# Patient Record
Sex: MALE | Race: WHITE | Employment: OTHER | URBAN - METROPOLITAN AREA
[De-identification: names, ages, dates, MRNs, and addresses within clinical notes are randomized per-mention and may not be internally consistent; named-entity substitution may affect disease eponyms.]

---

## 2017-08-02 RX ORDER — SILDENAFIL CITRATE 20 MG/1
TABLET ORAL
Qty: 30 TAB | Refills: 0 | Status: SHIPPED | OUTPATIENT
Start: 2017-08-02 | End: 2017-09-08

## 2017-08-16 PROBLEM — K21.9 GERD (GASTROESOPHAGEAL REFLUX DISEASE): Status: ACTIVE | Noted: 2017-08-16

## 2017-08-16 PROBLEM — K63.5 COLON POLYPS: Status: ACTIVE | Noted: 2017-08-16

## 2017-08-16 PROBLEM — Z79.899 ON STATIN THERAPY: Status: ACTIVE | Noted: 2017-08-16

## 2017-08-16 PROBLEM — M10.9 GOUT: Status: ACTIVE | Noted: 2017-08-16

## 2017-08-16 PROBLEM — N52.9 ED (ERECTILE DYSFUNCTION): Status: ACTIVE | Noted: 2017-08-16

## 2017-08-16 PROBLEM — Z23 ENCOUNTER FOR IMMUNIZATION: Status: ACTIVE | Noted: 2017-08-16

## 2017-08-16 PROBLEM — I10 HYPERTENSION: Status: ACTIVE | Noted: 2017-08-16

## 2017-08-16 PROBLEM — K13.21 LEUKOPLAKIA OF ORAL CAVITY: Status: ACTIVE | Noted: 2017-08-16

## 2017-08-16 PROBLEM — R06.00 DYSPNEA: Status: ACTIVE | Noted: 2017-08-16

## 2017-08-16 PROBLEM — R53.83 FATIGUE: Status: ACTIVE | Noted: 2017-08-16

## 2017-08-16 PROBLEM — E78.5 DYSLIPIDEMIA: Status: ACTIVE | Noted: 2017-08-16

## 2017-08-16 PROBLEM — I25.10 CAD (CORONARY ARTERY DISEASE): Status: ACTIVE | Noted: 2017-08-16

## 2017-08-16 PROBLEM — G45.9 TIA (TRANSIENT ISCHEMIC ATTACK): Status: ACTIVE | Noted: 2017-08-16

## 2017-08-16 RX ORDER — FUROSEMIDE 20 MG/1
TABLET ORAL DAILY
COMMUNITY
End: 2017-09-01 | Stop reason: SDUPTHER

## 2017-08-16 RX ORDER — ALBUTEROL SULFATE 90 UG/1
AEROSOL, METERED RESPIRATORY (INHALATION)
COMMUNITY
End: 2017-09-08

## 2017-09-01 RX ORDER — FUROSEMIDE 20 MG/1
TABLET ORAL
Qty: 30 TAB | Refills: 6 | Status: SHIPPED | OUTPATIENT
Start: 2017-09-01 | End: 2018-09-10

## 2017-09-06 RX ORDER — ATORVASTATIN CALCIUM 80 MG/1
TABLET, FILM COATED ORAL
Qty: 30 TAB | Refills: 6 | Status: SHIPPED | OUTPATIENT
Start: 2017-09-06 | End: 2017-12-08 | Stop reason: SDUPTHER

## 2017-09-08 ENCOUNTER — OFFICE VISIT (OUTPATIENT)
Dept: INTERNAL MEDICINE CLINIC | Age: 82
End: 2017-09-08

## 2017-09-08 VITALS
SYSTOLIC BLOOD PRESSURE: 174 MMHG | OXYGEN SATURATION: 95 % | HEIGHT: 68 IN | WEIGHT: 222 LBS | HEART RATE: 60 BPM | DIASTOLIC BLOOD PRESSURE: 88 MMHG | BODY MASS INDEX: 33.65 KG/M2

## 2017-09-08 DIAGNOSIS — M1A.00X0 IDIOPATHIC CHRONIC GOUT WITHOUT TOPHUS, UNSPECIFIED SITE: ICD-10-CM

## 2017-09-08 DIAGNOSIS — I10 ESSENTIAL HYPERTENSION: ICD-10-CM

## 2017-09-08 DIAGNOSIS — Z79.899 ON STATIN THERAPY: ICD-10-CM

## 2017-09-08 DIAGNOSIS — R53.83 FATIGUE, UNSPECIFIED TYPE: ICD-10-CM

## 2017-09-08 DIAGNOSIS — Z23 ENCOUNTER FOR IMMUNIZATION: ICD-10-CM

## 2017-09-08 DIAGNOSIS — E78.5 DYSLIPIDEMIA: ICD-10-CM

## 2017-09-08 DIAGNOSIS — I25.10 CORONARY ARTERY DISEASE INVOLVING NATIVE CORONARY ARTERY OF NATIVE HEART WITHOUT ANGINA PECTORIS: Primary | ICD-10-CM

## 2017-09-08 LAB
ALBUMIN SERPL-MCNC: 4.2 G/DL (ref 3.9–5.4)
ALKALINE PHOS POC: 124 U/L (ref 38–126)
ALT SERPL-CCNC: 39 U/L (ref 9–52)
AST SERPL-CCNC: 32 U/L (ref 14–36)
BACTERIA UA POCT, BACTPOCT: NORMAL
BILIRUB UR QL STRIP: NEGATIVE
BUN BLD-MCNC: 17 MG/DL (ref 9–20)
CALCIUM BLD-MCNC: 9.5 MG/DL (ref 8.4–10.2)
CASTS UA POCT: 0
CHLORIDE BLD-SCNC: 102 MMOL/L (ref 98–107)
CHOLEST SERPL-MCNC: 171 MG/DL (ref 0–200)
CK (CPK) POC: 50 U/L (ref 30–135)
CLUE CELLS, CLUEPOCT: NEGATIVE
CO2 POC: 28 MMOL/L (ref 22–32)
CREAT BLD-MCNC: 1.1 MG/DL (ref 0.8–1.5)
CRYSTALS UA POCT, CRYSPOCT: NORMAL
EGFR (POC): 62.2
EPITHELIAL CELLS POCT, EPITHPOCT: NORMAL
GLUCOSE POC: 105 MG/DL (ref 75–110)
GLUCOSE UR-MCNC: NEGATIVE MG/DL
GRAN# POC: 6.3 K/UL (ref 2–7.8)
GRAN% POC: 78.2 % (ref 37–92)
HCT VFR BLD CALC: 48.3 % (ref 37–51)
HDLC SERPL-MCNC: 36 MG/DL (ref 35–130)
HGB BLD-MCNC: 15.4 G/DL (ref 12–18)
KETONES P FAST UR STRIP-MCNC: NEGATIVE MG/DL
LDL CHOLESTEROL POC: 70.4 MG/DL (ref 0–130)
LY# POC: 1.3 K/UL (ref 0.6–4.1)
LY% POC: 17.1 % (ref 10–58.5)
MCH RBC QN: 32.5 PG (ref 26–32)
MCHC RBC-ENTMCNC: 31.9 G/DL (ref 30–36)
MCV RBC: 102 FL (ref 80–97)
MID #, POC: 0.3 K/UL (ref 0–1.8)
MID% POC: 4.7 % (ref 0.1–24)
MUCUS UA POCT, MUCPOCT: NORMAL
PH UR STRIP: 6 [PH] (ref 5–7)
PLATELET # BLD: 207 K/UL (ref 140–440)
POTASSIUM SERPL-SCNC: 5.1 MMOL/L (ref 3.6–5)
PROT SERPL-MCNC: 7.3 G/DL (ref 6.3–8.2)
PROTEIN,URINE POC: NEGATIVE MG/DL
RBC # BLD: 4.73 M/UL (ref 4.2–6.3)
RBC UA POCT, RBCPOCT: 0
SODIUM SERPL-SCNC: 145 MMOL/L (ref 137–145)
SP GR UR STRIP: 1.01 (ref 1.01–1.02)
TCHOL/HDL RATIO (POC): 4.8 (ref 0–4)
TOTAL BILIRUBIN POC: 0.8 MG/DL (ref 0.2–1.3)
TRICH UA POCT, TRICHPOC: NEGATIVE
TRIGL SERPL-MCNC: 323 MG/DL (ref 0–200)
UA UROBILINOGEN AMB POC: NORMAL (ref 0.2–1)
URINALYSIS CLARITY POC: CLEAR
URINALYSIS COLOR POC: NORMAL
URINE BLOOD POC: NEGATIVE
URINE LEUKOCYTES POC: NEGATIVE
URINE NITRITES POC: NEGATIVE
VLDLC SERPL CALC-MCNC: 64.6 MG/DL
WBC # BLD: 7.9 K/UL (ref 4.1–10.9)
WBC UA POCT, WBCPOCT: NORMAL
YEAST UA POCT, YEASTPOC: NEGATIVE

## 2017-09-08 RX ORDER — VALSARTAN 320 MG/1
320 TABLET ORAL DAILY
Qty: 30 TAB | Refills: 6 | Status: SHIPPED | OUTPATIENT
Start: 2017-09-08 | End: 2017-12-15 | Stop reason: SDUPTHER

## 2017-09-08 NOTE — MR AVS SNAPSHOT
Visit Information Date & Time Provider Department Dept. Phone Encounter #  
 9/8/2017  9:00 AM RAMA Mays MD [address] [place] [address] [facility] [phone] [id number] Follow-up Instructions Return in about 1 month (around 10/8/2017) for bp check, follow up. Upcoming Health Maintenance Date Due DTaP/Tdap/Td series (1 - Tdap) 10/8/1955 ZOSTER VACCINE AGE 60> 8/8/1994 GLAUCOMA SCREENING Q2Y 10/8/1999 MEDICARE YEARLY EXAM 10/8/1999 Pneumococcal 65+ Low/Medium Risk (2 of 2 - PPSV23) 1/1/2010 INFLUENZA AGE 9 TO ADULT 8/1/2017 Allergies as of 9/8/2017  Review Complete On: 9/8/2017 By: Arlet Ballard MD  
 No Known Allergies Current Immunizations  Reviewed on 11/9/2015 Name Date Influenza High Dose Vaccine PF 9/8/2017 Influenza Vaccine (Quad) PF 11/10/2015 11:04 AM  
 Pneumococcal Conjugate (PCV-13) 9/8/2017 Pneumococcal Vaccine (Unspecified Type) 1/1/2005 Not reviewed this visit You Were Diagnosed With   
  
 Codes Comments Coronary artery disease involving native coronary artery of native heart without angina pectoris    -  Primary ICD-10-CM: I25.10 ICD-9-CM: 414.01 Essential hypertension     ICD-10-CM: I10 
ICD-9-CM: 401.9 Dyslipidemia     ICD-10-CM: E78.5 ICD-9-CM: 272.4 On statin therapy     ICD-10-CM: Z79.899 ICD-9-CM: V58.69 Idiopathic chronic gout without tophus, unspecified site     ICD-10-CM: M1A. 00X0 ICD-9-CM: 274.02 Fatigue, unspecified type     ICD-10-CM: R53.83 ICD-9-CM: 780.79 Encounter for immunization     ICD-10-CM: Z20 ICD-9-CM: V03.89 Vitals BP Pulse Height(growth percentile) Weight(growth percentile) SpO2 BMI  
 174/88 (BP 1 Location: Left arm, BP Patient Position: Sitting) 60 5' 8\" (1.727 m) 222 lb (100.7 kg) 95% 33.75 kg/m2 Smoking Status Former Smoker Vitals History BMI and BSA Data Body Mass Index Body Surface Area 33.75 kg/m 2 2.2 m 2 [phone] Your Updated Medication List  
  
   
This list is accurate as of: 9/8/17 10:48 AM.  Always use your most recent med list.  
  
  
  
  
 allopurinol 300 mg tablet Commonly known as:  Vena Ditch Take  by mouth daily. amLODIPine 10 mg tablet Commonly known as:  Jones Matar Take 5 mg by mouth daily. aspirin 81 mg tablet Take 81 mg by mouth. atenolol 100 mg tablet Commonly known as:  TENORMIN Take 100 mg by mouth daily. atorvastatin 80 mg tablet Commonly known as:  LIPITOR  
TAKE ONE TABLET BY MOUTH EVERY DAY  
  
 diazePAM 2 mg tablet Commonly known as:  VALIUM Take 1 Tab by mouth every eight (8) hours as needed for Anxiety. Max Daily Amount: 6 mg.  
  
 docusate sodium 100 mg capsule Commonly known as:  Frida Parsley Take 1 Cap by mouth two (2) times a day. May use OTC instead. Prevents constipation from narcotics. FOLGARD RX 2.2-25-1 mg Tab Generic drug:  folic acid-vit H9-VVQ A00 TAKE ONE TABLET BY MOUTH DAILY  
  
 furosemide 20 mg tablet Commonly known as:  LASIX TAKE ONE TABLET BY MOUTH DAILY PREVACID PO Take  by mouth.  
  
 valsartan 320 mg tablet Commonly known as:  DIOVAN Take 1 Tab by mouth daily. Prescriptions Sent to Pharmacy Refills  
 valsartan (DIOVAN) 320 mg tablet 6 Sig: Take 1 Tab by mouth daily. Class: Normal  
 Pharmacy: [facility] [address] [place] [address] 108 Ph #: [phone] Route: Oral  
  
We Performed the Following ADMIN INFLUENZA VIRUS VAC [ [facility]] ADMIN PNEUMOCOCCAL VACCINE [ [facility]] AMB POC CK (CPK) [40095 CPT(R)] AMB POC COMPLETE CBC,AUTOMATED ENTER [id number] CPT(R)] AMB POC COMPREHENSIVE METABOLIC PANEL [53083 CPT(R)] AMB POC LIPID PROFILE [92493 CPT(R)] AMB POC URINALYSIS DIP STICK AUTO W/ MICRO  [77399 CPT(R)] INFLUENZA VIRUS VACCINE, HIGH DOSE SEASONAL, PRESERVATIVE FREE [79986 CPT(R)] PNEUMOCOCCAL CONJ VACCINE 13 VALENT IM [id number] CPT(R)] Follow-up Instructions Return in about 1 month (around 10/8/2017) for bp check, follow up. Patient Instructions Vaccine Information Statement Influenza (Flu) Vaccine (Inactivated or Recombinant): What you need to know Many Vaccine Information Statements are available in Thai and other languages. See www.immunize.org/vis Hojas de Información Sobre Vacunas están disponibles en Español y en muchos otros idiomas. Visite www.immunize.org/vis 1. Why get vaccinated? Influenza (flu) is a contagious disease that spreads around the [place] every year, usually between October and May. Flu is caused by influenza viruses, and is spread mainly by coughing, sneezing, and close contact. Anyone can get flu. Flu strikes suddenly and can last several days. Symptoms vary by age, but can include: 
 fever/chills  sore throat  muscle aches  fatigue  cough  headache  runny or stuffy nose Flu can also lead to pneumonia and blood infections, and cause diarrhea and seizures in children. If you have a medical condition, such as heart or lung disease, flu can make it worse. Flu is more dangerous for some people. Infants and young children, people 72years of age and older, pregnant women, and people with certain health conditions or a weakened immune system are at greatest risk. Each year thousands of people in the [facility] die from flu, and many more are hospitalized. Flu vaccine can: 
 keep you from getting flu, 
 make flu less severe if you do get it, and 
 keep you from spreading flu to your family and other people. 2. Inactivated and recombinant flu vaccines A dose of flu vaccine is recommended every flu season.  Children 6 months through 6years of age may need two doses during the same flu season. Everyone else needs only one dose each flu season. Some inactivated flu vaccines contain a very small amount of a mercury-based preservative called thimerosal. Studies have not shown thimerosal in vaccines to be harmful, but flu vaccines that do not contain thimerosal are available. There is no live flu virus in flu shots. They cannot cause the flu. There are many flu viruses, and they are always changing. Each year a new flu vaccine is made to protect against three or four viruses that are likely to cause disease in the upcoming flu season. But even when the vaccine doesnt exactly match these viruses, it may still provide some protection Flu vaccine cannot prevent: 
 flu that is caused by a virus not covered by the vaccine, or 
 illnesses that look like flu but are not. It takes about 2 weeks for protection to develop after vaccination, and protection lasts through the flu season. 3. Some people should not get this vaccine Tell the person who is giving you the vaccine:  If you have any severe, life-threatening allergies. If you ever had a life-threatening allergic reaction after a dose of flu vaccine, or have a severe allergy to any part of this vaccine, you may be advised not to get vaccinated. Most, but not all, types of flu vaccine contain a small amount of egg protein.  If you ever had Guillain-Barré Syndrome (also called GBS). Some people with a history of GBS should not get this vaccine. This should be discussed with your doctor.  If you are not feeling well. It is usually okay to get flu vaccine when you have a mild illness, but you might be asked to come back when you feel better. 4. Risks of a vaccine reaction With any medicine, including vaccines, there is a chance of reactions. These are usually mild and go away on their own, but serious reactions are also possible. Most people who get a flu shot do not have any problems with it. Minor problems following a flu shot include:  
 soreness, redness, or swelling where the shot was given  hoarseness  sore, red or itchy eyes  cough  fever  aches  headache  itching  fatigue If these problems occur, they usually begin soon after the shot and last 1 or 2 days. More serious problems following a flu shot can include the following:  There may be a small increased risk of Guillain-Barré Syndrome (GBS) after inactivated flu vaccine. This risk has been estimated at 1 or 2 additional cases per million people vaccinated. This is much lower than the risk of severe complications from flu, which can be prevented by flu vaccine.  Young children who get the flu shot along with pneumococcal vaccine (PCV13) and/or DTaP vaccine at the same time might be slightly more likely to have a seizure caused by fever. Ask your doctor for more information. Tell your doctor if a child who is getting flu vaccine has ever had a seizure. Problems that could happen after any injected vaccine:  People sometimes faint after a medical procedure, including vaccination. Sitting or lying down for about 15 minutes can help prevent fainting, and injuries caused by a fall. Tell your doctor if you feel dizzy, or have vision changes or ringing in the ears.  Some people get severe pain in the shoulder and have difficulty moving the arm where a shot was given. This happens very rarely.  Any medication can cause a severe allergic reaction. Such reactions from a vaccine are very rare, estimated at about 1 in a million doses, and would happen within a few minutes to a few hours after the vaccination. As with any medicine, there is a very remote chance of a vaccine causing a serious injury or death. The safety of vaccines is always being monitored.  For more information, visit: www.cdc.gov/vaccinesafety/ 
 
 
The [facility] Vaccine Injury Compensation Program (VICP) is a federal program that was created to compensate people who may have been injured by certain vaccines. Persons who believe they may have been injured by a vaccine can learn about the program and about filing a claim by calling 8-218.610.3464 or visiting the [address] [place] [address] website at [email or web address]/vaccinecompensation. There is a time limit to file a claim for compensation. 7. How can I learn more?  Ask your healthcare provider. He or she can give you the vaccine package insert or suggest other sources of information.  Call your local or state health department.  Contact the Centers for Disease Control and Prevention (CDC): 
- Call 8-188.363.2122 (1-800-CDC-INFO) or 
- Visit CDCs website at www.cdc.gov/flu Vaccine Information Statement Inactivated Influenza Vaccine 8/7/2015 
[address] 645VT-16 Department of Health and [place] Centers for Disease Control and Prevention Office Use Only Vaccine Information Statement Pneumococcal Conjugate Vaccine (PCV13): What You Need to Know Many Vaccine Information Statements are available in Grenadian and other languages. See www.immunize.org/vis. Hojas de información Sobre Vacunas están disponibles en español y en muchos otros idiomas. Visite www.immunize.org/vis. 1. Why get vaccinated? Vaccination can protect both children and adults from pneumococcal disease. Pneumococcal disease is caused by bacteria that can spread from person to person through close contact. It can cause ear infections, and it can also lead to more serious infections of the: 
 Lungs (pneumonia),  Blood (bacteremia), and 
 Covering of the brain and spinal cord (meningitis). Pneumococcal pneumonia is most common among adults. Pneumococcal meningitis can cause deafness and brain damage, and it kills about 1 child in 10 who get it. Anyone can get pneumococcal disease, but children under 3years of age and adults 72 years and older, people with certain medical conditions, and cigarette smokers are at the highest risk. Before there was a vaccine, the [facility] saw: 
 more than 700 cases of meningitis, 
 about 13,000 blood infections, 
 about 5 million ear infections, and 
 about 200 deaths 
in children under 5 each year from pneumococcal disease. Since vaccine became available, severe pneumococcal disease in these children has fallen by 88%. About 18,000 older adults die of pneumococcal disease each year in the [place]. Treatment of pneumococcal infections with penicillin and other drugs is not as effective as it used to be, because some strains of the disease have become resistant to these drugs. This makes prevention of the disease, through vaccination, even more important. 2. PCV13 vaccine Pneumococcal conjugate vaccine (called PCV13) protects against 13 types of pneumococcal bacteria. PCV13 is routinely given to children at 2, 4, 6, and 1515 months of age. It is also recommended for children and adults 3to 59years of age with certain health conditions, and for all adults 72years of age and older. Your doctor can give you details. 3. Some people should not get this vaccine Anyone who has ever had a life-threatening allergic reaction to a dose of this vaccine, to an earlier pneumococcal vaccine called PCV7, or to any vaccine containing diphtheria toxoid (for example, DTaP), should not get PCV13. Anyone with a severe allergy to any component of PCV13 should not get the vaccine. Tell your doctor if the person being vaccinated has any severe allergies. If the person scheduled for vaccination is not feeling well, your healthcare provider might decide to reschedule the shot on another day. 4. Risks of a vaccine reaction With any medicine, including vaccines, there is a chance of reactions. These are usually mild and go away on their own, but serious reactions are also possible. Problems reported following PCV13 varied by age and dose in the series. The most common problems reported among children were:  About half became drowsy after the shot, had a temporary loss of appetite, or had redness or tenderness where the shot was given.  About 1 out of 3 had swelling where the shot was given.  About 1 out of 3 had a mild fever, and about 1 in 20 had a fever over 102.2°F. 
 Up to about 8 out of 10 became fussy or irritable. Adults have reported pain, redness, and swelling where the shot was given; also mild fever, fatigue, headache, chills, or muscle pain. Angelia Oliveira children who get PCV13 along with inactivated flu vaccine at the same time may be at increased risk for seizures caused by fever. Ask your doctor for more information. Problems that could happen after any vaccine:  People sometimes faint after a medical procedure, including vaccination. Sitting or lying down for about 15 minutes can help prevent fainting, and injuries caused by a fall. Tell your doctor if you feel dizzy, or have vision changes or ringing in the ears.  Some older children and adults get severe pain in the shoulder and have difficulty moving the arm where a shot was given. This happens very rarely.  Any medication can cause a severe allergic reaction. Such reactions from a vaccine are very rare, estimated at about 1 in a million doses, and would happen within a few minutes to a few hours after the vaccination. As with any medicine, there is a very small chance of a vaccine causing a serious injury or death. The safety of vaccines is always being monitored. For more information, visit: www.cdc.gov/vaccinesafety/  
 
5. What if there is a serious reaction? What should I look for?  Look for anything that concerns you, such as signs of a severe allergic reaction, very high fever, or unusual behavior. Signs of a severe allergic reaction can include hives, swelling of the face and throat, difficulty breathing, a fast heartbeat, dizziness, and weakness  usually within a few minutes to a few hours after the vaccination. What should I do?  If you think it is a severe allergic reaction or other emergency that cant wait, call 9-1-1 or get the person to the nearest hospital. Otherwise, call your doctor. Reactions should be reported to the Vaccine Adverse Event Reporting System (VAERS). Your doctor should file this report, or you can do it yourself through the VAERS web site at www.vaers. hhs.gov, or by calling 9-878.787.9909. VAERS does not give medical advice. 6. The National Vaccine Injury Compensation Program 
 
The [facility] Vaccine Injury Compensation Program (VICP) is a federal program that was created to compensate people who may have been injured by certain vaccines.  
 
Persons who believe they may have been injured by a vaccine can learn about the program and about filing a claim by calling 7-292.570.7103 or visiting the [address] [facility] website at www.[facility].gov/vaccinecompensation. There is a time limit to file a claim for compensation. 7. How can I learn more?  Ask your healthcare provider. He or she can give you the vaccine package insert or suggest other sources of information.  Call your local or state health department.  Contact the Centers for Disease Control and Prevention (CDC): 
- Call 5-646.333.1372 (1-800-CDC-INFO) or 
- Visit CDCs website at www.cdc.gov/vaccines Vaccine Information Statement PCV13 Vaccine 11/5/2015  
42 [address] [id number]-02 [facility] and [place] Centers for Disease Control and Prevention Office Use Only Introducing [facility]! Kat[employer] introduces [employer] patient portal. Now you can access parts of your medical record, email your doctor's office, and request medication refills online. 1. In your internet browser, go to [email or web address] 2. Click on the First Time User? Click Here link in the Sign In box. You will see the New Member Sign Up page. 3. Enter your [employer] Access Code exactly as it appears below. You will not need to use this code after youve completed the sign-up process. If you do not sign up before the expiration date, you must request a new code. · [employer] Access Code: [email or web address] Expires: 12/7/2017  8:53 AM 
 
4. Enter the last four digits of your Social Security Number (xxxx) and Date of Birth (mm/dd/yyyy) as indicated and click Submit. You will be taken to the next sign-up page. 5. Create a [employer] ID. This will be your [employer] login ID and cannot be changed, so think of one that is secure and easy to remember. 6. Create a [employer] password. You can change your password at any time. 7. Enter your Password Reset Question and Answer. This can be used at a later time if you forget your password. 8. Enter your e-mail address. You will receive e-mail notification when new information is available in [address] 9. Click Sign Up. You can now view and download portions of your medical record. 10. Click the Download Summary menu link to download a portable copy of your medical information. If you have questions, please visit the Frequently Asked Questions section of the [facility] website. Remember, [facility] [employer] [facility] is NOT to be used for urgent needs. For medical emergencies, dial 911. Now available from your iPhone and Android! Please provide this summary of care documentation to your next provider. Your primary care clinician is listed as RAMA Calhoun. If you have any questions after today's visit, please call [phone].

## 2017-09-08 NOTE — PROGRESS NOTES
Chelsea Singh is a 80 y.o. male presenting for Blood Pressure Check (rm 3 - 6 mo follow up - had coffee with cream and sugar) and Cholesterol Problem  . 1. Have you been to the ER, urgent care clinic since your last visit? Hospitalized since your last visit? No    2. Have you seen or consulted any other health care providers outside of the [address] [place] [address] since your last visit? Include any pap smears or colon screening.  No

## 2017-09-08 NOTE — PATIENT INSTRUCTIONS
Vaccine Information Statement    Influenza (Flu) Vaccine (Inactivated or Recombinant): What you need to know    Many Vaccine Information Statements are available in Portuguese and other languages. See www.immunize.org/vis  Hojas de Información Sobre Vacunas están disponibles en Español y en muchos otros idiomas. Visite www.immunize.org/vis    1. Why get vaccinated? Influenza (flu) is a contagious disease that spreads around the [place] every year, usually between October and May. Flu is caused by influenza viruses, and is spread mainly by coughing, sneezing, and close contact. Anyone can get flu. Flu strikes suddenly and can last several days. Symptoms vary by age, but can include:   fever/chills   sore throat   muscle aches   fatigue   cough   headache    runny or stuffy nose    Flu can also lead to pneumonia and blood infections, and cause diarrhea and seizures in children. If you have a medical condition, such as heart or lung disease, flu can make it worse. Flu is more dangerous for some people. Infants and young children, people 72years of age and older, pregnant women, and people with certain health conditions or a weakened immune system are at greatest risk. Each year thousands of people in the [facility] die from flu, and many more are hospitalized. Flu vaccine can:   keep you from getting flu,   make flu less severe if you do get it, and   keep you from spreading flu to your family and other people. 2. Inactivated and recombinant flu vaccines    A dose of flu vaccine is recommended every flu season. Children 6 months through 6years of age may need two doses during the same flu season. Everyone else needs only one dose each flu season.        Some inactivated flu vaccines contain a very small amount of a mercury-based preservative called thimerosal. Studies have not shown thimerosal in vaccines to be harmful, but flu vaccines that do not contain thimerosal are available. There is no live flu virus in flu shots. They cannot cause the flu. There are many flu viruses, and they are always changing. Each year a new flu vaccine is made to protect against three or four viruses that are likely to cause disease in the upcoming flu season. But even when the vaccine doesnt exactly match these viruses, it may still provide some protection    Flu vaccine cannot prevent:   flu that is caused by a virus not covered by the vaccine, or   illnesses that look like flu but are not. It takes about 2 weeks for protection to develop after vaccination, and protection lasts through the flu season. 3. Some people should not get this vaccine    Tell the person who is giving you the vaccine:     If you have any severe, life-threatening allergies. If you ever had a life-threatening allergic reaction after a dose of flu vaccine, or have a severe allergy to any part of this vaccine, you may be advised not to get vaccinated. Most, but not all, types of flu vaccine contain a small amount of egg protein.  If you ever had Guillain-Barré Syndrome (also called GBS). Some people with a history of GBS should not get this vaccine. This should be discussed with your doctor.  If you are not feeling well. It is usually okay to get flu vaccine when you have a mild illness, but you might be asked to come back when you feel better. 4. Risks of a vaccine reaction    With any medicine, including vaccines, there is a chance of reactions. These are usually mild and go away on their own, but serious reactions are also possible. Most people who get a flu shot do not have any problems with it.      Minor problems following a flu shot include:    soreness, redness, or swelling where the shot was given     hoarseness   sore, red or itchy eyes   cough   fever   aches   headache   itching   fatigue  If these problems occur, they usually begin soon after the shot and last 1 or 2 days. More serious problems following a flu shot can include the following:     There may be a small increased risk of Guillain-Barré Syndrome (GBS) after inactivated flu vaccine. This risk has been estimated at 1 or 2 additional cases per million people vaccinated. This is much lower than the risk of severe complications from flu, which can be prevented by flu vaccine.  Young children who get the flu shot along with pneumococcal vaccine (PCV13) and/or DTaP vaccine at the same time might be slightly more likely to have a seizure caused by fever. Ask your doctor for more information. Tell your doctor if a child who is getting flu vaccine has ever had a seizure. Problems that could happen after any injected vaccine:      People sometimes faint after a medical procedure, including vaccination. Sitting or lying down for about 15 minutes can help prevent fainting, and injuries caused by a fall. Tell your doctor if you feel dizzy, or have vision changes or ringing in the ears.  Some people get severe pain in the shoulder and have difficulty moving the arm where a shot was given. This happens very rarely.  Any medication can cause a severe allergic reaction. Such reactions from a vaccine are very rare, estimated at about 1 in a million doses, and would happen within a few minutes to a few hours after the vaccination. As with any medicine, there is a very remote chance of a vaccine causing a serious injury or death. The safety of vaccines is always being monitored. For more information, visit: www.cdc.gov/vaccinesafety/    5. What if there is a serious reaction? What should I look for?  Look for anything that concerns you, such as signs of a severe allergic reaction, very high fever, or unusual behavior.     Signs of a severe allergic reaction can include hives, swelling of the face and throat, difficulty breathing, a fast heartbeat, dizziness, and weakness  usually within a few minutes to a few hours after the vaccination. What should I do?  If you think it is a severe allergic reaction or other emergency that cant wait, call 9-1-1 and get the person to the nearest hospital. Otherwise, call your doctor.  Reactions should be reported to the Vaccine Adverse Event Reporting System (VAERS). Your doctor should file this report, or you can do it yourself through  the VAERS web site at www.vaers. [facility].gov, or by calling 9-450.346.6257. VAERS does not give medical advice. 6. The National Vaccine Injury Compensation Program    The [facility] Vaccine Injury Compensation Program (VICP) is a federal program that was created to compensate people who may have been injured by certain vaccines. Persons who believe they may have been injured by a vaccine can learn about the program and about filing a claim by calling 2-239.999.7887 or visiting the [employer] website at www.[facility].gov/vaccinecompensation. There is a time limit to file a claim for compensation. 7. How can I learn more?  Ask your healthcare provider. He or she can give you the vaccine package insert or suggest other sources of information.  Call your local or state health department.  Contact the Centers for Disease Control and Prevention (CDC):  - Call 4-100.885.9996 (1-800-CDC-INFO) or  - Visit CDCs website at www.cdc.gov/flu    Vaccine Information Statement   Inactivated Influenza Vaccine   8/7/2015  [address] [place] [id number]    Department of Health and Human Services  Centers for Disease Control and Prevention    Office Use Only    Vaccine Information Statement     Pneumococcal Conjugate Vaccine (PCV13): What You Need to Know    Many Vaccine Information Statements are available in [place]ien and other languages. See www.immunize.org/vis. Hojas de información Sobre Vacunas están disponibles en español y en muchos otros idiomas. Visite www.immunize.org/vis. 1. Why get vaccinated?     Vaccination can protect both children and adults from pneumococcal disease. Pneumococcal disease is caused by bacteria that can spread from person to person through close contact. It can cause ear infections, and it can also lead to more serious infections of the:   Lungs (pneumonia),   Blood (bacteremia), and   Covering of the brain and spinal cord (meningitis). Pneumococcal pneumonia is most common among adults. Pneumococcal meningitis can cause deafness and brain damage, and it kills about 1 child in 10 who get it. Anyone can get pneumococcal disease, but children under 3years of age and adults 72 years and older, people with certain medical conditions, and cigarette smokers are at the highest risk. Before there was a vaccine, the [facility] saw:   more than 700 cases of meningitis,   about 13,000 blood infections,   about 5 million ear infections, and   about 200 deaths  in children under 5 each year from pneumococcal disease. Since vaccine became available, severe pneumococcal disease in these children has fallen by 88%. About 18,000 older adults die of pneumococcal disease each year in the [place]. Treatment of pneumococcal infections with penicillin and other drugs is not as effective as it used to be, because some strains of the disease have become resistant to these drugs. This makes prevention of the disease, through vaccination, even more important. 2. PCV13 vaccine    Pneumococcal conjugate vaccine (called PCV13) protects against 13 types of pneumococcal bacteria. PCV13 is routinely given to children at 2, 4, 6, and 1515 months of age. It is also recommended for children and adults 3to 59years of age with certain health conditions, and for all adults 72years of age and older. Your doctor can give you details.     3. Some people should not get this vaccine    Anyone who has ever had a life-threatening allergic reaction to a dose of this vaccine, to an earlier pneumococcal vaccine called PCV7, or to any vaccine containing diphtheria toxoid (for example, DTaP), should not get PCV13. Anyone with a severe allergy to any component of PCV13 should not get the vaccine. Tell your doctor if the person being vaccinated has any severe allergies. If the person scheduled for vaccination is not feeling well, your healthcare provider might decide to reschedule the shot on another day. 4. Risks of a vaccine reaction    With any medicine, including vaccines, there is a chance of reactions. These are usually mild and go away on their own, but serious reactions are also possible. Problems reported following PCV13 varied by age and dose in the series. The most common problems reported among children were:    About half became drowsy after the shot, had a temporary loss of appetite, or had redness or tenderness where the shot was given.  About 1 out of 3 had swelling where the shot was given.  About 1 out of 3 had a mild fever, and about 1 in 20 had a fever over 102.2°F.   Up to about 8 out of 10 became fussy or irritable. Adults have reported pain, redness, and swelling where the shot was given; also mild fever, fatigue, headache, chills, or muscle pain. [place] children who get PCV13 along with inactivated flu vaccine at the same time may be at increased risk for seizures caused by fever. Ask your doctor for more information. Problems that could happen after any vaccine:     People sometimes faint after a medical procedure, including vaccination. Sitting or lying down for about 15 minutes can help prevent fainting, and injuries caused by a fall. Tell your doctor if you feel dizzy, or have vision changes or ringing in the ears.  Some older children and adults get severe pain in the shoulder and have difficulty moving the arm where a shot was given. This happens very rarely.  Any medication can cause a severe allergic reaction.  Such reactions from a vaccine are very rare, estimated at about 1 in a million doses, and would happen within a few minutes to a few hours after the vaccination. As with any medicine, there is a very small chance of a vaccine causing a serious injury or death. The safety of vaccines is always being monitored. For more information, visit: www.cdc.gov/vaccinesafety/     5. What if there is a serious reaction? What should I look for?  Look for anything that concerns you, such as signs of a severe allergic reaction, very high fever, or unusual behavior. Signs of a severe allergic reaction can include hives, swelling of the face and throat, difficulty breathing, a fast heartbeat, dizziness, and weakness  usually within a few minutes to a few hours after the vaccination. What should I do?  If you think it is a severe allergic reaction or other emergency that cant wait, call 9-1-1 or get the person to the nearest hospital. Otherwise, call your doctor. Reactions should be reported to the Vaccine Adverse Event Reporting System (VAERS). Your doctor should file this report, or you can do it yourself through the VAERS web site at www.vaers. [facility].gov, or by calling 2-288.561.5108. VAERS does not give medical advice. 6. The National Vaccine Injury Compensation Program    The [facility] Vaccine Injury Compensation Program (VICP) is a federal program that was created to compensate people who may have been injured by certain vaccines. Persons who believe they may have been injured by a vaccine can learn about the program and about filing a claim by calling 3-884.112.6135 or visiting the [employer] website at www.[facility].gov/vaccinecompensation. There is a time limit to file a claim for compensation. 7. How can I learn more?  Ask your healthcare provider. He or she can give you the vaccine package insert or suggest other sources of information.  Call your local or state health department.    Contact the Centers for Disease Control and Prevention (CDC):  - Call 5-408.414.1264 (1-800-CDC-INFO) or  - Visit CDCs website at www.cdc.gov/vaccines    Vaccine Information Statement   PCV13 Vaccine   11/5/2015   [address] [id number]    Department of Health and Human Services  Centers for Disease Control and Prevention    Office Use Only

## 2017-09-08 NOTE — PROGRESS NOTES
This note will not be viewable in [address] Teresa Kumar is a 80 y.o. male and presents with Blood Pressure Check (Rm 3 - 6 mo follow up); Cholesterol Problem; and Cough (X 3- 4 mo - clear mucous)  . Subjective:    Mr. Gaby Gutierres presents today for follow-up of hypertension hyperlipidemia and a cough. The cough is been largely nonproductive with clear mucus over the past 3-4 months. He denies any pleuritic chest pain or shortness of breath. He has had no chest pain congestion coryza PND orthopnea or pedal edema. 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 30 Tab 6    atorvastatin (LIPITOR) 80 mg tablet TAKE ONE TABLET BY MOUTH EVERY DAY 30 Tab 6    furosemide (LASIX) 20 mg tablet TAKE ONE TABLET BY MOUTH DAILY 30 Tab 6    FOLGARD RX 2.2-25-1 mg tab TAKE ONE TABLET BY MOUTH DAILY 30 Tab 6    aspirin 81 mg tablet Take 81 mg by mouth.  amlodipine (NORVASC) 10 mg tablet Take 5 mg by mouth daily.  LANSOPRAZOLE (PREVACID PO) Take  by mouth.       allopurinol (ZYLOPRIM) 300 mg tablet Take  by mouth daily.      atenolol (TENORMIN) 100 mg tablet Take 100 mg by mouth daily.  docusate sodium (COLACE) 100 mg capsule Take 1 Cap by mouth two (2) times a day. May use OTC instead. Prevents constipation from narcotics. 60 Cap 0    diazepam (VALIUM) 2 mg tablet Take 1 Tab by mouth every eight (8) hours as needed for Anxiety. Max Daily Amount: 6 mg. 20 Tab 0     Social History     Social History    Marital status:      Spouse name: N/A    Number of children: N/A    Years of education: N/A     Social History Main Topics    Smoking status: Former Smoker    Smokeless tobacco: Former User    Alcohol use 8.4 oz/week     14 Cans of beer per week    Drug use: No    Sexual activity: Not Asked     Other Topics Concern    None     Social History Narrative     History reviewed. No pertinent family history. Review of Systems  Constitutional:  negative for fevers, chills, anorexia and weight loss  Eyes:    negative for visual disturbance and irritation  ENT:    negative for tinnitus,sore throat,nasal congestion,ear pains. hoarseness  Respiratory:     negative for hemoptysis, dyspnea,wheezing  CV:    negative for chest pain, palpitations, lower extremity edema  GI:    negative for nausea, vomiting, diarrhea, abdominal pain,melena  Endo:               negative for polyuria,polydipsia,polyphagia,heat intolerance  Genitourinary : negative for frequency, dysuria and hematuria  Integumentary: negative for rash and pruritus  Hematologic:   negative for easy bruising and gum/nose bleeding  Musculoskel:  negative for myalgias, arthralgias, back pain, muscle weakness, joint pain  Neurological:   negative for headaches, dizziness, vertigo, memory problems and gait   Behavl/Psych:  negative for feelings of anxiety, depression, mood changes  ROS otherwise negative      Objective:  Visit Vitals    /88 (BP 1 Location: Left arm, BP Patient Position: Sitting)    Pulse 60    Ht 5' 8\" (1.727 m)    Wt 222 lb (100.7 kg)  SpO2 95%    BMI 33.75 kg/m2     Physical Exam:   General appearance - alert, well appearing, and in no distress  Mental status - alert, oriented to person, place, and time  EYE-MAURICE, EOMI, fundi normal, corneas normal, no foreign bodies  ENT-ENT exam normal, no neck nodes or sinus tenderness  Nose -mildly erythematous and boggy  Mouth - mucous membranes moist, pharynx normal without lesions  Neck - supple, no significant adenopathy   Chest - clear to auscultation, no wheezes, rales or rhonchi, symmetric air entry   Heart - normal rate, regular rhythm, normal S1, S2, no murmurs, rubs, clicks or gallops   Abdomen - soft, nontender, nondistended, no masses or organomegaly  Lymph- no adenopathy palpable  Ext-peripheral pulses normal, no pedal edema, no clubbing or cyanosis  Skin-Warm and dry. no hyperpigmentation, vitiligo, or suspicious lesions  Neuro -alert, oriented, normal speech, no focal findings or movement disorder noted      Assessment/Plan:  Diagnoses and all orders for this visit:    1. Coronary artery disease involving native coronary artery of native heart without angina pectoris    2. Essential hypertension  -     AMB POC COMPREHENSIVE METABOLIC PANEL  -     AMB POC URINALYSIS DIP STICK AUTO W/ MICRO     3. Dyslipidemia  -     AMB POC LIPID PROFILE    4. On statin therapy  -     AMB POC CK (CPK)    5. Idiopathic chronic gout without tophus, unspecified site    6. Fatigue, unspecified type  -     AMB POC COMPLETE CBC,AUTOMATED ENTER    7. Encounter for immunization  -     Influenza virus vaccine ([address]) 72 years and older (11483)  -     Pneumococcal Conjugate vaccine - 13 valent (50 years and older)  -     Administration fee () for Medicare insured patients  -     [address] Medicare Injection Admin Charge    Other orders  -     valsartan (DIOVAN) 320 mg tablet; Take 1 Tab by mouth daily. ICD-10-CM ICD-9-CM    1.  Coronary artery disease involving native coronary artery of native heart without angina pectoris I25.10 414.01    2. Essential hypertension I10 401.9 AMB POC COMPREHENSIVE METABOLIC PANEL      AMB POC URINALYSIS DIP STICK AUTO W/ MICRO    3. Dyslipidemia E78.5 272.4 AMB POC LIPID PROFILE   4. On statin therapy Z79.899 V58.69 AMB POC CK (CPK)   5. Idiopathic chronic gout without tophus, unspecified site M1A. 00X0 274.02    6. Fatigue, unspecified type R53.83 780.79 AMB POC COMPLETE CBC,AUTOMATED ENTER   7. Encounter for immunization Z23 V03.89 INFLUENZA VIRUS VACCINE, HIGH DOSE SEASONAL, PRESERVATIVE FREE      PNEUMOCOCCAL CONJ VACCINE 13 VALENT IM      ADMIN INFLUENZA VIRUS VAC      ADMIN PNEUMOCOCCAL VACCINE   Plan:    Mr. Grady De Leon blood pressure is elevated today. The enalapril that he is on may be contributing to his cough. However he does have some postnasal drainage and this may be the cause as well. He had previous in office spirometry the did not demonstrate findings consistent with COPD. Will discontinue enalapril and switch to valsartan 320 mg daily. Follow-up blood pressure check in 1 month. Further recommendations based on lab results. Follow-up Disposition:  Return in about 1 month (around 10/8/2017) for bp check, follow up. I have reviewed with the patient details of the assessment and plan and all questions were answered. Relevent patient education was performed. Verbal and/or written instructions (see AVS) provided. The most recent lab findings were reviewed with the patient. An After Visit Summary was printed and given to the patient.     Yissel White MD

## 2017-10-10 ENCOUNTER — OFFICE VISIT (OUTPATIENT)
Dept: INTERNAL MEDICINE CLINIC | Age: 82
End: 2017-10-10

## 2017-10-10 VITALS
WEIGHT: 226 LBS | SYSTOLIC BLOOD PRESSURE: 140 MMHG | DIASTOLIC BLOOD PRESSURE: 90 MMHG | HEART RATE: 73 BPM | HEIGHT: 68 IN | BODY MASS INDEX: 34.25 KG/M2

## 2017-10-10 DIAGNOSIS — I10 ESSENTIAL HYPERTENSION: Primary | ICD-10-CM

## 2017-10-10 DIAGNOSIS — R05.9 COUGH: ICD-10-CM

## 2017-10-10 DIAGNOSIS — N52.9 ERECTILE DYSFUNCTION, UNSPECIFIED ERECTILE DYSFUNCTION TYPE: ICD-10-CM

## 2017-10-10 RX ORDER — SILDENAFIL CITRATE 20 MG/1
20 TABLET ORAL 3 TIMES DAILY
Qty: 30 TAB | Refills: 1 | Status: SHIPPED | OUTPATIENT
Start: 2017-10-10 | End: 2018-09-10

## 2017-10-10 NOTE — PROGRESS NOTES
This note will not be viewable in [address] Miladis Farooq is a 80 y.o. male and presents with Blood Pressure Check (Rm 2 - continues to cough)  . Subjective:  Mr. Annemarie Lipscomb presents today for follow-up blood pressure check. Blood pressure remains elevated despite his current regimen. His pedal edema persist and has not improved. He notes his cough has persisted to but states that his wife has had this as well as a couple of neighbors. The cough is now largely nonproductive. He has no shortness of breath or wheezing with this. 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 30 Tab 1    valsartan (DIOVAN) 320 mg tablet Take 1 Tab by mouth daily. 30 Tab 6    atorvastatin (LIPITOR) 80 mg tablet TAKE ONE TABLET BY MOUTH EVERY DAY 30 Tab 6    furosemide (LASIX) 20 mg tablet TAKE ONE TABLET BY MOUTH DAILY 30 Tab 6    FOLGARD RX 2.2-25-1 mg tab TAKE ONE TABLET BY MOUTH DAILY 30 Tab 6    docusate sodium (COLACE) 100 mg capsule Take 1 Cap by mouth two (2) times a day. May use OTC instead.  Prevents constipation from narcotics. 60 Cap 0    diazepam (VALIUM) 2 mg tablet Take 1 Tab by mouth every eight (8) hours as needed for Anxiety. Max Daily Amount: 6 mg. 20 Tab 0    aspirin 81 mg tablet Take 81 mg by mouth.  amlodipine (NORVASC) 10 mg tablet Take 5 mg by mouth daily.  LANSOPRAZOLE (PREVACID PO) Take  by mouth.  allopurinol (ZYLOPRIM) 300 mg tablet Take  by mouth daily.  atenolol (TENORMIN) 100 mg tablet Take 100 mg by mouth daily. Social History     Social History    Marital status:      Spouse name: N/A    Number of children: N/A    Years of education: N/A     Social History Main Topics    Smoking status: Former Smoker    Smokeless tobacco: Former User    Alcohol use 8.4 oz/week     14 Cans of beer per week    Drug use: No    Sexual activity: Not Asked     Other Topics Concern    None     Social History Narrative     History reviewed. No pertinent family history. Review of Systems  Constitutional:  negative for fevers, chills, anorexia and weight loss  Eyes:    negative for visual disturbance and irritation  ENT:    negative for tinnitus,sore throat,nasal congestion,ear pains. hoarseness  Respiratory:     negative for  hemoptysis, dyspnea,wheezing  CV:    negative for chest pain, palpitations, lower extremity edema  GI:    negative for nausea, vomiting, diarrhea, abdominal pain,melena  Endo:               negative for polyuria,polydipsia,polyphagia,heat intolerance  Genitourinary : negative for frequency, dysuria and hematuria  Integumentary: negative for rash and pruritus  Hematologic:   negative for easy bruising and gum/nose bleeding  Musculoskel:  negative for myalgias, arthralgias, back pain, muscle weakness, joint pain  Neurological:   negative for headaches, dizziness, vertigo, memory problems and gait   Behavl/Psych:  negative for feelings of anxiety, depression, mood changes  ROS otherwise negative      Objective:  Visit Vitals    /90 (BP 1 Location: Left arm, BP Patient Position: Sitting)    Pulse 73    Ht 5' 8\" (1.727 m)    Wt 226 lb (102.5 kg)    BMI 34.36 kg/m2     Physical Exam:   General appearance - alert, well appearing, and in no distress  Mental status - alert, oriented to person, place, and time  EYE-MAURICE, EOMI, fundi normal, corneas normal, no foreign bodies  ENT-ENT exam normal, no neck nodes or sinus tenderness  Nose - normal and patent, no erythema, discharge or polyps  Mouth - mucous membranes moist, pharynx normal without lesions  Neck - supple, no significant adenopathy   Chest - clear to auscultation, no wheezes, rales or rhonchi, symmetric air entry   Heart - normal rate, regular rhythm, normal S1, S2, no murmurs, rubs, clicks or gallops   Abdomen - soft, nontender, nondistended, no masses or organomegaly  Lymph- no adenopathy palpable  Ext-peripheral pulses normal, 2+ pedal edema, no clubbing or cyanosis  Skin-Warm and dry. no hyperpigmentation, vitiligo, or suspicious lesions  Neuro -alert, oriented, normal speech, no focal findings or movement disorder noted      Assessment/Plan:  Diagnoses and all orders for this visit:    1. Essential hypertension    2. Cough    3. Erectile dysfunction, unspecified erectile dysfunction type    Other orders  -     sildenafil (REVATIO) 20 mg tablet; Take 1 Tab by mouth three (3) times daily. ICD-10-CM ICD-9-CM    1. Essential hypertension I10 401.9    2. Cough R05 786.2    3. Erectile dysfunction, unspecified erectile dysfunction type N52.9 607.84        Plan:    Blood pressures improved overall on current regimen including valsartan, Lasix, amlodipine, and atenolol. Will continue to monitor blood pressure on current regimen. Follow low-sodium diet. Pedal edema may improve as well and will be monitored. Cough has persisted but workup including in office spirometry and  chest x-ray have been negative. Follow-up Disposition:  Return in about 6 weeks (around 11/21/2017).     I have reviewed with the patient details of the assessment and plan and all questions were answered. Relevent patient education was performed. Verbal and/or written instructions (see AVS) provided. The most recent lab findings were reviewed with the patient. Plan was discussed with patient who verbally expressed understanding. An After Visit Summary was printed and given to the patient.     Mari Miller MD

## 2017-10-10 NOTE — MR AVS SNAPSHOT
Visit Information Date & Time Provider Department Dept. Phone Encounter #  
 10/10/2017  9:40 AM RAMA Higuera MD [facility] 84 [phone] [id number] Follow-up Instructions Return in about 6 weeks (around 11/21/2017). Upcoming Health Maintenance Date Due DTaP/Tdap/Td series (1 - Tdap) 10/8/1955 ZOSTER VACCINE AGE 60> 8/8/1994 GLAUCOMA SCREENING Q2Y 10/8/1999 MEDICARE YEARLY EXAM 10/8/1999 Allergies as of 10/10/2017  Review Complete On: 10/10/2017 By: Vanessa Davis MD  
 No Known Allergies Current Immunizations  Reviewed on 11/9/2015 Name Date Influenza High Dose Vaccine PF 9/8/2017 Influenza Vaccine (Quad) PF 11/10/2015 11:04 AM  
 Pneumococcal Conjugate (PCV-13) 9/8/2017 Pneumococcal Vaccine (Unspecified Type) 1/1/2005 Not reviewed this visit You Were Diagnosed With   
  
 Codes Comments Essential hypertension    -  Primary ICD-10-CM: I10 
ICD-9-CM: 401.9 Vitals BP Pulse Height(growth percentile) Weight(growth percentile) BMI Smoking Status 140/90 (BP 1 Location: Left arm, BP Patient Position: Sitting) 73 5' 8\" (1.727 m) 226 lb (102.5 kg) 34.36 kg/m2 Former Smoker Vitals History BMI and BSA Data Body Mass Index Body Surface Area  
 34.36 kg/m 2 2.22 m 2 [phone] Your Updated Medication List  
  
   
This list is accurate as of: 10/10/17 10:58 AM.  Always use your most recent med list.  
  
  
  
  
 allopurinol 300 mg tablet Commonly known as:  Almetta Hefty Take  by mouth daily. amLODIPine 10 mg tablet Commonly known as:  Hettinger Royals Take 5 mg by mouth daily. aspirin 81 mg tablet Take 81 mg by mouth. atenolol 100 mg tablet Commonly known as:  TENORMIN Take 100 mg by mouth daily. atorvastatin 80 mg tablet Commonly known as:  LIPITOR  
TAKE ONE TABLET BY MOUTH EVERY DAY  
  
 diazePAM 2 mg tablet Commonly known as:  VALIUM Take 1 Tab by mouth every eight (8) hours as needed for Anxiety. Max Daily Amount: 6 mg.  
  
 docusate sodium 100 mg capsule Commonly known as:  Maretta Stanley Take 1 Cap by mouth two (2) times a day. May use OTC instead. Prevents constipation from narcotics. FOLGARD RX 2.2-25-1 mg Tab Generic drug:  folic acid-vit D5-EEZ A27 TAKE ONE TABLET BY MOUTH DAILY  
  
 furosemide 20 mg tablet Commonly known as:  LASIX TAKE ONE TABLET BY MOUTH DAILY PREVACID PO Take  by mouth.  
  
 sildenafil 20 mg tablet Commonly known as:  REVATIO Take 1 Tab by mouth three (3) times daily. valsartan 320 mg tablet Commonly known as:  DIOVAN Take 1 Tab by mouth daily. Prescriptions Printed Refills  
 sildenafil (REVATIO) 20 mg tablet 1 Sig: Take 1 Tab by mouth three (3) times daily. Class: Print Route: Oral  
  
Follow-up Instructions Return in about 6 weeks (around 11/21/2017). Introducing [facility]! [facility] introduces Amitive patient portal. Now you can access parts of your medical record, email your doctor's office, and request medication refills online. 1. In your internet browser, go to [email or web address] 2. Click on the First Time User? Click Here link in the Sign In box. You will see the New Member Sign Up page. 3. Enter your Amitive Access Code exactly as it appears below. You will not need to use this code after youve completed the sign-up process. If you do not sign up before the expiration date, you must request a new code. · Amitive Access Code: [email or web address] Expires: 12/7/2017  8:53 AM 
 
4. Enter the last four digits of your Social Security Number (xxxx) and Date of Birth (mm/dd/yyyy) as indicated and click Submit. You will be taken to the next sign-up page. 5. Create a Triond ID. This will be your Triond login ID and cannot be changed, so think of one that is secure and easy to remember. 6. Create a Triond password. You can change your password at any time. 7. Enter your Password Reset Question and Answer. This can be used at a later time if you forget your password. 8. Enter your e-mail address. You will receive e-mail notification when new information is available in [address] 9. Click Sign Up. You can now view and download portions of your medical record. 10. Click the Download Summary menu link to download a portable copy of your medical information. If you have questions, please visit the Frequently Asked Questions section of the Triond website. Remember, Triond is NOT to be used for urgent needs. For medical emergencies, dial 911. Now available from your iPhone and Android! Please provide this summary of care documentation to your next provider. Your primary care clinician is listed as RAMA Pedraza. If you have any questions after today's visit, please call [phone].

## 2017-10-10 NOTE — PROGRESS NOTES
Emily Olivera is a 80 y.o. male presenting for Blood Pressure Check (Rm 2 - continues to cough)  . 1. Have you been to the ER, urgent care clinic since your last visit? Hospitalized since your last visit? No    2. Have you seen or consulted any other health care providers outside of the [address] [place] [address] since your last visit? Include any pap smears or colon screening.  No

## 2017-11-06 ENCOUNTER — APPOINTMENT (OUTPATIENT)
Dept: GENERAL RADIOLOGY | Age: 82
End: 2017-11-06
Attending: EMERGENCY MEDICINE
Payer: MEDICARE

## 2017-11-06 ENCOUNTER — DOCUMENTATION ONLY (OUTPATIENT)
Dept: INTERNAL MEDICINE CLINIC | Age: 82
End: 2017-11-06

## 2017-11-06 ENCOUNTER — HOSPITAL ENCOUNTER (EMERGENCY)
Age: 82
Discharge: HOME | End: 2017-11-06
Attending: EMERGENCY MEDICINE
Payer: MEDICARE

## 2017-11-06 ENCOUNTER — APPOINTMENT (OUTPATIENT)
Dept: CT IMAGING | Age: 82
End: 2017-11-06
Attending: EMERGENCY MEDICINE
Payer: MEDICARE

## 2017-11-06 VITALS
TEMPERATURE: 97.9 F | RESPIRATION RATE: 23 BRPM | HEIGHT: 68 IN | HEART RATE: 74 BPM | OXYGEN SATURATION: 92 % | SYSTOLIC BLOOD PRESSURE: 182 MMHG | DIASTOLIC BLOOD PRESSURE: 85 MMHG | WEIGHT: 223 LBS | BODY MASS INDEX: 33.8 KG/M2

## 2017-11-06 DIAGNOSIS — R06.02 SHORTNESS OF BREATH: Primary | ICD-10-CM

## 2017-11-06 DIAGNOSIS — R07.89 CHEST TIGHTNESS: ICD-10-CM

## 2017-11-06 LAB
ALBUMIN SERPL-MCNC: 3.1 G/DL (ref 3.5–5)
ALBUMIN/GLOB SERPL: 0.8 {RATIO} (ref 1.1–2.2)
ALP SERPL-CCNC: 112 U/L (ref 45–117)
ALT SERPL-CCNC: 38 U/L (ref 12–78)
ANION GAP SERPL CALC-SCNC: 4 MMOL/L (ref 5–15)
AST SERPL-CCNC: 29 U/L (ref 15–37)
ATRIAL RATE: 65 BPM
BASOPHILS # BLD: 0 K/UL (ref 0–0.1)
BASOPHILS NFR BLD: 1 % (ref 0–1)
BILIRUB SERPL-MCNC: 0.6 MG/DL (ref 0.2–1)
BNP SERPL-MCNC: 379 PG/ML (ref 0–450)
BUN SERPL-MCNC: 19 MG/DL (ref 6–20)
BUN/CREAT SERPL: 15 (ref 12–20)
CALCIUM SERPL-MCNC: 8.2 MG/DL (ref 8.5–10.1)
CALCULATED P AXIS, ECG09: 91 DEGREES
CALCULATED R AXIS, ECG10: -25 DEGREES
CALCULATED T AXIS, ECG11: 32 DEGREES
CHLORIDE SERPL-SCNC: 107 MMOL/L (ref 97–108)
CK SERPL-CCNC: 60 U/L (ref 39–308)
CO2 SERPL-SCNC: 30 MMOL/L (ref 21–32)
CREAT SERPL-MCNC: 1.27 MG/DL (ref 0.7–1.3)
D DIMER PPP FEU-MCNC: 0.57 MG/L FEU (ref 0–0.65)
DIAGNOSIS, 93000: NORMAL
EOSINOPHIL # BLD: 0.1 K/UL (ref 0–0.4)
EOSINOPHIL NFR BLD: 3 % (ref 0–7)
ERYTHROCYTE [DISTWIDTH] IN BLOOD BY AUTOMATED COUNT: 14.8 % (ref 11.5–14.5)
GLOBULIN SER CALC-MCNC: 4 G/DL (ref 2–4)
GLUCOSE SERPL-MCNC: 96 MG/DL (ref 65–100)
HCT VFR BLD AUTO: 42.6 % (ref 36.6–50.3)
HGB BLD-MCNC: 14.3 G/DL (ref 12.1–17)
LYMPHOCYTES # BLD: 1 K/UL (ref 0.8–3.5)
LYMPHOCYTES NFR BLD: 17 % (ref 12–49)
MAGNESIUM SERPL-MCNC: 1.9 MG/DL (ref 1.6–2.4)
MCH RBC QN AUTO: 33 PG (ref 26–34)
MCHC RBC AUTO-ENTMCNC: 33.6 G/DL (ref 30–36.5)
MCV RBC AUTO: 98.4 FL (ref 80–99)
MONOCYTES # BLD: 0.5 K/UL (ref 0–1)
MONOCYTES NFR BLD: 9 % (ref 5–13)
NEUTS SEG # BLD: 4 K/UL (ref 1.8–8)
NEUTS SEG NFR BLD: 70 % (ref 32–75)
P-R INTERVAL, ECG05: 172 MS
PLATELET # BLD AUTO: 194 K/UL (ref 150–400)
POTASSIUM SERPL-SCNC: 4.4 MMOL/L (ref 3.5–5.1)
PROT SERPL-MCNC: 7.1 G/DL (ref 6.4–8.2)
Q-T INTERVAL, ECG07: 432 MS
QRS DURATION, ECG06: 84 MS
QTC CALCULATION (BEZET), ECG08: 449 MS
RBC # BLD AUTO: 4.33 M/UL (ref 4.1–5.7)
SODIUM SERPL-SCNC: 141 MMOL/L (ref 136–145)
TROPONIN I SERPL-MCNC: <0.04 NG/ML
VENTRICULAR RATE, ECG03: 65 BPM
WBC # BLD AUTO: 5.7 K/UL (ref 4.1–11.1)

## 2017-11-06 PROCEDURE — 80053 COMPREHEN METABOLIC PANEL: CPT | Performed by: EMERGENCY MEDICINE

## 2017-11-06 PROCEDURE — 85025 COMPLETE CBC W/AUTO DIFF WBC: CPT | Performed by: EMERGENCY MEDICINE

## 2017-11-06 PROCEDURE — 93005 ELECTROCARDIOGRAM TRACING: CPT

## 2017-11-06 PROCEDURE — 94761 N-INVAS EAR/PLS OXIMETRY MLT: CPT

## 2017-11-06 PROCEDURE — 74011250636 HC RX REV CODE- 250/636: Performed by: EMERGENCY MEDICINE

## 2017-11-06 PROCEDURE — 74011636320 HC RX REV CODE- 636/320: Performed by: EMERGENCY MEDICINE

## 2017-11-06 PROCEDURE — 99285 EMERGENCY DEPT VISIT HI MDM: CPT

## 2017-11-06 PROCEDURE — 85379 FIBRIN DEGRADATION QUANT: CPT | Performed by: EMERGENCY MEDICINE

## 2017-11-06 PROCEDURE — 36415 COLL VENOUS BLD VENIPUNCTURE: CPT | Performed by: EMERGENCY MEDICINE

## 2017-11-06 PROCEDURE — 71020 XR CHEST PA LAT: CPT

## 2017-11-06 PROCEDURE — 82550 ASSAY OF CK (CPK): CPT | Performed by: EMERGENCY MEDICINE

## 2017-11-06 PROCEDURE — 84484 ASSAY OF TROPONIN QUANT: CPT | Performed by: EMERGENCY MEDICINE

## 2017-11-06 PROCEDURE — 71275 CT ANGIOGRAPHY CHEST: CPT

## 2017-11-06 PROCEDURE — 83880 ASSAY OF NATRIURETIC PEPTIDE: CPT | Performed by: EMERGENCY MEDICINE

## 2017-11-06 PROCEDURE — 83735 ASSAY OF MAGNESIUM: CPT | Performed by: EMERGENCY MEDICINE

## 2017-11-06 RX ORDER — SODIUM CHLORIDE 0.9 % (FLUSH) 0.9 %
10 SYRINGE (ML) INJECTION
Status: COMPLETED | OUTPATIENT
Start: 2017-11-06 | End: 2017-11-06

## 2017-11-06 RX ORDER — SODIUM CHLORIDE 9 MG/ML
50 INJECTION, SOLUTION INTRAVENOUS
Status: COMPLETED | OUTPATIENT
Start: 2017-11-06 | End: 2017-11-06

## 2017-11-06 RX ADMIN — IOPAMIDOL 100 ML: 755 INJECTION, SOLUTION INTRAVENOUS at 14:26

## 2017-11-06 RX ADMIN — SODIUM CHLORIDE 50 ML/HR: 900 INJECTION, SOLUTION INTRAVENOUS at 14:26

## 2017-11-06 RX ADMIN — Medication 10 ML: at 14:26

## 2017-11-06 NOTE — DISCHARGE INSTRUCTIONS
Chest Pain: Care Instructions  Your Care Instructions    There are many things that can cause chest pain. Some are not serious and will get better on their own in a few days. But some kinds of chest pain need more testing and treatment. Your doctor may have recommended a follow-up visit in the next 8 to 12 hours. If you are not getting better, you may need more tests or treatment. Even though your doctor has released you, you still need to watch for any problems. The doctor carefully checked you, but sometimes problems can develop later. If you have new symptoms or if your symptoms do not get better, get medical care right away. If you have worse or different chest pain or pressure that lasts more than 5 minutes or you passed out (lost consciousness), call 911 or seek other emergency help right away. A medical visit is only one step in your treatment. Even if you feel better, you still need to do what your doctor recommends, such as going to all suggested follow-up appointments and taking medicines exactly as directed. This will help you recover and help prevent future problems. How can you care for yourself at home? · Rest until you feel better. · Take your medicine exactly as prescribed. Call your doctor if you think you are having a problem with your medicine. · Do not drive after taking a prescription pain medicine. When should you call for help? Call 911 if:  ? · You passed out (lost consciousness). ? · You have severe difficulty breathing. ? · You have symptoms of a heart attack. These may include:  ¨ Chest pain or pressure, or a strange feeling in your chest.  ¨ Sweating. ¨ Shortness of breath. ¨ Nausea or vomiting. ¨ Pain, pressure, or a strange feeling in your back, neck, jaw, or upper belly or in one or both shoulders or arms. ¨ Lightheadedness or sudden weakness. ¨ A fast or irregular heartbeat.   After you call 911, the  may tell you to chew 1 adult-strength or 2 to 4 low-dose aspirin. Wait for an ambulance. Do not try to drive yourself. ?Call your doctor today if:  ? · You have any trouble breathing. ? · Your chest pain gets worse. ? · You are dizzy or lightheaded, or you feel like you may faint. ? · You are not getting better as expected. ? · You are having new or different chest pain. Where can you learn more? Go to http://rhonda-ko.info/. Enter A120 in the search box to learn more about \"Chest Pain: Care Instructions. \"  Current as of: March 20, 2017  Content Version: 11.4  © 9940-8503 [employer]. Care instructions adapted under license by [employer] (which disclaims liability or warranty for this information). If you have questions about a medical condition or this instruction, always ask your healthcare professional. [place] [address] any warranty or liability for your use of this information. Shortness of Breath: Care Instructions  Your Care Instructions  Shortness of breath has many causes. Sometimes conditions such as anxiety can lead to shortness of breath. Some people get mild shortness of breath when they exercise. Trouble breathing also can be a symptom of a serious problem, such as asthma, lung disease, emphysema, heart problems, and pneumonia. If your shortness of breath continues, you may need tests and treatment. Watch for any changes in your breathing and other symptoms. Follow-up care is a key part of your treatment and safety. Be sure to make and go to all appointments, and call your doctor if you are having problems. It's also a good idea to know your test results and keep a list of the medicines you take. How can you care for yourself at home? · Do not smoke or allow others to smoke around you. If you need help quitting, talk to your doctor about stop-smoking programs and medicines. These can increase your chances of quitting for good.   · Get plenty of rest and sleep.  · Take your medicines exactly as prescribed. Call your doctor if you think you are having a problem with your medicine. · Find healthy ways to deal with stress. ¨ Exercise daily. ¨ Get plenty of sleep. ¨ Eat regularly and well. When should you call for help? Call 911 anytime you think you may need emergency care. For example, call if:  ? · You have severe shortness of breath. ? · You have symptoms of a heart attack. These may include:  ¨ Chest pain or pressure, or a strange feeling in the chest.  ¨ Sweating. ¨ Shortness of breath. ¨ Nausea or vomiting. ¨ Pain, pressure, or a strange feeling in the back, neck, jaw, or upper belly or in one or both shoulders or arms. ¨ Lightheadedness or sudden weakness. ¨ A fast or irregular heartbeat. After you call 911, the  may tell you to chew 1 adult-strength or 2 to 4 low-dose aspirin. Wait for an ambulance. Do not try to drive yourself. ?Call your doctor now or seek immediate medical care if:  ? · Your shortness of breath gets worse or you start to wheeze. Wheezing is a high-pitched sound when you breathe. ? · You wake up at night out of breath or have to prop your head up on several pillows to breathe. ? · You are short of breath after only light activity or while at rest.   ? Watch closely for changes in your health, and be sure to contact your doctor if:  ? · You do not get better over the next 1 to 2 days. Where can you learn more? Go to http://rhonda-ko.info/. Enter S780 in the search box to learn more about \"Shortness of Breath: Care Instructions. \"  Current as of: May 12, 2017  Content Version: 11.4  © 8437-7208 [employer]. Care instructions adapted under license by [employer] (which disclaims liability or warranty for this information).  If you have questions about a medical condition or this instruction, always ask your healthcare professional. Ulysses Torres any warranty or liability for your use of this information.

## 2017-11-06 NOTE — ED NOTES
1252 Patient to Xray via stretcher. 1400 Patient resting comfortably in stretcher. Call bell within reach. Family is at bedside. 1416 Patient off the floor to CT via stretcher. 1448 Patient resting comfortably in stretcher. Family remains at bedside. Patient denies pain and discomfort this time.

## 2017-11-06 NOTE — ED NOTES
Patient arrives via EMS. Patient c/o SOB since 0100 this morning that woke him up. Patient also c/o productive cough x 1 month. Patient states he has had swelling to bilateral lower extremities that has been worsening. Patient states his PCP told him to come to the ER to be evaluated.

## 2017-11-06 NOTE — ED NOTES
MD Anthony reviewed discharge instructions with the patient and spouse. The patient and spouse verbalized understanding. Patient discharged home with vitals baseline. Patient is taken to vehicle via wheelchair by RN. Patient has no further requests at this time and will follow up with his PCP as well as cardiology. No further complaints noted by patient.

## 2017-11-06 NOTE — PROGRESS NOTES
Received call in regards to patient - that he is SOB and pulse rate is 70 which they state this is low for him - he woke in the middle of the night SOB and is still feeling bad, advised to call rescue squad so that he could get started on 02 and they could evaluated if he needs to go to ER

## 2017-11-06 NOTE — ED PROVIDER NOTES
[facility].  EMERGENCY DEPARTMENT HISTORY AND PHYSICAL EXAM       Date of Service: 11/6/2017   Patient Name: Steph Etienne   YOB: 1934  Medical Record Number: [id number]    History of Presenting Illness     No chief complaint on file. History Provided By:  patient    Additional History:   Steph Etienne is a 80 y.o. male with PMhx significant for HTN, arthritis, gout, CAD, GERD, and TIA who presents via EMS to the ED with cc of intermittent episodes of SOB and cough productive of yellow sputum x 1 AM. Pt reports waking due to chest tightness and SOB at 0100. He drank a glass of water, took 2 tablets of Tylenol and then fell back asleep at ~ 3 AM. Pt reports chest tightness was radiating to both shoulders and then resolved before going back to sleep. He denies chest pain and denies any additional episodes of chest tightness since waking this morning. He felt normal when he woke this morning and then went to sit down after eating breakfast. He soon became SOB and his granddaughter (a nursing student) instructed him to go to the ED. Of note, the pt reports leg swelling that is worse than normal even though he has been taking Lasix as instructed. He notes that he had an MI ~ 20 years ago are much less severe than his prior MI. He does not have a hx of heart failure, DVT or PE. He has not traveled or had surgery recently. He denies any fevers, chills, N/V/D, CP, weight gain, abd pain, dysuria, hematuria, or hemoptysis. Social Hx: - Tobacco, + EtOH, - Illicit Drugs    There are no other complaints, changes or physical findings at this time.     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 Social History:   Social History   Substance Use Topics    Smoking status: Former Smoker    Smokeless tobacco: Former User    Alcohol use 8.4 oz/week     14 Cans of beer per week        Allergies:   No Known Allergies      Review of Systems   Review of Systems   Constitutional: Negative for chills, fatigue and fever. HENT: Negative for congestion, rhinorrhea and sore throat. Eyes: Negative for pain, discharge and visual disturbance. Respiratory: Positive for cough and shortness of breath. Negative for chest tightness and wheezing. Cardiovascular: Positive for leg swelling. Negative for chest pain and palpitations. Gastrointestinal: Negative for abdominal pain, constipation, diarrhea, nausea and vomiting. Genitourinary: Negative for dysuria, frequency and hematuria. Musculoskeletal: Negative for arthralgias, back pain and myalgias. Skin: Negative for rash. Neurological: Negative for dizziness, weakness, light-headedness and headaches. Psychiatric/Behavioral: Negative. Physical Exam  Physical Exam   Constitutional: He is oriented to person, place, and time. He appears well-developed and well-nourished. No distress. HENT:   Head: Normocephalic and atraumatic. Eyes: EOM are normal. Right eye exhibits no discharge.  Left eye exhibits no discharge. No scleral icterus. Neck: Normal range of motion. Neck supple. No tracheal deviation present. Cardiovascular: Normal rate, regular rhythm, normal heart sounds and intact distal pulses. Exam reveals no gallop and no friction rub. No murmur heard. Pulmonary/Chest: Effort normal and breath sounds normal. No respiratory distress. He has no wheezes. He has no rales. Abdominal: Soft. He exhibits no distension. There is no tenderness. Musculoskeletal: Normal range of motion. 2+ bilateral LE edema   Lymphadenopathy:     He has no cervical adenopathy. Neurological: He is alert and oriented to person, place, and time. Skin: Skin is warm and dry. No rash noted. Psychiatric: He has a normal mood and affect. Nursing note and vitals reviewed. Medical Decision Making   I am the first provider for this patient. I reviewed the vital signs, available nursing notes, past medical history, past surgical history, family history and social history. Old Medical Records: Old medical records. Previous electrocardiograms. Nursing notes. Provider Notes:   Differential includes pulmonary edema, heart failure, pleural effusion, ACS, PE, pneumonia, PTX  - CBC, CMP, Ty (one troponin as patient has not had any reoccurrence of symptoms since 1 AM), BNP  - CXR  - If CXR is unremarkable, will proceed with CTA to rule out PE    ED Course:  12:16 PM   Initial assessment performed. The patients presenting problems have been discussed, and they are in agreement with the care plan formulated and outlined with them. I have encouraged them to ask questions as they arise throughout their visit. PROGRESS NOTE:  3:00 PM  Labs unremarkable. CTA negative for PE or other acute process. Patient is maintaining oxygen saturation on room air. Advised him to increase lasix to 40 mg daily for five days and then to resume normal dose. Advised follow up with PCP and cardiology.   Discussed results, prescriptions and follow up plan with patient. Provided customary return to ED instructions. Patient expressed understanding. Josiane Wesley MD    Diagnostic Study Results     Labs -      Recent Results (from the past 12 hour(s))   EKG, 12 LEAD, INITIAL    Collection Time: 11/06/17 12:24 PM   Result Value Ref Range    Ventricular Rate 65 BPM    Atrial Rate 65 BPM    P-R Interval 172 ms    QRS Duration 84 ms    Q-T Interval 432 ms    QTC Calculation (Bezet) 449 ms    Calculated P Axis 91 degrees    Calculated R Axis -25 degrees    Calculated T Axis 32 degrees    Diagnosis       Normal sinus rhythm  Normal ECG  When compared with ECG of 09-NOV-2015 13:59,  No significant change was found  Confirmed by Jorge Gilliam ([id number]) on 11/6/2017 1:43:22 PM     CBC WITH AUTOMATED DIFF    Collection Time: 11/06/17 12:36 PM   Result Value Ref Range    WBC 5.7 4.1 - 11.1 K/uL    RBC 4.33 4.10 - 5.70 M/uL    HGB 14.3 12.1 - 17.0 g/dL    HCT 42.6 36.6 - 50.3 %    MCV 98.4 80.0 - 99.0 FL    MCH 33.0 26.0 - 34.0 PG    MCHC 33.6 30.0 - 36.5 g/dL    RDW 14.8 (H) 11.5 - 14.5 %    PLATELET 455 280 - 824 K/uL    NEUTROPHILS 70 32 - 75 %    LYMPHOCYTES 17 12 - 49 %    MONOCYTES 9 5 - 13 %    EOSINOPHILS 3 0 - 7 %    BASOPHILS 1 0 - 1 %    ABS. NEUTROPHILS 4.0 1.8 - 8.0 K/UL    ABS. LYMPHOCYTES 1.0 0.8 - 3.5 K/UL    ABS. MONOCYTES 0.5 0.0 - 1.0 K/UL    ABS. EOSINOPHILS 0.1 0.0 - 0.4 K/UL    ABS.  BASOPHILS 0.0 0.0 - 0.1 K/UL   METABOLIC PANEL, COMPREHENSIVE    Collection Time: 11/06/17 12:36 PM   Result Value Ref Range    Sodium 141 136 - 145 mmol/L    Potassium 4.4 3.5 - 5.1 mmol/L    Chloride 107 97 - 108 mmol/L    CO2 30 21 - 32 mmol/L    Anion gap 4 (L) 5 - 15 mmol/L    Glucose 96 65 - 100 mg/dL    BUN 19 6 - 20 MG/DL    Creatinine 1.27 0.70 - 1.30 MG/DL    BUN/Creatinine ratio 15 12 - 20      GFR est AA >60 >60 ml/min/1.73m2    GFR est non-AA 54 (L) >60 ml/min/1.73m2    Calcium 8.2 (L) 8.5 - 10.1 MG/DL    Bilirubin, total 0.6 0.2 - 1.0 MG/DL    ALT (SGPT) 38 12 - 78 U/L    AST (SGOT) 29 15 - 37 U/L    Alk. phosphatase 112 45 - 117 U/L    Protein, total 7.1 6.4 - 8.2 g/dL    Albumin 3.1 (L) 3.5 - 5.0 g/dL    Globulin 4.0 2.0 - 4.0 g/dL    A-G Ratio 0.8 (L) 1.1 - 2.2     CK W/ REFLX CKMB    Collection Time: 11/06/17 12:36 PM   Result Value Ref Range    CK 60 39 - 308 U/L   TROPONIN I    Collection Time: 11/06/17 12:36 PM   Result Value Ref Range    Troponin-I, Qt. <0.04 <0.05 ng/mL   MAGNESIUM    Collection Time: 11/06/17 12:36 PM   Result Value Ref Range    Magnesium 1.9 1.6 - 2.4 mg/dL   NT-PRO BNP    Collection Time: 11/06/17 12:36 PM   Result Value Ref Range    NT pro- 0 - 450 PG/ML       Radiologic Studies -  The following have been ordered and reviewed:  CTA CHEST W OR W WO CONT   Final Result      XR CHEST PA LAT   Final Result        CT Results  (Last 48 hours)               11/06/17 1427  CTA CHEST W OR W WO CONT Final result    Impression:  IMPRESSION: Mild atelectasis at the left lung base. Otherwise negative CTA of   the chest.       Narrative: Indication: Chest pain. COMPARISON: 4/23/2015. Contrast-enhanced CT angiogram of the chest performed using 100 cc Isovue-370. Three-dimensional postprocessing was performed. CT dose reduction was achieved through use of a standardized protocol tailored   for this examination and are automatic exposure control for dose modulation dose   reduction. FINDINGS:       The pulmonary arteries are unremarkable. There is no evidence for pulmonary   embolus as questioned clinically. There is no hilar or mediastinal adenopathy. There is mild atelectasis at the left base. The lungs are otherwise fully   expanded and clear. Upper abdomen is unremarkable. CXR Results  (Last 48 hours)               11/06/17 1307  XR CHEST PA LAT Final result    Impression:  IMPRESSION:    No acute cardiopulmonary disease radiographically. .  .            Narrative: INDICATION:  dyspnea, chest tightness since last night. EXAM: 2 VIEW CHEST RADIOGRAPH. COMPARISON: 4/23/2015. FINDINGS: Frontal and lateral views of the chest show minimal atelectasis or   scar in both lung bases, but less than on the prior study. . . The heart,   mediastinum and pulmonary vasculature are stable, upper normal.  The bony thorax   is unremarkable for age. ..                 Vital Signs-Reviewed the patient's vital signs. Patient Vitals for the past 12 hrs:   Temp Pulse Resp BP SpO2   11/06/17 1400 - 66 20 149/78 93 %   11/06/17 1330 - 65 25 (!) 170/98 94 %   11/06/17 1230 - 66 16 154/70 94 %   11/06/17 1215 97.9 °F (36.6 °C) 70 18 (!) 165/92 93 %       Medications Given in the ED:  Medications   iopamidol (ISOVUE-370) 76 % injection 100 mL (not administered)   0.9% sodium chloride infusion (not administered)   sodium chloride (NS) flush 10 mL (not administered)       Pulse Oximetry Analysis - Abnormal 93% on RA       EKG interpretation: (Preliminary) 1224  Rhythm: normal sinus rhythm; and regular . Rate (approx.): 65; Axis: normal; OH interval: normal; QRS interval: normal ; ST/T wave: normal; Other findings: normal.  Written by Mariela Jimenez ED Scribe, as dictated by Maura Halsted, MD    Diagnosis   Clinical Impression:   1. Shortness of breath    2.  Chest tightness         Plan:  1:   Follow-up Information     Follow up With Details Comments Contact Info    Aliyah Balckmon III, DO  Please follow up with cardiology as soon as possible [address]  [address]  [address] [facility]      Mary Driscoll MD In 2 days  [address] [place] [address]  [phone]      [facility] EMERGENCY DEPT  As needed, If symptoms worsen [address] [place] [address]  [phone]        2:   Current Discharge Medication List        Return to ED if Worse    Disposition Note:  Discharge Note:  3:04 PM  The pt is ready for discharge. The pt's signs, symptoms, diagnosis, and discharge instructions have been discussed and pt has conveyed their understanding. The pt is to follow up as recommended or return to ER should their symptoms worsen. Plan has been discussed and pt is in agreement. This note is prepared by Michael Ponce, acting as a Scribe for Peg Zhang MD.    Peg Zhang MD: The scribe's documentation has been prepared under my direction and personally reviewed by me in its entirety. I confirm that the notes above accurately reflects all work, treatment, procedures, and medical decision making performed by me.    _______________________________   Attestations:     Attestations: This note is prepared by Michael Ponce, acting as Scribe for MD Peg Carrasco MD: The scribe's documentation has been prepared under my direction and personally reviewed by me in its entirety.  I confirm that the note above accurately reflects all work, treatment, procedures, and medical decision making performed by me.        _______________________________

## 2017-11-08 RX ORDER — ATENOLOL 100 MG/1
TABLET ORAL
Qty: 30 TAB | Refills: 6 | Status: SHIPPED | OUTPATIENT
Start: 2017-11-08 | End: 2017-12-08 | Stop reason: SDUPTHER

## 2017-11-09 NOTE — TELEPHONE ENCOUNTER
Requested Prescriptions     Pending Prescriptions Disp Refills    amLODIPine (NORVASC) 10 mg tablet 60 Tab 3     Sig: Take 0.5 Tabs by mouth daily.        Last Refill: 08/31/17  Next Appointment: 11/21/17

## 2017-11-10 RX ORDER — AMLODIPINE BESYLATE 10 MG/1
5 TABLET ORAL DAILY
Qty: 60 TAB | Refills: 3 | Status: SHIPPED | OUTPATIENT
Start: 2017-11-10 | End: 2017-12-15 | Stop reason: SDUPTHER

## 2017-11-13 ENCOUNTER — OFFICE VISIT (OUTPATIENT)
Dept: INTERNAL MEDICINE CLINIC | Age: 82
End: 2017-11-13

## 2017-11-13 VITALS
SYSTOLIC BLOOD PRESSURE: 140 MMHG | TEMPERATURE: 97.8 F | DIASTOLIC BLOOD PRESSURE: 80 MMHG | OXYGEN SATURATION: 94 % | BODY MASS INDEX: 34.16 KG/M2 | WEIGHT: 225.4 LBS | HEIGHT: 68 IN | RESPIRATION RATE: 24 BRPM | HEART RATE: 78 BPM

## 2017-11-13 DIAGNOSIS — R06.02 SHORTNESS OF BREATH: ICD-10-CM

## 2017-11-13 DIAGNOSIS — R60.0 LOWER EXTREMITY EDEMA: Primary | ICD-10-CM

## 2017-11-13 DIAGNOSIS — I10 ESSENTIAL HYPERTENSION: ICD-10-CM

## 2017-11-13 NOTE — PROGRESS NOTES
Identified pt with two pt identifiers(name and ). Reviewed record in preparation for visit and have obtained necessary documentation. Chief Complaint   Patient presents with   [facility] Follow Up     ED [facility] on 17 for shortness of breath, cough and leg swelling; Room 8        Health Maintenance Due   Topic    DTaP/Tdap/Td series (1 - Tdap)    ZOSTER VACCINE AGE 60>     GLAUCOMA SCREENING Q2Y     MEDICARE YEARLY EXAM        Coordination of Care Questionnaire:  :   1) Have you been to an emergency room, urgent care clinic since your last visit? yes ED [facility] for shortness of breath, cough, bilateral leg swelling on 2017. Hospitalized since your last visit? no             2. Have seen or consulted any other health care provider since your last visit? NO  If yes, where when, and reason for visit? 3) Do you have an Advanced Directive/ Living Will in place? NO  If yes, do we have a copy on file NO  If no, would you like information NO    Patient is accompanied by self I have received verbal consent from Kaylin Comer to discuss any/all medical information while they are present in the room.

## 2017-11-13 NOTE — MR AVS SNAPSHOT
Visit Information Date & Time Provider Department Dept. Phone Encounter #  
 11/13/2017  2:50 PM RAMA Fierro MD [facility] [id number] Follow-up Instructions Return if symptoms worsen or fail to improve. Your Appointments 11/21/2017 10:10 AM  
FOLLOW UP 10 with MD BERT Toth [facility]JUANA [facility] ([address]) Appt Note: 6wks [address] [id number] [address] [facility] [phone] Upcoming Health Maintenance Date Due DTaP/Tdap/Td series (1 - Tdap) 10/8/1955 ZOSTER VACCINE AGE 60> 8/8/1994 GLAUCOMA SCREENING Q2Y 10/8/1999 MEDICARE YEARLY EXAM 10/8/1999 Allergies as of 11/13/2017  Review Complete On: 11/13/2017 By: Dominic Hyde MD  
 No Known Allergies Current Immunizations  Reviewed on 11/9/2015 Name Date Influenza High Dose Vaccine PF 9/8/2017 Influenza Vaccine (Quad) PF 11/10/2015 11:04 AM  
 Pneumococcal Conjugate (PCV-13) 9/8/2017 Pneumococcal Vaccine (Unspecified Type) 1/1/2005 Not reviewed this visit You Were Diagnosed With   
  
 Codes Comments Lower extremity edema    -  Primary ICD-10-CM: R60.0 ICD-9-CM: 927. 3 Shortness of breath     ICD-10-CM: R06.02 
ICD-9-CM: 786.05 Essential hypertension     ICD-10-CM: I10 
ICD-9-CM: 401.9 Vitals BP Pulse Temp Resp Height(growth percentile) Weight(growth percentile) 140/80 (BP 1 Location: Right arm, BP Patient Position: Sitting) 78 97.8 °F (36.6 °C) (Oral) 24 5' 8\" (1.727 m) 225 lb 6.4 oz (102.2 kg) SpO2 BMI Smoking Status 94% 34.27 kg/m2 Former Smoker Vitals History BMI and BSA Data Body Mass Index Body Surface Area  
 34.27 kg/m 2 2.21 m 2 Preferred Pharmacy Pharmacy Name Phone [facility] [address] [place] [address] [phone] Your Updated Medication List  
  
   
This list is accurate as of: 11/13/17  4:20 PM.  Always use your most recent med list.  
  
  
  
  
 allopurinol 300 mg tablet Commonly known as:  Jaunita Phenes Take  by mouth daily. amLODIPine 10 mg tablet Commonly known as:  Gillespie Fanti Take 0.5 Tabs by mouth daily. aspirin 81 mg tablet Take 81 mg by mouth. atenolol 100 mg tablet Commonly known as:  TENORMIN  
TAKE ONE TABLET BY MOUTH DAILY  
  
 atorvastatin 80 mg tablet Commonly known as:  LIPITOR  
TAKE ONE TABLET BY MOUTH EVERY DAY  
  
 diazePAM 2 mg tablet Commonly known as:  VALIUM Take 1 Tab by mouth every eight (8) hours as needed for Anxiety. Max Daily Amount: 6 mg.  
  
 docusate sodium 100 mg capsule Commonly known as:  Arman Duster Take 1 Cap by mouth two (2) times a day. May use OTC instead. Prevents constipation from narcotics. FOLGARD RX 2.2-25-1 mg Tab Generic drug:  folic acid-vit M1-SNA Y04 TAKE ONE TABLET BY MOUTH DAILY  
  
 furosemide 20 mg tablet Commonly known as:  LASIX TAKE ONE TABLET BY MOUTH DAILY PREVACID PO Take  by mouth.  
  
 sildenafil 20 mg tablet Commonly known as:  REVATIO Take 1 Tab by mouth three (3) times daily. valsartan 320 mg tablet Commonly known as:  DIOVAN Take 1 Tab by mouth daily. We Performed the Following REFERRAL TO CARDIOLOGY [PRW63 Custom] Comments:  
 Please evaluate patient for shortness of breath and lower extremity edema. History of hypertension and remote history of coronary disease. Follow-up Instructions Return if symptoms worsen or fail to improve. Referral Information Referral ID Referred By Referred To  
  
 [id number] [facility] [address] [place] [address] [place], [address] [facility] Visits Status Start Date End Date 1 New Request 11/13/17 11/13/18 If your referral has a status of pending review or denied, additional information will be sent to support the outcome of this decision. Introducing [facility]! Shonda Razo introduces [employer] patient portal. Now you can access parts of your medical record, email your doctor's office, and request medication refills online. 1. In your internet browser, go to [email or web address] 2. Click on the First Time User? Click Here link in the Sign In box. You will see the New Member Sign Up page. 3. Enter your [employer] Access Code exactly as it appears below. You will not need to use this code after youve completed the sign-up process. If you do not sign up before the expiration date, you must request a new code. · [employer] Access Code: [email or web address] Expires: 12/7/2017  7:53 AM 
 
4. Enter the last four digits of your Social Security Number (xxxx) and Date of Birth (mm/dd/yyyy) as indicated and click Submit. You will be taken to the next sign-up page. 5. Create a [employer] ID. This will be your [employer] login ID and cannot be changed, so think of one that is secure and easy to remember. 6. Create a [employer] password. You can change your password at any time. 7. Enter your Password Reset Question and Answer. This can be used at a later time if you forget your password. 8. Enter your e-mail address. You will receive e-mail notification when new information is available in [address] 9. Click Sign Up. You can now view and download portions of your medical record. 10. Click the Download Summary menu link to download a portable copy of your medical information. If you have questions, please visit the Frequently Asked Questions section of the [employer] website. Remember, [employer] is NOT to be used for urgent needs. For medical emergencies, dial 911. Now available from your iPhone and Android! Please provide this summary of care documentation to your next provider. Your primary care clinician is listed as RAMA [place]. If you have any questions after today's visit, please call [phone].

## 2017-11-13 NOTE — PROGRESS NOTES
This note will not be viewable in [address] Krys Wright is a 80 y.o. male and presents with Hospital Follow Up [facility] on 11/6/17 for shortness of breath, cough and leg swelling; Room 8)  . Subjective:  Mr. Liberty May presents today for hospital follow-up after going to the emergency room on 6 November for shortness of breath cough and leg swelling. At that time he had a CTA of the chest that ruled out a PE. He continues to have lower extremity swelling although he states it is mildly improved after increasing his furosemide to 40 mg daily. He had negative cardiac enzymes and an EKG that was unremarkable. He has not had an echocardiogram done in the recent past.  His last stress test was done in 2015 and at that time did not show any evidence for ischemia and a normal ejection fraction. He had an echo done at that time that also showed a normal ejection fraction. He has had in office spirometry done as well that suggested some mild obstructive lung disease. He was seen by pulmonary because of some hypoxemia and although I am not sure what the pulmonary function tests there showed was diagnosed with restrictive lung disease. He is not currently using an inhaler on a regular basis.     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 60 Tab 3    atenolol (TENORMIN) 100 mg tablet TAKE ONE TABLET BY MOUTH DAILY 30 Tab 6    sildenafil (REVATIO) 20 mg tablet Take 1 Tab by mouth three (3) times daily. 30 Tab 1    valsartan (DIOVAN) 320 mg tablet Take 1 Tab by mouth daily. 30 Tab 6    atorvastatin (LIPITOR) 80 mg tablet TAKE ONE TABLET BY MOUTH EVERY DAY 30 Tab 6    furosemide (LASIX) 20 mg tablet TAKE ONE TABLET BY MOUTH DAILY 30 Tab 6    FOLGARD RX 2.2-25-1 mg tab TAKE ONE TABLET BY MOUTH DAILY 30 Tab 6    docusate sodium (COLACE) 100 mg capsule Take 1 Cap by mouth two (2) times a day. May use OTC instead. Prevents constipation from narcotics. 60 Cap 0    diazepam (VALIUM) 2 mg tablet Take 1 Tab by mouth every eight (8) hours as needed for Anxiety. Max Daily Amount: 6 mg. 20 Tab 0    aspirin 81 mg tablet Take 81 mg by mouth.  LANSOPRAZOLE (PREVACID PO) Take  by mouth.  allopurinol (ZYLOPRIM) 300 mg tablet Take  by mouth daily. Social History     Social History    Marital status:      Spouse name: N/A    Number of children: N/A    Years of education: N/A     Social History Main Topics    Smoking status: Former Smoker    Smokeless tobacco: Former User    Alcohol use 8.4 oz/week     14 Cans of beer per week    Drug use: No    Sexual activity: Not Asked     Other Topics Concern    None     Social History Narrative     History reviewed. No pertinent family history. Review of Systems  Constitutional:  negative for fevers, chills, anorexia and weight loss  Eyes:    negative for visual disturbance and irritation  ENT:    negative for tinnitus,sore throat,nasal congestion,ear pains. hoarseness  Respiratory:     negative for , hemoptysis, dyspnea,wheezing, positive for cough  CV:    negative for chest pain, palpitations, positive for lower extremity edema  GI:    negative for nausea, vomiting, diarrhea, abdominal pain,melena  Endo:               negative for polyuria,polydipsia,polyphagia,heat intolerance  Genitourinary : negative for frequency, dysuria and hematuria  Integumentary: negative for rash and pruritus  Hematologic:   negative for easy bruising and gum/nose bleeding  Musculoskel:  negative for myalgias, arthralgias, back pain, muscle weakness, joint pain  Neurological:   negative for headaches, dizziness, vertigo, memory problems and gait   Behavl/Psych:  negative for feelings of anxiety, depression, mood changes  ROS otherwise negative      Objective:  Visit Vitals    /80 (BP 1 Location: Right arm, BP Patient Position: Sitting)    Pulse 78    Temp 97.8 °F (36.6 °C) (Oral)    Resp 24    Ht 5' 8\" (1.727 m)    Wt 225 lb 6.4 oz (102.2 kg)    SpO2 94%    BMI 34.27 kg/m2     Physical Exam:   General appearance - alert, well appearing, and in no distress  Mental status - alert, oriented to person, place, and time  EYE-MAURICE, EOMI, fundi normal, corneas normal, no foreign bodies  ENT-ENT exam normal, no neck nodes or sinus tenderness  Nose - normal and patent, no erythema, discharge or polyps  Mouth - mucous membranes moist, pharynx normal without lesions  Neck - supple, no significant adenopathy   Chest - clear to auscultation, no wheezes, rales or rhonchi, symmetric air entry   Heart - normal rate, regular rhythm, normal S1, S2, no murmurs, rubs, clicks or gallops   Abdomen - soft, nontender, nondistended, no masses or organomegaly  Lymph- no adenopathy palpable  Ext-peripheral pulses normal, no pedal edema, no clubbing or cyanosis  Skin-Warm and dry. no hyperpigmentation, vitiligo, or suspicious lesions  Neuro -alert, oriented, normal speech, no focal findings or movement disorder noted      Assessment/Plan:  Diagnoses and all orders for this visit:    1.  Lower extremity edema  - REFERRAL TO CARDIOLOGY    2. Shortness of breath    3. Essential hypertension          ICD-10-CM ICD-9-CM    1. Lower extremity edema R60.0 782.3 REFERRAL TO CARDIOLOGY   2. Shortness of breath R06.02 786.05    3. Essential hypertension I10 401.9      Plan:    I suspect a lot of what the patient experienced was probably secondary to some underlying COPD. However given his age and known history of coronary disease previously I think he should be evaluated by cardiology as well. He will be referred to cardiology for further evaluation. He has been given a sample of Bivespi 2 inhalations twice daily to take for the time being. He will continue furosemide 40 mg daily. Follow-up after referral to cardiology or return in the meantime if symptoms progress. Follow-up Disposition:  Return if symptoms worsen or fail to improve. I have reviewed with the patient details of the assessment and plan and all questions were answered. Relevent patient education was performed. Verbal and/or written instructions (see AVS) provided. The most recent lab findings were reviewed with the patient. Plan was discussed with patient who verbally expressed understanding. An After Visit Summary was printed and given to the patient.     Isiah Phelps MD

## 2017-11-15 NOTE — TELEPHONE ENCOUNTER
Requested Prescriptions     Pending Prescriptions Disp Refills    furosemide (LASIX) 40 mg tablet 30 Tab 6     Sig: Take 1 Tab by mouth daily. Last Refill:  In the hospital  Next Appointment:joe urrutia last seen 11/21/17

## 2017-11-16 RX ORDER — FUROSEMIDE 40 MG/1
40 TABLET ORAL DAILY
Qty: 30 TAB | Refills: 6 | Status: SHIPPED | OUTPATIENT
Start: 2017-11-16 | End: 2018-09-10

## 2017-11-27 LAB — EF %, EXTERNAL: NORMAL

## 2017-12-01 RX ORDER — ALLOPURINOL 300 MG/1
300 TABLET ORAL DAILY
Qty: 30 TAB | Refills: 6 | Status: SHIPPED | OUTPATIENT
Start: 2017-12-01 | End: 2018-07-10 | Stop reason: SDUPTHER

## 2017-12-01 NOTE — TELEPHONE ENCOUNTER
Requested Prescriptions     Pending Prescriptions Disp Refills    allopurinol (ZYLOPRIM) 300 mg tablet 30 Tab 6     Sig: Take 1 Tab by mouth daily.     folic acid-vit X8-IFT U49 (FOLGARD RX) 2.2-25-1 mg tab 30 Tab 6       Last Refill: 4/5/17 Allopurinol  /  8/30/17 Folgard  Next Appointment:no fu last seen 11/13/17

## 2017-12-08 RX ORDER — ATENOLOL 100 MG/1
100 TABLET ORAL DAILY
Qty: 30 TAB | Refills: 6 | Status: SHIPPED | OUTPATIENT
Start: 2017-12-08 | End: 2018-07-11 | Stop reason: SDUPTHER

## 2017-12-08 RX ORDER — ATORVASTATIN CALCIUM 80 MG/1
80 TABLET, FILM COATED ORAL DAILY
Qty: 30 TAB | Refills: 6 | Status: SHIPPED | OUTPATIENT
Start: 2017-12-08 | End: 2018-07-11 | Stop reason: SDUPTHER

## 2017-12-08 NOTE — TELEPHONE ENCOUNTER
Requested Prescriptions     Pending Prescriptions Disp Refills    atenolol (TENORMIN) 100 mg tablet 30 Tab 6     Sig: Take 1 Tab by mouth daily.  atorvastatin (LIPITOR) 80 mg tablet 30 Tab 6     Sig: Take 1 Tab by mouth daily.        Last Refill: 11/08/17, 09/06/17  Next Appointment:none scheduled

## 2017-12-15 RX ORDER — VALSARTAN 320 MG/1
320 TABLET ORAL DAILY
Qty: 30 TAB | Refills: 6 | Status: SHIPPED | OUTPATIENT
Start: 2017-12-15 | End: 2018-07-03 | Stop reason: SDUPTHER

## 2017-12-15 RX ORDER — AMLODIPINE BESYLATE 10 MG/1
10 TABLET ORAL DAILY
Qty: 90 TAB | Refills: 0 | Status: SHIPPED | OUTPATIENT
Start: 2017-12-15 | End: 2018-04-06 | Stop reason: SDUPTHER

## 2017-12-15 NOTE — TELEPHONE ENCOUNTER
Requested Prescriptions     Pending Prescriptions Disp Refills    valsartan (DIOVAN) 320 mg tablet 30 Tab 6     Sig: Take 1 Tab by mouth daily.        Last Refill: 09/08/17  Next Appointment:joe urrutia last seen 11/13/17

## 2017-12-15 NOTE — TELEPHONE ENCOUNTER
Requested Prescriptions     Pending Prescriptions Disp Refills    amLODIPine (NORVASC) 10 mg tablet 90 Tab 0     Sig: Take 1 Tab by mouth daily.        Last Refill: 11/10/17  Next Appointment:none scheduled

## 2018-03-09 ENCOUNTER — OFFICE VISIT (OUTPATIENT)
Dept: INTERNAL MEDICINE CLINIC | Age: 83
End: 2018-03-09

## 2018-03-09 VITALS
HEART RATE: 78 BPM | HEIGHT: 68 IN | SYSTOLIC BLOOD PRESSURE: 126 MMHG | WEIGHT: 230 LBS | TEMPERATURE: 97.9 F | OXYGEN SATURATION: 94 % | DIASTOLIC BLOOD PRESSURE: 80 MMHG | BODY MASS INDEX: 34.86 KG/M2

## 2018-03-09 DIAGNOSIS — R05.9 COUGH: Primary | ICD-10-CM

## 2018-03-09 RX ORDER — METHYLPREDNISOLONE 4 MG/1
TABLET ORAL
Qty: 1 DOSE PACK | Refills: 0 | Status: SHIPPED | OUTPATIENT
Start: 2018-03-09 | End: 2018-07-27 | Stop reason: ALTCHOICE

## 2018-03-09 RX ORDER — CODEINE PHOSPHATE AND GUAIFENESIN 10; 100 MG/5ML; MG/5ML
5 SOLUTION ORAL
Qty: 236 ML | Refills: 0 | Status: SHIPPED | OUTPATIENT
Start: 2018-03-09 | End: 2018-07-27

## 2018-03-09 RX ORDER — LEVOFLOXACIN 500 MG/1
500 TABLET, FILM COATED ORAL DAILY
Qty: 10 TAB | Refills: 0 | Status: SHIPPED | OUTPATIENT
Start: 2018-03-09 | End: 2018-03-19

## 2018-03-09 NOTE — MR AVS SNAPSHOT
[facility] 
 
 
 [address] [address] [phone] Patient: Mili Blanco MRN: [id number] :1934 Visit Information Date & Time Provider Department Dept. Phone Encounter #  
 3/9/2018  3:30 PM RAMA Kaplan MD [place] [address] [phone] [id number] Follow-up Instructions Return if symptoms worsen or fail to improve. Upcoming Health Maintenance Date Due DTaP/Tdap/Td series (1 - Tdap) 10/8/1955 ZOSTER VACCINE AGE 60> 1994 GLAUCOMA SCREENING Q2Y 10/8/1999 Bone Densitometry (Dexa) Screening 10/8/1999 MEDICARE YEARLY EXAM 10/8/1999 Allergies as of 3/9/2018  Review Complete On: 3/9/2018 By: Sharon Johnson No Known Allergies Current Immunizations  Reviewed on 2015 Name Date Influenza High Dose Vaccine PF 2017 Influenza Vaccine (Quad) PF 11/10/2015 11:04 AM  
 Pneumococcal Conjugate (PCV-13) 2017 Pneumococcal Vaccine (Unspecified Type) 2005 Not reviewed this visit You Were Diagnosed With   
  
 Codes Comments Cough    -  Primary ICD-10-CM: W74 ICD-9-CM: 932. 2 Vitals BP Pulse Temp Height(growth percentile) Weight(growth percentile) SpO2  
 126/80 (BP 1 Location: Left arm, BP Patient Position: Sitting) 78 97.9 °F (36.6 °C) (Oral) 5' 8\" (1.727 m) 230 lb (104.3 kg) 94% BMI Smoking Status 34.97 kg/m2 Former Smoker BMI and BSA Data Body Mass Index Body Surface Area 34.97 kg/m 2 2.24 m 2 Preferred Pharmacy Pharmacy Name Phone Coretha Page [address] [place] [facility] [address] [place] [address]  [address] [phone] Your Updated Medication List  
  
   
This list is accurate as of 3/9/18  4:30 PM.  Always use your most recent med list.  
  
  
  
  
 allopurinol 300 mg tablet Commonly known as:  Ozella Furrow Take 1 Tab by mouth daily. amLODIPine 10 mg tablet Commonly known as:  Cassidy Moreno Take 1 Tab by mouth daily. aspirin 81 mg tablet Take 81 mg by mouth. atenolol 100 mg tablet Commonly known as:  TENORMIN Take 1 Tab by mouth daily. atorvastatin 80 mg tablet Commonly known as:  LIPITOR Take 1 Tab by mouth daily. diazePAM 2 mg tablet Commonly known as:  VALIUM Take 1 Tab by mouth every eight (8) hours as needed for Anxiety. Max Daily Amount: 6 mg.  
  
 docusate sodium 100 mg capsule Commonly known as:  Andree Moulton Take 1 Cap by mouth two (2) times a day. May use OTC instead. Prevents constipation from narcotics. folic acid-vit C0-BQG R57 2.2-25-1 mg Tab Commonly known as:  FOLGARD RX  
TAKE ONE TABLET BY MOUTH DAILY * furosemide 20 mg tablet Commonly known as:  LASIX TAKE ONE TABLET BY MOUTH DAILY * furosemide 40 mg tablet Commonly known as:  LASIX Take 1 Tab by mouth daily. glycopyrrolate-formoterol 9-4.8 mcg Hfaa Commonly known as:  Leldon Yoselin Take 2 Inhalation by inhalation two (2) times a day. guaiFENesin-codeine 100-10 mg/5 mL solution Commonly known as:  Andrews Jesusita Take 5 mL by mouth three (3) times daily as needed for Cough. Max Daily Amount: 15 mL. levoFLOXacin 500 mg tablet Commonly known as:  Ericka Ripa Take 1 Tab by mouth daily for 10 days. methylPREDNISolone 4 mg tablet Commonly known as:  Ino Pencil Take as directed. PREVACID PO Take  by mouth.  
  
 sildenafil (antihypertensive) 20 mg tablet Commonly known as:  REVATIO Take 1 Tab by mouth three (3) times daily. valsartan 320 mg tablet Commonly known as:  DIOVAN Take 1 Tab by mouth daily. * Notice: This list has 2 medication(s) that are the same as other medications prescribed for you. Read the directions carefully, and ask your doctor or other care provider to review them with you. Prescriptions Printed Refills levoFLOXacin (LEVAQUIN) 500 mg tablet 0 Sig: Take 1 Tab by mouth daily for 10 days. Class: Print Route: Oral  
 methylPREDNISolone (MEDROL DOSEPACK) 4 mg tablet 0 Sig: Take as directed. Class: Print  
 guaiFENesin-codeine (CHERATUSSIN AC) 100-10 mg/5 mL solution 0 Sig: Take 5 mL by mouth three (3) times daily as needed for Cough. Max Daily Amount: 15 mL. Class: Print Route: Oral  
  
Follow-up Instructions Return if symptoms worsen or fail to improve. To-Do List   
 03/09/2018 Imaging:  XR CHEST PA LAT Introducing [facility]! [facility] introduces [employer] patient portal. Now you can access parts of your medical record, email your doctor's office, and request medication refills online. 1. In your internet browser, go to [email or web address] 2. Click on the First Time User? Click Here link in the Sign In box. You will see the New Member Sign Up page. 3. Enter your [employer] Access Code exactly as it appears below. You will not need to use this code after youve completed the sign-up process. If you do not sign up before the expiration date, you must request a new code. · [employer] Access Code: [email or web address] Expires: 6/7/2018  3:33 PM 
 
4. Enter the last four digits of your Social Security Number (xxxx) and Date of Birth (mm/dd/yyyy) as indicated and click Submit. You will be taken to the next sign-up page. 5. Create a [employer] ID. This will be your [facility] login ID and cannot be changed, so think of one that is secure and easy to remember. 6. Create a [facility] password. You can change your password at any time. 7. Enter your Password Reset Question and Answer. This can be used at a later time if you forget your password. 8. Enter your e-mail address. You will receive e-mail notification when new information is available in [address] 9. Click Sign Up. You can now view and download portions of your medical record. 10. Click the Download Summary menu link to download a portable copy of your medical information. If you have questions, please visit the Frequently Asked Questions section of the [employer] website. Remember, [facility] is NOT to be used for urgent needs. For medical emergencies, dial 911. Now available from your iPhone and Android! Please provide this summary of care documentation to your next provider. Your primary care clinician is listed as RAMA Guerin. If you have any questions after today's visit, please call [phone].

## 2018-03-09 NOTE — PROGRESS NOTES
Magdalena Mcqueen is a 80 y.o. male presenting for Cough and Shortness of Breath  . 1. Have you been to the ER, urgent care clinic since your last visit? Hospitalized since your last visit? No    2. Have you seen or consulted any other health care providers outside of the [address] [place] [address] since your last visit? Include any pap smears or colon screening. Yes - Dr Kwame Good- earlier today for cardio follow-up. Fall Risk Assessment, last 12 mths 11/13/2017   Able to walk? Yes   Fall in past 12 months? No         Abuse Screening Questionnaire 9/8/2017   Do you ever feel afraid of your partner? N   Are you in a relationship with someone who physically or mentally threatens you? N   Is it safe for you to go home? Y       PHQ over the last two weeks 11/13/2017   Little interest or pleasure in doing things Not at all   Feeling down, depressed or hopeless Not at all   Total Score PHQ 2 0       There are no discontinued medications.

## 2018-03-09 NOTE — PROGRESS NOTES
This note will not be viewable in [address] Rolo Gómez is a 80 y.o. male and presents with Cough and Shortness of Breath  . Subjective:  Mr. Rubén Galloway presents today with complaint of cough and congestion and shortness of breath. His symptoms have been present for the past couple of weeks. He denies fever chills or rigors. He denies any pleuritic chest pain. He saw his cardiologist earlier today who recommended that he be seen and have a chest x-ray today. He has not been taking any medication for this. His cough is productive of thick brownish sputum. 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 10 Tab 0    methylPREDNISolone (MEDROL DOSEPACK) 4 mg tablet Take as directed. 1 Dose Pack 0    guaiFENesin-codeine (CHERATUSSIN AC) 100-10 mg/5 mL solution Take 5 mL by mouth three (3) times daily as needed for Cough. Max Daily Amount: 15 mL. 236 mL 0    valsartan (DIOVAN) 320 mg tablet Take 1 Tab by mouth daily. 30 Tab 6    amLODIPine (NORVASC) 10 mg tablet Take 1 Tab by mouth daily. 90 Tab 0    atenolol (TENORMIN) 100 mg tablet Take 1 Tab by mouth daily. 30 Tab 6    atorvastatin (LIPITOR) 80 mg tablet Take 1 Tab by mouth daily. 30 Tab 6    allopurinol (ZYLOPRIM) 300 mg tablet Take 1 Tab by mouth daily. 30 Tab 6    folic acid-vit U1-ZDN I84 (FOLGARD RX) 2.2-25-1 mg tab TAKE ONE TABLET BY MOUTH DAILY 30 Tab 6    furosemide (LASIX) 40 mg tablet Take 1 Tab by mouth daily. (Patient taking differently: Take 80 mg by mouth daily.) 30 Tab 6    glycopyrrolate-formoterol (BEVESPI AEROSPHERE) 9-4.8 mcg HFAA Take 2 Inhalation by inhalation two (2) times a day. 1 Inhaler 0    sildenafil (REVATIO) 20 mg tablet Take 1 Tab by mouth three (3) times daily. 30 Tab 1    docusate sodium (COLACE) 100 mg capsule Take 1 Cap by mouth two (2) times a day. May use OTC instead. Prevents constipation from narcotics. 60 Cap 0    diazepam (VALIUM) 2 mg tablet Take 1 Tab by mouth every eight (8) hours as needed for Anxiety. Max Daily Amount: 6 mg. 20 Tab 0    aspirin 81 mg tablet Take 81 mg by mouth.  LANSOPRAZOLE (PREVACID PO) Take  by mouth.  furosemide (LASIX) 20 mg tablet TAKE ONE TABLET BY MOUTH DAILY 30 Tab 6     Social History     Social History    Marital status:      Spouse name: N/A    Number of children: N/A    Years of education: N/A     Social History Main Topics    Smoking status: Former Smoker    Smokeless tobacco: Former User    Alcohol use 8.4 oz/week     14 Cans of beer per week    Drug use: No    Sexual activity: Not Asked     Other Topics Concern    None     Social History Narrative     History reviewed. No pertinent family history. Review of Systems  Constitutional: negative for fevers, chills, anorexia and weight loss  Eyes:   negative for visual disturbance and irritation  ENT:   Positive for some sinus congestion and post nasal drainage.    Respiratory:  Positive for cough and chest congestion without wheezing  CV:   negative for chest pain, palpitations, lower extremity edema  GI:   negative for nausea, vomiting, diarrhea, abdominal pain,melena  Integumentary: negative for rash and pruritus  Neurological:  negative for headaches, dizziness, vertigo, memory problems and gait       Objective:  Visit Vitals    /80 (BP 1 Location: Left arm, BP Patient Position: Sitting)    Pulse 78    Temp 97.9 °F (36.6 °C) (Oral)    Ht 5' 8\" (1.727 m)    Wt 230 lb (104.3 kg)    SpO2 94%    BMI 34.97 kg/m2     Physical Exam:   General appearance - alert, ill appearing, and in no distress  Mental status - alert, oriented to person, place, and time  EYE-MAURICE, EOMI, conjuctiva clear. No lid swelling or purulent drainage  ENT- TM's clear without A/F level. Pharynx slightly erythematous with drainage noted  Nose - normal and patent, no erythema,  Neck - supple, no significant adenopathy   Chest - Coarse upper airway rhonchi present without wheezing   Heart - normal rate, regular rhythm, normal S1, S2, no murmurs, rubs, clicks or gallops   Skin-No rash appreciated  Neuro -alert, oriented, normal speech, no focal findings. Assessment/Plan:  Diagnoses and all orders for this visit:    1. Cough  -     XR CHEST PA LAT; Future  -     guaiFENesin-codeine (CHERATUSSIN AC) 100-10 mg/5 mL solution; Take 5 mL by mouth three (3) times daily as needed for Cough. Max Daily Amount: 15 mL. Other orders  -     levoFLOXacin (LEVAQUIN) 500 mg tablet; Take 1 Tab by mouth daily for 10 days. -     methylPREDNISolone (MEDROL DOSEPACK) 4 mg tablet; Take as directed. Chest x-ray does not appear to show any acute infiltrate. If the patient does not respond to antibiotics and a steroid Dosepak he should return to clinic for further evaluation. Other Instructions:  Mucinex as directed    Increase po fluids    Follow-up Disposition:  Return if symptoms worsen or fail to improve. I have reviewed with the patient details of the assessment and plan and all questions were answered.  Relevent patient education was performed. An After Visit Summary was printed and given to the patient.     Carmelo Bowers MD

## 2018-04-03 RX ORDER — AMLODIPINE BESYLATE 10 MG/1
10 TABLET ORAL DAILY
Qty: 90 TAB | Refills: 3 | Status: CANCELLED | OUTPATIENT
Start: 2018-04-03

## 2018-04-03 NOTE — TELEPHONE ENCOUNTER
Requested Prescriptions     Pending Prescriptions Disp Refills    amLODIPine (NORVASC) 10 mg tablet 90 Tab 3     Sig: Take 1 Tab by mouth daily.        Last Refill: 12/15/17  Next Appointment:joe urrutia

## 2018-04-06 RX ORDER — AMLODIPINE BESYLATE 10 MG/1
10 TABLET ORAL DAILY
Qty: 90 TAB | Refills: 3 | Status: SHIPPED | OUTPATIENT
Start: 2018-04-06 | End: 2019-04-04 | Stop reason: SDUPTHER

## 2018-07-03 RX ORDER — VALSARTAN 320 MG/1
320 TABLET ORAL DAILY
Qty: 30 TAB | Refills: 0 | Status: SHIPPED | OUTPATIENT
Start: 2018-07-03 | End: 2018-08-14 | Stop reason: ALTCHOICE

## 2018-07-03 NOTE — TELEPHONE ENCOUNTER
Requested Prescriptions     Pending Prescriptions Disp Refills    valsartan (DIOVAN) 320 mg tablet 30 Tab 0     Sig: Take 1 Tab by mouth daily. Patient Last Seen: 03- without labs    Last labs done:  11-    Next appointment:  Not scheduled  Patient was last seen for a routine visit on 11-, was referred to cardiologist (11-) and return to see Dr. Michoacano Polo after seeing the cardiologist.  No follow-up visit, only an acute visit in March 2018. Rx for 30 tablets and request patient to be seen prior to further refills.

## 2018-07-11 RX ORDER — ALLOPURINOL 300 MG/1
TABLET ORAL
Qty: 30 TAB | Refills: 5 | Status: SHIPPED | OUTPATIENT
Start: 2018-07-11 | End: 2018-07-27 | Stop reason: SDUPTHER

## 2018-07-11 RX ORDER — ALLOPURINOL 300 MG/1
300 TABLET ORAL DAILY
Qty: 30 TAB | Refills: 6 | Status: SHIPPED | OUTPATIENT
Start: 2018-07-11 | End: 2019-10-17

## 2018-07-13 RX ORDER — ATENOLOL 100 MG/1
TABLET ORAL
Qty: 30 TAB | Refills: 5 | Status: SHIPPED | OUTPATIENT
Start: 2018-07-13 | End: 2019-01-11 | Stop reason: SDUPTHER

## 2018-07-13 RX ORDER — ATORVASTATIN CALCIUM 80 MG/1
TABLET, FILM COATED ORAL
Qty: 30 TAB | Refills: 5 | Status: SHIPPED | OUTPATIENT
Start: 2018-07-13 | End: 2019-01-04 | Stop reason: SDUPTHER

## 2018-07-27 ENCOUNTER — OFFICE VISIT (OUTPATIENT)
Dept: INTERNAL MEDICINE CLINIC | Age: 83
End: 2018-07-27

## 2018-07-27 VITALS
SYSTOLIC BLOOD PRESSURE: 124 MMHG | WEIGHT: 222.4 LBS | RESPIRATION RATE: 16 BRPM | BODY MASS INDEX: 33.71 KG/M2 | OXYGEN SATURATION: 94 % | HEIGHT: 68 IN | DIASTOLIC BLOOD PRESSURE: 76 MMHG | TEMPERATURE: 98.8 F | HEART RATE: 67 BPM

## 2018-07-27 DIAGNOSIS — I10 ESSENTIAL HYPERTENSION: ICD-10-CM

## 2018-07-27 DIAGNOSIS — E78.5 DYSLIPIDEMIA: ICD-10-CM

## 2018-07-27 DIAGNOSIS — Z79.899 ON STATIN THERAPY: ICD-10-CM

## 2018-07-27 DIAGNOSIS — I25.10 CORONARY ARTERY DISEASE INVOLVING NATIVE CORONARY ARTERY OF NATIVE HEART WITHOUT ANGINA PECTORIS: Primary | ICD-10-CM

## 2018-07-27 DIAGNOSIS — R10.31 RIGHT LOWER QUADRANT ABDOMINAL PAIN: ICD-10-CM

## 2018-07-27 LAB
BACTERIA UA POCT, BACTPOCT: NORMAL
BILIRUB UR QL STRIP: NEGATIVE
CASTS UA POCT: 0
CLUE CELLS, CLUEPOCT: NEGATIVE
CRYSTALS UA POCT, CRYSPOCT: NEGATIVE
EPITHELIAL CELLS POCT: NEGATIVE
GLUCOSE UR-MCNC: NEGATIVE MG/DL
GRAN# POC: 6.6 K/UL (ref 2–7.8)
GRAN% POC: 76.1 % (ref 37–92)
HCT VFR BLD CALC: 45.9 % (ref 37–51)
HGB BLD-MCNC: 14.9 G/DL (ref 12–18)
KETONES P FAST UR STRIP-MCNC: NEGATIVE MG/DL
LY# POC: 1.7 K/UL (ref 0.6–4.1)
LY% POC: 20.1 % (ref 10–58.5)
MCH RBC QN: 32.2 PG (ref 26–32)
MCHC RBC-ENTMCNC: 32.4 G/DL (ref 30–36)
MCV RBC: 99 FL (ref 80–97)
MID #, POC: 0.3 K/UL (ref 0–1.8)
MID% POC: 3.8 % (ref 0.1–24)
MUCUS UA POCT, MUCPOCT: NORMAL
PH UR STRIP: 6 [PH] (ref 5–7)
PLATELET # BLD: 258 K/UL (ref 140–440)
PROT UR QL STRIP: NEGATIVE
RBC # BLD: 4.62 M/UL (ref 4.2–6.3)
RBC UA POCT, RBCPOCT: 0
SP GR UR STRIP: 1.01 (ref 1.01–1.02)
TRICH UA POCT, TRICHPOC: NEGATIVE
UA UROBILINOGEN AMB POC: NORMAL (ref 0.2–1)
URINALYSIS CLARITY POC: CLEAR
URINALYSIS COLOR POC: NORMAL
URINE BLOOD POC: NEGATIVE
URINE CULT COMMENT, POCT: NORMAL
URINE LEUKOCYTES POC: NEGATIVE
URINE NITRITES POC: NEGATIVE
WBC # BLD: 8.6 K/UL (ref 4.1–10.9)
WBC UA POCT, WBCPOCT: 0
YEAST UA POCT, YEASTPOC: NEGATIVE

## 2018-07-27 RX ORDER — MAGNESIUM CITRATE
296 SOLUTION, ORAL ORAL
Qty: 1 BOTTLE | Refills: 1 | Status: SHIPPED | OUTPATIENT
Start: 2018-07-27 | End: 2018-07-27

## 2018-07-27 NOTE — PROGRESS NOTES
Chief Complaint Patient presents with  Hypertension  
  room 1 1. Have you been to the ER, urgent care clinic since your last visit? Hospitalized since your last visit? NO 
 
2. Have you seen or consulted any other health care providers outside of the [address] [place] [address] since your last visit? Include any pap smears or colon screening. NO 
 
 
PT IS HERE FOR ROUTINE CHECK. PT IS NOT FASTING.

## 2018-07-27 NOTE — PROGRESS NOTES
This note will not be viewable in [address] Lawrence Nieto is a 80 y.o. male and presents with Hypertension (room 1) Jose Antonio Trujillo Subjective: 
Mr. Hunter Somemr presents today for follow-up of hypertension, coronary artery disease, hyperlipidemia, GERD, and history of gout. He has remote history of TIA. He is doing well on his current medical regimen. He has complained of continued cough productive of scant amount of sputum. He denies any chest pain, shortness of breath, PND, orthopnea, or pedal edema. His most significant complaint today is of a discomfort in his right lower abdomen. This is been present for the past 2-3 weeks. He does not note any change in his bowels. He denies any constipation or diarrhea. He has not had any nausea. He denies any fever chills or rigors. He has had occasional pain that radiates into his right testicle but this is been present for some time now. It is not persistent and has not gotten worse. Past Medical History:  
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  
Procedure Laterality Date [address] [facility] Cardiac Stents  HX HERNIA REPAIR    
 RIH  
 HX HERNIA REPAIR  43/42/29  
 open umbilical hernia repair mesh No Known Allergies Current Outpatient Prescriptions Medication Sig Dispense Refill  magnesium citrate solution Take 296 mL by mouth now for 1 dose.  1 Bottle 1  
 atorvastatin (LIPITOR) 80 mg tablet TAKE ONE TABLET BY MOUTH DAILY 30 Tab 5  
 atenolol (TENORMIN) 100 mg tablet TAKE ONE TABLET BY MOUTH DAILY 30 Tab 5  
 allopurinol (ZYLOPRIM) 300 mg tablet Take 1 Tab by mouth daily. 30 Tab 6  
 valsartan (DIOVAN) 320 mg tablet Take 1 Tab by mouth daily. 30 Tab 0  
 amLODIPine (NORVASC) 10 mg tablet Take 1 Tab by mouth daily. 90 Tab 3  furosemide (LASIX) 40 mg tablet Take 1 Tab by mouth daily. (Patient taking differently: Take 80 mg by mouth daily.) 30 Tab 6  
 sildenafil (REVATIO) 20 mg tablet Take 1 Tab by mouth three (3) times daily. 30 Tab 1  
 diazepam (VALIUM) 2 mg tablet Take 1 Tab by mouth every eight (8) hours as needed for Anxiety. Max Daily Amount: 6 mg. 20 Tab 0  
 aspirin 81 mg tablet Take 81 mg by mouth.  LANSOPRAZOLE (PREVACID PO) Take  by mouth.  folic acid-vit H6-DLF J40 (FOLGARD RX) 2.2-25-1 mg tab TAKE ONE TABLET BY MOUTH DAILY 30 Tab 6  
 glycopyrrolate-formoterol (BEVESPI AEROSPHERE) 9-4.8 mcg HFAA Take 2 Inhalation by inhalation two (2) times a day. 1 Inhaler 0  
 furosemide (LASIX) 20 mg tablet TAKE ONE TABLET BY MOUTH DAILY 30 Tab 6  
 docusate sodium (COLACE) 100 mg capsule Take 1 Cap by mouth two (2) times a day. May use OTC instead. Prevents constipation from narcotics. 60 Cap 0 Social History Social History  Marital status:  Spouse name: N/A  
 Number of children: N/A  
 Years of education: N/A Social History Main Topics  Smoking status: Former Smoker  Smokeless tobacco: Former User  Alcohol use 8.4 oz/week 14 Cans of beer per week  Drug use: No  
 Sexual activity: Not Asked Other Topics Concern  None Social History Narrative History reviewed. No pertinent family history. Review of Systems Constitutional:  negative for fevers, chills, anorexia and weight loss Eyes:    negative for visual disturbance and irritation ENT:    negative for tinnitus,sore throat,nasal congestion,ear pains.hoarseness Respiratory:     negative for cough, hemoptysis, dyspnea,wheezing CV:    negative for chest pain, palpitations, lower extremity edema GI:    negative for nausea, vomiting, diarrhea, abdominal pain,melena Endo:               negative for polyuria,polydipsia,polyphagia,heat intolerance Genitourinary : negative for frequency, dysuria and hematuria Integumentary: negative for rash and pruritus Hematologic:   negative for easy bruising and gum/nose bleeding Musculoskel:  negative for myalgias, arthralgias, back pain, muscle weakness, joint pain Neurological:   negative for headaches, dizziness, vertigo, memory problems and gait Behavl/Psych:  negative for feelings of anxiety, depression, mood changes ROS otherwise negative Objective: 
Visit Vitals  /76 (BP 1 Location: Left arm, BP Patient Position: Sitting)  Pulse 67  Temp 98.8 °F (37.1 °C) (Oral)  Resp 16  
 Ht 5' 8\" (1.727 m)  Wt 222 lb 6.4 oz (100.9 kg)  SpO2 94%  BMI 33.82 kg/m2 Physical Exam:  
General appearance - alert, well appearing, and in no distress Mental status - alert, oriented to person, place, and time EYE-MAURICE, EOMI, fundi normal, corneas normal, no foreign bodies ENT-ENT exam normal, no neck nodes or sinus tenderness Nose - normal and patent, no erythema, discharge or polyps Mouth - mucous membranes moist, pharynx normal without lesions Neck - supple, no significant adenopathy Chest - clear to auscultation, no wheezes, rales or rhonchi, symmetric air entry Heart - normal rate, regular rhythm, normal S1, S2, no murmurs, rubs, clicks or gallops Abdomen - soft, nontender, nondistended, no masses or organomegaly Lymph- no adenopathy palpable Ext-peripheral pulses normal, no pedal edema, no clubbing or cyanosis Skin-Warm and dry. no hyperpigmentation, vitiligo, or suspicious lesions Neuro -alert, oriented, normal speech, no focal findings or movement disorder noted Assessment/Plan: 
Diagnoses and all orders for this visit: 1. Coronary artery disease involving native coronary artery of native heart without angina pectoris 2. Essential hypertension -     METABOLIC PANEL, COMPREHENSIVE 
-     AMB POC URINALYSIS DIP STICK AUTO W/ MICRO 3. Dyslipidemia -     LIPID PANEL 
 
4. On statin therapy 5. Right lower quadrant abdominal pain -     XR ABD (AP AND ERECT OR DECUB); Future -     METABOLIC PANEL, COMPREHENSIVE 
-     AMB POC COMPLETE CBC,AUTOMATED ENTER 
-     AMB POC URINALYSIS DIP STICK AUTO W/ MICRO Other orders 
-     magnesium citrate solution; Take 296 mL by mouth now for 1 dose. ICD-10-CM ICD-9-CM 1. Coronary artery disease involving native coronary artery of native heart without angina pectoris I25.10 414.01   
2. Essential hypertension R34 297.9 METABOLIC PANEL, COMPREHENSIVE  
   AMB POC URINALYSIS DIP STICK AUTO W/ MICRO 3. Dyslipidemia E78.5 272.4 LIPID PANEL  
4. On statin therapy Z79.899 V58.69   
5. Right lower quadrant abdominal pain R10.31 789.03 XR ABD (AP AND ERECT OR DECUB) METABOLIC PANEL, COMPREHENSIVE  
   AMB POC COMPLETE CBC,AUTOMATED ENTER  
   AMB POC URINALYSIS DIP STICK AUTO W/ MICRO Plan: 
 
Patient's abdominal pain could perhaps be from constipation. Although he has not noted any change in his bowel habits his x-ray film indicates a significant amount of stool. He will be given mag citrate to take this weekend to see how he responds. If his symptoms are relieved and no further workup will be indicated. Otherwise may consider CT scan of the abdomen should his symptoms persist or progress. In the meantime he will have his labs done as well and will continue his other medications as prescribed above. His blood pressure appears to be controlled and his lipid profile is pending at this time. Follow-up Disposition: 
Return in about 1 month (around 8/27/2018) for follow up.  
 
I have reviewed with the patient details of the assessment and plan and all questions were answered. Relevent patient education was performed. Verbal and/or written instructions (see AVS) provided. The most recent lab findings were reviewed with the patient. Plan was discussed with patient who verbally expressed understanding. An After Visit Summary was printed and given to the patient.  
 
Anthony Owens MD

## 2018-07-28 LAB
ALBUMIN SERPL-MCNC: 4 G/DL (ref 3.5–4.7)
ALBUMIN/GLOB SERPL: 1.4 {RATIO} (ref 1.2–2.2)
ALP SERPL-CCNC: 126 IU/L (ref 39–117)
ALT SERPL-CCNC: 28 IU/L (ref 0–44)
AST SERPL-CCNC: 28 IU/L (ref 0–40)
BILIRUB SERPL-MCNC: 0.4 MG/DL (ref 0–1.2)
BUN SERPL-MCNC: 19 MG/DL (ref 8–27)
BUN/CREAT SERPL: 14 (ref 10–24)
CALCIUM SERPL-MCNC: 9.6 MG/DL (ref 8.6–10.2)
CHLORIDE SERPL-SCNC: 101 MMOL/L (ref 96–106)
CHOLEST SERPL-MCNC: 170 MG/DL (ref 100–199)
CO2 SERPL-SCNC: 27 MMOL/L (ref 20–29)
CREAT SERPL-MCNC: 1.4 MG/DL (ref 0.76–1.27)
GLOBULIN SER CALC-MCNC: 2.8 G/DL (ref 1.5–4.5)
GLUCOSE SERPL-MCNC: 93 MG/DL (ref 65–99)
HDLC SERPL-MCNC: 29 MG/DL
LDLC SERPL CALC-MCNC: ABNORMAL MG/DL (ref 0–99)
POTASSIUM SERPL-SCNC: 4.5 MMOL/L (ref 3.5–5.2)
PROT SERPL-MCNC: 6.8 G/DL (ref 6–8.5)
SODIUM SERPL-SCNC: 144 MMOL/L (ref 134–144)
TRIGL SERPL-MCNC: 483 MG/DL (ref 0–149)
VLDLC SERPL CALC-MCNC: ABNORMAL MG/DL (ref 5–40)

## 2018-07-31 NOTE — PROGRESS NOTES
Lab results overall look okay with the exception of an elevated triglyceride level. I believe this was a nonfasting. Creatinine mildly elevated at 1.4. Analysis was clear. Will order abdominal ultrasound for further evaluation.

## 2018-08-03 DIAGNOSIS — R10.84 GENERALIZED ABDOMINAL PAIN: Primary | ICD-10-CM

## 2018-08-03 RX ORDER — TRAMADOL HYDROCHLORIDE 50 MG/1
50 TABLET ORAL
Qty: 24 TAB | Refills: 0 | Status: SHIPPED | OUTPATIENT
Start: 2018-08-03 | End: 2018-09-10

## 2018-08-13 ENCOUNTER — HOSPITAL ENCOUNTER (OUTPATIENT)
Dept: ULTRASOUND IMAGING | Age: 83
Discharge: HOME | End: 2018-08-13
Attending: INTERNAL MEDICINE
Payer: MEDICARE

## 2018-08-13 DIAGNOSIS — R10.31 RIGHT LOWER QUADRANT ABDOMINAL PAIN: ICD-10-CM

## 2018-08-13 PROCEDURE — 76700 US EXAM ABDOM COMPLETE: CPT

## 2018-08-14 RX ORDER — OLMESARTAN MEDOXOMIL 40 MG/1
40 TABLET ORAL DAILY
Qty: 30 TAB | Refills: 6 | Status: SHIPPED | OUTPATIENT
Start: 2018-08-14 | End: 2018-09-24 | Stop reason: SDUPTHER

## 2018-08-16 DIAGNOSIS — R10.32 LLQ ABDOMINAL PAIN: Primary | ICD-10-CM

## 2018-08-16 NOTE — PROGRESS NOTES
Patient has already been notified of ultrasound and lab results. He continues to have symptoms and a follow-up office visit is to be scheduled. Please schedule a CT scan of the abdomen and pelvis prior to office visit for review.

## 2018-08-17 ENCOUNTER — HOSPITAL ENCOUNTER (OUTPATIENT)
Dept: CT IMAGING | Age: 83
Discharge: HOME | End: 2018-08-17
Attending: INTERNAL MEDICINE
Payer: MEDICARE

## 2018-08-17 DIAGNOSIS — R10.32 LLQ ABDOMINAL PAIN: ICD-10-CM

## 2018-08-17 LAB — CREAT BLD-MCNC: 1.1 MG/DL (ref 0.6–1.3)

## 2018-08-17 PROCEDURE — 82565 ASSAY OF CREATININE: CPT

## 2018-08-17 PROCEDURE — 74011000255 HC RX REV CODE- 255: Performed by: INTERNAL MEDICINE

## 2018-08-17 PROCEDURE — 74011250636 HC RX REV CODE- 250/636: Performed by: INTERNAL MEDICINE

## 2018-08-17 PROCEDURE — 74177 CT ABD & PELVIS W/CONTRAST: CPT

## 2018-08-17 PROCEDURE — 74011636320 HC RX REV CODE- 636/320: Performed by: INTERNAL MEDICINE

## 2018-08-17 RX ORDER — SODIUM CHLORIDE 9 MG/ML
50 INJECTION, SOLUTION INTRAVENOUS
Status: COMPLETED | OUTPATIENT
Start: 2018-08-17 | End: 2018-08-17

## 2018-08-17 RX ORDER — BARIUM SULFATE 20 MG/ML
900 SUSPENSION ORAL
Status: COMPLETED | OUTPATIENT
Start: 2018-08-17 | End: 2018-08-17

## 2018-08-17 RX ORDER — SODIUM CHLORIDE 0.9 % (FLUSH) 0.9 %
10 SYRINGE (ML) INJECTION
Status: COMPLETED | OUTPATIENT
Start: 2018-08-17 | End: 2018-08-17

## 2018-08-17 RX ADMIN — Medication 10 ML: at 13:57

## 2018-08-17 RX ADMIN — BARIUM SULFATE 900 ML: 21 SUSPENSION ORAL at 13:57

## 2018-08-17 RX ADMIN — IOPAMIDOL 100 ML: 755 INJECTION, SOLUTION INTRAVENOUS at 13:57

## 2018-08-17 RX ADMIN — SODIUM CHLORIDE 50 ML/HR: 900 INJECTION, SOLUTION INTRAVENOUS at 13:57

## 2018-08-20 ENCOUNTER — OFFICE VISIT (OUTPATIENT)
Dept: INTERNAL MEDICINE CLINIC | Age: 83
End: 2018-08-20

## 2018-08-20 VITALS
BODY MASS INDEX: 33.74 KG/M2 | RESPIRATION RATE: 16 BRPM | WEIGHT: 222.6 LBS | SYSTOLIC BLOOD PRESSURE: 126 MMHG | DIASTOLIC BLOOD PRESSURE: 73 MMHG | HEIGHT: 68 IN | HEART RATE: 66 BPM | TEMPERATURE: 98.6 F | OXYGEN SATURATION: 95 %

## 2018-08-20 DIAGNOSIS — R10.9 RIGHT FLANK PAIN: Primary | ICD-10-CM

## 2018-08-20 DIAGNOSIS — R10.31 RIGHT GROIN PAIN: ICD-10-CM

## 2018-08-20 NOTE — PROGRESS NOTES
Chief Complaint   Patient presents with    Medication Evaluation     room 3     1. Have you been to the ER, urgent care clinic since your last visit? NO Hospitalized since your last visit? NO    2. Have you seen or consulted any other health care providers outside of the [facility] since your last visit? Include any pap smears or colon screening. NO      PT IS HERE TO DISCUSS CT SCAN. PT STATES HE STILL HAS CONTINUED RIGHT SIDED ABDOMINAL PAIN.

## 2018-08-20 NOTE — MR AVS SNAPSHOT
[address] [address] [address] [address] [phone] Patient: David Ramos MRN: [id number] :1934 Visit Information Date & Time Provider Department Dept. Phone Encounter #  
 2018  2:50 PM RAMA Hinds MD [facility] [id number] Upcoming Health Maintenance Date Due DTaP/Tdap/Td series (1 - Tdap) 10/8/1955 ZOSTER VACCINE AGE 60> 1994 GLAUCOMA SCREENING Q2Y 10/8/1999 MEDICARE YEARLY EXAM 3/14/2018 Influenza Age 5 to Adult 2018 Allergies as of 2018  Review Complete On: 2018 By: Olena OrMONTSE No Known Allergies Current Immunizations  Reviewed on 2015 Name Date Influenza High Dose Vaccine PF 2017 Influenza Vaccine (Quad) PF 11/10/2015 11:04 AM  
 Pneumococcal Conjugate (PCV-13) 2017 Pneumococcal Vaccine (Unspecified Type) 2005 Not reviewed this visit You Were Diagnosed With   
  
 Codes Comments Right flank pain    -  Primary ICD-10-CM: R10.9 ICD-9-CM: 789.09 Right groin pain     ICD-10-CM: R10.31 ICD-9-CM: 789.09 Vitals BP Pulse Temp Resp Height(growth percentile) Weight(growth percentile) 126/73 (BP 1 Location: Left arm, BP Patient Position: Sitting) 66 98.6 °F (37 °C) (Oral) 16 5' 8\" (1.727 m) 222 lb 9.6 oz (101 kg) SpO2 BMI Smoking Status 95% 33.85 kg/m2 Former Smoker Vitals History BMI and BSA Data Body Mass Index Body Surface Area  
 33.85 kg/m 2 2.2 m 2 Preferred Pharmacy Pharmacy Name Phone Breonna Maldonado [address], [address] [place] [address] Hardik Farrar [phone] Your Updated Medication List  
  
   
This list is accurate as of 18  4:24 PM.  Always use your most recent med list.  
  
  
  
  
 allopurinol 300 mg tablet Commonly known as:  Magnus Agarwal Take 1 Tab by mouth daily. amLODIPine 10 mg tablet Commonly known as:  Gillespie Fanti Take 1 Tab by mouth daily. aspirin 81 mg tablet Take 81 mg by mouth. atenolol 100 mg tablet Commonly known as:  TENORMIN  
TAKE ONE TABLET BY MOUTH DAILY  
  
 atorvastatin 80 mg tablet Commonly known as:  LIPITOR  
TAKE ONE TABLET BY MOUTH DAILY  
  
 diazePAM 2 mg tablet Commonly known as:  VALIUM Take 1 Tab by mouth every eight (8) hours as needed for Anxiety. Max Daily Amount: 6 mg.  
  
 docusate sodium 100 mg capsule Commonly known as:  Armludwig Duster Take 1 Cap by mouth two (2) times a day. May use OTC instead. Prevents constipation from narcotics. * furosemide 20 mg tablet Commonly known as:  LASIX TAKE ONE TABLET BY MOUTH DAILY * furosemide 40 mg tablet Commonly known as:  LASIX Take 1 Tab by mouth daily. glycopyrrolate-formoterol 9-4.8 mcg Hfaa Commonly known as:  Fei Bobamoto Take 2 Inhalation by inhalation two (2) times a day. olmesartan 40 mg tablet Commonly known as:  [employer] Take 1 Tab by mouth daily. PREVACID PO Take  by mouth.  
  
 sildenafil (antihypertensive) 20 mg tablet Commonly known as:  REVATIO Take 1 Tab by mouth three (3) times daily. TL JONNA RX 2.2-25-1 mg Tab Generic drug:  folic acid-vit S4-ZFR Q50 TAKE ONE TABLET BY MOUTH DAILY  
  
 traMADol 50 mg tablet Commonly known as:  ULTRAM  
Take 1 Tab by mouth every six (6) hours as needed for Pain. Max Daily Amount: 200 mg.  
  
 * Notice: This list has 2 medication(s) that are the same as other medications prescribed for you. Read the directions carefully, and ask your doctor or other care provider to review them with you. We Performed the Following REFERRAL TO GENERAL SURGERY [REF27 Custom] Comments:  
 Please see patient for right flank/abdominal pain with radiation to the right groin. Referral Information Referral ID Referred By Referred To [address], [place]   
   [address] [place] [address] [address] [place], [address] [facility] Phone: [phone] Fax: [phone] Visits Status Start Date End Date 1 New Request 8/20/18 8/20/19 If your referral has a status of pending review or denied, additional information will be sent to support the outcome of this decision. Introducing [facility]! [employer] introduces [employer]anu patient portal. Now you can access parts of your medical record, email your doctor's office, and request medication refills online. 1. In your internet browser, go to [email or web address] 2. Click on the First Time User? Click Here link in the Sign In box. You will see the New Member Sign Up page. 3. Enter your Nadanu Access Code exactly as it appears below. You will not need to use this code after youve completed the sign-up process. If you do not sign up before the expiration date, you must request a new code. · Nadanu Access Code: [email or web address] Expires: 10/25/2018  2:58 PM 
 
4. Enter the last four digits of your Social Security Number (xxxx) and Date of Birth (mm/dd/yyyy) as indicated and click Submit. You will be taken to the next sign-up page. 5. Create a Nadanu ID. This will be your Nadanu login ID and cannot be changed, so think of one that is secure and easy to remember. 6. Create a Nadanu password. You can change your password at any time. 7. Enter your Password Reset Question and Answer. This can be used at a later time if you forget your password. 8. Enter your e-mail address. You will receive e-mail notification when new information is available in [address] 9. Click Sign Up. You can now view and download portions of your medical record. 10. Click the Download Summary menu link to download a portable copy of your medical information.  
 
If you have questions, please visit the Frequently Asked Questions section of the [employer]. Remember, [email or web address] is NOT to be used for urgent needs. For medical emergencies, dial 911. Now available from your iPhone and Android! Please provide this summary of care documentation to your next provider. Your primary care clinician is listed as RAMA Covington. If you have any questions after today's visit, please call [phone].

## 2018-08-21 ENCOUNTER — OFFICE VISIT (OUTPATIENT)
Dept: SURGERY | Age: 83
End: 2018-08-21

## 2018-08-21 VITALS
HEART RATE: 69 BPM | OXYGEN SATURATION: 98 % | RESPIRATION RATE: 18 BRPM | TEMPERATURE: 98.3 F | SYSTOLIC BLOOD PRESSURE: 143 MMHG | DIASTOLIC BLOOD PRESSURE: 76 MMHG | BODY MASS INDEX: 33.49 KG/M2 | HEIGHT: 68 IN | WEIGHT: 221 LBS

## 2018-08-21 DIAGNOSIS — R10.9 RIGHT FLANK PAIN: Primary | ICD-10-CM

## 2018-08-22 ENCOUNTER — TELEPHONE (OUTPATIENT)
Dept: SURGERY | Age: 83
End: 2018-08-22

## 2018-09-05 NOTE — PROGRESS NOTES
To: Tia Acevedo MD 
From: Cirilo Almazan MD 
 
Thank you for sending Connie Padilla back to see us. Nice to see him again. Encounter Date: 8/21/2018 Subjective:  
  
Baron Valdovinos is a 80 y.o. male presents for evaluation of right flank pain. Radiates around to the front down to the right testicle. Been there all the time for about 3-4 weeks. Severity waxes and wanes. RIH repaired with mesh years ago. I repaired an umbilical hernia in 4480. Objective:  
 
Visit Vitals  /76 (BP 1 Location: Left arm, BP Patient Position: Sitting)  Pulse 69  Temp 98.3 °F (36.8 °C)  Resp 18  Ht 5' 8\" (1.727 m)  Wt 100.2 kg (221 lb)  SpO2 98%  BMI 33.6 kg/m2 General:  alert, cooperative, no distress, appears stated age Abdomen: soft, bowel sounds active, non-tender No recurrent right inguinal hernia. The cord and testicle are not TTP. Assessment:  
 
No evidence of recurrent hernia. Symptoms do seem to be slowly improving. Plan:  
 
Ibuprofen 2 tabs three times daily with food. Ice area 20 minutes at a time as often as possible. Can do 20 minutes every hour. Refrain from exercise. Should walk daily, but nothing more. Do no lift>10lbs. Follow this regimen for 2 weeks. Call us in 2 weeks to let us know if: 1. Its all better -- ease back into exercise. Avoid abdominal work-outs another month. 2. Its almost better -- refrain from exercise another month. 3. Its no better -- call to make follow-up appointment.    
 
 
Cirilo Almazan MD

## 2018-09-10 ENCOUNTER — HOSPITAL ENCOUNTER (OUTPATIENT)
Dept: PREADMISSION TESTING | Age: 83
Discharge: HOME | End: 2018-09-10
Payer: MEDICARE

## 2018-09-10 VITALS
HEART RATE: 66 BPM | SYSTOLIC BLOOD PRESSURE: 117 MMHG | HEIGHT: 68 IN | RESPIRATION RATE: 16 BRPM | DIASTOLIC BLOOD PRESSURE: 56 MMHG | OXYGEN SATURATION: 95 % | TEMPERATURE: 97.7 F | WEIGHT: 228.84 LBS | BODY MASS INDEX: 34.68 KG/M2

## 2018-09-10 LAB
ANION GAP SERPL CALC-SCNC: 7 MMOL/L (ref 5–15)
ATRIAL RATE: 72 BPM
BUN SERPL-MCNC: 21 MG/DL (ref 6–20)
BUN/CREAT SERPL: 15 (ref 12–20)
CALCIUM SERPL-MCNC: 8.8 MG/DL (ref 8.5–10.1)
CALCULATED P AXIS, ECG09: 108 DEGREES
CALCULATED R AXIS, ECG10: -13 DEGREES
CALCULATED T AXIS, ECG11: 32 DEGREES
CHLORIDE SERPL-SCNC: 102 MMOL/L (ref 97–108)
CO2 SERPL-SCNC: 29 MMOL/L (ref 21–32)
CREAT SERPL-MCNC: 1.37 MG/DL (ref 0.7–1.3)
DIAGNOSIS, 93000: NORMAL
ERYTHROCYTE [DISTWIDTH] IN BLOOD BY AUTOMATED COUNT: 15.4 % (ref 11.5–14.5)
GLUCOSE SERPL-MCNC: 102 MG/DL (ref 65–100)
HCT VFR BLD AUTO: 42.8 % (ref 36.6–50.3)
HGB BLD-MCNC: 14.4 G/DL (ref 12.1–17)
MCH RBC QN AUTO: 32.9 PG (ref 26–34)
MCHC RBC AUTO-ENTMCNC: 33.6 G/DL (ref 30–36.5)
MCV RBC AUTO: 97.7 FL (ref 80–99)
NRBC # BLD: 0 K/UL (ref 0–0.01)
NRBC BLD-RTO: 0 PER 100 WBC
P-R INTERVAL, ECG05: 168 MS
PLATELET # BLD AUTO: 173 K/UL (ref 150–400)
PMV BLD AUTO: 10.5 FL (ref 8.9–12.9)
POTASSIUM SERPL-SCNC: 4.5 MMOL/L (ref 3.5–5.1)
Q-T INTERVAL, ECG07: 410 MS
QRS DURATION, ECG06: 82 MS
QTC CALCULATION (BEZET), ECG08: 448 MS
RBC # BLD AUTO: 4.38 M/UL (ref 4.1–5.7)
SODIUM SERPL-SCNC: 138 MMOL/L (ref 136–145)
VENTRICULAR RATE, ECG03: 72 BPM
WBC # BLD AUTO: 6.7 K/UL (ref 4.1–11.1)

## 2018-09-10 PROCEDURE — 80048 BASIC METABOLIC PNL TOTAL CA: CPT | Performed by: UROLOGY

## 2018-09-10 PROCEDURE — 93005 ELECTROCARDIOGRAM TRACING: CPT

## 2018-09-10 PROCEDURE — 85027 COMPLETE CBC AUTOMATED: CPT | Performed by: UROLOGY

## 2018-09-10 PROCEDURE — 36415 COLL VENOUS BLD VENIPUNCTURE: CPT | Performed by: UROLOGY

## 2018-09-10 RX ORDER — FUROSEMIDE 80 MG/1
80 TABLET ORAL DAILY
COMMUNITY
End: 2018-12-19 | Stop reason: SDUPTHER

## 2018-09-10 RX ORDER — IBUPROFEN 200 MG
200 TABLET ORAL
COMMUNITY
End: 2019-04-16

## 2018-09-10 NOTE — ADVANCED PRACTICE NURSE
TESSA 4 in PAT assessment. Pt denies ever having been advised that he snores loud enough to be heard through a closed door, ever having been advised that he had witnessed apnea while sleeping or ever having been referred for a sleep apnea evaluation. Reports being combative after waking up from anesthesia previously (\" had to have 4 people hold me down. \"). Denies decreased cervical ROM. Denies loose teeth, dentures or partial plates.

## 2018-09-10 NOTE — PERIOP NOTES
[facility] Preoperative Instructions Surgery Date 09/18/2018          Time of Arrival 1200 pm Contact # [phone] or [phone] 1. On the day of your surgery, please report to the Surgical Services Registration Desk and sign in at your designated time. The Surgery Center is located to the right of the Emergency Room. 2. You must have someone with you to drive you home. You should not drive a car for 24 hours following surgery. Please make arrangements for a friend or family member to stay with you for the first 24 hours after your surgery. 3. Do not have anything to eat or drink (including water, gum, mints, coffee, juice) after midnight ?? .? This may not apply to medications prescribed by your physician. ?(Please note below the special instructions with medications to take the morning of your procedure.) 4. We recommend you do not drink any alcoholic beverages for 24 hours before and after your surgery. 5. Contact your surgeons office for instructions on the following medications: non-steroidal anti-inflammatory drugs (i.e. Advil, Aleve), vitamins, and supplements. (Some surgeons will want you to stop these medications prior to surgery and others may allow you to take them) **If you are currently taking Plavix, Coumadin, Aspirin and/or other blood-thinning agents, contact your surgeon for instructions. ** Your surgeon will partner with the physician prescribing these medications to determine if it is safe to stop or if you need to continue taking. Please do not stop taking these medications without instructions from your surgeon 6. Wear comfortable clothes. Wear glasses instead of contacts. Do not bring any money or jewelry. Please bring picture ID, insurance card, and any prearranged co-payment or hospital payment. Do not wear make-up, particularly mascara the morning of your surgery.   Do not wear nail polish, particularly if you are having foot /hand surgery. Wear your hair loose or down, no ponytails, buns, keenan pins or clips. All body piercings must be removed. Please shower with antibacterial soap for three consecutive days before and on the morning of surgery, but do not apply any lotions, powders or deodorants after the shower on the day of surgery. Please use a fresh towels after each shower. Please sleep in clean clothes and change bed linens the night before surgery. Please do not shave for 48 hours prior to surgery. Shaving of the face is acceptable. 7. You should understand that if you do not follow these instructions your surgery may be cancelled. If your physical condition changes (I.e. fever, cold or flu) please contact your surgeon as soon as possible. 8. It is important that you be on time. If a situation occurs where you may be late, please call [phone] (OR Holding Area). 9. If you have any questions and or problems, please call [phone] (Pre-admission Testing). 10. Your surgery time may be subject to change. You will receive a phone call the evening prior if your time changes. 11.  If having outpatient surgery, you must have someone to drive you here, stay with you during the duration of your stay, and to drive you home at time of discharge. 12.   In an effort to improve the efficiency, privacy, and safety for all of our Pre-op patients visitors are not allowed in the Holding area. Once you arrive and are registered your family/visitors will be asked to remain in the waiting room. The Pre-op staff will get you from the Surgical Waiting Area and will explain to you and your family/visitors that the Pre-op phase is beginning. The staff will answer any questions and provide instructions for tracking of the patient, by use of the existing tracking number and color-coded status board in the waiting room.   At this time the staff will also ask for your designated spokesperson information in the event that the physician or staff need to provide an update or obtain any pertinent information. The designated spokesperson will be notified if the physician needs to speak to family during the pre-operative phase. If at any time your family/visitors has questions or concerns they may approach the volunteer desk in the waiting area for assistance. Special Instructions: MEDICATIONS TO TAKE THE MORNING OF SURGERY WITH A SIP OF WATER: allopurinol, amlodipine, atenolol, atorvastatin, valium if needed, furosemide (lasix), lansoprazole (prevacid) I understand a pre-operative phone call will be made to verify my surgery time. In the event that I am not available, I give permission for a message to be left on my answering service and/or with another person? yes 
 
 
 
 ___________________      __________   _________ 
  (Signature of Patient)             (Witness)                (Date and Time)

## 2018-09-18 ENCOUNTER — APPOINTMENT (OUTPATIENT)
Dept: CT IMAGING | Age: 83
End: 2018-09-18
Attending: GENERAL ACUTE CARE HOSPITAL
Payer: MEDICARE

## 2018-09-18 ENCOUNTER — ANESTHESIA EVENT (OUTPATIENT)
Dept: SURGERY | Age: 83
End: 2018-09-18
Payer: MEDICARE

## 2018-09-18 ENCOUNTER — APPOINTMENT (OUTPATIENT)
Dept: GENERAL RADIOLOGY | Age: 83
End: 2018-09-18
Attending: GENERAL ACUTE CARE HOSPITAL
Payer: MEDICARE

## 2018-09-18 ENCOUNTER — ANESTHESIA (OUTPATIENT)
Dept: SURGERY | Age: 83
End: 2018-09-18
Payer: MEDICARE

## 2018-09-18 ENCOUNTER — HOSPITAL ENCOUNTER (OUTPATIENT)
Age: 83
Setting detail: OBSERVATION
Discharge: HOME | End: 2018-09-19
Attending: UROLOGY | Admitting: GENERAL ACUTE CARE HOSPITAL
Payer: MEDICARE

## 2018-09-18 DIAGNOSIS — N43.3 HYDROCELE IN ADULT: Primary | ICD-10-CM

## 2018-09-18 DIAGNOSIS — R09.02 HYPOXIA: ICD-10-CM

## 2018-09-18 DIAGNOSIS — J39.8 TRACHEOMALACIA: ICD-10-CM

## 2018-09-18 LAB
ALBUMIN SERPL-MCNC: 4 G/DL (ref 3.5–5)
ALBUMIN/GLOB SERPL: 1 {RATIO} (ref 1.1–2.2)
ALP SERPL-CCNC: 138 U/L (ref 45–117)
ALT SERPL-CCNC: 37 U/L (ref 12–78)
ANION GAP SERPL CALC-SCNC: 6 MMOL/L (ref 5–15)
AST SERPL-CCNC: 31 U/L (ref 15–37)
BASOPHILS # BLD: 0 K/UL (ref 0–0.1)
BASOPHILS NFR BLD: 0 % (ref 0–1)
BILIRUB SERPL-MCNC: 0.5 MG/DL (ref 0.2–1)
BNP SERPL-MCNC: 269 PG/ML (ref 0–450)
BUN SERPL-MCNC: 18 MG/DL (ref 6–20)
BUN/CREAT SERPL: 13 (ref 12–20)
CALCIUM SERPL-MCNC: 8.9 MG/DL (ref 8.5–10.1)
CHLORIDE SERPL-SCNC: 101 MMOL/L (ref 97–108)
CO2 SERPL-SCNC: 30 MMOL/L (ref 21–32)
CREAT SERPL-MCNC: 1.37 MG/DL (ref 0.7–1.3)
DIFFERENTIAL METHOD BLD: ABNORMAL
EOSINOPHIL # BLD: 0.1 K/UL (ref 0–0.4)
EOSINOPHIL NFR BLD: 1 % (ref 0–7)
ERYTHROCYTE [DISTWIDTH] IN BLOOD BY AUTOMATED COUNT: 15.5 % (ref 11.5–14.5)
GLOBULIN SER CALC-MCNC: 3.9 G/DL (ref 2–4)
GLUCOSE SERPL-MCNC: 119 MG/DL (ref 65–100)
HCT VFR BLD AUTO: 45.1 % (ref 36.6–50.3)
HGB BLD-MCNC: 15.2 G/DL (ref 12.1–17)
IMM GRANULOCYTES # BLD: 0 K/UL (ref 0–0.04)
IMM GRANULOCYTES NFR BLD AUTO: 0 % (ref 0–0.5)
LYMPHOCYTES # BLD: 0.8 K/UL (ref 0.8–3.5)
LYMPHOCYTES NFR BLD: 10 % (ref 12–49)
MCH RBC QN AUTO: 32.4 PG (ref 26–34)
MCHC RBC AUTO-ENTMCNC: 33.7 G/DL (ref 30–36.5)
MCV RBC AUTO: 96.2 FL (ref 80–99)
MONOCYTES # BLD: 0.2 K/UL (ref 0–1)
MONOCYTES NFR BLD: 2 % (ref 5–13)
NEUTS SEG # BLD: 6.7 K/UL (ref 1.8–8)
NEUTS SEG NFR BLD: 87 % (ref 32–75)
NRBC # BLD: 0 K/UL (ref 0–0.01)
NRBC BLD-RTO: 0 PER 100 WBC
PLATELET # BLD AUTO: 178 K/UL (ref 150–400)
PMV BLD AUTO: 10.2 FL (ref 8.9–12.9)
POTASSIUM SERPL-SCNC: 4.7 MMOL/L (ref 3.5–5.1)
PROT SERPL-MCNC: 7.9 G/DL (ref 6.4–8.2)
RBC # BLD AUTO: 4.69 M/UL (ref 4.1–5.7)
RBC MORPH BLD: ABNORMAL
SODIUM SERPL-SCNC: 137 MMOL/L (ref 136–145)
WBC # BLD AUTO: 7.8 K/UL (ref 4.1–11.1)

## 2018-09-18 PROCEDURE — 71275 CT ANGIOGRAPHY CHEST: CPT

## 2018-09-18 PROCEDURE — 77030019908 HC STETH ESOPH SIMS -A: Performed by: NURSE ANESTHETIST, CERTIFIED REGISTERED

## 2018-09-18 PROCEDURE — 74011250636 HC RX REV CODE- 250/636: Performed by: ANESTHESIOLOGY

## 2018-09-18 PROCEDURE — 77030002888 HC SUT CHRMC J&J -A: Performed by: UROLOGY

## 2018-09-18 PROCEDURE — 74011250636 HC RX REV CODE- 250/636

## 2018-09-18 PROCEDURE — 76010000149 HC OR TIME 1 TO 1.5 HR: Performed by: UROLOGY

## 2018-09-18 PROCEDURE — 77030032490 HC SLV COMPR SCD KNE COVD -B: Performed by: UROLOGY

## 2018-09-18 PROCEDURE — 94760 N-INVAS EAR/PLS OXIMETRY 1: CPT

## 2018-09-18 PROCEDURE — 77030008684 HC TU ET CUF COVD -B: Performed by: NURSE ANESTHETIST, CERTIFIED REGISTERED

## 2018-09-18 PROCEDURE — 77030011640 HC PAD GRND REM COVD -A: Performed by: UROLOGY

## 2018-09-18 PROCEDURE — 74011250636 HC RX REV CODE- 250/636: Performed by: INTERNAL MEDICINE

## 2018-09-18 PROCEDURE — 85025 COMPLETE CBC W/AUTO DIFF WBC: CPT | Performed by: GENERAL ACUTE CARE HOSPITAL

## 2018-09-18 PROCEDURE — 74011000250 HC RX REV CODE- 250: Performed by: UROLOGY

## 2018-09-18 PROCEDURE — 99218 HC RM OBSERVATION: CPT

## 2018-09-18 PROCEDURE — 80053 COMPREHEN METABOLIC PANEL: CPT | Performed by: GENERAL ACUTE CARE HOSPITAL

## 2018-09-18 PROCEDURE — 77030012406 HC DRN WND PENRS BARD -A: Performed by: UROLOGY

## 2018-09-18 PROCEDURE — 94761 N-INVAS EAR/PLS OXIMETRY MLT: CPT

## 2018-09-18 PROCEDURE — 74011636320 HC RX REV CODE- 636/320: Performed by: INTERNAL MEDICINE

## 2018-09-18 PROCEDURE — 76060000033 HC ANESTHESIA 1 TO 1.5 HR: Performed by: UROLOGY

## 2018-09-18 PROCEDURE — 36415 COLL VENOUS BLD VENIPUNCTURE: CPT | Performed by: GENERAL ACUTE CARE HOSPITAL

## 2018-09-18 PROCEDURE — 88302 TISSUE EXAM BY PATHOLOGIST: CPT | Performed by: UROLOGY

## 2018-09-18 PROCEDURE — 77030026438 HC STYL ET INTUB CARD -A: Performed by: NURSE ANESTHETIST, CERTIFIED REGISTERED

## 2018-09-18 PROCEDURE — 74011250637 HC RX REV CODE- 250/637: Performed by: GENERAL ACUTE CARE HOSPITAL

## 2018-09-18 PROCEDURE — 74011250636 HC RX REV CODE- 250/636: Performed by: UROLOGY

## 2018-09-18 PROCEDURE — 71045 X-RAY EXAM CHEST 1 VIEW: CPT

## 2018-09-18 PROCEDURE — 74011000250 HC RX REV CODE- 250

## 2018-09-18 PROCEDURE — 83880 ASSAY OF NATRIURETIC PEPTIDE: CPT | Performed by: GENERAL ACUTE CARE HOSPITAL

## 2018-09-18 PROCEDURE — 76210000002 HC OR PH I REC 3 TO 3.5 HR: Performed by: UROLOGY

## 2018-09-18 RX ORDER — CEFAZOLIN SODIUM/WATER 2 G/20 ML
2 SYRINGE (ML) INTRAVENOUS ONCE
Status: COMPLETED | OUTPATIENT
Start: 2018-09-18 | End: 2018-09-18

## 2018-09-18 RX ORDER — AMLODIPINE BESYLATE 5 MG/1
10 TABLET ORAL DAILY
Status: DISCONTINUED | OUTPATIENT
Start: 2018-09-19 | End: 2018-09-19 | Stop reason: HOSPADM

## 2018-09-18 RX ORDER — LIDOCAINE HYDROCHLORIDE 10 MG/ML
0.1 INJECTION, SOLUTION EPIDURAL; INFILTRATION; INTRACAUDAL; PERINEURAL AS NEEDED
Status: DISCONTINUED | OUTPATIENT
Start: 2018-09-18 | End: 2018-09-18 | Stop reason: HOSPADM

## 2018-09-18 RX ORDER — ATENOLOL 50 MG/1
100 TABLET ORAL DAILY
Status: DISCONTINUED | OUTPATIENT
Start: 2018-09-19 | End: 2018-09-19 | Stop reason: HOSPADM

## 2018-09-18 RX ORDER — FUROSEMIDE 10 MG/ML
40 INJECTION INTRAMUSCULAR; INTRAVENOUS ONCE
Status: COMPLETED | OUTPATIENT
Start: 2018-09-18 | End: 2018-09-18

## 2018-09-18 RX ORDER — SODIUM CHLORIDE 0.9 % (FLUSH) 0.9 %
5-10 SYRINGE (ML) INJECTION EVERY 8 HOURS
Status: DISCONTINUED | OUTPATIENT
Start: 2018-09-18 | End: 2018-09-18 | Stop reason: HOSPADM

## 2018-09-18 RX ORDER — SODIUM CHLORIDE 0.9 % (FLUSH) 0.9 %
10 SYRINGE (ML) INJECTION
Status: COMPLETED | OUTPATIENT
Start: 2018-09-18 | End: 2018-09-18

## 2018-09-18 RX ORDER — LOSARTAN POTASSIUM 100 MG/1
100 TABLET ORAL DAILY
Status: DISCONTINUED | OUTPATIENT
Start: 2018-09-19 | End: 2018-09-19 | Stop reason: HOSPADM

## 2018-09-18 RX ORDER — ACETAMINOPHEN 325 MG/1
650 TABLET ORAL
Status: DISCONTINUED | OUTPATIENT
Start: 2018-09-18 | End: 2018-09-19 | Stop reason: HOSPADM

## 2018-09-18 RX ORDER — SODIUM CHLORIDE, SODIUM LACTATE, POTASSIUM CHLORIDE, CALCIUM CHLORIDE 600; 310; 30; 20 MG/100ML; MG/100ML; MG/100ML; MG/100ML
75 INJECTION, SOLUTION INTRAVENOUS CONTINUOUS
Status: DISCONTINUED | OUTPATIENT
Start: 2018-09-18 | End: 2018-09-18 | Stop reason: HOSPADM

## 2018-09-18 RX ORDER — PANTOPRAZOLE SODIUM 40 MG/1
40 TABLET, DELAYED RELEASE ORAL DAILY
Status: DISCONTINUED | OUTPATIENT
Start: 2018-09-19 | End: 2018-09-19 | Stop reason: HOSPADM

## 2018-09-18 RX ORDER — SODIUM CHLORIDE 9 MG/ML
50 INJECTION, SOLUTION INTRAVENOUS CONTINUOUS
Status: DISCONTINUED | OUTPATIENT
Start: 2018-09-18 | End: 2018-09-18 | Stop reason: HOSPADM

## 2018-09-18 RX ORDER — PHENYLEPHRINE HCL IN 0.9% NACL 0.4MG/10ML
SYRINGE (ML) INTRAVENOUS AS NEEDED
Status: DISCONTINUED | OUTPATIENT
Start: 2018-09-18 | End: 2018-09-18 | Stop reason: HOSPADM

## 2018-09-18 RX ORDER — ATORVASTATIN CALCIUM 40 MG/1
80 TABLET, FILM COATED ORAL
Status: DISCONTINUED | OUTPATIENT
Start: 2018-09-19 | End: 2018-09-19 | Stop reason: HOSPADM

## 2018-09-18 RX ORDER — SODIUM CHLORIDE 0.9 % (FLUSH) 0.9 %
5-10 SYRINGE (ML) INJECTION AS NEEDED
Status: DISCONTINUED | OUTPATIENT
Start: 2018-09-18 | End: 2018-09-19 | Stop reason: HOSPADM

## 2018-09-18 RX ORDER — DEXAMETHASONE SODIUM PHOSPHATE 4 MG/ML
INJECTION, SOLUTION INTRA-ARTICULAR; INTRALESIONAL; INTRAMUSCULAR; INTRAVENOUS; SOFT TISSUE AS NEEDED
Status: DISCONTINUED | OUTPATIENT
Start: 2018-09-18 | End: 2018-09-18 | Stop reason: HOSPADM

## 2018-09-18 RX ORDER — NEOSTIGMINE METHYLSULFATE 1 MG/ML
INJECTION INTRAVENOUS AS NEEDED
Status: DISCONTINUED | OUTPATIENT
Start: 2018-09-18 | End: 2018-09-18 | Stop reason: HOSPADM

## 2018-09-18 RX ORDER — FUROSEMIDE 10 MG/ML
INJECTION INTRAMUSCULAR; INTRAVENOUS
Status: COMPLETED
Start: 2018-09-18 | End: 2018-09-18

## 2018-09-18 RX ORDER — PROPOFOL 10 MG/ML
INJECTION, EMULSION INTRAVENOUS AS NEEDED
Status: DISCONTINUED | OUTPATIENT
Start: 2018-09-18 | End: 2018-09-18 | Stop reason: HOSPADM

## 2018-09-18 RX ORDER — MIDAZOLAM HYDROCHLORIDE 1 MG/ML
1 INJECTION, SOLUTION INTRAMUSCULAR; INTRAVENOUS AS NEEDED
Status: DISCONTINUED | OUTPATIENT
Start: 2018-09-18 | End: 2018-09-18 | Stop reason: HOSPADM

## 2018-09-18 RX ORDER — SUCCINYLCHOLINE CHLORIDE 20 MG/ML
INJECTION INTRAMUSCULAR; INTRAVENOUS AS NEEDED
Status: DISCONTINUED | OUTPATIENT
Start: 2018-09-18 | End: 2018-09-18 | Stop reason: HOSPADM

## 2018-09-18 RX ORDER — DIPHENHYDRAMINE HYDROCHLORIDE 50 MG/ML
12.5 INJECTION, SOLUTION INTRAMUSCULAR; INTRAVENOUS AS NEEDED
Status: DISCONTINUED | OUTPATIENT
Start: 2018-09-18 | End: 2018-09-18 | Stop reason: HOSPADM

## 2018-09-18 RX ORDER — EPHEDRINE SULFATE 50 MG/ML
INJECTION, SOLUTION INTRAVENOUS AS NEEDED
Status: DISCONTINUED | OUTPATIENT
Start: 2018-09-18 | End: 2018-09-18 | Stop reason: HOSPADM

## 2018-09-18 RX ORDER — ONDANSETRON 2 MG/ML
INJECTION INTRAMUSCULAR; INTRAVENOUS AS NEEDED
Status: DISCONTINUED | OUTPATIENT
Start: 2018-09-18 | End: 2018-09-18 | Stop reason: HOSPADM

## 2018-09-18 RX ORDER — GLYCOPYRROLATE 0.2 MG/ML
INJECTION INTRAMUSCULAR; INTRAVENOUS AS NEEDED
Status: DISCONTINUED | OUTPATIENT
Start: 2018-09-18 | End: 2018-09-18 | Stop reason: HOSPADM

## 2018-09-18 RX ORDER — HYDROMORPHONE HYDROCHLORIDE 1 MG/ML
0.2 INJECTION, SOLUTION INTRAMUSCULAR; INTRAVENOUS; SUBCUTANEOUS
Status: DISCONTINUED | OUTPATIENT
Start: 2018-09-18 | End: 2018-09-18 | Stop reason: HOSPADM

## 2018-09-18 RX ORDER — SODIUM CHLORIDE 0.9 % (FLUSH) 0.9 %
5-10 SYRINGE (ML) INJECTION EVERY 8 HOURS
Status: DISCONTINUED | OUTPATIENT
Start: 2018-09-18 | End: 2018-09-19 | Stop reason: HOSPADM

## 2018-09-18 RX ORDER — DIAZEPAM 2 MG/1
2 TABLET ORAL
Status: DISCONTINUED | OUTPATIENT
Start: 2018-09-18 | End: 2018-09-19 | Stop reason: HOSPADM

## 2018-09-18 RX ORDER — OXYCODONE AND ACETAMINOPHEN 5; 325 MG/1; MG/1
1 TABLET ORAL AS NEEDED
Status: DISCONTINUED | OUTPATIENT
Start: 2018-09-18 | End: 2018-09-18 | Stop reason: HOSPADM

## 2018-09-18 RX ORDER — FENTANYL CITRATE 50 UG/ML
50 INJECTION, SOLUTION INTRAMUSCULAR; INTRAVENOUS AS NEEDED
Status: DISCONTINUED | OUTPATIENT
Start: 2018-09-18 | End: 2018-09-18 | Stop reason: HOSPADM

## 2018-09-18 RX ORDER — SODIUM CHLORIDE 0.9 % (FLUSH) 0.9 %
5-10 SYRINGE (ML) INJECTION AS NEEDED
Status: DISCONTINUED | OUTPATIENT
Start: 2018-09-18 | End: 2018-09-18 | Stop reason: HOSPADM

## 2018-09-18 RX ORDER — LIDOCAINE HYDROCHLORIDE 20 MG/ML
INJECTION, SOLUTION EPIDURAL; INFILTRATION; INTRACAUDAL; PERINEURAL AS NEEDED
Status: DISCONTINUED | OUTPATIENT
Start: 2018-09-18 | End: 2018-09-18 | Stop reason: HOSPADM

## 2018-09-18 RX ORDER — ONDANSETRON 2 MG/ML
4 INJECTION INTRAMUSCULAR; INTRAVENOUS AS NEEDED
Status: DISCONTINUED | OUTPATIENT
Start: 2018-09-18 | End: 2018-09-18 | Stop reason: HOSPADM

## 2018-09-18 RX ORDER — MORPHINE SULFATE 10 MG/ML
2 INJECTION, SOLUTION INTRAMUSCULAR; INTRAVENOUS
Status: DISCONTINUED | OUTPATIENT
Start: 2018-09-18 | End: 2018-09-18 | Stop reason: HOSPADM

## 2018-09-18 RX ORDER — FENTANYL CITRATE 50 UG/ML
25 INJECTION, SOLUTION INTRAMUSCULAR; INTRAVENOUS
Status: DISCONTINUED | OUTPATIENT
Start: 2018-09-18 | End: 2018-09-18 | Stop reason: HOSPADM

## 2018-09-18 RX ORDER — MIDAZOLAM HYDROCHLORIDE 1 MG/ML
0.5 INJECTION, SOLUTION INTRAMUSCULAR; INTRAVENOUS
Status: DISCONTINUED | OUTPATIENT
Start: 2018-09-18 | End: 2018-09-18 | Stop reason: HOSPADM

## 2018-09-18 RX ORDER — HYDROCODONE BITARTRATE AND ACETAMINOPHEN 5; 325 MG/1; MG/1
1 TABLET ORAL
Qty: 20 TAB | Refills: 0 | Status: SHIPPED | OUTPATIENT
Start: 2018-09-18 | End: 2018-09-28

## 2018-09-18 RX ORDER — BUPIVACAINE HYDROCHLORIDE 2.5 MG/ML
INJECTION, SOLUTION EPIDURAL; INFILTRATION; INTRACAUDAL AS NEEDED
Status: DISCONTINUED | OUTPATIENT
Start: 2018-09-18 | End: 2018-09-18 | Stop reason: HOSPADM

## 2018-09-18 RX ORDER — SODIUM CHLORIDE, SODIUM LACTATE, POTASSIUM CHLORIDE, CALCIUM CHLORIDE 600; 310; 30; 20 MG/100ML; MG/100ML; MG/100ML; MG/100ML
25 INJECTION, SOLUTION INTRAVENOUS CONTINUOUS
Status: DISCONTINUED | OUTPATIENT
Start: 2018-09-18 | End: 2018-09-18 | Stop reason: HOSPADM

## 2018-09-18 RX ORDER — GUAIFENESIN 100 MG/5ML
81 LIQUID (ML) ORAL DAILY
Status: DISCONTINUED | OUTPATIENT
Start: 2018-09-19 | End: 2018-09-19 | Stop reason: HOSPADM

## 2018-09-18 RX ORDER — FENTANYL CITRATE 50 UG/ML
INJECTION, SOLUTION INTRAMUSCULAR; INTRAVENOUS AS NEEDED
Status: DISCONTINUED | OUTPATIENT
Start: 2018-09-18 | End: 2018-09-18 | Stop reason: HOSPADM

## 2018-09-18 RX ORDER — CEFUROXIME AXETIL 250 MG/1
250 TABLET ORAL 2 TIMES DAILY
Qty: 6 TAB | Refills: 0 | Status: SHIPPED | OUTPATIENT
Start: 2018-09-18 | End: 2018-09-28 | Stop reason: ALTCHOICE

## 2018-09-18 RX ORDER — ROCURONIUM BROMIDE 10 MG/ML
INJECTION, SOLUTION INTRAVENOUS AS NEEDED
Status: DISCONTINUED | OUTPATIENT
Start: 2018-09-18 | End: 2018-09-18 | Stop reason: HOSPADM

## 2018-09-18 RX ORDER — ALLOPURINOL 300 MG/1
300 TABLET ORAL DAILY
Status: DISCONTINUED | OUTPATIENT
Start: 2018-09-19 | End: 2018-09-19 | Stop reason: HOSPADM

## 2018-09-18 RX ORDER — FUROSEMIDE 80 MG/1
80 TABLET ORAL DAILY
Status: DISCONTINUED | OUTPATIENT
Start: 2018-09-19 | End: 2018-09-19 | Stop reason: HOSPADM

## 2018-09-18 RX ORDER — SODIUM CHLORIDE 9 MG/ML
50 INJECTION, SOLUTION INTRAVENOUS
Status: COMPLETED | OUTPATIENT
Start: 2018-09-18 | End: 2018-09-18

## 2018-09-18 RX ADMIN — ROCURONIUM BROMIDE 15 MG: 10 INJECTION, SOLUTION INTRAVENOUS at 14:31

## 2018-09-18 RX ADMIN — Medication 10 ML: at 21:15

## 2018-09-18 RX ADMIN — FENTANYL CITRATE 50 MCG: 50 INJECTION, SOLUTION INTRAMUSCULAR; INTRAVENOUS at 14:18

## 2018-09-18 RX ADMIN — DEXAMETHASONE SODIUM PHOSPHATE 8 MG: 4 INJECTION, SOLUTION INTRA-ARTICULAR; INTRALESIONAL; INTRAMUSCULAR; INTRAVENOUS; SOFT TISSUE at 14:30

## 2018-09-18 RX ADMIN — FENTANYL CITRATE 50 MCG: 50 INJECTION, SOLUTION INTRAMUSCULAR; INTRAVENOUS at 14:31

## 2018-09-18 RX ADMIN — SUCCINYLCHOLINE CHLORIDE 140 MG: 20 INJECTION INTRAMUSCULAR; INTRAVENOUS at 14:18

## 2018-09-18 RX ADMIN — FUROSEMIDE 40 MG: 10 INJECTION INTRAMUSCULAR; INTRAVENOUS at 17:28

## 2018-09-18 RX ADMIN — Medication 80 MCG: at 14:48

## 2018-09-18 RX ADMIN — NEOSTIGMINE METHYLSULFATE 2 MG: 1 INJECTION INTRAVENOUS at 15:02

## 2018-09-18 RX ADMIN — PROPOFOL 150 MG: 10 INJECTION, EMULSION INTRAVENOUS at 14:18

## 2018-09-18 RX ADMIN — GLYCOPYRROLATE 0.4 MG: 0.2 INJECTION INTRAMUSCULAR; INTRAVENOUS at 15:02

## 2018-09-18 RX ADMIN — EPHEDRINE SULFATE 10 MG: 50 INJECTION, SOLUTION INTRAVENOUS at 14:40

## 2018-09-18 RX ADMIN — LIDOCAINE HYDROCHLORIDE 80 MG: 20 INJECTION, SOLUTION EPIDURAL; INFILTRATION; INTRACAUDAL; PERINEURAL at 14:18

## 2018-09-18 RX ADMIN — SODIUM CHLORIDE 50 ML/HR: 900 INJECTION, SOLUTION INTRAVENOUS at 21:15

## 2018-09-18 RX ADMIN — ACETAMINOPHEN 650 MG: 325 TABLET ORAL at 20:35

## 2018-09-18 RX ADMIN — SODIUM CHLORIDE, SODIUM LACTATE, POTASSIUM CHLORIDE, AND CALCIUM CHLORIDE 25 ML/HR: 600; 310; 30; 20 INJECTION, SOLUTION INTRAVENOUS at 12:48

## 2018-09-18 RX ADMIN — FUROSEMIDE 40 MG: 10 INJECTION, SOLUTION INTRAMUSCULAR; INTRAVENOUS at 17:28

## 2018-09-18 RX ADMIN — Medication 10 ML: at 20:36

## 2018-09-18 RX ADMIN — Medication 80 MCG: at 14:44

## 2018-09-18 RX ADMIN — Medication 2 G: at 14:24

## 2018-09-18 RX ADMIN — ONDANSETRON 4 MG: 2 INJECTION INTRAMUSCULAR; INTRAVENOUS at 14:30

## 2018-09-18 RX ADMIN — ROCURONIUM BROMIDE 5 MG: 10 INJECTION, SOLUTION INTRAVENOUS at 14:18

## 2018-09-18 RX ADMIN — IOPAMIDOL 100 ML: 755 INJECTION, SOLUTION INTRAVENOUS at 21:15

## 2018-09-18 RX ADMIN — EPHEDRINE SULFATE 10 MG: 50 INJECTION, SOLUTION INTRAVENOUS at 14:34

## 2018-09-18 NOTE — PERIOP NOTES
TRANSFER - OUT REPORT: 
 
Verbal report given to AVIVA Keith(name) on Alex Wise  being transferred to [facility](unit) for routine progression of care Report consisted of patients Situation, Background, Assessment and  
Recommendations(SBAR). Information from the following report(s) SBAR was reviewed with the receiving nurse. Lines:  
Peripheral IV 09/18/18 Left Arm (Active) Site Assessment Clean, dry, & intact 9/18/2018  3:20 PM  
Phlebitis Assessment 0 9/18/2018  3:20 PM  
Infiltration Assessment 0 9/18/2018  3:20 PM  
Dressing Status Clean, dry, & intact 9/18/2018  3:20 PM  
Dressing Type Tape;Transparent 9/18/2018  3:20 PM  
Hub Color/Line Status Pink 9/18/2018  3:20 PM  
  
 
Opportunity for questions and clarification was provided. Patient transported with: 
 Registered Nurse Tech  
02 4L

## 2018-09-18 NOTE — OP NOTES
[facility]  OPERATIVE REPORT    Robin July.  MR#: [id number]  : 1934  ACCOUNT #: [de-identified]   DATE OF SERVICE: 2018    PREOPERATIVE DIAGNOSIS:  Right symptomatic hydrocele. POSTOPERATIVE DIAGNOSIS:  Right symptomatic hydrocele. PROCEDURE PERFORMED:  Right hydrocelectomy. SURGEON:  Dennise Farrell. Dana Dennis MD    ASSISTANT:  none. COMPLICATIONS:  None. ESTIMATED BLOOD LOSS:  Minimal.    SPECIMENS REMOVED:  Right hydrocele sac. IMPLANTS:  None. ANESTHESIA:  General.    COMPLICATIONS:  None. DESCRIPTION OF PROCEDURE:  After informed consent, the patient was placed on the operating table in supine position. He received preoperative antibiotics and SCDs and TEDs were placed on the lower extremities. After induction of general anesthetic, his scrotum was shaved and the penis, scrotum and inner thigh and lower abdomen were prepped and draped in sterile fashion. A full timeout was accomplished. I infiltrated the median raphe with Marcaine, lidocaine mix and then used a 15 blade to make a 4 cm incision and was able to dissect through the dartos and tunica vaginalis and I was able to deliver the hydrocele sac with the testicle within it from the right hemiscrotum. I was able to open the hydrocele sac and drain about 150 mL of clear fluid. The testicle was inspected and found to be somewhat atrophic, but no masses or lesions were noted. I trimmed the hydrocele sac and sent the trimmed material for pathology. I then reapproximated the hydrocele sac posterior to the testicle using running 3-0 chromic. I made sure that the neck was not tight. I then obtained hemostasis and placed the testicle in its anatomic position in the right hemiscrotum. I was careful not to twist it. Closed the first layer of tunica and dartos with running 3-0 chromic incorporating some anterior tunica albuginea to prevent a torsion.   Then a second layer was run with 3-0 chromic trying to obliterate dead space and then the skin was closed with a 4-0 Monocryl subcuticular. Dermabond was applied. The patient tolerated the procedure well. No complications. MD NANCI Arechiga / CHARLINE  D: 09/18/2018 15:10     T: 09/18/2018 15:57  JOB #: [id number]  CC: Genie Bui MD  CC: Von Lantigua MD

## 2018-09-18 NOTE — PROGRESS NOTES
Bedside shift change report given to Greg Owens  (oncoming nurse) by Ros Gilmore RN (offgoing nurse). Report included the following information SBAR.

## 2018-09-18 NOTE — H&P
Shane Veras is an 80year old male who presents today for \"Scrotal US results\". He returns for follow-up. This gentleman gives at least a 2 month history of pain in the right flank area that has slowly progressed down to the right testicular area. Denies any nausea, vomiting, fever or chills. No hematuria dysuria. No prior history of nephrolithiasis. Denies any history of urinary tract infections or prostatitis. He underwent a CT scan that I personally visualized August 17, 2018 that was unremarkable. He also had an abdominal ultrasound that was unremarkable. He was seen by general surgery and they ruled out a hernia. He states the main pain is now just above the right testicle. He has also had some swelling of the right testicle. Denies any trauma to the area. Denies any precipitating or alleviating factors. The pain is constant in nature. Testicular ultrasound reveals a large right hydrocele. No evidence of masses. The patient has had very little relief after the antibiotics that were prescribed. PAST MEDICAL HISTORY: 
 
Allergies: DENIES: Latex, Shellfish, X-Ray Dye, Iodine. Medications: CEFUROXIME AXETIL 500 MG ORAL TABLET (CEFUROXIME AXETIL) 1 po Q12hr OLMESARTAN MEDOXOMIL 40 MG ORAL TABLET (OLMESARTAN MEDOXOMIL) qd PREVACID 15 MG ORAL CAPSULE DELAYED RELEASE (LANSOPRAZOLE) qd 
ASPIRIN ADULT LOW DOSE 81 MG ORAL TABLET DELAYED RELEASE (ASPIRIN) qd 
ALLOPURINOL 300 MG ORAL TABLET (ALLOPURINOL) qd 
AMLODIPINE BESYLATE 10 MG ORAL TABLET (AMLODIPINE BESYLATE) qd 
ATENOLOL 100 MG ORAL TABLET (ATENOLOL) qd 
ATORVASTATIN CALCIUM 80 MG ORAL TABLET (ATORVASTATIN CALCIUM) qd FUROSEMIDE 80 MG ORAL TABLET (FUROSEMIDE) qd 
 
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 DENIES: Heart Disease, Pacemaker/Defibrillator, Lung Disease, Diabetes, Bowel Problems, Stroke/Seizure, Kidney Problems, Bleeding Problems, HIV, Hepatitis, or Cancer. Surgeries: DENIES prior surgeries Family History: DENIES: Prostate cancer, Kidney cancer, Kidney disease, Kidney stones. Social History: Retired. . Smoking status: former smoker. Drinks alcohol 4 or more times a week. System Review: Admits to: Leg Swelling and Shortness of Breath. DENIES: Unexplained Weight Loss, Dry Eyes, Dry Mouth, Constipation, Involuntary Urine Loss, Lower Extremity Weakness, Dry Skin, Difficulty Walking, Psychiatric Problems, Impaired Sex Drive, Easy Bleeding, Rash. EXAMINATION: Appearance: well-developed NAD Respiratory Effort: breathing easily Abdomen/Flank: Tender right anterior flank area, no palpable hernias, well-healed supraumbilical incision, no peritoneal signs Scrotum: Right hydrocele unchanged in nature, no palpable hernia, cord structures palpably normal today. URINALYSIS from Voided specimen Urine Dip: pH: 5.0, Bld: Neg, LE: Neg, Nit: Neg, Prot: Neg, Ket: Neg, Gluc: Neg 
Urine Micro: WBC: 1-2, RBC: 0, Bacteria: 0 IMPRESSION: 
 
1. RIGHT FLANK PAIN (TJU97-X13.9) - Unchanged 2. HYDROCELE IN ADULT (TWS06-H48.3) - Unchanged PLAN: Plan right hydrocelectomy. The risks, alternatives as well as benefits of the procedure including but not limited to bleeding, infection, recurrence, injury to the vasculature causing testicular atrophy as well as a possibility that the pain may  persist was discussed with the patient and family. He also understands may have nothing to do with his right sided abdominal pain. We will plan to perform it at the hospital in case an incidental hernia is discovered and general surgery as needed. cc: Robby Sow MD 
Transcribed by [employer] E&M Service, Urinalysis Microscopic Upcoming Orders Schedule Surgery - Schedule for RIGHT HYDROCELECTOMY - 1 hr at Hospital under General anesthesia. Requesting Next Available (Me). Pre-Op Abx: Ancef 1gm IV OCTOR. Office followup 2 weeks.

## 2018-09-18 NOTE — IP AVS SNAPSHOT
[address] 845 [facility] 
[phone] Patient: Taye Ernst MRN: [id number] :1934 About your hospitalization You were admitted on:  2018 You last received care in the:  [facility] 2 GENERAL SURGERY You were discharged on:  2018 Why you were hospitalized Your primary diagnosis was:  Not on File Your diagnoses also included:  Hypoxia Follow-up Information Follow up With Details Comments Contact Info Ej Miller MD Go on 2018 Hospital follow-up scheduled at 11:00am( If you have questions or need to reschedule please call [address]30 [facility] [address] [place] [address] 
[phone] Merle Quevedo MD Schedule an appointment as soon as possible for a visit in 1 week pulmonary follow up [address] [facility] [address] [place] [address] 
[phone] [employer]  this is your oxygen company, please call them with any issues or concerns directly [address] [address] 
[phone] Your Scheduled Appointments 2018 11:00 AM EDT Office Visit with Ej Miller MD  
[facility] 26 ([facility]) [facility] 70 [address] [address] [phone] Discharge Orders None A check gio indicates which time of day the medication should be taken. My Medications START taking these medications Instructions Each Dose to Equal  
 Morning Noon Evening Bedtime  
 cefUROXime 250 mg tablet Commonly known as:  CEFTIN Your last dose was: Your next dose is: Take 1 Tab by mouth two (2) times a day. 250 mg HYDROcodone-acetaminophen 5-325 mg per tablet Commonly known as:  Jermaine Handler Your last dose was: Your next dose is: Take 1 Tab by mouth every six (6) hours as needed for Pain. Max Daily Amount: 4 Tabs. 1 Tab CONTINUE taking these medications Instructions Each Dose to Equal  
 Morning Noon Evening Bedtime ADVIL 200 mg tablet Generic drug:  ibuprofen Your last dose was: Your next dose is: Take 200 mg by mouth every six (6) hours as needed for Pain. 200 mg  
    
   
   
   
  
 allopurinol 300 mg tablet Commonly known as:  Loraine Casa Your last dose was: Your next dose is: Take 1 Tab by mouth daily. 300 mg  
    
   
   
   
  
 amLODIPine 10 mg tablet Commonly known as:  Alexandria Perales Your last dose was: Your next dose is: Take 1 Tab by mouth daily. 10 mg  
    
   
   
   
  
 atenolol 100 mg tablet Commonly known as:  TENORMIN Your last dose was: Your next dose is: TAKE ONE TABLET BY MOUTH DAILY  
     
   
   
   
  
 atorvastatin 80 mg tablet Commonly known as:  LIPITOR Your last dose was: Your next dose is: TAKE ONE TABLET BY MOUTH DAILY  
     
   
   
   
  
 diazePAM 2 mg tablet Commonly known as:  VALIUM Your last dose was: Your next dose is: Take 1 Tab by mouth every eight (8) hours as needed for Anxiety. Max Daily Amount: 6 mg.  
 2 mg LASIX 80 mg tablet Generic drug:  furosemide Your last dose was: Your next dose is: Take 80 mg by mouth daily. 80 mg  
    
   
   
   
  
 olmesartan 40 mg tablet Commonly known as:  [employer] Your last dose was: Your next dose is: Take 1 Tab by mouth daily. 40 mg  
    
   
   
   
  
 PREVACID PO Your last dose was: Your next dose is: Take 15 mg by mouth daily. 15 mg  
    
   
   
   
  
 TL JONNA RX 2.2-25-1 mg Tab Generic drug:  folic acid-vit U6-PNE K87 Your last dose was: Your next dose is: TAKE ONE TABLET BY MOUTH DAILY  
     
   
   
   
  
  
STOP taking these medications   
 aspirin 81 mg tablet Where to Get Your Medications Information on where to get these meds will be given to you by the nurse or doctor. ! Ask your nurse or doctor about these medications  
  cefUROXime 250 mg tablet HYDROcodone-acetaminophen 5-325 mg per tablet Opioid Education Prescription Opioids: What You Need to Know: 
 
Prescription opioids can be used to help relieve moderate-to-severe pain and are often prescribed following a surgery or injury, or for certain health conditions. These medications can be an important part of treatment but also come with serious risks. Opioids are strong pain medicines. Examples include hydrocodone, oxycodone, fentanyl, and morphine. Heroin is an example of an illegal opioid. It is important to work with your health care provider to make sure you are getting the safest, most effective care. WHAT ARE THE RISKS AND SIDE EFFECTS OF OPIOID USE? Prescription opioids carry serious risks of addiction and overdose, especially with prolonged use. An opioid overdose, often marked by slow breathing, can cause sudden death. The use of prescription opioids can have a number of side effects as well, even when taken as directed. · Tolerance-meaning you might need to take more of a medication for the same pain relief · Physical dependence-meaning you have symptoms of withdrawal when the medication is stopped. Withdrawal symptoms can include nausea, sweating, chills, diarrhea, stomach cramps, and muscle aches. Withdrawal can last up to several weeks, depending on which drug you took and how long you took it. · Increased sensitivity to pain · Constipation · Nausea, vomiting, and dry mouth · Sleepiness and dizziness · Confusion · Depression · Low levels of testosterone that can result in lower sex drive, energy, and strength · Itching and sweating RISKS ARE GREATER WITH:      
· History of drug misuse, substance use disorder, or overdose · Mental health conditions (such as depression or anxiety) · Sleep apnea · Older age (72 years or older) · Pregnancy Avoid alcohol while taking prescription opioids. Also, unless specifically advised by your health care provider, medications to avoid include: · Benzodiazepines (such as Xanax or Valium) · Muscle relaxants (such as Soma or Flexeril) · Hypnotics (such as Ambien or Lunesta) · Other prescription opioids KNOW YOUR OPTIONS Talk to your health care provider about ways to manage your pain that don't involve prescription opioids. Some of these options may actually work better and have fewer risks and side effects. Consult your physician before adding or stopping any medications, treatments, or physical activity. Options may include: 
· Pain relievers such as acetaminophen, ibuprofen, and naproxen · Some medications that are also used for depression or seizures · Physical therapy and exercise · Counseling to help patients learn how to cope better with triggers of pain and stress. · Application of heat or cold compress · Massage therapy · Relaxation techniques Be Informed Make sure you know the name of your medication, how much and how often to take it, and its potential risks & side effects. IF YOU ARE PRESCRIBED OPIOIDS FOR PAIN: 
· Never take opioids in greater amounts or more often than prescribed. Remember the goal is not to be pain-free but to manage your pain at a tolerable level. · Follow up with your primary care provider to: · Work together to create a plan on how to manage your pain. · Talk about ways to help manage your pain that don't involve prescription opioids. · Talk about any and all concerns and side effects. · Help prevent misuse and abuse. · Never sell or share prescription opioids · Help prevent misuse and abuse. · Store prescription opioids in a secure place and out of reach of others (this may include visitors, children, friends, and family). · Safely dispose of unused/unwanted prescription opioids: Find your community drug take-back program or your pharmacy mail-back program, or flush them down the toilet, following guidance from the Food and Drug Administration (www.fda.gov/Drugs/ResourcesForYou). · Visit www.cdc.gov/drugoverdose to learn about the risks of opioid abuse and overdose. · If you believe you may be struggling with addiction, tell your health care provider and ask for guidance or call [address] [place] [address] at [phone]. Discharge Instructions [address], [address] [place] [id number] 
[phone] Patient Discharge Instructions Julianne Duong / [id number] : 1934 Admitted 2018 Discharged: 18 Take Home Medications · It is important that you take the medication exactly as they are prescribed. · Keep your medication in the bottles provided by the pharmacist and keep a list of the medication names, dosages, and times to be taken in your wallet. · Do not take other medications without consulting your doctor. What to do at [facility] Recommended diet: Cardiac Diet, low sodium (salt) Recommended activity: Activity as tolerated, Follow-up with Dr. Aleja Nesbitt in 1 week. Call [phone] for your appointment. Follow up with Pulmonary Specialist, Dr. Quynh Hart 1 week. Information obtained by : 
I understand that if any problems occur once I am at home I am to contact my physician. I understand and acknowledge receipt of the instructions indicated above. Physician's or R.N.'s Signature                                                                  Date/Time Patient or Representative Signature                                                          Date/Time Hydrocelectomy: What to Expect at [facility] Your Recovery Restart aspirin in 3 days Hydrocelectomy is surgery to remove a hydrocele. A hydrocele is a fluid-filled sac inside the scrotum. A hydrocele can happen on one or both sides of the scrotum. The doctor made a very small cut (incision) in your scrotum to drain the fluid from the hydrocele and to remove the fluid-filled sac. This surgery was done to remove the fluid and to stop the buildup of fluid in the scrotum. After your surgery, you may feel more tired than usual and have some mild groin pain for several days. Your groin and scrotum may be swollen or bruised. This usually gets better in 2 to 3 weeks. You will probably be able to go back to work or school 4 to 7 days after surgery. But you will need to avoid strenuous exercise or heavy lifting for 2 to 4 weeks. This care sheet gives you a general idea about how long it will take for you to recover. But each person recovers at a different pace. Follow the steps below to get better as quickly as possible. How can you care for yourself at home? Activity 
  · Rest when you feel tired. Getting enough sleep will help you recover.  
  · Try to walk each day. Start by walking a little more than you did the day before. Bit by bit, increase the amount you walk. Walking boosts blood flow and helps prevent pneumonia and constipation.  
  · You may shower 24 hours after surgery, if your doctor says it is okay. Pat the cut (incision) dry. Do not take a bath for the first week, or until your doctor tells you it is okay.  
  · You may return to work or school when you are ready. This is usually in about 4 to 7 days.  
  · Avoid strenuous activities, such as bicycle riding, jogging, weight lifting, or aerobic exercise, until your doctor says it is okay.  
  · For 2 to 4 weeks, avoid lifting anything that would make you strain. This may include heavy grocery bags and milk containers, a heavy briefcase or backpack, cat litter or dog food bags, a vacuum , or a child. Diet 
  · You can eat your normal diet. If your stomach is upset, try bland, low-fat foods like plain rice, broiled chicken, toast, and yogurt.  
  · Drink plenty of fluids to avoid becoming dehydrated.  
  · You may notice that your bowel movements are not regular right after your surgery. This is common. Try to avoid constipation and straining with bowel movements. You may want to take a fiber supplement every day. If you have not had a bowel movement after a couple of days, ask your doctor about taking a mild laxative. Medicines 
  · Your doctor will tell you if and when you can restart your medicines. He or she will also give you instructions about taking any new medicines.  
  · If you take blood thinners, such as warfarin (Coumadin), clopidogrel (Plavix), or aspirin, be sure to talk to your doctor. He or she will tell you if and when to start taking those medicines again. Make sure that you understand exactly what your doctor wants you to do.  
  · Take pain medicines exactly as directed. ¨ If the doctor gave you a prescription medicine for pain, take it as prescribed. ¨ If you are not taking a prescription pain medicine, ask your doctor if you can take an over-the-counter medicine.  
  · If you think pain medicine is making you sick to your stomach: 
¨ Take your medicine after meals (unless the doctor has told you not to). ¨ Ask your doctor for a different pain medicine.  
  · If your doctor prescribed antibiotics, take them as directed. Do not stop taking them just because you feel better. You need to take the full course of antibiotics. Incision care 
  · If you have strips of tape on the cut (incision) the doctor made, leave the tape on for a week or until it falls off.  
  · Wash the area daily with warm, soapy water, and pat it dry. Don't use hydrogen peroxide or alcohol, which can slow healing. You may cover the area with a gauze bandage if it weeps or rubs against clothing. Change the bandage every day.  
  · Keep the area clean and dry. Follow-up care is a key part of your treatment and safety. Be sure to make and go to all appointments, and call your doctor if you are having problems. It's also a good idea to know your test results and keep a list of the medicines you take. When should you call for help? Call 911 anytime you think you may need emergency care. For example, call if: 
  · You passed out (lost consciousness).  
  · You have severe trouble breathing.  
  · You have sudden chest pain and shortness of breath, or you cough up blood.  
 Call your doctor now or seek immediate medical care if: 
  · You are sick to your stomach or cannot keep fluids down.  
  · You have pain that does not get better after you take pain medicine.  
  · You have a fever over 100.4 F.  
  · You have loose stitches, or your incision comes open.  
  · Bright red blood has soaked through the bandage over your incision.  
  · Your scrotum gets more swollen.  
  · You have signs of infection, such as: 
¨ Increased pain, swelling, warmth, or redness. ¨ Red streaks leading from the incision. ¨ Pus draining from the incision. ¨ Swollen lymph nodes in your groin, neck, or armpits.  
 Watch closely for changes in your health, and be sure to contact your doctor if you have any problems. Where can you learn more? Go to http://rhonda-ko.info/. Enter I484 in the search box to learn more about \"Hydrocelectomy: What to Expect at Home. \" Current as of: May 12, 2017 Content Version: 11.7 © 6913-0455 [employer]. Care instructions adapted under license by [employer] (which disclaims liability or warranty for this information). If you have questions about a medical condition or this instruction, always ask your healthcare professional. [address] any warranty or liability for your use of this information. DISCHARGE SUMMARY from Nurse PATIENT INSTRUCTIONS: 
 
After general anesthesia or intravenous sedation, for 24 hours or while taking prescription Narcotics: · Limit your activities · Do not drive and operate hazardous machinery · Do not make important personal or business decisions · Do  not drink alcoholic beverages · If you have not urinated within 8 hours after discharge, please contact your surgeon on call. Report the following to your surgeon: 
· Excessive pain, swelling, redness or odor of or around the surgical area · Temperature over 100.5 · Nausea and vomiting lasting longer than 4 hours or if unable to take medications · Any signs of decreased circulation or nerve impairment to extremity: change in color, persistent  numbness, tingling, coldness or increase pain · Any questions What to do at Home: *  Please give a list of your current medications to your Primary Care Provider. *  Please update this list whenever your medications are discontinued, doses are 
    changed, or new medications (including over-the-counter products) are added. *  Please carry medication information at all times in case of emergency situations. These are general instructions for a healthy lifestyle: No smoking/ No tobacco products/ Avoid exposure to second hand smoke Surgeon General's Warning:  Quitting smoking now greatly reduces serious risk to your health. Obesity, smoking, and sedentary lifestyle greatly increases your risk for illness A healthy diet, regular physical exercise & weight monitoring are important for maintaining a healthy lifestyle You may be retaining fluid if you have a history of heart failure or if you experience any of the following symptoms:  Weight gain of 3 pounds or more overnight or 5 pounds in a week, increased swelling in our hands or feet or shortness of breath while lying flat in bed. Please call your doctor as soon as you notice any of these symptoms; do not wait until your next office visit. Recognize signs and symptoms of STROKE: 
 
F-face looks uneven A-arms unable to move or move unevenly S-speech slurred or non-existent T-time-call 911 as soon as signs and symptoms begin-DO NOT go Back to bed or wait to see if you get better-TIME IS BRAIN. Warning Signs of HEART ATTACK Call 911 if you have these symptoms: 
? Chest discomfort. Most heart attacks involve discomfort in the center of the chest that lasts more than a few minutes, or that goes away and comes back. It can feel like uncomfortable pressure, squeezing, fullness, or pain. ? Discomfort in other areas of the upper body. Symptoms can include pain or discomfort in one or both arms, the back, neck, jaw, or stomach. ? Shortness of breath with or without chest discomfort. ? Other signs may include breaking out in a cold sweat, nausea, or lightheadedness. Don't wait more than five minutes to call [address]! Fast action can save your life. Calling 911 is almost always the fastest way to get lifesaving treatment. Emergency Medical Services staff can begin treatment when they arrive  up to an hour sooner than if someone gets to the hospital by car.   
 
The discharge information has been reviewed with the patient and caregiver. The patient and caregiver verbalized understanding. Discharge medications reviewed with the patient and caregiver and appropriate educational materials and side effects teaching were provided. ___________________________________________________________________________________________________________________________________ A common side effect of anesthesia following surgery is nausea and/or vomiting. In order to decrease symptoms, it is wise to avoid foods that are high in fat, greasy foods, milk products, and spicy foods for the first 24 hours. Acceptable foods for the first 24 hours following surgery include but are not limited to: 
 
? soup 
? broth 
?  toast  
? crackers ? applesauce 
? bananas  
? mashed potatoes, 
? soft or scrambled eggs 
? oatmeal 
?  jello It is important to eat when taking your pain medication. This will help to prevent nausea. If possible, please try to time your meals with your medications. It is very important to stay hydrated following surgery. Sip fluids frequently while awake. Avoid acidic drinks such as citrus juices and soda for 24 hours. Carbonated beverages may cause bloating and gas. Acceptable fluids include: 
 
? water (flavor packets may add variety) ? coffee or tea (in moderation) ? Gatorade ? Therman [place] ? apple juice 
? cranberry juice You are encouraged to cough and deep breathe every hour when awake. This will help to prevent respiratory complications following anesthesia. You may want to hug a pillow when coughing and sneezing to add additional support to the surgical area and to decrease discomfort if you had abdominal or chest surgery. If you are discharged home with support stockings, you may remove them after 24 hours. Support stockings are used to help prevent blood clots in the legs following surgery.  
 
Please take time to review all of your Home Care Instructions and Medication Information sheets provided in your discharge packet. If you have any questions, please contact your surgeons office. Thank you. TO PREVENT AN INFECTION 1. [address] YOUR HANDS 
 
? To prevent infection, good handwashing is the most important thing you or your caregiver can do.   
 
? Wash your hands with soap and water or use the hand  we gave you before you touch any wounds. 2. SHOWER ? Use the antibacterial soap we gave you when you take a shower. ? Shower with this soap until your wounds are healed. ? To reach all areas of your body, you may need someone to help you. ? Dont forget to clean your belly button with every shower. 3.  USE CLEAN SHEETS 
 
? Use freshly cleaned sheets on your bed after surgery. ? To keep the surgery site clean, do not allow pets to sleep with you while your wound is still healing. 4. STOP SMOKING ? Stop smoking, or at least cut back on smoking ? Smoking slows your healing. 5.  CONTROL YOUR BLOOD SUGAR 
 
? High blood sugars slow wound healing. ? If you are diabetic, control your blood sugar levels before and after your surgery. Narcotic-Analgesic/Acetaminophen (Percocet, Norco, Lorcet HD, Lortab 10/325) - (By mouth) Why this medicine is used:  
Relieves pain. Contact a nurse or doctor right away if you have: 
· Extreme weakness, shallow breathing, slow heartbeat · Severe confusion, lightheadedness, dizziness, fainting · Yellow skin or eyes, dark urine or pale stools · Severe constipation, severe stomach pain, nausea, vomiting, loss of appetite · Sweating or cold, clammy skin Common side effects: · Mild constipation, nausea, vomiting · Sleepiness, tiredness · Itching, rash © 2017 [facility] Information is for End User's use only and may not be sold, redistributed or otherwise used for commercial purposes. Cefuroxime (Ceftin) - (By mouth) Why this medicine is used: Treats infections. Contact a nurse or doctor right away if you have: · Blistering, peeling, or red skin rash · Severe or bloody diarrhea Common side effects: 
· Nausea · Bad taste in your mouth © 2017 [address] Information is for End User's use only and may not be sold, redistributed or otherwise used for commercial purposes. ACO Transitions of Care Introducing [employer] [address] Sanchez offers a voluntary care coordination program to provide high quality service and care to Jovanni Rodriguez fee-for-service beneficiaries. Romain Herrmann was designed to help you enhance your health and well-being through the following services: ? Transitions of Care  support for individuals who are transitioning from one care setting to another (example: Hospital to home). ? Chronic and Complex Care Coordination  support for individuals and caregivers of those with serious or chronic illnesses or with more than one chronic (ongoing) condition and those who take a number of different medications. If you meet specific medical criteria, a [facility] may call you directly to coordinate your care with your primary care physician and your other care providers. For questions about the [facility] programs, please, contact your physicians office. For general questions or additional information about Accountable Care Organizations: 
Please visit www.medicare.gov/acos. html or call 1-800-MEDICARE (1-810.403.1716) TTY users should call 2-377.503.5327. [employer] Announcement We are excited to announce that we are making your provider's discharge notes available to you in [employer]. You will see these notes when they are completed and signed by the physician that discharged you from your recent hospital stay.   If you have any questions or concerns about any information you see in [employer], please call the Health Information Department where you were seen or reach out to your Primary Care Provider for more information about your plan of care. Introducing \[facility] [place] [facility]\"" & HEALTH SERVICES! Radha Ayala introduces [employer] patient portal. Now you can access parts of your medical record, email your doctor's office, and request medication refills online. 1. In your internet browser, go to [email or web address] 2. Click on the First Time User? Click Here link in the Sign In box. You will see the New Member Sign Up page. 3. Enter your [employer] Access Code exactly as it appears below. You will not need to use this code after youve completed the sign-up process. If you do not sign up before the expiration date, you must request a new code. · [employer] Access Code: [email or web address] Expires: 10/25/2018  2:58 PM 
 
4. Enter the last four digits of your Social Security Number (xxxx) and Date of Birth (mm/dd/yyyy) as indicated and click Submit. You will be taken to the next sign-up page. 5. Create a [employer] ID. This will be your [employer] login ID and cannot be changed, so think of one that is secure and easy to remember. 6. Create a [employer] password. You can change your password at any time. 7. Enter your Password Reset Question and Answer. This can be used at a later time if you forget your password. 8. Enter your e-mail address. You will receive e-mail notification when new information is available in [address] 9. Click Sign Up. You can now view and download portions of your medical record. 10. Click the Download Summary menu link to download a portable copy of your medical information. If you have questions, please visit the Frequently Asked Questions section of the [employer] website. Remember, [employer] is NOT to be used for urgent needs. For medical emergencies, dial 911. Now available from your iPhone and Android! Introducing [employer]el [employer] As a [facility] patient, I wanted to make you aware of our electronic visit tool called Mendel Kc. [employer] allows you to connect within minutes with a medical provider 24 hours a day, seven days a week via a mobile device or tablet or logging into a secure website from your computer. You can access Mendel Lozanofin from anywhere in the [place]. A virtual visit might be right for you when you have a simple condition and feel like you just dont want to get out of bed, or cant get away from work for an appointment, when your regular [facility] provider is not available (evenings, weekends or holidays), or when youre out of town and need minor care. Electronic visits cost only $49 and if the [employer] provider determines a prescription is needed to treat your condition, one can be electronically transmitted to a nearby pharmacy*. Please take a moment to enroll today if you have not already done so. The enrollment process is free and takes just a few minutes. To enroll, please download the [employer] cheryl to your tablet or phone, or visit [email or web address] to enroll on your computer. And, as an [address] [place] [address] patient with a [employer] account, the results of your visits will be scanned into your electronic medical record and your primary care provider will be able to view the scanned results. We urge you to continue to see your regular [facility] provider for your ongoing medical care. And while your primary care provider may not be the one available when you seek a Mendel Doughertybessyfin virtual visit, the peace of mind you get from getting a real diagnosis real time can be priceless. For more information on Mendel Farhatbessyfin, view our Frequently Asked Questions (FAQs) at [email or web address]. Sincerely, 
 
Benjamin Logan MD 
Chief Medical Officer [facility] *:  certain medications cannot be prescribed via [employer]el [employer] Providers Seen During Your Hospitalization Provider Specialty Primary office phone Suyapa eCe MD Urology [phone] Gaby Li MD Internal Medicine [phone] Immunizations Administered for This Admission Name Date Influenza Vaccine (Quad) PF 9/19/2018 Your Primary Care Physician (PCP) Primary Care Physician Office Phone Office Fax Sandi Bedoya  You are allergic to the following No active allergies Recent Documentation Height Weight BMI Smoking Status 1.727 m 102.1 kg 34.22 kg/m2 Former Smoker Emergency Contacts Name Discharge Info Relation Home Work Mobile Linn Rush DISCHARGE CAREGIVER [3] Spouse [3] [phone] Patient Belongings The following personal items are in your possession at time of discharge: 
  Dental Appliances: None  Visual Aid: Glasses, At bedside   Hearing Aids/Status: Not in hospital, At home  Home Medications: None   Jewelry: None  Clothing: Undergarments, Pants, Shirt, Footwear, Socks    Other Valuables: None  Personal Items Sent to Safe: declined Please provide this summary of care documentation to your next provider. Signatures-by signing, you are acknowledging that this After Visit Summary has been reviewed with you and you have received a copy. Patient Signature:  ____________________________________________________________ Date:  ____________________________________________________________  
  
Geovany Hackett Provider Signature:  ____________________________________________________________ Date:  ____________________________________________________________

## 2018-09-18 NOTE — ANESTHESIA PREPROCEDURE EVALUATION
Anesthetic History No history of anesthetic complications Review of Systems / Medical History Patient summary reviewed, nursing notes reviewed and pertinent labs reviewed Pulmonary Within defined limits Comments: Hx Hypoxia Former Smoker - 4 pack years Neuro/Psych  
 
 
 
TIA Comments: Tremors Alcohol abuse (F10.10) Cardiovascular Hypertension CAD, cardiac stents and hyperlipidemia Exercise tolerance: <4 METS 
  
GI/Hepatic/Renal 
  
GERD Comments: Umbilical hernia Dyspepsia and other specified disorders of function of stomach Endo/Other Arthritis Other Findings Comments: Back pain ETOH abuse 70 beers/week Gout Physical Exam 
 
Airway Mallampati: II 
TM Distance: 4 - 6 cm Neck ROM: normal range of motion Mouth opening: Normal 
 
 Cardiovascular Regular rate and rhythm,  S1 and S2 normal,  no murmur, click, rub, or gallop Rhythm: regular Rate: normal 
 
 
 
 Dental 
 
 
Comments: No loose Pulmonary Breath sounds clear to auscultation Abdominal 
GI exam deferred Other Findings Anesthetic Plan ASA: 3 Anesthesia type: general 
 
 
 
 
Induction: Intravenous Anesthetic plan and risks discussed with: Patient

## 2018-09-18 NOTE — DISCHARGE INSTRUCTIONS
Doctor Brooks [address]  [phone]      Patient Discharge Instructions    Kady Wolf / [id number] : 1934    Admitted 2018 Discharged: 18     Take Home Medications            · It is important that you take the medication exactly as they are prescribed. · Keep your medication in the bottles provided by the pharmacist and keep a list of the medication names, dosages, and times to be taken in your wallet. · Do not take other medications without consulting your doctor. What to do at Home    Recommended diet: Cardiac Diet, low sodium (salt)    Recommended activity: Activity as tolerated,       Follow-up with Dr. Jf Valle in 1 week. Call [phone] for your appointment. Follow up with Pulmonary Specialist, Dr. Cyndi Penaloza 1 week. Information obtained by :  I understand that if any problems occur once I am at home I am to contact my physician. I understand and acknowledge receipt of the instructions indicated above. Physician's or R.N.'s Signature                                                                  Date/Time                                                                                                                                              Patient or Representative Signature                                                          Date/Time         Hydrocelectomy: What to Expect at [address] aspirin in 3 days  Hydrocelectomy is surgery to remove a hydrocele. A hydrocele is a fluid-filled sac inside the scrotum. A hydrocele can happen on one or both sides of the scrotum. The doctor made a very small cut (incision) in your scrotum to drain the fluid from the hydrocele and to remove the fluid-filled sac.   This surgery was done to remove the fluid and to stop the buildup of fluid in the scrotum. After your surgery, you may feel more tired than usual and have some mild groin pain for several days. Your groin and scrotum may be swollen or bruised. This usually gets better in 2 to 3 weeks. You will probably be able to go back to work or school 4 to 7 days after surgery. But you will need to avoid strenuous exercise or heavy lifting for 2 to 4 weeks. This care sheet gives you a general idea about how long it will take for you to recover. But each person recovers at a different pace. Follow the steps below to get better as quickly as possible. How can you care for yourself at home? Activity    · Rest when you feel tired. Getting enough sleep will help you recover.     · Try to walk each day. Start by walking a little more than you did the day before. Bit by bit, increase the amount you walk. Walking boosts blood flow and helps prevent pneumonia and constipation.     · You may shower 24 hours after surgery, if your doctor says it is okay. Pat the cut (incision) dry. Do not take a bath for the first week, or until your doctor tells you it is okay.     · You may return to work or school when you are ready. This is usually in about 4 to 7 days.     · Avoid strenuous activities, such as bicycle riding, jogging, weight lifting, or aerobic exercise, until your doctor says it is okay.     · For 2 to 4 weeks, avoid lifting anything that would make you strain. This may include heavy grocery bags and milk containers, a heavy briefcase or backpack, cat litter or dog food bags, a vacuum , or a child. Diet    · You can eat your normal diet. If your stomach is upset, try bland, low-fat foods like plain rice, broiled chicken, toast, and yogurt.     · Drink plenty of fluids to avoid becoming dehydrated.     · You may notice that your bowel movements are not regular right after your surgery. This is common.  Try to avoid constipation and straining with bowel movements. You may want to take a fiber supplement every day. If you have not had a bowel movement after a couple of days, ask your doctor about taking a mild laxative. Medicines    · Your doctor will tell you if and when you can restart your medicines. He or she will also give you instructions about taking any new medicines.     · If you take blood thinners, such as warfarin (Coumadin), clopidogrel (Plavix), or aspirin, be sure to talk to your doctor. He or she will tell you if and when to start taking those medicines again. Make sure that you understand exactly what your doctor wants you to do.     · Take pain medicines exactly as directed. ¨ If the doctor gave you a prescription medicine for pain, take it as prescribed. ¨ If you are not taking a prescription pain medicine, ask your doctor if you can take an over-the-counter medicine.     · If you think pain medicine is making you sick to your stomach:  ¨ Take your medicine after meals (unless the doctor has told you not to). ¨ Ask your doctor for a different pain medicine.     · If your doctor prescribed antibiotics, take them as directed. Do not stop taking them just because you feel better. You need to take the full course of antibiotics. Incision care    · If you have strips of tape on the cut (incision) the doctor made, leave the tape on for a week or until it falls off.     · Wash the area daily with warm, soapy water, and pat it dry. Don't use hydrogen peroxide or alcohol, which can slow healing. You may cover the area with a gauze bandage if it weeps or rubs against clothing. Change the bandage every day.     · Keep the area clean and dry. Follow-up care is a key part of your treatment and safety. Be sure to make and go to all appointments, and call your doctor if you are having problems. It's also a good idea to know your test results and keep a list of the medicines you take. When should you call for help?   Call 911 anytime you think you may need emergency care. For example, call if:    · You passed out (lost consciousness).     · You have severe trouble breathing.     · You have sudden chest pain and shortness of breath, or you cough up blood.    Call your doctor now or seek immediate medical care if:    · You are sick to your stomach or cannot keep fluids down.     · You have pain that does not get better after you take pain medicine.     · You have a fever over 100.4 F.     · You have loose stitches, or your incision comes open.     · Bright red blood has soaked through the bandage over your incision.     · Your scrotum gets more swollen.     · You have signs of infection, such as:  ¨ Increased pain, swelling, warmth, or redness. ¨ Red streaks leading from the incision. ¨ Pus draining from the incision. ¨ Swollen lymph nodes in your groin, neck, or armpits.    Watch closely for changes in your health, and be sure to contact your doctor if you have any problems. Where can you learn more? Go to http://rhonda-ko.info/. Enter Z480 in the search box to learn more about \"Hydrocelectomy: What to Expect at Home. \"  Current as of: May 12, 2017  Content Version: 11.7  © 2213-1787 Connect. Care instructions adapted under license by [employer] (which disclaims liability or warranty for this information). If you have questions about a medical condition or this instruction, always ask your healthcare professional. [place] [address] any warranty or liability for your use of this information.     DISCHARGE SUMMARY from Nurse    PATIENT INSTRUCTIONS:    After general anesthesia or intravenous sedation, for 24 hours or while taking prescription Narcotics:  · Limit your activities  · Do not drive and operate hazardous machinery  · Do not make important personal or business decisions  · Do  not drink alcoholic beverages  · If you have not urinated within 8 hours after discharge, please contact your surgeon on call. Report the following to your surgeon:  · Excessive pain, swelling, redness or odor of or around the surgical area  · Temperature over 100.5  · Nausea and vomiting lasting longer than 4 hours or if unable to take medications  · Any signs of decreased circulation or nerve impairment to extremity: change in color, persistent  numbness, tingling, coldness or increase pain  · Any questions    What to do at Home:    *  Please give a list of your current medications to your Primary Care Provider. *  Please update this list whenever your medications are discontinued, doses are      changed, or new medications (including over-the-counter products) are added. *  Please carry medication information at all times in case of emergency situations. These are general instructions for a healthy lifestyle:    No smoking/ No tobacco products/ Avoid exposure to second hand smoke  Surgeon General's Warning:  Quitting smoking now greatly reduces serious risk to your health. Obesity, smoking, and sedentary lifestyle greatly increases your risk for illness    A healthy diet, regular physical exercise & weight monitoring are important for maintaining a healthy lifestyle    You may be retaining fluid if you have a history of heart failure or if you experience any of the following symptoms:  Weight gain of 3 pounds or more overnight or 5 pounds in a week, increased swelling in our hands or feet or shortness of breath while lying flat in bed. Please call your doctor as soon as you notice any of these symptoms; do not wait until your next office visit. Recognize signs and symptoms of STROKE:    F-face looks uneven    A-arms unable to move or move unevenly    S-speech slurred or non-existent    T-time-call 911 as soon as signs and symptoms begin-DO NOT go       Back to bed or wait to see if you get better-TIME IS BRAIN.     Warning Signs of HEART ATTACK     Call 911 if you have these symptoms:   Chest discomfort. Most heart attacks involve discomfort in the center of the chest that lasts more than a few minutes, or that goes away and comes back. It can feel like uncomfortable pressure, squeezing, fullness, or pain.  Discomfort in other areas of the upper body. Symptoms can include pain or discomfort in one or both arms, the back, neck, jaw, or stomach.  Shortness of breath with or without chest discomfort.  Other signs may include breaking out in a cold sweat, nausea, or lightheadedness. Don't wait more than five minutes to call 911 - MINUTES MATTER! Fast action can save your life. Calling 911 is almost always the fastest way to get lifesaving treatment. Emergency Medical Services staff can begin treatment when they arrive -- up to an hour sooner than if someone gets to the hospital by car. The discharge information has been reviewed with the patient and caregiver. The patient and caregiver verbalized understanding. Discharge medications reviewed with the patient and caregiver and appropriate educational materials and side effects teaching were provided. ___________________________________________________________________________________________________________________________________    A common side effect of anesthesia following surgery is nausea and/or vomiting. In order to decrease symptoms, it is wise to avoid foods that are high in fat, greasy foods, milk products, and spicy foods for the first 24 hours. Acceptable foods for the first 24 hours following surgery include but are not limited to:     soup   broth    toast    crackers    applesauce    bananas    mashed potatoes,   soft or scrambled eggs   oatmeal    jello    It is important to eat when taking your pain medication. This will help to prevent nausea. If possible, please try to time your meals with your medications. It is very important to stay hydrated following surgery. Sip fluids frequently while awake.  Avoid acidic drinks such as citrus juices and soda for 24 hours. Carbonated beverages may cause bloating and gas. Acceptable fluids include:    - water (flavor packets may add variety)  - coffee or tea (in moderation)  - Gatorade  - Pablo-aid  - apple juice  - cranberry juice    You are encouraged to cough and deep breathe every hour when awake. This will help to prevent respiratory complications following anesthesia. You may want to hug a pillow when coughing and sneezing to add additional support to the surgical area and to decrease discomfort if you had abdominal or chest surgery. If you are discharged home with support stockings, you may remove them after 24 hours. Support stockings are used to help prevent blood clots in the legs following surgery. Please take time to review all of your Home Care Instructions and Medication Information sheets provided in your discharge packet. If you have any questions, please contact your surgeons office. Thank you. TO PREVENT AN INFECTION      1. [address] YOUR HANDS     To prevent infection, good handwashing is the most important thing you or your caregiver can do.  Wash your hands with soap and water or use the hand  we gave you before you touch any wounds. 2. SHOWER     Use the antibacterial soap we gave you when you take a shower.  Shower with this soap until your wounds are healed.  To reach all areas of your body, you may need someone to help you.  Dont forget to clean your belly button with every shower. 3.  USE CLEAN SHEETS     Use freshly cleaned sheets on your bed after surgery.  To keep the surgery site clean, do not allow pets to sleep with you while your wound is still healing. 4. STOP SMOKING     Stop smoking, or at least cut back on smoking     Smoking slows your healing. 5.  CONTROL YOUR BLOOD SUGAR     High blood sugars slow wound healing.      If you are diabetic, control your blood sugar levels before and after your surgery. Narcotic-Analgesic/Acetaminophen (Percocet, Norco, Lorcet HD, Lortab 10/325) - (By mouth)   Why this medicine is used:   Relieves pain. Contact a nurse or doctor right away if you have:  · Extreme weakness, shallow breathing, slow heartbeat  · Severe confusion, lightheadedness, dizziness, fainting  · Yellow skin or eyes, dark urine or pale stools  · Severe constipation, severe stomach pain, nausea, vomiting, loss of appetite  · Sweating or cold, clammy skin     Common side effects:  · Mild constipation, nausea, vomiting  · Sleepiness, tiredness  · Itching, rash  © 2017 [facility] [facility] Information is for End User's use only and may not be sold, redistributed or otherwise used for commercial purposes. Cefuroxime (Ceftin) - (By mouth)   Why this medicine is used:   Treats infections. Contact a nurse or doctor right away if you have:  · Blistering, peeling, or red skin rash  · Severe or bloody diarrhea     Common side effects:  · Nausea  · Bad taste in your mouth  © 2017 [address] is for End User's use only and may not be sold, redistributed or otherwise used for commercial purposes.

## 2018-09-18 NOTE — PERIOP NOTES
Patient unable to stay above 90% on room air. Patient has been deep breathing and coughing. Patient has been instructed on how to use incentive spirometer. Patient at 750ml on regular basis on incentive. Patient sitting up, states he feels perfectly fine. Patient states earlier that five years ago when he had surgery before on his belly button that he was placed on oxygen. Patient states that after awhile he was doing well staying between 92-95 and patient was taken off oxygen 1640 Patient's granddaughter called for update upset that patient is still back in recovery. RN stated that he is awake and sitting up but unable to keep his oxygenation above 90%. She states that he is not going to get any better, he has been told he needs to be on oxygenation all the time. I explained to her that there is no documentation of that information. Nurse stated that patient was 95% on room air before surgery and needs to be close to baseline before he can be discharged. Patient's granddaughter asked what his oxygenation level was at that time and I stated 84% on room air. Patient's granddaughter hung up on me. Dr. María Dee notified and Dr. Shiloh Baum notified of patient not being able to sustain oxygenation above 90% without support. Dr. Shiloh Baum would like hospitalist consult on patient. Hospitalist paged, Dr. Marbella Harding will be around to see patient, new order for stat chest xray. Dr. María Dee notified of changes made by Dr. Shiloh Baum 1700 Dr. María Dee at bedside assessing patient 80 Dr. Marbella Harding at bedside collaborating with Dr. María Dee. [id number] Radiology at bedside completing chest xray per Dr. Marbella Harding [place] [address] orders from Dr. Marbella Harding, Lasix 40mg IV and labs 1732 All new orders completed per Dr. Marbella Harding request 
 
[phone] out to speak to patient's wife in waiting room. Updated her on patient's current condition, what we have completed and to let her know patient would be staying over night.  Patient's family understood and gave Patient was seen in the [place] ER and needs to schedule a follow up.     me daughters cell number to contact her when we have more information concerning a room for patient. Ene at [phone]

## 2018-09-18 NOTE — H&P
Hospitalist Admission Note NAME: Loraine Nelson :  1934 MRN:  [id number] Date/Time:  2018 5:22 PM 
 
Patient PCP: Shahzad Juárez MD 
______________________________________________________________________ Given the patient's current clinical presentation, I have a high level of concern for decompensation if discharged from the emergency department. Complex decision making was performed, which includes reviewing the patient's available past medical records, laboratory results, and x-ray films. My assessment of this patient's clinical condition and my plan of care is as follows. 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 
-Admit to Obs 
-Ordered stat labs - CBC, CMP, BNP 
-CXR ordered - official read pending - appeared consistent with fluid overload due to blunting of bilateral costophrenic angles and lower bases 
-Will give stat IV Lasix 40mg 
-Will also repeat Echo - previous one from  with normal EF and moderate mitral valve regurg 
-Pt already takes 80mg PO Lasix at home - will resume that from tomorrow 
-Did receive fluids during his procedure but states that he took his Lasix this morning  
-Currently on 2L NC - was weaned down from 4L - O2 sats drop down to low 80's without O2. Therefore wean as tolerated. Monitor I/Os. Addendum: 
Reviewed blood work and official CXR read. Will order stat CTA Chest to rule out PE. Pt remains on 4L with no respiratory complaints. 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 Discussed with Dr. Enid Carter [[place]?[facility] whose recommendation is make the pt NPO after midnight in case a bronch is planned for him tomorrow morning. CTA also show LLL atelectasis and/or consolidation. Pt remains afebrile, with normal WBC - will not start on abx. Went back to see the pt again - he remains comfortable, and saturating well on 4L NC without any respiratory complaints. Explained CT findings and pt verbalized understanding. Asked pt if he would like me to call his wife but pt refused noting that it was late. R hydroceolectomy 9/18 
-Underwent the procedure today using general anesthesia 
-As per Urology. GERD 
CAD 
HTN Gout 
-Resume home meds Code Status: Full Surrogate Decision Maker: Wife DVT Prophylaxis: SCD 
GI Prophylaxis: not indicated Baseline: Independent Subjective: CHIEF COMPLAINT: hypoxia HISTORY OF PRESENT ILLNESS:    
Brian Curran is a 80 y.o.  male who presents with CC above. Pt arrived at [address] [facility] for outpatient surgery this morning - Urological procedure. I was consulted for \"OTHER\" - after speaking with PACU Nursing Staff I was advised pt was hypoxic and requiring O2 when he had previously not been on it. Upon my evaluation at bedside, pt reported no complaints with his breathing. He denies any CP, fever or chills. He states that he on O2 7 years ago after a hospitalization but that was promptly taken away from him. He states that he takes Lasix 80mg every day and that the purpose was \"because my legs swell. \" He states that they have been consistently swollen for the past \"little while. \" He denies any PND or orthopnea. We were asked to admit for work up and evaluation of the above problems. Past Medical History:  
Diagnosis Date  Adverse effect of anesthesia   
 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  
Procedure Laterality Date [address] Cardiac Stents  HX HERNIA REPAIR    
 RIH  
 HX HERNIA REPAIR  82/08/74  
 open umbilical hernia repair mesh Social History Substance Use Topics  Smoking status: Former Smoker Packs/day: 1.00 Years: 4.00  Smokeless tobacco: Former User  Alcohol use Yes Comment: has not drank in 6 months Family History Problem Relation Age of Onset  Heart Disease Mother  Hypertension Mother  Hypertension Father  Hypertension Sister  Hypertension Brother  Cancer Brother No Known Allergies Prior to Admission medications Medication Sig Start Date End Date Taking? Authorizing Provider  
cefUROXime (CEFTIN) 250 mg tablet Take 1 Tab by mouth two (2) times a day. 9/18/18  Yes Jaja Garcia MD  
HYDROcodone-acetaminophen [facility]) 5-325 mg per tablet Take 1 Tab by mouth every six (6) hours as needed for Pain. Max Daily Amount: 4 Tabs. 9/18/18  Yes Jaja Garcia MD  
furosemide (LASIX) 80 mg tablet Take 80 mg by mouth daily. Yes Historical Provider  
ibuprofen (ADVIL) 200 mg tablet Take 200 mg by mouth every six (6) hours as needed for Pain. Yes Historical Provider  
olmesartan (BENICAR) 40 mg tablet Take 1 Tab by mouth daily. 8/14/18  Yes RAMA Heredia MD  
atorvastatin (LIPITOR) 80 mg tablet TAKE ONE TABLET BY MOUTH DAILY 7/13/18  Yes RAMA Heredia MD  
atenolol (TENORMIN) 100 mg tablet TAKE ONE TABLET BY MOUTH DAILY 7/13/18  Yes RAMA Heredia MD  
allopurinol (ZYLOPRIM) 300 mg tablet Take 1 Tab by mouth daily.  7/11/18  Yes Miguel Campbell MD  
 amLODIPine (NORVASC) 10 mg tablet Take 1 Tab by mouth daily. 4/6/18  Yes Kendell Sykes MD  
diazepam (VALIUM) 2 mg tablet Take 1 Tab by mouth every eight (8) hours as needed for Anxiety. Max Daily Amount: 6 mg. 4/22/15  Yes Jewels Ramirez MD  
aspirin 81 mg tablet Take 81 mg by mouth daily. Yes Historical Provider LANSOPRAZOLE (PREVACID PO) Take 15 mg by mouth daily. Yes Historical Provider TL JONNA RX 2.2-25-1 mg tab TAKE ONE TABLET BY MOUTH DAILY 8/13/18   RAMA Tim MD  
 
 
REVIEW OF SYSTEMS:    
I am not able to complete the review of systems because: The patient is intubated and sedated The patient has altered mental status due to his acute medical problems The patient has baseline aphasia from prior stroke(s) The patient has baseline dementia and is not reliable historian The patient is in acute medical distress and unable to provide information Total of 12 systems reviewed as follows:   
   POSITIVE= underlined text  Negative = text not underlined General:  fever, chills, sweats, generalized weakness, weight loss/gain,  
   loss of appetite Eyes:    blurred vision, eye pain, loss of vision, double vision ENT:    rhinorrhea, pharyngitis Respiratory:   cough, sputum production, SOB, MARES, wheezing, pleuritic pain  
Cardiology:   chest pain, palpitations, orthopnea, PND, edema, syncope Gastrointestinal:  abdominal pain , N/V, diarrhea, dysphagia, constipation, bleeding Genitourinary:  frequency, urgency, dysuria, hematuria, incontinence Muskuloskeletal :  arthralgia, myalgia, back pain Hematology:  easy bruising, nose or gum bleeding, lymphadenopathy Dermatological: rash, ulceration, pruritis, color change / jaundice Endocrine:   hot flashes or polydipsia Neurological:  headache, dizziness, confusion, focal weakness, paresthesia, Speech difficulties, memory loss, gait difficulty Psychological: Feelings of anxiety, depression, agitation Objective: VITALS:   
Visit Vitals  /69 (BP 1 Location: Left arm, BP Patient Position: At rest)  Pulse 70  Temp 98 °F (36.7 °C)  Resp 16  
 Ht 5' 8\" (1.727 m)  Wt 102.1 kg (225 lb 1.4 oz)  SpO2 (!) 89%  BMI 34.22 kg/m2 PHYSICAL EXAM: 
 
General:    Alert, cooperative, no distress, appears stated age. HEENT: Atraumatic, anicteric sclerae, pink conjunctivae No oral ulcers, mucosa moist, throat clear, dentition fair Neck:  Supple, symmetrical,  thyroid: non tender Lungs:   Mild crackles appreciated bibasilar Chest wall:  No tenderness  No Accessory muscle use. Heart:   Regular  rhythm,  No  murmur   No edema Abdomen:   Soft, non-tender. Not distended. Bowel sounds normal 
Extremities: No cyanosis. No clubbing,   
  Skin turgor normal, Capillary refill normal, Radial dial pulse 2+ Skin:     Not pale. Not Jaundiced  No rashes Psych:  Fair insight. Not depressed. Not anxious or agitated. Neurologic: EOMs intact. No facial asymmetry. No aphasia or slurred speech. Symmetrical strength, Sensation grossly intact. Alert and oriented X 4.  
 
_______________________________________________________________________ Care Plan discussed with: 
  Comments Patient x Family RN x Care Manager Consultant:     
_______________________________________________________________________ Expected  Disposition:  
Home with Family HH/PT/OT/RN x  
SNF/LTC   
LEILANI   
________________________________________________________________________ TOTAL TIME:  55 Minutes Critical Care Provided     Minutes non procedure based Comments  
 x Reviewed previous records  
>50% of visit spent in counseling and coordination of care  Discussion with patient and/or family and questions answered 
  
 
________________________________________________________________________ Signed: Harrison Rojo MD 
 
 Procedures: see electronic medical records for all procedures/Xrays and details which were not copied into this note but were reviewed prior to creation of Plan. LAB DATA REVIEWED:   
No results found for this or any previous visit (from the past 24 hour(s)).

## 2018-09-18 NOTE — ANESTHESIA POSTPROCEDURE EVALUATION
Post-Anesthesia Evaluation and Assessment Patient: Craig Barnett MRN: [id number]  SSN: [id number] YOB: 1934  Age: 80 y.o. Sex: male Cardiovascular Function/Vital Signs Visit Vitals  /73  Pulse 76  Temp 36.7 °C (98 °F)  Resp 30  
 Ht 5' 8\" (1.727 m)  Wt 102.1 kg (225 lb 1.4 oz)  SpO2 90%  BMI 34.22 kg/m2 Patient is status post general anesthesia for Procedure(s): RIGHT HYDROCELECTOMY. Nausea/Vomiting: None Postoperative hydration reviewed and adequate. Pain: 
Pain Scale 1: Numeric (0 - 10) (09/18/18 1236) Pain Intensity 1: 0 (09/18/18 1236) Managed Neurological Status:  
Neuro (WDL): Within Defined Limits (09/18/18 1216) At baseline Mental Status and Level of Consciousness: Alert and oriented Pulmonary Status:  
O2 Device: Nasal cannula (09/18/18 1518) Adequate oxygenation and airway patent Complications related to anesthesia: None Post-anesthesia assessment completed. No concerns Signed By: Rafy Odonnell DO September 18, 2018

## 2018-09-19 VITALS
DIASTOLIC BLOOD PRESSURE: 61 MMHG | HEART RATE: 75 BPM | TEMPERATURE: 97.3 F | OXYGEN SATURATION: 95 % | SYSTOLIC BLOOD PRESSURE: 98 MMHG | WEIGHT: 225.09 LBS | RESPIRATION RATE: 18 BRPM | HEIGHT: 68 IN | BODY MASS INDEX: 34.11 KG/M2

## 2018-09-19 PROCEDURE — 74011250637 HC RX REV CODE- 250/637: Performed by: GENERAL ACUTE CARE HOSPITAL

## 2018-09-19 PROCEDURE — 99218 HC RM OBSERVATION: CPT

## 2018-09-19 PROCEDURE — 90686 IIV4 VACC NO PRSV 0.5 ML IM: CPT | Performed by: INTERNAL MEDICINE

## 2018-09-19 PROCEDURE — G0008 ADMIN INFLUENZA VIRUS VAC: HCPCS

## 2018-09-19 PROCEDURE — 90471 IMMUNIZATION ADMIN: CPT

## 2018-09-19 PROCEDURE — 74011250636 HC RX REV CODE- 250/636: Performed by: INTERNAL MEDICINE

## 2018-09-19 PROCEDURE — 94760 N-INVAS EAR/PLS OXIMETRY 1: CPT

## 2018-09-19 PROCEDURE — 77010033678 HC OXYGEN DAILY

## 2018-09-19 PROCEDURE — 93306 TTE W/DOPPLER COMPLETE: CPT

## 2018-09-19 RX ADMIN — PANTOPRAZOLE SODIUM 40 MG: 40 TABLET, DELAYED RELEASE ORAL at 09:51

## 2018-09-19 RX ADMIN — ASPIRIN 81 MG 81 MG: 81 TABLET ORAL at 09:50

## 2018-09-19 RX ADMIN — INFLUENZA VIRUS VACCINE 0.5 ML: 15; 15; 15; 15 SUSPENSION INTRAMUSCULAR at 17:31

## 2018-09-19 RX ADMIN — Medication 10 ML: at 05:14

## 2018-09-19 RX ADMIN — ACETAMINOPHEN 650 MG: 325 TABLET ORAL at 00:06

## 2018-09-19 RX ADMIN — ALLOPURINOL 300 MG: 300 TABLET ORAL at 09:51

## 2018-09-19 RX ADMIN — AMLODIPINE BESYLATE 10 MG: 5 TABLET ORAL at 09:51

## 2018-09-19 RX ADMIN — ATENOLOL 100 MG: 50 TABLET ORAL at 09:51

## 2018-09-19 RX ADMIN — LOSARTAN POTASSIUM 100 MG: 100 TABLET, FILM COATED ORAL at 09:51

## 2018-09-19 RX ADMIN — FUROSEMIDE 80 MG: 80 TABLET ORAL at 09:51

## 2018-09-19 RX ADMIN — Medication 10 ML: at 14:31

## 2018-09-19 NOTE — PROGRESS NOTES
CM acknowledges discharge order and consult for home O2. CM met with patient and wife. Assessment completed. CM order for home O2 written, respiratory form filled out and faxed to Dr. Myrna Martines office for his signature. Awaiting fax back to forward to DME company. Update at 4:30pm -  faxed oxygen orders to [employer]. CM phoned Dr. Juve Walter office to discuss [facility] order for skilled nursing for respiratory assessment and education. Order placed and sent to [facility]. CM received a message thru [employer] requesting additional notes. Additional notes uploaded into [employer]. Update @ 4:45pm -  met with family to update on O2 order status and discuss [facility] for skilled nursing. At this time family is going to decline. State they have been thru before and pt has several family members who are nurses that will be monitoring him at home. Information for [facility] shared and family will keep in mind. [place] has received updated notes in [employer]. Update @ 5:10pm -  received notification thru [employer], pt initially approved but then call back from agency stated pt does not have qualifying diagnosis. CM department will provide pt with coverage for up to 3 months given pt oxygen level could not be maintained above 87% O2 without it . Portable tank taken from closet, presented to pt, signed by patient and form placed back in storage room bin. Reviewed follow up appt on AVS. Discussed calling [employer] prior to leaving hospital to arrange delivery to home. Pt and family are ageeable to the plan. Care Management Interventions PCP Verified by CM: Yes (sees dr Glenna Schmidt) Mode of Transport at Discharge: Other (see comment) (wife will transport to home at discharge by car) Transition of Care Consult (CM Consult): Discharge Planning Discharge Durable Medical Equipment: Yes (home o2) Physical Therapy Consult: No 
Occupational Therapy Consult: No 
Speech Therapy Consult: No 
 Current Support Network: Own Home, Lives with Spouse (lives in a one story home with 3 steps into entrance with wife) Confirm Follow Up Transport: Family (pt will drive self or wife will assist to follow up appt) Plan discussed with Pt/Family/Caregiver: Yes [facility] [employer] Offered: Yes Discharge Location Discharge Placement: Home with home health ARUN Appiah, RN Care Manager [id number] [facility] 
[phone]

## 2018-09-19 NOTE — PROGRESS NOTES
PCP JACQUES appt scheduled with Dr. Rogers Estimable on 9/28/2018 at 11:00am. Appt added to AVS. Gissel Orlando CM Specialist

## 2018-09-19 NOTE — PROGRESS NOTES
Pt's sats 82% on room air at rest. 
Pt's sat's 86% on room air during ambulation. Pt's sat's 90% on 2 L during ambulation. Pt's sat's 90% on 2 L at rest. 
 
Called MD office and informed of results. RN stated that she would tell MD that pt will need home oxygen ordered.

## 2018-09-19 NOTE — CONSULTS
[facility]  Pulmonary, Critical Care, and Sleep Medicine    Initial Patient Consult    Name: Elizabeth Dillon MRN: [id number]   : 1934 Hospital: [facility]   Date: 2018        IMPRESSION:   · Acute respiratory failure  · Tracheomalacia  · TESSA  · Atelectasis      RECOMMENDATIONS:   · Wean O2  · Mobilize  · Needs out pt sleep study  · Needs out pt PFT  · Will f/u with me next week in my office     Subjective: This patient has been seen and evaluated at the request of Dr. Olya Osman for post op hypoxemia. Patient is a 80 y.o. male obese s/p hydrcele resection  Developed hypoxemia post op  A chest ct was obtained with incidental finding of tracheomalacia  Today in no distress  No sob, no cp        Past Medical History:   Diagnosis Date    Adverse effect of anesthesia     combative after anesthesia    Alcohol abuse     6 beers/day    Arthritis     CAD (coronary artery disease)     Dyslipidemia 2017    Dyspepsia and other specified disorders of function of stomach     Dyspnea 2017    ED (erectile dysfunction) 2017    Encounter for immunization 2017    Fatigue 2017    GERD (gastroesophageal reflux disease) 2017    Gout     Gout 2017    Hypertension     Leukoplakia of oral cavity 2017    On statin therapy 2017    TIA (transient ischemic attack) 3/00/1304    Umbilical hernia       Past Surgical History:   Procedure Laterality Date    CARDIAC SURG PROCEDURE UNLIST      Cardiac Stents    HX HERNIA REPAIR      RIH    HX HERNIA REPAIR  /    open umbilical hernia repair mesh      Prior to Admission medications    Medication Sig Start Date End Date Taking? Authorizing Provider   cefUROXime (CEFTIN) 250 mg tablet Take 1 Tab by mouth two (2) times a day.  18  Yes Ale Queen MD   HYDROcodone-acetaminophen [facility]) 5-325 mg per tablet Take 1 Tab by mouth every six (6) hours as needed for Pain. Max Daily Amount: 4 Tabs. 9/18/18  Yes Veronique Saez MD   furosemide (LASIX) 80 mg tablet Take 80 mg by mouth daily. Yes Historical Provider   ibuprofen (ADVIL) 200 mg tablet Take 200 mg by mouth every six (6) hours as needed for Pain. Yes Historical Provider   olmesartan (BENICAR) 40 mg tablet Take 1 Tab by mouth daily. 8/14/18  Yes RAMA Hickman MD   atorvastatin (LIPITOR) 80 mg tablet TAKE ONE TABLET BY MOUTH DAILY 7/13/18  Yes RAMA Hickman MD   atenolol (TENORMIN) 100 mg tablet TAKE ONE TABLET BY MOUTH DAILY 7/13/18  Yes RAMA Hickman MD   allopurinol (ZYLOPRIM) 300 mg tablet Take 1 Tab by mouth daily. 7/11/18  Yes RAMA Hickman MD   amLODIPine (NORVASC) 10 mg tablet Take 1 Tab by mouth daily. 4/6/18  Yes Damaris Jimenez MD   diazepam (VALIUM) 2 mg tablet Take 1 Tab by mouth every eight (8) hours as needed for Anxiety. Max Daily Amount: 6 mg. 4/22/15  Yes Jewels Ramirez MD   aspirin 81 mg tablet Take 81 mg by mouth daily. Yes Historical Provider   LANSOPRAZOLE (PREVACID PO) Take 15 mg by mouth daily.    Yes Historical Provider   TL JONNA RX 2.2-25-1 mg tab TAKE ONE TABLET BY MOUTH DAILY 8/13/18   RAMA Hickman MD     No Known Allergies   Social History   Substance Use Topics    Smoking status: Former Smoker     Packs/day: 1.00     Years: 4.00    Smokeless tobacco: Former User    Alcohol use Yes      Comment: has not drank in 6 months      Family History   Problem Relation Age of Onset    Heart Disease Mother     Hypertension Mother     Hypertension Father     Hypertension Sister     Hypertension Brother     Cancer Brother         Current Facility-Administered Medications   Medication Dose Route Frequency    allopurinol (ZYLOPRIM) tablet 300 mg  300 mg Oral DAILY    amLODIPine (NORVASC) tablet 10 mg  10 mg Oral DAILY    aspirin chewable tablet 81 mg  81 mg Oral DAILY    losartan (COZAAR) tablet 100 mg  100 mg Oral DAILY    B complex-vitaminC-folic acid (NEPHROCAP) cap  1 Cap Oral DAILY    pantoprazole (PROTONIX) tablet 40 mg  40 mg Oral DAILY    atenolol (TENORMIN) tablet 100 mg  100 mg Oral DAILY    atorvastatin (LIPITOR) tablet 80 mg  80 mg Oral QHS    furosemide (LASIX) tablet 80 mg  80 mg Oral DAILY    sodium chloride (NS) flush 5-10 mL  5-10 mL IntraVENous Q8H    influenza vaccine 2018- (6 mos+)(PF) (FLUARIX QUAD/FLULAVAL QUAD) injection 0.5 mL  0.5 mL IntraMUSCular PRIOR TO DISCHARGE       Review of Systems:  A comprehensive review of systems was negative. Objective:   Vital Signs:    Visit Vitals    /89 (BP 1 Location: Left arm, BP Patient Position: At rest;Supine)    Pulse 77    Temp 98 °F (36.7 °C)    Resp 20    Ht 5' 8\" (1.727 m)    Wt 102.1 kg (225 lb 1.4 oz)    SpO2 95%    BMI 34.22 kg/m2       O2 Device: Nasal cannula   O2 Flow Rate (L/min): 4 l/min   Temp (24hrs), Av.9 °F (36.6 °C), Min:97.5 °F (36.4 °C), Max:99 °F (37.2 °C)       Intake/Output:   Last shift:         Last 3 shifts:  1901 -  0700  In: 700 [I.V.:700]  Out: 725 [Urine:725]    Intake/Output Summary (Last 24 hours) at 18 1049  Last data filed at 18 0013   Gross per 24 hour   Intake              700 ml   Output              725 ml   Net              -25 ml      Physical Exam:   General:  Alert, cooperative, no distress, appears stated age. Head:  Normocephalic, without obvious abnormality, atraumatic. Eyes:  Conjunctivae/corneas clear. PERRL, EOMs intact. Nose: Nares normal. Septum midline. Mucosa normal. No drainage or sinus tenderness. Throat: Lips, mucosa, and tongue normal. Teeth and gums normal.   Neck: Supple, symmetrical, trachea midline, no adenopathy, thyroid: no enlargment/tenderness/nodules, no carotid bruit and no JVD. No stridor   Back:   Symmetric, no curvature. ROM normal.   Lungs:   Clear to auscultation bilaterally. No wheezing   Chest wall:  No tenderness or deformity.    Heart:  Regular rate and rhythm, S1, S2 normal, no murmur, click, rub or gallop. Abdomen:   Soft, non-tender. Bowel sounds normal. No masses,  No organomegaly. Extremities: Extremities normal, atraumatic, no cyanosis or edema. Pulses: 2+ and symmetric all extremities. Skin: Skin color, texture, turgor normal. No rashes or lesions   Lymph nodes: Cervical, supraclavicular, and axillary nodes normal.   Neurologic: Grossly nonfocal     Data review:     Recent Results (from the past 24 hour(s))   CBC WITH AUTOMATED DIFF    Collection Time: 09/18/18  5:27 PM   Result Value Ref Range    WBC 7.8 4.1 - 11.1 K/uL    RBC 4.69 4.10 - 5.70 M/uL    HGB 15.2 12.1 - 17.0 g/dL    HCT 45.1 36.6 - 50.3 %    MCV 96.2 80.0 - 99.0 FL    MCH 32.4 26.0 - 34.0 PG    MCHC 33.7 30.0 - 36.5 g/dL    RDW 15.5 (H) 11.5 - 14.5 %    PLATELET 796 715 - 527 K/uL    MPV 10.2 8.9 - 12.9 FL    NRBC 0.0 0  WBC    ABSOLUTE NRBC 0.00 0.00 - 0.01 K/uL    NEUTROPHILS 87 (H) 32 - 75 %    LYMPHOCYTES 10 (L) 12 - 49 %    MONOCYTES 2 (L) 5 - 13 %    EOSINOPHILS 1 0 - 7 %    BASOPHILS 0 0 - 1 %    IMMATURE GRANULOCYTES 0 0.0 - 0.5 %    ABS. NEUTROPHILS 6.7 1.8 - 8.0 K/UL    ABS. LYMPHOCYTES 0.8 0.8 - 3.5 K/UL    ABS. MONOCYTES 0.2 0.0 - 1.0 K/UL    ABS. EOSINOPHILS 0.1 0.0 - 0.4 K/UL    ABS. BASOPHILS 0.0 0.0 - 0.1 K/UL    ABS. IMM.  GRANS. 0.0 0.00 - 0.04 K/UL    DF SMEAR SCANNED      RBC COMMENTS ANISOCYTOSIS  1+       METABOLIC PANEL, COMPREHENSIVE    Collection Time: 09/18/18  5:27 PM   Result Value Ref Range    Sodium 137 136 - 145 mmol/L    Potassium 4.7 3.5 - 5.1 mmol/L    Chloride 101 97 - 108 mmol/L    CO2 30 21 - 32 mmol/L    Anion gap 6 5 - 15 mmol/L    Glucose 119 (H) 65 - 100 mg/dL    BUN 18 6 - 20 MG/DL    Creatinine 1.37 (H) 0.70 - 1.30 MG/DL    BUN/Creatinine ratio 13 12 - 20      GFR est AA >60 >60 ml/min/1.73m2    GFR est non-AA 50 (L) >60 ml/min/1.73m2    Calcium 8.9 8.5 - 10.1 MG/DL    Bilirubin, total 0.5 0.2 - 1.0 MG/DL    ALT (SGPT) 37 12 - 78 U/L    AST (SGOT) 31 15 - 37 U/L    Alk.  phosphatase 138 (H) 45 - 117 U/L    Protein, total 7.9 6.4 - 8.2 g/dL    Albumin 4.0 3.5 - 5.0 g/dL    Globulin 3.9 2.0 - 4.0 g/dL    A-G Ratio 1.0 (L) 1.1 - 2.2     NT-PRO BNP    Collection Time: 09/18/18  5:27 PM   Result Value Ref Range    NT pro- 0 - 450 PG/ML       Imaging:  I have personally reviewed the patients radiographs and have reviewed the reports:  CXR: bibasilar atx  Chest ct: tracheomalacia        Blanca Curran MD

## 2018-09-19 NOTE — PROGRESS NOTES
Spiritual Care Partner Volunteer visited patient in Gen Surg on 9/19/18. Documented by: 
Candido Cole M.Div.  Paging Service [phone])

## 2018-09-19 NOTE — DISCHARGE SUMMARY
Physician Discharge Summary     Patient ID:  Trudy Alonso  [id number]  13 y.o.  1934    Admit date: 9/18/2018  Discharge date and time:  9/19/18  12:02 PM    Discharge Diagnoses: hypoxia    Hospital Course: Mr. Miladis Singh was admitted to the hospital postprocedure for a right hydrocelectomy with hypoxemia. The patient did not appear to have any breathing difficulty but his O2 sats were below acceptable range. He was admitted for observation. 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 In addition the patient is obese and has a history of sleep apnea which has been untreated. In the setting of having a procedure with anesthesia and untreated sleep apnea and obesity it was felt that his hypoxemia was multifactorial.  His tracheomalacia does not appear to be a contributing factor as this is probably a chronic condition. He was evaluated by pulmonary and further testing will be done as an outpatient. If his oxygen saturation remains below 88% on room air he will be sent home with oxygen to maintain sat above 88%. He will follow-up in the office in 1 week for reevaluation and will have follow-up with pulmonary in 1 week for further testing. PCP: Abhijit Chester MD  Consults: Pulmonary/Intensive care    Significant Diagnostic Studies: CTA chest    [unfilled]    [unfilled]      Discharge Exam:  Visit Vitals    /89 (BP 1 Location: Left arm, BP Patient Position: At rest;Supine)    Pulse 77    Temp 98 °F (36.7 °C)    Resp 20    Ht 5' 8\" (1.727 m)    Wt 225 lb 1.4 oz (102.1 kg)    SpO2 95%    BMI 34.22 kg/m2     General:  Alert, cooperative, no distress, appears stated age. Head:  Normocephalic, without obvious abnormality, atraumatic. Eyes:  Conjunctivae/corneas clear. PERRL, EOMs intact. Fundi benign   Ears:  Normal TMs and external ear canals both ears. Nose: Nares normal. Septum midline.  Mucosa normal. No drainage or sinus tenderness. Throat: Lips, mucosa, and tongue normal. Teeth and gums normal.   Neck: Supple, symmetrical, trachea midline, no adenopathy, thyroid: no enlargement/tenderness/nodules, no carotid bruit and no JVD. Back:   Symmetric, no curvature. ROM normal. No CVA tenderness. Lungs:   Clear to auscultation bilaterally. Chest wall:  No tenderness or deformity. Heart:  Regular rate and rhythm, S1, S2 normal, no murmur, click, rub or gallop. Abdomen:   Soft, non-tender. Bowel sounds normal. No masses,  No organomegaly. Genitalia:  Normal male without lesion, discharge or tenderness. Rectal:  Normal tone, normal prostate, no masses or tenderness  Guaiac negative stool. Extremities: Extremities normal, atraumatic, no cyanosis or edema. Pulses: 2+ and symmetric all extremities. Skin: Skin color, texture, turgor normal. No rashes or lesions   Lymph nodes: Cervical, supraclavicular, and axillary nodes normal.   Neurologic: CNII-XII intact. Normal strength, sensation and reflexes throughout. Disposition: home    Patient Instructions:   Current Discharge Medication List      START taking these medications    Details   cefUROXime (CEFTIN) 250 mg tablet Take 1 Tab by mouth two (2) times a day. Qty: 6 Tab, Refills: 0      HYDROcodone-acetaminophen (NORCO) 5-325 mg per tablet Take 1 Tab by mouth every six (6) hours as needed for Pain. Max Daily Amount: 4 Tabs. Qty: 20 Tab, Refills: 0    Associated Diagnoses: Hydrocele in adult         CONTINUE these medications which have NOT CHANGED    Details   furosemide (LASIX) 80 mg tablet Take 80 mg by mouth daily. ibuprofen (ADVIL) 200 mg tablet Take 200 mg by mouth every six (6) hours as needed for Pain. olmesartan (BENICAR) 40 mg tablet Take 1 Tab by mouth daily.   Qty: 30 Tab, Refills: 6      atorvastatin (LIPITOR) 80 mg tablet TAKE ONE TABLET BY MOUTH DAILY  Qty: 30 Tab, Refills: 5      atenolol (TENORMIN) 100 mg tablet TAKE ONE TABLET BY MOUTH DAILY  Qty: 30 Tab, Refills: 5      allopurinol (ZYLOPRIM) 300 mg tablet Take 1 Tab by mouth daily. Qty: 30 Tab, Refills: 6      amLODIPine (NORVASC) 10 mg tablet Take 1 Tab by mouth daily. Qty: 90 Tab, Refills: 3      diazepam (VALIUM) 2 mg tablet Take 1 Tab by mouth every eight (8) hours as needed for Anxiety. Max Daily Amount: 6 mg. Qty: 20 Tab, Refills: 0      LANSOPRAZOLE (PREVACID PO) Take 15 mg by mouth daily.       TL JONNA RX 2.2-25-1 mg tab TAKE ONE TABLET BY MOUTH DAILY  Qty: 30 Tab, Refills: 5         STOP taking these medications       aspirin 81 mg tablet Comments:   Reason for Stopping:                 Signed:  Prema Prescott MD  9/19/2018  12:01 PM

## 2018-09-19 NOTE — PROGRESS NOTES
PROGRESS NOTE 
 
NAME:  Juany Vance :   1934 MRN:   [id number] Date/Time:  2018 8:09 AM 
Subjective:  
History:  Admitted with hypoxia, now on 4L, CT chest revealed tracheomalacia , atelectasis, pt. Is s/p hydrocoelectomy right testicle Medications reviewed: 
Current Facility-Administered Medications Medication Dose Route Frequency  allopurinol (ZYLOPRIM) tablet 300 mg  300 mg Oral DAILY  amLODIPine (NORVASC) tablet 10 mg  10 mg Oral DAILY  aspirin chewable tablet 81 mg  81 mg Oral DAILY  losartan (COZAAR) tablet 100 mg  100 mg Oral DAILY  B complex-vitaminC-folic acid (NEPHROCAP) cap  1 Cap Oral DAILY  pantoprazole (PROTONIX) tablet 40 mg  40 mg Oral DAILY  atenolol (TENORMIN) tablet 100 mg  100 mg Oral DAILY  atorvastatin (LIPITOR) tablet 80 mg  80 mg Oral QHS  diazePAM (VALIUM) tablet 2 mg  2 mg Oral Q8H PRN  
 furosemide (LASIX) tablet 80 mg  80 mg Oral DAILY  sodium chloride (NS) flush 5-10 mL  5-10 mL IntraVENous Q8H  
 sodium chloride (NS) flush 5-10 mL  5-10 mL IntraVENous PRN  
 influenza vaccine - (6 mos+)(PF) (FLUARIX QUAD/FLULAVAL QUAD) injection 0.5 mL  0.5 mL IntraMUSCular PRIOR TO DISCHARGE  acetaminophen (TYLENOL) tablet 650 mg  650 mg Oral Q4H PRN Objective:  
Vitals: 
Visit Vitals  /71  Pulse 72  Temp 98 °F (36.7 °C)  Resp 19  
 Ht 5' 8\" (1.727 m)  Wt 225 lb 1.4 oz (102.1 kg)  SpO2 94%  BMI 34.22 kg/m2 O2 Flow Rate (L/min): 4 l/min O2 Device: Nasal cannula Temp (24hrs), Av.9 °F (36.6 °C), Min:97.5 °F (36.4 °C), Max:99 °F (37.2 °C) Last 24hr Input/Output: 
 
Intake/Output Summary (Last 24 hours) at 18 0809 Last data filed at 18 0013 Gross per 24 hour Intake              700 ml Output              725 ml Net              -25 ml PHYSICAL EXAM: 
General:     Alert, cooperative, no distress, appears stated age. Head:    Normocephalic, without obvious abnormality, atraumatic. Eyes:    Conjunctivae/corneas clear. PERRLA Nose:   Nares normal. No drainage or sinus tenderness. Throat:     Lips, mucosa, and tongue normal.  No Thrush Neck:   Supple, symmetrical,  no adenopathy, thyroid: non tender 
   no carotid bruit and no JVD. Back:     Symmetric,  No CVA tenderness. Lungs:    Clear to auscultation bilaterally. Mild upper airway wheeze Heart:    Regular rate and rhythm,  no murmur, rub or gallop. Abdomen:    Soft, non-tender. Not distended. Bowel sounds normal. No masses Extremities:  Extremities normal, atraumatic, No cyanosis. No edema. No clubbing Lymph nodes:  Cervical, supraclavicular normal. 
Neurologic:  Normal strength, Alert and oriented X 3. Skin:                No rash Lab Data Reviewed: 
 
Recent Results (from the past 24 hour(s)) CBC WITH AUTOMATED DIFF Collection Time: 09/18/18  5:27 PM  
Result Value Ref Range WBC 7.8 4.1 - 11.1 K/uL  
 RBC 4.69 4.10 - 5.70 M/uL  
 HGB 15.2 12.1 - 17.0 g/dL HCT 45.1 36.6 - 50.3 % MCV 96.2 80.0 - 99.0 FL  
 MCH 32.4 26.0 - 34.0 PG  
 MCHC 33.7 30.0 - 36.5 g/dL  
 RDW 15.5 (H) 11.5 - 14.5 % PLATELET 694 820 - 553 K/uL MPV 10.2 8.9 - 12.9 FL  
 NRBC 0.0 0  WBC ABSOLUTE NRBC 0.00 0.00 - 0.01 K/uL NEUTROPHILS 87 (H) 32 - 75 % LYMPHOCYTES 10 (L) 12 - 49 % MONOCYTES 2 (L) 5 - 13 % EOSINOPHILS 1 0 - 7 % BASOPHILS 0 0 - 1 % IMMATURE GRANULOCYTES 0 0.0 - 0.5 % ABS. NEUTROPHILS 6.7 1.8 - 8.0 K/UL  
 ABS. LYMPHOCYTES 0.8 0.8 - 3.5 K/UL  
 ABS. MONOCYTES 0.2 0.0 - 1.0 K/UL  
 ABS. EOSINOPHILS 0.1 0.0 - 0.4 K/UL  
 ABS. BASOPHILS 0.0 0.0 - 0.1 K/UL  
 ABS. IMM. GRANS. 0.0 0.00 - 0.04 K/UL  
 DF SMEAR SCANNED    
 RBC COMMENTS ANISOCYTOSIS 
1+ METABOLIC PANEL, COMPREHENSIVE Collection Time: 09/18/18  5:27 PM  
Result Value Ref Range Sodium 137 136 - 145 mmol/L  Potassium 4.7 3.5 - 5.1 mmol/L  
 Chloride 101 97 - 108 mmol/L  
 CO2 30 21 - 32 mmol/L Anion gap 6 5 - 15 mmol/L Glucose 119 (H) 65 - 100 mg/dL BUN 18 6 - 20 MG/DL Creatinine 1.37 (H) 0.70 - 1.30 MG/DL  
 BUN/Creatinine ratio 13 12 - 20 GFR est AA >60 >60 ml/min/1.73m2 GFR est non-AA 50 (L) >60 ml/min/1.73m2 Calcium 8.9 8.5 - 10.1 MG/DL Bilirubin, total 0.5 0.2 - 1.0 MG/DL  
 ALT (SGPT) 37 12 - 78 U/L  
 AST (SGOT) 31 15 - 37 U/L Alk. phosphatase 138 (H) 45 - 117 U/L Protein, total 7.9 6.4 - 8.2 g/dL Albumin 4.0 3.5 - 5.0 g/dL Globulin 3.9 2.0 - 4.0 g/dL A-G Ratio 1.0 (L) 1.1 - 2.2 NT-PRO BNP Collection Time: 09/18/18  5:27 PM  
Result Value Ref Range NT pro- 0 - 450 PG/ML EXAM:  CTA CHEST W OR W WO CONT INDICATION:   new onset hypoxia COMPARISON: CT of the chest, 4/23/2015 and 11/6/2017. Limitations: Respiratory motion Jacquie Barrio TECHNIQUE:  
Precontrast  images were obtained to localize the volume for acquisition. Multislice helical CT arteriography was performed from the diaphragm to the 
thoracic inlet during uneventful rapid bolus intravenous contrast 
administration. Lung and soft tissue windows were generated. Coronal and 
sagittal images were generated and 3D post processing consisting of coronal 
maximum intensity images was performed. CT dose reduction was achieved through use of a standardized protocol tailored 
for this examination and automatic exposure control for dose modulation. Jacquie Barrio FINDINGS: 
CHEST: 
Chest wall/thoracic inlet: Within normal limits. Thyroid: Within normal limits. Mediastinum/viviane: Calcified right hilar lymph nodes. Jacquie Barrio Heart/vessels: No visible pulmonary embolus. Calcifications of the coronary 
arteries. Lungs/Pleura: Calcified granulomas in the right lower lobe. Subpleural nodule in 
the left, posterior costophrenic sulcus is unchanged since 2015. Near complete 
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 Atelectasis and/or consolidation in the left lung base Mliss Deleon INCIDENTALLY IMAGED ABDOMEN: 
Incidentally imaged portions of the abdomen appear unremarkable. . 
MSK:  
Within normal limits. Mliss Deleon IMPRESSION IMPRESSION:  
1. No visualized pulmonary embolus. 2. Tracheomalacia. 3. Coronary artery disease. 4. Remote granulomatous disease. 5. Atelectasis and/or consolidation in the left lung base. Assessment/Plan: Active Problems: Hypoxia (4/23/2015) 
 
  
___________________________________________________ PLAN: 
 
1. Plan to have pulmonary see, ?bronch, pt. Largely asymptomatic but does have some mild wheezing and still on 4L NC 
 
 
 
 
 
 
___________________________________________________ Attending Physician: Raphael Sever, MD

## 2018-09-19 NOTE — PROGRESS NOTES
I have reviewed discharge instructions with the patient. Follow up appointments reviewed. Care of incision also reviewed with patient. The patient verbalized understanding. IV's removed. Patient discharged with all of belongings.

## 2018-09-23 NOTE — PROGRESS NOTES
This note will not be viewable in [address] Lalo Whaley is a 80 y.o. male and presents with Medication Evaluation (room 3)  . Subjective:  Mr. Gisell Watts presents today with complaint of continued right sided abdominal/flank discomfort. This is been present on and off for some time now. He denies any change in bowel habits. He denies any dysuria or frequency of urination. He has not had any fever chills or rigors. He does not note relief of pain with over-the-counter medications. He had a CT scan of the abdomen and pelvis performed which did not show any obvious abnormality. He presents today for follow-up evaluation review of this. He previously had an ultrasound of the abdomen in July.'s was also unremarkable. 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  30 Tab 6    TL JONNA RX 2.2-25-1 mg tab TAKE ONE TABLET BY MOUTH DAILY 30 Tab 5    atorvastatin (LIPITOR) 80 mg tablet TAKE ONE TABLET BY MOUTH DAILY 30 Tab 5    atenolol (TENORMIN) 100 mg tablet TAKE ONE TABLET BY MOUTH DAILY 30 Tab 5    allopurinol (ZYLOPRIM) 300 mg tablet Take 1 Tab by mouth daily. 30 Tab 6    amLODIPine (NORVASC) 10 mg tablet Take 1 Tab by mouth daily. 90 Tab 3    LANSOPRAZOLE (PREVACID PO) Take 15 mg by mouth daily.  cefUROXime (CEFTIN) 250 mg tablet Take 1 Tab by mouth two (2) times a day. 6 Tab 0    HYDROcodone-acetaminophen (NORCO) 5-325 mg per tablet Take 1 Tab by mouth every six (6) hours as needed for Pain. Max Daily Amount: 4 Tabs. 20 Tab 0    furosemide (LASIX) 80 mg tablet Take 80 mg by mouth daily.  ibuprofen (ADVIL) 200 mg tablet Take 200 mg by mouth every six (6) hours as needed for Pain.  diazepam (VALIUM) 2 mg tablet Take 1 Tab by mouth every eight (8) hours as needed for Anxiety. Max Daily Amount: 6 mg. 20 Tab 0     Social History     Social History    Marital status:      Spouse name: N/A    Number of children: N/A    Years of education: N/A     Social History Main Topics    Smoking status: Former Smoker     Packs/day: 1.00     Years: 4.00    Smokeless tobacco: Former User    Alcohol use Yes      Comment: has not drank in 6 months    Drug use: No    Sexual activity: Not Asked     Other Topics Concern    None     Social History Narrative     Family History   Problem Relation Age of Onset    Heart Disease Mother     Hypertension Mother     Hypertension Father     Hypertension Sister     Hypertension Brother     Cancer Brother        Review of Systems  Constitutional:  negative for fevers, chills, anorexia and weight loss  Eyes:    negative for visual disturbance and irritation  ENT:    negative for tinnitus,sore throat,nasal congestion,ear pains. hoarseness  Respiratory:     negative for cough, hemoptysis, dyspnea,wheezing  CV:    negative for chest pain, palpitations, lower extremity edema  GI:    negative for nausea, vomiting, diarrhea, abdominal pain,melena  Endo:               negative for polyuria,polydipsia,polyphagia,heat intolerance  Genitourinary : negative for frequency, dysuria and hematuria  Integumentary: negative for rash and pruritus  Hematologic:   negative for easy bruising and gum/nose bleeding  Musculoskel:  negative for myalgias, arthralgias, back pain, muscle weakness, joint pain  Neurological:   negative for headaches, dizziness, vertigo, memory problems and gait   Behavl/Psych:  negative for feelings of anxiety, depression, mood changes  ROS otherwise negative      Objective:  Visit Vitals    /73 (BP 1 Location: Left arm, BP Patient Position: Sitting)    Pulse 66    Temp 98.6 °F (37 °C) (Oral)    Resp 16    Ht 5' 8\" (1.727 m)    Wt 222 lb 9.6 oz (101 kg)    SpO2 95%    BMI 33.85 kg/m2     Physical Exam:   General appearance - alert, well appearing, and in no distress  Mental status - alert, oriented to person, place, and time  EYE-MAURICE, EOMI, fundi normal, corneas normal, no foreign bodies  ENT-ENT exam normal, no neck nodes or sinus tenderness  Nose - normal and patent, no erythema, discharge or polyps  Mouth - mucous membranes moist, pharynx normal without lesions  Neck - supple, no significant adenopathy   Chest - clear to auscultation, no wheezes, rales or rhonchi, symmetric air entry   Heart - normal rate, regular rhythm, normal S1, S2, no murmurs, rubs, clicks or gallops   Abdomen - soft, nontender, nondistended, no masses or organomegaly  Lymph- no adenopathy palpable  Ext-peripheral pulses normal, no pedal edema, no clubbing or cyanosis  Skin-Warm and dry. no hyperpigmentation, vitiligo, or suspicious lesions  Neuro -alert, oriented, normal speech, no focal findings or movement disorder noted      Assessment/Plan:  Diagnoses and all orders for this visit:    1. Right flank pain  -     REFERRAL TO GENERAL SURGERY    2. Right groin pain  -     REFERRAL TO GENERAL SURGERY          ICD-10-CM ICD-9-CM    1.  Right flank pain R10.9 789.09 REFERRAL TO GENERAL SURGERY 2. Right groin pain R10.31 789.09 REFERRAL TO GENERAL SURGERY   Plan:    The patient's exam is unremarkable. My concern is that perhaps he has either an abdominal wall strain or possible hernia that I am not able to appreciate on exam.  Will refer to surgery for further evaluation and guidance. Follow-up Disposition: Not on File    I have reviewed with the patient details of the assessment and plan and all questions were answered. Relevent patient education was performed. Verbal and/or written instructions (see AVS) provided. The most recent lab findings were reviewed with the patient. Plan was discussed with patient who verbally expressed understanding. An After Visit Summary was printed and given to the patient.     Tia Acevedo MD

## 2018-09-24 RX ORDER — OLMESARTAN MEDOXOMIL 40 MG/1
40 TABLET ORAL DAILY
Qty: 90 TAB | Refills: 3 | Status: SHIPPED | OUTPATIENT
Start: 2018-09-24 | End: 2019-04-12 | Stop reason: DRUGHIGH

## 2018-09-24 NOTE — TELEPHONE ENCOUNTER
Requested Prescriptions     Pending Prescriptions Disp Refills    olmesartan (BENICAR) 40 mg tablet 90 Tab 3     Sig: Take 1 Tab by mouth daily.        Last Refill: 08/14/18  Next Appointment:09/28/18

## 2018-09-28 ENCOUNTER — OFFICE VISIT (OUTPATIENT)
Dept: INTERNAL MEDICINE CLINIC | Age: 83
End: 2018-09-28

## 2018-09-28 VITALS
RESPIRATION RATE: 16 BRPM | BODY MASS INDEX: 34.33 KG/M2 | HEART RATE: 66 BPM | SYSTOLIC BLOOD PRESSURE: 111 MMHG | HEIGHT: 68 IN | TEMPERATURE: 97.6 F | OXYGEN SATURATION: 92 % | DIASTOLIC BLOOD PRESSURE: 54 MMHG | WEIGHT: 226.5 LBS

## 2018-09-28 DIAGNOSIS — R09.02 HYPOXIA: Primary | ICD-10-CM

## 2018-09-28 DIAGNOSIS — E78.5 DYSLIPIDEMIA: ICD-10-CM

## 2018-09-28 DIAGNOSIS — Z79.899 ON STATIN THERAPY: ICD-10-CM

## 2018-09-28 DIAGNOSIS — I25.10 CORONARY ARTERY DISEASE INVOLVING NATIVE CORONARY ARTERY OF NATIVE HEART WITHOUT ANGINA PECTORIS: ICD-10-CM

## 2018-09-28 DIAGNOSIS — I10 ESSENTIAL HYPERTENSION: ICD-10-CM

## 2018-09-28 NOTE — MR AVS SNAPSHOT
[facility] 
 
 
 [address] [address] [phone] Patient: David Ramos MRN: [id number] :1934 Visit Information Date & Time Provider Department Dept. Phone Encounter #  
 2018 11:00 AM RAMA Hinds MD [facility] [phone] [id number] Follow-up Instructions Return in about 3 months (around 2018) for follow up. Your Appointments 1/3/2019 10:40 AM  
FOLLOW UP 10 with MD BERT [facility] ([facility]) Appt Note: [address] [address] [address] [address] Upcoming Health Maintenance Date Due DTaP/Tdap/Td series (1 - Tdap) 10/8/1955 Shingrix Vaccine Age 50> (1 of 2) 10/8/1984 GLAUCOMA SCREENING Q2Y 10/8/1999 MEDICARE YEARLY EXAM 3/14/2018 Allergies as of 2018  Review Complete On: 2018 By: Gaby Li MD  
 No Known Allergies Current Immunizations  Reviewed on 2015 Name Date Influenza High Dose Vaccine PF 2017 Influenza Vaccine (Quad) PF 2018  5:31 PM, 11/10/2015 11:04 AM  
 Pneumococcal Conjugate (PCV-13) 2017 Pneumococcal Vaccine (Unspecified Type) 2005 Not reviewed this visit You Were Diagnosed With   
  
 Codes Comments Hypoxia    -  Primary ICD-10-CM: R09.02 
ICD-9-CM: 799.02 Coronary artery disease involving native coronary artery of native heart without angina pectoris     ICD-10-CM: I25.10 ICD-9-CM: 414.01 Dyslipidemia     ICD-10-CM: E78.5 ICD-9-CM: 272.4 On statin therapy     ICD-10-CM: Z79.899 ICD-9-CM: V58.69 Essential hypertension     ICD-10-CM: I10 
ICD-9-CM: 401.9 Vitals BP Pulse Temp Resp Height(growth percentile) Weight(growth percentile) 111/54 (BP 1 Location: Left arm, BP Patient Position: Sitting) 66 97.6 °F (36.4 °C) (Oral) 16 5' 8\" (1.727 m) 226 lb 8 oz (102.7 kg) SpO2 BMI Smoking Status 92% 34.44 kg/m2 Former Smoker Vitals History BMI and BSA Data Body Mass Index Body Surface Area 34.44 kg/m 2 2.22 m 2 Preferred Pharmacy Pharmacy Name Phone RACHEL RODRIGUEZ [address] [place], [address] [facility] Haily Ghosh [phone] Your Updated Medication List  
  
   
This list is accurate as of 9/28/18 11:42 AM.  Always use your most recent med list. ADVIL 200 mg tablet Generic drug:  ibuprofen Take 200 mg by mouth every six (6) hours as needed for Pain. allopurinol 300 mg tablet Commonly known as:  Ollen Epley Take 1 Tab by mouth daily. amLODIPine 10 mg tablet Commonly known as:  Arlyss Tori Take 1 Tab by mouth daily. atenolol 100 mg tablet Commonly known as:  TENORMIN  
TAKE ONE TABLET BY MOUTH DAILY  
  
 atorvastatin 80 mg tablet Commonly known as:  LIPITOR  
TAKE ONE TABLET BY MOUTH DAILY  
  
 LASIX 80 mg tablet Generic drug:  furosemide Take 80 mg by mouth daily. olmesartan 40 mg tablet Commonly known as:  [employer] Take 1 Tab by mouth daily. Oxygen Indications: 2 LITERS VIA NC  
  
 PREVACID PO Take 15 mg by mouth daily. TL JONNA RX 2.2-25-1 mg Tab Generic drug:  folic acid-vit U3-BZB M86 TAKE ONE TABLET BY MOUTH DAILY Follow-up Instructions Return in about 3 months (around 12/28/2018) for follow up. Introducing [facility]! [employer] introduces mobintent patient portal. Now you can access parts of your medical record, email your doctor's office, and request medication refills online. 1. In your internet browser, go to [email or web address] 2. Click on the First Time User? Click Here link in the Sign In box. You will see the New Member Sign Up page. 3. Enter your STWA Access Code exactly as it appears below. You will not need to use this code after youve completed the sign-up process. If you do not sign up before the expiration date, you must request a new code. · STWA Access Code: [email or web address] Expires: 10/25/2018  2:58 PM 
 
4. Enter the last four digits of your Social Security Number (xxxx) and Date of Birth (mm/dd/yyyy) as indicated and click Submit. You will be taken to the next sign-up page. 5. Create a STWA ID. This will be your STWA login ID and cannot be changed, so think of one that is secure and easy to remember. 6. Create a STWA password. You can change your password at any time. 7. Enter your Password Reset Question and Answer. This can be used at a later time if you forget your password. 8. Enter your e-mail address. You will receive e-mail notification when new information is available in [address]. 9. Click Sign Up. You can now view and download portions of your medical record. 10. Click the Download Summary menu link to download a portable copy of your medical information. If you have questions, please visit the Frequently Asked Questions section of the STWA website. Remember, STWA is NOT to be used for urgent needs. For medical emergencies, dial 911. Now available from your iPhone and Android! Please provide this summary of care documentation to your next provider. Your primary care clinician is listed as RAMA Leyva. If you have any questions after today's visit, please call [phone].

## 2018-09-28 NOTE — PROGRESS NOTES
This note will not be viewable in [address] Lalo Whaley is a 80 y.o. male and presents with Transitions Of Care (room 3) and Hospital Follow Up  . Subjective:  Mr. Gisell Watts presents today for transition of care follow-up after being hospitalized with hypoxemia after his surgical procedure. Date of admission 9/18/2018, date of discharge 9/19/2018. He went home with oxygen and had follow-up with pulmonary this week. He had formal pulmonary function test and was started Anuro. He has a sleep study scheduled for next week. He notes that he is less short of breath and denies any chest pain, palpitations, PND, orthopnea, or pedal edema. 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 90 Tab 3    furosemide (LASIX) 80 mg tablet Take 80 mg by mouth daily.  ibuprofen (ADVIL) 200 mg tablet Take 200 mg by mouth every six (6) hours as needed for Pain.       TL JONNA RX 2.2-25-1 mg tab TAKE ONE TABLET BY MOUTH DAILY 30 Tab 5    atorvastatin (LIPITOR) 80 mg tablet TAKE ONE TABLET BY MOUTH DAILY 30 Tab 5    atenolol (TENORMIN) 100 mg tablet TAKE ONE TABLET BY MOUTH DAILY 30 Tab 5    allopurinol (ZYLOPRIM) 300 mg tablet Take 1 Tab by mouth daily. 30 Tab 6    amLODIPine (NORVASC) 10 mg tablet Take 1 Tab by mouth daily. 90 Tab 3    LANSOPRAZOLE (PREVACID PO) Take 15 mg by mouth daily. Social History     Social History    Marital status:      Spouse name: N/A    Number of children: N/A    Years of education: N/A     Social History Main Topics    Smoking status: Former Smoker     Packs/day: 1.00     Years: 4.00    Smokeless tobacco: Former User    Alcohol use Yes      Comment: has not drank in 6 months    Drug use: No    Sexual activity: Not Asked     Other Topics Concern    None     Social History Narrative     Family History   Problem Relation Age of Onset    Heart Disease Mother     Hypertension Mother     Hypertension Father     Hypertension Sister     Hypertension Brother     Cancer Brother        Review of Systems  Constitutional:  negative for fevers, chills, anorexia and weight loss  Eyes:    negative for visual disturbance and irritation  ENT:    negative for tinnitus,sore throat,nasal congestion,ear pains. hoarseness  Respiratory:     negative for cough, hemoptysis, dyspnea,wheezing  CV:    negative for chest pain, palpitations, lower extremity edema  GI:    negative for nausea, vomiting, diarrhea, abdominal pain,melena  Endo:               negative for polyuria,polydipsia,polyphagia,heat intolerance  Genitourinary : negative for frequency, dysuria and hematuria  Integumentary: negative for rash and pruritus  Hematologic:   negative for easy bruising and gum/nose bleeding  Musculoskel:  negative for myalgias, arthralgias, back pain, muscle weakness, joint pain  Neurological:   negative for headaches, dizziness, vertigo, memory problems and gait Behavl/Psych:  negative for feelings of anxiety, depression, mood changes  ROS otherwise negative      Objective:  Visit Vitals    /54 (BP 1 Location: Left arm, BP Patient Position: Sitting)    Pulse 66    Temp 97.6 °F (36.4 °C) (Oral)    Resp 16    Ht 5' 8\" (1.727 m)    Wt 226 lb 8 oz (102.7 kg)    SpO2 92%    BMI 34.44 kg/m2     Physical Exam:   General appearance - alert, well appearing, and in no distress  Mental status - alert, oriented to person, place, and time  EYE-MAURICE, EOMI, fundi normal, corneas normal, no foreign bodies  ENT-ENT exam normal, no neck nodes or sinus tenderness  Nose - normal and patent, no erythema, discharge or polyps  Mouth - mucous membranes moist, pharynx normal without lesions  Neck - supple, no significant adenopathy   Chest - clear to auscultation, no wheezes, rales or rhonchi, symmetric air entry   Heart - normal rate, regular rhythm, normal S1, S2, no murmurs, rubs, clicks or gallops   Abdomen - soft, nontender, nondistended, no masses or organomegaly  Lymph- no adenopathy palpable  Ext-peripheral pulses normal, no pedal edema, no clubbing or cyanosis  Skin-Warm and dry. no hyperpigmentation, vitiligo, or suspicious lesions  Neuro -alert, oriented, normal speech, no focal findings or movement disorder noted      Assessment/Plan:  Diagnoses and all orders for this visit:    1. Hypoxia    2. Coronary artery disease involving native coronary artery of native heart without angina pectoris    3. Dyslipidemia    4. On statin therapy    5. Essential hypertension          ICD-10-CM ICD-9-CM    1. Hypoxia R09.02 799.02    2. Coronary artery disease involving native coronary artery of native heart without angina pectoris I25.10 414.01    3. Dyslipidemia E78.5 272.4    4. On statin therapy Z79.899 V58.69    5. Essential hypertension I10 401.9      Plan:    Patient will continue his current medical regimen as outlined above. Follow-up with pulmonary as scheduled.   Sleep study scheduled for next week. Blood pressure remained stable. Hyperlipidemia is treated on Lipitor. Follow-up Disposition:  Return in about 3 months (around 12/28/2018) for follow up. I have reviewed with the patient details of the assessment and plan and all questions were answered. Relevent patient education was performed. Verbal and/or written instructions (see AVS) provided. The most recent lab findings were reviewed with the patient. Plan was discussed with patient who verbally expressed understanding. An After Visit Summary was printed and given to the patient.     Chayo Foote MD

## 2018-09-28 NOTE — PROGRESS NOTES
Chief Complaint   Patient presents with   [address]     room [address] [facility] Follow Up     1. Have you been to the ER, urgent care clinic since your last visit? NO Hospital[facility] since your last visit? [facility]     2. Have you seen or consulted any other health care providers outside of the [address] [place] [address] since your last visit? Include any pap smears or colon screening.  YES, UROLOGY AND PULMONOLOGY    PT IS HERE FOR HOSP F/U. PT STATES HE HAD A RIGHT HYDROCOLECTOMY AND HAD COMPLICATIONS AFTER SURGERY, \"ESOPHAGUS COLLAPSED\"

## 2018-12-19 RX ORDER — FUROSEMIDE 80 MG/1
80 TABLET ORAL DAILY
Qty: 90 TAB | Refills: 3 | Status: ON HOLD | OUTPATIENT
Start: 2018-12-19 | End: 2019-10-28 | Stop reason: SDUPTHER

## 2018-12-19 NOTE — TELEPHONE ENCOUNTER
Requested Prescriptions     Pending Prescriptions Disp Refills    furosemide (LASIX) 80 mg tablet 90 Tab 0     Sig: Take 1 Tab by mouth daily.        Last Refill: ?  Next Appointment:1/3/19

## 2019-01-03 ENCOUNTER — OFFICE VISIT (OUTPATIENT)
Dept: INTERNAL MEDICINE CLINIC | Age: 84
End: 2019-01-03

## 2019-01-03 VITALS
OXYGEN SATURATION: 93 % | RESPIRATION RATE: 18 BRPM | DIASTOLIC BLOOD PRESSURE: 68 MMHG | TEMPERATURE: 97.7 F | HEIGHT: 68 IN | BODY MASS INDEX: 35.25 KG/M2 | SYSTOLIC BLOOD PRESSURE: 118 MMHG | HEART RATE: 64 BPM | WEIGHT: 232.6 LBS

## 2019-01-03 DIAGNOSIS — Z79.899 ON STATIN THERAPY: ICD-10-CM

## 2019-01-03 DIAGNOSIS — G95.19 NEUROGENIC CLAUDICATION (HCC): ICD-10-CM

## 2019-01-03 DIAGNOSIS — I25.10 CORONARY ARTERY DISEASE INVOLVING NATIVE CORONARY ARTERY OF NATIVE HEART WITHOUT ANGINA PECTORIS: Primary | ICD-10-CM

## 2019-01-03 DIAGNOSIS — E78.5 DYSLIPIDEMIA: ICD-10-CM

## 2019-01-03 DIAGNOSIS — I10 ESSENTIAL HYPERTENSION: ICD-10-CM

## 2019-01-03 PROBLEM — G47.33 OSA ON CPAP: Status: ACTIVE | Noted: 2019-01-03

## 2019-01-03 PROBLEM — E66.01 SEVERE OBESITY (HCC): Status: ACTIVE | Noted: 2019-01-03

## 2019-01-03 PROBLEM — Z99.89 OSA ON CPAP: Status: ACTIVE | Noted: 2019-01-03

## 2019-01-03 PROBLEM — J41.0 SIMPLE CHRONIC BRONCHITIS (HCC): Status: ACTIVE | Noted: 2019-01-03

## 2019-01-03 RX ORDER — UMECLIDINIUM BROMIDE AND VILANTEROL TRIFENATATE 62.5; 25 UG/1; UG/1
1 POWDER RESPIRATORY (INHALATION) DAILY
COMMUNITY
Start: 2018-12-26

## 2019-01-03 NOTE — PROGRESS NOTES
Westly Baumgarten is a 80 y.o. male Chief Complaint Patient presents with  Hypertension 3 month follow up c/o swelling in legs Visit Vitals /68 (BP 1 Location: Left arm, BP Patient Position: Sitting) Pulse 64 Temp 97.7 °F (36.5 °C) (Oral) Resp 18 Ht 5' 8\" (1.727 m) Wt 232 lb 9.6 oz (105.5 kg) SpO2 93% BMI 35.37 kg/m² Health Maintenance Due Topic Date Due  
 DTaP/Tdap/Td series (1 - Tdap) 10/08/1955  Shingrix Vaccine Age 50> (1 of 2) 10/08/1984  GLAUCOMA SCREENING Q2Y  10/08/1999  MEDICARE YEARLY EXAM  03/14/2018 1. Have you been to the ER, urgent care clinic since your last visit? Hospitalized since your last visit? No 
 
2. Have you seen or consulted any other health care providers outside of the [address] [place] [address] since your last visit? Include any pap smears or colon screening.  No

## 2019-01-04 NOTE — TELEPHONE ENCOUNTER
Patient is requesting for his Prevacid medication to be refilled as you have taken him off of it.     Requested Prescriptions     Pending Prescriptions Disp Refills    atorvastatin (LIPITOR) 80 mg tablet 30 Tab 5     LOV 1/3/19  NOV 7/12/19  Pharmacy on file verified

## 2019-01-07 RX ORDER — ATORVASTATIN CALCIUM 80 MG/1
TABLET, FILM COATED ORAL
Qty: 30 TAB | Refills: 5 | Status: SHIPPED | OUTPATIENT
Start: 2019-01-07 | End: 2019-07-08 | Stop reason: SDUPTHER

## 2019-01-11 NOTE — TELEPHONE ENCOUNTER
Requested Prescriptions     Pending Prescriptions Disp Refills    atenolol (TENORMIN) 100 mg tablet 90 Tab 3     Sig: Take 1 Tab by mouth daily.        Last Refill: 07/13/18  Next Appointment:07/12/19

## 2019-01-14 RX ORDER — ATENOLOL 100 MG/1
100 TABLET ORAL DAILY
Qty: 90 TAB | Refills: 3 | Status: SHIPPED | OUTPATIENT
Start: 2019-01-14 | End: 2019-06-10

## 2019-01-30 NOTE — PROGRESS NOTES
This note will not be viewable in [address] Sony Han is a 80 y.o. male and presents with Hypertension (3 month follow up c/o swelling in legs) Paige Martinez Subjective: 
 
Mr. Annamarie Tipton presents today for follow-up of hypertension. He has increased swelling in his legs. He denies any shortness of breath, PND, orthopnea. He complains of increased pain in his legs with ambulation. He has had previous testing for peripheral vascular disease which appears to be adequate. He has some moderate back pain from time to time. Review of Systems Constitutional:  
Eyes:   negative for visual disturbance and irritation ENT:   negative for tinnitus,sore throat,nasal congestion,ear pains. hoarseness Respiratory:  negative for cough, hemoptysis, dyspnea,wheezing CV:   negative for chest pain, palpitations, lower extremity edema GI:   negative for nausea, vomiting, diarrhea, abdominal pain,melena Endo:               negative for polyuria,polydipsia,polyphagia,heat intolerance Genitourinary: negative for frequency, dysuria and hematuria Integumentary: negative for rash and pruritus Hematologic:  negative for easy bruising and gum/nose bleeding Musculoskel: negative for myalgias, arthralgias, back pain, muscle weakness, joint pain Neurological:  negative for headaches, dizziness, vertigo, memory problems and gait Behavl/Psych: negative for feelings of anxiety, depression, mood changes Past Medical History:  
Diagnosis Date  Adverse effect of anesthesia   
 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  
Procedure Laterality Date [facility] [facility] Cardiac Stents  HX HERNIA REPAIR    
 RIH  
 HX HERNIA REPAIR  68/05/47  
 open umbilical hernia repair mesh  HX UROLOGICAL HYDROCOLECTOMY Social History Socioeconomic History  Marital status:  Spouse name: Not on file  Number of children: Not on file  Years of education: Not on file  Highest education level: Not on file Tobacco Use  Smoking status: Former Smoker Packs/day: 1.00 Years: 4.00 Pack years: 4.00  Smokeless tobacco: Former User Substance and Sexual Activity  Alcohol use: Yes Comment: has not drank in 6 months  Drug use: No  
 
Family History Problem Relation Age of Onset  Heart Disease Mother  Hypertension Mother  Hypertension Father  Hypertension Sister  Hypertension Brother  Cancer Brother Current Outpatient Medications Medication Sig Dispense Refill  ANORO ELLIPTA 62.5-25 mcg/actuation inhaler  furosemide (LASIX) 80 mg tablet Take 1 Tab by mouth daily. 90 Tab 3  
 olmesartan (BENICAR) 40 mg tablet Take 1 Tab by mouth daily. 90 Tab 3  ibuprofen (ADVIL) 200 mg tablet Take 200 mg by mouth every six (6) hours as needed for Pain.  TL JONNA RX 2.2-25-1 mg tab TAKE ONE TABLET BY MOUTH DAILY 30 Tab 5  
 allopurinol (ZYLOPRIM) 300 mg tablet Take 1 Tab by mouth daily. 30 Tab 6  
 amLODIPine (NORVASC) 10 mg tablet Take 1 Tab by mouth daily. 90 Tab 3  
 atenolol (TENORMIN) 100 mg tablet Take 1 Tab by mouth daily.  90 Tab 3  
 atorvastatin (LIPITOR) 80 mg tablet TAKE ONE TABLET BY MOUTH DAILY 30 Tab 5  
 Oxygen Indications: 2 LITERS VIA NC    
  LANSOPRAZOLE (PREVACID PO) Take 15 mg by mouth daily. No Known Allergies Objective: 
Visit Vitals /68 (BP 1 Location: Left arm, BP Patient Position: Sitting) Pulse 64 Temp 97.7 °F (36.5 °C) (Oral) Resp 18 Ht 5' 8\" (1.727 m) Wt 232 lb 9.6 oz (105.5 kg) SpO2 93% BMI 35.37 kg/m² Physical Exam:  
General appearance - alert, well appearing, and in no distress Mental status - alert, oriented to person, place, and time EYE-MAURICE, EOMI, fundi normal, corneas normal, no foreign bodies ENT-ENT exam normal, no neck nodes or sinus tenderness Nose - normal and patent, no erythema, discharge or polyps Mouth - mucous membranes moist, pharynx normal without lesions Neck - supple, no significant adenopathy Chest - clear to auscultation, no wheezes, rales or rhonchi, symmetric air entry Heart - normal rate, regular rhythm, normal S1, S2, no murmurs, rubs, clicks or gallops Abdomen - soft, nontender, nondistended, no masses or organomegaly Lymph- no adenopathy palpable Ext-peripheral pulses normal, 1+ bilateral pedal edema, no clubbing or cyanosis Skin-Warm and dry. no hyperpigmentation, vitiligo, or suspicious lesions Neuro -alert, oriented, normal speech, no focal findings or movement disorder noted Musculoskeletal- FROM, no bony abnormalities, no point tenderness No results found for this visit on 01/03/19. All results for lab orders may not have been returned by the time this encountered was closed. Assessment/Plan: ICD-10-CM ICD-9-CM 1. Coronary artery disease involving native coronary artery of native heart without angina pectoris I25.10 414.01   
2. Essential hypertension I10 401.9 3. Dyslipidemia E78.5 272.4   
4. On statin therapy Z79.899 V58.69   
5. Neurogenic claudication M48.062 724.03 REFERRAL TO ORTHOPEDICS Orders Placed This Encounter  REFERRAL TO ORTHOPEDICS Referral Priority:   Routine Referral Type:   Consultation Referral Reason:   Specialty Services Required Referred to Provider:   Sharan Sarmiento MD  
  Number of Visits Requested:   1  ANORO ELLIPTA 62.5-25 mcg/actuation inhaler Plan: I suspect he may have some neurogenic claudication. No clear if he may benefit from further evaluation. Will refer to orthopedics for further consultation. Continue other medications as prescribed. He has some trace pedal edema which is unchanged. Continue to monitor his blood pressure as well his his hyperlipidemia. He remains on a maintenance inhaler for some underlying COPD. Follow-up Disposition: 
Return in about 6 months (around 7/3/2019) for follow up. I have reviewed with the patient details of the assessment and plan and all questions were answered. Relevent patient education was performed. Verbal and/or written instructions (see AVS) provided. The most recent lab findings were reviewed with the patient. Plan was discussed with patient who verbal expressed understanding. An After Visit Summary was printed and given to the patient.  
 
 
Golria Montilla MD

## 2019-02-08 RX ORDER — CYANOCOBALAMIN/FOLIC AC/VIT B6 1-2.2-25MG
TABLET ORAL
Qty: 30 TAB | Refills: 4 | Status: SHIPPED | OUTPATIENT
Start: 2019-02-08 | End: 2019-04-12

## 2019-02-11 ENCOUNTER — DOCUMENTATION ONLY (OUTPATIENT)
Dept: INTERNAL MEDICINE CLINIC | Age: 84
End: 2019-02-11

## 2019-02-11 NOTE — PROGRESS NOTES
virt - fe not covered by insurance Dr Arti Booker recommends patient to take B complex with Folate - OTC - patient notified

## 2019-04-04 RX ORDER — AMLODIPINE BESYLATE 10 MG/1
TABLET ORAL
Qty: 30 TAB | Refills: 0 | Status: SHIPPED | OUTPATIENT
Start: 2019-04-04 | End: 2019-05-09 | Stop reason: SDUPTHER

## 2019-04-12 ENCOUNTER — APPOINTMENT (OUTPATIENT)
Dept: CT IMAGING | Age: 84
End: 2019-04-12
Attending: EMERGENCY MEDICINE
Payer: MEDICARE

## 2019-04-12 ENCOUNTER — APPOINTMENT (OUTPATIENT)
Dept: GENERAL RADIOLOGY | Age: 84
End: 2019-04-12
Attending: EMERGENCY MEDICINE
Payer: MEDICARE

## 2019-04-12 ENCOUNTER — APPOINTMENT (OUTPATIENT)
Dept: VASCULAR SURGERY | Age: 84
End: 2019-04-12
Attending: INTERNAL MEDICINE
Payer: MEDICARE

## 2019-04-12 ENCOUNTER — HOSPITAL ENCOUNTER (OUTPATIENT)
Age: 84
Setting detail: OBSERVATION
Discharge: HOME | End: 2019-04-15
Attending: EMERGENCY MEDICINE | Admitting: INTERNAL MEDICINE
Payer: MEDICARE

## 2019-04-12 DIAGNOSIS — G45.9 TIA (TRANSIENT ISCHEMIC ATTACK): Primary | ICD-10-CM

## 2019-04-12 DIAGNOSIS — I10 ESSENTIAL HYPERTENSION: ICD-10-CM

## 2019-04-12 DIAGNOSIS — I25.10 CORONARY ARTERY DISEASE INVOLVING NATIVE CORONARY ARTERY OF NATIVE HEART WITHOUT ANGINA PECTORIS: ICD-10-CM

## 2019-04-12 DIAGNOSIS — R09.02 HYPOXIA: ICD-10-CM

## 2019-04-12 DIAGNOSIS — E78.5 DYSLIPIDEMIA: ICD-10-CM

## 2019-04-12 DIAGNOSIS — I65.23 BILATERAL CAROTID ARTERY STENOSIS: ICD-10-CM

## 2019-04-12 DIAGNOSIS — Z99.89 OSA ON CPAP: ICD-10-CM

## 2019-04-12 DIAGNOSIS — G47.33 OSA ON CPAP: ICD-10-CM

## 2019-04-12 LAB
ANION GAP SERPL CALC-SCNC: 4 MMOL/L (ref 5–15)
ARTERIAL PATENCY WRIST A: YES
ATRIAL RATE: 65 BPM
BASE DEFICIT BLD-SCNC: 2 MMOL/L
BASOPHILS # BLD: 0.1 K/UL (ref 0–0.1)
BASOPHILS NFR BLD: 1 % (ref 0–1)
BDY SITE: ABNORMAL
BUN SERPL-MCNC: 26 MG/DL (ref 6–20)
BUN/CREAT SERPL: 20 (ref 12–20)
CALCIUM SERPL-MCNC: 9 MG/DL (ref 8.5–10.1)
CALCULATED P AXIS, ECG09: 40 DEGREES
CALCULATED R AXIS, ECG10: -20 DEGREES
CALCULATED T AXIS, ECG11: 32 DEGREES
CHLORIDE SERPL-SCNC: 108 MMOL/L (ref 97–108)
CO2 SERPL-SCNC: 27 MMOL/L (ref 21–32)
CREAT SERPL-MCNC: 1.29 MG/DL (ref 0.7–1.3)
DIAGNOSIS, 93000: NORMAL
DIFFERENTIAL METHOD BLD: ABNORMAL
EOSINOPHIL # BLD: 0.2 K/UL (ref 0–0.4)
EOSINOPHIL NFR BLD: 2 % (ref 0–7)
ERYTHROCYTE [DISTWIDTH] IN BLOOD BY AUTOMATED COUNT: 14.9 % (ref 11.5–14.5)
ETHANOL SERPL-MCNC: <10 MG/DL
GAS FLOW.O2 O2 DELIVERY SYS: ABNORMAL L/MIN
GLUCOSE BLD STRIP.AUTO-MCNC: 124 MG/DL (ref 65–100)
GLUCOSE SERPL-MCNC: 134 MG/DL (ref 65–100)
HCO3 BLD-SCNC: 22.4 MMOL/L (ref 22–26)
HCT VFR BLD AUTO: 44.1 % (ref 36.6–50.3)
HGB BLD-MCNC: 15.2 G/DL (ref 12.1–17)
IMM GRANULOCYTES # BLD AUTO: 0 K/UL (ref 0–0.04)
IMM GRANULOCYTES NFR BLD AUTO: 0 % (ref 0–0.5)
INR PPP: 1.2 (ref 0.9–1.1)
LEFT CCA DIST DIAS: 8.6 CM/S
LEFT CCA DIST SYS: 65 CM/S
LEFT CCA PROX DIAS: 12.8 CM/S
LEFT CCA PROX SYS: 70.9 CM/S
LEFT ECA DIAS: 0 CM/S
LEFT ECA SYS: 97.1 CM/S
LEFT ICA DIST DIAS: 7.5 CM/S
LEFT ICA DIST SYS: 32.7 CM/S
LEFT ICA MID DIAS: 11.3 CM/S
LEFT ICA MID SYS: 61.3 CM/S
LEFT ICA PROX DIAS: 10.4 CM/S
LEFT ICA PROX SYS: 39.1 CM/S
LEFT ICA/CCA SYS: 0.94
LEFT SUBCLAVIAN DIAS: 0 CM/S
LEFT SUBCLAVIAN SYS: 95.8 CM/S
LEFT VERTEBRAL DIAS: 8.83 CM/S
LEFT VERTEBRAL SYS: 36.3 CM/S
LYMPHOCYTES # BLD: 0.9 K/UL (ref 0.8–3.5)
LYMPHOCYTES NFR BLD: 13 % (ref 12–49)
MCH RBC QN AUTO: 33.9 PG (ref 26–34)
MCHC RBC AUTO-ENTMCNC: 34.5 G/DL (ref 30–36.5)
MCV RBC AUTO: 98.4 FL (ref 80–99)
MONOCYTES # BLD: 0.6 K/UL (ref 0–1)
MONOCYTES NFR BLD: 9 % (ref 5–13)
NEUTS SEG # BLD: 4.9 K/UL (ref 1.8–8)
NEUTS SEG NFR BLD: 75 % (ref 32–75)
NRBC # BLD: 0 K/UL (ref 0–0.01)
NRBC BLD-RTO: 0 PER 100 WBC
P-R INTERVAL, ECG05: 190 MS
PCO2 BLD: 35 MMHG (ref 35–45)
PH BLD: 7.41 [PH] (ref 7.35–7.45)
PLATELET # BLD AUTO: 184 K/UL (ref 150–400)
PMV BLD AUTO: 10.3 FL (ref 8.9–12.9)
PO2 BLD: 61 MMHG (ref 80–100)
POTASSIUM SERPL-SCNC: 4.7 MMOL/L (ref 3.5–5.1)
PROTHROMBIN TIME: 12.4 SEC (ref 9–11.1)
Q-T INTERVAL, ECG07: 432 MS
QRS DURATION, ECG06: 88 MS
QTC CALCULATION (BEZET), ECG08: 449 MS
RBC # BLD AUTO: 4.48 M/UL (ref 4.1–5.7)
RIGHT CCA DIST DIAS: 6.5 CM/S
RIGHT CCA DIST SYS: 55.9 CM/S
RIGHT CCA PROX DIAS: 0 CM/S
RIGHT CCA PROX SYS: 74 CM/S
RIGHT ECA DIAS: 4.06 CM/S
RIGHT ECA SYS: 83.9 CM/S
RIGHT ICA DIST DIAS: 8.8 CM/S
RIGHT ICA DIST SYS: 34.7 CM/S
RIGHT ICA MID DIAS: 12.8 CM/S
RIGHT ICA MID SYS: 54.2 CM/S
RIGHT ICA PROX DIAS: 12.2 CM/S
RIGHT ICA PROX SYS: 60.4 CM/S
RIGHT ICA/CCA SYS: 1.1
RIGHT SUBCLAVIAN DIAS: 0 CM/S
RIGHT SUBCLAVIAN SYS: 57.4 CM/S
RIGHT VERTEBRAL DIAS: 12.73 CM/S
RIGHT VERTEBRAL SYS: 47.3 CM/S
SAO2 % BLD: 92 % (ref 92–97)
SERVICE CMNT-IMP: ABNORMAL
SODIUM SERPL-SCNC: 139 MMOL/L (ref 136–145)
SPECIMEN TYPE: ABNORMAL
TROPONIN I SERPL-MCNC: <0.05 NG/ML
VENTRICULAR RATE, ECG03: 65 BPM
WBC # BLD AUTO: 6.6 K/UL (ref 4.1–11.1)

## 2019-04-12 PROCEDURE — 74011250636 HC RX REV CODE- 250/636: Performed by: INTERNAL MEDICINE

## 2019-04-12 PROCEDURE — 74011250637 HC RX REV CODE- 250/637: Performed by: INTERNAL MEDICINE

## 2019-04-12 PROCEDURE — 93880 EXTRACRANIAL BILAT STUDY: CPT

## 2019-04-12 PROCEDURE — 82962 GLUCOSE BLOOD TEST: CPT

## 2019-04-12 PROCEDURE — 71045 X-RAY EXAM CHEST 1 VIEW: CPT

## 2019-04-12 PROCEDURE — 36600 WITHDRAWAL OF ARTERIAL BLOOD: CPT

## 2019-04-12 PROCEDURE — 99218 HC RM OBSERVATION: CPT

## 2019-04-12 PROCEDURE — 70450 CT HEAD/BRAIN W/O DYE: CPT

## 2019-04-12 PROCEDURE — 99285 EMERGENCY DEPT VISIT HI MDM: CPT

## 2019-04-12 PROCEDURE — 85610 PROTHROMBIN TIME: CPT

## 2019-04-12 PROCEDURE — 96372 THER/PROPH/DIAG INJ SC/IM: CPT

## 2019-04-12 PROCEDURE — 84484 ASSAY OF TROPONIN QUANT: CPT

## 2019-04-12 PROCEDURE — 82803 BLOOD GASES ANY COMBINATION: CPT

## 2019-04-12 PROCEDURE — 93005 ELECTROCARDIOGRAM TRACING: CPT

## 2019-04-12 PROCEDURE — 97161 PT EVAL LOW COMPLEX 20 MIN: CPT

## 2019-04-12 PROCEDURE — 80307 DRUG TEST PRSMV CHEM ANLYZR: CPT

## 2019-04-12 PROCEDURE — 85025 COMPLETE CBC W/AUTO DIFF WBC: CPT

## 2019-04-12 PROCEDURE — 80048 BASIC METABOLIC PNL TOTAL CA: CPT

## 2019-04-12 PROCEDURE — 97116 GAIT TRAINING THERAPY: CPT

## 2019-04-12 PROCEDURE — 36415 COLL VENOUS BLD VENIPUNCTURE: CPT

## 2019-04-12 RX ORDER — SODIUM CHLORIDE 0.9 % (FLUSH) 0.9 %
5-40 SYRINGE (ML) INJECTION AS NEEDED
Status: DISCONTINUED | OUTPATIENT
Start: 2019-04-12 | End: 2019-04-15 | Stop reason: HOSPADM

## 2019-04-12 RX ORDER — SODIUM CHLORIDE 0.9 % (FLUSH) 0.9 %
5-40 SYRINGE (ML) INJECTION EVERY 8 HOURS
Status: DISCONTINUED | OUTPATIENT
Start: 2019-04-12 | End: 2019-04-15 | Stop reason: HOSPADM

## 2019-04-12 RX ORDER — ACETAMINOPHEN 325 MG/1
650 TABLET ORAL
Status: DISCONTINUED | OUTPATIENT
Start: 2019-04-12 | End: 2019-04-15 | Stop reason: HOSPADM

## 2019-04-12 RX ORDER — DULOXETIN HYDROCHLORIDE 30 MG/1
30 CAPSULE, DELAYED RELEASE ORAL DAILY
COMMUNITY
End: 2019-04-16

## 2019-04-12 RX ORDER — MULTIVIT WITH MINERALS/HERBS
1 TABLET ORAL DAILY
COMMUNITY
End: 2019-06-05

## 2019-04-12 RX ORDER — ACETAMINOPHEN 500 MG
1000 TABLET ORAL
COMMUNITY
End: 2019-05-21

## 2019-04-12 RX ORDER — GUAIFENESIN 100 MG/5ML
81 LIQUID (ML) ORAL DAILY
Status: DISCONTINUED | OUTPATIENT
Start: 2019-04-13 | End: 2019-04-14

## 2019-04-12 RX ORDER — LORAZEPAM 2 MG/ML
1 INJECTION INTRAMUSCULAR
Status: DISCONTINUED | OUTPATIENT
Start: 2019-04-12 | End: 2019-04-13

## 2019-04-12 RX ORDER — ATORVASTATIN CALCIUM 40 MG/1
80 TABLET, FILM COATED ORAL DAILY
Status: DISCONTINUED | OUTPATIENT
Start: 2019-04-13 | End: 2019-04-15 | Stop reason: HOSPADM

## 2019-04-12 RX ORDER — OLMESARTAN MEDOXOMIL 20 MG/1
20 TABLET ORAL DAILY
COMMUNITY
End: 2019-05-02 | Stop reason: SDUPTHER

## 2019-04-12 RX ORDER — DULOXETIN HYDROCHLORIDE 30 MG/1
30 CAPSULE, DELAYED RELEASE ORAL DAILY
Status: DISCONTINUED | OUTPATIENT
Start: 2019-04-13 | End: 2019-04-15 | Stop reason: HOSPADM

## 2019-04-12 RX ORDER — FUROSEMIDE 40 MG/1
80 TABLET ORAL DAILY
Status: DISCONTINUED | OUTPATIENT
Start: 2019-04-13 | End: 2019-04-15 | Stop reason: HOSPADM

## 2019-04-12 RX ORDER — ACETAMINOPHEN 650 MG/1
650 SUPPOSITORY RECTAL
Status: DISCONTINUED | OUTPATIENT
Start: 2019-04-12 | End: 2019-04-15 | Stop reason: HOSPADM

## 2019-04-12 RX ORDER — HEPARIN SODIUM 5000 [USP'U]/ML
5000 INJECTION, SOLUTION INTRAVENOUS; SUBCUTANEOUS EVERY 8 HOURS
Status: DISCONTINUED | OUTPATIENT
Start: 2019-04-12 | End: 2019-04-15 | Stop reason: HOSPADM

## 2019-04-12 RX ORDER — ALLOPURINOL 100 MG/1
300 TABLET ORAL DAILY
Status: DISCONTINUED | OUTPATIENT
Start: 2019-04-13 | End: 2019-04-15 | Stop reason: HOSPADM

## 2019-04-12 RX ADMIN — Medication 10 ML: at 21:26

## 2019-04-12 RX ADMIN — HEPARIN SODIUM 5000 UNITS: 5000 INJECTION INTRAVENOUS; SUBCUTANEOUS at 17:36

## 2019-04-12 RX ADMIN — Medication 10 ML: at 17:38

## 2019-04-12 RX ADMIN — ACETAMINOPHEN 650 MG: 325 TABLET ORAL at 21:25

## 2019-04-12 NOTE — PROGRESS NOTES
Pharmacy Clarification of Prior to Admission Medication Regimen The patient was interviewed regarding clarification of the prior to admission medication regimen. The patients family was present in room and obtained permission from patient to discuss drug regimen with visitor(s) present. The patient was questioned regarding use of any other inhalers, topical products, over the counter medications, herbal medications, vitamin products or ophthalmic/nasal/otic medication use. Patient was uncertain of his Olmesartan strengths. MHT called the outpatient pharmacy, [facility], [phone], and spoke with Yaakov Sprague, who was able to verify the patient's Olmesartan strengths and sigs. Information Obtained From: RX Query, Patient, Outpatient Pharmacy, Pertinent Pharmacy Findings: 
Patient's granddaughter helps patient with his medications PTA medication list was corrected to the following:  
 
Prior to Admission Medications Prescriptions Last Dose Informant Patient Reported? Taking? ANORO ELLIPTA 62.5-25 mcg/actuation inhaler 4/11/2019 at Unknown time Self Yes Yes Sig: Take 1 Puff by inhalation daily. DULoxetine (CYMBALTA) 30 mg capsule 4/11/2019 at Unknown time Self Yes Yes Sig: Take 30 mg by mouth daily. acetaminophen (TYLENOL) 500 mg tablet 4/11/2019 at Unknown time Self Yes Yes Sig: Take 1,000 mg by mouth every six (6) hours as needed for Pain. allopurinol (ZYLOPRIM) 300 mg tablet 4/11/2019 at Unknown time Self No Yes Sig: Take 1 Tab by mouth daily. amLODIPine (NORVASC) 10 mg tablet 4/11/2019 at Unknown time Self No Yes Sig: TAKE ONE TABLET BY MOUTH DAILY  
atenolol (TENORMIN) 100 mg tablet 4/11/2019 at Unknown time Self No Yes Sig: Take 1 Tab by mouth daily. atorvastatin (LIPITOR) 80 mg tablet 4/11/2019 at Unknown time Self No Yes Sig: TAKE ONE TABLET BY MOUTH DAILY  
b complex vitamins tablet 4/11/2019 at Unknown time Self Yes Yes Sig: Take 1 Tab by mouth daily. furosemide (LASIX) 80 mg tablet 4/11/2019 at Unknown time Self No Yes Sig: Take 1 Tab by mouth daily. ibuprofen (ADVIL) 200 mg tablet 4/5/2019 at Unknown time Self Yes Yes Sig: Take 200 mg by mouth every six (6) hours as needed for Pain. olmesartan (BENICAR) 20 mg tablet 4/11/2019 at Unknown time Other Yes Yes Sig: Take 20 mg by mouth daily. Facility-Administered Medications: None Thank you, Hero Whalen CPhT Medication History Pharmacy Technician

## 2019-04-12 NOTE — PROGRESS NOTES
Problem: Mobility Impaired (Adult and Pediatric) Goal: *Acute Goals and Plan of Care (Insert Text) Description Physical Therapy Goals Initiated 4/12/2019 1. Patient will move from supine to sit and sit to supine , scoot up and down and roll side to side in bed with independence within 7 day(s). 2.  Patient will transfer from bed to chair and chair to bed with independence using the least restrictive device within 7 day(s). 3.  Patient will perform sit to stand with independence within 7 day(s). 4.  Patient will ambulate with independence for 300 feet with the least restrictive device within 7 day(s). 5.  Patient will ascend/descend 1 stairs with independence within 7 day(s). Outcome: Progressing Towards Goal 
 PHYSICAL THERAPY EVALUATION- NEURO POPULATION Patient: Evelia Morin (70 y.o. male) Date: 4/12/2019 Primary Diagnosis: TIA (transient ischemic attack) [G45.9] Precautions: fall - S  
 
ASSESSMENT : 
Based on the objective data described below, the patient presents with slight limitation below baseline but mostly resolving sxs s/p episode of slurred and affeted speech and feeling unwell this am. Cleared for session by RN. Pt lives with wife in one story home, [facility]. He is normally fully independent with all activity with no device, drives, and does own ADLs. He states he does have a hx of low back issues (mainly on R) for which he is going to be evaluated by Dr. Phuc Matute. Pt currently requires min A to come to EOB after lying in the bed all day. He shows good equal strength all four extremities. He transfers with S. He amb with SBA to S with wide-based, lateral weight shifting gait but pt and family state this is his normal pattern. Pt voices feeling still a little wobbly but his gait presents steady w/o LOB or path deviation. He scores a 45/56 on the LLOYD balance scale placing him in the low fall risk category. Pt noted during session to appear SOB with activity but both family and pt report this is his baseline. Sats 92-93% on room air with activity and RN reports that is level that pt has remained at throughout the day. Pt returned to bed with call bell in reach. Nurse notified. At this time, pt appears to be returning to his baseline. He has some subjective feeling of unsteadiness but feels as if his gait is his normal pattern. Will see pt in acute setting to progress higher level balance as appropriate. Expect no PT needs upon d/c however if pt still feeling not back to his normal activity would recommend OPPT. Patient will benefit from skilled intervention to address the above impairments. Patient?s rehabilitation potential is considered to be Excellent Factors which may influence rehabilitation potential include:  
? None noted ? Mental ability/status ? Medical condition ? Home/family situation and support systems ? Safety awareness 
? Pain tolerance/management 
? Other: PLAN : 
Recommendations and Planned Interventions: ?             Bed Mobility Training             ? Neuromuscular Re-Education ? Transfer Training                   ? Orthotic/Prosthetic Training ? Gait Training                         ? Modalities ? Therapeutic Exercises           ? Edema Management/Control ? Therapeutic Activities            ? Patient and Family Training/Education ? Other (comment): Frequency/Duration: Patient will be followed by physical therapy 5 times a week to address goals. Discharge Recommendations: none vs OPPT if needed Further Equipment Recommendations for Discharge: none SUBJECTIVE:  
Patient stated ? I feel good. ? OBJECTIVE DATA SUMMARY:  
HISTORY:   
Past Medical History:  
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  
Procedure Laterality Date [facility] 2174 Cardiac Stents HX HERNIA REPAIR    
 RIH  
 HX HERNIA REPAIR  02/07/71  
 open umbilical hernia repair mesh HX UROLOGICAL HYDROCOLECTOMY Prior Level of Function/Home Situation: Pt lives with wife in one story home, sill step entry. He is normally fully independent with all activity with no device, drives, and does own ADLs. He states he does have a hx of low back issues (mainly on R) for which he is going to be evaluated by Dr. Clementine Hernandez. Home Situation Home Environment: Private residence # Steps to Enter: 1 One/Two Story Residence: One story Living Alone: No 
Support Systems: Family member(s), Child(theodora), Spouse/Significant Other/Partner Patient Expects to be Discharged to[de-identified] Private residence Current DME Used/Available at Home: None EXAMINATION/PRESENTATION/DECISION MAKING:  
Critical Behavior: 
Neurologic State: Alert Orientation Level: Oriented X4 Cognition: Appropriate decision making, Appropriate for age attention/concentration, Appropriate safety awareness, Follows commands Hearing: Auditory Auditory Impairment: Hard of hearing, bilateral 
 
Range Of Motion: 
AROM: Within functional limits PROM: Within functional limits Strength:   
 Strength: Within functional limits(no focal deficits noted) Tone & Sensation:  
Tone: Normal 
  
  
  
  
Sensation: Intact Coordination: 
Coordination: Within functional limits Functional Mobility: 
Bed Mobility: 
  
Supine to Sit: Minimum assistance Sit to Supine: Independent Transfers: 
Sit to Stand: Supervision Stand to Sit: Supervision Balance:  
Sitting: Intact Standing: Impaired Standing - Static: Good Standing - Dynamic : Fair Ambulation/Gait Training: 
Distance (ft): 150 Feet (ft) Assistive Device: Gait belt Ambulation - Level of Assistance: Stand-by assistance;Supervision Gait Abnormalities: Decreased step clearance(increased B lateral weight shift; pt/family state baseline) Speed/Beverly: Slow Step Length: Right shortened;Left shortened Functional Measure Copeland Balance Test: 
 
Sitting to Standin Standing Unsupported: 4 Sitting with Back Unsupported: 4 Standing to Sittin Transfers: 4 Standing Unsupported with Eyes Closed: 4 Standing Unsupported with Feet Together: 3 Reach Forward with Outstretched Arm: 3  Object: 3 Turn to Look Over Shoulders: 4 Turn 360 Degrees: 4 Alternate Foot on Step/Stool: 0 Standing Unsupported One Foot in Front: 3 Stand on One Le Total: 45 
 
 
56=Maximum possible score;  
0-20=High fall risk 21-40=Moderate fall risk 41-56=Low fall risk Physical Therapy Evaluation Charge Determination History Examination Presentation Decision-Making HIGH Complexity :3+ comorbidities / personal factors will impact the outcome/ POC  HIGH Complexity : 4+ Standardized tests and measures addressing body structure, function, activity limitation and / or participation in recreation  LOW Complexity : Stable, uncomplicated  LOW Complexity : FOTO score of  Based on the above components, the patient evaluation is determined to be of the following complexity level: LOW Activity Tolerance:  
Good for session; no acute distress Please refer to the flowsheet for vital signs taken during this treatment. After treatment:  
?     Patient left in no apparent distress sitting up in chair ? Patient left in no apparent distress in bed 
? Call bell left within reach ? Nursing notified ? Caregiver present ? Bed alarm activated COMMUNICATION/EDUCATION:  
The patient?s plan of care was discussed with: Registered Nurse. Patient was educated regarding his deficit(s) of high level balance as this relates to his diagnosis of TIA. He demonstrated Excellent understanding as evidenced by his ability to discuss. Patient and/or family was verbally educated on the BE FAST acronym for signs/symptoms of CVA and TIA. BE FAST was written on patient's communication board  for visual education and reinforcement. All questions answered with patient indicating good understanding. ?  Fall prevention education was provided and the patient/caregiver indicated understanding. ? Patient/family have participated as able in goal setting and plan of care. ?  Patient/family agree to work toward stated goals and plan of care. ?  Patient understands intent and goals of therapy, but is neutral about his/her participation. ? Patient is unable to participate in goal setting and plan of care. Thank you for this referral. 
Peggy Maldonado, PT Time Calculation: 31 mins

## 2019-04-12 NOTE — ED PROVIDER NOTES
EMERGENCY DEPARTMENT HISTORY AND PHYSICAL EXAM 
 
 
Date: 4/12/2019 Patient Name: Tano Lynn History of Presenting Illness Chief Complaint Patient presents with  Fatigue  
  weakness when at home he had gone outside family reports confusion  Altered mental status History Provided By: Patient's Wife and Patient's Daughter HPI: Tano Lynn, 80 y.o. male with PMHx significant for CAD, CVA/TIA, presents via EMS to the ED with cc of slurred speech. Family reports that this morning he went outside for a walk, he came in around 730, had slurred and garbled speech. Currently patient is unable to answer questions. No discernible speech. He does however follow simple commands and move all extremities. He was at his normal baseline prior to the episode this morning; family reports he is normally a and o x3 and moves all extremities; prior cva without residual deficits; HPI and ROS are limited due to current mental status, physical condition. PMHx: Significant for hypertension, TIA/CVA, high cholesterol, CAD PSHx: Significant for cardiac stents, hernia repair Social Hx: Drinks 6 beers a day. Former smoker. No illicit drug use There are no other complaints, changes, or physical findings at this time. PCP: Macy Gallardo MD 
 
No current facility-administered medications on file prior to encounter. Current Outpatient Medications on File Prior to Encounter Medication Sig Dispense Refill  olmesartan (BENICAR) 20 mg tablet Take 20 mg by mouth daily.  b complex vitamins tablet Take 1 Tab by mouth daily.  DULoxetine (CYMBALTA) 30 mg capsule Take 30 mg by mouth daily.  acetaminophen (TYLENOL) 500 mg tablet Take 1,000 mg by mouth every six (6) hours as needed for Pain.  amLODIPine (NORVASC) 10 mg tablet TAKE ONE TABLET BY MOUTH DAILY 30 Tab 0  
 atenolol (TENORMIN) 100 mg tablet Take 1 Tab by mouth daily.  90 Tab 3  
  atorvastatin (LIPITOR) 80 mg tablet TAKE ONE TABLET BY MOUTH DAILY 30 Tab 5  ANORO ELLIPTA 62.5-25 mcg/actuation inhaler Take 1 Puff by inhalation daily.  furosemide (LASIX) 80 mg tablet Take 1 Tab by mouth daily. 90 Tab 3  ibuprofen (ADVIL) 200 mg tablet Take 200 mg by mouth every six (6) hours as needed for Pain.  allopurinol (ZYLOPRIM) 300 mg tablet Take 1 Tab by mouth daily. 30 Tab 6 Past History Past Medical History: 
Past Medical History:  
Diagnosis Date  Adverse effect of anesthesia   
 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 
Past Surgical History:  
Procedure Laterality Date [facility] Cardiac Stents  HX HERNIA REPAIR    
 RIH  
 HX HERNIA REPAIR  58/90/20  
 open umbilical hernia repair mesh  HX UROLOGICAL HYDROCOLECTOMY Family History: 
Family History Problem Relation Age of Onset  Heart Disease Mother  Hypertension Mother  Hypertension Father  Hypertension Sister  Hypertension Brother  Cancer Brother Social History: 
Social History Tobacco Use  Smoking status: Former Smoker Packs/day: 1.00 Years: 4.00 Pack years: 4.00  Smokeless tobacco: Former User Substance Use Topics  Alcohol use: Yes Comment: has not drank in 6 months  Drug use: No  
 
 
Allergies: 
No Known Allergies Review of Systems Review of Systems Unable to perform ROS: Patient nonverbal  
 
 
Physical Exam  
Physical Exam  
Constitutional: He appears well-developed and well-nourished. No distress. HENT:  
Head: Normocephalic. Nose: Nose normal.  
Mouth/Throat: Oropharynx is clear and moist. No oropharyngeal exudate. Eyes: Pupils are equal, round, and reactive to light. Conjunctivae are normal. No scleral icterus. Neck: Normal range of motion. Neck supple. No JVD present. No tracheal deviation present. No thyromegaly present. Cardiovascular: Normal rate, regular rhythm and intact distal pulses. Exam reveals no gallop and no friction rub. No murmur heard. Pulmonary/Chest: Effort normal and breath sounds normal. No stridor. No respiratory distress. He has no wheezes. He has no rales. Abdominal: Soft. Bowel sounds are normal. He exhibits no distension. There is no tenderness. There is no rebound and no guarding. Musculoskeletal: Normal range of motion. He exhibits no edema. Lymphadenopathy:  
  He has no cervical adenopathy. Neurological: He is alert. No cranial nerve deficit. He exhibits normal muscle tone. Coordination normal.  
Patient alert. Eyes open. Garbled nondiscernible speech. Follows simple commands. EOMI. moves all extremities. Skin: Skin is warm and dry. No rash noted. He is not diaphoretic. No erythema. Nursing note and vitals reviewed. Diagnostic Study Results Labs - Recent Results (from the past 12 hour(s)) GLUCOSE, POC Collection Time: 04/12/19 10:17 AM  
Result Value Ref Range Glucose (POC) 124 (H) 65 - 100 mg/dL Performed by Atif BECKFORD)   
CBC WITH AUTOMATED DIFF Collection Time: 04/12/19 10:24 AM  
Result Value Ref Range WBC 6.6 4.1 - 11.1 K/uL  
 RBC 4.48 4.10 - 5.70 M/uL  
 HGB 15.2 12.1 - 17.0 g/dL HCT 44.1 36.6 - 50.3 %  MCV 98.4 80.0 - 99.0 FL  
 MCH 33.9 26.0 - 34.0 PG  
 MCHC 34.5 30.0 - 36.5 g/dL  
 RDW 14.9 (H) 11.5 - 14.5 % PLATELET 919 397 - 971 K/uL MPV 10.3 8.9 - 12.9 FL  
 NRBC 0.0 0  WBC ABSOLUTE NRBC 0.00 0.00 - 0.01 K/uL NEUTROPHILS 75 32 - 75 % LYMPHOCYTES 13 12 - 49 % MONOCYTES 9 5 - 13 % EOSINOPHILS 2 0 - 7 % BASOPHILS 1 0 - 1 % IMMATURE GRANULOCYTES 0 0.0 - 0.5 % ABS. NEUTROPHILS 4.9 1.8 - 8.0 K/UL  
 ABS. LYMPHOCYTES 0.9 0.8 - 3.5 K/UL  
 ABS. MONOCYTES 0.6 0.0 - 1.0 K/UL  
 ABS. EOSINOPHILS 0.2 0.0 - 0.4 K/UL  
 ABS. BASOPHILS 0.1 0.0 - 0.1 K/UL  
 ABS. IMM. GRANS. 0.0 0.00 - 0.04 K/UL  
 DF AUTOMATED METABOLIC PANEL, BASIC Collection Time: 04/12/19 10:24 AM  
Result Value Ref Range Sodium 139 136 - 145 mmol/L Potassium 4.7 3.5 - 5.1 mmol/L Chloride 108 97 - 108 mmol/L  
 CO2 27 21 - 32 mmol/L Anion gap 4 (L) 5 - 15 mmol/L Glucose 134 (H) 65 - 100 mg/dL BUN 26 (H) 6 - 20 MG/DL Creatinine 1.29 0.70 - 1.30 MG/DL  
 BUN/Creatinine ratio 20 12 - 20 GFR est AA >60 >60 ml/min/1.73m2 GFR est non-AA 53 (L) >60 ml/min/1.73m2 Calcium 9.0 8.5 - 10.1 MG/DL PROTHROMBIN TIME + INR Collection Time: 04/12/19 10:24 AM  
Result Value Ref Range INR 1.2 (H) 0.9 - 1.1 Prothrombin time 12.4 (H) 9.0 - 11.1 sec TROPONIN I Collection Time: 04/12/19 10:24 AM  
Result Value Ref Range Troponin-I, Qt. <0.05 <0.05 ng/mL ETHYL ALCOHOL Collection Time: 04/12/19 10:24 AM  
Result Value Ref Range ALCOHOL(ETHYL),SERUM <10 <10 MG/DL  
EKG, 12 LEAD, INITIAL Collection Time: 04/12/19 10:39 AM  
Result Value Ref Range Ventricular Rate 65 BPM  
 Atrial Rate 65 BPM  
 P-R Interval 190 ms QRS Duration 88 ms Q-T Interval 432 ms QTC Calculation (Bezet) 449 ms Calculated P Axis 40 degrees Calculated R Axis -20 degrees Calculated T Axis 32 degrees Diagnosis Normal sinus rhythm Normal ECG When compared with ECG of 10-SEP-2018 13:22, 
 premature ventricular complexes are no longer present POC G3 - PUL Collection Time: 04/12/19 11:08 AM  
Result Value Ref Range pH (POC) 7.414 7.35 - 7.45    
 pCO2 (POC) 35.0 35.0 - 45.0 MMHG  
 pO2 (POC) 61 (L) 80 - 100 MMHG  
 HCO3 (POC) 22.4 22 - 26 MMOL/L  
 sO2 (POC) 92 92 - 97 % Base deficit (POC) 2 mmol/L Site LEFT RADIAL Device: ROOM AIR Allens test (POC) YES Specimen type (POC) ARTERIAL    
DUPLEX CAROTID BILATERAL Collection Time: 04/12/19  2:50 PM  
Result Value Ref Range Right subclavian sys 57.4 cm/s RIGHT SUBCLAVIAN ARTERY D 0.00 cm/s Right cca dist sys 55.9 cm/s Right CCA dist cadena 6.5 cm/s Right CCA prox sys 74.0 cm/s Right CCA prox cadena 0.0 cm/s Right ICA dist sys 34.7 cm/s Right ICA dist cadena 8.8 cm/s Right ICA mid sys 54.2 cm/s Right ICA mid cadena 12.8 cm/s Right ICA prox sys 60.4 cm/s Right ICA prox cadena 12.2 cm/s Right eca sys 83.9 cm/s RIGHT EXTERNAL CAROTID ARTERY D 4.06 cm/s Right vertebral sys 47.3 cm/s RIGHT VERTEBRAL ARTERY D 12.73 cm/s Right ICA/CCA sys 1.1 Left subclavian sys 95.8 cm/s LEFT SUBCLAVIAN ARTERY D 0.00 cm/s Left CCA dist sys 65.0 cm/s Left CCA dist cadena 8.6 cm/s Left CCA prox sys 70.9 cm/s Left CCA prox cadena 12.8 cm/s Left ICA dist sys 32.7 cm/s Left ICA dist cadena 7.5 cm/s Left ICA mid sys 61.3 cm/s Left ICA mid cadena 11.3 cm/s Left ICA prox sys 39.1 cm/s Left ICA prox cadena 10.4 cm/s Left ECA sys 97.1 cm/s LEFT EXTERNAL CAROTID ARTERY D 0.00 cm/s Left vertebral sys 36.3 cm/s LEFT VERTEBRAL ARTERY D 8.83 cm/s Left ICA/CCA sys 0.94 Radiologic Studies -  
XR CHEST PORT Final Result IMPRESSION:  
1. There is areas of opacity and volume loss left lower lobe consistent with  
atelectasis. Follow-up to exclude pneumonia is suggested. CT CODE NEURO HEAD WO CONTRAST Final Result IMPRESSION: No acute process or change compared to the prior exam.  
  
  
  
MRI BRAIN WO CONT    (Results Pending) CT Results  (Last 48 hours) 04/12/19 0939  CT CODE NEURO HEAD WO CONTRAST Final result Impression:  IMPRESSION: No acute process or change compared to the prior exam.  
   
   
  
 Narrative:  EXAM: CT CODE NEURO HEAD WO CONTRAST INDICATION: Acute dysarthria, confusion COMPARISON: 8/17/2012. CONTRAST: None. TECHNIQUE: Unenhanced CT of the head was performed using 5 mm images. Brain and  
bone windows were generated. CT dose reduction was achieved through use of a  
standardized protocol tailored for this examination and automatic exposure  
control for dose modulation. FINDINGS:  
The ventricles and sulci are normal in size, shape and configuration and  
midline. Small vessel ischemic changes are stable compared to the prior exam.  
There is no intracranial hemorrhage, extra-axial collection, mass, mass effect  
or midline shift. The basilar cisterns are open. No acute infarct is  
identified. The bone windows demonstrate no abnormalities. The visualized  
portions of the paranasal sinuses and mastoid air cells are clear. Vascular  
calcification is noted. CXR Results  (Last 48 hours) 04/12/19 1012  XR CHEST PORT Final result Impression:  IMPRESSION:  
1. There is areas of opacity and volume loss left lower lobe consistent with  
atelectasis. Follow-up to exclude pneumonia is suggested. Narrative:  EXAM: XR CHEST PORT INDICATION: CVA, fatigue and altered mental status COMPARISON: 9/18/2018 FINDINGS: A portable AP radiograph of the chest was obtained at 1000 hours. The  
patient is on a cardiac monitor. The heart size is normal.  
   
When compared with the previous examination the overall aeration has improved. The right lung is clear. There are opacities identified at the left lung base with some opacity and apparent volume loss these findings are most likely due to  
atelectasis. Follow-up to exclude pneumonia is suggested. Medical Decision Making I am the first provider for this patient. I reviewed the vital signs, available nursing notes, past medical history, past surgical history, family history and social history. Vital Signs-Reviewed the patient's vital signs. Patient Vitals for the past 12 hrs: 
 Temp Pulse Resp BP SpO2  
04/12/19 1056     95 % 04/12/19 1045  67 17 126/64 90 % 04/12/19 0958  74 20 140/63 95 % 04/12/19 0953  70 18 140/73 100 % 04/12/19 0928 98.5 °F (36.9 °C) 65 18 157/77 (!) 89 % Pulse Oximetry Analysis - 89% on RA Cardiac Monitor:  
Rate: 65 bpm 
Rhythm: Normal Sinus Rhythm ED EKG interpretation:10:39 Rhythm: normal sinus rhythm; and regular . Rate (approx.): 65; Axis: normal; CT Interval: normal; QRS interval: normal ; ST/T wave: normal; This EKG was interpreted by Gilda García MD,ED Provider. Records Reviewed: Nursing Notes and Old Medical Records Provider Notes (Medical Decision Making): DDX: CVA/TIA, ACS, CHF 
 
ED Course:  
Initial assessment performed. The patients presenting problems have been discussed, and they are in agreement with the care plan formulated and outlined with them. I have encouraged them to ask questions as they arise throughout their visit. ED Course as of Apr 12 1450 Fri Apr 12, 2019  
7273 Patient now speaking with appropriate but slurred and understandable speech. [CH] 1011 Dr. Gregorio Cee, tele-neurology, evaluated the patient via bedside monitor. Patient now speaking with clear speech. Alert and oriented x4. Following commands without difficulty. She recommended admission first CVA/TIA workup. If creatinine okay obtain CTA head and neck. [CH] ED Course User Index 
[CH] Sujey Serrato MD  
 
 
 
PROGRESS NOTE Pt reevaluated. Pt symptoms resolved and feeling much better. No neuro deficits currently. No indication for TPA. Will admit to hospitalist for CVA/TIA workup. Written by Aida Thomas MD  
 
 
 
 
 
Critical Care Time:  
0 Disposition: 
Admit PLAN: 
1. Current Discharge Medication List  
  
 
2. Follow-up Information None Return to ED if worse Diagnosis Clinical Impression: 1. TIA (transient ischemic attack) Please note that this dictation was completed with Intent, the computer voice recognition software. Quite often unanticipated grammatical, syntax, homophones, and other interpretive errors are inadvertently transcribed by the computer software. Please disregard these errors. Please excuse any errors that have escaped final proofreading.

## 2019-04-12 NOTE — PROGRESS NOTES
TRANSFER - IN REPORT: 
 
Verbal report received from [facility]) on Emily Arrow  being received from ER(unit) for routine progression of care Report consisted of patients Situation, Background, Assessment and  
Recommendations(SBAR). Information from the following report(s) SBAR, Kardex, Recent Results, Med Rec Status and Cardiac Rhythm SR was reviewed with the receiving nurse. Opportunity for questions and clarification was provided. Assessment completed upon patients arrival to unit and care assumed.

## 2019-04-12 NOTE — PROGRESS NOTES
Speech pathology note Consult received for SLP dysphagia screening. RN to complete the STAND. If formal SLP evaluation is desired, please re-consult with SLP eval and treat order as SLP does not complete \"screenings\" only evaluations or treatments. Thank you. Ronit Kay., CCC-SLP

## 2019-04-12 NOTE — ROUTINE PROCESS
-Please complete MRI History and Safety Screening Form for this patient using [employer] only under Orders Requiring a Screening Form: 
 

## 2019-04-12 NOTE — PROGRESS NOTES
Spiritual Care Assessment/Progress Note [address] 
 
 
NAME: Lalo Whaley      MRN: [id number] AGE: 80 y.o. SEX: male Adventism Affiliation: Other Language: [place] 4/12/2019     Total Time (in minutes): 13 Spiritual Assessment begun in [facility] EMERGENCY DEPT through conversation with: 
  
    [x]Patient        [] Family    [] Friend(s) Reason for Consult: Other (comment)(code stroke) Spiritual beliefs: (Please include comment if needed) 
   [] Identifies with a navarro tradition:     
   [] Supported by a navarro community:        
   [] Claims no spiritual orientation:       
   [] Seeking spiritual identity:            
   [] Adheres to an individual form of spirituality:       
   [x] Not able to assess:                   
 
    
Identified resources for coping:  
   [] Prayer                           
   [] Music                  [] Guided Imagery [x] Family/friends                 [] Pet visits [] Devotional reading                         [] Unknown 
   [] Other:                                        
 
 
Interventions offered during this visit: (See comments for more details) Patient Interventions: Crisis Family/Friend(s): Catharsis/review of pertinent events in supportive environment Plan of Care: 
 
 [] Support spiritual and/or cultural needs  
 [] Support AMD and/or advance care planning process    
 [] Support grieving process 
 [] Coordinate Rites and/or Rituals  
 [] Coordination with community clergy 
 [x] No spiritual needs identified at this time 
 [] Detailed Plan of Care below (See Comments)  [] Make referral to Music Therapy 
[] Make referral to Pet Therapy    
[] Make referral to Addiction services 
[] Make referral to [facility] 
[] Make referral to Spiritual Care Partner 
[] No future visits requested       
[] Follow up visits as needed Comments:   Responded to Code Stroke in ER.   Patient's wife and daughter were at bedside as patient was being evaluated by tele-neurologist.  Spoke briefly with daughter. She and her mother are coping effectively at this time. Advised daughter of  availability Mick Thompson, [facility], [facility]  Paging Service  [phone])

## 2019-04-12 NOTE — H&P
Hospitalist Admission NoteNAME: Compa Rock :  1934 MRN:  [id number] Date/Time:  2019 2:00 PM 
 
Patient PCP: Gino Ronquillo MD 
________________________________________________________________________ My assessment of this patient's clinical condition and my plan of care is as follows. Assessment / Plan: 
Slurred speech rule out transient ischemic attack Initiate TIA protocol Check MRI of the brain, 2D echocardiogram and ultrasound carotids Check hemoglobin A1c, TSH and fasting lipid profile Start aspirin and continue home statin Consult PT OT and speech therapy Consult neurology Will give 1 dose of 1 mg IV Ativan for claustrophobia during MRI Fall precautions Left lower lobe atelectasis On arrival saturations were 89% on room air but quickly improved to 100% Chest x-ray showing left lower lobe atelectasis PO2 on ABG was 60 Start incentive spirometry Repeat chest x-ray to exclude pneumonia per radiologist recommendation Check CBC in a.m. Monitor for any respiratory symptoms Hypertension Currently blood pressure is 126/64 In view of acute TIA, will hold antihypertensives to allow for permissive hypertension Resume antihypertensives in a.m. History of COPD Continue home Anoro Dyslipidemia Gouty arthritis Continue atorvastatin, allopurinol Patient's care will be taken out by Dr. Rogers Estimable from tomorrow Code Status: full Surrogate Decision Maker: Vito Wang DVT Prophylaxis: heparin GI Prophylaxis: not indicated Baseline: From home independent Subjective: CHIEF COMPLAINT: Slurred speech HISTORY OF PRESENT ILLNESS:    
This is a 80-year-old male with past medical history of COPD, coronary artery disease, TIA presents to the hospital chief complaint of slurred speech. Janet Murrieta reports that he was in his usual state of health until about this morning when he was outside of his home to get newspaper and started having some slurred speech and difficulty in finding words.  He did not have any focal weakness, tingling, numbness or loss of vision.  Reports his symptoms have been improving gradually since the onset. Osiris Porter still reports some confusion.  Does not report any chest pain, shortness of breath, cough or phlegm.  Denies abdominal pain, nausea or vomiting.  Denies headache, fever or chills. On arrival to the hospital his vital signs were within normal limits except for saturations of 89% on room air but on repeat check 100% on room air.  On lab work is noted to have a normal CBC.  BMP showed a creatinine of 1.29.  CT head showed no acute process.  Chest x-ray showed left lower lobe atelectasis. We were asked to admit for work up and evaluation of the above problems. Past Medical History:  
Diagnosis Date  Adverse effect of anesthesia   
 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  
Procedure Laterality Date [facility] Cardiac Stents  HX HERNIA REPAIR    
 RIH  
 HX HERNIA REPAIR  04/98/60  
 open umbilical hernia repair mesh  HX UROLOGICAL HYDROCOLECTOMY Social History Tobacco Use  Smoking status: Former Smoker Packs/day: 1.00 Years: 4.00 Pack years: 4.00  Smokeless tobacco: Former User Substance Use Topics  Alcohol use: Yes Comment: has not drank in 6 months Family History Problem Relation Age of Onset  Heart Disease Mother  Hypertension Mother  Hypertension Father  Hypertension Sister  Hypertension Brother  Cancer Brother No Known Allergies Prior to Admission medications Medication Sig Start Date End Date Taking? Authorizing Provider  
olmesartan (BENICAR) 20 mg tablet Take 20 mg by mouth daily. Yes Provider, Historical  
b complex vitamins tablet Take 1 Tab by mouth daily. Yes Provider, Historical  
DULoxetine (CYMBALTA) 30 mg capsule Take 30 mg by mouth daily. Yes Provider, Historical  
acetaminophen (TYLENOL) 500 mg tablet Take 1,000 mg by mouth every six (6) hours as needed for Pain. Yes Provider, Historical  
amLODIPine (NORVASC) 10 mg tablet TAKE ONE TABLET BY MOUTH DAILY 4/4/19  Yes Eleuterio Bhandari MD  
atenolol (TENORMIN) 100 mg tablet Take 1 Tab by mouth daily. 1/14/19  Yes Mega Oquendo MD  
atorvastatin (LIPITOR) 80 mg tablet TAKE ONE TABLET BY MOUTH DAILY 1/7/19  Yes Mega Oquendo MD  
[facility] ELLIPTA 62.5-25 mcg/actuation inhaler Take 1 Puff by inhalation daily. 12/26/18  Yes Provider, Historical  
furosemide (LASIX) 80 mg tablet Take 1 Tab by mouth daily. 12/19/18  Yes Mega Oquendo MD  
ibuprofen (ADVIL) 200 mg tablet Take 200 mg by mouth every six (6) hours as needed for Pain. Yes Provider, Historical  
allopurinol (ZYLOPRIM) 300 mg tablet Take 1 Tab by mouth daily. 7/11/18  Yes Mega Oquendo MD  
 
 
REVIEW OF SYSTEMS:    
I am not able to complete the review of systems because: The patient is intubated and sedated The patient has altered mental status due to his acute medical problems The patient has baseline aphasia from prior stroke(s) The patient has baseline dementia and is not reliable historian The patient is in acute medical distress and unable to provide information Total of 12 systems reviewed as follows:   
   POSITIVE= underlined text  Negative = text not underlined General:  fever, chills, sweats, generalized weakness, weight loss/gain,  
   loss of appetite Eyes:    blurred vision, eye pain, loss of vision, double vision ENT:    rhinorrhea, pharyngitis Respiratory:   cough, sputum production, SOB, MARES, wheezing, pleuritic pain  
Cardiology:   chest pain, palpitations, orthopnea, PND, edema, syncope Gastrointestinal:  abdominal pain , N/V, diarrhea, dysphagia, constipation, bleeding Genitourinary:  frequency, urgency, dysuria, hematuria, incontinence Muskuloskeletal :  arthralgia, myalgia, back pain Hematology:  easy bruising, nose or gum bleeding, lymphadenopathy Dermatological: rash, ulceration, pruritis, color change / jaundice Endocrine:   hot flashes or polydipsia Neurological:  headache, dizziness, confusion, focal weakness, paresthesia, Speech difficulties, memory loss, gait difficulty Psychological: Feelings of anxiety, depression, agitation Objective: VITALS:   
Visit Vitals /64 Pulse 67 Temp 98.5 °F (36.9 °C) Resp 17 Ht 5' 8\" (1.727 m) Wt 108.4 kg (238 lb 15.7 oz) SpO2 95% BMI 36.34 kg/m² PHYSICAL EXAM: 
 
General:    Alert, cooperative, no distress, appears stated age. HEENT: Atraumatic, anicteric sclerae, pink conjunctivae No oral ulcers, mucosa moist, throat clear, dentition fair Neck:  Supple, symmetrical,  thyroid: non tender Lungs:   Clear to auscultation bilaterally. No Wheezing or Rhonchi. No rales. Air entry diminished at bases Chest wall:  No tenderness  No Accessory muscle use. Heart:   Regular  rhythm,  No  murmur   No edema Abdomen:   Soft, non-tender. Not distended. Bowel sounds normal 
Extremities: No cyanosis. No clubbing,   
  Skin turgor normal, Capillary refill normal, Radial dial pulse 2+ Skin:     Not pale. Not Jaundiced  No rashes Psych:  Not anxious or agitated. Neurologic: EOMs intact. No facial asymmetry. No aphasia or slurred speech. Symmetrical strength, Sensation grossly intact. Alert and oriented X 4.  
 
_______________________________________________________________________ Care Plan discussed with: 
  Comments Patient y Family RN y   
Care Manager Consultant:     
_______________________________________________________________________ Expected  Disposition:  
Home with Family y HH/PT/OT/RN   
SNF/LTC   
[facility]   
________________________________________________________________________ TOTAL TIME:  60  Minutes Critical Care Provided     Minutes non procedure based Comments  
 y Reviewed previous records  
>50% of visit spent in counseling and coordination of care y Discussion with patient and/or family and questions answered 
  
 
________________________________________________________________________ Signed: Boubacar Solitario MD 
 
Procedures: see electronic medical records for all procedures/Xrays and details which were not copied into this note but were reviewed prior to creation of Plan. LAB DATA REVIEWED:   
Recent Results (from the past 24 hour(s)) GLUCOSE, POC Collection Time: 04/12/19 10:17 AM  
Result Value Ref Range Glucose (POC) 124 (H) 65 - 100 mg/dL Performed by Trung HinesRN)   
CBC WITH AUTOMATED DIFF Collection Time: 04/12/19 10:24 AM  
Result Value Ref Range WBC 6.6 4.1 - 11.1 K/uL  
 RBC 4.48 4.10 - 5.70 M/uL  
 HGB 15.2 12.1 - 17.0 g/dL HCT 44.1 36.6 - 50.3 % MCV 98.4 80.0 - 99.0 FL  
 MCH 33.9 26.0 - 34.0 PG  
 MCHC 34.5 30.0 - 36.5 g/dL  
 RDW 14.9 (H) 11.5 - 14.5 % PLATELET 335 886 - 212 K/uL MPV 10.3 8.9 - 12.9 FL  
 NRBC 0.0 0  WBC ABSOLUTE NRBC 0.00 0.00 - 0.01 K/uL NEUTROPHILS 75 32 - 75 % LYMPHOCYTES 13 12 - 49 % MONOCYTES 9 5 - 13 % EOSINOPHILS 2 0 - 7 % BASOPHILS 1 0 - 1 % IMMATURE GRANULOCYTES 0 0.0 - 0.5 % ABS. NEUTROPHILS 4.9 1.8 - 8.0 K/UL  
 ABS. LYMPHOCYTES 0.9 0.8 - 3.5 K/UL  
 ABS. MONOCYTES 0.6 0.0 - 1.0 K/UL  
 ABS. EOSINOPHILS 0.2 0.0 - 0.4 K/UL  
 ABS. BASOPHILS 0.1 0.0 - 0.1 K/UL  
 ABS. IMM. GRANS. 0.0 0.00 - 0.04 K/UL  
 DF AUTOMATED METABOLIC PANEL, BASIC Collection Time: 04/12/19 10:24 AM  
Result Value Ref Range Sodium 139 136 - 145 mmol/L Potassium 4.7 3.5 - 5.1 mmol/L Chloride 108 97 - 108 mmol/L  
 CO2 27 21 - 32 mmol/L Anion gap 4 (L) 5 - 15 mmol/L Glucose 134 (H) 65 - 100 mg/dL BUN 26 (H) 6 - 20 MG/DL Creatinine 1.29 0.70 - 1.30 MG/DL  
 BUN/Creatinine ratio 20 12 - 20 GFR est AA >60 >60 ml/min/1.73m2 GFR est non-AA 53 (L) >60 ml/min/1.73m2 Calcium 9.0 8.5 - 10.1 MG/DL PROTHROMBIN TIME + INR Collection Time: 04/12/19 10:24 AM  
Result Value Ref Range INR 1.2 (H) 0.9 - 1.1 Prothrombin time 12.4 (H) 9.0 - 11.1 sec TROPONIN I Collection Time: 04/12/19 10:24 AM  
Result Value Ref Range Troponin-I, Qt. <0.05 <0.05 ng/mL ETHYL ALCOHOL Collection Time: 04/12/19 10:24 AM  
Result Value Ref Range ALCOHOL(ETHYL),SERUM <10 <10 MG/DL  
EKG, 12 LEAD, INITIAL Collection Time: 04/12/19 10:39 AM  
Result Value Ref Range Ventricular Rate 65 BPM  
 Atrial Rate 65 BPM  
 P-R Interval 190 ms QRS Duration 88 ms Q-T Interval 432 ms QTC Calculation (Bezet) 449 ms Calculated P Axis 40 degrees Calculated R Axis -20 degrees Calculated T Axis 32 degrees Diagnosis Normal sinus rhythm Normal ECG When compared with ECG of 10-SEP-2018 13:22, 
premature ventricular complexes are no longer present POC G3 - PUL Collection Time: 04/12/19 11:08 AM  
Result Value Ref Range pH (POC) 7.414 7.35 - 7.45    
 pCO2 (POC) 35.0 35.0 - 45.0 MMHG  
 pO2 (POC) 61 (L) 80 - 100 MMHG  
 HCO3 (POC) 22.4 22 - 26 MMOL/L  
 sO2 (POC) 92 92 - 97 % Base deficit (POC) 2 mmol/L Site LEFT RADIAL  Device: ROOM AIR    
 Allens test (POC) YES  Specimen type (POC) ARTERIAL

## 2019-04-12 NOTE — PROGRESS NOTES
Speech pathology note Reviewed chart and note patient admitted with aphasia that has resolved. Note NIHSS=0. Note patient passed the STAND, however no diet ordered yet. Discussed case with RN who reported no SLP-related concerns. Patient off the floor for testing, however wife and daughter present at bedside. Both report that patient has returned to his baseline speech, language, and cognition. Family also reported that patient has eaten crackers since being in the hospital with no difficulty. Formal SLP evaluation not clinically indicated at this time. Will sign off. Please re-consult if further needs arise. Thank you. Brianna Painting., CCC-SLP

## 2019-04-12 NOTE — ACP (ADVANCE CARE PLANNING)
Advance Care Planning Note      NAME: Emmanuel Little   :  1934   MRN:  [id number]     Date/Time:  2019 2:15 PM    Active Diagnoses:  Hospital Problems  Date Reviewed: 1/3/2019          Codes Class Noted POA    TIA (transient ischemic attack) ICD-10-CM: G45.9  ICD-9-CM: 435.9  2017 Unknown          CAD  HTN  Dyslipidemia  COPD  Gout      These active diagnoses are of sufficient risk that focused discussion on advance care planning is indicated in order to allow the patient to thoughtfully consider personal goals of care, and if situations arise that prevent the ability to personally give input, to ensure appropriate representation of their personal desires for different levels and aggressiveness of care. Discussion:   Code status addressed and wants to be a Full Code. Patient wants central line and vasopressors if needed. Patient  would like to assign wife Melquiades Hemphill  as the surrogate decision maker. Persons present and participating in discussion: Shamir Dang MD.      Time Spent:   Total time spent face-to-face in education and discussion: 16   minutes.          Codie Wynn MD   Hospitalist

## 2019-04-12 NOTE — ED NOTES
TRANSFER - OUT REPORT: 
 
Verbal report given to [facility] RN (name) on Amie Coughlin  being transferred to Neuro Tele 3105 (unit) for routine progression of care Report consisted of patients Situation, Background, Assessment and  
Recommendations(SBAR). Information from the following report(s) SBAR, ED Summary, [facility] and Recent Results was reviewed with the receiving nurse. Lines:  
Peripheral IV 04/12/19 Right Antecubital (Active) Site Assessment Clean, dry, & intact 4/12/2019  9:55 AM  
Phlebitis Assessment 0 4/12/2019  9:55 AM  
Infiltration Assessment 0 4/12/2019  9:55 AM  
Dressing Status Clean, dry, & intact 4/12/2019  9:55 AM  
Dressing Type Transparent 4/12/2019  9:55 AM  
Hub Color/Line Status Pink 4/12/2019  9:55 AM  
  
 
Opportunity for questions and clarification was provided.

## 2019-04-13 ENCOUNTER — APPOINTMENT (OUTPATIENT)
Dept: NON INVASIVE DIAGNOSTICS | Age: 84
End: 2019-04-13
Attending: INTERNAL MEDICINE
Payer: MEDICARE

## 2019-04-13 ENCOUNTER — APPOINTMENT (OUTPATIENT)
Dept: GENERAL RADIOLOGY | Age: 84
End: 2019-04-13
Attending: INTERNAL MEDICINE
Payer: MEDICARE

## 2019-04-13 ENCOUNTER — APPOINTMENT (OUTPATIENT)
Dept: MRI IMAGING | Age: 84
End: 2019-04-13
Attending: INTERNAL MEDICINE
Payer: MEDICARE

## 2019-04-13 LAB
ANION GAP SERPL CALC-SCNC: 4 MMOL/L (ref 5–15)
BUN SERPL-MCNC: 22 MG/DL (ref 6–20)
BUN/CREAT SERPL: 19 (ref 12–20)
CALCIUM SERPL-MCNC: 8.9 MG/DL (ref 8.5–10.1)
CHLORIDE SERPL-SCNC: 109 MMOL/L (ref 97–108)
CHOLEST SERPL-MCNC: 158 MG/DL
CHOLEST SERPL-MCNC: 173 MG/DL
CO2 SERPL-SCNC: 26 MMOL/L (ref 21–32)
CREAT SERPL-MCNC: 1.15 MG/DL (ref 0.7–1.3)
ECHO AO ROOT DIAM: 3.04 CM
ECHO AV AREA PEAK VELOCITY: 1.6 CM2
ECHO AV AREA VTI: 1.8 CM2
ECHO AV AREA/BSA PEAK VELOCITY: 0.7 CM2/M2
ECHO AV AREA/BSA VTI: 0.8 CM2/M2
ECHO AV MEAN GRADIENT: 15.4 MMHG
ECHO AV PEAK GRADIENT: 25.2 MMHG
ECHO AV PEAK VELOCITY: 250.87 CM/S
ECHO AV REGURGITANT PHT: 643 CM
ECHO AV VTI: 52.78 CM
ECHO EST RA PRESSURE: 10 MMHG
ECHO LA AREA 2C: 17.7 CM2
ECHO LA AREA 4C: 15.6 CM2
ECHO LA MAJOR AXIS: 3.37 CM
ECHO LA TO AORTIC ROOT RATIO: 1.11
ECHO LA VOL 2C: 45.71 ML (ref 18–58)
ECHO LA VOL 4C: 32.81 ML (ref 18–58)
ECHO LA VOL BP: 40.7 ML (ref 18–58)
ECHO LA VOL/BSA BIPLANE: 18.46 ML/M2 (ref 16–28)
ECHO LA VOLUME INDEX A2C: 20.74 ML/M2 (ref 16–28)
ECHO LA VOLUME INDEX A4C: 14.88 ML/M2 (ref 16–28)
ECHO LV E' LATERAL VELOCITY: 6.45 CM/S
ECHO LV E' SEPTAL VELOCITY: 4.63 CM/S
ECHO LV INTERNAL DIMENSION DIASTOLIC: 4.18 CM (ref 4.2–5.9)
ECHO LV INTERNAL DIMENSION SYSTOLIC: 2.93 CM
ECHO LV IVSD: 1.3 CM (ref 0.6–1)
ECHO LV MASS 2D: 242.9 G (ref 88–224)
ECHO LV MASS INDEX 2D: 110.2 G/M2 (ref 49–115)
ECHO LV POSTERIOR WALL DIASTOLIC: 1.36 CM (ref 0.6–1)
ECHO LVOT DIAM: 2.1 CM
ECHO LVOT PEAK GRADIENT: 5.7 MMHG
ECHO LVOT PEAK VELOCITY: 118.9 CM/S
ECHO LVOT SV: 93.6 ML
ECHO LVOT VTI: 26.93 CM
ECHO MV A VELOCITY: 81.56 CM/S
ECHO MV AREA PHT: 2.7 CM2
ECHO MV E DECELERATION TIME (DT): 252.2 MS
ECHO MV E VELOCITY: 78.11 CM/S
ECHO MV E/A RATIO: 0.96
ECHO MV E/E' LATERAL: 12.11
ECHO MV E/E' RATIO (AVERAGED): 14.49
ECHO MV E/E' SEPTAL: 16.87
ECHO MV PRESSURE HALF TIME (PHT): 81 MS
ECHO MV REGURGITANT PEAK GRADIENT: 73.9 MMHG
ECHO MV REGURGITANT PEAK VELOCITY: 429.79 CM/S
ECHO PULMONARY ARTERY SYSTOLIC PRESSURE (PASP): 41.4 MMHG
ECHO PV MAX VELOCITY: 88.05 CM/S
ECHO PV PEAK GRADIENT: 3.1 MMHG
ECHO RIGHT VENTRICULAR SYSTOLIC PRESSURE (RVSP): 41.4 MMHG
ECHO RV INTERNAL DIMENSION: 2.44 CM
ECHO TV REGURGITANT MAX VELOCITY: 280.02 CM/S
ECHO TV REGURGITANT PEAK GRADIENT: 31.4 MMHG
ERYTHROCYTE [DISTWIDTH] IN BLOOD BY AUTOMATED COUNT: 15.1 % (ref 11.5–14.5)
EST. AVERAGE GLUCOSE BLD GHB EST-MCNC: 131 MG/DL
GLUCOSE SERPL-MCNC: 111 MG/DL (ref 65–100)
HBA1C MFR BLD: 6.2 % (ref 4.2–6.3)
HCT VFR BLD AUTO: 41.9 % (ref 36.6–50.3)
HDLC SERPL-MCNC: 26 MG/DL
HDLC SERPL-MCNC: 28 MG/DL
HDLC SERPL: 6.1 {RATIO} (ref 0–5)
HDLC SERPL: 6.2 {RATIO} (ref 0–5)
HGB BLD-MCNC: 14.4 G/DL (ref 12.1–17)
LDLC SERPL CALC-MCNC: 82.6 MG/DL (ref 0–100)
LDLC SERPL CALC-MCNC: 84.6 MG/DL (ref 0–100)
LIPID PROFILE,FLP: ABNORMAL
LIPID PROFILE,FLP: ABNORMAL
MCH RBC QN AUTO: 34.1 PG (ref 26–34)
MCHC RBC AUTO-ENTMCNC: 34.4 G/DL (ref 30–36.5)
MCV RBC AUTO: 99.3 FL (ref 80–99)
NRBC # BLD: 0 K/UL (ref 0–0.01)
NRBC BLD-RTO: 0 PER 100 WBC
PISA AR MAX VEL: 462.4 CM/S
PLATELET # BLD AUTO: 182 K/UL (ref 150–400)
PMV BLD AUTO: 10.8 FL (ref 8.9–12.9)
POTASSIUM SERPL-SCNC: 4.4 MMOL/L (ref 3.5–5.1)
RBC # BLD AUTO: 4.22 M/UL (ref 4.1–5.7)
SODIUM SERPL-SCNC: 139 MMOL/L (ref 136–145)
TRIGL SERPL-MCNC: 247 MG/DL (ref ?–150)
TRIGL SERPL-MCNC: 302 MG/DL (ref ?–150)
TROPONIN I SERPL-MCNC: <0.05 NG/ML
TSH SERPL DL<=0.05 MIU/L-ACNC: 4.15 UIU/ML (ref 0.36–3.74)
VLDLC SERPL CALC-MCNC: 49.4 MG/DL
VLDLC SERPL CALC-MCNC: 60.4 MG/DL
WBC # BLD AUTO: 5.3 K/UL (ref 4.1–11.1)

## 2019-04-13 PROCEDURE — 84443 ASSAY THYROID STIM HORMONE: CPT

## 2019-04-13 PROCEDURE — 71046 X-RAY EXAM CHEST 2 VIEWS: CPT

## 2019-04-13 PROCEDURE — 97165 OT EVAL LOW COMPLEX 30 MIN: CPT | Performed by: OCCUPATIONAL THERAPIST

## 2019-04-13 PROCEDURE — 80048 BASIC METABOLIC PNL TOTAL CA: CPT

## 2019-04-13 PROCEDURE — 70551 MRI BRAIN STEM W/O DYE: CPT

## 2019-04-13 PROCEDURE — 83036 HEMOGLOBIN GLYCOSYLATED A1C: CPT

## 2019-04-13 PROCEDURE — 96374 THER/PROPH/DIAG INJ IV PUSH: CPT

## 2019-04-13 PROCEDURE — 80061 LIPID PANEL: CPT

## 2019-04-13 PROCEDURE — 85027 COMPLETE CBC AUTOMATED: CPT

## 2019-04-13 PROCEDURE — 99218 HC RM OBSERVATION: CPT

## 2019-04-13 PROCEDURE — 36415 COLL VENOUS BLD VENIPUNCTURE: CPT

## 2019-04-13 PROCEDURE — 96372 THER/PROPH/DIAG INJ SC/IM: CPT

## 2019-04-13 PROCEDURE — 74011250637 HC RX REV CODE- 250/637: Performed by: INTERNAL MEDICINE

## 2019-04-13 PROCEDURE — 93306 TTE W/DOPPLER COMPLETE: CPT

## 2019-04-13 PROCEDURE — 74011250636 HC RX REV CODE- 250/636: Performed by: PSYCHIATRY & NEUROLOGY

## 2019-04-13 PROCEDURE — 74011250636 HC RX REV CODE- 250/636: Performed by: INTERNAL MEDICINE

## 2019-04-13 PROCEDURE — 84484 ASSAY OF TROPONIN QUANT: CPT

## 2019-04-13 RX ORDER — DIPHENHYDRAMINE HCL 25 MG
25 CAPSULE ORAL
Status: DISCONTINUED | OUTPATIENT
Start: 2019-04-13 | End: 2019-04-15 | Stop reason: HOSPADM

## 2019-04-13 RX ORDER — LORAZEPAM 2 MG/ML
2 INJECTION INTRAMUSCULAR
Status: COMPLETED | OUTPATIENT
Start: 2019-04-13 | End: 2019-04-13

## 2019-04-13 RX ADMIN — ACETAMINOPHEN 650 MG: 325 TABLET ORAL at 19:06

## 2019-04-13 RX ADMIN — LORAZEPAM 2 MG: 2 INJECTION INTRAMUSCULAR; INTRAVENOUS at 12:40

## 2019-04-13 RX ADMIN — DULOXETINE HYDROCHLORIDE 30 MG: 30 CAPSULE, DELAYED RELEASE ORAL at 09:29

## 2019-04-13 RX ADMIN — Medication 10 ML: at 07:27

## 2019-04-13 RX ADMIN — ASPIRIN 81 MG 81 MG: 81 TABLET ORAL at 09:29

## 2019-04-13 RX ADMIN — HEPARIN SODIUM 5000 UNITS: 5000 INJECTION INTRAVENOUS; SUBCUTANEOUS at 09:29

## 2019-04-13 RX ADMIN — HEPARIN SODIUM 5000 UNITS: 5000 INJECTION INTRAVENOUS; SUBCUTANEOUS at 17:21

## 2019-04-13 RX ADMIN — Medication 10 ML: at 22:12

## 2019-04-13 RX ADMIN — DIPHENHYDRAMINE HYDROCHLORIDE 25 MG: 25 CAPSULE ORAL at 22:10

## 2019-04-13 RX ADMIN — FUROSEMIDE 80 MG: 40 TABLET ORAL at 09:29

## 2019-04-13 RX ADMIN — UMECLIDINIUM BROMIDE AND VILANTEROL TRIFENATATE 1 PUFF: 62.5; 25 POWDER RESPIRATORY (INHALATION) at 11:50

## 2019-04-13 RX ADMIN — ALLOPURINOL 300 MG: 100 TABLET ORAL at 09:29

## 2019-04-13 RX ADMIN — Medication 10 ML: at 14:56

## 2019-04-13 RX ADMIN — HEPARIN SODIUM 5000 UNITS: 5000 INJECTION INTRAVENOUS; SUBCUTANEOUS at 01:35

## 2019-04-13 RX ADMIN — ATORVASTATIN CALCIUM 80 MG: 40 TABLET, FILM COATED ORAL at 09:29

## 2019-04-13 NOTE — PROGRESS NOTES
Reason for Admission:  Slurred speech RRAT Score:  22 Resources/supports as identified by patient/family:   
The patient has 1 son that lives in [place], [place] and 1 daughter that lives locally. He lives in a 1 level home with his wife. Top Challenges facing patient (as identified by patient/family and CM): Finances/Medication cost?  The patient is retired and he has income from social security. He reports no financial concerns. He uses [address] [place] [address] for his medications. Transportation? The patient is able to drive. His daughter and wife also assist with transportation Support system or lack thereof? The patient lives with his wife in a 1 level home with 1 step to enter. He has 1 daughter that lives locally and 1 son that lives [place], [place]. He has 4 grandchildren. Living arrangements? The  lives in a 1 level home with 1 step to enter with his wife Self-care/ADLs/Cognition? The  is completely independent in his ADLs and IADLs. He uses no assistive device when ambulating. Current Advanced Directive/Advance Care Plan:  The patient is a full code and he does not have a MPOA/AD on-file. Plan for utilizing home health: N/A, PT/OT has cleared the patient for home with no services Likelihood of readmission: Low Transition of Care Plan:         
CM met with the patient and his daughter at bedside to discuss dispo plan. The patient lives in a 1 level home with 1 step to enter with his wife. He has 1 daughter that lives locally and 1 son that lives in [place], [place]. He has 4 grandchildren. The  is completely independent in his ADLs and IADLs. He uses no assistive device when ambulating. The patient  is able to drive and his daughter will assist with d/c transportation.  The patient plans to return home and PT/OT have cleared the patient to return home with no additional services. CM will continue to follow the patient for dispo planning needs. Care Management Interventions PCP Verified by CM: Yes(The patient has seen his PCP within the past 6 months ) Mode of Transport at Discharge: Other (see comment)(The patient's daughter will transport upon d/c ) Transition of Care Consult (CM Consult): Discharge Planning MyChart Signup: No 
Discharge Durable Medical Equipment: No 
Physical Therapy Consult: Yes Occupational Therapy Consult: Yes Speech Therapy Consult: Yes Current Support Network: Lives with Spouse Confirm Follow Up Transport: Self Plan discussed with Pt/Family/Caregiver: Yes Freedom of Choice Offered: Yes [employer] Information Provided?: No 
Discharge Location Discharge Placement: Home Vernell Coronel LCSW

## 2019-04-13 NOTE — ROUTINE PROCESS
Bedside shift change report given to Yue Mckeon RN (oncoming nurse) by Nabil Bradley RN  (offgoing nurse). Report included the following information SBAR, Kardex, Intake/Output, MAR, Accordion and Recent Results.

## 2019-04-13 NOTE — CONSULTS
STROKE/TRANSIENT ISCHEMIC ATTACK CONSULT NOTE    Patient ID:  Lalo Whaley  [id number]  9 y.o.  1934    Date of Consultation:  April 13, 2019    Referring Physician: Dr. Myesha Rizo    Reason for Consultation:  Transient slurred speech and aphasia    History of Present Illness:     Patient Active Problem List    Diagnosis Date Noted    Bilateral carotid artery stenosis 04/12/2019    Severe obesity ([address] 01/03/2019    TESSA on CPAP 01/03/2019    Simple chronic bronchitis ([address] 01/03/2019    Encounter for immunization 08/16/2017    Fatigue 08/16/2017    CAD (coronary artery disease) 08/16/2017    Dyslipidemia 08/16/2017    On statin therapy 08/16/2017    Gout 08/16/2017    GERD (gastroesophageal reflux disease) 08/16/2017    TIA (transient ischemic attack) 08/16/2017    Dyspnea 08/16/2017    Colon polyps 08/16/2017    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    Hypertension 08/16/2017    ED (erectile dysfunction) 08/16/2017    Hypoxia 04/23/2015    Neurogenic claudication 04/23/2015     Past Medical History:   Diagnosis Date    Adverse effect of anesthesia     combative after anesthesia    Alcohol abuse     6 beers/day    Arthritis     CAD (coronary artery disease)     Dyslipidemia 8/16/2017    Dyspepsia and other specified disorders of function of stomach     Dyspnea 8/16/2017    ED (erectile dysfunction) 8/16/2017    Encounter for immunization 8/16/2017    Fatigue 8/16/2017    GERD (gastroesophageal reflux disease) 8/16/2017    Gout     Gout 8/16/2017    Hypertension     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    On statin therapy 8/16/2017    TESSA on CPAP 1/3/2019    Simple chronic bronchitis ([address] 1/3/2019    TIA (transient ischemic attack) 5/42/4402    Umbilical hernia 10/6/7252      Past Surgical History:   Procedure Laterality Date    CARDIAC SURG PROCEDURE UNLIST  1996    Cardiac Stents    HX HERNIA REPAIR      RIH    HX HERNIA REPAIR  34/66/58    open umbilical hernia repair mesh    HX UROLOGICAL      HYDROCOLECTOMY      Prior to Admission medications    Medication Sig Start Date End Date Taking? Authorizing Provider   olmesartan (BENICAR) 20 mg tablet Take 20 mg by mouth daily. Yes Provider, Historical   b complex vitamins tablet Take 1 Tab by mouth daily. Yes Provider, Historical   DULoxetine (CYMBALTA) 30 mg capsule Take 30 mg by mouth daily. Yes Provider, Historical   acetaminophen (TYLENOL) 500 mg tablet Take 1,000 mg by mouth every six (6) hours as needed for Pain. Yes Provider, Historical   amLODIPine (NORVASC) 10 mg tablet TAKE ONE TABLET BY MOUTH DAILY 4/4/19  Yes Nelson Alba MD   atenolol (TENORMIN) 100 mg tablet Take 1 Tab by mouth daily. 1/14/19  Yes Matt Schulz MD   atorvastatin (LIPITOR) 80 mg tablet TAKE ONE TABLET BY MOUTH DAILY 1/7/19  Yes Matt Schulz MD   [facility] ELLIPTA 62.5-25 mcg/actuation inhaler Take 1 Puff by inhalation daily. 12/26/18  Yes Provider, Historical   furosemide (LASIX) 80 mg tablet Take 1 Tab by mouth daily. 12/19/18  Yes Matt Schulz MD   ibuprofen (ADVIL) 200 mg tablet Take 200 mg by mouth every six (6) hours as needed for Pain. Yes Provider, Historical   allopurinol (ZYLOPRIM) 300 mg tablet Take 1 Tab by mouth daily. 7/11/18  Yes Matt Schulz MD     No Known Allergies   Social History     Tobacco Use    Smoking status: Former Smoker     Packs/day: 1.00     Years: 4.00     Pack years: 4.00    Smokeless tobacco: Former User   Substance Use Topics    Alcohol use: Yes     Comment: has not drank in 6 months      Family History   Problem Relation Age of Onset    Heart Disease Mother     Hypertension Mother     Hypertension Father     Hypertension Sister     Hypertension Brother     Cancer Brother         Subjective:      Starr Olivares is a 80 y.o. past medical history of COPD, CAD, TIA, dyslipidemia, gout, TESSA on CPAP who was admitted from the ER for transient speech changes and aphasia. Per patient and history, yesterday morning he was in his usual state of health when he went outside of his home to get newspaper and started having some slurred speech and difficulty in finding words. He denies any focal weakness, tingling, numbness or loss of vision. Reports his symptoms have been improving gradually since the onset. In the ER blood pressure was 157/77. Laboratory workup was unremarkable except for decreased PO2 at 62 and increased TSH. Head CT did not reveal any acute process. Small vessel ischemic disease changes. Chest x-ray revealed left lower lobe atelectasis. Patient symptoms resolved while in the ER. Carotid Doppler done revealed less than 50% bilateral ICA stenosis. LDL was 82.6. When seen today patient reports he is at baseline. Outside reports reviewed: ER records, radiology reports, lab reports. Review of Systems:    Pertinent items are noted in HPI. Objective:     Patient Vitals for the past 8 hrs:   BP Temp Pulse Resp SpO2   04/13/19 0737 148/86 97.4 °F (36.3 °C) 68 20 93 %   04/13/19 0435 128/59 97.4 °F (36.3 °C) 64 20 96 %       NEUROLOGICAL EXAM:    Appearance: The patient is obese, provides a coherent history and is in no acute distress. Mental Status: Oriented to time, place and person. Fluent, no aphasia or dysarthria. Mood and affect appropriate. Cranial Nerves:   Intact visual fields. MAURICE, EOM's full, no nystagmus, no ptosis. Facial sensation is normal. Corneal reflexes are intact. Facial movement is symmetric. Hearing is normal bilaterally. Palate is midline with normal sternocleidomastoid and trapezius muscles are normal. Tongue is midline. Motor:  5/5 strength in upper and lower proximal and distal muscles. Normal bulk and tone. No pronator drift. Reflexes:   Deep tendon reflexes 1+/4 and symmetrical.   Sensory:   Normal to cold and vibration. Gait:  Not tested. Tremor:   No tremor noted. Cerebellar:  Intact FTN/NANDO/HTS.    Neurovascular: No carotid bruits. Imaging  CT Head: reviewed    Lab Review    Recent Results (from the past 24 hour(s))   GLUCOSE, POC    Collection Time: 04/12/19 10:17 AM   Result Value Ref Range    Glucose (POC) 124 (H) 65 - 100 mg/dL    Performed by Amparo Calderón (RN)    CBC WITH AUTOMATED DIFF    Collection Time: 04/12/19 10:24 AM   Result Value Ref Range    WBC 6.6 4.1 - 11.1 K/uL    RBC 4.48 4.10 - 5.70 M/uL    HGB 15.2 12.1 - 17.0 g/dL    HCT 44.1 36.6 - 50.3 %    MCV 98.4 80.0 - 99.0 FL    MCH 33.9 26.0 - 34.0 PG    MCHC 34.5 30.0 - 36.5 g/dL    RDW 14.9 (H) 11.5 - 14.5 %    PLATELET 442 531 - 015 K/uL    MPV 10.3 8.9 - 12.9 FL    NRBC 0.0 0  WBC    ABSOLUTE NRBC 0.00 0.00 - 0.01 K/uL    NEUTROPHILS 75 32 - 75 %    LYMPHOCYTES 13 12 - 49 %    MONOCYTES 9 5 - 13 %    EOSINOPHILS 2 0 - 7 %    BASOPHILS 1 0 - 1 %    IMMATURE GRANULOCYTES 0 0.0 - 0.5 %    ABS. NEUTROPHILS 4.9 1.8 - 8.0 K/UL    ABS. LYMPHOCYTES 0.9 0.8 - 3.5 K/UL    ABS. MONOCYTES 0.6 0.0 - 1.0 K/UL    ABS. EOSINOPHILS 0.2 0.0 - 0.4 K/UL    ABS. BASOPHILS 0.1 0.0 - 0.1 K/UL    ABS. IMM.  GRANS. 0.0 0.00 - 0.04 K/UL    DF AUTOMATED     METABOLIC PANEL, BASIC    Collection Time: 04/12/19 10:24 AM   Result Value Ref Range    Sodium 139 136 - 145 mmol/L    Potassium 4.7 3.5 - 5.1 mmol/L    Chloride 108 97 - 108 mmol/L    CO2 27 21 - 32 mmol/L    Anion gap 4 (L) 5 - 15 mmol/L    Glucose 134 (H) 65 - 100 mg/dL    BUN 26 (H) 6 - 20 MG/DL    Creatinine 1.29 0.70 - 1.30 MG/DL    BUN/Creatinine ratio 20 12 - 20      GFR est AA >60 >60 ml/min/1.73m2    GFR est non-AA 53 (L) >60 ml/min/1.73m2    Calcium 9.0 8.5 - 10.1 MG/DL   PROTHROMBIN TIME + INR    Collection Time: 04/12/19 10:24 AM   Result Value Ref Range    INR 1.2 (H) 0.9 - 1.1      Prothrombin time 12.4 (H) 9.0 - 11.1 sec   TROPONIN I    Collection Time: 04/12/19 10:24 AM   Result Value Ref Range    Troponin-I, Qt. <0.05 <0.05 ng/mL   ETHYL ALCOHOL    Collection Time: 04/12/19 10:24 AM   Result Value Ref Range    ALCOHOL(ETHYL),SERUM <10 <10 MG/DL   EKG, 12 LEAD, INITIAL    Collection Time: 04/12/19 10:39 AM   Result Value Ref Range    Ventricular Rate 65 BPM    Atrial Rate 65 BPM    P-R Interval 190 ms    QRS Duration 88 ms    Q-T Interval 432 ms    QTC Calculation (Bezet) 449 ms    Calculated P Axis 40 degrees    Calculated R Axis -20 degrees    Calculated T Axis 32 degrees    Diagnosis       Normal sinus rhythm  When compared with ECG of 10-SEP-2018 13:22,  premature ventricular complexes are no longer present  Confirmed by Monika Glover [id number]) on 4/12/2019 4:57:06 PM     POC G3 - PUL    Collection Time: 04/12/19 11:08 AM   Result Value Ref Range    pH (POC) 7.414 7.35 - 7.45      pCO2 (POC) 35.0 35.0 - 45.0 MMHG    pO2 (POC) 61 (L) 80 - 100 MMHG    HCO3 (POC) 22.4 22 - 26 MMOL/L    sO2 (POC) 92 92 - 97 %    Base deficit (POC) 2 mmol/L    Site LEFT RADIAL      Device: ROOM AIR      Allens test (POC) YES      Specimen type (POC) ARTERIAL     DUPLEX CAROTID BILATERAL    Collection Time: 04/12/19  2:50 PM   Result Value Ref Range    Right subclavian sys 57.4 cm/s    RIGHT SUBCLAVIAN ARTERY D 0.00 cm/s    Right cca dist sys 55.9 cm/s    Right CCA dist cadena 6.5 cm/s    Right CCA prox sys 74.0 cm/s    Right CCA prox cadena 0.0 cm/s    Right ICA dist sys 34.7 cm/s    Right ICA dist cadena 8.8 cm/s    Right ICA mid sys 54.2 cm/s    Right ICA mid cadena 12.8 cm/s    Right ICA prox sys 60.4 cm/s    Right ICA prox cadena 12.2 cm/s    Right eca sys 83.9 cm/s    RIGHT EXTERNAL CAROTID ARTERY D 4.06 cm/s    Right vertebral sys 47.3 cm/s    RIGHT VERTEBRAL ARTERY D 12.73 cm/s    Right ICA/CCA sys 1.1     Left subclavian sys 95.8 cm/s    LEFT SUBCLAVIAN ARTERY D 0.00 cm/s    Left CCA dist sys 65.0 cm/s    Left CCA dist cadena 8.6 cm/s    Left CCA prox sys 70.9 cm/s    Left CCA prox cadena 12.8 cm/s    Left ICA dist sys 32.7 cm/s    Left ICA dist cadena 7.5 cm/s    Left ICA mid sys 61.3 cm/s    Left ICA mid cadena 11.3 cm/s Left ICA prox sys 39.1 cm/s    Left ICA prox cadena 10.4 cm/s    Left ECA sys 97.1 cm/s    LEFT EXTERNAL CAROTID ARTERY D 0.00 cm/s    Left vertebral sys 36.3 cm/s    LEFT VERTEBRAL ARTERY D 8.83 cm/s    Left ICA/CCA sys 0.94    CBC W/O DIFF    Collection Time: 04/13/19  4:34 AM   Result Value Ref Range    WBC 5.3 4.1 - 11.1 K/uL    RBC 4.22 4.10 - 5.70 M/uL    HGB 14.4 12.1 - 17.0 g/dL    HCT 41.9 36.6 - 50.3 %    MCV 99.3 (H) 80.0 - 99.0 FL    MCH 34.1 (H) 26.0 - 34.0 PG    MCHC 34.4 30.0 - 36.5 g/dL    RDW 15.1 (H) 11.5 - 14.5 %    PLATELET 172 622 - 330 K/uL    MPV 10.8 8.9 - 12.9 FL    NRBC 0.0 0  WBC    ABSOLUTE NRBC 0.00 0.00 - 9.84 K/uL   METABOLIC PANEL, BASIC    Collection Time: 04/13/19  4:34 AM   Result Value Ref Range    Sodium 139 136 - 145 mmol/L    Potassium 4.4 3.5 - 5.1 mmol/L    Chloride 109 (H) 97 - 108 mmol/L    CO2 26 21 - 32 mmol/L    Anion gap 4 (L) 5 - 15 mmol/L    Glucose 111 (H) 65 - 100 mg/dL    BUN 22 (H) 6 - 20 MG/DL    Creatinine 1.15 0.70 - 1.30 MG/DL    BUN/Creatinine ratio 19 12 - 20      GFR est AA >60 >60 ml/min/1.73m2    GFR est non-AA >60 >60 ml/min/1.73m2    Calcium 8.9 8.5 - 10.1 MG/DL   TROPONIN I    Collection Time: 04/13/19  4:34 AM   Result Value Ref Range    Troponin-I, Qt. <0.05 <0.05 ng/mL   TSH 3RD GENERATION    Collection Time: 04/13/19  4:34 AM   Result Value Ref Range    TSH 4.15 (H) 0.36 - 3.74 uIU/mL         Assessment:     Active Problems:    TIA (transient ischemic attack) (8/16/2017)    Hyperlipidemia  Carotid stenosis    Plan:   1. Neurological examination is non-focal. No residual deficits seen. Consider TIA. 2. Head CT was negative. Agree with Brain MRI without contrast to assess for subtle stroke. 3. Carotid doppler revealed <50% bilateral ICA stenosis. No intervention necessary. 4. Echocardiogram was unremarkable. EF of 56-60%. 5. LDL was elevated at 82.6. Patient is on statin therapy. 6. Patient is considered a failure of Aspirin.  Recommend Plavix 75 mg daily for stroke prophylaxis. 7/ Baseline evaluation by speech therapy. 8. Discussed with the patient potential etiology, stroke risk stratification workup and treatment plan. All questions and concerns were answered. Okay for discharge from a neurological standpoint after MRI. Follow up with outpatient neurology in 2 weeks. Thank you for the consult.

## 2019-04-13 NOTE — PROGRESS NOTES
[address] 
[phone] Medical Progress Note NAME: Kaylin Comer :  1934 MRM:  [id number] Date/Time: 2019  10:09 AM 
 
Subjective:  
 
Events leading to admission noted. Aphasia has cleared and he is close to baseline. O2 sats improved and off O2. Still waiting on MRI, ECHO and inpatient Neuro consult. Having problems with fit of CPAP and will ask respiratory to address. He is mobile. No new CR, GI or  sxs. Past Medical History reviewed and unchanged from Admission History and Physical and/or prior progress note/electronic medical record Medications reviewed. ROS:   
 
General: negative for fever, chills, sweats, weakness Ear Nose and Throat: negative for rhinorrhea, pharyngitis, otalgia, tinnitus, speech or swallowing difficulties Respiratory:  negative for cough, sputum production, SOB, wheezing, MARES, pleuritic pain 
Cardiology:  negative for chest pain, palpitations, orthopnea, PND, edema, syncope Gastrointestinal: negative for abdominal pain, N/V, dysphagia, change in bowel habits, bleeding Genitourinary: negative for frequency, urgency, dysuria, hematuria, incontinence Muskuloskeletal : negative for arthralgia, myalgia Neurological: negative for headache, dizziness, confusion, focal weakness, paresthesia, memory loss, gait disturbance Objective:  
 
Last 24hrs VS reviewed since prior progress note. Most recent are: 
 
Visit Vitals /86 (BP 1 Location: Left arm, BP Patient Position: At rest) Pulse 68 Temp 97.4 °F (36.3 °C) Resp 20 Ht 5' 8\" (1.727 m) Wt 238 lb 15.7 oz (108.4 kg) SpO2 93% BMI 36.34 kg/m² SpO2 Readings from Last 6 Encounters:  
19 93% 19 93% 18 92% 18 95% 09/10/18 95% 18 98% Intake/Output Summary (Last 24 hours) at 2019 1010 Last data filed at 2019 1850 Gross per 24 hour Intake 100 ml Output  Net 100 ml Physical Exam: 
 
General appearance: alert, cooperative, no distress, appears stated age Eyes: negative Neck: supple, symmetrical, trachea midline, no adenopathy, thyroid: not enlarged, symmetric, no tenderness/mass/nodules, no carotid bruit and no JVD Lungs: clear to auscultation bilaterally Heart: regular rate and rhythm, S1, S2 normal, no murmur, click, rub or gallop Abdomen: soft, non-tender. Bowel sounds normal. No masses,  no organomegaly Extremities: extremities normal, atraumatic, no cyanosis or edema Skin: Skin color, texture, turgor normal. No rashes or lesions Neurologic: Grossly normal 
 
Data Review: 
 
Lab: 
 
BMP:  
Lab Results Component Value Date/Time  04/13/2019 04:34 AM  
 K 4.4 04/13/2019 04:34 AM  
  (H) 04/13/2019 04:34 AM  
 CO2 26 04/13/2019 04:34 AM  
 AGAP 4 (L) 04/13/2019 04:34 AM  
  (H) 04/13/2019 04:34 AM  
 BUN 22 (H) 04/13/2019 04:34 AM  
 CREA 1.15 04/13/2019 04:34 AM  
 GFRAA >60 04/13/2019 04:34 AM  
 GFRNA >60 04/13/2019 04:34 AM  
 
CBC:  
Lab Results Component Value Date/Time WBC 5.3 04/13/2019 04:34 AM  
 HGB 14.4 04/13/2019 04:34 AM  
 HCT 41.9 04/13/2019 04:34 AM  
  04/13/2019 04:34 AM  
 
All labs reviewed in past 24 hours Other pertinent lab: A1C and FLP pending Imaging: 
 
Reviewed. CT head negative, Carotid dopplers < 50% stenosis. CXR ? atelectasis Assessment / Plan:  
 
Principal Problem: 
  TIA (transient ischemic attack) (8/16/2017) Active Problems: Hypoxia (4/23/2015) CAD (coronary artery disease) (8/16/2017) Dyslipidemia (8/16/2017) Hypertension (8/16/2017) TESSA on CPAP (1/3/2019) Bilateral carotid artery stenosis (4/12/2019) 1. Await MRI, Neuro consult, ECHO 2. Respiratory to help with CPAP 3. O2 weaned 4. Repeat CXR 5. Continue ASA Care Plan discussed with: Patient and Family Discussed:  Code Status, Care Plan and D/C Planning Prophylaxis:  none Disposition:  Home w/Family 
___________________________________________________ Attending Physician: Carolynn Roth MD

## 2019-04-13 NOTE — PROGRESS NOTES
Initial Nutrition Assessment: 
 
INTERVENTIONS/RECOMMENDATIONS:  
· Continue current diet ASSESSMENT:  
Chart reviewed, medically noted for TIA and PMH shown below. Nutrition referral triggered due to MST score, however false trigger due to scoring shown below and weight history shows no significant weight loss over the past 8 years. Pt consumed 100% of dinner last night. Past Medical History:  
Diagnosis Date  Adverse effect of anesthesia   
 combative after anesthesia  Alcohol abuse 6 beers/day  Arthritis  CAD (coronary artery disease)  Dyslipidemia 8/16/2017  Dyspepsia and other specified disorders of function of stomach  Dyspnea 8/16/2017  ED (erectile dysfunction) 8/16/2017  Encounter for immunization 8/16/2017  Fatigue 8/16/2017  GERD (gastroesophageal reflux disease) 8/16/2017  Gout  Gout 8/16/2017  Hypertension  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  On statin therapy 8/16/2017  TESSA on CPAP 1/3/2019  Simple chronic bronchitis ([facility] [address] 1/3/2019  TIA (transient ischemic attack) 8/16/2017  Umbilical hernia 68/2/8458 Recently Lost Weight Without Trying No filed at 04/12/2019 1210 If Yes, How Much Weight Loss Unsure filed at 04/12/2019 1210 Eating Poorly Due to Decreased Appetite No filed at 04/12/2019 1210 MST Score 2 filed at 04/12/2019 1210 Diet Order: Cardiac 
% Eaten:   
Patient Vitals for the past 72 hrs: 
 % Diet Eaten 04/12/19 1850 100 % Pertinent Medications: [x]Reviewed: lasix, Pertinent Labs: [x]Reviewed:  
Food Allergies: [x]NKFA  []Other Last BM: 4/11 Edema:        []RUE   []LUE   [x]RLE 1+  [x]LLE  1+ Pressure Injury:      [] Stage I   [] Stage II   [] Stage III   [] Stage IV Wt Readings from Last 30 Encounters:  
04/12/19 108.4 kg (238 lb 15.7 oz) 04/12/19 108 kg (238 lb) 01/03/19 105.5 kg (232 lb 9.6 oz)  
09/28/18 102.7 kg (226 lb 8 oz) 09/18/18 102.1 kg (225 lb 1.4 oz) 09/10/18 103.8 kg (228 lb 13.4 oz) 08/21/18 100.2 kg (221 lb)  
08/20/18 101 kg (222 lb 9.6 oz)  
07/27/18 100.9 kg (222 lb 6.4 oz) 03/09/18 104.3 kg (230 lb)  
11/13/17 102.2 kg (225 lb 6.4 oz) 11/06/17 101.2 kg (223 lb) 10/10/17 102.5 kg (226 lb) 09/08/17 100.7 kg (222 lb) 12/04/15 96.2 kg (212 lb)  
11/16/15 96.2 kg (212 lb) 11/09/15 96.2 kg (212 lb)  
04/23/15 101.3 kg (223 lb 6.4 oz) 04/21/15 97.5 kg (215 lb) 08/17/12 94.2 kg (207 lb 10.8 oz) 11/09/11 95.3 kg (210 lb) Anthropometrics:  
Height: 5' 8\" (172.7 cm) Weight: 108.4 kg (238 lb 15.7 oz) IBW (%IBW):   ( ) UBW (%UBW):   (  %) Last Weight Metrics: 
Weight Loss Metrics 4/12/2019 1/3/2019 9/28/2018 9/18/2018 9/10/2018 8/21/2018 8/20/2018 Today's Wt 238 lb 15.7 oz 232 lb 9.6 oz 226 lb 8 oz 225 lb 1.4 oz 228 lb 13.4 oz 221 lb 222 lb 9.6 oz BMI 36.34 kg/m2 35.37 kg/m2 34.44 kg/m2 34.22 kg/m2 34.79 kg/m2 33.6 kg/m2 33.85 kg/m2 BMI: Body mass index is 36.34 kg/m². This BMI is indicative of: 
 []Underweight    []Normal    []Overweight    [x] Obesity   [] Extreme Obesity (BMI>40) Estimated Nutrition Needs (Based on):  
1900 Kcals/day(BMR: 1350 x 1.1) , 85 g(0.8 g/kg) Protein Carbohydrate: At Least 130 g/day  Fluids: 1900 mL/day (1ml/kcal) or per primary team 
 
NUTRITION DIAGNOSES:  
Problem:  No nutritional diagnosis at this time Etiology: related to Signs/Symptoms: as evidenced by NUTRITION INTERVENTIONS: 
Meals/Snacks: General/healthful diet GOAL:  
consume >50% of meals in 5-7 days LEARNING NEEDS (Diet, Food/Nutrient-Drug Interaction):  
 [x] None Identified 
 [] Identified and Education Provided/Documented 
 [] Identified and Pt declined/was not appropriate Cultureal, Caodaism, OR Ethnic Dietary Needs:  
 [x] None Identified 
 [] Identified and Addressed 
 
 [x] Interdisciplinary Care Plan Reviewed/Documented  
 [x] Discharge Planning:   Heart healthy diet MONITORING /EVALUATION:  
  
Food/Nutrient Intake Outcomes: Total energy intake Physical Signs/Symptoms Outcomes: Weight/weight change, Electrolyte and renal profile, Glucose profile, GI 
 
NUTRITION RISK:  
 [] High              [] Moderate           [x]  Low  []  Minimal/Uncompromised PT SEEN FOR:  
 []  MD Consult: []Calorie Count []Diabetic Diet Education []Diet Education []Electrolyte Management []General Nutrition Management and Supplements []Management of Tube Feeding []TPN Recommendations [x]  RN Referral:  [x]MST score >=2 
   []Enteral/Parenteral Nutrition PTA []Pregnant: Gestational DM or Multigestation 
   []Pressure Ulcer/Wound Care needs 
     
[]  Low BMI 
[]  LOS Referral  
 
 
Bo Huang RDN Pager [phone] Weekend Pager [phone]

## 2019-04-13 NOTE — PROGRESS NOTES
Problem: Falls - Risk of 
Goal: *Absence of Falls Description Document Anastacia Apodaca Fall Risk and appropriate interventions in the flowsheet. Outcome: Progressing Towards Goal 
  
Problem: Patient Education: Go to Patient Education Activity Goal: Patient/Family Education Outcome: Progressing Towards Goal 
  
Problem: Patient Education: Go to Patient Education Activity Goal: Patient/Family Education Outcome: Progressing Towards Goal

## 2019-04-13 NOTE — PROGRESS NOTES
* No surgery found * 
* No surgery found * Bedside shift change report given to Alexa CHICAS (oncoming nurse) by Liang Gray (offgoing nurse). Report included the following information SBAR, Kardex, ED Summary, Intake/Output, MAR and Recent Results. Zone Phone:   [phone] Significant changes during shift:  Patient was admitted. Patient was having trouble with CPAP machine from home, [employer] came and checked off the machine and respiratory refit the mask and patient is now asleep with mask on his face. Patient Information Starr Olivares 80 y.o. 
4/12/2019  9:47 AM by Jhonny Damon MD. Starr Olivares was admitted from Home 
 
Problem List 
 
Patient Active Problem List  
 Diagnosis Date Noted  Bilateral carotid artery stenosis 04/12/2019  Severe obesity ([facility] [address] 01/03/2019  TESSA on CPAP 01/03/2019  Simple chronic bronchitis ([facility] [address] 01/03/2019  Encounter for immunization 08/16/2017  Fatigue 08/16/2017  CAD (coronary artery disease) 08/16/2017  Dyslipidemia 08/16/2017  On statin therapy 08/16/2017  Gout 08/16/2017  GERD (gastroesophageal reflux disease) 08/16/2017  TIA (transient ischemic attack) 08/16/2017  Dyspnea 08/16/2017  Colon polyps 08/16/2017  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  
Diagnosis Date  Adverse effect of anesthesia   
 combative after anesthesia  Alcohol abuse 6 beers/day  Arthritis  CAD (coronary artery disease)  Dyslipidemia 8/16/2017  Dyspepsia and other specified disorders of function of stomach  Dyspnea 8/16/2017  ED (erectile dysfunction) 8/16/2017  Encounter for immunization 8/16/2017  Fatigue 8/16/2017  GERD (gastroesophageal reflux disease) 8/16/2017  Gout  Gout 8/16/2017  Hypertension  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  On statin therapy 8/16/2017  TESSA on CPAP 1/3/2019  Simple chronic bronchitis ([address] 1/3/2019  TIA (transient ischemic attack) 8/16/2017  Umbilical hernia 29/4/8615 Core Measures: CVA: Yes Yes CHF:No No 
PNA:No No 
 
Post Op Surgical (If Applicable):  
 
Number times ambulated in hallway past shift:  0 Number of times OOB to chair past shift:   0 
NG Tube: No 
Incentive Spirometer: Yes Drains: No  
Dressing Present:  No 
Flatus:  No 
 
Activity Status: OOB to Chair No 
Ambulated this shift Yes Bed Rest No 
 
Supplemental O2: (If Applicable) NC Yes NRB No 
Venti-mask No 
On 2 Liters/min Patient also has CPAP machine at night LINES AND DRAINS: 
 
Peripheral IV 
 
DVT prophylaxis: DVT prophylaxis Med- Yes DVT prophylaxis SCD or MOISES- No  
 
Wounds: (If Applicable) Wounds- No 
 
Patient Safety: 
 
Falls Score Total Score: 3 Safety Level_______ Bed Alarm On? Yes Sitter? No 
 
Plan for upcoming shift: MRI Discharge Plan: Yes Active Consults: 
IP CONSULT TO TELE-NEUROLOGY 
IP CONSULT TO NEUROLOGY 
IP CONSULT TO FAMILY PRACTICE 
IP CONSULT TO HOSPITALIST

## 2019-04-13 NOTE — PROGRESS NOTES
Occupational Therapy EVALUATION/discharge Patient: Baron Valdovinos (48 y.o. male) Date: 4/13/2019 Primary Diagnosis: TIA (transient ischemic attack) [G45.9] Precautions: none ASSESSMENT:  
Based on the objective data described below, the patient presents without any focal neurological deficits note during formal or functional testing. No LOB and good overall safety awareness during all functional mobility. Patient is performing ADLs at his baseline of being generally independent with the exception of needing assistance for socks and shoes. He is also demonstrating independence with all functional mobility. Patient and family are in agreement with this OT that the patient is at baseline and is without any OT needs. Will complete OT orders. Further skilled acute occupational therapy is not indicated at this time. Discharge Recommendations: None Further Equipment Recommendations for Discharge: none OBJECTIVE DATA SUMMARY:  
HISTORY:  
Past Medical History:  
Diagnosis Date  Adverse effect of anesthesia   
 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  
Procedure Laterality Date [address] [facility] Cardiac Stents  HX HERNIA REPAIR    
 RIH  
 HX HERNIA REPAIR  62/16/27  
 open umbilical hernia repair mesh  HX UROLOGICAL HYDROCOLECTOMY Prior Level of Function/Environment/Context: independent with ADLs with the exception of needing assistance for socks/shoes and is independent with ambulation. Some hand tremors at baseline, but not impeding FM coordination. Occupations in which the patient is/was successful, what are the barriers preventing that success:  
Performance Patterns (routines, roles, habits, and rituals):  
Personal Interests and/or values:  
Expanded or extensive additional review of patient history:  
 
Home Situation Home Environment: Private residence # Steps to Enter: 1 One/Two Story Residence: One story Living Alone: No 
Support Systems: Family member(s) Patient Expects to be Discharged to[de-identified] Private residence Current DME Used/Available at Home: None Hand dominance: Right EXAMINATION OF PERFORMANCE DEFICITS: 
Cognitive/Behavioral Status: 
Neurologic State: Alert Orientation Level: Oriented X4 Cognition: Appropriate decision making; Appropriate for age attention/concentration; Appropriate safety awareness; Follows commands Perception: Appears intact Perseveration: No perseveration noted Safety/Judgement: Awareness of environment Hearing: Auditory Auditory Impairment: Hearing aid(s) Hearing Aids/Status: With patient Vision/Perceptual:   
    
Acuity: Within Defined Limits Corrective Lenses: Reading glasses Range of Motion: 
AROM: Within functional limits Strength: 
Strength: Within functional limits Coordination: 
Coordination: Within functional limits Fine Motor Skills-Upper: Left Intact; Right Intact Gross Motor Skills-Upper: Left Intact; Right Intact Tone & Sensation: 
Tone: Normal 
Sensation: Intact Balance: 
Sitting: Intact Standing: Intact Functional Mobility and Transfers for ADLs:Bed Mobility: 
Rolling: Independent Supine to Sit: Independent Sit to Supine: Independent Scooting: Independent Transfers: Sit to Stand: Independent Stand to Sit: Independent Bed to Chair: Independent Bathroom Mobility: Independent Toilet Transfer : Independent ADL Assessment: 
Feeding: Independent Oral Facial Hygiene/Grooming: Independent Bathing: Independent Upper Body Dressing: Independent Lower Body Dressing: Independent Toileting: Independent Functional Measure: 
Barthel Index: 
 
Bathin Bladder: 10 Bowels: 10 
Groomin Dressing: 10 Feeding: 10 Mobility: 10 Stairs: 5 Toilet Use: 10 Transfer (Bed to Chair and Back): 15 Total: 90 The Barthel ADL Index: Guidelines 1. The index should be used as a record of what a patient does, not as a record of what a patient could do. 2. The main aim is to establish degree of independence from any help, physical or verbal, however minor and for whatever reason. 3. The need for supervision renders the patient not independent. 4. A patient's performance should be established using the best available evidence. Asking the patient, friends/relatives and nurses are the usual sources, but direct observation and common sense are also important. However direct testing is not needed. 5. Usually the patient's performance over the preceding 24-48 hours is important, but occasionally longer periods will be relevant. 6. Middle categories imply that the patient supplies over 50 per cent of the effort. 7. Use of aids to be independent is allowed. Cesario Ferrari., Barthel, D.W. (9333). Functional evaluation: the Barthel Index. [address] [facility] (14)2. BENNIE Cai, Shelby Tadeo., Jerry Pang., [facility], [address] [place] [address] (). Measuring the change indisability after inpatient rehabilitation; comparison of the responsiveness of the Barthel Index and Functional [place] Measure. Journal of Neurology, Neurosurgery, and Psychiatry, 66(4), 633-164.  
Jeni Martin, NAlexsanderJ.A, LIBRADO Pulido.M, Stu Bhandari, M.A. (2004.) Assessment of post-stroke quality of life in cost-effectiveness studies: The usefulness of the Barthel Index and the EuroQoL-5D. [facility], 13, 927-33 Activity Tolerance:  
SpO2 90 to 93% on RA at rest and with activity. After treatment:  
[]  Patient left in no apparent distress sitting up in chair 
[x]  Patient left in no apparent distress in bed 
[x]  Call bell left within reach [x]  Nursing notified 
[]  Caregiver present 
[]  Bed alarm activated COMMUNICATION/EDUCATION:  
Communication/Collaboration: 
[x]      Home safety education was provided and the patient/caregiver indicated understanding. [x]      Patient/family have participated as able and agree with findings and recommendations. []      Patient is unable to participate in plan of care at this time. Findings and recommendations were discussed with: Physical Therapist and Registered Nurse Pedro Hunt, OTR/L Time Calculation: 19 mins

## 2019-04-14 LAB
ANION GAP SERPL CALC-SCNC: 5 MMOL/L (ref 5–15)
BUN SERPL-MCNC: 23 MG/DL (ref 6–20)
BUN/CREAT SERPL: 18 (ref 12–20)
CALCIUM SERPL-MCNC: 8.8 MG/DL (ref 8.5–10.1)
CHLORIDE SERPL-SCNC: 107 MMOL/L (ref 97–108)
CO2 SERPL-SCNC: 27 MMOL/L (ref 21–32)
CREAT SERPL-MCNC: 1.25 MG/DL (ref 0.7–1.3)
GLUCOSE SERPL-MCNC: 106 MG/DL (ref 65–100)
POTASSIUM SERPL-SCNC: 4.2 MMOL/L (ref 3.5–5.1)
SODIUM SERPL-SCNC: 139 MMOL/L (ref 136–145)

## 2019-04-14 PROCEDURE — 94761 N-INVAS EAR/PLS OXIMETRY MLT: CPT

## 2019-04-14 PROCEDURE — 74011250637 HC RX REV CODE- 250/637: Performed by: INTERNAL MEDICINE

## 2019-04-14 PROCEDURE — 77010033678 HC OXYGEN DAILY

## 2019-04-14 PROCEDURE — 74011250636 HC RX REV CODE- 250/636: Performed by: INTERNAL MEDICINE

## 2019-04-14 PROCEDURE — 99218 HC RM OBSERVATION: CPT

## 2019-04-14 PROCEDURE — 36415 COLL VENOUS BLD VENIPUNCTURE: CPT

## 2019-04-14 PROCEDURE — 96372 THER/PROPH/DIAG INJ SC/IM: CPT

## 2019-04-14 PROCEDURE — 80048 BASIC METABOLIC PNL TOTAL CA: CPT

## 2019-04-14 PROCEDURE — 94762 N-INVAS EAR/PLS OXIMTRY CONT: CPT

## 2019-04-14 RX ORDER — CLOPIDOGREL BISULFATE 75 MG/1
75 TABLET ORAL DAILY
Status: DISCONTINUED | OUTPATIENT
Start: 2019-04-14 | End: 2019-04-15 | Stop reason: HOSPADM

## 2019-04-14 RX ADMIN — Medication 10 ML: at 15:01

## 2019-04-14 RX ADMIN — DULOXETINE HYDROCHLORIDE 30 MG: 30 CAPSULE, DELAYED RELEASE ORAL at 09:19

## 2019-04-14 RX ADMIN — UMECLIDINIUM BROMIDE AND VILANTEROL TRIFENATATE 1 PUFF: 62.5; 25 POWDER RESPIRATORY (INHALATION) at 09:19

## 2019-04-14 RX ADMIN — HEPARIN SODIUM 5000 UNITS: 5000 INJECTION INTRAVENOUS; SUBCUTANEOUS at 17:55

## 2019-04-14 RX ADMIN — Medication 10 ML: at 20:17

## 2019-04-14 RX ADMIN — DIPHENHYDRAMINE HYDROCHLORIDE 25 MG: 25 CAPSULE ORAL at 20:16

## 2019-04-14 RX ADMIN — HEPARIN SODIUM 5000 UNITS: 5000 INJECTION INTRAVENOUS; SUBCUTANEOUS at 01:22

## 2019-04-14 RX ADMIN — ATORVASTATIN CALCIUM 80 MG: 40 TABLET, FILM COATED ORAL at 09:19

## 2019-04-14 RX ADMIN — ALLOPURINOL 300 MG: 100 TABLET ORAL at 09:19

## 2019-04-14 RX ADMIN — Medication 10 ML: at 06:23

## 2019-04-14 RX ADMIN — CLOPIDOGREL BISULFATE 75 MG: 75 TABLET ORAL at 09:19

## 2019-04-14 RX ADMIN — HEPARIN SODIUM 5000 UNITS: 5000 INJECTION INTRAVENOUS; SUBCUTANEOUS at 09:19

## 2019-04-14 RX ADMIN — FUROSEMIDE 80 MG: 40 TABLET ORAL at 09:19

## 2019-04-14 NOTE — PROGRESS NOTES
Patient is 87%-92% on Home CPAP machine with good seal. There is no where to hook Oxygen to his machine. If you keep the nasal cannula in his nose, there is a large leak from the mask.

## 2019-04-14 NOTE — ROUTINE PROCESS
.. 
* No surgery found * 
* No surgery found * Bedside and Verbal shift change report given to RN (oncoming nurse) by Dannielle Magdaleno (offgoing nurse). Report included the following information SBAR, Kardex, Recent Results and Cardiac Rhythm NSR. Zone Phone:   [phone] Significant changes during shift:  Pt with poor fitting cpap mask, restless , not sleeping well took mask off sats 88% put on O2 2lnc. Patient Information Starr Olivares 80 y.o. 
4/12/2019  9:47 AM by Jhonny Damon MD. Starr Olivares was admitted from Home 
 
Problem List 
 
Patient Active Problem List  
 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  
Diagnosis Date  Adverse effect of anesthesia   
 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 
Ambulated this shift Yes Bed Rest No 
 
Supplemental O2: (If Applicable) NC Yes NRB No 
Venti-mask No 
On 2 Liters/min LINES AND DRAINS:piv 20g lac DVT prophylaxis: DVT prophylaxis Med- Yes DVT prophylaxis SCD or MOISES- No  
 
Wounds: (If Applicable) Wounds- No 
 
Location 0 Patient Safety: 
 
Falls Score Total Score: 3 Safety Level_______ Bed Alarm On? Yes Sitter? No 
 
Plan for upcoming shift: pulse oximetry testing tonight Discharge Plan: Yes TBD Active Consults: 
IP CONSULT TO TELE-NEUROLOGY 
IP CONSULT TO NEUROLOGY 
IP CONSULT TO FAMILY PRACTICE 
IP CONSULT TO HOSPITALIST

## 2019-04-14 NOTE — PROGRESS NOTES
[facility] Consult Service Progress NOTEPulmonary, Critical Care, and Sleep Medicine Name: Lawrence Nieto MRN: [id number] : 1934 Hospital: [facility] Date: 2019  Admission Date: 2019 Chart and notes reviewed. Data reviewed. Spoke with nursing staff to clarify consult. Pt having difficulty with his home CPAP mask, not his mask here in the hospital. He sees my partner Dr. Betsy Fernandez. Records show that when seen in clinic  and complained of mask leaks in the sleep clinic he was using an F20 mask. He was then tried on the DW FF S/S and pt requested a change and this was ordered from his DME [employer] in March. I asked nursing to have pt call our Sleep clinic Monday AM for appointment to address these complaints, bringing his device and last mask to troubleshoot. We do not have any masks here that will help him as an outpt. Unfortunately this cannot be resolved as an inpatient. Hospital Day: 3 Natalie Abbott MD

## 2019-04-14 NOTE — PROGRESS NOTES
[address] [place] [address] 
[phone] Medical Progress Note NAME: Lyndon Chavez :  1934 MRM:  [id number] Date/Time: 2019  8:53 AM 
 
Subjective:  
 
Aphasia has cleared and he is at baseline. O2 sats falling at night and CPAP mask ill fitting. Will see if pulmonary can address. In meantime reluctant to D/C until we qualify for nocturnal O2 if we can not fix his CPAP and maintain adequate O2 sats at night. He is mobile. No new CR, GI or  sxs. Have changed from ASA to Plavix re TIA. Past Medical History reviewed and unchanged from Admission History and Physical and/or prior progress note/electronic medical record Medications reviewed. ROS:   
 
General: negative for fever, chills, sweats, weakness Ear Nose and Throat: negative for rhinorrhea, pharyngitis, otalgia, tinnitus, speech or swallowing difficulties Respiratory:  negative for cough, sputum production, SOB, wheezing, MARES, pleuritic pain 
Cardiology:  negative for chest pain, palpitations, orthopnea, PND, edema, syncope Gastrointestinal: negative for abdominal pain, N/V, dysphagia, change in bowel habits, bleeding Genitourinary: negative for frequency, urgency, dysuria, hematuria, incontinence Muskuloskeletal : negative for arthralgia, myalgia Neurological: negative for headache, dizziness, confusion, focal weakness, paresthesia, memory loss, gait disturbance Objective:  
 
Last 24hrs VS reviewed since prior progress note. Most recent are: 
 
Visit Vitals /77 (BP 1 Location: Left arm, BP Patient Position: At rest) Pulse 77 Temp 98 °F (36.7 °C) Resp 22 Ht 5' 8\" (1.727 m) Wt 238 lb 15 oz (108.4 kg) SpO2 94% BMI 36.33 kg/m² SpO2 Readings from Last 6 Encounters:  
19 94% 19 93% 18 92% 18 95% 09/10/18 95% 18 98% O2 Flow Rate (L/min): 2 l/min Intake/Output Summary (Last 24 hours) at 4/14/2019 0578 Last data filed at 4/13/2019 8044 Gross per 24 hour Intake 320 ml Output  Net 320 ml Physical Exam: 
 
General appearance: alert, cooperative, no distress, appears stated age Eyes: negative Neck: supple, symmetrical, trachea midline, no adenopathy, thyroid: not enlarged, symmetric, no tenderness/mass/nodules, no carotid bruit and no JVD Lungs: clear to auscultation bilaterally Heart: regular rate and rhythm, S1, S2 normal, no murmur, click, rub or gallop Abdomen: soft, non-tender. Bowel sounds normal. No masses,  no organomegaly Extremities: extremities normal, atraumatic, no cyanosis or edema Skin: Skin color, texture, turgor normal. No rashes or lesions Neurologic: Grossly normal 
 
Data Review: 
 
Lab: 
 
BMP:  
Lab Results Component Value Date/Time  04/14/2019 04:13 AM  
 K 4.2 04/14/2019 04:13 AM  
  04/14/2019 04:13 AM  
 CO2 27 04/14/2019 04:13 AM  
 AGAP 5 04/14/2019 04:13 AM  
  (H) 04/14/2019 04:13 AM  
 BUN 23 (H) 04/14/2019 04:13 AM  
 CREA 1.25 04/14/2019 04:13 AM  
 GFRAA >60 04/14/2019 04:13 AM  
 GFRNA 55 (L) 04/14/2019 04:13 AM  
 
CBC:  
No results found for: WBC, HGB, HGBEXT, HCT, HCTEXT, PLT, PLTEXT, HGBEXT, HCTEXT, PLTEXT All labs reviewed in past 24 hours Other pertinent lab: A1C and FLP pending Imaging: 
 
Reviewed. CT head negative, Carotid dopplers < 50% stenosis. CXR now clear. MRI and ECHO normal 
 
 
Assessment / Plan:  
 
Principal Problem: 
  TIA (transient ischemic attack) (8/16/2017) Active Problems: Hypoxia (4/23/2015) CAD (coronary artery disease) (8/16/2017) Dyslipidemia (8/16/2017) Hypertension (8/16/2017) TESSA on CPAP (1/3/2019) Bilateral carotid artery stenosis (4/12/2019) 1. Overnight oximetry 2. Consult Dr. Sanjeev Garcia to help with CPAP 3. ASA->Plavix 4. Continue other Rx Care Plan discussed with: Patient and Family Discussed:  Code Status, Care Plan and D/C Planning Prophylaxis:  none Disposition:  Home w/Family 
___________________________________________________ Attending Physician: Bonita Peoples MD

## 2019-04-14 NOTE — PROGRESS NOTES
.. 
* No surgery found * 
* No surgery found * Bedside and Verbal shift change report given to [address] [place] (oncoming nurse) by Stevie Osorio RN (offgoing nurse). Report included the following information SBAR, Kardex, Recent Results and Cardiac Rhythm NSR. 
  
Zone Phone:   [phone] 
  
  
Significant changes during shift:  none 
  
  
  
Patient Information 
  
Jaydon Narayan 80 y.o. 
4/12/2019  9:47 AM by Sergey Zapata MD. Jaydon Narayan was admitted from Home 
  
Problem List 
  
    
Patient Active Problem List  
  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  
  
    
Past Medical History:  
Diagnosis Date  Adverse effect of anesthesia    
  combative after anesthesia  Alcohol abuse    
  6 beers/day  Arthritis    
 CAD (coronary artery disease)    
 Dyslipidemia 8/16/2017  Dyspepsia and other specified disorders of function of stomach    
 Dyspnea 8/16/2017  ED (erectile dysfunction) 8/16/2017  Encounter for immunization 8/16/2017  Fatigue 8/16/2017  GERD (gastroesophageal reflux disease) 8/16/2017  Gout    
 Gout 8/16/2017  Hypertension    
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  
  
  
  
Core Measures: 
  
CVA: Yes Yes Activity Status: 
  
OOB to Chair No 
Ambulated this shift Yes Bed Rest No 
  
Supplemental O2: (If Applicable) 
  
NC Yes NRB No 
Venti-mask No 
On 2 Liters/min 
  
  
LINES AND DRAINS:piv 20g lac 
  
    
DVT prophylaxis: 
  
DVT prophylaxis Med- Yes DVT prophylaxis SCD or MOISES- No  
  
Wounds: (If Applicable) 
  
Wounds- No 
  
Location 0 
  
Patient Safety: 
  
Falls Score Total Score: 3 Safety Level_______ Bed Alarm On? Yes Sitter? No 
  
Plan for upcoming shift: pulse oximetry testing tonight 
  
  
  
Discharge Plan: Yes TBD 
  
Active Consults: 
IP CONSULT TO TELE-NEUROLOGY 
IP CONSULT TO NEUROLOGY 
IP CONSULT TO FAMILY PRACTICE 
IP CONSULT TO HOSPITALIST

## 2019-04-14 NOTE — PROGRESS NOTES
Bedside shift change report given to Jhon Rojas RN  (oncoming nurse) by Saqib Borrego RN  (offgoing nurse). Report included the following information SBAR, Kardex, Intake/Output, MAR, Accordion and Recent Results.

## 2019-04-14 NOTE — PROGRESS NOTES
Neurology Progress Note Patient ID: Amie Coughlin [id number] 
16 y.o. 
1934 Subjective:  
  
Patient reports no recurrence of his TIA symptoms. Nocturnal hypoxia despite CPAP. Brain MRI without contrast revealed no acute stroke. Moderate white matter disease. Current Facility-Administered Medications Medication Dose Route Frequency  clopidogrel (PLAVIX) tablet 75 mg  75 mg Oral DAILY  diphenhydrAMINE (BENADRYL) capsule 25 mg  25 mg Oral Q4H PRN  
 allopurinol (ZYLOPRIM) tablet 300 mg  300 mg Oral DAILY  umeclidinium-vilanterol (ANORO ELLIPTA) 62.5 mcg- 25 mcg/inhalation  1 Puff Inhalation DAILY  atorvastatin (LIPITOR) tablet 80 mg  80 mg Oral DAILY  DULoxetine (CYMBALTA) capsule 30 mg  30 mg Oral DAILY  furosemide (LASIX) tablet 80 mg  80 mg Oral DAILY  sodium chloride (NS) flush 5-40 mL  5-40 mL IntraVENous Q8H  
 sodium chloride (NS) flush 5-40 mL  5-40 mL IntraVENous PRN  
 acetaminophen (TYLENOL) tablet 650 mg  650 mg Oral Q4H PRN Or  
 acetaminophen (TYLENOL) solution 650 mg  650 mg Per NG tube Q4H PRN Or  
 acetaminophen (TYLENOL) suppository 650 mg  650 mg Rectal Q4H PRN  
 heparin (porcine) injection 5,000 Units  5,000 Units SubCUTAneous Q8H Review of Systems: 
 
Pertinent items are noted in HPI. Objective:  
 
Patient Vitals for the past 8 hrs: 
 BP Temp Pulse Resp SpO2  
04/14/19 1216  97.4 °F (36.3 °C)     
04/14/19 0744 120/77 98 °F (36.7 °C) 77 22 94 % 04/14 0701 - 04/14 1900 In: 300 [P.O.:300] Out: 200 [Urine:200] 04/12 1901 - 04/14 0700 In: 320 [P.O.:320] Out: -  
 
Lab Review Recent Results (from the past 24 hour(s)) METABOLIC PANEL, BASIC Collection Time: 04/14/19  4:13 AM  
Result Value Ref Range Sodium 139 136 - 145 mmol/L Potassium 4.2 3.5 - 5.1 mmol/L Chloride 107 97 - 108 mmol/L  
 CO2 27 21 - 32 mmol/L Anion gap 5 5 - 15 mmol/L Glucose 106 (H) 65 - 100 mg/dL  BUN 23 (H) 6 - 20 MG/DL  
 Creatinine 1.25 0.70 - 1.30 MG/DL  
 BUN/Creatinine ratio 18 12 - 20 GFR est AA >60 >60 ml/min/1.73m2 GFR est non-AA 55 (L) >60 ml/min/1.73m2 Calcium 8.8 8.5 - 10.1 MG/DL  
 
NEUROLOGICAL EXAM: 
  
Appearance: The patient is obese, provides a coherent history and is in no acute distress. Mental Status: Oriented to time, place and person. Fluent, no aphasia or dysarthria. Mood and affect appropriate. Cranial Nerves:   II - XII were intact except for decrease hearing. Motor:  5/5 strength. Normal bulk and tone. No pronator drift. Reflexes:   Deep tendon reflexes 1+/4 and symmetrical.  
Sensory:   Normal to cold and vibration. Gait:  Not tested. Tremor:   No tremor noted. Cerebellar:  Intact FTN/NANDO/HTS. Assessment:  
TIA Hyperlipidemia Carotid stenosis TESSA Plan: 1. Neurological examination is non-focal. No residual deficits seen. Consider TIA. 2. Head CT was negative. Brain MRI without contrast revealed no acute stroke. Moderate white matter disease. 3. Carotid doppler revealed <50% bilateral ICA stenosis. No intervention necessary. 4. Echocardiogram was unremarkable. EF of 56-60%. 5. LDL was elevated at 82.6. Patient is on statin therapy. 6. Continue Plavix 75 mg daily for stroke prophylaxis. 7. TESSA treatment underway. 8. Patient was advised [place], [place] cannot drive for 30 days until evaluation by neurology. 
  
Okay for discharge from a neurological standpoint. Follow up with outpatient neurology in 2 weeks.  
 
Signed: 
Lizbeth Palomino MD 
4/14/2019 
12:20 PM

## 2019-04-15 ENCOUNTER — HOME HEALTH ADMISSION (OUTPATIENT)
Dept: HOME HEALTH SERVICES | Facility: HOME HEALTH | Age: 84
End: 2019-04-15
Payer: MEDICARE

## 2019-04-15 VITALS
HEART RATE: 94 BPM | WEIGHT: 238.94 LBS | HEIGHT: 68 IN | BODY MASS INDEX: 36.21 KG/M2 | SYSTOLIC BLOOD PRESSURE: 123 MMHG | TEMPERATURE: 99.1 F | OXYGEN SATURATION: 94 % | DIASTOLIC BLOOD PRESSURE: 76 MMHG | RESPIRATION RATE: 18 BRPM

## 2019-04-15 PROCEDURE — 96372 THER/PROPH/DIAG INJ SC/IM: CPT

## 2019-04-15 PROCEDURE — 74011250637 HC RX REV CODE- 250/637: Performed by: INTERNAL MEDICINE

## 2019-04-15 PROCEDURE — 99218 HC RM OBSERVATION: CPT

## 2019-04-15 PROCEDURE — 74011250636 HC RX REV CODE- 250/636: Performed by: INTERNAL MEDICINE

## 2019-04-15 PROCEDURE — 94762 N-INVAS EAR/PLS OXIMTRY CONT: CPT

## 2019-04-15 RX ORDER — CLOPIDOGREL BISULFATE 75 MG/1
75 TABLET ORAL DAILY
Qty: 90 TAB | Refills: 3 | Status: SHIPPED | OUTPATIENT
Start: 2019-04-15 | End: 2019-09-28

## 2019-04-15 RX ADMIN — CLOPIDOGREL BISULFATE 75 MG: 75 TABLET ORAL at 09:38

## 2019-04-15 RX ADMIN — UMECLIDINIUM BROMIDE AND VILANTEROL TRIFENATATE 1 PUFF: 62.5; 25 POWDER RESPIRATORY (INHALATION) at 09:37

## 2019-04-15 RX ADMIN — ALLOPURINOL 300 MG: 100 TABLET ORAL at 09:37

## 2019-04-15 RX ADMIN — HEPARIN SODIUM 5000 UNITS: 5000 INJECTION INTRAVENOUS; SUBCUTANEOUS at 01:48

## 2019-04-15 RX ADMIN — Medication 10 ML: at 06:18

## 2019-04-15 RX ADMIN — ATORVASTATIN CALCIUM 80 MG: 40 TABLET, FILM COATED ORAL at 09:38

## 2019-04-15 RX ADMIN — HEPARIN SODIUM 5000 UNITS: 5000 INJECTION INTRAVENOUS; SUBCUTANEOUS at 09:37

## 2019-04-15 RX ADMIN — DULOXETINE HYDROCHLORIDE 30 MG: 30 CAPSULE, DELAYED RELEASE ORAL at 09:38

## 2019-04-15 RX ADMIN — FUROSEMIDE 80 MG: 40 TABLET ORAL at 09:37

## 2019-04-15 NOTE — PROGRESS NOTES
Patient is being discharged to home today with home health services from [facility]. FOC signed and placed on chart.

## 2019-04-15 NOTE — PROGRESS NOTES
[address] 
[phone] Medical Progress Note NAME: Twin Stringer :  1934 MRM:  [id number] Date/Time: 4/15/2019  8:03 AM 
 
Subjective:  
 
Aphasia has cleared and he is at baseline. O2 sats falling at night and CPAP mask ill fitting. In meantime reluctant to D/C until we qualify for nocturnal O2 if we can not fix his CPAP and maintain adequate O2 sats at night. He is mobile. No new CR, GI or  sxs. Have changed from ASA to Plavix re TIA. Past Medical History reviewed and unchanged from Admission History and Physical and/or prior progress note/electronic medical record Medications reviewed. ROS:   
 
General: negative for fever, chills, sweats, weakness Ear Nose and Throat: negative for rhinorrhea, pharyngitis, otalgia, tinnitus, speech or swallowing difficulties Respiratory:  negative for cough, sputum production, SOB, wheezing, MARES, pleuritic pain 
Cardiology:  negative for chest pain, palpitations, orthopnea, PND, edema, syncope Gastrointestinal: negative for abdominal pain, N/V, dysphagia, change in bowel habits, bleeding Genitourinary: negative for frequency, urgency, dysuria, hematuria, incontinence Muskuloskeletal : negative for arthralgia, myalgia Neurological: negative for headache, dizziness, confusion, focal weakness, paresthesia, memory loss, gait disturbance Objective:  
 
Last 24hrs VS reviewed since prior progress note. Most recent are: 
 
Visit Vitals /76 (BP 1 Location: Right arm, BP Patient Position: Sitting) Pulse 94 Temp 99.1 °F (37.3 °C) Resp 18 Ht 5' 8\" (1.727 m) Wt 238 lb 15 oz (108.4 kg) SpO2 94% BMI 36.33 kg/m² SpO2 Readings from Last 6 Encounters:  
04/15/19 94% 19 93% 18 92% 18 95% 09/10/18 95% 18 98% O2 Flow Rate (L/min): 1.75 l/min Intake/Output Summary (Last 24 hours) at 4/15/2019 6349 Last data filed at 4/14/2019 2011 Gross per 24 hour Intake 660 ml Output 600 ml Net 60 ml Physical Exam: 
 
General appearance: alert, cooperative, no distress, appears stated age Eyes: negative Neck: supple, symmetrical, trachea midline, no adenopathy, thyroid: not enlarged, symmetric, no tenderness/mass/nodules, no carotid bruit and no JVD Lungs: clear to auscultation bilaterally Heart: regular rate and rhythm, S1, S2 normal, no murmur, click, rub or gallop Abdomen: soft, non-tender. Bowel sounds normal. No masses,  no organomegaly Extremities: extremities normal, atraumatic, no cyanosis or edema Skin: Skin color, texture, turgor normal. No rashes or lesions Neurologic: Grossly normal 
 
Data Review: 
 
Lab: 
 
BMP:  
No results found for: NA, K, CL, CO2, AGAP, GLU, BUN, CREA, GFRAA, GFRNA 
CBC:  
No results found for: WBC, HGB, HGBEXT, HCT, HCTEXT, PLT, PLTEXT, HGBEXT, HCTEXT, PLTEXT All labs reviewed in past 24 hours Other pertinent lab: A1C and FLP pending Imaging: 
 
Reviewed. CT head negative, Carotid dopplers < 50% stenosis. CXR now clear. MRI and ECHO normal 
 
 
Assessment / Plan:  
 
Principal Problem: 
  TIA (transient ischemic attack) (8/16/2017) Active Problems: Hypoxia (4/23/2015) CAD (coronary artery disease) (8/16/2017) Dyslipidemia (8/16/2017) Hypertension (8/16/2017) TESSA on CPAP (1/3/2019) Bilateral carotid artery stenosis (4/12/2019) 1. Overnight oximetry with sats 88-92% 2. Consult Dr. Nisreen Hamm to help with CPAP, follow up outpatient for TESSA 3. ASA->Plavix 4. Continue other Rx Care Plan discussed with: Patient and Family Discussed:  Code Status, Care Plan and D/C Planning Prophylaxis:  none Disposition:  Home w/Family 
___________________________________________________ Attending Physician: Stacey Jordan MD

## 2019-04-15 NOTE — DISCHARGE SUMMARY
Physician Discharge Summary     Patient ID:  Compa Rock  [id number]  77 y.o.  1934    Admit date: 4/12/2019  Discharge date and time:  4/15/19  8:11AM    Discharge Diagnoses: TIA, HTN, hypoxia, dyslipidemia, TESSA on CPAP, CAD, mild carotid artery disease    Hospital Course: Mr. Kassandra Chavez was admitted with aphasia which resolved within several hours. He had no other focal neurological deficits. His work up included head CT, MRI, carotid dopplers, ECHO all of which were normal with minimal carotid artery disease by US. He was started on Plavix in lieu of aspirin. He had an overnight oximetry on RA with sats 88% to 92%. He has TESSA and is on CPAP. He will need pulmonary follow up to have his CPAP mask evaluated/modified. No other changes were made in his medications. He will follow up in 1 week. PCP: Gino Ronquillo MD  Consults: Pulmonary/Intensive care and Neurology    Significant Diagnostic Studies: head CT, MRI, ECHO, carotid dopplers, overnight pulsoximetry    [unfilled]    [unfilled]      Discharge Exam:  Visit Vitals  /76 (BP 1 Location: Right arm, BP Patient Position: Sitting)   Pulse 94   Temp 99.1 °F (37.3 °C)   Resp 18   Ht 5' 8\" (1.727 m)   Wt 238 lb 15 oz (108.4 kg)   SpO2 94%   BMI 36.33 kg/m²     General:  Alert, cooperative, no distress, appears stated age. Head:  Normocephalic, without obvious abnormality, atraumatic. Eyes:  Conjunctivae/corneas clear. PERRL, EOMs intact. Fundi benign   Ears:  Normal TMs and external ear canals both ears. Nose: Nares normal. Septum midline. Mucosa normal. No drainage or sinus tenderness. Throat: Lips, mucosa, and tongue normal. Teeth and gums normal.   Neck: Supple, symmetrical, trachea midline, no adenopathy, thyroid: no enlargement/tenderness/nodules, no carotid bruit and no JVD. Back:   Symmetric, no curvature. ROM normal. No CVA tenderness. Lungs:   Clear to auscultation bilaterally. Chest wall:  No tenderness or deformity. Heart:  Regular rate and rhythm, S1, S2 normal, no murmur, click, rub or gallop. Abdomen:   Soft, non-tender. Bowel sounds normal. No masses,  No organomegaly. Genitalia:  Normal male without lesion, discharge or tenderness. Rectal:  Normal tone, normal prostate, no masses or tenderness  Guaiac negative stool. Extremities: Extremities normal, atraumatic, no cyanosis or edema. Pulses: 2+ and symmetric all extremities. Skin: Skin color, texture, turgor normal. No rashes or lesions   Lymph nodes: Cervical, supraclavicular, and axillary nodes normal.   Neurologic: CNII-XII intact. Normal strength, sensation and reflexes throughout. Disposition: home    Patient Instructions:   Current Discharge Medication List      START taking these medications    Details   clopidogrel (PLAVIX) 75 mg tab Take 1 Tab by mouth daily. Qty: 90 Tab, Refills: 3         CONTINUE these medications which have NOT CHANGED    Details   olmesartan (BENICAR) 20 mg tablet Take 20 mg by mouth daily. b complex vitamins tablet Take 1 Tab by mouth daily. DULoxetine (CYMBALTA) 30 mg capsule Take 30 mg by mouth daily. acetaminophen (TYLENOL) 500 mg tablet Take 1,000 mg by mouth every six (6) hours as needed for Pain. amLODIPine (NORVASC) 10 mg tablet TAKE ONE TABLET BY MOUTH DAILY  Qty: 30 Tab, Refills: 0      atenolol (TENORMIN) 100 mg tablet Take 1 Tab by mouth daily. Qty: 90 Tab, Refills: 3      atorvastatin (LIPITOR) 80 mg tablet TAKE ONE TABLET BY MOUTH DAILY  Qty: 30 Tab, Refills: 5      ANORO ELLIPTA 62.5-25 mcg/actuation inhaler Take 1 Puff by inhalation daily. furosemide (LASIX) 80 mg tablet Take 1 Tab by mouth daily. Qty: 90 Tab, Refills: 3      ibuprofen (ADVIL) 200 mg tablet Take 200 mg by mouth every six (6) hours as needed for Pain. allopurinol (ZYLOPRIM) 300 mg tablet Take 1 Tab by mouth daily.   Qty: 30 Tab, Refills: 6               Signed:  Gaby Li MD  4/15/2019  8:11 AM

## 2019-04-15 NOTE — PROGRESS NOTES
2212: Overnight study started. Patient on Room Air with a SPO2 of 88%. 04/15/2019 0425: Overnight study completed. SPO2 92% on room air. 0503: Overnight study copy in chart.

## 2019-04-15 NOTE — PROGRESS NOTES
Reviewed medicals and referral sent to [facility] to assess for home oxygen. Awaiting their response.

## 2019-04-15 NOTE — PROGRESS NOTES
Spoke with nurse Evans in Dr Hewitt Newer office and she states per Dr Rogers Estimable the patient does not qualify for home oxygen and patient to follow up with pulmonologist and appointment was already made. [place] [employer] company aware.

## 2019-04-15 NOTE — DISCHARGE INSTRUCTIONS
Doctor Cecilia [phone]  [phone]      Patient Discharge Instructions    Linn Patel / [id number] : 1934    Admitted 2019 Discharged: 4/15/19     Take Home Medications            · It is important that you take the medication exactly as they are prescribed. · Keep your medication in the bottles provided by the pharmacist and keep a list of the medication names, dosages, and times to be taken in your wallet. · Do not take other medications without consulting your doctor. What to do at Home    Recommended diet: Cardiac Diet,     Recommended activity: Activity as tolerated, per neurology no driving for 30 days      Follow-up with Dr. Deya Florez in 1 week. Call [phone] for your appointment. Information obtained by :  I understand that if any problems occur once I am at home I am to contact my physician. I understand and acknowledge receipt of the instructions indicated above.                                                                                                                                            Physician's or R.N.'s Signature                                                                  Date/Time                                                                                                                                              Patient or Representative Signature                                                          Date/Time

## 2019-04-16 ENCOUNTER — HOME CARE VISIT (OUTPATIENT)
Dept: SCHEDULING | Facility: HOME HEALTH | Age: 84
End: 2019-04-16
Payer: MEDICARE

## 2019-04-16 ENCOUNTER — PATIENT OUTREACH (OUTPATIENT)
Dept: INTERNAL MEDICINE CLINIC | Age: 84
End: 2019-04-16

## 2019-04-16 VITALS
HEART RATE: 72 BPM | OXYGEN SATURATION: 93 % | SYSTOLIC BLOOD PRESSURE: 138 MMHG | DIASTOLIC BLOOD PRESSURE: 64 MMHG | RESPIRATION RATE: 22 BRPM | TEMPERATURE: 98 F

## 2019-04-16 PROCEDURE — 3331090001 HH PPS REVENUE CREDIT

## 2019-04-16 PROCEDURE — 400013 HH SOC

## 2019-04-16 PROCEDURE — G0299 HHS/HOSPICE OF RN EA 15 MIN: HCPCS

## 2019-04-16 PROCEDURE — 3331090002 HH PPS REVENUE DEBIT

## 2019-04-16 RX ORDER — GABAPENTIN 100 MG/1
200 CAPSULE ORAL 3 TIMES DAILY
COMMUNITY
End: 2019-10-06 | Stop reason: DRUGHIGH

## 2019-04-16 NOTE — PROGRESS NOTES
Hospital Discharge Follow-Up Date/Time:  2019 1:46 PM 
 
Patient was admitted to [facility] on 19 and discharged on 4/15/19 for TIA. The physician discharge summary was available at the time of outreach. Patient was contacted within two business days of discharge. Top Challenges reviewed with the provider Patient has appointment 19 with sleep clinic to have CPAP mask evaluated and modified. Overnight oximetry study on room air in hospital showed sats 88-92%. Method of communication with provider :chart routing Inpatient RRAT score: 18 Was this a readmission? no  
Patient stated reason for the readmission: n/a Nurse Navigator (NN) contacted the family by telephone to perform post hospital discharge assessment. Verified name and  with family as identifiers. Provided introduction to self, and explanation of the Nurse Navigator role. Reviewed discharge instructions and red flags with family who verbalized understanding. Family given an opportunity to ask questions and does not have any further questions or concerns at this time. The family agrees to contact the PCP office for questions related to their healthcare. NN provided contact information for future reference. Disease Specific:   N/A Summary of patient's top problems: 1. Patient admitted with aphasia which resolved within several hours. No other focal neuro deficits. Patient's work up included head CT, MRI, carotid dopplers, echo. All tests normal except minimal carotid disease via ultrasound. Patient was started on Plavix. 2. Obstructive sleep apnea - wears CPAP. For study at sleep clinic 19 to have CPAP mask evaluated and modified. 3. No ACP on file. Discussed Honoring Choices with wife. Home Health orders at discharge:  Home Health company: [facility] Date of initial visit: 19 Durable Medical Equipment ordered/company: n/a 
 Durable Medical Equipment received: n/a Barriers to care? none Advance Care Planning:  
Does patient have an Advance Directive:  not on file; education provided Medication(s):  
New Medications at Discharge: plavix Changed Medications at Discharge: n/a Discontinued Medications at Discharge: n/a Medication reconciliation was performed with family, who verbalizes understanding of administration of home medications. There were no barriers to obtaining medications identified at this time. Referral to Pharm D needed: no  
 
Current Outpatient Medications Medication Sig  
 folic acid-vit U3-XPX U06 (VIRT-JONNA) 2.2-25-1 mg tab Take  by mouth daily.  gabapentin (NEURONTIN) 100 mg capsule Take  by mouth three (3) times daily.  clopidogrel (PLAVIX) 75 mg tab Take 1 Tab by mouth daily.  olmesartan (BENICAR) 20 mg tablet Take 20 mg by mouth daily.  b complex vitamins tablet Take 1 Tab by mouth daily.  acetaminophen (TYLENOL) 500 mg tablet Take 1,000 mg by mouth every six (6) hours as needed for Pain.  amLODIPine (NORVASC) 10 mg tablet TAKE ONE TABLET BY MOUTH DAILY  atenolol (TENORMIN) 100 mg tablet Take 1 Tab by mouth daily.  atorvastatin (LIPITOR) 80 mg tablet TAKE ONE TABLET BY MOUTH DAILY  ANORO ELLIPTA 62.5-25 mcg/actuation inhaler Take 1 Puff by inhalation daily.  furosemide (LASIX) 80 mg tablet Take 1 Tab by mouth daily.  allopurinol (ZYLOPRIM) 300 mg tablet Take 1 Tab by mouth daily. No current facility-administered medications for this visit. Medications Discontinued During This Encounter Medication Reason  DULoxetine (CYMBALTA) 30 mg capsule Not A Current Medication  ibuprofen (ADVIL) 200 mg tablet Not A Current Medication [facility] follow up appointment(s):  
Future Appointments Date Time Provider Deanna Casanova 4/24/2019 12:45 PM [id number] [facility] MRI 1 [facility]  
4/26/2019  3:30 PM Shreya Kern MD [address] [facility]  
 5/20/2019  8:40 AM Shon Gage.MD [facility]  
7/12/2019  9:30 AM MD Janay Monterroso [facility] follow up appointment(s): Sleep clinic 4/17/19, Dr. Cindy Denson, neurologist 5/20/19 [facility]:  n/a  
 
 
Goals  Knowledge and adherence of prescribed medication (ie. action, side effects, missed dose, etc.).   
  4/16/19-NN spoke with patient's wife re: recent hospital stay for TIA. Patient was started on Plavix. Stressed the importance of taking medication as prescribed and importance of not missing doses. Also discussed importance of reporting any further signs of neurological deficit that might indicate stroke, such as slurred speech, weakness, confusion and importance of getting patient immediate medical attention should that occur. Patient's wife voiced understanding.   NN will check with patient in 2 weeks to see how he is doing. / vs

## 2019-04-17 PROCEDURE — 3331090002 HH PPS REVENUE DEBIT

## 2019-04-17 PROCEDURE — 3331090001 HH PPS REVENUE CREDIT

## 2019-04-18 ENCOUNTER — HOME CARE VISIT (OUTPATIENT)
Dept: SCHEDULING | Facility: HOME HEALTH | Age: 84
End: 2019-04-18
Payer: MEDICARE

## 2019-04-18 PROCEDURE — 3331090002 HH PPS REVENUE DEBIT

## 2019-04-18 PROCEDURE — 3331090001 HH PPS REVENUE CREDIT

## 2019-04-19 PROCEDURE — 3331090002 HH PPS REVENUE DEBIT

## 2019-04-19 PROCEDURE — 3331090001 HH PPS REVENUE CREDIT

## 2019-04-20 PROCEDURE — 3331090001 HH PPS REVENUE CREDIT

## 2019-04-20 PROCEDURE — 3331090002 HH PPS REVENUE DEBIT

## 2019-04-21 PROCEDURE — 3331090001 HH PPS REVENUE CREDIT

## 2019-04-21 PROCEDURE — 3331090002 HH PPS REVENUE DEBIT

## 2019-04-22 PROCEDURE — 3331090002 HH PPS REVENUE DEBIT

## 2019-04-22 PROCEDURE — 3331090001 HH PPS REVENUE CREDIT

## 2019-04-23 ENCOUNTER — HOME CARE VISIT (OUTPATIENT)
Dept: SCHEDULING | Facility: HOME HEALTH | Age: 84
End: 2019-04-23
Payer: MEDICARE

## 2019-04-23 VITALS
RESPIRATION RATE: 18 BRPM | SYSTOLIC BLOOD PRESSURE: 118 MMHG | HEART RATE: 67 BPM | OXYGEN SATURATION: 93 % | DIASTOLIC BLOOD PRESSURE: 70 MMHG

## 2019-04-23 PROCEDURE — 3331090002 HH PPS REVENUE DEBIT

## 2019-04-23 PROCEDURE — G0152 HHCP-SERV OF OT,EA 15 MIN: HCPCS

## 2019-04-23 PROCEDURE — G0299 HHS/HOSPICE OF RN EA 15 MIN: HCPCS

## 2019-04-23 PROCEDURE — 3331090001 HH PPS REVENUE CREDIT

## 2019-04-24 ENCOUNTER — HOSPITAL ENCOUNTER (OUTPATIENT)
Dept: MRI IMAGING | Age: 84
Discharge: HOME | End: 2019-04-24
Attending: ORTHOPAEDIC SURGERY
Payer: MEDICARE

## 2019-04-24 VITALS
RESPIRATION RATE: 20 BRPM | SYSTOLIC BLOOD PRESSURE: 121 MMHG | HEIGHT: 68 IN | WEIGHT: 235 LBS | HEART RATE: 68 BPM | TEMPERATURE: 98.6 F | DIASTOLIC BLOOD PRESSURE: 56 MMHG | OXYGEN SATURATION: 95 % | BODY MASS INDEX: 35.61 KG/M2

## 2019-04-24 VITALS
OXYGEN SATURATION: 98 % | HEART RATE: 70 BPM | WEIGHT: 227 LBS | SYSTOLIC BLOOD PRESSURE: 130 MMHG | BODY MASS INDEX: 34.52 KG/M2 | DIASTOLIC BLOOD PRESSURE: 78 MMHG | TEMPERATURE: 98.2 F | RESPIRATION RATE: 18 BRPM

## 2019-04-24 DIAGNOSIS — M54.32 BILATERAL SCIATICA: ICD-10-CM

## 2019-04-24 DIAGNOSIS — M54.50 LOW BACK PAIN: ICD-10-CM

## 2019-04-24 DIAGNOSIS — M48.062 SPINAL STENOSIS, LUMBAR REGION WITH NEUROGENIC CLAUDICATION: ICD-10-CM

## 2019-04-24 DIAGNOSIS — M47.817 LUMBOSACRAL SPONDYLOSIS WITHOUT MYELOPATHY: ICD-10-CM

## 2019-04-24 DIAGNOSIS — M54.31 BILATERAL SCIATICA: ICD-10-CM

## 2019-04-24 PROCEDURE — 3331090002 HH PPS REVENUE DEBIT

## 2019-04-24 PROCEDURE — 3331090001 HH PPS REVENUE CREDIT

## 2019-04-24 PROCEDURE — 72148 MRI LUMBAR SPINE W/O DYE: CPT

## 2019-04-24 PROCEDURE — 74011250636 HC RX REV CODE- 250/636: Performed by: RADIOLOGY

## 2019-04-24 RX ORDER — FENTANYL CITRATE 50 UG/ML
100 INJECTION, SOLUTION INTRAMUSCULAR; INTRAVENOUS
Status: DISCONTINUED | OUTPATIENT
Start: 2019-04-24 | End: 2019-04-28 | Stop reason: HOSPADM

## 2019-04-24 RX ORDER — SODIUM CHLORIDE 9 MG/ML
25 INJECTION, SOLUTION INTRAVENOUS CONTINUOUS
Status: DISCONTINUED | OUTPATIENT
Start: 2019-04-24 | End: 2019-04-28 | Stop reason: HOSPADM

## 2019-04-24 RX ORDER — MIDAZOLAM HYDROCHLORIDE 1 MG/ML
5 INJECTION, SOLUTION INTRAMUSCULAR; INTRAVENOUS
Status: DISCONTINUED | OUTPATIENT
Start: 2019-04-24 | End: 2019-04-28 | Stop reason: HOSPADM

## 2019-04-24 RX ADMIN — FENTANYL CITRATE 25 MCG: 50 INJECTION, SOLUTION INTRAMUSCULAR; INTRAVENOUS at 12:09

## 2019-04-24 RX ADMIN — SODIUM CHLORIDE 25 ML/HR: 900 INJECTION, SOLUTION INTRAVENOUS at 12:00

## 2019-04-24 RX ADMIN — MIDAZOLAM HYDROCHLORIDE 1 MG: 1 INJECTION, SOLUTION INTRAMUSCULAR; INTRAVENOUS at 12:10

## 2019-04-24 RX ADMIN — FENTANYL CITRATE 50 MCG: 50 INJECTION, SOLUTION INTRAMUSCULAR; INTRAVENOUS at 12:05

## 2019-04-24 RX ADMIN — MIDAZOLAM HYDROCHLORIDE 2 MG: 1 INJECTION, SOLUTION INTRAMUSCULAR; INTRAVENOUS at 12:00

## 2019-04-24 NOTE — ROUTINE PROCESS
Procedure reviewed with patient by Dr. Bryson Keith. Opportunity to verbalize questions and concerns. Consent obtained.

## 2019-04-24 NOTE — H&P
Interventional Radiology History and Physical (Inpatient)    Patient: Jing Licona 80 y.o. male     Referring Physician:  Timothy Dhaliwal MD    Chief Complaint: No chief complaint on file.       History of Present Illness: conscious sedation (Versed and fentanyl) for diagnostic MRI    History:  Past Medical History:   Diagnosis Date    Adverse effect of anesthesia     combative after anesthesia    Alcohol abuse     6 beers/day    Arthritis     CAD (coronary artery disease)     Dyslipidemia 8/16/2017    Dyspepsia and other specified disorders of function of stomach     Dyspnea 8/16/2017    ED (erectile dysfunction) 8/16/2017    Encounter for immunization 8/16/2017    Fatigue 8/16/2017    GERD (gastroesophageal reflux disease) 8/16/2017    Gout     Gout 8/16/2017    Hypertension     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    On statin therapy 8/16/2017    TESSA on CPAP 1/3/2019    Simple chronic bronchitis ([address] 1/3/2019    TIA (transient ischemic attack) 7/51/5051    Umbilical hernia 49/3/4778     Family History   Problem Relation Age of Onset    Heart Disease Mother     Hypertension Mother     Hypertension Father     Hypertension Sister     Hypertension Brother     Cancer Brother      Social History     Socioeconomic History    Marital status:      Spouse name: Not on file    Number of children: Not on file    Years of education: Not on file    Highest education level: Not on file   Occupational History    Not on file   Social Needs    Financial resource strain: Not on file    Food insecurity:     Worry: Not on file     Inability: Not on file    Transportation needs:     Medical: Not on file     Non-medical: Not on file   Tobacco Use    Smoking status: Former Smoker     Packs/day: 1.00     Years: 4.00     Pack years: 4.00    Smokeless tobacco: Former User   Substance and Sexual Activity    Alcohol use: Yes     Comment: has not drank in 6 months    Drug use: No    Sexual activity: Not on file   Lifestyle    Physical activity:     Days per week: Not on file     Minutes per session: Not on file    Stress: Not on file   Relationships    Social connections:     Talks on phone: Not on file     Gets together: Not on file     Attends Religion service: Not on file     Active member of club or organization: Not on file     Attends meetings of clubs or organizations: Not on file     Relationship status: Not on file    Intimate partner violence:     Fear of current or ex partner: Not on file     Emotionally abused: Not on file     Physically abused: Not on file     Forced sexual activity: Not on file   Other Topics Concern    Not on file   Social History Narrative    Not on file       Allergies: No Known Allergies    Current Medications:  Current Outpatient Medications   Medication Sig    folic acid-vit L4-QAM E26 (VIRT-JONNA) 2.2-25-1 mg tab Take  by mouth daily.  gabapentin (NEURONTIN) 100 mg capsule Take  by mouth three (3) times daily.  cyanocobalamin/folic ac/vit B6 (VIRT-JONNA PO) Take 1 Tab by mouth daily.  gabapentin (NEURONTIN) 100 mg capsule Take 100 mg by mouth three (3) times daily.  clopidogrel (PLAVIX) 75 mg tab Take 1 Tab by mouth daily.  olmesartan (BENICAR) 20 mg tablet Take 20 mg by mouth daily.  b complex vitamins tablet Take 1 Tab by mouth daily.  acetaminophen (TYLENOL) 500 mg tablet Take 1,000 mg by mouth every six (6) hours as needed for Pain.  amLODIPine (NORVASC) 10 mg tablet TAKE ONE TABLET BY MOUTH DAILY    atenolol (TENORMIN) 100 mg tablet Take 1 Tab by mouth daily.  atorvastatin (LIPITOR) 80 mg tablet TAKE ONE TABLET BY MOUTH DAILY    ANORO ELLIPTA 62.5-25 mcg/actuation inhaler Take 1 Puff by inhalation daily.  furosemide (LASIX) 80 mg tablet Take 1 Tab by mouth daily.  allopurinol (ZYLOPRIM) 300 mg tablet Take 1 Tab by mouth daily.      Current Facility-Administered Medications   Medication Dose Route Frequency    0.9% sodium chloride infusion  25 mL/hr IntraVENous CONTINUOUS    fentaNYL citrate (PF) injection 100 mcg  100 mcg IntraVENous Multiple    midazolam (VERSED) injection 5 mg  5 mg IntraVENous Multiple        Physical Exam:  Blood pressure 136/69, pulse (!) 59, temperature 98.6 °F (37 °C), resp. rate 20, height 5' 8\" (1.727 m), weight 106.6 kg (235 lb), SpO2 94 %. GENERAL: alert, cooperative, no distress, appears stated age, LUNG: clear to auscultation bilaterally, HEART: regular rate and rhythm    Findings/Diagnosis: conscious sedation (Versed and fentanyl) for diagnostic MRI    Alerts:    Hospital Problems  Date Reviewed: 1/3/2019    None          Laboratory:    No results for input(s): HGB, HGBEXT, HCT, HCTEXT, WBC, PLT, PLTEXT, INR, BUN, CREA, K, CRCLT, HGBEXT, HCTEXT, PLTEXT in the last 72 hours. No lab exists for component: PTT, PT, INREXT      Plan of Care/Planned Procedure:  Risks, benefits, and alternatives reviewed with patient and he agrees to proceed with the procedure. Full dictated report to follow.

## 2019-04-24 NOTE — DISCHARGE INSTRUCTIONS
[facility]  Radiology Department  [phone]    Radiologist:  Dr. Cezar Ramires    Date:4/24/2019        MRI With Sedation Discharge Instructions      Go home and rest and restrict your activity the next 24 hours. You have been given sedating medications, so do not drive or drink alcohol today. Resume your previous diet and medications. You may return to work and resume normal activities tomorrow. Results of your MRI will be sent to your physician as soon as they become available.

## 2019-04-25 ENCOUNTER — HOME CARE VISIT (OUTPATIENT)
Dept: SCHEDULING | Facility: HOME HEALTH | Age: 84
End: 2019-04-25
Payer: MEDICARE

## 2019-04-25 VITALS
DIASTOLIC BLOOD PRESSURE: 64 MMHG | HEART RATE: 74 BPM | TEMPERATURE: 97.8 F | SYSTOLIC BLOOD PRESSURE: 112 MMHG | RESPIRATION RATE: 18 BRPM | OXYGEN SATURATION: 94 %

## 2019-04-25 PROCEDURE — G0151 HHCP-SERV OF PT,EA 15 MIN: HCPCS

## 2019-04-25 PROCEDURE — G0158 HHC OT ASSISTANT EA 15: HCPCS

## 2019-04-25 PROCEDURE — 3331090002 HH PPS REVENUE DEBIT

## 2019-04-25 PROCEDURE — 3331090001 HH PPS REVENUE CREDIT

## 2019-04-26 ENCOUNTER — OFFICE VISIT (OUTPATIENT)
Dept: INTERNAL MEDICINE CLINIC | Age: 84
End: 2019-04-26

## 2019-04-26 VITALS
RESPIRATION RATE: 18 BRPM | SYSTOLIC BLOOD PRESSURE: 118 MMHG | OXYGEN SATURATION: 92 % | TEMPERATURE: 97.7 F | DIASTOLIC BLOOD PRESSURE: 68 MMHG | HEART RATE: 64 BPM

## 2019-04-26 VITALS
HEART RATE: 63 BPM | OXYGEN SATURATION: 95 % | SYSTOLIC BLOOD PRESSURE: 118 MMHG | HEIGHT: 68 IN | WEIGHT: 228.8 LBS | DIASTOLIC BLOOD PRESSURE: 72 MMHG | BODY MASS INDEX: 34.68 KG/M2

## 2019-04-26 DIAGNOSIS — Z79.899 ON STATIN THERAPY: ICD-10-CM

## 2019-04-26 DIAGNOSIS — G47.33 OSA ON CPAP: ICD-10-CM

## 2019-04-26 DIAGNOSIS — E78.5 DYSLIPIDEMIA: ICD-10-CM

## 2019-04-26 DIAGNOSIS — F51.01 PRIMARY INSOMNIA: Primary | ICD-10-CM

## 2019-04-26 DIAGNOSIS — I10 ESSENTIAL HYPERTENSION: ICD-10-CM

## 2019-04-26 DIAGNOSIS — R09.02 HYPOXIA: ICD-10-CM

## 2019-04-26 DIAGNOSIS — Z99.89 OSA ON CPAP: ICD-10-CM

## 2019-04-26 DIAGNOSIS — E66.01 SEVERE OBESITY (HCC): ICD-10-CM

## 2019-04-26 DIAGNOSIS — G45.9 TIA (TRANSIENT ISCHEMIC ATTACK): ICD-10-CM

## 2019-04-26 PROCEDURE — 3331090002 HH PPS REVENUE DEBIT

## 2019-04-26 PROCEDURE — 3331090001 HH PPS REVENUE CREDIT

## 2019-04-26 RX ORDER — CLONAZEPAM 0.5 MG/1
0.5 TABLET ORAL
Qty: 30 TAB | Refills: 0 | Status: SHIPPED | OUTPATIENT
Start: 2019-04-26 | End: 2019-09-23

## 2019-04-26 NOTE — PROGRESS NOTES
Trudy Alonso is a 80 y.o. male and presents with Transitions Of Care  . Subjective:    Mr. Miladis Singh presents today for transition of care follow-up after being hospitalized at [facility] from the 12th through 15 April. Mr. Miladis Singh was admitted with aphasia which resolved within several hours. He had no other focal neurological deficits. His work up included head CT, MRI, carotid dopplers, ECHO all of which were normal with minimal carotid artery disease by US. He was started on Plavix in lieu of aspirin. He had an overnight oximetry on RA with sats 88% to 92%. He has TESSA and is on CPAP. He will need pulmonary follow up to have his CPAP mask evaluated/modified. No other changes were made in his medications. He has had follow-up with Dr. Vanessa Mayberry and his CPAP device was adjusted. He had an MRI for his back and has a follow-up with Dr. Brennan Gleason for this. He has had lower extremity weakness and some spinal stenosis is noted on his MRI. He has had no recurring symptoms of a aphasia or focal neurological deficits. Review of Systems  Constitutional:   Eyes:   negative for visual disturbance and irritation  ENT:   negative for tinnitus,sore throat,nasal congestion,ear pains. hoarseness  Respiratory:  negative for cough, hemoptysis, dyspnea,wheezing  CV:   negative for chest pain, palpitations, lower extremity edema  GI:   negative for nausea, vomiting, diarrhea, abdominal pain,melena  Endo:               negative for polyuria,polydipsia,polyphagia,heat intolerance  Genitourinary: negative for frequency, dysuria and hematuria  Integumentary: negative for rash and pruritus  Hematologic:  negative for easy bruising and gum/nose bleeding  Musculoskel: negative for myalgias, arthralgias, back pain, muscle weakness, joint pain  Neurological:  negative for headaches, dizziness, vertigo, memory problems and gait   Behavl/Psych: negative for feelings of anxiety, depression, mood changes    Past Medical History: Diagnosis Date    Adverse effect of anesthesia     combative after anesthesia    Alcohol abuse     6 beers/day    Arthritis     CAD (coronary artery disease)     Dyslipidemia 8/16/2017    Dyspepsia and other specified disorders of function of stomach     Dyspnea 8/16/2017    ED (erectile dysfunction) 8/16/2017    Encounter for immunization 8/16/2017    Fatigue 8/16/2017    GERD (gastroesophageal reflux disease) 8/16/2017    Gout     Gout 8/16/2017    Hypertension     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    On statin therapy 8/16/2017    TESSA on CPAP 1/3/2019    Simple chronic bronchitis ([address] 1/3/2019    TIA (transient ischemic attack) 1/22/8425    Umbilical hernia 02/3/8421     Past Surgical History:   Procedure Laterality Date    CARDIAC SURG PROCEDURE UNLIST  1996    Cardiac Stents    HX HERNIA REPAIR      RIH    HX HERNIA REPAIR  10/47/15    open umbilical hernia repair mesh    HX UROLOGICAL      HYDROCOLECTOMY     Social History     Socioeconomic History    Marital status:      Spouse name: Not on file    Number of children: Not on file    Years of education: Not on file    Highest education level: Not on file   Tobacco Use    Smoking status: Former Smoker     Packs/day: 1.00     Years: 4.00     Pack years: 4.00    Smokeless tobacco: Former User   Substance and Sexual Activity    Alcohol use: Yes     Comment: has not drank in 6 months    Drug use: No     Family History   Problem Relation Age of Onset    Heart Disease Mother     Hypertension Mother     Hypertension Father     Hypertension Sister     Hypertension Brother     Cancer Brother      Current Outpatient Medications   Medication Sig Dispense Refill    clonazePAM (KLONOPIN) 0.5 mg tablet Take 1 Tab by mouth nightly as needed (insomnia). Max Daily Amount: 0.5 mg. 30 Tab 0    folic acid-vit N9-SMT Z99 (VIRT-JONNA) 2.2-25-1 mg tab Take  by mouth daily.       gabapentin (NEURONTIN) 100 mg capsule Take 100 mg by mouth three (3) times daily.  clopidogrel (PLAVIX) 75 mg tab Take 1 Tab by mouth daily. 90 Tab 3    olmesartan (BENICAR) 20 mg tablet Take 20 mg by mouth daily.  b complex vitamins tablet Take 1 Tab by mouth daily.  acetaminophen (TYLENOL) 500 mg tablet Take 1,000 mg by mouth every six (6) hours as needed for Pain.  amLODIPine (NORVASC) 10 mg tablet TAKE ONE TABLET BY MOUTH DAILY 30 Tab 0    atenolol (TENORMIN) 100 mg tablet Take 1 Tab by mouth daily. 90 Tab 3    atorvastatin (LIPITOR) 80 mg tablet TAKE ONE TABLET BY MOUTH DAILY 30 Tab 5    ANORO ELLIPTA 62.5-25 mcg/actuation inhaler Take 1 Puff by inhalation daily.  furosemide (LASIX) 80 mg tablet Take 1 Tab by mouth daily. 90 Tab 3    allopurinol (ZYLOPRIM) 300 mg tablet Take 1 Tab by mouth daily. 30 Tab 6    gabapentin (NEURONTIN) 100 mg capsule Take  by mouth three (3) times daily.  cyanocobalamin/folic ac/vit B6 (VIRT-JONNA PO) Take 1 Tab by mouth daily.        Facility-Administered Medications Ordered in Other Visits   Medication Dose Route Frequency Provider Last Rate Last Dose    0.9% sodium chloride infusion  25 mL/hr IntraVENous CONTINUOUS Dudley Montesinos MD   Stopped at 04/24/19 1300    fentaNYL citrate (PF) injection 100 mcg  100 mcg IntraVENous Multiple Dudley Montesinos MD   25 mcg at 04/24/19 1209    midazolam (VERSED) injection 5 mg  5 mg IntraVENous Multiple Dudley Montesinos MD   1 mg at 04/24/19 1210     No Known Allergies    Objective:  Visit Vitals  /72 (BP 1 Location: Left arm, BP Patient Position: Sitting)   Pulse 63   Ht 5' 8\" (1.727 m)   Wt 228 lb 12.8 oz (103.8 kg)   SpO2 95%   BMI 34.79 kg/m²     Physical Exam:   General appearance - alert, well appearing, and in no distress  Mental status - alert, oriented to person, place, and time  EYE-MAURICE, EOMI, fundi normal, corneas normal, no foreign bodies  ENT-ENT exam normal, no neck nodes or sinus tenderness  Nose - normal and patent, no erythema, discharge or polyps  Mouth - mucous membranes moist, pharynx normal without lesions  Neck - supple, no significant adenopathy   Chest - clear to auscultation, no wheezes, rales or rhonchi, symmetric air entry   Heart - normal rate, regular rhythm, normal S1, S2, no murmurs, rubs, clicks or gallops   Abdomen - soft, nontender, nondistended, no masses or organomegaly  Lymph- no adenopathy palpable  Ext-peripheral pulses normal, no pedal edema, no clubbing or cyanosis  Skin-Warm and dry. no hyperpigmentation, vitiligo, or suspicious lesions  Neuro -alert, oriented, normal speech, no focal findings or movement disorder noted  Musculoskeletal- FROM, no bony abnormalities, no point tenderness    No results found for this visit on 04/26/19. All results for lab orders may not have been returned by the time this encountered was closed. Assessment/Plan:       ICD-10-CM ICD-9-CM    1. Primary insomnia F51.01 307.42 clonazePAM (KLONOPIN) 0.5 mg tablet   2. TIA (transient ischemic attack) G45.9 435.9    3. Essential hypertension I10 401.9    4. TESSA on CPAP G47.33 327.23     Z99.89 V46.8    5. Hypoxia R09.02 799.02    6. Dyslipidemia E78.5 272.4    7. On statin therapy Z79.899 V58.69    8. Severe obesity ([facility] E66.01 278.01    9. Transition of care performed with sharing of clinical summary Z91.89 V15.89        Orders Placed This Encounter    clonazePAM (KLONOPIN) 0.5 mg tablet     Sig: Take 1 Tab by mouth nightly as needed (insomnia). Max Daily Amount: 0.5 mg.     Dispense:  30 Tab     Refill:  0       Plan:    The patient will continue his current medical regimen as outlined above. Continue Plavix in lieu of aspirin as outlined per his discharge summary. Try clonazepam 0.5 mg nightly for insomnia/claustrophobia which he request to help him sleep. I have explained to him that this can work against his sleep apnea and that he should have his CPAP device adjusted further if he is having difficulty.   He has follow-up with pulmonary in July. I have reviewed with the patient details of the assessment and plan and all questions were answered. Relevent patient education was performed. Verbal and/or written instructions (see AVS) provided. The most recent lab findings were reviewed with the patient. Plan was discussed with patient who verbal expressed understanding. An After Visit Summary was printed and given to the patient.       Rowena Reina MD

## 2019-04-26 NOTE — PROGRESS NOTES
Lyndon Chavez is a 80 y.o. male presenting for Transitions Of Care  . 1. Have you been to the ER, urgent care clinic since your last visit? Hospitalized since your last visit? Patient was admitted to [facility] on 4/12/19 and discharged on 4/15/19 for TIA    2. Have you seen or consulted any other health care providers outside of the [address] [place] [address] since your last visit? Include any pap smears or colon screening. No    Fall Risk Assessment, last 12 mths 1/3/2019   Able to walk? Yes   Fall in past 12 months? No         Abuse Screening Questionnaire 4/26/2019   Do you ever feel afraid of your partner? N   Are you in a relationship with someone who physically or mentally threatens you? N   Is it safe for you to go home? Y       3 most recent PHQ Screens 4/12/2019   PHQ Not Done Patient Decline   Little interest or pleasure in doing things -   Feeling down, depressed, irritable, or hopeless -   Total Score PHQ 2 -       There are no discontinued medications.

## 2019-04-27 PROCEDURE — 3331090002 HH PPS REVENUE DEBIT

## 2019-04-27 PROCEDURE — 3331090001 HH PPS REVENUE CREDIT

## 2019-04-28 PROCEDURE — 3331090002 HH PPS REVENUE DEBIT

## 2019-04-28 PROCEDURE — 3331090001 HH PPS REVENUE CREDIT

## 2019-04-29 PROCEDURE — 3331090002 HH PPS REVENUE DEBIT

## 2019-04-29 PROCEDURE — 3331090001 HH PPS REVENUE CREDIT

## 2019-04-30 PROCEDURE — 3331090001 HH PPS REVENUE CREDIT

## 2019-04-30 PROCEDURE — 3331090002 HH PPS REVENUE DEBIT

## 2019-05-01 ENCOUNTER — HOME CARE VISIT (OUTPATIENT)
Dept: SCHEDULING | Facility: HOME HEALTH | Age: 84
End: 2019-05-01
Payer: MEDICARE

## 2019-05-01 VITALS
SYSTOLIC BLOOD PRESSURE: 116 MMHG | TEMPERATURE: 98.2 F | RESPIRATION RATE: 18 BRPM | DIASTOLIC BLOOD PRESSURE: 80 MMHG | HEART RATE: 70 BPM | OXYGEN SATURATION: 98 %

## 2019-05-01 PROCEDURE — G0299 HHS/HOSPICE OF RN EA 15 MIN: HCPCS

## 2019-05-01 PROCEDURE — 3331090001 HH PPS REVENUE CREDIT

## 2019-05-01 PROCEDURE — 3331090002 HH PPS REVENUE DEBIT

## 2019-05-02 ENCOUNTER — HOME CARE VISIT (OUTPATIENT)
Dept: SCHEDULING | Facility: HOME HEALTH | Age: 84
End: 2019-05-02
Payer: MEDICARE

## 2019-05-02 PROCEDURE — G0152 HHCP-SERV OF OT,EA 15 MIN: HCPCS

## 2019-05-02 PROCEDURE — 3331090001 HH PPS REVENUE CREDIT

## 2019-05-02 PROCEDURE — 3331090002 HH PPS REVENUE DEBIT

## 2019-05-02 RX ORDER — OLMESARTAN MEDOXOMIL 20 MG/1
20 TABLET ORAL DAILY
Qty: 90 TAB | Refills: 3 | Status: SHIPPED | OUTPATIENT
Start: 2019-05-02 | End: 2019-06-05

## 2019-05-02 NOTE — TELEPHONE ENCOUNTER
PCP: Shreya Kern MD    Last appt: 4/26/2019  Future Appointments   Date Time Provider Deanna Casanova   5/3/2019 To Be Determined Sheryl Houser [place] [address]   5/6/2019 10:30 AM Olegario Bowman [facility] [address]   5/20/2019  8:40 AM Lory Yun  [address]   7/12/2019  9:30 AM Shreya Kern MD [facility] CHRISTOPHER SCHED       Requested Prescriptions     Pending Prescriptions Disp Refills    olmesartan (BENICAR) 20 mg tablet 90 Tab 3     Sig: Take 1 Tab by mouth daily.        Prior labs and Blood pressures:  BP Readings from Last 3 Encounters:   05/01/19 116/80   04/26/19 118/72   04/25/19 112/64     Lab Results   Component Value Date/Time    Sodium 139 04/14/2019 04:13 AM    Potassium 4.2 04/14/2019 04:13 AM    Chloride 107 04/14/2019 04:13 AM    CO2 27 04/14/2019 04:13 AM    Anion gap 5 04/14/2019 04:13 AM    Glucose 106 (H) 04/14/2019 04:13 AM    BUN 23 (H) 04/14/2019 04:13 AM    Creatinine 1.25 04/14/2019 04:13 AM    BUN/Creatinine ratio 18 04/14/2019 04:13 AM    GFR est AA >60 04/14/2019 04:13 AM    GFR est non-AA 55 (L) 04/14/2019 04:13 AM    Calcium 8.8 04/14/2019 04:13 AM     Lab Results   Component Value Date/Time    Hemoglobin A1c 6.2 04/13/2019 04:34 AM     Lab Results   Component Value Date/Time    Cholesterol, total 173 04/13/2019 11:23 AM    Cholesterol (POC) 171.0 09/08/2017 10:42 AM    HDL Cholesterol 28 04/13/2019 11:23 AM    HDL Cholesterol (POC) 36.0 09/08/2017 10:42 AM    LDL Cholesterol (POC) 70.4 09/08/2017 10:42 AM    LDL, calculated 84.6 04/13/2019 11:23 AM    VLDL, calculated 60.4 04/13/2019 11:23 AM    Triglyceride 302 (H) 04/13/2019 11:23 AM    Triglycerides (POC) 323.0 (A) 09/08/2017 10:42 AM    CHOL/HDL Ratio 6.2 (H) 04/13/2019 11:23 AM     No results found for: TONI Kelly    Lab Results   Component Value Date/Time    TSH 4.15 (H) 04/13/2019 04:34 AM

## 2019-05-03 VITALS
SYSTOLIC BLOOD PRESSURE: 118 MMHG | OXYGEN SATURATION: 95 % | DIASTOLIC BLOOD PRESSURE: 78 MMHG | HEART RATE: 73 BPM | TEMPERATURE: 97.9 F | RESPIRATION RATE: 18 BRPM

## 2019-05-03 PROCEDURE — 3331090001 HH PPS REVENUE CREDIT

## 2019-05-03 PROCEDURE — 3331090002 HH PPS REVENUE DEBIT

## 2019-05-04 PROCEDURE — 3331090002 HH PPS REVENUE DEBIT

## 2019-05-04 PROCEDURE — 3331090001 HH PPS REVENUE CREDIT

## 2019-05-05 PROCEDURE — 3331090001 HH PPS REVENUE CREDIT

## 2019-05-05 PROCEDURE — 3331090002 HH PPS REVENUE DEBIT

## 2019-05-06 ENCOUNTER — HOME CARE VISIT (OUTPATIENT)
Dept: SCHEDULING | Facility: HOME HEALTH | Age: 84
End: 2019-05-06
Payer: MEDICARE

## 2019-05-06 VITALS
RESPIRATION RATE: 18 BRPM | TEMPERATURE: 98.5 F | HEART RATE: 62 BPM | OXYGEN SATURATION: 93 % | SYSTOLIC BLOOD PRESSURE: 130 MMHG | DIASTOLIC BLOOD PRESSURE: 60 MMHG

## 2019-05-06 PROCEDURE — 3331090002 HH PPS REVENUE DEBIT

## 2019-05-06 PROCEDURE — 3331090001 HH PPS REVENUE CREDIT

## 2019-05-06 PROCEDURE — G0152 HHCP-SERV OF OT,EA 15 MIN: HCPCS

## 2019-05-07 PROCEDURE — 3331090002 HH PPS REVENUE DEBIT

## 2019-05-07 PROCEDURE — 3331090001 HH PPS REVENUE CREDIT

## 2019-05-08 PROCEDURE — 3331090002 HH PPS REVENUE DEBIT

## 2019-05-08 PROCEDURE — 3331090001 HH PPS REVENUE CREDIT

## 2019-05-09 ENCOUNTER — HOSPITAL ENCOUNTER (OUTPATIENT)
Dept: PREADMISSION TESTING | Age: 84
Discharge: HOME | End: 2019-05-09
Attending: ORTHOPAEDIC SURGERY
Payer: MEDICARE

## 2019-05-09 VITALS
HEART RATE: 69 BPM | HEIGHT: 68 IN | RESPIRATION RATE: 20 BRPM | DIASTOLIC BLOOD PRESSURE: 54 MMHG | SYSTOLIC BLOOD PRESSURE: 143 MMHG | WEIGHT: 231.48 LBS | BODY MASS INDEX: 35.08 KG/M2 | TEMPERATURE: 97.9 F | OXYGEN SATURATION: 92 %

## 2019-05-09 DIAGNOSIS — F51.01 PRIMARY INSOMNIA: ICD-10-CM

## 2019-05-09 LAB
25(OH)D3 SERPL-MCNC: 13.7 NG/ML (ref 30–100)
ABO + RH BLD: NORMAL
ALBUMIN SERPL-MCNC: 3.4 G/DL (ref 3.5–5)
ALBUMIN/GLOB SERPL: 0.8 {RATIO} (ref 1.1–2.2)
ALP SERPL-CCNC: 145 U/L (ref 45–117)
ALT SERPL-CCNC: 98 U/L (ref 12–78)
ANION GAP SERPL CALC-SCNC: 8 MMOL/L (ref 5–15)
APPEARANCE UR: CLEAR
AST SERPL-CCNC: 64 U/L (ref 15–37)
BACTERIA URNS QL MICRO: NEGATIVE /HPF
BILIRUB SERPL-MCNC: 0.6 MG/DL (ref 0.2–1)
BILIRUB UR QL: NEGATIVE
BLOOD GROUP ANTIBODIES SERPL: NORMAL
BUN SERPL-MCNC: 22 MG/DL (ref 6–20)
BUN/CREAT SERPL: 15 (ref 12–20)
CALCIUM SERPL-MCNC: 8.5 MG/DL (ref 8.5–10.1)
CHLORIDE SERPL-SCNC: 109 MMOL/L (ref 97–108)
CO2 SERPL-SCNC: 24 MMOL/L (ref 21–32)
COLOR UR: NORMAL
CREAT SERPL-MCNC: 1.43 MG/DL (ref 0.7–1.3)
EPITH CASTS URNS QL MICRO: NORMAL /LPF
ERYTHROCYTE [DISTWIDTH] IN BLOOD BY AUTOMATED COUNT: 14.5 % (ref 11.5–14.5)
EST. AVERAGE GLUCOSE BLD GHB EST-MCNC: 126 MG/DL
GLOBULIN SER CALC-MCNC: 4.2 G/DL (ref 2–4)
GLUCOSE SERPL-MCNC: 149 MG/DL (ref 65–100)
GLUCOSE UR STRIP.AUTO-MCNC: NEGATIVE MG/DL
HBA1C MFR BLD: 6 % (ref 4.2–6.3)
HCT VFR BLD AUTO: 47 % (ref 36.6–50.3)
HGB BLD-MCNC: 15.6 G/DL (ref 12.1–17)
HGB UR QL STRIP: NEGATIVE
HYALINE CASTS URNS QL MICRO: NORMAL /LPF (ref 0–5)
INR PPP: 1.3 (ref 0.9–1.1)
KETONES UR QL STRIP.AUTO: NEGATIVE MG/DL
LEUKOCYTE ESTERASE UR QL STRIP.AUTO: NEGATIVE
MCH RBC QN AUTO: 32.6 PG (ref 26–34)
MCHC RBC AUTO-ENTMCNC: 33.2 G/DL (ref 30–36.5)
MCV RBC AUTO: 98.1 FL (ref 80–99)
NITRITE UR QL STRIP.AUTO: NEGATIVE
NRBC # BLD: 0 K/UL (ref 0–0.01)
NRBC BLD-RTO: 0 PER 100 WBC
PH UR STRIP: 5.5 [PH] (ref 5–8)
PLATELET # BLD AUTO: 196 K/UL (ref 150–400)
PMV BLD AUTO: 10.7 FL (ref 8.9–12.9)
POTASSIUM SERPL-SCNC: 3.7 MMOL/L (ref 3.5–5.1)
PREALB SERPL-MCNC: 17.3 MG/DL (ref 20–40)
PROT SERPL-MCNC: 7.6 G/DL (ref 6.4–8.2)
PROT UR STRIP-MCNC: NEGATIVE MG/DL
PROTHROMBIN TIME: 12.7 SEC (ref 9–11.1)
RBC # BLD AUTO: 4.79 M/UL (ref 4.1–5.7)
RBC #/AREA URNS HPF: NORMAL /HPF (ref 0–5)
SODIUM SERPL-SCNC: 141 MMOL/L (ref 136–145)
SP GR UR REFRACTOMETRY: 1.01 (ref 1–1.03)
SPECIMEN EXP DATE BLD: NORMAL
UA: UC IF INDICATED,UAUC: NORMAL
UROBILINOGEN UR QL STRIP.AUTO: 0.2 EU/DL (ref 0.2–1)
WBC # BLD AUTO: 6 K/UL (ref 4.1–11.1)
WBC URNS QL MICRO: NORMAL /HPF (ref 0–4)

## 2019-05-09 PROCEDURE — 83036 HEMOGLOBIN GLYCOSYLATED A1C: CPT

## 2019-05-09 PROCEDURE — 36415 COLL VENOUS BLD VENIPUNCTURE: CPT

## 2019-05-09 PROCEDURE — 97161 PT EVAL LOW COMPLEX 20 MIN: CPT

## 2019-05-09 PROCEDURE — 84134 ASSAY OF PREALBUMIN: CPT

## 2019-05-09 PROCEDURE — 97116 GAIT TRAINING THERAPY: CPT

## 2019-05-09 PROCEDURE — 81001 URINALYSIS AUTO W/SCOPE: CPT

## 2019-05-09 PROCEDURE — 85610 PROTHROMBIN TIME: CPT

## 2019-05-09 PROCEDURE — 85027 COMPLETE CBC AUTOMATED: CPT

## 2019-05-09 PROCEDURE — 86900 BLOOD TYPING SEROLOGIC ABO: CPT

## 2019-05-09 PROCEDURE — 80053 COMPREHEN METABOLIC PANEL: CPT

## 2019-05-09 PROCEDURE — 82306 VITAMIN D 25 HYDROXY: CPT

## 2019-05-09 RX ORDER — SODIUM CHLORIDE, SODIUM LACTATE, POTASSIUM CHLORIDE, CALCIUM CHLORIDE 600; 310; 30; 20 MG/100ML; MG/100ML; MG/100ML; MG/100ML
25 INJECTION, SOLUTION INTRAVENOUS CONTINUOUS
Status: CANCELLED | OUTPATIENT
Start: 2019-05-13

## 2019-05-09 RX ORDER — ACETAMINOPHEN 10 MG/ML
1000 INJECTION, SOLUTION INTRAVENOUS ONCE
Status: CANCELLED | OUTPATIENT
Start: 2019-05-13 | End: 2019-05-13

## 2019-05-09 RX ORDER — CALC/MAG/B COMPLEX/D3/HERB 61
15 TABLET ORAL
COMMUNITY
End: 2019-09-28

## 2019-05-09 RX ORDER — CEFAZOLIN SODIUM/WATER 2 G/20 ML
2 SYRINGE (ML) INTRAVENOUS ONCE
Status: CANCELLED | OUTPATIENT
Start: 2019-05-10 | End: 2019-05-10

## 2019-05-09 RX ORDER — PREGABALIN 150 MG/1
150 CAPSULE ORAL ONCE
Status: CANCELLED | OUTPATIENT
Start: 2019-05-13 | End: 2019-05-13

## 2019-05-09 RX ORDER — DULOXETIN HYDROCHLORIDE 30 MG/1
30 CAPSULE, DELAYED RELEASE ORAL DAILY
COMMUNITY
End: 2019-08-01 | Stop reason: SDUPTHER

## 2019-05-09 NOTE — TELEPHONE ENCOUNTER
Pharmacy on file verified    Requested Prescriptions     Pending Prescriptions Disp Refills    amLODIPine (NORVASC) 10 mg tablet 30 Tab 0       LOV 4/26/19  NOV 7/12/19  LF 4/4/19

## 2019-05-09 NOTE — PROGRESS NOTES
[facility] Physical Therapy Pre-surgery evaluation [address] [facility], [address] [facility] physical Therapy pre SPINE surgery EVALUATION Patient:Del Shafer (80 y.o. male) Date: 5/9/2019 L2 lateral fusion, L2-5 posterior discectomy and fusion Precautions: spinal precautions, log roll, back brace ASSESSMENT : 
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 Discussed anticipated disposition to home with possible discharge within a 1 to 2 day time frame post-surgery. Patient and  in agreement. Anticipate patient will need acute PT orders based on impaired gait and pain. GOALS: (Goals have been discussed and agreed upon with patient.) DISCHARGE GOALS: Time Frame: 1 DAY 1. Patient will demonstrate increased strength, range of motion, and pain control via a home exercise program in order to minimize functional deficits in preparation for their upcoming surgery. This will be achieved by using education, demonstration and through the use of an informational handout including a home exercise program. 
REHABILITATION POTENTIAL FOR STATED GOALS: Good RECOMMENDATIONS AND PLANNED INTERVENTIONS: (Benefits and precautions of physical therapy have been discussed with the patient.) 1. Home Exercise Program 
TREATMENT PLAN EFFECTIVE DATES: 5/9/2019 to 5/9/2019 FREQUENCY/DURATION: Patient to continue to perform home exercise program at least twice daily until surgery. SUBJECTIVE:  
Patient stated the pain makes me short of breath.  OBJECTIVE DATA SUMMARY:  
HISTORY:   
Past Medical History:  
Diagnosis Date  Adverse effect of anesthesia   
 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  
Procedure Laterality Date [address] [facility] Cardiac Stents  CARDIAC SURG PROCEDURE UNLIST  04/2019 EF 61-65%  HX HERNIA REPAIR    
 RIH  
 HX HERNIA REPAIR  55/09/63  
 open umbilical hernia repair mesh  HX UROLOGICAL HYDROCOLECTOMY Prior Level of Function/Home Situation: Pt reports [place], but has to rest after prolonged walking due to pain in back and BLE. Pt reports still doing yard work and using a riding more. Pt hasn't driven in 3 weeks per orders from neurologist due to having a TIA in early April. Pt reports that he hasn't had any falls and hasn't had any previous back surgeries. Personal factors and/or comorbidities impacting plan of care: none Home Situation Home Environment: Private residence # Steps to Enter: 1 Rails to Enter: No 
One/Two Story Residence: One story Living Alone: No 
Support Systems: Friends \ neighbors, Family member(s) Patient Expects to be Discharged to[de-identified] Private residence Current DME Used/Available at Home: Blood pressure cuff(Pulse oximeter) Tub or Shower Type: Shower EXAMINATION/PRESENTATION/DECISION MAKING:  
 
ADLs (Current Functional Status): Bathing/Showering:  
[x] Independent 
[] Requires Assistance from Someone [] Sponge Bath Only Ambulation: 
[x] Independent 
[] Walk Indoors Only 
[] Walk Outdoors [] Use Assistive Device [] Use Wheelchair Only Dressing: 
[x] Independent Requires Assistance from Someone for: 
[] Sock/Shoes 
[] Pants 
[] Everything Household Activities: 
[x] Routine house and yard work 
[] Light Housework Only 
[] None Critical Behavior: 
  
  
  
  
 
Strength:   
 Strength: Within functional limits Tone & Sensation:  
Tone: Normal 
  
  
  
  
Sensation: Impaired(numbness and tingling to B knee) Range Of Motion: 
 
 AROM: Within functional limits PROM: Within functional limits Coordination: 
 Coordination: Within functional limits Functional Mobility: 
Transfers: 
Sit to Stand: Independent Stand to Sit: Independent Balance:  
Sitting: Intact Standing: Intact Ambulation/Gait Training: 
Distance (ft): 150 Feet (ft) Gait Description (WDL): Exceptions to [facility] Base of Support: Widened Functional Measure: 
10 Meter walk test:  (Specify if any supplemental oxygen is used, the type, pre, during and post sats.) Trial 1 (Time to Walk 10 Meters): 13.56 Seconds Trial 2 (Time to Walk 10 Meters): 13.21 Seconds Trial 3 (Time to Walk 10 Meters): 14.66 Seconds Average : 13.8 Seconds Score (Meters/Second): 0.7 Meters/Second Walking Speed (m/s) Modifier Scale Age 52-63 Age 61-76 Age 66-77 Age 80-80 Male Female Male Female Male Female Male Female CH 
 0% Impaired ? 1.39 ? 1.40 ? 1.36 ? 1.30 ? 1.33 ? 1.27 ? 1.21 ? 1.15  
CI  
1-19% Impaired 1.11-1.38 1.12-1.39 1.09-1.35 1.04-1.29 1.06-1.32 1.01-1.26 0.96-1.20 0.92-1.14 CJ  
20-39% Impaired 0.83-1.10 0.84-1.11 0.82-1.08 0.78-1.03 0.80-1.05 0.76-1.00 0.72-0.95 0.69-0.91 CK  
40-59% Impaired 0.56-0.82 0.57-0.83 0.54-0.81 0.52-0.77 0.53-0.79 0.51-0.75 0.48-0.71 0.46-0.68  
CL  
 60-79% Impaired 0.28-0.55 0.28-0.56 0.27-0.53 0.26-0.51 0.27-0.52 0.25-0.50 0.24-0.49 0.23-0.45 CM 
 80-99% Impaired 0.01-0.28 < 0.01-0.28 < 0.01-0.27 < 0.01-0.26 0.01-0.27 0.01-0.24 0.01-0.23 0.01-0.22  
CN  
100% Impaired Cannot Perform Minimal Detectable Change (MDC-90) = 0.1 m/s Viviana EID. \"Comfortable and maximum walking speed of adults aged 20-79 years: reference values and determinants. \" Age and Agin Volume 26(1):15-9. Francesca Gallegos. \"Age- and gender-related test performance in community-dwelling elderly people: Six-Minute Walk Test, Copeland Balance Scale, Timed Up & Go Test, and gait speeds. \" Physical Therapy: 2002 Volume 82(2):128-37. Sara Ramirez DM, Anisha PW, Morenita Figueroa JD, [facility] SONIA. \"Assessing stability and change of four performance measures: a longitudinal study evaluating outcome following total hip and knee arthroplasty. \" [facility] Musculoskeletal Disorders: 2005 Volume 6(3). Sarbjit Little, PhD; Kimberly Watson, PhD. Tunde Parsons Paper: \"Walking Speed: the Sixth Vital Sign\" Journal of Geriatric Physical Therapy: 2009 - Volume 32 - Issue 2 - p 25 . Pain: 
Pain Scale 1: Numeric (0 - 10) Pain Intensity 1: 4-8 Pain Location 1: Back Pain Orientation 1: Lower Pain Description 1: Lonn Patsy Radicular pain: BLE  To knees Activity Tolerance:  
Good Patient [x]   does  []   does not demonstrate signs/symptoms of shortness of breath/dyspnea on exertion/respiratory distress. COMMUNICATION/EDUCATION:  
The patient was educated on: 
[x]         Early post operative mobility is imperative to achieve a patient's desired outcomes and to restore biological function. [x]         Post operative spinal precautions may/may not be applicable. These precautions are based on the patient's physician and the procedure(s) performed. [x]         Spinal precautions including: ·   No bending forward, sideways, or backwards ·   No twisting ·   No lifting more than 5-10 pounds ·   No sitting longer than 30-60 minutes at a time ·   Gael brace when out of bed and mobilizing The patients plan of care was discussed as follows:  
[x]         The patient verbalized understanding of his/her plan in preparation for their upcoming surgery 
[]         The patient's  was present for this session 
[]        The patient reports that he/she does not have a  identified at this time 
[]         The  verbalized understanding of the education regarding the patient's upcoming surgery 
[]         Patient/family agree to work toward stated goals and plan of care. []         Patient understands intent and goals of therapy, but is neutral about his/her participation. []         Patient is unable to participate in goal setting and plan of care.  
 
 
Thank you for this referral. 
Cheryl Grullon, PT

## 2019-05-09 NOTE — PERIOP NOTES
Incentive Patrice Gannon Using the incentive spirometer helps expand the small air sacs of your lungs, helps you breathe deeply, and helps improve your lung function. Use your incentive spirometer twice a day (10 breaths each time) prior to surgery. How to Use Your Incentive Spirometer: 1. Hold the incentive spirometer in an upright position. 2. Breathe out as usual.  
3. Place the mouthpiece in your mouth and seal your lips tightly around it. 4. Take a deep breath. Breathe in slowly and as deeply as possible. Keep the blue flow rate guide between the arrows. 5. Hold your breath as long as possible. Then exhale slowly and allow the piston to fall to the bottom of the column. 6. Rest for a few seconds and repeat steps one through five at least 10 times. PAT Tidal Volume___1250_______________  x________2________  Date_5/9/2019______________________ BRING THE INCENTIVE SPIROMETER WITH YOU TO THE HOSPITAL ON THE DAY OF YOUR SURGERY. Opportunity given to ask and answer questions as well as to observe return demonstration. Patient signature_____________________________    Tushar Koehler RN___________________________

## 2019-05-09 NOTE — PERIOP NOTES
[facility] Preoperative Instructions Surgery Date 5/13/2019          Time of Arrival 12:30 PM 
 
1. On the day of your surgery, please report to the Surgical Services Registration Desk and sign in at your designated time. The Surgery Center is located to the right of the Emergency Room. 2. You must have someone with you to drive you home. You should not drive a car for 24 hours following surgery. Please make arrangements for a friend or family member to stay with you for the first 24 hours after your surgery. 3. Do not have anything to eat or drink (including water, gum, mints, coffee, juice) after midnight 5/12/2019?? Sudie Mar ? This may not apply to medications prescribed by your physician. ?(Please note below the special instructions with medications to take the morning of your procedure.) 4. We recommend you do not drink any alcoholic beverages for 24 hours before and after your surgery. 5. Contact your surgeons office for instructions on the following medications: non-steroidal anti-inflammatory drugs (i.e. Advil, Aleve), vitamins, and supplements. (Some surgeons will want you to stop these medications prior to surgery and others may allow you to take them) **If you are currently taking Plavix, Coumadin, Aspirin and/or other blood-thinning agents, contact your surgeon for instructions. ** Your surgeon will partner with the physician prescribing these medications to determine if it is safe to stop or if you need to continue taking. Please do not stop taking these medications without instructions from your surgeon 6. Wear comfortable clothes. Wear glasses instead of contacts. Do not bring any money or jewelry. Please bring picture ID, insurance card, and any prearranged co-payment or hospital payment. Do not wear make-up, particularly mascara the morning of your surgery.   Do not wear nail polish, particularly if you are having foot /hand surgery. Wear your hair loose or down, no ponytails, buns, keenan pins or clips. All body piercings must be removed. Please shower with antibacterial soap for three consecutive days before and on the morning of surgery, but do not apply any lotions, powders or deodorants after the shower on the day of surgery. Please use a fresh towels after each shower. Please sleep in clean clothes and change bed linens the night before surgery. Please do not shave for 48 hours prior to surgery. Shaving of the face is acceptable. 7. You should understand that if you do not follow these instructions your surgery may be cancelled. If your physical condition changes (I.e. fever, cold or flu) please contact your surgeon as soon as possible. 8. It is important that you be on time. If a situation occurs where you may be late, please call [phone] (OR Holding Area). 9. If you have any questions and or problems, please call [phone] (Pre-admission Testing). 10. Your surgery time may be subject to change. You will receive a phone call the evening prior if your time changes. 11.  If having outpatient surgery, you must have someone to drive you here, stay with you during the duration of your stay, and to drive you home at time of discharge. 12.   In an effort to improve the efficiency, privacy, and safety for all of our Pre-op patients visitors are not allowed in the Holding area. Once you arrive and are registered your family/visitors will be asked to remain in the waiting room. The Pre-op staff will get you from the Surgical Waiting Area and will explain to you and your family/visitors that the Pre-op phase is beginning. The staff will answer any questions and provide instructions for tracking of the patient, by use of the existing tracking number and color-coded status board in the waiting room.   At this time the staff will also ask for your designated spokesperson information in the event that the physician or staff need to provide an update or obtain any pertinent information. The designated spokesperson will be notified if the physician needs to speak to family during the pre-operative phase. If at any time your family/visitors has questions or concerns they may approach the volunteer desk in the waiting area for assistance. Special Instructions: 
 
Bring your CPAP machine with you to the hospital to have available if needed Use your Incentive Spirometer at home prior to surgery Bring Incentive Spirometer and Notebook with you to the hospital the day of surgery. Use your Anoro-Ellipta  Inahler the morning of surgery and bring it with you to the hospital. 
 
 Drink your pre-Surgery drink the night before surgery and the morning of surgery as prescribed. Stop Plavix and Vitamin Supplements  per surgeon instructions. MEDICATIONS TO TAKE THE MORNING OF SURGERY WITH A SIP OF WATER: 
Anoro-Ellipta Inhaler, Allopurinol, Amlodipine,Atenolol,Duloxetine, Lasix, ADDENDUM; PT'S WIFE CALLED AND REPORTED PT IS ALSO TAKING LANSOPRAZOLE 15MG , instructed to also take lansoprazole the day of surgery I understand a pre-operative phone call will be made to verify my surgery time. In the event that I am not available, I give permission for a message to be left on my answering service and/or with another person? Yes [phone] 
 
 
 
 ___________________      __________   _________ 
  (Signature of Patient)             (Witness)                (Date and Time)

## 2019-05-09 NOTE — ADVANCED PRACTICE NURSE
Pt in for PAT assessment prior to planned surgery with Dr. Héctor Hinds 5/13/19. PMHx reviewed, recent admission for TIA noted. Neurology consult reviewed w/ recommended 2 week FU after DC from hospital.  Pt has FU appt scheduled for 5/20/19. PC to Bryce Anderson, assistant to Dr. Héctor Hinds regarding recent admission for TIA which she states pt had not reported to surgeon. Bryce Anderson to review recent admission, recommendation for FU with neurology for 2 weeks with Dr. Héctor Hinds and call back with further recommendations for pt regarding planned surgery.

## 2019-05-09 NOTE — PERIOP NOTES
Orthopedic and Spine Patients: Instructions on When You Can Eat or Drink Before Surgery You have been provided 2 pre-surgery drinks received at your pre-admission testing appointment. ? Night before surgery: 
o You should drink one pre-surgery drink at bedtime. No food after midnight!   
? Day of Surgery: 
o Complete 2nd pre-surgery drink 1 hour prior to arrival at hospital. 
For questions call Pre-Admission Testing at [phone]. They are available from 8:00am-5:00pm, Monday through Friday.

## 2019-05-09 NOTE — PERIOP NOTES
PC from pt's wife to verify all Mr [employer] current medications. PTA Medication List updated in [facility] . Instruction give for Mr Chris Martin to also take his Lansoprazole the morning of surgery. Mrs Chris Martin able to verbalize instructions without difficulty.  Jose M CHICAS

## 2019-05-09 NOTE — PERIOP NOTES
Preventing Infections Before and After  Your SurgeryIMPORTANT INSTRUCTIONSPlease read and follow these instructions carefully. If you are unable to comply with the below instructions your procedure will be cancelled. Every Night for Three (3) nights before your surgery: 1. Shower with an antibacterial soap, such as Dial, or the soap provided at your preassessment appointment. A shower is better than a bath for cleaning your skin. 2. If needed, ask someone to help you reach all areas of your body. Dont forget to clean your belly button with every shower. The night before your surgery: If you lose your Hibiclens please contact surgery center or you can purchase it at a local pharmacy 1. On the night before your surgery, shower with an antibacterial soap, such as Dial, or the soap provided at your preassessment appointment. 2. With one packet of Hibiclens in hand, turn water off. 
3. Apply Hibiclens antiseptic skin cleanser with a clean, freshly washed washcloth. ? Gently apply to your body from chin to toes (except the genital area) and especially the area(s) where your incision(s) will be. ? Leave Hibiclens on your skin for at least 20 seconds. CAUTION: If needed, Hibiclens may be used to clean the folds of skin of the legs (such as in the area of the groin) and on your buttocks and hips. However, do not use Hibiclens above the neck or in the genital area (your bottom) or put inside any area of your body. 4. Turn the water back on and rinse. 5. Dry gently with a clean, freshly washed towel. 6. After your shower, do not use any powder, deodorant, perfumes or lotion. 7. Use clean, freshly washed towels and washcloths every time you shower. 8. Wear clean, freshly washed pajamas to bed the night before surgery. 9. Sleep on clean, freshly washed sheets. 10. Do not allow pets to sleep in your bed with you. The Morning of your surgery: 1. Shower again thoroughly with an antibacterial soap, such as Dial or the soap provided at your preassessment appointment. If needed, ask someone for help to reach all areas of your body. Dont forget to clean your belly button! Rinse. 2. Dry gently with a clean, freshly washed towel. 3. After your shower, do not use any powder, deodorant, perfumes or lotion prior to surgery. 4. Put on clean, freshly washed clothing. Tips to help prevent infections after your surgery: 1. Protect your surgical wound from germs: 
? Hand washing is the most important thing you and your caregivers can do to prevent infections. ? Keep your bandage clean and dry! ? Do not touch your surgical wound. 2. Use clean, freshly washed towels and washcloths every time you shower; do not share bath linens with others. 3. Until your surgical wound is healed, wear clothing and sleep on bed linens each day that are clean and freshly washed. 4. Do not allow pets to sleep in your bed with you or touch your surgical wound. 5. Do not smoke  smoking delays wound healing. This may be a good time to stop smoking. 6. If you have diabetes, it is important for you to manage your blood sugar levels properly before your surgery as well as after your surgery. Poorly managed blood sugar levels slow down wound healing and prevent you from healing completely. If you lose your Hibiclens, please call the [facility], or it is available for purchase at your pharmacy. ___________________      ___________________      ________________ 
(Signature of Patient)          (Witness)                   (Date and Time)

## 2019-05-10 ENCOUNTER — DOCUMENTATION ONLY (OUTPATIENT)
Dept: INTERNAL MEDICINE CLINIC | Age: 84
End: 2019-05-10

## 2019-05-10 LAB
BACTERIA SPEC CULT: ABNORMAL
BACTERIA SPEC CULT: ABNORMAL
SERVICE CMNT-IMP: ABNORMAL

## 2019-05-10 RX ORDER — AMLODIPINE BESYLATE 10 MG/1
TABLET ORAL
Qty: 30 TAB | Refills: 6 | Status: SHIPPED | OUTPATIENT
Start: 2019-05-10 | End: 2019-06-10

## 2019-05-10 NOTE — PERIOP NOTES
PC from Bryce Anderson at Dr Héctor Hinds office stating Mupirocin 2% Nasal Ointment called into pharmacy for pt. Medication added to pt PTA Medication list in [facility].  Kody CHICAS

## 2019-05-10 NOTE — PERIOP NOTES
Preliminary MRSA Nasal CX results faxed to Dr Vasile Mehta marked URGENT. Request TX Plan be faxed back ASAP. Confirmation Fax received.  Duffy Mortimer RN

## 2019-05-13 ENCOUNTER — ANESTHESIA EVENT (OUTPATIENT)
Dept: SURGERY | Age: 84
DRG: 453 | End: 2019-05-13
Payer: MEDICARE

## 2019-05-13 ENCOUNTER — APPOINTMENT (OUTPATIENT)
Dept: GENERAL RADIOLOGY | Age: 84
DRG: 453 | End: 2019-05-13
Attending: ORTHOPAEDIC SURGERY
Payer: MEDICARE

## 2019-05-13 ENCOUNTER — ANESTHESIA (OUTPATIENT)
Dept: SURGERY | Age: 84
DRG: 453 | End: 2019-05-13
Payer: MEDICARE

## 2019-05-13 ENCOUNTER — APPOINTMENT (OUTPATIENT)
Dept: GENERAL RADIOLOGY | Age: 84
DRG: 453 | End: 2019-05-13
Attending: ANESTHESIOLOGY
Payer: MEDICARE

## 2019-05-13 ENCOUNTER — HOSPITAL ENCOUNTER (INPATIENT)
Age: 84
LOS: 8 days | Discharge: NURSING, REHAB OR LONG-TERM CARE | DRG: 453 | End: 2019-05-21
Attending: ORTHOPAEDIC SURGERY | Admitting: ORTHOPAEDIC SURGERY
Payer: MEDICARE

## 2019-05-13 DIAGNOSIS — Z98.1 S/P LUMBAR SPINAL FUSION: Primary | ICD-10-CM

## 2019-05-13 PROBLEM — M48.061 SPINAL STENOSIS OF LUMBAR REGION AT MULTIPLE LEVELS: Status: ACTIVE | Noted: 2019-05-13

## 2019-05-13 LAB
ANION GAP BLD CALC-SCNC: 18 MMOL/L (ref 10–20)
BUN BLD-MCNC: 26 MG/DL (ref 9–20)
CA-I BLD-MCNC: 1.1 MMOL/L (ref 1.12–1.32)
CHLORIDE BLD-SCNC: 100 MMOL/L (ref 98–107)
CO2 BLD-SCNC: 28 MMOL/L (ref 21–32)
CREAT BLD-MCNC: 1.5 MG/DL (ref 0.6–1.3)
GLUCOSE BLD STRIP.AUTO-MCNC: 163 MG/DL (ref 65–100)
GLUCOSE BLD-MCNC: 166 MG/DL (ref 65–100)
HCT VFR BLD AUTO: 42.1 % (ref 36.6–50.3)
HCT VFR BLD CALC: 45 % (ref 36.6–50.3)
HGB BLD-MCNC: 14.6 G/DL (ref 12.1–17)
POTASSIUM BLD-SCNC: 4.1 MMOL/L (ref 3.5–5.1)
SERVICE CMNT-IMP: ABNORMAL
SERVICE CMNT-IMP: ABNORMAL
SODIUM BLD-SCNC: 140 MMOL/L (ref 136–145)

## 2019-05-13 PROCEDURE — 77030038844 HC GRFT DMB NEVOS BIOE -G1: Performed by: ORTHOPAEDIC SURGERY

## 2019-05-13 PROCEDURE — 76010000171 HC OR TIME 2 TO 2.5 HR INTENSV-TIER 1: Performed by: ORTHOPAEDIC SURGERY

## 2019-05-13 PROCEDURE — 77030003029 HC SUT VCRL J&J -B: Performed by: ORTHOPAEDIC SURGERY

## 2019-05-13 PROCEDURE — 77030033138 HC SUT PGA STRATFX J&J -B: Performed by: ORTHOPAEDIC SURGERY

## 2019-05-13 PROCEDURE — 74011250636 HC RX REV CODE- 250/636

## 2019-05-13 PROCEDURE — 77030018836 HC SOL IRR NACL ICUM -A: Performed by: ORTHOPAEDIC SURGERY

## 2019-05-13 PROCEDURE — 0ST20ZZ RESECTION OF LUMBAR VERTEBRAL DISC, OPEN APPROACH: ICD-10-PCS | Performed by: ORTHOPAEDIC SURGERY

## 2019-05-13 PROCEDURE — 07DR3ZZ EXTRACTION OF ILIAC BONE MARROW, PERCUTANEOUS APPROACH: ICD-10-PCS | Performed by: ORTHOPAEDIC SURGERY

## 2019-05-13 PROCEDURE — 77030037695 HC ALLGRFT BN VIA FORM VIVX -H: Performed by: ORTHOPAEDIC SURGERY

## 2019-05-13 PROCEDURE — 94660 CPAP INITIATION&MGMT: CPT

## 2019-05-13 PROCEDURE — 74011000250 HC RX REV CODE- 250

## 2019-05-13 PROCEDURE — 36415 COLL VENOUS BLD VENIPUNCTURE: CPT

## 2019-05-13 PROCEDURE — 74011250636 HC RX REV CODE- 250/636: Performed by: ANESTHESIOLOGY

## 2019-05-13 PROCEDURE — 74011250636 HC RX REV CODE- 250/636: Performed by: ORTHOPAEDIC SURGERY

## 2019-05-13 PROCEDURE — 77030040356 HC CORD BPLR FRCP COVD -A: Performed by: ORTHOPAEDIC SURGERY

## 2019-05-13 PROCEDURE — 71045 X-RAY EXAM CHEST 1 VIEW: CPT

## 2019-05-13 PROCEDURE — 77030018723 HC ELCTRD BLD COVD -A: Performed by: ORTHOPAEDIC SURGERY

## 2019-05-13 PROCEDURE — 72100 X-RAY EXAM L-S SPINE 2/3 VWS: CPT

## 2019-05-13 PROCEDURE — 76000 FLUOROSCOPY <1 HR PHYS/QHP: CPT

## 2019-05-13 PROCEDURE — 77030032490 HC SLV COMPR SCD KNE COVD -B: Performed by: ORTHOPAEDIC SURGERY

## 2019-05-13 PROCEDURE — 77030035129: Performed by: ORTHOPAEDIC SURGERY

## 2019-05-13 PROCEDURE — 74011250637 HC RX REV CODE- 250/637: Performed by: ORTHOPAEDIC SURGERY

## 2019-05-13 PROCEDURE — 77030039267 HC ADH SKN EXOFIN S2SG -B: Performed by: ORTHOPAEDIC SURGERY

## 2019-05-13 PROCEDURE — 82962 GLUCOSE BLOOD TEST: CPT

## 2019-05-13 PROCEDURE — C1713 ANCHOR/SCREW BN/BN,TIS/BN: HCPCS | Performed by: ORTHOPAEDIC SURGERY

## 2019-05-13 PROCEDURE — 77030029099 HC BN WAX SSPC -A: Performed by: ORTHOPAEDIC SURGERY

## 2019-05-13 PROCEDURE — 0SG10A0 FUSION OF 2 OR MORE LUMBAR VERTEBRAL JOINTS WITH INTERBODY FUSION DEVICE, ANTERIOR APPROACH, ANTERIOR COLUMN, OPEN APPROACH: ICD-10-PCS | Performed by: ORTHOPAEDIC SURGERY

## 2019-05-13 PROCEDURE — 76210000000 HC OR PH I REC 2 TO 2.5 HR: Performed by: ORTHOPAEDIC SURGERY

## 2019-05-13 PROCEDURE — 77030029251 HC SPCR SPN GLMB -K1: Performed by: ORTHOPAEDIC SURGERY

## 2019-05-13 PROCEDURE — 77030034850: Performed by: ORTHOPAEDIC SURGERY

## 2019-05-13 PROCEDURE — 85018 HEMOGLOBIN: CPT

## 2019-05-13 PROCEDURE — 77030003028 HC SUT VCRL J&J -A: Performed by: ORTHOPAEDIC SURGERY

## 2019-05-13 PROCEDURE — 77030008467 HC STPLR SKN COVD -B: Performed by: ORTHOPAEDIC SURGERY

## 2019-05-13 PROCEDURE — 74011000250 HC RX REV CODE- 250: Performed by: ANESTHESIOLOGY

## 2019-05-13 PROCEDURE — 77030025906 HC GRFT BN CUBE CNFRM MUSC -F: Performed by: ORTHOPAEDIC SURGERY

## 2019-05-13 PROCEDURE — 77030008684 HC TU ET CUF COVD -B: Performed by: NURSE ANESTHETIST, CERTIFIED REGISTERED

## 2019-05-13 PROCEDURE — 77030020061 HC IV BLD WRMR ADMIN SET 3M -B: Performed by: ANESTHESIOLOGY

## 2019-05-13 PROCEDURE — 77030019908 HC STETH ESOPH SIMS -A: Performed by: NURSE ANESTHETIST, CERTIFIED REGISTERED

## 2019-05-13 PROCEDURE — 77030014007 HC SPNG HEMSTAT J&J -B: Performed by: ORTHOPAEDIC SURGERY

## 2019-05-13 PROCEDURE — 80047 BASIC METABLC PNL IONIZED CA: CPT

## 2019-05-13 PROCEDURE — 77030020268 HC MISC GENERAL SUPPLY: Performed by: ORTHOPAEDIC SURGERY

## 2019-05-13 PROCEDURE — 77030014647 HC SEAL FBRN TISSL BAXT -D: Performed by: ORTHOPAEDIC SURGERY

## 2019-05-13 PROCEDURE — 65660000000 HC RM CCU STEPDOWN

## 2019-05-13 PROCEDURE — 77030018846 HC SOL IRR STRL H20 ICUM -A: Performed by: ORTHOPAEDIC SURGERY

## 2019-05-13 PROCEDURE — 76060000035 HC ANESTHESIA 2 TO 2.5 HR: Performed by: ORTHOPAEDIC SURGERY

## 2019-05-13 PROCEDURE — 77030026438 HC STYL ET INTUB CARD -A: Performed by: NURSE ANESTHETIST, CERTIFIED REGISTERED

## 2019-05-13 DEVICE — 5.5MM VARIABLE ANGLE SCREW, 30MM
Type: IMPLANTABLE DEVICE | Site: SPINE LUMBAR | Status: FUNCTIONAL
Brand: TRUSS

## 2019-05-13 DEVICE — GRAFT BNE SUB W17XH17XL17MM 49ML CANC CUBE DEMIN CNFRM: Type: IMPLANTABLE DEVICE | Site: SPINE LUMBAR | Status: FUNCTIONAL

## 2019-05-13 DEVICE — PLYMOUTH THORACOLUMBAR PLATE, 19MM
Type: IMPLANTABLE DEVICE | Site: SPINE LUMBAR | Status: FUNCTIONAL
Brand: PLYMOUTH

## 2019-05-13 DEVICE — GRAFT BNE SUB GRFT 5CC VIA FRM MOLD: Type: IMPLANTABLE DEVICE | Site: SPINE LUMBAR | Status: FUNCTIONAL

## 2019-05-13 DEVICE — RISE-L SPACER 22 X 60MM, 7-14MM, 3-15&DEG;
Type: IMPLANTABLE DEVICE | Site: SPINE LUMBAR | Status: FUNCTIONAL
Brand: RISE-L

## 2019-05-13 DEVICE — ALLOGRAFT BNE SPNG 50X20X7 MM CANC DBM: Type: IMPLANTABLE DEVICE | Site: SPINE LUMBAR | Status: FUNCTIONAL

## 2019-05-13 RX ORDER — MIDAZOLAM HYDROCHLORIDE 1 MG/ML
0.25 INJECTION, SOLUTION INTRAMUSCULAR; INTRAVENOUS ONCE
Status: COMPLETED | OUTPATIENT
Start: 2019-05-13 | End: 2019-05-13

## 2019-05-13 RX ORDER — SODIUM CHLORIDE 0.9 % (FLUSH) 0.9 %
5-40 SYRINGE (ML) INJECTION EVERY 8 HOURS
Status: DISCONTINUED | OUTPATIENT
Start: 2019-05-13 | End: 2019-05-21 | Stop reason: HOSPADM

## 2019-05-13 RX ORDER — NEOSTIGMINE METHYLSULFATE 1 MG/ML
INJECTION INTRAVENOUS AS NEEDED
Status: DISCONTINUED | OUTPATIENT
Start: 2019-05-13 | End: 2019-05-13 | Stop reason: HOSPADM

## 2019-05-13 RX ORDER — ACETAMINOPHEN 500 MG
1000 TABLET ORAL EVERY 6 HOURS
Status: DISCONTINUED | OUTPATIENT
Start: 2019-05-14 | End: 2019-05-21 | Stop reason: HOSPADM

## 2019-05-13 RX ORDER — ONDANSETRON 2 MG/ML
4 INJECTION INTRAMUSCULAR; INTRAVENOUS
Status: ACTIVE | OUTPATIENT
Start: 2019-05-13 | End: 2019-05-14

## 2019-05-13 RX ORDER — SODIUM CHLORIDE 9 MG/ML
125 INJECTION, SOLUTION INTRAVENOUS CONTINUOUS
Status: DISPENSED | OUTPATIENT
Start: 2019-05-13 | End: 2019-05-14

## 2019-05-13 RX ORDER — SODIUM CHLORIDE 0.9 % (FLUSH) 0.9 %
5-40 SYRINGE (ML) INJECTION EVERY 8 HOURS
Status: DISCONTINUED | OUTPATIENT
Start: 2019-05-13 | End: 2019-05-13 | Stop reason: HOSPADM

## 2019-05-13 RX ORDER — HYDROMORPHONE HYDROCHLORIDE 2 MG/ML
INJECTION, SOLUTION INTRAMUSCULAR; INTRAVENOUS; SUBCUTANEOUS AS NEEDED
Status: DISCONTINUED | OUTPATIENT
Start: 2019-05-13 | End: 2019-05-13

## 2019-05-13 RX ORDER — DEXAMETHASONE SODIUM PHOSPHATE 4 MG/ML
INJECTION, SOLUTION INTRA-ARTICULAR; INTRALESIONAL; INTRAMUSCULAR; INTRAVENOUS; SOFT TISSUE AS NEEDED
Status: DISCONTINUED | OUTPATIENT
Start: 2019-05-13 | End: 2019-05-13 | Stop reason: HOSPADM

## 2019-05-13 RX ORDER — FAMOTIDINE 20 MG/1
20 TABLET, FILM COATED ORAL 2 TIMES DAILY
Status: DISCONTINUED | OUTPATIENT
Start: 2019-05-13 | End: 2019-05-15

## 2019-05-13 RX ORDER — FENTANYL CITRATE 50 UG/ML
25 INJECTION, SOLUTION INTRAMUSCULAR; INTRAVENOUS
Status: DISCONTINUED | OUTPATIENT
Start: 2019-05-13 | End: 2019-05-13 | Stop reason: HOSPADM

## 2019-05-13 RX ORDER — OXYCODONE HYDROCHLORIDE 5 MG/1
10-15 TABLET ORAL
Status: DISCONTINUED | OUTPATIENT
Start: 2019-05-13 | End: 2019-05-21 | Stop reason: HOSPADM

## 2019-05-13 RX ORDER — DEXMEDETOMIDINE HYDROCHLORIDE 4 UG/ML
INJECTION, SOLUTION INTRAVENOUS AS NEEDED
Status: DISCONTINUED | OUTPATIENT
Start: 2019-05-13 | End: 2019-05-13 | Stop reason: HOSPADM

## 2019-05-13 RX ORDER — MUPIROCIN 20 MG/G
OINTMENT TOPICAL 2 TIMES DAILY
Status: DISCONTINUED | OUTPATIENT
Start: 2019-05-13 | End: 2019-05-21 | Stop reason: HOSPADM

## 2019-05-13 RX ORDER — PHENYLEPHRINE HYDROCHLORIDE 10 MG/ML
INJECTION INTRAVENOUS
Status: DISCONTINUED
Start: 2019-05-13 | End: 2019-05-13 | Stop reason: WASHOUT

## 2019-05-13 RX ORDER — POLYETHYLENE GLYCOL 3350 17 G/17G
17 POWDER, FOR SOLUTION ORAL DAILY
Status: DISCONTINUED | OUTPATIENT
Start: 2019-05-14 | End: 2019-05-21 | Stop reason: HOSPADM

## 2019-05-13 RX ORDER — CEFAZOLIN SODIUM/WATER 2 G/20 ML
2 SYRINGE (ML) INTRAVENOUS ONCE
Status: COMPLETED | OUTPATIENT
Start: 2019-05-13 | End: 2019-05-13

## 2019-05-13 RX ORDER — LOSARTAN POTASSIUM 50 MG/1
50 TABLET ORAL DAILY
Status: DISCONTINUED | OUTPATIENT
Start: 2019-05-14 | End: 2019-05-16

## 2019-05-13 RX ORDER — SODIUM CHLORIDE 0.9 % (FLUSH) 0.9 %
5-40 SYRINGE (ML) INJECTION AS NEEDED
Status: DISCONTINUED | OUTPATIENT
Start: 2019-05-13 | End: 2019-05-21 | Stop reason: HOSPADM

## 2019-05-13 RX ORDER — AMLODIPINE BESYLATE 5 MG/1
10 TABLET ORAL DAILY
Status: DISCONTINUED | OUTPATIENT
Start: 2019-05-14 | End: 2019-05-21 | Stop reason: HOSPADM

## 2019-05-13 RX ORDER — CYCLOBENZAPRINE HCL 10 MG
10 TABLET ORAL
Status: DISCONTINUED | OUTPATIENT
Start: 2019-05-13 | End: 2019-05-21 | Stop reason: HOSPADM

## 2019-05-13 RX ORDER — PANTOPRAZOLE SODIUM 40 MG/1
40 TABLET, DELAYED RELEASE ORAL
Status: DISCONTINUED | OUTPATIENT
Start: 2019-05-14 | End: 2019-05-20

## 2019-05-13 RX ORDER — HYDROMORPHONE HYDROCHLORIDE 1 MG/ML
0.2 INJECTION, SOLUTION INTRAMUSCULAR; INTRAVENOUS; SUBCUTANEOUS
Status: DISCONTINUED | OUTPATIENT
Start: 2019-05-13 | End: 2019-05-13 | Stop reason: HOSPADM

## 2019-05-13 RX ORDER — AMOXICILLIN 250 MG
1 CAPSULE ORAL 2 TIMES DAILY
Status: DISCONTINUED | OUTPATIENT
Start: 2019-05-13 | End: 2019-05-21 | Stop reason: HOSPADM

## 2019-05-13 RX ORDER — SUCCINYLCHOLINE CHLORIDE 20 MG/ML
INJECTION INTRAMUSCULAR; INTRAVENOUS AS NEEDED
Status: DISCONTINUED | OUTPATIENT
Start: 2019-05-13 | End: 2019-05-13 | Stop reason: HOSPADM

## 2019-05-13 RX ORDER — PREGABALIN 75 MG/1
150 CAPSULE ORAL ONCE
Status: COMPLETED | OUTPATIENT
Start: 2019-05-13 | End: 2019-05-13

## 2019-05-13 RX ORDER — LIDOCAINE HYDROCHLORIDE 10 MG/ML
0.1 INJECTION, SOLUTION EPIDURAL; INFILTRATION; INTRACAUDAL; PERINEURAL AS NEEDED
Status: DISCONTINUED | OUTPATIENT
Start: 2019-05-13 | End: 2019-05-13 | Stop reason: HOSPADM

## 2019-05-13 RX ORDER — ATORVASTATIN CALCIUM 10 MG/1
5 TABLET, FILM COATED ORAL DAILY
Status: DISCONTINUED | OUTPATIENT
Start: 2019-05-14 | End: 2019-05-21 | Stop reason: HOSPADM

## 2019-05-13 RX ORDER — ACETAMINOPHEN 10 MG/ML
1000 INJECTION, SOLUTION INTRAVENOUS ONCE
Status: COMPLETED | OUTPATIENT
Start: 2019-05-13 | End: 2019-05-13

## 2019-05-13 RX ORDER — CEFAZOLIN SODIUM/WATER 2 G/20 ML
2 SYRINGE (ML) INTRAVENOUS EVERY 8 HOURS
Status: COMPLETED | OUTPATIENT
Start: 2019-05-13 | End: 2019-05-14

## 2019-05-13 RX ORDER — HYDROMORPHONE HYDROCHLORIDE 1 MG/ML
INJECTION, SOLUTION INTRAMUSCULAR; INTRAVENOUS; SUBCUTANEOUS
Status: COMPLETED
Start: 2019-05-13 | End: 2019-05-13

## 2019-05-13 RX ORDER — MIDAZOLAM HYDROCHLORIDE 1 MG/ML
INJECTION, SOLUTION INTRAMUSCULAR; INTRAVENOUS
Status: COMPLETED
Start: 2019-05-13 | End: 2019-05-13

## 2019-05-13 RX ORDER — DULOXETIN HYDROCHLORIDE 30 MG/1
30 CAPSULE, DELAYED RELEASE ORAL DAILY
Status: DISCONTINUED | OUTPATIENT
Start: 2019-05-14 | End: 2019-05-21 | Stop reason: HOSPADM

## 2019-05-13 RX ORDER — PHENYLEPHRINE HYDROCHLORIDE 10 MG/ML
INJECTION INTRAVENOUS
Status: DISPENSED
Start: 2019-05-13 | End: 2019-05-14

## 2019-05-13 RX ORDER — ROCURONIUM BROMIDE 10 MG/ML
INJECTION, SOLUTION INTRAVENOUS AS NEEDED
Status: DISCONTINUED | OUTPATIENT
Start: 2019-05-13 | End: 2019-05-13 | Stop reason: HOSPADM

## 2019-05-13 RX ORDER — GLYCOPYRROLATE 0.2 MG/ML
INJECTION INTRAMUSCULAR; INTRAVENOUS AS NEEDED
Status: DISCONTINUED | OUTPATIENT
Start: 2019-05-13 | End: 2019-05-13 | Stop reason: HOSPADM

## 2019-05-13 RX ORDER — NALOXONE HYDROCHLORIDE 0.4 MG/ML
0.4 INJECTION, SOLUTION INTRAMUSCULAR; INTRAVENOUS; SUBCUTANEOUS AS NEEDED
Status: DISCONTINUED | OUTPATIENT
Start: 2019-05-13 | End: 2019-05-21 | Stop reason: HOSPADM

## 2019-05-13 RX ORDER — ALLOPURINOL 100 MG/1
300 TABLET ORAL DAILY
Status: DISCONTINUED | OUTPATIENT
Start: 2019-05-14 | End: 2019-05-21 | Stop reason: HOSPADM

## 2019-05-13 RX ORDER — LIDOCAINE HYDROCHLORIDE 20 MG/ML
INJECTION, SOLUTION EPIDURAL; INFILTRATION; INTRACAUDAL; PERINEURAL AS NEEDED
Status: DISCONTINUED | OUTPATIENT
Start: 2019-05-13 | End: 2019-05-13 | Stop reason: HOSPADM

## 2019-05-13 RX ORDER — GABAPENTIN 100 MG/1
100 CAPSULE ORAL 3 TIMES DAILY
Status: DISCONTINUED | OUTPATIENT
Start: 2019-05-13 | End: 2019-05-21 | Stop reason: HOSPADM

## 2019-05-13 RX ORDER — PROPOFOL 10 MG/ML
INJECTION, EMULSION INTRAVENOUS AS NEEDED
Status: DISCONTINUED | OUTPATIENT
Start: 2019-05-13 | End: 2019-05-13 | Stop reason: HOSPADM

## 2019-05-13 RX ORDER — DIAZEPAM 5 MG/1
5 TABLET ORAL
Status: DISCONTINUED | OUTPATIENT
Start: 2019-05-13 | End: 2019-05-21 | Stop reason: HOSPADM

## 2019-05-13 RX ORDER — SODIUM CHLORIDE 0.9 % (FLUSH) 0.9 %
5-40 SYRINGE (ML) INJECTION AS NEEDED
Status: DISCONTINUED | OUTPATIENT
Start: 2019-05-13 | End: 2019-05-13 | Stop reason: HOSPADM

## 2019-05-13 RX ORDER — HYDROXYZINE HYDROCHLORIDE 10 MG/1
10 TABLET, FILM COATED ORAL
Status: DISCONTINUED | OUTPATIENT
Start: 2019-05-13 | End: 2019-05-16

## 2019-05-13 RX ORDER — PHENYLEPHRINE HCL IN 0.9% NACL 30MG/250ML
10-180 PLASTIC BAG, INJECTION (ML) INTRAVENOUS
Status: DISCONTINUED | OUTPATIENT
Start: 2019-05-13 | End: 2019-05-16

## 2019-05-13 RX ORDER — ATENOLOL 50 MG/1
100 TABLET ORAL DAILY
Status: DISCONTINUED | OUTPATIENT
Start: 2019-05-14 | End: 2019-05-21 | Stop reason: HOSPADM

## 2019-05-13 RX ORDER — OXYCODONE HYDROCHLORIDE 5 MG/1
5 TABLET ORAL
Status: DISCONTINUED | OUTPATIENT
Start: 2019-05-13 | End: 2019-05-21 | Stop reason: HOSPADM

## 2019-05-13 RX ORDER — SODIUM CHLORIDE, SODIUM LACTATE, POTASSIUM CHLORIDE, CALCIUM CHLORIDE 600; 310; 30; 20 MG/100ML; MG/100ML; MG/100ML; MG/100ML
25 INJECTION, SOLUTION INTRAVENOUS CONTINUOUS
Status: DISCONTINUED | OUTPATIENT
Start: 2019-05-13 | End: 2019-05-13 | Stop reason: HOSPADM

## 2019-05-13 RX ORDER — HYDROMORPHONE HYDROCHLORIDE 1 MG/ML
1 INJECTION, SOLUTION INTRAMUSCULAR; INTRAVENOUS; SUBCUTANEOUS
Status: DISPENSED | OUTPATIENT
Start: 2019-05-13 | End: 2019-05-14

## 2019-05-13 RX ORDER — FENTANYL CITRATE 50 UG/ML
INJECTION, SOLUTION INTRAMUSCULAR; INTRAVENOUS AS NEEDED
Status: DISCONTINUED | OUTPATIENT
Start: 2019-05-13 | End: 2019-05-13

## 2019-05-13 RX ORDER — CLONAZEPAM 0.5 MG/1
0.5 TABLET ORAL
Status: DISCONTINUED | OUTPATIENT
Start: 2019-05-13 | End: 2019-05-21 | Stop reason: HOSPADM

## 2019-05-13 RX ORDER — FACIAL-BODY WIPES
10 EACH TOPICAL DAILY PRN
Status: DISCONTINUED | OUTPATIENT
Start: 2019-05-15 | End: 2019-05-21 | Stop reason: HOSPADM

## 2019-05-13 RX ORDER — FUROSEMIDE 80 MG/1
80 TABLET ORAL DAILY
Status: DISCONTINUED | OUTPATIENT
Start: 2019-05-14 | End: 2019-05-16

## 2019-05-13 RX ORDER — IPRATROPIUM BROMIDE AND ALBUTEROL SULFATE 2.5; .5 MG/3ML; MG/3ML
SOLUTION RESPIRATORY (INHALATION)
Status: COMPLETED
Start: 2019-05-13 | End: 2019-05-13

## 2019-05-13 RX ORDER — SODIUM CHLORIDE, SODIUM LACTATE, POTASSIUM CHLORIDE, CALCIUM CHLORIDE 600; 310; 30; 20 MG/100ML; MG/100ML; MG/100ML; MG/100ML
25 INJECTION, SOLUTION INTRAVENOUS CONTINUOUS
Status: DISCONTINUED | OUTPATIENT
Start: 2019-05-13 | End: 2019-05-13

## 2019-05-13 RX ORDER — EPHEDRINE SULFATE/0.9% NACL/PF 50 MG/5 ML
SYRINGE (ML) INTRAVENOUS AS NEEDED
Status: DISCONTINUED | OUTPATIENT
Start: 2019-05-13 | End: 2019-05-13 | Stop reason: HOSPADM

## 2019-05-13 RX ORDER — DIPHENHYDRAMINE HYDROCHLORIDE 50 MG/ML
12.5 INJECTION, SOLUTION INTRAMUSCULAR; INTRAVENOUS AS NEEDED
Status: DISCONTINUED | OUTPATIENT
Start: 2019-05-13 | End: 2019-05-13 | Stop reason: HOSPADM

## 2019-05-13 RX ORDER — HYDROMORPHONE HYDROCHLORIDE 1 MG/ML
0.5 INJECTION, SOLUTION INTRAMUSCULAR; INTRAVENOUS; SUBCUTANEOUS ONCE
Status: COMPLETED | OUTPATIENT
Start: 2019-05-13 | End: 2019-05-13

## 2019-05-13 RX ORDER — IPRATROPIUM BROMIDE AND ALBUTEROL SULFATE 2.5; .5 MG/3ML; MG/3ML
3 SOLUTION RESPIRATORY (INHALATION) ONCE
Status: COMPLETED | OUTPATIENT
Start: 2019-05-13 | End: 2019-05-13

## 2019-05-13 RX ADMIN — FENTANYL CITRATE 25 MCG: 50 INJECTION, SOLUTION INTRAMUSCULAR; INTRAVENOUS at 17:55

## 2019-05-13 RX ADMIN — MUPIROCIN: 20 OINTMENT TOPICAL at 22:38

## 2019-05-13 RX ADMIN — Medication 2 G: at 22:33

## 2019-05-13 RX ADMIN — PREGABALIN 150 MG: 75 CAPSULE ORAL at 14:04

## 2019-05-13 RX ADMIN — IPRATROPIUM BROMIDE AND ALBUTEROL SULFATE 3 ML: .5; 3 SOLUTION RESPIRATORY (INHALATION) at 18:18

## 2019-05-13 RX ADMIN — LIDOCAINE HYDROCHLORIDE 100 MG: 20 INJECTION, SOLUTION EPIDURAL; INFILTRATION; INTRACAUDAL; PERINEURAL at 15:31

## 2019-05-13 RX ADMIN — HYDROMORPHONE HYDROCHLORIDE 0.5 MG: 1 INJECTION, SOLUTION INTRAMUSCULAR; INTRAVENOUS; SUBCUTANEOUS at 18:45

## 2019-05-13 RX ADMIN — Medication 10 MG: at 16:16

## 2019-05-13 RX ADMIN — ACETAMINOPHEN 1000 MG: 500 TABLET ORAL at 23:57

## 2019-05-13 RX ADMIN — SUCCINYLCHOLINE CHLORIDE 100 MG: 20 INJECTION INTRAMUSCULAR; INTRAVENOUS at 15:32

## 2019-05-13 RX ADMIN — DEXMEDETOMIDINE HYDROCHLORIDE 2 MCG: 4 INJECTION, SOLUTION INTRAVENOUS at 16:19

## 2019-05-13 RX ADMIN — HYDROMORPHONE HYDROCHLORIDE 0.4 MG: 2 INJECTION, SOLUTION INTRAMUSCULAR; INTRAVENOUS; SUBCUTANEOUS at 17:17

## 2019-05-13 RX ADMIN — DEXMEDETOMIDINE HYDROCHLORIDE 8 MCG: 4 INJECTION, SOLUTION INTRAVENOUS at 15:56

## 2019-05-13 RX ADMIN — NEOSTIGMINE METHYLSULFATE 4 MG: 1 INJECTION INTRAVENOUS at 17:08

## 2019-05-13 RX ADMIN — FENTANYL CITRATE 50 MCG: 50 INJECTION, SOLUTION INTRAMUSCULAR; INTRAVENOUS at 15:31

## 2019-05-13 RX ADMIN — DEXAMETHASONE SODIUM PHOSPHATE 4 MG: 4 INJECTION, SOLUTION INTRA-ARTICULAR; INTRALESIONAL; INTRAMUSCULAR; INTRAVENOUS; SOFT TISSUE at 15:39

## 2019-05-13 RX ADMIN — HYDROMORPHONE HYDROCHLORIDE 0.5 MG: 1 INJECTION, SOLUTION INTRAMUSCULAR; INTRAVENOUS; SUBCUTANEOUS at 19:00

## 2019-05-13 RX ADMIN — FENTANYL CITRATE 25 MCG: 50 INJECTION, SOLUTION INTRAMUSCULAR; INTRAVENOUS at 16:08

## 2019-05-13 RX ADMIN — MIDAZOLAM HYDROCHLORIDE 0.25 MG: 1 INJECTION, SOLUTION INTRAMUSCULAR; INTRAVENOUS at 18:15

## 2019-05-13 RX ADMIN — SENNOSIDES AND DOCUSATE SODIUM 1 TABLET: 8.6; 5 TABLET ORAL at 21:50

## 2019-05-13 RX ADMIN — IPRATROPIUM BROMIDE AND ALBUTEROL SULFATE 3 ML: 2.5; .5 SOLUTION RESPIRATORY (INHALATION) at 18:18

## 2019-05-13 RX ADMIN — Medication 10 ML: at 21:51

## 2019-05-13 RX ADMIN — FAMOTIDINE 20 MG: 20 TABLET ORAL at 21:50

## 2019-05-13 RX ADMIN — FENTANYL CITRATE 25 MCG: 50 INJECTION, SOLUTION INTRAMUSCULAR; INTRAVENOUS at 16:10

## 2019-05-13 RX ADMIN — Medication 10 MG: at 16:04

## 2019-05-13 RX ADMIN — MEPERIDINE HYDROCHLORIDE 12.5 MG: 25 INJECTION, SOLUTION INTRAMUSCULAR; INTRAVENOUS; SUBCUTANEOUS at 17:57

## 2019-05-13 RX ADMIN — GABAPENTIN 100 MG: 100 CAPSULE ORAL at 21:50

## 2019-05-13 RX ADMIN — DEXMEDETOMIDINE HYDROCHLORIDE 2 MCG: 4 INJECTION, SOLUTION INTRAVENOUS at 16:50

## 2019-05-13 RX ADMIN — ROCURONIUM BROMIDE 25 MG: 10 INJECTION, SOLUTION INTRAVENOUS at 15:40

## 2019-05-13 RX ADMIN — DEXMEDETOMIDINE HYDROCHLORIDE 2 MCG: 4 INJECTION, SOLUTION INTRAVENOUS at 16:48

## 2019-05-13 RX ADMIN — GLYCOPYRROLATE 0.7 MG: 0.2 INJECTION INTRAMUSCULAR; INTRAVENOUS at 17:08

## 2019-05-13 RX ADMIN — ROCURONIUM BROMIDE 5 MG: 10 INJECTION, SOLUTION INTRAVENOUS at 15:31

## 2019-05-13 RX ADMIN — ROCURONIUM BROMIDE 10 MG: 10 INJECTION, SOLUTION INTRAVENOUS at 16:08

## 2019-05-13 RX ADMIN — SODIUM CHLORIDE, SODIUM LACTATE, POTASSIUM CHLORIDE, AND CALCIUM CHLORIDE 25 ML/HR: 600; 310; 30; 20 INJECTION, SOLUTION INTRAVENOUS at 14:03

## 2019-05-13 RX ADMIN — SODIUM CHLORIDE 1000 ML: 900 INJECTION, SOLUTION INTRAVENOUS at 19:09

## 2019-05-13 RX ADMIN — Medication 10 MG: at 15:43

## 2019-05-13 RX ADMIN — Medication 30 MCG/MIN: at 19:38

## 2019-05-13 RX ADMIN — MEPERIDINE HYDROCHLORIDE 12.5 MG: 25 INJECTION, SOLUTION INTRAMUSCULAR; INTRAVENOUS; SUBCUTANEOUS at 18:02

## 2019-05-13 RX ADMIN — SODIUM CHLORIDE 125 ML/HR: 900 INJECTION, SOLUTION INTRAVENOUS at 18:21

## 2019-05-13 RX ADMIN — PROPOFOL 20 MG: 10 INJECTION, EMULSION INTRAVENOUS at 16:10

## 2019-05-13 RX ADMIN — Medication 2 G: at 15:37

## 2019-05-13 RX ADMIN — PROPOFOL 140 MG: 10 INJECTION, EMULSION INTRAVENOUS at 15:31

## 2019-05-13 RX ADMIN — DEXMEDETOMIDINE HYDROCHLORIDE 2 MCG: 4 INJECTION, SOLUTION INTRAVENOUS at 17:08

## 2019-05-13 RX ADMIN — FENTANYL CITRATE 25 MCG: 50 INJECTION, SOLUTION INTRAMUSCULAR; INTRAVENOUS at 18:05

## 2019-05-13 RX ADMIN — OXYCODONE HYDROCHLORIDE 5 MG: 5 TABLET ORAL at 21:50

## 2019-05-13 RX ADMIN — PROPOFOL 20 MG: 10 INJECTION, EMULSION INTRAVENOUS at 17:16

## 2019-05-13 RX ADMIN — ROCURONIUM BROMIDE 5 MG: 10 INJECTION, SOLUTION INTRAVENOUS at 17:00

## 2019-05-13 RX ADMIN — DEXMEDETOMIDINE HYDROCHLORIDE 4 MCG: 4 INJECTION, SOLUTION INTRAVENOUS at 15:49

## 2019-05-13 RX ADMIN — ACETAMINOPHEN 1000 MG: 10 INJECTION, SOLUTION INTRAVENOUS at 14:03

## 2019-05-13 NOTE — ANESTHESIA POSTPROCEDURE EVALUATION
Procedure(s): 
L2-4LATERAL FUSION WITH BONE MARROW ASPIRATION FROM ILILAC CREST. general 
 
Anesthesia Post Evaluation Patient location during evaluation: PACU Note status: Adequate. Level of consciousness: responsive to verbal stimuli and sleepy but conscious Pain management: satisfactory to patient Airway patency: patent Anesthetic complications: no 
Cardiovascular status: acceptable Respiratory status: acceptable Hydration status: acceptable Comments: +Post-Anesthesia Evaluation and Assessment Patient: Jaydon Narayan MRN: [id number]  SSN: [id number] YOB: 1934  Age: 80 y.o. Sex: male Cardiovascular Function/Vital Signs /60   Pulse 67   Temp 36.4 °C (97.6 °F)   Resp 20   Ht 5' 8\" (1.727 m)   Wt 101.9 kg (224 lb 10.4 oz)   SpO2 95%   BMI 34.16 kg/m² Patient is status post Procedure(s): 
L2-4LATERAL FUSION WITH BONE MARROW ASPIRATION FROM ILILAC CREST. Nausea/Vomiting: Controlled. Postoperative hydration reviewed and adequate. Pain: 
Pain Scale 1: Numeric (0 - 10) (05/13/19 1930) Pain Intensity 1: 4 (05/13/19 1930) Managed. Neurological Status:  
Neuro (WDL): Exceptions to WDL (05/13/19 4130) At baseline. Mental Status and Level of Consciousness: Arousable. Pulmonary Status:  
O2 Device: CPAP mask (05/13/19 4431) Adequate oxygenation and airway patent. Complications related to anesthesia: None Post-anesthesia assessment completed. No concerns. Signed By: Fern Cole MD  
 5/13/2019 Post anesthesia nausea and vomiting:  controlled Vitals Value Taken Time /60 5/13/2019  7:45 PM  
Temp 36.4 °C (97.6 °F) 5/13/2019  5:43 PM  
Pulse 66 5/13/2019  7:51 PM  
Resp 16 5/13/2019  7:51 PM  
SpO2 95 % 5/13/2019  7:51 PM  
Vitals shown include unvalidated device data.

## 2019-05-13 NOTE — BRIEF OP NOTE
BRIEF OPERATIVE NOTE Date of Procedure: 5/13/2019 Preoperative Diagnosis: BILATERAL SCIATICA, LOW BACK PAIN, STENOSIS, SPONDYLOSIS, SCOLIOSIS Postoperative Diagnosis: BILATERAL SCIATICA, LOW BACK PAIN, STENOSIS, SPONDYLOSIS, SCOLIOSIS Procedure(s): 
L2-4LATERAL FUSION WITH BONE MARROW ASPIRATION FROM [facility] Surgeon(s) and Role: Eric Fry MD - Primary Surgical Assistant: Cinda Ortiz Surgical Staff: 
Circ-1: Rossy Cross 
Circ-Relief: Lelo West Physician Assistant: ALANA Benjamin Radiology Technician: Fletcher Pfeiffer, RT Scrub Tech-1: [facility] Scrub Tech-Relief: Abdoulaye Akers Event Time In Time Out Incision Start [phone] Incision Close Anesthesia: General  
Estimated Blood Loss: 70cc Specimens: * No specimens in log * Findings: Stenosis Complications: None Implants:  
Implant Name Type Inv. Item Serial No.  Lot No. LRB No. Used Action GRAFT SPNG DMB CANC [id number] -- NEVOS - [id number]  GRAFT SPNG DMB CANC [id number] -- [employer] [id number] [employer] N/A N/A 1 Implanted GRAFT SPNG DMB CANC [id number] -- NEVOS - [id number]  GRAFT SPNG DMB CANC [id number] -- [employer] [id number] [employer] N/A N/A 1 Implanted CONFORM CUBE 17MM Q-PACK   [id number] [employer] 0011 N/A 1 Implanted CONFORM CUBE 17MM Q-PACK   [id number] [employer] N/A N/A 1 Implanted VIA 5CC FORM MOLDABLE   [id number] [employer] N/A N/A 1 Implanted SPACER EXP [id number] 7MM 3-15D -- RISE-L - SNA  SPACER EXP [id number] 7MM 3-15D -- RISE-L NA [employer] TRAY N/A 1 Implanted

## 2019-05-13 NOTE — PERIOP NOTES
TRANSFER - IN REPORT: 
 
Verbal report received from ELVIA Katz RN(name) on Elizabeth Peak  being received from OR(unit) for routine post - op Report consisted of patients Situation, Background, Assessment and  
Recommendations(SBAR). Information from the following report(s) OR Summary was reviewed with the receiving nurse. Opportunity for questions and clarification was provided. Assessment completed upon patients arrival to unit and care assumed.

## 2019-05-13 NOTE — H&P
Progress notes Subjective:  
  
Patient ID: Craig Barnett is a 80 y.o. male. 
  
Chief Complaint: Pain of the Lower Back 
  
  
HPI:  Craig Barnett is a 80 y.o. male with complaints of low back pain radiating down the posterior BLE to the ankles, L > R. It is worse with walking and standing and better with resting for about 45 minutes. The pain interferes with his ability to walk short distances. He describes a positive shopping cart sign. Previously he had bilateral PD edema +3. It is rated 8 out of 10 on the VAS.   
    
Patient Active Problem List  
  Diagnosis Date Noted  Bilateral carotid artery stenosis 04/12/2019  TESSA on CPAP 01/03/2019  Severe obesity 01/03/2019  Simple chronic bronchitis 01/03/2019  CAD (coronary artery disease) 08/16/2017  Colon polyps 08/16/2017  Dyslipidemia 08/16/2017  Dyspnea 08/16/2017  ED (erectile dysfunction) 08/16/2017  Fatigue 08/16/2017  GERD (gastroesophageal reflux disease) 08/16/2017  Gout 08/16/2017  Hypertension 08/16/2017  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  TIA (transient ischemic attack) 08/16/2017  Hypoxia 04/23/2015  Neurogenic claudication 04/23/2015  
  
  
  
Current Outpatient Medications:  
  allopurinol (ZYLOPRIM) 300 MG tablet, , Disp: , Rfl:  
  amLODIPine (NORVASC) 10 MG tablet, , Disp: , Rfl:  
  aspirin 81 MG tablet, Take 81 mg by mouth daily, Disp: , Rfl:  
  atenolol (TENORMIN) 100 MG tablet, , Disp: , Rfl:  
  atorvastatin (LIPITOR) 80 MG tablet, , Disp: , Rfl:  
  clonazePAM (KlonoPIN) 0.5 MG tablet, Take 0.5 mg by mouth, Disp: , Rfl:  
  clopidogrel (PLAVIX) 75 MG tablet, Take 75 mg by mouth daily, Disp: , Rfl:  
  DULoxetine (CYMBALTA) 30 MG capsule, Take 1 capsule (30 mg total) by mouth daily, Disp: 30 capsule, Rfl: 11 
  furosemide (LASIX) 80 MG tablet, , Disp: , Rfl:  
  gabapentin (NEURONTIN) 100 MG capsule, Take 1 capsule (100 mg total) by mouth See admin instructions 1 daily for 3 days, then 1 bid for 3 days, then 1 tid, Disp: 90 capsule, Rfl: 2 
  olmesartan (BENICAR) 40 MG tablet, , Disp: , Rfl:  
  pseudoephedrine-acetaminophen (TYLENOL SINUS)  MG tablet, Take 1 tablet by mouth every 4 (four) hours as needed for congestion, Disp: , Rfl:  
  umeclidinium-vilanterol (ANORO ELLIPTA) 62.5-25 MCG/INH aerosol powder , , Disp: , Rfl:  
  
No Known Allergies 
  
ROS:  
No new bowel or bladder incontinence. No fever. No saddle anesthesia. 
  
Objective:  
  
   
Vitals:  
  04/29/19 1133 BP: 129/56  
  
  
Body mass index is 34.52 kg/m². , a BMI over 30 is considered obese and a BMI over 40 has been associated with a higher risk of surgical complications. 
  
Constitutional: No acute distress. Well nourished. HEENT: Normocephalic. Respiratory:  No labored breathing. Cardiovascular:  No marked cyanosis. Skin:  No marked skin ulcers/lesions on bilateral upper or lower extremities. Psychiatric: Alert and oriented x3. Inspection: No gross deformity of bilateral upper or lower extremities. Musculoskeletal/Neurological:  
Gait/balance: - Normal. Able to walk on heels and toes. Thoracolumbar spine: 
- No tenderness to palpation - Full range of motion. Right lower extremity: 
- No tenderness to palpation - Full range of motion - No pain with internal/external rotation of the hip - Strength: 
- 5 out of 5 to hip flexors - 5 out of 5 to quads - 5 out of 5 to TA 
- 5 out of 5 to EHL 
- 5 out of 5 to [employer] - Negative straight leg raise Left lower extremity: 
- No tenderness to palpation - Full range of motion - No pain with internal/external rotation of the hip - Strength: 
- 5 out of 5 to hip flexors - 5 out of 5 to quads - 5 out of 5 to TA 
- 5 out of 5 to EHL 
- 5 out of 5 to [employer] - Negative straight leg raise Sensation: - Intact to light touch Reflexes: 
- +2 Patellar tendon  
- +2 Achilles tendon  
  
 Bilateral PD edema +2. 
  
Radiographs:   
  
  
 Mri Lumbar Spine Without Contrast ([id number]) 
  Result Date: 2019 [facility] - MRI LUMB SPINE WO CONT Patient: Edie Martins : 1934 Date of Service: 2019 1:27:58 PM Reason For Exam: LOW BACK PAIN,Lumbosacral spondylosis without myelopathy,Bilateral sciatica,Spinal stenosis, lumbar region with neurogenic claudication Ordering Provider: Jordan Sheridan Physician: Edgar Parra Signing Date: 2019 2:09:02 PM EXAM: MRI LUMB SPINE WO CONT INDICATION: LOW BACK PAIN,Lumbosacral spondylosis without myelopathy,Bilateral sciatica,Spinal stenosis, lumbar region with neurogenic claudication. M 54.5, M 47.817, M 54.31, M 54.32, M 48.062. COMPARISON: None TECHNIQUE: MR imaging of the lumbar spine was performed using the following sequences: sagittal T1, T2, STIR; coronal T2; axial T1, T2. CONTRAST:  None. FINDINGS: Conus position, morphology and signal appear normal. There is normal vertebral body height and bone signal. A congenitally narrow spinal canal is shown. There is L5-S1 retrolisthesis measuring 7 mm. A levoconvex lumbar curve is shown, mild-moderate, centered at L3-L4. No paraspinal soft tissue mass is shown. The visualized superior sacrum and SI joints appear within normal limits. Lower thoracic spine: Normal appearing T11-12 discs and facets. L1-L2: Normal disc height. No disc herniation nor substantial disc bulging. Mild bilateral facet osteoarthrosis. Mild canal stenosis. Mild left subarticular stenosis. L2-L3: Moderate disc space narrowing. Moderate leftward lateralizing diffuse disc bulging. Moderate left and moderate to severe right facet osteoarthrosis. Severe canal stenosis. Subarticular stenosis on the left. Moderate to severe left and mild right foraminal stenosis. L3-L4: Mild disc space narrowing. Mild to moderate diffuse disc bulging. Moderate to severe right and moderate left facet osteoarthrosis. Severe canal stenosis. Bilateral subarticular stenosis, greater on the left. Mild right foraminal narrowing. L4-L5: Severe disc space narrowing. Moderately prominent right lateral and left lateral-foraminal osteophytes. Mild left lateral foraminal disc bulging. Mild bilateral facet osteoarthrosis. Moderate to severe canal stenosis. Left greater than right bilateral subarticular stenosis. Moderate right foraminal stenosis. L5-S1: Severe disc space narrowing with ankylosis. Endplate osteophytes posteriorly, especially centrally. Mild bilateral facet osteoarthrosis. Mild canal stenosis with moderate indentation of the central and left anterolateral thecal sac, related to osteophyte versus T1 hyperintense disc protrusion, associated with posterior displacement of the traversing left S1 nerve root. Bilateral subarticular stenosis, greater on the left. Mild bilateral foraminal narrowing. IMPRESSION: Congenitally narrow spinal canal with multilevel degenerative findings detailed by level above. Note: Intravenous sedation was administered to this patient under direct nursing and physician supervision and with continuous monitoring. A total of 5 milligrams Versed and 100 micrograms fentanyl was administered. There were no complications. CPT code: 00711  Signing date/time: 4/24/2019  2:09 PM Signed by: Ian Pena    
  
I independently reviewed the lumbar spine MRI done at [facility] in April which shows an autofusion from L4-S1 and severe stenosis from L2-L5. I also reviewed the BLE EMGs which were limited by edema but there appears to be no lumbar radiculopathy. Assessment:  
  
    ICD-10-CM 1. Bilateral sciatica M54.31  
  M54.32 2. Low back pain, unspecified back pain laterality, unspecified chronicity, with sciatica presence unspecified M54.5 3. Spinal stenosis, lumbar region, with neurogenic claudication M48.062  
4.  Lumbosacral spondylosis without myelopathy M47.817  
 5. Degenerative scoliosis in adult patient M41.50  
6. Foraminal stenosis of lumbar region M99.83  
  
  
Plan:  
  
I reviewed the findings of the above tests and discussed treatment options with Mr. Joy Ann today. He has an autofusion from L4-S1 and severe stenosis from L2-L5 contributing to his current symptoms. The bilateral edema has improved slightly since the previous visit. HE is a candidate for a staged operation: day 1 is a L2-L5 lateral fusion from the left side if possible, day 2 is an L2-L5 posterior decompression and fusion. I have discussed the procedure in detail with the patient and mentioned complications, including but not limited to: death, permanent disability, heart attack, stroke, lung injury or infection, blindness, ileus, bladder or bowel problems, ureter injury, bleeding, nerve injury (including numbness, pain and weakness), paralysis (which may be permanent), failure to heal, failure to fuse bone together in fusion procedures, failure to relief symptoms, failure to relief pain, increased pain, need for further surgeries, failure or breakage or hardware, malpositioning of hardware, need to fuse or operate on additional levels determined either during or after surgery, destabilization of the spine (which may require fusion or later surgery), infections (which may or may not require additional surgery), dural tears (tears of the sac holding in nerves and spinal fluid), meningitis, voice changes, vocal cord injury, hoarseness, blood clots, pulmonary embolus, Monico syndrome, recurrent disc herniation, diaphragm paralysis, and anesthetic complications. Comorbidities such as obesity, smoking, rheumatoid arthritis, chronic steroid use and diabetes increase these risks. The patient understands and wants to proceed. . 
  
   
Orders Placed This Encounter  Surgical Posting Sheet  Surgical Posting Sheet  BMI >=25 PATIENT INSTRUCTIONS & EDUCATION  
  
 Return call after pt has made a decision.  
  
  
Charting performed by Fab Bray in the presence of Lazaro Kay MD. 
  
I, Lazaro Kay MD, personally performed the services described in this documentation, as recorded by the scribe in my presence and it accurately and completely records my words and actions. 
  
This note has been transcribed electronically using voice recognition and a trained scribe. It is believed to be accurate, but may contain errors secondary to technological limitations and other factors.

## 2019-05-13 NOTE — ANESTHESIA PREPROCEDURE EVALUATION
Anesthetic History No history of anesthetic complications Review of Systems / Medical History Patient summary reviewed, nursing notes reviewed and pertinent labs reviewed Pulmonary COPD Sleep apnea Comments: Hx Hypoxia Former Smoker - 4 pack years Neuro/Psych CVA 
TIA and psychiatric history Comments: Lumbar Stenosis, spondylosis, and scoliosis with bilateral Sciatica Tremors Alcohol abuse (F10.10) Cardiovascular Hypertension Valvular problems/murmurs: aortic stenosis and aortic insufficiency CAD, cardiac stents and hyperlipidemia Exercise tolerance: <4 METS Comments: TTE (4/13/19): Estimated left ventricular ejection fraction is 61 - 65%. Left ventricular mild concentric hypertrophy. No regional wall motion abnormality noted. Mild aortic valve stenosis is present. Mild aortic valve regurgitation is present. GI/Hepatic/Renal 
  
GERD Comments: Umbilical hernia Dyspepsia and other specified disorders of function of stomach Endo/Other Obesity and arthritis Pertinent negatives: No morbid obesity Other Findings Comments: Back pain ETOH abuse Gout Physical Exam 
 
Airway Mallampati: II 
TM Distance: 4 - 6 cm Neck ROM: normal range of motion Mouth opening: Normal 
 
 Cardiovascular Regular rate and rhythm,  S1 and S2 normal,  no murmur, click, rub, or gallop Rhythm: regular Rate: normal 
 
 
 
 Dental 
 
Dentition: Poor dentition Comments: Multiple missing teeth, significant decay, none loose. Pulmonary Breath sounds clear to auscultation Abdominal 
GI exam deferred Other Findings Anesthetic Plan ASA: 3 Anesthesia type: general 
 
 
 
 
Induction: Intravenous Anesthetic plan and risks discussed with: Patient

## 2019-05-13 NOTE — PERIOP NOTES
Dr. Burnetta Goltz at bedside assessing patient due to <Spo2 despite Duo Neb, and Unable to wean O2 in on Home CPAP Machine. Respiratory at Bedside Setting Up Hospital CPAP Machine with 100% FiO2. Adonay Cheung 1905 1L NS Bolus Started due to <BP 
 
1912 Chest X-Ray, I-Stat, H&H Completed. 1920 Dr. Vasile Mehta Updated on patients condition. 1930 Patients Family Updated on patients condition and Plan.

## 2019-05-13 NOTE — DISCHARGE INSTRUCTIONS
[facility]    Discharge Instruction Sheet: POSTERIOR SPINAL FUSION    DR. Glendy Handley    Pain control:   Typically, we will prescribe a narcotic usually 1-2 tabs every four hours is    sufficient for the pain. Most patients need this only for the first few weeks. You   should discontinue this as the pain decreases. You should not drive while taking any narcotic pain medications. Constipation  Pain medicines and anesthesia can be constipating-this can be prevented by gentle physical activity and drinking plenty of fluid. It should be treated with over-the-counter medications such as Miralax or suppositories, and/or Fleets enema. You should have a bowel movement at least every other day following surgery. Incision care     Keep this area clean and dry. Your dressing is designed to stay in place for 5-7 days. You will be sent home with one additional dressing to change at that time. Leave this new dressing in place until our follow up visit in the office in about 10-14 days. If staples are in place, they should be removed about 14-20 days after surgery. You may shower with this impermeable dressing in place. DO NOT take a tub bath or go swimming until cleared by your doctor. DO NOT apply lotions, oils, or creams to incision. To increase and promote healing:   Stop Smoking (or at least cut back on smoking).  Eat a well-balanced diet (high in protein and vitamin C)   If your appetite is poor, consider nutritional supplements like Ensure, Glucerna, or [place] Instant Breakfast.   If you are diabetic, controlling you blood sugars is very important to prevent infection and promote wound healing. Nutrition:   If you were on a supplement such as Ensure or Glucerna) while in the hospital, please continue using them with each meal for the next 30 days.    Eat a well-balanced diet - High in protein, high in vitamins and minerals, especially vitamin C and zinc.     Restrictions:   Remember your \"BLT's\"    1. Limited bending at waist    2. Lift no more than 10 pounds    3. No Twisting     If you were given a brace, wear it when out of bed. Warning signs : Please call your physician immediately at [phone] if you have   Bleeding from incision that is constant.  Change in mental status (unusual behavior or confusion)   If your incision develops redness or swelling   Change in wound drainage (increase in amount, color, or foul odor)   [place] over 101.5 degrees Fahrenheit    Headache that is not relieved with pain medication   Tenderness or redness in the calf of your leg    Emergency: CALL 911 if you have   Shortness of breath   Chest pain   Localized chest pain when coughing or taking a deep breath    Follow-up  Please call Dr. Willie Walker office for a follow up appointment in 2 weeks at [phone]. You can return to work when cleared by a physician. During normal business hours you may reach Dr. Amaya Polo' team directly at [phone] if you have concerns or questions.     Sandeep Holbrook

## 2019-05-14 ENCOUNTER — ANESTHESIA EVENT (OUTPATIENT)
Dept: SURGERY | Age: 84
DRG: 453 | End: 2019-05-14
Payer: MEDICARE

## 2019-05-14 ENCOUNTER — APPOINTMENT (OUTPATIENT)
Dept: GENERAL RADIOLOGY | Age: 84
DRG: 453 | End: 2019-05-14
Attending: ORTHOPAEDIC SURGERY
Payer: MEDICARE

## 2019-05-14 ENCOUNTER — ANESTHESIA (OUTPATIENT)
Dept: SURGERY | Age: 84
DRG: 453 | End: 2019-05-14
Payer: MEDICARE

## 2019-05-14 LAB
GLUCOSE BLD STRIP.AUTO-MCNC: 135 MG/DL (ref 65–100)
HGB BLD-MCNC: 14.3 G/DL (ref 12.1–17)
SERVICE CMNT-IMP: ABNORMAL

## 2019-05-14 PROCEDURE — 94660 CPAP INITIATION&MGMT: CPT

## 2019-05-14 PROCEDURE — 85018 HEMOGLOBIN: CPT

## 2019-05-14 PROCEDURE — C1713 ANCHOR/SCREW BN/BN,TIS/BN: HCPCS | Performed by: ORTHOPAEDIC SURGERY

## 2019-05-14 PROCEDURE — 77030012407 HC DRN WND BARD -B: Performed by: ORTHOPAEDIC SURGERY

## 2019-05-14 PROCEDURE — 77010033678 HC OXYGEN DAILY

## 2019-05-14 PROCEDURE — 74011000250 HC RX REV CODE- 250: Performed by: ANESTHESIOLOGY

## 2019-05-14 PROCEDURE — 77030035129: Performed by: ORTHOPAEDIC SURGERY

## 2019-05-14 PROCEDURE — 77030029301 HC PMP F NGP WND DRSG PICO S&N -F: Performed by: ORTHOPAEDIC SURGERY

## 2019-05-14 PROCEDURE — 74011250636 HC RX REV CODE- 250/636: Performed by: ANESTHESIOLOGY

## 2019-05-14 PROCEDURE — 77030012961 HC IRR KT CYSTO/TUR ICUM -A: Performed by: ORTHOPAEDIC SURGERY

## 2019-05-14 PROCEDURE — P9045 ALBUMIN (HUMAN), 5%, 250 ML: HCPCS

## 2019-05-14 PROCEDURE — 74011000250 HC RX REV CODE- 250

## 2019-05-14 PROCEDURE — 76010000162 HC OR TIME 1.5 TO 2 HR INTENSV-TIER 1: Performed by: ORTHOPAEDIC SURGERY

## 2019-05-14 PROCEDURE — 74011250636 HC RX REV CODE- 250/636

## 2019-05-14 PROCEDURE — 82962 GLUCOSE BLOOD TEST: CPT

## 2019-05-14 PROCEDURE — 0SG1071 FUSION OF 2 OR MORE LUMBAR VERTEBRAL JOINTS WITH AUTOLOGOUS TISSUE SUBSTITUTE, POSTERIOR APPROACH, POSTERIOR COLUMN, OPEN APPROACH: ICD-10-PCS | Performed by: ORTHOPAEDIC SURGERY

## 2019-05-14 PROCEDURE — 77030018846 HC SOL IRR STRL H20 ICUM -A: Performed by: ORTHOPAEDIC SURGERY

## 2019-05-14 PROCEDURE — 77030020263 HC SOL INJ SOD CL0.9% LFCR 1000ML: Performed by: ORTHOPAEDIC SURGERY

## 2019-05-14 PROCEDURE — 51798 US URINE CAPACITY MEASURE: CPT

## 2019-05-14 PROCEDURE — 76000 FLUOROSCOPY <1 HR PHYS/QHP: CPT

## 2019-05-14 PROCEDURE — 77030014647 HC SEAL FBRN TISSL BAXT -D: Performed by: ORTHOPAEDIC SURGERY

## 2019-05-14 PROCEDURE — 77030003029 HC SUT VCRL J&J -B: Performed by: ORTHOPAEDIC SURGERY

## 2019-05-14 PROCEDURE — 77030018836 HC SOL IRR NACL ICUM -A: Performed by: ORTHOPAEDIC SURGERY

## 2019-05-14 PROCEDURE — 77030026438 HC STYL ET INTUB CARD -A: Performed by: ANESTHESIOLOGY

## 2019-05-14 PROCEDURE — 97116 GAIT TRAINING THERAPY: CPT

## 2019-05-14 PROCEDURE — 77030029099 HC BN WAX SSPC -A: Performed by: ORTHOPAEDIC SURGERY

## 2019-05-14 PROCEDURE — 74011250636 HC RX REV CODE- 250/636: Performed by: ORTHOPAEDIC SURGERY

## 2019-05-14 PROCEDURE — 74011250637 HC RX REV CODE- 250/637: Performed by: ORTHOPAEDIC SURGERY

## 2019-05-14 PROCEDURE — 77030040356 HC CORD BPLR FRCP COVD -A: Performed by: ORTHOPAEDIC SURGERY

## 2019-05-14 PROCEDURE — 77030037728 HC GRFT BN FBR CORT 3DEMIN 30CC BACT -I: Performed by: ORTHOPAEDIC SURGERY

## 2019-05-14 PROCEDURE — 76060000034 HC ANESTHESIA 1.5 TO 2 HR: Performed by: ORTHOPAEDIC SURGERY

## 2019-05-14 PROCEDURE — 77030031139 HC SUT VCRL2 J&J -A: Performed by: ORTHOPAEDIC SURGERY

## 2019-05-14 PROCEDURE — 77030008684 HC TU ET CUF COVD -B: Performed by: ANESTHESIOLOGY

## 2019-05-14 PROCEDURE — 97161 PT EVAL LOW COMPLEX 20 MIN: CPT

## 2019-05-14 PROCEDURE — 74011000250 HC RX REV CODE- 250: Performed by: ORTHOPAEDIC SURGERY

## 2019-05-14 PROCEDURE — 77030018723 HC ELCTRD BLD COVD -A: Performed by: ORTHOPAEDIC SURGERY

## 2019-05-14 PROCEDURE — 65660000000 HC RM CCU STEPDOWN

## 2019-05-14 PROCEDURE — 77030008467 HC STPLR SKN COVD -B: Performed by: ORTHOPAEDIC SURGERY

## 2019-05-14 PROCEDURE — 77030013079 HC BLNKT BAIR HGGR 3M -A: Performed by: ANESTHESIOLOGY

## 2019-05-14 PROCEDURE — 77030039825 HC MSK NSL PAP SUPERNO2VA VYRM -B: Performed by: ANESTHESIOLOGY

## 2019-05-14 PROCEDURE — 77030002934 HC SUT MCRYL J&J -B: Performed by: ORTHOPAEDIC SURGERY

## 2019-05-14 PROCEDURE — 72100 X-RAY EXAM L-S SPINE 2/3 VWS: CPT

## 2019-05-14 PROCEDURE — 07DR3ZZ EXTRACTION OF ILIAC BONE MARROW, PERCUTANEOUS APPROACH: ICD-10-PCS | Performed by: ORTHOPAEDIC SURGERY

## 2019-05-14 PROCEDURE — 77030004391 HC BUR FLUT MEDT -C: Performed by: ORTHOPAEDIC SURGERY

## 2019-05-14 PROCEDURE — 77030022704 HC SUT VLOC COVD -B: Performed by: ORTHOPAEDIC SURGERY

## 2019-05-14 PROCEDURE — 77030013567 HC DRN WND RESERV BARD -A: Performed by: ORTHOPAEDIC SURGERY

## 2019-05-14 PROCEDURE — 76210000016 HC OR PH I REC 1 TO 1.5 HR: Performed by: ORTHOPAEDIC SURGERY

## 2019-05-14 PROCEDURE — 77030020275 HC MISC ORTHOPEDIC: Performed by: ORTHOPAEDIC SURGERY

## 2019-05-14 PROCEDURE — 36415 COLL VENOUS BLD VENIPUNCTURE: CPT

## 2019-05-14 PROCEDURE — 77030019908 HC STETH ESOPH SIMS -A: Performed by: ANESTHESIOLOGY

## 2019-05-14 PROCEDURE — 77030005518 HC CATH URETH FOL 2W BARD -B

## 2019-05-14 PROCEDURE — 77030034475 HC MISC IMPL SPN: Performed by: ORTHOPAEDIC SURGERY

## 2019-05-14 PROCEDURE — 77030003666 HC NDL SPINAL BD -A: Performed by: ORTHOPAEDIC SURGERY

## 2019-05-14 PROCEDURE — 77030003028 HC SUT VCRL J&J -A: Performed by: ORTHOPAEDIC SURGERY

## 2019-05-14 DEVICE — GRAFT BNE 3D 30 CC CORTICAL FIBER: Type: IMPLANTABLE DEVICE | Site: SPINE LUMBAR | Status: FUNCTIONAL

## 2019-05-14 DEVICE — SET SCR SPNL TI SGL INNR FOR VIPER 2 MINIMALLY INVASIVE: Type: IMPLANTABLE DEVICE | Site: SPINE LUMBAR | Status: FUNCTIONAL

## 2019-05-14 DEVICE — SCREW SPNL L45MM DIA7MM TI POLYAX EXT TAB FOR MINIMALLY: Type: IMPLANTABLE DEVICE | Site: SPINE LUMBAR | Status: FUNCTIONAL

## 2019-05-14 RX ORDER — SODIUM CHLORIDE 0.9 % (FLUSH) 0.9 %
5-40 SYRINGE (ML) INJECTION AS NEEDED
Status: DISCONTINUED | OUTPATIENT
Start: 2019-05-14 | End: 2019-05-21 | Stop reason: HOSPADM

## 2019-05-14 RX ORDER — SODIUM CHLORIDE 0.9 % (FLUSH) 0.9 %
5-40 SYRINGE (ML) INJECTION AS NEEDED
Status: DISCONTINUED | OUTPATIENT
Start: 2019-05-14 | End: 2019-05-14 | Stop reason: SDUPTHER

## 2019-05-14 RX ORDER — HYDROMORPHONE HYDROCHLORIDE 1 MG/ML
0.2 INJECTION, SOLUTION INTRAMUSCULAR; INTRAVENOUS; SUBCUTANEOUS
Status: DISCONTINUED | OUTPATIENT
Start: 2019-05-14 | End: 2019-05-14

## 2019-05-14 RX ORDER — TAMSULOSIN HYDROCHLORIDE 0.4 MG/1
0.4 CAPSULE ORAL DAILY
Status: DISCONTINUED | OUTPATIENT
Start: 2019-05-15 | End: 2019-05-21 | Stop reason: HOSPADM

## 2019-05-14 RX ORDER — IPRATROPIUM BROMIDE AND ALBUTEROL SULFATE 2.5; .5 MG/3ML; MG/3ML
3 SOLUTION RESPIRATORY (INHALATION)
Status: COMPLETED | OUTPATIENT
Start: 2019-05-14 | End: 2019-05-14

## 2019-05-14 RX ORDER — GABAPENTIN 100 MG/1
100 CAPSULE ORAL 3 TIMES DAILY
Status: DISCONTINUED | OUTPATIENT
Start: 2019-05-14 | End: 2019-05-14 | Stop reason: SDUPTHER

## 2019-05-14 RX ORDER — CEFAZOLIN SODIUM/WATER 2 G/20 ML
2 SYRINGE (ML) INTRAVENOUS EVERY 8 HOURS
Status: COMPLETED | OUTPATIENT
Start: 2019-05-15 | End: 2019-05-15

## 2019-05-14 RX ORDER — FACIAL-BODY WIPES
10 EACH TOPICAL DAILY PRN
Status: DISCONTINUED | OUTPATIENT
Start: 2019-05-16 | End: 2019-05-14 | Stop reason: SDUPTHER

## 2019-05-14 RX ORDER — FAMOTIDINE 20 MG/1
20 TABLET, FILM COATED ORAL 2 TIMES DAILY
Status: DISCONTINUED | OUTPATIENT
Start: 2019-05-14 | End: 2019-05-14 | Stop reason: SDUPTHER

## 2019-05-14 RX ORDER — OXYCODONE HYDROCHLORIDE 5 MG/1
10-15 TABLET ORAL
Status: DISCONTINUED | OUTPATIENT
Start: 2019-05-14 | End: 2019-05-14 | Stop reason: SDUPTHER

## 2019-05-14 RX ORDER — PHENYLEPHRINE HCL IN 0.9% NACL 0.4MG/10ML
SYRINGE (ML) INTRAVENOUS AS NEEDED
Status: DISCONTINUED | OUTPATIENT
Start: 2019-05-14 | End: 2019-05-14 | Stop reason: HOSPADM

## 2019-05-14 RX ORDER — SODIUM CHLORIDE, SODIUM LACTATE, POTASSIUM CHLORIDE, CALCIUM CHLORIDE 600; 310; 30; 20 MG/100ML; MG/100ML; MG/100ML; MG/100ML
75 INJECTION, SOLUTION INTRAVENOUS CONTINUOUS
Status: DISCONTINUED | OUTPATIENT
Start: 2019-05-14 | End: 2019-05-15

## 2019-05-14 RX ORDER — CYCLOBENZAPRINE HCL 10 MG
10 TABLET ORAL
Status: DISCONTINUED | OUTPATIENT
Start: 2019-05-14 | End: 2019-05-14 | Stop reason: SDUPTHER

## 2019-05-14 RX ORDER — ONDANSETRON 2 MG/ML
4 INJECTION INTRAMUSCULAR; INTRAVENOUS
Status: DISCONTINUED | OUTPATIENT
Start: 2019-05-14 | End: 2019-05-14 | Stop reason: SDUPTHER

## 2019-05-14 RX ORDER — HYDROXYZINE HYDROCHLORIDE 10 MG/1
10 TABLET, FILM COATED ORAL
Status: DISCONTINUED | OUTPATIENT
Start: 2019-05-14 | End: 2019-05-14 | Stop reason: SDUPTHER

## 2019-05-14 RX ORDER — ONDANSETRON 2 MG/ML
INJECTION INTRAMUSCULAR; INTRAVENOUS AS NEEDED
Status: DISCONTINUED | OUTPATIENT
Start: 2019-05-14 | End: 2019-05-14 | Stop reason: HOSPADM

## 2019-05-14 RX ORDER — ETOMIDATE 2 MG/ML
INJECTION INTRAVENOUS AS NEEDED
Status: DISCONTINUED | OUTPATIENT
Start: 2019-05-14 | End: 2019-05-14 | Stop reason: HOSPADM

## 2019-05-14 RX ORDER — FENTANYL CITRATE 50 UG/ML
INJECTION, SOLUTION INTRAMUSCULAR; INTRAVENOUS AS NEEDED
Status: DISCONTINUED | OUTPATIENT
Start: 2019-05-14 | End: 2019-05-14 | Stop reason: HOSPADM

## 2019-05-14 RX ORDER — MIDAZOLAM HYDROCHLORIDE 1 MG/ML
1 INJECTION, SOLUTION INTRAMUSCULAR; INTRAVENOUS AS NEEDED
Status: DISCONTINUED | OUTPATIENT
Start: 2019-05-14 | End: 2019-05-14 | Stop reason: SDUPTHER

## 2019-05-14 RX ORDER — SODIUM CHLORIDE 9 MG/ML
50 INJECTION, SOLUTION INTRAVENOUS CONTINUOUS
Status: DISCONTINUED | OUTPATIENT
Start: 2019-05-14 | End: 2019-05-15

## 2019-05-14 RX ORDER — MIDAZOLAM HYDROCHLORIDE 1 MG/ML
0.5 INJECTION, SOLUTION INTRAMUSCULAR; INTRAVENOUS
Status: DISCONTINUED | OUTPATIENT
Start: 2019-05-14 | End: 2019-05-14

## 2019-05-14 RX ORDER — AMOXICILLIN 250 MG
1 CAPSULE ORAL 2 TIMES DAILY
Status: DISCONTINUED | OUTPATIENT
Start: 2019-05-14 | End: 2019-05-14 | Stop reason: SDUPTHER

## 2019-05-14 RX ORDER — CEFAZOLIN SODIUM 1 G/3ML
INJECTION, POWDER, FOR SOLUTION INTRAMUSCULAR; INTRAVENOUS AS NEEDED
Status: DISCONTINUED | OUTPATIENT
Start: 2019-05-14 | End: 2019-05-14 | Stop reason: HOSPADM

## 2019-05-14 RX ORDER — LIDOCAINE HYDROCHLORIDE 20 MG/ML
INJECTION, SOLUTION EPIDURAL; INFILTRATION; INTRACAUDAL; PERINEURAL AS NEEDED
Status: DISCONTINUED | OUTPATIENT
Start: 2019-05-14 | End: 2019-05-14 | Stop reason: HOSPADM

## 2019-05-14 RX ORDER — CEFAZOLIN SODIUM/WATER 2 G/20 ML
2 SYRINGE (ML) INTRAVENOUS EVERY 8 HOURS
Status: DISCONTINUED | OUTPATIENT
Start: 2019-05-14 | End: 2019-05-14

## 2019-05-14 RX ORDER — SODIUM CHLORIDE 9 MG/ML
75 INJECTION, SOLUTION INTRAVENOUS CONTINUOUS
Status: DISPENSED | OUTPATIENT
Start: 2019-05-14 | End: 2019-05-15

## 2019-05-14 RX ORDER — ALBUMIN HUMAN 50 G/1000ML
SOLUTION INTRAVENOUS AS NEEDED
Status: DISCONTINUED | OUTPATIENT
Start: 2019-05-14 | End: 2019-05-14 | Stop reason: HOSPADM

## 2019-05-14 RX ORDER — NALOXONE HYDROCHLORIDE 0.4 MG/ML
0.4 INJECTION, SOLUTION INTRAMUSCULAR; INTRAVENOUS; SUBCUTANEOUS AS NEEDED
Status: DISCONTINUED | OUTPATIENT
Start: 2019-05-14 | End: 2019-05-14 | Stop reason: SDUPTHER

## 2019-05-14 RX ORDER — SODIUM CHLORIDE 0.9 % (FLUSH) 0.9 %
5-40 SYRINGE (ML) INJECTION EVERY 8 HOURS
Status: DISCONTINUED | OUTPATIENT
Start: 2019-05-14 | End: 2019-05-15 | Stop reason: SDUPTHER

## 2019-05-14 RX ORDER — SUCCINYLCHOLINE CHLORIDE 20 MG/ML
INJECTION INTRAMUSCULAR; INTRAVENOUS AS NEEDED
Status: DISCONTINUED | OUTPATIENT
Start: 2019-05-14 | End: 2019-05-14 | Stop reason: HOSPADM

## 2019-05-14 RX ORDER — LIDOCAINE HYDROCHLORIDE 10 MG/ML
0.1 INJECTION, SOLUTION EPIDURAL; INFILTRATION; INTRACAUDAL; PERINEURAL AS NEEDED
Status: DISCONTINUED | OUTPATIENT
Start: 2019-05-14 | End: 2019-05-14

## 2019-05-14 RX ORDER — ACETAMINOPHEN 500 MG
1000 TABLET ORAL EVERY 6 HOURS
Status: DISCONTINUED | OUTPATIENT
Start: 2019-05-14 | End: 2019-05-14 | Stop reason: SDUPTHER

## 2019-05-14 RX ORDER — FENTANYL CITRATE 50 UG/ML
25 INJECTION, SOLUTION INTRAMUSCULAR; INTRAVENOUS
Status: DISCONTINUED | OUTPATIENT
Start: 2019-05-14 | End: 2019-05-14

## 2019-05-14 RX ORDER — HYDROMORPHONE HYDROCHLORIDE 1 MG/ML
1 INJECTION, SOLUTION INTRAMUSCULAR; INTRAVENOUS; SUBCUTANEOUS
Status: DISCONTINUED | OUTPATIENT
Start: 2019-05-14 | End: 2019-05-14 | Stop reason: SDUPTHER

## 2019-05-14 RX ORDER — OXYCODONE AND ACETAMINOPHEN 5; 325 MG/1; MG/1
1 TABLET ORAL AS NEEDED
Status: DISCONTINUED | OUTPATIENT
Start: 2019-05-14 | End: 2019-05-14

## 2019-05-14 RX ORDER — DIAZEPAM 5 MG/1
5 TABLET ORAL
Status: DISCONTINUED | OUTPATIENT
Start: 2019-05-14 | End: 2019-05-14 | Stop reason: SDUPTHER

## 2019-05-14 RX ORDER — MIDAZOLAM HYDROCHLORIDE 1 MG/ML
1 INJECTION, SOLUTION INTRAMUSCULAR; INTRAVENOUS AS NEEDED
Status: DISCONTINUED | OUTPATIENT
Start: 2019-05-14 | End: 2019-05-14

## 2019-05-14 RX ORDER — DEXAMETHASONE SODIUM PHOSPHATE 4 MG/ML
INJECTION, SOLUTION INTRA-ARTICULAR; INTRALESIONAL; INTRAMUSCULAR; INTRAVENOUS; SOFT TISSUE AS NEEDED
Status: DISCONTINUED | OUTPATIENT
Start: 2019-05-14 | End: 2019-05-14 | Stop reason: HOSPADM

## 2019-05-14 RX ORDER — OXYCODONE HYDROCHLORIDE 5 MG/1
5 TABLET ORAL
Status: DISCONTINUED | OUTPATIENT
Start: 2019-05-14 | End: 2019-05-14 | Stop reason: SDUPTHER

## 2019-05-14 RX ORDER — FENTANYL CITRATE 50 UG/ML
50 INJECTION, SOLUTION INTRAMUSCULAR; INTRAVENOUS AS NEEDED
Status: DISCONTINUED | OUTPATIENT
Start: 2019-05-14 | End: 2019-05-21 | Stop reason: HOSPADM

## 2019-05-14 RX ORDER — POLYETHYLENE GLYCOL 3350 17 G/17G
17 POWDER, FOR SOLUTION ORAL DAILY
Status: DISCONTINUED | OUTPATIENT
Start: 2019-05-15 | End: 2019-05-14 | Stop reason: SDUPTHER

## 2019-05-14 RX ORDER — IPRATROPIUM BROMIDE AND ALBUTEROL SULFATE 2.5; .5 MG/3ML; MG/3ML
SOLUTION RESPIRATORY (INHALATION)
Status: COMPLETED
Start: 2019-05-14 | End: 2019-05-14

## 2019-05-14 RX ORDER — SODIUM CHLORIDE 0.9 % (FLUSH) 0.9 %
5-40 SYRINGE (ML) INJECTION EVERY 8 HOURS
Status: DISCONTINUED | OUTPATIENT
Start: 2019-05-14 | End: 2019-05-14

## 2019-05-14 RX ORDER — ONDANSETRON 2 MG/ML
4 INJECTION INTRAMUSCULAR; INTRAVENOUS AS NEEDED
Status: DISCONTINUED | OUTPATIENT
Start: 2019-05-14 | End: 2019-05-14

## 2019-05-14 RX ORDER — SODIUM CHLORIDE 0.9 % (FLUSH) 0.9 %
5-40 SYRINGE (ML) INJECTION AS NEEDED
Status: DISCONTINUED | OUTPATIENT
Start: 2019-05-14 | End: 2019-05-14

## 2019-05-14 RX ORDER — MORPHINE SULFATE 10 MG/ML
2 INJECTION, SOLUTION INTRAMUSCULAR; INTRAVENOUS
Status: DISCONTINUED | OUTPATIENT
Start: 2019-05-14 | End: 2019-05-14

## 2019-05-14 RX ORDER — SODIUM CHLORIDE 0.9 % (FLUSH) 0.9 %
5-40 SYRINGE (ML) INJECTION EVERY 8 HOURS
Status: DISCONTINUED | OUTPATIENT
Start: 2019-05-14 | End: 2019-05-14 | Stop reason: SDUPTHER

## 2019-05-14 RX ORDER — ROCURONIUM BROMIDE 10 MG/ML
INJECTION, SOLUTION INTRAVENOUS AS NEEDED
Status: DISCONTINUED | OUTPATIENT
Start: 2019-05-14 | End: 2019-05-14 | Stop reason: HOSPADM

## 2019-05-14 RX ORDER — SODIUM CHLORIDE 9 MG/ML
50 INJECTION, SOLUTION INTRAVENOUS CONTINUOUS
Status: DISCONTINUED | OUTPATIENT
Start: 2019-05-14 | End: 2019-05-14

## 2019-05-14 RX ORDER — SODIUM CHLORIDE, SODIUM LACTATE, POTASSIUM CHLORIDE, CALCIUM CHLORIDE 600; 310; 30; 20 MG/100ML; MG/100ML; MG/100ML; MG/100ML
75 INJECTION, SOLUTION INTRAVENOUS CONTINUOUS
Status: DISCONTINUED | OUTPATIENT
Start: 2019-05-14 | End: 2019-05-14

## 2019-05-14 RX ORDER — DIPHENHYDRAMINE HYDROCHLORIDE 50 MG/ML
12.5 INJECTION, SOLUTION INTRAMUSCULAR; INTRAVENOUS AS NEEDED
Status: DISCONTINUED | OUTPATIENT
Start: 2019-05-14 | End: 2019-05-14

## 2019-05-14 RX ADMIN — FENTANYL CITRATE 25 MCG: 50 INJECTION, SOLUTION INTRAMUSCULAR; INTRAVENOUS at 13:13

## 2019-05-14 RX ADMIN — Medication 10 ML: at 05:17

## 2019-05-14 RX ADMIN — ROCURONIUM BROMIDE 10 MG: 10 INJECTION, SOLUTION INTRAVENOUS at 13:13

## 2019-05-14 RX ADMIN — SUCCINYLCHOLINE CHLORIDE 180 MG: 20 INJECTION INTRAMUSCULAR; INTRAVENOUS at 13:13

## 2019-05-14 RX ADMIN — SODIUM CHLORIDE, POTASSIUM CHLORIDE, SODIUM LACTATE AND CALCIUM CHLORIDE 500 ML: 600; 310; 30; 20 INJECTION, SOLUTION INTRAVENOUS at 13:00

## 2019-05-14 RX ADMIN — Medication 1 LOZENGE: at 05:13

## 2019-05-14 RX ADMIN — ONDANSETRON 4 MG: 2 INJECTION INTRAMUSCULAR; INTRAVENOUS at 14:16

## 2019-05-14 RX ADMIN — ALBUMIN HUMAN 250 ML: 50 SOLUTION INTRAVENOUS at 12:40

## 2019-05-14 RX ADMIN — DEXAMETHASONE SODIUM PHOSPHATE 4 MG: 4 INJECTION, SOLUTION INTRA-ARTICULAR; INTRALESIONAL; INTRAMUSCULAR; INTRAVENOUS; SOFT TISSUE at 13:24

## 2019-05-14 RX ADMIN — Medication 10 ML: at 17:01

## 2019-05-14 RX ADMIN — CEFAZOLIN SODIUM 2 G: 1 INJECTION, POWDER, FOR SOLUTION INTRAMUSCULAR; INTRAVENOUS at 13:45

## 2019-05-14 RX ADMIN — Medication 2 G: at 06:17

## 2019-05-14 RX ADMIN — MUPIROCIN: 20 OINTMENT TOPICAL at 18:20

## 2019-05-14 RX ADMIN — Medication 10 ML: at 17:00

## 2019-05-14 RX ADMIN — UMECLIDINIUM BROMIDE AND VILANTEROL TRIFENATATE 1 PUFF: 62.5; 25 POWDER RESPIRATORY (INHALATION) at 08:10

## 2019-05-14 RX ADMIN — ETOMIDATE 20 MG: 2 INJECTION INTRAVENOUS at 13:13

## 2019-05-14 RX ADMIN — Medication 10 ML: at 17:09

## 2019-05-14 RX ADMIN — Medication 1 LOZENGE: at 06:36

## 2019-05-14 RX ADMIN — FENTANYL CITRATE 50 MCG: 50 INJECTION, SOLUTION INTRAMUSCULAR; INTRAVENOUS at 13:48

## 2019-05-14 RX ADMIN — ALLOPURINOL 300 MG: 100 TABLET ORAL at 08:02

## 2019-05-14 RX ADMIN — ONDANSETRON 4 MG: 2 INJECTION INTRAMUSCULAR; INTRAVENOUS at 15:48

## 2019-05-14 RX ADMIN — FUROSEMIDE 80 MG: 80 TABLET ORAL at 08:02

## 2019-05-14 RX ADMIN — OXYCODONE HYDROCHLORIDE 5 MG: 5 TABLET ORAL at 02:46

## 2019-05-14 RX ADMIN — PANTOPRAZOLE SODIUM 40 MG: 40 TABLET, DELAYED RELEASE ORAL at 08:01

## 2019-05-14 RX ADMIN — IPRATROPIUM BROMIDE AND ALBUTEROL SULFATE 3 ML: .5; 3 SOLUTION RESPIRATORY (INHALATION) at 15:15

## 2019-05-14 RX ADMIN — SENNOSIDES AND DOCUSATE SODIUM 1 TABLET: 8.6; 5 TABLET ORAL at 08:10

## 2019-05-14 RX ADMIN — Medication 10 ML: at 21:26

## 2019-05-14 RX ADMIN — Medication 80 MCG: at 13:27

## 2019-05-14 RX ADMIN — OXYCODONE HYDROCHLORIDE 10 MG: 5 TABLET ORAL at 08:01

## 2019-05-14 RX ADMIN — HYDROMORPHONE HYDROCHLORIDE 1 MG: 1 INJECTION, SOLUTION INTRAMUSCULAR; INTRAVENOUS; SUBCUTANEOUS at 17:25

## 2019-05-14 RX ADMIN — LIDOCAINE HYDROCHLORIDE 80 MG: 20 INJECTION, SOLUTION EPIDURAL; INFILTRATION; INTRACAUDAL; PERINEURAL at 13:13

## 2019-05-14 RX ADMIN — IPRATROPIUM BROMIDE AND ALBUTEROL SULFATE 3 ML: .5; 3 SOLUTION RESPIRATORY (INHALATION) at 12:46

## 2019-05-14 RX ADMIN — FAMOTIDINE 20 MG: 20 TABLET ORAL at 21:20

## 2019-05-14 RX ADMIN — FENTANYL CITRATE 25 MCG: 50 INJECTION, SOLUTION INTRAMUSCULAR; INTRAVENOUS at 15:30

## 2019-05-14 RX ADMIN — MUPIROCIN: 20 OINTMENT TOPICAL at 08:10

## 2019-05-14 RX ADMIN — SODIUM CHLORIDE, SODIUM LACTATE, POTASSIUM CHLORIDE, AND CALCIUM CHLORIDE 75 ML/HR: 600; 310; 30; 20 INJECTION, SOLUTION INTRAVENOUS at 10:59

## 2019-05-14 RX ADMIN — Medication 10 MCG/MIN: at 18:01

## 2019-05-14 RX ADMIN — SODIUM CHLORIDE 125 ML/HR: 900 INJECTION, SOLUTION INTRAVENOUS at 17:06

## 2019-05-14 RX ADMIN — IPRATROPIUM BROMIDE AND ALBUTEROL SULFATE 3 ML: 2.5; .5 SOLUTION RESPIRATORY (INHALATION) at 15:15

## 2019-05-14 RX ADMIN — Medication 80 MCG: at 13:20

## 2019-05-14 RX ADMIN — ACETAMINOPHEN 1000 MG: 500 TABLET ORAL at 12:01

## 2019-05-14 RX ADMIN — Medication 10 ML: at 21:19

## 2019-05-14 RX ADMIN — SODIUM CHLORIDE, SODIUM LACTATE, POTASSIUM CHLORIDE, AND CALCIUM CHLORIDE: 600; 310; 30; 20 INJECTION, SOLUTION INTRAVENOUS at 14:16

## 2019-05-14 RX ADMIN — Medication 15 MCG/MIN: at 05:17

## 2019-05-14 RX ADMIN — FENTANYL CITRATE 25 MCG: 50 INJECTION, SOLUTION INTRAMUSCULAR; INTRAVENOUS at 13:07

## 2019-05-14 RX ADMIN — ACETAMINOPHEN 1000 MG: 500 TABLET ORAL at 06:18

## 2019-05-14 RX ADMIN — GABAPENTIN 100 MG: 100 CAPSULE ORAL at 21:19

## 2019-05-14 RX ADMIN — SODIUM CHLORIDE 125 ML/HR: 900 INJECTION, SOLUTION INTRAVENOUS at 04:22

## 2019-05-14 RX ADMIN — ROCURONIUM BROMIDE 30 MG: 10 INJECTION, SOLUTION INTRAVENOUS at 13:20

## 2019-05-14 RX ADMIN — Medication 20 MCG/MIN: at 04:02

## 2019-05-14 RX ADMIN — SENNOSIDES AND DOCUSATE SODIUM 1 TABLET: 8.6; 5 TABLET ORAL at 21:20

## 2019-05-14 RX ADMIN — GABAPENTIN 100 MG: 100 CAPSULE ORAL at 08:02

## 2019-05-14 RX ADMIN — Medication 10 MCG/MIN: at 06:34

## 2019-05-14 RX ADMIN — HYDROMORPHONE HYDROCHLORIDE 1 MG: 1 INJECTION, SOLUTION INTRAMUSCULAR; INTRAVENOUS; SUBCUTANEOUS at 09:50

## 2019-05-14 NOTE — ANESTHESIA POSTPROCEDURE EVALUATION
Procedure(s): 
L2-5 POSTERIOR DECOMPRESSION AND FUSION WITH BONE MARROW ASPIRATION FROM ILIAC CREST. general 
 
Anesthesia Post Evaluation Patient location during evaluation: PACU Note status: Adequate. Level of consciousness: responsive to verbal stimuli and sleepy but conscious Pain management: satisfactory to patient Airway patency: patent Anesthetic complications: no 
Cardiovascular status: acceptable Respiratory status: acceptable Hydration status: acceptable Comments: +Post-Anesthesia Evaluation and Assessment Patient: Miladis Farooq MRN: [id number]  SSN: [id number] YOB: 1934  Age: 80 y.o. Sex: male Cardiovascular Function/Vital Signs /60   Pulse 95   Temp 36.7 °C (98.1 °F)   Resp 20   Ht 5' 8\" (1.727 m)   Wt 104 kg (229 lb 4.5 oz)   SpO2 92%   BMI 34.86 kg/m² Patient is status post Procedure(s): 
L2-5 POSTERIOR DECOMPRESSION AND FUSION WITH BONE MARROW ASPIRATION FROM ILIAC CREST. Nausea/Vomiting: Controlled. Postoperative hydration reviewed and adequate. Pain: 
Pain Scale 1: Numeric (0 - 10) (05/14/19 1535) Pain Intensity 1: 3 (05/14/19 1535) Managed. Neurological Status:  
Neuro (WDL): Exceptions to WDL (05/14/19 1502) At baseline. Mental Status and Level of Consciousness: Arousable. Pulmonary Status:  
O2 Device: CPAP mask (05/14/19 1530) Adequate oxygenation and airway patent. Complications related to anesthesia: None Post-anesthesia assessment completed. No concerns. Signed By: Jean Carlos Guzman MD  
 5/14/2019 Post anesthesia nausea and vomiting:  controlled Vitals Value Taken Time /58 5/14/2019  4:00 PM  
Temp 36.7 °C (98.1 °F) 5/14/2019  3:09 PM  
Pulse 93 5/14/2019  4:01 PM  
Resp 14 5/14/2019  4:01 PM  
SpO2 96 % 5/14/2019  4:01 PM  
Vitals shown include unvalidated device data.

## 2019-05-14 NOTE — PERIOP NOTES
Patient: Martinez Wolf MRN: [id number]  SSN: [id number] YOB: 1934  Age: 80 y.o. Sex: male Patient is status post Procedure(s): 
L2-5 POSTERIOR DECOMPRESSION AND FUSION WITH BONE MARROW ASPIRATION FROM ILIAC CREST. Surgeon(s) and Role: Xavier Guerra MD - Primary Local/Dose/Irrigation:  100ml surgical pain solution Peripheral IV 05/13/19 Left Antecubital (Active) Site Assessment Clean, dry, & intact 5/14/2019 10:40 AM  
Phlebitis Assessment 0 5/14/2019 10:40 AM  
Infiltration Assessment 0 5/14/2019 10:40 AM  
Dressing Status Clean, dry, & intact 5/14/2019 10:40 AM  
Dressing Type Tape;Transparent 5/14/2019 10:40 AM  
Hub Color/Line Status Pink;Capped 5/14/2019 10:40 AM  
   
Peripheral IV 05/13/19 Left Hand (Active) Site Assessment Clean, dry, & intact 5/14/2019 10:40 AM  
Phlebitis Assessment 0 5/14/2019 10:40 AM  
Infiltration Assessment 0 5/14/2019 10:40 AM  
Dressing Status Clean, dry, & intact 5/14/2019 10:40 AM  
Dressing Type Tape;Transparent 5/14/2019 10:40 AM  
Hub Color/Line Status Pink; Infusing 5/14/2019 10:40 AM  
        
Airway - Endotracheal Tube 05/14/19 Oral (Active) Line Estevan Lips 5/14/2019 12:00 AM  
         
 
 
 
Dressing/Packing:  Wound Back-Dressing Type: Staples(mikaela) (05/14/19 1402) Wound Flank Left-Dressing Type: Topical skin adhesive/glue (05/14/19 4545) Wound Back Left Aspiration site-Dressing Type: Adhesive wound dressing (Mastisol) (05/14/19 0824) Splint/Cast:  ]

## 2019-05-14 NOTE — PROGRESS NOTES
Chart reviewed. Attempted to see pt for PM therapy session however pt currently off the floor to OR for second part of staged surgery.  
 
rCistina Molina, PT, DPT

## 2019-05-14 NOTE — ANESTHESIA PREPROCEDURE EVALUATION
Anesthetic History No history of anesthetic complications Review of Systems / Medical History Patient summary reviewed, nursing notes reviewed and pertinent labs reviewed Pulmonary COPD Sleep apnea: CPAP Neuro/Psych CVA 
TIA and psychiatric history Comments: Lumbar Stenosis, spondylosis, and scoliosis with bilateral Sciatica Tremors Alcohol abuse (F10.10) Cardiovascular Hypertension Valvular problems/murmurs: aortic stenosis and aortic insufficiency CAD, cardiac stents and hyperlipidemia Exercise tolerance: <4 METS Comments: TTE (4/13/19): Estimated left ventricular ejection fraction is 61 - 65%. Left ventricular mild concentric hypertrophy. No regional wall motion abnormality noted. Mild aortic valve stenosis is present. Mild aortic valve regurgitation is present. GI/Hepatic/Renal 
  
GERD Endo/Other Obesity and arthritis Pertinent negatives: No morbid obesity Other Findings Comments: Back pain ETOH abuse Gout Physical Exam 
 
Airway Mallampati: II 
TM Distance: 4 - 6 cm Neck ROM: normal range of motion Mouth opening: Normal 
 
 Cardiovascular Regular rate and rhythm,  S1 and S2 normal,  no murmur, click, rub, or gallop Rhythm: regular Rate: normal 
 
 
 
 Dental 
 
Dentition: Poor dentition Comments: Multiple missing teeth, significant decay, none loose. Pulmonary Breath sounds clear to auscultation Prolonged expiration Abdominal 
GI exam deferred Other Findings Anesthetic Plan ASA: 3 Anesthesia type: general 
 
 
 
 
Induction: Intravenous Anesthetic plan and risks discussed with: Patient

## 2019-05-14 NOTE — PERIOP NOTES
Pt states he cannot tolerate the nasal cpap as it is blowing air into his  Right eye. States he wants the nasal oxygen placed back on done. o2 sat up and down   87- 93%. On the nasal 4 liters oxygen.

## 2019-05-14 NOTE — PROGRESS NOTES
Orders received, chart reviewed and patient evaluated by physical therapy. Pt scheduled for 2nd part of staged sx this afternoon therefore therapy recommendations TBD pending progress post-operatively. Patient with orthostatic hypotension during AM therapy session however BP stabilized with activity - please see doc flow sheet for vitals taken during this session. Pt ambulated 35ft w/ RW and minAx1 and tolerated well. Reported 7-8/10 pain at incision site however denied pain radiating down BLEs. Full evaluation to follow.   
 
Mili Molina, PT, DPT

## 2019-05-14 NOTE — PERIOP NOTES
TRANSFER - OUT REPORT: 
 
Verbal report given to [facility] RN(name) on Kaylin Comer  being transferred to [facility](unit) for routine progression of care Report consisted of patients Situation, Background, Assessment and  
Recommendations(SBAR). Information from the following report(s) OR Summary, Intake/Output and MAR was reviewed with the receiving nurse. Opportunity for questions and clarification was provided. Patient transported with: 
 Monitor Registered Nurse Tech Apolinar Drip Continuous CPAP with 100% FiO2 RRT

## 2019-05-14 NOTE — PROGRESS NOTES
Ortho/ NeuroSurgery NP Note POD# 0  s/p L2-5 POSTERIOR DECOMPRESSION AND FUSION WITH BONE MARROW ASPIRATION FROM ILIAC CREST Patient seen in PACU Pt resting in bed, CPAP in place. VSS Afebrile. Patient has not had something to eat/drink. No nausea. Most Recent Labs:  
Lab Results Component Value Date/Time HGB 14.3 05/14/2019 04:35 AM  
 Hemoglobin A1c 6.0 05/09/2019 07:16 AM  
 
 
Body mass index is 34.86 kg/m². Reference: BMI greater than 30 is classified as obesity and greater than 40 is classified as morbid obesity. STOP BANG Score: 7 Voiding status: needs to void, salvador in place Dressing c.d.i Calves soft and supple; No pain with passive stretch Sensation and motor intact SCDs for mechanical DVT proph Plan: 
1) PT BID starting tomorrow 2) Shalini-op Antibiotics Ancef 3) Pain management 4) Pepcid for GI Prophylaxis 5) Discharge plans to home pending progression Readiness for discharge: 
   [x] Vital Signs stable  
 [x] Hgb stable- no signs or symptoms of hemodynamic instability  
 [] + Voiding  
 [x] Incision intact, drainage minimal  
 [] Tolerating PO intake   
 [] Cleared by PT (OT if applicable) [] Stair training completed (if applicable) [] Independent/Contact Guard ambulation with assistive device (household distance) [] Bed mobility [] Car transfers  
  [] ADLs  
 [] Adequate pain control on oral medication alone Rose Raphael NP

## 2019-05-14 NOTE — OP NOTES
[address]  OPERATIVE REPORT    Name:  Fercho Encarnacion  MR#:  [id number]  :  1934  ACCOUNT #:  [de-identified]  DATE OF SERVICE:  2019      PREOPERATIVE DIAGNOSES:  1. Spinal stenosis with claudication. 2.  Lumbago. 3.  Sciatica. 4.  Lumbar spinal stenosis. POSTOPERATIVE DIAGNOSES:  1. Spinal stenosis with claudication. 2.  Lumbago. 3.  Sciatica. 4.  Lumbar spinal stenosis. PROCEDURE PERFORMED:  1. An L2 through L4 anterior fusion. 2.  An L2 through L4 application of interbody biomechanical device. 3. An L2 through L4 anterior instrumentation. 4.  Use of allograft bone for spinal fusion. 5.  Use of bone marrow aspirate for spinal fusion augmentation. SURGEON:  Isaac Hussein MD    FIRST ASSISTANT:  Rayne Rutledge. Dave Davis [place]    ANESTHESIA:  General.    DRAINS:  None. COMPLICATIONS:  None. SPECIMENS REMOVED:  None. IMPLANTS:  Globus RISE-L interbody biomechanical device and Globus anterior plate. ESTIMATED BLOOD LOSS:  70 mL. INDICATIONS OF THE PROCEDURE:  The patient is a pleasant 28-year-old gentleman with lumbar spinal stenosis, lumbago, and sciatica that has failed to improve on nonoperative treatment. At this point, he elected to proceed with surgical intervention. I have given him warnings about possible complications including, but not limited to pain, scar, bleeding, infection, nonunion, damage to surrounding structures, death, paralysis, blindness, stroke. He understands and wants to proceed. PROCEDURE:  After informed consent was obtained and the operative site was properly marked, the patient was moved back to the operating room and underwent general endotracheal anesthesia. His arms were placed in the 90:90 position. He was positioned in lateral decubitus with the left side up using the regular OR bed. He was safely secured to the bed and all the bony extremities were well padded.   A bump was placed between the knees and we then proceeded to use fluoroscopy to verify that we were in the correct level. Once we were in the correct level, I proceeded to perform a standard anterior approach for an OLIF. Through that approach, I was able to expose the L2 through L4 levels and retracted psoas muscles posteriorly and the abdominal contents anteriorly. Once the area was exposed and hemostasis was obtained, I proceeded to bring in the fluoroscopy and verified we were in the correct level. Self-retaining retractor was then placed in position, and I proceeded to remove the disk with 15-blade followed by box curette and Pederson. Curettes were then used with pituitaries. Once we had the disk removed, I used a Jamshidi needle through a separate fascial incision and aspirated 60 mL of bone marrow from the left posterosuperior iliac spine. I then proceeded to use a trial to determine the size for the implant and then the implant was packed with allograft bone, soaked in bone marrow aspirate and inserted in a proper position. Once in place, it was deployed to its final height and then a plate was selected and placed in position for the final.  The plate was in position for its final location. The screws were then tightened. I then proceeded to remove the retractor moving one level down and repeat the procedure in the exact same manner. Both levels were completed, I obtained final AP and lateral x-rays, saved them to PACS, closed abdominal musculature in layers with #1 Vicryl, followed by irrigating the subcu, closed the subcu with 2-0 Vicryl, and the skin with 3-0 running Monocryl and Dermabond. Sterile dressing was applied. The patient was then awakened and transferred to the PACU in stable condition. POSTOPERATIVE PLAN:  We are going to ambulate him tomorrow that will help us determine what kind of procedure he is going to need for the stage 2.       MD MORENA Caban/[id number]  D:  05/13/2019 17:30  T:  05/13/2019 22:29  JOB #:  [id number]  CC:  MD Ana Ferrer MD

## 2019-05-14 NOTE — PROGRESS NOTES
0700-Bedside shift change report given to Becca Benavidez (oncoming nurse) by  Yoan Ace nurse). Report included the following information SBAR, Kardex and MAR.  
0820- Apolinar stopped. AM BP meds held. BP soft. 3126- Patient assessed. See flowsheet. Bladder scan reveals 364 cc. Patient encouraged to void. 
0801- 10 of oxycodone given for \"6/10\" pain. See flowsheet. 0830- Patient working with PT. Patient was orthostatic. See flowsheet. 0900- Report given to pre-op holding nurse. They will send for the patient in one hour. 0950- 1 mg of dilaudid given for \"8/10\" pain. Yu placed per orders. Patient was unable to void. 1001- Patient transported to pre-op holding with nurse and monitor. 1630- Report received from PACU RN.  
7129- Patient received from PACU. Reassessed. See flow sheet. Patient is drowsy and intermittingly awakens and is agitated and restless. Family is at bedside. Frequent vital signs initiated. Dressing examined with PACU nurseVita. Old drainage present and marked. Per PACU nurseManish, the amount of drainage has not changed since surgery. 1721- Increased drainage on GERALDO dressing. Paged Dr. Héctor Hinds. 1725- 1 mg of dilaudid given for given for pain. 0- Dr. Héctor Hinds stated to change the GERALDO dressing if the seal breaks or change to an ABD pad if the dressing is saturated. The dressing is not saturated and the GERALDO seal is intact. Will continue to monitor. 1801- Apolinar started for hypotension. 1820- CPAP tidal volumes low after receiving hydromorphone. Called respiratory to see if they could adjust settings. Dr. Héctor Hinds paged for respiratory. 1825- Respiratory received orders to adjust CPAP. 1830- Apolinar increased to keep SBP greater than 90. 
1851- Increased drainage from GERALDO dressing, but dressing is not saturated and seal is intact. 1900- Report given to oncoming shift. 1910- BS checked.

## 2019-05-14 NOTE — PERIOP NOTES
1225:  CRNA at bedside aware of VSS. Patient repositioned in bed for comfort & elevation of head to assist in ease of breathing 1255:  Duo- Neb breathing treatment in progress. Patient A&O 
 
1172:  Patient to OR. Bilateral Hearing aides removed and taken to family in patient room by tech & they were updated that surgery was delayed close to 2 hours.

## 2019-05-14 NOTE — BRIEF OP NOTE
BRIEF OPERATIVE NOTE Date of Procedure: 5/14/2019 Preoperative Diagnosis: BILATERAL SCIATICA, LOW BACK PAIN, STENOSIS, SPONDYLOSIS, SCOLIOSIS Postoperative Diagnosis: BILATERAL SCIATICA, LOW BACK PAIN, STENOSIS, SPONDYLOSIS, SCOLIOSIS Procedure(s): 
L2-5 POSTERIOR DECOMPRESSION AND FUSION WITH BONE MARROW ASPIRATION FROM ILIAC CREST Surgeon(s) and Role: Anup Prado MD - Primary Surgical Assistant: ALANA Peters Surgical Staff: 
Circ-1: Chidi Christopher Physician Assistant: ALANA Orr Radiology Technician: Lera Mcardle; Renaye Sack Scrub Tech-1: Gale Must Event Time In Time Out Incision Start 1350 Incision Close Anesthesia: General  
Estimated Blood Loss: 50cc Specimens: * No specimens in log * Findings: stenosis Complications: None Implants:  
Implant Name Type Inv. Item Serial No.  Lot No. [id number] No. Used Action 3DEMIN CORTICAL FIBERS 30CC   [id number] [employer] N/A N/A 1 Implanted SCR RAIN X-TAB 7X45MM TI -- VIPER PRIME - SN/A  SCR RAIN X-TAB 7X45MM TI -- VIPER PRIME N/A [employer] SPINE N/A N/A 8 Implanted SCR ST SPNE INNER RAIN VIPER --  - SN/A  SCR ST SPNE INNER RAIN VIPER --  N/A [employer] DEPUY SPINE N/A N/A 8 Implanted 120mm VIPER RAMEZ   N/A DEPUY SPINE N/A N/A 2 Implanted

## 2019-05-14 NOTE — PROGRESS NOTES
Patient is presently of the floor to the OR for the 2nd part of his spine surgery. Will likely follow back tomorrow, POD#1, for OT evaluation.

## 2019-05-14 NOTE — PERIOP NOTES
1502-Handoff Report from Operating Room to PACU Report received from Fabiola Flower RN and Marifer Adamson CRNA regarding Wava Fossa. Surgeon(s): 
Diedre Ganser, MD  And Procedure(s) (LRB): 
L2-5 POSTERIOR DECOMPRESSION AND FUSION WITH BONE MARROW ASPIRATION FROM ILIAC CREST (N/A)  confirmed  
with allergies, drains and dressings discussed. Anesthesia type, drugs, patient history, complications, estimated blood loss, vital signs, intake and output, and last pain medication, lines, reversal medications and temperature were reviewed. 1510- Lungs with expiratory wheezing. Verbal order received for duo neb now. 1525- Respiratory at bedside with CPAP machine. 1545- Pt suddenly ripped CPAP mask off of face while RNs were trying to readjust it. Pt stating,  \"Help me! Where arm I? \" Pt reoriented and states he is feeling better once RN explained where he was. 1615- TRANSFER - OUT REPORT: 
 
Verbal report given to Nuno Donaldson RN(name) on Wava Fossa  being transferred to PCU(unit) for routine post - op Report consisted of patients Situation, Background, Assessment and  
Recommendations(SBAR). Information from the following report(s) SBAR, Kardex, OR Summary, Procedure Summary, Intake/Output, MAR and Recent Results was reviewed with the receiving nurse. Opportunity for questions and clarification was provided. Patient transported with: 
 Monitor O2 @ 10 liters Registered Nurse Tech RRT 
CPAP

## 2019-05-14 NOTE — PROGRESS NOTES
Spiritual Care Partner Volunteer visited patient in [address] on May 14, 2019. Documented by: 
NELSON Lucero Div  Paging Service [phone])

## 2019-05-14 NOTE — PROGRESS NOTES
Problem: Mobility Impaired (Adult and Pediatric) Goal: *Acute Goals and Plan of Care (Insert Text) Description Physical Therapy Goals Initiated 5/14/2019 1. Patient will move from supine to sit and sit to supine , scoot up and down and roll side to side in bed with modified independence within 4 days. 2. Patient will perform sit to stand with independence within 4 days. 3. Patient will ambulate with independence for 300 feet with the least restrictive device within 4 days. 5. Patient will verbalize and demonstrate understanding of spinal precautions (No bending, lifting greater than 5 lbs, or twisting; log-roll technique; frequent repositioning as instructed) within 4 days. Outcome: Progressing Towards Goal 
 PHYSICAL THERAPY EVALUATION Patient: Amie Coughlin (89 y.o. male) Date: 5/14/2019 Primary Diagnosis: Spinal stenosis of lumbar region at multiple levels [M48.061] Procedure(s) (LRB): 
L2-5 POSTERIOR DECOMPRESSION AND FUSION (N/A) Day of Surgery Precautions:   Spinal 
 
ASSESSMENT : 
Based on the objective data described below, the patient presents with orthostatic hypotension, increased pain levels, BLE weakness, decreased activity tolerance/endurance, impaired balance, and overall impaired functional mobility on POD 1 following L2-5 lateral fusion. Pt received supine in bed on 4 LPM O2, RN recently discontinued herbert and cleared pt for participation with therapy. Educated pt regarding spinal precautions, wearing schedule of brace, as well as log roll technique. Vital signs assessed throughout position changes/activity with pt initially hypotensive in static stance (82/70) however BP stabilized with activity. Pt required maxAx1 in order to perform log roll secondary to increased pain levels at incision site. Remaining functional mobility occurred with minAx1, including sit<>Stand transfer and ambulation trial of 35ft w/RW support.  Gait stability fair overall with pt exhibiting slow, shuffled gait and notable BLE weakness (mild buckling however no overt LOB). Pt report 7-8/10 pain at incision site however denied pain radiating down BLEs. Pt scheduled for 2nd part of staged surgery this afternoon therefore discharge recommendations TBD pending progress with therapy after 2nd part of surgery. Of note, pt did NOT receive back brace prior to surgery, stating that Dr. Sony Martinez told him he would receive it during hospital admission. Patient will benefit from skilled intervention to address the above impairments. Patient?s rehabilitation potential is considered to be Good Factors which may influence rehabilitation potential include:  
? None noted ? Mental ability/status ? Medical condition ? Home/family situation and support systems ? Safety awareness 
? Pain tolerance/management 
? Other: PLAN : 
Recommendations and Planned Interventions: 
?           Bed Mobility Training             ? Neuromuscular Re-Education ? Transfer Training                   ? Orthotic/Prosthetic Training 
? Gait Training                         ? Modalities ? Therapeutic Exercises           ? Edema Management/Control ? Therapeutic Activities            ? Patient and Family Training/Education ? Other (comment): Frequency/Duration: Patient will be followed by physical therapy  twice daily to address goals. Discharge Recommendations: To Be Determined Further Equipment Recommendations for Discharge: TBD SUBJECTIVE:  
Patient stated ? Oh, it's all coming back to me now.? OBJECTIVE DATA SUMMARY:  
HISTORY:   
Past Medical History:  
Diagnosis Date Adverse effect of anesthesia   
 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 [address] On statin therapy 8/16/2017 TESSA on CPAP 1/3/2019 Psychiatric disorder Depression Simple chronic bronchitis ([facility] [address] 1/3/2019 TIA (transient ischemic attack) 8/16/2017 Umbilical hernia 55/8/9620 Past Surgical History:  
Procedure Laterality Date Del al 2174 Cardiac Stents CARDIAC SURG PROCEDURE UNLIST  04/2019 EF 61-65% HX HERNIA REPAIR    
 RIH  
 HX HERNIA REPAIR  56/51/46  
 open umbilical hernia repair mesh HX UROLOGICAL HYDROCELECTOMY Prior Level of Function/Home Situation: Pt lives with wife in one story home. Independent w/ ambulation and ADLs. Denies history of falls. Recent TIA in April 2019 therefore has not been driving since. States pain in back and BLEs prevented him from walking long distances or standing for long periods of time. Personal factors and/or comorbidities impacting plan of care:  
 
Home Situation Home Environment: Private residence # Steps to Enter: 1 Rails to Enter: No 
One/Two Story Residence: One story Living Alone: No 
Support Systems: Spouse/Significant Other/Partner, Child(theodora), Family member(s) Patient Expects to be Discharged to[de-identified] Private residence Current DME Used/Available at Home: CPAP Tub or Shower Type: Shower EXAMINATION/PRESENTATION/DECISION MAKING:  
Critical Behavior: 
Neurologic State: Drowsy, Eyes open spontaneously, Alert Orientation Level: Oriented X4 Cognition: Follows commands, Appropriate safety awareness, Appropriate decision making Hearing: Auditory Auditory Impairment: Hard of hearing, bilateral, Hearing aid(s) Hearing Aids/Status: At bedside Skin:  dressing clean, dry, intact Edema: none noted Range Of Motion: 
AROM: Generally decreased, functional 
  
  
  
  
  
  
  
Strength:   
Strength: Generally decreased, functional 
  
  
  
  
  
  
Tone & Sensation:  
Tone: Normal 
  
  
  
  
Sensation: Intact Coordination: 
Coordination: Within functional limits Vision:  
  
Functional Mobility: 
Bed Mobility: 
Rolling: Maximum assistance;Assist x1(log roll) Supine to Sit: Maximum assistance;Assist x1 Sit to Supine: Maximum assistance;Assist x1 Scooting: Maximum assistance;Assist x1 Transfers: 
Sit to Stand: Minimum assistance;Assist x1 Balance:  
Sitting: Intact Standing: Impaired; With support Standing - Static: Good;Constant support Standing - Dynamic : Fair Ambulation/Gait Training: 
Distance (ft): 35 Feet (ft) Assistive Device: Walker, rolling;Gait belt Ambulation - Level of Assistance: Minimal assistance;Assist x1 Gait Abnormalities: Decreased step clearance;Shuffling gait;Trunk sway increased Base of Support: Widened Speed/Beverly: Pace decreased (<100 feet/min) Step Length: Left shortened;Right shortened Functional Measure: 
Barthel Index: 
Bathin Bladder: 0 Bowels: 5 Groomin Dressin Feeding: 10 Mobility: 0 Stairs: 0 Toilet Use: 5 Transfer (Bed to Chair and Back): 5 Total: 35/100 Percentage of impairment  
0% 1-19% 20-39% 40-59% 60-79% 80-99% 100% Barthel Score 0-100 100 99-80 79-60 59-40 20-39 1-19 
 0 The Barthel ADL Index: Guidelines 1. The index should be used as a record of what a patient does, not as a record of what a patient could do. 2. The main aim is to establish degree of independence from any help, physical or verbal, however minor and for whatever reason. 3. The need for supervision renders the patient not independent. 4. A patient's performance should be established using the best available evidence. Asking the patient, friends/relatives and nurses are the usual sources, but direct observation and common sense are also important. However direct testing is not needed. 5. Usually the patient's performance over the preceding 24-48 hours is important, but occasionally longer periods will be relevant. 6. Middle categories imply that the patient supplies over 50 per cent of the effort. 7. Use of aids to be independent is allowed. Cesario Ferrari., Barthel, D.W. ([id number]). Functional evaluation: the Barthel Index. [address] [facility] (14)2. Isra Ptael rose Daren, ELVAF, Shelby Tadeo., Jerry Pang., [place], [address] (1999). Measuring the change indisability after inpatient rehabilitation; comparison of the responsiveness of the Barthel Index and Functional Bee Measure. Journal of Neurology, Neurosurgery, and Psychiatry, 66(4), 568-982. Jeni Martin, N.J.A, MAZIN Pulido, & Rafael Gee MKOTA. (2004.) Assessment of post-stroke quality of life in cost-effectiveness studies: The usefulness of the Barthel Index and the EuroQoL-5D. [facility], 08, [phone] Physical Therapy Evaluation Charge Determination History Examination Presentation Decision-Making MEDIUM  Complexity : 1-2 comorbidities / personal factors will impact the outcome/ POC  MEDIUM Complexity : 3 Standardized tests and measures addressing body structure, function, activity limitation and / or participation in recreation  MEDIUM Complexity : Evolving with changing characteristics  MEDIUM Complexity : FOTO score of 26-74 Based on the above components, the patient evaluation is determined to be of the following complexity level: MEDIUM Pain: 
Pain Scale 1: Numeric (0 - 10) Pain Intensity 1: 8 Pain Location 1: Back Pain Orientation 1: Posterior Pain Description 1: Aching Pain Intervention(s) 1: Medication (see MAR) Activity Tolerance: Orthostatic hypotension which stabilized with exercise/activity; O2 sats 91-93% on 4 LPM O2 Please refer to the flowsheet for vital signs taken during this treatment. After treatment:  
?         Patient left in no apparent distress sitting up in chair ? Patient left in no apparent distress in bed 
? Call bell left within reach ? Nursing notified ? Caregiver present ? Bed alarm activated COMMUNICATION/EDUCATION:  
The patient?s plan of care was discussed with: Registered Nurse and NP . ? Fall prevention education was provided and the patient/caregiver indicated understanding. ? Patient/family have participated as able in goal setting and plan of care. ?         Patient/family agree to work toward stated goals and plan of care. ?         Patient understands intent and goals of therapy, but is neutral about his/her participation. ? Patient is unable to participate in goal setting and plan of care. Thank you for this referral. 
Emmett Quiñonez, PT, DPT Time Calculation: 23 mins

## 2019-05-14 NOTE — PROGRESS NOTES
TRANSFER - IN REPORT: 
 
Verbal report received from Karri Ross (name) on Alex Wise  being received from PACU(unit) for routine post - op Report consisted of patients Situation, Background, Assessment and  
Recommendations(SBAR). Information from the following report(s) SBAR, OR Summary, Procedure Summary, Intake/Output, Recent Results, Med Rec Status and Cardiac Rhythm NSR was reviewed with the receiving nurse. Opportunity for questions and clarification was provided. Assessment completed upon patients arrival to unit and care assumed.

## 2019-05-14 NOTE — PROGRESS NOTES
Spoke with Dr. Skyla Vicente who is aware that patient is on our CPAP machine due to amount of oxygen needed. Respiratory to adjust CPAP according to patient needs and wean FiO2 to keep sats above 90%. May place heater on bipap as patient feels dry. Patient may use home CPAP once FiO2 is weaned down and respiratory can bleed in O2 to home CPAP (Patient needing too much FiO2 to bleed into home CPAP at the moment) Tawana Rothman, RRT

## 2019-05-14 NOTE — ROUTINE PROCESS
sbar in note - pt to holding area identifies self and procedure for today. Vss. Nasal 4 liters in place. Pt has been npo except for meds. Pt has a salvador draining yellow urine.

## 2019-05-15 ENCOUNTER — APPOINTMENT (OUTPATIENT)
Dept: GENERAL RADIOLOGY | Age: 84
DRG: 453 | End: 2019-05-15
Attending: ORTHOPAEDIC SURGERY
Payer: MEDICARE

## 2019-05-15 LAB
GLUCOSE BLD STRIP.AUTO-MCNC: 133 MG/DL (ref 65–100)
HGB BLD-MCNC: 11.4 G/DL (ref 12.1–17)
SERVICE CMNT-IMP: ABNORMAL

## 2019-05-15 PROCEDURE — 97165 OT EVAL LOW COMPLEX 30 MIN: CPT | Performed by: OCCUPATIONAL THERAPIST

## 2019-05-15 PROCEDURE — 74011000250 HC RX REV CODE- 250: Performed by: ORTHOPAEDIC SURGERY

## 2019-05-15 PROCEDURE — 97164 PT RE-EVAL EST PLAN CARE: CPT

## 2019-05-15 PROCEDURE — 97530 THERAPEUTIC ACTIVITIES: CPT

## 2019-05-15 PROCEDURE — 97530 THERAPEUTIC ACTIVITIES: CPT | Performed by: OCCUPATIONAL THERAPIST

## 2019-05-15 PROCEDURE — 85018 HEMOGLOBIN: CPT

## 2019-05-15 PROCEDURE — 97116 GAIT TRAINING THERAPY: CPT

## 2019-05-15 PROCEDURE — 36415 COLL VENOUS BLD VENIPUNCTURE: CPT

## 2019-05-15 PROCEDURE — 74011250636 HC RX REV CODE- 250/636: Performed by: ORTHOPAEDIC SURGERY

## 2019-05-15 PROCEDURE — 82962 GLUCOSE BLOOD TEST: CPT

## 2019-05-15 PROCEDURE — 65660000000 HC RM CCU STEPDOWN

## 2019-05-15 PROCEDURE — 74011250637 HC RX REV CODE- 250/637: Performed by: ORTHOPAEDIC SURGERY

## 2019-05-15 PROCEDURE — 74011250636 HC RX REV CODE- 250/636: Performed by: PHYSICIAN ASSISTANT

## 2019-05-15 PROCEDURE — 94660 CPAP INITIATION&MGMT: CPT

## 2019-05-15 PROCEDURE — 72100 X-RAY EXAM L-S SPINE 2/3 VWS: CPT

## 2019-05-15 RX ORDER — FAMOTIDINE 20 MG/1
20 TABLET, FILM COATED ORAL
Status: DISCONTINUED | OUTPATIENT
Start: 2019-05-16 | End: 2019-05-15 | Stop reason: SDUPTHER

## 2019-05-15 RX ADMIN — SODIUM CHLORIDE 125 ML/HR: 900 INJECTION, SOLUTION INTRAVENOUS at 01:19

## 2019-05-15 RX ADMIN — SODIUM CHLORIDE 75 ML/HR: 900 INJECTION, SOLUTION INTRAVENOUS at 10:44

## 2019-05-15 RX ADMIN — FUROSEMIDE 80 MG: 80 TABLET ORAL at 10:44

## 2019-05-15 RX ADMIN — TAMSULOSIN HYDROCHLORIDE 0.4 MG: 0.4 CAPSULE ORAL at 09:22

## 2019-05-15 RX ADMIN — Medication 10 ML: at 21:06

## 2019-05-15 RX ADMIN — POLYETHYLENE GLYCOL 3350 17 G: 17 POWDER, FOR SOLUTION ORAL at 09:21

## 2019-05-15 RX ADMIN — Medication 10 ML: at 06:05

## 2019-05-15 RX ADMIN — DULOXETINE HYDROCHLORIDE 30 MG: 30 CAPSULE, DELAYED RELEASE ORAL at 09:22

## 2019-05-15 RX ADMIN — ACETAMINOPHEN 1000 MG: 500 TABLET ORAL at 16:33

## 2019-05-15 RX ADMIN — OXYCODONE HYDROCHLORIDE 5 MG: 5 TABLET ORAL at 18:16

## 2019-05-15 RX ADMIN — ALLOPURINOL 300 MG: 100 TABLET ORAL at 09:21

## 2019-05-15 RX ADMIN — UMECLIDINIUM BROMIDE AND VILANTEROL TRIFENATATE 1 PUFF: 62.5; 25 POWDER RESPIRATORY (INHALATION) at 10:45

## 2019-05-15 RX ADMIN — SENNOSIDES AND DOCUSATE SODIUM 1 TABLET: 8.6; 5 TABLET ORAL at 18:16

## 2019-05-15 RX ADMIN — GABAPENTIN 100 MG: 100 CAPSULE ORAL at 21:06

## 2019-05-15 RX ADMIN — Medication 2 G: at 20:46

## 2019-05-15 RX ADMIN — ATORVASTATIN CALCIUM 5 MG: 10 TABLET, FILM COATED ORAL at 09:21

## 2019-05-15 RX ADMIN — GABAPENTIN 100 MG: 100 CAPSULE ORAL at 16:32

## 2019-05-15 RX ADMIN — SENNOSIDES AND DOCUSATE SODIUM 1 TABLET: 8.6; 5 TABLET ORAL at 09:22

## 2019-05-15 RX ADMIN — NEPHROCAP 1 CAPSULE: 1 CAP ORAL at 09:21

## 2019-05-15 RX ADMIN — MUPIROCIN: 20 OINTMENT TOPICAL at 10:49

## 2019-05-15 RX ADMIN — PANTOPRAZOLE SODIUM 40 MG: 40 TABLET, DELAYED RELEASE ORAL at 09:22

## 2019-05-15 RX ADMIN — Medication 15 MCG/MIN: at 06:59

## 2019-05-15 RX ADMIN — Medication 2 G: at 11:56

## 2019-05-15 RX ADMIN — GABAPENTIN 100 MG: 100 CAPSULE ORAL at 09:22

## 2019-05-15 RX ADMIN — Medication 10 ML: at 16:37

## 2019-05-15 NOTE — PROGRESS NOTES
Patient seen for OT evaluation and full note to follow. Anticipate need for inpatient rehab after discharge.

## 2019-05-15 NOTE — PROGRESS NOTES
Problem: Self Care Deficits Care Plan (Adult) Goal: *Acute Goals and Plan of Care (Insert Text) Description Occupational Therapy Goals Initiated 5/15/2019 1. Patient will perform grooming standing at sink with supervision/set-up within 7 day(s). 2.  Patient will perform upper body dressing with supervision/set-up within 7 day(s). 3.  Patient will perform lower body dressing using AE with supervision/set-up within 7 day(s). 4.  Patient will perform toilet transfers with supervision/set-up within 7 day(s). 5.  Patient will perform all aspects of toileting with supervision/set-up within 7 day(s). 6.  Patient will perform sponge bathing using AE with supervision/set-up within 7 day(s). Outcome: Progressing Towards Goal 
 
 
 
OCCUPATIONAL THERAPY EVALUATION Patient: Sandi Flannery (6 y.o. male) Date: 5/15/2019 Primary Diagnosis: Spinal stenosis of lumbar region at multiple levels [M48.061] Procedure(s) (LRB): 
L2-5 POSTERIOR DECOMPRESSION AND FUSION WITH BONE MARROW ASPIRATION FROM ILIAC CREST (N/A) 1 Day Post-Op Precautions:  Fall, Spinal 
 
ASSESSMENT : 
Based on the objective data described below, the patient presents with mild confusion, post-op spine precautions, GW, decreased activity tolerance, decreased safety awareness and decreased balance which is impairing his functional independence. He is functioning below his independent to mod I baseline, now performing ADLs at an independent to max A level and is CGA to min A for functional mobility. Patient will benefit from skilled intervention to address the above impairments. Patients rehabilitation potential is considered to be Good Factors which may influence rehabilitation potential include: ? None noted ? Mental ability/status ? Medical condition ? Home/family situation and support systems ? Safety awareness ? Pain tolerance/management ?             Other: PLAN : 
Recommendations and Planned Interventions: 
?               Self Care Training                  ? Therapeutic Activities ? Functional Mobility Training    ? Cognitive Retraining 
? Therapeutic Exercises           ? Endurance Activities ? Balance Training                   ? Neuromuscular Re-Education ? Visual/Perceptual Training     ? Home Safety Training 
? Patient Education                 ? Family Training/Education ? Other (comment): Frequency/Duration: Patient will be followed by occupational therapy 5 times a week to address goals. Discharge Recommendations: Inpatient Rehab Further Equipment Recommendations for Discharge: TBD OBJECTIVE DATA SUMMARY:  
 
Past Medical History:  
Diagnosis Date  Adverse effect of anesthesia   
 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  
Procedure Laterality Date [facility] Cardiac Stents  CARDIAC SURG PROCEDURE UNLIST  04/2019 EF 61-65%  HX HERNIA REPAIR    
 RIH  
 HX HERNIA REPAIR  72/94/17  
 open umbilical hernia repair mesh  HX UROLOGICAL HYDROCELECTOMY Prior Level of Function/Home Situation: Patient reports being independent to mod I with ADLs, is independent with ambulation and is performing some yard work. Expanded or extensive additional review of patient history:  
 
Home Situation Home Environment: Private residence # Steps to Enter: 1 Rails to Enter: No 
One/Two Story Residence: One story Living Alone: No 
Support Systems: Spouse/Significant Other/Partner, Child(theodora), Family member(s) Patient Expects to be Discharged to[de-identified] Private residence Current DME Used/Available at Home: CPAP, LB AE for ADLs Tub or Shower Type: Shower ? Right hand dominant   ? Left hand dominant Cognitive/Behavioral Status: 
Neurologic State: Alert Orientation Level: Oriented to person;Oriented to place;Oriented to situation Cognition: Follows commands; Impaired decision making;Poor safety awareness Perception: Appears intact Perseveration: No perseveration noted Safety/Judgement: Decreased awareness of need for safety;Decreased insight into deficits Vision/Perceptual:   
    
Acuity: Within Defined Limits Range of Motion: 
AROM: Generally decreased, functional(for BLEs, WFL BUEs) Strength: 
Strength: Generally decreased, functional(for BLEs, WFL BUEs) Coordination: 
Coordination: Within functional limits Tone & Sensation: 
Tone: Normal 
Balance: 
Sitting: Intact Standing: Impaired(with support of RW) Standing - Static: Good Standing - Dynamic : Fair Functional Mobility and Transfers for ADLs: 
Bed Mobility: 
Rolling: Contact guard assistance;Assist x1 Supine to Sit: Contact guard assistance;Assist x1 Sit to Supine: Minimum assistance Scooting: Contact guard assistance Transfers: 
Sit to Stand: Minimum assistance Bed to Chair: Minimum assistance(ambulating with a RW) Toilet Transfer : Minimum assistance(using grab bar) Bathroom Mobility: Minimum assistance(ambulating with a RW) ADL Assessment: 
Feeding: Independent Oral Facial Hygiene/Grooming: Contact guard assistance Bathing: Moderate assistance Upper Body Dressing: Minimum assistance Lower Body Dressing: Maximum assistance Toileting: Moderate assistance Functional Measure: 
Barthel Index: 
 
Bathin Bladder: 0 Bowels: 10 
Groomin Dressin Feeding: 10 Mobility: 0 Stairs: 0 Toilet Use: 0 Transfer (Bed to Chair and Back): 10 Total: 30 Barthel and G-code impairment scale: 
Percentage of impairment CH 
0% CI 
1-19% CJ 
20-39% CK 
40-59% CL 
60-79% CM 
80-99% CN 
100% Barthel Score 0-100 100 99-80 79-60 59-40 20-39 1-19 
 0 Barthel Score 0-20 20 17-19 13-16 9-12 5-8 1-4 0 The Barthel ADL Index: Guidelines 1. The index should be used as a record of what a patient does, not as a record of what a patient could do. 2. The main aim is to establish degree of independence from any help, physical or verbal, however minor and for whatever reason. 3. The need for supervision renders the patient not independent. 4. A patient's performance should be established using the best available evidence. Asking the patient, friends/relatives and nurses are the usual sources, but direct observation and common sense are also important. However direct testing is not needed. 5. Usually the patient's performance over the preceding 24-48 hours is important, but occasionally longer periods will be relevant. 6. Middle categories imply that the patient supplies over 50 per cent of the effort. 7. Use of aids to be independent is allowed. Paola Mcmahon., Barthel, D.W. ([id number]). Functional evaluation: the Barthel Index. [address] [facility] (14)2. BENNIE Rangel, Clara Lucas., Chalo Contreras, [address] [place] [address] ().  Measuring the change indisability after inpatient rehabilitation; comparison of the responsiveness of the Barthel Index and Functional [place] Measure. Journal of Neurology, Neurosurgery, and Psychiatry, 66(4), 781-501. SARAH Becker, MAZIN Pulido, & Tracy Hernadez M.A. (2004.) Assessment of post-stroke quality of life in cost-effectiveness studies: The usefulness of the Barthel Index and the EuroQoL-5D. [facility], 77, 039-11 ADL Intervention and task modifications: 
Initiated bed mobility, transfer, standing grooming, toileting and safety training. Patient able to recall 3/3 spine precautions, but required cueing for adherence during bed mobility. Pain: 
Pain Scale 1: Numeric (0 - 10) Pain Intensity 1: 0 Activity Tolerance: VSS on 3L NC After treatment:  
? Patient left in no apparent distress sitting up in chair ? Patient left in no apparent distress in bed 
? Call bell left within reach ? Nursing notified ? Caregiver present ? Bed alarm activated COMMUNICATION/EDUCATION:  
The patients plan of care was discussed with: Registered Nurse. 
? Home safety education was provided and the patient/caregiver indicated understanding. ? Patient/family have participated as able in goal setting and plan of care. ? Patient/family agree to work toward stated goals and plan of care. ? Patient understands intent and goals of therapy, but is neutral about his/her participation. ? Patient is unable to participate in goal setting and plan of care. This patients plan of care is appropriate for delegation to [facility]. Thank you for this referral. 
Tricia Jasso OTR/L Time Calculation: 31 mins

## 2019-05-15 NOTE — PROGRESS NOTES
PCU SHIFT NURSING NOTE Bedside and Verbal shift change report given to RohitRN (oncoming nurse) by Asha Arndt RN (offgoing nurse). Report included the following information SBAR, Kardex, MAR, Recent Results and Cardiac Rhythm NSR. Shift Summary:  
0830:  /57. Decreased Apolinar to 10 mcg/min. Will continue to monitor pt. 
0920:  /59, Decreased Apolinar to 5 mcg/min. Pt working with pt. 
8367:  Pt up in chair. /67. Apolinar stopped. Pt is scheduled to received Norvasc, Atenolol, Losartan and Lasix this am.  Will placed call to PA and speak with him about meds. 1010:  [facility]ed [facility] to speak with ALANA Mendez about meds. 1030:  Spoke with Jean-Claude Hammond, [address]. Infomred him that pt was on Apolinar over night and that Norvasc, atenolol, lasix and Losartan. Spoke with him about giving Lasix and holding the others today. Apolinar is currently off and will continue to monitor BP. Received orders to decrease NS to 75 ml/hr. OT going in to work with pt. 
1050:  Pt back in bed. Spoke with pharmacy about anti-biotics and they are adjusting. Salvador discontinued and pt tolerated well. Urinal provided. 1210:  Pt assisted to sitting on side of bed to eat lunch. BP stable. No signs of distress. Small amount of new drainage on dressing to back. Will continue to monitor pt. 
1615:  Pt back to bed. Pt has not voided sinsce salvador removed. Bladder scanned for 199ml. Will monitor. 1700:  Pt off floor for x-ray. 1750: Went and picked up pt from x-ray department. When arrived pt was grimacing in pain. States his back pain was a 9/10. Took pt back up to room and pulled up and repositioned in bed. 1800:  Bladder scanned pt and 422 ml in bladder. Per policy straight cathed pt. Cath in without any difficulty. 450 ml of urine out. Will continue to monitor pt. 
1820:  Medicated with oxycodone and increased O2 to 3L NC. Will monitor. [facility] and asked for them to check pt CPAP. Reference number [id number] 1840:  Placed pt back on CPAP due to O2 sats staying around 86%. O2 sats on CPAP improved to 92% and climbing. 1910:  Bedside and Verbal shift change report given to Yoli Cleveland RN (oncoming nurse) by Carlos A Meyers RN (offgoing nurse). Report included the following information SBAR, Kardex, MAR, Recent Results and Cardiac Rhythm NSR.  
2000:  Changed GERALDO dressing to back. Pt resting and wanted to stay on his right side for now. Urinal in reach. Admission Date 5/13/2019 Admission Diagnosis Spinal stenosis of lumbar region at multiple levels [M48.061] Consults None Consults [x]PT [x]OT []Speech [x]Case Management  
  
[] Palliative Cardiac Monitoring Order  
[x]Yes []No  
 
IV drips []Yes Drip:                            Dose: 
Drip:                            Dose: 
Drip:                            Dose:  
[x]No  
 
GI Prophylaxis [x]Yes []No  
 
 
 
DVT Prophylaxis SCDs:  Sequential Compression Device: Bilateral  
     
 Gee stockings:     
  
[x] Medication []Contraindicated []None Activity Level Activity Level: Bed Rest   
 Activity Assistance: Complete care Purposeful Rounding every 1-2 hour? []Yes Dow Score  Total Score: 2 Bed Alarm (If score 3 or >) []Yes  
[] Refused (See signed refusal form in chart) Aquiles Score  Aquiles Score: 18 Aquiles Score (if score 14 or less) []PMT consult  
[]Wound Care consult []Specialty bed  
[] Nutrition consult Needs prior to discharge:  
Home O2 required:   
[x]Yes []No  
 If yes, how much O2 required? Other:  
 Last Bowel Movement: Last Bowel Movement Date: 05/12/19 Influenza Vaccine Received Flu Vaccine for Current Season (usually Sept-March): Yes Pneumonia Vaccine Diet Active Orders Diet DIET CARDIAC Regular LDAs Peripheral IV 05/13/19 Left Antecubital (Active) Site Assessment Clean, dry, & intact 5/14/2019  9:45 PM  
 Phlebitis Assessment 0 5/14/2019  9:45 PM  
Infiltration Assessment 0 5/14/2019  9:45 PM  
Dressing Status Clean, dry, & intact 5/14/2019  9:45 PM  
Dressing Type Transparent 5/14/2019  9:45 PM  
Hub Color/Line Status Pink; Infusing 5/14/2019  9:45 PM  
   
Peripheral IV 05/13/19 Left Hand (Active) Site Assessment Clean, dry, & intact 5/14/2019  9:45 PM  
Phlebitis Assessment 0 5/14/2019  9:45 PM  
Infiltration Assessment 0 5/14/2019  9:45 PM  
Dressing Status Clean, dry, & intact 5/14/2019  9:45 PM  
Dressing Type Transparent 5/14/2019  9:45 PM  
Hub Color/Line Status Pink; Infusing 5/14/2019  9:45 PM  
Alcohol Cap Used Yes 5/14/2019  9:45 PM  
                  
Urinary Catheter [REMOVED] Urinary Catheter 05/13/19 2- way; Yu-Indications for Use: Surgery Urinary Catheter 05/14/19 Coude-Indications for Use: Accurate measurement of urinary output Intake & Output Date 05/14/19 0700 - 05/15/19 0659 05/15/19 0700 - 05/16/19 6528 Shift 2877-7920 1510-1257 24 Hour Total 7522-4481 3496-7445 24 Hour Total  
INTAKE  
P.O. 50 50 100     
  P. O. 50 50 100     
I. V.(mL/kg/hr) 1509.7(8.2)  3613.2(1.4) I.V. 500  500 Phenylephrine Volume 157  157 Volume (0.9% sodium chloride infusion) 1756. 3  1756.3 Volume (lactated Ringers infusion) 1200  1200 Shift Total(mL/kg) [id number]. 2(35.2) 50(0.4) 3713. 2(33.4) OUTPUT Urine(mL/kg/hr) 600(0.5) 610(0.5) 1210(0.5) Urine Output 225  225 Urine Output (mL) (Urinary Catheter 05/14/19 Coude) 375 610 985 Blood 50  50 Estimated Blood Loss 50  50 Shift Total(mL/kg) 650(6.3) 610(5.5) [id number]) NET 3013.2 -560 2453.2 Weight (kg) 104 111.3 111.3 111.3 111.3 111.3 Readmission Risk Assessment Tool Score High Risk   
      
 24 Total Score 3 Has Seen PCP in Last 6 Months (Yes=3, No=0) 2 . Living with Significant Other. Assisted Living. LTAC. SNF. or  
Rehab 4 IP Visits Last 12 Months (1-3=4, 4=9, >4=11) 5 Pt. Coverage (Medicare=5 , Medicaid, or Self-Pay=4) 10 Charlson Comorbidity Score (Age + Comorbid Conditions) Criteria that do not apply:  
 Patient Length of Stay (>5 days = 3) Expected Length of Stay - - - Actual Length of Stay 2

## 2019-05-15 NOTE — PROGRESS NOTES
Problem: Mobility Impaired (Adult and Pediatric) Goal: *Acute Goals and Plan of Care (Insert Text) Description Physical Therapy Goals Initiated 5/14/2019 - remain appropriate 5/15/19 1. Patient will move from supine to sit and sit to supine , scoot up and down and roll side to side in bed with modified independence within 4 days. 2. Patient will perform sit to stand with independence within 4 days. 3. Patient will ambulate with independence for 300 feet with the least restrictive device within 4 days. 5. Patient will verbalize and demonstrate understanding of spinal precautions (No bending, lifting greater than 5 lbs, or twisting; log-roll technique; frequent repositioning as instructed) within 4 days. Outcome: Progressing Towards Goal 
 PHYSICAL THERAPY REEVALUATION Patient: Emmanuel Little (89 y.o. male) Date: 5/15/2019 Primary Diagnosis: Spinal stenosis of lumbar region at multiple levels [M48.061] Procedure(s) (LRB): 
L2-5 POSTERIOR DECOMPRESSION AND FUSION WITH BONE MARROW ASPIRATION FROM ILIAC CREST (N/A) 1 Day Post-Op Precautions:   Spinal, Fall Chart, physical therapy assessment, plan of care and goals were reviewed. ASSESSMENT : 
Based on the objective data described below, the patient presents with increased weakness, impaired standing balance, decreased activity tolerance, and overall impaired functional mobility on POD 1 following L2-5 posterior decompression and fusion (2nd part of staged surgery). Pt received supine in bed, on 6 LPM O2 and 10 mcg herbert, however cleared for participation with therapy by RN, pt agreeable. Vital signs assessed throughout mobility with BP remaining stable throughout. O2 sats decreased to 83% post activity on 6 LPM O2 however recovered with seated rest and instruction in pursed lip breathing technique. Gait unsteady overall with pt requiring minAx2 and use of RW during ambulation trial of 60ft.  Full body tremors/shakiness noted throughout therapy session which further increased gait instability/fall risk. Given functional mobility deficits demonstrated this date and lack of family support at home (wife with recent gallbladder sx, unable to safely assist per pt report), recommend inpt rehab at discharge. Patient will benefit from skilled intervention to address the above impairments. Patient?s rehabilitation potential is considered to be Good Factors which may influence rehabilitation potential include:  
? None noted ? Mental ability/status ? Medical condition ? Home/family situation and support systems ? Safety awareness 
? Pain tolerance/management 
? Other: PLAN : 
Recommendations and Planned Interventions: ?             Bed Mobility Training             ? Neuromuscular Re-Education ? Transfer Training                   ? Orthotic/Prosthetic Training ? Gait Training                         ? Modalities ? Therapeutic Exercises           ? Edema Management/Control ? Therapeutic Activities            ? Patient and Family Training/Education ? Other (comment): stair climbing Frequency/Duration: Patient will be followed by physical therapy twice daily to address goals. Discharge Recommendations: Inpatient Rehab Further Equipment Recommendations for Discharge: TBD SUBJECTIVE:  
Patient stated ? The only pain I'm having is this tube!! (salvador catheter). ? OBJECTIVE DATA SUMMARY:  
 
Past Medical History:  
Diagnosis Date Adverse effect of anesthesia   
 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  
Procedure Laterality Date [address] Cardiac Stents CARDIAC SURG PROCEDURE UNLIST  04/2019 EF 61-65% HX HERNIA REPAIR    
 RIH  
 HX HERNIA REPAIR  43/34/42  
 open umbilical hernia repair mesh HX UROLOGICAL HYDROCELECTOMY Hospital course since last seen and reason for reevaluation: Pt s/p 2nd part of planned staged surgery (L2-5 posterior decompression and fusion). Critical Behavior: 
Neurologic State: Alert, Eyes open spontaneously Orientation Level: Oriented X4 Cognition: Follows commands Skin:  dressing saturated with blood - RN aware Strength:   
Strength: Generally decreased, functional 
  
  
  
  
  
 
  
Tone & Sensation:  
Tone: Normal 
  
  
  
  
Sensation: Intact Range Of Motion: 
AROM: Generally decreased, functional 
  
  
  
  
  
  
  
Coordination: 
Coordination: Generally decreased, functional 
 
Functional Mobility: 
Bed Mobility: 
Rolling: Contact guard assistance;Assist x1 Supine to Sit: Contact guard assistance;Assist x1 Scooting: Supervision Transfers: 
Sit to Stand: Contact guard assistance;Assist x1 Stand to Sit: Contact guard assistance;Assist x1 Bed to Chair: Minimum assistance;Assist x1(2nd person for safety) Balance:  
Sitting: Intact Standing: Impaired; With support(RW) 
Standing - Static: Fair;Constant support Standing - Dynamic : Fair Ambulation/Gait Training: 
Distance (ft): 60 Feet (ft) Assistive Device: Brace/Splint;Gait belt;Walker, rolling Ambulation - Level of Assistance: Minimal assistance;Assist x1(2nd person for safety) Gait Abnormalities: Shuffling gait;Trunk sway increased(full body tremors/shakiness?) Base of Support: Widened Speed/Beverly: Pace decreased (<100 feet/min); Shuffled Step Length: Left shortened;Right shortened Functional Measure: 
Barthel Index: 
Bathin Bladder: 0 Bowels: 5 Groomin Dressin Feeding: 10 Mobility: 0 Stairs: 0 Toilet Use: 5 Transfer (Bed to Chair and Back): 5 Total: 35/100 Percentage of impairment  
0% 1-19% 20-39% 40-59% 60-79% 80-99% 100% Barthel Score 0-100 100 99-80 79-60 59-40 20-39 1-19 
 0 The Barthel ADL Index: Guidelines 1. The index should be used as a record of what a patient does, not as a record of what a patient could do. 2. The main aim is to establish degree of independence from any help, physical or verbal, however minor and for whatever reason. 3. The need for supervision renders the patient not independent. 4. A patient's performance should be established using the best available evidence. Asking the patient, friends/relatives and nurses are the usual sources, but direct observation and common sense are also important. However direct testing is not needed. 5. Usually the patient's performance over the preceding 24-48 hours is important, but occasionally longer periods will be relevant. 6. Middle categories imply that the patient supplies over 50 per cent of the effort. 7. Use of aids to be independent is allowed. Carol Crowell., Barthel, D.W. ([id number]). Functional evaluation: the Barthel Index. [address] [facility] (14)2. Timothy Mcgregor, BENNIE, Aj Turner., Teresa Patel.[place], [address] ().  Measuring the change indisability after inpatient rehabilitation; comparison of the responsiveness of the Barthel Index and Functional [place] Measure. Journal of Neurology, Neurosurgery, and Psychiatry, 66(4), 524-498. SARAH Walters, MAZIN Pulido, & Kaitlynn Remy M.A. (2004.) Assessment of post-stroke quality of life in cost-effectiveness studies: The usefulness of the Barthel Index and the EuroQoL-5D. [facility], 13, 757-92 Pain: 
Pain Scale 1: Numeric (0 - 10) Pain Intensity 1: 0 Activity Tolerance:  
O2 sats 83% post activity on 6 LPM O2, recovered with seated rest. All other VSS Please refer to the flowsheet for vital signs taken during this treatment. After treatment:  
?  Patient left in no apparent distress sitting up in chair ? Patient left in no apparent distress in bed 
? Call bell left within reach ? Nursing notified ? Caregiver present ? Bed alarm activated COMMUNICATION/EDUCATION:  
The patient?s plan of care was discussed with: Registered Nurse. ?  Fall prevention education was provided and the patient/caregiver indicated understanding. ? Patient/family have participated as able in goal setting and plan of care. ?  Patient/family agree to work toward stated goals and plan of care. ?  Patient understands intent and goals of therapy, but is neutral about his/her participation. ? Patient is unable to participate in goal setting and plan of care. Thank you for this referral. 
Jacquelyn Bustillo, PT, DPT Time Calculation: 25 mins

## 2019-05-15 NOTE — PROGRESS NOTES
Problem: Mobility Impaired (Adult and Pediatric) Goal: *Acute Goals and Plan of Care (Insert Text) Description Physical Therapy Goals Initiated 5/14/2019 - remain appropriate 5/15/19 1. Patient will move from supine to sit and sit to supine , scoot up and down and roll side to side in bed with modified independence within 4 days. 2. Patient will perform sit to stand with independence within 4 days. 3. Patient will ambulate with independence for 300 feet with the least restrictive device within 4 days. 5. Patient will verbalize and demonstrate understanding of spinal precautions (No bending, lifting greater than 5 lbs, or twisting; log-roll technique; frequent repositioning as instructed) within 4 days. 5/15/2019 1510 by Reyes Baseman, PT Outcome: Progressing Towards Goal 
5/15/2019 0916 by Reyes Baseman, PT Outcome: Progressing Towards Goal 
 PHYSICAL THERAPY TREATMENT Patient: Amie Coughlin (60 y.o. male) Date: 5/15/2019 Diagnosis: Spinal stenosis of lumbar region at multiple levels [M48.061] <principal problem not specified> Procedure(s) (LRB): 
L2-5 POSTERIOR DECOMPRESSION AND FUSION WITH BONE MARROW ASPIRATION FROM ILIAC CREST (N/A) 1 Day Post-Op Precautions: Fall, Spinal 
Chart, physical therapy assessment, plan of care and goals were reviewed. ASSESSMENT: 
Pt received supine in bed, RN and pt consented to therapy session. BP monitored and remained stable throughout session. All mobility occurred on 3 LPM O2, O2 sats decreasing to 86% post activity however quickly recovering to 93-5% with seated rest. Pt knew 2/3 spinal precautions, re-educated on precautions and use of lumbar back brace. Pt supine <> sit using log roll w/ CGAx1, sitting balance intact. Observed minor bleeding from incision through bandage, informed RN. CGA x1 sit <> stand, standing balance good w/ RW support.  Pt ambulated 90 ft w/ RW and CGA, exhibiting improved gait stability and fewer tremors however gait stability remains fair overall. Returned pt to chair. Overall, the pt tolerated the tx session well. Pt progressing appropriately toward goals and would continue to benefit from PT BID as well as inpatient rehab at discharge. Progression toward goals: 
?      Improving appropriately and progressing toward goals ? Improving slowly and progressing toward goals ? Not making progress toward goals and plan of care will be adjusted PLAN: 
Patient continues to benefit from skilled intervention to address the above impairments. Continue treatment per established plan of care. Discharge Recommendations:  Inpatient Rehab Further Equipment Recommendations for Discharge:  tbd by facility SUBJECTIVE:  
Patient stated ? I ate dinner sitting on the side of the bed. ? The patient stated 2/3 back precautions. Reviewed all 3 with patient. OBJECTIVE DATA SUMMARY:  
Critical Behavior: 
Neurologic State: Alert Orientation Level: Oriented to person, Oriented to place, Oriented to situation Cognition: Follows commands, Impaired decision making, Poor safety awareness Safety/Judgement: Decreased awareness of need for safety, Decreased insight into deficits Functional Mobility Training: 
Bed Mobility: 
Log Rolling: Contact guard assistance Supine to Sit: Contact guard assistance Sit to Supine: Minimum assistance Scooting: Supervision Brace donned with  maximal assistance while seated EOB. Transfers: 
Sit to Stand: Contact guard assistance Stand to Sit: Contact guard assistance Bed to Chair: Contact guard assistance Balance: 
Sitting: Intact Standing: Impaired; With support(rolling walker) Standing - Static: Good;Constant support Standing - Dynamic : Fair Ambulation/Gait Training: 
Distance (ft): 90 Feet (ft) Assistive Device: Brace/Splint;Gait belt;Walker, rolling Ambulation - Level of Assistance: Contact guard assistance;Assist x1 Gait Abnormalities: Decreased step clearance Base of Support: Widened Speed/Beverly: Pace decreased (<100 feet/min) Step Length: Left shortened;Right shortened Pain: 
Pain Scale 1: Numeric (0 - 10) Pain Intensity 1: 0 Activity Tolerance:  
O2 sats 86% post activity on 3 LPM O2, recovered with seated rest 
Please refer to the flowsheet for vital signs taken during this treatment. After treatment:  
?  Patient left in no apparent distress sitting up in chair ? Patient left in no apparent distress in bed 
? Call bell left within reach ? Nursing notified ? Caregiver present ? Bed alarm activated COMMUNICATION/COLLABORATION:  
The patient?s plan of care was discussed with: Registered Nurse Mili Molina, PT, DPT Time Calculation: 25 mins

## 2019-05-15 NOTE — PROGRESS NOTES
ORTHO POST OP SPINE PROGRESS NOTE May 15, 2019 Admit Date: 2019 Admit Diagnosis: Spinal stenosis of lumbar region at multiple levels [M48.061] Procedure: Procedure(s): 
L2-5 POSTERIOR DECOMPRESSION AND FUSION WITH BONE MARROW ASPIRATION FROM ILIAC CREST Post Op day: 1 Day Post-Op Subjective:  
 
Steph Etienne is a patient who has complaints Of mild low back pain and left hip pain with no right lower extremity pain postop day 1 and2   respectively following L2 through L5 posterior fusion and L2 through L4 lateral fusion. Daughter at bedside. Patient with CPAP on. Patient denies nausea or vomiting. Catheter still in place. Review of Systems: Pertinent items are noted in HPI. Objective:  
 
PT/OT:  
Distance Ambulated:          
Time Ambulated (min):       
Ambulation Response: Activity Response: Tolerated well Assistive Device:              Assistive Device: Fall prevention device Vital Signs:   
Blood pressure 112/62, pulse 77, temperature 98.3 °F (36.8 °C), resp. rate 20, height 5' 8\" (1.727 m), weight 111.3 kg (245 lb 6 oz), SpO2 94 %. Temp (24hrs), Av.2 °F (36.8 °C), Min:97.7 °F (36.5 °C), Max:98.6 °F (37 °C) No intake/output data recorded.  1901 - 05/15 0700 In: 5113.2 [P.O.:500; I.V.:4613.2] Out: 1260 [Urine:1210] LAB:   
Recent Labs 05/15/19 
0421 HGB 11.4* Wound/Drain Assessment: 
Drain:   
 
Dressing:  
 
Physical Exam: 
Neurological: no deficit Incision clean, dry, and intact and mikaela saturated, holding suction 5/5 strength bilateral lower extremities with the exception of the left hip flexors and knee extensors 4-5 limited by pain in the hip Assessment:  
  
Patient Active Problem List  
Diagnosis Code  Hypoxia R09.02  
 Neurogenic claudication M48.062  
 Encounter for immunization Z23  Fatigue R53.83  
 CAD (coronary artery disease) I25.10  Dyslipidemia E78.5  On statin therapy Z79.899  Gout M10.9  GERD (gastroesophageal reflux disease) K21.9  TIA (transient ischemic attack) G45.9  Dyspnea R06.00  
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  
 Hypertension I10  
 ED (erectile dysfunction) N52.9  Severe obesity (HCC) E66.01  
 TESSA on CPAP G47.33, Z99.89  Simple chronic bronchitis ([address] J41.0  Bilateral carotid artery stenosis I65.23  Spinal stenosis of lumbar region at multiple levels M48.061 Plan:  
 
Continue PT/OT/Rehab Discontinue: madeleine Consult: PT  and OT Discharge To: Home versus rehab pending progress We will discuss with Dr. Mary Kemp for bracing Will need mikaela dressing replaced. Has 2nd bandage and room

## 2019-05-15 NOTE — PROGRESS NOTES
Reason for Admission:   Spinal Stenosis of lumbar region at multiple levels. Bilateral Sciatica, Low Back Pain, Spondylosis. RRAT Score:     24 high risk Resources/supports as identified by patient/family:   Family support - wife and daughter Top Challenges facing patient (as identified by patient/family and CM): Finances/Medication cost?    No issues - has Medicare and [employer] secondary Transportation? Pt was driving and family drives Support system or lack thereof? Good family support system Living arrangements? Lives with wife in a 1 story home with 1 step to the entrance. Self-care/ADLs/Cognition? Independent with ADL's and IADL's/alert & oriented Current Advanced Directive/Advance Care Plan:  Not on file Plan for utilizing home health:   No, inpt rehab if approved Likelihood of readmission:  Low risk Transition of Care Plan:   inpt rehab if approved and f/u appts Pt is a 80 y.o  male admitted with Spinal Stenosis of lumbar region at multiple levels. Pt is 1 day post of from a L2-5 POSTERIOR DECOMPRESSION AND FUSION WITH BONE MARROW ASPIRATION FROM ILIAC CREST. Pt was alert, oriented resting in bed with his wife at the bedside. Demographic information verified and all is correct. Pt would like his daughter added to the emergency contact list and this CM will add it. Pt lives with his wife in a 1 story home with 1 step to the entrance. Prior to admission, pt was independent with his ADL's and IADL's. Pt has a CPAP machine, pulse ox and bp cuff at home. Pt had home health from [address] [place] [address] in the past and denies going to rehab. Preferred pharmacy is [facility] on [address]. CM discussed therapy's recommendation for inpt rehab and pt was in agreement.  Discussed inpt rehab hospitals and pt chose [facility]. [facility] form signed and referral was sent via [employer]. Care Management Interventions PCP Verified by CM: Yes(Dr. Cramer [facility]) Mode of Transport at Discharge: Other (see comment)(pt's family can drive pt by car) Transition of Care Consult (CM Consult): Discharge Planning Discharge Durable Medical Equipment: No(CPAP, pulse ox, Bp cuff) Physical Therapy Consult: Yes Occupational Therapy Consult: Yes Speech Therapy Consult: No 
Current Support Network: Lives with Spouse, Own Home(lives with wife in a 1 story home with 1 step to the entrance) Confirm Follow Up Transport: Family Plan discussed with Pt/Family/Caregiver: Yes Freedom of Choice Offered: Yes Discharge Location Discharge Placement: Rehab hospital/unit acute Gildardo Katz, [facility] [place]

## 2019-05-15 NOTE — OP NOTES
[address]  OPERATIVE REPORT    Name:  Atif Murphy  MR#:  [id number]  :  1934  ACCOUNT #:  [de-identified]  DATE OF SERVICE:  2019      PREOPERATIVE DIAGNOSES:  1. Lumbar spinal stenosis. 2.  Lumbago. 3.  Sciatica. 4.  Scoliosis. POSTOPERATIVE DIAGNOSES:  1. Lumbar spinal stenosis. 2.  Lumbago. 3.  Sciatica. 4.  Scoliosis. PROCEDURE PERFORMED:  1. An L2 through L5 posterior fusion. 2.  An L2 through L5 posterior instrumentation. 3.  Bone marrow aspirate from a separate fascial incision on the left posterosuperior iliac spine for spinal fusion augmentation. 4.  Allograft bone for spinal fusion. SURGEON:  Aleida Gandara MD    ASSISTANT:  Carlos Hassan    ANESTHESIA:  General.    DRAINS:  None. COMPLICATIONS:  None. SPECIMENS REMOVED:  None. IMPLANTS:   [employer] VIPER PRIME pedicle screw system. ESTIMATED BLOOD LOSS:  50 mL. INDICATIONS FOR PROCEDURE:  The patient is a pleasant 44-year-old gentleman who has undergone an L2 through L4 anterior fusion yesterday and today returns for the second part of the procedure after having had a chance to ambulate with physical therapy. At this point, he states that his legs feel better and for the second part of the procedure, no decompression is required. PROCEDURE:  After informed consent was obtained and the operative site was properly marked, the patient was moved back to the operating room and underwent general endotracheal anesthesia. He was positioned prone on the operating room table, he was on the [employer] frame. His arms were placed in a 90:90 position. The knees were gently bent with pillows. Fluoroscopy was used to gio the level of the incision. We then proceeded to prep and drape in the usual manner.   Time-out was obtained, verifying that this was the correct patient, the correct surgery, the correct site, as well as that he had received IV antibiotics within 30 minutes of the incision. I then proceeded to perform a standard bilateral Tana approaches and with fluoroscopy on the AP view, I placed pedicle screw aiming for the 3 o'clock and 9 o'clock position using the VIPER PRIME pedicle screw system from L2 through L5 bilaterally. Once each pedicle was marked  Individually, the pedicle screw was inserted, I was able to adjust the height accordingly  Under fluoroscopy. I saw the screws in the proper location on the AP view. Fluoroscopy was turned to the lateral view. The screw height was adjusted and with a proper screw height in place, I proceeded to insert a Jamshidi needle through a separate fascial incision on to the left posterosuperior iliac spine, aspirated 60 mL of bone marrow, and I proceeded with that bone marrow augment with the cortical bone fibers, decorticated the posterior elements bilaterally and placed allograft bone soaked in bone marrow aspirate. The rods were then placed in proper position followed by the locking caps. The locking caps were final tightened with a final tightening device. Once that was completed, I closed the fascia with #1 Vicryl and V-Loc followed by irrigating the subcutaneous, closed subcutaneous with 2-0 Vicryl, and skin with staples. Sterile dressing was applied. The patient was then awakened and transferred to PACU in stable condition. POSTOPERATIVE PLAN:  The patient is going to remain here for 1 or 2 days. We are going to give him SCDs and MOISES byerse for DVT prophylaxis and Ancef for infection prophylaxis. MD MORENA Bautista/[id number]  D:  05/14/2019 14:41  T:  05/14/2019 21:32  JOB #:  [id number]  CC:  ELVIA Melchor MD

## 2019-05-15 NOTE — PROGRESS NOTES
Bedside shift change report given to Corby Sommer (oncoming nurse) by BODØ (offgoing nurse). Report included the following information SBAR, OR Summary, Procedure Summary, Intake/Output, Recent Results, Med Rec Status and Cardiac Rhythm NSR. Bedside shift change report given to Roanne Barthel (oncoming nurse) by Elieser Mak (offgoing nurse). Report included the following information SBAR, OR Summary, Intake/Output, Med Rec Status and Cardiac Rhythm NSR. Apolinar infusing at 15mcg/min

## 2019-05-16 ENCOUNTER — APPOINTMENT (OUTPATIENT)
Dept: NUCLEAR MEDICINE | Age: 84
DRG: 453 | End: 2019-05-16
Attending: INTERNAL MEDICINE
Payer: MEDICARE

## 2019-05-16 ENCOUNTER — APPOINTMENT (OUTPATIENT)
Dept: GENERAL RADIOLOGY | Age: 84
DRG: 453 | End: 2019-05-16
Attending: PHYSICIAN ASSISTANT
Payer: MEDICARE

## 2019-05-16 ENCOUNTER — APPOINTMENT (OUTPATIENT)
Dept: CT IMAGING | Age: 84
DRG: 453 | End: 2019-05-16
Attending: INTERNAL MEDICINE
Payer: MEDICARE

## 2019-05-16 LAB
ANION GAP SERPL CALC-SCNC: 6 MMOL/L (ref 5–15)
ARTERIAL PATENCY WRIST A: YES
BASE DEFICIT BLD-SCNC: 4 MMOL/L
BDY SITE: ABNORMAL
BUN SERPL-MCNC: 48 MG/DL (ref 6–20)
BUN/CREAT SERPL: 25 (ref 12–20)
CALCIUM SERPL-MCNC: 7.9 MG/DL (ref 8.5–10.1)
CHLORIDE SERPL-SCNC: 107 MMOL/L (ref 97–108)
CO2 SERPL-SCNC: 24 MMOL/L (ref 21–32)
CREAT SERPL-MCNC: 1.92 MG/DL (ref 0.7–1.3)
D DIMER PPP FEU-MCNC: 3.87 MG/L FEU (ref 0–0.65)
GAS FLOW.O2 O2 DELIVERY SYS: ABNORMAL L/MIN
GAS FLOW.O2 SETTING OXYMISER: 6 L/M
GLUCOSE BLD STRIP.AUTO-MCNC: 121 MG/DL (ref 65–100)
GLUCOSE BLD STRIP.AUTO-MCNC: 142 MG/DL (ref 65–100)
GLUCOSE SERPL-MCNC: 152 MG/DL (ref 65–100)
HCO3 BLD-SCNC: 21.6 MMOL/L (ref 22–26)
HGB BLD-MCNC: 10.9 G/DL (ref 12.1–17)
PCO2 BLD: 40.7 MMHG (ref 35–45)
PH BLD: 7.33 [PH] (ref 7.35–7.45)
PO2 BLD: 55 MMHG (ref 80–100)
POTASSIUM SERPL-SCNC: 4.3 MMOL/L (ref 3.5–5.1)
SAO2 % BLD: 86 % (ref 92–97)
SERVICE CMNT-IMP: ABNORMAL
SERVICE CMNT-IMP: ABNORMAL
SODIUM SERPL-SCNC: 137 MMOL/L (ref 136–145)
SPECIMEN TYPE: ABNORMAL
TOTAL RESP. RATE, ITRR: 21

## 2019-05-16 PROCEDURE — 97530 THERAPEUTIC ACTIVITIES: CPT

## 2019-05-16 PROCEDURE — 85018 HEMOGLOBIN: CPT

## 2019-05-16 PROCEDURE — 74011000258 HC RX REV CODE- 258: Performed by: INTERNAL MEDICINE

## 2019-05-16 PROCEDURE — 80048 BASIC METABOLIC PNL TOTAL CA: CPT

## 2019-05-16 PROCEDURE — 74011250637 HC RX REV CODE- 250/637: Performed by: INTERNAL MEDICINE

## 2019-05-16 PROCEDURE — 77030018846 HC SOL IRR STRL H20 ICUM -A

## 2019-05-16 PROCEDURE — 77030040236 HC DEV DRSG WND PICO S&N -D

## 2019-05-16 PROCEDURE — 74011250636 HC RX REV CODE- 250/636: Performed by: ORTHOPAEDIC SURGERY

## 2019-05-16 PROCEDURE — 74011250636 HC RX REV CODE- 250/636: Performed by: INTERNAL MEDICINE

## 2019-05-16 PROCEDURE — 77030029301 HC PMP F NGP WND DRSG PICO S&N -F

## 2019-05-16 PROCEDURE — A9558 XE133 XENON 10MCI: HCPCS

## 2019-05-16 PROCEDURE — 87040 BLOOD CULTURE FOR BACTERIA: CPT

## 2019-05-16 PROCEDURE — 77030012794 HC KT NSL CANN/HGH TRAN -A

## 2019-05-16 PROCEDURE — 36415 COLL VENOUS BLD VENIPUNCTURE: CPT

## 2019-05-16 PROCEDURE — 77010033678 HC OXYGEN DAILY

## 2019-05-16 PROCEDURE — 74011000250 HC RX REV CODE- 250: Performed by: INTERNAL MEDICINE

## 2019-05-16 PROCEDURE — 85379 FIBRIN DEGRADATION QUANT: CPT

## 2019-05-16 PROCEDURE — 71250 CT THORAX DX C-: CPT

## 2019-05-16 PROCEDURE — 65660000000 HC RM CCU STEPDOWN

## 2019-05-16 PROCEDURE — 97116 GAIT TRAINING THERAPY: CPT

## 2019-05-16 PROCEDURE — 74011250637 HC RX REV CODE- 250/637: Performed by: ORTHOPAEDIC SURGERY

## 2019-05-16 PROCEDURE — 82962 GLUCOSE BLOOD TEST: CPT

## 2019-05-16 PROCEDURE — 82803 BLOOD GASES ANY COMBINATION: CPT

## 2019-05-16 PROCEDURE — 71045 X-RAY EXAM CHEST 1 VIEW: CPT

## 2019-05-16 PROCEDURE — 36600 WITHDRAWAL OF ARTERIAL BLOOD: CPT

## 2019-05-16 PROCEDURE — 94640 AIRWAY INHALATION TREATMENT: CPT

## 2019-05-16 PROCEDURE — 51798 US URINE CAPACITY MEASURE: CPT

## 2019-05-16 RX ORDER — IPRATROPIUM BROMIDE AND ALBUTEROL SULFATE 2.5; .5 MG/3ML; MG/3ML
3 SOLUTION RESPIRATORY (INHALATION)
Status: DISCONTINUED | OUTPATIENT
Start: 2019-05-16 | End: 2019-05-16

## 2019-05-16 RX ORDER — GUAIFENESIN 600 MG/1
600 TABLET, EXTENDED RELEASE ORAL EVERY 12 HOURS
Status: DISCONTINUED | OUTPATIENT
Start: 2019-05-16 | End: 2019-05-21 | Stop reason: HOSPADM

## 2019-05-16 RX ORDER — IPRATROPIUM BROMIDE AND ALBUTEROL SULFATE 2.5; .5 MG/3ML; MG/3ML
3 SOLUTION RESPIRATORY (INHALATION)
Status: DISCONTINUED | OUTPATIENT
Start: 2019-05-16 | End: 2019-05-18

## 2019-05-16 RX ADMIN — PANTOPRAZOLE SODIUM 40 MG: 40 TABLET, DELAYED RELEASE ORAL at 08:18

## 2019-05-16 RX ADMIN — Medication 10 ML: at 08:17

## 2019-05-16 RX ADMIN — GABAPENTIN 100 MG: 100 CAPSULE ORAL at 08:17

## 2019-05-16 RX ADMIN — ACETAMINOPHEN 1000 MG: 500 TABLET ORAL at 12:46

## 2019-05-16 RX ADMIN — GABAPENTIN 100 MG: 100 CAPSULE ORAL at 21:48

## 2019-05-16 RX ADMIN — ACETAMINOPHEN 1000 MG: 500 TABLET ORAL at 17:23

## 2019-05-16 RX ADMIN — CEFTRIAXONE 1 G: 1 INJECTION, POWDER, FOR SOLUTION INTRAMUSCULAR; INTRAVENOUS at 17:22

## 2019-05-16 RX ADMIN — DULOXETINE HYDROCHLORIDE 30 MG: 30 CAPSULE, DELAYED RELEASE ORAL at 08:18

## 2019-05-16 RX ADMIN — OXYCODONE HYDROCHLORIDE 5 MG: 5 TABLET ORAL at 06:45

## 2019-05-16 RX ADMIN — ATORVASTATIN CALCIUM 5 MG: 10 TABLET, FILM COATED ORAL at 08:18

## 2019-05-16 RX ADMIN — IPRATROPIUM BROMIDE AND ALBUTEROL SULFATE 3 ML: .5; 3 SOLUTION RESPIRATORY (INHALATION) at 19:36

## 2019-05-16 RX ADMIN — POLYETHYLENE GLYCOL 3350 17 G: 17 POWDER, FOR SOLUTION ORAL at 08:16

## 2019-05-16 RX ADMIN — GUAIFENESIN 600 MG: 600 TABLET, EXTENDED RELEASE ORAL at 21:48

## 2019-05-16 RX ADMIN — TAMSULOSIN HYDROCHLORIDE 0.4 MG: 0.4 CAPSULE ORAL at 08:18

## 2019-05-16 RX ADMIN — AZITHROMYCIN MONOHYDRATE 500 MG: 500 INJECTION, POWDER, LYOPHILIZED, FOR SOLUTION INTRAVENOUS at 14:15

## 2019-05-16 RX ADMIN — Medication 10 ML: at 21:48

## 2019-05-16 RX ADMIN — ACETAMINOPHEN 1000 MG: 500 TABLET ORAL at 06:21

## 2019-05-16 RX ADMIN — UMECLIDINIUM BROMIDE AND VILANTEROL TRIFENATATE 1 PUFF: 62.5; 25 POWDER RESPIRATORY (INHALATION) at 08:23

## 2019-05-16 RX ADMIN — FUROSEMIDE 80 MG: 80 TABLET ORAL at 08:18

## 2019-05-16 RX ADMIN — ALLOPURINOL 300 MG: 100 TABLET ORAL at 08:17

## 2019-05-16 RX ADMIN — GABAPENTIN 100 MG: 100 CAPSULE ORAL at 17:23

## 2019-05-16 RX ADMIN — SENNOSIDES AND DOCUSATE SODIUM 1 TABLET: 8.6; 5 TABLET ORAL at 17:23

## 2019-05-16 RX ADMIN — MUPIROCIN: 20 OINTMENT TOPICAL at 21:49

## 2019-05-16 RX ADMIN — Medication 10 ML: at 14:16

## 2019-05-16 RX ADMIN — NEPHROCAP 1 CAPSULE: 1 CAP ORAL at 08:18

## 2019-05-16 RX ADMIN — SENNOSIDES AND DOCUSATE SODIUM 1 TABLET: 8.6; 5 TABLET ORAL at 08:18

## 2019-05-16 RX ADMIN — OXYCODONE HYDROCHLORIDE 5 MG: 5 TABLET ORAL at 18:53

## 2019-05-16 NOTE — PROGRESS NOTES
Bedside and Verbal shift change report given to Bing (oncoming nurse) by Ar Eng RN (offgoing nurse). Report included the following information SBAR, Kardex, Intake/Output, MAR, Recent Results, Med Rec Status and Cardiac Rhythm NSR.  
 
2000. Ar Eng RN Changed GERALDO dressing to back.  
 
2100. Discussed with patient on available pain medication to take. Enforced to notify the RN for when he is in pain. 0005. Bladder scanned patient. 235mL scanned. Patient was not able to void. Will check again. 0600. Bladder scanned patient. Patient was able to void. 600mL output. 9479. Patient notified RN that he was in pain. Gave pain medication. (See Flowsheets, MAR for details.) 
 
0700. Patient had no concerns or complaints at this time. Bedside and Verbal shift change report given to [address] [place] [address] (oncoming nurse) by Bing (offgoing nurse). Report included the following information SBAR, Kardex, Intake/Output, MAR, Recent Results, Med Rec Status and Cardiac Rhythm NSR.

## 2019-05-16 NOTE — PROGRESS NOTES
Chart reviewed. Attempted to see pt for PM therapy session however RN reporting that pt just returned to bed, placed on 12 L high flow NC and SpO2 remains in 80s. RN requesting pt be allowed to rest. Will defer however continue to follow. Thank you Getachew Mcintyre, PT, DPT

## 2019-05-16 NOTE — PROGRESS NOTES
Attempted to see patient for OT treatment, but he is now back in bed and nursing has asked that OT be deferred this morning 2/2 patient's SpO2 holding in the 80s despite multiple measures taken to address this. He is on his CPAP now. Will follow back later today to re-attempt OT treatment.

## 2019-05-16 NOTE — PROGRESS NOTES
Ortho / Neurosurgery NP Note POD# 3  s/p L2-5 POSTERIOR DECOMPRESSION AND FUSION WITH BONE MARROW ASPIRATION FROM ILIAC CREST Pt resting in bed with home CPAP in place, sating in low 90's. Patient states he pain is well controlled and his breathing feels normal.  
 
VSS Afebrile. Voiding status: voided Labs Lab Results Component Value Date/Time HGB 10.9 (L) 05/16/2019 03:43 AM  
  
Lab Results Component Value Date/Time INR 1.3 (H) 05/09/2019 07:16 AM  
  
 
Body mass index is 37.31 kg/m². : A BMI > 30 is classified as obesity and > 40 is classified as morbid obesity. GERALDO Dressing in place with small amount of drainage present. Lateral incision intact with no drainage. Calves soft and supple; No pain with passive stretch Sensation and motor intact SCDs for mechanical DVT proph while in bed PLAN: 
1) PT BID 
2) TESSA/COPD with increased O2  requirements postoperatively- Hospitalist consulted- appreciate their recommendations. Chest XR 05/16: Opacification of the left lung base, likely combination of atelectasis and pleural fluid. . Clear right lung 3) GI Prophylaxis - Protonix 4) Readniess for discharge: 
   [] Vital Signs stable  
 [x] Hgb stable  
 [x] + Voiding- monitor strict I/O [x] Wound intact, drainage minimal  
 [x] Tolerating PO intake   
 [] Cleared by PT (OT if applicable) [] Stair training completed (if applicable) [] Independent / Contact Guard Assist (household distance) [] Bed mobility [] Car transfers  
  [] ADLs [x] Adequate pain control on oral medication alone Plan for discharge pending medical readiness.   
 
Artie Ybarra, WILBUR 
DNP, AGACNP-BC

## 2019-05-16 NOTE — PROGRESS NOTES
ORTHO POST OP SPINE PROGRESS NOTE May 16, 2019 Admit Date: 2019 Admit Diagnosis: Spinal stenosis of lumbar region at multiple levels [M48.061] Procedure: Procedure(s): 
L2-5 POSTERIOR DECOMPRESSION AND FUSION WITH BONE MARROW ASPIRATION FROM ILIAC CREST Post Op day: 2 Days Post-Op Subjective:  
 
Lyndon Chavez is a patient who has complaints Of mild back pain and improved were extremity pain status post L2 through L4 lateral fusion and L2 through L5 posterior fusion done in staged fashion. Worsening pain after up for x-rays yesterday. Daughter at bedside. Review of Systems: Pertinent items are noted in HPI. Objective:  
 
PT/OT:  
Distance Ambulated:          
Time Ambulated (min):       
Ambulation Response: Activity Response: Tolerated well Assistive Device:              Assistive Device: Fall prevention device Vital Signs:   
Blood pressure 113/55, pulse 78, temperature 98.1 °F (36.7 °C), resp. rate 21, height 5' 8\" (1.727 m), weight 111.3 kg (245 lb 6 oz), SpO2 97 %. Temp (24hrs), Av.4 °F (36.9 °C), Min:98.1 °F (36.7 °C), Max:98.9 °F (37.2 °C) No intake/output data recorded.  1901 -  0700 In: 2571.7 [P.O.:50; I.V.:2521.7] Out: 1210 [[id number]] LAB:   
Recent Labs 19 
5396 HGB 10.9* Wound/Drain Assessment: 
Drain:   
 
Dressing:  
 
Physical Exam: 
Neurological: no deficit Incision clean, dry, and intact and mikaela Holding suction. Changed by nursing yesterday 5 strength bilateral lower extremities Assessment:  
  
Patient Active Problem List  
Diagnosis Code  Hypoxia R09.02  
 Neurogenic claudication M48.062  
 Encounter for immunization Z23  Fatigue R53.83  
 CAD (coronary artery disease) I25.10  Dyslipidemia E78.5  On statin therapy Z79.899  Gout M10.9  GERD (gastroesophageal reflux disease) K21.9  TIA (transient ischemic attack) G45.9  Dyspnea R06.00  
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  
 Hypertension I10  
 ED (erectile dysfunction) N52.9  Severe obesity (HCC) E66.01  
 TESSA on CPAP G47.33, Z99.89  Simple chronic bronchitis ([address] J41.0  Bilateral carotid artery stenosis I65.23  Spinal stenosis of lumbar region at multiple levels M48.061 Plan:  
 
Continue PT/OT/Rehab Discontinue:  
Consult: PT  and OT Discharge To: Home. Likely tomorrow

## 2019-05-16 NOTE — PROGRESS NOTES
PCU SHIFT NURSING NOTE Bedside and Verbal shift change report given to Tammie Lanza RN (oncoming nurse) by Noe Patel RN (offgoing nurse). Report included the following information SBAR, Kardex, MAR, Recent Results and Cardiac Rhythm NSR. Shift Summary:  
0730:  Took pt off CPAP. Placed on 4L NC. Quickly had to increase to 6 L. Pt at 89-90% on 6L. Will monitor. 0800:  Spoke with PA while at bedside and informed him of pt O2 requirement. Also informed him that BP is still borderline. Will currently hold BP meds and assess pt. 
0910:  Placed order for chest x-ray portable and x-ray tech is already on the floor. Pt still on 6L NC satting 88%. Will continue to monitor pt. 
1010:  Pt back to bed.  o2 sats 90% on 6L. 1020:  Pt voided 300ml, post void bladder scan of 0ml. 1030:  Placed call to ortho office to speak with PA. 
1045:  Placed pt on his home CPAP with 4L O2. Due to sats at 83% on 6L NC.   
1047:  [facility]-ray and sats. He will speak with Dr. Marianne Cardoso and place necessary orders. 1115:  Rebekah Bowers NP with ortho came down to see pt. He will touch base with MD and place necessary orders. 1123:  Called and spoke with Dr. Kale Pulido for hospitalist consult. They will see pt. 
1215:  Dr. Kale Pulido at bedside assessing pt. He gave orders for labs and will order scans based off of the BMP. 
1225:  20 g IV placed and labs drawn and sent. 1328:  Received call that BMP was hemolyzed. Will repeat. Asked lab to look into it because the BMP and D-Dimer were sent at the same time. Lab was sent at [phone] and it was an hour before call received. 1505:  Received call from MD about labs and what test he ordered. Will call Nuclear Medicine for test. 
2719:  Nurse with ptm to Nuclear medicine. Pt on portable monitor and non-rebreather for transport. 1615:  Returned to floor with pt from CT. Pt has had both test completed. 1700:  Blood cultures drawn and sent to lab. During transport and moving with test GERALDO dressing tube disconnected. Spoke with Gaby Noble NP and received order to try to re-inforce and if not possible to change dressing. 1810:  GERALDO dressing changed once picked up from the PACU. Pt sitting up on side of the bed eating. Admission Date 5/13/2019 Admission Diagnosis Spinal stenosis of lumbar region at multiple levels [M48.061] Consults None Consults [x]PT [x]OT []Speech [x]Case Management  
  
[] Palliative Cardiac Monitoring Order  
[x]Yes []No  
 
IV drips []Yes Drip:                            Dose: 
Drip:                            Dose: 
Drip:                            Dose:  
[x]No  
 
GI Prophylaxis []Yes []No  
 
 
 
DVT Prophylaxis SCDs:  Sequential Compression Device: Bilateral  
     
 Gee stockings:     
  
[] Medication []Contraindicated []None Activity Level Activity Level: Up with Assistance Activity Assistance: Partial (one person) Purposeful Rounding every 1-2 hour? [x]Yes Dow Score  Total Score: 3 Bed Alarm (If score 3 or >) []Yes  
[] Refused (See signed refusal form in chart) Aquiles Score  Aquiles Score: 18 Aquiles Score (if score 14 or less) []PMT consult  
[]Wound Care consult []Specialty bed  
[] Nutrition consult Needs prior to discharge:  
Home O2 required:   
[x]Yes []No  
 If yes, how much O2 required? Other:  
 Last Bowel Movement: Last Bowel Movement Date: 05/12/19 Influenza Vaccine Received Flu Vaccine for Current Season (usually Sept-March): Yes Pneumonia Vaccine Diet Active Orders Diet DIET CARDIAC Regular LDAs Peripheral IV 05/13/19 Left Antecubital (Active) Site Assessment Clean, dry, & intact 5/16/2019  4:00 AM  
Phlebitis Assessment 0 5/16/2019  4:00 AM  
Infiltration Assessment 0 5/16/2019  4:00 AM  
 Dressing Status Clean, dry, & intact 5/16/2019  4:00 AM  
Dressing Type Tape;Transparent 5/16/2019  4:00 AM  
Hub Color/Line Status Pink;Flushed; Infusing 5/16/2019  4:00 AM  
Action Taken Open ports on tubing capped 5/16/2019  4:00 AM  
Alcohol Cap Used Yes 5/16/2019  4:00 AM  
                  
Urinary Catheter [REMOVED] Urinary Catheter 05/13/19 2- way; Yu-Indications for Use: Surgery [REMOVED] Urinary Catheter 05/14/19 Coude-Indications for Use: Surgery Intake & Output Date 05/15/19 0700 - 05/16/19 9190 05/16/19 0700 - 05/17/19 5410 Shift 6218-3949 5271-6570 24 Hour Total 6983-7142 3281-4762 24 Hour Total  
INTAKE  
I.V.(mL/kg/hr) 786.3(0.6)  786.3(0.3) Volume (0.9% sodium chloride infusion) 786.3  786. 3 Shift Total(mL/kg) 786. 3(7.1)  786. 3(7.1) OUTPUT Urine(mL/kg/hr)  600(0.4) 600(0.2) Urine Voided  600 600 Shift Total(mL/kg)  600(5.4) 600(5.4) .3 -600 186. 3 Weight (kg) 111.3 111.3 111.3 111.3 111.3 111.3 Readmission Risk Assessment Tool Score High Risk   
      
 24 Total Score 3 Has Seen PCP in Last 6 Months (Yes=3, No=0) 2 . Living with Significant Other. Assisted Living. LTAC. SNF. or  
Rehab  
 4 IP Visits Last 12 Months (1-3=4, 4=9, >4=11) 5 Pt. Coverage (Medicare=5 , Medicaid, or Self-Pay=4) 10 Charlson Comorbidity Score (Age + Comorbid Conditions) Criteria that do not apply:  
 Patient Length of Stay (>5 days = 3) Expected Length of Stay - - - Actual Length of Stay 3

## 2019-05-16 NOTE — PROGRESS NOTES
Problem: Mobility Impaired (Adult and Pediatric) Goal: *Acute Goals and Plan of Care (Insert Text) Description Physical Therapy Goals Initiated 5/14/2019 - remain appropriate 5/15/19 1. Patient will move from supine to sit and sit to supine , scoot up and down and roll side to side in bed with modified independence within 4 days. 2. Patient will perform sit to stand with independence within 4 days. 3. Patient will ambulate with independence for 300 feet with the least restrictive device within 4 days. 5. Patient will verbalize and demonstrate understanding of spinal precautions (No bending, lifting greater than 5 lbs, or twisting; log-roll technique; frequent repositioning as instructed) within 4 days. Outcome: Progressing Towards Goal 
 PHYSICAL THERAPY TREATMENT Patient: Linn Patel (2 y.o. male) Date: 5/16/2019 Diagnosis: Spinal stenosis of lumbar region at multiple levels [M48.061] <principal problem not specified> Procedure(s) (LRB): 
L2-5 POSTERIOR DECOMPRESSION AND FUSION WITH BONE MARROW ASPIRATION FROM ILIAC CREST (N/A) 2 Days Post-Op Precautions: Fall, Spinal 
Chart, physical therapy assessment, plan of care and goals were reviewed. ASSESSMENT: 
Pt received supine in bed, RN and pt consented to therapy. Pt recalled 3/3 spinal precautions, re-educated on use of lumbar back brace. Pt stated pain was 4/10. VS monitored throughout session, O2 sat decreased to 87% w/ activity on 6L O2 but quickly recovered to 92-95% with seated rest and pursed lips breathing instruction. Pt MinAx1 with log roll, and MinAx1 for supine <> sit. Static and dynamic sitting balance intact. Pt CGAx1 with sit <> stand transfer, static standing balance good w/ RW support, dynamic standing balance fair, pt ambulated 140 ft w/ RW and CGAx1. Following seated rest break, progressed pt to an additional ambulation trial of 30ft w/ CGAx1 and NO AD support.  Pt's dynamic standing balance fair, w/ pt reaching for support from walls or furniture. Gait stability significantly improved with rolling walker support therefore recommend continued use during all OOB mobility/ambulation. Pt reported pain increased to 7/10 at end of tx session. Overall, pt tolerated the tx session well and is progressing towards goals appropriately. Continue to see BID. Progression toward goals: 
?    Improving appropriately and progressing toward goals ? Improving slowly and progressing toward goals ? Not making progress toward goals and plan of care will be adjusted PLAN: 
Patient continues to benefit from skilled intervention to address the above impairments. Continue treatment per established plan of care. Discharge Recommendations:  Inpatient Rehab Further Equipment Recommendations for Discharge:  tbd by facility SUBJECTIVE:  
Patient stated ? I hurt after that xray. ? OBJECTIVE DATA SUMMARY:  
Critical Behavior: 
Neurologic State: Alert Orientation Level: Oriented X4 Cognition: Appropriate decision making, Appropriate for age attention/concentration, Appropriate safety awareness, Follows commands Safety/Judgement: Decreased awareness of need for safety, Decreased insight into deficits Functional Mobility Training: 
Bed Mobility: 
Rolling: Contact guard assistance Supine to Sit: Contact guard assistance Scooting: Supervision Transfers: 
Sit to Stand: Contact guard assistance Stand to Sit: Contact guard assistance Bed to Chair: Contact guard assistance Balance: 
Sitting: Intact Standing: Impaired; With support Standing - Static: Good;Constant support Standing - Dynamic : Fair Ambulation/Gait Training: 
Distance (ft): 170 Feet (ft)(140ft w/ RW, 30 w/o RW) Assistive Device: Brace/Splint;Gait belt;Walker, rolling Ambulation - Level of Assistance: Contact guard assistance;Assist x1 Gait Abnormalities: Decreased step clearance;Shuffling gait Base of Support: Widened Speed/Beverly: Pace decreased (<100 feet/min); Shuffled Step Length: Left shortened;Right shortened Pain: 
Pain Scale 1: Numeric (0 - 10) Pain Intensity 1: 0 Pain Location 1: Back Pain Orientation 1: Lower;Mid 
Pain Description 1: Aching Pain Intervention(s) 1: Repositioned;Relaxation technique;Medication (see MAR) Activity Tolerance:  
O2 sats 87% post activity on 6 LPM O2, recovered with seated rest 
Please refer to the flowsheet for vital signs taken during this treatment. After treatment:  
?    Patient left in no apparent distress sitting up in chair ? Patient left in no apparent distress in bed 
? Call bell left within reach ? Nursing notified ? Caregiver present ? Bed alarm activated COMMUNICATION/COLLABORATION:  
The patient?s plan of care was discussed with: Registered Nurse Malcolm Dillard PT, DPT Time Calculation: 28 mins

## 2019-05-16 NOTE — PROGRESS NOTES
JACQUES - inpt rehab at [address] [place] [address] if approved. CM reviewed chart and will continue to follow for discharge planning needs. [facility] is following pt for medical stability. Pt was unable to work with PT and OT today due to oxygen sats and was placed on 12 liters of high flow oxygen. They are unable to accept until his oxygen requirements are 5 liters or less. Larry Flower, [address] [facility]

## 2019-05-16 NOTE — PROGRESS NOTES
Attempted again to see patient for OT treatment, but nursing reports patient has just gotten back to bed and is now on 12 L high flow NC. Patient's SpO2 continue to hold in the 80s as well. Will defer OT today and follow back tomorrow.

## 2019-05-16 NOTE — CONSULTS
Hospitalist Consultation Note    NAME:  Loraine Nelson   :   1934   MRN:   [id number]     ATTENDING: Anup Mehta MD  PCP:  Anny Marie MD    Date/Time:  2019 12:25 PM      Recommendations/Plan:       Active Problems:    Spinal stenosis of lumbar region at multiple levels (2019)       Update  Cr is now elevated at 1.9 and D dimer is high at 3.8  Will get ct chest non contrast and also a vq scan to r/o pe  Will hold lasix due to elevated creatinine and check bmp in am   ABG shows po2 of 55 on 6L NC so will start him back on his home cpap. VQ low prob for PE. CT shows atelectasis and minimal infiltrate so will cont ceftriaxone and Zithromax for now. Avoiding quinolones due to his age. Acute hypoxic respiratory failure multifactorial  Atelectasis of left lung base  Left lung opacification rule out pneumonia  Hx of COPD and TESSA  -Chest x-ray shows opacification of the left lung base low  -We will check a stat BMP and d-dimer and based on that we will consider getting CT angiogram of chest  -Start DuoNeb.  Start empiric azithromycin and ceftriaxone for now. Check sputum and blood cx.   -Consider starting on BiPAP if still remains hypoxic.  Will check blood gas.   -Continue incentive spirometry  -Consider thoracentesis if there is significant effusion    Status post L2-L5 posterior decompression and fusion  -Postoperative care per neurosurgery    CKD stage 3  -base line cr is around 1.2 and cr on  was 1.4  -will check bmp now2    Hypertension  Dyslipidemia  GERD  BPH  -Resume home amlodipine, atenolol with holding parameters  -Resume home statin, Protonix and Flomax  -Hold losartan due to elevated creatinine    On Lasix at home, but no previous documented history of congestive heart failure  -Recent echo shows ejection fraction of 65% with mild aortic valve stenosis and no mention of diastolic dysfunction  -Continue home Lasix with holding parameters        Code Status: full  DVT Prophylaxis: Per ortho, now on Scd's          Subjective:   REQUESTING PHYSICIAN: Dr Abraham Carbajal:    Sob  This is a 63-year-old male with past medical history of hypertension, dyslipidemia, COPD who underwent a L2-L5 posterior decompression on 5/14/2019 and subsequently hospitalist consultation was placed today for shortness of breath and hypoxia.  Patient reports that he is now having some shortness of breath along with cough and white to yellowish phlegm.  Does not report any wheezing.  He does not report any chest pain.  He reports his back pain is currently controlled.  Does not report any fever or chills.  He does have a history of COPD and obstructive sleep apnea and is on CPAP at home. Geena Dos Santos is currently saturating anywhere between 88 and 92% on his home CPAP.  Does not report any other symptoms at this point of time.             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    Morbid obesity ([address]     On statin therapy 8/16/2017    TESSA on CPAP 1/3/2019    Psychiatric disorder     Depression    Simple chronic bronchitis ([address] 1/3/2019    TIA (transient ischemic attack) 1/28/5350    Umbilical hernia 94/5/8926      Past Surgical History:   Procedure Laterality Date    CARDIAC SURG PROCEDURE UNLIST  1996    Cardiac Stents    CARDIAC SURG PROCEDURE UNLIST  04/2019    EF 61-65%    HX HERNIA REPAIR      RIH    HX HERNIA REPAIR  30/32/21    open umbilical hernia repair mesh    HX UROLOGICAL      HYDROCELECTOMY     Social History     Tobacco Use    Smoking status: Former Smoker     Packs/day: 0.50     Years: 20.00     Pack years: 10.00    Smokeless tobacco: Former User    Tobacco comment: quit about 40  years ago   / used to dip tobaco and dip snuff   Substance Use Topics    Alcohol use: Yes     Comment: \"Drinks very little now for about a month \"      Family History   Problem Relation Age of Onset    Heart Disease Mother     Hypertension Mother     Hypertension Father     Hypertension Sister     Hypertension Brother     Cancer Brother      No Known Allergies   Prior to Admission medications    Medication Sig Start Date End Date Taking? Authorizing Provider   amLODIPine (NORVASC) 10 mg tablet TAKE ONE TABLET BY MOUTH DAILY 5/10/19  Yes Timo Shaikh MD   DULoxetine (CYMBALTA) 30 mg capsule Take 30 mg by mouth daily. Yes Provider, Historical   olmesartan (BENICAR) 20 mg tablet Take 1 Tab by mouth daily. 5/2/19  Yes Timo Shaikh MD   clonazePAM (KLONOPIN) 0.5 mg tablet Take 1 Tab by mouth nightly as needed (insomnia). Max Daily Amount: 0.5 mg. 4/26/19  Yes Timo Shaikh MD   folic acid-vit V2-ERICA W07 (VIRT-JONNA) 2.2-25-1 mg tab Take  by mouth daily. Yes Provider, Historical   cyanocobalamin/folic ac/vit B6 (VIRT-JONNA PO) Take 1 Tab by mouth daily. Yes Provider, Historical   b complex vitamins tablet Take 1 Tab by mouth daily. Yes Provider, Historical   atenolol (TENORMIN) 100 mg tablet Take 1 Tab by mouth daily. 1/14/19  Yes Timo Shaikh MD   atorvastatin (LIPITOR) 80 mg tablet TAKE ONE TABLET BY MOUTH DAILY 1/7/19  Yes Timo Shaikh MD   [facility] ELLIPTA 62.5-25 mcg/actuation inhaler Take 1 Puff by inhalation daily. 12/26/18  Yes Provider, Historical   furosemide (LASIX) 80 mg tablet Take 1 Tab by mouth daily. 12/19/18  Yes Timo Shaikh MD   allopurinol (ZYLOPRIM) 300 mg tablet Take 1 Tab by mouth daily.  7/11/18  Yes Emily Baca MD   mupirocin calcium (BACTROBAN) 2 % nasal ointment by Both Nostrils route two (2) times a day. Provider, Historical   lansoprazole (PREVACID) 15 mg capsule Take 15 mg by mouth Daily (before breakfast). Provider, Historical   gabapentin (NEURONTIN) 100 mg capsule Take  by mouth three (3) times daily. Provider, Historical   clopidogrel (PLAVIX) 75 mg tab Take 1 Tab by mouth daily. 4/15/19   Emily Baca MD   acetaminophen (TYLENOL) 500 mg tablet Take 1,000 mg by mouth every six (6) hours as needed for Pain. Provider, Historical       REVIEW OF SYSTEMS:     Total of 12 systems reviewed as follows:   I am not able to complete the review of systems because:    The patient is intubated and sedated    The patient has altered mental status due to his acute medical problems    The patient has baseline aphasia from prior stroke(s)    The patient has baseline dementia and is not reliable historian                 POSITIVE= underlined text  Negative = text not underlined  General:  fever, chills, sweats, generalized weakness, weight loss/gain,      loss of appetite   Eyes:    blurred vision, eye pain, loss of vision, double vision  ENT:    rhinorrhea, pharyngitis   Respiratory:   cough, sputum production, SOB, wheezing, MARES, pleuritic pain   Cardiology:   chest pain, palpitations, orthopnea, PND, edema, syncope   Gastrointestinal:  abdominal pain , N/V, dysphagia, diarrhea, constipation, bleeding   Genitourinary:  frequency, urgency, dysuria, hematuria, incontinence   Muskuloskeletal :  arthralgia, myalgia   Hematology:  easy bruising, nose or gum bleeding, lymphadenopathy   Dermatological: rash, ulceration, pruritis   Endocrine:   hot flashes or polydipsia   Neurological:  headache, dizziness, confusion, focal weakness, paresthesia,     Speech difficulties, memory loss, gait disturbance  Psychological: Feelings of anxiety, depression, agitation    Objective:   VITALS:    Visit Vitals  /59 (BP 1 Location: Right arm, BP Patient Position: At rest)   Pulse 78   Temp 98.2 °F (36.8 °C)   Resp 20   Ht 5' 8\" (1.727 m)   Wt 111.3 kg (245 lb 6 oz)   SpO2 90%   BMI 37.31 kg/m²     Temp (24hrs), Av.4 °F (36.9 °C), Min:98.1 °F (36.7 °C), Max:98.9 °F (37.2 °C)      PHYSICAL EXAM:   General:    Alert, cooperative, no distress, appears stated age. HEENT: Atraumatic, anicteric sclerae, pink conjunctivae     No oral ulcers, mucosa moist, throat clear  Neck:  Supple, symmetrical,  thyroid: non tender  Lungs:   Air entry diminished on left, crackles + at left base, no wheeze  Chest wall:  No tenderness  No Accessory muscle use. Heart:   Regular  rhythm,  No  murmur   No edema  Abdomen:   Soft, non-tender. Not distended. Bowel sounds normal  Extremities: No cyanosis. No clubbing  Skin:     Not pale. Not Jaundiced  No rashes   Psych:  Not anxious or agitated. Neurologic: EOMs intact. No facial asymmetry. No aphasia or slurred speech. Symmetrical strength, Alert and oriented X 4.     _______________________________________________________________________  Care Plan discussed with:    Comments   Patient y    Family      RN y    Care Manager                    Consultant:      ____________________________________________________________________  TOTAL TIME:     60  mins    Comments    y Reviewed previous records   >50% of visit spent in counseling and coordination of care y Discussion with patient and/or family and questions answered       Critical Care Provided     Minutes non procedure based  ________________________________________________________________________  Signed: Meli Juarez MD      Procedures: see electronic medical records for all procedures/Xrays and details which were not copied into this note but were reviewed prior to creation of Plan.     LAB DATA REVIEWED:    Recent Results (from the past 24 hour(s))   GLUCOSE, POC    Collection Time: 05/15/19  9:12 PM   Result Value Ref Range    Glucose (POC) 133 (H) 65 - 100 mg/dL    Performed by Pauline Vizcarra    HEMOGLOBIN    Collection Time: 05/16/19  3:43 AM   Result Value Ref Range    HGB 10.9 (L) 12.1 - 17.0 g/dL   GLUCOSE, POC    Collection Time: 05/16/19  7:26 AM   Result Value Ref Range    Glucose (POC) 121 (H) 65 - 100 mg/dL    Performed by Keith Gregg        _____________________________  Hospitalist: Liliam Olivas MD

## 2019-05-17 LAB
ANION GAP SERPL CALC-SCNC: 6 MMOL/L (ref 5–15)
APPEARANCE UR: CLEAR
BACTERIA URNS QL MICRO: NEGATIVE /HPF
BACTERIA URNS QL MICRO: NEGATIVE /HPF
BASOPHILS # BLD: 0 K/UL (ref 0–0.1)
BASOPHILS NFR BLD: 0 % (ref 0–1)
BILIRUB UR QL: NEGATIVE
BUN SERPL-MCNC: 46 MG/DL (ref 6–20)
BUN/CREAT SERPL: 28 (ref 12–20)
CALCIUM SERPL-MCNC: 8 MG/DL (ref 8.5–10.1)
CHLORIDE SERPL-SCNC: 108 MMOL/L (ref 97–108)
CO2 SERPL-SCNC: 25 MMOL/L (ref 21–32)
COLOR UR: ABNORMAL
CREAT SERPL-MCNC: 1.65 MG/DL (ref 0.7–1.3)
DIFFERENTIAL METHOD BLD: ABNORMAL
EOSINOPHIL # BLD: 0.1 K/UL (ref 0–0.4)
EOSINOPHIL NFR BLD: 1 % (ref 0–7)
EPITH CASTS URNS QL MICRO: ABNORMAL /LPF
EPITH CASTS URNS QL MICRO: NORMAL /LPF
ERYTHROCYTE [DISTWIDTH] IN BLOOD BY AUTOMATED COUNT: 14.7 % (ref 11.5–14.5)
GLUCOSE BLD STRIP.AUTO-MCNC: 116 MG/DL (ref 65–100)
GLUCOSE BLD STRIP.AUTO-MCNC: 137 MG/DL (ref 65–100)
GLUCOSE SERPL-MCNC: 113 MG/DL (ref 65–100)
GLUCOSE UR STRIP.AUTO-MCNC: NEGATIVE MG/DL
HCT VFR BLD AUTO: 32.5 % (ref 36.6–50.3)
HGB BLD-MCNC: 10.8 G/DL (ref 12.1–17)
HGB UR QL STRIP: NEGATIVE
HYALINE CASTS URNS QL MICRO: ABNORMAL /LPF (ref 0–5)
HYALINE CASTS URNS QL MICRO: NORMAL /LPF (ref 0–5)
IMM GRANULOCYTES # BLD AUTO: 0 K/UL (ref 0–0.04)
IMM GRANULOCYTES NFR BLD AUTO: 1 % (ref 0–0.5)
KETONES UR QL STRIP.AUTO: NEGATIVE MG/DL
LEUKOCYTE ESTERASE UR QL STRIP.AUTO: ABNORMAL
LYMPHOCYTES # BLD: 0.9 K/UL (ref 0.8–3.5)
LYMPHOCYTES NFR BLD: 10 % (ref 12–49)
MCH RBC QN AUTO: 33.4 PG (ref 26–34)
MCHC RBC AUTO-ENTMCNC: 33.2 G/DL (ref 30–36.5)
MCV RBC AUTO: 100.6 FL (ref 80–99)
MONOCYTES # BLD: 0.9 K/UL (ref 0–1)
MONOCYTES NFR BLD: 10 % (ref 5–13)
NEUTS SEG # BLD: 6.5 K/UL (ref 1.8–8)
NEUTS SEG NFR BLD: 78 % (ref 32–75)
NITRITE UR QL STRIP.AUTO: NEGATIVE
NRBC # BLD: 0 K/UL (ref 0–0.01)
NRBC BLD-RTO: 0 PER 100 WBC
PH UR STRIP: 5.5 [PH] (ref 5–8)
PLATELET # BLD AUTO: 153 K/UL (ref 150–400)
PMV BLD AUTO: 11.1 FL (ref 8.9–12.9)
POTASSIUM SERPL-SCNC: 4.2 MMOL/L (ref 3.5–5.1)
PROT UR STRIP-MCNC: 30 MG/DL
RBC # BLD AUTO: 3.23 M/UL (ref 4.1–5.7)
RBC #/AREA URNS HPF: ABNORMAL /HPF (ref 0–5)
RBC #/AREA URNS HPF: NORMAL /HPF (ref 0–5)
SERVICE CMNT-IMP: ABNORMAL
SERVICE CMNT-IMP: ABNORMAL
SODIUM SERPL-SCNC: 139 MMOL/L (ref 136–145)
SP GR UR REFRACTOMETRY: 1.02 (ref 1–1.03)
UA: UC IF INDICATED,UAUC: ABNORMAL
UROBILINOGEN UR QL STRIP.AUTO: 1 EU/DL (ref 0.2–1)
WBC # BLD AUTO: 8.4 K/UL (ref 4.1–11.1)
WBC URNS QL MICRO: ABNORMAL /HPF (ref 0–4)
WBC URNS QL MICRO: NORMAL /HPF (ref 0–4)

## 2019-05-17 PROCEDURE — 74011000258 HC RX REV CODE- 258: Performed by: INTERNAL MEDICINE

## 2019-05-17 PROCEDURE — 94640 AIRWAY INHALATION TREATMENT: CPT

## 2019-05-17 PROCEDURE — 65660000000 HC RM CCU STEPDOWN

## 2019-05-17 PROCEDURE — 80048 BASIC METABOLIC PNL TOTAL CA: CPT

## 2019-05-17 PROCEDURE — 81001 URINALYSIS AUTO W/SCOPE: CPT

## 2019-05-17 PROCEDURE — 87070 CULTURE OTHR SPECIMN AEROBIC: CPT

## 2019-05-17 PROCEDURE — 74011250636 HC RX REV CODE- 250/636: Performed by: INTERNAL MEDICINE

## 2019-05-17 PROCEDURE — 81015 MICROSCOPIC EXAM OF URINE: CPT

## 2019-05-17 PROCEDURE — 74011250637 HC RX REV CODE- 250/637: Performed by: INTERNAL MEDICINE

## 2019-05-17 PROCEDURE — 77010033678 HC OXYGEN DAILY

## 2019-05-17 PROCEDURE — 97110 THERAPEUTIC EXERCISES: CPT

## 2019-05-17 PROCEDURE — 87077 CULTURE AEROBIC IDENTIFY: CPT

## 2019-05-17 PROCEDURE — 97530 THERAPEUTIC ACTIVITIES: CPT | Performed by: OCCUPATIONAL THERAPIST

## 2019-05-17 PROCEDURE — 36415 COLL VENOUS BLD VENIPUNCTURE: CPT

## 2019-05-17 PROCEDURE — 82962 GLUCOSE BLOOD TEST: CPT

## 2019-05-17 PROCEDURE — 85025 COMPLETE CBC W/AUTO DIFF WBC: CPT

## 2019-05-17 PROCEDURE — 97535 SELF CARE MNGMENT TRAINING: CPT | Performed by: OCCUPATIONAL THERAPIST

## 2019-05-17 PROCEDURE — 74011000250 HC RX REV CODE- 250: Performed by: INTERNAL MEDICINE

## 2019-05-17 PROCEDURE — 87186 SC STD MICRODIL/AGAR DIL: CPT

## 2019-05-17 PROCEDURE — 74011250637 HC RX REV CODE- 250/637: Performed by: ORTHOPAEDIC SURGERY

## 2019-05-17 RX ORDER — AZITHROMYCIN 250 MG/1
500 TABLET, FILM COATED ORAL DAILY
Status: DISCONTINUED | OUTPATIENT
Start: 2019-05-17 | End: 2019-05-18

## 2019-05-17 RX ORDER — SORBITOL SOLUTION 70 %
30 SOLUTION, ORAL MISCELLANEOUS
Status: COMPLETED | OUTPATIENT
Start: 2019-05-17 | End: 2019-05-17

## 2019-05-17 RX ADMIN — AZITHROMYCIN MONOHYDRATE 500 MG: 250 TABLET ORAL at 12:18

## 2019-05-17 RX ADMIN — AMLODIPINE BESYLATE 10 MG: 5 TABLET ORAL at 08:58

## 2019-05-17 RX ADMIN — ALLOPURINOL 300 MG: 100 TABLET ORAL at 08:57

## 2019-05-17 RX ADMIN — IPRATROPIUM BROMIDE AND ALBUTEROL SULFATE 3 ML: .5; 3 SOLUTION RESPIRATORY (INHALATION) at 19:37

## 2019-05-17 RX ADMIN — SENNOSIDES AND DOCUSATE SODIUM 1 TABLET: 8.6; 5 TABLET ORAL at 17:06

## 2019-05-17 RX ADMIN — SORBITOL SOLUTION (BULK) 30 ML: 70 SOLUTION at 17:06

## 2019-05-17 RX ADMIN — DULOXETINE HYDROCHLORIDE 30 MG: 30 CAPSULE, DELAYED RELEASE ORAL at 08:58

## 2019-05-17 RX ADMIN — ATENOLOL 100 MG: 50 TABLET ORAL at 08:58

## 2019-05-17 RX ADMIN — OXYCODONE HYDROCHLORIDE 5 MG: 5 TABLET ORAL at 06:29

## 2019-05-17 RX ADMIN — CEFTRIAXONE 1 G: 1 INJECTION, POWDER, FOR SOLUTION INTRAMUSCULAR; INTRAVENOUS at 14:38

## 2019-05-17 RX ADMIN — ATORVASTATIN CALCIUM 5 MG: 10 TABLET, FILM COATED ORAL at 08:58

## 2019-05-17 RX ADMIN — MUPIROCIN: 20 OINTMENT TOPICAL at 18:00

## 2019-05-17 RX ADMIN — OXYCODONE HYDROCHLORIDE 5 MG: 5 TABLET ORAL at 02:50

## 2019-05-17 RX ADMIN — PANTOPRAZOLE SODIUM 40 MG: 40 TABLET, DELAYED RELEASE ORAL at 08:58

## 2019-05-17 RX ADMIN — POLYETHYLENE GLYCOL 3350 17 G: 17 POWDER, FOR SOLUTION ORAL at 08:57

## 2019-05-17 RX ADMIN — MUPIROCIN: 20 OINTMENT TOPICAL at 08:59

## 2019-05-17 RX ADMIN — ACETAMINOPHEN 1000 MG: 500 TABLET ORAL at 17:06

## 2019-05-17 RX ADMIN — GUAIFENESIN 600 MG: 600 TABLET, EXTENDED RELEASE ORAL at 21:06

## 2019-05-17 RX ADMIN — TAMSULOSIN HYDROCHLORIDE 0.4 MG: 0.4 CAPSULE ORAL at 08:58

## 2019-05-17 RX ADMIN — NEPHROCAP 1 CAPSULE: 1 CAP ORAL at 08:58

## 2019-05-17 RX ADMIN — SORBITOL SOLUTION (BULK) 30 ML: 70 SOLUTION at 15:00

## 2019-05-17 RX ADMIN — ACETAMINOPHEN 1000 MG: 500 TABLET ORAL at 06:18

## 2019-05-17 RX ADMIN — Medication 10 ML: at 12:18

## 2019-05-17 RX ADMIN — Medication 10 ML: at 06:34

## 2019-05-17 RX ADMIN — SENNOSIDES AND DOCUSATE SODIUM 1 TABLET: 8.6; 5 TABLET ORAL at 08:58

## 2019-05-17 RX ADMIN — IPRATROPIUM BROMIDE AND ALBUTEROL SULFATE 3 ML: .5; 3 SOLUTION RESPIRATORY (INHALATION) at 08:04

## 2019-05-17 RX ADMIN — GABAPENTIN 100 MG: 100 CAPSULE ORAL at 21:06

## 2019-05-17 RX ADMIN — ACETAMINOPHEN 1000 MG: 500 TABLET ORAL at 12:17

## 2019-05-17 RX ADMIN — GABAPENTIN 100 MG: 100 CAPSULE ORAL at 17:06

## 2019-05-17 RX ADMIN — GABAPENTIN 100 MG: 100 CAPSULE ORAL at 08:57

## 2019-05-17 RX ADMIN — IPRATROPIUM BROMIDE AND ALBUTEROL SULFATE 3 ML: .5; 3 SOLUTION RESPIRATORY (INHALATION) at 14:25

## 2019-05-17 RX ADMIN — Medication 10 ML: at 21:06

## 2019-05-17 RX ADMIN — GUAIFENESIN 600 MG: 600 TABLET, EXTENDED RELEASE ORAL at 08:58

## 2019-05-17 NOTE — PROGRESS NOTES
Hospitalist Progress Note NAME: Krys Wright :  1934 MRN:  [id number] Assessment / Plan: 
Acute hypoxic respiratory failure from Atelectasis Possible Pneumonia COPD 
TESSA 
-CT Chest: \"1. Bibasilar, left lower lobe and left upper lobe atelectasis or minimal 
Infiltrate. 2. Pulmonary artery hypertension is suggested but stable since 2018. 3. Other incidental findings. \" 
-V/Q scan: \"IMPRESSION: 1. Low probability for pulmonary embolism. 2. Left lung decreased ventilation compatible with airspace disease. \" 
-wean O2 as tolerated, making ggod progress today with pulmonary toilet/incentive spirometry 
-continue empiric antibiotics, check procalcitonin as PNA felt less likely and atelectasis seems to be the predominant issue Status post L2-L5 posterior decompression and fusion 
-Postoperative care per neurosurgery 
  
CKD stage 3 
-monitor renal function, Cr decreasing 
-continue to hold home losartan Hypertension 
-continue home amlodipine, atenolol with holding parameters 
-continue home lasix Dyslipidemia 
-continue home statin GERD 
-PPI BPH 
-Flomax; salvador discontinued today 
-UA checked without bacteria (patient complaints of dysuria) 
-bladder checks Constipation:  
-continue miralax and stool softeners 
-sorbitol ordered Obesity: Body mass index is 37.31 kg/m² Full DVT ppx per Ortho, SCDs Subjective: Chief Complaint / Reason for Physician Visit: follow-up hypoxia Patient feeling better O2 being weaned Review of Systems: 
Symptom Y/N Comments  Symptom Y/N Comments Fever/Chills n   Chest Pain n   
Poor Appetite    Edema y Better than baseline Cough    Abdominal Pain n   
Sputum    Joint Pain SOB/MARES  improiving  Pruritis/Rash Nausea/vomit    Tolerating PT/OT Diarrhea    Tolerating Diet y Denies dysphagia Constipation y No BM in 4d  Other y Olivia Salas Could NOT obtain due to:   
 
Objective: VITALS:  
 Last 24hrs VS reviewed since prior progress note. Most recent are: 
Patient Vitals for the past 24 hrs: 
 Temp Pulse Resp BP SpO2  
05/17/19 1425     95 % 05/17/19 1100 98.6 °F (37 °C) 86 16 118/65 93 % 05/17/19 1025     92 % 05/17/19 0904  97 17 113/70 (!) 87 % 05/17/19 0804 98.3 °F (36.8 °C) 97 18 125/63 (!) 88 % 05/17/19 0400    104/51   
05/17/19 0309 98.5 °F (36.9 °C) 85 20 113/64 95 % 05/16/19 2300 98.2 °F (36.8 °C) 85 20 112/61 95 % 05/16/19 2200    115/55   
05/16/19 2100  79 17 111/58 90 % 05/16/19 2000     91 % 05/1934 98.7 °F (37.1 °C) 80 22 110/58 90 % 05/16/19 1500 97.8 °F (36.6 °C) 82 22 107/53 91 % Intake/Output Summary (Last 24 hours) at 5/17/2019 1446 Last data filed at 5/17/2019 0400 Gross per 24 hour Intake  Output 1400 ml Net -1400 ml PHYSICAL EXAM: 
General: WD, WN. Alert, cooperative, no acute distress   
EENT:  EOMI. Anicteric sclerae. MMM Resp:  Diminished, no wheezing or rales. No accessory muscle use CV:  Regular  rhythm,  No edema GI:  Soft, Non distended, Non tender.  +Bowel sounds Neurologic:  Alert and oriented X 3, normal speech, hard of hearing Psych:   Good insight. Not anxious nor agitated Skin:  No rashes noted. No jaundice Reviewed most current lab test results and cultures  YES Reviewed most current radiology test results   YES Review and summation of old records today    NO Reviewed patient's current orders and MAR    YES 
PMH/SH reviewed - no change compared to H&P 
________________________________________________________________________ Care Plan discussed with: 
  Comments Patient x Family  x   
RN x Care Manager Consultant Multidiciplinary team rounds were held today with , nursing, pharmacist and clinical coordinator. Patient's plan of care was discussed; medications were reviewed and discharge planning was addressed. ________________________________________________________________________ Total NON critical care TIME:  35   Minutes Total CRITICAL CARE TIME Spent:   Minutes non procedure based Comments >50% of visit spent in counseling and coordination of care x   
________________________________________________________________________ Kimi Shukla MD  
 
Procedures: see electronic medical records for all procedures/Xrays and details which were not copied into this note but were reviewed prior to creation of Plan. LABS: 
I reviewed today's most current labs and imaging studies. Pertinent labs include: 
Recent Labs 05/17/19 
7293 05/16/19 
5371 05/15/19 
0421 WBC 8.4  --   --   
HGB 10.8* 10.9* 11.4* HCT 32.5*  --   --   
  --   --   
 
Recent Labs 05/17/19 
0306 05/16/19 
1347  137  
K 4.2 4.3  107 CO2 25 24 * 152* BUN 46* 48* CREA 1.65* 1.92* CA 8.0* 7.9* Signed: Kimi Shukla MD

## 2019-05-17 NOTE — PROGRESS NOTES
Problem: Mobility Impaired (Adult and Pediatric) Goal: *Acute Goals and Plan of Care (Insert Text) Description Physical Therapy Goals Initiated 5/14/2019 - remain appropriate 5/15/19 1. Patient will move from supine to sit and sit to supine , scoot up and down and roll side to side in bed with modified independence within 4 days. 2. Patient will perform sit to stand with independence within 4 days. 3. Patient will ambulate with independence for 300 feet with the least restrictive device within 4 days. 5. Patient will verbalize and demonstrate understanding of spinal precautions (No bending, lifting greater than 5 lbs, or twisting; log-roll technique; frequent repositioning as instructed) within 4 days. Outcome: Progressing Towards Goal 
PHYSICAL THERAPY TREATMENT Patient: Craig Barnett (89 y.o. male) Date: 5/17/2019 Diagnosis: Spinal stenosis of lumbar region at multiple levels [M48.061] <principal problem not specified> Procedure(s) (LRB): 
L2-5 POSTERIOR DECOMPRESSION AND FUSION WITH BONE MARROW ASPIRATION FROM ILIAC CREST (N/A) 3 Days Post-Op Precautions: Fall, Spinal 
Chart, physical therapy assessment, plan of care and goals were reviewed. ASSESSMENT: 
Pt received supine in bed on 15L of oxygen, high flow. At rest spO2: 93% and with activity desaturates to 87%. Pt performed UE and LE therex with verbal cues for technique and pacing. Pt encouraged to perform inspiration and expiration with UE movements to improve oxygen exchange. Pt utilized incentive spirometry with observation and verbal cues for education and encouragement. Pt will continue to benefit from PT to progress mobility as tolerated. Pt is still requiring increased oxygen support and anticipate a long recovery from this. Will see daily today given poor respiratory status and tolerance to activity. Continue to recommend inpatient rehab upon discharge pending progress. Progression toward goals: ?      Improving appropriately and progressing toward goals ? Improving slowly and progressing toward goals ? Not making progress toward goals and plan of care will be adjusted PLAN: 
Patient continues to benefit from skilled intervention to address the above impairments. Continue treatment per established plan of care. Discharge Recommendations:  Inpatient Rehab Further Equipment Recommendations for Discharge:  tbd SUBJECTIVE:  
Patient stated It takes everything out of me when I use that breathing thing (referring to incentive spirometry).  The patient stated 3/3 back precautions. Reviewed all 3 with patient. OBJECTIVE DATA SUMMARY:  
Critical Behavior: 
Neurologic State: Alert Orientation Level: Oriented X4 Cognition: Appropriate for age attention/concentration, Appropriate decision making, Follows commands Safety/Judgement: Decreased awareness of need for safety, Decreased insight into deficits Functional Mobility Training: 
Bed Mobility: 
 
  
Scooting: Supervision Therapeutic Exercises: Ankle pumps, LAQ, glute sets, elbow flexion/extension x 10 reps each; shoulder elevation, scapular retraction 2 x 5 Pain: 
Pain Scale 1: Numeric (0 - 10) Pain Intensity 1: 0 Pain Location 1: Back Pain Orientation 1: Lower Pain Description 1: Aching Pain Intervention(s) 1: Medication (see MAR) Activity Tolerance:  
Poor. Requiring significant amount of oxygen at rest and spO2: 93% at it's highest 
Please refer to the flowsheet for vital signs taken during this treatment. After treatment:  
?  Patient left in no apparent distress sitting up in chair ? Patient left in no apparent distress in bed 
? Call bell left within reach ? Nursing notified ? Caregiver present ? Bed alarm activated COMMUNICATION/COLLABORATION:  
The patients plan of care was discussed with: Occupational Therapist and Registered Nurse Ravindra Aguilera PT, DPT Time Calculation: 10 mins

## 2019-05-17 NOTE — PROGRESS NOTES
Problem: Self Care Deficits Care Plan (Adult) Goal: *Acute Goals and Plan of Care (Insert Text) Description Occupational Therapy Goals Initiated 5/15/2019 1. Patient will perform grooming standing at sink with supervision/set-up within 7 day(s). 2.  Patient will perform upper body dressing with supervision/set-up within 7 day(s). 3.  Patient will perform lower body dressing using AE with supervision/set-up within 7 day(s). 4.  Patient will perform toilet transfers with supervision/set-up within 7 day(s). 5.  Patient will perform all aspects of toileting with supervision/set-up within 7 day(s). 6.  Patient will perform sponge bathing using AE with supervision/set-up within 7 day(s). Outcome: Progressing Towards Goal 
 
OCCUPATIONAL THERAPY TREATMENT Patient: Suzie Bowles (89 y.o. male) Date: 5/17/2019 Diagnosis: Spinal stenosis of lumbar region at multiple levels [M48.061] <principal problem not specified> Procedure(s) (LRB): 
L2-5 POSTERIOR DECOMPRESSION AND FUSION WITH BONE MARROW ASPIRATION FROM ILIAC CREST (N/A) 3 Days Post-Op Precautions: Fall, Spinal 
 
ASSESSMENT: 
Patient with on 15L high flow NC today, maintaining an SpO2 of 93% at rest and only desaturating to 89% during activity. He was able to recall all spine precautions and required min A for adherence during bed mobility. Patient was overall CGA to min A for functional mobility, was min A to don his LSO and was SBA for standing grooming. He is very motivated to regain his functional independence and will greatly benefit from inpatient rehab at discharge. Progression toward goals: 
?       Improving appropriately and progressing toward goals ? Improving slowly and progressing toward goals ? Not making progress toward goals and plan of care will be adjusted PLAN: 
Patient continues to benefit from skilled intervention to address the above impairments. Continue treatment per established plan of care. Discharge Recommendations:  Inpatient Rehab Further Equipment Recommendations for Discharge:  TBD OBJECTIVE DATA SUMMARY:  
Cognitive/Behavioral Status: 
Neurologic State: Alert Orientation Level: Oriented X4 Cognition: Impaired decision making; Follows commands Safety/Judgement: Decreased awareness of need for safety Functional Mobility and Transfers for ADLs: 
Bed Mobility: 
Rolling: Contact guard assistance Supine to Sit: Minimum assistance Scooting: Supervision Transfers: 
Sit to Stand: Contact guard assistance Bed to Chair: Contact guard assistance(ambulating with a RW) Balance: 
Sitting: Intact Standing: Impaired(with support of RW) Standing - Static: Good Standing - Dynamic : Good ADL Intervention: 
Grooming Washing Face: Supervision;Set-up(seated) Washing Hands: Stand-by assistance(standing. ) Brushing/Combing Hair: Set-up; Supervision(seated. ) Upper Body Dressing Assistance Dressing Assistance: Minimum assistance(to forrest LSO) Cognitive Retraining Safety/Judgement: Decreased awareness of need for safety Pain: 
Pain Scale 1: Numeric (0 - 10) Pain Intensity 1: 0 Pain Location 1: Back Pain Orientation 1: Lower Pain Description 1: Aching Pain Intervention(s) 1: Medication (see MAR) Activity Tolerance:  
Patient on 15 L high flow NC t/o session. SpO2 was as low as 89% during activity, and was most consistently 93% when at rest.  
 
After treatment:  
? Patient left in no apparent distress sitting up in chair ? Patient left in no apparent distress in bed 
? Call bell left within reach ? Nursing notified ? Caregiver present ? Bed alarm activated COMMUNICATION/COLLABORATION:  
The patient?s plan of care was discussed with: Physical Therapist and Registered Nurse Pedro Hunt, OTR/L Time Calculation: 25 mins

## 2019-05-17 NOTE — PROGRESS NOTES
ORTHO POST OP SPINE PROGRESS NOTE May 17, 2019 Admit Date: 2019 Admit Diagnosis: Spinal stenosis of lumbar region at multiple levels [M48.061] Procedure: Procedure(s): 
L2-5 POSTERIOR DECOMPRESSION AND FUSION WITH BONE MARROW ASPIRATION FROM ILIAC CREST Post Op day: 3 Days Post-Op Subjective:  
 
Kaylin Comer is a patient who has complaints Mild back pain status post L2 through L5 posterior L2 through L4 lateral fusion done 3 and 4 days ago respectively. Tolerating p.o. and able to void. Daughter at bedside. O2 sats dropping and hospitalist consulted yesterday. V/Q scan and CT scan done yesterday showing no P. He continues with low O2 saturation. Has been using incentive spirometry. Was on 6 L yesterday is currently on 12 L. Review of Systems: Pertinent items are noted in HPI. Objective:  
 
PT/OT:  
Distance Ambulated:          
Time Ambulated (min):       
Ambulation Response: Activity Response: Tolerated well Assistive Device:              Assistive Device: Fall prevention device Vital Signs:   
Blood pressure 104/51, pulse 85, temperature 98.5 °F (36.9 °C), resp. rate 20, height 5' 8\" (1.727 m), weight 111.3 kg (245 lb 6 oz), SpO2 95 %. Temp (24hrs), Av.3 °F (36.8 °C), Min:97.8 °F (36.6 °C), Max:98.7 °F (37.1 °C) No intake/output data recorded. 05/15 1901 -  0700 In: -  
Out: 2750 [Urine:2750] LAB:   
Recent Labs 19 
5232 HGB 10.8* WBC 8.4  Wound/Drain Assessment: 
Drain:   
 
Dressing:  
 
Physical Exam: 
Neurological: no deficit Incision clean, dry, and intact 5/5 strength bilateral lower extremity Assessment:  
  
Patient Active Problem List  
Diagnosis Code  Hypoxia R09.02  
 Neurogenic claudication M48.062  
 Encounter for immunization Z23  Fatigue R53.83  
 CAD (coronary artery disease) I25.10  Dyslipidemia E78.5  On statin therapy Z79.899  Gout M10.9  GERD (gastroesophageal reflux disease) K21.9  TIA (transient ischemic attack) G45.9  Dyspnea R06.00  
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  
 Hypertension I10  
 ED (erectile dysfunction) N52.9  Severe obesity (HCC) E66.01  
 TESSA on CPAP G47.33, Z99.89  Simple chronic bronchitis ([address] J41.0  Bilateral carotid artery stenosis I65.23  Spinal stenosis of lumbar region at multiple levels M48.061 Plan:  
 
Continue PT/OT/Rehab Discontinue: 
Consult: PT  and OT Discharge To: Inpatient Rehab Encouraged incentive spirometry

## 2019-05-17 NOTE — PROGRESS NOTES
ADULT PROTOCOL: JET AEROSOL ASSESSMENT Patient  Evelia Morin     80 y.o.   male     5/16/2019  8:41 PM 
 
Breath Sounds Pre Procedure: Right Breath Sounds: Coarse Left Breath Sounds: Coarse Breath Sounds Post Procedure: Right Breath Sounds: Coarse Left Breath Sounds: Coarse Breathing pattern: Pre procedure Breathing Pattern: Regular Post procedure Breathing Pattern: Regular Heart Rate: Pre procedure Pulse: 81 
         Post procedure Pulse: 81 Resp Rate: Pre procedure Respirations: 18 Post procedure Respirations: 18 
 
Oxygen: O2 Device: Hi flow nasal cannula   Flow rate (L/min) 15 Changed: NO SpO2: Pre procedure SpO2: 92 %   with oxygen Post procedure SpO2: 90 %  with oxygen Nebulizer Therapy: Current medications Aerosolized Medications: DuoNeb Changed: NO Smoking History:  Smoking status: Former Smoker  
    Packs/day: 0.50  
    Years: 20.00  
    Pack years: 10.00 Problem List:  
Patient Active Problem List  
Diagnosis Code  Hypoxia R09.02  
 Neurogenic claudication M48.062  
 Encounter for immunization Z23  Fatigue R53.83  
 CAD (coronary artery disease) I25.10  Dyslipidemia E78.5  On statin therapy Z79.899  Gout M10.9  GERD (gastroesophageal reflux disease) K21.9  TIA (transient ischemic attack) G45.9  Dyspnea R06.00  
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  
 Hypertension I10  
 ED (erectile dysfunction) N52.9  Severe obesity (HCC) E66.01  
 TESSA on CPAP G47.33, Z99.89  Simple chronic bronchitis ([address] J41.0  Bilateral carotid artery stenosis I65.23  Spinal stenosis of lumbar region at multiple levels M48.061 Respiratory Therapist: RT Laura

## 2019-05-17 NOTE — PROGRESS NOTES
Bedside and Verbal shift change report given to Bing (oncoming nurse) by Yumi Dimas RN (offgoing nurse). Report included the following information SBAR, Kardex, Intake/Output, MAR, Recent Results, Med Rec Status and Cardiac Rhythm NSR.  
 
0140. Patient started pulling leads, pulse ox, and CPAP off. Reoriented patient and patient started answering all orientation questions correctly. Patient is not in distress. When asked why he pulled everything off he answered with \"I don't know. \" Patient is in no pain and does not have any complaints or concerns at this time. Repositioned patient so that he could go back to sleep. 0250. Patient stated that he was in pain. (See flow sheets for details). Gave pain medication (See MAR for details.)  
 
0098. Patient stated that he was in pain. (FlowSheets for details.) Gave pain medication. [facility], [facility] for details.) Discussed with patient on available pain medication to take. Enforced to notify the RN for when he is in pain.

## 2019-05-17 NOTE — ROUTINE PROCESS
Patient currently refusing to get put of bed, placed bed into chair position. Pulmonary toilet in progress. encouraging IS,  15 L High flow tiffanie

## 2019-05-18 LAB
ALBUMIN SERPL-MCNC: 2.5 G/DL (ref 3.5–5)
ALBUMIN/GLOB SERPL: 0.7 {RATIO} (ref 1.1–2.2)
ALP SERPL-CCNC: 80 U/L (ref 45–117)
ALT SERPL-CCNC: 14 U/L (ref 12–78)
ANION GAP SERPL CALC-SCNC: 6 MMOL/L (ref 5–15)
AST SERPL-CCNC: 52 U/L (ref 15–37)
BASOPHILS # BLD: 0 K/UL (ref 0–0.1)
BASOPHILS NFR BLD: 0 % (ref 0–1)
BILIRUB SERPL-MCNC: 0.6 MG/DL (ref 0.2–1)
BUN SERPL-MCNC: 37 MG/DL (ref 6–20)
BUN/CREAT SERPL: 30 (ref 12–20)
CALCIUM SERPL-MCNC: 8.1 MG/DL (ref 8.5–10.1)
CHLORIDE SERPL-SCNC: 110 MMOL/L (ref 97–108)
CO2 SERPL-SCNC: 26 MMOL/L (ref 21–32)
CREAT SERPL-MCNC: 1.25 MG/DL (ref 0.7–1.3)
DIFFERENTIAL METHOD BLD: ABNORMAL
EOSINOPHIL # BLD: 0.3 K/UL (ref 0–0.4)
EOSINOPHIL NFR BLD: 3 % (ref 0–7)
ERYTHROCYTE [DISTWIDTH] IN BLOOD BY AUTOMATED COUNT: 14.7 % (ref 11.5–14.5)
GLOBULIN SER CALC-MCNC: 3.4 G/DL (ref 2–4)
GLUCOSE BLD STRIP.AUTO-MCNC: 106 MG/DL (ref 65–100)
GLUCOSE BLD STRIP.AUTO-MCNC: 116 MG/DL (ref 65–100)
GLUCOSE BLD STRIP.AUTO-MCNC: 119 MG/DL (ref 65–100)
GLUCOSE SERPL-MCNC: 112 MG/DL (ref 65–100)
HCT VFR BLD AUTO: 32.1 % (ref 36.6–50.3)
HGB BLD-MCNC: 10.6 G/DL (ref 12.1–17)
IMM GRANULOCYTES # BLD AUTO: 0.1 K/UL (ref 0–0.04)
IMM GRANULOCYTES NFR BLD AUTO: 1 % (ref 0–0.5)
LYMPHOCYTES # BLD: 0.9 K/UL (ref 0.8–3.5)
LYMPHOCYTES NFR BLD: 11 % (ref 12–49)
MCH RBC QN AUTO: 33.3 PG (ref 26–34)
MCHC RBC AUTO-ENTMCNC: 33 G/DL (ref 30–36.5)
MCV RBC AUTO: 100.9 FL (ref 80–99)
MONOCYTES # BLD: 0.9 K/UL (ref 0–1)
MONOCYTES NFR BLD: 11 % (ref 5–13)
NEUTS SEG # BLD: 6.2 K/UL (ref 1.8–8)
NEUTS SEG NFR BLD: 74 % (ref 32–75)
NRBC # BLD: 0 K/UL (ref 0–0.01)
NRBC BLD-RTO: 0 PER 100 WBC
PLATELET # BLD AUTO: 171 K/UL (ref 150–400)
PMV BLD AUTO: 11 FL (ref 8.9–12.9)
POTASSIUM SERPL-SCNC: 4 MMOL/L (ref 3.5–5.1)
PROCALCITONIN SERPL-MCNC: 0.1 NG/ML
PROT SERPL-MCNC: 5.9 G/DL (ref 6.4–8.2)
RBC # BLD AUTO: 3.18 M/UL (ref 4.1–5.7)
SERVICE CMNT-IMP: ABNORMAL
SODIUM SERPL-SCNC: 142 MMOL/L (ref 136–145)
WBC # BLD AUTO: 8.4 K/UL (ref 4.1–11.1)

## 2019-05-18 PROCEDURE — 74011250637 HC RX REV CODE- 250/637: Performed by: INTERNAL MEDICINE

## 2019-05-18 PROCEDURE — 77010033711 HC HIGH FLOW OXYGEN

## 2019-05-18 PROCEDURE — 77010033678 HC OXYGEN DAILY

## 2019-05-18 PROCEDURE — 65660000000 HC RM CCU STEPDOWN

## 2019-05-18 PROCEDURE — 74011000250 HC RX REV CODE- 250: Performed by: INTERNAL MEDICINE

## 2019-05-18 PROCEDURE — 80053 COMPREHEN METABOLIC PANEL: CPT

## 2019-05-18 PROCEDURE — 85025 COMPLETE CBC W/AUTO DIFF WBC: CPT

## 2019-05-18 PROCEDURE — 94640 AIRWAY INHALATION TREATMENT: CPT

## 2019-05-18 PROCEDURE — 84145 PROCALCITONIN (PCT): CPT

## 2019-05-18 PROCEDURE — 82962 GLUCOSE BLOOD TEST: CPT

## 2019-05-18 PROCEDURE — 36415 COLL VENOUS BLD VENIPUNCTURE: CPT

## 2019-05-18 PROCEDURE — 74011250637 HC RX REV CODE- 250/637: Performed by: ORTHOPAEDIC SURGERY

## 2019-05-18 PROCEDURE — 97116 GAIT TRAINING THERAPY: CPT

## 2019-05-18 PROCEDURE — 74011000250 HC RX REV CODE- 250: Performed by: NURSE PRACTITIONER

## 2019-05-18 PROCEDURE — 51798 US URINE CAPACITY MEASURE: CPT

## 2019-05-18 RX ORDER — DEXTROMETHORPHAN POLISTIREX 30 MG/5 ML
SUSPENSION, EXTENDED RELEASE 12 HR ORAL AS NEEDED
Status: DISCONTINUED | OUTPATIENT
Start: 2019-05-18 | End: 2019-05-21 | Stop reason: HOSPADM

## 2019-05-18 RX ORDER — MAGNESIUM CITRATE
296 SOLUTION, ORAL ORAL
Status: COMPLETED | OUTPATIENT
Start: 2019-05-18 | End: 2019-05-18

## 2019-05-18 RX ORDER — IPRATROPIUM BROMIDE AND ALBUTEROL SULFATE 2.5; .5 MG/3ML; MG/3ML
3 SOLUTION RESPIRATORY (INHALATION)
Status: DISCONTINUED | OUTPATIENT
Start: 2019-05-18 | End: 2019-05-19

## 2019-05-18 RX ADMIN — Medication 10 ML: at 14:00

## 2019-05-18 RX ADMIN — SENNOSIDES AND DOCUSATE SODIUM 1 TABLET: 8.6; 5 TABLET ORAL at 09:39

## 2019-05-18 RX ADMIN — GUAIFENESIN 600 MG: 600 TABLET, EXTENDED RELEASE ORAL at 09:39

## 2019-05-18 RX ADMIN — ACETAMINOPHEN 1000 MG: 500 TABLET ORAL at 06:20

## 2019-05-18 RX ADMIN — GABAPENTIN 100 MG: 100 CAPSULE ORAL at 09:38

## 2019-05-18 RX ADMIN — ACETAMINOPHEN 1000 MG: 500 TABLET ORAL at 17:55

## 2019-05-18 RX ADMIN — GABAPENTIN 100 MG: 100 CAPSULE ORAL at 21:11

## 2019-05-18 RX ADMIN — CYCLOBENZAPRINE HYDROCHLORIDE 10 MG: 10 TABLET, FILM COATED ORAL at 03:28

## 2019-05-18 RX ADMIN — PANTOPRAZOLE SODIUM 40 MG: 40 TABLET, DELAYED RELEASE ORAL at 09:38

## 2019-05-18 RX ADMIN — DIAZEPAM 5 MG: 5 TABLET ORAL at 05:14

## 2019-05-18 RX ADMIN — BISACODYL 10 MG: 10 SUPPOSITORY RECTAL at 21:11

## 2019-05-18 RX ADMIN — Medication 10 ML: at 23:26

## 2019-05-18 RX ADMIN — POLYETHYLENE GLYCOL 3350 17 G: 17 POWDER, FOR SOLUTION ORAL at 09:37

## 2019-05-18 RX ADMIN — DIAZEPAM 5 MG: 5 TABLET ORAL at 17:55

## 2019-05-18 RX ADMIN — ATORVASTATIN CALCIUM 5 MG: 10 TABLET, FILM COATED ORAL at 09:38

## 2019-05-18 RX ADMIN — DULOXETINE HYDROCHLORIDE 30 MG: 30 CAPSULE, DELAYED RELEASE ORAL at 09:39

## 2019-05-18 RX ADMIN — ATENOLOL 100 MG: 50 TABLET ORAL at 09:38

## 2019-05-18 RX ADMIN — AMLODIPINE BESYLATE 10 MG: 5 TABLET ORAL at 09:38

## 2019-05-18 RX ADMIN — MUPIROCIN: 20 OINTMENT TOPICAL at 09:00

## 2019-05-18 RX ADMIN — Medication 296 ML: at 11:23

## 2019-05-18 RX ADMIN — GUAIFENESIN 600 MG: 600 TABLET, EXTENDED RELEASE ORAL at 21:11

## 2019-05-18 RX ADMIN — IPRATROPIUM BROMIDE AND ALBUTEROL SULFATE 3 ML: .5; 3 SOLUTION RESPIRATORY (INHALATION) at 20:45

## 2019-05-18 RX ADMIN — IPRATROPIUM BROMIDE AND ALBUTEROL SULFATE 3 ML: .5; 3 SOLUTION RESPIRATORY (INHALATION) at 07:25

## 2019-05-18 RX ADMIN — ACETAMINOPHEN 1000 MG: 500 TABLET ORAL at 12:00

## 2019-05-18 RX ADMIN — ALLOPURINOL 300 MG: 100 TABLET ORAL at 09:39

## 2019-05-18 RX ADMIN — GABAPENTIN 100 MG: 100 CAPSULE ORAL at 17:55

## 2019-05-18 RX ADMIN — TAMSULOSIN HYDROCHLORIDE 0.4 MG: 0.4 CAPSULE ORAL at 09:39

## 2019-05-18 RX ADMIN — NEPHROCAP 1 CAPSULE: 1 CAP ORAL at 09:38

## 2019-05-18 RX ADMIN — ACETAMINOPHEN 1000 MG: 500 TABLET ORAL at 23:25

## 2019-05-18 RX ADMIN — SENNOSIDES AND DOCUSATE SODIUM 1 TABLET: 8.6; 5 TABLET ORAL at 17:55

## 2019-05-18 RX ADMIN — OXYCODONE HYDROCHLORIDE 5 MG: 5 TABLET ORAL at 21:16

## 2019-05-18 RX ADMIN — MUPIROCIN: 20 OINTMENT TOPICAL at 18:00

## 2019-05-18 RX ADMIN — OXYCODONE HYDROCHLORIDE 5 MG: 5 TABLET ORAL at 02:23

## 2019-05-18 RX ADMIN — ACETAMINOPHEN 1000 MG: 500 TABLET ORAL at 00:00

## 2019-05-18 NOTE — PROGRESS NOTES
Problem: Mobility Impaired (Adult and Pediatric) Goal: *Acute Goals and Plan of Care (Insert Text) Description Physical Therapy Goals Initiated 5/14/2019 - remain appropriate 5/15/19 1. Patient will move from supine to sit and sit to supine , scoot up and down and roll side to side in bed with modified independence within 4 days. 2. Patient will perform sit to stand with independence within 4 days. 3. Patient will ambulate with independence for 300 feet with the least restrictive device within 4 days. 5. Patient will verbalize and demonstrate understanding of spinal precautions (No bending, lifting greater than 5 lbs, or twisting; log-roll technique; frequent repositioning as instructed) within 4 days. 5/18/2019 1544 by Travis Sorto PTA Outcome: Progressing Towards Goal 
5/18/2019 1247 by Travis Sorto PTA Outcome: Progressing Towards Goal 
 PHYSICAL THERAPY TREATMENT Patient: Twin Stringer (84 y.o. male) Date: 5/18/2019 Diagnosis: Spinal stenosis of lumbar region at multiple levels [M48.061] <principal problem not specified> Procedure(s) (LRB): 
L2-5 POSTERIOR DECOMPRESSION AND FUSION WITH BONE MARROW ASPIRATION FROM ILIAC CREST (N/A) 4 Days Post-Op Precautions: Fall, Spinal 
Chart, physical therapy assessment, plan of care and goals were reviewed. ASSESSMENT: 
Pt cleared by nurse to mobilize. Pt received up in chair on 7LPM. Pts 02 stats at 93% at rest.  Pt agreeable to therapy. Pt performed sit to stand transfer at [facility] with cueing for hand placement. Pt ambulated 25ft and 15ft with RW at [facility]. Pts LEs shaky during ambulation but pt with no LOB. Pt requiring x1 standing rest break during ambulation for PLB. Pts o2 stats decreasing to 88% after first bout but increasing to 91% quickly with PLB. Pt performed sit to supine at mod A/min A x2 with log roll. Pt left in bed supie with all needs met. Pts o2 stats at 99% on 7LPM. Nurse made aware. Pt will benefit from IP rehab to improve strength and endurance for safe mobility. Progression toward goals: 
?      Improving appropriately and progressing toward goals ? Improving slowly and progressing toward goals ? Not making progress toward goals and plan of care will be adjusted PLAN: 
Patient continues to benefit from skilled intervention to address the above impairments. Continue treatment per established plan of care. Discharge Recommendations:  Inpatient Rehab Further Equipment Recommendations for Discharge:  TBD by rehab SUBJECTIVE:  
Patient stated ? Yea I think I will get back in bed. ? The patient stated 3/3 back precautions. Reviewed all 3 with patient. OBJECTIVE DATA SUMMARY:  
Critical Behavior: 
Neurologic State: Alert Orientation Level: Oriented X4 Cognition: Follows commands Safety/Judgement: Decreased awareness of need for safety Functional Mobility Training: 
Bed Mobility: 
Log Rolling: Moderate assistance;Minimum assistance Sit to Supine: Minimum assistance; Moderate assistance;Assist x2 Brace donned with  minimal assistance/contact guard assist  
Transfers: 
Sit to Stand: Contact guard assistance Stand to Sit: Contact guard assistance Balance: 
Sitting: Intact Standing: Impaired Standing - Static: Fair;Constant support Standing - Dynamic : Fair;Constant support Ambulation/Gait Training: 
Distance (ft): 40 Feet (ft)(25ft and 15ft) Assistive Device: Gait belt;Walker, rolling Ambulation - Level of Assistance: Contact guard assistance Gait Abnormalities: Decreased step clearance Base of Support: Widened Speed/Beverly: Pace decreased (<100 feet/min) Step Length: Right shortened;Left shortened Pain: 
Pain Scale 1: Numeric (0 - 10) Pain Intensity 1: 0 Pain Location 1: Back Pain Orientation 1: Lower Pain Description 1: Aching Pain Intervention(s) 1: Medication (see MAR) Activity Tolerance: Pt with improved mobility tolerance. After treatment:  
?  Patient left in no apparent distress sitting up in chair ? Patient left in no apparent distress in bed 
? Call bell left within reach ? Nursing notified ? Caregiver present ? Bed alarm activated COMMUNICATION/COLLABORATION:  
The patient?s plan of care was discussed with: Registered Nurse Perez Fowler PTA Time Calculation: 15 mins

## 2019-05-18 NOTE — PROGRESS NOTES
Problem: Mobility Impaired (Adult and Pediatric) Goal: *Acute Goals and Plan of Care (Insert Text) Description Physical Therapy Goals Initiated 5/14/2019 - remain appropriate 5/15/19 1. Patient will move from supine to sit and sit to supine , scoot up and down and roll side to side in bed with modified independence within 4 days. 2. Patient will perform sit to stand with independence within 4 days. 3. Patient will ambulate with independence for 300 feet with the least restrictive device within 4 days. 5. Patient will verbalize and demonstrate understanding of spinal precautions (No bending, lifting greater than 5 lbs, or twisting; log-roll technique; frequent repositioning as instructed) within 4 days. Outcome: Progressing Towards Goal 
 PHYSICAL THERAPY TREATMENT Patient: Juany Vance (44 y.o. male) Date: 5/18/2019 Diagnosis: Spinal stenosis of lumbar region at multiple levels [M48.061] <principal problem not specified> Procedure(s) (LRB): 
L2-5 POSTERIOR DECOMPRESSION AND FUSION WITH BONE MARROW ASPIRATION FROM ILIAC CREST (N/A) 4 Days Post-Op Precautions: Fall, Spinal 
Chart, physical therapy assessment, plan of care and goals were reviewed. ASSESSMENT: 
Pt cleared by nurse to mobilize. Pt received up in chair on 7LPM high flow. Pts o2 stats at 91-93% at rest. Pt performed x2 sit to stand transfers with cueing for hand placement. Pt ambulated 25ft x2 with RW at [facility]. Pt requiring cueing for PLB breathing throughout. Pt able to perform. Pt with short pauses during ambulation to focus on breathing. Pts o2 stats at first bout at 92% and at 96-97% after second bout. Pt with improved mobility tolerance and able to maintaining o2 stats. Nurse aware on improved o2 stats. Pt left up in chair with all needs met with family in room. Pt will benefit from IP Rehab to improve strength and endurance for safe mobility. Progression toward goals: ?      Improving appropriately and progressing toward goals ? Improving slowly and progressing toward goals ? Not making progress toward goals and plan of care will be adjusted PLAN: 
Patient continues to benefit from skilled intervention to address the above impairments. Continue treatment per established plan of care. Discharge Recommendations:  Inpatient Rehab Further Equipment Recommendations for Discharge:  gait belt and rolling walker SUBJECTIVE:  
Patient stated ? I actually feel a little better. ? The patient stated 3/3 back precautions. Reviewed all 3 with patient. OBJECTIVE DATA SUMMARY:  
Critical Behavior: 
Neurologic State: Alert Orientation Level: Oriented X4 Cognition: Follows commands Safety/Judgement: Decreased awareness of need for safety Functional Mobility Training: 
Bed Mobility: 
  
  
Transfers: 
Sit to Stand: Contact guard assistance Stand to Sit: Contact guard assistance Balance: 
Sitting: Intact Standing: Impaired Standing - Static: Fair;Good;Constant support Standing - Dynamic : Fair;Constant support Ambulation/Gait Training: 
Distance (ft): 50 Feet (ft)(25ft x2) Assistive Device: Gait belt;Walker, rolling;Brace/Splint Ambulation - Level of Assistance: Contact guard assistance Gait Abnormalities: Decreased step clearance Base of Support: Widened Speed/Beverly: Pace decreased (<100 feet/min) Step Length: Right shortened;Left shortened Pain: 
Pain Scale 1: Numeric (0 - 10) Pain Intensity 1: 0 Pain Location 1: Back Pain Orientation 1: Lower Pain Description 1: Aching Pain Intervention(s) 1: Medication (see MAR) Activity Tolerance: Pt with improved mobility tolerance and improved o2 stats after activity. After treatment:  
?  Patient left in no apparent distress sitting up in chair ? Patient left in no apparent distress in bed 
? Call bell left within reach ? Nursing notified ?  Caregiver present ? Bed alarm activated COMMUNICATION/COLLABORATION:  
The patient?s plan of care was discussed with: Registered Nurse Shiloh Lyles PTA Time Calculation: 24 mins

## 2019-05-18 NOTE — PROGRESS NOTES
Hospitalist Progress Note NAME: Sandeep Holbrook :  1934 MRN:  [id number] Assessment / Plan: 
Acute hypoxic respiratory failure from Atelectasis Possible Pneumonia COPD 
TESSA 
-CT Chest: \"1. Bibasilar, left lower lobe and left upper lobe atelectasis or minimal 
Infiltrate. 2. Pulmonary artery hypertension is suggested but stable since 2018. 3. Other incidental findings. \" 
-V/Q scan: \"IMPRESSION: 1. Low probability for pulmonary embolism. 2. Left lung decreased ventilation compatible with airspace disease. \" 
-wean O2 as tolerated, making good progress today with pulmonary toilet/incentive spirometry 
-discontinue empiric antibiotics since procalcitonin normal (especially in this setting PNA felt less likely and atelectasis seems to be the predominant issue) Status post L2-L5 posterior decompression and fusion 
-Postoperative care per neurosurgery 
  
CKD stage 3 
-monitor renal function, Cr continues to decrease 
-continue to hold home losartan Hypertension 
-continue home amlodipine, atenolol with holding parameters 
-continue to hold home lasix Dyslipidemia 
-continue home statin GERD 
-PPI BPH 
-Flomax 
-bladder checks Constipation:  
-sorbitol ineffective, will try mag citrate today and prn enema ordered Obesity: Body mass index is 37.31 kg/m² Full DVT ppx per Ortho, SCDs Subjective: Chief Complaint / Reason for Physician Visit: follow-up hypoxia Patient wearing his CPAP today, still no BM, breathing feels better Review of Systems: 
Symptom Y/N Comments  Symptom Y/N Comments Fever/Chills n   Chest Pain n   
Poor Appetite    Edema Cough    Abdominal Pain n   
Sputum    Joint Pain SOB/MARES    Pruritis/Rash Nausea/vomit    Tolerating PT/OT Diarrhea    Tolerating Diet y Constipation    Other Could NOT obtain due to:   
 
Objective: VITALS:  
Last 24hrs VS reviewed since prior progress note. Most recent are: Patient Vitals for the past 24 hrs: 
 Temp Pulse Resp BP SpO2  
05/18/19 0725     91 % 05/18/19 0700 97.9 °F (36.6 °C) 67 19 112/62 (!) 88 % 05/18/19 0300 97.6 °F (36.4 °C) 74 20 128/70 95 % 05/17/19 2300 97.5 °F (36.4 °C) 71 16 119/64 91 % 05/17/19 1957 97.6 °F (36.4 °C) 75 18 119/68 94 % 05/17/19 1937     91 % 05/17/19 1900 98.1 °F (36.7 °C) 76 16 130/71 93 % 05/17/19 1504 98.3 °F (36.8 °C) 73 18 107/67 95 % 05/17/19 1425     95 % 05/17/19 1100 98.6 °F (37 °C) 86 16 118/65 93 % Intake/Output Summary (Last 24 hours) at 5/18/2019 1047 Last data filed at 5/18/2019 2744 Gross per 24 hour Intake 600 ml Output 1300 ml Net -700 ml PHYSICAL EXAM: 
General: WD, WN. Alert, cooperative, no acute distress   
EENT:  EOMI. Anicteric sclerae. MMM Resp:  Diminished, no wheezing or rales. No accessory muscle use CV:  Regular  rhythm,  No edema GI:  Soft, Non distended, Non tender.  +Bowel sounds Neurologic:  Alert and oriented X 3, normal speech, hard of hearing Psych:   Good insight. Not anxious nor agitated Skin:  No rashes noted. No jaundice Reviewed most current lab test results and cultures  YES Reviewed most current radiology test results   YES Review and summation of old records today    NO Reviewed patient's current orders and MAR    YES 
PMH/SH reviewed - no change compared to H&P 
________________________________________________________________________ Care Plan discussed with: 
  Comments Patient x Family RN x Care Manager Consultant Multidiciplinary team rounds were held today with , nursing, pharmacist and clinical coordinator. Patient's plan of care was discussed; medications were reviewed and discharge planning was addressed. ________________________________________________________________________ Total NON critical care TIME:  25   Minutes Total CRITICAL CARE TIME Spent:   Minutes non procedure based Comments >50% of visit spent in counseling and coordination of care    
________________________________________________________________________ Yissel Toledo MD  
 
Procedures: see electronic medical records for all procedures/Xrays and details which were not copied into this note but were reviewed prior to creation of Plan. LABS: 
I reviewed today's most current labs and imaging studies. Pertinent labs include: 
Recent Labs 05/18/19 
0760 05/17/19 
6150 05/16/19 
4060 WBC 8.4 8.4  --   
HGB 10.6* 10.8* 10.9* HCT 32.1* 32.5*  --   
 153  --   
 
Recent Labs 05/18/19 
8359 05/17/19 
0306 05/16/19 
1347  139 137  
K 4.0 4.2 4.3 * 108 107 CO2 26 25 24 * 113* 152* BUN 37* 46* 48* CREA 1.25 1.65* 1.92* CA 8.1* 8.0* 7.9* ALB 2.5*  --   --   
TBILI 0.6  --   --   
SGOT 52*  --   --   
ALT 14  --   --   
 
 
Signed: Yissel Toledo MD

## 2019-05-18 NOTE — PROGRESS NOTES
Bedside shift change report given to Chele Lazcano (oncoming nurse) by Brian Smith (offgoing nurse). Report included the following information SBAR, Kardex, Intake/Output, MAR, Recent Results and Cardiac Rhythm NSR.  
 
0223: C/O 6/10 back pain. PRN Roxicodone given. See MAR. Patient states \"I can't get comfortable. \" Attempts at repositioning unsuccessful. Requested pain medication. 0699: Patient continues to c/o of pain, now increased to 8/10. Patient states, \"it feels like spasms in my back. I am not comfortable. \" PRN Flexeril given for muscle spasms and repositioned. See MAR.   
 
3935: Patient states pain is \"gone. \" 
 
0700: Hand-off bedside report given to Kenny Rivera.

## 2019-05-18 NOTE — PROGRESS NOTES
ADULT PROTOCOL: JET AEROSOL  REASSESSMENT Patient  Trudy Alonso     80 y.o.   male     5/17/2019  8:47 PM 
 
Breath Sounds Pre Procedure: Right Breath Sounds: Coarse Left Breath Sounds: Coarse Breath Sounds Post Procedure: Right Breath Sounds: Coarse Left Breath Sounds: Coarse Breathing pattern: Pre procedure Breathing Pattern: Regular Post procedure Breathing Pattern: Regular Heart Rate: Pre procedure Pulse: 74 Post procedure Pulse: 75 Resp Rate: Pre procedure Respirations: 20 
         Post procedure Respirations: 19 Oxygen: O2 Device: Hi flow nasal cannula   Flow rate (L/min) 8 Changed: NO SpO2: Pre procedure SpO2: 91 %   with oxygen Post procedure SpO2: 90 %  with oxygen Nebulizer Therapy: Current medications Aerosolized Medications: DuoNeb Changed: NO 
 
 
Problem List:  
Patient Active Problem List  
Diagnosis Code  Hypoxia R09.02  
 Neurogenic claudication M48.062  
 Encounter for immunization Z23  Fatigue R53.83  
 CAD (coronary artery disease) I25.10  Dyslipidemia E78.5  On statin therapy Z79.899  Gout M10.9  GERD (gastroesophageal reflux disease) K21.9  TIA (transient ischemic attack) G45.9  Dyspnea R06.00  
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  
 Hypertension I10  
 ED (erectile dysfunction) N52.9  Severe obesity (HCC) E66.01  
 TESSA on CPAP G47.33, Z99.89  Simple chronic bronchitis ([address] J41.0  Bilateral carotid artery stenosis I65.23  Spinal stenosis of lumbar region at multiple levels M48.061 Respiratory Therapist: Mirela Leger, RT

## 2019-05-18 NOTE — PROGRESS NOTES
[facility] Included SBAR, I/O, MAR, Kardex, Recent Results, Cardiac Rhythm. Pt resting in bed at this time, receiving breathing treatment from RT. 
 
0930 - pt meds administered PO - tolerated well. Resting in bed on CPAP, no distress or pain at this time. 1100 - pt out of bed and in chair w/ brace around back. CPAP removed, Hi-flow replaced at 7L/min. Pt tolerated movement well. 1130 - Pt drank magnesium citrate 1215 - PT in room to ambulate patient

## 2019-05-18 NOTE — PROGRESS NOTES
Problem: Falls - Risk of 
Goal: *Absence of Falls Description Document Jose Alarconen Fall Risk and appropriate interventions in the flowsheet. Outcome: Progressing Towards Goal 
  
Problem: Pressure Injury - Risk of 
Goal: *Prevention of pressure injury Description Document Aquiles Scale and appropriate interventions in the flowsheet. Outcome: Progressing Towards Goal 
  
Problem: Chronic Obstructive Pulmonary Disease (COPD) Goal: *Oxygen saturation during activity within specified parameters Outcome: Progressing Towards Goal 
Goal: *Able to remain out of bed as prescribed Outcome: Progressing Towards Goal 
Goal: *Absence of hypoxia Outcome: Progressing Towards Goal 
Goal: *Optimize nutritional status Outcome: Progressing Towards Goal

## 2019-05-18 NOTE — PROGRESS NOTES
ADULT PROTOCOL: JET AEROSOL  REASSESSMENT Patient  Craig Barnett     80 y.o.   male     5/18/2019  2:01 PM 
 
Breath Sounds Pre Procedure: Right Breath Sounds: Diminished Left Breath Sounds: Diminished Breath Sounds Post Procedure: Right Breath Sounds: Diminished Left Breath Sounds: Diminished Breathing pattern: Pre procedure Breathing Pattern: Regular Post procedure Breathing Pattern: Regular Heart Rate: Pre procedure Pulse: 70 Post procedure Pulse: 74 Resp Rate: Pre procedure Respirations: 20 
         Post procedure Respirations: 20 
  
Oxygen: O2 Device: CPAP mask SpO2: Pre procedure SpO2: 91 % Post procedure SpO2: 92 % Nebulizer Therapy: Current medications Aerosolized Medications: DuoNeb Changed: yes, made BID per protocol. Problem List:  
Patient Active Problem List  
Diagnosis Code  Hypoxia R09.02  
 Neurogenic claudication M48.062  
 Encounter for immunization Z23  Fatigue R53.83  
 CAD (coronary artery disease) I25.10  Dyslipidemia E78.5  On statin therapy Z79.899  Gout M10.9  GERD (gastroesophageal reflux disease) K21.9  TIA (transient ischemic attack) G45.9  Dyspnea R06.00  
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  
 Hypertension I10  
 ED (erectile dysfunction) N52.9  Severe obesity (HCC) E66.01  
 TESSA on CPAP G47.33, Z99.89  Simple chronic bronchitis ([address] J41.0  Bilateral carotid artery stenosis I65.23  Spinal stenosis of lumbar region at multiple levels M48.061 Respiratory Therapist: eTssie Gallegos

## 2019-05-18 NOTE — PROGRESS NOTES
Po lumbar fusion. Pain managed. Having increase demand for o2. On bipap Patient Vitals for the past 24 hrs: 
 Temp Pulse Resp BP SpO2  
05/18/19 0725     91 % 05/18/19 0700 97.9 °F (36.6 °C) 67 19 112/62 (!) 88 % 05/18/19 0300 97.6 °F (36.4 °C) 74 20 128/70 95 % 05/17/19 2300 97.5 °F (36.4 °C) 71 16 119/64 91 % 05/17/19 1957 97.6 °F (36.4 °C) 75 18 119/68 94 % 05/17/19 1937     91 % 05/17/19 1900 98.1 °F (36.7 °C) 76 16 130/71 93 % 05/17/19 1504 98.3 °F (36.8 °C) 73 18 107/67 95 % 05/17/19 1425     95 % Dressing clean and dry Lat wound with bruising Pt when he can tolerate Medical management appreciated

## 2019-05-19 LAB
BACTERIA SPEC CULT: ABNORMAL
BACTERIA SPEC CULT: ABNORMAL
GLUCOSE BLD STRIP.AUTO-MCNC: 123 MG/DL (ref 65–100)
GLUCOSE BLD STRIP.AUTO-MCNC: 126 MG/DL (ref 65–100)
GLUCOSE BLD STRIP.AUTO-MCNC: 141 MG/DL (ref 65–100)
GRAM STN SPEC: ABNORMAL
SERVICE CMNT-IMP: ABNORMAL

## 2019-05-19 PROCEDURE — 65660000000 HC RM CCU STEPDOWN

## 2019-05-19 PROCEDURE — 97116 GAIT TRAINING THERAPY: CPT

## 2019-05-19 PROCEDURE — 74011250637 HC RX REV CODE- 250/637: Performed by: ORTHOPAEDIC SURGERY

## 2019-05-19 PROCEDURE — 82962 GLUCOSE BLOOD TEST: CPT

## 2019-05-19 PROCEDURE — 74011250637 HC RX REV CODE- 250/637: Performed by: INTERNAL MEDICINE

## 2019-05-19 RX ORDER — IPRATROPIUM BROMIDE AND ALBUTEROL SULFATE 2.5; .5 MG/3ML; MG/3ML
3 SOLUTION RESPIRATORY (INHALATION)
Status: DISCONTINUED | OUTPATIENT
Start: 2019-05-19 | End: 2019-05-21 | Stop reason: HOSPADM

## 2019-05-19 RX ADMIN — ATORVASTATIN CALCIUM 5 MG: 10 TABLET, FILM COATED ORAL at 08:28

## 2019-05-19 RX ADMIN — OXYCODONE HYDROCHLORIDE 5 MG: 5 TABLET ORAL at 21:52

## 2019-05-19 RX ADMIN — GABAPENTIN 100 MG: 100 CAPSULE ORAL at 08:28

## 2019-05-19 RX ADMIN — DULOXETINE HYDROCHLORIDE 30 MG: 30 CAPSULE, DELAYED RELEASE ORAL at 08:28

## 2019-05-19 RX ADMIN — SENNOSIDES AND DOCUSATE SODIUM 1 TABLET: 8.6; 5 TABLET ORAL at 18:07

## 2019-05-19 RX ADMIN — MUPIROCIN: 20 OINTMENT TOPICAL at 18:08

## 2019-05-19 RX ADMIN — TAMSULOSIN HYDROCHLORIDE 0.4 MG: 0.4 CAPSULE ORAL at 08:28

## 2019-05-19 RX ADMIN — GABAPENTIN 100 MG: 100 CAPSULE ORAL at 16:34

## 2019-05-19 RX ADMIN — ACETAMINOPHEN 1000 MG: 500 TABLET ORAL at 07:33

## 2019-05-19 RX ADMIN — AMLODIPINE BESYLATE 10 MG: 5 TABLET ORAL at 08:28

## 2019-05-19 RX ADMIN — ALLOPURINOL 300 MG: 100 TABLET ORAL at 08:28

## 2019-05-19 RX ADMIN — ACETAMINOPHEN 1000 MG: 500 TABLET ORAL at 18:07

## 2019-05-19 RX ADMIN — GUAIFENESIN 600 MG: 600 TABLET, EXTENDED RELEASE ORAL at 21:52

## 2019-05-19 RX ADMIN — Medication 10 ML: at 06:01

## 2019-05-19 RX ADMIN — PANTOPRAZOLE SODIUM 40 MG: 40 TABLET, DELAYED RELEASE ORAL at 08:28

## 2019-05-19 RX ADMIN — OXYCODONE HYDROCHLORIDE 5 MG: 5 TABLET ORAL at 06:01

## 2019-05-19 RX ADMIN — ATENOLOL 100 MG: 50 TABLET ORAL at 08:28

## 2019-05-19 RX ADMIN — GABAPENTIN 100 MG: 100 CAPSULE ORAL at 21:52

## 2019-05-19 RX ADMIN — MUPIROCIN: 20 OINTMENT TOPICAL at 09:00

## 2019-05-19 RX ADMIN — ACETAMINOPHEN 1000 MG: 500 TABLET ORAL at 12:55

## 2019-05-19 RX ADMIN — GUAIFENESIN 600 MG: 600 TABLET, EXTENDED RELEASE ORAL at 08:28

## 2019-05-19 RX ADMIN — NEPHROCAP 1 CAPSULE: 1 CAP ORAL at 08:28

## 2019-05-19 RX ADMIN — SENNOSIDES AND DOCUSATE SODIUM 1 TABLET: 8.6; 5 TABLET ORAL at 08:28

## 2019-05-19 RX ADMIN — Medication 10 ML: at 21:52

## 2019-05-19 RX ADMIN — POLYETHYLENE GLYCOL 3350 17 G: 17 POWDER, FOR SOLUTION ORAL at 08:27

## 2019-05-19 RX ADMIN — Medication 10 ML: at 16:34

## 2019-05-19 NOTE — PROGRESS NOTES
Hospitalist Progress Note NAME: Taye Ernst :  1934 MRN:  [id number] Assessment / Plan: 
Acute hypoxic respiratory failure from Atelectasis Pneumonia ruled out COPD 
TESSA 
-CT Chest: \"1. Bibasilar, left lower lobe and left upper lobe atelectasis or minimal 
Infiltrate. 2. Pulmonary artery hypertension is suggested but stable since 2018. 3. Other incidental findings. \" 
-V/Q scan: \"IMPRESSION: 1. Low probability for pulmonary embolism. 2. Left lung decreased ventilation compatible with airspace disease. \" 
-wean O2 as tolerated, making good progress today with pulmonary toilet/incentive spirometry; patient on 4L with O2 sat of 98%, not on home O2 thus will continue to wean to off 
-empiric antibiotics discontinued on  since procalcitonin normal (especially in this setting PNA felt less likely and atelectasis seems to be the predominant issue) Status post L2-L5 posterior decompression and fusion 
-Postoperative care per neurosurgery 
  
CKD stage 3 
-monitor renal function, Cr continues to decrease 
-continue to hold home losartan Hypertension 
-continue home amlodipine, atenolol with holding parameters 
-continue to hold home lasix Dyslipidemia 
-continue home statin GERD 
-PPI BPH 
-Flomax 
-bladder checks Constipation:  
-resolved Obesity: Body mass index is 37.31 kg/m² Full DVT ppx per Ortho, SCDs Subjective: Chief Complaint / Reason for Physician Visit: follow-up hypoxia Patient on NC Had BM and feels much better Slept well after BM Review of Systems: 
Symptom Y/N Comments  Symptom Y/N Comments Fever/Chills n   Chest Pain n   
Poor Appetite    Edema Cough    Abdominal Pain n   
Sputum    Joint Pain SOB/MARES    Pruritis/Rash Nausea/vomit    Tolerating PT/OT Diarrhea    Tolerating Diet y Constipation    Other Could NOT obtain due to:   
 
Objective: VITALS:  
 Last 24hrs VS reviewed since prior progress note. Most recent are: 
Patient Vitals for the past 24 hrs: 
 Temp Pulse Resp BP SpO2  
05/19/19 0736 97.9 °F (36.6 °C) 70 20 128/76 96 % 05/19/19 0400 97.6 °F (36.4 °C) (!) 58 20 128/60 92 % 05/19/19 0000 98 °F (36.7 °C) 68 18 123/64   
05/18/19 2044     92 % 05/18/19 2000 97.6 °F (36.4 °C) 70 18 121/66 91 % 05/18/19 1554 97.4 °F (36.3 °C) 66 18 124/66 92 % 05/18/19 1210 98 °F (36.7 °C) 68 18 107/58 93 % Intake/Output Summary (Last 24 hours) at 5/19/2019 6736 Last data filed at 5/19/2019 9931 Gross per 24 hour Intake  Output 800 ml Net -800 ml PHYSICAL EXAM: 
General: WD, WN. Alert, cooperative, no acute distress   
EENT:  EOMI. Anicteric sclerae. MMM Resp:  Diminished, no wheezing or rales. No accessory muscle use CV:  Regular  rhythm,  No edema GI:  Soft, Less distended, Non tender.  +Bowel sounds Neurologic:  Alert and oriented X 3, normal speech, hard of hearing Psych:   Good insight. Not anxious nor agitated Skin:  No rashes noted. No jaundice Reviewed most current lab test results and cultures  YES Reviewed most current radiology test results   YES Review and summation of old records today    NO Reviewed patient's current orders and MAR    YES 
PMH/ reviewed - no change compared to H&P 
________________________________________________________________________ Care Plan discussed with: 
  Comments Patient x Family RN x Care Manager Consultant Multidiciplinary team rounds were held today with , nursing, pharmacist and clinical coordinator. Patient's plan of care was discussed; medications were reviewed and discharge planning was addressed. ________________________________________________________________________ Total NON critical care TIME:  25   Minutes Total CRITICAL CARE TIME Spent:   Minutes non procedure based Comments >50% of visit spent in counseling and coordination of care    
________________________________________________________________________ Tk Reyes MD  
 
Procedures: see electronic medical records for all procedures/Xrays and details which were not copied into this note but were reviewed prior to creation of Plan. LABS: 
I reviewed today's most current labs and imaging studies. Pertinent labs include: 
Recent Labs 05/18/19 
2612 05/17/19 
0553 WBC 8.4 8.4 HGB 10.6* 10.8* HCT 32.1* 32.5*  
 153 Recent Labs 05/18/19 
0352 05/17/19 
0306 05/16/19 
1347  139 137  
K 4.0 4.2 4.3 * 108 107 CO2 26 25 24 * 113* 152* BUN 37* 46* 48* CREA 1.25 1.65* 1.92* CA 8.1* 8.0* 7.9* ALB 2.5*  --   --   
TBILI 0.6  --   --   
SGOT 52*  --   --   
ALT 14  --   --   
 
 
Signed: Tk Reyes MD

## 2019-05-19 NOTE — PROGRESS NOTES
Problem: Mobility Impaired (Adult and Pediatric) Goal: *Acute Goals and Plan of Care (Insert Text) Description Physical Therapy Goals Initiated 5/14/2019 - remain appropriate 5/15/19 1. Patient will move from supine to sit and sit to supine , scoot up and down and roll side to side in bed with modified independence within 4 days. 2. Patient will perform sit to stand with independence within 4 days. 3. Patient will ambulate with independence for 300 feet with the least restrictive device within 4 days. 5. Patient will verbalize and demonstrate understanding of spinal precautions (No bending, lifting greater than 5 lbs, or twisting; log-roll technique; frequent repositioning as instructed) within 4 days. Outcome: Progressing Towards Goal 
 PHYSICAL THERAPY TREATMENT Patient: Baron Valdovinos (44 y.o. male) Date: 5/19/2019 Diagnosis: Spinal stenosis of lumbar region at multiple levels [M48.061] <principal problem not specified> Procedure(s) (LRB): 
L2-5 POSTERIOR DECOMPRESSION AND FUSION WITH BONE MARROW ASPIRATION FROM ILIAC CREST (N/A) 5 Days Post-Op Precautions: Fall, Spinal 
Chart, physical therapy assessment, plan of care and goals were reviewed. ASSESSMENT: 
Patient received sitting in the chair, agreeable and cleared for therapy. Patient required CGA for all functional mobility and is progressing well. Patient on RA and O2 sats remained >90% with ambulation. Patient with decreased gait speed and shortened steps, mildly unsteady with RW. Patient left sitting in the chair with all needs met at the conclusion of PT treatment session. Recommending IP rehab at discharge. Progression toward goals: 
?      Improving appropriately and progressing toward goals ? Improving slowly and progressing toward goals ? Not making progress toward goals and plan of care will be adjusted PLAN: 
Patient continues to benefit from skilled intervention to address the above impairments. Continue treatment per established plan of care. Discharge Recommendations:  Inpatient Rehab Further Equipment Recommendations for Discharge:  TBD SUBJECTIVE:  
Patient stated ? I think I have turned a corner. ? The patient stated 3/3 back precautions. Reviewed all 3 with patient. OBJECTIVE DATA SUMMARY:  
Critical Behavior: 
Neurologic State: Alert, Appropriate for age Orientation Level: Oriented X4 Cognition: Follows commands Safety/Judgement: Decreased awareness of need for safety Functional Mobility Training: 
Bed Mobility: Log   
  
  
  
  
  
Brace on prior to PT session Transfers: 
Sit to Stand: Contact guard assistance Stand to Sit: Contact guard assistance Bed to Chair: Contact guard assistance Balance: 
Sitting: Intact; Without support Standing: Impaired; With support Standing - Static: Good Standing - Dynamic : Fair Ambulation/Gait Training: 
Distance (ft): 120 Feet (ft) Assistive Device: Gait belt;Walker, rolling Ambulation - Level of Assistance: Contact guard assistance Gait Abnormalities: Decreased step clearance;Trunk sway increased Base of Support: Widened Speed/Beverly: Pace decreased (<100 feet/min) Step Length: Right shortened;Left shortened Stairs: Therapeutic Exercises:  
 
Pain: 
Pain Scale 1: Numeric (0 - 10) Pain Intensity 1: 0 Pain Location 1: Back Pain Orientation 1: Lower Pain Description 1: Aching Pain Intervention(s) 1: Medication (see MAR) Activity Tolerance:  
Improving. O2 sats >90% on RA with activity. Please refer to the flowsheet for vital signs taken during this treatment. After treatment:  
?  Patient left in no apparent distress sitting up in chair ? Patient left in no apparent distress in bed 
? Call bell left within reach ? Nursing notified ? Caregiver present ? Bed alarm activated COMMUNICATION/COLLABORATION:  
 The patient?s plan of care was discussed with: Registered Nurse and  Carlos Enrique Ricks, PT, DPT Time Calculation: 15 mins

## 2019-05-19 NOTE — PROGRESS NOTES
Bedside shift change report given to [address] (oncoming nurse) by Farnaz Bains (offgoing nurse). Report included the following information SBAR.

## 2019-05-19 NOTE — PROGRESS NOTES
Bedside and Verbal shift change report given to Kelby Brar (oncoming nurse) by CHEL Isabel RN (offgoing nurse). Report given with SBAR, Kardex, Intake/Output, MAR and Recent Results.

## 2019-05-19 NOTE — PROGRESS NOTES
Bedside shift change report given to Olga Lidia Alberto (oncoming nurse) by Reta Nguyen (offgoing nurse). Report included the following information SBAR, Kardex, Intake/Output, MAR, Recent Results and Cardiac Rhythm NSR.  
 
1945: Patient complaining of continuing \"fullness\" in abdomen. Noted distended, soft abodmen. Patient not complaining of pain or nausea. Active BS in all four quadrants. Patient able to pass gas. Dr. Gilbert Aguirre notified. Writer requested CT of abdomen and PA ordered to give PRN enema first.  
 
2240: Patient able to have large BM in [facility]. Patient verifies that he \"feels better. \" Assisted back to bed and back to [facility] for second attempts at a BM. 2330: Patient able to have second large BM. Assisted x1 back to bed. CPAP placed on patient. Hand-off shift report given to oncoming nurse Mario Alberto Torres RN.

## 2019-05-19 NOTE — PROGRESS NOTES
Spiritual Care Assessment/Progress Note [facility] 
 
 
NAME: Emily Olivera      MRN: [id number] AGE: 80 y.o. SEX: male Hinduism Affiliation: Other Language: [place] 5/19/2019     Total Time (in minutes): 12 Spiritual Assessment begun in [facility] 2 PROGRESSIVE CARE through conversation with: 
  
    [x]Patient        [x] Family    [] Friend(s) Reason for Consult: Initial/Spiritual assessment, patient floor Spiritual beliefs: (Please include comment if needed) 
   [] Identifies with a navarro tradition:     
   [] Supported by a navarro community:        
   [] Claims no spiritual orientation:       
   [] Seeking spiritual identity:            
   [x] Adheres to an individual form of spirituality:       
   [] Not able to assess:                   
 
    
Identified resources for coping:  
   [] Prayer                           
   [] Music                  [] Guided Imagery [x] Family/friends                 [] Pet visits [] Devotional reading                         [] Unknown 
   [] Other:                                         
 
 
Interventions offered during this visit: (See comments for more details) Patient Interventions: Initial/Spiritual assessment, patient floor, Initial visit, Affirmation of emotions/emotional suffering, Normalization of emotional/spiritual concerns, Prayer (assurance of) Family/Friend(s): Affirmation of emotions/emotional suffering, Initial Assessment, Prayer (assurance of) Plan of Care: 
 
 [x] Support spiritual and/or cultural needs  
 [] Support AMD and/or advance care planning process    
 [] Support grieving process 
 [] Coordinate Rites and/or Rituals  
 [] Coordination with community clergy 
 [x] No spiritual needs identified at this time 
 [] Detailed Plan of Care below (See Comments)  [] Make referral to Music Therapy 
[] Make referral to Pet Therapy    
[] Make referral to Addiction services [] Make referral to [facility] 
[] Make referral to Spiritual Care Partner 
[] No future visits requested       
[x] Follow up visits as needed Comments:  made initial visit to patients room in PCU. Patient was sitting in the chair in the room. There were lats of family in the room including his wife, Helder Mathew and daughter Reji Aquino. There were others as well. Patient and wife told of some medical issues that he was going there. He is slowly getting better according to them. He said as you can see family is very important to us. They are his support system at this time. They said they don't go to a [employer] at this time.  provided pastoral support and care during this visit. Spiritual Care will follow up as needed. Estela Herrmann MDiv Pager: [phone]

## 2019-05-19 NOTE — PROGRESS NOTES
Problem: Falls - Risk of 
Goal: *Absence of Falls Description Document Leigh Charles Fall Risk and appropriate interventions in the flowsheet. Outcome: Progressing Towards Goal 
  
Problem: Patient Education: Go to Patient Education Activity Goal: Patient/Family Education Outcome: Progressing Towards Goal 
  
Problem: Patient Education: Go to Patient Education Activity Goal: Patient/Family Education Outcome: Progressing Towards Goal 
  
Problem: Pressure Injury - Risk of 
Goal: *Prevention of pressure injury Description Document Aquiles Scale and appropriate interventions in the flowsheet. Outcome: Progressing Towards Goal 
  
Problem: Patient Education: Go to Patient Education Activity Goal: Patient/Family Education Outcome: Progressing Towards Goal 
  
Problem: Patient Education: Go to Patient Education Activity Goal: Patient/Family Education Outcome: Progressing Towards Goal 
  
Problem: Chronic Obstructive Pulmonary Disease (COPD) Goal: *Oxygen saturation during activity within specified parameters Outcome: Progressing Towards Goal 
Goal: *Able to remain out of bed as prescribed Outcome: Progressing Towards Goal 
Goal: *Absence of hypoxia Outcome: Progressing Towards Goal 
Goal: *Optimize nutritional status Outcome: Progressing Towards Goal 
  
Problem: Patient Education: Go to Patient Education Activity Goal: Patient/Family Education Outcome: Progressing Towards Goal 
  
Problem: Pain - Acute Goal: *Control of acute pain Outcome: Progressing Towards Goal 
  
Problem: Patient Education: Go to Patient Education Activity Goal: Patient/Family Education Outcome: Progressing Towards Goal

## 2019-05-19 NOTE — PROGRESS NOTES
ADULT PROTOCOL: JET AEROSOL  REASSESSMENT Patient  Martinez Wolf     80 y.o.   male     5/19/2019  7:34 AM 
 
Nebulizer Therapy: Current medications Aerosolized Medications: DuoNeb Changed: yes, made PRN per protocol. No wheezing noted and pt states he is not SOB. This is not a home med. Problem List:  
Patient Active Problem List  
Diagnosis Code  Hypoxia R09.02  
 Neurogenic claudication M48.062  
 Encounter for immunization Z23  Fatigue R53.83  
 CAD (coronary artery disease) I25.10  Dyslipidemia E78.5  On statin therapy Z79.899  Gout M10.9  GERD (gastroesophageal reflux disease) K21.9  TIA (transient ischemic attack) G45.9  Dyspnea R06.00  
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  
 Hypertension I10  
 ED (erectile dysfunction) N52.9  Severe obesity (HCC) E66.01  
 TESSA on CPAP G47.33, Z99.89  Simple chronic bronchitis ([address] J41.0  Bilateral carotid artery stenosis I65.23  Spinal stenosis of lumbar region at multiple levels M48.061 Respiratory Therapist: Sergei Aguilar

## 2019-05-20 LAB
ALBUMIN SERPL-MCNC: 2.5 G/DL (ref 3.5–5)
ALBUMIN/GLOB SERPL: 0.7 {RATIO} (ref 1.1–2.2)
ALP SERPL-CCNC: 77 U/L (ref 45–117)
ALT SERPL-CCNC: 17 U/L (ref 12–78)
ANION GAP SERPL CALC-SCNC: 6 MMOL/L (ref 5–15)
AST SERPL-CCNC: 37 U/L (ref 15–37)
BASOPHILS # BLD: 0 K/UL (ref 0–0.1)
BASOPHILS NFR BLD: 0 % (ref 0–1)
BILIRUB SERPL-MCNC: 0.6 MG/DL (ref 0.2–1)
BUN SERPL-MCNC: 24 MG/DL (ref 6–20)
BUN/CREAT SERPL: 23 (ref 12–20)
CALCIUM SERPL-MCNC: 8.2 MG/DL (ref 8.5–10.1)
CHLORIDE SERPL-SCNC: 107 MMOL/L (ref 97–108)
CO2 SERPL-SCNC: 27 MMOL/L (ref 21–32)
CREAT SERPL-MCNC: 1.03 MG/DL (ref 0.7–1.3)
DIFFERENTIAL METHOD BLD: ABNORMAL
EOSINOPHIL # BLD: 0.3 K/UL (ref 0–0.4)
EOSINOPHIL NFR BLD: 5 % (ref 0–7)
ERYTHROCYTE [DISTWIDTH] IN BLOOD BY AUTOMATED COUNT: 14.6 % (ref 11.5–14.5)
GLOBULIN SER CALC-MCNC: 3.7 G/DL (ref 2–4)
GLUCOSE BLD STRIP.AUTO-MCNC: 119 MG/DL (ref 65–100)
GLUCOSE BLD STRIP.AUTO-MCNC: 119 MG/DL (ref 65–100)
GLUCOSE BLD STRIP.AUTO-MCNC: 123 MG/DL (ref 65–100)
GLUCOSE BLD STRIP.AUTO-MCNC: 124 MG/DL (ref 65–100)
GLUCOSE BLD STRIP.AUTO-MCNC: 126 MG/DL (ref 65–100)
GLUCOSE BLD STRIP.AUTO-MCNC: 133 MG/DL (ref 65–100)
GLUCOSE BLD STRIP.AUTO-MCNC: 92 MG/DL (ref 65–100)
GLUCOSE BLD STRIP.AUTO-MCNC: 99 MG/DL (ref 65–100)
GLUCOSE SERPL-MCNC: 113 MG/DL (ref 65–100)
HCT VFR BLD AUTO: 33.7 % (ref 36.6–50.3)
HGB BLD-MCNC: 11.1 G/DL (ref 12.1–17)
IMM GRANULOCYTES # BLD AUTO: 0.1 K/UL (ref 0–0.04)
IMM GRANULOCYTES NFR BLD AUTO: 1 % (ref 0–0.5)
LYMPHOCYTES # BLD: 1 K/UL (ref 0.8–3.5)
LYMPHOCYTES NFR BLD: 15 % (ref 12–49)
MCH RBC QN AUTO: 32.6 PG (ref 26–34)
MCHC RBC AUTO-ENTMCNC: 32.9 G/DL (ref 30–36.5)
MCV RBC AUTO: 98.8 FL (ref 80–99)
MONOCYTES # BLD: 0.6 K/UL (ref 0–1)
MONOCYTES NFR BLD: 8 % (ref 5–13)
NEUTS SEG # BLD: 4.9 K/UL (ref 1.8–8)
NEUTS SEG NFR BLD: 71 % (ref 32–75)
NRBC # BLD: 0 K/UL (ref 0–0.01)
NRBC BLD-RTO: 0 PER 100 WBC
PLATELET # BLD AUTO: 205 K/UL (ref 150–400)
PMV BLD AUTO: 10.5 FL (ref 8.9–12.9)
POTASSIUM SERPL-SCNC: 4.2 MMOL/L (ref 3.5–5.1)
PROT SERPL-MCNC: 6.2 G/DL (ref 6.4–8.2)
RBC # BLD AUTO: 3.41 M/UL (ref 4.1–5.7)
SERVICE CMNT-IMP: ABNORMAL
SERVICE CMNT-IMP: NORMAL
SERVICE CMNT-IMP: NORMAL
SODIUM SERPL-SCNC: 140 MMOL/L (ref 136–145)
WBC # BLD AUTO: 6.9 K/UL (ref 4.1–11.1)

## 2019-05-20 PROCEDURE — 65660000000 HC RM CCU STEPDOWN

## 2019-05-20 PROCEDURE — 80053 COMPREHEN METABOLIC PANEL: CPT

## 2019-05-20 PROCEDURE — 74011250637 HC RX REV CODE- 250/637: Performed by: ORTHOPAEDIC SURGERY

## 2019-05-20 PROCEDURE — 85025 COMPLETE CBC W/AUTO DIFF WBC: CPT

## 2019-05-20 PROCEDURE — 36415 COLL VENOUS BLD VENIPUNCTURE: CPT

## 2019-05-20 PROCEDURE — 74011250636 HC RX REV CODE- 250/636: Performed by: INTERNAL MEDICINE

## 2019-05-20 PROCEDURE — 74011250637 HC RX REV CODE- 250/637: Performed by: INTERNAL MEDICINE

## 2019-05-20 PROCEDURE — 77010033678 HC OXYGEN DAILY

## 2019-05-20 PROCEDURE — 97116 GAIT TRAINING THERAPY: CPT

## 2019-05-20 PROCEDURE — 97530 THERAPEUTIC ACTIVITIES: CPT | Performed by: OCCUPATIONAL THERAPIST

## 2019-05-20 PROCEDURE — 97535 SELF CARE MNGMENT TRAINING: CPT | Performed by: OCCUPATIONAL THERAPIST

## 2019-05-20 RX ORDER — GUAIFENESIN 600 MG/1
600 TABLET, EXTENDED RELEASE ORAL EVERY 12 HOURS
Qty: 60 TAB | Refills: 0 | Status: SHIPPED | OUTPATIENT
Start: 2019-05-20 | End: 2019-06-10

## 2019-05-20 RX ORDER — PANTOPRAZOLE SODIUM 40 MG/1
40 TABLET, DELAYED RELEASE ORAL
Status: DISCONTINUED | OUTPATIENT
Start: 2019-05-21 | End: 2019-05-21 | Stop reason: HOSPADM

## 2019-05-20 RX ORDER — NALOXONE HYDROCHLORIDE 4 MG/.1ML
SPRAY NASAL
Qty: 1 EACH | Refills: 0 | Status: SHIPPED | OUTPATIENT
Start: 2019-05-20 | End: 2019-09-23

## 2019-05-20 RX ORDER — DOXYCYCLINE HYCLATE 100 MG
100 TABLET ORAL EVERY 12 HOURS
Qty: 14 TAB | Refills: 0 | Status: SHIPPED | OUTPATIENT
Start: 2019-05-20 | End: 2019-05-27

## 2019-05-20 RX ORDER — POLYETHYLENE GLYCOL 3350 17 G/17G
17 POWDER, FOR SOLUTION ORAL DAILY
Qty: 15 PACKET | Refills: 0 | Status: SHIPPED | OUTPATIENT
Start: 2019-05-21 | End: 2019-06-05

## 2019-05-20 RX ORDER — FAMOTIDINE 20 MG/1
10 TABLET, FILM COATED ORAL
Status: DISCONTINUED | OUTPATIENT
Start: 2019-05-20 | End: 2019-05-21 | Stop reason: HOSPADM

## 2019-05-20 RX ORDER — ONDANSETRON 2 MG/ML
4 INJECTION INTRAMUSCULAR; INTRAVENOUS
Status: DISCONTINUED | OUTPATIENT
Start: 2019-05-20 | End: 2019-05-21 | Stop reason: HOSPADM

## 2019-05-20 RX ORDER — LEVOFLOXACIN 750 MG/1
750 TABLET ORAL
Qty: 5 TAB | Refills: 0 | Status: SHIPPED | OUTPATIENT
Start: 2019-05-21 | End: 2019-05-21

## 2019-05-20 RX ORDER — DOXYCYCLINE HYCLATE 100 MG
100 TABLET ORAL EVERY 12 HOURS
Status: DISCONTINUED | OUTPATIENT
Start: 2019-05-20 | End: 2019-05-21 | Stop reason: HOSPADM

## 2019-05-20 RX ORDER — FUROSEMIDE 80 MG/1
80 TABLET ORAL DAILY
Status: DISCONTINUED | OUTPATIENT
Start: 2019-05-20 | End: 2019-05-21 | Stop reason: HOSPADM

## 2019-05-20 RX ORDER — FAMOTIDINE 20 MG/1
20 TABLET, FILM COATED ORAL
Status: DISCONTINUED | OUTPATIENT
Start: 2019-05-20 | End: 2019-05-20

## 2019-05-20 RX ORDER — LEVOFLOXACIN 750 MG/1
750 TABLET ORAL
Status: DISCONTINUED | OUTPATIENT
Start: 2019-05-20 | End: 2019-05-21

## 2019-05-20 RX ORDER — AZITHROMYCIN 250 MG/1
500 TABLET, FILM COATED ORAL DAILY
Status: DISCONTINUED | OUTPATIENT
Start: 2019-05-20 | End: 2019-05-20

## 2019-05-20 RX ORDER — AMOXICILLIN 250 MG
1 CAPSULE ORAL 2 TIMES DAILY
Qty: 30 TAB | Refills: 0 | Status: SHIPPED | OUTPATIENT
Start: 2019-05-20 | End: 2019-06-04

## 2019-05-20 RX ORDER — OXYCODONE HYDROCHLORIDE 5 MG/1
5 TABLET ORAL
Qty: 60 TAB | Refills: 0 | Status: SHIPPED | OUTPATIENT
Start: 2019-05-20 | End: 2019-06-03

## 2019-05-20 RX ADMIN — TAMSULOSIN HYDROCHLORIDE 0.4 MG: 0.4 CAPSULE ORAL at 08:49

## 2019-05-20 RX ADMIN — DOXYCYCLINE HYCLATE 100 MG: 100 TABLET, COATED ORAL at 08:48

## 2019-05-20 RX ADMIN — ACETAMINOPHEN 1000 MG: 500 TABLET ORAL at 11:20

## 2019-05-20 RX ADMIN — FUROSEMIDE 80 MG: 80 TABLET ORAL at 08:49

## 2019-05-20 RX ADMIN — GUAIFENESIN 600 MG: 600 TABLET, EXTENDED RELEASE ORAL at 20:28

## 2019-05-20 RX ADMIN — GABAPENTIN 100 MG: 100 CAPSULE ORAL at 08:49

## 2019-05-20 RX ADMIN — ACETAMINOPHEN 1000 MG: 500 TABLET ORAL at 17:23

## 2019-05-20 RX ADMIN — OXYCODONE HYDROCHLORIDE 10 MG: 5 TABLET ORAL at 03:11

## 2019-05-20 RX ADMIN — OXYCODONE HYDROCHLORIDE 5 MG: 5 TABLET ORAL at 20:28

## 2019-05-20 RX ADMIN — ACETAMINOPHEN 1000 MG: 500 TABLET ORAL at 06:26

## 2019-05-20 RX ADMIN — DIAZEPAM 5 MG: 5 TABLET ORAL at 11:20

## 2019-05-20 RX ADMIN — PANTOPRAZOLE SODIUM 40 MG: 40 TABLET, DELAYED RELEASE ORAL at 06:26

## 2019-05-20 RX ADMIN — DOXYCYCLINE HYCLATE 100 MG: 100 TABLET, COATED ORAL at 20:28

## 2019-05-20 RX ADMIN — FAMOTIDINE 10 MG: 20 TABLET ORAL at 11:41

## 2019-05-20 RX ADMIN — MUPIROCIN: 20 OINTMENT TOPICAL at 09:00

## 2019-05-20 RX ADMIN — NEPHROCAP 1 CAPSULE: 1 CAP ORAL at 08:49

## 2019-05-20 RX ADMIN — MUPIROCIN: 20 OINTMENT TOPICAL at 17:24

## 2019-05-20 RX ADMIN — AMLODIPINE BESYLATE 10 MG: 5 TABLET ORAL at 08:49

## 2019-05-20 RX ADMIN — ONDANSETRON 4 MG: 2 INJECTION INTRAMUSCULAR; INTRAVENOUS at 11:20

## 2019-05-20 RX ADMIN — GABAPENTIN 100 MG: 100 CAPSULE ORAL at 21:26

## 2019-05-20 RX ADMIN — SENNOSIDES AND DOCUSATE SODIUM 1 TABLET: 8.6; 5 TABLET ORAL at 17:23

## 2019-05-20 RX ADMIN — GABAPENTIN 100 MG: 100 CAPSULE ORAL at 16:40

## 2019-05-20 RX ADMIN — LEVOFLOXACIN 750 MG: 750 TABLET, FILM COATED ORAL at 08:49

## 2019-05-20 RX ADMIN — DULOXETINE HYDROCHLORIDE 30 MG: 30 CAPSULE, DELAYED RELEASE ORAL at 08:49

## 2019-05-20 RX ADMIN — Medication 10 ML: at 06:26

## 2019-05-20 RX ADMIN — Medication 10 ML: at 11:40

## 2019-05-20 RX ADMIN — Medication 10 ML: at 21:27

## 2019-05-20 RX ADMIN — Medication 10 ML: at 16:40

## 2019-05-20 RX ADMIN — SENNOSIDES AND DOCUSATE SODIUM 1 TABLET: 8.6; 5 TABLET ORAL at 08:48

## 2019-05-20 RX ADMIN — ATENOLOL 100 MG: 50 TABLET ORAL at 08:49

## 2019-05-20 RX ADMIN — GUAIFENESIN 600 MG: 600 TABLET, EXTENDED RELEASE ORAL at 08:49

## 2019-05-20 RX ADMIN — POLYETHYLENE GLYCOL 3350 17 G: 17 POWDER, FOR SOLUTION ORAL at 08:49

## 2019-05-20 RX ADMIN — ATORVASTATIN CALCIUM 5 MG: 10 TABLET, FILM COATED ORAL at 08:49

## 2019-05-20 RX ADMIN — ALLOPURINOL 300 MG: 100 TABLET ORAL at 08:49

## 2019-05-20 NOTE — PROGRESS NOTES
Problem: Mobility Impaired (Adult and Pediatric) Goal: *Acute Goals and Plan of Care (Insert Text) Description Physical Therapy Goals Initiated 5/14/2019 - remain appropriate 5/15/19 1. Patient will move from supine to sit and sit to supine , scoot up and down and roll side to side in bed with modified independence within 4 days. 2. Patient will perform sit to stand with independence within 4 days. 3. Patient will ambulate with independence for 300 feet with the least restrictive device within 4 days. 5. Patient will verbalize and demonstrate understanding of spinal precautions (No bending, lifting greater than 5 lbs, or twisting; log-roll technique; frequent repositioning as instructed) within 4 days. 5/20/2019 1608 by Leia Del Rosario PTA Outcome: Progressing Towards Goal 
5/20/2019 1458 by Leia Del Rosario PTA Outcome: Progressing Towards Goal 
 PHYSICAL THERAPY TREATMENT Patient: Sergio Quijano (72 y.o. male) Date: 5/20/2019 Diagnosis: Spinal stenosis of lumbar region at multiple levels [M48.061] <principal problem not specified> Procedure(s) (LRB): 
L2-5 POSTERIOR DECOMPRESSION AND FUSION WITH BONE MARROW ASPIRATION FROM ILIAC CREST (N/A) 6 Days Post-Op Precautions: Fall, Spinal 
Chart, physical therapy assessment, plan of care and goals were reviewed. ASSESSMENT: 
Pt cleared by nurse to mobilize. Pt received up in chair after working with OT. Pt agreeable to therapy. Pt performed sit to stand transfer at [facility] with cueing for hand placement. Pt ambulated 110ft with RW at [facility]. Pt still requiring cueing for PLB. Pt with improved stability. Pt requiring x1 stand rest breaks for deep breathing. Pts o2 stats decreased to 84% after ambulation. Pt able to improve with PLB. Pt left up in chair with all needs met. Pt will benefit form IP rehab to continue to improve strength and endurance for safe mobility. Progression toward goals: ?      Improving appropriately and progressing toward goals ? Improving slowly and progressing toward goals ? Not making progress toward goals and plan of care will be adjusted PLAN: 
Patient continues to benefit from skilled intervention to address the above impairments. Continue treatment per established plan of care. Discharge Recommendations:  Inpatient Rehab Further Equipment Recommendations for Discharge:  TBD by rehab SUBJECTIVE:  
Patient stated ? I just got done. ? The patient stated 3/3 back precautions. Reviewed all 3 with patient. OBJECTIVE DATA SUMMARY:  
Critical Behavior: 
Neurologic State: Alert, Appropriate for age Orientation Level: Oriented X4 Cognition: Follows commands Safety/Judgement: Decreased awareness of need for safety Functional Mobility Training: 
Bed Mobility: Log   
  
  
  
  
  
Brace donned with  minimal assistance/contact guard assist  
Transfers: 
Sit to Stand: Contact guard assistance;Stand-by assistance Stand to Sit: Contact guard assistance;Stand-by assistance Balance: 
Sitting: Intact Standing: Impaired; With support Standing - Static: Good;Constant support Standing - Dynamic : Good;Constant support Ambulation/Gait Training: 
Distance (ft): 110 Feet (ft) Assistive Device: Gait belt;Walker, rolling Ambulation - Level of Assistance: Contact guard assistance Gait Abnormalities: Decreased step clearance Base of Support: Widened Speed/Beverly: Pace decreased (<100 feet/min) Step Length: Right shortened;Left shortened Pain: 
Pain Scale 1: Numeric (0 - 10) Activity Tolerance: Pt with improved mobility tolerance but with a decreased in o2 stats after activity. After treatment:  
?  Patient left in no apparent distress sitting up in chair ? Patient left in no apparent distress in bed 
? Call bell left within reach ? Nursing notified ? Caregiver present ?  Bed alarm activated COMMUNICATION/COLLABORATION:  
The patient?s plan of care was discussed with: Registered Nurse Joeseph Denver, PTA Time Calculation: 8 mins

## 2019-05-20 NOTE — PROGRESS NOTES
ORTHO POST OP SPINE PROGRESS NOTE May 20, 2019 Admit Date: 2019 Admit Diagnosis: Spinal stenosis of lumbar region at multiple levels [M48.061] Procedure: Procedure(s): 
L2-5 POSTERIOR DECOMPRESSION AND FUSION WITH BONE MARROW ASPIRATION FROM ILIAC CREST Post Op day: 6 Days Post-Op Subjective:  
 
Linn Patel is a patient who has complaints Mild back pain 6 days status post L2 to through L5 lateral and posterior fusion done in a staged fashion. Daughter at bedside. Remained in-house through the weekend given poor pulmonary function requiring oxygen although has been improving. Daughter concerned this morning that Lasix has not been restarted. Was having poor renal function as well. Patient states he is feeling well with regard to his back and only complaining of pain in the  bilateral hips left greater than right. Review of Systems: Pertinent items are noted in HPI. Objective:  
 
PT/OT:  
Distance Ambulated:          
Time Ambulated (min):       
Ambulation Response: Activity Response: Tolerated well Assistive Device:              Assistive Device: Fall prevention device Vital Signs:   
Blood pressure 132/70, pulse 62, temperature 98 °F (36.7 °C), resp. rate 18, height 5' 8\" (1.727 m), weight 113.6 kg (250 lb 7.1 oz), SpO2 94 %. Temp (24hrs), Av °F (36.7 °C), Min:97.9 °F (36.6 °C), Max:98.1 °F (36.7 °C) No intake/output data recorded.  1901 -  0700 In: 250 [P.O.:250] Out: 900 [Urine:900] LAB:   
Recent Labs  
  19 
0326 HGB 11.1* WBC 6.9  Wound/Drain Assessment: 
Drain:   
 
Dressing:  
 
Physical Exam: 
Neurological: no deficit Incision clean, dry, and intact 5/5 strength bilateral lower extremities with the exception of the bilateral hip flexors and knee extensors which are 4/5 Assessment:  
  
Patient Active Problem List  
Diagnosis Code  Hypoxia R09.02  
 Neurogenic claudication M48.062  
  Encounter for immunization Z23  Fatigue R53.83  
 CAD (coronary artery disease) I25.10  Dyslipidemia E78.5  On statin therapy Z79.899  Gout M10.9  GERD (gastroesophageal reflux disease) K21.9  TIA (transient ischemic attack) G45.9  Dyspnea R06.00  
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  
 Hypertension I10  
 ED (erectile dysfunction) N52.9  Severe obesity (HCC) E66.01  
 TESSA on CPAP G47.33, Z99.89  Simple chronic bronchitis ([address] J41.0  Bilateral carotid artery stenosis I65.23  Spinal stenosis of lumbar region at multiple levels M48.061 Plan:  
 
Continue PT/OT/Rehab Discontinue:  
Consult: PT  and OT Discharge To: Inpatient Rehab. Hopefully today   Will defer  Decision to restart Lasix to hospitalist 
 can likely switch to normal lumbar bandage prior to DC 
 Universal Safety Interventions

## 2019-05-20 NOTE — PROGRESS NOTES
Visit attempted pt feeling very nauseous after new meds this morning. Pt asked is we could wait till later today for therapy. Will defer at this time and continue to follow.

## 2019-05-20 NOTE — PROGRESS NOTES
Problem: Mobility Impaired (Adult and Pediatric) Goal: *Acute Goals and Plan of Care (Insert Text) Description Physical Therapy Goals Initiated 5/14/2019 - remain appropriate 5/15/19 1. Patient will move from supine to sit and sit to supine , scoot up and down and roll side to side in bed with modified independence within 4 days. 2. Patient will perform sit to stand with independence within 4 days. 3. Patient will ambulate with independence for 300 feet with the least restrictive device within 4 days. 5. Patient will verbalize and demonstrate understanding of spinal precautions (No bending, lifting greater than 5 lbs, or twisting; log-roll technique; frequent repositioning as instructed) within 4 days. Outcome: Progressing Towards Goal 
 PHYSICAL THERAPY TREATMENT Patient: Elizabeth Dillon (22 y.o. male) Date: 5/20/2019 Diagnosis: Spinal stenosis of lumbar region at multiple levels [M48.061] <principal problem not specified> Procedure(s) (LRB): 
L2-5 POSTERIOR DECOMPRESSION AND FUSION WITH BONE MARROW ASPIRATION FROM ILIAC CREST (N/A) 6 Days Post-Op Precautions: Fall, Spinal 
Chart, physical therapy assessment, plan of care and goals were reviewed. ASSESSMENT: 
Pt cleared by nurse to mobilize. Pt received up in chair on RA. Pts o2 stats at 91% at rest. Pt agreeable to therapy and reported feeling much better. Pt performed sit to stand transfer at [facility]. Pt performed ambulated 90ft with RW at [facility]. Pt requiring cueing for PLB with all mobility. Pts LEs shaky throughout due to weakness but with no LOB. Pt requiring x3 short standing rest breaks during ambulation. Pts i2 stats after ambulation at 93%. Pt performed seated exercises once back in recliner. Pt with improved mobility tolerance. Pt will benefit from IP rehab to improve strength and endurance for safe mobility to return to independent prior level of function. Progression toward goals: ?      Improving appropriately and progressing toward goals ? Improving slowly and progressing toward goals ? Not making progress toward goals and plan of care will be adjusted PLAN: 
Patient continues to benefit from skilled intervention to address the above impairments. Continue treatment per established plan of care. Discharge Recommendations:  Inpatient Rehab Further Equipment Recommendations for Discharge:  TBD by rehab SUBJECTIVE:  
Patient stated ? I feel much better now. ? The patient stated 3/3 back precautions. Reviewed all 3 with patient. OBJECTIVE DATA SUMMARY:  
Critical Behavior: 
Neurologic State: Alert, Appropriate for age Orientation Level: Oriented X4 Cognition: Follows commands Safety/Judgement: Decreased awareness of need for safety Functional Mobility Training: 
Bed Mobility: Log   
  
  
  
  
  
Transfers: 
Sit to Stand: Contact guard assistance Stand to Sit: Contact guard assistance Balance: 
Sitting: Intact Standing: Impaired; With support Standing - Static: Good;Constant support Standing - Dynamic : Fair;Constant support Ambulation/Gait Training: 
Distance (ft): 90 Feet (ft) Assistive Device: Gait belt;Walker, rolling Ambulation - Level of Assistance: Contact guard assistance Gait Abnormalities: Decreased step clearance Base of Support: Widened Speed/Beverly: Pace decreased (<100 feet/min) Step Length: Left shortened;Right shortened Therapeutic Exercises:  
Seated LAQ x10 Marching x10 Heel and toe raises x10 Pain: 
Pain Scale 1: Numeric (0 - 10) Pain Intensity 1: 4 Pain Location 1: Back Pain Orientation 1: Lower Pain Description 1: Aching Pain Intervention(s) 1: Medication (see MAR); Repositioned Activity Tolerance: Pt with improved mobility tolerance. After treatment:  
?  Patient left in no apparent distress sitting up in chair ? Patient left in no apparent distress in bed ?  Call bell left within reach ? Nursing notified ? Caregiver present ? Bed alarm activated COMMUNICATION/COLLABORATION:  
The patient?s plan of care was discussed with: Registered Nurse Trey Hummel PTA Time Calculation: 11 mins

## 2019-05-20 NOTE — PROGRESS NOTES
JACQUES: D/C to [facility] 
 
CM called [facility] to notify that patient is ready for D/C. He is no longer requiring 02 and is medically ready for rehab. CM will continue to assist with safe and appropriate D/C to acute rehab. Faith Michele RN, CHPN, [facility]

## 2019-05-20 NOTE — PROGRESS NOTES
Hospitalist Progress Note NAME: Craig Barnett :  1934 MRN:  [id number] Assessment / Plan: 
Acute hypoxic respiratory failure from Atelectasis Pneumonia ruled out COPD 
TESSA 
-CT Chest: \"1. Bibasilar, left lower lobe and left upper lobe atelectasis or minimal 
Infiltrate. 2. Pulmonary artery hypertension is suggested but stable since 2018. 3. Other incidental findings. \" 
-V/Q scan: \"IMPRESSION: 1. Low probability for pulmonary embolism. 2. Left lung decreased ventilation compatible with airspace disease. \" 
-empiric antibiotics discontinued on  since procalcitonin normal (especially in this setting PNA felt less likely and atelectasis seems to be the predominant issue)  
-was on 2L NC this am because of \"sleep apnea\" per RN report, otherwise O2 was weaned to RA on  MRSE and Serratia Bronchitis: per  Sputum Culture, despite this development patient has been weaned to RA; this is nothing that should stop patient from proceeding to [facility] rehab  
-start on Doxycycline for the MRSE (7 days) - patient educated to take with adequate water to prevent esophageal irritation/ucleration  
-Start on Levaquin for the Serratia (5 days) -With patient's renal issues I do not feel that Bactrim therapy is in his best interest 
-start flutter valve 
-continue mucinex Status post L2-L5 posterior decompression and fusion 
-Postoperative care per neurosurgery 
  
CKD stage 3 
-monitor renal function, Cr continues to decrease 
-continue to hold home losartan Hypertension 
-continue home amlodipine, atenolol with holding parameters 
-restart home lasix 80 mg daily 
-if goes to [facility] today will need to have BMP checked later this week Dyslipidemia 
-continue home statin GERD 
-PPI reordered 
-prn pepcid BPH 
-Flomax 
-bladder checks prn Constipation:  
-resolved Obesity: Body mass index is 37.31 kg/m² Full DVT ppx per Ortho, SCDs Subjective: Chief Complaint / Reason for Physician Visit: follow-up hypoxia Patient coughed up gray sputum this am, suptum was white until last night Denies SOB Otherwise feels okay Moving around better Review of Systems: 
Symptom Y/N Comments  Symptom Y/N Comments Fever/Chills n   Chest Pain n   
Poor Appetite    Edema Cough    Abdominal Pain n   
Sputum    Joint Pain SOB/MAERS    Pruritis/Rash Nausea/vomit    Tolerating PT/OT Diarrhea    Tolerating Diet y Constipation    Other Could NOT obtain due to:   
 
Objective: VITALS:  
Last 24hrs VS reviewed since prior progress note. Most recent are: 
Patient Vitals for the past 24 hrs: 
 Temp Pulse Resp BP SpO2  
05/20/19 0750 98 °F (36.7 °C) 62 18 132/70 94 % 05/20/19 0307 98.1 °F (36.7 °C) 67  (!) 131/94 93 % 05/19/19 2306 97.9 °F (36.6 °C) 65  127/66 94 % 05/19/19 1915 98 °F (36.7 °C) 67 18 129/63 92 % 05/19/19 1508 98.1 °F (36.7 °C) 67 20 125/66 92 % 05/19/19 1148 97.9 °F (36.6 °C) (!) 57 20 105/58 98 % Intake/Output Summary (Last 24 hours) at 5/20/2019 1431 Last data filed at 5/19/2019 2234 Gross per 24 hour Intake 250 ml Output 400 ml Net -150 ml PHYSICAL EXAM: 
General: WD, WN. Alert, cooperative, no acute distress   
EENT:  EOMI. Anicteric sclerae. MMM Resp:  Diminished, mild coarse breath sounds clear with cough. No accessory muscle use CV:  Regular  rhythm,  No edema GI:  Soft, Less distended, Non tender.  +Bowel sounds Neurologic:  Alert and oriented X 3, normal speech, hard of hearing Psych:   Good insight. Not anxious nor agitated Skin:  No rashes noted. No jaundice Reviewed most current lab test results and cultures  YES Reviewed most current radiology test results   YES Review and summation of old records today    NO Reviewed patient's current orders and MAR    YES 
PMH/SH reviewed - no change compared to H&P 
________________________________________________________________________ Care Plan discussed with: 
  Comments Patient x Family RN x Care Manager Consultant Multidiciplinary team rounds were held today with , nursing, pharmacist and clinical coordinator. Patient's plan of care was discussed; medications were reviewed and discharge planning was addressed. ________________________________________________________________________ Total NON critical care TIME:  35   Minutes Total CRITICAL CARE TIME Spent:   Minutes non procedure based Comments >50% of visit spent in counseling and coordination of care x   
________________________________________________________________________ Bereket Marquis MD  
 
Procedures: see electronic medical records for all procedures/Xrays and details which were not copied into this note but were reviewed prior to creation of Plan. LABS: 
I reviewed today's most current labs and imaging studies. Pertinent labs include: 
Recent Labs  
  05/20/19 
0326 05/18/19 
8208 WBC 6.9 8.4 HGB 11.1* 10.6* HCT 33.7* 32.1*  
 171 Recent Labs  
  05/20/19 
0326 05/18/19 
1599  142  
K 4.2 4.0  
 110* CO2 27 26 * 112* BUN 24* 37* CREA 1.03 1.25  
CA 8.2* 8.1* ALB 2.5* 2.5* TBILI 0.6 0.6 SGOT 37 52* ALT 17 14 Signed: Bereket Marquis MD

## 2019-05-20 NOTE — PROGRESS NOTES
Ortho / Neurosurgery NP Note POD# 7  s/p L2-5 POSTERIOR DECOMPRESSION AND FUSION WITH BONE MARROW ASPIRATION FROM ILIAC CREST Pt out of bed to chair on room air. States that he is nauseated from a new medication that was started this morning. VSS Afebrile. Voiding status: voiding Labs Lab Results Component Value Date/Time HGB 11.1 (L) 05/20/2019 03:26 AM  
  
Lab Results Component Value Date/Time INR 1.3 (H) 05/09/2019 07:16 AM  
  
 
Body mass index is 38.08 kg/m². : A BMI > 30 is classified as obesity and > 40 is classified as morbid obesity. GERALDO Dressing in place with small amount of drainage present. Lateral incision intact with no drainage. Calves soft and supple; No pain with passive stretch Sensation and motor intact SCDs for mechanical DVT proph while in bed PLAN: 
1) PT BID 
2) TESSA/COPD with increased O2  requirements postoperatively (now resolved) - Hospitalist consulted- appreciate their recommendations. Chest XR 05/16: Opacification of the left lung base, likely combination of atelectasis and pleural fluid. . Clear right 
 lung Sputum culture 05/17: gram positive cocci- Doxycycline started this morning- will defer to hospitalist for further management 3) GI Prophylaxis - Protonix 4) Readniess for discharge: 
   [x] Vital Signs stable  
 [x] Hgb stable  
 [x] + Voiding- monitor strict I/O [x] Wound intact, drainage minimal  
 [x] Tolerating PO intake   
 [] Cleared by PT (OT if applicable) [] Stair training completed (if applicable) [] Independent / Contact Guard Assist (household distance) [] Bed mobility [] Car transfers  
  [] ADLs [x] Adequate pain control on oral medication alone Plan for discharge to in patient rehab pending medical readiness, may need PO antibiotics per hospitalist recommendations.   
 
Raul Yang, NP 
DNP, AGACNP-BC

## 2019-05-20 NOTE — PROGRESS NOTES
Problem: Self Care Deficits Care Plan (Adult) Goal: *Acute Goals and Plan of Care (Insert Text) Description Occupational Therapy Goals Initiated 5/15/2019 1. Patient will perform grooming standing at sink with supervision/set-up within 7 day(s). 2.  Patient will perform upper body dressing with supervision/set-up within 7 day(s). 3.  Patient will perform lower body dressing using AE with supervision/set-up within 7 day(s). 4.  Patient will perform toilet transfers with supervision/set-up within 7 day(s). 5.  Patient will perform all aspects of toileting with supervision/set-up within 7 day(s). 6.  Patient will perform sponge bathing using AE with supervision/set-up within 7 day(s). Outcome: Progressing Towards Goal 
 
OCCUPATIONAL THERAPY TREATMENT Patient: Linn Patel (89 y.o. male) Date: 5/20/2019 Diagnosis: Spinal stenosis of lumbar region at multiple levels [M48.061] <principal problem not specified> Procedure(s) (LRB): 
L2-5 POSTERIOR DECOMPRESSION AND FUSION WITH BONE MARROW ASPIRATION FROM ILIAC CREST (N/A) 6 Days Post-Op Precautions: Fall, Spinal 
 
ASSESSMENT: 
Good overall functional progress and improved activity tolerance, now on RA with VSS. Cueing needed during LB ADLs for adherence to spine precautions, but patient otherwise demonstrating appropriate safety awareness. Overall he was CGA for transfers and ambulation with a RW, was min A for LB dressing, performed standing grooming with supervision and was max A for toileting. Patient will need AE for all aspects of his LB ADL performance and will likely need a toileting Aide. No overt LOB during standing ADLs or ambulation with a RW today. Progression toward goals: 
?       Improving appropriately and progressing toward goals ? Improving slowly and progressing toward goals ? Not making progress toward goals and plan of care will be adjusted PLAN: 
 Patient continues to benefit from skilled intervention to address the above impairments. Continue treatment per established plan of care. Discharge Recommendations:  Inpatient Rehab Further Equipment Recommendations for Discharge:  TBD OBJECTIVE DATA SUMMARY:  
Cognitive/Behavioral Status: 
Neurologic State: Alert Orientation Level: Oriented X4 Cognition: Appropriate decision making; Appropriate for age attention/concentration Safety/Judgement: Awareness of environment; Insight into deficits Functional Mobility and Transfers for ADLs: 
Bed Mobility: 
 Presented up in chair Scooting: Independent Transfers: 
Sit to Stand: Contact guard assistance;Stand-by assistance Toilet Transfer : Contact guard assistance(using grab bar) Bathroom Mobility: Contact guard assistance(ambulating with a RW) Balance: 
Sitting: Intact Standing: Impaired; With support Standing - Static: Good;Constant support Standing - Dynamic : Good;Constant support ADL Intervention: 
Grooming Grooming Assistance: Supervision;Set-up(standing at sink) Washing Face: Supervision;Set-up Washing Hands: Supervision;Set-up Brushing/Combing Hair: Supervision;Set-up Lower Body Dressing Assistance Underpants: Minimum assistance; Compensatory technique training Position Performed: Seated edge of bed;Standing Cues: Teodora Tanya Toileting Toileting Assistance: Maximum assistance Bowel Hygiene: Total assistance (dependent) Clothing Management: Supervision Cues: Verbal cues provided Cognitive Retraining Safety/Judgement: Awareness of environment; Insight into deficits Activity Tolerance: VSS on RA, Fair After treatment:  
? Patient left in no apparent distress sitting up in chair ? Patient left in no apparent distress in bed 
? Call bell left within reach ? Nursing notified ? Caregiver present ? Bed alarm activated COMMUNICATION/COLLABORATION:  
 The patients plan of care was discussed with: Physical Therapy Assistant and Registered Nurse Pedro Hunt, OTR/L Time Calculation: 24 mins

## 2019-05-21 ENCOUNTER — HOSPITAL ENCOUNTER (OUTPATIENT)
Dept: REHABILITATION | Age: 84
End: 2019-06-01
Attending: ORTHOPAEDIC SURGERY | Admitting: PHYSICAL MEDICINE & REHABILITATION

## 2019-05-21 VITALS
WEIGHT: 246.91 LBS | HEIGHT: 68 IN | TEMPERATURE: 97.8 F | OXYGEN SATURATION: 90 % | BODY MASS INDEX: 37.42 KG/M2 | RESPIRATION RATE: 18 BRPM | SYSTOLIC BLOOD PRESSURE: 109 MMHG | DIASTOLIC BLOOD PRESSURE: 61 MMHG | HEART RATE: 67 BPM

## 2019-05-21 DIAGNOSIS — M48.061 SPINAL STENOSIS OF LUMBAR REGION: ICD-10-CM

## 2019-05-21 DIAGNOSIS — J44.9 CHRONIC OBSTRUCTIVE PULMONARY DISEASE (HCC): ICD-10-CM

## 2019-05-21 LAB
ALBUMIN SERPL-MCNC: 2.7 G/DL (ref 3.5–5)
ALBUMIN/GLOB SERPL: 0.7 {RATIO} (ref 1.1–2.2)
ALP SERPL-CCNC: 89 U/L (ref 45–117)
ALT SERPL-CCNC: 20 U/L (ref 12–78)
ANION GAP SERPL CALC-SCNC: 3 MMOL/L (ref 5–15)
APPEARANCE UR: CLEAR
AST SERPL-CCNC: 37 U/L (ref 15–37)
BACTERIA SPEC CULT: NORMAL
BACTERIA URNS QL MICRO: NEGATIVE /HPF
BASOPHILS # BLD: 0 K/UL (ref 0–0.1)
BASOPHILS NFR BLD: 1 % (ref 0–1)
BILIRUB SERPL-MCNC: 0.7 MG/DL (ref 0.2–1)
BILIRUB UR QL: NEGATIVE
BUN SERPL-MCNC: 23 MG/DL (ref 6–20)
BUN/CREAT SERPL: 20 (ref 12–20)
CALCIUM SERPL-MCNC: 8.5 MG/DL (ref 8.5–10.1)
CHLORIDE SERPL-SCNC: 105 MMOL/L (ref 97–108)
CO2 SERPL-SCNC: 29 MMOL/L (ref 21–32)
COLOR UR: NORMAL
CREAT SERPL-MCNC: 1.17 MG/DL (ref 0.7–1.3)
DIFFERENTIAL METHOD BLD: ABNORMAL
EOSINOPHIL # BLD: 0.3 K/UL (ref 0–0.4)
EOSINOPHIL NFR BLD: 4 % (ref 0–7)
EPITH CASTS URNS QL MICRO: NORMAL /LPF
ERYTHROCYTE [DISTWIDTH] IN BLOOD BY AUTOMATED COUNT: 14.7 % (ref 11.5–14.5)
GLOBULIN SER CALC-MCNC: 3.8 G/DL (ref 2–4)
GLUCOSE BLD STRIP.AUTO-MCNC: 118 MG/DL (ref 65–100)
GLUCOSE BLD STRIP.AUTO-MCNC: 94 MG/DL (ref 65–100)
GLUCOSE SERPL-MCNC: 109 MG/DL (ref 65–100)
GLUCOSE UR STRIP.AUTO-MCNC: NEGATIVE MG/DL
HCT VFR BLD AUTO: 33.4 % (ref 36.6–50.3)
HGB BLD-MCNC: 11.2 G/DL (ref 12.1–17)
HGB UR QL STRIP: NEGATIVE
HYALINE CASTS URNS QL MICRO: NORMAL /LPF (ref 0–5)
IMM GRANULOCYTES # BLD AUTO: 0.1 K/UL (ref 0–0.04)
IMM GRANULOCYTES NFR BLD AUTO: 1 % (ref 0–0.5)
KETONES UR QL STRIP.AUTO: NEGATIVE MG/DL
LEUKOCYTE ESTERASE UR QL STRIP.AUTO: NEGATIVE
LYMPHOCYTES # BLD: 1 K/UL (ref 0.8–3.5)
LYMPHOCYTES NFR BLD: 14 % (ref 12–49)
MCH RBC QN AUTO: 33.2 PG (ref 26–34)
MCHC RBC AUTO-ENTMCNC: 33.5 G/DL (ref 30–36.5)
MCV RBC AUTO: 99.1 FL (ref 80–99)
MONOCYTES # BLD: 0.6 K/UL (ref 0–1)
MONOCYTES NFR BLD: 9 % (ref 5–13)
NEUTS SEG # BLD: 5.2 K/UL (ref 1.8–8)
NEUTS SEG NFR BLD: 71 % (ref 32–75)
NITRITE UR QL STRIP.AUTO: NEGATIVE
NRBC # BLD: 0 K/UL (ref 0–0.01)
NRBC BLD-RTO: 0 PER 100 WBC
PH UR STRIP: 5.5 [PH] (ref 5–8)
PLATELET # BLD AUTO: 221 K/UL (ref 150–400)
PMV BLD AUTO: 10.2 FL (ref 8.9–12.9)
POTASSIUM SERPL-SCNC: 4 MMOL/L (ref 3.5–5.1)
PROT SERPL-MCNC: 6.5 G/DL (ref 6.4–8.2)
PROT UR STRIP-MCNC: NEGATIVE MG/DL
RBC # BLD AUTO: 3.37 M/UL (ref 4.1–5.7)
RBC #/AREA URNS HPF: NORMAL /HPF (ref 0–5)
SERVICE CMNT-IMP: ABNORMAL
SERVICE CMNT-IMP: NORMAL
SERVICE CMNT-IMP: NORMAL
SODIUM SERPL-SCNC: 137 MMOL/L (ref 136–145)
SP GR UR REFRACTOMETRY: 1.01 (ref 1–1.03)
UROBILINOGEN UR QL STRIP.AUTO: 0.2 EU/DL (ref 0.2–1)
WBC # BLD AUTO: 7.1 K/UL (ref 4.1–11.1)
WBC URNS QL MICRO: NORMAL /HPF (ref 0–4)

## 2019-05-21 PROCEDURE — 85025 COMPLETE CBC W/AUTO DIFF WBC: CPT

## 2019-05-21 PROCEDURE — 74011250637 HC RX REV CODE- 250/637: Performed by: INTERNAL MEDICINE

## 2019-05-21 PROCEDURE — 81001 URINALYSIS AUTO W/SCOPE: CPT

## 2019-05-21 PROCEDURE — 80053 COMPREHEN METABOLIC PANEL: CPT

## 2019-05-21 PROCEDURE — 87086 URINE CULTURE/COLONY COUNT: CPT

## 2019-05-21 PROCEDURE — 82962 GLUCOSE BLOOD TEST: CPT

## 2019-05-21 PROCEDURE — 77010033678 HC OXYGEN DAILY

## 2019-05-21 PROCEDURE — 74011250637 HC RX REV CODE- 250/637: Performed by: ORTHOPAEDIC SURGERY

## 2019-05-21 PROCEDURE — 36415 COLL VENOUS BLD VENIPUNCTURE: CPT

## 2019-05-21 PROCEDURE — 97116 GAIT TRAINING THERAPY: CPT

## 2019-05-21 RX ORDER — CEFDINIR 300 MG/1
300 CAPSULE ORAL EVERY 12 HOURS
Qty: 8 CAP | Refills: 0 | Status: SHIPPED
Start: 2019-05-21 | End: 2019-05-25

## 2019-05-21 RX ORDER — CEFDINIR 300 MG/1
300 CAPSULE ORAL EVERY 12 HOURS
Status: DISCONTINUED | OUTPATIENT
Start: 2019-05-21 | End: 2019-05-21 | Stop reason: HOSPADM

## 2019-05-21 RX ORDER — ACETAMINOPHEN 325 MG/1
650 TABLET ORAL
Qty: 90 TAB | Refills: 0 | Status: SHIPPED | OUTPATIENT
Start: 2019-05-21 | End: 2019-06-04

## 2019-05-21 RX ADMIN — FUROSEMIDE 80 MG: 80 TABLET ORAL at 08:30

## 2019-05-21 RX ADMIN — LEVOFLOXACIN 750 MG: 750 TABLET, FILM COATED ORAL at 05:48

## 2019-05-21 RX ADMIN — POLYETHYLENE GLYCOL 3350 17 G: 17 POWDER, FOR SOLUTION ORAL at 08:30

## 2019-05-21 RX ADMIN — SENNOSIDES AND DOCUSATE SODIUM 1 TABLET: 8.6; 5 TABLET ORAL at 08:30

## 2019-05-21 RX ADMIN — ALLOPURINOL 300 MG: 100 TABLET ORAL at 08:30

## 2019-05-21 RX ADMIN — Medication 10 ML: at 05:49

## 2019-05-21 RX ADMIN — GABAPENTIN 100 MG: 100 CAPSULE ORAL at 08:30

## 2019-05-21 RX ADMIN — PANTOPRAZOLE SODIUM 40 MG: 40 TABLET, DELAYED RELEASE ORAL at 05:49

## 2019-05-21 RX ADMIN — ACETAMINOPHEN 1000 MG: 500 TABLET ORAL at 05:47

## 2019-05-21 RX ADMIN — NEPHROCAP 1 CAPSULE: 1 CAP ORAL at 08:30

## 2019-05-21 RX ADMIN — FAMOTIDINE 10 MG: 20 TABLET ORAL at 08:31

## 2019-05-21 RX ADMIN — DIAZEPAM 5 MG: 5 TABLET ORAL at 07:20

## 2019-05-21 RX ADMIN — ATORVASTATIN CALCIUM 5 MG: 10 TABLET, FILM COATED ORAL at 08:30

## 2019-05-21 RX ADMIN — DULOXETINE HYDROCHLORIDE 30 MG: 30 CAPSULE, DELAYED RELEASE ORAL at 08:30

## 2019-05-21 RX ADMIN — TAMSULOSIN HYDROCHLORIDE 0.4 MG: 0.4 CAPSULE ORAL at 08:31

## 2019-05-21 RX ADMIN — ATENOLOL 100 MG: 50 TABLET ORAL at 08:30

## 2019-05-21 RX ADMIN — GUAIFENESIN 600 MG: 600 TABLET, EXTENDED RELEASE ORAL at 08:30

## 2019-05-21 RX ADMIN — MUPIROCIN: 20 OINTMENT TOPICAL at 08:31

## 2019-05-21 RX ADMIN — AMLODIPINE BESYLATE 10 MG: 5 TABLET ORAL at 08:31

## 2019-05-21 RX ADMIN — DOXYCYCLINE HYCLATE 100 MG: 100 TABLET, COATED ORAL at 08:30

## 2019-05-21 RX ADMIN — CEFDINIR 300 MG: 300 CAPSULE ORAL at 09:35

## 2019-05-21 NOTE — DISCHARGE SUMMARY
Spine Discharge Summary    Patient ID:  Elizabeth Dillon  [id number]  male  80 y.o.  1934    Admit date: 5/13/2019    Discharge date: 5/21/2019    Admitting Physician: Jeffrey Jeffries MD     Consulting Physician(s):   Treatment Team: Attending Provider: Willard Hermosillo MD; Nurse Practitioner: Pilar Watson NP; Utilization Review: Kristen Nguyen RN; Care Manager: Sanjana Dunn, RN; Care Manager: Alma Cantu RN; Consulting Provider: Lissy Bishop MD; Primary Nurse: Mendy Lawton RN    Date of Surgery:   5/14/2019     Preoperative Diagnosis:  BILATERAL SCIATICA, LOW BACK PAIN, STENOSIS, SPONDYLOSIS, SCOLIOSIS    Postoperative Diagnosis:   BILATERAL SCIATICA, LOW BACK PAIN, STENOSIS, SPONDYLOSIS, SCOLIOSIS    Procedure(s):  L2-5 POSTERIOR DECOMPRESSION AND FUSION WITH BONE MARROW ASPIRATION FROM ILIAC CREST     Anesthesia Type:   General     Surgeon: Willard Hermosillo MD                            HPI:  Pt is a 80 y.o. male who has a history of BILATERAL SCIATICA, LOW BACK PAIN, STENOSIS, SPONDYLOSIS, SCOLIOSIS  with pain and limitations of activities of daily living who presents at this time for a two staged lateral and posterior L2-5 fusion  following the failure of conservative management.     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 : A BMI > 30 is classified as obesity and > 40 is classified as morbid obesity. Medications upon admission :   Prior to Admission Medications   Prescriptions Last Dose Informant Patient Reported? Taking? ANORO ELLIPTA 62.5-25 mcg/actuation inhaler 5/13/2019 at Unknown time Self Yes Yes   Sig: Take 1 Puff by inhalation daily. DULoxetine (CYMBALTA) 30 mg capsule 4/13/2019 at Unknown time  Yes Yes   Sig: Take 30 mg by mouth daily. acetaminophen (TYLENOL) 500 mg tablet Not Taking at Unknown time Self Yes No   Sig: Take 1,000 mg by mouth every six (6) hours as needed for Pain. allopurinol (ZYLOPRIM) 300 mg tablet 5/12/2019 at Unknown time Self No Yes   Sig: Take 1 Tab by mouth daily. amLODIPine (NORVASC) 10 mg tablet 5/13/2019 at Unknown time  No Yes   Sig: TAKE ONE TABLET BY MOUTH DAILY   atenolol (TENORMIN) 100 mg tablet 5/13/2019 at Unknown time Self No Yes   Sig: Take 1 Tab by mouth daily. atorvastatin (LIPITOR) 80 mg tablet 5/12/2019 at Unknown time Self No Yes   Sig: TAKE ONE TABLET BY MOUTH DAILY   b complex vitamins tablet 5/6/2019 at Unknown time Self Yes Yes   Sig: Take 1 Tab by mouth daily. clonazePAM (KLONOPIN) 0.5 mg tablet 5/6/2019 at Unknown time  No Yes   Sig: Take 1 Tab by mouth nightly as needed (insomnia). Max Daily Amount: 0.5 mg.   clopidogrel (PLAVIX) 75 mg tab Not Taking at Unknown time  No No   Sig: Take 1 Tab by mouth daily. cyanocobalamin/folic ac/vit B6 (VIRT-JONNA PO) 5/6/2019 at Unknown time  Yes Yes   Sig: Take 1 Tab by mouth daily. folic acid-vit N3-MUD I78 (VIRT-JONNA) 2.2-25-1 mg tab 5/6/2019 at Unknown time  Yes Yes   Sig: Take  by mouth daily. furosemide (LASIX) 80 mg tablet 5/6/2019 at Unknown time Self No Yes   Sig: Take 1 Tab by mouth daily.    gabapentin (NEURONTIN) 100 mg capsule Unknown at Unknown time  Yes No   Sig: Take  by mouth three (3) times daily. lansoprazole (PREVACID) 15 mg capsule Not Taking at Unknown time  Yes No   Sig: Take 15 mg by mouth Daily (before breakfast). mupirocin calcium (BACTROBAN) 2 % nasal ointment Not Taking at Unknown time  Yes No   Sig: by Both Nostrils route two (2) times a day. olmesartan (BENICAR) 20 mg tablet 5/12/2019 at Unknown time  No Yes   Sig: Take 1 Tab by mouth daily. Facility-Administered Medications: None        Allergies: Allergies   Allergen Reactions    Levaquin [Levofloxacin] Nausea and Vomiting     Reports anxiety, nausea, aching after taking levaquin        Hospital Course: The patient underwent surgery. Complications:  None; patient tolerated the procedure well. Was taken to the PACU in stable condition and then transferred to the PCU. On POD 0, the patient was admitted to PCU for increased O2 demand. On POD 1 the hospitalist service was consulted for acute hypoxic respiratory failure and KATHY. A CTA was ordered, which was negative for PE. The patient was started on empiric antibiotics for concerns of pneumonia on chest XR and sputum culture was obtained. Duonebs were ordered, and the patient was placed on his home CPAP. On 05/17 sputum culture grew gram positive cocci, and serratia marcescens. Doxycycline and Levaquin were started. The patient was unable to tolerate Levaquin due to GI upset, and Cefdnir was started in place of. The patient continued to progress and is now sating appropriately on room air, and baseline CPAP at night. Doxycycline and Cefdnir will continue at discharge for the prescribed period.       Perioperative Antibiotics:  Ancef     Postoperative Pain Management:  Oxycodone      Postoperative transfusions:    Number of units banked PRBCs =   none     Post Op complications: none    Hemoglobin at discharge:    Lab Results   Component Value Date/Time    HGB 11.2 (L) 05/21/2019 03:24 AM    INR 1.3 (H) 05/09/2019 07:16 AM       Dressing was changed on POD # 5. Incision - clean, dry and intact. No significant erythema or swelling. Neurovascular exam found to be within normal limits. Wound appears to be healing without any evidence of infection. Physical Therapy started following surgery and participated in bed mobility, transfers and ambulation. Gait:  Gait  Base of Support: Widened  Speed/Beverly: Pace decreased (<100 feet/min)  Step Length: Right shortened, Left shortened  Gait Abnormalities: Decreased step clearance  Ambulation - Level of Assistance: Contact guard assistance  Distance (ft): 110 Feet (ft)  Assistive Device: Gait belt, Walker, rolling                   Discharged to: Inpatient Rehab . Condition on Discharge:   stable    Discharge instructions:    - Take pain medications as prescribed  - Resume pre hospital diet      - Discharge activity: activity as tolerated  - Ambulate as tolerated  - Wound Care Keep wound clean and dry. See discharge instruction sheet. -DISCHARGE MEDICATION LIST     Current Discharge Medication List      START taking these medications    Details   cefdinir (OMNICEF) 300 mg capsule Take 1 Cap by mouth every twelve (12) hours for 4 days. Qty: 8 Cap, Refills: 0      oxyCODONE IR (ROXICODONE) 5 mg immediate release tablet Take 1 Tab by mouth every four (4) hours as needed for Pain for up to 14 days. Max Daily Amount: 30 mg.  Qty: 60 Tab, Refills: 0    Associated Diagnoses: S/P lumbar spinal fusion      polyethylene glycol (MIRALAX) 17 gram packet Take 1 Packet by mouth daily for 15 days. Qty: 15 Packet, Refills: 0      senna-docusate (PERICOLACE) 8.6-50 mg per tablet Take 1 Tab by mouth two (2) times a day for 15 days. Qty: 30 Tab, Refills: 0      naloxone (NARCAN) 4 mg/actuation nasal spray Use 1 spray intranasally, then discard. Repeat with new spray every 2 min as needed for opioid overdose symptoms, alternating nostrils.   Qty: 1 Each, Refills: 0 doxycycline (VIBRA-TABS) 100 mg tablet Take 1 Tab by mouth every twelve (12) hours for 7 days. Qty: 14 Tab, Refills: 0      guaiFENesin ER (MUCINEX) 600 mg ER tablet Take 1 Tab by mouth every twelve (12) hours for 30 days. Qty: 60 Tab, Refills: 0         CONTINUE these medications which have CHANGED    Details   acetaminophen (TYLENOL) 325 mg tablet Take 2 Tabs by mouth every six (6) hours as needed for Pain for up to 14 days. Qty: 90 Tab, Refills: 0         CONTINUE these medications which have NOT CHANGED    Details   amLODIPine (NORVASC) 10 mg tablet TAKE ONE TABLET BY MOUTH DAILY  Qty: 30 Tab, Refills: 6      DULoxetine (CYMBALTA) 30 mg capsule Take 30 mg by mouth daily. olmesartan (BENICAR) 20 mg tablet Take 1 Tab by mouth daily. Qty: 90 Tab, Refills: 3      clonazePAM (KLONOPIN) 0.5 mg tablet Take 1 Tab by mouth nightly as needed (insomnia). Max Daily Amount: 0.5 mg.  Qty: 30 Tab, Refills: 0    Associated Diagnoses: Primary insomnia      !! folic acid-vit N2-JTV Z94 (VIRT-JONNA) 2.2-25-1 mg tab Take  by mouth daily. !! cyanocobalamin/folic ac/vit B6 (VIRT-JONNA PO) Take 1 Tab by mouth daily. b complex vitamins tablet Take 1 Tab by mouth daily. atenolol (TENORMIN) 100 mg tablet Take 1 Tab by mouth daily. Qty: 90 Tab, Refills: 3      atorvastatin (LIPITOR) 80 mg tablet TAKE ONE TABLET BY MOUTH DAILY  Qty: 30 Tab, Refills: 5      ANORO ELLIPTA 62.5-25 mcg/actuation inhaler Take 1 Puff by inhalation daily. furosemide (LASIX) 80 mg tablet Take 1 Tab by mouth daily. Qty: 90 Tab, Refills: 3      allopurinol (ZYLOPRIM) 300 mg tablet Take 1 Tab by mouth daily. Qty: 30 Tab, Refills: 6      mupirocin calcium (BACTROBAN) 2 % nasal ointment by Both Nostrils route two (2) times a day. lansoprazole (PREVACID) 15 mg capsule Take 15 mg by mouth Daily (before breakfast). gabapentin (NEURONTIN) 100 mg capsule Take  by mouth three (3) times daily.       clopidogrel (PLAVIX) 75 mg tab Take 1 Tab by mouth daily. Qty: 90 Tab, Refills: 3       !! - Potential duplicate medications found. Please discuss with provider.        per medical continuation form      -Follow up in office in 2 weeks      Signed:  YUAN Rendon [id number]  Orthopaedic Nurse Practitioner    5/21/2019  10:57 AM

## 2019-05-21 NOTE — ROUTINE PROCESS
Report to Damaris Ca at [facility], all questions answered, transferred in wheelchair with personal belongings

## 2019-05-21 NOTE — PROGRESS NOTES
JACQUES: D/C to [facility] today at 2:00pm    Notified nurse and patient that [facility] has a bed and will be ready at 2:00 pm.      CM will assist with any further JACQUES planning as needed.     Keith Chu RN, CHPN, [facility]

## 2019-05-21 NOTE — PROGRESS NOTES
Problem: Mobility Impaired (Adult and Pediatric)  Goal: *Acute Goals and Plan of Care (Insert Text)  Description  Physical Therapy Goals  Initiated 5/14/2019 - remain appropriate 5/15/19    1. Patient will move from supine to sit and sit to supine , scoot up and down and roll side to side in bed with modified independence within 4 days. 2. Patient will perform sit to stand with independence within 4 days. 3. Patient will ambulate with independence for 300 feet with the least restrictive device within 4 days. 5. Patient will verbalize and demonstrate understanding of spinal precautions (No bending, lifting greater than 5 lbs, or twisting; log-roll technique; frequent repositioning as instructed) within 4 days. Outcome: Progressing Towards Goal   PHYSICAL THERAPY TREATMENT  Patient: Sandeep Holbrook (89 y.o. male)  Date: 5/21/2019  Diagnosis: Spinal stenosis of lumbar region at multiple levels [M48.061] <principal problem not specified>  Procedure(s) (LRB):  L2-5 POSTERIOR DECOMPRESSION AND FUSION WITH BONE MARROW ASPIRATION FROM ILIAC CREST (N/A) 7 Days Post-Op  Precautions: Fall, Spinal  Chart, physical therapy assessment, plan of care and goals were reviewed. ASSESSMENT:  Pt cleared by nurse to mobilize. Pt received up in chair. Pt agreeable to therapy. Pt performed sit to stand transfer at CGA/SBA. Pt ambulated 125ft with RW at [address]. Pt requiring cueing for PLB during activity. Pt able to complete walk without a standing rest break. Pts o2 stats after ambulation at 84%. Pt able to recover quickly with PLB. Pt perform seated exercises once back in chair. Pt will left up in chair with all needs met. Pt will benefit form IP rehab to improve endurance and safety to return to independent prior level of function. Progression toward goals:  ?      Improving appropriately and progressing toward goals  ? Improving slowly and progressing toward goals  ?       Not making progress toward goals and plan of care will be adjusted     PLAN:  Patient continues to benefit from skilled intervention to address the above impairments. Continue treatment per established plan of care. Discharge Recommendations:  Inpatient Rehab  Further Equipment Recommendations for Discharge:  TBD by rehab       SUBJECTIVE:   Patient stated ? I hope they are as nice as you guys. ?   The patient stated 3/3 back precautions. Reviewed all 3 with patient. OBJECTIVE DATA SUMMARY:   Critical Behavior:  Neurologic State: Alert  Orientation Level: Oriented X4  Cognition: Appropriate decision making, Appropriate for age attention/concentration  Safety/Judgement: Awareness of environment, Insight into deficits  Functional Mobility Training:     Brace donned with  minimal assistance/contact guard assist   Transfers:  Sit to Stand: Contact guard assistance;Stand-by assistance  Stand to Sit: Stand-by assistance                             Balance:  Sitting: Intact  Standing: Intact; With support  Standing - Static: Good;Constant support  Standing - Dynamic : Good;Constant support  Ambulation/Gait Training:  Distance (ft): 125 Feet (ft)  Assistive Device: Gait belt;Walker, rolling  Ambulation - Level of Assistance: Contact guard assistance        Gait Abnormalities: Decreased step clearance        Base of Support: Widened     Speed/Beverly: Pace decreased (<100 feet/min)  Step Length: Left shortened;Right shortened              Therapeutic Exercises:   Seated   LAQ x10  Marching x10  Heel and toe raises x10    Pain:  Pain Scale 1: Numeric (0 - 10)  Pain Intensity 1: 0              Activity Tolerance:   Pt improving mobility each day. After treatment:   ?  Patient left in no apparent distress sitting up in chair  ? Patient left in no apparent distress in bed  ? Call bell left within reach  ? Nursing notified  ? Caregiver present  ?   Bed alarm activated    COMMUNICATION/COLLABORATION:   The patient?s plan of care was discussed with: Registered Nurse    Della Cho, PTA   Time Calculation: 13 mins

## 2019-05-21 NOTE — PROGRESS NOTES
2230: Pt asleep sating 87-89% on home cpap. 2L of O2 connected to cpap. sats 91%.     2300: Bedside Monitor alarming, SpO2 87% while pt is sleeping wearing Cpap mask with 2L O2. Pt encouraged to take deep breaths. Sats continue to sustain 87-88%. O2 increased to 3.5L. sats back at 91%. Will continue to monitor. 2315: Sats continue to drop while patient is asleep wearing CPAP on 3.5L. Pt pulled up and repositioned. Cpap machine and mask assessed. O2 increased to 4.5L. sats 89-90%. Will continue to monitor.

## 2019-05-21 NOTE — PROGRESS NOTES
Hospitalist Progress Note    NAME: Starr Olivares   :  1934   MRN:  [id number]       Assessment / Plan:  Acute hypoxic respiratory failure from Atelectasis  Pneumonia ruled out  COPD  TESSA  -CT Chest: \"1. Bibasilar, left lower lobe and left upper lobe atelectasis or minimal  Infiltrate. 2. Pulmonary artery hypertension is suggested but stable since 2018. 3. Other incidental findings. \"  -V/Q scan: \"IMPRESSION: 1. Low probability for pulmonary embolism. 2. Left lung decreased ventilation compatible with airspace disease. \"  -Empiric antibiotics discontinued on  since procalcitonin normal (especially in this setting PNA felt less likely and atelectasis seems to be the predominant issue)   -Hypoxia resolved    MRSE and Serratia Bronchitis: per  Sputum Culture   -start on Doxycycline for the MRSE (7 days) - tolerating well thus far   -Start on Levaquin for the Serratia (5 days) but not tolerating; therefore, based on sensitivities, patient was transitioned to [place]  -Continue flutter valve and Mucinex, mobilizing sputum well    Status post L2-L5 posterior decompression and fusion  -Postoperative care per neurosurgery     CKD stage 3  -monitor renal function, Cr continues to decrease  -continue to hold home losartan    Hypertension  -continue home amlodipine, atenolol with holding parameters  -restart home lasix 80 mg daily    Dyslipidemia  -continue home statin    GERD  -PPI reordered  -prn pepcid    BPH  -Flomax  -bladder checks prn    Constipation:   -resolved    Obesity: Body mass index is 37.31 kg/m²    Full  DVT ppx per Ortho, SCDs  Discharge to rehab today       Subjective:     Chief Complaint / Reason for Physician Visit: follow-up hypoxia  Patient had apparent adverse reaction to Levaquin, became jittery and shaky, improved with time. States he is having productive cough that is becoming more clear and he is feeling better. No dyspnea. No nausea or vomiting.  Some constipation but passing flatus normally. Review of Systems:  Symptom Y/N Comments  Symptom Y/N Comments   Fever/Chills n   Chest Pain n    Poor Appetite n   Edema     Cough y   Abdominal Pain n    Sputum y   Joint Pain     SOB/MARES    Pruritis/Rash     Nausea/vomit n   Tolerating PT/OT     Diarrhea n   Tolerating Diet y    Constipation    Other       Could NOT obtain due to:      Objective:     VITALS:   Last 24hrs VS reviewed since prior progress note. Most recent are:  Patient Vitals for the past 24 hrs:   Temp Pulse Resp BP SpO2   05/21/19 1037 97.8 °F (36.6 °C) 67 18 109/61 90 %   05/21/19 0758 98.2 °F (36.8 °C) 61 16 132/81 93 %   05/21/19 0308 97.9 °F (36.6 °C) 65 18 118/61 (!) 88 %   05/20/19 2259 98.2 °F (36.8 °C) 66 20 119/64 92 %   05/20/19 2011 97.7 °F (36.5 °C) (!) 58 18 117/59 94 %       Intake/Output Summary (Last 24 hours) at 5/21/2019 1621  Last data filed at 5/21/2019 0100  Gross per 24 hour   Intake 150 ml   Output 600 ml   Net -450 ml        PHYSICAL EXAM:  General: WD, WN. Alert, cooperative, no acute distress    EENT:  EOMI. Anicteric sclerae. MMM  Resp:  Diminished, mild coarse breath sounds clear with cough. No accessory muscle use  CV:  Regular  rhythm,  No edema  GI:  Soft, nondistended, Non tender.  +Bowel sounds  Neurologic:  Alert and oriented X 3, normal speech, hard of hearing  Psych:   Good insight. Not anxious nor agitated  Skin:  No rashes noted.   No jaundice    Reviewed most current lab test results and cultures  YES  Reviewed most current radiology test results   YES  Review and summation of old records today    NO  Reviewed patient's current orders and MAR    YES  PMH/SH reviewed - no change compared to H&P  ________________________________________________________________________  Care Plan discussed with:    Comments   Patient x    Family      RN x    Care Manager     Consultant                        Multidiciplinary team rounds were held today with , nursing, pharmacist and clinical coordinator. Patient's plan of care was discussed; medications were reviewed and discharge planning was addressed. ________________________________________________________________________  Total NON critical care TIME:  35   Minutes    Total CRITICAL CARE TIME Spent:   Minutes non procedure based      Comments   >50% of visit spent in counseling and coordination of care x    ________________________________________________________________________  Lynell Alpers, MD     Procedures: see electronic medical records for all procedures/Xrays and details which were not copied into this note but were reviewed prior to creation of Plan. LABS:  I reviewed today's most current labs and imaging studies.   Pertinent labs include:  Recent Labs     05/21/19  0324 05/20/19  0326   WBC 7.1 6.9   HGB 11.2* 11.1*   HCT 33.4* 33.7*    205     Recent Labs     05/21/19  0324 05/20/19  0326    140   K 4.0 4.2    107   CO2 29 27   * 113*   BUN 23* 24*   CREA 1.17 1.03   CA 8.5 8.2*   ALB 2.7* 2.5*   TBILI 0.7 0.6   SGOT 37 37   ALT 20 17       Signed: Lynell Alpers, MD

## 2019-05-21 NOTE — ROUTINE PROCESS
Patient reports feeling poorly after levaquin this AM, same symptoms as yesterday, Jittery, pain aches and nausea/ reflux. Valium given for muscle tightness

## 2019-05-21 NOTE — PROGRESS NOTES
Problem: Chronic Obstructive Pulmonary Disease (COPD)  Goal: *Oxygen saturation during activity within specified parameters  Outcome: Progressing Towards Goal  Goal: *Able to remain out of bed as prescribed  Outcome: Progressing Towards Goal  Goal: *Absence of hypoxia  Outcome: Progressing Towards Goal     Problem: Pain - Acute  Goal: *Control of acute pain  Outcome: Progressing Towards Goal

## 2019-05-22 ENCOUNTER — PATIENT OUTREACH (OUTPATIENT)
Dept: INTERNAL MEDICINE CLINIC | Age: 84
End: 2019-05-22

## 2019-05-22 LAB
ALBUMIN SERPL-MCNC: 2.6 G/DL (ref 3.5–5)
ALBUMIN/GLOB SERPL: 0.7 {RATIO} (ref 1.1–2.2)
ALP SERPL-CCNC: 88 U/L (ref 45–117)
ALT SERPL-CCNC: 21 U/L (ref 12–78)
ANION GAP SERPL CALC-SCNC: 4 MMOL/L (ref 5–15)
AST SERPL-CCNC: 36 U/L (ref 15–37)
BASOPHILS # BLD: 0 K/UL (ref 0–0.1)
BASOPHILS NFR BLD: 1 % (ref 0–1)
BILIRUB SERPL-MCNC: 0.6 MG/DL (ref 0.2–1)
BUN SERPL-MCNC: 22 MG/DL (ref 6–20)
BUN/CREAT SERPL: 18 (ref 12–20)
CALCIUM SERPL-MCNC: 8.7 MG/DL (ref 8.5–10.1)
CHLORIDE SERPL-SCNC: 104 MMOL/L (ref 97–108)
CO2 SERPL-SCNC: 30 MMOL/L (ref 21–32)
CREAT SERPL-MCNC: 1.24 MG/DL (ref 0.7–1.3)
DIFFERENTIAL METHOD BLD: ABNORMAL
EOSINOPHIL # BLD: 0.2 K/UL (ref 0–0.4)
EOSINOPHIL NFR BLD: 3 % (ref 0–7)
ERYTHROCYTE [DISTWIDTH] IN BLOOD BY AUTOMATED COUNT: 14.9 % (ref 11.5–14.5)
GLOBULIN SER CALC-MCNC: 3.5 G/DL (ref 2–4)
GLUCOSE SERPL-MCNC: 95 MG/DL (ref 65–100)
HCT VFR BLD AUTO: 33.3 % (ref 36.6–50.3)
HGB BLD-MCNC: 11.4 G/DL (ref 12.1–17)
IMM GRANULOCYTES # BLD AUTO: 0 K/UL (ref 0–0.04)
IMM GRANULOCYTES NFR BLD AUTO: 1 % (ref 0–0.5)
LYMPHOCYTES # BLD: 0.9 K/UL (ref 0.8–3.5)
LYMPHOCYTES NFR BLD: 13 % (ref 12–49)
MAGNESIUM SERPL-MCNC: 2.1 MG/DL (ref 1.6–2.4)
MCH RBC QN AUTO: 33.4 PG (ref 26–34)
MCHC RBC AUTO-ENTMCNC: 34.2 G/DL (ref 30–36.5)
MCV RBC AUTO: 97.7 FL (ref 80–99)
MONOCYTES # BLD: 0.5 K/UL (ref 0–1)
MONOCYTES NFR BLD: 8 % (ref 5–13)
NEUTS SEG # BLD: 5.1 K/UL (ref 1.8–8)
NEUTS SEG NFR BLD: 74 % (ref 32–75)
NRBC # BLD: 0 K/UL (ref 0–0.01)
NRBC BLD-RTO: 0 PER 100 WBC
PLATELET # BLD AUTO: 216 K/UL (ref 150–400)
PMV BLD AUTO: 10.2 FL (ref 8.9–12.9)
POTASSIUM SERPL-SCNC: 4 MMOL/L (ref 3.5–5.1)
PROT SERPL-MCNC: 6.1 G/DL (ref 6.4–8.2)
RBC # BLD AUTO: 3.41 M/UL (ref 4.1–5.7)
SODIUM SERPL-SCNC: 138 MMOL/L (ref 136–145)
WBC # BLD AUTO: 6.8 K/UL (ref 4.1–11.1)

## 2019-05-22 PROCEDURE — 85025 COMPLETE CBC W/AUTO DIFF WBC: CPT

## 2019-05-22 PROCEDURE — 83735 ASSAY OF MAGNESIUM: CPT

## 2019-05-22 PROCEDURE — 80053 COMPREHEN METABOLIC PANEL: CPT

## 2019-05-22 PROCEDURE — 36415 COLL VENOUS BLD VENIPUNCTURE: CPT

## 2019-05-22 NOTE — PROGRESS NOTES
Transition of Care Coordination/Hospital to Post Acute Facility:     Date/Time:  2019 12:47 PM    Patient was admitted to [facility] on 19 for treatment of spinal stenosis. Patient was discharged 19 to [address] [place] [address] for continuation of care. Inpatient RRAT score: 27    Top Challenges reviewed             Method of communication with care team :phone     Nurse Navigator(NN) spoke with  to provide introduction to self and explanation of the Nurse Navigator Role. Verified name and  as patient identifiers. Discussed and reviewed  anticipated length of stay, medication reconciliation    ACP:   Does the patient have a current ACP (including DDNR):  no  Does the post acute facility have a copy of the patients ACP:  no    Medication(s):   New Medications at Discharge: omnicef, doxycycline, mucinex, narcan, oxycodone IR, miralax, pericolace  Changed Medications at Discharge: acetaminophen  Discontinued Medications at Discharge: n/a     PCP/Specialist follow up:   Future Appointments   Date Time Provider Deanna Casanova   2019  9:30 AM Zahra Steele MD [address]        Opportunity to ask questions was provided. Contact information was provided for future reference or further questions. Will continue to monitor.

## 2019-05-23 LAB
BACTERIA SPEC CULT: NORMAL
CC UR VC: NORMAL
SERVICE CMNT-IMP: NORMAL

## 2019-05-25 ENCOUNTER — APPOINTMENT (OUTPATIENT)
Dept: GENERAL RADIOLOGY | Age: 84
End: 2019-05-25
Attending: PHYSICAL MEDICINE & REHABILITATION

## 2019-05-25 PROCEDURE — 71045 X-RAY EXAM CHEST 1 VIEW: CPT

## 2019-05-29 ENCOUNTER — APPOINTMENT (OUTPATIENT)
Dept: ULTRASOUND IMAGING | Age: 84
End: 2019-05-29
Attending: PHYSICAL MEDICINE & REHABILITATION

## 2019-05-29 LAB
ANION GAP SERPL CALC-SCNC: 5 MMOL/L (ref 5–15)
BUN SERPL-MCNC: 16 MG/DL (ref 6–20)
BUN/CREAT SERPL: 15 (ref 12–20)
CALCIUM SERPL-MCNC: 8.5 MG/DL (ref 8.5–10.1)
CHLORIDE SERPL-SCNC: 105 MMOL/L (ref 97–108)
CO2 SERPL-SCNC: 28 MMOL/L (ref 21–32)
CREAT SERPL-MCNC: 1.08 MG/DL (ref 0.7–1.3)
ERYTHROCYTE [DISTWIDTH] IN BLOOD BY AUTOMATED COUNT: 14.6 % (ref 11.5–14.5)
GLUCOSE SERPL-MCNC: 105 MG/DL (ref 65–100)
HCT VFR BLD AUTO: 33.4 % (ref 36.6–50.3)
HGB BLD-MCNC: 11.5 G/DL (ref 12.1–17)
MCH RBC QN AUTO: 33.4 PG (ref 26–34)
MCHC RBC AUTO-ENTMCNC: 34.4 G/DL (ref 30–36.5)
MCV RBC AUTO: 97.1 FL (ref 80–99)
NRBC # BLD: 0 K/UL (ref 0–0.01)
NRBC BLD-RTO: 0 PER 100 WBC
PLATELET # BLD AUTO: 264 K/UL (ref 150–400)
PMV BLD AUTO: 10.1 FL (ref 8.9–12.9)
POTASSIUM SERPL-SCNC: 3.8 MMOL/L (ref 3.5–5.1)
RBC # BLD AUTO: 3.44 M/UL (ref 4.1–5.7)
SODIUM SERPL-SCNC: 138 MMOL/L (ref 136–145)
WBC # BLD AUTO: 5.7 K/UL (ref 4.1–11.1)

## 2019-05-29 PROCEDURE — 36415 COLL VENOUS BLD VENIPUNCTURE: CPT

## 2019-05-29 PROCEDURE — 85027 COMPLETE CBC AUTOMATED: CPT

## 2019-05-29 PROCEDURE — 80048 BASIC METABOLIC PNL TOTAL CA: CPT

## 2019-05-29 PROCEDURE — 93970 EXTREMITY STUDY: CPT

## 2019-06-05 ENCOUNTER — PATIENT OUTREACH (OUTPATIENT)
Dept: INTERNAL MEDICINE CLINIC | Age: 84
End: 2019-06-05

## 2019-06-05 NOTE — PROGRESS NOTES
Hospital Discharge Follow-Up      Date/Time:  2019 9:24 AM    Patient was admitted to [facility] on 19 and discharged on 19 for L2-5 decompression and fustion. The physician discharge summary was available at the time of outreach. The patient was then sent to [facility] inpatient rehab and discharged from that facility on 19. Patient was contacted within three business days of discharge from that facility. Top Challenges reviewed with the provider   No ACP on file         Method of communication with provider :chart routing    Inpatient RRAT score: 32  Was this a readmission? yes   Patient stated reason for the readmission: planned lumbar surgery    Nurse Navigator (NN) contacted the family by telephone to perform post hospital discharge assessment. Verified name and  with family as identifiers. Provided introduction to self, and explanation of the Nurse Navigator role. Reviewed discharge instructions and red flags with family who verbalized understanding. Family given an opportunity to ask questions and does not have any further questions or concerns at this time. The family agrees to contact the PCP office for questions related to their healthcare. NN provided contact information for future reference. Disease Specific:   N/A    Summary of patient's top problems:  1. Patient with history of spinal stenosis of lumbar region admitted to [facility] for L2-5 decompression and fusion. Patient's hospital course complicated by post op acute hypoxic respiratory failure and KATHY. CT negative for Pulmonary embolus. Patient started on antibiotics for concern of pneumonia along with duoneb treatments and use of CPAP which he uses at home. Sputum culture positive for gram + cocci and serratia marcescens. Antibiotics used were doxycycline and levaquin, which had to be changed to cefdnir due to GI upset with the levaquin. Patient progressed and worked with PT.   Transferred to [facility] for more intensive rehab post-op. Now receiving home PT. Home Health orders at discharge: [address]: [facility]  Date of initial visit: 6/5/19    Durable Medical Equipment ordered/company: n/a  Durable Medical Equipment received: Patient using a walker at home    Barriers to care? none    Advance Care Planning:   Does patient have an Advance Directive:  not on file; education provided - Discussed Honoring Choices with patient's wife and told her of availability of facilitator in PCP office. Medication(s):   New Medications at Discharge: cefdinir, doxycycline, guaifenesin ER, naloxone, oxycodone IR, miralax, pericolace  Changed Medications at Discharge: acetaminophen  Discontinued Medications at Discharge: n/a    Medication reconciliation was performed with family, who verbalizes understanding of administration of home medications. There were no barriers to obtaining medications identified at this time. Referral to Pharm D needed: no     Current Outpatient Medications   Medication Sig    polyethylene glycol (MIRALAX) 17 gram packet Take 1 Packet by mouth daily for 15 days.  amLODIPine (NORVASC) 10 mg tablet TAKE ONE TABLET BY MOUTH DAILY    gabapentin (NEURONTIN) 100 mg capsule Take  by mouth three (3) times daily.  clopidogrel (PLAVIX) 75 mg tab Take 1 Tab by mouth daily.  atenolol (TENORMIN) 100 mg tablet Take 1 Tab by mouth daily. (Patient taking differently: Take 50 mg by mouth daily.)    atorvastatin (LIPITOR) 80 mg tablet TAKE ONE TABLET BY MOUTH DAILY    ANORO ELLIPTA 62.5-25 mcg/actuation inhaler Take 1 Puff by inhalation daily.  furosemide (LASIX) 80 mg tablet Take 1 Tab by mouth daily.  allopurinol (ZYLOPRIM) 300 mg tablet Take 1 Tab by mouth daily.  naloxone (NARCAN) 4 mg/actuation nasal spray Use 1 spray intranasally, then discard. Repeat with new spray every 2 min as needed for opioid overdose symptoms, alternating nostrils.     guaiFENesin ER (MUCINEX) 600 mg ER tablet Take 1 Tab by mouth every twelve (12) hours for 30 days.  mupirocin calcium (BACTROBAN) 2 % nasal ointment by Both Nostrils route two (2) times a day.  DULoxetine (CYMBALTA) 30 mg capsule Take 30 mg by mouth daily.  lansoprazole (PREVACID) 15 mg capsule Take 15 mg by mouth Daily (before breakfast).  clonazePAM (KLONOPIN) 0.5 mg tablet Take 1 Tab by mouth nightly as needed (insomnia). Max Daily Amount: 0.5 mg.    folic acid-vit B8-WTU A51 (VIRT-JONNA) 2.2-25-1 mg tab Take  by mouth daily. No current facility-administered medications for this visit. Medications Discontinued During This Encounter   Medication Reason    olmesartan (BENICAR) 20 mg tablet Not A Current Medication    b complex vitamins tablet Not A Current Medication    cyanocobalamin/folic ac/vit B6 (VIRT-JONNA PO) Not A Current Medication       [facility] follow up appointment(s):   Future Appointments   Date Time Provider Deanna Casanova   6/10/2019  9:00 AM Mundo Kuhn MD [address] [facility]   7/12/2019  9:30 AM Mundo Kuhn MD [address]      Non-[facility] follow up appointment(s): Dr. Héctor Hinds, orthopedic surgeon 6/14/19  [facility]:  n/a       Goals      Returns to baseline activity level. 6/5/19-NN spoke to patient's wife after his discharge from [address] [place] [address] on 6/1/19 after having L2-5 decompression and fusion. Patient is mobile with a walker and is receiving home PT starting today. He has f/u with PCP on 6/10/19, and f/u with orthopedic surgeon 6/14/19. He will participate with therapy to return to baseline activity level. NN will be available for questions/concerns. Will also check on patient's progress in 2 weeks.  / vs

## 2019-06-10 ENCOUNTER — OFFICE VISIT (OUTPATIENT)
Dept: INTERNAL MEDICINE CLINIC | Age: 84
End: 2019-06-10

## 2019-06-10 VITALS
BODY MASS INDEX: 33.65 KG/M2 | HEART RATE: 72 BPM | TEMPERATURE: 97.8 F | DIASTOLIC BLOOD PRESSURE: 68 MMHG | HEIGHT: 68 IN | WEIGHT: 222 LBS | OXYGEN SATURATION: 95 % | RESPIRATION RATE: 18 BRPM | SYSTOLIC BLOOD PRESSURE: 110 MMHG

## 2019-06-10 DIAGNOSIS — I10 ESSENTIAL HYPERTENSION: ICD-10-CM

## 2019-06-10 DIAGNOSIS — I25.10 CORONARY ARTERY DISEASE INVOLVING NATIVE CORONARY ARTERY OF NATIVE HEART WITHOUT ANGINA PECTORIS: ICD-10-CM

## 2019-06-10 DIAGNOSIS — M48.061 SPINAL STENOSIS OF LUMBAR REGION AT MULTIPLE LEVELS: ICD-10-CM

## 2019-06-10 DIAGNOSIS — Z99.89 OSA ON CPAP: ICD-10-CM

## 2019-06-10 DIAGNOSIS — G47.33 OSA ON CPAP: ICD-10-CM

## 2019-06-10 DIAGNOSIS — R09.02 HYPOXIA: ICD-10-CM

## 2019-06-10 RX ORDER — ATENOLOL 50 MG/1
TABLET ORAL
COMMUNITY
Start: 2019-05-29 | End: 2019-07-01 | Stop reason: SDUPTHER

## 2019-06-10 RX ORDER — TAMSULOSIN HYDROCHLORIDE 0.4 MG/1
0.4 CAPSULE ORAL DAILY
COMMUNITY
End: 2019-06-10

## 2019-06-10 NOTE — PROGRESS NOTES
Chief Complaint   Patient presents with   [facility] Follow Up     had back surgery, having  pain in right testicle, on [facility]X for [facility]TI from [facility]         1. Have you been to the ER, urgent care clinic since your last visit? Hospitalized since your last visit? Yes [facility] for back surgery    2. Have you seen or consulted any other health care providers outside of the [address] [place] [address] since your last visit? Include any pap smears or colon screening.   no

## 2019-06-10 NOTE — PROGRESS NOTES
This note will not be viewable in [address] Madhu Johnson is a 80 y.o. male and presents with Hospital Follow Up (had back surgery, having  pain in right testicle, on ABX for UTI from [facility])  . Subjective:    Mr. Julissa Lorenzana presents today after having undergone elective L2-L5 laminectomy and decompression with Dr. Jennifer Hardy on May 14. He was discharged on the 21st and went to rehab at [facility]. He had some mild renal insufficiency and urine outflow obstruction that was likely secondary to his anesthesia. He was placed on tamsulosin and has remained on that since discharge from rehab. He has no urinary frequency or urgency and  freely. He also notes that during his hospitalization he became very short of breath and was volume overloaded and required diuresis. He has no shortness of breath at this time. He is complaining of some discomfort in his right groin area that comes and goes but remains fairly persistent. It is not exacerbated by activity or movement. [facility] and [facility] empirically on an antibiotic for a possible UTI. His urine culture is pending at this time. He is accompanied by his wife and his daughter today. Review of Systems  Constitutional:   Eyes:   negative for visual disturbance and irritation  ENT:   negative for tinnitus,sore throat,nasal congestion,ear pains. hoarseness  Respiratory:  negative for cough, hemoptysis, dyspnea,wheezing  CV:   negative for chest pain, palpitations, lower extremity edema  GI:   negative for nausea, vomiting, diarrhea, abdominal pain,melena  Endo:               negative for polyuria,polydipsia,polyphagia,heat intolerance  Genitourinary: negative for frequency, dysuria and hematuria  Integumentary: negative for rash and pruritus  Hematologic:  negative for easy bruising and gum/nose bleeding  Musculoskel: negative for myalgias, arthralgias, back pain, muscle weakness, joint pain  Neurological:  negative for headaches, dizziness, vertigo, memory problems and gait   Behavl/Psych: negative for feelings of anxiety, depression, mood changes    Past Medical History:   Diagnosis Date    Adverse effect of anesthesia     combative after anesthesia    Alcohol abuse     6 beers/day    Arthritis     CAD (coronary artery disease)     [facility] obstructive pulmonary disease ([facility] [address] [facility]     Dyslipidemia 8/16/2017    Dyspepsia and other specified disorders of function of stomach     Dyspnea 8/16/2017    ED (erectile dysfunction) 8/16/2017    Encounter for immunization 8/16/2017    Fatigue 8/16/2017    GERD (gastroesophageal reflux disease) 8/16/2017    Gout 8/16/2017    Hypertension     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    Morbid obesity ([facility] [address]     On statin therapy 8/16/2017    TESSA on CPAP 1/3/2019    Psychiatric disorder     Depression    Simple chronic bronchitis ([facility] [address] 1/3/2019    TIA (transient ischemic attack) 9/38/7605    Umbilical hernia 00/6/0488     Past Surgical History:   Procedure Laterality Date    CARDIAC SURG PROCEDURE UNLIST  1996    Cardiac Stents    CARDIAC SURG PROCEDURE UNLIST  04/2019    EF 61-65%    HX HERNIA REPAIR      RIH    HX HERNIA REPAIR  72/17/38    open umbilical hernia repair mesh    HX UROLOGICAL      HYDROCELECTOMY     Social History     Socioeconomic History    Marital status:      Spouse name: Not on file    Number of children: Not on file    Years of education: Not on file    Highest education level: Not on file   Tobacco Use    Smoking status: Former Smoker     Packs/day: 0.50     Years: 20.00     Pack years: 10.00    Smokeless tobacco: Former User    Tobacco comment: quit about 40  years ago   / used to dip tobaco and dip snuff   Substance and Sexual Activity    Alcohol use: Not Currently     Comment: \"Drinks very little now for about a month \"    Drug use: No     Family History   Problem Relation Age of Onset    Heart Disease Mother     Hypertension Mother     Hypertension Father     Hypertension Sister     Hypertension Brother     Cancer Brother      Current Outpatient Medications   Medication Sig Dispense Refill    atenolol (TENORMIN) 50 mg tablet       DULoxetine (CYMBALTA) 30 mg capsule Take 30 mg by mouth daily.  lansoprazole (PREVACID) 15 mg capsule Take 15 mg by mouth Daily (before breakfast).  gabapentin (NEURONTIN) 100 mg capsule Take  by mouth three (3) times daily.  clopidogrel (PLAVIX) 75 mg tab Take 1 Tab by mouth daily. 90 Tab 3    atorvastatin (LIPITOR) 80 mg tablet TAKE ONE TABLET BY MOUTH DAILY 30 Tab 5    ANORO ELLIPTA 62.5-25 mcg/actuation inhaler Take 1 Puff by inhalation daily.  furosemide (LASIX) 80 mg tablet Take 1 Tab by mouth daily. 90 Tab 3    allopurinol (ZYLOPRIM) 300 mg tablet Take 1 Tab by mouth daily. 30 Tab 6    naloxone (NARCAN) 4 mg/actuation nasal spray Use 1 spray intranasally, then discard. Repeat with new spray every 2 min as needed for opioid overdose symptoms, alternating nostrils. 1 Each 0    clonazePAM (KLONOPIN) 0.5 mg tablet Take 1 Tab by mouth nightly as needed (insomnia).  Max Daily Amount: 0.5 mg. 30 Tab 0     Allergies   Allergen Reactions    Levaquin [Levofloxacin] Nausea and Vomiting     Reports anxiety, nausea, aching after taking levaquin       Objective:  Visit Vitals  /68 (BP 1 Location: Right arm, BP Patient Position: Sitting)   Pulse 72   Temp 97.8 °F (36.6 °C)   Resp 18   Ht 5' 8\" (1.727 m)   Wt 222 lb (100.7 kg)   SpO2 95%   BMI 33.75 kg/m²     Physical Exam:   General appearance - alert, well appearing, and in no distress  Mental status - alert, oriented to person, place, and time  EYE-MAURICE, EOMI, fundi normal, corneas normal, no foreign bodies  ENT-ENT exam normal, no neck nodes or sinus tenderness  Nose - normal and patent, no erythema, discharge or polyps  Mouth - mucous membranes moist, pharynx normal without lesions  Neck - supple, no significant adenopathy Chest - clear to auscultation, no wheezes, rales or rhonchi, symmetric air entry   Heart - normal rate, regular rhythm, normal S1, S2, no murmurs, rubs, clicks or gallops   Abdomen - soft, nontender, nondistended, no masses or organomegaly  Lymph- no adenopathy palpable  Ext-peripheral pulses normal, no pedal edema, no clubbing or cyanosis  Skin-Warm and dry. no hyperpigmentation, vitiligo, or suspicious lesions  Neuro -alert, oriented, normal speech, no focal findings or movement disorder noted  Musculoskeletal- FROM, no bony abnormalities, no point tenderness    No results found for this visit on 06/10/19. All results for lab orders may not have been returned by the time this encountered was closed. Assessment/Plan:       ICD-10-CM ICD-9-CM    1. Transition of care performed with sharing of clinical summary Z91.89 V15.89    2. Coronary artery disease involving native coronary artery of native heart without angina pectoris I25.10 414.01    3. Essential hypertension I10 401.9    4. Hypoxia R09.02 799.02    5. TESSA on CPAP G47.33 327.23     Z99.89 V46.8    6. Spinal stenosis of lumbar region at multiple levels M48.061 724.02        Orders Placed This Encounter    atenolol (TENORMIN) 50 mg tablet    DISCONTD: b complex-vitamin c-folic acid 0.8 mg (NEPHRO-JADE) 0.8 mg tab tablet     Sig: Take 1 Tab by mouth daily.  DISCONTD: tamsulosin (FLOMAX) 0.4 mg capsule     Sig: Take 0.4 mg by mouth daily. Follow-up and Dispositions    · Return in about 1 month (around 7/10/2019) for as scheduled. Plan:    His Flomax and Nephrocap will be discontinued. He is on 50 mg of atenolol and 2.5 mg of amlodipine. His medication list has been updated. On exam he does not have an obvious hernia. He may have some degree of a muscle strain and if this persist it should resolve without further intervention.   I have reviewed his labs and before his discharge from rehab his renal function was back to normal.  He will follow-up as scheduled in 1 month. I have reviewed with the patient details of the assessment and plan and all questions were answered. Relevent patient education was performed. Verbal and/or written instructions (see AVS) provided. The most recent lab findings were reviewed with the patient. Plan was discussed with patient who verbal expressed understanding. An After Visit Summary was printed and given to the patient.       Mary Driscoll MD

## 2019-06-20 ENCOUNTER — PATIENT OUTREACH (OUTPATIENT)
Dept: INTERNAL MEDICINE CLINIC | Age: 84
End: 2019-06-20

## 2019-06-20 NOTE — PROGRESS NOTES
Patient has graduated from the Transitions of Care Coordination  program on 6/20/19. Patient's symptoms are stable at this time. Patient/family has the ability to self-manage. Care management goals have been completed at this time. No further nurse navigator follow up scheduled. Goals Addressed                 This Visit's Progress     COMPLETED: Returns to baseline activity level. 6/5/19-NN spoke to patient's wife after his discharge from [address] [place] [address] on 6/1/19 after having L2-5 decompression and fusion. Patient is mobile with a walker and is receiving home PT starting today. He has f/u with PCP on 6/10/19, and f/u with orthopedic surgeon 6/14/19. He will participate with therapy to return to baseline activity level. NN will be available for questions/concerns. Will also check on patient's progress in 2 weeks. / vs            Pt has nurse navigator's contact information for any further questions, concerns, or needs.   Patients upcoming visits:    Future Appointments   Date Time Provider Deanna Casanova   7/12/2019  9:30 AM Timo Shaikh MD [address]

## 2019-07-01 RX ORDER — ATENOLOL 50 MG/1
50 TABLET ORAL DAILY
Qty: 30 TAB | Refills: 5 | Status: ON HOLD | OUTPATIENT
Start: 2019-07-01 | End: 2019-11-25 | Stop reason: SDUPTHER

## 2019-07-01 RX ORDER — AMLODIPINE BESYLATE 2.5 MG/1
2.5 TABLET ORAL DAILY
Qty: 30 TAB | Refills: 5 | Status: SHIPPED | OUTPATIENT
Start: 2019-07-01 | End: 2019-07-08 | Stop reason: SDUPTHER

## 2019-07-01 NOTE — TELEPHONE ENCOUNTER
PCP: Nicole Baldwin MD    Last appt: 6/10/2019  Future Appointments   Date Time Provider Deanna Casanova   7/12/2019  9:30 AM Nicole Baldwin MD [address]       Requested Prescriptions     Pending Prescriptions Disp Refills    atenolol (TENORMIN) 50 mg tablet 30 Tab 5     Sig: Take 1 Tab by mouth daily.  amLODIPine (NORVASC) 2.5 mg tablet 30 Tab 5     Sig: Take 1 Tab by mouth daily.        Prior labs and Blood pressures:  BP Readings from Last 3 Encounters:   06/10/19 110/68   05/21/19 109/61   05/09/19 143/54     Lab Results   Component Value Date/Time    Sodium 138 05/29/2019 04:50 AM    Potassium 3.8 05/29/2019 04:50 AM    Chloride 105 05/29/2019 04:50 AM    CO2 28 05/29/2019 04:50 AM    Anion gap 5 05/29/2019 04:50 AM    Glucose 105 (H) 05/29/2019 04:50 AM    BUN 16 05/29/2019 04:50 AM    Creatinine 1.08 05/29/2019 04:50 AM    BUN/Creatinine ratio 15 05/29/2019 04:50 AM    GFR est AA >60 05/29/2019 04:50 AM    GFR est non-AA >60 05/29/2019 04:50 AM    Calcium 8.5 05/29/2019 04:50 AM     Lab Results   Component Value Date/Time    Hemoglobin A1c 6.0 05/09/2019 07:16 AM     Lab Results   Component Value Date/Time    Cholesterol, total 173 04/13/2019 11:23 AM    Cholesterol (POC) 171.0 09/08/2017 10:42 AM    HDL Cholesterol 28 04/13/2019 11:23 AM    HDL Cholesterol (POC) 36.0 09/08/2017 10:42 AM    LDL Cholesterol (POC) 70.4 09/08/2017 10:42 AM    LDL, calculated 84.6 04/13/2019 11:23 AM    VLDL, calculated 60.4 04/13/2019 11:23 AM    Triglyceride 302 (H) 04/13/2019 11:23 AM    Triglycerides (POC) 323.0 (A) 09/08/2017 10:42 AM    CHOL/HDL Ratio 6.2 (H) 04/13/2019 11:23 AM     Lab Results   Component Value Date/Time    Vitamin D 25-Hydroxy 13.7 (L) 05/09/2019 07:16 AM       Lab Results   Component Value Date/Time    TSH 4.15 (H) 04/13/2019 04:34 AM

## 2019-07-08 RX ORDER — AMLODIPINE BESYLATE 2.5 MG/1
2.5 TABLET ORAL DAILY
Qty: 90 TAB | Refills: 3 | Status: SHIPPED | OUTPATIENT
Start: 2019-07-08 | End: 2019-09-28

## 2019-07-08 RX ORDER — ATORVASTATIN CALCIUM 80 MG/1
TABLET, FILM COATED ORAL
Qty: 90 TAB | Refills: 3 | Status: SHIPPED | OUTPATIENT
Start: 2019-07-08 | End: 2020-06-23

## 2019-07-08 NOTE — TELEPHONE ENCOUNTER
Requested Prescriptions     Pending Prescriptions Disp Refills    amLODIPine (NORVASC) 2.5 mg tablet 90 Tab 3     Sig: Take 1 Tab by mouth daily.        Last Refill: 7-1-19 for 30d, they are requesting 90d  Last visit:6-10-19    Requested Prescriptions     Pending Prescriptions Disp Refills     90 Tab 3         atorvastatin (LIPITOR) 80 mg tablet 90 Tab 3     Sig: TAKE ONE TABLET BY MOUTH DAILY       Last Refill: 06-04-19  Last visit:6-10-19

## 2019-07-12 ENCOUNTER — OFFICE VISIT (OUTPATIENT)
Dept: INTERNAL MEDICINE CLINIC | Age: 84
End: 2019-07-12

## 2019-07-12 VITALS
WEIGHT: 216.6 LBS | OXYGEN SATURATION: 96 % | DIASTOLIC BLOOD PRESSURE: 66 MMHG | TEMPERATURE: 97.7 F | SYSTOLIC BLOOD PRESSURE: 116 MMHG | HEART RATE: 74 BPM | RESPIRATION RATE: 20 BRPM | BODY MASS INDEX: 32.83 KG/M2 | HEIGHT: 68 IN

## 2019-07-12 DIAGNOSIS — E78.5 DYSLIPIDEMIA: ICD-10-CM

## 2019-07-12 DIAGNOSIS — M1A.00X0 IDIOPATHIC CHRONIC GOUT WITHOUT TOPHUS, UNSPECIFIED SITE: ICD-10-CM

## 2019-07-12 DIAGNOSIS — J41.0 SIMPLE CHRONIC BRONCHITIS (HCC): ICD-10-CM

## 2019-07-12 DIAGNOSIS — R10.9 RIGHT FLANK PAIN: ICD-10-CM

## 2019-07-12 DIAGNOSIS — Z99.89 OSA ON CPAP: ICD-10-CM

## 2019-07-12 DIAGNOSIS — G47.33 OSA ON CPAP: ICD-10-CM

## 2019-07-12 DIAGNOSIS — G45.9 TIA (TRANSIENT ISCHEMIC ATTACK): ICD-10-CM

## 2019-07-12 DIAGNOSIS — I25.10 CORONARY ARTERY DISEASE INVOLVING NATIVE CORONARY ARTERY OF NATIVE HEART WITHOUT ANGINA PECTORIS: Primary | ICD-10-CM

## 2019-07-12 DIAGNOSIS — I10 ESSENTIAL HYPERTENSION: ICD-10-CM

## 2019-07-12 DIAGNOSIS — Z79.899 ON STATIN THERAPY: ICD-10-CM

## 2019-07-12 LAB
BILIRUB UR QL: NEGATIVE
CLARITY: CLEAR
COLOR UR: NORMAL
GLUCOSE 24H UR-MRATE: NEGATIVE G/(24.H)
HGB UR QL STRIP: NEGATIVE
KETONES UR QL STRIP.AUTO: NEGATIVE
LEUKOCYTE ESTERASE: NEGATIVE
NITRITE UR QL STRIP.AUTO: NEGATIVE
PH UR STRIP: 6 [PH] (ref 5–7)
PROT UR STRIP-MCNC: NEGATIVE MG/DL
SP GR UR REFRACTOMETRY: 1.01 (ref 1–1.03)
UROBILINOGEN UR QL STRIP.AUTO: NEGATIVE

## 2019-07-12 RX ORDER — OLMESARTAN MEDOXOMIL 20 MG/1
20 TABLET ORAL DAILY
COMMUNITY
End: 2019-09-28

## 2019-07-12 NOTE — PROGRESS NOTES
Sonali Queen is a 80 y.o. male presenting for Hypertension (6 mo fu) and Urinary Frequency  . 1. Have you been to the ER, urgent care clinic since your last visit? Hospitalized since your last visit? Yes When: 6/[facility] Reason for visit: uti    2. Have you seen or consulted any other health care providers outside of the [address] [place] [address] since your last visit? Include any pap smears or colon screening. No    Fall Risk Assessment, last 12 mths 1/3/2019   Able to walk? Yes   Fall in past 12 months? No         Abuse Screening Questionnaire 4/26/2019   Do you ever feel afraid of your partner? N   Are you in a relationship with someone who physically or mentally threatens you? N   Is it safe for you to go home? Y       3 most recent PHQ Screens 4/12/2019   PHQ Not Done Patient Decline   Little interest or pleasure in doing things -   Feeling down, depressed, irritable, or hopeless -   Total Score PHQ 2 -       There are no discontinued medications.

## 2019-07-16 NOTE — PROGRESS NOTES
This note will not be viewable in [address] Roderick Caballero is a 80 y.o. male and presents with Hypertension (6 mo fu) and Urinary Frequency  . Subjective:  Mr. Mack Prader presents today for follow-up of hypertension, hyperlipidemia, coronary disease, and monitoring of statin therapy. He endorses a history of recent right-sided flank pain with radiation to his right groin and testicle. [facility] with [facility]. He notes his symptoms have improved slightly but are persistent. He has no fever chills or rigors. He has no dysuria. He has no nausea or vomiting or diaphoresis. He denies shortness of breath, chest pain, palpitations, PND, orthopnea, or pedal edema.     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    Morbid obesity ([address]     On statin therapy 8/16/2017    TESSA on CPAP 1/3/2019    Psychiatric disorder     Depression    Simple chronic bronchitis ([address] 1/3/2019    TIA (transient ischemic attack) 4/04/1211    Umbilical hernia 49/8/5356     Past Surgical History:   Procedure Laterality Date    CARDIAC SURG PROCEDURE UNLIST  1996    Cardiac Stents    CARDIAC SURG PROCEDURE UNLIST  04/2019    EF 61-65%    HX HERNIA REPAIR      RIH    HX HERNIA REPAIR  09/17/13    open umbilical hernia repair mesh    HX UROLOGICAL      HYDROCELECTOMY     Allergies   Allergen Reactions    Levaquin [Levofloxacin] Nausea and Vomiting Reports anxiety, nausea, aching after taking levaquin     Current Outpatient Medications   Medication Sig Dispense Refill    olmesartan (BENICAR) 20 mg tablet Take 20 mg by mouth daily.  amLODIPine (NORVASC) 2.5 mg tablet Take 1 Tab by mouth daily. 90 Tab 3    atorvastatin (LIPITOR) 80 mg tablet TAKE ONE TABLET BY MOUTH DAILY 90 Tab 3    atenolol (TENORMIN) 50 mg tablet Take 1 Tab by mouth daily. 30 Tab 5    naloxone (NARCAN) 4 mg/actuation nasal spray Use 1 spray intranasally, then discard. Repeat with new spray every 2 min as needed for opioid overdose symptoms, alternating nostrils. 1 Each 0    DULoxetine (CYMBALTA) 30 mg capsule Take 30 mg by mouth daily.  lansoprazole (PREVACID) 15 mg capsule Take 15 mg by mouth Daily (before breakfast).  clonazePAM (KLONOPIN) 0.5 mg tablet Take 1 Tab by mouth nightly as needed (insomnia). Max Daily Amount: 0.5 mg. 30 Tab 0    gabapentin (NEURONTIN) 100 mg capsule Take  by mouth three (3) times daily.  clopidogrel (PLAVIX) 75 mg tab Take 1 Tab by mouth daily. 90 Tab 3    ANORO ELLIPTA 62.5-25 mcg/actuation inhaler Take 1 Puff by inhalation daily.  furosemide (LASIX) 80 mg tablet Take 1 Tab by mouth daily. 90 Tab 3    allopurinol (ZYLOPRIM) 300 mg tablet Take 1 Tab by mouth daily.  27 Tab 6     Social History     Socioeconomic History    Marital status:      Spouse name: Not on file    Number of children: Not on file    Years of education: Not on file    Highest education level: Not on file   Tobacco Use    Smoking status: Former Smoker     Packs/day: 0.50     Years: 20.00     Pack years: 10.00    Smokeless tobacco: Former User    Tobacco comment: quit about 40  years ago   / used to dip tobaco and dip snuff   Substance and Sexual Activity    Alcohol use: Not Currently     Comment: \"Drinks very little now for about a month \"    Drug use: No     Family History   Problem Relation Age of Onset    Heart Disease Mother    [facility] Hypertension Mother     Hypertension Father     Hypertension Sister     Hypertension Brother     Cancer Brother        Review of Systems  Constitutional:  negative for fevers, chills, anorexia and weight loss  Eyes:    negative for visual disturbance and irritation  ENT:    negative for tinnitus,sore throat,nasal congestion,ear pains. hoarseness  Respiratory:     negative for cough, hemoptysis, dyspnea,wheezing  CV:    negative for chest pain, palpitations, lower extremity edema  GI:    negative for nausea, vomiting, diarrhea, abdominal pain,melena  Endo:               negative for polyuria,polydipsia,polyphagia,heat intolerance  Genitourinary : negative for frequency, dysuria and hematuria  Integumentary: negative for rash and pruritus  Hematologic:   negative for easy bruising and gum/nose bleeding  Musculoskel:  negative for myalgias, arthralgias, back pain, muscle weakness, joint pain  Neurological:   negative for headaches, dizziness, vertigo, memory problems and gait   Behavl/Psych:  negative for feelings of anxiety, depression, mood changes  ROS otherwise negative      Objective:  Visit Vitals  /66 (BP 1 Location: Right arm, BP Patient Position: Sitting)   Pulse 74   Temp 97.7 °F (36.5 °C) (Oral)   Resp 20   Ht 5' 8\" (1.727 m)   Wt 216 lb 9.6 oz (98.2 kg)   SpO2 96%   BMI 32.93 kg/m²     Physical Exam:   General appearance - alert, well appearing, and in no distress  Mental status - alert, oriented to person, place, and time  EYE-MAURICE, EOMI, fundi normal, corneas normal, no foreign bodies  ENT-ENT exam normal, no neck nodes or sinus tenderness  Nose - normal and patent, no erythema, discharge or polyps  Mouth - mucous membranes moist, pharynx normal without lesions  Neck - supple, no significant adenopathy   Chest - clear to auscultation, no wheezes, rales or rhonchi, symmetric air entry   Heart - normal rate, regular rhythm, normal S1, S2, no murmurs, rubs, clicks or gallops   Abdomen - soft, nontender, nondistended, no masses or organomegaly, no bruising noted of the flank, no inguinal hernia noted with direct palpation of the inguinal canal  Lymph- no adenopathy palpable  Ext-peripheral pulses normal, no pedal edema, no clubbing or cyanosis  Skin-Warm and dry. no hyperpigmentation, vitiligo, or suspicious lesions  Neuro -alert, oriented, normal speech, no focal findings or movement disorder noted  -external genitalia appears normal testicles appear normal without nodularity thickening or swelling    Assessment/Plan:  Diagnoses and all orders for this visit:    1. Coronary artery disease involving native coronary artery of native heart without angina pectoris  -     LIPID PANEL; Future  -     METABOLIC PANEL, COMPREHENSIVE; Future    2. TIA (transient ischemic attack)    3. Essential hypertension  -     METABOLIC PANEL, COMPREHENSIVE; Future  -     URINALYSIS W/O MICRO    4. TESSA on CPAP    5. Simple chronic bronchitis ([address]    6. On statin therapy  -     CK; Future    7. Idiopathic chronic gout without tophus, unspecified site    8. Dyslipidemia  -     LIPID PANEL; Future    9. Right flank pain  -     CBC WITH AUTOMATED DIFF; Future  -     URINALYSIS W/O MICRO          ICD-10-CM ICD-9-CM    1. Coronary artery disease involving native coronary artery of native heart without angina pectoris I25.10 414.01 LIPID PANEL      METABOLIC PANEL, COMPREHENSIVE      CANCELED: LIPID PANEL      CANCELED: METABOLIC PANEL, COMPREHENSIVE      CANCELED: METABOLIC PANEL, COMPREHENSIVE      CANCELED: LIPID PANEL   2. TIA (transient ischemic attack) G45.9 435.9    3. Essential hypertension I15 246.5 METABOLIC PANEL, COMPREHENSIVE      URINALYSIS W/O MICRO      CANCELED: METABOLIC PANEL, COMPREHENSIVE      CANCELED: URINALYSIS W/O MICRO      CANCELED: METABOLIC PANEL, COMPREHENSIVE   4. TESSA on CPAP G47.33 327.23     Z99.89 V46.8    5. Simple chronic bronchitis (HCC) J41.0 491.0    6.  On statin therapy Z79.899 V58.69 CK CANCELED: CK      CANCELED: CK   7. Idiopathic chronic gout without tophus, unspecified site M1A. 00X0 274.02    8. Dyslipidemia E78.5 272.4 LIPID PANEL      CANCELED: LIPID PANEL      CANCELED: LIPID PANEL   9. Right flank pain R10.9 789.09 CBC WITH AUTOMATED DIFF      URINALYSIS W/O MICRO         Follow-up and Dispositions    · Return in about 2 weeks (around 7/26/2019) for follow up. Plan:    Continue current medical regimen as outlined above. Follow urine analysis and response to antibiotic therapy. I have reviewed with the patient details of the assessment and plan and all questions were answered. Relevent patient education was performed. Verbal and/or written instructions (see AVS) provided. The most recent lab findings were reviewed with the patient. Plan was discussed with patient who verbally expressed understanding. An After Visit Summary was printed and given to the patient.     Ed Wilson MD

## 2019-07-18 ENCOUNTER — OFFICE VISIT (OUTPATIENT)
Dept: INTERNAL MEDICINE CLINIC | Age: 84
End: 2019-07-18

## 2019-07-18 ENCOUNTER — DOCUMENTATION ONLY (OUTPATIENT)
Dept: INTERNAL MEDICINE CLINIC | Age: 84
End: 2019-07-18

## 2019-07-18 VITALS
WEIGHT: 218.2 LBS | HEART RATE: 70 BPM | SYSTOLIC BLOOD PRESSURE: 112 MMHG | OXYGEN SATURATION: 95 % | HEIGHT: 68 IN | TEMPERATURE: 97.6 F | RESPIRATION RATE: 16 BRPM | DIASTOLIC BLOOD PRESSURE: 60 MMHG | BODY MASS INDEX: 33.07 KG/M2

## 2019-07-18 DIAGNOSIS — I10 ESSENTIAL HYPERTENSION: ICD-10-CM

## 2019-07-18 DIAGNOSIS — J41.0 SIMPLE CHRONIC BRONCHITIS (HCC): ICD-10-CM

## 2019-07-18 DIAGNOSIS — Z01.818 PREOP EXAMINATION: Primary | ICD-10-CM

## 2019-07-18 DIAGNOSIS — Z79.899 ON STATIN THERAPY: ICD-10-CM

## 2019-07-18 DIAGNOSIS — Z99.89 OSA ON CPAP: ICD-10-CM

## 2019-07-18 DIAGNOSIS — G47.33 OSA ON CPAP: ICD-10-CM

## 2019-07-18 DIAGNOSIS — M25.551 RIGHT HIP PAIN: ICD-10-CM

## 2019-07-18 DIAGNOSIS — I25.10 CORONARY ARTERY DISEASE INVOLVING NATIVE CORONARY ARTERY OF NATIVE HEART WITHOUT ANGINA PECTORIS: ICD-10-CM

## 2019-07-18 DIAGNOSIS — H25.013 CORTICAL AGE-RELATED CATARACT OF BOTH EYES: ICD-10-CM

## 2019-07-18 DIAGNOSIS — E78.5 DYSLIPIDEMIA: ICD-10-CM

## 2019-07-18 DIAGNOSIS — N50.811 TESTICULAR PAIN, RIGHT: ICD-10-CM

## 2019-07-18 LAB
A-G RATIO,AGRAT: 1.4 RATIO
ALBUMIN SERPL-MCNC: 3.9 G/DL (ref 3.9–5.4)
ALP SERPL-CCNC: 159 U/L (ref 38–126)
ALT SERPL-CCNC: 63 U/L (ref 9–52)
ANION GAP SERPL CALC-SCNC: 16 MMOL/L
AST SERPL W P-5'-P-CCNC: 57 U/L (ref 14–36)
BILIRUB SERPL-MCNC: 0.5 MG/DL (ref 0.2–1.3)
BUN SERPL-MCNC: 23 MG/DL (ref 9–20)
BUN/CREATININE RATIO,BUCR: 21 RATIO
CALCIUM SERPL-MCNC: 9.1 MG/DL (ref 8.4–10.2)
CHLORIDE SERPL-SCNC: 98 MMOL/L (ref 98–107)
CHOL/HDL RATIO,CHHD: 6 RATIO (ref 0–4)
CHOLEST SERPL-MCNC: 138 MG/DL (ref 0–200)
CK SERPL-CCNC: 41 U/L (ref 30–135)
CO2 SERPL-SCNC: 28 MMOL/L (ref 22–32)
CREAT SERPL-MCNC: 1.1 MG/DL (ref 0.8–1.5)
ERYTHROCYTE [DISTWIDTH] IN BLOOD BY AUTOMATED COUNT: 13.4 %
GLOBULIN,GLOB: 2.7
GLUCOSE SERPL-MCNC: 132 MG/DL (ref 75–110)
HCT VFR BLD AUTO: 45.7 % (ref 37–51)
HDLC SERPL-MCNC: 25 MG/DL (ref 35–130)
HGB BLD-MCNC: 14 G/DL (ref 12–18)
LDL/HDL RATIO,LDHD: 2 RATIO
LDLC SERPL CALC-MCNC: 49 MG/DL (ref 0–130)
LYMPHOCYTES ABSOLUTE: 1.2 K/UL (ref 0.6–4.1)
LYMPHOCYTES NFR BLD: 16.4 % (ref 10–58.5)
MCH RBC QN AUTO: 31.7 PG (ref 26–32)
MCHC RBC AUTO-ENTMCNC: 30.6 G/DL (ref 30–36)
MCV RBC AUTO: 103.7 FL (ref 80–97)
MONOCYTES ABS-DIF,2141: 0.6 K/UL (ref 0–1.8)
MONOCYTES NFR BLD: 7.8 % (ref 0.1–24)
NEUTROPHILS # BLD: 75.8 % (ref 37–92)
NEUTROPHILS ABS,2156: 5.8 K/UL (ref 2–7.8)
PLATELET # BLD AUTO: 235 K/UL (ref 140–440)
PMV BLD AUTO: 8.9 FL
POTASSIUM SERPL-SCNC: 4.5 MMOL/L (ref 3.6–5)
PROT SERPL-MCNC: 6.6 G/DL (ref 6.3–8.2)
RBC # BLD AUTO: 4.41 M/UL (ref 4.2–6.3)
SODIUM SERPL-SCNC: 142 MMOL/L (ref 137–145)
TRIGL SERPL-MCNC: 321 MG/DL (ref 0–200)
VLDLC SERPL CALC-MCNC: 64 MG/DL
WBC # BLD AUTO: 7.6 K/UL (ref 4.1–10.9)

## 2019-07-18 NOTE — PROGRESS NOTES
This note will not be viewable in [address] Nevin Martinez is a 80 y.o. male and presents with Pre-op Exam (cataract surgery) and Follow-up  . Subjective:    Mr. Aayush Edgar presents today prior to undergoing cataract surgery for preoperative clearance. He denies any worsening shortness of breath, cough, congestion, chest pain, palpitations, PND, orthopnea, or pedal edema. He does note decreasing visual acuity. He has had some persistent discomfort in his right groin area. He notes this is sometimes alleviated by urinating. He had an apparent cyst that was drained previously by urology. [facility] and [facility] on an antibiotic which did seem to help. A follow-up urine culture within the past week was negative. Review of Systems  Constitutional:   Eyes:   negative for irritation  ENT:   negative for tinnitus,sore throat,nasal congestion,ear pains. hoarseness  Respiratory:  negative for cough, hemoptysis, dyspnea,wheezing  CV:   negative for chest pain, palpitations, lower extremity edema  GI:   negative for nausea, vomiting, diarrhea, abdominal pain,melena  Endo:               negative for polyuria,polydipsia,polyphagia,heat intolerance  Genitourinary: negative for frequency, dysuria and hematuria  Integumentary: negative for rash and pruritus  Hematologic:  negative for easy bruising and gum/nose bleeding  Musculoskel: negative for myalgias, arthralgias, back pain, muscle weakness, joint pain  Neurological:  negative for headaches, dizziness, vertigo, memory problems and gait   Behavl/Psych: negative for feelings of anxiety, depression, mood changes    Past Medical History:   Diagnosis Date    Adverse effect of anesthesia     combative after anesthesia    Alcohol abuse     6 beers/day    Arthritis     CAD (coronary artery disease)     Chronic obstructive pulmonary disease [address]     Dyslipidemia 8/16/2017    Dyspepsia and other specified disorders of function of stomach  Dyspnea 8/16/2017    ED (erectile dysfunction) 8/16/2017    Encounter for immunization 8/16/2017    Fatigue 8/16/2017    GERD (gastroesophageal reflux disease) 8/16/2017    Gout 8/16/2017    Hypertension     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    Morbid obesity ([facility]     On statin therapy 8/16/2017    TESSA on CPAP 1/3/2019    Psychiatric disorder     Depression    Simple chronic bronchitis ([facility] 1/3/2019    TIA (transient ischemic attack) 5/99/1707    Umbilical hernia 91/1/5192     Past Surgical History:   Procedure Laterality Date    CARDIAC SURG PROCEDURE UNLIST  1996    Cardiac Stents    CARDIAC SURG PROCEDURE UNLIST  04/2019    EF 61-65%    HX HERNIA REPAIR      RIH    HX HERNIA REPAIR  94/24/48    open umbilical hernia repair mesh    HX UROLOGICAL      HYDROCELECTOMY     Social History     Socioeconomic History    Marital status:      Spouse name: Not on file    Number of children: Not on file    Years of education: Not on file    Highest education level: Not on file   Tobacco Use    Smoking status: Former Smoker     Packs/day: 0.50     Years: 20.00     Pack years: 10.00    Smokeless tobacco: Former User    Tobacco comment: quit about 40  years ago   / used to dip tobaco and dip snuff   Substance and Sexual Activity    Alcohol use: Not Currently     Comment: \"Drinks very little now for about a month \"    Drug use: No     Family History   Problem Relation Age of Onset    Heart Disease Mother     Hypertension Mother     Hypertension Father     Hypertension Sister     Hypertension Brother     Cancer Brother      Current Outpatient Medications   Medication Sig Dispense Refill    olmesartan (BENICAR) 20 mg tablet Take 20 mg by mouth daily.  amLODIPine (NORVASC) 2.5 mg tablet Take 1 Tab by mouth daily. 90 Tab 3    atorvastatin (LIPITOR) 80 mg tablet TAKE ONE TABLET BY MOUTH DAILY 90 Tab 3    atenolol (TENORMIN) 50 mg tablet Take 1 Tab by mouth daily.  30 Tab 5    naloxone (NARCAN) 4 mg/actuation nasal spray Use 1 spray intranasally, then discard. Repeat with new spray every 2 min as needed for opioid overdose symptoms, alternating nostrils. 1 Each 0    DULoxetine (CYMBALTA) 30 mg capsule Take 30 mg by mouth daily.  lansoprazole (PREVACID) 15 mg capsule Take 15 mg by mouth Daily (before breakfast).  clonazePAM (KLONOPIN) 0.5 mg tablet Take 1 Tab by mouth nightly as needed (insomnia). Max Daily Amount: 0.5 mg. 30 Tab 0    gabapentin (NEURONTIN) 100 mg capsule Take  by mouth three (3) times daily.  clopidogrel (PLAVIX) 75 mg tab Take 1 Tab by mouth daily. 90 Tab 3    ANORO ELLIPTA 62.5-25 mcg/actuation inhaler Take 1 Puff by inhalation daily.  furosemide (LASIX) 80 mg tablet Take 1 Tab by mouth daily. 90 Tab 3    allopurinol (ZYLOPRIM) 300 mg tablet Take 1 Tab by mouth daily.  30 Tab 6     Allergies   Allergen Reactions    Levaquin [Levofloxacin] Nausea and Vomiting     Reports anxiety, nausea, aching after taking levaquin       Objective:  Visit Vitals  /60 (BP 1 Location: Left arm, BP Patient Position: Sitting)   Pulse 70   Temp 97.6 °F (36.4 °C) (Oral)   Resp 16   Ht 5' 8\" (1.727 m)   Wt 218 lb 3.2 oz (99 kg)   SpO2 95%   BMI 33.18 kg/m²     Physical Exam:   General appearance - alert, well appearing, and in no distress  Mental status - alert, oriented to person, place, and time  EYE-MAURICE, EOMI, fundi normal, corneas normal, no foreign bodies  ENT-ENT exam normal, no neck nodes or sinus tenderness  Nose - normal and patent, no erythema, discharge or polyps  Mouth - mucous membranes moist, pharynx normal without lesions  Neck - supple, no significant adenopathy   Chest - clear to auscultation, no wheezes, rales or rhonchi, symmetric air entry   Heart - normal rate, regular rhythm, normal S1, S2, no murmurs, rubs, clicks or gallops   Abdomen - soft, nontender, nondistended, no masses or organomegaly  Lymph- no adenopathy palpable  Ext-peripheral pulses normal, no pedal edema, no clubbing or cyanosis  Skin-Warm and dry. no hyperpigmentation, vitiligo, or suspicious lesions  Neuro -alert, oriented, normal speech, no focal findings or movement disorder noted  Musculoskeletal- FROM, no bony abnormalities, no point tenderness  -no palpable abnormality of the scrotum or testicle nontender without nodularity thickening or mass, no inguinal hernia noted on exam  No results found for this visit on 07/18/19. All results for lab orders may not have been returned by the time this encountered was closed. Assessment/Plan:       ICD-10-CM ICD-9-CM    1. Preop examination Z01.818 V72.84    2. Cortical age-related cataract of both eyes H25.013 366.15    3. Right hip pain M25.551 719.45 XR HIP RT W OR WO PELV 2-3 VWS   4. Coronary artery disease involving native coronary artery of native heart without angina pectoris I25.10 414.01    5. Essential hypertension I10 401.9    6. TESSA on CPAP G47.33 327.23     Z99.89 V46.8    7. Simple chronic bronchitis (HCC) J41.0 491.0 XR CHEST PA LAT   8. Dyslipidemia E78.5 272.4    9. On statin therapy Z79.899 V58.69    10. Testicular pain, right N50.811 608.9 REFERRAL TO UROLOGY       Orders Placed This Encounter    XR HIP RT W OR WO PELV 2-3 VWS     Standing Status:   Future     Number of Occurrences:   1     Standing Expiration Date:   7/18/2020     Order Specific Question:   Reason for Exam     Answer:   right hip pain    XR CHEST PA LAT     Standing Status:   Future     Number of Occurrences:   1     Standing Expiration Date:   7/18/2020     Order Specific Question:   Reason for Exam     Answer:   COPD    REFERRAL TO UROLOGY     Referral Priority:   Routine     Referral Type:   Consultation     Referral Reason:   Specialty Services Required     Referred to Provider:   Donnie Adkins MD     Requested Specialty:   Urology     Number of Visits Requested:   1       Follow-up and Dispositions    · Return for as scheduled. Plan:    Low perioperative risk for planned procedure. No further stratification needed. Patient is medically maximized for his procedure. With regard to his right groin discomfort and x-ray showed some mild degenerative arthritis but exam and history does not seem to indicate arthritis as a cause of his pain. He will be referred to urology for follow-up evaluation. I have reviewed with the patient details of the assessment and plan and all questions were answered. Relevent patient education was performed. Verbal and/or written instructions (see AVS) provided. The most recent lab findings were reviewed with the patient. Plan was discussed with patient who verbal expressed understanding. An After Visit Summary was printed and given to the patient.       Osvaldo Larson MD

## 2019-07-18 NOTE — PROGRESS NOTES
Reviewed record in preparation for visit and have obtained necessary documentation. Identified pt with two pt identifiers(name and ). Chief Complaint   Patient presents with    Pre-op Exam     cataract surgery        Coordination of Care Questionnaire:  :     1) Have you been to an emergency room, urgent care clinic since your last visit? No     Hospitalized since your last visit? No               2) Have you seen or consulted any other health care providers outside of [address] [place] [address] since your last visit?  No

## 2019-08-01 ENCOUNTER — OFFICE VISIT (OUTPATIENT)
Dept: INTERNAL MEDICINE CLINIC | Age: 84
End: 2019-08-01

## 2019-08-01 VITALS
HEART RATE: 71 BPM | WEIGHT: 220.6 LBS | DIASTOLIC BLOOD PRESSURE: 72 MMHG | TEMPERATURE: 98.1 F | HEIGHT: 68 IN | BODY MASS INDEX: 33.43 KG/M2 | SYSTOLIC BLOOD PRESSURE: 132 MMHG | OXYGEN SATURATION: 94 %

## 2019-08-01 DIAGNOSIS — G47.33 OSA ON CPAP: ICD-10-CM

## 2019-08-01 DIAGNOSIS — R09.02 HYPOXIA: ICD-10-CM

## 2019-08-01 DIAGNOSIS — I25.10 CORONARY ARTERY DISEASE INVOLVING NATIVE CORONARY ARTERY OF NATIVE HEART WITHOUT ANGINA PECTORIS: ICD-10-CM

## 2019-08-01 DIAGNOSIS — Z99.89 OSA ON CPAP: ICD-10-CM

## 2019-08-01 DIAGNOSIS — R10.9 FLANK PAIN: Primary | ICD-10-CM

## 2019-08-01 DIAGNOSIS — J41.0 SIMPLE CHRONIC BRONCHITIS (HCC): ICD-10-CM

## 2019-08-01 DIAGNOSIS — I10 ESSENTIAL HYPERTENSION: ICD-10-CM

## 2019-08-01 RX ORDER — DULOXETIN HYDROCHLORIDE 30 MG/1
30 CAPSULE, DELAYED RELEASE ORAL DAILY
Qty: 30 CAP | Refills: 6 | Status: SHIPPED | OUTPATIENT
Start: 2019-08-01 | End: 2020-03-16

## 2019-08-01 RX ORDER — PREDNISOLONE ACETATE 10 MG/ML
SUSPENSION/ DROPS OPHTHALMIC
COMMUNITY
Start: 2019-07-10 | End: 2019-09-23

## 2019-08-01 NOTE — PROGRESS NOTES
Reviewed record in preparation for visit and have obtained necessary documentation. Identified pt with two pt identifiers(name and ). Chief Complaint   Patient presents with    Shortness of Breath        Coordination of Care Questionnaire:  :     1) Have you been to an emergency room, urgent care clinic since your last visit? No     Hospitalized since your last visit? No               2) Have you seen or consulted any other health care providers outside of [address] [place] [address] since your last visit?  Yes Dr aSrbjit Carrasquillo

## 2019-08-01 NOTE — PROGRESS NOTES
This note will not be viewable in [address] Allie Taylor is a 80 y.o. male and presents with Shortness of Breath  . Subjective:    Mr. Aristeo Chavarria presents today for shortness of breath. He was noted to be short of breath yesterday when doing physical therapy. He was able to go for 5 minutes and then he had to stop and rest.  He was able to go for another 5 minutes. He was noted to be short of breath and discontinued the remainder of his therapy session. He denies any PND orthopnea chest pain or pedal edema. He denies cough or congestion at this time. He had recently undergone a work-up including CT scan chest x-rays after having back surgery and becoming hypoxemic postoperatively. He was noted to have some mild pulmonary hypertension on echocardiogram.  He is also noted to have obstructive sleep apnea and has been prescribed a CPAP device to wear at night which he is only recently started to do. He also has been given maintenance inhalers to use on a regular basis for underlying simple chronic bronchitis. He is complained of some right flank pain and has seen urology and was prescribed Bactrim at that time. The pain has improved but tends to come and go periodically. Review of Systems  Constitutional:   Eyes:   negative for visual disturbance and irritation  ENT:   negative for tinnitus,sore throat,nasal congestion,ear pains. hoarseness  Respiratory:  negative for cough, hemoptysis,wheezing  CV:   negative for chest pain, palpitations, lower extremity edema  GI:   negative for nausea, vomiting, diarrhea, abdominal pain,melena  Endo:               negative for polyuria,polydipsia,polyphagia,heat intolerance  Genitourinary: negative for frequency, dysuria and hematuria  Integumentary: negative for rash and pruritus  Hematologic:  negative for easy bruising and gum/nose bleeding  Musculoskel: negative for myalgias, arthralgias, back pain, muscle weakness, joint pain  Neurological:  negative for headaches, dizziness, vertigo, memory problems and gait   Behavl/Psych: negative for feelings of anxiety, depression, mood changes    Past Medical History:   Diagnosis Date    Adverse effect of anesthesia     combative after anesthesia    Alcohol abuse     6 beers/day    Arthritis     CAD (coronary artery disease)     [facility] obstructive pulmonary disease ([facility]     Dyslipidemia 8/16/2017    Dyspepsia and other specified disorders of function of stomach     Dyspnea 8/16/2017    ED (erectile dysfunction) 8/16/2017    Encounter for immunization 8/16/2017    Fatigue 8/16/2017    Flank pain 8/1/2019    GERD (gastroesophageal reflux disease) 8/16/2017    Gout 8/16/2017    Hypertension     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    Morbid obesity ([facility]     On statin therapy 8/16/2017    TESSA on CPAP 1/3/2019    Psychiatric disorder     Depression    Simple chronic bronchitis ([facility] 1/3/2019    TIA (transient ischemic attack) 7/24/2391    Umbilical hernia 44/0/1149     Past Surgical History:   Procedure Laterality Date    CARDIAC SURG PROCEDURE UNLIST  1996    Cardiac Stents    CARDIAC SURG PROCEDURE UNLIST  04/2019    EF 61-65%    HX HERNIA REPAIR      RIH    HX HERNIA REPAIR  91/98/08    open umbilical hernia repair mesh    HX UROLOGICAL      HYDROCELECTOMY     Social History     Socioeconomic History    Marital status:      Spouse name: Not on file    Number of children: Not on file    Years of education: Not on file    Highest education level: Not on file   Tobacco Use    Smoking status: Former Smoker     Packs/day: 0.50     Years: 20.00     Pack years: 10.00    Smokeless tobacco: Former User    Tobacco comment: quit about 40  years ago   / used to dip tobaco and dip snuff   Substance and Sexual Activity    Alcohol use: Not Currently     Comment: \"Drinks very little now for about a month \"    Drug use: No     Family History   Problem Relation Age of Onset    Heart Disease Mother  Hypertension Mother     Hypertension Father     Hypertension Sister     Hypertension Brother     Cancer Brother      Current Outpatient Medications   Medication Sig Dispense Refill    prednisoLONE acetate (PRED FORTE) 1 % ophthalmic suspension       DULoxetine (CYMBALTA) 30 mg capsule Take 1 Cap by mouth daily. 30 Cap 6    olmesartan (BENICAR) 20 mg tablet Take 20 mg by mouth daily.  amLODIPine (NORVASC) 2.5 mg tablet Take 1 Tab by mouth daily. 90 Tab 3    atorvastatin (LIPITOR) 80 mg tablet TAKE ONE TABLET BY MOUTH DAILY 90 Tab 3    atenolol (TENORMIN) 50 mg tablet Take 1 Tab by mouth daily. 30 Tab 5    naloxone (NARCAN) 4 mg/actuation nasal spray Use 1 spray intranasally, then discard. Repeat with new spray every 2 min as needed for opioid overdose symptoms, alternating nostrils. 1 Each 0    gabapentin (NEURONTIN) 100 mg capsule Take 100 mg by mouth three (3) times daily. Indications: taking 2 caps three times daily      clopidogrel (PLAVIX) 75 mg tab Take 1 Tab by mouth daily. 90 Tab 3    furosemide (LASIX) 80 mg tablet Take 1 Tab by mouth daily. 90 Tab 3    allopurinol (ZYLOPRIM) 300 mg tablet Take 1 Tab by mouth daily. 30 Tab 6    lansoprazole (PREVACID) 15 mg capsule Take 15 mg by mouth Daily (before breakfast).  clonazePAM (KLONOPIN) 0.5 mg tablet Take 1 Tab by mouth nightly as needed (insomnia). Max Daily Amount: 0.5 mg. 30 Tab 0    ANORO ELLIPTA 62.5-25 mcg/actuation inhaler Take 1 Puff by inhalation daily.        Allergies   Allergen Reactions    Levaquin [Levofloxacin] Nausea and Vomiting     Reports anxiety, nausea, aching after taking levaquin       Objective:  Visit Vitals  /72 (BP 1 Location: Left arm, BP Patient Position: Sitting)   Pulse 71   Temp 98.1 °F (36.7 °C) (Oral)   Ht 5' 8\" (1.727 m)   Wt 220 lb 9.6 oz (100.1 kg)   SpO2 94%   BMI 33.54 kg/m²     Physical Exam:   General appearance - alert, moderately obese, and in no distress  Mental status - alert, oriented to person, place, and time  EYE-MAURICE, EOMI, fundi normal, corneas normal, no foreign bodies  ENT-ENT exam normal, no neck nodes or sinus tenderness  Nose - normal and patent, no erythema, discharge or polyps  Mouth - mucous membranes moist, pharynx normal without lesions  Neck - supple, no significant adenopathy   Chest - clear to auscultation, no wheezes, rales or rhonchi, symmetric air entry   Heart - normal rate, regular rhythm, normal S1, S2, no murmurs, rubs, clicks or gallops   Abdomen - soft, nontender, nondistended, no masses or organomegaly  Lymph- no adenopathy palpable  Ext-peripheral pulses normal, no pedal edema, no clubbing or cyanosis  Skin-Warm and dry. no hyperpigmentation, vitiligo, or suspicious lesions  Neuro -alert, oriented, normal speech, no focal findings or movement disorder noted  Musculoskeletal- FROM, no bony abnormalities, no point tenderness    No results found for this visit on 08/01/19. All results for lab orders may not have been returned by the time this encountered was closed. Assessment/Plan:       ICD-10-CM ICD-9-CM    1. Flank pain R10.9 789.09 D DIMER   2. Essential hypertension I10 401.9    3. Coronary artery disease involving native coronary artery of native heart without angina pectoris I25.10 414.01    4. Hypoxia R09.02 799.02    5. TESSA on CPAP G47.33 327.23     Z99.89 V46.8    6. Simple chronic bronchitis (HCC) J41.0 491.0        Orders Placed This Encounter    D DIMER    prednisoLONE acetate (PRED FORTE) 1 % ophthalmic suspension    DULoxetine (CYMBALTA) 30 mg capsule     Sig: Take 1 Cap by mouth daily. Dispense:  30 Cap     Refill:  6     Plan:    Follow-up d-dimer. If positive will refer for a CT angiogram of the chest to rule out a PE. Otherwise I suspect his shortness of breath is related to his underlying COPD, sleep apnea, and pulmonary hypertension. He will continue his medications otherwise as prescribed.          I have reviewed with the patient details of the assessment and plan and all questions were answered. Relevent patient education was performed. Verbal and/or written instructions (see AVS) provided. The most recent lab findings were reviewed with the patient. Plan was discussed with patient who verbal expressed understanding. An After Visit Summary was printed and given to the patient.       Leonid Bang MD

## 2019-08-01 NOTE — LETTER
Name:Julito Glynn :1934 MR #:[id number] Provider Tressa Worthington MD  
*[id number]* BSMG-491 () Page 1 of 5 Initial [facility] CONTROLLED SUBSTANCE AGREEMENT I may be prescribed medications that are controlled substances as part  of my treatment plan for management of my medical condition(s). The goal of my treatment plan is to maintain and/or improve my health and wellbeing. Because controlled substances have an increased risk of abuse or harm, continual re-evaluation is needed determine if the goals of my treatment plan are being met for my safety and the safety of others. Chris Hill  am entering into this Controlled Substance Agreement with my provider, Wandy Mina MD at [address] [place] [address] . I understand that successful treatment requires mutual trust and honesty between me and my provider. I understand that there are state and federal laws and regulations which apply to the medications that my provider may prescribe that must be followed. I understand there are risks and benefits ts of taking the medicines that my provider may prescribe. I understand and agree that following this Agreement is necessary in continuing my provider-patient relationship and success of my treatment plan. As a part of my treatment plan, I agree to the following: COMMUNICATION: 
 
1. I will communicate fully with my provider about my medical condition(s), including the effect on my daily life and how well my medications are helping. I will tell my provider all of the medications that I take for any reason, including medications I receive from another health care provider, and will notify my provider about all issues, problems or concerns, including any side effects, which may be related to my medications. I understand that this information allows my provider to adjust my treatment plan to help manage my medical condition.  I understand that this information will become part of my permanent medical record. 2. I will notify my provider if I have a history of alcohol/drug misuse/addiction or if I have had treatment for alcohol/drug addiction in the past, or if I have a new problem with or concern about alcohol/drug use/addiction, because this increases the likelihood of high risk behaviors and may lead to serious medical conditions. 3. Females Only: I will notify my provider if I am or become pregnant, or if I intend to become pregnant, or if I intend to breastfeed. I understand that communication of these issues with my provider is important, due to possible effects my medication could have on an unborn fetus or breastfeeding child. Name:. Lior Carrillo :1934 MR #:[id number] Provider Wesley Guzman MD  
*[id number]* BSMG-491 () Page 2 of 5 Initial [employer] MISUSE OF MEDICATIONS / DRUGS: 
 
1. I agree to take all controlled substances as prescribed, and will not misuse or abuse any controlled substances prescribed by my provider. For my safety, I will not increase the amount of medicine I take without first talking with and getting permission from my provider. 2. If I have a medical emergency, another health care provider may prescribe me medication. If I seek emergency treatment, I will notify my provider within seventy-two (72) hours. 3. I understand that my provider may discuss my use and/or possible misuse/abuse of controlled substances and alcohol, as appropriate, with any health care provider involved in my care, pharmacist or legal authority. ILLEGAL DRUGS: 
 
1. I will not use illegal drugs of any kind, including but not limited to marijuana, heroin, cocaine, or any prescription drug which is not prescribed to me. DRUG DIVERSION / PRESCRIPTION FRAUD: 
 
1. I will not share, sell, trade, give away, or otherwise misuse my prescriptions or medications. 2. I will not alter any prescriptions provided to me by my provider. SINGLE PROVIDER: 
 
1. I agree that all controlled substances that I take will be prescribed only by my provider (or his/her covering provider) under this Agreement. This agreement does not prevent me from seeking emergency medical treatment or receiving pain management related to a surgery. PROTECTING MEDICATIONS: 
 
1. I am responsible for keeping my prescriptions and medications in a safe and secure place including safeguarding them from loss or theft. I understand that lost, stolen or damaged/destroyed prescriptions or medications will not be replaced. Name:Alexsander Hfofman :1934 MR #:[id number] Provider Yani Cole MD  
*[id number]* BSMG-491 () Page 3 of 5 Initial [employer] PRESCRIPTION RENEWALS/REFILLS: 
 
1. I will follow my controlled substance medication schedule as prescribed by my provider. 2. I understand and agree that I will make any requests for renewals or refills of my prescriptions only at the time of an office visit or during my providers regular office hours subject to the prescription refill requirements of the individual practice. 3. I understand that my provider may not call in prescriptions for controlled substances to my pharmacy. 4. I understand that my provider may adjust or discontinue these medications as deemed appropriate for my medical treatment plan. This Agreement does not guarantee the prescription of controlled medications. 5. I agree that if my medications are adjusted or discontinued, I will properly dispose of any remaining medications. I understand that I will be required to dispose of any remaining controlled medications prior to being provided with any prescriptions for other controlled medications.  
 
 
1. I authorize my provider and my pharmacy to cooperate fully with any local, state, or federal law enforcement agency in the investigation of any possible misuse, sale, or other diversion of my controlled substance prescriptions or medications. RISKS: 
 
 
1. I understand that if I do not adhere to this Agreement in any way, my provider may change my prescriptions, stop prescribing controlled substances or end our provider-patient relationship. 2. If my provider decides to stop prescribing medication, or decides to end our provider-patient relationship,my provider may require that I taper my medications slowly. If necessary, my provider may also provide a prescription for other medications to treat my withdrawal symptoms. UNDERSTANDING THIS AGREEMENT: 
 
I understand that my provider may adjust or stop my prescriptions for controlled substances based on my medical condition and my treatment plan. I understand that this Agreement does not guarantee that I will be prescribed medications or controlled substances. I understand that controlled substances may be just one part 
of my treatment plan. My initial on each page and my signature below shows that I have read each page of this Agreement, I have had an opportunity to ask questions, and all of my questions have been answered to my satisfaction by my provider. By signing below, I agree to comply with this Agreement, and I understand that if I do not follow the Agreements listed above, my provider may stop 
 
 
 
_________________________________________  Date/Time 8/1/2019 3:50 PM   
             (Patient Signature)

## 2019-08-03 LAB — D DIMER PPP FEU-MCNC: NORMAL MG/L FEU

## 2019-08-03 NOTE — PROGRESS NOTES
D-dimer results was not done because sample was not prepped properly.   Please have patient get lab work done at hospital.  (Lab rec printed)

## 2019-08-07 NOTE — PROGRESS NOTES
Patient informed. He or his wife will  the lab order from our  [facility]

## 2019-08-08 ENCOUNTER — APPOINTMENT (OUTPATIENT)
Dept: CT IMAGING | Age: 84
End: 2019-08-08
Attending: PHYSICIAN ASSISTANT
Payer: MEDICARE

## 2019-08-08 ENCOUNTER — HOSPITAL ENCOUNTER (EMERGENCY)
Age: 84
Discharge: HOME | End: 2019-08-09
Attending: EMERGENCY MEDICINE | Admitting: EMERGENCY MEDICINE
Payer: MEDICARE

## 2019-08-08 VITALS
TEMPERATURE: 97.4 F | DIASTOLIC BLOOD PRESSURE: 96 MMHG | WEIGHT: 221 LBS | HEIGHT: 67 IN | BODY MASS INDEX: 34.69 KG/M2 | HEART RATE: 70 BPM | RESPIRATION RATE: 18 BRPM | OXYGEN SATURATION: 93 % | SYSTOLIC BLOOD PRESSURE: 134 MMHG

## 2019-08-08 DIAGNOSIS — S39.011A STRAIN OF ABDOMINAL WALL, INITIAL ENCOUNTER: Primary | ICD-10-CM

## 2019-08-08 LAB
ALBUMIN SERPL-MCNC: 3.3 G/DL (ref 3.5–5)
ALBUMIN/GLOB SERPL: 0.9 {RATIO} (ref 1.1–2.2)
ALP SERPL-CCNC: 133 U/L (ref 45–117)
ALT SERPL-CCNC: 52 U/L (ref 12–78)
ANION GAP SERPL CALC-SCNC: 5 MMOL/L (ref 5–15)
APPEARANCE UR: CLEAR
AST SERPL-CCNC: 41 U/L (ref 15–37)
BACTERIA URNS QL MICRO: NEGATIVE /HPF
BASOPHILS # BLD: 0.1 K/UL (ref 0–0.1)
BASOPHILS NFR BLD: 1 % (ref 0–1)
BILIRUB SERPL-MCNC: 0.3 MG/DL (ref 0.2–1)
BILIRUB UR QL: NEGATIVE
BUN SERPL-MCNC: 27 MG/DL (ref 6–20)
BUN/CREAT SERPL: 17 (ref 12–20)
CALCIUM SERPL-MCNC: 8.9 MG/DL (ref 8.5–10.1)
CHLORIDE SERPL-SCNC: 104 MMOL/L (ref 97–108)
CO2 SERPL-SCNC: 30 MMOL/L (ref 21–32)
COLOR UR: NORMAL
CREAT BLD-MCNC: 1.5 MG/DL (ref 0.6–1.3)
CREAT SERPL-MCNC: 1.57 MG/DL (ref 0.7–1.3)
DIFFERENTIAL METHOD BLD: ABNORMAL
EOSINOPHIL # BLD: 0.1 K/UL (ref 0–0.4)
EOSINOPHIL NFR BLD: 2 % (ref 0–7)
EPITH CASTS URNS QL MICRO: NORMAL /LPF
ERYTHROCYTE [DISTWIDTH] IN BLOOD BY AUTOMATED COUNT: 15.7 % (ref 11.5–14.5)
GLOBULIN SER CALC-MCNC: 3.7 G/DL (ref 2–4)
GLUCOSE SERPL-MCNC: 101 MG/DL (ref 65–100)
GLUCOSE UR STRIP.AUTO-MCNC: NEGATIVE MG/DL
HCT VFR BLD AUTO: 40.8 % (ref 36.6–50.3)
HGB BLD-MCNC: 13.2 G/DL (ref 12.1–17)
HGB UR QL STRIP: NEGATIVE
HYALINE CASTS URNS QL MICRO: NORMAL /LPF (ref 0–5)
IMM GRANULOCYTES # BLD AUTO: 0 K/UL (ref 0–0.04)
IMM GRANULOCYTES NFR BLD AUTO: 1 % (ref 0–0.5)
KETONES UR QL STRIP.AUTO: NEGATIVE MG/DL
LEUKOCYTE ESTERASE UR QL STRIP.AUTO: NEGATIVE
LIPASE SERPL-CCNC: 143 U/L (ref 73–393)
LYMPHOCYTES # BLD: 1.2 K/UL (ref 0.8–3.5)
LYMPHOCYTES NFR BLD: 19 % (ref 12–49)
MCH RBC QN AUTO: 31.9 PG (ref 26–34)
MCHC RBC AUTO-ENTMCNC: 32.4 G/DL (ref 30–36.5)
MCV RBC AUTO: 98.6 FL (ref 80–99)
MONOCYTES # BLD: 0.6 K/UL (ref 0–1)
MONOCYTES NFR BLD: 10 % (ref 5–13)
NEUTS SEG # BLD: 4.4 K/UL (ref 1.8–8)
NEUTS SEG NFR BLD: 67 % (ref 32–75)
NITRITE UR QL STRIP.AUTO: NEGATIVE
NRBC # BLD: 0 K/UL (ref 0–0.01)
NRBC BLD-RTO: 0 PER 100 WBC
PH UR STRIP: 5.5 [PH] (ref 5–8)
PLATELET # BLD AUTO: 267 K/UL (ref 150–400)
PMV BLD AUTO: 10 FL (ref 8.9–12.9)
POTASSIUM SERPL-SCNC: 4.4 MMOL/L (ref 3.5–5.1)
PROT SERPL-MCNC: 7 G/DL (ref 6.4–8.2)
PROT UR STRIP-MCNC: NEGATIVE MG/DL
RBC # BLD AUTO: 4.14 M/UL (ref 4.1–5.7)
RBC #/AREA URNS HPF: NORMAL /HPF (ref 0–5)
SODIUM SERPL-SCNC: 139 MMOL/L (ref 136–145)
SP GR UR REFRACTOMETRY: 1.01 (ref 1–1.03)
UA: UC IF INDICATED,UAUC: NORMAL
UROBILINOGEN UR QL STRIP.AUTO: 0.2 EU/DL (ref 0.2–1)
WBC # BLD AUTO: 6.5 K/UL (ref 4.1–11.1)
WBC URNS QL MICRO: NORMAL /HPF (ref 0–4)

## 2019-08-08 PROCEDURE — 82565 ASSAY OF CREATININE: CPT

## 2019-08-08 PROCEDURE — 96374 THER/PROPH/DIAG INJ IV PUSH: CPT

## 2019-08-08 PROCEDURE — 81001 URINALYSIS AUTO W/SCOPE: CPT

## 2019-08-08 PROCEDURE — 83690 ASSAY OF LIPASE: CPT

## 2019-08-08 PROCEDURE — 80053 COMPREHEN METABOLIC PANEL: CPT

## 2019-08-08 PROCEDURE — 99283 EMERGENCY DEPT VISIT LOW MDM: CPT

## 2019-08-08 PROCEDURE — 85025 COMPLETE CBC W/AUTO DIFF WBC: CPT

## 2019-08-08 PROCEDURE — 74011250636 HC RX REV CODE- 250/636: Performed by: PHYSICIAN ASSISTANT

## 2019-08-08 PROCEDURE — 74176 CT ABD & PELVIS W/O CONTRAST: CPT

## 2019-08-08 PROCEDURE — 36415 COLL VENOUS BLD VENIPUNCTURE: CPT

## 2019-08-08 RX ORDER — MORPHINE SULFATE 2 MG/ML
2 INJECTION, SOLUTION INTRAMUSCULAR; INTRAVENOUS
Status: COMPLETED | OUTPATIENT
Start: 2019-08-08 | End: 2019-08-08

## 2019-08-08 RX ADMIN — MORPHINE SULFATE 2 MG: 2 INJECTION, SOLUTION INTRAMUSCULAR; INTRAVENOUS at 21:27

## 2019-08-09 LAB — D DIMER PPP FEU-MCNC: 1.34 MG/L FEU (ref 0–0.49)

## 2019-08-09 PROCEDURE — 74011000250 HC RX REV CODE- 250: Performed by: PHYSICIAN ASSISTANT

## 2019-08-09 RX ORDER — LIDOCAINE 4 G/100G
1 PATCH TOPICAL EVERY 24 HOURS
Status: DISCONTINUED | OUTPATIENT
Start: 2019-08-09 | End: 2019-08-09

## 2019-08-09 RX ORDER — LIDOCAINE 4 G/100G
1 PATCH TOPICAL
Status: DISCONTINUED | OUTPATIENT
Start: 2019-08-09 | End: 2019-08-09 | Stop reason: HOSPADM

## 2019-08-09 RX ORDER — METHOCARBAMOL 750 MG/1
750 TABLET, FILM COATED ORAL 3 TIMES DAILY
Qty: 15 TAB | Refills: 0 | Status: SHIPPED | OUTPATIENT
Start: 2019-08-09 | End: 2019-09-23

## 2019-08-09 RX ORDER — LIDOCAINE 4 G/100G
PATCH TOPICAL
Qty: 30 PATCH | Refills: 0 | Status: SHIPPED | OUTPATIENT
Start: 2019-08-09 | End: 2019-09-23

## 2019-08-09 NOTE — ED PROVIDER NOTES
EMERGENCY DEPARTMENT HISTORY AND PHYSICAL EXAM      Date: 8/8/2019  Patient Name: Rosie Lee    History of Presenting Illness     Chief Complaint   Patient presents with    Flank Pain       History Provided By: Patient    HPI: Rosie Lee, 80 y.o. male with PMHx significant for coronary artery disease, arthritis, COPD, dyslipidemia, alcohol abuse, hypertension, umbilical hernia, presents to the ED with cc of right flank pain. 2 days ago, the patient was leaning over in the car when he began having severe right flank pain. He had leftover oxycodone from back surgery in May and took this with relief of pain. Today, he leaned over to  something from the ground and had the sudden onset of severe right flank pain. The pain is worse with movement. His daughter reports that he has had intermittent flank pain over the last few months that his primary care provider has been assessing him for. He denies fever, nausea, vomiting, diarrhea, constipation, dysuria, hematuria, bowel or bladder dysfunction, saddle anesthesia, lower extremity numbness or weakness. There are no other complaints, changes, or physical findings at this time. PCP: Erki Santana MD    No current facility-administered medications on file prior to encounter. Current Outpatient Medications on File Prior to Encounter   Medication Sig Dispense Refill    prednisoLONE acetate (PRED FORTE) 1 % ophthalmic suspension       DULoxetine (CYMBALTA) 30 mg capsule Take 1 Cap by mouth daily. 30 Cap 6    olmesartan (BENICAR) 20 mg tablet Take 20 mg by mouth daily.  amLODIPine (NORVASC) 2.5 mg tablet Take 1 Tab by mouth daily. 90 Tab 3    atorvastatin (LIPITOR) 80 mg tablet TAKE ONE TABLET BY MOUTH DAILY 90 Tab 3    atenolol (TENORMIN) 50 mg tablet Take 1 Tab by mouth daily. 30 Tab 5    naloxone (NARCAN) 4 mg/actuation nasal spray Use 1 spray intranasally, then discard.  Repeat with new spray every 2 min as needed for opioid overdose symptoms, alternating nostrils. 1 Each 0    lansoprazole (PREVACID) 15 mg capsule Take 15 mg by mouth Daily (before breakfast).  clonazePAM (KLONOPIN) 0.5 mg tablet Take 1 Tab by mouth nightly as needed (insomnia). Max Daily Amount: 0.5 mg. 30 Tab 0    gabapentin (NEURONTIN) 100 mg capsule Take 100 mg by mouth three (3) times daily. Indications: taking 2 caps three times daily      clopidogrel (PLAVIX) 75 mg tab Take 1 Tab by mouth daily. 90 Tab 3    ANORO ELLIPTA 62.5-25 mcg/actuation inhaler Take 1 Puff by inhalation daily.  furosemide (LASIX) 80 mg tablet Take 1 Tab by mouth daily. 90 Tab 3    allopurinol (ZYLOPRIM) 300 mg tablet Take 1 Tab by mouth daily.  27 Tab 6       Past History     Past Medical History:  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    Morbid obesity ([address]     On statin therapy 8/16/2017    TESSA on CPAP 1/3/2019    Psychiatric disorder     Depression    Simple chronic bronchitis ([address] 1/3/2019    TIA (transient ischemic attack) 8/58/1578    Umbilical hernia 07/9/1145       Past Surgical History:  Past Surgical History:   Procedure Laterality Date    CARDIAC SURG PROCEDURE UNLIST  1996    Cardiac Stents    CARDIAC SURG PROCEDURE UNLIST  04/2019    EF 61-65%    HX HERNIA REPAIR      RIH    HX HERNIA REPAIR  73/23/99    open umbilical hernia repair mesh    HX UROLOGICAL      HYDROCELECTOMY       Family History:  Family History   Problem Relation Age of Onset    Heart Disease Mother     Hypertension Mother     Hypertension Father     Hypertension Sister     Hypertension Brother     Cancer Brother        Social History:  Social History     Tobacco Use    Smoking status: Former Smoker     Packs/day: 0.50     Years: 20.00     Pack years: 10.00    Smokeless tobacco: Former User    Tobacco comment: quit about 40  years ago   / used to dip tobaco and dip snuff   Substance Use Topics    Alcohol use: Not Currently     Comment: \"Drinks very little now for about a month \"    Drug use: No       Allergies: Allergies   Allergen Reactions    Levaquin [Levofloxacin] Nausea and Vomiting     Reports anxiety, nausea, aching after taking levaquin         Review of Systems   Review of Systems   Constitutional: Negative for chills and fever. HENT: Negative for ear pain and sore throat. Eyes: Negative for redness and visual disturbance. Respiratory: Negative for cough and shortness of breath. Cardiovascular: Negative for chest pain and palpitations. Gastrointestinal: Negative for abdominal pain, constipation, diarrhea, nausea and vomiting. Genitourinary: Positive for flank pain. Negative for dysuria and hematuria. Musculoskeletal: Negative for back pain and gait problem. Skin: Negative for rash and wound. Neurological: Negative for dizziness, weakness, numbness and headaches. Psychiatric/Behavioral: Negative for behavioral problems and confusion. All other systems reviewed and are negative. Physical Exam   Physical Exam   Constitutional: He is oriented to person, place, and time. He appears well-developed and well-nourished. Uncomfortable appearing with movement. HENT:   Head: Normocephalic and atraumatic. Eyes: Pupils are equal, round, and reactive to light. Conjunctivae and EOM are normal.   Neck: Normal range of motion. Neck supple.    Cardiovascular: Normal rate, regular rhythm and normal heart sounds. Pulmonary/Chest: Effort normal and breath sounds normal. No respiratory distress. He has no wheezes. He has no rales. Abdominal: Soft. Abdomen is protuberant but not firm or tense. He has multiple surgical incision scars. There is a small bulge over the right flank that may be a hernia. This area is very tender to palpation. There is also an anterior wall hernia with no tenderness. No rebound or guarding. Musculoskeletal:   Surgical scar over the lower thoracic spine. No midline tenderness to palpation of the thoracic or lumbar spine. Neurological: He is alert and oriented to person, place, and time. He has normal strength. No cranial nerve deficit or sensory deficit. GCS eye subscore is 4. GCS verbal subscore is 5. GCS motor subscore is 6. Skin: Skin is warm and dry. No rash noted. Psychiatric: He has a normal mood and affect. His behavior is normal.   Nursing note and vitals reviewed. Diagnostic Study Results     Labs -     Recent Results (from the past 12 hour(s))   CBC WITH AUTOMATED DIFF    Collection Time: 08/08/19  9:28 PM   Result Value Ref Range    WBC 6.5 4.1 - 11.1 K/uL    RBC 4.14 4.10 - 5.70 M/uL    HGB 13.2 12.1 - 17.0 g/dL    HCT 40.8 36.6 - 50.3 %    MCV 98.6 80.0 - 99.0 FL    MCH 31.9 26.0 - 34.0 PG    MCHC 32.4 30.0 - 36.5 g/dL    RDW 15.7 (H) 11.5 - 14.5 %    PLATELET 203 770 - 338 K/uL    MPV 10.0 8.9 - 12.9 FL    NRBC 0.0 0  WBC    ABSOLUTE NRBC 0.00 0.00 - 0.01 K/uL    NEUTROPHILS 67 32 - 75 %    LYMPHOCYTES 19 12 - 49 %    MONOCYTES 10 5 - 13 %    EOSINOPHILS 2 0 - 7 %    BASOPHILS 1 0 - 1 %    IMMATURE GRANULOCYTES 1 (H) 0.0 - 0.5 %    ABS. NEUTROPHILS 4.4 1.8 - 8.0 K/UL    ABS. LYMPHOCYTES 1.2 0.8 - 3.5 K/UL    ABS. MONOCYTES 0.6 0.0 - 1.0 K/UL    ABS. EOSINOPHILS 0.1 0.0 - 0.4 K/UL    ABS. BASOPHILS 0.1 0.0 - 0.1 K/UL    ABS. IMM.  GRANS. 0.0 0.00 - 0.04 K/UL    DF AUTOMATED     URINALYSIS W/ REFLEX CULTURE    Collection Time: 08/08/19 10:53 PM   Result Value Ref Range    Color YELLOW/STRAW      Appearance CLEAR CLEAR      Specific gravity 1.014 1.003 - 1.030      pH (UA) 5.5 5.0 - 8.0      Protein NEGATIVE  NEG mg/dL    Glucose NEGATIVE  NEG mg/dL    Ketone NEGATIVE  NEG mg/dL    Bilirubin NEGATIVE  NEG      Blood NEGATIVE  NEG      Urobilinogen 0.2 0.2 - 1.0 EU/dL    Nitrites NEGATIVE  NEG      Leukocyte Esterase NEGATIVE  NEG      WBC 0-4 0 - 4 /hpf    RBC 0-5 0 - 5 /hpf    Epithelial cells FEW FEW /lpf    Bacteria NEGATIVE  NEG /hpf    UA:UC IF INDICATED CULTURE NOT INDICATED BY UA RESULT CNI      Hyaline cast 2-5 0 - 5 /lpf   METABOLIC PANEL, COMPREHENSIVE    Collection Time: 08/08/19 11:03 PM   Result Value Ref Range    Sodium 139 136 - 145 mmol/L    Potassium 4.4 3.5 - 5.1 mmol/L    Chloride 104 97 - 108 mmol/L    CO2 30 21 - 32 mmol/L    Anion gap 5 5 - 15 mmol/L    Glucose 101 (H) 65 - 100 mg/dL    BUN 27 (H) 6 - 20 MG/DL    Creatinine 1.57 (H) 0.70 - 1.30 MG/DL    BUN/Creatinine ratio 17 12 - 20      GFR est AA 51 (L) >60 ml/min/1.73m2    GFR est non-AA 42 (L) >60 ml/min/1.73m2    Calcium 8.9 8.5 - 10.1 MG/DL    Bilirubin, total 0.3 0.2 - 1.0 MG/DL    ALT (SGPT) 52 12 - 78 U/L    AST (SGOT) 41 (H) 15 - 37 U/L    Alk. phosphatase 133 (H) 45 - 117 U/L    Protein, total 7.0 6.4 - 8.2 g/dL    Albumin 3.3 (L) 3.5 - 5.0 g/dL    Globulin 3.7 2.0 - 4.0 g/dL    A-G Ratio 0.9 (L) 1.1 - 2.2     LIPASE    Collection Time: 08/08/19 11:03 PM   Result Value Ref Range    Lipase 143 73 - 393 U/L   POC CREATININE    Collection Time: 08/08/19 11:05 PM   Result Value Ref Range    Creatinine (POC) 1.5 (H) 0.6 - 1.3 mg/dL    GFRAA, POC 54 (L) >60 ml/min/1.73m2    GFRNA, POC 45 (L) >60 ml/min/1.73m2       Radiologic Studies -   CT ABD PELV WO CONT   Final Result   IMPRESSION:   No acute findings seen. CT Results  (Last 48 hours)               08/08/19 2332  CT ABD PELV WO CONT Final result    Impression:  IMPRESSION:   No acute findings seen. Narrative:  EXAM: CT ABD PELV WO CONT       INDICATION: Pain, flank colic/kidney; Possible hernia, severe pain since bending   foreward right side       COMPARISON: August 17, 2018       CONTRAST:  None. TECHNIQUE:    Thin axial images were obtained through the  abdomen and pelvis. Coronal and   sagittal reconstructions were generated. Oral contrast was not administered. CT   dose reduction was achieved through use of a standardized protocol tailored for   this examination and automatic exposure control for dose modulation. The absence of intravenous contrast material reduces the sensitivity for   evaluation of the solid parenchymal organs of the abdomen. FINDINGS:    LUNG BASES: Clear. INCIDENTALLY IMAGED HEART AND MEDIASTINUM: Unremarkable. LIVER: No mass or biliary dilatation. GALLBLADDER: Unremarkable. SPLEEN: No mass. PANCREAS: No mass or ductal dilatation. ADRENALS: Unremarkable. KIDNEYS/URETERS: No mass, calculus, or hydronephrosis. STOMACH: Unremarkable. SMALL BOWEL: No dilatation or wall thickening. COLON: No dilatation or wall thickening. Sigmoid diverticulosis. APPENDIX: Unremarkable. PERITONEUM: No ascites or pneumoperitoneum. RETROPERITONEUM: No lymphadenopathy or aortic aneurysm. REPRODUCTIVE ORGANS:   URINARY BLADDER: No mass or calculus. Not filled cannot be fully evaluated. BONES: No destructive bone lesion. Postsurgical and degenerative changes   thoracolumbar spine. ADDITIONAL COMMENTS: N/A               CXR Results  (Last 48 hours)    None            Medical Decision Making   I am the first provider for this patient. I reviewed the vital signs, available nursing notes, past medical history, past surgical history, family history and social history. Vital Signs-Reviewed the patient's vital signs.   Patient Vitals for the past 12 hrs:   Temp Pulse Resp BP SpO2   08/08/19 1940 97.4 °F (36.3 °C) 70 18 (!) 134/96 93 %         Records Reviewed: Nursing Notes and Old Medical Records    Provider Notes (Medical Decision Making):   Patient presents with right flank pain. Differential diagnosis includes hernia, kidney stone, pyelonephritis, cholelithiasis, cholecystitis, appendicitis, musculoskeletal pain. Plan for labs, CT of the abdomen. ED Course:   Initial assessment performed. The patients presenting problems have been discussed, and they are in agreement with the care plan formulated and outlined with them. I have encouraged them to ask questions as they arise throughout their visit. 12:00 AM - Discussed results with patient and family. Dr Cheri Buchanan also assessed the patient. CT is negative and swelling to the right flank is consistent with soft tissue swelling due to muscle strain. Plan to treat with muscle relaxer and lidocaine patches. Disposition:  12:02 AM    The patient has been re-evaluated and is ready for discharge. Reviewed available results with patient. Counseled patient on diagnosis and care plan. Patient has expressed understanding, and all questions have been answered. Patient agrees with plan and agrees to follow up as recommended, or to return to the ED if their symptoms worsen. Discharge instructions have been provided and explained to the patient, along with reasons to return to the ED. PLAN:  1. Current Discharge Medication List      START taking these medications    Details   methocarbamol (ROBAXIN) 750 mg tablet Take 1 Tab by mouth three (3) times daily. Qty: 15 Tab, Refills: 0      lidocaine 4 % patch Apply patch to the area for 12 hours, then remove for 12 hours. Qty: 30 Patch, Refills: 0           2.    Follow-up Information     Follow up With Specialties Details Why Contact Info    Yunior Garcia MD Internal Medicine Schedule an appointment as soon as possible for a visit in 2 days for a recheck [address] [place] [address]  [phone]      [facility] EMERGENCY DEPT Emergency Medicine Go to If symptoms worsen [address] [place] [address]  [phone]        Return to ED if worse     Diagnosis     Clinical Impression:   1. Strain of abdominal wall, initial encounter            Reilly Reyes. KAYLEE Soto        This note will not be viewable in [email or web address].

## 2019-08-09 NOTE — ED NOTES
Report given to Lawanda [facility]. They were informed of patient chief complaint, current status, orders completed (to include IV access/medications/radiology testing), outstanding orders that still need to be completed, and the treatment plan. Ensured no questions or concerns regarding the patient prior to departure.

## 2019-08-09 NOTE — ED NOTES
Med applied to rt. Abd., tolerated well. Pt. Discharged to home by MD alert and oriented x 2, no distress. with family, iv d/c'd by MD

## 2019-08-09 NOTE — PROGRESS NOTES
D-dimer mildly elevated. If flank pain persist will need CT angiogram of the chest.  I see where he presented to the emergency room for evaluation and this will have been evaluated further at that time. Follow-up office visit to be scheduled.

## 2019-08-09 NOTE — DISCHARGE INSTRUCTIONS
Patient Education        Abdominal Strain: Rehab Exercises  Introduction  Here are some examples of exercises for you to try. The exercises may be suggested for a condition or for rehabilitation. Start each exercise slowly. Ease off the exercises if you start to have pain. You will be told when to start these exercises and which ones will work best for you. How to do the exercises  Tummy tuck    1. Lie on your back with your knees bent. Place two fingers just inside your hip bones so you can feel your lower belly muscles. 2. Take a deep breath in.  3. As you breathe out, pull your belly button in toward your spine, as if you are trying to zip up a tight pair of jeans. You should feel your lower belly muscles pull slightly away from your fingers as the muscles tighten. 4. Hold for about 6 seconds, but do not hold your breath. 5. Relax up to 10 seconds. 6. Repeat 8 to 12 times. 7. Repeat several times a day, and try to hold your lower belly muscles in for longer as you get stronger. 8. Practice doing this exercise while you are standing, such as when you are standing in line, or sitting. Pelvic tilt    1. Lie on your back with your knees bent. 2. \"Brace\" your stomach--tighten your muscles by pulling in and imagining your belly button moving toward your spine. 3. Press your lower back into the floor. You should feel your hips and pelvis rock back. 4. Hold for 6 seconds while breathing smoothly. 5. Relax and allow your pelvis and hips to rock forward. 6. Repeat 8 to 12 times. Curl-up    1. Lie down with your knees bent and your arms at your sides. Keep your feet flat on the floor. 2. Lift your head and shoulders up a few inches. At the same time, raise your arms to about thigh level. 3. Hold for 6 seconds. 4. Relax and return to your starting position. 5. Repeat 8 to 12 times. Diagonal curl-up    1. Lie down with your knees bent and your arms at your sides.  Keep your feet flat on the floor.  2. Lift your head and shoulders up. At the same time, reach to one side with both arms. 3. Hold for 6 seconds. 4. Relax and return to your starting position. 5. Repeat 8 to 12 times. 6. Repeat the same steps on your other side. Follow-up care is a key part of your treatment and safety. Be sure to make and go to all appointments, and call your doctor if you are having problems. It's also a good idea to know your test results and keep a list of the medicines you take. Where can you learn more? Go to http://rhonda-ko.info/. Enter E932 in the search box to learn more about \"Abdominal Strain: Rehab Exercises. \"  Current as of: September 20, 2018  Content Version: 12.1  © 8523-2599 [employer]. Care instructions adapted under license by [employer] (which disclaims liability or warranty for this information). If you have questions about a medical condition or this instruction, always ask your healthcare professional. [place] [address] any warranty or liability for your use of this information. Patient Education        Muscle Strain: Care Instructions  Your Care Instructions    A muscle strain happens when you overstretch, or pull, a muscle. It can happen when you exercise or lift something or when you have an accident. Rest and other home care can help the muscle heal.  Follow-up care is a key part of your treatment and safety. Be sure to make and go to all appointments, and call your doctor if you are having problems. It's also a good idea to know your test results and keep a list of the medicines you take. How can you care for yourself at home? · Rest the strained muscle. Do not put weight on it for a day or two. If your doctor advises you to, use crutches or a sling to rest a sore limb. · Put ice or a cold pack on the sore muscle for 10 to 20 minutes at a time to stop swelling.  Put a thin cloth between the ice pack and your skin.  · Prop up the sore arm or leg on a pillow when you ice it or anytime you sit or lie down during the next 3 days. Try to keep it above the level of your heart. This will help reduce swelling. · Take pain medicines exactly as directed. ? If the doctor gave you a prescription medicine for pain, take it as prescribed. ? If you are not taking a prescription pain medicine, ask your doctor if you can take an over-the-counter medicine. · Do not do anything that makes the pain worse. Return to exercise gradually as you feel better. When should you call for help? Call your doctor now or seek immediate medical care if:    · You have new severe pain.     · Your injured limb is cool or pale or changes color.     · You have tingling, weakness, or numbness in your injured limb.     · You cannot move the injured area.    Watch closely for changes in your health, and be sure to contact your doctor if:    · You cannot put weight on a joint, or it feels unsteady when you walk.     · Pain and swelling get worse or do not start to get better after 2 days of home treatment. Where can you learn more? Go to http://rhonda-ko.info/. Enter I870 in the search box to learn more about \"Muscle Strain: Care Instructions. \"  Current as of: September 20, 2018  Content Version: 12.1  © 7260-3355 [email or web address]. Care instructions adapted under license by [employer] (which disclaims liability or warranty for this information). If you have questions about a medical condition or this instruction, always ask your healthcare professional. [place] [address] any warranty or liability for your use of this information.

## 2019-08-13 RX ORDER — ALLOPURINOL 300 MG/1
TABLET ORAL
Qty: 30 TAB | Refills: 4 | Status: SHIPPED | OUTPATIENT
Start: 2019-08-13 | End: 2019-08-14 | Stop reason: SDUPTHER

## 2019-08-14 ENCOUNTER — OFFICE VISIT (OUTPATIENT)
Dept: INTERNAL MEDICINE CLINIC | Age: 84
End: 2019-08-14

## 2019-08-14 VITALS
BODY MASS INDEX: 34.84 KG/M2 | SYSTOLIC BLOOD PRESSURE: 112 MMHG | WEIGHT: 222 LBS | TEMPERATURE: 98.1 F | HEART RATE: 72 BPM | DIASTOLIC BLOOD PRESSURE: 62 MMHG | HEIGHT: 67 IN | RESPIRATION RATE: 18 BRPM | OXYGEN SATURATION: 95 %

## 2019-08-14 DIAGNOSIS — R10.9 RIGHT FLANK PAIN: Primary | ICD-10-CM

## 2019-08-14 LAB
ANION GAP SERPL CALC-SCNC: 14 MMOL/L
BUN SERPL-MCNC: 29 MG/DL (ref 9–20)
CALCIUM SERPL-MCNC: 9.3 MG/DL (ref 8.4–10.2)
CHLORIDE SERPL-SCNC: 101 MMOL/L (ref 98–107)
CO2 SERPL-SCNC: 30 MMOL/L (ref 22–32)
CREAT SERPL-MCNC: 1.5 MG/DL (ref 0.8–1.5)
GLUCOSE SERPL-MCNC: 103 MG/DL (ref 75–110)
POTASSIUM SERPL-SCNC: 5.7 MMOL/L (ref 3.6–5)
SODIUM SERPL-SCNC: 145 MMOL/L (ref 137–145)

## 2019-08-14 RX ORDER — DICLOFENAC SODIUM 100 MG/1
100 TABLET, FILM COATED, EXTENDED RELEASE ORAL DAILY
Qty: 30 TAB | Refills: 0 | Status: SHIPPED | OUTPATIENT
Start: 2019-08-14 | End: 2019-09-23

## 2019-08-14 RX ORDER — HYDROCODONE BITARTRATE AND ACETAMINOPHEN 5; 325 MG/1; MG/1
1 TABLET ORAL
Qty: 15 TAB | Refills: 0 | Status: SHIPPED | OUTPATIENT
Start: 2019-08-14 | End: 2019-08-17

## 2019-08-14 NOTE — PROGRESS NOTES
Gwynn Skiff presents today at the clinic for    Chief Complaint   Patient presents with    LOW BACK PAIN     ride side        Wt Readings from Last 3 Encounters:   08/14/19 222 lb (100.7 kg)   08/08/19 221 lb (100.2 kg)   08/01/19 220 lb 9.6 oz (100.1 kg)     Temp Readings from Last 3 Encounters:   08/14/19 98.1 °F (36.7 °C) (Oral)   08/08/19 97.4 °F (36.3 °C)   08/01/19 98.1 °F (36.7 °C) (Oral)     BP Readings from Last 3 Encounters:   08/14/19 112/62   08/08/19 (!) 134/96   08/01/19 132/72     Pulse Readings from Last 3 Encounters:   08/14/19 72   08/08/19 70   08/01/19 71       Health Maintenance Due   Topic    DTaP/Tdap/Td series (1 - Tdap)    Shingrix Vaccine Age 50> (1 of 2)    GLAUCOMA SCREENING Q2Y     MEDICARE YEARLY EXAM     Influenza Age 5 to Adult          Learning Assessment:  :     Learning Assessment 8/21/2018 9/8/2017 11/9/2015   PRIMARY LEARNER Patient Patient Patient   HIGHEST LEVEL OF EDUCATION - PRIMARY LEARNER  - GRADUATED HIGH SCHOOL OR GED -   BARRIERS PRIMARY LEARNER - NONE -   CO-LEARNER CAREGIVER - No -   PRIMARY LANGUAGE ENGLISH ENGLISH ENGLISH   LEARNER PREFERENCE PRIMARY LISTENING LISTENING LISTENING   ANSWERED BY patient patient patient   RELATIONSHIP SELF SELF SELF       Depression Screening:  :     3 most recent PHQ Screens 4/12/2019   [facility] Not Done Patient Decline   Little interest or pleasure in doing things -   Feeling down, depressed, irritable, or hopeless -   Total Score PHQ 2 -       Fall Risk Assessment:  :     Fall Risk Assessment, last 12 mths 1/3/2019   Able to walk? Yes   Fall in past 12 months? No       Abuse Screening:  :     Abuse Screening Questionnaire 4/26/2019 8/21/2018 7/27/2018 9/8/2017   Do you ever feel afraid of your partner? N N N N   Are you in a relationship with someone who physically or mentally threatens you? N N N N   Is it safe for you to go home?  Charity Villegas       Coordination of Care Questionnaire:  :     1. Have you been to the ER, urgent care clinic since your last visit? Hospitalized since your last visit? ED [facility] 8/8/19 for low back pain    2. Have you seen or consulted any other health care providers outside of the [address] [place] [address] since your last visit? Include any pap smears or colon screening.  no

## 2019-08-15 NOTE — PROGRESS NOTES
Potassium is 5.7. Question if this could be lab error as it was 4.4 last week. Creatinine is still up at 1.5. He can take his pain medicine but would like for him to have a follow up lab tomorrow.

## 2019-08-30 NOTE — PROGRESS NOTES
This note will not be viewable in [address] Del Holloway is a 80 y.o. male and presents with LOW BACK PAIN (ride side)  . Subjective:    Right flank to low back discomfort. This has been present on and off for the past few days. He denies any trauma or injury. He has no fever chills or rigors. He has no urinary frequency or urgency. The pain is exacerbated by movement he had a CT scan done in the emergency room on the eighth which did not reveal any significant abnormality. He had some previously noted degenerative changes in the lumbar spine. He describes the pain is radiating into his right groin area. .       Review of Systems  Constitutional:   Eyes:   negative for visual disturbance and irritation  ENT:   negative for tinnitus,sore throat,nasal congestion,ear pains. hoarseness  Respiratory:  negative for cough, hemoptysis, dyspnea,wheezing  CV:   negative for chest pain, palpitations, lower extremity edema  GI:   negative for nausea, vomiting, diarrhea, abdominal pain,melena  Endo:               negative for polyuria,polydipsia,polyphagia,heat intolerance  Genitourinary: negative for frequency, dysuria and hematuria  Integumentary: negative for rash and pruritus  Hematologic:  negative for easy bruising and gum/nose bleeding  Musculoskel: negative for myalgias, arthralgias, muscle weakness, joint pain  Neurological:  negative for headaches, dizziness, vertigo, memory problems and gait   Behavl/Psych: negative for feelings of anxiety, depression, mood changes    Past Medical History:   Diagnosis Date    Adverse effect of anesthesia     combative after anesthesia    Alcohol abuse     6 beers/day    Arthritis     CAD (coronary artery disease)     [facility] disease ([facility] [address]     Dyslipidemia 8/16/2017    Dyspepsia and other specified disorders of function of stomach     Dyspnea 8/16/2017    ED (erectile dysfunction) 8/16/2017    Encounter for immunization 8/16/2017    Fatigue 8/16/2017    Flank pain 8/1/2019    GERD (gastroesophageal reflux disease) 8/16/2017    Gout 8/16/2017    Hypertension     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    Morbid obesity ([facility] [address]     On statin therapy 8/16/2017    TESSA on CPAP 1/3/2019    Psychiatric disorder     Depression    Simple chronic bronchitis ([facility] [address] 1/3/2019    TIA (transient ischemic attack) 5/07/4540    Umbilical hernia 30/7/6768     Past Surgical History:   Procedure Laterality Date    CARDIAC SURG PROCEDURE UNLIST  1996    Cardiac Stents    CARDIAC SURG PROCEDURE UNLIST  04/2019    EF 61-65%    HX HERNIA REPAIR      RIH    HX HERNIA REPAIR  63/89/71    open umbilical hernia repair mesh    HX UROLOGICAL      HYDROCELECTOMY     Social History     Socioeconomic History    Marital status:      Spouse name: Not on file    Number of children: Not on file    Years of education: Not on file    Highest education level: Not on file   Tobacco Use    Smoking status: Former Smoker     Packs/day: 0.50     Years: 20.00     Pack years: 10.00    Smokeless tobacco: Former User    Tobacco comment: quit about 40  years ago   / used to dip tobaco and dip snuff   Substance and Sexual Activity    Alcohol use: Not Currently     Comment: \"Drinks very little now for about a month \"    Drug use: No     Family History   Problem Relation Age of Onset    Heart Disease Mother     Hypertension Mother     Hypertension Father     Hypertension Sister     Hypertension Brother     Cancer Brother      Current Outpatient Medications   Medication Sig Dispense Refill    diclofenac (VOLTAREN XR) 100 mg ER tablet Take 1 Tab by mouth daily. 30 Tab 0    methocarbamol (ROBAXIN) 750 mg tablet Take 1 Tab by mouth three (3) times daily. 15 Tab 0    lidocaine 4 % patch Apply patch to the area for 12 hours, then remove for 12 hours.  30 Patch 0    prednisoLONE acetate (PRED FORTE) 1 % ophthalmic suspension       DULoxetine (CYMBALTA) 30 mg capsule Take 1 Cap by mouth daily. 30 Cap 6    olmesartan (BENICAR) 20 mg tablet Take 20 mg by mouth daily.  amLODIPine (NORVASC) 2.5 mg tablet Take 1 Tab by mouth daily. 90 Tab 3    atorvastatin (LIPITOR) 80 mg tablet TAKE ONE TABLET BY MOUTH DAILY 90 Tab 3    atenolol (TENORMIN) 50 mg tablet Take 1 Tab by mouth daily. 30 Tab 5    naloxone (NARCAN) 4 mg/actuation nasal spray Use 1 spray intranasally, then discard. Repeat with new spray every 2 min as needed for opioid overdose symptoms, alternating nostrils. 1 Each 0    lansoprazole (PREVACID) 15 mg capsule Take 15 mg by mouth Daily (before breakfast).  clonazePAM (KLONOPIN) 0.5 mg tablet Take 1 Tab by mouth nightly as needed (insomnia). Max Daily Amount: 0.5 mg. 30 Tab 0    gabapentin (NEURONTIN) 100 mg capsule Take 100 mg by mouth three (3) times daily. Indications: taking 2 caps three times daily      clopidogrel (PLAVIX) 75 mg tab Take 1 Tab by mouth daily. 90 Tab 3    ANORO ELLIPTA 62.5-25 mcg/actuation inhaler Take 1 Puff by inhalation daily.  furosemide (LASIX) 80 mg tablet Take 1 Tab by mouth daily. 90 Tab 3    allopurinol (ZYLOPRIM) 300 mg tablet Take 1 Tab by mouth daily.  30 Tab 6     Allergies   Allergen Reactions    Levaquin [Levofloxacin] Nausea and Vomiting     Reports anxiety, nausea, aching after taking levaquin       Objective:  Visit Vitals  /62 (BP 1 Location: Right arm, BP Patient Position: Sitting)   Pulse 72   Temp 98.1 °F (36.7 °C) (Oral)   Resp 18   Ht 5' 7\" (1.702 m)   Wt 222 lb (100.7 kg)   SpO2 95%   BMI 34.77 kg/m²     Physical Exam:   General appearance - alert, well appearing, and in no distress  Mental status - alert, oriented to person, place, and time  EYE-MAURICE, EOMI, fundi normal, corneas normal, no foreign bodies  ENT-ENT exam normal, no neck nodes or sinus tenderness  Nose - normal and patent, no erythema, discharge or polyps  Mouth - mucous membranes moist, pharynx normal without lesions  Neck - supple, no significant adenopathy   Chest - clear to auscultation, no wheezes, rales or rhonchi, symmetric air entry, no CVA tenderness  Heart - normal rate, regular rhythm, normal S1, S2, no murmurs, rubs, clicks or gallops   Abdomen - soft, nontender, nondistended, no masses or organomegaly  Lymph- no adenopathy palpable  Ext-peripheral pulses normal, no pedal edema, no clubbing or cyanosis  Skin-Warm and dry. no hyperpigmentation, vitiligo, or suspicious lesions  Neuro -alert, oriented, normal speech, no focal findings or movement disorder noted  Musculoskeletal- FROM, no bony abnormalities, no point tenderness, straight leg raising test is negative, deep tendon reflexes are diminished but symmetric, left hip demonstrates full range of motion without pain or discomfort on internal rotation    Results for orders placed or performed in visit on 49/79/79   METABOLIC PANEL, BASIC   Result Value Ref Range    Glucose 103 75 - 110 mg/dL    BUN 29.0 (H) 9.0 - 20.0 mg/dL    Creatinine 1.5 0.8 - 1.5 mg/dL    Sodium 145 137 - 145 mmol/L    Potassium 5.7 (H) 3.6 - 5.0 mmol/L    Chloride 101 98 - 107 mmol/L    CO2 30.0 22.0 - 32.0 mmol/L    Calcium 9.3 8.4 - 10.2 mg/dl    Anion gap 14 mmol/L    GFR est AA 54 <60 mL/min/1.73m2    GFR est non-AA 45 <60 mL/min/1.73m2     All results for lab orders may not have been returned by the time this encountered was closed. Assessment/Plan:       ICD-10-CM ICD-9-CM    1. Right flank pain N44.0 778.10 METABOLIC PANEL, BASIC      diclofenac (VOLTAREN XR) 100 mg ER tablet      HYDROcodone-acetaminophen (NORCO) 5-325 mg per tablet       Orders Placed This Encounter    METABOLIC PANEL, BASIC ([facility])    diclofenac (VOLTAREN XR) 100 mg ER tablet     Sig: Take 1 Tab by mouth daily. Dispense:  30 Tab     Refill:  0    HYDROcodone-acetaminophen (NORCO) 5-325 mg per tablet     Sig: Take 1 Tab by mouth every four (4) hours as needed for Pain for up to 3 days. Max Daily Amount: 6 Tabs. Dispense:  15 Tab     Refill:  0       Follow-up and Dispositions    · Return for as scheduled. Plan:    Right flank pain which is most likely musculoskeletal.  Patient has some mild renal insufficiency and will not be given an NSAID. He is given a limited amount of hydrocodone to take for severe pain. If his symptoms are not improved may consider physical therapy evaluation or further imaging if indicated. Further recommendations based on labs as ordered. I have reviewed with the patient details of the assessment and plan and all questions were answered. Relevent patient education was performed. Verbal and/or written instructions (see AVS) provided. The most recent lab findings were reviewed with the patient. Plan was discussed with patient who verbal expressed understanding. An After Visit Summary was printed and given to the patient.       Bria Hinojosa MD

## 2019-09-20 ENCOUNTER — OFFICE VISIT (OUTPATIENT)
Dept: INTERNAL MEDICINE CLINIC | Age: 84
End: 2019-09-20

## 2019-09-20 VITALS
OXYGEN SATURATION: 95 % | TEMPERATURE: 94 F | HEIGHT: 67 IN | HEART RATE: 57 BPM | SYSTOLIC BLOOD PRESSURE: 132 MMHG | RESPIRATION RATE: 18 BRPM | DIASTOLIC BLOOD PRESSURE: 72 MMHG | WEIGHT: 229 LBS | BODY MASS INDEX: 35.94 KG/M2

## 2019-09-20 DIAGNOSIS — R53.83 FATIGUE, UNSPECIFIED TYPE: ICD-10-CM

## 2019-09-20 DIAGNOSIS — R51.9 ACUTE NONINTRACTABLE HEADACHE, UNSPECIFIED HEADACHE TYPE: ICD-10-CM

## 2019-09-20 DIAGNOSIS — I10 ESSENTIAL HYPERTENSION: Primary | ICD-10-CM

## 2019-09-20 LAB
A-G RATIO,AGRAT: 1.2 RATIO
ALBUMIN SERPL-MCNC: 4 G/DL (ref 3.9–5.4)
ALP SERPL-CCNC: 186 U/L (ref 38–126)
ALT SERPL-CCNC: 35 U/L (ref 0–50)
ANION GAP SERPL CALC-SCNC: 11 MMOL/L
AST SERPL W P-5'-P-CCNC: 36 U/L (ref 14–36)
BILIRUB SERPL-MCNC: 0.5 MG/DL (ref 0.2–1.3)
BUN SERPL-MCNC: 22 MG/DL (ref 9–20)
BUN/CREATININE RATIO,BUCR: 16 RATIO
CALCIUM SERPL-MCNC: 9.4 MG/DL (ref 8.4–10.2)
CHLORIDE SERPL-SCNC: 101 MMOL/L (ref 98–107)
CO2 SERPL-SCNC: 29 MMOL/L (ref 22–32)
CREAT SERPL-MCNC: 1.4 MG/DL (ref 0.8–1.5)
GLOBULIN,GLOB: 3.4
GLUCOSE SERPL-MCNC: 139 MG/DL (ref 75–110)
POTASSIUM SERPL-SCNC: 5.1 MMOL/L (ref 3.6–5)
PROT SERPL-MCNC: 7.4 G/DL (ref 6.3–8.2)
SODIUM SERPL-SCNC: 141 MMOL/L (ref 137–145)

## 2019-09-20 NOTE — PROGRESS NOTES
This note will not be viewable in [address] Rosie Lee is a 80 y.o. male and presents with Hypertension (elevated BP this am 199/99 194/85, BAD headache, feels faint)  . Subjective:    Mr. Eugene Reeder presents today for follow-up evaluation. He states that upon awakening this morning he felt very poorly. He feels faint and he has a bad headache. His blood pressure was elevated when he checked it at home this morning. He did take his blood pressure medication. He states that he broke out in sweat. This lasted for several minutes. He denies shortness of breath or chest pain. He denies PND orthopnea or pedal edema. His headache is somewhat improved at this point but still persists. He denies any visual changes. He has no sinus pressure congestion or drainage. Review of Systems  Constitutional:   Eyes:   negative for visual disturbance and irritation  ENT:   negative for tinnitus,sore throat,nasal congestion,ear pains. hoarseness  Respiratory:  negative for cough, hemoptysis, dyspnea,wheezing  CV:   negative for chest pain, palpitations, lower extremity edema  GI:   negative for nausea, vomiting, diarrhea, abdominal pain,melena  Endo:               negative for polyuria,polydipsia,polyphagia,heat intolerance  Genitourinary: negative for frequency, dysuria and hematuria  Integumentary: negative for rash and pruritus  Hematologic:  negative for easy bruising and gum/nose bleeding  Musculoskel: negative for myalgias, arthralgias, back pain, muscle weakness, joint pain  Neurological:  negative for headaches, dizziness, vertigo, memory problems and gait   Behavl/Psych: negative for feelings of anxiety, depression, mood changes    Past Medical History:   Diagnosis Date    Adverse effect of anesthesia     combative after anesthesia    Alcohol abuse     6 beers/day    Arthritis     CAD (coronary artery disease)     Chronic obstructive pulmonary disease ([facility] [address]     Dyslipidemia 8/16/2017    Dyspepsia and other specified disorders of function of stomach     Dyspnea 8/16/2017    ED (erectile dysfunction) 8/16/2017    Encounter for immunization 8/16/2017    Fatigue 8/16/2017    Flank pain 8/1/2019    GERD (gastroesophageal reflux disease) 8/16/2017    Gout 8/16/2017    Hypertension     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    Morbid obesity ([facility] [address]     On statin therapy 8/16/2017    TESSA on CPAP 1/3/2019    Psychiatric disorder     Depression    Simple chronic bronchitis ([facility] [address] 1/3/2019    TIA (transient ischemic attack) 0/36/7663    Umbilical hernia 54/8/6066     Past Surgical History:   Procedure Laterality Date    CARDIAC SURG PROCEDURE UNLIST  1996    Cardiac Stents    CARDIAC SURG PROCEDURE UNLIST  04/2019    EF 61-65%    HX HERNIA REPAIR      RIH    HX HERNIA REPAIR  88/02/22    open umbilical hernia repair mesh    HX UROLOGICAL      HYDROCELECTOMY     Social History     Socioeconomic History    Marital status:      Spouse name: Not on file    Number of children: Not on file    Years of education: Not on file    Highest education level: Not on file   Tobacco Use    Smoking status: Former Smoker     Packs/day: 0.50     Years: 20.00     Pack years: 10.00    Smokeless tobacco: Former User    Tobacco comment: quit about 40  years ago   / used to dip tobaco and dip snuff   Substance and Sexual Activity    Alcohol use: Not Currently     Comment: \"Drinks very little now for about a month \"    Drug use: No     Family History   Problem Relation Age of Onset    Heart Disease Mother     Hypertension Mother     Hypertension Father     Hypertension Sister     Hypertension Brother     Cancer Brother      Current Outpatient Medications   Medication Sig Dispense Refill    DULoxetine (CYMBALTA) 30 mg capsule Take 1 Cap by mouth daily. 30 Cap 6    olmesartan (BENICAR) 20 mg tablet Take 20 mg by mouth daily.  amLODIPine (NORVASC) 2.5 mg tablet Take 1 Tab by mouth daily.  90 Tab 3    atorvastatin (LIPITOR) 80 mg tablet TAKE ONE TABLET BY MOUTH DAILY 90 Tab 3    atenolol (TENORMIN) 50 mg tablet Take 1 Tab by mouth daily. 30 Tab 5    naloxone (NARCAN) 4 mg/actuation nasal spray Use 1 spray intranasally, then discard. Repeat with new spray every 2 min as needed for opioid overdose symptoms, alternating nostrils. 1 Each 0    lansoprazole (PREVACID) 15 mg capsule Take 15 mg by mouth Daily (before breakfast).  gabapentin (NEURONTIN) 100 mg capsule Take 100 mg by mouth three (3) times daily. Indications: taking 2 caps three times daily      clopidogrel (PLAVIX) 75 mg tab Take 1 Tab by mouth daily. 90 Tab 3    ANORO ELLIPTA 62.5-25 mcg/actuation inhaler Take 1 Puff by inhalation daily.  furosemide (LASIX) 80 mg tablet Take 1 Tab by mouth daily. 90 Tab 3    allopurinol (ZYLOPRIM) 300 mg tablet Take 1 Tab by mouth daily. 30 Tab 6    diclofenac (VOLTAREN XR) 100 mg ER tablet Take 1 Tab by mouth daily. 30 Tab 0    methocarbamol (ROBAXIN) 750 mg tablet Take 1 Tab by mouth three (3) times daily. 15 Tab 0    lidocaine 4 % patch Apply patch to the area for 12 hours, then remove for 12 hours. 30 Patch 0    prednisoLONE acetate (PRED FORTE) 1 % ophthalmic suspension       clonazePAM (KLONOPIN) 0.5 mg tablet Take 1 Tab by mouth nightly as needed (insomnia).  Max Daily Amount: 0.5 mg. 30 Tab 0     Allergies   Allergen Reactions    Levaquin [Levofloxacin] Nausea and Vomiting     Reports anxiety, nausea, aching after taking levaquin       Objective:  Visit Vitals  /72 (BP 1 Location: Right arm, BP Patient Position: Sitting)   Pulse (!) 57   Temp (!) 94 °F (34.4 °C) (Oral)   Resp 18   Ht 5' 7\" (1.702 m)   Wt 229 lb (103.9 kg)   SpO2 95%   BMI 35.87 kg/m²     Physical Exam:   General appearance - alert, well appearing, and in no distress  Mental status - alert, oriented to person, place, and time  EYE-MAURICE, EOMI, fundi normal, corneas normal, no foreign bodies  ENT-ENT exam normal, no neck nodes or sinus tenderness  Nose - normal and patent, no erythema, discharge or polyps  Mouth - mucous membranes moist, pharynx normal without lesions  Neck - supple, no significant adenopathy   Chest - clear to auscultation, no wheezes, rales or rhonchi, symmetric air entry   Heart - normal rate, regular rhythm, normal S1, S2, no murmurs, rubs, clicks or gallops   Abdomen - soft, nontender, nondistended, no masses or organomegaly  Lymph- no adenopathy palpable  Ext-peripheral pulses normal, no pedal edema, no clubbing or cyanosis  Skin-Warm and dry. no hyperpigmentation, vitiligo, or suspicious lesions  Neuro -alert, oriented, normal speech, no focal findings or movement disorder noted  Musculoskeletal- FROM, no bony abnormalities, no point tenderness    No results found for this visit on 09/20/19. All results for lab orders may not have been returned by the time this encountered was closed. Assessment/Plan:       ICD-10-CM ICD-9-CM    1. Essential hypertension I10 401.9 AMB POC EKG ROUTINE W/ 12 LEADS, INTER & REP      METABOLIC PANEL, COMPREHENSIVE   2. Fatigue, unspecified type R53.83 780.79 CBC WITH AUTOMATED DIFF   3. Acute nonintractable headache, unspecified headache type R51 784.0        Orders Placed This Encounter    CBC WITH AUTOMATED DIFF    METABOLIC PANEL, COMPREHENSIVE ([facility])    AMB POC EKG ROUTINE W/ 12 LEADS, INTER & REP     Order Specific Question:   Reason for Exam:     Answer:   HTN       Plan:    Headache was sudden onset this morning. It seems improved at this point time. His neurological exam is nonfocal.  His elevated blood pressure may have been the cause or a result of his headache. Blood pressure appears to be stable at this point time on his current regimen. Further recommendations based on labs as ordered. If headache progresses or symptoms worsen the patient is instructed to go to the emergency room for further evaluation.   Follow-up blood pressure after sitting for approximately 1 hour was 128/72. EKG with nonspecific changes. Labs pending. I have reviewed with the patient details of the assessment and plan and all questions were answered. Relevent patient education was performed. Verbal and/or written instructions (see AVS) provided. The most recent lab findings were reviewed with the patient. Plan was discussed with patient who verbal expressed understanding. An After Visit Summary was printed and given to the patient.       Chris Elizalde MD

## 2019-09-20 NOTE — PROGRESS NOTES
Chief Complaint   Patient presents with    Hypertension     elevated BP this am 199/99 194/85, BAD headache, feels faint         1. Have you been to the ER, urgent care clinic since your last visit? Hospitalized since your last visit? no    2. Have you seen or consulted any other health care providers outside of the [address] [place] [address] since your last visit? Include any pap smears or colon screening.   no

## 2019-09-21 LAB
BASOPHILS # BLD AUTO: 0.1 X10E3/UL (ref 0–0.2)
BASOPHILS NFR BLD AUTO: 1 %
EOSINOPHIL # BLD AUTO: 0.1 X10E3/UL (ref 0–0.4)
EOSINOPHIL NFR BLD AUTO: 1 %
ERYTHROCYTE [DISTWIDTH] IN BLOOD BY AUTOMATED COUNT: 15 % (ref 12.3–15.4)
HCT VFR BLD AUTO: 40.3 % (ref 37.5–51)
HGB BLD-MCNC: 13.8 G/DL (ref 13–17.7)
IMM GRANULOCYTES # BLD AUTO: 0.1 X10E3/UL (ref 0–0.1)
IMM GRANULOCYTES NFR BLD AUTO: 1 %
LYMPHOCYTES # BLD AUTO: 1 X10E3/UL (ref 0.7–3.1)
LYMPHOCYTES NFR BLD AUTO: 8 %
MCH RBC QN AUTO: 32.5 PG (ref 26.6–33)
MCHC RBC AUTO-ENTMCNC: 34.2 G/DL (ref 31.5–35.7)
MCV RBC AUTO: 95 FL (ref 79–97)
MONOCYTES # BLD AUTO: 0.5 X10E3/UL (ref 0.1–0.9)
MONOCYTES NFR BLD AUTO: 4 %
NEUTROPHILS # BLD AUTO: 9.9 X10E3/UL (ref 1.4–7)
NEUTROPHILS NFR BLD AUTO: 85 %
PLATELET # BLD AUTO: 262 X10E3/UL (ref 150–450)
RBC # BLD AUTO: 4.24 X10E6/UL (ref 4.14–5.8)
WBC # BLD AUTO: 11.5 X10E3/UL (ref 3.4–10.8)

## 2019-09-23 ENCOUNTER — APPOINTMENT (OUTPATIENT)
Dept: CT IMAGING | Age: 84
DRG: 871 | End: 2019-09-23
Attending: EMERGENCY MEDICINE
Payer: MEDICARE

## 2019-09-23 ENCOUNTER — HOSPITAL ENCOUNTER (INPATIENT)
Age: 84
LOS: 5 days | Discharge: HOME | DRG: 871 | End: 2019-09-28
Attending: EMERGENCY MEDICINE | Admitting: HOSPITALIST
Payer: MEDICARE

## 2019-09-23 ENCOUNTER — APPOINTMENT (OUTPATIENT)
Dept: GENERAL RADIOLOGY | Age: 84
DRG: 871 | End: 2019-09-23
Attending: INTERNAL MEDICINE
Payer: MEDICARE

## 2019-09-23 DIAGNOSIS — J18.9 PNEUMONIA OF LOWER LOBE DUE TO INFECTIOUS ORGANISM, UNSPECIFIED LATERALITY: ICD-10-CM

## 2019-09-23 DIAGNOSIS — K62.5 RECTAL BLEEDING: Primary | ICD-10-CM

## 2019-09-23 DIAGNOSIS — I95.9 HYPOTENSION, UNSPECIFIED HYPOTENSION TYPE: ICD-10-CM

## 2019-09-23 PROBLEM — A41.9 SEVERE SEPSIS (HCC): Status: ACTIVE | Noted: 2019-09-23

## 2019-09-23 PROBLEM — R65.20 SEVERE SEPSIS (HCC): Status: ACTIVE | Noted: 2019-09-23

## 2019-09-23 LAB
ALBUMIN SERPL-MCNC: 3 G/DL (ref 3.5–5)
ALBUMIN/GLOB SERPL: 0.8 {RATIO} (ref 1.1–2.2)
ALP SERPL-CCNC: 165 U/L (ref 45–117)
ALT SERPL-CCNC: 30 U/L (ref 12–78)
ANION GAP SERPL CALC-SCNC: 4 MMOL/L (ref 5–15)
ANION GAP SERPL CALC-SCNC: 7 MMOL/L (ref 5–15)
APPEARANCE UR: CLEAR
APTT PPP: 37.8 SEC (ref 22.1–32)
AST SERPL-CCNC: 23 U/L (ref 15–37)
ATRIAL RATE: 88 BPM
BACTERIA URNS QL MICRO: NEGATIVE /HPF
BASOPHILS # BLD: 0.1 K/UL (ref 0–0.1)
BASOPHILS NFR BLD: 1 % (ref 0–1)
BILIRUB SERPL-MCNC: 0.3 MG/DL (ref 0.2–1)
BILIRUB UR QL: NEGATIVE
BUN SERPL-MCNC: 32 MG/DL (ref 6–20)
BUN SERPL-MCNC: 35 MG/DL (ref 6–20)
BUN/CREAT SERPL: 17 (ref 12–20)
BUN/CREAT SERPL: 19 (ref 12–20)
CALCIUM SERPL-MCNC: 7.3 MG/DL (ref 8.5–10.1)
CALCIUM SERPL-MCNC: 8.5 MG/DL (ref 8.5–10.1)
CALCULATED P AXIS, ECG09: 95 DEGREES
CALCULATED R AXIS, ECG10: -7 DEGREES
CALCULATED T AXIS, ECG11: 63 DEGREES
CHLORIDE SERPL-SCNC: 107 MMOL/L (ref 97–108)
CHLORIDE SERPL-SCNC: 112 MMOL/L (ref 97–108)
CO2 SERPL-SCNC: 25 MMOL/L (ref 21–32)
CO2 SERPL-SCNC: 26 MMOL/L (ref 21–32)
COLOR UR: ABNORMAL
CREAT SERPL-MCNC: 1.8 MG/DL (ref 0.7–1.3)
CREAT SERPL-MCNC: 1.9 MG/DL (ref 0.7–1.3)
DIAGNOSIS, 93000: NORMAL
DIFFERENTIAL METHOD BLD: ABNORMAL
EOSINOPHIL # BLD: 0.2 K/UL (ref 0–0.4)
EOSINOPHIL NFR BLD: 1 % (ref 0–7)
EPITH CASTS URNS QL MICRO: ABNORMAL /LPF
ERYTHROCYTE [DISTWIDTH] IN BLOOD BY AUTOMATED COUNT: 15.7 % (ref 11.5–14.5)
GLOBULIN SER CALC-MCNC: 3.9 G/DL (ref 2–4)
GLUCOSE SERPL-MCNC: 169 MG/DL (ref 65–100)
GLUCOSE SERPL-MCNC: 192 MG/DL (ref 65–100)
GLUCOSE UR STRIP.AUTO-MCNC: NEGATIVE MG/DL
HCT VFR BLD AUTO: 27.3 % (ref 36.6–50.3)
HCT VFR BLD AUTO: 31.3 % (ref 36.6–50.3)
HCT VFR BLD AUTO: 34.6 % (ref 36.6–50.3)
HGB BLD-MCNC: 10.4 G/DL (ref 12.1–17)
HGB BLD-MCNC: 11.3 G/DL (ref 12.1–17)
HGB BLD-MCNC: 8.8 G/DL (ref 12.1–17)
HGB UR QL STRIP: NEGATIVE
IMM GRANULOCYTES # BLD AUTO: 0.1 K/UL (ref 0–0.04)
IMM GRANULOCYTES NFR BLD AUTO: 1 % (ref 0–0.5)
INR PPP: 1.2 (ref 0.9–1.1)
KETONES UR QL STRIP.AUTO: NEGATIVE MG/DL
LACTATE BLD-SCNC: 2.74 MMOL/L (ref 0.4–2)
LACTATE BLD-SCNC: 3.34 MMOL/L (ref 0.4–2)
LACTATE SERPL-SCNC: 2.5 MMOL/L (ref 0.4–2)
LACTATE SERPL-SCNC: 3.4 MMOL/L (ref 0.4–2)
LEUKOCYTE ESTERASE UR QL STRIP.AUTO: ABNORMAL
LIPASE SERPL-CCNC: 133 U/L (ref 73–393)
LYMPHOCYTES # BLD: 1.5 K/UL (ref 0.8–3.5)
LYMPHOCYTES NFR BLD: 9 % (ref 12–49)
MCH RBC QN AUTO: 31.5 PG (ref 26–34)
MCHC RBC AUTO-ENTMCNC: 32.7 G/DL (ref 30–36.5)
MCV RBC AUTO: 96.4 FL (ref 80–99)
MONOCYTES # BLD: 0.9 K/UL (ref 0–1)
MONOCYTES NFR BLD: 5 % (ref 5–13)
NEUTS SEG # BLD: 14.1 K/UL (ref 1.8–8)
NEUTS SEG NFR BLD: 83 % (ref 32–75)
NITRITE UR QL STRIP.AUTO: NEGATIVE
NRBC # BLD: 0 K/UL (ref 0–0.01)
NRBC BLD-RTO: 0 PER 100 WBC
P-R INTERVAL, ECG05: 174 MS
PH UR STRIP: 5.5 [PH] (ref 5–8)
PLATELET # BLD AUTO: 268 K/UL (ref 150–400)
PMV BLD AUTO: 10.4 FL (ref 8.9–12.9)
POTASSIUM SERPL-SCNC: 4.1 MMOL/L (ref 3.5–5.1)
POTASSIUM SERPL-SCNC: 5.1 MMOL/L (ref 3.5–5.1)
PROT SERPL-MCNC: 6.9 G/DL (ref 6.4–8.2)
PROT UR STRIP-MCNC: NEGATIVE MG/DL
PROTHROMBIN TIME: 12.3 SEC (ref 9–11.1)
Q-T INTERVAL, ECG07: 404 MS
QRS DURATION, ECG06: 82 MS
QTC CALCULATION (BEZET), ECG08: 488 MS
RBC # BLD AUTO: 3.59 M/UL (ref 4.1–5.7)
RBC #/AREA URNS HPF: ABNORMAL /HPF (ref 0–5)
SODIUM SERPL-SCNC: 140 MMOL/L (ref 136–145)
SODIUM SERPL-SCNC: 141 MMOL/L (ref 136–145)
SP GR UR REFRACTOMETRY: 1.02 (ref 1–1.03)
THERAPEUTIC RANGE,PTTT: ABNORMAL SECS (ref 58–77)
TROPONIN I SERPL-MCNC: <0.05 NG/ML
UA: UC IF INDICATED,UAUC: ABNORMAL
UROBILINOGEN UR QL STRIP.AUTO: 0.2 EU/DL (ref 0.2–1)
VENTRICULAR RATE, ECG03: 88 BPM
WBC # BLD AUTO: 16.9 K/UL (ref 4.1–11.1)
WBC URNS QL MICRO: ABNORMAL /HPF (ref 0–4)

## 2019-09-23 PROCEDURE — 86900 BLOOD TYPING SEROLOGIC ABO: CPT

## 2019-09-23 PROCEDURE — 94761 N-INVAS EAR/PLS OXIMETRY MLT: CPT

## 2019-09-23 PROCEDURE — 83690 ASSAY OF LIPASE: CPT

## 2019-09-23 PROCEDURE — 30233N1 TRANSFUSION OF NONAUTOLOGOUS RED BLOOD CELLS INTO PERIPHERAL VEIN, PERCUTANEOUS APPROACH: ICD-10-PCS | Performed by: INTERNAL MEDICINE

## 2019-09-23 PROCEDURE — 81001 URINALYSIS AUTO W/SCOPE: CPT

## 2019-09-23 PROCEDURE — 96374 THER/PROPH/DIAG INJ IV PUSH: CPT

## 2019-09-23 PROCEDURE — 74011250636 HC RX REV CODE- 250/636: Performed by: HOSPITALIST

## 2019-09-23 PROCEDURE — 93005 ELECTROCARDIOGRAM TRACING: CPT

## 2019-09-23 PROCEDURE — 74011250636 HC RX REV CODE- 250/636: Performed by: EMERGENCY MEDICINE

## 2019-09-23 PROCEDURE — 85025 COMPLETE CBC W/AUTO DIFF WBC: CPT

## 2019-09-23 PROCEDURE — 65660000000 HC RM CCU STEPDOWN

## 2019-09-23 PROCEDURE — 51798 US URINE CAPACITY MEASURE: CPT

## 2019-09-23 PROCEDURE — 87086 URINE CULTURE/COLONY COUNT: CPT

## 2019-09-23 PROCEDURE — 86923 COMPATIBILITY TEST ELECTRIC: CPT

## 2019-09-23 PROCEDURE — 36430 TRANSFUSION BLD/BLD COMPNT: CPT

## 2019-09-23 PROCEDURE — 85730 THROMBOPLASTIN TIME PARTIAL: CPT

## 2019-09-23 PROCEDURE — 83605 ASSAY OF LACTIC ACID: CPT

## 2019-09-23 PROCEDURE — 85610 PROTHROMBIN TIME: CPT

## 2019-09-23 PROCEDURE — 87040 BLOOD CULTURE FOR BACTERIA: CPT

## 2019-09-23 PROCEDURE — 84484 ASSAY OF TROPONIN QUANT: CPT

## 2019-09-23 PROCEDURE — 71045 X-RAY EXAM CHEST 1 VIEW: CPT

## 2019-09-23 PROCEDURE — 85018 HEMOGLOBIN: CPT

## 2019-09-23 PROCEDURE — 99285 EMERGENCY DEPT VISIT HI MDM: CPT

## 2019-09-23 PROCEDURE — 74011000258 HC RX REV CODE- 258: Performed by: HOSPITALIST

## 2019-09-23 PROCEDURE — 80053 COMPREHEN METABOLIC PANEL: CPT

## 2019-09-23 PROCEDURE — P9016 RBC LEUKOCYTES REDUCED: HCPCS

## 2019-09-23 PROCEDURE — 74176 CT ABD & PELVIS W/O CONTRAST: CPT

## 2019-09-23 PROCEDURE — 74011250637 HC RX REV CODE- 250/637: Performed by: HOSPITALIST

## 2019-09-23 PROCEDURE — 96361 HYDRATE IV INFUSION ADD-ON: CPT

## 2019-09-23 PROCEDURE — 36415 COLL VENOUS BLD VENIPUNCTURE: CPT

## 2019-09-23 RX ORDER — SODIUM CHLORIDE 0.9 % (FLUSH) 0.9 %
5-40 SYRINGE (ML) INJECTION EVERY 8 HOURS
Status: DISCONTINUED | OUTPATIENT
Start: 2019-09-23 | End: 2019-09-27

## 2019-09-23 RX ORDER — GUAIFENESIN 600 MG/1
1200 TABLET, EXTENDED RELEASE ORAL 2 TIMES DAILY
Status: DISCONTINUED | OUTPATIENT
Start: 2019-09-23 | End: 2019-09-28 | Stop reason: HOSPADM

## 2019-09-23 RX ORDER — PANTOPRAZOLE SODIUM 40 MG/1
40 TABLET, DELAYED RELEASE ORAL
Status: DISCONTINUED | OUTPATIENT
Start: 2019-09-24 | End: 2019-09-25

## 2019-09-23 RX ORDER — SODIUM CHLORIDE 0.9 % (FLUSH) 0.9 %
5-40 SYRINGE (ML) INJECTION AS NEEDED
Status: DISCONTINUED | OUTPATIENT
Start: 2019-09-23 | End: 2019-09-28 | Stop reason: HOSPADM

## 2019-09-23 RX ORDER — GABAPENTIN 100 MG/1
100 CAPSULE ORAL 3 TIMES DAILY
Status: DISCONTINUED | OUTPATIENT
Start: 2019-09-23 | End: 2019-09-28 | Stop reason: HOSPADM

## 2019-09-23 RX ORDER — SODIUM CHLORIDE 9 MG/ML
50 INJECTION, SOLUTION INTRAVENOUS CONTINUOUS
Status: DISCONTINUED | OUTPATIENT
Start: 2019-09-23 | End: 2019-09-26

## 2019-09-23 RX ORDER — SODIUM CHLORIDE 9 MG/ML
250 INJECTION, SOLUTION INTRAVENOUS AS NEEDED
Status: DISCONTINUED | OUTPATIENT
Start: 2019-09-23 | End: 2019-09-26 | Stop reason: SDUPTHER

## 2019-09-23 RX ORDER — IPRATROPIUM BROMIDE AND ALBUTEROL SULFATE 2.5; .5 MG/3ML; MG/3ML
3 SOLUTION RESPIRATORY (INHALATION)
Status: DISCONTINUED | OUTPATIENT
Start: 2019-09-23 | End: 2019-09-28 | Stop reason: HOSPADM

## 2019-09-23 RX ORDER — DOCUSATE SODIUM 100 MG/1
100 CAPSULE, LIQUID FILLED ORAL 2 TIMES DAILY
Status: DISCONTINUED | OUTPATIENT
Start: 2019-09-23 | End: 2019-09-26

## 2019-09-23 RX ORDER — ONDANSETRON 2 MG/ML
4 INJECTION INTRAMUSCULAR; INTRAVENOUS
Status: DISCONTINUED | OUTPATIENT
Start: 2019-09-23 | End: 2019-09-28 | Stop reason: HOSPADM

## 2019-09-23 RX ORDER — ONDANSETRON 2 MG/ML
4 INJECTION INTRAMUSCULAR; INTRAVENOUS
Status: COMPLETED | OUTPATIENT
Start: 2019-09-23 | End: 2019-09-23

## 2019-09-23 RX ORDER — ACETAMINOPHEN 325 MG/1
650 TABLET ORAL
Status: DISCONTINUED | OUTPATIENT
Start: 2019-09-23 | End: 2019-09-25

## 2019-09-23 RX ORDER — DULOXETIN HYDROCHLORIDE 30 MG/1
30 CAPSULE, DELAYED RELEASE ORAL DAILY
Status: DISCONTINUED | OUTPATIENT
Start: 2019-09-23 | End: 2019-09-28 | Stop reason: HOSPADM

## 2019-09-23 RX ORDER — ALLOPURINOL 100 MG/1
300 TABLET ORAL DAILY
Status: DISCONTINUED | OUTPATIENT
Start: 2019-09-23 | End: 2019-09-28 | Stop reason: HOSPADM

## 2019-09-23 RX ORDER — ONDANSETRON 2 MG/ML
INJECTION INTRAMUSCULAR; INTRAVENOUS
Status: DISPENSED
Start: 2019-09-23 | End: 2019-09-23

## 2019-09-23 RX ADMIN — SODIUM CHLORIDE 500 ML: 900 INJECTION, SOLUTION INTRAVENOUS at 13:43

## 2019-09-23 RX ADMIN — GUAIFENESIN 1200 MG: 600 TABLET, EXTENDED RELEASE ORAL at 10:28

## 2019-09-23 RX ADMIN — SODIUM CHLORIDE 10 ML: 9 INJECTION, SOLUTION INTRAMUSCULAR; INTRAVENOUS; SUBCUTANEOUS at 21:45

## 2019-09-23 RX ADMIN — CEFTRIAXONE 1 G: 1 INJECTION, POWDER, FOR SOLUTION INTRAMUSCULAR; INTRAVENOUS at 11:45

## 2019-09-23 RX ADMIN — SODIUM CHLORIDE 10 ML: 9 INJECTION, SOLUTION INTRAMUSCULAR; INTRAVENOUS; SUBCUTANEOUS at 14:00

## 2019-09-23 RX ADMIN — DULOXETINE 30 MG: 30 CAPSULE, DELAYED RELEASE ORAL at 11:28

## 2019-09-23 RX ADMIN — ONDANSETRON 4 MG: 2 INJECTION INTRAMUSCULAR; INTRAVENOUS at 04:41

## 2019-09-23 RX ADMIN — ACETAMINOPHEN 650 MG: 325 TABLET ORAL at 09:30

## 2019-09-23 RX ADMIN — UMECLIDINIUM BROMIDE AND VILANTEROL TRIFENATATE 1 PUFF: 62.5; 25 POWDER RESPIRATORY (INHALATION) at 18:31

## 2019-09-23 RX ADMIN — SODIUM CHLORIDE 150 ML/HR: 900 INJECTION, SOLUTION INTRAVENOUS at 10:54

## 2019-09-23 RX ADMIN — SODIUM CHLORIDE 150 ML/HR: 900 INJECTION, SOLUTION INTRAVENOUS at 19:25

## 2019-09-23 RX ADMIN — AZITHROMYCIN MONOHYDRATE 500 MG: 500 INJECTION, POWDER, LYOPHILIZED, FOR SOLUTION INTRAVENOUS at 10:27

## 2019-09-23 RX ADMIN — ONDANSETRON 4 MG: 2 INJECTION INTRAMUSCULAR; INTRAVENOUS at 11:27

## 2019-09-23 RX ADMIN — GABAPENTIN 100 MG: 100 CAPSULE ORAL at 10:28

## 2019-09-23 RX ADMIN — SODIUM CHLORIDE 1000 ML: 900 INJECTION, SOLUTION INTRAVENOUS at 08:42

## 2019-09-23 RX ADMIN — Medication 10 ML: at 14:00

## 2019-09-23 RX ADMIN — SODIUM CHLORIDE 1000 ML: 900 INJECTION, SOLUTION INTRAVENOUS at 04:45

## 2019-09-23 RX ADMIN — GABAPENTIN 100 MG: 100 CAPSULE ORAL at 21:45

## 2019-09-23 RX ADMIN — GUAIFENESIN 1200 MG: 600 TABLET, EXTENDED RELEASE ORAL at 18:28

## 2019-09-23 RX ADMIN — DOCUSATE SODIUM 100 MG: 100 CAPSULE, LIQUID FILLED ORAL at 10:28

## 2019-09-23 RX ADMIN — ALLOPURINOL 300 MG: 300 TABLET ORAL at 11:28

## 2019-09-23 RX ADMIN — GABAPENTIN 100 MG: 100 CAPSULE ORAL at 18:28

## 2019-09-23 NOTE — PROGRESS NOTES
Pharmacy Medication Reconciliation     The patient was asleep, wife and daughter were interviewed regarding current PTA medication list, use and drug allergies and questioned regarding use of any other inhalers, topical products, over the counter medications, herbal medications, vitamin products or ophthalmic/nasal/otic medication use. Sources: patient's wife and daughter, home medications brought to hospital, Rx Query    Allergy Update: Levaquin [levofloxacin]; Ciprofloxacin; and Quinolones-confirmed    Recommendations/Findings: The following amendments were made to the patient's active medication list on file at [facility]:   1) Additions:   Biotene Lozenges    2) Deletions:   Diclofenac er tab  Clonazepam  Lidocaine patch  Methocarbamol  Naloxone  Pred forte ophthalmic     3) Changes:   Gabapentin 100mg tablet from 100mg TID to 200mg TID    4) Pertinent Pharmacy Findings:  - Per patient's wife and daughter - the patient was supposed to stop taking gabapentin once this prescription was finished     -Clarified PTA med list with patient's wife and daughter. PTA medication list was corrected to the following:     Prior to Admission Medications   Prescriptions Last Dose Informant Patient Reported? Taking? ANORO ELLIPTA 62.5-25 mcg/actuation inhaler 2019 at AM Significant Other Yes Yes   Sig: Take 1 Puff by inhalation daily. DULoxetine (CYMBALTA) 30 mg capsule 2019 at AM Significant Other No Yes   Sig: Take 1 Cap by mouth daily. Lactoperoxi/Gluc Oxid/Pot Thio (BIOTENE DRY MOUTH MM) Unknown at Unknown time  Yes No   Si Lozenge by Mucous Membrane route. allopurinol (ZYLOPRIM) 300 mg tablet 2019 at AM Significant Other No Yes   Sig: Take 1 Tab by mouth daily. amLODIPine (NORVASC) 2.5 mg tablet 2019 at AM Significant Other No Yes   Sig: Take 1 Tab by mouth daily. atenolol (TENORMIN) 50 mg tablet 2019 at AM Significant Other No Yes   Sig: Take 1 Tab by mouth daily.    atorvastatin (LIPITOR) 80 mg tablet 9/22/2019 at AM Significant Other No Yes   Sig: TAKE ONE TABLET BY MOUTH DAILY   clopidogrel (PLAVIX) 75 mg tab 9/22/2019 at AM Significant Other No Yes   Sig: Take 1 Tab by mouth daily. furosemide (LASIX) 80 mg tablet 9/22/2019 at AM Significant Other No Yes   Sig: Take 1 Tab by mouth daily. gabapentin (NEURONTIN) 100 mg capsule 9/22/2019 at PM Significant Other Yes Yes   Sig: Take 200 mg by mouth three (3) times daily. lansoprazole (PREVACID) 15 mg capsule 9/22/2019 at AM Significant Other Yes Yes   Sig: Take 15 mg by mouth Daily (before breakfast). olmesartan (BENICAR) 20 mg tablet 9/22/2019 at AM Significant Other Yes Yes   Sig: Take 20 mg by mouth daily.       Facility-Administered Medications: None          Thank you,  Merna Godoy, [address] [facility] pharmacy student

## 2019-09-23 NOTE — PROGRESS NOTES
Pharmacy Automatic Renal Dosing Protocol - Antimicrobials    Indication for Antimicrobials: CAP     Current Regimen of Each Antimicrobial:  Zosyn 3.375g IV q6h x7 days  Azithromycin 500mg IV q24h x 7 days (Start Date ; Day # 1)    Significant Cultures:        Radiology / Imaging results: (X-ray, CT scan or MRI):    CXR: IMPRESSION: Increased left basilar airspace disease, compatible with atelectasis  or pneumonia. Follow-up is recommended to assure resolution. Paralysis, amputations, malnutrition:      Labs:  Recent Labs     19  0349 19  1028 19  1024   CREA 1.90* 1.4  --    BUN 32* 22.0*  --    WBC 16.9*  --  11.5*     Temp (24hrs), Av.1 °F (36.7 °C), Min:98.1 °F (36.7 °C), Max:98.1 °F (36.7 °C)    Creatinine Clearance (mL/min) or Dialysis: 33    Impression/Plan:   Adjust Zosyn dose to 3.375g IV x 1 over 30 min, followed by 3.375g IV q8h x 7 days  Continue Azithromycin at current dose  Antimicrobial stop date      Pharmacy will follow daily and adjust medications as appropriate for renal function and/or serum levels. Thank you,  Yesenia Every    Recommended duration of therapy  [email or web address]. docx    Renal Dosing  [email or web address]/Renal%[id number]. pdf

## 2019-09-23 NOTE — ED NOTES
Renetta Dixon is a 80 y.o. male who presents with total of 3 episodes of large amount of red to maroon blood from rectum starting at 1:10am today (had 2 more episodes within next 30 minutes), felt lightheaded after third episode. No abdominal pain, n/v.  +sweats, headache. Has been having increased cough with yellow sputum x 2 weeks. Denies increased SOB, no chest pain.   Colonoscopy 3 years ago was normal.

## 2019-09-23 NOTE — ED PROVIDER NOTES
EMERGENCY DEPARTMENT HISTORY AND PHYSICAL EXAM      Date: 9/23/2019  Patient Name: Jessie Sicard    History of Presenting Illness     Chief Complaint   Patient presents with    Abdominal Pain     Pt arrived via EMS, family states he had 3 large bloody stools. PT is pale. [place]. History Provided By: Patient, Patient's Daughter and EMS    HPI: Jessie Sicard, 80 y.o. male presents to the ED with cc of rectal bleeding. Pt had been seen by PCP a few days ago with the complaint of fatigue and feeling lightheaded. No sig abnormality found at that time. Pt brought in by EMS this morning secondary to rectal bleeding. Pt states onset last night with multiple large blood bowel movements. Blood maroon and large amount. Pt began feeling lightheaded. Pt states he has had mild abdominal cramping in LLQ. There has also been nausea but no vomiting. Pt denies any fever or chills. He denies any chest pain. Pt states he has been feeling short of breath worsening with exertion. He has had a dry cough. Pt denies any urinary symptoms. There have been no syncopal episodes. There are no other complaints, changes, or physical findings at this time. PCP: Herminia Nixon MD    No current facility-administered medications on file prior to encounter. Current Outpatient Medications on File Prior to Encounter   Medication Sig Dispense Refill    DULoxetine (CYMBALTA) 30 mg capsule Take 1 Cap by mouth daily. 30 Cap 6    olmesartan (BENICAR) 20 mg tablet Take 20 mg by mouth daily.  amLODIPine (NORVASC) 2.5 mg tablet Take 1 Tab by mouth daily. 90 Tab 3    atorvastatin (LIPITOR) 80 mg tablet TAKE ONE TABLET BY MOUTH DAILY 90 Tab 3    atenolol (TENORMIN) 50 mg tablet Take 1 Tab by mouth daily. 30 Tab 5    lansoprazole (PREVACID) 15 mg capsule Take 15 mg by mouth Daily (before breakfast).  gabapentin (NEURONTIN) 100 mg capsule Take 200 mg by mouth three (3) times daily.       clopidogrel (PLAVIX) 75 mg tab Take 1 Tab by mouth daily. 90 Tab 3    ANORO ELLIPTA 62.5-25 mcg/actuation inhaler Take 1 Puff by inhalation daily.  furosemide (LASIX) 80 mg tablet Take 1 Tab by mouth daily. 90 Tab 3    allopurinol (ZYLOPRIM) 300 mg tablet Take 1 Tab by mouth daily. 30 Tab 6    Lactoperoxi/Gluc Oxid/Pot Thio (BIOTENE DRY MOUTH MM) 1 Lozenge by Mucous Membrane route.          Past History     Past Medical History:  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    Morbid obesity ([address]     On statin therapy 8/16/2017    TESSA on CPAP 1/3/2019    Psychiatric disorder     Depression    Simple chronic bronchitis ([address] 1/3/2019    TIA (transient ischemic attack) 3/40/9429    Umbilical hernia 60/2/6851       Past Surgical History:  Past Surgical History:   Procedure Laterality Date    CARDIAC SURG PROCEDURE UNLIST  1996    Cardiac Stents    CARDIAC SURG PROCEDURE UNLIST  04/2019    EF 61-65%    HX HERNIA REPAIR      RIH    HX HERNIA REPAIR  41/16/19    open umbilical hernia repair mesh    HX UROLOGICAL      HYDROCELECTOMY       Family History:  Family History   Problem Relation Age of Onset    Heart Disease Mother     Hypertension Mother     Hypertension Father     Hypertension Sister     Hypertension Brother     Cancer Brother        Social History:  Social History     Tobacco Use    Smoking status: Former Smoker     Packs/day: 0.50     Years: 20.00     Pack years: 10.00    Smokeless tobacco: Former User    Tobacco comment: quit about 40  years ago   / used to dip tobaco and dip snuff   Substance Use Topics    Alcohol use: Not Currently     Comment: \"Drinks very little now for about a month \"    Drug use: No       Allergies: Allergies   Allergen Reactions    Levaquin [Levofloxacin] Nausea and Vomiting     Reports anxiety, nausea, aching after taking levaquin    Ciprofloxacin Shortness of Breath    Quinolones Shortness of Breath         Review of Systems   Review of Systems   Constitutional: Positive for activity change, appetite change, diaphoresis and fatigue. Negative for chills and fever. HENT: Negative. Negative for congestion, rhinorrhea, sinus pressure and sore throat. Eyes: Negative. Respiratory: Positive for cough and shortness of breath. Negative for choking, chest tightness and wheezing. Cardiovascular: Negative. Negative for chest pain, palpitations and leg swelling. Gastrointestinal: Positive for abdominal pain, blood in stool and nausea. Negative for constipation, diarrhea and vomiting. Endocrine: Negative. Genitourinary: Negative. Negative for difficulty urinating, dysuria, flank pain, hematuria and urgency. Musculoskeletal: Negative. Skin: Negative. Neurological: Positive for light-headedness. Negative for dizziness, speech difficulty, weakness, numbness and headaches. Hematological: Bruises/bleeds easily. Psychiatric/Behavioral: Negative. All other systems reviewed and are negative. Physical Exam   Physical Exam   Constitutional: He is oriented to person, place, and time. He appears well-developed and well-nourished. He appears distressed. HENT:   Head: Normocephalic and atraumatic. Mouth/Throat: Oropharynx is clear and moist. No oropharyngeal exudate. Eyes: Pupils are equal, round, and reactive to light. Conjunctivae and EOM are normal.   Neck: Normal range of motion. Neck supple. No JVD present.  No tracheal deviation present. Cardiovascular: Normal rate, regular rhythm, normal heart sounds and intact distal pulses. No murmur heard. Pulmonary/Chest: Effort normal. No stridor. No respiratory distress. He has no wheezes. He has no rales. He exhibits no tenderness. Diminished in bases, no rhonchi or wheezing   Abdominal: Soft. He exhibits no distension. There is tenderness (Mild left mid abdomen). There is no rebound and no guarding. Obese   Musculoskeletal: Normal range of motion. He exhibits no edema or tenderness. Neurological: He is alert and oriented to person, place, and time. No cranial nerve deficit. No gross motor or sensory deficits    Skin: Skin is warm and dry. He is not diaphoretic. There is pallor. Psychiatric: He has a normal mood and affect. His behavior is normal.   Nursing note and vitals reviewed.       Diagnostic Study Results     Labs -     Recent Results (from the past 12 hour(s))   POC LACTIC ACID    Collection Time: 09/23/19  8:43 AM   Result Value Ref Range    Lactic Acid (POC) 3.34 (HH) 0.40 - 2.00 mmol/L   HGB & HCT    Collection Time: 09/23/19 12:18 PM   Result Value Ref Range    HGB 8.8 (L) 12.1 - 17.0 g/dL    HCT 27.3 (L) 36.6 - 50.3 %   TROPONIN I    Collection Time: 09/23/19 12:18 PM   Result Value Ref Range    Troponin-I, Qt. <0.05 <3.72 ng/mL   METABOLIC PANEL, BASIC    Collection Time: 09/23/19 12:18 PM   Result Value Ref Range    Sodium 141 136 - 145 mmol/L    Potassium 5.1 3.5 - 5.1 mmol/L    Chloride 112 (H) 97 - 108 mmol/L    CO2 25 21 - 32 mmol/L    Anion gap 4 (L) 5 - 15 mmol/L    Glucose 169 (H) 65 - 100 mg/dL    BUN 35 (H) 6 - 20 MG/DL    Creatinine 1.80 (H) 0.70 - 1.30 MG/DL    BUN/Creatinine ratio 19 12 - 20      GFR est AA 44 (L) >60 ml/min/1.73m2    GFR est non-AA 36 (L) >60 ml/min/1.73m2    Calcium 7.3 (L) 8.5 - 10.1 MG/DL   POC LACTIC ACID    Collection Time: 09/23/19 12:40 PM   Result Value Ref Range    Lactic Acid (POC) 2.74 (HH) 0.40 - 2.00 mmol/L Radiologic Studies -   XR CHEST PORT   Final Result   IMPRESSION: Increased left basilar airspace disease, compatible with atelectasis   or pneumonia. Follow-up is recommended to assure resolution. CT ABD PELV WO CONT   Final Result   IMPRESSION:   Mild colonic diverticulosis. No evidence of acute diverticulitis. CT Results  (Last 48 hours)               09/23/19 0456  CT ABD PELV WO CONT Final result    Impression:  IMPRESSION:   Mild colonic diverticulosis. No evidence of acute diverticulitis. Narrative:  EXAM: CT ABD PELV WO CONT       INDICATION: Abdominal pain; LGI bleed,        COMPARISON: August 8, 2019 and April 21, 2015       CONTRAST:  None. TECHNIQUE:    Thin axial images were obtained through the abdomen and pelvis. Coronal and   sagittal reconstructions were generated. Oral contrast was not administered. CT   dose reduction was achieved through use of a standardized protocol tailored for   this examination and automatic exposure control for dose modulation. The absence of intravenous contrast material reduces the sensitivity for   evaluation of the solid parenchymal organs of the abdomen. FINDINGS:    LUNG BASES: 7 mm left lower lobe pulmonary nodule, unchanged since 2015. Unchanged calcified granuloma in the right lower lobe. Minimal bilateral   dependent atelectasis. INCIDENTALLY IMAGED HEART AND MEDIASTINUM: Extensive coronary artery   calcifications. LIVER: No mass or biliary dilatation. GALLBLADDER: Unremarkable. SPLEEN: No mass. PANCREAS: No mass or ductal dilatation. ADRENALS: Unremarkable. KIDNEYS/URETERS: No mass, calculus, or hydronephrosis. STOMACH: Unremarkable. SMALL BOWEL: No dilatation or wall thickening. COLON: No dilatation or wall thickening. Mild diverticulosis. No evidence of   acute diverticulitis. APPENDIX: Normal.   PERITONEUM: No ascites or pneumoperitoneum.    RETROPERITONEUM: No lymphadenopathy or aortic aneurysm. REPRODUCTIVE ORGANS: Normal sized prostate gland. URINARY BLADDER: No mass or calculus. BONES: No destructive bone lesion. Fusion hardware in the lumbar spine. ADDITIONAL COMMENTS: N/A               CXR Results  (Last 48 hours)               09/23/19 0633  XR CHEST PORT Final result    Impression:  IMPRESSION: Increased left basilar airspace disease, compatible with atelectasis   or pneumonia. Follow-up is recommended to assure resolution. Narrative:  INDICATION: Elevated white blood cell count       COMPARISON: July 18, 2019       FINDINGS: AP portable imaging of the chest performed at 6:14 AM demonstrates a   stable cardiomediastinal silhouette. The thoracic aorta remains tortuous and   atherosclerotic. There is mild left basilar airspace disease, increased from the   prior study. . No significant osseous abnormalities are seen. Medical Decision Making   I am the first provider for this patient. I reviewed the vital signs, available nursing notes, past medical history, past surgical history, family history and social history. Vital Signs-Reviewed the patient's vital signs.   Patient Vitals for the past 12 hrs:   Temp Pulse Resp BP SpO2   09/23/19 1701 98.4 °F (36.9 °C) 91 18 123/53 96 %   09/23/19 1645   18 116/58    09/23/19 1630  82 18 119/57 95 %   09/23/19 1615 98.5 °F (36.9 °C) 83 18 124/88 97 %   09/23/19 1600 98.7 °F (37.1 °C) 86 18 118/77 96 %   09/23/19 1545 97.8 °F (36.6 °C) 80 18 116/52 96 %   09/23/19 1519 98.4 °F (36.9 °C) 81 18 109/62 95 %   09/23/19 1413 98.5 °F (36.9 °C) 80 18 126/64 94 %   09/23/19 1345    114/58    09/23/19 1330  84 22 107/54 (!) 89 %   09/23/19 1315  83 20 (!) 89/56 92 %   09/23/19 1300  83 16 105/48 91 %   09/23/19 1230  86 20 99/53 92 %   09/23/19 1200  83 22 95/50 92 %   09/23/19 1130  85 21 101/53 93 %   09/23/19 1124 97.5 °F (36.4 °C)       09/23/19 1100  92 19 99/69 97 %   09/23/19 1030  87 21 99/57 94 % 09/23/19 1000  85 20 101/53 95 %   09/23/19 0836 96 °F (35.6 °C) 85 26 (!) 78/43 93 %   09/23/19 0830  88 28 101/56 95 %   09/23/19 0800  86 20 99/58 94 %   09/23/19 0730  94 17 106/66 90 %   09/23/19 0700  85 22 99/51 93 %   09/23/19 0633  89 15 95/58 92 %   09/23/19 0600  85 22 123/59 92 %   09/23/19 0530  81 21 108/60 93 %       EKG interpretation: (Preliminary)  NSR, rate 88, normal axis/pr/qrs, prolonged QTc. No acute ST changes, Skyla Santiago DO      Records Reviewed: Nursing Notes, Old Medical Records, Ambulance Run Sheet, Previous Radiology Studies and Previous Laboratory Studies    Provider Notes (Medical Decision Making):   DDx- Acute blood loss anemia, Ischemic Colitis, Diverticulitis v diverticulosis with bleeding, dehydration electrolyte abnormality     ED Course:   Initial assessment performed. The patients presenting problems have been discussed, and they are in agreement with the care plan formulated and outlined with them. I have encouraged them to ask questions as they arise throughout their visit. CT ordered- unfortunately due to worsening Cr, CT obtained without IV contrast. Ct no inflammatory changes of colon, diverticulosis present. Pt feeling better, pt no longer pale. There has been a drop in his Hgb from 2 days ago but at 11, pt BP has improved with NS. Consult Note:   Page to colorectal surgery as pt has seen them before for colonoscopy. No return call, I have relayed this to the Hospitalist.     Consult Note:   Case discussed with Dr. Shaniqua Baker, pt will be seen and evaluated for admission. Consult Note:   Case discussed with Dr. Ashanti Ibrahim. CXR shows concern for atelectasis v. Infiltrate. I had discussed with pt, he states he has had a productive cough. Pt with elevated lactate, originally felt to come from GI issues. Given CXR and Rectal bleeding, pt will proceed placing order for Zosyn to be given for coverage of Pneumonia as well as GI source.      Pt and daughter updated on treatment plan. Critical Care Time:   CRITICAL CARE NOTE :    IMPENDING DETERIORATION -Cardiovascular and Renal  ASSOCIATED RISK FACTORS - Hypotension, Bleeding and Dehydration  MANAGEMENT- Bedside Assessment and Supervision of Care  INTERPRETATION -  Xrays, CT Scan, ECG, Blood Pressure, Cardiac Output Measures  and Labs  INTERVENTIONS  IV fluids, Typed and cross 1 unit of blood if needed given Hypotension and active bleeding  CASE REVIEW - Hospitalist, Nursing and Family  TREATMENT RESPONSE -Stable  PERFORMED BY - Self    NOTES   :  I have spent 40 minutes of critical care time involved in lab review, consultations with specialist, family decision- making, bedside attention and documentation. During this entire length of time I was immediately available to the patient . Gabby To DO      Disposition:  Admit    PLAN:  1. Admit for further  Evaluation and management, serial H/H, IV Ab    Diagnosis     Clinical Impression:   1. Rectal bleeding    2. Hypotension, unspecified hypotension type    3. Pneumonia of lower lobe due to infectious organism, unspecified laterality [facility])        Attestations:    Gabby To DO    Please note that this dictation was completed with [employer], the computer voice recognition software. Quite often unanticipated grammatical, syntax, homophones, and other interpretive errors are inadvertently transcribed by the computer software. Please disregard these errors. Please excuse any errors that have escaped final proofreading. Thank you.

## 2019-09-23 NOTE — CONSULTS
Consult    Patient: Tod Lim MRN: [id number]  SSN: [id number]    YOB: 1934  Age: 80 y.o. Sex: male      Subjective:      Tod Lim is a 80 y.o. male who is being seen for rectal bleeding. He had a colonoscopy in the past with Dr. Virgil Conte about 5 years ago. He comes in with significant rectal bleeding that started between 1-2am overnight. He had 3 episodes about 10 minutes apart and felt light headed. He hasn't had any BMs since those 3 episodes.   He denies any pain        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    Morbid obesity ([address]     On statin therapy 8/16/2017    TESSA on CPAP 1/3/2019    Psychiatric disorder     Depression    Simple chronic bronchitis ([address] 1/3/2019    TIA (transient ischemic attack) 9/78/2770    Umbilical hernia 78/6/7153     Past Surgical History:   Procedure Laterality Date    CARDIAC SURG PROCEDURE UNLIST  1996    Cardiac Stents    CARDIAC SURG PROCEDURE UNLIST  04/2019    EF 61-65%    HX HERNIA REPAIR      RIH    HX HERNIA REPAIR  09/47/92    open umbilical hernia repair mesh    HX UROLOGICAL      HYDROCELECTOMY      Family History   Problem Relation Age of Onset    Heart Disease Mother     Hypertension Mother     Hypertension Father     Hypertension Sister     Hypertension Brother     Cancer Brother      Social History     Tobacco Use    Smoking status: Former Smoker Packs/day: 0.50     Years: 20.00     Pack years: 10.00    Smokeless tobacco: Former User    Tobacco comment: quit about 40  years ago   / used to dip tobaco and dip snuff   Substance Use Topics    Alcohol use: Not Currently     Comment: \"Drinks very little now for about a month \"      Current Facility-Administered Medications   Medication Dose Route Frequency Provider Last Rate Last Dose    sodium chloride (NS) flush 5-40 mL  5-40 mL IntraVENous Q8H Diantha Loop, DO        sodium chloride (NS) flush 5-40 mL  5-40 mL IntraVENous PRN Diantha Loop, DO        0.9% sodium chloride infusion 250 mL  250 mL IntraVENous PRN Diantha Loop, DO        azithromycin (ZITHROMAX) 500 mg in 0.9% sodium chloride (MBP/ADV) 250 mL  500 mg IntraVENous Q24H Melissa Sinha MD   Stopped at 09/23/19 1142    0.9% sodium chloride infusion  150 mL/hr IntraVENous CONTINUOUS Melissa Sinha  mL/hr at 09/23/19 1054 150 mL/hr at 09/23/19 1054    acetaminophen (TYLENOL) tablet 650 mg  650 mg Oral Q6H PRN Melissa Sinha MD   650 mg at 09/23/19 0930    sodium chloride (NS) flush 5-40 mL  5-40 mL IntraVENous Q8H Melissa Sinha MD        sodium chloride (NS) flush 5-40 mL  5-40 mL IntraVENous PRN Melissa Sinha MD        ondansetron [facility]) injection 4 mg  4 mg IntraVENous Q6H PRN Melissa Sinha MD   4 mg at 09/23/19 1127    docusate sodium (COLACE) capsule 100 mg  100 mg Oral BID Melissa Sinha MD   100 mg at 09/23/19 1028    cefTRIAXone (ROCEPHIN) 1 g in 0.9% sodium chloride (MBP/ADV) 50 mL  1 g IntraVENous Q24H Melissa Sinha MD   Stopped at 09/23/19 1214    albuterol-ipratropium (DUO-NEB) 2.5 MG-0.5 MG/3 ML  3 mL Nebulization Q4H PRN Melissa Sinha MD        allopurinol (ZYLOPRIM) tablet 300 mg  300 mg Oral DAILY Melissa Sinha MD   300 mg at 09/23/19 1128    umeclidinium-vilanterol (ANORO ELLIPTA) 62.5 mcg- 25 mcg/inhalation  1 Puff Inhalation DAILY Melissa Sinha MD        DULoxetine (CYMBALTA) capsule 30 mg 30 mg Oral DAILY Familia Person MD   30 mg at 09/23/19 1128    gabapentin (NEURONTIN) capsule 100 mg  100 mg Oral TID Familia Person MD   100 mg at 09/23/19 1028    [START ON 9/24/2019] pantoprazole (PROTONIX) tablet 40 mg  40 mg Oral ACB Familia Person MD        guaiFENesin ER [facility]) tablet 1,200 mg  1,200 mg Oral BID Familia Person MD   1,200 mg at 09/23/19 1028    0.9% sodium chloride infusion 250 mL  250 mL IntraVENous PRN Familia Person MD            Allergies   Allergen Reactions    Levaquin [Levofloxacin] Nausea and Vomiting     Reports anxiety, nausea, aching after taking levaquin    Ciprofloxacin Shortness of Breath    Quinolones Shortness of Breath       Review of Systems:  A comprehensive review of systems was negative except for that written in the History of Present Illness. Objective:     Vitals:    09/23/19 1413 09/23/19 1519 09/23/19 1545 09/23/19 1600   BP: 126/64 109/62 116/52 118/77   Pulse: 80 81 80 86   Resp: 18 18 18 18   Temp: 98.5 °F (36.9 °C) 98.4 °F (36.9 °C) 97.8 °F (36.6 °C) 98.7 °F (37.1 °C)   SpO2: 94% 95% 96% 96%   Weight:       Height:            Physical Exam:  General:  Alert, cooperative, no distress, appears stated age. Eyes:  Conjunctivae/corneas clear. PERRL, EOMs intact. Fundi benign   Ears:  Normal TMs and external ear canals both ears. Nose: Nares normal. Septum midline. Mucosa normal. No drainage or sinus tenderness. Mouth/Throat: Lips, mucosa, and tongue normal. Teeth and gums normal.   Neck: Supple, symmetrical, trachea midline, no adenopathy, thyroid: no enlargment/tenderness/nodules, no carotid bruit and no JVD. Back:   Symmetric, no curvature. ROM normal. No CVA tenderness. Lungs:   Clear to auscultation bilaterally. Heart:  Regular rate and rhythm, S1, S2 normal, no murmur, click, rub or gallop. Abdomen:   Soft, non-tender. Bowel sounds normal. No masses,  No organomegaly.    Extremities: Extremities normal, atraumatic, no cyanosis or edema.   Pulses: 2+ and symmetric all extremities. Skin: Skin color, texture, turgor normal. No rashes or lesions   Lymph nodes: Cervical, supraclavicular, and axillary nodes normal.   Neurologic: CNII-XII intact. Normal strength, sensation and reflexes throughout. Assessment:     Hospital Problems  Date Reviewed: 9/20/2019          Codes Class Noted POA    Rectal bleeding ICD-10-CM: K62.5  ICD-9-CM: 569.3  9/23/2019 Unknown        Pneumonia ICD-10-CM: J18.9  ICD-9-CM: 209  9/23/2019 Unknown        Severe sepsis ([facility] ICD-10-CM: A41.9, R65.20  ICD-9-CM: 038.9, 995.92  9/23/2019 Unknown              Plan:     84M with large amount of rectal bleeding. Hgb went from 13.8 to 8.8, so he possibly lost as much as 1.5L of blood. Blood pressure improved. he's getting a transfusion now. Next lab draw is after the transfusion. I would like to keep him on ice chips and sips in case he needs an emergent procedure. Agree with holding plavix.   Last dose was yesterday    Plan for colonoscopy after 5 days off plavix (likely Friday)    Signed By: Alyson Glass MD     September 23, 2019

## 2019-09-23 NOTE — ED NOTES
TRANSFER - OUT REPORT:    Verbal report given to [facility] OF LUZ MARIA RN(name) on Ryland Vásquez  being transferred to PCU room [phone] (unit) for routine progression of care       Report consisted of patients Situation, Background, Assessment and   Recommendations(SBAR). Information from the following report(s) SBAR, ED Summary, [facility] and Recent Results was reviewed with the receiving nurse. Lines:   Peripheral IV 09/23/19 Right Antecubital (Active)   Site Assessment Clean, dry, & intact 9/23/2019  4:02 AM   Phlebitis Assessment 0 9/23/2019  4:02 AM   Infiltration Assessment 0 9/23/2019  4:02 AM   Dressing Status Clean, dry, & intact 9/23/2019  4:02 AM       Peripheral IV 09/23/19 Left; Inner Basilic (Active)   Site Assessment Clean, dry, & intact; Clean;Dry; Intact 9/23/2019  9:24 AM   Phlebitis Assessment 0 9/23/2019  9:24 AM   Infiltration Assessment 0 9/23/2019  9:24 AM   Dressing Status Clean, dry, & intact; Clean;Dry; Intact 9/23/2019  9:24 AM   Dressing Type Transparent;Tape 9/23/2019  9:24 AM   Hub Color/Line Status Green;Flushed 9/23/2019  9:24 AM   Alcohol Cap Used No 9/23/2019  9:24 AM        Opportunity for questions and clarification was provided.       Patient transported with:   Monitor  Registered Nurse

## 2019-09-23 NOTE — PROGRESS NOTES
1325:  TRANSFER - IN REPORT:    Verbal report received from [address] RN(name) on Yenny Trevino  being received from ED(unit) for routine progression of care      Report consisted of patients Situation, Background, Assessment and   Recommendations(SBAR). Information from the following report(s) SBAR, Kardex, ED Summary, Intake/Output, MAR, Recent Results, Cardiac Rhythm NSR and Quality Measures was reviewed with the receiving nurse. Opportunity for questions and clarification was provided. Assessment completed upon patients arrival to unit and care assumed. 1410: Pt arrived to the unit. VSS. No complaints of pain. Family at bedside. Dual Skin Assessment performed with Lasha Conley RN. No signs of pressure ulcers. Pt reported not urinating since last night. Pt attempted to use the urinal with no luck. Bladder Scan showed 309mL. 1435: Pt ambulated to [facility] with 2 assist. Pt voided 225mL. 1530: Blood Transfusion began. Pt tolerating well. VSS. Family at bedside. 1545: Pt tolerating blood transfusion well. No complaints of SOB, HA, or chest pain. 1600: Dr. Lo Santos at bedside evaluating pt. Per Beryl Peterson, draw labs after blood transfusion tonight around 2000 and in the AM. Pt tolerating blood transfusion well. Rate advanced to 125mL/hr. Pt NPO with ice chips, sips of clears, and meds. Family at bedside. 1500: Pt tolerating blood transfusion well. Family at bedside. 1815: Blood transfusion complete. VSS. No complaints of pain, SOB, or Chest Pain. 1900: Bedside and Verbal shift change report given to Fran Trammell RN (oncoming nurse) by Carlos Santa RN and Estelita Segovia (offgoing nurse). Report included the following information SBAR, Kardex, ED Summary, Intake/Output, Recent Results, Cardiac Rhythm NSr and Quality Measures.

## 2019-09-23 NOTE — H&P
Hospitalist Admission Note    NAME: Armida Goodman   :  1934   MRN:  [id number]     Date/Time:  2019 7:30 AM    Patient PCP: Des Reynolds MD   Cardiology:  Dr. Shannon Shultz    ______________________________________________________________________   Assessment & Plan:  Severe sepsis, POA due to  LLL community acquired pneumonia, POA   Rectal bleeding, POA suspect diverticular. Possible ischemia. CT abdomen without contrast with diverticulosis without colitis or diverticulitis  Acute blood loss anemia, POA hgb 13-->11  Hypotension due to blood loss and severe sepsis, POA  KATHY on CKD stage 3, POA Cr 1.9, baseline 1.1 to 1.4  CAD hx stent x 1 26 years ago, POA on Plavix  Gout  COPD, not on home O2  TESSA on cpap  TIA , on plavix. Morbid obesity  Hard of hearing, has hearing aid  S/p L2-L5 decompression for spinal stenosis and back pain     --get blood culture. Order sputum culture. Put on rocephin and zithromax. Has Levaquin/quinolone allergy. --1L bolus given earlier. Repeat 1L bolus and then maintenance fluid with NS 150ml/hr.  --serial lactic. Second lactic 3.4-->3.3 done prior to second liter bolus. --clear liquid diet. Colorectal surgery consult. Report colonoscopy 3 years ago normal and would not need repeat. --serial hgb, transfuse if persistent hypotension or if hgb <7  --hold lasix, olmesartan, amlodipine, and atenolol due to hypotension. Monitor in stepdown  --check UA  --continue allopurinol  --continue anoro. Add duoneb q4h prn. Add mucinex  --continue cpap. Has not been using at home per daughter  --hold plavix due to GI bleeding  --continue gabapentin wean    Body mass index is 35.88 kg/m². Code:  Discussed, full  DVT prophylaxis: SCD  Surrogate decision maker:  Wife Jennifer Loja but she is having colonoscopy today. Daughter Ene [phone]        Subjective:   CHIEF COMPLAINT:  Rectal bleeding    HISTORY OF PRESENT ILLNESS:     Armida Goodman is a 80 y. o. male who presents with total of 3 episodes of large amount of red to maroon blood from rectum starting at 1:10am today (had 2 more episodes within next 30 minutes), felt lightheaded after third episode. No abdominal pain, n/v.  +sweats, headache. Has been having increased cough with yellow sputum x 2 weeks. Denies increased SOB, no chest pain. Colonoscopy 3 years ago was normal.       We were asked to admit for work up and evaluation of the above problems.      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    Morbid obesity ([address]     On statin therapy 8/16/2017    TESSA on CPAP 1/3/2019    Psychiatric disorder     Depression    Simple chronic bronchitis ([address] 1/3/2019    TIA (transient ischemic attack) 5/51/5825    Umbilical hernia 36/5/5518      Past Surgical History:   Procedure Laterality Date    CARDIAC SURG PROCEDURE UNLIST  1996    Cardiac Stents    CARDIAC SURG PROCEDURE UNLIST  04/2019    EF 61-65%    HX HERNIA REPAIR      RIH    HX HERNIA REPAIR  19/96/26    open umbilical hernia repair mesh    HX UROLOGICAL      HYDROCELECTOMY     Social History     Tobacco Use    Smoking status: Former Smoker     Packs/day: 0.50     Years: 20.00     Pack years: 10.00    Smokeless tobacco: Former User    Tobacco comment: quit about 40  years ago   / used to dip tobaco and dip snuff   Substance Use Topics    Alcohol use: Not Currently     Comment: \"Drinks very little now for about a month \"    , independent ADLs    Family History   Problem Relation Age of Onset    Heart Disease Mother     Hypertension Mother     Hypertension Father     Hypertension Sister     Hypertension Brother     Cancer Brother       Allergies   Allergen Reactions    Levaquin [Levofloxacin] Nausea and Vomiting     Reports anxiety, nausea, aching after taking levaquin    Ciprofloxacin Shortness of Breath    Quinolones Shortness of Breath        Prior to Admission medications    Medication Sig Start Date End Date Taking? Authorizing Provider   DULoxetine (CYMBALTA) 30 mg capsule Take 1 Cap by mouth daily. 8/1/19  Yes Azeb Figueroa MD   olmesartan (BENICAR) 20 mg tablet Take 20 mg by mouth daily. Yes Provider, Historical   amLODIPine (NORVASC) 2.5 mg tablet Take 1 Tab by mouth daily. 7/8/19  Yes Azeb Figueroa MD   atorvastatin (LIPITOR) 80 mg tablet TAKE ONE TABLET BY MOUTH DAILY 7/8/19  Yes Azeb Figueroa MD   atenolol (TENORMIN) 50 mg tablet Take 1 Tab by mouth daily. 7/1/19  Yes Gordo Driscoll MD   lansoprazole (PREVACID) 15 mg capsule Take 15 mg by mouth Daily (before breakfast). Yes Provider, Historical   gabapentin (NEURONTIN) 100 mg capsule Take 100 mg by mouth three (3) times daily. Yes Provider, Historical   clopidogrel (PLAVIX) 75 mg tab Take 1 Tab by mouth daily. 4/15/19  Yes Azeb Figueroa MD   [facility] ELLIPTA 62.5-25 mcg/actuation inhaler Take 1 Puff by inhalation daily. 12/26/18  Yes Provider, Historical   furosemide (LASIX) 80 mg tablet Take 1 Tab by mouth daily. 12/19/18  Yes Azeb Figueroa MD   allopurinol (ZYLOPRIM) 300 mg tablet Take 1 Tab by mouth daily. 7/11/18  Yes Azeb Figueroa MD     REVIEW OF SYSTEMS:  POSITIVE= Bold.   Negative = normal text  General:  fever, chills, sweats, generalized weakness, weight loss/gain, loss of appetite  Eyes:  blurred vision, eye pain, loss of vision, diplopia  Ear Nose and Throat: rhinorrhea, pharyngitis  Respiratory:   cough, sputum production, SOB, wheezing, MARES, pleuritic pain  Cardiology:  chest pain, palpitations, orthopnea, PND, edema, syncope   Gastrointestinal:  abdominal pain, N/V, dysphagia, diarrhea, constipation, bleeding  Genitourinary:  frequency, urgency, dysuria, hematuria, incontinence  Muskuloskeletal :  arthralgia, myalgia  Hematology:  easy bruising, bleeding, lymphadenopathy  Dermatological:  rash, ulceration, pruritis  Endocrine:  hot flashes or polydipsia  Neurological:  headache, dizziness, confusion, focal weakness, paresthesia, memory loss, gait disturbance  Psychological: anxiety, depression, agitation      Objective:   VITALS:    Visit Vitals  BP 95/58   Pulse 89   Temp 98.1 °F (36.7 °C)   Resp 15   Ht 5' 7\" (1.702 m)   Wt 103.9 kg (229 lb 0.9 oz)   SpO2 92%   BMI 35.88 kg/m²     Temp (24hrs), Av.1 °F (36.7 °C), Min:98.1 °F (36.7 °C), Max:98.1 °F (36.7 °C)    Body mass index is 35.88 kg/m². PHYSICAL EXAM:    General:    Alert, cooperative, obese no distress, appears stated age. HEENT: Atraumatic, anicteric sclerae, pink conjunctivae     No oral ulcers, mucosa moist, throat clear. Hard of hearing   Neck:  Supple, symmetrical,  thyroid: non tender  Lungs:   Some crackles in left lower lobe. No Wheezing or Rhonchi. Chest wall:  No tenderness  No Accessory muscle use. Heart:   Regular  rhythm,  No  murmur   No gallop. No edema. Abdomen:   Obese, protuberant, non-tender. Bowel sounds normal. No masses  Extremities: No cyanosis. No clubbing  Skin:     Not pale Not Jaundiced  Petechiae in left lower leg with petechial scab due to itching and scratching. Psych:  Good insight. Not depressed. Not anxious or agitated. Neurologic: EOMs intact. No facial asymmetry. No aphasia or slurred speech. Symmetrical strength, Alert and oriented X 3.    Peripheral pulse: Left, Radial, 2+  Capillary refill:  normal    IMAGING RESULTS:   []       I have personally reviewed the actual   []     CXR  []     CT scan  CXR:  CT :  EKG: SR 88   ________________________________________________________________________  Care Plan discussed with:    Comments   Patient y    Family  y Daughter Ene bedside   RN     Care Manager                    Consultant:      ________________________________________________________________________  Prophylaxis:  GI PPI   DVT SCD   ________________________________________________________________________  Recommended Disposition:   Home with Family y   HH/PT/OT/RN y   SNF/LTC    LEILANI    ________________________________________________________________________  Code Status:  Full Code y   DNR/DNI    ________________________________________________________________________  TOTAL TIME:  60 minutes      Comments    y Reviewed previous records   >50% of visit spent in counseling and coordination of care  Discussion with patient and/or family and questions answered         ______________________________________________________________________  Myesha Conte MD      Procedures: see electronic medical records for all procedures/Xrays and details which were not copied into this note but were reviewed prior to creation of Plan. LAB DATA REVIEWED:    Recent Results (from the past 24 hour(s))   CBC WITH AUTOMATED DIFF    Collection Time: 09/23/19  3:49 AM   Result Value Ref Range    WBC 16.9 (H) 4.1 - 11.1 K/uL    RBC 3.59 (L) 4.10 - 5.70 M/uL    HGB 11.3 (L) 12.1 - 17.0 g/dL    HCT 34.6 (L) 36.6 - 50.3 %    MCV 96.4 80.0 - 99.0 FL    MCH 31.5 26.0 - 34.0 PG    MCHC 32.7 30.0 - 36.5 g/dL    RDW 15.7 (H) 11.5 - 14.5 %    PLATELET 188 224 - 218 K/uL    MPV 10.4 8.9 - 12.9 FL    NRBC 0.0 0  WBC    ABSOLUTE NRBC 0.00 0.00 - 0.01 K/uL    NEUTROPHILS 83 (H) 32 - 75 %    LYMPHOCYTES 9 (L) 12 - 49 %    MONOCYTES 5 5 - 13 %    EOSINOPHILS 1 0 - 7 %    BASOPHILS 1 0 - 1 %    IMMATURE GRANULOCYTES 1 (H) 0.0 - 0.5 %    ABS. NEUTROPHILS 14.1 (H) 1.8 - 8.0 K/UL    ABS. LYMPHOCYTES 1.5 0.8 - 3.5 K/UL    ABS. MONOCYTES 0.9 0.0 - 1.0 K/UL    ABS. EOSINOPHILS 0.2 0.0 - 0.4 K/UL    ABS. BASOPHILS 0.1 0.0 - 0.1 K/UL    ABS. IMM. GRANS. 0.1 (H) 0.00 - 0.04 K/UL    DF AUTOMATED     METABOLIC PANEL, COMPREHENSIVE    Collection Time: 09/23/19  3:49 AM   Result Value Ref Range    Sodium 140 136 - 145 mmol/L    Potassium 4.1 3.5 - 5.1 mmol/L    Chloride 107 97 - 108 mmol/L    CO2 26 21 - 32 mmol/L    Anion gap 7 5 - 15 mmol/L    Glucose 192 (H) 65 - 100 mg/dL    BUN 32 (H) 6 - 20 MG/DL    Creatinine 1.90 (H) 0.70 - 1.30 MG/DL    BUN/Creatinine ratio 17 12 - 20      GFR est AA 41 (L) >60 ml/min/1.73m2    GFR est non-AA 34 (L) >60 ml/min/1.73m2    Calcium 8.5 8.5 - 10.1 MG/DL    Bilirubin, total 0.3 0.2 - 1.0 MG/DL    ALT (SGPT) 30 12 - 78 U/L    AST (SGOT) 23 15 - 37 U/L    Alk.  phosphatase 165 (H) 45 - 117 U/L    Protein, total 6.9 6.4 - 8.2 g/dL    Albumin 3.0 (L) 3.5 - 5.0 g/dL    Globulin 3.9 2.0 - 4.0 g/dL    A-G Ratio 0.8 (L) 1.1 - 2.2     PROTHROMBIN TIME + INR    Collection Time: 09/23/19  3:49 AM   Result Value Ref Range    INR 1.2 (H) 0.9 - 1.1      Prothrombin time 12.3 (H) 9.0 - 11.1 sec   PTT    Collection Time: 09/23/19  3:49 AM   Result Value Ref Range    aPTT 37.8 (H) 22.1 - 32.0 sec    aPTT, therapeutic range     58.0 - 77.0 SECS   EKG, 12 LEAD, INITIAL    Collection Time: 09/23/19  3:55 AM   Result Value Ref Range    Ventricular Rate 88 BPM    Atrial Rate 88 BPM    P-R Interval 174 ms    QRS Duration 82 ms    Q-T Interval 404 ms    QTC Calculation (Bezet) 488 ms    Calculated P Axis 95 degrees    Calculated R Axis -7 degrees    Calculated T Axis 63 degrees    Diagnosis       Normal sinus rhythm  Prolonged QT  When compared with ECG of 12-APR-2019 10:39,  No significant change was found     LACTIC ACID    Collection Time: 09/23/19  4:47 AM   Result Value Ref Range    Lactic acid 3.4 (HH) 0.4 - 2.0 MMOL/L   LIPASE    Collection Time: 09/23/19  4:47 AM   Result Value Ref Range Lipase 133 73 - 393 U/L   TYPE + CROSSMATCH    Collection Time: 09/23/19  4:47 AM   Result Value Ref Range    Crossmatch Expiration 09/26/2019     ABO/Rh(D) B POSITIVE     Antibody screen NEG     Unit number [id number]     Blood component type RC LR     Unit division 00     Status of unit ALLOCATED     Crossmatch result Compatible

## 2019-09-23 NOTE — ED NOTES
Bedside shift change report given to Rah Singh RN (oncoming nurse) by Fe Alaniz RN (offgoing nurse). Report included the following information SBAR and ED Summary.

## 2019-09-24 LAB
ERYTHROCYTE [DISTWIDTH] IN BLOOD BY AUTOMATED COUNT: 15.9 % (ref 11.5–14.5)
HCT VFR BLD AUTO: 26.9 % (ref 36.6–50.3)
HCT VFR BLD AUTO: 27 % (ref 36.6–50.3)
HGB BLD-MCNC: 8.9 G/DL (ref 12.1–17)
HGB BLD-MCNC: 9 G/DL (ref 12.1–17)
LACTATE SERPL-SCNC: 1.8 MMOL/L (ref 0.4–2)
MCH RBC QN AUTO: 33 PG (ref 26–34)
MCHC RBC AUTO-ENTMCNC: 33.5 G/DL (ref 30–36.5)
MCV RBC AUTO: 98.5 FL (ref 80–99)
NRBC # BLD: 0 K/UL (ref 0–0.01)
NRBC BLD-RTO: 0 PER 100 WBC
PLATELET # BLD AUTO: 188 K/UL (ref 150–400)
PMV BLD AUTO: 10 FL (ref 8.9–12.9)
RBC # BLD AUTO: 2.73 M/UL (ref 4.1–5.7)
WBC # BLD AUTO: 11.2 K/UL (ref 4.1–11.1)

## 2019-09-24 PROCEDURE — 74011250636 HC RX REV CODE- 250/636: Performed by: HOSPITALIST

## 2019-09-24 PROCEDURE — 36415 COLL VENOUS BLD VENIPUNCTURE: CPT

## 2019-09-24 PROCEDURE — 74011250637 HC RX REV CODE- 250/637: Performed by: HOSPITALIST

## 2019-09-24 PROCEDURE — 74011000258 HC RX REV CODE- 258: Performed by: HOSPITALIST

## 2019-09-24 PROCEDURE — 77010033678 HC OXYGEN DAILY

## 2019-09-24 PROCEDURE — 85027 COMPLETE CBC AUTOMATED: CPT

## 2019-09-24 PROCEDURE — 83605 ASSAY OF LACTIC ACID: CPT

## 2019-09-24 PROCEDURE — 65660000000 HC RM CCU STEPDOWN

## 2019-09-24 PROCEDURE — 85018 HEMOGLOBIN: CPT

## 2019-09-24 RX ADMIN — SODIUM CHLORIDE 10 ML: 9 INJECTION, SOLUTION INTRAMUSCULAR; INTRAVENOUS; SUBCUTANEOUS at 05:35

## 2019-09-24 RX ADMIN — SODIUM CHLORIDE 150 ML/HR: 900 INJECTION, SOLUTION INTRAVENOUS at 02:08

## 2019-09-24 RX ADMIN — GABAPENTIN 100 MG: 100 CAPSULE ORAL at 18:13

## 2019-09-24 RX ADMIN — DULOXETINE 30 MG: 30 CAPSULE, DELAYED RELEASE ORAL at 08:35

## 2019-09-24 RX ADMIN — Medication 10 ML: at 18:13

## 2019-09-24 RX ADMIN — GUAIFENESIN 1200 MG: 600 TABLET, EXTENDED RELEASE ORAL at 18:13

## 2019-09-24 RX ADMIN — Medication 10 ML: at 21:56

## 2019-09-24 RX ADMIN — GABAPENTIN 100 MG: 100 CAPSULE ORAL at 21:55

## 2019-09-24 RX ADMIN — DOCUSATE SODIUM 100 MG: 100 CAPSULE, LIQUID FILLED ORAL at 18:13

## 2019-09-24 RX ADMIN — CEFTRIAXONE 1 G: 1 INJECTION, POWDER, FOR SOLUTION INTRAMUSCULAR; INTRAVENOUS at 11:20

## 2019-09-24 RX ADMIN — AZITHROMYCIN MONOHYDRATE 500 MG: 500 INJECTION, POWDER, LYOPHILIZED, FOR SOLUTION INTRAVENOUS at 08:35

## 2019-09-24 RX ADMIN — DOCUSATE SODIUM 100 MG: 100 CAPSULE, LIQUID FILLED ORAL at 08:35

## 2019-09-24 RX ADMIN — SODIUM CHLORIDE 10 ML: 9 INJECTION, SOLUTION INTRAMUSCULAR; INTRAVENOUS; SUBCUTANEOUS at 21:55

## 2019-09-24 RX ADMIN — ALLOPURINOL 300 MG: 300 TABLET ORAL at 08:35

## 2019-09-24 RX ADMIN — UMECLIDINIUM BROMIDE AND VILANTEROL TRIFENATATE 1 PUFF: 62.5; 25 POWDER RESPIRATORY (INHALATION) at 08:36

## 2019-09-24 RX ADMIN — PANTOPRAZOLE SODIUM 40 MG: 40 TABLET, DELAYED RELEASE ORAL at 08:35

## 2019-09-24 RX ADMIN — GABAPENTIN 100 MG: 100 CAPSULE ORAL at 08:35

## 2019-09-24 RX ADMIN — GUAIFENESIN 1200 MG: 600 TABLET, EXTENDED RELEASE ORAL at 08:35

## 2019-09-24 NOTE — PROGRESS NOTES
1925: Bedside shift change report given to Addis Ewing RN (oncoming nurse) by Myke Dee RN and Woodrow Cervantes RN (offgoing nurse). Report included the following information SBAR, Kardex, Intake/Output, MAR, Recent Results and Cardiac Rhythm NSR. Pt A&Ox4, denies pain. Lungs clear, diminished. Hyperactive BS. Abdomen distended, semi-soft. Trace bilateral LE edema. Family at bedside, call bell within reach. 2050: Paged telehospitalist c critical lactic of 2.5 (down from 2.74)     2117: Acknowledged order to redraw lactic with AM labs. 0315: SpO2 decreasing to 89% while pt sleeping. Applied 2L NC. SpO2 increases to 95%. 0720: Bedside shift change report given to [address]  (oncoming nurse) by Addis Ewing RN (offgoing nurse). Report included the following information SBAR, Kardex, Intake/Output, MAR, Recent Results and Cardiac Rhythm NSR.

## 2019-09-24 NOTE — PROGRESS NOTES
0124 Report received from Marlo Figueroa RN. Patient in bed with no complaints at this time. Call bell within reach. [phone] Patient up to [facility] then to recliner. Placed patient on room air as O2 sats 98 % on 2L NC O2.   0940 O2 sats 94-96% on room air. 1015 unable to get lab specimen for repeat H/H.   1020 Dr Richard Meadows in to see patient. Diet advanced to Full Liquids. [address] [place] obtained lab specimen for H/H.  1325 Spoke with Dr Trina Andrea, aware of last H/H 8.9/27.0. Changed order to be drawn q12h. Plan of care discussed. 1540 Patient remains up in recliner. Daughter in room with patient. No complaints at this time.

## 2019-09-24 NOTE — PROGRESS NOTES
**Consult Information**  Member Facility: [facility] Tammie Sanchez MRN: [id number]  Consult ID: [id number]  Facility Time Zone: ET  Date and Time of Consult: 09/23/2019 08:53:22 PM  Requesting Clinician: Donny Bill RN  Patient Name: Pauline Hylton  YOB: 1934  Gender: Male    **Clinical Note**  Clinical Note: Patient's lactic acid is down to 2.5 from 2.74. Patient is being appropriately treated. Will repeat lactic acid in am.  D/w nursing. **Attestation**  Interaction Mode: Phone Only  Phone Duration (mins): 1  Time of Call : 09/23/2019 09:16 PM  Interaction Attestation: Interprofessional internet consultation was delivered through telephonic and/or electronic communication upon the request of the patients treating physician, while the requesting and the rendering provider were not in the same physical location. Written report was provided to the requesting provider.   Evaluation Duration (mins): 1

## 2019-09-24 NOTE — PROGRESS NOTES
CRS Progress Note    Bleeding seems to have stopped. Hgb down to 9 likely related to dilution/equilibration.   No blood per rectum    /63 (BP 1 Location: Right arm, BP Patient Position: At rest)   Pulse 77   Temp 98.3 °F (36.8 °C)   Resp 18   Ht 5' 7\" (1.702 m)   Wt 103.9 kg (229 lb 0.9 oz)   SpO2 94%   BMI 35.88 kg/m²     NAD, AAOx3  Abd soft, nontender    Labs reviewed    Plan  Full liquid diet  Plan for bowel prep on Thursday and colonoscopy on Friday  Hopefully we can stop plavix indefinitely

## 2019-09-24 NOTE — PROGRESS NOTES
Hospitalist Progress Note    NAME: Max Mckeon   :  1934   MRN:  [id number]       Assessment / Plan:  Severe sepsis, POA  due to LLL community acquired pneumonia, POA   Hypotension POA- resolved now  Lactate normalized  Likely due to blood loss  + severe sepsis, POA    Follow blood culture- neg in 18 hrs till now  Cont IV Abx- IV rocephin and zithromax for now (Levaquin/quinolone allergy)  S/p 2L bolus NS in ER,   cont maintenance fluid - decrease to 75ml/hr today    Rectal bleeding, POA - suspect diverticular  Acute blood loss anemia, POA   hgb 13-->11->8.8->10.4-->9.0->8.9  CT abdomen without contrast with diverticulosis without colitis or diverticulitis    Full liquids for now as per CRS Dr Jasmin estevez on Thursday- Plan for Colonoscopy Friday noted  Need to be off Plavix - likely DC for good on discharge  Report colonoscopy 3 years ago normal  Serial H/H - Q 12 now, transfuse if <8.0    KATHY on CKD stage 3, POA Cr 1.9, baseline 1.1 to 1.4  Cr=     CAD hx stent x 1 26 years ago, POA on Plavix  --hold lasix, olmesartan, amlodipine, and atenolol due to hypotension. Holding Plavix- likely DC on discharge- change to ASA 81        Gout  COPD, not on home O2  TESSA on cpap  TIA , on plavix. Morbid obesity  Hard of hearing, has hearing aid  S/p L2-L5 decompression for spinal stenosis and back pain        --continue anoro. Add duoneb q4h prn. Add mucinex  --continue cpap. Has not been using at home per daughter  --continue gabapentin wean        Code:  Discussed, full  DVT prophylaxis: SCD  Surrogate decision maker:  Wife Jc Reynoso but she is having colonoscopy today. Daughter Ene [phone]      30.0 - 39.9 Obese / Body mass index is 35.88 kg/m².    weightloss recommended  Recommended Disposition: Home w/Family on Friday Evening post Colonoscopy if cleared by Dr Richard Meadows (Family want DC on Friday itself)     Subjective:     Chief Complaint / Reason for Physician Visit F/U GI Bleed, PNA, Sepsis  \"I am fine\". Discussed with RN events overnight. Review of Systems:  Symptom Y/N Comments  Symptom Y/N Comments   Fever/Chills n   Chest Pain n    Poor Appetite n   Edema n    Cough n   Abdominal Pain n    Sputum n   Joint Pain n    SOB/MARES n   Pruritis/Rash     Nausea/vomit n   Tolerating PT/OT y    Diarrhea    Tolerating Diet y    Constipation    Other       Could NOT obtain due to:      Objective:     VITALS:   Last 24hrs VS reviewed since prior progress note. Most recent are:  Patient Vitals for the past 24 hrs:   Temp Pulse Resp BP SpO2   09/24/19 1114 98.5 °F (36.9 °C) 74 18 129/55 92 %   09/24/19 0719 98.4 °F (36.9 °C) 77 20 133/63 93 %   09/24/19 0216 98.3 °F (36.8 °C) 77 18 138/63 94 %   09/23/19 2311 98.2 °F (36.8 °C) 95 16 131/58 96 %   09/23/19 2220     95 %   09/23/19 2100 97.9 °F (36.6 °C) 80 16 121/89 95 %   09/23/19 2000    125/59    09/23/19 1900 97.5 °F (36.4 °C) 86 16 126/53 94 %   09/23/19 1816 98 °F (36.7 °C) 81 18 120/60 96 %   09/23/19 1745  82 16 122/70 99 %   09/23/19 1730  87 18 125/58 93 %   09/23/19 1715  83 16  95 %   09/23/19 1701 98.4 °F (36.9 °C) 91 18 123/53 96 %   09/23/19 1700  93  123/53    09/23/19 1645   18 116/58    09/23/19 1630  82 18 119/57 95 %   09/23/19 1615 98.5 °F (36.9 °C) 83 18 124/88 97 %   09/23/19 1600 98.7 °F (37.1 °C) 86 18 118/77 96 %   09/23/19 1545 97.8 °F (36.6 °C) 80 18 116/52 96 %   09/23/19 1519 98.4 °F (36.9 °C) 81 18 109/62 95 %   09/23/19 1413 98.5 °F (36.9 °C) 80 18 126/64 94 %   09/23/19 1345    114/58    09/23/19 1330  84 22 107/54 (!) 89 %       Intake/Output Summary (Last 24 hours) at 9/24/2019 1320  Last data filed at 9/24/2019 1120  Gross per 24 hour   Intake 5240.8 ml   Output 585 ml   Net 4655.8 ml        PHYSICAL EXAM:  General: WD, WN. Alert, cooperative, no acute distress    EENT:  EOMI. Anicteric sclerae. MMM  Resp:  CTA bilaterally, no wheezing or rales.   No accessory muscle use  CV:  Regular  rhythm,  No edema  GI:  Soft, Non distended, Non tender.  +Bowel sounds  Neurologic:  Alert and oriented X 3, normal speech,   Psych:   Good insight. Not anxious nor agitated  Skin:  No rashes. No jaundice    Reviewed most current lab test results and cultures  YES  Reviewed most current radiology test results   YES  Review and summation of old records today    NO  Reviewed patient's current orders and MAR    YES  PMH/SH reviewed - no change compared to H&P  ________________________________________________________________________  Care Plan discussed with:    Comments   Patient x    Family  x At bedside   RN x    Care Manager x Nila   Consultant                       x Multidiciplinary team rounds were held today with , nursing, pharmacist and clinical coordinator. Patient's plan of care was discussed; medications were reviewed and discharge planning was addressed. ________________________________________________________________________  Total NON critical care TIME:  36   Minutes    Total CRITICAL CARE TIME Spent:   Minutes non procedure based      Comments   >50% of visit spent in counseling and coordination of care     ________________________________________________________________________  Isabel Montes MD     Procedures: see electronic medical records for all procedures/Xrays and details which were not copied into this note but were reviewed prior to creation of Plan. LABS:  I reviewed today's most current labs and imaging studies. Pertinent labs include:  Recent Labs     09/24/19  1157 09/24/19  0216 09/23/19 2002 09/23/19  0349   WBC  --  11.2*  --   --  16.9*   HGB 8.9* 9.0* 10.4*   < > 11.3*   HCT 27.0* 26.9* 31.3*   < > 34.6*   PLT  --  188  --   --  268    < > = values in this interval not displayed.      Recent Labs     09/23/19  1218 09/23/19  0349    140   K 5.1 4.1   * 107   CO2 25 26   * 192*   BUN 35* 32*   CREA 1.80* 1.90*   CA 7.3* 8.5   ALB  --  3.0*   TBILI --  0.3   SGOT  --  23   ALT  --  30   INR  --  1.2*       Signed: Lyle Horvath MD

## 2019-09-25 ENCOUNTER — APPOINTMENT (OUTPATIENT)
Dept: CT IMAGING | Age: 84
DRG: 871 | End: 2019-09-25
Attending: INTERNAL MEDICINE
Payer: MEDICARE

## 2019-09-25 ENCOUNTER — ANESTHESIA EVENT (OUTPATIENT)
Dept: ENDOSCOPY | Age: 84
DRG: 871 | End: 2019-09-25
Payer: MEDICARE

## 2019-09-25 PROBLEM — Z23 ENCOUNTER FOR IMMUNIZATION: Status: RESOLVED | Noted: 2017-08-16 | Resolved: 2019-09-25

## 2019-09-25 LAB
ANION GAP SERPL CALC-SCNC: 4 MMOL/L (ref 5–15)
BACTERIA SPEC CULT: NORMAL
BUN SERPL-MCNC: 21 MG/DL (ref 6–20)
BUN/CREAT SERPL: 16 (ref 12–20)
CALCIUM SERPL-MCNC: 7.7 MG/DL (ref 8.5–10.1)
CC UR VC: NORMAL
CHLORIDE SERPL-SCNC: 117 MMOL/L (ref 97–108)
CO2 SERPL-SCNC: 23 MMOL/L (ref 21–32)
CREAT SERPL-MCNC: 1.3 MG/DL (ref 0.7–1.3)
ERYTHROCYTE [DISTWIDTH] IN BLOOD BY AUTOMATED COUNT: 16 % (ref 11.5–14.5)
GLUCOSE SERPL-MCNC: 112 MG/DL (ref 65–100)
HCT VFR BLD AUTO: 22.3 % (ref 36.6–50.3)
HCT VFR BLD AUTO: 23 % (ref 36.6–50.3)
HCT VFR BLD AUTO: 24.2 % (ref 36.6–50.3)
HCT VFR BLD AUTO: 26.1 % (ref 36.6–50.3)
HGB BLD-MCNC: 7.2 G/DL (ref 12.1–17)
HGB BLD-MCNC: 7.3 G/DL (ref 12.1–17)
HGB BLD-MCNC: 7.3 G/DL (ref 12.1–17)
HGB BLD-MCNC: 8.5 G/DL (ref 12.1–17)
MCH RBC QN AUTO: 32.6 PG (ref 26–34)
MCHC RBC AUTO-ENTMCNC: 32.3 G/DL (ref 30–36.5)
MCV RBC AUTO: 100.9 FL (ref 80–99)
NRBC # BLD: 0 K/UL (ref 0–0.01)
NRBC BLD-RTO: 0 PER 100 WBC
PLATELET # BLD AUTO: 192 K/UL (ref 150–400)
PMV BLD AUTO: 10.5 FL (ref 8.9–12.9)
POTASSIUM SERPL-SCNC: 4.7 MMOL/L (ref 3.5–5.1)
RBC # BLD AUTO: 2.21 M/UL (ref 4.1–5.7)
SERVICE CMNT-IMP: NORMAL
SODIUM SERPL-SCNC: 144 MMOL/L (ref 136–145)
WBC # BLD AUTO: 9 K/UL (ref 4.1–11.1)

## 2019-09-25 PROCEDURE — 74011000258 HC RX REV CODE- 258: Performed by: HOSPITALIST

## 2019-09-25 PROCEDURE — 87077 CULTURE AEROBIC IDENTIFY: CPT

## 2019-09-25 PROCEDURE — 74011250636 HC RX REV CODE- 250/636: Performed by: INTERNAL MEDICINE

## 2019-09-25 PROCEDURE — 74011250636 HC RX REV CODE- 250/636: Performed by: HOSPITALIST

## 2019-09-25 PROCEDURE — C9113 INJ PANTOPRAZOLE SODIUM, VIA: HCPCS | Performed by: INTERNAL MEDICINE

## 2019-09-25 PROCEDURE — 77010033678 HC OXYGEN DAILY

## 2019-09-25 PROCEDURE — 87186 SC STD MICRODIL/AGAR DIL: CPT

## 2019-09-25 PROCEDURE — 74178 CT ABD&PLV WO CNTR FLWD CNTR: CPT

## 2019-09-25 PROCEDURE — 87070 CULTURE OTHR SPECIMN AEROBIC: CPT

## 2019-09-25 PROCEDURE — P9016 RBC LEUKOCYTES REDUCED: HCPCS

## 2019-09-25 PROCEDURE — 74011250637 HC RX REV CODE- 250/637: Performed by: HOSPITALIST

## 2019-09-25 PROCEDURE — 65660000000 HC RM CCU STEPDOWN

## 2019-09-25 PROCEDURE — 74011636320 HC RX REV CODE- 636/320: Performed by: INTERNAL MEDICINE

## 2019-09-25 PROCEDURE — 74011000250 HC RX REV CODE- 250: Performed by: INTERNAL MEDICINE

## 2019-09-25 PROCEDURE — 36430 TRANSFUSION BLD/BLD COMPNT: CPT

## 2019-09-25 PROCEDURE — 85027 COMPLETE CBC AUTOMATED: CPT

## 2019-09-25 PROCEDURE — 80048 BASIC METABOLIC PNL TOTAL CA: CPT

## 2019-09-25 PROCEDURE — 85018 HEMOGLOBIN: CPT

## 2019-09-25 PROCEDURE — 36415 COLL VENOUS BLD VENIPUNCTURE: CPT

## 2019-09-25 RX ORDER — PANTOPRAZOLE SODIUM 40 MG/10ML
40 INJECTION, POWDER, LYOPHILIZED, FOR SOLUTION INTRAVENOUS EVERY 12 HOURS
Status: DISCONTINUED | OUTPATIENT
Start: 2019-09-25 | End: 2019-09-28 | Stop reason: HOSPADM

## 2019-09-25 RX ORDER — ACETAMINOPHEN 10 MG/ML
1000 INJECTION, SOLUTION INTRAVENOUS EVERY 8 HOURS
Status: COMPLETED | OUTPATIENT
Start: 2019-09-25 | End: 2019-09-26

## 2019-09-25 RX ORDER — ATENOLOL 50 MG/1
50 TABLET ORAL DAILY
Status: DISCONTINUED | OUTPATIENT
Start: 2019-09-26 | End: 2019-09-25

## 2019-09-25 RX ORDER — MORPHINE SULFATE 2 MG/ML
1 INJECTION, SOLUTION INTRAMUSCULAR; INTRAVENOUS
Status: DISCONTINUED | OUTPATIENT
Start: 2019-09-25 | End: 2019-09-28 | Stop reason: HOSPADM

## 2019-09-25 RX ORDER — SODIUM CHLORIDE 0.9 % (FLUSH) 0.9 %
10 SYRINGE (ML) INJECTION
Status: COMPLETED | OUTPATIENT
Start: 2019-09-25 | End: 2019-09-25

## 2019-09-25 RX ORDER — SODIUM CHLORIDE 9 MG/ML
250 INJECTION, SOLUTION INTRAVENOUS AS NEEDED
Status: DISCONTINUED | OUTPATIENT
Start: 2019-09-25 | End: 2019-09-26 | Stop reason: SDUPTHER

## 2019-09-25 RX ORDER — ATENOLOL 25 MG/1
12.5 TABLET ORAL DAILY
Status: DISCONTINUED | OUTPATIENT
Start: 2019-09-26 | End: 2019-09-28 | Stop reason: HOSPADM

## 2019-09-25 RX ADMIN — ACETAMINOPHEN 1000 MG: 10 INJECTION, SOLUTION INTRAVENOUS at 16:55

## 2019-09-25 RX ADMIN — Medication 10 ML: at 21:46

## 2019-09-25 RX ADMIN — GABAPENTIN 100 MG: 100 CAPSULE ORAL at 16:55

## 2019-09-25 RX ADMIN — PROCHLORPERAZINE EDISYLATE 5 MG: 5 INJECTION INTRAMUSCULAR; INTRAVENOUS at 08:11

## 2019-09-25 RX ADMIN — SODIUM CHLORIDE 50 ML/HR: 900 INJECTION, SOLUTION INTRAVENOUS at 18:11

## 2019-09-25 RX ADMIN — GUAIFENESIN 1200 MG: 600 TABLET, EXTENDED RELEASE ORAL at 17:58

## 2019-09-25 RX ADMIN — CEFTRIAXONE 1 G: 1 INJECTION, POWDER, FOR SOLUTION INTRAMUSCULAR; INTRAVENOUS at 10:35

## 2019-09-25 RX ADMIN — MORPHINE SULFATE 1 MG: 2 INJECTION, SOLUTION INTRAMUSCULAR; INTRAVENOUS at 08:11

## 2019-09-25 RX ADMIN — PANTOPRAZOLE SODIUM 40 MG: 40 INJECTION, POWDER, FOR SOLUTION INTRAVENOUS at 10:35

## 2019-09-25 RX ADMIN — SODIUM CHLORIDE 10 ML: 9 INJECTION, SOLUTION INTRAMUSCULAR; INTRAVENOUS; SUBCUTANEOUS at 13:29

## 2019-09-25 RX ADMIN — ONDANSETRON 4 MG: 2 INJECTION INTRAMUSCULAR; INTRAVENOUS at 03:21

## 2019-09-25 RX ADMIN — SODIUM CHLORIDE 75 ML/HR: 900 INJECTION, SOLUTION INTRAVENOUS at 04:14

## 2019-09-25 RX ADMIN — AZITHROMYCIN MONOHYDRATE 500 MG: 500 INJECTION, POWDER, LYOPHILIZED, FOR SOLUTION INTRAVENOUS at 08:11

## 2019-09-25 RX ADMIN — GABAPENTIN 100 MG: 100 CAPSULE ORAL at 21:44

## 2019-09-25 RX ADMIN — Medication 10 ML: at 13:30

## 2019-09-25 RX ADMIN — Medication 10 ML: at 11:51

## 2019-09-25 RX ADMIN — SODIUM CHLORIDE 10 ML: 9 INJECTION, SOLUTION INTRAMUSCULAR; INTRAVENOUS; SUBCUTANEOUS at 22:00

## 2019-09-25 RX ADMIN — IOPAMIDOL 100 ML: 755 INJECTION, SOLUTION INTRAVENOUS at 11:51

## 2019-09-25 RX ADMIN — ACETAMINOPHEN 1000 MG: 10 INJECTION, SOLUTION INTRAVENOUS at 09:00

## 2019-09-25 RX ADMIN — PANTOPRAZOLE SODIUM 40 MG: 40 INJECTION, POWDER, FOR SOLUTION INTRAVENOUS at 21:44

## 2019-09-25 NOTE — PROGRESS NOTES
Hospitalist Progress Note    NAME: Steve Rosenberg   :  1934   MRN:  [id number]       Assessment / Plan:  Rectal bleeding, POA - suspect diverticular, recurrent active GI bleed on   Acute blood loss anemia, POA   Intractable n/v on   hgb 13-->11->8.8->10.4-->9.0->8.9> 7.2>7.3  CT abdomen without contrast with diverticulosis without colitis or diverticulitis  Pt had 2active BM of bright red blood with clots this am  , pt c/o n/v   BP stable . A unit PRBC ordered , H&H q8h , transfuse if HB < 8  NPO for now, PRN anti-emetics , increase IVF   Stat CTA abdomen /pelvis : no active bleeding   Talked to Dr Jesús Mora >> no indication for emergent colonoscopy as plavix still need to wash out   Bowel prep on Thursday- Plan for Colonoscopy Friday noted  Need to be off Plavix - likely DC for good on discharge  Report colonoscopy 3 years ago normal  Update : dr Susan Brandon allowed ice chips , repeat hb after a unit PRBC 7.3, will order another unit . Severe sepsis, POA  due to LLL community acquired pneumonia, POA   Hypotension POA- resolved now  Lactate normalized  Likely due to blood loss  + severe sepsis, POA    Follow blood culture- neg in 18 hrs till now  Cont IV Abx- IV rocephin and zithromax for now (Levaquin/quinolone allergy)  S/p 2L bolus NS in ER,   cont maintenance fluid -    KATHY on CKD stage 3, POA Cr 1.9, baseline 1.1 to 1.4 resolved   Cr= 1.30    CAD hx stent x 1 26 years ago, POA on Plavix  --hold lasix, olmesartan, amlodipine due to hypotension and GI bleed   - BP coming up , will restart low dose tenormin with holding parameters   Holding Plavix- likely DC on discharge- change to ASA 81    Gout  COPD, not on home O2  TESSA on cpap  TIA , on plavix. Morbid obesity  Hard of hearing, has hearing aid  S/p L2-L5 decompression for spinal stenosis and back pain    --continue anoro. Add duoneb q4h prn. Add mucinex  --continue cpap.   Has not been using at home per daughter  --continue gabapentin wean        Code:  Discussed, full  DVT prophylaxis: SCD  Surrogate decision maker:  Wife Rubin Mijares. Daughter Ene [phone]      30.0 - 39.9 Obese / Body mass index is 35.88 kg/m². weightloss recommended  Recommended Disposition: Home w/Family on Friday Evening post Colonoscopy if cleared by Dr Niki Johnson (Family want DC on Friday itself)     Subjective:     Chief Complaint / Reason for Physician Visit F/U GI Bleed, PNA, Sepsis  Pt had bloody BM x4 this am and had n/v and not feeling well . C/o HA  Discussed with RN events overnight. Review of Systems:  Symptom Y/N Comments  Symptom Y/N Comments   Fever/Chills n   Chest Pain n    Poor Appetite y   Edema n    Cough n   Abdominal Pain n    Sputum n   Joint Pain n    SOB/MARES n   Pruritis/Rash     Nausea/vomit y/y   Tolerating PT/OT y    Diarrhea    Tolerating Diet n    Constipation    Other y HA      Could NOT obtain due to:      Objective:     VITALS:   Last 24hrs VS reviewed since prior progress note. Most recent are:  Patient Vitals for the past 24 hrs:   Temp Pulse Resp BP SpO2   09/25/19 0323 97.4 °F (36.3 °C) (!) 101 20 141/87 98 %   09/24/19 2258 98.3 °F (36.8 °C) 92 20 137/60 97 %   09/24/19 2014 97.6 °F (36.4 °C) 76 20 137/70 99 %   09/24/19 1540 98.4 °F (36.9 °C) 79 20 145/61 94 %   09/24/19 1114 98.5 °F (36.9 °C) 74 18 129/55 92 %       Intake/Output Summary (Last 24 hours) at 9/25/2019 0746  Last data filed at 9/24/2019 2258  Gross per 24 hour   Intake 3013.75 ml   Output 500 ml   Net 2513.75 ml        PHYSICAL EXAM:  General: WD, WN. Alert, cooperative,pale , dryu heaving   EENT:  EOMI. Anicteric sclerae. MMM  Resp:  CTA bilaterally, no wheezing or rales. No accessory muscle use  CV:  Regular  rhythm,  No edema  GI:  Soft, Non distended, Non tender.  +Bowel sounds  Neurologic:  Alert and oriented X 3, normal speech,   Psych:   Good insight. Not anxious nor agitated  Skin:  No rashes.   No jaundice    Reviewed most current lab test results and cultures YES  Reviewed most current radiology test results   YES  Review and summation of old records today    NO  Reviewed patient's current orders and MAR    YES  PMH/SH reviewed - no change compared to H&P  ________________________________________________________________________  Care Plan discussed with:    Comments   Patient x    Family  x At bedside   RN x    Care Manager x    Consultant  x Dr Susan Brandon                     x Multidiciplinary team rounds were held today with , nursing, pharmacist and clinical coordinator. Patient's plan of care was discussed; medications were reviewed and discharge planning was addressed. ________________________________________________________________________  Total NON critical care TIME:   Minutes    Total CRITICAL CARE TIME Spent: 30  Minutes non procedure based      Comments   >50% of visit spent in counseling and coordination of care x    ________________________________________________________________________  Jennifer Calhoun MD     Procedures: see electronic medical records for all procedures/Xrays and details which were not copied into this note but were reviewed prior to creation of Plan. LABS:  I reviewed today's most current labs and imaging studies. Pertinent labs include:  Recent Labs     09/25/19  0014 09/24/19  1157 09/24/19  0216  09/23/19  0349   WBC 9.0  --  11.2*  --  16.9*   HGB 7.2* 8.9* 9.0*   < > 11.3*   HCT 22.3* 27.0* 26.9*   < > 34.6*     --  188  --  268    < > = values in this interval not displayed.      Recent Labs     09/25/19  0014 09/23/19  1218 09/23/19  0349    141 140   K 4.7 5.1 4.1   * 112* 107   CO2 23 25 26   * 169* 192*   BUN 21* 35* 32*   CREA 1.30 1.80* 1.90*   CA 7.7* 7.3* 8.5   ALB  --   --  3.0*   TBILI  --   --  0.3   SGOT  --   --  23   ALT  --   --  30   INR  --   --  1.2*       Signed: Jennifer Calhoun MD

## 2019-09-25 NOTE — PROGRESS NOTES
Bedside and Verbal shift change report given to [facility] AVIVA CONRAD (oncoming nurse) by Meredith Guerra RN (offgoing nurse). Report included the following information SBAR, Kardex, Intake/Output, MAR, Accordion, Recent Results, Med Rec Status, Cardiac Rhythm Sinus Tach and Alarm Parameters . Patient received in bed noted to be in some distress. Blood noted to be on the pad in the bed and offgoing RN reported 2 other bloody stools overnight. Patient reporting nausea and overall feeling of malaise. MD paged and orders received.

## 2019-09-25 NOTE — PROGRESS NOTES
Bedside and Verbal shift change report given to Merle Macario RN (oncoming nurse) by Franco Bhagat RN (offgoing nurse). Report included the following information SBAR, Kardex, MAR and Recent Results. 2014: Assessed patient, MEWS 1, VSS, A&O x 4, no complaints of pain at this time, patient is resting in bed comfortalby with no request at this time. Will continue to monitor. 2258: Reassessed patient, MEWS 1, VSS, no changes from previous assessment, no complaints of pain at this time, no request at this time. Will continue to monitor. 0321: Administered PRN Zofran for nausea. 1588[place]ston Angela patient, MEWS 2 due to HR of 101, patient up to the bathroom and had bright red watery bowel movement, assisted patient back to bed and placed on 2L NC due to him being SOB. 0630: Patient had another bloody stool will page MD on day shift to make aware he is showing signs of active bleeding again. 0710: Bedside and Verbal shift change report given to Meagan Perez RN (oncoming nurse) by Merle Macario RN (offgoing nurse). Report included the following information SBAR, Kardex, MAR and Recent Results.

## 2019-09-25 NOTE — PROGRESS NOTES
Problem: Pressure Injury - Risk of  Goal: *Prevention of pressure injury  Description  Document Aquiles Scale and appropriate interventions in the flowsheet. Outcome: Progressing Towards Goal  Note:   Pressure Injury Interventions:  Sensory Interventions: Assess changes in LOC, Keep linens dry and wrinkle-free, Maintain/enhance activity level, Minimize linen layers, Pressure redistribution bed/mattress (bed type)    Moisture Interventions: Absorbent underpads, Apply protective barrier, creams and emollients    Activity Interventions: Assess need for specialty bed, Increase time out of bed    Mobility Interventions: PT/OT evaluation, Pressure redistribution bed/mattress (bed type)    Nutrition Interventions: Offer support with meals,snacks and hydration    Friction and Shear Interventions: Lift sheet, Apply protective barrier, creams and emollients                Problem: Patient Education: Go to Patient Education Activity  Goal: Patient/Family Education  Outcome: Progressing Towards Goal     Problem: Falls - Risk of  Goal: *Absence of Falls  Description  Document Too Fall Risk and appropriate interventions in the flowsheet.   Outcome: Progressing Towards Goal  Note:   Fall Risk Interventions:  Mobility Interventions: Bed/chair exit alarm         Medication Interventions: Bed/chair exit alarm    Elimination Interventions: Bed/chair exit alarm              Problem: Patient Education: Go to Patient Education Activity  Goal: Patient/Family Education  Outcome: Progressing Towards Goal     Problem: Breathing Pattern - Ineffective  Goal: *Absence of hypoxia  Outcome: Progressing Towards Goal

## 2019-09-25 NOTE — PROGRESS NOTES
Reason for Admission: Rectal Bleeding                  RRAT Score:  27                Resources/supports as identified by patient/family:   Family                 Top Challenges facing patient (as identified by patient/family and CM): Finances/Medication cost?  Medicaid                   Transportation? Family to transport               Support system or lack thereof? Family                     Living arrangements? Lives with spouse              Self-care/ADLs/Cognition? Independent                Current Advanced Directive/Advance Care Plan:  FULL-Not On FIle                           Plan for utilizing home health:  TBD                    Transition of Care Plan:                  CM completed room visit with pt, with family by bedside. Pt lives in one story home with family. Pt is known to be independent with ADLs, and drives. Pt will have transport home provided by family. Pt is known to be active with PCP: seen Sept 2019 and uses [facility]. Pt reported access to DME at home. Pt reported [address] in the past and inpatient rehab in the past.    Pt currently on O2, and will need to be weaned off at d/c. If Home O2 is required then CM will order DME. Pt listed spouse as medical decision maker when discussing ACP. CM will continue to follow. JACQUES    Weaned off O2  2nd IM Medicare Letter  Possible [id number]  [facility] with family     Care Management Interventions  PCP Verified by CM: Yes  Mode of Transport at Discharge:  Other (see comment)  Transition of Care Consult (CM Consult): Discharge Planning  Discharge Durable Medical Equipment: No  Physical Therapy Consult: No  Occupational Therapy Consult: No  Speech Therapy Consult: Yes  Current Support Network: Lives with Spouse, Own Home  Confirm Follow Up Transport: Family  Plan discussed with Pt/Family/Caregiver: Yes  Discharge Location  Discharge Placement: [facility] [address], MSW, [facility]

## 2019-09-26 ENCOUNTER — APPOINTMENT (OUTPATIENT)
Dept: GENERAL RADIOLOGY | Age: 84
DRG: 871 | End: 2019-09-26
Attending: INTERNAL MEDICINE
Payer: MEDICARE

## 2019-09-26 ENCOUNTER — APPOINTMENT (OUTPATIENT)
Dept: NUCLEAR MEDICINE | Age: 84
DRG: 871 | End: 2019-09-26
Attending: NURSE PRACTITIONER
Payer: MEDICARE

## 2019-09-26 LAB
ABO + RH BLD: NORMAL
BASOPHILS # BLD: 0 K/UL (ref 0–0.1)
BASOPHILS NFR BLD: 0 % (ref 0–1)
BLOOD GROUP ANTIBODIES SERPL: NORMAL
DIFFERENTIAL METHOD BLD: ABNORMAL
EOSINOPHIL # BLD: 0.2 K/UL (ref 0–0.4)
EOSINOPHIL NFR BLD: 2 % (ref 0–7)
ERYTHROCYTE [DISTWIDTH] IN BLOOD BY AUTOMATED COUNT: 16.2 % (ref 11.5–14.5)
HCT VFR BLD AUTO: 23.9 % (ref 36.6–50.3)
HCT VFR BLD AUTO: 26.1 % (ref 36.6–50.3)
HCT VFR BLD AUTO: 27.6 % (ref 36.6–50.3)
HGB BLD-MCNC: 7.7 G/DL (ref 12.1–17)
HGB BLD-MCNC: 8.7 G/DL (ref 12.1–17)
HGB BLD-MCNC: 9.2 G/DL (ref 12.1–17)
IMM GRANULOCYTES # BLD AUTO: 0 K/UL (ref 0–0.04)
IMM GRANULOCYTES NFR BLD AUTO: 0 % (ref 0–0.5)
LYMPHOCYTES # BLD: 1 K/UL (ref 0.8–3.5)
LYMPHOCYTES NFR BLD: 11 % (ref 12–49)
MCH RBC QN AUTO: 31.6 PG (ref 26–34)
MCHC RBC AUTO-ENTMCNC: 32.2 G/DL (ref 30–36.5)
MCV RBC AUTO: 98 FL (ref 80–99)
MONOCYTES # BLD: 0.8 K/UL (ref 0–1)
MONOCYTES NFR BLD: 9 % (ref 5–13)
NEUTS SEG # BLD: 7.1 K/UL (ref 1.8–8)
NEUTS SEG NFR BLD: 78 % (ref 32–75)
NRBC # BLD: 0 K/UL (ref 0–0.01)
NRBC BLD-RTO: 0 PER 100 WBC
PLATELET # BLD AUTO: 174 K/UL (ref 150–400)
PMV BLD AUTO: 10.1 FL (ref 8.9–12.9)
RBC # BLD AUTO: 2.44 M/UL (ref 4.1–5.7)
SPECIMEN EXP DATE BLD: NORMAL
WBC # BLD AUTO: 9.2 K/UL (ref 4.1–11.1)

## 2019-09-26 PROCEDURE — 74011250636 HC RX REV CODE- 250/636: Performed by: INTERNAL MEDICINE

## 2019-09-26 PROCEDURE — 36430 TRANSFUSION BLD/BLD COMPNT: CPT

## 2019-09-26 PROCEDURE — 76937 US GUIDE VASCULAR ACCESS: CPT

## 2019-09-26 PROCEDURE — 74011250636 HC RX REV CODE- 250/636: Performed by: HOSPITALIST

## 2019-09-26 PROCEDURE — 74011000258 HC RX REV CODE- 258: Performed by: HOSPITALIST

## 2019-09-26 PROCEDURE — 74011250637 HC RX REV CODE- 250/637: Performed by: HOSPITALIST

## 2019-09-26 PROCEDURE — 85025 COMPLETE CBC W/AUTO DIFF WBC: CPT

## 2019-09-26 PROCEDURE — 74011250637 HC RX REV CODE- 250/637: Performed by: INTERNAL MEDICINE

## 2019-09-26 PROCEDURE — 74011000250 HC RX REV CODE- 250: Performed by: HOSPITALIST

## 2019-09-26 PROCEDURE — 65660000000 HC RM CCU STEPDOWN

## 2019-09-26 PROCEDURE — P9016 RBC LEUKOCYTES REDUCED: HCPCS

## 2019-09-26 PROCEDURE — 77030027138 HC INCENT SPIROMETER -A

## 2019-09-26 PROCEDURE — 36415 COLL VENOUS BLD VENIPUNCTURE: CPT

## 2019-09-26 PROCEDURE — 77010033678 HC OXYGEN DAILY

## 2019-09-26 PROCEDURE — 86900 BLOOD TYPING SEROLOGIC ABO: CPT

## 2019-09-26 PROCEDURE — A9512 TC99M PERTECHNETATE: HCPCS

## 2019-09-26 PROCEDURE — 74011000250 HC RX REV CODE- 250: Performed by: NURSE PRACTITIONER

## 2019-09-26 PROCEDURE — 77030018786 HC NDL GD F/USND BARD -B

## 2019-09-26 PROCEDURE — 85018 HEMOGLOBIN: CPT

## 2019-09-26 PROCEDURE — 71045 X-RAY EXAM CHEST 1 VIEW: CPT

## 2019-09-26 PROCEDURE — C1751 CATH, INF, PER/CENT/MIDLINE: HCPCS

## 2019-09-26 PROCEDURE — C9113 INJ PANTOPRAZOLE SODIUM, VIA: HCPCS | Performed by: INTERNAL MEDICINE

## 2019-09-26 RX ORDER — ACETAMINOPHEN 325 MG/1
650 TABLET ORAL
Status: DISCONTINUED | OUTPATIENT
Start: 2019-09-26 | End: 2019-09-28 | Stop reason: HOSPADM

## 2019-09-26 RX ORDER — AZITHROMYCIN 250 MG/1
500 TABLET, FILM COATED ORAL DAILY
Status: DISCONTINUED | OUTPATIENT
Start: 2019-09-27 | End: 2019-09-26

## 2019-09-26 RX ORDER — FUROSEMIDE 10 MG/ML
60 INJECTION INTRAMUSCULAR; INTRAVENOUS ONCE
Status: COMPLETED | OUTPATIENT
Start: 2019-09-26 | End: 2019-09-26

## 2019-09-26 RX ORDER — SODIUM CHLORIDE 9 MG/ML
250 INJECTION, SOLUTION INTRAVENOUS AS NEEDED
Status: DISCONTINUED | OUTPATIENT
Start: 2019-09-26 | End: 2019-09-28 | Stop reason: HOSPADM

## 2019-09-26 RX ADMIN — AZITHROMYCIN MONOHYDRATE 500 MG: 500 INJECTION, POWDER, LYOPHILIZED, FOR SOLUTION INTRAVENOUS at 08:50

## 2019-09-26 RX ADMIN — UMECLIDINIUM BROMIDE AND VILANTEROL TRIFENATATE 1 PUFF: 62.5; 25 POWDER RESPIRATORY (INHALATION) at 08:50

## 2019-09-26 RX ADMIN — GABAPENTIN 100 MG: 100 CAPSULE ORAL at 23:01

## 2019-09-26 RX ADMIN — Medication 10 ML: at 13:38

## 2019-09-26 RX ADMIN — PANTOPRAZOLE SODIUM 40 MG: 40 INJECTION, POWDER, FOR SOLUTION INTRAVENOUS at 08:50

## 2019-09-26 RX ADMIN — PANTOPRAZOLE SODIUM 40 MG: 40 INJECTION, POWDER, FOR SOLUTION INTRAVENOUS at 21:15

## 2019-09-26 RX ADMIN — ALLOPURINOL 300 MG: 300 TABLET ORAL at 08:49

## 2019-09-26 RX ADMIN — ATENOLOL 12.5 MG: 25 TABLET ORAL at 12:01

## 2019-09-26 RX ADMIN — Medication 10 ML: at 21:19

## 2019-09-26 RX ADMIN — GABAPENTIN 100 MG: 100 CAPSULE ORAL at 08:50

## 2019-09-26 RX ADMIN — SODIUM CHLORIDE 10 ML: 9 INJECTION, SOLUTION INTRAMUSCULAR; INTRAVENOUS; SUBCUTANEOUS at 21:15

## 2019-09-26 RX ADMIN — FUROSEMIDE 60 MG: 10 INJECTION, SOLUTION INTRAMUSCULAR; INTRAVENOUS at 07:08

## 2019-09-26 RX ADMIN — POLYETHYLENE GLYCOL-3350 AND ELECTROLYTES 4000 ML: 236; 6.74; 5.86; 2.97; 22.74 POWDER, FOR SOLUTION ORAL at 15:47

## 2019-09-26 RX ADMIN — Medication 10 ML: at 06:00

## 2019-09-26 RX ADMIN — ACETAMINOPHEN 1000 MG: 10 INJECTION, SOLUTION INTRAVENOUS at 01:58

## 2019-09-26 RX ADMIN — GUAIFENESIN 1200 MG: 600 TABLET, EXTENDED RELEASE ORAL at 08:49

## 2019-09-26 RX ADMIN — CEFTRIAXONE 1 G: 1 INJECTION, POWDER, FOR SOLUTION INTRAMUSCULAR; INTRAVENOUS at 12:01

## 2019-09-26 RX ADMIN — GABAPENTIN 100 MG: 100 CAPSULE ORAL at 17:32

## 2019-09-26 RX ADMIN — SODIUM CHLORIDE 10 ML: 9 INJECTION, SOLUTION INTRAMUSCULAR; INTRAVENOUS; SUBCUTANEOUS at 13:37

## 2019-09-26 RX ADMIN — DULOXETINE 30 MG: 30 CAPSULE, DELAYED RELEASE ORAL at 08:49

## 2019-09-26 RX ADMIN — SODIUM CHLORIDE 10 ML: 9 INJECTION, SOLUTION INTRAMUSCULAR; INTRAVENOUS; SUBCUTANEOUS at 06:00

## 2019-09-26 RX ADMIN — GUAIFENESIN 1200 MG: 600 TABLET, EXTENDED RELEASE ORAL at 17:32

## 2019-09-26 NOTE — PROGRESS NOTES
Dr. Kieran Hurley and PICC nurses at bedside. Pt is a hard stick. BothMD and PICC nurse agree that pt does not need a midline. Per PICC team call them if help is needed with labs/line. Pt is down to one PIV.

## 2019-09-26 NOTE — PROGRESS NOTES
1930: Pt resting in bed, A&Ox4, no distress, family at bedside. Pt's skin is pink, warm, dry; pt denies pain or nausea. Lungs clear. Hyperactive BS. Denies needs at this time. Therapeutic listening as family expressed frustration and concerns over care. Reassurances provided. Family expressing understanding of plan of care and verbalize feeling better. 2000: Pt incontinent of moderate amount of maroon colored stool while using urinal. Incontinence care provided. Pt voiding without difficulty. Sent H&H to lab. 2025: Notified family at bedside that Hgb is now 8.5.   0430: Hgb 7.7 this AM. Paged telehospitalist for transfusion orders (attending note states plan to transfuse for Hgb <8)  0432: Spoke to Dr. Gi Ibrahim regarding attending note and provided update: pt recently on plavix, has had 1 medium sized bloody stool this shift. He will order 1 unit RBCs. 8876: Acknowledged orders to transfuse 1 unit RBCs. 0879: Updated granddaughter at bedside of current Hgb and plans to transfuse. She states she saw desaturations below 70% on the in room monitor and increased pt O2 to 3L. Recommended family bring in pt CPAP since pt is experiencing apnea. (SpO2 has remained above 80% c observed apneic episodes per monitor records)  0445: Pt continues to desat to low 80s while sleeping. Placed on 2L ventimask since pt is mouth-breathing. 0449: Pt SpO2 improves to 95%  0514: Blood consent signed and on chart. Education provided on need for transfusion and s/s of transfusion reaction. Initiated transfusion. 0530: No s/s of reaction. Increased rate of infusion (see doc flowsheet)  5606: Family requesting RN to bedside. Pt SOB, RR 22, new fine crackles in bases. Paged MD to discuss possible CXR and lasix. Pt has received 3 units and is currently infusing 1 unit. Decreased rate of transfusion to 100mL/h.   0640: Update provided to Dr. Gi Ibrahim regarding possible new fluid volume overload.  Orders entered for CXR and lasix, and IVF DCd.  0797: CXR completed. 0745: I have reviewed and agree with documentation provided this shift by Zorita Cockayne RN, [place].

## 2019-09-26 NOTE — PROGRESS NOTES
Rj lab for H&H. Was going to attempt to place another IV but no site for IV. Spoke with PICC team about possible midline. They will come and assess the pt.

## 2019-09-26 NOTE — PROGRESS NOTES
Hospitalist Progress Note    NAME: Mohamud Borges   :  1934   MRN:  [id number]       Assessment / Plan:  Acute persistent Lower GI bleeding  Acute blood loss anemia, POA   -hb base line is 13 and dropped to 7.7  -got 1 unit of prbc and hb is now 9.2  CT abdomen without contrast with diverticulosis without colitis or diverticulitis  NM bleeding scan shows no bleeding  CRS is planning on colonoscopy in am  NPO from mid nite  Off plavix      Severe sepsis, POA  due to LLL community acquired pneumonia, POA   Hypotension POA- resolved now  Acute Hypoxic respiratory failure due to above  -Cont ceftriaxone and Zithromax  -BP is now improved  -sputum cx shows staph species  -urine cx negative. Blood cx NGTD  -had episode of SOB today and cxr showed atelectasis vs pna. Will cont abx as above and start IS. IV fluids stopped and received one dose of lasix this am.  -Cont o2 by nasal cannula      KATHY on CKD stage 3  Cr is now close to base line of around 1.3    CAD hx stent x 1 26 years ago, POA on Plavix  HTN  --hold lasix, olmesartan, amlodipine due to hypotension and GI bleed   Holding Plavix- likely DC on discharge- change to ASA 81  -started on low dose of tenormin and monitor BP    Gout  COPD, not on home O2  TESSA on cpap  TIA , on plavix. Morbid obesity  Hard of hearing, has hearing aid  S/p L2-L5 decompression for spinal stenosis and back pain    --continue anoro. Cont duoneb q4h prn. Add mucinex  --continue cpap. Has not been using at home per daughter  --continue gabapentin wean        Code:  full    DVT prophylaxis: SCD due to gi bleeding  Surrogate decision maker:  Wife Wilder Munoz. Daughter Ene [phone]      30.0 - 39.9 Obese / Body mass index is 35.88 kg/m².    weightloss recommended    Recommended Disposition: Home     Subjective:     Chief Complaint / Reason for Physician Visit F/U GI Bleed, PNA, Sepsis  Had episode of sob today and improved after lasix was given  No further bleeding    Objective: VITALS:   Last 24hrs VS reviewed since prior progress note.  Most recent are:  Patient Vitals for the past 24 hrs:   Temp Pulse Resp BP SpO2   09/26/19 1451 98 °F (36.7 °C) 79 15 124/52 96 %   09/26/19 1207 98 °F (36.7 °C) 87 20 140/62 97 %   09/26/19 1201  95  150/63    09/26/19 0850  89  129/58    09/26/19 0802 98.4 °F (36.9 °C) 87 18 116/57 97 %   09/26/19 0730 97.7 °F (36.5 °C) 83 18 128/69 97 %   09/26/19 0630  (!) 102 26 137/60 100 %   09/26/19 0615 97.5 °F (36.4 °C) 88 16 147/65 99 %   09/26/19 0606     98 %   09/26/19 0604     97 %   09/26/19 0602     97 %   09/26/19 0559     97 %   09/26/19 0558     (!) 87 %   09/26/19 0556     90 %   09/26/19 0555     92 %   09/26/19 0553     92 %   09/26/19 0539 97.4 °F (36.3 °C) 88 18 125/54 95 %   09/26/19 0524 97.7 °F (36.5 °C) 88 20 138/57 94 %   09/26/19 0514 97.8 °F (36.6 °C) 93 20 136/63 95 %   09/26/19 0449     (!) 79 %   09/26/19 0447     98 %   09/26/19 0445     (!) 84 %   09/26/19 0443     96 %   09/26/19 0354 98.7 °F (37.1 °C) 94 17 127/61 96 %   09/26/19 0254     95 %   09/26/19 0253     96 %   09/26/19 0251     (!) 88 %   09/26/19 0249     92 %   09/26/19 0203     97 %   09/26/19 0200     97 %   09/26/19 0159     97 %   09/26/19 0157     97 %   09/26/19 0156     97 %   09/26/19 0154     97 %   09/26/19 0151     (!) 85 %   09/26/19 0150     92 %   09/26/19 0148     (!) 80 %   09/26/19 0146     93 %   09/26/19 0144     95 %   09/26/19 0142     (!) 83 %   09/26/19 0141     93 %   09/26/19 0139     (!) 84 %   09/26/19 0136     97 %   09/26/19 0135     100 %   09/26/19 0133     (!) 86 %   09/25/19 2200 97.8 °F (36.6 °C) 89 18 130/57 94 %   09/25/19 2001 97.6 °F (36.4 °C) 92 19 139/60 95 %   09/25/19 1801 98 °F (36.7 °C) 94 21 143/62 94 %   09/25/19 1700 98.1 °F (36.7 °C) 98 23 156/61 100 %   09/25/19 1630 98 °F (36.7 °C) 86 20 136/45 97 %   09/25/19 1600 97.7 °F (36.5 °C) 100 20 149/57 93 %       Intake/Output Summary (Last 24 hours) at 9/26/2019 1557  Last data filed at 9/26/2019 1202  Gross per 24 hour   Intake 3277.05 ml   Output 4650 ml   Net -1372.95 ml        PHYSICAL EXAM:  General: Alert, cooperative, pale   EENT:  EOMI. Anicteric sclerae. MMM  Resp:  CTA bilaterally, no wheezing or rales. No accessory muscle use  CV:  Regular  rhythm,  No edema  GI:  Soft, Non distended, Non tender.  +Bowel sounds  Neurologic:  Alert and oriented X 3, normal speech,   Psych:   Not anxious nor agitated  Skin:  No rashes. No jaundice    Reviewed most current lab test results and cultures  YES  Reviewed most current radiology test results   YES  Review and summation of old records today    NO  Reviewed patient's current orders and MAR    YES  PMH/SH reviewed - no change compared to H&P  ________________________________________________________________________  Care Plan discussed with:    Comments   Patient x    Family      RN x    Care Manager     Consultant                        Multidiciplinary team rounds were held today with , nursing, pharmacist and clinical coordinator. Patient's plan of care was discussed; medications were reviewed and discharge planning was addressed. ________________________________________________________________________  Total NON critical care TIME: 35   Minutes    Total CRITICAL CARE TIME Spent: 30  Minutes non procedure based      Comments   >50% of visit spent in counseling and coordination of care x    ________________________________________________________________________  Mariela Garcia MD     Procedures: see electronic medical records for all procedures/Xrays and details which were not copied into this note but were reviewed prior to creation of Plan. LABS:  I reviewed today's most current labs and imaging studies.   Pertinent labs include:  Recent Labs     09/26/19  0944 09/26/19  0402 09/25/19 1942 09/25/19 0014  09/24/19  0216   WBC  --  9.2  --   --  9.0  --  11.2*   HGB 9.2* 7.7* 8.5*   < > 7.2*   < > 9.0*   HCT 27.6* 23.9* 26.1*   < > 22.3*   < > 26.9*   PLT  --  174  --   --  192  --  188    < > = values in this interval not displayed.      Recent Labs     09/25/19 0014      K 4.7   *   CO2 23   *   BUN 21*   CREA 1.30   CA 7.7*       Signed: Joyce Fontaine MD

## 2019-09-26 NOTE — PROGRESS NOTES
Problem: Pressure Injury - Risk of  Goal: *Prevention of pressure injury  Description  Document Aquiles Scale and appropriate interventions in the flowsheet. Outcome: Progressing Towards Goal  Note:   Pressure Injury Interventions:  Sensory Interventions: Assess changes in LOC, Keep linens dry and wrinkle-free, Minimize linen layers    Moisture Interventions: Absorbent underpads, Apply protective barrier, creams and emollients, Check for incontinence Q2 hours and as needed    Activity Interventions: Increase time out of bed    Mobility Interventions: HOB 30 degrees or less, Turn and reposition approx. every two hours(pillow and wedges)    Nutrition Interventions: Document food/fluid/supplement intake    Friction and Shear Interventions: Apply protective barrier, creams and emollients, Minimize layers, Transferring/repositioning devices                Problem: Patient Education: Go to Patient Education Activity  Goal: Patient/Family Education  Outcome: Progressing Towards Goal     Problem: Falls - Risk of  Goal: *Absence of Falls  Description  Document Phoebe Dave Fall Risk and appropriate interventions in the flowsheet.   Outcome: Progressing Towards Goal  Note:   Fall Risk Interventions:  Mobility Interventions: Assess mobility with egress test, Bed/chair exit alarm, Patient to call before getting OOB         Medication Interventions: Bed/chair exit alarm, Patient to call before getting OOB, Teach patient to arise slowly    Elimination Interventions: Bed/chair exit alarm, Call light in reach, Patient to call for help with toileting needs              Problem: Patient Education: Go to Patient Education Activity  Goal: Patient/Family Education  Outcome: Progressing Towards Goal     Problem: Patient Education: Go to Patient Education Activity  Goal: Patient/Family Education  Outcome: Progressing Towards Goal     Problem: Upper and Lower GI Bleed: Day 3  Goal: Activity/Safety  Outcome: Progressing Towards Goal  Goal: Diagnostic Test/Procedures  Outcome: Progressing Towards Goal  Goal: Nutrition/Diet  Outcome: Progressing Towards Goal  Goal: Medications  Outcome: Progressing Towards Goal  Goal: Treatments/Interventions/Procedures  Outcome: Progressing Towards Goal  Goal: Psychosocial  Outcome: Progressing Towards Goal

## 2019-09-26 NOTE — PROGRESS NOTES
Bedside and Verbal shift change report given to Javad Valles (oncoming nurse) by Robyn Turner RN (offgoing nurse). Report included the following information SBAR, Kardex, Intake/Output, MAR, Recent Results and Cardiac Rhythm NSR.     0345: Pt sleeping/resting comfortably. Family at bedside. 0445: pt desating into the low 80s. Placed on 2L venti. sats increased into mid 90s.    0514: blood transfusion initiated. NS continuous fluids stopped. 0600: pt placed on 4L venti. Sleep apnea causing pt to desat into 70s/80s. When pt wakes up.    2394: pt family member called out stating pt SOB, increased work of breathing increased respirations. Decreased transfusion to 100. Fine crackles in the bases. Tele Paged Dr. Bree Garcia for cxray and lasix.

## 2019-09-26 NOTE — PROGRESS NOTES
Subjective





- Date & Time of Evaluation


Date of Evaluation: 05/22/19


Time of Evaluation: 10:00





- Subjective


Subjective: 





Pt resting in bed 


still w/ mild confusion





needs antibiotics for pneumonia





will need pT to strenghten her leg


for ambulation and because she lives in a walkup 


she lives on the 5th floor





Objective





- Vital Signs/Intake and Output


Vital Signs (last 24 hours): 


                                        











Temp Pulse Resp BP Pulse Ox


 


 98.2 F   76   18   133/76   94 L


 


 05/22/19 12:00  05/22/19 12:00  05/22/19 12:00  05/22/19 12:00  05/22/19 12:00











- Medications


Medications: 


                               Current Medications





Acetaminophen (Tylenol 325mg Tab)  325 mg PO Q6 PRN


   PRN Reason: Pain, moderate (4-7)


   Last Admin: 05/19/19 20:25 Dose:  325 mg


Albuterol/Ipratropium (Duoneb 3 Mg/0.5 Mg (3 Ml) Ud)  3 ml INH RQ4 PRN


   PRN Reason: Shortness of Breath


   Last Admin: 05/18/19 21:05 Dose:  3 ml


Amlodipine Besylate (Norvasc)  10 mg PO DAILY ANITA


   Last Admin: 05/22/19 09:45 Dose:  10 mg


Enoxaparin Sodium (Lovenox)  80 mg SC Q12 ANITA; Protocol


   Last Admin: 05/22/19 09:44 Dose:  80 mg


Hydrochlorothiazide (Hydrodiuril)  25 mg PO DAILY ANITA


   Last Admin: 05/22/19 09:43 Dose:  25 mg


Clindamycin Phosphate (Cleocin 600mg/50ml D5w)  600 mg in 50 mls @ 50 mls/hr 

IVPB Q12 ANITA; Protocol


   Last Admin: 05/22/19 11:05 Dose:  50 mls/hr


Aztreonam 1 gm/ Sodium (Chloride)  100 mls @ 100 mls/hr IVPB Q12 ANITA; Protocol


   Last Admin: 05/22/19 09:43 Dose:  100 mls/hr


Amikacin Sulfate 250 mg/ (Sodium Chloride)  101 mls @ 100.609 mls/hr IVPB Q24H 

ANITA; Protocol


   Last Admin: 05/22/19 13:17 Dose:  100.609 mls/hr


Metoprolol Tartrate (Lopressor)  100 mg PO BID ANITA


   Last Admin: 05/22/19 09:44 Dose:  100 mg


Risperidone (Risperdal Tab)  0.25 mg PO BID PRN


   PRN Reason: Agitation


   Last Admin: 05/21/19 20:33 Dose:  0.25 mg











- Labs


Labs: 


                                        





                                 05/22/19 04:20 





                                 05/22/19 04:20 





                                        











PT  11.9 Seconds (9.8-13.1)   05/20/19  10:30    


 


INR  1.0   05/20/19  10:30    














Assessment and Plan


(1) Atrial fibrillation with RVR


Status: Acute   





(2) Pneumonia


Status: Acute   





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


Status: Acute   





(4) Old myocardial infarction


Assessment & Plan: 


1986


Status: Acute Telemedicine Crosscover:    RN paged for Hgb 7.7 in patient with active GIB on Plavix. Attending's note states to transfuse for Hgb less than 8.0 mg/dL. 1 unit pRBCs ordered. Addendum at 06:41 am:  RN paged as patient with increasing SOB, O2 requirement and pulmonary exam concerning for volume overload. Stat CXR ordered as well as IV lasix. Patient's maintenance IVF's discontinued. Continue slow rate pRBC transfusion at this time.

## 2019-09-26 NOTE — PROGRESS NOTES
Midline insertion procedure note:  Pt has very limited vascular access. Procedure explained to Patient and family along with risks and benefits. Procedure teaching completed. Pre procedure assessment done. Patient Rodriguez Cook denies questions or concerns at this time. Midline information booklet left at bedside. Midline sign over the [facility]. Maximum sterile barrier precautions observed throughout procedure. Lidocaine 1% 5ml sc injected to site prior to access the vein. Cannulated Chephalic vein using ultrasound guidance. Inserted 4 Fr. single lumen midline to Right arm. Blood return verified and flushed with 20ml normal saline. Sterile dressing applied with Biopatch, StatLock and occlusive dressing as per protocol. Curos caps applied to port. Patient tolerated procedure well with minimal blood loss. Reason for access : Reliable IV access  Complications related to insertion : none  This midline to be removed when therapy no longer needed. See nursing message on Zones for midline reminders. Midline is CT and MRI compatible. Inserted by : Elie Carrel RN Vascular Access Nurse  Assisted by : Andi Santos RN, [facility] Vascular Access Nurse  Catheter Length : 11 cm   External Length : 0 cm   Right arm circumference : 32 Cm  Catheter occupies 14% of vein. Type of Midline: BARD  Ref#: [id number]  Lot#: [id number]  Expiration Date: 2021-01-31  Informed primary nurse Chryl Moots, RN midline is ready for use and to hang new infusion tubing prior to use.     Elie Carrel RN, BSN, [facility]      Vascular Access Nurse

## 2019-09-26 NOTE — PROGRESS NOTES
0700- Bedside and Verbal shift change report given to SCOTTIE Morrow, RN (oncoming nurse) by Topher Parson RN (offgoing nurse). Report included the following information SBAR, Kardex, Intake/Output, MAR and Recent Results. Pt resting in bed, family at bedside. Pt looks comfortable, no SOB noted. [address] - [address], NP in to see pt. NP states that a study would like to be done on pt to help narrow in where the bleed may be.   0932- Nuclear medicine called to confirm that RN does not need to go with pt. Nuclear med will be picking pt up soon. 1813- Pt off the floor to nuclear medicine. Pt family members in pt room. 1207- Pt back up in room. No complaints at this time. 5- Dr. Wallene Kehr in room to speak with family. Goal is still to do colonoscopy tomorrow, will start prep per orders. 1552- Pt started Golytely, instructed family and pt that this RN would like to see stools before flushing out of commode.   3700 [employer] called about pt home CPAP

## 2019-09-27 ENCOUNTER — ANESTHESIA (OUTPATIENT)
Dept: ENDOSCOPY | Age: 84
DRG: 871 | End: 2019-09-27
Payer: MEDICARE

## 2019-09-27 LAB
ABO + RH BLD: NORMAL
BACTERIA SPEC CULT: ABNORMAL
BACTERIA SPEC CULT: ABNORMAL
BLD PROD TYP BPU: NORMAL
BLOOD GROUP ANTIBODIES SERPL: NORMAL
BPU ID: NORMAL
CROSSMATCH RESULT,%XM: NORMAL
ERYTHROCYTE [DISTWIDTH] IN BLOOD BY AUTOMATED COUNT: 16.6 % (ref 11.5–14.5)
GRAM STN SPEC: ABNORMAL
HCT VFR BLD AUTO: 26.5 % (ref 36.6–50.3)
HCT VFR BLD AUTO: 28.9 % (ref 36.6–50.3)
HGB BLD-MCNC: 9 G/DL (ref 12.1–17)
HGB BLD-MCNC: 9.3 G/DL (ref 12.1–17)
MCH RBC QN AUTO: 32 PG (ref 26–34)
MCHC RBC AUTO-ENTMCNC: 34 G/DL (ref 30–36.5)
MCV RBC AUTO: 94.3 FL (ref 80–99)
NRBC # BLD: 0 K/UL (ref 0–0.01)
NRBC BLD-RTO: 0 PER 100 WBC
PLATELET # BLD AUTO: 220 K/UL (ref 150–400)
PMV BLD AUTO: 9.9 FL (ref 8.9–12.9)
RBC # BLD AUTO: 2.81 M/UL (ref 4.1–5.7)
SERVICE CMNT-IMP: ABNORMAL
SPECIMEN EXP DATE BLD: NORMAL
STATUS OF UNIT,%ST: NORMAL
UNIT DIVISION, %UDIV: 0
WBC # BLD AUTO: 6.1 K/UL (ref 4.1–11.1)

## 2019-09-27 PROCEDURE — 77030039825 HC MSK NSL PAP SUPERNO2VA VYRM -B: Performed by: ANESTHESIOLOGY

## 2019-09-27 PROCEDURE — 74011250636 HC RX REV CODE- 250/636: Performed by: SURGERY

## 2019-09-27 PROCEDURE — 36415 COLL VENOUS BLD VENIPUNCTURE: CPT

## 2019-09-27 PROCEDURE — 77030018846 HC SOL IRR STRL H20 ICUM -A

## 2019-09-27 PROCEDURE — 65660000000 HC RM CCU STEPDOWN

## 2019-09-27 PROCEDURE — 74011250637 HC RX REV CODE- 250/637: Performed by: HOSPITALIST

## 2019-09-27 PROCEDURE — 74011000258 HC RX REV CODE- 258: Performed by: HOSPITALIST

## 2019-09-27 PROCEDURE — 74011250636 HC RX REV CODE- 250/636: Performed by: ANESTHESIOLOGY

## 2019-09-27 PROCEDURE — 85027 COMPLETE CBC AUTOMATED: CPT

## 2019-09-27 PROCEDURE — 74011250636 HC RX REV CODE- 250/636: Performed by: INTERNAL MEDICINE

## 2019-09-27 PROCEDURE — C9113 INJ PANTOPRAZOLE SODIUM, VIA: HCPCS | Performed by: INTERNAL MEDICINE

## 2019-09-27 PROCEDURE — 74011250637 HC RX REV CODE- 250/637: Performed by: INTERNAL MEDICINE

## 2019-09-27 PROCEDURE — 0DJD8ZZ INSPECTION OF LOWER INTESTINAL TRACT, VIA NATURAL OR ARTIFICIAL OPENING ENDOSCOPIC: ICD-10-PCS | Performed by: SURGERY

## 2019-09-27 PROCEDURE — 76040000007: Performed by: SURGERY

## 2019-09-27 PROCEDURE — 85018 HEMOGLOBIN: CPT

## 2019-09-27 PROCEDURE — 77010033678 HC OXYGEN DAILY

## 2019-09-27 PROCEDURE — 74011250636 HC RX REV CODE- 250/636: Performed by: HOSPITALIST

## 2019-09-27 PROCEDURE — 76060000032 HC ANESTHESIA 0.5 TO 1 HR: Performed by: SURGERY

## 2019-09-27 PROCEDURE — 74011000250 HC RX REV CODE- 250: Performed by: ANESTHESIOLOGY

## 2019-09-27 RX ORDER — FLUMAZENIL 0.1 MG/ML
0.2 INJECTION INTRAVENOUS
Status: DISCONTINUED | OUTPATIENT
Start: 2019-09-27 | End: 2019-09-27 | Stop reason: HOSPADM

## 2019-09-27 RX ORDER — DOXYCYCLINE HYCLATE 100 MG
100 TABLET ORAL EVERY 12 HOURS
Status: DISCONTINUED | OUTPATIENT
Start: 2019-09-27 | End: 2019-09-28 | Stop reason: HOSPADM

## 2019-09-27 RX ORDER — NALOXONE HYDROCHLORIDE 0.4 MG/ML
0.4 INJECTION, SOLUTION INTRAMUSCULAR; INTRAVENOUS; SUBCUTANEOUS
Status: DISCONTINUED | OUTPATIENT
Start: 2019-09-27 | End: 2019-09-27 | Stop reason: HOSPADM

## 2019-09-27 RX ORDER — MIDAZOLAM HYDROCHLORIDE 1 MG/ML
.25-5 INJECTION, SOLUTION INTRAMUSCULAR; INTRAVENOUS
Status: DISCONTINUED | OUTPATIENT
Start: 2019-09-27 | End: 2019-09-27 | Stop reason: HOSPADM

## 2019-09-27 RX ORDER — SODIUM CHLORIDE 0.9 % (FLUSH) 0.9 %
5-40 SYRINGE (ML) INJECTION EVERY 8 HOURS
Status: DISCONTINUED | OUTPATIENT
Start: 2019-09-27 | End: 2019-09-28 | Stop reason: HOSPADM

## 2019-09-27 RX ORDER — SODIUM CHLORIDE 0.9 % (FLUSH) 0.9 %
5-40 SYRINGE (ML) INJECTION AS NEEDED
Status: DISCONTINUED | OUTPATIENT
Start: 2019-09-27 | End: 2019-09-28 | Stop reason: HOSPADM

## 2019-09-27 RX ORDER — DEXTROMETHORPHAN/PSEUDOEPHED 2.5-7.5/.8
1.2 DROPS ORAL
Status: DISCONTINUED | OUTPATIENT
Start: 2019-09-27 | End: 2019-09-27 | Stop reason: HOSPADM

## 2019-09-27 RX ORDER — EPINEPHRINE 0.1 MG/ML
1 INJECTION INTRACARDIAC; INTRAVENOUS
Status: DISCONTINUED | OUTPATIENT
Start: 2019-09-27 | End: 2019-09-27 | Stop reason: HOSPADM

## 2019-09-27 RX ORDER — LIDOCAINE HYDROCHLORIDE 20 MG/ML
INJECTION, SOLUTION EPIDURAL; INFILTRATION; INTRACAUDAL; PERINEURAL AS NEEDED
Status: DISCONTINUED | OUTPATIENT
Start: 2019-09-27 | End: 2019-09-27 | Stop reason: HOSPADM

## 2019-09-27 RX ORDER — PROPOFOL 10 MG/ML
INJECTION, EMULSION INTRAVENOUS AS NEEDED
Status: DISCONTINUED | OUTPATIENT
Start: 2019-09-27 | End: 2019-09-27 | Stop reason: HOSPADM

## 2019-09-27 RX ORDER — SODIUM CHLORIDE 0.9 % (FLUSH) 0.9 %
5-40 SYRINGE (ML) INJECTION EVERY 8 HOURS
Status: DISCONTINUED | OUTPATIENT
Start: 2019-09-27 | End: 2019-09-27

## 2019-09-27 RX ORDER — SODIUM CHLORIDE 9 MG/ML
50 INJECTION, SOLUTION INTRAVENOUS CONTINUOUS
Status: DISPENSED | OUTPATIENT
Start: 2019-09-27 | End: 2019-09-27

## 2019-09-27 RX ORDER — FENTANYL CITRATE 50 UG/ML
50 INJECTION, SOLUTION INTRAMUSCULAR; INTRAVENOUS
Status: DISCONTINUED | OUTPATIENT
Start: 2019-09-27 | End: 2019-09-27 | Stop reason: HOSPADM

## 2019-09-27 RX ORDER — ATROPINE SULFATE 0.1 MG/ML
0.5 INJECTION INTRAVENOUS
Status: DISCONTINUED | OUTPATIENT
Start: 2019-09-27 | End: 2019-09-27 | Stop reason: HOSPADM

## 2019-09-27 RX ADMIN — Medication 10 ML: at 22:06

## 2019-09-27 RX ADMIN — GUAIFENESIN 1200 MG: 600 TABLET, EXTENDED RELEASE ORAL at 08:08

## 2019-09-27 RX ADMIN — CEFTRIAXONE 1 G: 1 INJECTION, POWDER, FOR SOLUTION INTRAMUSCULAR; INTRAVENOUS at 09:31

## 2019-09-27 RX ADMIN — PANTOPRAZOLE SODIUM 40 MG: 40 INJECTION, POWDER, FOR SOLUTION INTRAVENOUS at 22:02

## 2019-09-27 RX ADMIN — GABAPENTIN 100 MG: 100 CAPSULE ORAL at 15:22

## 2019-09-27 RX ADMIN — PROPOFOL 150 MG: 10 INJECTION, EMULSION INTRAVENOUS at 14:20

## 2019-09-27 RX ADMIN — DOXYCYCLINE HYCLATE 100 MG: 100 TABLET, COATED ORAL at 22:03

## 2019-09-27 RX ADMIN — Medication 10 ML: at 22:03

## 2019-09-27 RX ADMIN — LIDOCAINE HYDROCHLORIDE 40 MG: 20 INJECTION, SOLUTION EPIDURAL; INFILTRATION; INTRACAUDAL; PERINEURAL at 13:46

## 2019-09-27 RX ADMIN — DOXYCYCLINE HYCLATE 100 MG: 100 TABLET, COATED ORAL at 15:22

## 2019-09-27 RX ADMIN — SODIUM CHLORIDE 10 ML: 9 INJECTION, SOLUTION INTRAMUSCULAR; INTRAVENOUS; SUBCUTANEOUS at 07:06

## 2019-09-27 RX ADMIN — UMECLIDINIUM BROMIDE AND VILANTEROL TRIFENATATE 1 PUFF: 62.5; 25 POWDER RESPIRATORY (INHALATION) at 08:13

## 2019-09-27 RX ADMIN — AZITHROMYCIN MONOHYDRATE 500 MG: 500 INJECTION, POWDER, LYOPHILIZED, FOR SOLUTION INTRAVENOUS at 06:57

## 2019-09-27 RX ADMIN — ATENOLOL 12.5 MG: 25 TABLET ORAL at 08:08

## 2019-09-27 RX ADMIN — GABAPENTIN 100 MG: 100 CAPSULE ORAL at 08:08

## 2019-09-27 RX ADMIN — SODIUM CHLORIDE 50 ML/HR: 900 INJECTION, SOLUTION INTRAVENOUS at 13:45

## 2019-09-27 RX ADMIN — Medication 10 ML: at 15:22

## 2019-09-27 RX ADMIN — GABAPENTIN 100 MG: 100 CAPSULE ORAL at 22:03

## 2019-09-27 RX ADMIN — DULOXETINE 30 MG: 30 CAPSULE, DELAYED RELEASE ORAL at 08:08

## 2019-09-27 RX ADMIN — ALLOPURINOL 300 MG: 300 TABLET ORAL at 08:08

## 2019-09-27 RX ADMIN — PANTOPRAZOLE SODIUM 40 MG: 40 INJECTION, POWDER, FOR SOLUTION INTRAVENOUS at 08:08

## 2019-09-27 NOTE — PROCEDURES
Colonoscopy Procedure Note    Indications: Rectal bleeding    Anesthesia/Sedation: MAC anesthesia Propofol    Pre-Procedure Exam:  Airway: clear   Heart: normal S1and S2    Lungs: clear bilateral  Abdomen: soft, nontender, bowel sounds present and normal in all quadrants   Mental Status: awake, alert, and oriented to person, place, and time      Procedure in Detail:  Informed consent was obtained for the procedure, including sedation. Risks of perforation, hemorrhage, adverse drug reaction, and aspiration were discussed. The patient was placed in the left lateral decubitus position. Based on the pre-procedure assessment, including review of the patient's medical history, medications, allergies, and review of systems, he had been deemed to be an appropriate candidate for moderate sedation; he was therefore sedated with the medications listed above. The patient was monitored continuously with ECG tracing, pulse oximetry, blood pressure monitoring, and direct observations. A rectal examination was performed. The [id number] was inserted into the rectum and advanced under direct vision to the cecum, which was identified by the ileocecal valve and appendiceal orifice. The quality of the colonic preparation was good. A careful inspection was made as the colonoscope was withdrawn, including a retroflexed view of the rectum; findings and interventions are described below. Appropriate photodocumentation was obtained. Findings:   Rectum:   Normal  Sigmoid:     - Diverticulosis. No blood or stigmata of recent bleeding  Descending Colon:     - Diverticulosis. No blood or stigmata of recent bleeding  Transverse Colon:     - Diverticulosis. No blood or stigmata of recent bleeding  Ascending Colon:     - Diverticulosis. No blood or stigmata of recent bleeding  Cecum:     - Diverticulosis. No blood or stigmata of recent bleeding          Specimens: No specimens were collected.        EBL: None    Complications: None; patient tolerated the procedure well. Attending Attestation: I performed the procedure.     Recommendations:    - No further colonoscopies needed unless any symptoms

## 2019-09-27 NOTE — ANESTHESIA PREPROCEDURE EVALUATION
Anesthetic History   No history of anesthetic complications            Review of Systems / Medical History  Patient summary reviewed, nursing notes reviewed and pertinent labs reviewed    Pulmonary    COPD    Sleep apnea: CPAP           Neuro/Psych       CVA  TIA and psychiatric history    Comments: Lumbar Stenosis, spondylosis, and scoliosis with bilateral Sciatica    Tremors  Alcohol abuse (F10.10) Cardiovascular    Hypertension  Valvular problems/murmurs: aortic stenosis and aortic insufficiency        CAD, cardiac stents and hyperlipidemia    Exercise tolerance: <4 METS  Comments: TTE (4/13/19):    Estimated left ventricular ejection fraction is 61 - 65%. Left ventricular mild concentric hypertrophy. No regional wall motion abnormality noted. Mild aortic valve stenosis is present. Mild aortic valve regurgitation is present. GI/Hepatic/Renal     GERD           Endo/Other        Obesity and arthritis  Pertinent negatives: No morbid obesity   Other Findings   Comments: Back pain  ETOH abuse  Gout  Diarrhea  Lower GI Bleed         Physical Exam    Airway  Mallampati: II  TM Distance: 4 - 6 cm  Neck ROM: normal range of motion   Mouth opening: Normal     Cardiovascular  Regular rate and rhythm,  S1 and S2 normal,  no murmur, click, rub, or gallop  Rhythm: regular  Rate: normal         Dental    Dentition: Poor dentition  Comments: Multiple missing teeth, significant decay, none loose.    Pulmonary  Breath sounds clear to auscultation          Prolonged expiration     Abdominal  GI exam deferred       Other Findings            Anesthetic Plan    ASA: 3  Anesthesia type: general          Induction: Intravenous  Anesthetic plan and risks discussed with: Patient      Propofol MAC/Supernova

## 2019-09-27 NOTE — PROGRESS NOTES
Following message sent to hospitalist on call via Telemed Request:    Can pt have order for prn po and/or IV Tylenol? Admitted with lower GIB. Getting bowel prep for colonoscopy 9/27. C/o intermittent headaches throughout the day. No prns on board other than IV morphine. Pt to be NPO after midnight. Had order for prn IV Tylenol earlier this admission.

## 2019-09-27 NOTE — PROGRESS NOTES
TRANSFER - OUT REPORT:    Verbal report given to Brie Fonseca RN(name) on Thuan Darlene  being transferred to 2269(unit) for routine progression of care       Report consisted of patients Situation, Background, Assessment and   Recommendations(SBAR). Information from the following report(s) Procedure Summary, MAR, Accordion and Cardiac Rhythm NSR was reviewed with the receiving nurse. Lines:   Peripheral IV 09/23/19 Right Antecubital (Active)   Site Assessment Clean, dry, & intact 9/26/2019  2:51 PM   Phlebitis Assessment 0 9/26/2019  2:51 PM   Infiltration Assessment 0 9/26/2019  2:51 PM   Dressing Status Clean, dry, & intact 9/26/2019  2:51 PM   Dressing Type Transparent;Tape 9/26/2019  2:51 PM   Hub Color/Line Status Pink; Infusing 9/26/2019  2:51 PM   Action Taken Open ports on tubing capped 9/26/2019 12:07 PM   Alcohol Cap Used Yes 9/26/2019  2:51 PM        Opportunity for questions and clarification was provided.       Patient transported with:

## 2019-09-27 NOTE — PERIOP NOTES
Anesthesia reports 150mg Propofol, 40mg Lidocaine and 700mL NS given during procedure. Received report from anesthesia staff on vital signs and status of patient. Endoscope was pre-cleaned at the bedside immediately following procedure by ELVIA BUENO

## 2019-09-27 NOTE — PROGRESS NOTES
Hanna Geoffrey  1934  [id number]    Situation:  Verbal report received from: Olga Campos RN  Procedure: Procedure(s):  COLONOSCOPY    Background:    Preoperative diagnosis: RECTAL BLEEDING  Postoperative diagnosis: Diverticulosis    :  Dr. Darrick Cardona  Assistant(s): Endoscopy Technician-1: Dsaha EID  Endoscopy RN-1: Lakisha Payne    Specimens: * No specimens in log *  H. Pylori  no    Assessment:  Anesthesia gave intra-procedure sedation and medications, see anesthesia flow sheet yes    Intravenous fluids: NS@ KVO     Vital signs stable      Abdominal assessment: round and soft      Recommendation:  Discharge patient per MD order .   Return to floor

## 2019-09-27 NOTE — ANESTHESIA POSTPROCEDURE EVALUATION
Procedure(s):  COLONOSCOPY.    total IV anesthesia    Anesthesia Post Evaluation        Patient location during evaluation: PACU  Note status: Adequate. Level of consciousness: responsive to verbal stimuli and sleepy but conscious  Pain management: satisfactory to patient  Airway patency: patent  Anesthetic complications: no  Cardiovascular status: acceptable  Respiratory status: acceptable  Hydration status: acceptable  Comments: +Post-Anesthesia Evaluation and Assessment    Patient: Hector Shahid MRN: [id number]  SSN: [id number]   YOB: 1934  Age: 80 y.o. Sex: male      Cardiovascular Function/Vital Signs    /71   Pulse 82   Temp 36.9 °C (98.5 °F)   Resp 17   Ht 5' 7\" (1.702 m)   Wt 103.9 kg (229 lb 0.9 oz)   SpO2 100%   BMI 35.88 kg/m²     Patient is status post Procedure(s):  COLONOSCOPY. Nausea/Vomiting: Controlled. Postoperative hydration reviewed and adequate. Pain:  Pain Scale 1: Numeric (0 - 10) (09/27/19 1429)  Pain Intensity 1: 0 (09/27/19 1429)   Managed. Neurological Status: At baseline. Mental Status and Level of Consciousness: Arousable. Pulmonary Status:   O2 Device: Room air (09/27/19 1429)   Adequate oxygenation and airway patent. Complications related to anesthesia: None    Post-anesthesia assessment completed. No concerns.     Signed By: Linward Halsted, DO    9/27/2019  Post anesthesia nausea and vomiting:  controlled      Vitals Value Taken Time   /71 9/27/2019  2:29 PM   Temp     Pulse 82 9/27/2019  2:29 PM   Resp 17 9/27/2019  2:29 PM   SpO2 100 % 9/27/2019  2:29 PM

## 2019-09-27 NOTE — ROUTINE PROCESS
MRSA positive sputum noted during chart review, critical value not relayed to RN. Dr Joesph Aguilar contacted and made aware. Patient placed on contact.

## 2019-09-28 VITALS
DIASTOLIC BLOOD PRESSURE: 59 MMHG | TEMPERATURE: 98.3 F | OXYGEN SATURATION: 100 % | HEART RATE: 70 BPM | HEIGHT: 67 IN | RESPIRATION RATE: 22 BRPM | BODY MASS INDEX: 35.95 KG/M2 | WEIGHT: 229.06 LBS | SYSTOLIC BLOOD PRESSURE: 151 MMHG

## 2019-09-28 LAB
BACTERIA SPEC CULT: NORMAL
HCT VFR BLD AUTO: 24.6 % (ref 36.6–50.3)
HCT VFR BLD AUTO: 30 % (ref 36.6–50.3)
HGB BLD-MCNC: 8.3 G/DL (ref 12.1–17)
HGB BLD-MCNC: 9.5 G/DL (ref 12.1–17)
SERVICE CMNT-IMP: NORMAL

## 2019-09-28 PROCEDURE — 74011250637 HC RX REV CODE- 250/637: Performed by: HOSPITALIST

## 2019-09-28 PROCEDURE — 74011250637 HC RX REV CODE- 250/637: Performed by: INTERNAL MEDICINE

## 2019-09-28 PROCEDURE — C9113 INJ PANTOPRAZOLE SODIUM, VIA: HCPCS | Performed by: INTERNAL MEDICINE

## 2019-09-28 PROCEDURE — 74011250636 HC RX REV CODE- 250/636: Performed by: INTERNAL MEDICINE

## 2019-09-28 PROCEDURE — 85018 HEMOGLOBIN: CPT

## 2019-09-28 PROCEDURE — 36415 COLL VENOUS BLD VENIPUNCTURE: CPT

## 2019-09-28 RX ORDER — PANTOPRAZOLE SODIUM 40 MG/1
40 TABLET, DELAYED RELEASE ORAL DAILY
Qty: 15 TAB | Refills: 0 | Status: SHIPPED | OUTPATIENT
Start: 2019-09-28 | End: 2019-10-13

## 2019-09-28 RX ORDER — GUAIFENESIN 100 MG/5ML
81 LIQUID (ML) ORAL DAILY
Qty: 30 TAB | Refills: 0 | Status: SHIPPED | OUTPATIENT
Start: 2019-09-28 | End: 2019-10-28

## 2019-09-28 RX ORDER — DOXYCYCLINE HYCLATE 100 MG
100 TABLET ORAL EVERY 12 HOURS
Qty: 10 TAB | Refills: 0 | Status: SHIPPED | OUTPATIENT
Start: 2019-09-28 | End: 2019-10-05

## 2019-09-28 RX ADMIN — DOXYCYCLINE HYCLATE 100 MG: 100 TABLET, COATED ORAL at 08:12

## 2019-09-28 RX ADMIN — ATENOLOL 12.5 MG: 25 TABLET ORAL at 08:11

## 2019-09-28 RX ADMIN — PANTOPRAZOLE SODIUM 40 MG: 40 INJECTION, POWDER, FOR SOLUTION INTRAVENOUS at 08:12

## 2019-09-28 RX ADMIN — Medication 10 ML: at 06:42

## 2019-09-28 RX ADMIN — UMECLIDINIUM BROMIDE AND VILANTEROL TRIFENATATE 1 PUFF: 62.5; 25 POWDER RESPIRATORY (INHALATION) at 08:14

## 2019-09-28 RX ADMIN — ALLOPURINOL 300 MG: 300 TABLET ORAL at 08:12

## 2019-09-28 RX ADMIN — GABAPENTIN 100 MG: 100 CAPSULE ORAL at 08:11

## 2019-09-28 RX ADMIN — DULOXETINE 30 MG: 30 CAPSULE, DELAYED RELEASE ORAL at 09:00

## 2019-09-28 RX ADMIN — GUAIFENESIN 1200 MG: 600 TABLET, EXTENDED RELEASE ORAL at 08:12

## 2019-09-28 NOTE — PROGRESS NOTES
Serial hemoglobin, monitoring for sighs of bleeding. No distress family at bedside, hypervigilant. Patient refusing cpap. Family placed patient on 2l NC. Nurse verified tubing and placement patient o2 drops due to apneic episodes episodes and refuses to wear cpap while sleeping. Patient not necessitating o2 while aware, only while sleeping. Continue to monitor.

## 2019-09-29 ENCOUNTER — HOSPITAL ENCOUNTER (INPATIENT)
Age: 84
LOS: 6 days | Discharge: HOME HEALTH | DRG: 377 | End: 2019-10-05
Attending: EMERGENCY MEDICINE | Admitting: INTERNAL MEDICINE
Payer: MEDICARE

## 2019-09-29 ENCOUNTER — APPOINTMENT (OUTPATIENT)
Dept: CT IMAGING | Age: 84
DRG: 377 | End: 2019-09-29
Attending: EMERGENCY MEDICINE
Payer: MEDICARE

## 2019-09-29 DIAGNOSIS — K62.5 BRBPR (BRIGHT RED BLOOD PER RECTUM): Primary | ICD-10-CM

## 2019-09-29 DIAGNOSIS — K92.2 GASTROINTESTINAL HEMORRHAGE, UNSPECIFIED GASTROINTESTINAL HEMORRHAGE TYPE: ICD-10-CM

## 2019-09-29 PROBLEM — D62 ACUTE BLOOD LOSS ANEMIA: Status: ACTIVE | Noted: 2019-09-29

## 2019-09-29 LAB
ALBUMIN SERPL-MCNC: 2.6 G/DL (ref 3.5–5)
ALBUMIN/GLOB SERPL: 0.8 {RATIO} (ref 1.1–2.2)
ALP SERPL-CCNC: 135 U/L (ref 45–117)
ALT SERPL-CCNC: 26 U/L (ref 12–78)
ANION GAP SERPL CALC-SCNC: 6 MMOL/L (ref 5–15)
AST SERPL-CCNC: 30 U/L (ref 15–37)
BASOPHILS # BLD: 0 K/UL (ref 0–0.1)
BASOPHILS NFR BLD: 0 % (ref 0–1)
BILIRUB SERPL-MCNC: 0.4 MG/DL (ref 0.2–1)
BUN SERPL-MCNC: 10 MG/DL (ref 6–20)
BUN/CREAT SERPL: 8 (ref 12–20)
CALCIUM SERPL-MCNC: 7.6 MG/DL (ref 8.5–10.1)
CHLORIDE SERPL-SCNC: 111 MMOL/L (ref 97–108)
CO2 SERPL-SCNC: 25 MMOL/L (ref 21–32)
COMMENT, HOLDF: NORMAL
CREAT SERPL-MCNC: 1.26 MG/DL (ref 0.7–1.3)
DIFFERENTIAL METHOD BLD: ABNORMAL
EOSINOPHIL # BLD: 0.2 K/UL (ref 0–0.4)
EOSINOPHIL NFR BLD: 3 % (ref 0–7)
ERYTHROCYTE [DISTWIDTH] IN BLOOD BY AUTOMATED COUNT: 16.9 % (ref 11.5–14.5)
GLOBULIN SER CALC-MCNC: 3.1 G/DL (ref 2–4)
GLUCOSE SERPL-MCNC: 134 MG/DL (ref 65–100)
HCT VFR BLD AUTO: 22.8 % (ref 36.6–50.3)
HCT VFR BLD AUTO: 24.6 % (ref 36.6–50.3)
HCT VFR BLD AUTO: 26.7 % (ref 36.6–50.3)
HGB BLD-MCNC: 7.6 G/DL (ref 12.1–17)
HGB BLD-MCNC: 8.2 G/DL (ref 12.1–17)
HGB BLD-MCNC: 8.7 G/DL (ref 12.1–17)
IMM GRANULOCYTES # BLD AUTO: 0 K/UL (ref 0–0.04)
IMM GRANULOCYTES NFR BLD AUTO: 0 % (ref 0–0.5)
LYMPHOCYTES # BLD: 0.5 K/UL (ref 0.8–3.5)
LYMPHOCYTES NFR BLD: 7 % (ref 12–49)
MCH RBC QN AUTO: 31.1 PG (ref 26–34)
MCHC RBC AUTO-ENTMCNC: 32.6 G/DL (ref 30–36.5)
MCV RBC AUTO: 95.4 FL (ref 80–99)
MONOCYTES # BLD: 0.5 K/UL (ref 0–1)
MONOCYTES NFR BLD: 8 % (ref 5–13)
NEUTS SEG # BLD: 5.5 K/UL (ref 1.8–8)
NEUTS SEG NFR BLD: 82 % (ref 32–75)
NRBC # BLD: 0 K/UL (ref 0–0.01)
NRBC BLD-RTO: 0 PER 100 WBC
PLATELET # BLD AUTO: 253 K/UL (ref 150–400)
PMV BLD AUTO: 9.6 FL (ref 8.9–12.9)
POTASSIUM SERPL-SCNC: 3.8 MMOL/L (ref 3.5–5.1)
PROT SERPL-MCNC: 5.7 G/DL (ref 6.4–8.2)
RBC # BLD AUTO: 2.8 M/UL (ref 4.1–5.7)
RBC MORPH BLD: ABNORMAL
SAMPLES BEING HELD,HOLD: NORMAL
SODIUM SERPL-SCNC: 142 MMOL/L (ref 136–145)
WBC # BLD AUTO: 6.7 K/UL (ref 4.1–11.1)

## 2019-09-29 PROCEDURE — 74011250636 HC RX REV CODE- 250/636: Performed by: EMERGENCY MEDICINE

## 2019-09-29 PROCEDURE — 85025 COMPLETE CBC W/AUTO DIFF WBC: CPT

## 2019-09-29 PROCEDURE — 99284 EMERGENCY DEPT VISIT MOD MDM: CPT

## 2019-09-29 PROCEDURE — 74011000250 HC RX REV CODE- 250: Performed by: EMERGENCY MEDICINE

## 2019-09-29 PROCEDURE — 96374 THER/PROPH/DIAG INJ IV PUSH: CPT

## 2019-09-29 PROCEDURE — P9016 RBC LEUKOCYTES REDUCED: HCPCS

## 2019-09-29 PROCEDURE — C9113 INJ PANTOPRAZOLE SODIUM, VIA: HCPCS | Performed by: EMERGENCY MEDICINE

## 2019-09-29 PROCEDURE — 36415 COLL VENOUS BLD VENIPUNCTURE: CPT

## 2019-09-29 PROCEDURE — C9113 INJ PANTOPRAZOLE SODIUM, VIA: HCPCS | Performed by: INTERNAL MEDICINE

## 2019-09-29 PROCEDURE — 74178 CT ABD&PLV WO CNTR FLWD CNTR: CPT

## 2019-09-29 PROCEDURE — 86923 COMPATIBILITY TEST ELECTRIC: CPT

## 2019-09-29 PROCEDURE — 74011250636 HC RX REV CODE- 250/636: Performed by: INTERNAL MEDICINE

## 2019-09-29 PROCEDURE — 36430 TRANSFUSION BLD/BLD COMPNT: CPT

## 2019-09-29 PROCEDURE — 80053 COMPREHEN METABOLIC PANEL: CPT

## 2019-09-29 PROCEDURE — 74011250637 HC RX REV CODE- 250/637: Performed by: INTERNAL MEDICINE

## 2019-09-29 PROCEDURE — 74011636320 HC RX REV CODE- 636/320: Performed by: EMERGENCY MEDICINE

## 2019-09-29 PROCEDURE — 86900 BLOOD TYPING SEROLOGIC ABO: CPT

## 2019-09-29 PROCEDURE — 85018 HEMOGLOBIN: CPT

## 2019-09-29 PROCEDURE — 74011250637 HC RX REV CODE- 250/637: Performed by: FAMILY MEDICINE

## 2019-09-29 PROCEDURE — 65660000000 HC RM CCU STEPDOWN

## 2019-09-29 PROCEDURE — 74011250637 HC RX REV CODE- 250/637: Performed by: EMERGENCY MEDICINE

## 2019-09-29 RX ORDER — SODIUM CHLORIDE 0.9 % (FLUSH) 0.9 %
10 SYRINGE (ML) INJECTION
Status: COMPLETED | OUTPATIENT
Start: 2019-09-29 | End: 2019-09-29

## 2019-09-29 RX ORDER — PEPPERMINT OIL
SPIRIT ORAL ONCE
Status: DISPENSED | OUTPATIENT
Start: 2019-09-29 | End: 2019-09-30

## 2019-09-29 RX ORDER — SODIUM CHLORIDE 9 MG/ML
250 INJECTION, SOLUTION INTRAVENOUS AS NEEDED
Status: DISCONTINUED | OUTPATIENT
Start: 2019-09-29 | End: 2019-10-05 | Stop reason: HOSPADM

## 2019-09-29 RX ORDER — DIAZEPAM 2 MG/1
2 TABLET ORAL
Status: COMPLETED | OUTPATIENT
Start: 2019-09-29 | End: 2019-09-29

## 2019-09-29 RX ORDER — LANOLIN ALCOHOL/MO/W.PET/CERES
3 CREAM (GRAM) TOPICAL
Status: DISCONTINUED | OUTPATIENT
Start: 2019-09-29 | End: 2019-10-05 | Stop reason: HOSPADM

## 2019-09-29 RX ORDER — DOXYCYCLINE HYCLATE 100 MG
100 TABLET ORAL EVERY 12 HOURS
Status: COMPLETED | OUTPATIENT
Start: 2019-09-29 | End: 2019-10-04

## 2019-09-29 RX ORDER — ATENOLOL 25 MG/1
25 TABLET ORAL DAILY
Status: DISCONTINUED | OUTPATIENT
Start: 2019-09-30 | End: 2019-10-05 | Stop reason: HOSPADM

## 2019-09-29 RX ORDER — GABAPENTIN 100 MG/1
200 CAPSULE ORAL 3 TIMES DAILY
Status: DISCONTINUED | OUTPATIENT
Start: 2019-09-29 | End: 2019-09-30

## 2019-09-29 RX ORDER — DULOXETIN HYDROCHLORIDE 30 MG/1
30 CAPSULE, DELAYED RELEASE ORAL DAILY
Status: DISCONTINUED | OUTPATIENT
Start: 2019-09-30 | End: 2019-10-05 | Stop reason: HOSPADM

## 2019-09-29 RX ADMIN — DIAZEPAM 2 MG: 2 TABLET ORAL at 12:19

## 2019-09-29 RX ADMIN — GABAPENTIN 200 MG: 100 CAPSULE ORAL at 17:52

## 2019-09-29 RX ADMIN — GABAPENTIN 200 MG: 100 CAPSULE ORAL at 21:34

## 2019-09-29 RX ADMIN — DOXYCYCLINE HYCLATE 100 MG: 100 TABLET, COATED ORAL at 21:35

## 2019-09-29 RX ADMIN — SODIUM CHLORIDE 40 MG: 9 INJECTION, SOLUTION INTRAMUSCULAR; INTRAVENOUS; SUBCUTANEOUS at 21:34

## 2019-09-29 RX ADMIN — IOPAMIDOL 100 ML: 755 INJECTION, SOLUTION INTRAVENOUS at 13:34

## 2019-09-29 RX ADMIN — MELATONIN 3 MG: at 21:35

## 2019-09-29 RX ADMIN — Medication 10 ML: at 13:34

## 2019-09-29 RX ADMIN — SODIUM CHLORIDE 40 MG: 9 INJECTION, SOLUTION INTRAMUSCULAR; INTRAVENOUS; SUBCUTANEOUS at 12:44

## 2019-09-29 NOTE — ED PROVIDER NOTES
EMERGENCY DEPARTMENT HISTORY AND PHYSICAL EXAM      Date: 9/29/2019  Patient Name: Liz Tan  Patient Age and Sex: 80 y.o. male    History of Presenting Illness     Chief Complaint   Patient presents with    Rectal Bleeding     The patient presents to the ED via car with family, rectal bleeding, discharged home from in patient hospital stay for same. History Provided By: Patient and Patient's Daughter    Ability to gather history was limited by: None    HPI: Liz Tan, 80 y.o. male with known diverticulosis, istory of GI bleed requiring multiple transfusions over the past week, unclear source of bleeding, return to the emergency department with ongoing active bright red blood per rectum. Patient reportedly passed a large amount of bright red blood to the toilet today, without nausea or vomiting or significant melena. He does report diffuse bilateral aching abdominal pain, seemingly mild. He is not anticoagulated. Over the past week he underwent colonoscopy and CT angiography which failed to identify a source of bleeding, it was recommended for outpatient upper endoscopy. Pt denies any other alleviating or exacerbating factors. There are no other complaints, changes or physical findings at this time.      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    Morbid obesity ([address]     On statin therapy 8/16/2017    TESSA on CPAP 1/3/2019    Psychiatric disorder     Depression    Simple chronic bronchitis ([address] 1/3/2019    TIA (transient ischemic attack) 2/65/5385    Umbilical hernia 59/7/7016     Past Surgical History:   Procedure Laterality Date    CARDIAC SURG PROCEDURE UNLIST  1996    Cardiac Stents    CARDIAC SURG PROCEDURE UNLIST  04/2019    EF 61-65%    COLONOSCOPY N/A 9/27/2019    COLONOSCOPY performed by Lisa Arias MD at [facility] ENDOSCOPY    HX [facility]    HX HERNIA REPAIR  85/07/37    open umbilical hernia repair mesh    HX UROLOGICAL      HYDROCELECTOMY       PCP: Temitope Spence MD    Past History   Past Medical History:  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    Morbid obesity ([address]     On statin therapy 8/16/2017    TESSA on CPAP 1/3/2019    Psychiatric disorder     Depression    Simple chronic bronchitis ([address] 1/3/2019    TIA (transient ischemic attack) 7/48/1901    Umbilical hernia 55/5/4384       Past Surgical History:  Past Surgical History:   Procedure Laterality Date    CARDIAC SURG PROCEDURE UNLIST  1996    Cardiac Stents    CARDIAC SURG PROCEDURE UNLIST  04/2019    EF 61-65%    COLONOSCOPY N/A 9/27/2019    COLONOSCOPY performed by [facility] at [facility]COPY    HX HERNIA REPAIR      [place]    HX HERNIA REPAIR  18/99/61    open umbilical hernia repair mesh    HX UROLOGICAL      HYDROCELECTOMY       Family History:  Family History   Problem Relation Age of Onset    Heart Disease Mother     Hypertension Mother     Hypertension Father     Hypertension Sister     Hypertension Brother     Cancer Brother        Social History:  Social History     Tobacco Use    Smoking status: Former Smoker     Packs/day: 0.50     Years: 20.00     Pack years: 10.00    Smokeless tobacco: Former User    Tobacco comment: quit about 40  years ago   / used to dip tobaco and dip snuff   Substance Use Topics    Alcohol use: Not Currently     Comment: \"Drinks very little now for about a month \"    Drug use: No       Allergies:   Allergies   Allergen Reactions    Levaquin [Levofloxacin] Nausea and Vomiting     Reports anxiety, nausea, aching after taking levaquin    Ciprofloxacin Shortness of Breath    Quinolones Shortness of Breath       Current Medications:  Current Facility-Administered Medications on File Prior to Encounter   Medication Dose Route Frequency Provider Last Rate Last Dose    [DISCONTINUED] doxycycline (VIBRA-TABS) tablet 100 mg  100 mg Oral Q12H Lori Garcia MD   100 mg at 09/28/19 9224    [DISCONTINUED] sodium chloride (NS) flush 5-40 mL  5-40 mL IntraVENous PRN Keshawn Johns MD        [DISCONTINUED] sodium chloride (NS) flush 5-40 mL  5-40 mL IntraVENous Q8H Janae Quinonez MD   10 mL at 09/28/19 2484    [DISCONTINUED] sodium chloride (NS) flush 5-40 mL  5-40 mL IntraVENous PRN Janae Quinonez MD   10 mL at 09/27/19 2203    [DISCONTINUED] maalox/viscous lidocaine (COV GI COCKTAIL)  30 mL Oral BID PRN Lori Garcia MD        [DISCONTINUED] 0.9% sodium chloride infusion 250 mL  250 mL IntraVENous PRN MD Yfn Amin [DISCONTINUED] acetaminophen (TYLENOL) tablet 650 mg  650 mg Oral Q4H PRN Geo Perez MD        [DISCONTINUED] prochlorperazine (COMPAZINE) with saline injection 5 mg  5 mg IntraVENous Q6H PRN Kely Farmer MD   5 mg at 09/25/19 0811    [DISCONTINUED] morphine injection 1 mg  1 mg IntraVENous Q4H PRN Flaca Gant MD   1 mg at 09/25/19 0024    [DISCONTINUED] pantoprazole (PROTONIX) injection 40 mg  40 mg IntraVENous Q12H Flaca Gant MD   40 mg at 09/28/19 5383    [DISCONTINUED] atenolol (TENORMIN) tablet 12.5 mg  12.5 mg Oral DAILY Flaca Gant MD   12.5 mg at 09/28/19 2219    [DISCONTINUED] sodium chloride (NS) flush 5-40 mL  5-40 mL IntraVENous PRN Marina Cottrell DO        [DISCONTINUED] sodium chloride (NS) flush 5-40 mL  5-40 mL IntraVENous PRN Albania Rueda MD        [DISCONTINUED] ondansetron [facility]) injection 4 mg  4 mg IntraVENous Q6H PRN Albania Rueda MD   4 mg at 09/25/19 0321    [DISCONTINUED] albuterol-ipratropium (DUO-NEB) 2.5 MG-0.5 MG/3 ML  3 mL Nebulization Q4H PRN Albania Rueda MD        [DISCONTINUED] allopurinol (ZYLOPRIM) tablet 300 mg  300 mg Oral DAILY Albania Rueda MD   300 mg at 09/28/19 5736    [DISCONTINUED] umeclidinium-vilanterol (ANORO ELLIPTA) 62.5 mcg- 25 mcg/inhalation  1 Puff Inhalation DAILY Albania Rueda MD   1 Puff at 09/28/19 9361    [DISCONTINUED] DULoxetine (CYMBALTA) capsule 30 mg  30 mg Oral DAILY Albania Rueda MD   30 mg at 09/28/19 0900    [DISCONTINUED] gabapentin (NEURONTIN) capsule 100 mg  100 mg Oral TID Albania Rueda MD   100 mg at 09/28/19 9119    [DISCONTINUED] guaiFENesin ER (MUCINEX) tablet 1,200 mg  1,200 mg Oral BID Albania Rueda MD   1,200 mg at 09/28/19 9577    [DISCONTINUED] influenza vaccine 2019-20 (6 mos+)(PF) (FLUARIX/FLULAVAL/FLUZONE QUAD) injection 0.5 mL  0.5 mL IntraMUSCular PRIOR TO DISCHARGE Albania Rueda MD         Current Outpatient Medications on File Prior to Encounter   Medication Sig Dispense Refill    doxycycline (VIBRA-TABS) 100 mg tablet Take 1 Tab by mouth every twelve (12) hours for 5 days. 10 Tab 0    pantoprazole (PROTONIX) 40 mg tablet Take 1 Tab by mouth daily for 15 days. 15 Tab 0    aspirin 81 mg chewable tablet Take 1 Tab by mouth daily for 30 days.  30 Tab 0    Lactoperoxi/Gluc Oxid/Pot Thio (BIOTENE DRY MOUTH MM) 1 Lozenge by Mucous Membrane route.  DULoxetine (CYMBALTA) 30 mg capsule Take 1 Cap by mouth daily. 30 Cap 6    atorvastatin (LIPITOR) 80 mg tablet TAKE ONE TABLET BY MOUTH DAILY 90 Tab 3    atenolol (TENORMIN) 50 mg tablet Take 1 Tab by mouth daily. 30 Tab 5    gabapentin (NEURONTIN) 100 mg capsule Take 200 mg by mouth three (3) times daily.  ANORO ELLIPTA 62.5-25 mcg/actuation inhaler Take 1 Puff by inhalation daily.  furosemide (LASIX) 80 mg tablet Take 1 Tab by mouth daily. 90 Tab 3    allopurinol (ZYLOPRIM) 300 mg tablet Take 1 Tab by mouth daily. 30 Tab 6       Review of Systems   Review of Systems   Gastrointestinal: Positive for abdominal pain and blood in stool. All other systems reviewed and are negative. Physical Exam   Vital Signs  Patient Vitals for the past 24 hrs:   Temp Pulse Resp BP SpO2   09/29/19 1454  84 20 143/74 98 %   09/29/19 1300  82 24 143/68 97 %   09/29/19 1230  79 23 150/67 97 %   09/29/19 1200  82 26 150/66 99 %   09/29/19 1145  79 (!) 32 145/70 100 %   09/29/19 1130  90 20 139/60 96 %   09/29/19 1100  94 24 137/73 100 %   09/29/19 1049 97.8 °F (36.6 °C) (!) 103 21 135/76 100 %       Physical Exam   Constitutional: He is oriented to person, place, and time. He appears well-developed and well-nourished. No distress. HENT:   Head: Normocephalic and atraumatic. Eyes: Conjunctivae are normal. Right eye exhibits no discharge. Left eye exhibits no discharge. Neck: Normal range of motion. Neck supple. Cardiovascular: Normal rate, regular rhythm and normal heart sounds. No murmur heard. Pulmonary/Chest: Effort normal and breath sounds normal. No respiratory distress. He has no wheezes. Abdominal: Soft. He exhibits no distension. There is generalized tenderness. There is no rigidity and no guarding.    Generalized tenderness, right greater than left   Genitourinary:   Genitourinary Comments: Patient passed moderate to large amount of charlie red blood in the emergency department, no melena   Musculoskeletal: Normal range of motion. He exhibits no deformity. Neurological: He is alert and oriented to person, place, and time. Skin: Skin is warm and dry. No rash noted. Psychiatric: He has a normal mood and affect. His behavior is normal. Thought content normal.   Tremulous, \"jumpy\", seems extremely anxious   Nursing note and vitals reviewed. Diagnostic Study Results   Labs  Recent Results (from the past 24 hour(s))   CBC WITH AUTOMATED DIFF    Collection Time: 09/29/19 10:58 AM   Result Value Ref Range    WBC 6.7 4.1 - 11.1 K/uL    RBC 2.80 (L) 4.10 - 5.70 M/uL    HGB 8.7 (L) 12.1 - 17.0 g/dL    HCT 26.7 (L) 36.6 - 50.3 %    MCV 95.4 80.0 - 99.0 FL    MCH 31.1 26.0 - 34.0 PG    MCHC 32.6 30.0 - 36.5 g/dL    RDW 16.9 (H) 11.5 - 14.5 %    PLATELET 031 337 - 749 K/uL    MPV 9.6 8.9 - 12.9 FL    NRBC 0.0 0  WBC    ABSOLUTE NRBC 0.00 0.00 - 0.01 K/uL    NEUTROPHILS 82 (H) 32 - 75 %    LYMPHOCYTES 7 (L) 12 - 49 %    MONOCYTES 8 5 - 13 %    EOSINOPHILS 3 0 - 7 %    BASOPHILS 0 0 - 1 %    IMMATURE GRANULOCYTES 0 0.0 - 0.5 %    ABS. NEUTROPHILS 5.5 1.8 - 8.0 K/UL    ABS. LYMPHOCYTES 0.5 (L) 0.8 - 3.5 K/UL    ABS. MONOCYTES 0.5 0.0 - 1.0 K/UL    ABS. EOSINOPHILS 0.2 0.0 - 0.4 K/UL    ABS. BASOPHILS 0.0 0.0 - 0.1 K/UL    ABS. IMM.  GRANS. 0.0 0.00 - 0.04 K/UL    DF AUTOMATED      RBC COMMENTS ANISOCYTOSIS  1+       METABOLIC PANEL, COMPREHENSIVE    Collection Time: 09/29/19 10:58 AM   Result Value Ref Range    Sodium 142 136 - 145 mmol/L    Potassium 3.8 3.5 - 5.1 mmol/L    Chloride 111 (H) 97 - 108 mmol/L    CO2 25 21 - 32 mmol/L    Anion gap 6 5 - 15 mmol/L    Glucose 134 (H) 65 - 100 mg/dL    BUN 10 6 - 20 MG/DL    Creatinine 1.26 0.70 - 1.30 MG/DL    BUN/Creatinine ratio 8 (L) 12 - 20      GFR est AA >60 >60 ml/min/1.73m2    GFR est non-AA 55 (L) >60 ml/min/1.73m2    Calcium 7.6 (L) 8.5 - 10.1 MG/DL Bilirubin, total 0.4 0.2 - 1.0 MG/DL    ALT (SGPT) 26 12 - 78 U/L    AST (SGOT) 30 15 - 37 U/L    Alk. phosphatase 135 (H) 45 - 117 U/L    Protein, total 5.7 (L) 6.4 - 8.2 g/dL    Albumin 2.6 (L) 3.5 - 5.0 g/dL    Globulin 3.1 2.0 - 4.0 g/dL    A-G Ratio 0.8 (L) 1.1 - 2.2     SAMPLES BEING HELD    Collection Time: 09/29/19 10:58 AM   Result Value Ref Range    SAMPLES BEING HELD BLUE     COMMENT        Add-on orders for these samples will be processed based on acceptable specimen integrity and analyte stability, which may vary by analyte. Radiologic Studies  CT ABD PELV W WO CONT   Final Result   IMPRESSION:   No evidence of active GI bleeding. No evidence of acute process. CT Results  (Last 48 hours)               09/29/19 1334  CT ABD PELV W WO CONT Final result    Impression:  IMPRESSION:   No evidence of active GI bleeding. No evidence of acute process. Narrative:  EXAM: CT ABD PELV W WO CONT       INDICATION: GI bleeding eval for extravasation       COMPARISON: Nuclear medicine GI blood loss study of 9/26/2019. CT 9/25/2019. CONTRAST: 100 mL of Isovue-370. TECHNIQUE:    Before and after the uneventful intravenous administration of contrast, thin   axial images were obtained through the abdomen and pelvis. Coronal and sagittal   reconstructions were generated. Oral contrast was not administered. CT dose   reduction was achieved through use of a standardized protocol tailored for this   examination and automatic exposure control for dose modulation. FINDINGS:    LUNG BASES: Clear. INCIDENTALLY IMAGED HEART AND MEDIASTINUM: Coronary atherosclerotic   calcifications. LIVER: No mass or biliary dilatation. GALLBLADDER: Unremarkable. SPLEEN: No mass. PANCREAS: No mass or ductal dilatation. ADRENALS: Unremarkable. KIDNEYS: No mass, calculus, or hydronephrosis. STOMACH: Unremarkable. SMALL BOWEL: No dilatation or wall thickening.    COLON: No dilatation or wall thickening. Several diverticula are present. No   evidence of diverticulitis. APPENDIX: Unremarkable. PERITONEUM: No ascites or pneumoperitoneum. RETROPERITONEUM: No lymphadenopathy or aortic aneurysm. REPRODUCTIVE ORGANS: Unremarkable. URINARY BLADDER: No mass or calculus. BONES: No destructive bone lesion. The patient is status post posterior fusion   from L2 to L5. ADDITIONAL COMMENTS: N/A               CXR Results  (Last 48 hours)    None          Procedures     Procedures    Medical Decision Making     Provider Notes (Medical Decision Making):   59-year-old male with recurrent bright red blood per rectum, passing moderate to large amounts of blood from the rectum, with diffuse abdominal pain. He required 4 transfusions over the past week. On exam he has no significant abdominal tenderness. He passed further bright red blood per rectum in the emergency department. I consulted colorectal surgery, who is the service to take care of him over the past week. They recommended CT angiography to be repeated. CT angiogram did not demonstrate a source of the acute bleeding today. The recommendation therefore was to admit to medical hospitalist and consult gastroenterology. Patient was given Protonix and will check serial hematocrits, clear liquid diet for now. Hemodynamically stable. Lexy Do MD  2:57 PM        Consult required?   Yes colorectal surgery and gastroenterology      Medications Administered During ED Course:  Medications   peppermint spirit solution (has no administration in time range)   diazePAM (VALIUM) tablet 2 mg (2 mg Oral Given 9/29/19 1219)   pantoprazole (PROTONIX) 40 mg in sodium chloride 0.9% 10 mL injection (40 mg IntraVENous Given 9/29/19 1244)   iopamidol (ISOVUE-370) 76 % injection 100 mL (100 mL IntraVENous Given 9/29/19 1334)   sodium chloride (NS) flush 10 mL (10 mL IntraVENous Given 9/29/19 1334)          Diagnosis     Disposition:  Admitted    Clinical Impression:   1. BRBPR (bright red blood per rectum)    2. Gastrointestinal hemorrhage, unspecified gastrointestinal hemorrhage type        Attestation:  I personally performed the services described in this documentation on this date 9/29/2019 for patient Renetta Dixon. Linnell Hammans, MD        I was the first provider for this patient on this visit. To the best of my ability I reviewed relevant prior medical records, electrocardiograms, laboratories, and radiologic studies. The patient's presenting problems were discussed, and the patient was in agreement with the care plan formulated and outlined with them. Linnell Hammans, MD    Please note that this dictation was completed with Dragon voice recognition software. Quite often unanticipated grammatical, syntax, homophones, and other interpretive errors are inadvertently transcribed by the computer software. Please disregard these errors and excuse any errors that have escaped final proofreading.

## 2019-09-29 NOTE — ED NOTES
Patient with oob to bedside commode with episode of bright red unformed stool. Patient became tachycardiac to 120s and was assisted back to bed.  Patients heart rate returned to 80s upon rest

## 2019-09-29 NOTE — DISCHARGE INSTRUCTIONS
Patient Discharge Instructions    Neil Munoz / [id number] : 1934    Admitted 2019 Discharged: 2019         DISCHARGE DIAGNOSIS:   Acute  Lower GI bleeding  Acute blood loss anemia, POA   Severe sepsis, POA resolved  LLL MRSA community acquired pneumonia, POA   Hypotension POA- resolved now  Acute Hypoxic respiratory failure due to above resolved  KATHY on CKD stage 3  CAD hx stent x 1 26 years ago, POA on Plavix  HTN  Gout  COPD, not on home O2  TESSA on cpap  TIA , on plavix. Morbid obesity       Take Home Medications     {Medication reconciliation information is now added to the patient's AVS automatically when it is printed. There is no need to use this SmartLink in discharge instructions. Highlight this text and delete it to clear this message}      General drug facts     If you have a very bad allergy, wear an allergy ID at all times. It is important that you take the medication exactly as they are prescribed. Keep your medication in the bottles provided by the pharmacist.  Keep a list of all your drugs (prescription, natural products, vitamins, OTC) with you. Give this list to your doctor. Do not take other medications without consulting your doctor. Do not share your drugs with others and do not take anyone else's drugs. Keep all drugs out of the reach of children and pets. Most drugs may be thrown away in household trash after mixing with coffee grounds or venecia litter and sealing in a plastic bag. Keep a list Call your doctor for help with any side effects. If in the [place] you may also call the FDA at 1-084-MXO-2921    Talk with the doctor before starting any new drug, including OTC, natural products, or vitamins. What to do at Home    1. Recommended diet: Cardiac    2. Recommended activity: Activity as tolerated    3.  If you experience any of the following symptoms then please call your primary care physician or return to the emergency room if you cannot get hold of your doctor:    4. Wound Care: None    5. Lab work: Cbc and Bmp in 1 week    6. Continue to wear cpap    8. Bring these papers with you to your follow up appointments. The papers will help your doctors be sure to continue the care plan from the hospital.      Follow-up with:   PCP: Eden Rivera MD  Follow-up Information     Follow up With Specialties Details Why Contact Info    Eden Rivera MD Internal Medicine   [address] [facility].  [phone]      Eden Rivera MD Internal Medicine Schedule an appointment as soon as possible for a visit in 1 week  [address]      Paty Godoy MD Colon and Rectal Surgery Schedule an appointment as soon as possible for a visit in 1 week  [address] [facility] [id number] [phone]      Harmony Levy MD Gastroenterology Schedule an appointment as soon as possible for a visit in 1 week  [address] [phone]             Please call for your own appointment        Information obtained by :  I understand that if any problems occur once I am at home I am to contact my physician. I understand and acknowledge receipt of the instructions indicated above.                                                                                                                                            Physician's or R.N.'s Signature                                                                  Date/Time                                                                                                                                              Patient or Representative Signature                                                          Date/Time

## 2019-09-29 NOTE — CONSULTS
Colon and Rectal Surgery History and Physical    Subjective:      Han Corcoran is a 80 y.o. male who has a history of rectal bleeding. He was discharged yesterday after not having any rectal bleeding and having stable Hgb. He had a colonoscopy on last admission showing pandiverticulosis but no sign of active bleeding. He had a normal rectum. He had a large bloody bm today and came back to ER. CTA negative once again.      Patient Active Problem List    Diagnosis Date Noted    Rectal bleeding 09/23/2019    Pneumonia 09/23/2019    Severe sepsis ([address] 09/23/2019    Flank pain 08/01/2019    Spinal stenosis of lumbar region at multiple levels 05/13/2019    Bilateral carotid artery stenosis 04/12/2019    Severe obesity ([address] 01/03/2019    TESSA on CPAP 01/03/2019    Simple chronic bronchitis ([address] 01/03/2019    Fatigue 08/16/2017    CAD (coronary artery disease) 08/16/2017    Dyslipidemia 08/16/2017    On statin therapy 08/16/2017    Gout 08/16/2017    GERD (gastroesophageal reflux disease) 08/16/2017    TIA (transient ischemic attack) 08/16/2017    Dyspnea 08/16/2017    Colon polyps 08/16/2017    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    Hypertension 08/16/2017    ED (erectile dysfunction) 08/16/2017    Hypoxia 04/23/2015    Neurogenic claudication 04/23/2015     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    Morbid obesity ([address]     On statin therapy 8/16/2017    TESSA on CPAP 1/3/2019    Psychiatric disorder     Depression    Simple chronic bronchitis (HCC) 1/3/2019    TIA (transient ischemic attack) 4/93/0431    Umbilical hernia 41/4/0924      Past Surgical History:   Procedure Laterality Date    CARDIAC SURG PROCEDURE UNLIST  1996    Cardiac Stents    CARDIAC SURG PROCEDURE UNLIST  04/2019    EF 61-65%    COLONOSCOPY N/A 9/27/2019    COLONOSCOPY performed by S[facility] Krause [facility] at [facility] ENDOSCOPY     [facility]  65/99/13    open umbilical hernia repair mesh    HX UROLOGICAL      HYDROCELECTOMY      Social History     Tobacco Use    Smoking status: Former Smoker     Packs/day: 0.50     Years: 20.00     Pack years: 10.00    Smokeless tobacco: Former User    Tobacco comment: quit about 40  years ago   / used to dip tobaco and dip snuff   Substance Use Topics    Alcohol use: Not Currently     Comment: \"Drinks very little now for about a month \"      Family History   Problem Relation Age of Onset    Heart Disease Mother     Hypertension Mother     Hypertension Father     Hypertension Sister     Hypertension Brother     Cancer Brother       Prior to Admission medications    Medication Sig Start Date End Date Taking? Authorizing Provider   doxycycline (VIBRA-TABS) 100 mg tablet Take 1 Tab by mouth every twelve (12) hours for 5 days. 9/28/19 10/3/19  Lexus Cosby MD   pantoprazole (PROTONIX) 40 mg tablet Take 1 Tab by mouth daily for 15 days. 9/28/19 10/13/19  Lexus Cosby MD   aspirin 81 mg chewable tablet Take 1 Tab by mouth daily for 30 days. 9/28/19 10/28/19  Lexus Cosby MD   Lactoperoxi/Gluc Oxid/Pot Thio (BIOTENE DRY MOUTH MM) 1 Lozenge by Mucous Membrane route. Provider, Historical   DULoxetine (CYMBALTA) 30 mg capsule Take 1 Cap by mouth daily.  8/1/19   Faith Patrick MD   atorvastatin (LIPITOR) 80 mg tablet TAKE ONE TABLET BY MOUTH DAILY 7/8/19   Faith Patrick MD atenolol (TENORMIN) 50 mg tablet Take 1 Tab by mouth daily. 7/1/19   Dann Garcia MD   gabapentin (NEURONTIN) 100 mg capsule Take 200 mg by mouth three (3) times daily. Provider, Historical   ANORO ELLIPTA 62.5-25 mcg/actuation inhaler Take 1 Puff by inhalation daily. 12/26/18   Provider, Historical   furosemide (LASIX) 80 mg tablet Take 1 Tab by mouth daily. 12/19/18   Harriet Caldwell MD   allopurinol (ZYLOPRIM) 300 mg tablet Take 1 Tab by mouth daily. 7/11/18   Harriet Caldwell MD     Allergies   Allergen Reactions    Levaquin [Levofloxacin] Nausea and Vomiting     Reports anxiety, nausea, aching after taking levaquin    Ciprofloxacin Shortness of Breath    Quinolones Shortness of Breath        Review of Systems:    A comprehensive review of systems was negative except for that written in the History of Present Illness. Objective:     Visit Vitals  /68   Pulse 84   Temp 97.8 °F (36.6 °C)   Resp 21   Ht 5' 8\" (1.727 m)   Wt 105.9 kg (233 lb 8 oz)   SpO2 98%   BMI 35.50 kg/m²        Physical Exam:   General:  Alert, cooperative, no distress, appears stated age. Head:  Normocephalic, without obvious abnormality, atraumatic. Eyes:  Conjunctivae/corneas clear. PERRL, EOMs intact. Ears:  Normal external ear canals both ears. Nose: Nares normal. Septum midline. No drainage. Throat: Lips, mucosa, and tongue normal. Teeth and gums normal.   Neck: Supple, symmetrical, trachea midline, no adenopathy   Back:   Symmetric, no curvature. ROM normal.   Lungs:   Clear   Chest wall:  No tenderness or deformity. Heart:  Regular rate and rhythm       Abdomen:   Soft, non-tender. Bowel sounds normal. No masses,  No organomegaly. Genitalia:  deferred       Extremities: Extremities normal, atraumatic, no cyanosis or edema. Skin: Skin color, texture, turgor normal. No rashes or lesions. Neurologic: CNII-XII intact. Normal strength, sensation and reflexes throughout.          Imaging: images and reports reviewed    Lab Review:    Recent Results (from the past 24 hour(s))   CBC WITH AUTOMATED DIFF    Collection Time: 09/29/19 10:58 AM   Result Value Ref Range    WBC 6.7 4.1 - 11.1 K/uL    RBC 2.80 (L) 4.10 - 5.70 M/uL    HGB 8.7 (L) 12.1 - 17.0 g/dL    HCT 26.7 (L) 36.6 - 50.3 %    MCV 95.4 80.0 - 99.0 FL    MCH 31.1 26.0 - 34.0 PG    MCHC 32.6 30.0 - 36.5 g/dL    RDW 16.9 (H) 11.5 - 14.5 %    PLATELET 335 047 - 104 K/uL    MPV 9.6 8.9 - 12.9 FL    NRBC 0.0 0  WBC    ABSOLUTE NRBC 0.00 0.00 - 0.01 K/uL    NEUTROPHILS 82 (H) 32 - 75 %    LYMPHOCYTES 7 (L) 12 - 49 %    MONOCYTES 8 5 - 13 %    EOSINOPHILS 3 0 - 7 %    BASOPHILS 0 0 - 1 %    IMMATURE GRANULOCYTES 0 0.0 - 0.5 %    ABS. NEUTROPHILS 5.5 1.8 - 8.0 K/UL    ABS. LYMPHOCYTES 0.5 (L) 0.8 - 3.5 K/UL    ABS. MONOCYTES 0.5 0.0 - 1.0 K/UL    ABS. EOSINOPHILS 0.2 0.0 - 0.4 K/UL    ABS. BASOPHILS 0.0 0.0 - 0.1 K/UL    ABS. IMM. GRANS. 0.0 0.00 - 0.04 K/UL    DF AUTOMATED      RBC COMMENTS ANISOCYTOSIS  1+       METABOLIC PANEL, COMPREHENSIVE    Collection Time: 09/29/19 10:58 AM   Result Value Ref Range    Sodium 142 136 - 145 mmol/L    Potassium 3.8 3.5 - 5.1 mmol/L    Chloride 111 (H) 97 - 108 mmol/L    CO2 25 21 - 32 mmol/L    Anion gap 6 5 - 15 mmol/L    Glucose 134 (H) 65 - 100 mg/dL    BUN 10 6 - 20 MG/DL    Creatinine 1.26 0.70 - 1.30 MG/DL    BUN/Creatinine ratio 8 (L) 12 - 20      GFR est AA >60 >60 ml/min/1.73m2    GFR est non-AA 55 (L) >60 ml/min/1.73m2    Calcium 7.6 (L) 8.5 - 10.1 MG/DL    Bilirubin, total 0.4 0.2 - 1.0 MG/DL    ALT (SGPT) 26 12 - 78 U/L    AST (SGOT) 30 15 - 37 U/L    Alk.  phosphatase 135 (H) 45 - 117 U/L    Protein, total 5.7 (L) 6.4 - 8.2 g/dL    Albumin 2.6 (L) 3.5 - 5.0 g/dL    Globulin 3.1 2.0 - 4.0 g/dL    A-G Ratio 0.8 (L) 1.1 - 2.2     SAMPLES BEING HELD    Collection Time: 09/29/19 10:58 AM   Result Value Ref Range    SAMPLES BEING HELD BLUE     COMMENT        Add-on orders for these samples will be processed based on acceptable specimen integrity and analyte stability, which may vary by analyte. HGB & HCT    Collection Time: 09/29/19  3:27 PM   Result Value Ref Range    HGB 7.6 (L) 12.1 - 17.0 g/dL    HCT 22.8 (L) 36.6 - 50.3 %       Labs and radiology: images and reports reviewed      Assessment:     GI bleed. Consult GI regarding possible upper GI source. Most likely diverticular bleeding but need to rule out upper GI source. Will need serial H/h and possible more blood transfusions. Admit to medical team and Dr. Aishwarya Simpson will follow along as last admission.      Plan:     As above    Signed By: Dakota De Leon MD     September 29, 2019

## 2019-09-29 NOTE — H&P
Hospitalist Admission Note    NAME: Negrito Santos   :  1934   MRN:  [id number]     Date/Time:  2019 3:22 PM    Patient PCP: Augustina Correa MD  _____________________________________________________________________  Given the patient's current clinical presentation, I have a high level of concern for decompensation if discharged from the emergency department. Complex decision making was performed, which includes reviewing the patient's available past medical records, laboratory results, and x-ray films. My assessment of this patient's clinical condition and my plan of care is as follows. Assessment / Plan:    Symptomatic Acute blood loss anemia  -Hg 9.5--8.7--7.6 with dizziness and weakness   -plan for transfusion 1 unit. Continue to follow Hg/Hct q6hrs  -admit to stepdown, high risk     Recurrent GI bleeding  -CT abd/pelvis w/wo No evidence of active GI bleeding. No evidence of acute process  -work up last week included NM bleeding scan (no bleeding) and colonoscopy (diverticulosis but no bleeding). Source unclear and plavix was discontinued  -GI consult and CRS. Differentials include SB source like AVMs or still diverticular bleeding.   -start IV protonix but less likely UGIB  -hold ASA    Recent  LLL MRSA community acquired pneumonia  -continue doxycycline to complete course. Not hypoxic   -incentive spirometry  -contact isolation     CKD stage 3  -follow Cr     CAD hx stent x 1 (26 years ago)  HTN  -hold asa, lasix.   At risk for hypotension with continued bleeding  -continue BB at half dose     Gout - hold allopurinol  COPD, not on home O2  -continue inhaler with nebs prn    TESSA on cpap  Morbid obesity  Hard of hearing, has hearing aid  Hx TIA   S/p L2-L5 decompression for spinal stenosis and back pain   -has not been using CPAP at home per daughter  -continue gabapentin        Code Status:  Full  Surrogate Decision Maker: wife    DVT Prophylaxis:  SCD  GI Prophylaxis: not indicated    Baseline:  . Independent           Subjective:   CHIEF COMPLAINT:  Rectal bleeding    HISTORY OF PRESENT ILLNESS:     Anca Barbosa is a 80 y.o.  male who was just discharged yesterday after an admission for hematochezia. His work up included CT abdomen, nuclear med bleeding scan and colonoscopy but no definite bleeding source was identified. His plavix was stopped and he went home yesterday evening. This morning he had a bout of BRBPR and presented back to this ER. Stat CT abdomen/pelvis again did not show any source of bleeding. His initial Hg is 8.7 which is down from 9.5 yesterday. After 2 more bouts of hematochezia in the ER, his Hg dropped to 7.6.         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    Morbid obesity ([address]     On statin therapy 8/16/2017    TESSA on CPAP 1/3/2019    Psychiatric disorder     Depression    Simple chronic bronchitis ([address] 1/3/2019    TIA (transient ischemic attack) 2/96/3284    Umbilical hernia 84/9/2513        Past Surgical History:   Procedure Laterality Date    CARDIAC SURG PROCEDURE UNLIST  1996    Cardiac Stents    CARDIAC SURG PROCEDURE UNLIST  04/2019    EF 61-65%    COLONOSCOPY N/A 9/27/2019    COL[facility] by [facility]umberto Miller [facility] at [facility] ENDOSCOPY    HX HERNIA REPAIR      RIH    HX HERNIA REPAIR  15/30/25    open umbilical hernia repair mesh    HX UROLOGICAL      HYDROCELECTOMY       Social History     Tobacco Use    Smoking status: Former Smoker     Packs/day: 0.50     Years: 20.00     Pack years: 10.00    Smokeless tobacco: Former User    Tobacco comment: quit about 40  years ago   / used to dip tobaco and dip snuff   Substance Use Topics    Alcohol use: Not Currently     Comment: \"Drinks very little now for about a month \"        Family History   Problem Relation Age of Onset    Heart Disease Mother     Hypertension Mother     Hypertension Father     Hypertension Sister     Hypertension Brother     Cancer Brother      Allergies   Allergen Reactions    Levaquin [Levofloxacin] Nausea and Vomiting     Reports anxiety, nausea, aching after taking levaquin    Ciprofloxacin Shortness of Breath    Quinolones Shortness of Breath        Prior to Admission medications    Medication Sig Start Date End Date Taking? Authorizing Provider   doxycycline (VIBRA-TABS) 100 mg tablet Take 1 Tab by mouth every twelve (12) hours for 5 days. 9/28/19 10/3/19  Raymundo Dillon MD   pantoprazole (PROTONIX) 40 mg tablet Take 1 Tab by mouth daily for 15 days. 9/28/19 10/13/19  Raymundo Dillon MD   aspirin 81 mg chewable tablet Take 1 Tab by mouth daily for 30 days. 9/28/19 10/28/19  Raymundo Dillon MD   Lactoperoxi/Gluc Oxid/Pot Thio (BIOTENE DRY MOUTH MM) 1 Lozenge by Mucous Membrane route. Provider, Historical   DULoxetine (CYMBALTA) 30 mg capsule Take 1 Cap by mouth daily. 8/1/19   Amy Emery MD   atorvastatin (LIPITOR) 80 mg tablet TAKE ONE TABLET BY MOUTH DAILY 7/8/19   Amy Emery MD   atenolol (TENORMIN) 50 mg tablet Take 1 Tab by mouth daily. 7/1/19   Candance Juneau, MD   gabapentin (NEURONTIN) 100 mg capsule Take 200 mg by mouth three (3) times daily. Provider, Historical   ANORO ELLIPTA 62.5-25 mcg/actuation inhaler Take 1 Puff by inhalation daily.  12/26/18   Provider, Historical   furosemide (LASIX) 80 mg tablet Take 1 Tab by mouth daily. 12/19/18   Sukhjinder Donohue MD   allopurinol (ZYLOPRIM) 300 mg tablet Take 1 Tab by mouth daily. 7/11/18   Sukhjinder Donohue MD       REVIEW OF SYSTEMS:       Total of 12 systems reviewed as follows:       POSITIVE= underlined text  Negative = text not underlined  General:  fever, chills, sweats, generalized weakness, weight loss/gain,      loss of appetite   Eyes:    blurred vision, eye pain, loss of vision, double vision  ENT:    rhinorrhea, pharyngitis   Respiratory:   cough, sputum production, SOB, MARES, wheezing, pleuritic pain   Cardiology:   chest pain, palpitations, orthopnea, PND, edema, syncope   Gastrointestinal:  abdominal pain , N/V, diarrhea, dysphagia, constipation, rectal bleeding   Genitourinary:  frequency, urgency, dysuria, hematuria, incontinence   Muskuloskeletal :  arthralgia, myalgia, back pain   Hematology:  easy bruising, nose or gum bleeding, lymphadenopathy   Dermatological: rash, ulceration, pruritis, color change / jaundice  Endocrine:   hot flashes or polydipsia   Neurological:  headache, dizziness, confusion, focal weakness, paresthesia,     Speech difficulties, memory loss, gait difficulty  Psychological: Feelings of anxiety, depression, agitation    Objective:   VITALS:    Visit Vitals  /74   Pulse 84   Temp 97.8 °F (36.6 °C)   Resp 20   Ht 5' 8\" (1.727 m)   Wt 105.9 kg (233 lb 8 oz)   SpO2 98%   BMI 35.50 kg/m²       PHYSICAL EXAM:    General:    Alert, cooperative, no distress, appears stated age. HEENT: Atraumatic, anicteric sclerae, pink conjunctivae     No oral ulcers, mucosa moist, throat clear, dentition fair  Neck:  Supple, symmetrical,  thyroid: non tender  Lungs:   Clear to auscultation bilaterally. No Wheezing or Rhonchi. No rales. Chest wall:  No tenderness  No Accessory muscle use. Heart:   Regular  rhythm,  No  murmur   No edema  Abdomen:   Soft, non-tender. Not distended. Bowel sounds normal  Extremities: No cyanosis.   No clubbing,  Skin turgor normal, Capillary refill normal, Radial dial pulse 2+  Skin:     Not pale. Not Jaundiced  No rashes   Psych:  Good insight. Not depressed. Not anxious or agitated. Neurologic: EOMs intact. No facial asymmetry. No aphasia or slurred speech. Symmetrical strength, Sensation grossly intact. Alert and oriented X 4.     _______________________________________________________________________  Care Plan discussed with:    Comments   Patient x    Family  x    RN     Care Manager                    Consultant:  carl ERNANDEZ   _______________________________________________________________________  Expected  Disposition:   Home with Family x   HH/PT/OT/RN    SNF/LTC    LEILANI    ________________________________________________________________________  TOTAL TIME:  72  Minutes    Critical Care Provided     Minutes non procedure based      Comments    x Reviewed previous records   >50% of visit spent in counseling and coordination of care  Discussion with patient and/or family and questions answered       Given the patient's current clinical presentation, I have a high level of concern for decompensation if discharged from the ED. Complex decision making was performed which includes reviewing the patient's available past medical records, laboratory results, and Xray films. I have also directly communicated my plan and discussed this case with the involved ED physician.     ____________________________________________________________________  Carolynn Bower MD    Procedures: see electronic medical records for all procedures/Xrays and details which were not copied into this note but were reviewed prior to creation of Plan.     LAB DATA REVIEWED:    Recent Results (from the past 24 hour(s))   CBC WITH AUTOMATED DIFF    Collection Time: 09/29/19 10:58 AM   Result Value Ref Range    WBC 6.7 4.1 - 11.1 K/uL    RBC 2.80 (L) 4.10 - 5.70 M/uL    HGB 8.7 (L) 12.1 - 17.0 g/dL    HCT 26.7 (L) 36.6 - 50.3 %    MCV 95.4 80.0 - 99.0 FL    MCH 31.1 26.0 - 34.0 PG    MCHC 32.6 30.0 - 36.5 g/dL    RDW 16.9 (H) 11.5 - 14.5 %    PLATELET 128 833 - 613 K/uL    MPV 9.6 8.9 - 12.9 FL    NRBC 0.0 0  WBC    ABSOLUTE NRBC 0.00 0.00 - 0.01 K/uL    NEUTROPHILS 82 (H) 32 - 75 %    LYMPHOCYTES 7 (L) 12 - 49 %    MONOCYTES 8 5 - 13 %    EOSINOPHILS 3 0 - 7 %    BASOPHILS 0 0 - 1 %    IMMATURE GRANULOCYTES 0 0.0 - 0.5 %    ABS. NEUTROPHILS 5.5 1.8 - 8.0 K/UL    ABS. LYMPHOCYTES 0.5 (L) 0.8 - 3.5 K/UL    ABS. MONOCYTES 0.5 0.0 - 1.0 K/UL    ABS. EOSINOPHILS 0.2 0.0 - 0.4 K/UL    ABS. BASOPHILS 0.0 0.0 - 0.1 K/UL    ABS. IMM. GRANS. 0.0 0.00 - 0.04 K/UL    DF AUTOMATED      RBC COMMENTS ANISOCYTOSIS  1+       METABOLIC PANEL, COMPREHENSIVE    Collection Time: 09/29/19 10:58 AM   Result Value Ref Range    Sodium 142 136 - 145 mmol/L    Potassium 3.8 3.5 - 5.1 mmol/L    Chloride 111 (H) 97 - 108 mmol/L    CO2 25 21 - 32 mmol/L    Anion gap 6 5 - 15 mmol/L    Glucose 134 (H) 65 - 100 mg/dL    BUN 10 6 - 20 MG/DL    Creatinine 1.26 0.70 - 1.30 MG/DL    BUN/Creatinine ratio 8 (L) 12 - 20      GFR est AA >60 >60 ml/min/1.73m2    GFR est non-AA 55 (L) >60 ml/min/1.73m2    Calcium 7.6 (L) 8.5 - 10.1 MG/DL    Bilirubin, total 0.4 0.2 - 1.0 MG/DL    ALT (SGPT) 26 12 - 78 U/L    AST (SGOT) 30 15 - 37 U/L    Alk. phosphatase 135 (H) 45 - 117 U/L    Protein, total 5.7 (L) 6.4 - 8.2 g/dL    Albumin 2.6 (L) 3.5 - 5.0 g/dL    Globulin 3.1 2.0 - 4.0 g/dL    A-G Ratio 0.8 (L) 1.1 - 2.2     SAMPLES BEING HELD    Collection Time: 09/29/19 10:58 AM   Result Value Ref Range    SAMPLES BEING HELD BLUE     COMMENT        Add-on orders for these samples will be processed based on acceptable specimen integrity and analyte stability, which may vary by analyte.

## 2019-09-29 NOTE — ED NOTES
Dr. Nithya Gil at bedside for assessment and plan of care. Blood consent obtained by Dr. Nithya Gil and witnessed by this RN. Patient and family agreeable. Awaiting inpatient bed assignment.

## 2019-09-29 NOTE — PROGRESS NOTES
Reason for Readmission:     GI bleed         RRAT Score and Risk Level:     27 high risk     Level of Readmission:    Level 1 (last admission 9/23-9/28 for GI bleed)      Care Conference scheduled:   Unit IDR rounds       Resources/supports as identified by patient/family:   Family support       Top Challenges facing patient (as identified by patient/family and CM): Finances/Medication cost?     No challenges reported  Transportation      Pt's family can transport at discharge, patient can drive  Support system or lack thereof?     family  Living arrangements? Lives with wife, 1 story house  Self-care/ADLs/Cognition? Independent with ADLs, currently alert and oriented       Current Advanced Directive/Advance Care Plan:  none           Plan for utilizing home health:   Has not used home health in the past             Transition of Care Plan:    Based on readmission, the patient's previous Plan of Care discharge home with family assistance and follow up appointments   has been evaluated and/or modified. The current Transition of Care Plan is:           1. Patient in ED bed waiting for inpatient admission  2. Patient will need 2nd IM letter at discharge  3. Patient and family prefer for patient to discharge home with family assistance and follow up appointments. Patient's daughter does not feel additional assistance at home is needed at this time. Patient is a 80year old male admitted 9/29 for GI bleed. Patient alert and oriented, resting in bed with wife and daughter and several other family members present in room. Demographic information verified and correct. Insurance information verified and correct. Patient lives with his wife, no home oxygen, no DME, has not used home health in the past.  [facility] [address] [facility] in [place]. Patient states he is independent with ADLs and can drive. Patient's family can transport at discharge.       Care Management Interventions  PCP Verified by CM: Yes(Nicolas Mckinnon MD)  Mode of Transport at Discharge:  Other (see comment)(pt's family can transport at [employer])  Transition of Care Consult (CM Consult): Discharge Planning  Discharge Durable Medical Equipment: No  Physical Therapy Consult: No  Occupational Therapy Consult: No  Speech Therapy Consult: No  Current Support Network: Lives with Spouse, Own Home(1 story house)  Confirm Follow Up Transport: Family  Plan discussed with Pt/Family/Caregiver: Yes  Discharge Location  Discharge Placement: [address], RN, [place] [id number] Care Management  [phone]

## 2019-09-29 NOTE — DISCHARGE SUMMARY
Hospitalist Discharge Summary     Patient ID:  Nancy Her  [id number]  6 y.o.  1934  9/23/2019    PCP on record: Sailaja Kohler MD    Admit date: 9/23/2019  Discharge date and time: 9/29/2019    DISCHARGE DIAGNOSIS:    Acute persistent Lower GI bleeding s/p colonoscopy showing divertiuclosis  Acute blood loss anemia, POA   Severe sepsis, POA resolved  LLL MRSA community acquired pneumonia, POA   Hypotension POA- resolved now  Acute Hypoxic respiratory failure due to above resolved  KATHY on CKD stage 3  CAD hx stent x 1 26 years ago, POA on Plavix  HTN  Gout  COPD, not on home O2  TESSA on cpap  TIA 4/19, on plavix, now on asa  Morbid obesity                CONSULTATIONS:  IP CONSULT TO COLORECTAL SURGERY    Excerpted HPI from H&P of Anthony Reyes MD:  Nancy Her is a 80 y.o. male who presents with total of 3 episodes of large amount of red to maroon blood from rectum starting at 1:10am today (had 2 more episodes within next 30 minutes), felt lightheaded after third episode. No abdominal pain, n/v.  +sweats, headache. Has been having increased cough with yellow sputum x 2 weeks. Denies increased SOB, no chest pain. Colonoscopy 3 years ago was normal.           ______________________________________________________________________  DISCHARGE SUMMARY/HOSPITAL COURSE:  for full details see H&P, daily progress notes, labs, consult notes. Acute persistent Lower GI bleeding  Acute blood loss anemia, POA   -hb base line is 13. S/p 1 unit of prbc on 9/25.  Hb is 9 today  CT abdomen without contrast with diverticulosis without colitis or diverticulitis  NM bleeding scan shows no bleeding  C scope showed diverticulosis but no stigmata of bleeding  Plavix held and Dr Judy Alves said to start asa instead and out pt endoscopy   HB stable and cleared by CRS for discharge     Severe sepsis, POA resolved  LLL MRSA community acquired pneumonia, POA   Hypotension POA- resolved now  Acute Hypoxic respiratory failure due to above resolved  -Sputum cx grew MRSA. Change abx to Doxycyline. Add Maalox prn with abx due to heart burn with abx. -BP is now improved  -urine cx negative. Blood cx NGTD  -Cont IS due to atelectasis on CXR. On room air now.     KATHY on CKD stage 3  Cr is now close to base line of around 1.3     CAD hx stent x 1 26 years ago, POA on Plavix  HTN  --hold lasix, olmesartan, amlodipine due to hypotension and GI bleed   -Holding Plavix- likely DC on discharge- change to ASA 81  -cont added  low dose of tenormin and monitor BP     Gout  COPD, not on home O2  TESSA on cpap  TIA 4/19, on plavix. Morbid obesity  Hard of hearing, has hearing aid  S/p L2-L5 decompression for spinal stenosis and back pain 5/19  -continue anoro. Cont duoneb q4h prn. Cont mucinex  -continue cpap.  Has not been using at home per daughter  -continue gabapentin      He is seen and examined today, vitals are stable, lab work is at base line and cleared by CRS to be discharged for out endoscopy.          _______________________________________________________________________  Patient seen and examined by me on discharge day. Pertinent Findings:  Gen:    Not in distress  Chest: Clear lungs  CVS:   Regular rhythm. No edema  Abd:  Soft, not distended, not tender  Neuro:  Alert, awake  _______________________________________________________________________  DISCHARGE MEDICATIONS:   Discharge Medication List as of 9/28/2019  1:11 PM      START taking these medications    Details   doxycycline (VIBRA-TABS) 100 mg tablet Take 1 Tab by mouth every twelve (12) hours for 5 days. , Print, Disp-10 Tab, R-0      pantoprazole (PROTONIX) 40 mg tablet Take 1 Tab by mouth daily for 15 days. , Print, Disp-15 Tab, R-0      aspirin 81 mg chewable tablet Take 1 Tab by mouth daily for 30 days. , Print, Disp-30 Tab, R-0         CONTINUE these medications which have NOT CHANGED    Details   DULoxetine (CYMBALTA) 30 mg capsule Take 1 Cap by mouth daily. , Normal, Disp-30 Cap, R-6      atorvastatin (LIPITOR) 80 mg tablet TAKE ONE TABLET BY MOUTH DAILY, Normal, Disp-90 Tab, R-3      atenolol (TENORMIN) 50 mg tablet Take 1 Tab by mouth daily. , Normal, Disp-30 Tab, R-5      gabapentin (NEURONTIN) 100 mg capsule Take 200 mg by mouth three (3) times daily. , Historical Med      ANORO ELLIPTA 62.5-25 mcg/actuation inhaler Take 1 Puff by inhalation daily. , Historical Med, KIMBERLY      furosemide (LASIX) 80 mg tablet Take 1 Tab by mouth daily. , Normal, Disp-90 Tab, R-3      allopurinol (ZYLOPRIM) 300 mg tablet Take 1 Tab by mouth daily. , Normal, Disp-30 Tab, R-6      Lactoperoxi/Gluc Oxid/Pot Thio (BIOTENE DRY MOUTH MM) 1 Lozenge by Mucous Membrane route., Historical Med         STOP taking these medications       olmesartan (BENICAR) 20 mg tablet Comments:   Reason for Stopping:         amLODIPine (NORVASC) 2.5 mg tablet Comments:   Reason for Stopping:         lansoprazole (PREVACID) 15 mg capsule Comments:   Reason for Stopping:         clopidogrel (PLAVIX) 75 mg tab Comments:   Reason for Stopping:                 Patient Follow Up Instructions:   1. Recommended diet: Cardiac    2. Recommended activity: Activity as tolerated    3. If you experience any of the following symptoms then please call your primary care physician or return to the emergency room if you cannot get hold of your doctor:    4. Wound Care: None    5. Lab work: Cbc and Bmp in 1 week    6.  Continue to wear cpap    Follow-up Information     Follow up With Specialties Details Why Contact Info    Morro Boyle MD Internal Medicine   [facility] [address]  [facility]  [phone]      Morro Boyle MD Internal Medicine Schedule an appointment as soon as possible for a visit in 1 week  [address]      Lionel Nowak MD Colon and Rectal Surgery Schedule an appointment as soon as possible for a visit in 1 week  [place] [facility] [phone]  [phone]      Angelo Valdivia MD Gastroenterology Schedule an appointment as soon as possible for a visit in 1 week  [place] [phone]          ________________________________________________________________    Risk of deterioration: High    Condition at Discharge:  Stable  __________________________________________________________________    Disposition  Home with family, no needs    ____________________________________________________________________    Code Status: Full Code  ___________________________________________________________________      Total time in minutes spent coordinating this discharge (includes going over instructions, follow-up, prescriptions, and preparing report for sign off to her PCP) :  35  minutes    Signed:  Cece Etienne MD

## 2019-09-30 ENCOUNTER — ANESTHESIA EVENT (OUTPATIENT)
Dept: ENDOSCOPY | Age: 84
DRG: 377 | End: 2019-09-30
Payer: MEDICARE

## 2019-09-30 ENCOUNTER — PATIENT OUTREACH (OUTPATIENT)
Dept: INTERNAL MEDICINE CLINIC | Age: 84
End: 2019-09-30

## 2019-09-30 ENCOUNTER — ANESTHESIA (OUTPATIENT)
Dept: ENDOSCOPY | Age: 84
DRG: 377 | End: 2019-09-30
Payer: MEDICARE

## 2019-09-30 LAB
ABO + RH BLD: NORMAL
ALBUMIN SERPL-MCNC: 2.3 G/DL (ref 3.5–5)
ALBUMIN/GLOB SERPL: 0.8 {RATIO} (ref 1.1–2.2)
ALP SERPL-CCNC: 117 U/L (ref 45–117)
ALT SERPL-CCNC: 25 U/L (ref 12–78)
ANION GAP SERPL CALC-SCNC: 3 MMOL/L (ref 5–15)
AST SERPL-CCNC: 28 U/L (ref 15–37)
BILIRUB SERPL-MCNC: 0.4 MG/DL (ref 0.2–1)
BLD PROD TYP BPU: NORMAL
BLOOD GROUP ANTIBODIES SERPL: NORMAL
BPU ID: NORMAL
BUN SERPL-MCNC: 12 MG/DL (ref 6–20)
BUN/CREAT SERPL: 10 (ref 12–20)
CALCIUM SERPL-MCNC: 7.8 MG/DL (ref 8.5–10.1)
CHLORIDE SERPL-SCNC: 111 MMOL/L (ref 97–108)
CO2 SERPL-SCNC: 28 MMOL/L (ref 21–32)
CREAT SERPL-MCNC: 1.24 MG/DL (ref 0.7–1.3)
CROSSMATCH RESULT,%XM: NORMAL
GLOBULIN SER CALC-MCNC: 2.9 G/DL (ref 2–4)
GLUCOSE SERPL-MCNC: 108 MG/DL (ref 65–100)
HCT VFR BLD AUTO: 24.7 % (ref 36.6–50.3)
HCT VFR BLD AUTO: 24.9 % (ref 36.6–50.3)
HCT VFR BLD AUTO: 28.6 % (ref 36.6–50.3)
HGB BLD-MCNC: 8 G/DL (ref 12.1–17)
HGB BLD-MCNC: 8.2 G/DL (ref 12.1–17)
HGB BLD-MCNC: 9.4 G/DL (ref 12.1–17)
POTASSIUM SERPL-SCNC: 4.2 MMOL/L (ref 3.5–5.1)
PROT SERPL-MCNC: 5.2 G/DL (ref 6.4–8.2)
SODIUM SERPL-SCNC: 142 MMOL/L (ref 136–145)
SPECIMEN EXP DATE BLD: NORMAL
STATUS OF UNIT,%ST: NORMAL
UNIT DIVISION, %UDIV: 0

## 2019-09-30 PROCEDURE — 74011250637 HC RX REV CODE- 250/637: Performed by: INTERNAL MEDICINE

## 2019-09-30 PROCEDURE — 74011000250 HC RX REV CODE- 250: Performed by: ANESTHESIOLOGY

## 2019-09-30 PROCEDURE — 88305 TISSUE EXAM BY PATHOLOGIST: CPT

## 2019-09-30 PROCEDURE — 77030039825 HC MSK NSL PAP SUPERNO2VA VYRM -B: Performed by: ANESTHESIOLOGY

## 2019-09-30 PROCEDURE — 76040000019: Performed by: SPECIALIST

## 2019-09-30 PROCEDURE — 36415 COLL VENOUS BLD VENIPUNCTURE: CPT

## 2019-09-30 PROCEDURE — 0DB68ZX EXCISION OF STOMACH, VIA NATURAL OR ARTIFICIAL OPENING ENDOSCOPIC, DIAGNOSTIC: ICD-10-PCS | Performed by: SPECIALIST

## 2019-09-30 PROCEDURE — 80053 COMPREHEN METABOLIC PANEL: CPT

## 2019-09-30 PROCEDURE — C9113 INJ PANTOPRAZOLE SODIUM, VIA: HCPCS | Performed by: INTERNAL MEDICINE

## 2019-09-30 PROCEDURE — 74011250636 HC RX REV CODE- 250/636: Performed by: ANESTHESIOLOGY

## 2019-09-30 PROCEDURE — 74011250636 HC RX REV CODE- 250/636: Performed by: SPECIALIST

## 2019-09-30 PROCEDURE — 74011250637 HC RX REV CODE- 250/637: Performed by: FAMILY MEDICINE

## 2019-09-30 PROCEDURE — 85018 HEMOGLOBIN: CPT

## 2019-09-30 PROCEDURE — 76060000031 HC ANESTHESIA FIRST 0.5 HR: Performed by: SPECIALIST

## 2019-09-30 PROCEDURE — 74011250636 HC RX REV CODE- 250/636: Performed by: INTERNAL MEDICINE

## 2019-09-30 PROCEDURE — 65660000000 HC RM CCU STEPDOWN

## 2019-09-30 PROCEDURE — 77030019988 HC FCPS ENDOSC DISP BSC -B: Performed by: SPECIALIST

## 2019-09-30 PROCEDURE — 74011000250 HC RX REV CODE- 250: Performed by: INTERNAL MEDICINE

## 2019-09-30 RX ORDER — NALOXONE HYDROCHLORIDE 0.4 MG/ML
0.4 INJECTION, SOLUTION INTRAMUSCULAR; INTRAVENOUS; SUBCUTANEOUS
Status: DISCONTINUED | OUTPATIENT
Start: 2019-09-30 | End: 2019-09-30 | Stop reason: HOSPADM

## 2019-09-30 RX ORDER — SODIUM CHLORIDE 0.9 % (FLUSH) 0.9 %
5-40 SYRINGE (ML) INJECTION AS NEEDED
Status: DISCONTINUED | OUTPATIENT
Start: 2019-09-30 | End: 2019-10-05 | Stop reason: HOSPADM

## 2019-09-30 RX ORDER — MIDAZOLAM HYDROCHLORIDE 1 MG/ML
.25-5 INJECTION, SOLUTION INTRAMUSCULAR; INTRAVENOUS
Status: DISCONTINUED | OUTPATIENT
Start: 2019-09-30 | End: 2019-09-30 | Stop reason: HOSPADM

## 2019-09-30 RX ORDER — IPRATROPIUM BROMIDE AND ALBUTEROL SULFATE 2.5; .5 MG/3ML; MG/3ML
3 SOLUTION RESPIRATORY (INHALATION)
Status: DISCONTINUED | OUTPATIENT
Start: 2019-09-30 | End: 2019-10-05 | Stop reason: HOSPADM

## 2019-09-30 RX ORDER — ACETAMINOPHEN 325 MG/1
650 TABLET ORAL
Status: DISCONTINUED | OUTPATIENT
Start: 2019-09-30 | End: 2019-10-01

## 2019-09-30 RX ORDER — FUROSEMIDE 10 MG/ML
40 INJECTION INTRAMUSCULAR; INTRAVENOUS ONCE
Status: COMPLETED | OUTPATIENT
Start: 2019-09-30 | End: 2019-09-30

## 2019-09-30 RX ORDER — SODIUM CHLORIDE 9 MG/ML
100 INJECTION, SOLUTION INTRAVENOUS CONTINUOUS
Status: DISPENSED | OUTPATIENT
Start: 2019-09-30 | End: 2019-09-30

## 2019-09-30 RX ORDER — GABAPENTIN 100 MG/1
100 CAPSULE ORAL 3 TIMES DAILY
Status: DISCONTINUED | OUTPATIENT
Start: 2019-09-30 | End: 2019-10-05 | Stop reason: HOSPADM

## 2019-09-30 RX ORDER — DEXTROMETHORPHAN/PSEUDOEPHED 2.5-7.5/.8
1.2 DROPS ORAL
Status: DISCONTINUED | OUTPATIENT
Start: 2019-09-30 | End: 2019-09-30 | Stop reason: HOSPADM

## 2019-09-30 RX ORDER — FUROSEMIDE 80 MG/1
80 TABLET ORAL DAILY
Status: DISCONTINUED | OUTPATIENT
Start: 2019-10-01 | End: 2019-10-05 | Stop reason: HOSPADM

## 2019-09-30 RX ORDER — FLUMAZENIL 0.1 MG/ML
0.2 INJECTION INTRAVENOUS
Status: DISCONTINUED | OUTPATIENT
Start: 2019-09-30 | End: 2019-09-30 | Stop reason: HOSPADM

## 2019-09-30 RX ORDER — SODIUM CHLORIDE 0.9 % (FLUSH) 0.9 %
5-40 SYRINGE (ML) INJECTION EVERY 8 HOURS
Status: DISCONTINUED | OUTPATIENT
Start: 2019-09-30 | End: 2019-10-05 | Stop reason: HOSPADM

## 2019-09-30 RX ORDER — PROPOFOL 10 MG/ML
INJECTION, EMULSION INTRAVENOUS AS NEEDED
Status: DISCONTINUED | OUTPATIENT
Start: 2019-09-30 | End: 2019-09-30 | Stop reason: HOSPADM

## 2019-09-30 RX ORDER — ATROPINE SULFATE 0.1 MG/ML
0.5 INJECTION INTRAVENOUS
Status: DISCONTINUED | OUTPATIENT
Start: 2019-09-30 | End: 2019-09-30 | Stop reason: HOSPADM

## 2019-09-30 RX ORDER — LIDOCAINE HYDROCHLORIDE 20 MG/ML
INJECTION, SOLUTION EPIDURAL; INFILTRATION; INTRACAUDAL; PERINEURAL AS NEEDED
Status: DISCONTINUED | OUTPATIENT
Start: 2019-09-30 | End: 2019-09-30 | Stop reason: HOSPADM

## 2019-09-30 RX ADMIN — PROPOFOL 120 MG: 10 INJECTION, EMULSION INTRAVENOUS at 11:03

## 2019-09-30 RX ADMIN — DOXYCYCLINE HYCLATE 100 MG: 100 TABLET, COATED ORAL at 20:52

## 2019-09-30 RX ADMIN — ACETAMINOPHEN 650 MG: 325 TABLET ORAL at 07:03

## 2019-09-30 RX ADMIN — MELATONIN 3 MG: at 20:53

## 2019-09-30 RX ADMIN — GABAPENTIN 100 MG: 100 CAPSULE ORAL at 21:03

## 2019-09-30 RX ADMIN — SODIUM CHLORIDE 40 MG: 9 INJECTION, SOLUTION INTRAMUSCULAR; INTRAVENOUS; SUBCUTANEOUS at 08:12

## 2019-09-30 RX ADMIN — ATENOLOL 25 MG: 25 TABLET ORAL at 08:11

## 2019-09-30 RX ADMIN — GABAPENTIN 100 MG: 100 CAPSULE ORAL at 15:35

## 2019-09-30 RX ADMIN — Medication 10 ML: at 21:03

## 2019-09-30 RX ADMIN — DOXYCYCLINE HYCLATE 100 MG: 100 TABLET, COATED ORAL at 08:11

## 2019-09-30 RX ADMIN — GABAPENTIN 200 MG: 100 CAPSULE ORAL at 08:11

## 2019-09-30 RX ADMIN — DULOXETINE HYDROCHLORIDE 30 MG: 30 CAPSULE, DELAYED RELEASE ORAL at 08:11

## 2019-09-30 RX ADMIN — Medication 10 ML: at 15:36

## 2019-09-30 RX ADMIN — SODIUM CHLORIDE 100 ML/HR: 900 INJECTION, SOLUTION INTRAVENOUS at 10:39

## 2019-09-30 RX ADMIN — SODIUM CHLORIDE 40 MG: 9 INJECTION, SOLUTION INTRAMUSCULAR; INTRAVENOUS; SUBCUTANEOUS at 20:53

## 2019-09-30 RX ADMIN — FUROSEMIDE 40 MG: 10 INJECTION, SOLUTION INTRAMUSCULAR; INTRAVENOUS at 16:27

## 2019-09-30 RX ADMIN — ACETAMINOPHEN 650 MG: 325 TABLET ORAL at 20:53

## 2019-09-30 RX ADMIN — LIDOCAINE HYDROCHLORIDE 100 MG: 20 INJECTION, SOLUTION EPIDURAL; INFILTRATION; INTRACAUDAL; PERINEURAL at 10:53

## 2019-09-30 RX ADMIN — Medication 10 ML: at 20:54

## 2019-09-30 RX ADMIN — UMECLIDINIUM BROMIDE AND VILANTEROL TRIFENATATE 1 PUFF: 62.5; 25 POWDER RESPIRATORY (INHALATION) at 12:34

## 2019-09-30 NOTE — ANESTHESIA POSTPROCEDURE EVALUATION
Procedure(s):  ESOPHAGOGASTRODUODENOSCOPY (EGD)  ESOPHAGOGASTRODUODENAL (EGD) BIOPSY. total IV anesthesia    Anesthesia Post Evaluation        Patient location during evaluation: PACU  Note status: Adequate. Level of consciousness: responsive to verbal stimuli and sleepy but conscious  Pain management: satisfactory to patient  Airway patency: patent  Anesthetic complications: no  Cardiovascular status: acceptable  Respiratory status: acceptable  Hydration status: acceptable  Comments: +Post-Anesthesia Evaluation and Assessment    Patient: Mary Hoffman MRN: [id number]  SSN: [id number]   YOB: 1934  Age: 80 y.o. Sex: male      Cardiovascular Function/Vital Signs    /63   Pulse 70   Temp 36.5 °C (97.7 °F)   Resp (!) 31   Ht 5' 8\" (1.727 m)   Wt 111.9 kg (246 lb 11.1 oz)   SpO2 94%   BMI 37.51 kg/m²     Patient is status post Procedure(s) with comments:  ESOPHAGOGASTRODUODENOSCOPY (EGD) - 1030 best   1100 or 1530 ok  ESOPHAGOGASTRODUODENAL (EGD) BIOPSY. Nausea/Vomiting: Controlled. Postoperative hydration reviewed and adequate. Pain:  Pain Scale 1: Numeric (0 - 10) (09/30/19 1116)  Pain Intensity 1: 0 (09/30/19 1116)   Managed. Neurological Status: At baseline. Mental Status and Level of Consciousness: Arousable. Pulmonary Status:   O2 Device: Room air (09/30/19 1116)   Adequate oxygenation and airway patent. Complications related to anesthesia: None    Post-anesthesia assessment completed. No concerns. Signed By: Denys Crocker MD    9/30/2019  Post anesthesia nausea and vomiting:  controlled      Vitals Value Taken Time   /70 9/30/2019 11:26 AM   Temp 36.5 °C (97.7 °F) 9/30/2019 11:16 AM   Pulse 0 9/30/2019 11:29 AM   Resp 0 9/30/2019 11:29 AM   SpO2 94 % 9/30/2019 11:26 AM   Vitals shown include unvalidated device data.

## 2019-09-30 NOTE — PROGRESS NOTES
9/30/19-Patient was admitted to [facility] 9/23/19-9/28/19 for rectal bleeding, and re-admitted 9/29/19for acute blood loss anemia.   NN will follow upon discharge. / vs

## 2019-09-30 NOTE — PROGRESS NOTES
TRANSFER - OUT REPORT:    Verbal report given to [facility] RN(name) on Elizabeth Coulter  being transferred to PCU(unit) for routine post - op       Report consisted of patients Situation, Background, Assessment and   Recommendations(SBAR). Information from the following report(s) SBAR was reviewed with the receiving nurse. Opportunity for questions and clarification was provided.

## 2019-09-30 NOTE — PROCEDURES
Esophagogastroduodenoscopy    Indications:  Hematochezia  Acute blood loss anemia    Medications:  See anesthesia form    Assistants:  Elizabeth Boyle      Post procedure diagnosis: proximal gastric exudate atop folds    Description of Procedure:    Prior to the procedure its objectives, risks, consequences and alternatives were discussed with the patient who then elected to proceed. The Olympus video endoscope was inserted under direct vision into the mouth and then into the esophagus. The esophagus looked normal.    The z-line was located at 43 cm. In the proximal stomach there were thick fold with overlying exudate. The started in the fundus and were present in the proximal 1/3 of th body. The mucosa between the folds was normal.  There were no other diagnostic abnormalities of the body, fundus, antrum, cardia and incisura of the stomach. This included direct and retroflexion examination. The first and second portion of the duodenum appeared normal.  I took biopsies of the exudate and the folds. Complications: There were no apparent complications and the patient tolerated the procedure well. Implants:  none    Estimated Blood Loss:  none  Specimens Removed:  Proximal stomach biopsies  Impressions: Thick folds with overlying exudate proximal stomach. Appearance suggests ischemia.  This is unusual and is impressive  No other source of bleeding seen and no blood in the lumen      Signed By: Jackson Dobson MD                        September 30, 2019     11:03 AM

## 2019-09-30 NOTE — ROUTINE PROCESS
Phyllis Cutting 1934 
[id number] Situation: 
Verbal report received from: Heather Ortiz RN Procedure: Procedure(s) with comments: 
ESOPHAGOGASTRODUODENOSCOPY (EGD) - 1030 best   1100 or 1530 ok ESOPHAGOGASTRODUODENAL (EGD) BIOPSY Background: 
 
Preoperative diagnosis: Hematochezia [K92.1] Postoperative diagnosis: linear exudate proximal stomach :  Dr. Joseph Oconnell Assistant(s): Endoscopy Technician-1: Jose Carlos Becerra Endoscopy RN-1: Yvonne Barton RN Endoscopy RN-2: Andres Hassan RN Specimens:  
ID Type Source Tests Collected by Time Destination 1 : proximal stomach bx Preservative   Sherrie Bermeo MD 9/30/2019 1059 Pathology H. Pylori  no Assessment: 
Intra-procedure medications Anesthesia gave intra-procedure sedation and medications, see anesthesia flow sheet yes Intravenous fluids: NS@ Thora Haagensen Vital signs stable Abdominal assessment: round and soft Recommendation: 
Discharge patient per MD order [facility] Return to floor  Yes Family or Friend  Daughter and wife upstairs in patient room Permission to share finding with family or friend yes

## 2019-09-30 NOTE — H&P
Pre-endoscopy H and P    The patient was seen and examined in the endoscoy suite. The airway was assessed and docuemented. The problem list, past medical history, and medications were reviewed.      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    Hypertension I10    ED (erectile dysfunction) N52.9    Severe obesity (HCC) E66.01    TESSA on CPAP G47.33, Z99.89    Simple chronic bronchitis (HCC) J41.0    Bilateral carotid artery stenosis I65.23    Spinal stenosis of lumbar region at multiple levels M48.061    Flank pain R10.9    Rectal bleeding K62.5    Pneumonia J18.9    Severe sepsis (HCC) A41.9, R65.20    Acute blood loss anemia D62     Social History     Socioeconomic History    Marital status:      Spouse name: Not on file    Number of children: Not on file    Years of education: Not on file    Highest education level: Not on file   Occupational History    Not on file   Social Needs    Financial resource strain: Not on file    Food insecurity:     Worry: Not on file     Inability: Not on file    Transportation needs:     Medical: Not on file     Non-medical: Not on file   Tobacco Use    Smoking status: Former Smoker     Packs/day: 0.50     Years: 20.00     Pack years: 10.00    Smokeless tobacco: Former User    Tobacco comment: quit about 40  years ago   / used to dip tobaco and dip snuff   Substance and Sexual Activity    Alcohol use: Not Currently     Comment: \"Drinks very little now for about a month \"    Drug use: No    Sexual activity: Not on file   Lifestyle    Physical activity:     Days per week: Not on file     Minutes per session: Not on file    Stress: Not on file   Relationships    Social connections:     Talks on phone: Not on file     Gets together: Not on file     Attends Orthodoxy service: Not on file     Active member of club or organization: Not on file     Attends meetings of clubs or organizations: Not on file     Relationship status: Not on file    Intimate partner violence:     Fear of current or ex partner: Not on file     Emotionally abused: Not on file     Physically abused: Not on file     Forced sexual activity: Not on file   Other Topics Concern    Not on file   Social History Narrative    Not on file     Past Medical History:   Diagnosis Date    Adverse effect of anesthesia     combative after anesthesia    Alcohol abuse     6 beers/day    Arthritis     CAD (coronary artery disease)     Chronic obstructive pulmonary disease ([facility]     Dyslipidemia 2017    Dyspepsia and other specified disorders of function of stomach     Dyspnea 2017    ED (erectile dysfunction) 2017    Encounter for immunization 2017    Fatigue 2017    Flank pain 2019    GERD (gastroesophageal reflux disease) 2017    Gout 2017    Hypertension     Leukoplakia of oral cavity 2017    Morbid obesity ([facility] [address] [facility]     On statin therapy 2017    TESSA on CPAP 1/3/2019    Psychiatric disorder     Depression    Simple chronic bronchitis ([facility] [address] 1/3/2019    TIA (transient ischemic attack) 435    Umbilical hernia      The patient has a family history of na    Prior to Admission Medications   Prescriptions Last Dose Informant Patient Reported? Taking? ANORO ELLIPTA 62.5-25 mcg/actuation inhaler  Significant Other Yes No   Sig: Take 1 Puff by inhalation daily. DULoxetine (CYMBALTA) 30 mg capsule  Significant Other No No   Sig: Take 1 Cap by mouth daily. Lactoperoxi/Gluc Oxid/Pot Thio (BIOTENE DRY MOUTH MM)   Yes No   Si Lozenge by Mucous Membrane route.    allopurinol (ZYLOPRIM) 300 mg tablet  Significant Other No No   Sig: Take 1 Tab by mouth daily. aspirin 81 mg chewable tablet   No No   Sig: Take 1 Tab by mouth daily for 30 days. atenolol (TENORMIN) 50 mg tablet  Significant Other No No   Sig: Take 1 Tab by mouth daily. atorvastatin (LIPITOR) 80 mg tablet  Significant Other No No   Sig: TAKE ONE TABLET BY MOUTH DAILY   doxycycline (VIBRA-TABS) 100 mg tablet   No No   Sig: Take 1 Tab by mouth every twelve (12) hours for 5 days. furosemide (LASIX) 80 mg tablet  Significant Other No No   Sig: Take 1 Tab by mouth daily. gabapentin (NEURONTIN) 100 mg capsule  Significant Other Yes No   Sig: Take 200 mg by mouth three (3) times daily. pantoprazole (PROTONIX) 40 mg tablet   No No   Sig: Take 1 Tab by mouth daily for 15 days. Facility-Administered Medications: None           The review of systems is:  negative for shortness of breath or chest pain      The heart, lungs, and mental status were satisfactory for the administration of anesthesia sedation and for the procedure. We are doing this for hematochezia, recurrent off blood thinners with a negative colonoscopy last admission DR Bernardo Bueno. I discussed with the patient the objectives, risks, consequences and alternatives to the procedure.       Rosangela Akers MD  9/30/2019  10:49 AM

## 2019-09-30 NOTE — PROGRESS NOTES
Hospitalist Progress Note    NAME: Armida Goodman   :  1934   MRN:  [id number]       Assessment / Plan:    Symptomatic Acute blood loss anemia  S/p EGD on   -Hg 9.5--8.7--7.6 with dizziness and weakness   -s/p transfusion  1 unit yestersday . Continue to follow Hg/Hct q8hrs  -admit to stepdown, high risk   - EGD  showed : Impressions: Thick folds with overlying exudate proximal stomach. Appearance suggests ischemia. This is unusual and is impressive  No other source of bleeding seen and no blood in the lumen  - Gi on board , c/w CLD , await bx results   - no further bleeding today    transfuse if Hb <8  With h/o CAD s/p stent     Recurrent GI bleeding  -CT abd/pelvis w/wo No evidence of active GI bleeding. No evidence of acute process  -work up last week included NM bleeding scan (no bleeding) and colonoscopy (diverticulosis but no bleeding). Source unclear and plavix was discontinued  -GI consult and CRS. Differentials include SB source like AVMs or still diverticular bleeding. -c/w  IV protonix but less likely UGIB  -hold ASA  - hold lasix for now , caution with blood transfusions, can be easily fluid overload . - if no further bleeding and BP stable ,lasix can be restarted tomorrow am      Recent  LLL MRSA community acquired pneumonia  -continue doxycycline to complete course. Not hypoxic   -incentive spirometry  -contact isolation     CKD stage 3  -follow Cr     CAD hx stent x 1 (26 years ago)  HTN  LE edema   -hold asa, lasix.   At risk for hypotension with continued bleeding  -continue BB at half dose  - consider restarting home dose lasix if BP remains stable      Gout - hold allopurinol  COPD, not on home O2  -continue inhaler with nebs prn     TESSA on cpap  Morbid obesity  Hard of hearing, has hearing aid  Hx TIA   S/p L2-L5 decompression for spinal stenosis and back pain   -has not been using CPAP at home per daughter  -continue gabapentin ,weaned down to 100mg bid       Code Status:  Full  Surrogate Decision Maker: wife     DVT Prophylaxis:  SCD  GI Prophylaxis: on PPI    Baseline:  . Independent        30.0 - 39.9 Obese / Body mass index is 37.51 kg/m². Subjective:     Chief Complaint / Reason for Physician Visit  FU GI bleed . He reports no acute complaints . Discussed with RN events overnight. Review of Systems:  Symptom Y/N Comments  Symptom Y/N Comments   Fever/Chills    Chest Pain n    Poor Appetite    Edema y LE edema    Cough    Abdominal Pain n    Sputum    Joint Pain     SOB/MARES n   Pruritis/Rash     Nausea/vomit n   Tolerating PT/OT     Diarrhea n   Tolerating Diet  npo   Constipation    Other       Could NOT obtain due to:      Objective:     VITALS:   Last 24hrs VS reviewed since prior progress note.  Most recent are:  Patient Vitals for the past 24 hrs:   Temp Pulse Resp BP SpO2   09/30/19 0329 97.5 °F (36.4 °C) 84 11 138/73 96 %   09/29/19 2215  80 21 145/70 93 %   09/29/19 2145  81 25 149/74 97 %   09/29/19 2045  85 25 133/71 95 %   09/29/19 1931  86 23  97 %   09/29/19 1930 98 °F (36.7 °C) 86 20 145/71 98 %   09/29/19 1929  83 25  98 %   09/29/19 1915 98.1 °F (36.7 °C) 83 22 153/70 98 %   09/29/19 1914  85 22  99 %   09/29/19 1901  79 22  100 %   09/29/19 1900    163/71    09/29/19 1848  82 18  100 %   09/29/19 1845  80 23 154/76    09/29/19 1830 97.8 °F (36.6 °C) 85 19 163/77 97 %   09/29/19 1815  83 17 147/72 98 %   09/29/19 1811 98 °F (36.7 °C) 78 17 152/78 98 %   09/29/19 1800  80 21 152/78 100 %   09/29/19 1756    158/74    09/29/19 1755 98.1 °F (36.7 °C) 81 20 158/74 97 %   09/29/19 1754  82 20  100 %   09/29/19 1750 98.2 °F (36.8 °C) 70 26 150/73 97 %   09/29/19 1746  82 24  99 %   09/29/19 1745 98.1 °F (36.7 °C) 80 16 148/74 98 %   09/29/19 1744 98.1 °F (36.7 °C) 81 21 146/67 100 %   09/29/19 1729 98.4 °F (36.9 °C) 83 20 151/68 97 %   09/29/19 1630    124/68 98 %   09/29/19 1629 98.3 °F (36.8 °C) (!) 112 (!) 31 124/68 100 %   09/29/19 1600  82 25 164/68 97 %   09/29/19 1530  84 21 155/68 98 %   09/29/19 1500  88 19 146/72 97 %   09/29/19 1454  84 20 143/74 98 %   09/29/19 1300  82 24 143/68 97 %   09/29/19 1230  79 23 150/67 97 %   09/29/19 1200  82 26 150/66 99 %   09/29/19 1145  79 (!) 32 145/70 100 %   09/29/19 1130  90 20 139/60 96 %   09/29/19 1100  94 24 137/73 100 %   09/29/19 1049 97.8 °F (36.6 °C) (!) 103 21 135/76 100 %       Intake/Output Summary (Last 24 hours) at 9/30/2019 0838  Last data filed at 9/30/2019 0424  Gross per 24 hour   Intake    Output 600 ml   Net -600 ml        PHYSICAL EXAM:  General: WD, WN. Alert, cooperative, no acute distress    EENT:  EOMI. Anicteric sclerae. MMM  Resp:  CTA bilaterally, no wheezing or rales. No accessory muscle use  CV:  Regular  rhythm,  LE  Edema 2+  GI:  Soft, Non distended, Non tender.  +Bowel sounds  Neurologic:  Alert and oriented X 3, normal speech,   Psych:   Good insight. Not anxious nor agitated  Skin:  No rashes. No jaundice    Reviewed most current lab test results and cultures  YES  Reviewed most current radiology test results   YES  Review and summation of old records today    NO  Reviewed patient's current orders and MAR    YES  PMH/ reviewed - no change compared to H&P  ________________________________________________________________________  Care Plan discussed with:    Comments   Patient x    Family      RN x    Care Manager     Consultant                       x Multidiciplinary team rounds were held today with , nursing, pharmacist and clinical coordinator. Patient's plan of care was discussed; medications were reviewed and discharge planning was addressed.      ________________________________________________________________________  Total NON critical care TIME:  25   Minutes    Total CRITICAL CARE TIME Spent:   Minutes non procedure based      Comments   >50% of visit spent in counseling and coordination of care x    ________________________________________________________________________  Nino Taylor MD     Procedures: see electronic medical records for all procedures/Xrays and details which were not copied into this note but were reviewed prior to creation of Plan. LABS:  I reviewed today's most current labs and imaging studies. Pertinent labs include:  Recent Labs     09/30/19  0407 09/29/19  2136 09/29/19  1527 09/29/19  1058  09/27/19  0942   WBC  --   --   --  6.7  --  6.1   HGB 8.2* 8.2* 7.6* 8.7*   < > 9.0*   HCT 24.9* 24.6* 22.8* 26.7*   < > 26.5*   PLT  --   --   --  253  --  220    < > = values in this interval not displayed.      Recent Labs     09/30/19  0407 09/29/19  1058    142   K 4.2 3.8   * 111*   CO2 28 25   * 134*   BUN 12 10   CREA 1.24 1.26   CA 7.8* 7.6*   ALB 2.3* 2.6*   TBILI 0.4 0.4   SGOT 28 30   ALT 25 26       Signed: Nino Taylor MD

## 2019-09-30 NOTE — PROGRESS NOTES
JACQUES - home with [facility] and f/u orders     Pt is a Level 1 Readmit [facility] a [facility] Pt went home with f/u appts. CM sent interoffice email to Denys GARCIA to inform her of pt's readmission. CM reviewed chart and noted pt lives with his wife in a 1 story home. Pt was independent with his ADL's and driving. Denies using DME. CM will continue to follow for discharge planning needs.      Caitlin Vyas, [address]

## 2019-09-30 NOTE — PROGRESS NOTES
See op note    The lesions look ischemic (emboic or small vessel)  Await biopsies    My plan:    Review his arterial anatomy in radiology  Speak to family  Cami Hebert to have clear liquids this pm, but I did not write  Will follow    Darwin Hylton MD  11:07 AM  9/30/2019

## 2019-09-30 NOTE — CONSULTS
[address]               Gastroenterology Consult Note  ALANA Bahena  Covering for Dr. Ángel Garcia     Admitting: Dr. Jerre Lefort Date: 9/30/2019     Subjective:     Chief Complaint: GI bleed    History of Present Illness: Mann Beth is a 80 y.o. male who is seen in consultation for BRBPR and anemia. Pt reprots he started with bleeding on Sunday 9/22. He awoke early in the morning and felt he needed to have a BM. Passes significant amount of BRB per rectum. He went back to bed. Reports this happened x3 that day. No abdominal pain with episodes. He felt lightheaded as if he was going to pass out so came to ED. He was admitted, Plavix was stopped and CTA was negative for bleeding source. He had a colonoscopy by Dr. Aishwarya Simpson showing diverticulosis and presumed to be diverticular bleed. Pt was d/dia home and yesterday had significant amount of BRB per rectum and felt symptomatic with lightheadedness and slight AMS. He continued to have bleeding with last episode being yesterday at [facility].  Now has minor discomfort in his left and right flank/sides. Blood is more maroon now with some clots present. No melena. He admits to nausea with dry heaving but no vomiting. He does have a hx of reflux and indigestion int he past.  He was taking OTC prilosec, on D/C this was changes to Protonix. Never had an EGD in the past.     Hgb 8.7 on 9/29, devcreased to 7.6 transfused one unit of PRBC and increased to 8.2. MCV 95.4. CTA 9/29/19: IMPRESSION:  No evidence of active GI bleeding. No evidence of acute process. Colonoscopy 9/27/19: Findings:   Rectum:   Normal  Sigmoid:     - Diverticulosis. No blood or stigmata of recent bleeding  Descending Colon:     - Diverticulosis. No blood or stigmata of recent bleeding  Transverse Colon:     - Diverticulosis. No blood or stigmata of recent bleeding  Ascending Colon:     - Diverticulosis.   No blood or stigmata of recent bleeding  Cecum:     - Diverticulosis.   No blood or stigmata of recent bleeding    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    Morbid obesity ([address]     On statin therapy 8/16/2017    TESSA on CPAP 1/3/2019    Psychiatric disorder     Depression    Simple chronic bronchitis ([address] 1/3/2019    TIA (transient ischemic attack) 5/75/8579    Umbilical hernia 36/2/7274     Past Surgical History:   Procedure Laterality Date    CARDIAC SURG PROCEDURE UNLIST  1996    Cardiac Stents    CARDIAC SURG PROCEDURE UNLIST  04/2019    EF 61-65%    COLONOSCOPY N/A 9/27/2019    COLONOSCOPY performed by Karla [facility]auer [facility] at [facility] ENDOSCOPY    HX HERNIA REPAIR      RIH    HX HERNIA REPAIR  88/58/18    open umbilical hernia repair mesh    HX UROLOGICAL      HYDROCELECTOMY      Family History   Problem Relation Age of Onset    Heart Disease Mother     Hypertension Mother     Hypertension Father     Hypertension Sister     Hypertension Brother     Cancer Brother      Social History     Tobacco Use    Smoking status: Former Smoker     Packs/day: 0.50     Years: 20.00     Pack years: 10.00    Smokeless tobacco: Former User    Tobacco comment: quit about 40  years ago   / used to dip tobaco and dip snuff   Substance Use Topics    Alcohol use: Not Currently     Comment: \"Drinks very little now for about a month \"      Allergies   Allergen Reactions    Levaquin [Levofloxacin] Nausea and Vomiting     Reports anxiety, nausea, aching after taking levaquin    Ciprofloxacin Shortness of Breath    Quinolones Shortness of Breath     Current Facility-Administered Medications   Medication Dose Route Frequency    acetaminophen (TYLENOL) tablet 650 mg  650 mg Oral Q6H PRN    umeclidinium-vilanterol (ANORO ELLIPTA) 62.5 mcg- 25 mcg/inhalation  1 Puff Inhalation DAILY    atenolol (TENORMIN) tablet 25 mg  25 mg Oral DAILY    doxycycline (VIBRA-TABS) tablet 100 mg  100 mg Oral Q12H    DULoxetine (CYMBALTA) capsule 30 mg  30 mg Oral DAILY    gabapentin (NEURONTIN) capsule 200 mg  200 mg Oral TID    pantoprazole (PROTONIX) 40 mg in sodium chloride 0.9% 10 mL injection  40 mg IntraVENous Q12H    0.9% sodium chloride infusion 250 mL  250 mL IntraVENous PRN    melatonin tablet 3 mg  3 mg Oral QHS PRN        Review of Systems:    A detailed review of systems was performed as follows:  Constitutional:  Negative  Eyes:  No ocular sensitivity to the sun, blurred vision or double vision. ENMT:  No nose or sinus problems. Respiratory: No coughing, wheezing or sob  Cardiac:  No chest pain, exertional chest pain or palpitations  Gastrointestinal:  See history of the present illness  :   No pain with urination or hematuria  Musculoskeletal:  + arthritis no hot swollen joints. Endocrine:  No thyroid disease or diabetes  Psychiatric: No depression or feeling blue  Integumentary:  No skin rash or sensitivity to the sun. Neurologic:  +hx of TIA, no seizure; no numbness or tingling of the extremities. Heme-Lymphatic:  No history of anemia, no unexplained lumps or bumps      Objective:     Physical Exam:  Visit Vitals  /73 (BP 1 Location: Right arm, BP Patient Position: At rest)   Pulse 84   Temp 97.5 °F (36.4 °C)   Resp 11   Ht 5' 8\" (1.727 m)   Wt 111.9 kg (246 lb 11.1 oz)   SpO2 96%   BMI 37.51 kg/m²        GEN: Pleasant WM, NAD  Skin:  Extremities and face reveal no rashes. HEENT: Sclerae anicteric. Extra-occular muscles are intact. Cardiovascular: Regular rate and rhythm. No murmurs, gallops, or rubs. Elige Nicely Respiratory:  Comfortable breathing with no accessory muscle use. Clear breath sounds with no wheezes, rales, or rhonchi. GI:  Abdomen obese,  nondistended, soft, and nontender. Normal active bowel sounds. No enlargement of the liver or spleen. No masses palpable. Rectal:  Deferred  Musculoskeletal:  trace pitting edema of the lower legs. Neurological:  Gross memory appears intact. Patient is alert and oriented. Psychiatric:  Mood appears appropriate with judgement intact. Lymphatic:  No cervical or supraclavicular adenopathy. Laboratory:    Recent Results (from the past 24 hour(s))   CBC WITH AUTOMATED DIFF    Collection Time: 09/29/19 10:58 AM   Result Value Ref Range    WBC 6.7 4.1 - 11.1 K/uL    RBC 2.80 (L) 4.10 - 5.70 M/uL    HGB 8.7 (L) 12.1 - 17.0 g/dL    HCT 26.7 (L) 36.6 - 50.3 %    MCV 95.4 80.0 - 99.0 FL    MCH 31.1 26.0 - 34.0 PG    MCHC 32.6 30.0 - 36.5 g/dL    RDW 16.9 (H) 11.5 - 14.5 %    PLATELET 769 526 - 102 K/uL    MPV 9.6 8.9 - 12.9 FL    NRBC 0.0 0  WBC    ABSOLUTE NRBC 0.00 0.00 - 0.01 K/uL    NEUTROPHILS 82 (H) 32 - 75 %    LYMPHOCYTES 7 (L) 12 - 49 %    MONOCYTES 8 5 - 13 %    EOSINOPHILS 3 0 - 7 %    BASOPHILS 0 0 - 1 %    IMMATURE GRANULOCYTES 0 0.0 - 0.5 %    ABS. NEUTROPHILS 5.5 1.8 - 8.0 K/UL    ABS. LYMPHOCYTES 0.5 (L) 0.8 - 3.5 K/UL    ABS. MONOCYTES 0.5 0.0 - 1.0 K/UL    ABS. EOSINOPHILS 0.2 0.0 - 0.4 K/UL    ABS. BASOPHILS 0.0 0.0 - 0.1 K/UL    ABS. IMM.  GRANS. 0.0 0.00 - 0.04 K/UL    DF AUTOMATED      RBC COMMENTS ANISOCYTOSIS  1+       METABOLIC PANEL, COMPREHENSIVE    Collection Time: 09/29/19 10:58 AM   Result Value Ref Range    Sodium 142 136 - 145 mmol/L    Potassium 3.8 3.5 - 5.1 mmol/L    Chloride 111 (H) 97 - 108 mmol/L    CO2 25 21 - 32 mmol/L    Anion gap 6 5 - 15 mmol/L    Glucose 134 (H) 65 - 100 mg/dL    BUN 10 6 - 20 MG/DL    Creatinine 1.26 0.70 - 1.30 MG/DL    BUN/Creatinine ratio 8 (L) 12 - 20      GFR est AA >60 >60 ml/min/1.73m2    GFR est non-AA 55 (L) >60 ml/min/1.73m2    Calcium 7.6 (L) 8.5 - 10.1 MG/DL    Bilirubin, total 0.4 0.2 - 1.0 MG/DL    ALT (SGPT) 26 12 - 78 U/L    AST (SGOT) 30 15 - 37 U/L    Alk. phosphatase 135 (H) 45 - 117 U/L    Protein, total 5.7 (L) 6.4 - 8.2 g/dL    Albumin 2.6 (L) 3.5 - 5.0 g/dL    Globulin 3.1 2.0 - 4.0 g/dL    A-G Ratio 0.8 (L) 1.1 - 2.2     SAMPLES BEING HELD    Collection Time: 09/29/19 10:58 AM   Result Value Ref Range    SAMPLES BEING HELD BLUE     COMMENT        Add-on orders for these samples will be processed based on acceptable specimen integrity and analyte stability, which may vary by analyte.    TYPE + CROSSMATCH    Collection Time: 09/29/19 10:58 AM   Result Value Ref Range    Crossmatch Expiration 10/02/2019     ABO/Rh(D) B POSITIVE     Antibody screen NEG     Unit number [id number]     Blood component type RC LR     Unit division 00     Status of unit TRANSFUSED     Crossmatch result Compatible    HGB & HCT    Collection Time: 09/29/19  3:27 PM   Result Value Ref Range    HGB 7.6 (L) 12.1 - 17.0 g/dL    HCT 22.8 (L) 36.6 - 50.3 %   HGB & HCT    Collection Time: 09/29/19  9:36 PM   Result Value Ref Range    HGB 8.2 (L) 12.1 - 17.0 g/dL    HCT 24.6 (L) 36.6 - 50.3 %   HGB & HCT    Collection Time: 09/30/19  4:07 AM   Result Value Ref Range    HGB 8.2 (L) 12.1 - 17.0 g/dL    HCT 24.9 (L) 36.6 - 39.2 %   METABOLIC PANEL, COMPREHENSIVE    Collection Time: 09/30/19  4:07 AM   Result Value Ref Range    Sodium 142 136 - 145 mmol/L    Potassium 4.2 3.5 - 5.1 mmol/L    Chloride 111 (H) 97 - 108 mmol/L    CO2 28 21 - 32 mmol/L    Anion gap 3 (L) 5 - 15 mmol/L    Glucose 108 (H) 65 - 100 mg/dL    BUN 12 6 - 20 MG/DL    Creatinine 1.24 0.70 - 1.30 MG/DL    BUN/Creatinine ratio 10 (L) 12 - 20      GFR est AA >60 >60 ml/min/1.73m2    GFR est non-AA 56 (L) >60 ml/min/1.73m2    Calcium 7.8 (L) 8.5 - 10.1 MG/DL Bilirubin, total 0.4 0.2 - 1.0 MG/DL    ALT (SGPT) 25 12 - 78 U/L    AST (SGOT) 28 15 - 37 U/L    Alk. phosphatase 117 45 - 117 U/L    Protein, total 5.2 (L) 6.4 - 8.2 g/dL    Albumin 2.3 (L) 3.5 - 5.0 g/dL    Globulin 2.9 2.0 - 4.0 g/dL    A-G Ratio 0.8 (L) 1.1 - 2.2       CT Results (most recent):  Results from Hospital Encounter encounter on 09/29/19   CT ABD PELV W WO CONT    Narrative EXAM: CT ABD PELV W WO CONT    INDICATION: GI bleeding eval for extravasation    COMPARISON: Nuclear medicine GI blood loss study of 9/26/2019. CT 9/25/2019. CONTRAST: 100 mL of Isovue-370. TECHNIQUE:   Before and after the uneventful intravenous administration of contrast, thin  axial images were obtained through the abdomen and pelvis. Coronal and sagittal  reconstructions were generated. Oral contrast was not administered. CT dose  reduction was achieved through use of a standardized protocol tailored for this  examination and automatic exposure control for dose modulation. FINDINGS:   LUNG BASES: Clear. INCIDENTALLY IMAGED HEART AND MEDIASTINUM: Coronary atherosclerotic  calcifications. LIVER: No mass or biliary dilatation. GALLBLADDER: Unremarkable. SPLEEN: No mass. PANCREAS: No mass or ductal dilatation. ADRENALS: Unremarkable. KIDNEYS: No mass, calculus, or hydronephrosis. STOMACH: Unremarkable. SMALL BOWEL: No dilatation or wall thickening. COLON: No dilatation or wall thickening. Several diverticula are present. No  evidence of diverticulitis. APPENDIX: Unremarkable. PERITONEUM: No ascites or pneumoperitoneum. RETROPERITONEUM: No lymphadenopathy or aortic aneurysm. REPRODUCTIVE ORGANS: Unremarkable. URINARY BLADDER: No mass or calculus. BONES: No destructive bone lesion. The patient is status post posterior fusion  from L2 to L5. ADDITIONAL COMMENTS: N/A      Impression IMPRESSION:  No evidence of active GI bleeding. No evidence of acute process.            Assessment/Plan:     Active Problems:    Acute blood loss anemia (9/29/2019)    Patient continued with red/maroon blood per rectum and continued drop in hgb from  8.7 to 7.6, received one unit of blood this admission with improvement in hgb to 8.2 (baseline appears to be around 13 last month). We will proceed with EGD to look for causes of UGI bleeding such as PUD, erosive gastritis, AVM, and daya wiss tear, and esophagitis. Procedure including risks, benefits and alternatives discussed with pt and family at bedside, they are in agreement with proceeding. Cont to monitor hgb, if cont to drop and no source of bleeding found on EGD may consider SB capsule.       ALANA Mallory    09/30/19  9:24 AM    [address] [facility], [address] [place] [address]  [address] [id number]  [address] [place] [address] [place]: [phone]

## 2019-09-30 NOTE — PROGRESS NOTES
Anesthesia reports 120mg Propofol, 100mg Lidocaine and 250mL NS given during procedure. Received report from anesthesia staff on vital signs and status of patient.

## 2019-09-30 NOTE — ANESTHESIA PREPROCEDURE EVALUATION
Anesthetic History   No history of anesthetic complications            Review of Systems / Medical History  Patient summary reviewed, nursing notes reviewed and pertinent labs reviewed    Pulmonary    COPD    Sleep apnea: CPAP           Neuro/Psych       CVA  TIA and psychiatric history    Comments: Lumbar Stenosis, spondylosis, and scoliosis with bilateral Sciatica    Tremors  Alcohol abuse (F10.10) Cardiovascular    Hypertension  Valvular problems/murmurs: aortic stenosis and aortic insufficiency        CAD, cardiac stents and hyperlipidemia    Exercise tolerance: <4 METS  Comments: TTE (4/13/19):    Estimated left ventricular ejection fraction is 61 - 65%. Left ventricular mild concentric hypertrophy. No regional wall motion abnormality noted. Mild aortic valve stenosis is present. Mild aortic valve regurgitation is present. GI/Hepatic/Renal     GERD           Endo/Other        Obesity and arthritis  Pertinent negatives: No morbid obesity   Other Findings   Comments: Back pain  ETOH abuse  Gout  Diarrhea  Lower GI Bleed           Physical Exam    Airway  Mallampati: II  TM Distance: 4 - 6 cm  Neck ROM: normal range of motion   Mouth opening: Normal     Cardiovascular  Regular rate and rhythm,  S1 and S2 normal,  no murmur, click, rub, or gallop  Rhythm: regular  Rate: normal         Dental    Dentition: Poor dentition  Comments: Multiple missing teeth, significant decay, none loose.    Pulmonary  Breath sounds clear to auscultation          Prolonged expiration     Abdominal  GI exam deferred       Other Findings            Anesthetic Plan    ASA: 3  Anesthesia type: general and total IV anesthesia          Induction: Intravenous  Anesthetic plan and risks discussed with: Patient      Propofol MAC/Supernova

## 2019-10-01 ENCOUNTER — APPOINTMENT (OUTPATIENT)
Dept: CT IMAGING | Age: 84
DRG: 377 | End: 2019-10-01
Attending: INTERNAL MEDICINE
Payer: MEDICARE

## 2019-10-01 LAB
ERYTHROCYTE [DISTWIDTH] IN BLOOD BY AUTOMATED COUNT: 17.4 % (ref 11.5–14.5)
HCT VFR BLD AUTO: 27.2 % (ref 36.6–50.3)
HCT VFR BLD AUTO: 29.1 % (ref 36.6–50.3)
HCT VFR BLD AUTO: 29.4 % (ref 36.6–50.3)
HGB BLD-MCNC: 8.8 G/DL (ref 12.1–17)
HGB BLD-MCNC: 9.4 G/DL (ref 12.1–17)
HGB BLD-MCNC: 9.6 G/DL (ref 12.1–17)
MCH RBC QN AUTO: 30.1 PG (ref 26–34)
MCHC RBC AUTO-ENTMCNC: 32.4 G/DL (ref 30–36.5)
MCV RBC AUTO: 93.2 FL (ref 80–99)
NRBC # BLD: 0 K/UL (ref 0–0.01)
NRBC BLD-RTO: 0 PER 100 WBC
PLATELET # BLD AUTO: 233 K/UL (ref 150–400)
PMV BLD AUTO: 9.7 FL (ref 8.9–12.9)
RBC # BLD AUTO: 2.92 M/UL (ref 4.1–5.7)
WBC # BLD AUTO: 4.8 K/UL (ref 4.1–11.1)

## 2019-10-01 PROCEDURE — 74011250636 HC RX REV CODE- 250/636: Performed by: INTERNAL MEDICINE

## 2019-10-01 PROCEDURE — 74011000250 HC RX REV CODE- 250: Performed by: INTERNAL MEDICINE

## 2019-10-01 PROCEDURE — 74011250637 HC RX REV CODE- 250/637: Performed by: INTERNAL MEDICINE

## 2019-10-01 PROCEDURE — 97165 OT EVAL LOW COMPLEX 30 MIN: CPT | Performed by: OCCUPATIONAL THERAPIST

## 2019-10-01 PROCEDURE — 36415 COLL VENOUS BLD VENIPUNCTURE: CPT

## 2019-10-01 PROCEDURE — 85018 HEMOGLOBIN: CPT

## 2019-10-01 PROCEDURE — 85027 COMPLETE CBC AUTOMATED: CPT

## 2019-10-01 PROCEDURE — 74011250637 HC RX REV CODE- 250/637: Performed by: HOSPITALIST

## 2019-10-01 PROCEDURE — 97535 SELF CARE MNGMENT TRAINING: CPT | Performed by: OCCUPATIONAL THERAPIST

## 2019-10-01 PROCEDURE — C9113 INJ PANTOPRAZOLE SODIUM, VIA: HCPCS | Performed by: INTERNAL MEDICINE

## 2019-10-01 PROCEDURE — 65660000000 HC RM CCU STEPDOWN

## 2019-10-01 PROCEDURE — 74011250637 HC RX REV CODE- 250/637: Performed by: FAMILY MEDICINE

## 2019-10-01 PROCEDURE — 70450 CT HEAD/BRAIN W/O DYE: CPT

## 2019-10-01 RX ORDER — DIPHENHYDRAMINE HCL 12.5MG/5ML
25 LIQUID (ML) ORAL
Status: COMPLETED | OUTPATIENT
Start: 2019-10-01 | End: 2019-10-01

## 2019-10-01 RX ORDER — BUTALBITAL, ACETAMINOPHEN AND CAFFEINE 50; 325; 40 MG/1; MG/1; MG/1
1 TABLET ORAL
Status: DISCONTINUED | OUTPATIENT
Start: 2019-10-01 | End: 2019-10-05 | Stop reason: HOSPADM

## 2019-10-01 RX ADMIN — ACETAMINOPHEN 650 MG: 325 TABLET ORAL at 03:17

## 2019-10-01 RX ADMIN — ATENOLOL 25 MG: 25 TABLET ORAL at 08:41

## 2019-10-01 RX ADMIN — FUROSEMIDE 80 MG: 80 TABLET ORAL at 08:40

## 2019-10-01 RX ADMIN — SODIUM CHLORIDE 40 MG: 9 INJECTION, SOLUTION INTRAMUSCULAR; INTRAVENOUS; SUBCUTANEOUS at 20:53

## 2019-10-01 RX ADMIN — Medication 10 ML: at 21:00

## 2019-10-01 RX ADMIN — Medication 10 ML: at 15:06

## 2019-10-01 RX ADMIN — GABAPENTIN 100 MG: 100 CAPSULE ORAL at 21:00

## 2019-10-01 RX ADMIN — MELATONIN 3 MG: at 23:58

## 2019-10-01 RX ADMIN — UMECLIDINIUM BROMIDE AND VILANTEROL TRIFENATATE 1 PUFF: 62.5; 25 POWDER RESPIRATORY (INHALATION) at 08:44

## 2019-10-01 RX ADMIN — DOXYCYCLINE HYCLATE 100 MG: 100 TABLET, COATED ORAL at 20:52

## 2019-10-01 RX ADMIN — Medication 10 ML: at 05:05

## 2019-10-01 RX ADMIN — BUTALBITAL, ACETAMINOPHEN AND CAFFEINE 1 TABLET: 50; 325; 40 TABLET ORAL at 16:43

## 2019-10-01 RX ADMIN — BUTALBITAL, ACETAMINOPHEN AND CAFFEINE 1 TABLET: 50; 325; 40 TABLET ORAL at 23:58

## 2019-10-01 RX ADMIN — DULOXETINE HYDROCHLORIDE 30 MG: 30 CAPSULE, DELAYED RELEASE ORAL at 08:41

## 2019-10-01 RX ADMIN — DOXYCYCLINE HYCLATE 100 MG: 100 TABLET, COATED ORAL at 08:40

## 2019-10-01 RX ADMIN — GABAPENTIN 100 MG: 100 CAPSULE ORAL at 08:41

## 2019-10-01 RX ADMIN — DIPHENHYDRAMINE HYDROCHLORIDE 25 MG: 25 LIQUID ORAL at 03:17

## 2019-10-01 RX ADMIN — BUTALBITAL, ACETAMINOPHEN AND CAFFEINE 1 TABLET: 50; 325; 40 TABLET ORAL at 08:40

## 2019-10-01 RX ADMIN — SODIUM CHLORIDE 40 MG: 9 INJECTION, SOLUTION INTRAMUSCULAR; INTRAVENOUS; SUBCUTANEOUS at 08:41

## 2019-10-01 NOTE — PROGRESS NOTES
Gastroenterology Progress Note  ALANA Vizcarra   covering for Dr. Lee [place]    10/1/2019    Admit Date: 9/29/2019    Subjective: Follow up for:  1) GI bleeding- hematochezia    2) Thick folds with exudate     Patient is on Clear Liquid. Tolerating it well. No pain with diet. Pain: Patient complains of abdominal pain no. Bowel Movements: Starting to pass darker appearing formed stool. Small in quantity. No further acute episodes of bleeding. Hgb 8.8 today     EGD done on 9/30: Impressions: Thick folds with overlying exudate proximal stomach. Appearance suggests ischemia. This is unusual and is impressive  No other source of bleeding seen and no blood in the lumen    Path: Proximal stomach, biopsy:   Superficial mucosal necrosis with erosion, see comment     Comment   Histologically these biopsies are unusual with superficial mucosal necrosis and little accompanying acute inflammation. The underlying fundic glands are essentially unremarkable. I favor a local effect such as NSAID or other medication over ischemic gastritis.      Only been on 81 mg of ASA, no other NSAID use    Current Facility-Administered Medications   Medication Dose Route Frequency    butalbital-acetaminophen-caffeine (FIORICET, ESGIC) -40 mg per tablet 1 Tab  1 Tab Oral Q6H PRN    sodium chloride (NS) flush 5-40 mL  5-40 mL IntraVENous Q8H    sodium chloride (NS) flush 5-40 mL  5-40 mL IntraVENous PRN    gabapentin (NEURONTIN) capsule 100 mg  100 mg Oral TID    furosemide (LASIX) tablet 80 mg  80 mg Oral DAILY    albuterol-ipratropium (DUO-NEB) 2.5 MG-0.5 MG/3 ML  3 mL Nebulization Q6H PRN    umeclidinium-vilanterol (ANORO ELLIPTA) 62.5 mcg- 25 mcg/inhalation  1 Puff Inhalation DAILY    atenolol (TENORMIN) tablet 25 mg  25 mg Oral DAILY    doxycycline (VIBRA-TABS) tablet 100 mg  100 mg Oral Q12H    DULoxetine (CYMBALTA) capsule 30 mg  30 mg Oral DAILY    pantoprazole (PROTONIX) 40 mg in sodium chloride 0.9% 10 mL injection  40 mg IntraVENous Q12H    0.9% sodium chloride infusion 250 mL  250 mL IntraVENous PRN    melatonin tablet 3 mg  3 mg Oral QHS PRN        Objective:     Blood pressure 143/76, pulse 76, temperature 98.3 °F (36.8 °C), resp. rate 18, height 5' 8\" (1.727 m), weight 107.5 kg (236 lb 15.9 oz), SpO2 98 %. No intake/output data recorded. 09/29 1901 - 10/01 0700  In: 250 [I.V.:250]  Out: 2250 [Urine:2250]    EXAM:   GEN: WM, NAD  HEENT: NCAT  Heart: RRR, + systolic murmur  Lungs: CTA  Abdomen: Mildly distended, soft, non-tender, normal BS    Data Review    Recent Results (from the past 24 hour(s))   HGB & HCT    Collection Time: 09/30/19  8:44 PM   Result Value Ref Range    HGB 9.4 (L) 12.1 - 17.0 g/dL    HCT 28.6 (L) 36.6 - 50.3 %   CBC W/O DIFF    Collection Time: 10/01/19  5:18 AM   Result Value Ref Range    WBC 4.8 4.1 - 11.1 K/uL    RBC 2.92 (L) 4.10 - 5.70 M/uL    HGB 8.8 (L) 12.1 - 17.0 g/dL    HCT 27.2 (L) 36.6 - 50.3 %    MCV 93.2 80.0 - 99.0 FL    MCH 30.1 26.0 - 34.0 PG    MCHC 32.4 30.0 - 36.5 g/dL    RDW 17.4 (H) 11.5 - 14.5 %    PLATELET 119 196 - 138 K/uL    MPV 9.7 8.9 - 12.9 FL    NRBC 0.0 0  WBC    ABSOLUTE NRBC 0.00 0.00 - 0.01 K/uL     Recent Labs     10/01/19  0518 09/30/19 2044 09/29/19  1058   WBC 4.8  --   --  6.7   HGB 8.8* 9.4*   < > 8.7*   HCT 27.2* 28.6*   < > 26.7*     --   --  253    < > = values in this interval not displayed. Recent Labs     09/30/19  0407 09/29/19  1058    142   K 4.2 3.8   * 111*   CO2 28 25   BUN 12 10   CREA 1.24 1.26   * 134*   CA 7.8* 7.6*     Recent Labs     09/30/19  0407 09/29/19  1058   SGOT 28 30   ALT 25 26    135*   TBILI 0.4 0.4   TP 5.2* 5.7*   ALB 2.3* 2.6*   GLOB 2.9 3.1     No results for input(s): INR, PTP, APTT, INREXT in the last 72 hours. No results for input(s): FE, TIBC, PSAT, FERR in the last 72 hours.    No results found for: FOL, RBCF   No results for input(s): PH, PCO2, PO2 in the last 72 hours. No results for input(s): CPK, CKNDX, TROIQ in the last 72 hours. No lab exists for component: CPKMB  Lab Results   Component Value Date/Time    Cholesterol, total 138 07/18/2019 08:37 AM    HDL Cholesterol 25 (L) 07/18/2019 08:37 AM    LDL, calculated 49 07/18/2019 08:37 AM    Triglyceride 321 (H) 07/18/2019 08:37 AM    CHOL/HDL Ratio 6 (H) 07/18/2019 08:37 AM     Lab Results   Component Value Date/Time    Glucose (POC) 118 (H) 05/21/2019 11:16 AM    Glucose (POC) 94 05/21/2019 07:34 AM    Glucose (POC) 126 (H) 05/20/2019 09:07 PM    Glucose (POC) 99 05/20/2019 04:30 PM    Glucose (POC) 119 (H) 05/20/2019 11:30 AM     Lab Results   Component Value Date/Time    Color YELLOW/STRAW 09/23/2019 09:58 PM    Appearance CLEAR 09/23/2019 09:58 PM    Specific gravity 1.020 09/23/2019 09:58 PM    Specific gravity 1.015 07/12/2019 01:50 PM    pH (UA) 5.5 09/23/2019 09:58 PM    Protein NEGATIVE  09/23/2019 09:58 PM    Glucose NEGATIVE  09/23/2019 09:58 PM    Ketone NEGATIVE  09/23/2019 09:58 PM    Bilirubin NEGATIVE  09/23/2019 09:58 PM    Urobilinogen 0.2 09/23/2019 09:58 PM    Nitrites NEGATIVE  09/23/2019 09:58 PM    Leukocyte Esterase TRACE (A) 09/23/2019 09:58 PM    Epithelial cells FEW 09/23/2019 09:58 PM    Bacteria NEGATIVE  09/23/2019 09:58 PM    WBC 5-10 09/23/2019 09:58 PM    RBC 0-5 09/23/2019 09:58 PM           Assessment:     Active Problems:    Acute blood loss anemia (9/29/2019)        Plan:     EGD done yesterday concerning for ischemia, path suggesting NSAID/medication induced however only on 81 mg of ASA. We will continue to monitor hgb and for signs of bleeding. Further investigate causes to ischemia with cardiac evaluation to perform embolic w/u. Consider restarting plavix if CRS in agreement. Further assessment and plan per Dr. Debby Pinto.      ALANA Gomez    10/01/19  12:35 PM  [address] [facility], [address] [facility]  [address] [facility]: [phone]

## 2019-10-01 NOTE — PROGRESS NOTES
Order received and acknowledged. Attempted to see pt for PT eval. Discussed with nursing, pt with HA but medically clear for PT. Entered the room and daughter at bedside. Both pt and daughter refused to mobilize at this time until he received HA medicine. Daughter reported that MD had just been in the room and they were waiting on \"a sedative and tylenol for the HA\". Nursing made aware of interaction. Will continue to follow.

## 2019-10-01 NOTE — PROGRESS NOTES
Hospitalist Progress Note    NAME: Max Mckeon   :  1934   MRN:  [id number]       Assessment / Plan:  Symptomatic Acute blood loss anemia  S/p EGD on   -Hg 9.5, currently stable   -s/p transfusion  1 unit RBCs  -trend Hb  -EGD  showed : Impressions:    Thick folds with overlying exudate proximal stomach.  Appearance suggests ischemia. This is unusual and is impressive  No other source of bleeding seen and no blood in the lumen  -appreciate GI, to consider pill cam, possible Wed  - no further bleeding today    transfuse if Hb <8  With h/o CAD s/p stent   -still having melanotic stools     Recurrent GI bleeding  -CT abd/pelvis w/wo No evidence of active GI bleeding. No evidence of acute process  -work up last week included NM bleeding scan (no bleeding) and colonoscopy (diverticulosis but no bleeding).  Source unclear and plavix was discontinued  -GI consult and CRS.   Differentials include SB source like AVMs or still diverticular bleeding. -c/w  IV protonix but less likely UGIB  -hold ASA  - hold lasix for now , caution with blood transfusions, can be easily fluid overload .   - if no further bleeding and BP stable ,lasix can be restarted tomorrow am     Headache  -reports worst headache he's had  -CTH negative for bleeding   -improved with fioricet     Recent  LLL MRSA community acquired pneumonia  -continue doxycycline to complete course.  Not hypoxic   -incentive spirometry  -contact isolation     CKD stage 3  -follow Cr     CAD hx stent x 1 (26 years ago)  HTN  LE edema   -hold asa, lasix.  At risk for hypotension with continued bleeding  -continue BB at half dose  - consider restarting home dose lasix if BP remains stable      Gout - hold allopurinol  COPD, not on home O2  -continue inhaler with nebs prn     TESSA on cpap  Morbid obesity  Hard of hearing, has hearing aid  Hx TIA   S/p L2-L5 decompression for spinal stenosis and back pain   -has not been using CPAP at home per daughter  -continue gabapentin ,weaned down to 100mg bid         Code Status:  Full  Surrogate Decision Maker: wife     DVT Prophylaxis:  SCD  GI Prophylaxis: on PPI    Baseline:  .  Independent         30.0 - 39.9 Obese / Body mass index is 37.51 kg/m². Subjective:     Chief Complaint / Reason for Physician Visit  Primary complaint is headache this AM. Noted to have melanotic stools    Discussed with RN events overnight. Review of Systems:  Symptom Y/N Comments  Symptom Y/N Comments   Fever/Chills n   Chest Pain n    Poor Appetite    Edema     Cough n   Abdominal Pain n    Sputum n   Joint Pain     SOB/MARES n   Pruritis/Rash     Nausea/vomit n   Tolerating PT/OT     Diarrhea    Tolerating Diet     Constipation    Other y +headache     Could NOT obtain due to:      Objective:     VITALS:   Last 24hrs VS reviewed since prior progress note. Most recent are:  Patient Vitals for the past 24 hrs:   Temp Pulse Resp BP SpO2   10/01/19 1728 98.2 °F (36.8 °C) 72 16 146/67 97 %   10/01/19 1057 98.3 °F (36.8 °C) 76 18 143/76 98 %   10/01/19 0754 98.3 °F (36.8 °C) 79 18 156/78 94 %   10/01/19 0406 98.1 °F (36.7 °C) 80 19 140/77 92 %   10/01/19 0405  80 26  90 %   09/30/19 2317 98 °F (36.7 °C) 82 26 148/71 93 %       Intake/Output Summary (Last 24 hours) at 10/1/2019 1936  Last data filed at 10/1/2019 0416  Gross per 24 hour   Intake    Output 600 ml   Net -600 ml        PHYSICAL EXAM:  General: WD, WN. Alert, cooperative, uncomfortable but not acute distress    EENT:  EOMI. Anicteric sclerae. MMM  Resp:  CTA bilaterally, no wheezing or rales. No accessory muscle use  CV:  Regular  rhythm,  No edema  GI:  Soft, Non distended, Non tender.  +Bowel sounds  Neurologic:  Alert and oriented X 3, normal speech,   Psych:   Not anxious nor agitated  Skin:  No rashes.   No jaundice    Reviewed most current lab test results and cultures  YES  Reviewed most current radiology test results   YES  Review and summation of old records today    NO  Reviewed patient's current orders and MAR    YES  PMH/SH reviewed - no change compared to H&P  ________________________________________________________________________  Care Plan discussed with:    Comments   Patient x    Family      RN x    Care Manager     Consultant  x Dr Allison Cardona team rounds were held today with , nursing, pharmacist and clinical coordinator. Patient's plan of care was discussed; medications were reviewed and discharge planning was addressed. ________________________________________________________________________  Total NON critical care TIME: 30   Minutes    Total CRITICAL CARE TIME Spent:   Minutes non procedure based      Comments   >50% of visit spent in counseling and coordination of care x    ________________________________________________________________________  Reggie Lombard, DO     Procedures: see electronic medical records for all procedures/Xrays and details which were not copied into this note but were reviewed prior to creation of Plan. LABS:  I reviewed today's most current labs and imaging studies. Pertinent labs include:  Recent Labs     10/01/19  1300 10/01/19  0518 09/30/19  2044  09/29/19  1058   WBC  --  4.8  --   --  6.7   HGB 9.4* 8.8* 9.4*   < > 8.7*   HCT 29.1* 27.2* 28.6*   < > 26.7*   PLT  --  233  --   --  253    < > = values in this interval not displayed.      Recent Labs     09/30/19  0407 09/29/19  1058    142   K 4.2 3.8   * 111*   CO2 28 25   * 134*   BUN 12 10   CREA 1.24 1.26   CA 7.8* 7.6*   ALB 2.3* 2.6*   TBILI 0.4 0.4   SGOT 28 30   ALT 25 26       Signed: Reggie Lombard, DO

## 2019-10-01 NOTE — PROGRESS NOTES
OCCUPATIONAL THERAPY EVALUATION/DISCHARGE  Patient: Ozzie Arvizu (40 y.o. male)  Date: 10/1/2019  Primary Diagnosis: Acute blood loss anemia [D62]  Procedure(s) (LRB):  ESOPHAGOGASTRODUODENOSCOPY (EGD) (N/A)  ESOPHAGOGASTRODUODENAL (EGD) BIOPSY (N/A) 1 Day Post-Op   Precautions:   Contact(MRSA)    ASSESSMENT  Based on the objective data described below, the patient presents with report of feeling poorly and weak. He was not interested in OT services and is familiar with adaptive strategies with ADLs. Supervision to SBA needed for ADLS and ADL mobility. Vitals were stable and pt was on room air this session. Reinforced with pt the importance of being OOB for most of the day, mobilizing with supervision to bathroom and around room. Family was present and all are in agreement that OT services are not indicated at this time. Pt does have a essential tremor in BUE educated pt on stabilizing UE against body and larger utensils to compensate. Current Level of Function (ADLs/self-care): SBA to supervision level with mobility and ADLS    Functional Outcome Measure: The patient scored 60/100 on the barthel outcome measure which is indicative of minimal to moderate decline in ADLS. Other factors to consider for discharge: will need supervision and SBA at discharge     PLAN :  Recommend with staff: OOB for all meals, mobilizing with staff frequently, OOB to bathroom for toileting and ADLS    Recommendation for discharge: (in order for the patient to meet his/her long term goals)  No skilled occupational therapy/ follow up rehabilitation needs identified at this time. Recommend initial family assist for IADLS and ADLS as needed    This discharge recommendation:  Has been made in collaboration with the attending provider and/or case management    Equipment recommendations for successful discharge: none       SUBJECTIVE:   Patient stated I don't need you. I don't feel good but I can do all of that.     OBJECTIVE DATA SUMMARY:   HISTORY:   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    Morbid obesity ([address]     On statin therapy 8/16/2017    TESSA on CPAP 1/3/2019    Psychiatric disorder     Depression    Simple chronic bronchitis ([address] 1/3/2019    TIA (transient ischemic attack) 0/62/9234    Umbilical hernia 69/9/1468     Past Surgical History:   Procedure Laterality Date    CARDIAC SURG PROCEDURE UNLIST  1996    Cardiac Stents    CARDIAC SURG PROCEDURE UNLIST  04/2019    EF 61-65%    COLONOSCOPY N/A 9/27/2019    COLONOSCOPY performed by Kehinde Torres MD at [facility] ENDOSCOPY    HX HERNIA REPAIR      [facility]    HX HERNIA REPAIR  36/80/59    open umbilical hernia repair mesh    HX UROLOGICAL      HYDROCELECTOMY    UPPER GI ENDOSCOPY,BIOPSY  9/30/2019            Prior Level of Function/Environment/Context: performed ADLS on his own, uses hip kit for lower body dressing, ambulated without assist devices, has a essential tremor in BUE, just finished OP PT  Expanded or extensive additional review of patient history:   Home Situation  Home Environment: Private residence  # Steps to Enter: 1  Rails to Enter: No  One/Two Story Residence: One story  Living Alone: No  Support Systems: Spouse/Significant Other/Partner, Family member(s)  Current DME Used/Available at Home: None(Hip kit)  Tub or Shower Type: Shower    Hand dominance: Right    EXAMINATION OF PERFORMANCE DEFICITS:  Cognitive/Behavioral Status:  Neurologic State: Alert  Orientation Level: Oriented X4  Cognition: Follows commands; Appropriate decision making; Appropriate for age attention/concentration  Perception: Appears intact  Perseveration: No perseveration noted  Safety/Judgement: Awareness of environment; Fall prevention;Home safety; Insight into deficits        Hearing: Auditory  Auditory Impairment: Hard of hearing, bilateral, Hearing aid(s)  Hearing Aids/Status: With patient    Vision/Perceptual:                           Acuity: (grossly intact)         Range of Motion:    AROM: Generally decreased, functional                         Strength:    Strength: Generally decreased, functional                Coordination:  Coordination: Generally decreased, functional(BUE essential tremor)  Fine Motor Skills-Upper: Left Intact; Right Intact    Gross Motor Skills-Upper: Left Impaired;Right Impaired(but functional, essential tremor)    Tone & Sensation:    Tone: Normal                         Balance:  Sitting: Intact    Functional Mobility and Transfers for ADLs:  Bed Mobility:  Rolling: (seated at bedside chair upon arrival )    Transfers:  Sit to Stand: Supervision  Stand to Sit: Supervision  Bed to Chair: Stand-by assistance(without assist devices)  Bathroom Mobility: Stand-by assistance  Toilet Transfer : Stand-by assistance    ADL Assessment:  Feeding: Independent    Oral Facial Hygiene/Grooming: Supervision    Bathing: Supervision    Upper Body Dressing: Supervision    Lower Body Dressing: Stand-by assistance(uses hip kit)    Toileting: Supervision                ADL Intervention and task modifications:     See assessment  Cognitive Retraining  Safety/Judgement: Awareness of environment; Fall prevention;Home safety; Insight into deficits      Functional Measure:  Barthel Index:    Bathin  Bladder: 10  Bowels: 5  Groomin  Dressin  Feeding: 10  Mobility: 10  Stairs: 0  Toilet Use: 5  Transfer (Bed to Chair and Back): 10  Total: 60/100        The Barthel ADL Index: Guidelines  1. The index should be used as a record of what a patient does, not as a record of what a patient could do. 2. The main aim is to establish degree of independence from any help, physical or verbal, however minor and for whatever reason. 3. The need for supervision renders the patient not independent. 4. A patient's performance should be established using the best available evidence. Asking the patient, friends/relatives and nurses are the usual sources, but direct observation and common sense are also important. However direct testing is not needed. 5. Usually the patient's performance over the preceding 24-48 hours is important, but occasionally longer periods will be relevant. 6. Middle categories imply that the patient supplies over 50 per cent of the effort. 7. Use of aids to be independent is allowed. Kole Victor., Barthel, D.W. (7098). Functional evaluation: the Barthel Index. [address] [facility] (14)2. Beckie Richard rose BENNIE Mercedes, Elige Heimlich., Justina Jalloh., Amna Gaona, [address] [place] [address] (1999). Measuring the change indisability after inpatient rehabilitation; comparison of the responsiveness of the Barthel Index and Functional [place] Measure. Journal of Neurology, Neurosurgery, and Psychiatry, 66(4), 975-104. Sarah Ricci, N.J.A, MAZIN Pulido, & Lexi Fierro M.A. (2004.) Assessment of post-stroke quality of life in cost-effectiveness studies: The usefulness of the Barthel Index and the EuroQoL-5D.  Quality of Life Research, 15, 154-64         Occupational Therapy Evaluation Charge Determination   History Examination Decision-Making   MEDIUM Complexity : Expanded review of history including physical, cognitive and psychosocial  history  MEDIUM Complexity : 3-5 performance deficits relating to physical, cognitive , or psychosocial skils that result in activity limitations and / or participation restrictions MEDIUM Complexity : Patient may present with comorbidities that affect occupational performnce. Miniml to moderate modification of tasks or assistance (eg, physical or verbal ) with assesment(s) is necessary to enable patient to complete evaluation       Based on the above components, the patient evaluation is determined to be of the following complexity level: MEDIUM  Pain Ratin/10    Activity Tolerance:   Fair and requires rest breaks  Please refer to the flowsheet for vital signs taken during this treatment. After treatment patient left in no apparent distress:    Sitting in chair, Call bell within reach and Caregiver / family present    COMMUNICATION/EDUCATION:   The patients plan of care was discussed with: Registered Nurse and patient and his family.     Thank you for this referral.  Le Ledesma, OTR/L  Time Calculation: 20 mins

## 2019-10-01 NOTE — PROGRESS NOTES
1900: The bedside shift change report given to Tony  (oncoming nurse) by Wilman Son  (offgoing nurse). Report included the following information SBAR, Kardex, Intake/Output, MAR, Recent Results, Med Rec Status, Cardiac Rhythm NSR and Alarm Parameters . 2:50 AM  Reported lack of sleep , the melatonin given earlier not working, Radha BEAVERS, ordered benadryl 25mg, administered as per orders. 5:51 AM  Blood work done.  Hemoglobin  8.8 at 5AM, for H & H Q8

## 2019-10-01 NOTE — PROGRESS NOTES
**Consult Information**  Member [facility]: [facility] MRN: [id number]  Consult ID: [id number] Time Zone: ET  Date and Time of Consult: 10/01/2019 02:56:04 AM  Requesting Clinician: Joanna Michelle  Patient Name: James Stevens  YOB: 1934  Gender: Male    **Clinical Note**  Clinical Note: Will order Benadryl for sleep for patient. D/w nursing. **Attestation**  Interaction Mode: Phone Only  Phone Duration (mins): 1  Time of Call : 10/01/2019 02:58 AM  Interaction Attestation: Interprofessional internet consultation was delivered through telephonic and/or electronic communication upon the request of the patients treating physician, while the requesting and the rendering provider were not in the same physical location. Written report was provided to the requesting provider.   Evaluation Duration (mins): 1

## 2019-10-01 NOTE — PROGRESS NOTES
Problem: Falls - Risk of  Goal: *Absence of Falls  Description  Document Julia Velasco Fall Risk and appropriate interventions in the flowsheet. Outcome: Progressing Towards Goal  Note:   Fall Risk Interventions:  Mobility Interventions: Bed/chair exit alarm, Patient to call before getting OOB         Medication Interventions: Patient to call before getting OOB    Elimination Interventions: Bed/chair exit alarm, Call light in reach, Urinal in reach              Problem: Patient Education: Go to Patient Education Activity  Goal: Patient/Family Education  Outcome: Progressing Towards Goal     Problem: Risk for Spread of Infection  Goal: Prevent transmission of infectious organism to others  Description  Prevent the transmission of infectious organisms to other patients, staff members, and visitors.   Outcome: Progressing Towards Goal     Problem: Patient Education:  Go to Education Activity  Goal: Patient/Family Education  Outcome: Progressing Towards Goal

## 2019-10-01 NOTE — ROUTINE PROCESS
1000, holly for HA, improved. 1300 HGB drawn and to lab. 
 
1400 GI at bedside. . 
 
1800 Dr Urvashi Fox at bedside, patient daughter aware of plan of care

## 2019-10-02 LAB
ANION GAP SERPL CALC-SCNC: 4 MMOL/L (ref 5–15)
BUN SERPL-MCNC: 12 MG/DL (ref 6–20)
BUN/CREAT SERPL: 10 (ref 12–20)
CALCIUM SERPL-MCNC: 8.1 MG/DL (ref 8.5–10.1)
CHLORIDE SERPL-SCNC: 105 MMOL/L (ref 97–108)
CO2 SERPL-SCNC: 28 MMOL/L (ref 21–32)
CREAT SERPL-MCNC: 1.25 MG/DL (ref 0.7–1.3)
ERYTHROCYTE [DISTWIDTH] IN BLOOD BY AUTOMATED COUNT: 17.3 % (ref 11.5–14.5)
GLUCOSE SERPL-MCNC: 105 MG/DL (ref 65–100)
HBV SURFACE AG SER QL: 0.28 INDEX
HBV SURFACE AG SER QL: NEGATIVE
HCT VFR BLD AUTO: 25.1 % (ref 36.6–50.3)
HCT VFR BLD AUTO: 25.7 % (ref 36.6–50.3)
HCT VFR BLD AUTO: 26.2 % (ref 36.6–50.3)
HCV AB SERPL QL IA: NONREACTIVE
HCV COMMENT,HCGAC: NORMAL
HGB BLD-MCNC: 8.5 G/DL (ref 12.1–17)
HGB BLD-MCNC: 8.6 G/DL (ref 12.1–17)
HGB BLD-MCNC: 8.6 G/DL (ref 12.1–17)
HIV1 P24 AG SERPL QL IA: NONREACTIVE
HIV1+2 AB SERPL QL IA: NONREACTIVE
MCH RBC QN AUTO: 30.5 PG (ref 26–34)
MCHC RBC AUTO-ENTMCNC: 32.8 G/DL (ref 30–36.5)
MCV RBC AUTO: 92.9 FL (ref 80–99)
NRBC # BLD: 0 K/UL (ref 0–0.01)
NRBC BLD-RTO: 0 PER 100 WBC
PLATELET # BLD AUTO: 246 K/UL (ref 150–400)
PMV BLD AUTO: 9.8 FL (ref 8.9–12.9)
POTASSIUM SERPL-SCNC: 3.8 MMOL/L (ref 3.5–5.1)
RBC # BLD AUTO: 2.82 M/UL (ref 4.1–5.7)
SODIUM SERPL-SCNC: 137 MMOL/L (ref 136–145)
WBC # BLD AUTO: 3.9 K/UL (ref 4.1–11.1)

## 2019-10-02 PROCEDURE — 74011250637 HC RX REV CODE- 250/637: Performed by: INTERNAL MEDICINE

## 2019-10-02 PROCEDURE — 77030018846 HC SOL IRR STRL H20 ICUM -A

## 2019-10-02 PROCEDURE — 74011250636 HC RX REV CODE- 250/636: Performed by: INTERNAL MEDICINE

## 2019-10-02 PROCEDURE — 77010033678 HC OXYGEN DAILY

## 2019-10-02 PROCEDURE — 97116 GAIT TRAINING THERAPY: CPT

## 2019-10-02 PROCEDURE — 85018 HEMOGLOBIN: CPT

## 2019-10-02 PROCEDURE — 80048 BASIC METABOLIC PNL TOTAL CA: CPT

## 2019-10-02 PROCEDURE — 97161 PT EVAL LOW COMPLEX 20 MIN: CPT

## 2019-10-02 PROCEDURE — 36415 COLL VENOUS BLD VENIPUNCTURE: CPT

## 2019-10-02 PROCEDURE — C9113 INJ PANTOPRAZOLE SODIUM, VIA: HCPCS | Performed by: INTERNAL MEDICINE

## 2019-10-02 PROCEDURE — 74011250637 HC RX REV CODE- 250/637: Performed by: FAMILY MEDICINE

## 2019-10-02 PROCEDURE — 85027 COMPLETE CBC AUTOMATED: CPT

## 2019-10-02 PROCEDURE — 65660000000 HC RM CCU STEPDOWN

## 2019-10-02 PROCEDURE — 74011000250 HC RX REV CODE- 250: Performed by: INTERNAL MEDICINE

## 2019-10-02 RX ORDER — PANTOPRAZOLE SODIUM 40 MG/1
40 TABLET, DELAYED RELEASE ORAL
Status: DISCONTINUED | OUTPATIENT
Start: 2019-10-02 | End: 2019-10-05 | Stop reason: HOSPADM

## 2019-10-02 RX ADMIN — FUROSEMIDE 80 MG: 80 TABLET ORAL at 08:28

## 2019-10-02 RX ADMIN — Medication 10 ML: at 21:11

## 2019-10-02 RX ADMIN — MELATONIN 3 MG: at 22:40

## 2019-10-02 RX ADMIN — UMECLIDINIUM BROMIDE AND VILANTEROL TRIFENATATE 1 PUFF: 62.5; 25 POWDER RESPIRATORY (INHALATION) at 08:27

## 2019-10-02 RX ADMIN — GABAPENTIN 100 MG: 100 CAPSULE ORAL at 08:28

## 2019-10-02 RX ADMIN — Medication 10 ML: at 05:41

## 2019-10-02 RX ADMIN — GABAPENTIN 100 MG: 100 CAPSULE ORAL at 16:16

## 2019-10-02 RX ADMIN — Medication 10 ML: at 16:16

## 2019-10-02 RX ADMIN — PANTOPRAZOLE SODIUM 40 MG: 40 TABLET, DELAYED RELEASE ORAL at 16:16

## 2019-10-02 RX ADMIN — BUTALBITAL, ACETAMINOPHEN AND CAFFEINE 1 TABLET: 50; 325; 40 TABLET ORAL at 05:28

## 2019-10-02 RX ADMIN — BUTALBITAL, ACETAMINOPHEN AND CAFFEINE 1 TABLET: 50; 325; 40 TABLET ORAL at 22:40

## 2019-10-02 RX ADMIN — DOXYCYCLINE HYCLATE 100 MG: 100 TABLET, COATED ORAL at 08:28

## 2019-10-02 RX ADMIN — GABAPENTIN 100 MG: 100 CAPSULE ORAL at 21:11

## 2019-10-02 RX ADMIN — DOXYCYCLINE HYCLATE 100 MG: 100 TABLET, COATED ORAL at 21:10

## 2019-10-02 RX ADMIN — SODIUM CHLORIDE 40 MG: 9 INJECTION, SOLUTION INTRAMUSCULAR; INTRAVENOUS; SUBCUTANEOUS at 08:28

## 2019-10-02 RX ADMIN — ATENOLOL 25 MG: 25 TABLET ORAL at 08:28

## 2019-10-02 RX ADMIN — DULOXETINE HYDROCHLORIDE 30 MG: 30 CAPSULE, DELAYED RELEASE ORAL at 08:28

## 2019-10-02 NOTE — ACP (ADVANCE CARE PLANNING)
Responded to nurse request for advance medical directive consult. Explained document to patient, wife and daughter. Assisted in completing the first portion of the AMD.  Made a copy for patient's chart. Returned original and one copy to patient. Patient named his daughter, Chito Barnes as his primary decision maker.   Her telephone number is [phone]    KERRIE Whelan, [facility], Chaplain [facility] Paging Service  [phone])

## 2019-10-02 NOTE — PROGRESS NOTES
0700- Bedside and Verbal shift change report given to SCOTTIE Warren RN (oncoming nurse) by Ant Taylor (offgoing nurse). Report included the following information SBAR, Kardex, Intake/Output, MAR and Recent Results. Pt resting in bed. Daughter in room. No needs at this time. 80- Spoke with [facility] about pt wanting Advance Directive, [facility] states that they may not be able to see pt today and possibly see pt tomorrow. Will update family and pt.  [phone]  in to see pt and address advance directive with pt and family members.

## 2019-10-02 NOTE — PROGRESS NOTES
Gastroenterology Progress Note  ALANA Flores   covering for Dr. Gerda Almaraz    10/2/2019    Admit Date: 9/29/2019    Subjective: Follow up for:  1) GI bleeding- hematochezia    2) Thick folds with exudate in stomach    Patient is on Clear Liquid. Tolerating it well. No pain with diet. No N/V. Pain: Patient complains of abdominal pain no. Bowel Movements: No further BM today, denies any further melena or hematochezi    Hgb 8.6 today       Current Facility-Administered Medications   Medication Dose Route Frequency    butalbital-acetaminophen-caffeine (FIORICET, ESGIC) -40 mg per tablet 1 Tab  1 Tab Oral Q6H PRN    sodium chloride (NS) flush 5-40 mL  5-40 mL IntraVENous Q8H    sodium chloride (NS) flush 5-40 mL  5-40 mL IntraVENous PRN    gabapentin (NEURONTIN) capsule 100 mg  100 mg Oral TID    furosemide (LASIX) tablet 80 mg  80 mg Oral DAILY    albuterol-ipratropium (DUO-NEB) 2.5 MG-0.5 MG/3 ML  3 mL Nebulization Q6H PRN    umeclidinium-vilanterol (ANORO ELLIPTA) 62.5 mcg- 25 mcg/inhalation  1 Puff Inhalation DAILY    atenolol (TENORMIN) tablet 25 mg  25 mg Oral DAILY    doxycycline (VIBRA-TABS) tablet 100 mg  100 mg Oral Q12H    DULoxetine (CYMBALTA) capsule 30 mg  30 mg Oral DAILY    pantoprazole (PROTONIX) 40 mg in sodium chloride 0.9% 10 mL injection  40 mg IntraVENous Q12H    0.9% sodium chloride infusion 250 mL  250 mL IntraVENous PRN    melatonin tablet 3 mg  3 mg Oral QHS PRN        Objective:     Blood pressure 144/73, pulse 72, temperature 98.4 °F (36.9 °C), resp. rate 18, height 5' 8\" (1.727 m), weight 107.5 kg (236 lb 15.9 oz), SpO2 98 %.     10/02 0701 - 10/02 1900  In: -   Out: 500 [Urine:500]    09/30 1901 - 10/02 0700  In: 80 [P.O.:120]  Out: 3992 [Urine:2225]    EXAM:   GEN: Elderly pleasant WM, NAD  HEENT: NCAT  Heart: RRR, + systolic murmur  Lungs: CTA  Abdomen: Mildly distended, soft, non-tender, normal BS    Data Review    Recent Results (from the past 24 hour(s))   HGB & HCT    Collection Time: 10/01/19  1:00 PM   Result Value Ref Range    HGB 9.4 (L) 12.1 - 17.0 g/dL    HCT 29.1 (L) 36.6 - 50.3 %   HGB & HCT    Collection Time: 10/01/19  8:49 PM   Result Value Ref Range    HGB 9.6 (L) 12.1 - 17.0 g/dL    HCT 29.4 (L) 36.6 - 50.3 %   POST EXPOSURE PROFILE    Collection Time: 10/01/19  9:50 PM   Result Value Ref Range    p24 Antigen NONREACTIVE NR      HIV-1,2 Ab NONREACTIVE NR      Hep C  virus Ab Interp. NONREACTIVE NR      Hep C  virus Ab comment Method used is Siemens [employer]      Hepatitis B surface Ag 0.28 Index    Hep B surface Ag Interp. NEGATIVE  NEG     CBC W/O DIFF    Collection Time: 10/02/19  5:06 AM   Result Value Ref Range    WBC 3.9 (L) 4.1 - 11.1 K/uL    RBC 2.82 (L) 4.10 - 5.70 M/uL    HGB 8.6 (L) 12.1 - 17.0 g/dL    HCT 26.2 (L) 36.6 - 50.3 %    MCV 92.9 80.0 - 99.0 FL    MCH 30.5 26.0 - 34.0 PG    MCHC 32.8 30.0 - 36.5 g/dL    RDW 17.3 (H) 11.5 - 14.5 %    PLATELET 495 567 - 910 K/uL    MPV 9.8 8.9 - 12.9 FL    NRBC 0.0 0  WBC    ABSOLUTE NRBC 0.00 0.00 - 8.78 K/uL   METABOLIC PANEL, BASIC    Collection Time: 10/02/19  5:06 AM   Result Value Ref Range    Sodium 137 136 - 145 mmol/L    Potassium 3.8 3.5 - 5.1 mmol/L    Chloride 105 97 - 108 mmol/L    CO2 28 21 - 32 mmol/L    Anion gap 4 (L) 5 - 15 mmol/L    Glucose 105 (H) 65 - 100 mg/dL    BUN 12 6 - 20 MG/DL    Creatinine 1.25 0.70 - 1.30 MG/DL    BUN/Creatinine ratio 10 (L) 12 - 20      GFR est AA >60 >60 ml/min/1.73m2    GFR est non-AA 55 (L) >60 ml/min/1.73m2    Calcium 8.1 (L) 8.5 - 10.1 MG/DL     Recent Labs     10/02/19  0506 10/01/19  2049  10/01/19  0518   WBC 3.9*  --   --  4.8   HGB 8.6* 9.6*   < > 8.8*   HCT 26.2* 29.4*   < > 27.2*     --   --  233    < > = values in this interval not displayed.      Recent Labs     10/02/19  0506 09/30/19  0407 09/29/19  1058    142 142   K 3.8 4.2 3.8    111* 111*   CO2 28 28 25   BUN 12 12 10   CREA 1.25 1.24 1. 26   * 108* 134*   CA 8.1* 7.8* 7.6*     Recent Labs     09/30/19  0407 09/29/19  1058   SGOT 28 30   ALT 25 26    135*   TBILI 0.4 0.4   TP 5.2* 5.7*   ALB 2.3* 2.6*   GLOB 2.9 3.1     No results for input(s): INR, PTP, APTT, INREXT, INREXT in the last 72 hours. No results for input(s): FE, TIBC, PSAT, FERR in the last 72 hours. No results found for: FOL, RBCF   No results for input(s): PH, PCO2, PO2 in the last 72 hours. No results for input(s): CPK, CKNDX, TROIQ in the last 72 hours. No lab exists for component: CPKMB  Lab Results   Component Value Date/Time    Cholesterol, total 138 07/18/2019 08:37 AM    HDL Cholesterol 25 (L) 07/18/2019 08:37 AM    LDL, calculated 49 07/18/2019 08:37 AM    Triglyceride 321 (H) 07/18/2019 08:37 AM    CHOL/HDL Ratio 6 (H) 07/18/2019 08:37 AM     Lab Results   Component Value Date/Time    Glucose (POC) 118 (H) 05/21/2019 11:16 AM    Glucose (POC) 94 05/21/2019 07:34 AM    Glucose (POC) 126 (H) 05/20/2019 09:07 PM    Glucose (POC) 99 05/20/2019 04:30 PM    Glucose (POC) 119 (H) 05/20/2019 11:30 AM     Lab Results   Component Value Date/Time    Color YELLOW/STRAW 09/23/2019 09:58 PM    Appearance CLEAR 09/23/2019 09:58 PM    Specific gravity 1.020 09/23/2019 09:58 PM    Specific gravity 1.015 07/12/2019 01:50 PM    pH (UA) 5.5 09/23/2019 09:58 PM    Protein NEGATIVE  09/23/2019 09:58 PM    Glucose NEGATIVE  09/23/2019 09:58 PM    Ketone NEGATIVE  09/23/2019 09:58 PM    Bilirubin NEGATIVE  09/23/2019 09:58 PM    Urobilinogen 0.2 09/23/2019 09:58 PM    Nitrites NEGATIVE  09/23/2019 09:58 PM    Leukocyte Esterase TRACE (A) 09/23/2019 09:58 PM    Epithelial cells FEW 09/23/2019 09:58 PM    Bacteria NEGATIVE  09/23/2019 09:58 PM    WBC 5-10 09/23/2019 09:58 PM    RBC 0-5 09/23/2019 09:58 PM           Assessment:     Active Problems:    Acute blood loss anemia (9/29/2019)        Plan:     GI bleeding: Patient has no further melena or hematochezia today.   Planning for capsule study tomorrow morning to evaluate SB. NPO after MN. CRS re consulted, consider restarting palvix if they are in agreement. Gastric lesions: concerning for ischemia, causes by hypotension/hypovolemia, or embolic cause. Cardiology planning for EUNICE tomorrow as well as further O/P eval with EVR. Plan for f/u EGD in 6 weeks to reevaluate lesions.         ALANA Pool    10/02/19  12:35 PM  [id number] [facility], [address] [place] [address]  [phone] [id number]  [address] [place] [address] [place]: [phone]

## 2019-10-02 NOTE — PROGRESS NOTES
I have reviewed and agree with documentation provided this shift by Christena Gosselin RN, orientee.

## 2019-10-02 NOTE — PROGRESS NOTES
1900 - Bedside and Verbal shift change report given to Lyn Parkinson, AVIVA and Margot Jung RN (oncoming nurse) by Ange Arriaag RN (offgoing nurse). Report included the following information SBAR, Kardex, Intake/Output, MAR, Recent Results and Cardiac Rhythm NSR. Pt resting up in the chair at this time. Pt family at bedside and call bell within reach. 2050 - Pt ambulated back to bed. SCD's applied and heels elevated. Pt hgb and hmt lab drawn and sent to lab. Pt resting in bed with no complaints at this time. 6661 - Order in for CPAP and respiratory therapy. 0000 - Pt reports 8/10 headache and inability to sleep. Administered fioricet and melatonin. CPAP off per pt request.   6101 - Pt reports that the headache has subsided. Pt is back on CPAP. Call bell within reach. 46- Labs drawn and sent. 3934 - Pt bathed with CHG, linen changed, electrodes changed, and gown changed. SCD's applied. Pt repositioned in bed. Pt complains of 6/10 headache. Will treat with fioricet. Pt resting in bed quietly at this time. 0700 - Shift report given to Natalia Mays RN using SBAR.

## 2019-10-02 NOTE — PROGRESS NOTES
Problem: Falls - Risk of  Goal: *Absence of Falls  Description  Document Billie Dear Fall Risk and appropriate interventions in the flowsheet. Outcome: Progressing Towards Goal  Note:   Fall Risk Interventions:  Mobility Interventions: Assess mobility with egress test, Bed/chair exit alarm, Patient to call before getting OOB         Medication Interventions: Bed/chair exit alarm, Patient to call before getting OOB, Teach patient to arise slowly    Elimination Interventions: Bed/chair exit alarm, Call light in reach, Patient to call for help with toileting needs    History of Falls Interventions: Bed/chair exit alarm         Problem: Patient Education: Go to Patient Education Activity  Goal: Patient/Family Education  Outcome: Progressing Towards Goal     Problem: Risk for Spread of Infection  Goal: Prevent transmission of infectious organism to others  Description  Prevent the transmission of infectious organisms to other patients, staff members, and visitors. Outcome: Progressing Towards Goal     Problem: Patient Education:  Go to Education Activity  Goal: Patient/Family Education  Outcome: Progressing Towards Goal     Problem: Pressure Injury - Risk of  Goal: *Prevention of pressure injury  Description  Document Aquiles Scale and appropriate interventions in the flowsheet. Outcome: Progressing Towards Goal  Note:   Pressure Injury Interventions:  Sensory Interventions: Check visual cues for pain, Assess changes in LOC    Moisture Interventions: Absorbent underpads, Minimize layers, Moisture barrier    Activity Interventions: Increase time out of bed, PT/OT evaluation    Mobility Interventions: Pressure redistribution bed/mattress (bed type), Turn and reposition approx.  every two hours(pillow and wedges), PT/OT evaluation    Nutrition Interventions: Document food/fluid/supplement intake    Friction and Shear Interventions: Minimize layers, Transferring/repositioning devices                Problem: Patient Education: Go to Patient Education Activity  Goal: Patient/Family Education  Outcome: Progressing Towards Goal

## 2019-10-02 NOTE — PROGRESS NOTES
CRS progress note    No major bleeding events. Reports some dark stool. Hgb stable in mid-8 range    /52   Pulse 69   Temp 98.7 °F (37.1 °C)   Resp 16   Ht 5' 8\" (1.727 m)   Wt 107.5 kg (236 lb 15.9 oz)   SpO2 90%   BMI 36.03 kg/m²     NAD, AAOx3  Abd vincent    Plan  Appreciate GI help in trying to localize the lesion. Source is likely diverticular but I agree with capsule study to rule out small bowel. Bleeding is not significant enough to warrant surgery.   Plus, since the bleeding has not been localized, I cannot do a segmental resection    I will continue to check his chart and follow along peripherally

## 2019-10-02 NOTE — CONSULTS
Consult    NAME: Renetta Dixon   :  1934   MRN:  [id number]     Date/Time:  10/2/2019 8:55 AM    Patient PCP: Eden Rivera MD  ________________________________________________________________________     Assessment:     1. Acute blood loss anemia  2. Gastrointestinal hemorrhage  3. CAD s/p prior PTCA of the LAD in . Cath /10 w/ mild diffuse disease; nml EF. MPI 3/18 w/ EF 71% and no ischemia or infarct  4. Echo  w/ EF 60-65%, mild cLVH, mild PHTN  5. Mild aortic stenosis  6. Chronic venous insufficiency s/p left GSV venaseal  and right GSV venaseal 3/19  7. Chronic kidney disease; Stg 3  8. Gout  9. COPD  10. TESSA on CPAP  11. Remote TIA   12. Hypertension  13. Hyperlipidemia  14. Former smoker  13. [place]        Plan:   EGD w/ findings concerning for ischemia  Pill cam to ensue  Colorectal surgery to see    CT w/ no active bleeding  HDCT w/o acute process    HgB 8.6    1. Discussed EUNICE w/ patient. NPO p MN. Will plan on this tomorrow afternoon. 2. Will need a 30d EVR on discharge  3. Keep on tele while here  4. If EVR and EUNICE unremarkable can consider ILR as an outpt  5. Continue beta blocker  6. Plavix per GI/CRS    Thank you for this consult and allowing me to take part in this patients care. Please call with questions. [x]        High complexity decision making was performed        Subjective:   CHIEF COMPLAINT: BRBPR    HISTORY OF PRESENT ILLNESS:     Michelle Rock is a 80 y.o.  male who \"was just discharged yesterday after an admission for hematochezia. His work up included CT abdomen, nuclear med bleeding scan and colonoscopy but no definite bleeding source was identified. His plavix was stopped and he went home yesterday evening. This morning he had a bout of BRBPR and presented back to this ER. Stat CT abdomen/pelvis again did not show any source of bleeding. His initial Hg is 8.7 which is down from 9.5 yesterday.   After 2 more bouts of hematochezia in the ER, his Hg dropped to 7. 6. \"    We were asked to consult for work up and evaluation of the above problems.      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    Morbid obesity ([address]     On statin therapy 8/16/2017    TESSA on CPAP 1/3/2019    Psychiatric disorder     Depression    Simple chronic bronchitis ([address] 1/3/2019    TIA (transient ischemic attack) 5/34/5437    Umbilical hernia 66/8/3559      Past Surgical History:   Procedure Laterality Date    CARDIAC SURG PROCEDURE UNLIST  1996    Cardiac Stents    CARDIAC SURG PROCEDURE UNLIST  04/2019    EF 61-65%    COLONOSCOPY N/A 9/27/2019    COLONOSCOPY performed by Humberto Miller MD at [facility] ENDOSCOPY    HX HERNIA REPAIR      RI    HX HERNIA REPAIR  45/90/64    open umbilical hernia repair mesh    HX UROLOGICAL      HYDROCELECTOMY    UPPER GI ENDOSCOPY,BIOPSY  9/30/2019          Allergies   Allergen Reactions    Levaquin [Levofloxacin] Nausea and Vomiting     Reports anxiety, nausea, aching after taking levaquin    Ciprofloxacin Shortness of Breath    Quinolones Shortness of Breath      Meds:  See below  Social History     Tobacco Use    Smoking status: Former Smoker     Packs/day: 0.50     Years: 20.00     Pack years: 10.00    Smokeless tobacco: Former User    Tobacco comment: quit about 40  years ago   / used to dip tobaco and dip snuff   Substance Use Topics    Alcohol use: Not Currently Comment: \"Drinks very little now for about a month \"      Family History   Problem Relation Age of Onset    Heart Disease Mother     Hypertension Mother     Hypertension Father     Hypertension Sister     Hypertension Brother     Cancer Brother        REVIEW OF SYSTEMS:     []         Unable to obtain  ROS due to ---   [x]         Total of 12 systems reviewed as follows:    Constitutional: negative fever, negative chills, negative weight loss  Eyes:   negative visual changes  ENT:   negative sore throat, tongue or lip swelling  Respiratory:  negative cough, negative dyspnea  Cards:  negative for chest pain, palpitations, lower extremity edema  GI:   negative for nausea, vomiting, diarrhea, and abdominal pain  Genitourinary: negative for frequency, dysuria  Integument:  negative for rash   Hematologic:  negative for easy bruising and gum/nose bleeding  Musculoskel: negative for myalgias,  back pain  Neurological:  negative for headaches, dizziness, vertigo, weakness  Behavl/Psych: negative for feelings of anxiety, depression     Pertinent Positives include :    Objective:      Physical Exam:    Last 24hrs VS reviewed since prior progress note. Most recent are:    Visit Vitals  /68   Pulse 73   Temp 98.4 °F (36.9 °C)   Resp 18   Ht 5' 8\" (1.727 m)   Wt 107.5 kg (236 lb 15.9 oz)   SpO2 92%   BMI 36.03 kg/m²       Intake/Output Summary (Last 24 hours) at 10/2/2019 0855  Last data filed at 10/2/2019 0439  Gross per 24 hour   Intake 120 ml   Output 1325 ml   Net -1205 ml        General Appearance: Well developed, well nourished, alert & oriented x 3,    no acute distress. [place]. Obese. Ears/Nose/Mouth/Throat: Pupils equal and round, Hearing grossly normal.  Neck: Supple. JVP within normal limits. Carotids good upstrokes, with no bruit. Chest: Lungs clear to auscultation bilaterally. Cardiovascular: Regular rate and rhythm, S1S2 normal, no murmur, rubs, gallops.   Abdomen: Soft, non-tender, bowel sounds are active. No organomegaly. Extremities: No edema bilaterally. Femoral pulses +2, Distal Pulses +1. Skin: Warm and dry. Neuro: CN II-XII grossly intact, Strength and sensation grossly intact. []         Post-cath site without hematoma, bruit, tenderness, or thrill. Distal pulses intact. Data:      Telemetry:  SR    EKG:  [x]  No new EKG for review. Prior to Admission medications    Medication Sig Start Date End Date Taking? Authorizing Provider   doxycycline (VIBRA-TABS) 100 mg tablet Take 1 Tab by mouth every twelve (12) hours for 5 days. 9/28/19 10/3/19  Davi Ruby MD   pantoprazole (PROTONIX) 40 mg tablet Take 1 Tab by mouth daily for 15 days. 9/28/19 10/13/19  Davi Ruby MD   aspirin 81 mg chewable tablet Take 1 Tab by mouth daily for 30 days. 9/28/19 10/28/19  Davi Ruby MD   Lactoperoxi/Gluc Oxid/Pot Thio (BIOTENE DRY MOUTH MM) 1 Lozenge by Mucous Membrane route. Provider, Historical   DULoxetine (CYMBALTA) 30 mg capsule Take 1 Cap by mouth daily. 8/1/19   Meryle Guise, MD   atorvastatin (LIPITOR) 80 mg tablet TAKE ONE TABLET BY MOUTH DAILY 7/8/19   Meryle Guise, MD   atenolol (TENORMIN) 50 mg tablet Take 1 Tab by mouth daily. 7/1/19   Laurent Hooper MD   gabapentin (NEURONTIN) 100 mg capsule Take 200 mg by mouth three (3) times daily. Provider, Historical   ANORO ELLIPTA 62.5-25 mcg/actuation inhaler Take 1 Puff by inhalation daily. 12/26/18   Provider, Historical   furosemide (LASIX) 80 mg tablet Take 1 Tab by mouth daily. 12/19/18   Meryle Guise, MD   allopurinol (ZYLOPRIM) 300 mg tablet Take 1 Tab by mouth daily.  7/11/18   Meryle Guise, MD       Recent Results (from the past 24 hour(s))   HGB & HCT    Collection Time: 10/01/19  1:00 PM   Result Value Ref Range    HGB 9.4 (L) 12.1 - 17.0 g/dL    HCT 29.1 (L) 36.6 - 50.3 %   HGB & HCT    Collection Time: 10/01/19  8:49 PM   Result Value Ref Range    HGB 9.6 (L) 12.1 - 17.0 g/dL    HCT 29.4 (L) 36.6 - 50.3 %   POST EXPOSURE PROFILE    Collection Time: 10/01/19  9:50 PM   Result Value Ref Range    p24 Antigen NONREACTIVE NR      HIV-1,2 Ab NONREACTIVE NR      Hep C  virus Ab Interp. NONREACTIVE NR      Hep C  virus Ab comment Method used is Siemens [employer]      Hepatitis B surface Ag 0.28 Index    Hep B surface Ag Interp.  NEGATIVE  NEG     CBC W/O DIFF    Collection Time: 10/02/19  5:06 AM   Result Value Ref Range    WBC 3.9 (L) 4.1 - 11.1 K/uL    RBC 2.82 (L) 4.10 - 5.70 M/uL    HGB 8.6 (L) 12.1 - 17.0 g/dL    HCT 26.2 (L) 36.6 - 50.3 %    MCV 92.9 80.0 - 99.0 FL    MCH 30.5 26.0 - 34.0 PG    MCHC 32.8 30.0 - 36.5 g/dL    RDW 17.3 (H) 11.5 - 14.5 %    PLATELET 724 889 - 295 K/uL    MPV 9.8 8.9 - 12.9 FL    NRBC 0.0 0  WBC    ABSOLUTE NRBC 0.00 0.00 - 9.10 K/uL   METABOLIC PANEL, BASIC    Collection Time: 10/02/19  5:06 AM   Result Value Ref Range    Sodium 137 136 - 145 mmol/L    Potassium 3.8 3.5 - 5.1 mmol/L    Chloride 105 97 - 108 mmol/L    CO2 28 21 - 32 mmol/L    Anion gap 4 (L) 5 - 15 mmol/L    Glucose 105 (H) 65 - 100 mg/dL    BUN 12 6 - 20 MG/DL    Creatinine 1.25 0.70 - 1.30 MG/DL    BUN/Creatinine ratio 10 (L) 12 - 20      GFR est AA >60 >60 ml/min/1.73m2    GFR est non-AA 55 (L) >60 ml/min/1.73m2    Calcium 8.1 (L) 8.5 - 10.1 MG/DL        To Paiz III, DO

## 2019-10-02 NOTE — PROGRESS NOTES
Hospitalist Progress Note    NAME: Chadwick Case   :  1934   MRN:  [id number]       Assessment / Plan:  Symptomatic Acute blood loss anemia  Recurrent GI bleeding, unclear etiology  S/p EGD on   -Hg 9.5, currently stable   -s/p transfusion  1 unit RBCs  -trend Hb  -EGD  showed : Impressions:    Thick folds with overlying exudate proximal stomach.  Appearance suggests ischemia. This is unusual and is impressive  No other source of bleeding seen and no blood in the lumen  -appreciate GI, to consider pill cam, possible Wed  - no further bleeding today    transfuse if Hb <8  With h/o CAD s/p stent   -still having melanotic stools    Given unusual EGD findings, concern for possible embolic phenom  -cardio consulted for EUNICE, planned for 10.3  -patient to undergo capsule study for SB eval though this felt less likely to be source of bleeding. Patient will also undergo OP eval with EVR and will follow with cardiology for this. Will consider restarting plavix if Hb remains stable in the AM     -CT abd/pelvis w/wo No evidence of active GI bleeding. No evidence of acute process  -work up last week included NM bleeding scan (no bleeding) and colonoscopy (diverticulosis but no bleeding).  Source unclear and plavix was discontinued  -GI consult and CRS.   Differentials include SB source like AVMs or still diverticular bleeding. -c/w  IV protonix but less likely UGIB  -hold ASA  - hold lasix for now , caution with blood transfusions, can be easily fluid overload .   - if no further bleeding and BP stable ,lasix can be restarted tomorrow am     Headache  -CTH negative  -resolved with fioricet     Recent  LLL MRSA community acquired pneumonia  -continue doxycycline to complete course.  Not hypoxic   -incentive spirometry  -contact isolation     CKD stage 3  -follow Cr     CAD hx stent x 1 (26 years ago)  HTN  LE edema   -hold asa, lasix.  At risk for hypotension with continued bleeding  -continue BB at half dose  - consider restarting home dose lasix if BP remains stable      Gout - hold allopurinol  COPD, not on home O2  -continue inhaler with nebs prn     TESSA on cpap  Morbid obesity  Hard of hearing, has hearing aid  Hx TIA 4/19  S/p L2-L5 decompression for spinal stenosis and back pain 5/19  -has not been using CPAP at home per daughter  -continue gabapentin ,weaned down to 100mg bid         Code Status:  Full  Surrogate Decision Maker: wife     DVT Prophylaxis:  SCD  GI Prophylaxis: on PPI    Baseline:  .  Independent         30.0 - 39.9 Obese / Body mass index is 37.51 kg/m². Subjective:     Chief Complaint / Reason for Physician Visit  Patient sitting in bedside chair, no acute complaints. Discussed with RN events overnight. Review of Systems:  Symptom Y/N Comments  Symptom Y/N Comments   Fever/Chills n   Chest Pain n    Poor Appetite    Edema     Cough n   Abdominal Pain n    Sputum n   Joint Pain     SOB/MARES n   Pruritis/Rash     Nausea/vomit n   Tolerating PT/OT     Diarrhea    Tolerating Diet     Constipation    Other       Could NOT obtain due to:      Objective:     VITALS:   Last 24hrs VS reviewed since prior progress note. Most recent are:  Patient Vitals for the past 24 hrs:   Temp Pulse Resp BP SpO2   10/02/19 1001  72  144/73 98 %   10/02/19 0827  73  150/68    10/02/19 0720 98.4 °F (36.9 °C) 73 18 130/71 92 %   10/02/19 0439 98 °F (36.7 °C) 71 16 142/84 97 %   10/02/19 0005 99 °F (37.2 °C) 75 26 148/71 95 %   10/01/19 2319     95 %   10/01/19 1938 98.5 °F (36.9 °C) 74 28 132/64 97 %   10/01/19 1728 98.2 °F (36.8 °C) 72 16 146/67 97 %   10/01/19 1057 98.3 °F (36.8 °C) 76 18 143/76 98 %       Intake/Output Summary (Last 24 hours) at 10/2/2019 1046  Last data filed at 10/2/2019 0827  Gross per 24 hour   Intake 120 ml   Output 1825 ml   Net -1705 ml        PHYSICAL EXAM:  General: WD, WN. Alert, cooperative, uncomfortable but not acute distress    EENT:  EOMI.  Anicteric sclerae. MMM  Resp:  CTA bilaterally, no wheezing or rales. No accessory muscle use  CV:  Regular  rhythm,  No edema  GI:  Soft, Non distended, Non tender.  +Bowel sounds  Neurologic:  Alert and oriented X 3, normal speech,   Psych:   Not anxious nor agitated  Skin:  No rashes. No jaundice    Reviewed most current lab test results and cultures  YES  Reviewed most current radiology test results   YES  Review and summation of old records today    NO  Reviewed patient's current orders and MAR    YES  PMH/SH reviewed - no change compared to H&P  ________________________________________________________________________  Care Plan discussed with:    Comments   Patient x    Family      RN x    Care Manager     Consultant                        Multidiciplinary team rounds were held today with , nursing, pharmacist and clinical coordinator. Patient's plan of care was discussed; medications were reviewed and discharge planning was addressed. ________________________________________________________________________  Total NON critical care TIME: 25 Minutes    Total CRITICAL CARE TIME Spent:   Minutes non procedure based      Comments   >50% of visit spent in counseling and coordination of care x    ________________________________________________________________________  Knute Amis, DO     Procedures: see electronic medical records for all procedures/Xrays and details which were not copied into this note but were reviewed prior to creation of Plan. LABS:  I reviewed today's most current labs and imaging studies. Pertinent labs include:  Recent Labs     10/02/19  0506 10/01/19  2049 10/01/19  1300 10/01/19  0518  09/29/19  1058   WBC 3.9*  --   --  4.8  --  6.7   HGB 8.6* 9.6* 9.4* 8.8*   < > 8.7*   HCT 26.2* 29.4* 29.1* 27.2*   < > 26.7*     --   --  233  --  253    < > = values in this interval not displayed.      Recent Labs     10/02/19  0506 09/30/19  0407 09/29/19  1058    142 142   K 3.8 4.2 3.8    111* 111*   CO2 28 28 25   * 108* 134*   BUN 12 12 10   CREA 1.25 1.24 1.26   CA 8.1* 7.8* 7.6*   ALB  --  2.3* 2.6*   TBILI  --  0.4 0.4   SGOT  --  28 30   ALT  --  25 26       Signed: Alisa Santos DO

## 2019-10-02 NOTE — PROGRESS NOTES
1900 - Bedside and Verbal shift change report given to Fanny Nava, RN and Nilam Bales RN (oncoming nurse) by Natalia Mays RN (offgoing nurse). Report included the following information SBAR, Kardex, Intake/Output, MAR, Recent Results and Cardiac Rhythm NSR. Pt sitting up in chair at this time. Call bell within reach. VSS on RA. Will continue to monitor. 2108 - Multiple unsuccessful attempts at drawing labs to monitor H/H q8h. Paged telemed MD to obtain order for arterial stick to obtain H/H now and AM labs. 2130 - H/H drawn and sent to lab. Hgb 8.6.   2250 - Pt reports 8/10 headache and requesting melatonin. Pt placed back in bed and CPAP on pt. Call bell within reach. Will continue to monitor. 0000 - Pt NPO.   0351 - H/H drawn and sent to lab. Hgb 9.7.   0545 - Pt given CHG bath, linens changed, and gown changed. Pt denies any discomfort at this time. CPAP on and call bell within reach. 0700 - Shift report given to Natalia Mays RN using SBAR.

## 2019-10-02 NOTE — PROGRESS NOTES
Problem: Mobility Impaired (Adult and Pediatric)  Goal: *Acute Goals and Plan of Care (Insert Text)  Description  FUNCTIONAL STATUS PRIOR TO ADMISSION: Independent w/ ambulation and ADLs. Denies history of falls. Recently finished OP PT at [address] [place] [address] following back surgery in May 2019 Wilbercalixto Cardenas). Does not wear home O2. HOME SUPPORT PRIOR TO ADMISSION: The patient lived with wife however did not require assist. Supportive daughter in the area who is able to assist if needed. Physical Therapy Goals  Initiated 10/2/2019  1. Patient will move from supine to sit and sit to supine , scoot up and down and roll side to side in bed with independence within 7 day(s). 2.  Patient will transfer from bed to chair and chair to bed with independence using the least restrictive device within 7 day(s). 3.  Patient will perform sit to stand with independence within 7 day(s). 4.  Patient will ambulate with independence for 150 feet with the least restrictive device within 7 day(s). 5.  Patient will ascend/descend 1 stairs with 0 handrail(s) with independence within 7 day(s). Outcome: Progressing Towards Goal   PHYSICAL THERAPY EVALUATION  Patient: Mann Beth (81 y.o. male)  Date: 10/2/2019  Primary Diagnosis: Acute blood loss anemia [D62]  Procedure(s) (LRB):  ESOPHAGOGASTRODUODENOSCOPY (EGD) (N/A)  ESOPHAGOGASTRODUODENAL (EGD) BIOPSY (N/A) 2 Days Post-Op   Precautions:   Contact(MRSA)      ASSESSMENT  Based on the objective data described below, the patient presents with generalized weakness, impaired balance, decreased endurance/activity tolerance, and overall impaired functional mobility. Pt required CGAx1 during ambulation trial secondary to mild increase in trunk sway. Pt with x1 major LOB, requiring CG/Guillermina to prevent fall. Pt only able to tolerate ambulation trial of 45ft before fatigue and declining further participation with therapy.  Anticipate pt will continue to progress well with skilled intervention in acute care setting and be appropriate to return home w/ assist of family. Pt declining additional therapy at discharge. Current Level of Function Impacting Discharge (mobility/balance): CGAx1 during ambulation without device. Functional Outcome Measure: The patient scored 50/100 on the Barthel Index outcome measure which is indicative of moderate impairment in ADLs and functional mobility. Other factors to consider for discharge: lives w/ wife who has her own medical issues     Patient will benefit from skilled therapy intervention to address the above noted impairments. PLAN :  Recommendations and Planned Interventions: bed mobility training, transfer training, gait training, therapeutic exercises, patient and family training/education and therapeutic activities      Frequency/Duration: Patient will be followed by physical therapy:  4 times a week to address goals. Recommendation for discharge: (in order for the patient to meet his/her long term goals)  Pt declining further skilled therapy needs at discharge. This discharge recommendation:  Has not yet been discussed the attending provider and/or case management    Equipment recommendations for successful discharge (if) home: patient owns DME required for discharge         SUBJECTIVE:   Patient stated I need to use the bathroom, then we can walk. 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    Morbid obesity ([facility] [address] [address]     On statin therapy 8/16/2017    TESSA on CPAP 1/3/2019    Psychiatric disorder     Depression    Simple chronic bronchitis ([facility] [address] 1/3/2019    TIA (transient ischemic attack) 8/97/8586    Umbilical hernia 05/4/0341     Past Surgical History:   Procedure Laterality Date    CARDIAC SURG PROCEDURE UNLIST  1996    Cardiac Stents    CARDIAC SURG PROCEDURE UNLIST  04/2019    EF 61-65%    COLONOSCOPY N/A 9/27/2019    COLONOSCOPY performed by Luis Parker MD at [address]  46/50/37    open umbilical hernia repair mesh    HX UROLOGICAL      HYDROCELECTOMY    UPPER GI ENDOSCOPY,BIOPSY  9/30/2019            Personal factors and/or comorbidities impacting plan of care:     Home Situation  Home Environment: Private residence  # Steps to Enter: 1  Rails to Enter: No  One/Two Story Residence: One story  Living Alone: No  Support Systems: Child(theodora), Spouse/Significant Other/Partner  Patient Expects to be Discharged to[de-identified] Private residence  Current DME Used/Available at Home: Shaikh Og, straight, Walker, rolling, Adaptive dressing aides  Tub or Shower Type: Shower    EXAMINATION/PRESENTATION/DECISION MAKING:   Critical Behavior:  Neurologic State: Alert  Orientation Level: Oriented X4  Cognition: Follows commands  Safety/Judgement: Awareness of environment, Fall prevention, Home safety, Insight into deficits  Hearing:   Auditory  Auditory Impairment: Hard of hearing, bilateral, Hearing aid(s)  Hearing Aids/Status: With patient  Skin:  intact  Edema: none noted   Range Of Motion:  AROM: Generally decreased, functional                       Strength:    Strength: Generally decreased, functional                    Tone & Sensation:   Tone: Normal              Sensation: Intact               Coordination:  Coordination: Within functional limits  Vision:      Functional Mobility:  Bed Mobility:  Rolling: Supervision  Supine to Sit: Supervision     Scooting: Supervision  Transfers:  Sit to Stand: Supervision  Stand to Sit: Supervision        Bed to Chair: Contact guard assistance              Balance:   Sitting: Intact  Standing: Impaired  Standing - Static: Good  Standing - Dynamic : Fair  Ambulation/Gait Training:  Distance (ft): 45 Feet (ft)  Assistive Device: Gait belt  Ambulation - Level of Assistance: Contact guard assistance        Gait Abnormalities: Decreased step clearance;Trunk sway increased; Shuffling gait        Base of Support: Widened     Speed/Beverly: Pace decreased (<100 feet/min)  Step Length: Left shortened;Right shortened                  Functional Measure:  Barthel Index:    Bathin  Bladder: 5  Bowels: 10  Groomin  Dressin  Feeding: 10  Mobility: 0  Stairs: 0  Toilet Use: 5  Transfer (Bed to Chair and Back): 10  Total: 50/100       The Barthel ADL Index: Guidelines  1. The index should be used as a record of what a patient does, not as a record of what a patient could do. 2. The main aim is to establish degree of independence from any help, physical or verbal, however minor and for whatever reason. 3. The need for supervision renders the patient not independent. 4. A patient's performance should be established using the best available evidence. Asking the patient, friends/relatives and nurses are the usual sources, but direct observation and common sense are also important. However direct testing is not needed. 5. Usually the patient's performance over the preceding 24-48 hours is important, but occasionally longer periods will be relevant. 6. Middle categories imply that the patient supplies over 50 per cent of the effort. 7. Use of aids to be independent is allowed. Abbi Santos., Barthel, D.W. ([id number]). Functional evaluation: the Barthel Index. [facility] (14)2. BENNIE Farris, Carmelina Rivas., Gerda Lucas, [place], [facility] ().  Measuring the change indisability after inpatient rehabilitation; comparison of the responsiveness of the Barthel Index and Functional [place] Measure. Journal of Neurology, Neurosurgery, and Psychiatry, 664), 054-400. SARAH Bejarano, MAZIN Pulido, & Jacqui Rodrigues M.A. (2004.) Assessment of post-stroke quality of life in cost-effectiveness studies: The usefulness of the Barthel Index and the EuroQoL-5D. Quality of Life Research, 15, 195-86           Physical Therapy Evaluation Charge Determination   History Examination Presentation Decision-Making   MEDIUM  Complexity : 1-2 comorbidities / personal factors will impact the outcome/ POC  MEDIUM Complexity : 3 Standardized tests and measures addressing body structure, function, activity limitation and / or participation in recreation  MEDIUM Complexity : Evolving with changing characteristics  MEDIUM Complexity : FOTO score of 26-74      Based on the above components, the patient evaluation is determined to be of the following complexity level: LOW     Pain Rating:  Denied complaints of pain    Activity Tolerance:   WNL and Good  Please refer to the flowsheet for vital signs taken during this treatment. After treatment patient left in no apparent distress:   Sitting in chair, Call bell within reach, Bed / chair alarm activated and Caregiver / family present    COMMUNICATION/EDUCATION:   The patients plan of care was discussed with: Registered Nurse. Fall prevention education was provided and the patient/caregiver indicated understanding., Patient/family have participated as able in goal setting and plan of care. and Patient/family agree to work toward stated goals and plan of care.     Thank you for this referral.  Jacqui Gutierres, PT   Time Calculation: 17 mins

## 2019-10-02 NOTE — PROGRESS NOTES
Problem: Risk for Spread of Infection  Goal: Prevent transmission of infectious organism to others  Description  Prevent the transmission of infectious organisms to other patients, staff members, and visitors. Outcome: Progressing Towards Goal     Problem: Falls - Risk of  Goal: *Absence of Falls  Description  Document Prince Celestin Fall Risk and appropriate interventions in the flowsheet. Outcome: Progressing Towards Goal  Note:   Fall Risk Interventions:  Mobility Interventions: Bed/chair exit alarm, Communicate number of staff needed for ambulation/transfer, Patient to call before getting OOB         Medication Interventions: Patient to call before getting OOB, Teach patient to arise slowly    Elimination Interventions: Bed/chair exit alarm, Call light in reach, Patient to call for help with toileting needs, Toileting schedule/hourly rounds    History of Falls Interventions: Bed/chair exit alarm, Room close to nurse's station         Problem: Pressure Injury - Risk of  Goal: *Prevention of pressure injury  Description  Document Aquiles Scale and appropriate interventions in the flowsheet.   Outcome: Progressing Towards Goal  Note:   Pressure Injury Interventions:  Sensory Interventions: Avoid rigorous massage over bony prominences, Assess need for specialty bed, Assess changes in LOC    Moisture Interventions: Absorbent underpads, Check for incontinence Q2 hours and as needed, Minimize layers    Activity Interventions: Increase time out of bed, Pressure redistribution bed/mattress(bed type)    Mobility Interventions: HOB 30 degrees or less, Pressure redistribution bed/mattress (bed type), Float heels    Nutrition Interventions: Document food/fluid/supplement intake, Offer support with meals,snacks and hydration    Friction and Shear Interventions: HOB 30 degrees or less, Minimize layers

## 2019-10-03 ENCOUNTER — APPOINTMENT (OUTPATIENT)
Dept: NON INVASIVE DIAGNOSTICS | Age: 84
DRG: 377 | End: 2019-10-03
Attending: INTERNAL MEDICINE
Payer: MEDICARE

## 2019-10-03 LAB
HCT VFR BLD AUTO: 29.4 % (ref 36.6–50.3)
HGB BLD-MCNC: 9.7 G/DL (ref 12.1–17)

## 2019-10-03 PROCEDURE — 74011250636 HC RX REV CODE- 250/636: Performed by: INTERNAL MEDICINE

## 2019-10-03 PROCEDURE — 76040000019: Performed by: SPECIALIST

## 2019-10-03 PROCEDURE — 85018 HEMOGLOBIN: CPT

## 2019-10-03 PROCEDURE — 65660000000 HC RM CCU STEPDOWN

## 2019-10-03 PROCEDURE — B24BZZ4 ULTRASONOGRAPHY OF HEART WITH AORTA, TRANSESOPHAGEAL: ICD-10-PCS | Performed by: INTERNAL MEDICINE

## 2019-10-03 PROCEDURE — 36415 COLL VENOUS BLD VENIPUNCTURE: CPT

## 2019-10-03 PROCEDURE — 93312 ECHO TRANSESOPHAGEAL: CPT

## 2019-10-03 PROCEDURE — 74011000250 HC RX REV CODE- 250: Performed by: INTERNAL MEDICINE

## 2019-10-03 PROCEDURE — 74011250637 HC RX REV CODE- 250/637: Performed by: INTERNAL MEDICINE

## 2019-10-03 PROCEDURE — 99152 MOD SED SAME PHYS/QHP 5/>YRS: CPT

## 2019-10-03 PROCEDURE — 74011250637 HC RX REV CODE- 250/637: Performed by: FAMILY MEDICINE

## 2019-10-03 RX ORDER — LIDOCAINE HYDROCHLORIDE 20 MG/ML
15 SOLUTION OROPHARYNGEAL AS NEEDED
Status: DISCONTINUED | OUTPATIENT
Start: 2019-10-03 | End: 2019-10-03

## 2019-10-03 RX ORDER — KETOROLAC TROMETHAMINE 30 MG/ML
30 INJECTION, SOLUTION INTRAMUSCULAR; INTRAVENOUS ONCE
Status: COMPLETED | OUTPATIENT
Start: 2019-10-03 | End: 2019-10-03

## 2019-10-03 RX ORDER — CLOPIDOGREL BISULFATE 75 MG/1
75 TABLET ORAL DAILY
Status: DISCONTINUED | OUTPATIENT
Start: 2019-10-04 | End: 2019-10-03

## 2019-10-03 RX ORDER — GUAIFENESIN 100 MG/5ML
81 LIQUID (ML) ORAL DAILY
Status: DISCONTINUED | OUTPATIENT
Start: 2019-10-04 | End: 2019-10-03

## 2019-10-03 RX ORDER — FENTANYL CITRATE 50 UG/ML
12.5-5 INJECTION, SOLUTION INTRAMUSCULAR; INTRAVENOUS
Status: DISCONTINUED | OUTPATIENT
Start: 2019-10-03 | End: 2019-10-03

## 2019-10-03 RX ORDER — MIDAZOLAM HYDROCHLORIDE 1 MG/ML
.5-2 INJECTION, SOLUTION INTRAMUSCULAR; INTRAVENOUS
Status: DISCONTINUED | OUTPATIENT
Start: 2019-10-03 | End: 2019-10-03

## 2019-10-03 RX ORDER — GUAIFENESIN 100 MG/5ML
81 LIQUID (ML) ORAL DAILY
Status: DISCONTINUED | OUTPATIENT
Start: 2019-10-04 | End: 2019-10-05 | Stop reason: HOSPADM

## 2019-10-03 RX ADMIN — UMECLIDINIUM BROMIDE AND VILANTEROL TRIFENATATE 1 PUFF: 62.5; 25 POWDER RESPIRATORY (INHALATION) at 09:49

## 2019-10-03 RX ADMIN — MELATONIN 3 MG: at 22:04

## 2019-10-03 RX ADMIN — BENZOCAINE, BUTAMBEN, AND TETRACAINE HYDROCHLORIDE 1 SPRAY: .028; .004; .004 AEROSOL, SPRAY TOPICAL at 13:07

## 2019-10-03 RX ADMIN — Medication 10 ML: at 06:12

## 2019-10-03 RX ADMIN — FUROSEMIDE 80 MG: 80 TABLET ORAL at 09:49

## 2019-10-03 RX ADMIN — ATENOLOL 25 MG: 25 TABLET ORAL at 09:49

## 2019-10-03 RX ADMIN — DULOXETINE HYDROCHLORIDE 30 MG: 30 CAPSULE, DELAYED RELEASE ORAL at 09:49

## 2019-10-03 RX ADMIN — GABAPENTIN 100 MG: 100 CAPSULE ORAL at 16:35

## 2019-10-03 RX ADMIN — PANTOPRAZOLE SODIUM 40 MG: 40 TABLET, DELAYED RELEASE ORAL at 16:35

## 2019-10-03 RX ADMIN — FENTANYL CITRATE 25 MCG: 50 INJECTION INTRAMUSCULAR; INTRAVENOUS at 13:09

## 2019-10-03 RX ADMIN — KETOROLAC TROMETHAMINE 30 MG: 30 INJECTION, SOLUTION INTRAMUSCULAR at 09:03

## 2019-10-03 RX ADMIN — GABAPENTIN 100 MG: 100 CAPSULE ORAL at 09:49

## 2019-10-03 RX ADMIN — Medication 10 ML: at 22:04

## 2019-10-03 RX ADMIN — DOXYCYCLINE HYCLATE 100 MG: 100 TABLET, COATED ORAL at 20:42

## 2019-10-03 RX ADMIN — Medication 10 ML: at 16:35

## 2019-10-03 RX ADMIN — MIDAZOLAM 1 MG: 1 INJECTION INTRAMUSCULAR; INTRAVENOUS at 13:08

## 2019-10-03 RX ADMIN — PANTOPRAZOLE SODIUM 40 MG: 40 TABLET, DELAYED RELEASE ORAL at 09:49

## 2019-10-03 RX ADMIN — GABAPENTIN 100 MG: 100 CAPSULE ORAL at 20:42

## 2019-10-03 RX ADMIN — LIDOCAINE HYDROCHLORIDE 15 ML: 20 SOLUTION ORAL; TOPICAL at 13:06

## 2019-10-03 RX ADMIN — DOXYCYCLINE HYCLATE 100 MG: 100 TABLET, COATED ORAL at 09:49

## 2019-10-03 NOTE — PROGRESS NOTES
2330: Slight desaturations to 86% while on CPAP c good pleth. Recovers immediately to 93%. 0130: Desaturations observed on pulse oximetry c good pleth to 80% briefly, then returns to 93% while on CPAP.   0245: Several desaturations observed on pulse oximetry, some as low as 45% SpO2 c good pleth while on CPAP c 2L O2. Pt rouses to stimulation and is encouraged to take deep breaths. SpO2 recovers to 91%. Notified RT. Pt appears to be having several apneas despite CPAP use. 0423: Desaturation to 83% on CPAP, good pleth. Recovers to 94%. 0630: Pt continues to have 2-3 episodes of apnea per hour while on CPAP.

## 2019-10-03 NOTE — PERIOP NOTES
Received rpt from night nurse and she stated pt NPO. Patient able to swallow pill cam  Without difficulty. Daughter in room. Gave rpt to day nurse. Instructed nurse, patient and daughter to watch for blue blinking light and call endo if light stops blinking. Dr. Casa Anthony in to see pt. Patient knows to not eat until scheduled EUNICE is completed.

## 2019-10-03 NOTE — PROGRESS NOTES
This is a late note documenting an October 2, 2019 Advance Medical Directive consult on PCU. Responded to nurse request for advance medical directive consult. Explained document to patient, wife and daughter. Assisted in completing the first portion of the AMD.  Made a copy for patient's chart. Returned original and one copy to patient.     Patient named his daughter, Anderson Dixon as his primary decision maker. Her telephone number is [phone]    Patient indicated he has very good support from his family. He has good spiritual support as well.   He was very receptive to assurance of prayer.      KERRIE Wright, [facility], [address] [place] [address]J[place] ([id number])

## 2019-10-03 NOTE — PROGRESS NOTES
Patient arrived to Non-Invasive Cardiology Lab for In Patient EUNICE Procedure. Staff introduced to patient. Patient identifiers verified with Name and Date of Birth. Procedure verified with patient. Consent forms reviewed and signed by patient or authorized representative and verified. Allergies verified. Patient informed of procedure and plan of care. Questions answered with review. Patient on cardiac monitor, non-invasive blood pressure, SPO2 monitor. On 2l NC. Patient is A&Ox3. Patient reports no complaints. Patient on bed, in low position, with side rails up. Patient instructed to call for assistance as needed. Family waiting in pt. room.

## 2019-10-03 NOTE — PROGRESS NOTES
Progress Note      10/3/2019 7:54 AM  NAME: Han Corcoran   MRN:  [id number]   Admit Diagnosis: Acute blood loss anemia [D62]      Problem List:     1. Acute blood loss anemia  2. Gastrointestinal hemorrhage  3. CAD s/p prior PTCA of the LAD in '96. Cath /10 w/ mild diffuse disease; nml EF. MPI 3/18 w/ EF 71% and no ischemia or infarct  4. Echo 4/19 w/ EF 60-65%, mild cLVH, mild PHTN  5. Mild aortic stenosis  6. Chronic venous insufficiency s/p left GSV venaseal 2/19 and right GSV venaseal 3/19  7. Chronic kidney disease; Stg 3  8. Gout  9. COPD  10. TESSA on CPAP  11. Remote TIA 4/19  12. Hypertension  13. Hyperlipidemia  14. Former smoker  13. [place]     Assessment/Plan:   EGD w/ findings concerning for ischemia  Pill cam per GI     CT w/ no active bleeding  HDCT w/o acute process     HgB 9.7     16. Discussed EUNICE w/ patient. NPO. Will plan on this afternoon. 17. Will need a 30d EVR on discharge  18. Keep on tele while here  19. If EVR and EUNICE unremarkable can consider ILR as an outpt  20. Continue beta blocker  21. Plavix per GI         [x]       High complexity decision making was performed in this patient at high risk for decompensation with multiple organ involvement. Subjective:     Han Corcoran denies chest pain, dyspnea. Discussed with RN events overnight. Review of Systems:    Symptom Y/N Comments  Symptom Y/N Comments   Fever/Chills N   Chest Pain N    Poor Appetite N   Edema N    Cough N   Abdominal Pain N    Sputum N   Joint Pain N    SOB/MARES N   Pruritis/Rash N    Nausea/vomit N   Tolerating PT/OT Y    Diarrhea N   Tolerating Diet Y    Constipation N   Other       Could NOT obtain due to:      Objective:      Physical Exam:    Last 24hrs VS reviewed since prior progress note.  Most recent are:    Visit Vitals  /75   Pulse 72   Temp 97.9 °F (36.6 °C)   Resp 18   Ht 5' 8\" (1.727 m)   Wt 107.5 kg (236 lb 15.9 oz)   SpO2 94%   BMI 36.03 kg/m²       Intake/Output Summary (Last 24 hours) at 10/3/2019 0754  Last data filed at 10/3/2019 0545  Gross per 24 hour   Intake 240 ml   Output 1300 ml   Net -1060 ml        General Appearance: Well developed, well nourished, alert & oriented x 3,    no acute distress. Ears/Nose/Mouth/Throat: Hearing grossly normal.  Neck: Supple. Chest: Lungs clear to auscultation bilaterally. Cardiovascular: Regular rate and rhythm, S1S2 normal, no murmur. Abdomen: Soft, non-tender, bowel sounds are active. Extremities: No edema bilaterally. Skin: Warm and dry. []         Post-cath site without hematoma, bruit, tenderness, or thrill. Distal pulses intact. PMH/ reviewed - no change compared to H&P    Data Review    Telemetry: normal sinus rhythm     EKG:   []  No new EKG for review    Lab Data Personally Reviewed:    Recent Labs     10/03/19  0512 10/02/19  2128  10/02/19  0506  10/01/19  0518   WBC  --   --   --  3.9*  --  4.8   HGB 9.7* 8.6*   < > 8.6*   < > 8.8*   HCT 29.4* 25.1*   < > 26.2*   < > 27.2*   PLT  --   --   --  246  --  233    < > = values in this interval not displayed. No results for input(s): INR, PTP, APTT, INREXT in the last 72 hours. Recent Labs     10/02/19  0506      K 3.8      CO2 28   BUN 12   CREA 1.25   *   CA 8.1*     No results for input(s): CPK, CKNDX, TROIQ in the last 72 hours. No lab exists for component: CPKMB  Lab Results   Component Value Date/Time    Cholesterol, total 138 07/18/2019 08:37 AM    HDL Cholesterol 25 (L) 07/18/2019 08:37 AM    LDL, calculated 49 07/18/2019 08:37 AM    Triglyceride 321 (H) 07/18/2019 08:37 AM    CHOL/HDL Ratio 6 (H) 07/18/2019 08:37 AM       No results for input(s): SGOT, GPT, AP, TBIL, TP, ALB, GLOB, GGT, AML, LPSE in the last 72 hours. No lab exists for component: AMYP, HLPSE  No results for input(s): PH, PCO2, PO2 in the last 72 hours.     Medications Personally Reviewed:    Current Facility-Administered Medications   Medication Dose Route Frequency    pantoprazole (PROTONIX) tablet 40 mg  40 mg Oral ACB&D    butalbital-acetaminophen-caffeine (FIORICET, ESGIC) -40 mg per tablet 1 Tab  1 Tab Oral Q6H PRN    sodium chloride (NS) flush 5-40 mL  5-40 mL IntraVENous Q8H    sodium chloride (NS) flush 5-40 mL  5-40 mL IntraVENous PRN    gabapentin (NEURONTIN) capsule 100 mg  100 mg Oral TID    furosemide (LASIX) tablet 80 mg  80 mg Oral DAILY    albuterol-ipratropium (DUO-NEB) 2.5 MG-0.5 MG/3 ML  3 mL Nebulization Q6H PRN    umeclidinium-vilanterol (ANORO ELLIPTA) 62.5 mcg- 25 mcg/inhalation  1 Puff Inhalation DAILY    atenolol (TENORMIN) tablet 25 mg  25 mg Oral DAILY    doxycycline (VIBRA-TABS) tablet 100 mg  100 mg Oral Q12H    DULoxetine (CYMBALTA) capsule 30 mg  30 mg Oral DAILY    0.9% sodium chloride infusion 250 mL  250 mL IntraVENous PRN    melatonin tablet 3 mg  3 mg Oral QHS PRN         Irven Cost III, DO

## 2019-10-03 NOTE — PROGRESS NOTES
Chart reviewed. Attempted to see pt for PT intervention however pt off the floor for EUNICE. Will defer however continue to follow.  Thank you    Vinnie Nunez, PT, DPT

## 2019-10-03 NOTE — PROGRESS NOTES
TRANSFER - OUT REPORT:    Verbal report given to [facility] on Chadwick Case being transferred to Room 2250 for routine progression of care       Report consisted of patient's Situation, Background, Assessment and   Recommendations(SBAR). Information from the following report(s) SBAR, Procedure Summary, Intake/Output, MAR, Recent Results, Med Rec Status, Cardiac Rhythm NSR and Procedure Verification was reviewed with the receiving nurse. Opportunity for questions and clarification was provided.       Patient transported with:   Yadio

## 2019-10-03 NOTE — PROGRESS NOTES
Problem: Falls - Risk of  Goal: *Absence of Falls  Description  Document Shonda Sutton Fall Risk and appropriate interventions in the flowsheet. Outcome: Progressing Towards Goal  Note:   Fall Risk Interventions:  Mobility Interventions: Bed/chair exit alarm, Assess mobility with egress test, Patient to call before getting OOB         Medication Interventions: Bed/chair exit alarm, Patient to call before getting OOB, Teach patient to arise slowly    Elimination Interventions: Bed/chair exit alarm, Call light in reach, Patient to call for help with toileting needs    History of Falls Interventions: Bed/chair exit alarm         Problem: Risk for Spread of Infection  Goal: Prevent transmission of infectious organism to others  Description  Prevent the transmission of infectious organisms to other patients, staff members, and visitors.   Outcome: Progressing Towards Goal     Problem: Patient Education:  Go to Education Activity  Goal: Patient/Family Education  Outcome: Progressing Towards Goal     Problem: Upper and Lower GI Bleed:  Discharge Outcomes  Goal: *Hemodynamically stable  Outcome: Progressing Towards Goal  Goal: *Lungs clear or at baseline  Outcome: Progressing Towards Goal  Goal: *Tolerating diet  Outcome: Progressing Towards Goal

## 2019-10-03 NOTE — PROGRESS NOTES
Hospitalist Progress Note    NAME: Mann Beth   :  1934   MRN:  [id number]       Assessment / Plan:  Symptomatic Acute blood loss anemia  Recurrent GI bleeding, unclear etiology  S/p EGD on , gastric lesion concerning for ischemia  -embolic work-up underway, plan for EUNICE today 10.3  -s/p tranfusion, Hb stable last several days and up to 9.7 today  -will restart asa, discussed with patient and daughter. Pt was switched to plavix after recent TIA after being on asa for \"20 years prior\". Patient and daughter do not wish for him to go back on plavix and will resume asa after discussion  -swallowed pill cam today  -appreciate all consults  -cont PPI bid for now, less likely UGIB    Headache  -CTH negative  -resolved with fioricet     Recent  LLL MRSA community acquired pneumonia  -continue doxycycline to complete course.  Not hypoxic   -incentive spirometry  -contact isolation     CKD stage 3  -follow Cr     CAD hx stent x 1 (26 years ago)  HTN  LE edema   -hold asa, lasix.  At risk for hypotension with continued bleeding  -continue BB at half dose  - consider restarting home dose lasix if BP remains stable      Gout - hold allopurinol  COPD, not on home O2  -continue inhaler with nebs prn     TESSA on cpap  Morbid obesity  Hard of hearing, has hearing aid  Hx TIA   S/p L2-L5 decompression for spinal stenosis and back pain   -has not been using CPAP at home per daughter  -continue gabapentin ,weaned down to 100mg bid     Likely DC home tomorrow pending course        Code Status:  Full  Surrogate Decision Maker: wife     DVT Prophylaxis:  SCD  GI Prophylaxis: on PPI    Baseline:  .  Independent         30.0 - 39.9 Obese / Body mass index is 37.51 kg/m². Subjective:     Chief Complaint / Reason for Physician Visit   \"I haven't felt this good since I've been here\". Denies n/v, cp, or sob    Discussed with RN events overnight.      Review of Systems:  Symptom Y/N Comments  Symptom Y/N Comments   Fever/Chills n   Chest Pain n    Poor Appetite    Edema     Cough n   Abdominal Pain n    Sputum n   Joint Pain     SOB/MARES n   Pruritis/Rash     Nausea/vomit n   Tolerating PT/OT     Diarrhea    Tolerating Diet     Constipation    Other       Could NOT obtain due to:      Objective:     VITALS:   Last 24hrs VS reviewed since prior progress note.  Most recent are:  Patient Vitals for the past 24 hrs:   Temp Pulse Resp BP SpO2   10/03/19 1407  68 20 131/68 93 %   10/03/19 1355  70 21 132/67 94 %   10/03/19 1350  68 17 134/67 94 %   10/03/19 1345  73 21 140/58 94 %   10/03/19 1340  77 19 98/57 92 %   10/03/19 1335  71 20 138/65 93 %   10/03/19 1330  68 15 142/71 95 %   10/03/19 1325  70 14 134/85 96 %   10/03/19 1320  71 19 127/65 93 %   10/03/19 1315  72 20 159/82 98 %   10/03/19 1310  65 17 145/72 100 %   10/03/19 1305  (!) 54 17 138/59 98 %   10/03/19 1252  70  135/74 100 %   10/03/19 1123 98.7 °F (37.1 °C) 68 14 138/69 97 %   10/03/19 0949  71  131/80    10/03/19 0721 97.6 °F (36.4 °C) 75 18 148/79 93 %   10/03/19 0430     94 %   10/03/19 0429     (!) 80 %   10/03/19 0304 97.9 °F (36.6 °C) 72 18 141/75 94 %   10/03/19 0256     96 %   10/03/19 0248     (!) 81 %   10/03/19 0247     90 %   10/03/19 0239     (!) 85 %   10/03/19 0237     91 %   10/02/19 2343     (!) 86 %   10/02/19 2342     (!) 88 %   10/02/19 2339     (!) 88 %   10/02/19 2338     (!) 88 %   10/02/19 2336     (!) 88 %   10/02/19 2334     (!) 88 %   10/02/19 2333     (!) 88 %   10/02/19 2331     (!) 86 %   10/02/19 2328     (!) 88 %   10/02/19 2327     (!) 87 %   10/02/19 2325     (!) 87 %   10/02/19 2253 99 °F (37.2 °C) 74 21 158/65 98 %   10/02/19 1930 99.2 °F (37.3 °C) 75 18 143/66 98 %   10/02/19 1444 98.6 °F (37 °C) 62 23 132/60 97 %       Intake/Output Summary (Last 24 hours) at 10/3/2019 1430  Last data filed at 10/3/2019 1353  Gross per 24 hour Intake 240 ml   Output 1300 ml   Net -1060 ml        PHYSICAL EXAM:  General: WD, WN. Alert, cooperative, uncomfortable but not acute distress    EENT:  EOMI. Anicteric sclerae. MMM  Resp:  CTA bilaterally, no wheezing or rales. No accessory muscle use  CV:  Regular  rhythm,  No edema  GI:  Soft, Non distended, Non tender.  +Bowel sounds  Neurologic:  Alert and oriented X 3, normal speech,   Psych:   Not anxious nor agitated  Skin:  No rashes. No jaundice    Reviewed most current lab test results and cultures  YES  Reviewed most current radiology test results   YES  Review and summation of old records today    NO  Reviewed patient's current orders and MAR    YES  PMH/SH reviewed - no change compared to H&P  ________________________________________________________________________  Care Plan discussed with:    Comments   Patient x    Family      RN x    Care Manager     Consultant                        Multidiciplinary team rounds were held today with , nursing, pharmacist and clinical coordinator. Patient's plan of care was discussed; medications were reviewed and discharge planning was addressed. ________________________________________________________________________  Total NON critical care TIME: 25 Minutes    Total CRITICAL CARE TIME Spent:   Minutes non procedure based      Comments   >50% of visit spent in counseling and coordination of care x    ________________________________________________________________________  Jose Antonio Pelt, DO     Procedures: see electronic medical records for all procedures/Xrays and details which were not copied into this note but were reviewed prior to creation of Plan. LABS:  I reviewed today's most current labs and imaging studies.   Pertinent labs include:  Recent Labs     10/03/19  0512 10/02/19  2128 10/02/19  1252 10/02/19  0506  10/01/19  0518   WBC  --   --   --  3.9*  --  4.8   HGB 9.7* 8.6* 8.5* 8.6*   < > 8.8*   HCT 29.4* 25.1* 25.7* 26.2*   < > 27.2*   PLT  --   --   --  246  --  233    < > = values in this interval not displayed.      Recent Labs     10/02/19  0506      K 3.8      CO2 28   *   BUN 12   CREA 1.25   CA 8.1*       Signed: Alyson Roche DO

## 2019-10-03 NOTE — PROGRESS NOTES
Gastroenterology Daily Progress Note   (Negrito Hart PA-C covering for Dr. Julia Morse)   [address] [place] [address] Dr Tang Date: 9/29/2019       Subjective:     Patient is feeling well, swallowed capsule today with no issues. He has been NPO. No abdominal pain, N/V.  Hgb increased to 9.7 without any further s/sx of bleeding. Headache is better, thinks it gets worse with no food. Planning for EUNICE today around 2. Current Facility-Administered Medications   Medication Dose Route Frequency    pantoprazole (PROTONIX) tablet 40 mg  40 mg Oral ACB&D    butalbital-acetaminophen-caffeine (FIORICET, ESGIC) -40 mg per tablet 1 Tab  1 Tab Oral Q6H PRN    sodium chloride (NS) flush 5-40 mL  5-40 mL IntraVENous Q8H    sodium chloride (NS) flush 5-40 mL  5-40 mL IntraVENous PRN    gabapentin (NEURONTIN) capsule 100 mg  100 mg Oral TID    furosemide (LASIX) tablet 80 mg  80 mg Oral DAILY    albuterol-ipratropium (DUO-NEB) 2.5 MG-0.5 MG/3 ML  3 mL Nebulization Q6H PRN    umeclidinium-vilanterol (ANORO ELLIPTA) 62.5 mcg- 25 mcg/inhalation  1 Puff Inhalation DAILY    atenolol (TENORMIN) tablet 25 mg  25 mg Oral DAILY    doxycycline (VIBRA-TABS) tablet 100 mg  100 mg Oral Q12H    DULoxetine (CYMBALTA) capsule 30 mg  30 mg Oral DAILY    0.9% sodium chloride infusion 250 mL  250 mL IntraVENous PRN    melatonin tablet 3 mg  3 mg Oral QHS PRN        Objective:     Visit Vitals  /80   Pulse 71   Temp 97.6 °F (36.4 °C)   Resp 18   Ht 5' 8\" (1.727 m)   Wt 107.5 kg (236 lb 15.9 oz)   SpO2 93%   BMI 36.03 kg/m²   Blood pressure 131/80, pulse 71, temperature 97.6 °F (36.4 °C), resp. rate 18, height 5' 8\" (1.727 m), weight 107.5 kg (236 lb 15.9 oz), SpO2 93 %. No intake/output data recorded.     10/01 1901 - 10/03 0700  In: 360 [P.O.:360]  Out: 2625 [Urine:2625]      Intake/Output Summary (Last 24 hours) at 10/3/2019 1027  Last data filed at 10/3/2019 0545  Gross per 24 hour   Intake 240 ml Output 800 ml   Net -560 ml         Physical Exam:       General: Pleasant elderly WF, NAD  Chest:  CTA, No rhonchi, rales or rubs. Heart: S1, S2, RRR  GI: distended but Soft, NT, + bowel sounds  Extremities: No ededma  CNS: CN II-XII normal.      Labs:       Recent Results (from the past 24 hour(s))   HGB & HCT    Collection Time: 10/02/19 12:52 PM   Result Value Ref Range    HGB 8.5 (L) 12.1 - 17.0 g/dL    HCT 25.7 (L) 36.6 - 50.3 %   HGB & HCT    Collection Time: 10/02/19  9:28 PM   Result Value Ref Range    HGB 8.6 (L) 12.1 - 17.0 g/dL    HCT 25.1 (L) 36.6 - 50.3 %   HGB & HCT    Collection Time: 10/03/19  5:12 AM   Result Value Ref Range    HGB 9.7 (L) 12.1 - 17.0 g/dL    HCT 29.4 (L) 36.6 - 50.3 %   LABRCNT(wbc:2,hgb:2,hct:2,plt:2,)  Recent Labs     10/02/19  0506      K 3.8      CO2 28   BUN 12   CREA 1.25   *   CA 8.1*   LABRCNT(sgot:3,gpt:3,ap:3,tbiL:3,TP:3,ALB:3,GLOB:3,ggt:3,aml:3,amyp:3,lpse:3,hlpse:3)No results for input(s): INR, PTP, APTT, INREXT in the last 72 hours. No results for input(s): SGOT, GPT, AP, TBIL, TP, ALB, GLOB, GGT, AML, LPSE in the last 72 hours. No lab exists for component: AMYP, HLPSEBRIEFLAB(B12,FOL,FOLAT,RBCF)No results found for: FOL, RBCFLABRCNT(CPK:3,CpKMB:3,ckndx:3,troiq:3)No components found for: GLPOCBRIEFLAB(CHOL,CHOLX,CHOLP,CHLST,CHOLV,HDL,HDLC,HDLP,LDL,DLDL,LDLC,DLDLP,TGL,TGLX,TRIGL,TRIGP,CHHD,CHHDX)No results for input(s): PH, PCO2, PO2 in the last 72 hours. LABRCNT(CPK:3,CpKMB:3,ckndx:3,troiq:3)ALANA Sanches  No results for input(s): CPK, CKNDX, TROIQ in the last 72 hours. No lab exists for component: ALANA Harkins      Problem List:     Active Problems:    Acute blood loss anemia (9/29/2019)        Impression:  GI bleed with hematochezia  Gastric lesions- concerning for ischemia  Anemia         Plan:  Patient has no further melena or hematochezia today. Capsule study in progress to evaluate the SB.   Gastric lesions concerning for ischemia, causes by hypotension/hypovolemia, or embolic cause. Cardiology planning for EUNICE today, pt NPO. CRS following peripherally.     - Pending results of capsule to determine further management  - Consider restarting Plavix if no findings  - Plan for f/u EGD in 6 weeks to reevaluate gastric lesions  - Embolic w/u in progress by cardiology  - Continue to monitor hgb and transfuse as needed          ALANA Pool    10/3/2019  10:27 AM   [id number] [facility], [address] [facility]  [address] [place] [address] [place]: [phone]

## 2019-10-03 NOTE — PROGRESS NOTES
Brief Procedure Note    Patient: Renetta Dixon MRN: [id number]  SSN: [id number]    YOB: 1934  Age: 80 y.o. Sex: male      Date of Procedure: 10/3/2019     Pre-procedure Diagnosis: Rule out PFO/ASD    Post-procedure Diagnosis: nml LVEF, mild MR, mild AI, no DULCE clot, MIGUEL, no PFO/ASD, mild plaquing of the Ao    Procedure: EUNICE    Performed By: Annetta Singh III, DO     Anesthesia: Moderate Sedation    Estimated Blood Loss: Less than 10 mL      Specimens:  None    Findings: as above    Complications: None    Implants: None    Recommendations: Continue medical therapy. 30 day EVR to be set up by my office. Will be available as needed.     Signed By: Warren Rawls DO     October 3, 2019

## 2019-10-04 ENCOUNTER — HOME HEALTH ADMISSION (OUTPATIENT)
Dept: HOME HEALTH SERVICES | Facility: HOME HEALTH | Age: 84
End: 2019-10-04
Payer: MEDICARE

## 2019-10-04 LAB
HCT VFR BLD AUTO: 28.4 % (ref 36.6–50.3)
HGB BLD-MCNC: 8.9 G/DL (ref 12.1–17)

## 2019-10-04 PROCEDURE — 65660000000 HC RM CCU STEPDOWN

## 2019-10-04 PROCEDURE — 74011250637 HC RX REV CODE- 250/637: Performed by: INTERNAL MEDICINE

## 2019-10-04 PROCEDURE — 85018 HEMOGLOBIN: CPT

## 2019-10-04 PROCEDURE — 74011250637 HC RX REV CODE- 250/637: Performed by: FAMILY MEDICINE

## 2019-10-04 PROCEDURE — 77010033678 HC OXYGEN DAILY

## 2019-10-04 PROCEDURE — 97116 GAIT TRAINING THERAPY: CPT

## 2019-10-04 PROCEDURE — 36415 COLL VENOUS BLD VENIPUNCTURE: CPT

## 2019-10-04 PROCEDURE — 0DJ07ZZ INSPECTION OF UPPER INTESTINAL TRACT, VIA NATURAL OR ARTIFICIAL OPENING: ICD-10-PCS | Performed by: SPECIALIST

## 2019-10-04 RX ORDER — BUTALBITAL, ACETAMINOPHEN AND CAFFEINE 50; 325; 40 MG/1; MG/1; MG/1
1 TABLET ORAL
Qty: 10 TAB | Refills: 0 | Status: SHIPPED | OUTPATIENT
Start: 2019-10-04 | End: 2019-11-19 | Stop reason: SDUPTHER

## 2019-10-04 RX ORDER — POLYETHYLENE GLYCOL 3350 17 G/17G
17 POWDER, FOR SOLUTION ORAL DAILY
Status: DISCONTINUED | OUTPATIENT
Start: 2019-10-05 | End: 2019-10-05 | Stop reason: HOSPADM

## 2019-10-04 RX ORDER — DOCUSATE SODIUM 100 MG/1
100 CAPSULE, LIQUID FILLED ORAL
Status: DISCONTINUED | OUTPATIENT
Start: 2019-10-04 | End: 2019-10-05 | Stop reason: HOSPADM

## 2019-10-04 RX ADMIN — GABAPENTIN 100 MG: 100 CAPSULE ORAL at 08:53

## 2019-10-04 RX ADMIN — DULOXETINE HYDROCHLORIDE 30 MG: 30 CAPSULE, DELAYED RELEASE ORAL at 08:53

## 2019-10-04 RX ADMIN — GABAPENTIN 100 MG: 100 CAPSULE ORAL at 16:34

## 2019-10-04 RX ADMIN — PANTOPRAZOLE SODIUM 40 MG: 40 TABLET, DELAYED RELEASE ORAL at 08:53

## 2019-10-04 RX ADMIN — ATENOLOL 25 MG: 25 TABLET ORAL at 08:53

## 2019-10-04 RX ADMIN — BUTALBITAL, ACETAMINOPHEN AND CAFFEINE 1 TABLET: 50; 325; 40 TABLET ORAL at 02:23

## 2019-10-04 RX ADMIN — PANTOPRAZOLE SODIUM 40 MG: 40 TABLET, DELAYED RELEASE ORAL at 16:34

## 2019-10-04 RX ADMIN — MELATONIN 3 MG: at 20:50

## 2019-10-04 RX ADMIN — ASPIRIN 81 MG CHEWABLE TABLET 81 MG: 81 TABLET CHEWABLE at 08:53

## 2019-10-04 RX ADMIN — FUROSEMIDE 80 MG: 80 TABLET ORAL at 08:53

## 2019-10-04 RX ADMIN — GABAPENTIN 100 MG: 100 CAPSULE ORAL at 22:20

## 2019-10-04 RX ADMIN — DOXYCYCLINE HYCLATE 100 MG: 100 TABLET, COATED ORAL at 08:53

## 2019-10-04 RX ADMIN — UMECLIDINIUM BROMIDE AND VILANTEROL TRIFENATATE 1 PUFF: 62.5; 25 POWDER RESPIRATORY (INHALATION) at 08:54

## 2019-10-04 RX ADMIN — BUTALBITAL, ACETAMINOPHEN AND CAFFEINE 1 TABLET: 50; 325; 40 TABLET ORAL at 12:13

## 2019-10-04 RX ADMIN — Medication 10 ML: at 16:34

## 2019-10-04 RX ADMIN — Medication 10 ML: at 04:53

## 2019-10-04 RX ADMIN — BUTALBITAL, ACETAMINOPHEN AND CAFFEINE 1 TABLET: 50; 325; 40 TABLET ORAL at 20:50

## 2019-10-04 NOTE — PROGRESS NOTES
0700- Bedside and Verbal shift change report given to SCOTTIE Toussaint RN (oncoming nurse) by Marleny Capps RN (offgoing nurse). Report included the following information SBAR, Kardex, Intake/Output, MAR and Recent Results. Pt resting in bed. No needs at this time. 46- Dr. Marlon River in to see pt. Pt up in chair. Pt daughter voicing concern for pt and pt wanting to have a BM before leaving. 1400- Pt Son in room to visit. 1648- No needs at this time. Pt resting in chair.

## 2019-10-04 NOTE — PROGRESS NOTES
[id number]  Pill Cam Dictated.     Findings:  Focal fold thickening with superficial erosion (seen on EGD c/w mucosal necrosis)    DIstal TI with focal erythema but no bleeding, can't exclude avm, nonbleeding    Plan:  Proceed with anticoagulation  PPI, sucralfate  Should f/u with Dr. Alma Owens after discharge

## 2019-10-04 NOTE — PROGRESS NOTES
Problem: Upper and Lower GI Bleed:  Discharge Outcomes  Goal: *Hemodynamically stable  Outcome: Progressing Towards Goal  Goal: *Demonstrates independent activity or return to baseline  Outcome: Progressing Towards Goal  Goal: *Verbalizes understanding and describes prescribed diet  Outcome: Progressing Towards Goal  Goal: *Tolerating diet  Outcome: Progressing Towards Goal

## 2019-10-04 NOTE — PROGRESS NOTES
PCP JACQUES appt scheduled with Dr. Prosper Beach on 10/17/2019 at 3:30pm Appt added to AVS. CATALINO Srivastava CM Specialist

## 2019-10-04 NOTE — PROGRESS NOTES
Problem: Mobility Impaired (Adult and Pediatric)  Goal: *Acute Goals and Plan of Care (Insert Text)  Description  FUNCTIONAL STATUS PRIOR TO ADMISSION: Independent w/ ambulation and ADLs. Denies history of falls. Recently finished OP PT at [address] [place] [address] following back surgery in May 2019 Calista Vieyra). Does not wear home O2. HOME SUPPORT PRIOR TO ADMISSION: The patient lived with wife however did not require assist. Supportive daughter in the area who is able to assist if needed. Physical Therapy Goals  Initiated 10/2/2019  1. Patient will move from supine to sit and sit to supine , scoot up and down and roll side to side in bed with independence within 7 day(s). 2.  Patient will transfer from bed to chair and chair to bed with independence using the least restrictive device within 7 day(s). 3.  Patient will perform sit to stand with independence within 7 day(s). 4.  Patient will ambulate with independence for 150 feet with the least restrictive device within 7 day(s). 5.  Patient will ascend/descend 1 stairs with 0 handrail(s) with independence within 7 day(s). Outcome: Progressing Towards Goal   PHYSICAL THERAPY TREATMENT  Patient: Liz Tan (77 y.o. male)  Date: 10/4/2019  Diagnosis: Acute blood loss anemia [D62] <principal problem not specified>  Procedure(s) (LRB):  CAPSULE (N/A) 1 Day Post-Op  Precautions: Contact(MRSA)  Chart, physical therapy assessment, plan of care and goals were reviewed. ASSESSMENT  Patient continues with skilled PT services and is progressing towards goals. Demonstrated stable O2 sats and HR with ambulation for 200 feet. Current Level of Function Impacting Discharge (mobility/balance): independent with bed mobility, supine to sit, sit to stand  and bed to chair transfers. Sba amb with RW for 200 feet. Other factors to consider for discharge: no         PLAN :  Patient continues to benefit from skilled intervention to address the above impairments. Continue treatment per established plan of care. to address goals. Recommendation for discharge: (in order for the patient to meet his/her long term goals)   Physical therapy at least 2 days/week in the home     This discharge recommendation:  Has been made in collaboration with the attending provider and/or case management    Equipment recommendations for successful discharge (if) home: patient owns DME required for discharge         OBJECTIVE DATA SUMMARY:   Critical Behavior:  Neurologic State: Alert  Orientation Level: Oriented X4  Cognition: Follows commands, Appropriate decision making, Appropriate for age attention/concentration, Appropriate safety awareness  Safety/Judgement: Awareness of environment, Good awareness of safety precautions  Functional Mobility Training:  Bed Mobility:  Rolling: (up in recliner when PT arrived)                 Transfers:  Sit to Stand: Modified independent  Stand to Sit: Independent        Bed to Chair: Modified independent                    Balance:  Sitting: Intact  Standing: Impaired  Standing - Static: Good; Unsupported  Standing - Dynamic : Fair;Occasional  Ambulation/Gait Training:  Distance (ft): 200 Feet (ft)  Assistive Device: Gait belt;Walker, rolling  Ambulation - Level of Assistance: Stand-by assistance        Gait Abnormalities: Path deviations(minor deviations/no LOB)        Base of Support: Widened     Speed/Beverly: Pace decreased (<100 feet/min)                    Pain Rating:  No compliants    Activity Tolerance:   Good and SpO2 stable on RA  Please refer to the flowsheet for vital signs taken during this treatment.     After treatment patient left in no apparent distress:   Sitting in chair, Call bell within reach and Caregiver / family present    COMMUNICATION/COLLABORATION:   The patients plan of care was discussed with: Registered Nurse and     Lyric Salcedo, PT   Time Calculation: 16 mins

## 2019-10-04 NOTE — PROGRESS NOTES
Gastroenterology Daily Progress Note   (Luther Sanches PA-C covering for Dr. Shanon Sinclair)   [address] [facility] [address] [place] Date: 9/29/2019       Subjective:     Patient is doing well today, he has no abdominal pain. Continued with head aches. No F/C. No BM this far but passinggas. Did get dinner an breakfast, tolerating diet without any sx. No S/Sx of bleeding. Capsule done yesterday, report pending. EUNICE was done with no embolic source, normal LVEF    Hgb 8.9 today    Current Facility-Administered Medications   Medication Dose Route Frequency    aspirin chewable tablet 81 mg  81 mg Oral DAILY    pantoprazole (PROTONIX) tablet 40 mg  40 mg Oral ACB&D    butalbital-acetaminophen-caffeine (FIORICET, ESGIC) -40 mg per tablet 1 Tab  1 Tab Oral Q6H PRN    sodium chloride (NS) flush 5-40 mL  5-40 mL IntraVENous Q8H    sodium chloride (NS) flush 5-40 mL  5-40 mL IntraVENous PRN    gabapentin (NEURONTIN) capsule 100 mg  100 mg Oral TID    furosemide (LASIX) tablet 80 mg  80 mg Oral DAILY    albuterol-ipratropium (DUO-NEB) 2.5 MG-0.5 MG/3 ML  3 mL Nebulization Q6H PRN    umeclidinium-vilanterol (ANORO ELLIPTA) 62.5 mcg- 25 mcg/inhalation  1 Puff Inhalation DAILY    atenolol (TENORMIN) tablet 25 mg  25 mg Oral DAILY    DULoxetine (CYMBALTA) capsule 30 mg  30 mg Oral DAILY    0.9% sodium chloride infusion 250 mL  250 mL IntraVENous PRN    melatonin tablet 3 mg  3 mg Oral QHS PRN        Objective:     Visit Vitals  /62 (BP 1 Location: Left arm, BP Patient Position: Sitting)   Pulse 70   Temp 98.2 °F (36.8 °C)   Resp 18   Ht 5' 8\" (1.727 m)   Wt 102.5 kg (225 lb 15.5 oz)   SpO2 97%   BMI 34.36 kg/m²   Blood pressure 107/62, pulse 70, temperature 98.2 °F (36.8 °C), resp. rate 18, height 5' 8\" (1.727 m), weight 102.5 kg (225 lb 15.5 oz), SpO2 97 %. No intake/output data recorded.     10/02 1901 - 10/04 0700  In: 240 [P.O.:240]  Out: 1700 [Urine:1700]      Intake/Output Summary (Last 24 hours) at 10/4/2019 1157  Last data filed at 10/4/2019 0600  Gross per 24 hour   Intake    Output 900 ml   Net -900 ml         Physical Exam:       General: Pleasant elderly WF, NAD  Chest:  CTA, No rhonchi, rales or rubs. Heart: S1, S2, RRR  GI: obese, distended but Soft, NT, + bowel sounds  Extremities: No ededma  CNS: CN II-XII normal.      Labs:       Recent Results (from the past 24 hour(s))   HGB & HCT    Collection Time: 10/04/19  4:50 AM   Result Value Ref Range    HGB 8.9 (L) 12.1 - 17.0 g/dL    HCT 28.4 (L) 36.6 - 50.3 %   LABRCNT(wbc:2,hgb:2,hct:2,plt:2,)  Recent Labs     10/02/19  0506      K 3.8      CO2 28   BUN 12   CREA 1.25   *   CA 8.1*   LABRCNT(sgot:3,gpt:3,ap:3,tbiL:3,TP:3,ALB:3,GLOB:3,ggt:3,aml:3,amyp:3,lpse:3,hlpse:3)No results for input(s): INR, PTP, APTT, INREXT, INREXT in the last 72 hours. No results for input(s): SGOT, GPT, AP, TBIL, TP, ALB, GLOB, GGT, AML, LPSE in the last 72 hours. No lab exists for component: AMYP, HLPSEBRIEFLAB(B12,FOL,FOLAT,RBCF)No results found for: FOL, RBCFLABRCNT(CPK:3,CpKMB:3,ckndx:3,troiq:3)No components found for: GLPOCBRIEFLAB(CHOL,CHOLX,CHOLP,CHLST,CHOLV,HDL,HDLC,HDLP,LDL,DLDL,LDLC,DLDLP,TGL,TGLX,TRIGL,TRIGP,CHHD,CHHDX)No results for input(s): PH, PCO2, PO2 in the last 72 hours. LABRCNT(CPK:3,CpKMB:3,ckndx:3,troiq:3)ALANA Sanches  No results for input(s): CPK, CKNDX, TROIQ in the last 72 hours. No lab exists for component: ALANA Harkins      Problem List:     Active Problems:    Acute blood loss anemia (9/29/2019)        Impression:  GI bleed with hematochezia  Gastric lesions- concerning for ischemia  Anemia         Plan:  Capsule done with report pending, should not hold up D/C. No further s/sx of bleeding, hgb stable at 8.9. EUNICE with no embolic source. GI will sign off the case, please contact with questions or if further consultation is required.     - Pending results of capsule to determine further management  - Arrange f/u in office in 1-2 weeks to review results  - Plan for f/u EGD in 6 weeks to reevaluate gastric lesions  - Cardiology f/u as planned for further embolic w/u  - Continue to monitor hgb while admitted         Carlos Gonzalez    10/4/2019  12:24 PM    [address] [facility], [address] [place] [address]  [facility] [address] [place] [address]: [phone]

## 2019-10-04 NOTE — PROGRESS NOTES
Progress Note      10/4/2019 7:54 AM  NAME: Nolan Valenzuela   MRN:  [id number]   Admit Diagnosis: Acute blood loss anemia [D62]      Problem List:     1. Acute blood loss anemia  2. Gastrointestinal hemorrhage  3. CAD s/p prior PTCA of the LAD in '96. Cath /10 w/ mild diffuse disease; nml EF. MPI 3/18 w/ EF 71% and no ischemia or infarct  4. Echo 4/19 w/ EF 60-65%, mild cLVH, mild PHTN  5. Mild aortic stenosis  6. Chronic venous insufficiency s/p left GSV venaseal 2/19 and right GSV venaseal 3/19  7. Chronic kidney disease; Stg 3  8. Gout  9. COPD  10. TESSA on CPAP  11. Remote TIA 4/19  12. Hypertension  13. Hyperlipidemia  14. Former smoker  13. [place]     Assessment/Plan:   EGD w/ findings concerning for ischemia  Pill cam per GI     CT w/ no active bleeding  HDCT w/o acute process     HgB 9     16. EUNICE w/o embolic source; nml LVEF  17. Will need a 30d EVR on discharge; to be arranged by my office  18. Keep on tele while here  19. If EVR unremarkable can consider ILR as an outpt  20. Continue beta blocker  21. On ASA 81mg  22. OK w/ discharge from my standpoint; can see me in 6 weeks in the office after EVR is complete         [x]       High complexity decision making was performed in this patient at high risk for decompensation with multiple organ involvement. Subjective:     Nolan Valenzuela denies chest pain, dyspnea. Discussed with RN events overnight. Review of Systems:    Symptom Y/N Comments  Symptom Y/N Comments   Fever/Chills N   Chest Pain N    Poor Appetite N   Edema N    Cough N   Abdominal Pain N    Sputum N   Joint Pain N    SOB/MRAES N   Pruritis/Rash N    Nausea/vomit N   Tolerating PT/OT Y    Diarrhea N   Tolerating Diet Y    Constipation N   Other       Could NOT obtain due to:      Objective:      Physical Exam:    Last 24hrs VS reviewed since prior progress note.  Most recent are:    Visit Vitals  /76   Pulse 88   Temp 99.7 °F (37.6 °C)   Resp 18   Ht 5' 8\" (1.727 m)   Wt 102.5 kg (225 lb 15.5 oz)   SpO2 94%   BMI 34.36 kg/m²       Intake/Output Summary (Last 24 hours) at 10/4/2019 0954  Last data filed at 10/4/2019 0600  Gross per 24 hour   Intake    Output 900 ml   Net -900 ml        General Appearance: Well developed, well nourished, alert & oriented x 3,    no acute distress. Ears/Nose/Mouth/Throat: Hearing grossly normal.  Neck: Supple. Chest: Lungs clear to auscultation bilaterally. Cardiovascular: Regular rate and rhythm, S1S2 normal, no murmur. Abdomen: Soft, non-tender, bowel sounds are active. Extremities: No edema bilaterally. Skin: Warm and dry. []         Post-cath site without hematoma, bruit, tenderness, or thrill. Distal pulses intact. PMH/SH reviewed - no change compared to H&P    Data Review    Telemetry: sinus rhythm     EKG:   []  No new EKG for review    Lab Data Personally Reviewed:    Recent Labs     10/04/19  0450 10/03/19  0512  10/02/19  0506   WBC  --   --   --  3.9*   HGB 8.9* 9.7*   < > 8.6*   HCT 28.4* 29.4*   < > 26.2*   PLT  --   --   --  246    < > = values in this interval not displayed. No results for input(s): INR, PTP, APTT, INREXT, INREXT in the last 72 hours. Recent Labs     10/02/19  0506      K 3.8      CO2 28   BUN 12   CREA 1.25   *   CA 8.1*     No results for input(s): CPK, CKNDX, TROIQ in the last 72 hours. No lab exists for component: CPKMB  Lab Results   Component Value Date/Time    Cholesterol, total 138 07/18/2019 08:37 AM    HDL Cholesterol 25 (L) 07/18/2019 08:37 AM    LDL, calculated 49 07/18/2019 08:37 AM    Triglyceride 321 (H) 07/18/2019 08:37 AM    CHOL/HDL Ratio 6 (H) 07/18/2019 08:37 AM       No results for input(s): SGOT, GPT, AP, TBIL, TP, ALB, GLOB, GGT, AML, LPSE in the last 72 hours. No lab exists for component: AMYP, HLPSE  No results for input(s): PH, PCO2, PO2 in the last 72 hours.     Medications Personally Reviewed:    Current Facility-Administered Medications   Medication Dose Route Frequency    aspirin chewable tablet 81 mg  81 mg Oral DAILY    pantoprazole (PROTONIX) tablet 40 mg  40 mg Oral ACB&D    butalbital-acetaminophen-caffeine (FIORICET, ESGIC) -40 mg per tablet 1 Tab  1 Tab Oral Q6H PRN    sodium chloride (NS) flush 5-40 mL  5-40 mL IntraVENous Q8H    sodium chloride (NS) flush 5-40 mL  5-40 mL IntraVENous PRN    gabapentin (NEURONTIN) capsule 100 mg  100 mg Oral TID    furosemide (LASIX) tablet 80 mg  80 mg Oral DAILY    albuterol-ipratropium (DUO-NEB) 2.5 MG-0.5 MG/3 ML  3 mL Nebulization Q6H PRN    umeclidinium-vilanterol (ANORO ELLIPTA) 62.5 mcg- 25 mcg/inhalation  1 Puff Inhalation DAILY    atenolol (TENORMIN) tablet 25 mg  25 mg Oral DAILY    DULoxetine (CYMBALTA) capsule 30 mg  30 mg Oral DAILY    0.9% sodium chloride infusion 250 mL  250 mL IntraVENous PRN    melatonin tablet 3 mg  3 mg Oral QHS PRN         [place] Samantha III, DO

## 2019-10-04 NOTE — PROGRESS NOTES
JACQUES - Home with HH and f/u appts    CM met with pt and pt's daughter at the bedside. Discussed therapy's recommendation for SNF or HH. [facility] Arash Weber informed and [place] orders obtained. Pt and pt's daughter chose [facility]. FOC form signed and referral was sent via [facility] and they accepted. Pt lives home with his wife and his children and grandchildren live close by. Discussed [facility] and they were in agreement. Referral sent to [facility] via [employer]. Medicare pt has received, reviewed, and signed 2nd IM letter informing them of their right to appeal the discharge. Signed copy has been placed on pt bedside chart. Pt's family can transport pt home by car at discharge.      Josh Seals, [address]

## 2019-10-04 NOTE — PROGRESS NOTES
Pulse oximetry assessment   97% at rest on room air (if 88% or less, skip next steps)  94% while ambulating on room air about 200 feet  No sob observed or reported    James Guadalupe, PT

## 2019-10-05 VITALS
TEMPERATURE: 98 F | OXYGEN SATURATION: 97 % | HEIGHT: 68 IN | SYSTOLIC BLOOD PRESSURE: 154 MMHG | HEART RATE: 75 BPM | DIASTOLIC BLOOD PRESSURE: 76 MMHG | BODY MASS INDEX: 34.25 KG/M2 | RESPIRATION RATE: 16 BRPM | WEIGHT: 225.97 LBS

## 2019-10-05 LAB
HCT VFR BLD AUTO: 27.8 % (ref 36.6–50.3)
HGB BLD-MCNC: 8.8 G/DL (ref 12.1–17)

## 2019-10-05 PROCEDURE — 85018 HEMOGLOBIN: CPT

## 2019-10-05 PROCEDURE — 74011250637 HC RX REV CODE- 250/637: Performed by: INTERNAL MEDICINE

## 2019-10-05 PROCEDURE — 36415 COLL VENOUS BLD VENIPUNCTURE: CPT

## 2019-10-05 RX ADMIN — ATENOLOL 25 MG: 25 TABLET ORAL at 08:22

## 2019-10-05 RX ADMIN — GABAPENTIN 100 MG: 100 CAPSULE ORAL at 08:22

## 2019-10-05 RX ADMIN — POLYETHYLENE GLYCOL 3350 17 G: 17 POWDER, FOR SOLUTION ORAL at 08:22

## 2019-10-05 RX ADMIN — PANTOPRAZOLE SODIUM 40 MG: 40 TABLET, DELAYED RELEASE ORAL at 08:24

## 2019-10-05 RX ADMIN — UMECLIDINIUM BROMIDE AND VILANTEROL TRIFENATATE 1 PUFF: 62.5; 25 POWDER RESPIRATORY (INHALATION) at 08:27

## 2019-10-05 RX ADMIN — FUROSEMIDE 80 MG: 80 TABLET ORAL at 08:22

## 2019-10-05 RX ADMIN — Medication 10 ML: at 06:00

## 2019-10-05 RX ADMIN — DULOXETINE HYDROCHLORIDE 30 MG: 30 CAPSULE, DELAYED RELEASE ORAL at 08:22

## 2019-10-05 RX ADMIN — BUTALBITAL, ACETAMINOPHEN AND CAFFEINE 1 TABLET: 50; 325; 40 TABLET ORAL at 04:42

## 2019-10-05 RX ADMIN — ASPIRIN 81 MG CHEWABLE TABLET 81 MG: 81 TABLET CHEWABLE at 08:22

## 2019-10-05 NOTE — PROGRESS NOTES
Hospitalist Progress Note    NAME: Armida Goodman   :  1934   MRN:  [id number]       Assessment / Plan:  Symptomatic Acute blood loss anemia  Recurrent GI bleeding, unclear etiology  S/p EGD on , gastric lesion concerning for ischemia  -embolic work-up underway, plan for EUNICE today 10.3  -s/p tranfusion, Hb stable last several days and up to 9.7 today  -will restart asa, discussed with patient and daughter. Pt was switched to plavix after recent TIA after being on asa for \"20 years prior\". Patient and daughter do not wish for him to go back on plavix and will resume asa after discussion  -swallowed pill cam today  -appreciate all consults  -cont PPI bid for now, less likely UGIB    Headache  -CTH negative  -resolved with fioricet     Recent  LLL MRSA community acquired pneumonia  -continue doxycycline to complete course.  Not hypoxic   -incentive spirometry  -contact isolation     CKD stage 3  -follow Cr     CAD hx stent x 1 (26 years ago)  HTN  LE edema   -hold asa, lasix.  At risk for hypotension with continued bleeding  -continue BB at half dose  - consider restarting home dose lasix if BP remains stable      Gout - hold allopurinol  COPD, not on home O2  -continue inhaler with nebs prn     TESSA on cpap  Morbid obesity  Hard of hearing, has hearing aid  Hx TIA   S/p L2-L5 decompression for spinal stenosis and back pain   -has not been using CPAP at home per daughter  -continue gabapentin ,weaned down to 100mg bid     Constipation  -start bowel regimen with miralax, colace  -ambulate with assistance    Likely DC home tomorrow pending course        Code Status:  Full  Surrogate Decision Maker: wife     DVT Prophylaxis:  SCD  GI Prophylaxis: on PPI    Baseline:  .  Independent         30.0 - 39.9 Obese / Body mass index is 37.51 kg/m². Subjective:     Chief Complaint / Reason for Physician Visit  Pt resting in chair, no acute complaints.  Family is very concerned that he has not had a bowel movement    Discussed with RN events overnight. Review of Systems:  Symptom Y/N Comments  Symptom Y/N Comments   Fever/Chills n   Chest Pain n    Poor Appetite    Edema     Cough n   Abdominal Pain n    Sputum n   Joint Pain     SOB/MARES n   Pruritis/Rash     Nausea/vomit n   Tolerating PT/OT     Diarrhea    Tolerating Diet     Constipation y   Other       Could NOT obtain due to:      Objective:     VITALS:   Last 24hrs VS reviewed since prior progress note. Most recent are:  Patient Vitals for the past 24 hrs:   Temp Pulse Resp BP SpO2   10/04/19 1928 98.4 °F (36.9 °C) 72 17 143/60 100 %   10/04/19 1511 98.3 °F (36.8 °C) 73 18 124/58 97 %   10/04/19 1138 98.2 °F (36.8 °C) 70 18 107/62 97 %   10/04/19 0853  88  139/76    10/04/19 0727 99.7 °F (37.6 °C) 79 18 142/69 94 %   10/04/19 0600  74  145/67 97 %   10/04/19 0558 97.9 °F (36.6 °C)  18     10/04/19 0400  75 18  91 %   10/04/19 0100  77   93 %   10/03/19 2338 98 °F (36.7 °C) 78 16 151/69 95 %       Intake/Output Summary (Last 24 hours) at 10/4/2019 2206  Last data filed at 10/4/2019 0727  Gross per 24 hour   Intake    Output 450 ml   Net -450 ml        PHYSICAL EXAM:  General: WD, WN. Alert, cooperative, uncomfortable but not acute distress    EENT:  EOMI. Anicteric sclerae. MMM  Resp:  CTA bilaterally, no wheezing or rales. No accessory muscle use  CV:  Regular  rhythm,  No edema  GI:  Soft, Non distended, Non tender.  +Bowel sounds  Neurologic:  Alert and oriented X 3, normal speech,   Psych:   Not anxious nor agitated  Skin:  No rashes.   No jaundice    Reviewed most current lab test results and cultures  YES  Reviewed most current radiology test results   YES  Review and summation of old records today    NO  Reviewed patient's current orders and MAR    YES  PMH/SH reviewed - no change compared to H&P  ________________________________________________________________________  Care Plan discussed with:    Comments   Patient x    Family RN x    Care Manager     Consultant                        Multidiciplinary team rounds were held today with , nursing, pharmacist and clinical coordinator. Patient's plan of care was discussed; medications were reviewed and discharge planning was addressed. ________________________________________________________________________  Total NON critical care TIME: 25 Minutes    Total CRITICAL CARE TIME Spent:   Minutes non procedure based      Comments   >50% of visit spent in counseling and coordination of care x    ________________________________________________________________________  Lazarus Harm, DO     Procedures: see electronic medical records for all procedures/Xrays and details which were not copied into this note but were reviewed prior to creation of Plan. LABS:  I reviewed today's most current labs and imaging studies. Pertinent labs include:  Recent Labs     10/04/19  0450 10/03/19  0512 10/02/19  2128  10/02/19  0506   WBC  --   --   --   --  3.9*   HGB 8.9* 9.7* 8.6*   < > 8.6*   HCT 28.4* 29.4* 25.1*   < > 26.2*   PLT  --   --   --   --  246    < > = values in this interval not displayed.      Recent Labs     10/02/19  0506      K 3.8      CO2 28   *   BUN 12   CREA 1.25   CA 8.1*       Signed: Lazarus Harm, DO

## 2019-10-05 NOTE — DISCHARGE SUMMARY
Hospitalist Discharge Summary     Patient ID:  Gilma Garcia  [id number]  58 y.o.  1934  9/29/2019    PCP on record: Harriet Caldwell MD    Admit date: 9/29/2019  Discharge date and time: 10/5/2019    DISCHARGE DIAGNOSIS:    See below    CONSULTATIONS:  IP CONSULT TO Diana Johnson 157 CONSULT TO HOSPITALIST  IP CONSULT TO GASTROENTEROLOGY  IP CONSULT TO CARDIOLOGY    Excerpted HPI from H&P of Gisell English MD:  Karen Salcedo is a 80 y.o.  male who was just discharged yesterday after an admission for hematochezia. His work up included CT abdomen, nuclear med bleeding scan and colonoscopy but no definite bleeding source was identified. His plavix was stopped and he went home yesterday evening. This morning he had a bout of BRBPR and presented back to this ER. Stat CT abdomen/pelvis again did not show any source of bleeding. His initial Hg is 8.7 which is down from 9.5 yesterday. After 2 more bouts of hematochezia in the ER, his Hg dropped to 7.6.    ______________________________________________________________________  DISCHARGE SUMMARY/HOSPITAL COURSE:  for full details see H&P, daily progress notes, labs, consult notes. Symptomatic Acute blood loss anemia  Recurrent GI bleeding, unclear etiology  S/p EGD on 09/30, gastric lesion concerning for ischemia  -embolic work-up underway  -EUNICE: nml LVEF, mild MR, mild AI, no DULCE clot, MIGUEL, no PFO/ASD, mild plaquing of the Ao  -to set up for outpatient event recorder with cardiology  -s/p tranfusion, Hb stable last several days and up to 9.7 today  -will restart asa, discussed with patient and daughter. Pt was switched to plavix after recent TIA after being on asa for \"20 years prior\".  Patient and daughter do not wish for him to go back on plavix and will resume asa after discussion  -s/p pill cam, to follow up OP for results  -appreciate all consults  -cont PPI bid for now, less likely UGIB     Headache  -CTH negative  -resolved with fioricet     Recent  LLL MRSA community acquired pneumonia  -continue doxycycline to complete course.  Not hypoxic   -incentive spirometry  -contact isolation     CKD stage 3  -follow Cr     CAD hx stent x 1 (26 years ago)  HTN  LE edema   -hold asa, lasix.  At risk for hypotension with continued bleeding  -continue BB at half dose  - consider restarting home dose lasix if BP remains stable      Gout - hold allopurinol  COPD, not on home O2  -continue inhaler with nebs prn     TESSA on cpap  Morbid obesity  Hard of hearing, has hearing aid  Hx TIA 4/19  S/p L2-L5 decompression for spinal stenosis and back pain 5/19  -has not been using CPAP at home per daughter  -continue gabapentin ,weaned down to 100mg bid   _______________________________________________________________________  Patient seen and examined by me on discharge day. Pertinent Findings:  Gen:    Not in distress  Chest: Clear lungs  CVS:   Regular rhythm. No edema  Abd:  Soft, not distended, not tender  Neuro:  Alert, oriented x 3  _______________________________________________________________________  DISCHARGE MEDICATIONS:   Current Discharge Medication List      START taking these medications    Details   butalbital-acetaminophen-caffeine (FIORICET, ESGIC) -40 mg per tablet Take 1 Tab by mouth every six (6) hours as needed for Headache. Qty: 10 Tab, Refills: 0         CONTINUE these medications which have NOT CHANGED    Details   pantoprazole (PROTONIX) 40 mg tablet Take 1 Tab by mouth daily for 15 days. Qty: 15 Tab, Refills: 0      aspirin 81 mg chewable tablet Take 1 Tab by mouth daily for 30 days. Qty: 30 Tab, Refills: 0      Lactoperoxi/Gluc Oxid/Pot Thio (BIOTENE DRY MOUTH MM) 1 Lozenge by Mucous Membrane route. DULoxetine (CYMBALTA) 30 mg capsule Take 1 Cap by mouth daily.   Qty: 30 Cap, Refills: 6      atorvastatin (LIPITOR) 80 mg tablet TAKE ONE TABLET BY MOUTH DAILY  Qty: 90 Tab, Refills: 3      atenolol (TENORMIN) 50 mg tablet Take 1 Tab by mouth daily. Qty: 30 Tab, Refills: 5      gabapentin (NEURONTIN) 100 mg capsule Take 200 mg by mouth three (3) times daily. ANORO ELLIPTA 62.5-25 mcg/actuation inhaler Take 1 Puff by inhalation daily. furosemide (LASIX) 80 mg tablet Take 1 Tab by mouth daily. Qty: 90 Tab, Refills: 3      allopurinol (ZYLOPRIM) 300 mg tablet Take 1 Tab by mouth daily. Qty: 30 Tab, Refills: 6         STOP taking these medications       doxycycline (VIBRA-TABS) 100 mg tablet Comments:   Reason for Stopping:                 Patient Follow Up Instructions: Activity: PT/OT per Home Health  Diet: Resume previous diet  Wound Care: None needed    Follow-up with PCP one week.  Other follow-up as below  Follow-up tests/labs pill cam results to follow OP with gastroenterology    Follow-up Information     Follow up With Specialties Details Why Contact Info    Segun Guzman MD Internal Medicine Go on 10/17/2019 Hospital follow-up scheduled at 3:30pm ( If you have questions or need to reschedule please call [address]  [phone]      Viri Hernandez DO Cardiology Call  [phone] [facility]  [address]  [address] [facility]      Natacha Briseno MD Gastroenterology Call  [address]  [phone]      [address]  PT and Nursing services  [address] [place] [address]  [phone]    [facility]    [place] [id number]  [phone]        ________________________________________________________________    Risk of deterioration: Moderate    Condition at Discharge:  Stable  __________________________________________________________________    Disposition  Home with family and home health services    ____________________________________________________________________    Code Status: Full Code  ___________________________________________________________________      Total time in minutes spent coordinating this discharge (includes going over instructions, follow-up, prescriptions, and preparing report for sign off to her PCP) :  35 minutes    Signed:  Clearance Tootie DO

## 2019-10-05 NOTE — ROUTINE PROCESS
1900: Bedside shift change report given to Bud chiu RN (oncoming nurse) by Samantha Parra (offgoing nurse). Report included the following information Kardex. 0700: Bedside shift change report given to Carlyn Doherty RN (oncoming nurse) by Bud chiu RN (offgoing nurse). Report included the following information SBAR.

## 2019-10-05 NOTE — OP NOTES
[address]  OPERATIVE REPORT    Name:  Emily Hancock  MR#:  [id number]  :  1934  ACCOUNT #:  [de-identified]  DATE OF SERVICE:  10/04/2019      PREOPERATIVE DIAGNOSIS:  Recent episode of acute gastrointestinal blood loss anemia while on anticoagulation. POSTOPERATIVE DIAGNOSES:  Erythema and thickened gastric folds with some mucosal erosion and nonspecific erythema in the most distal small bowel unlikely source of bleeding. PROCEDURE PERFORMED:  Small bowel capsule endoscopy. SURGEON:  Charles Larson MD    ASSISTANT:  None. ANESTHESIA:  n/a. COMPLICATIONS:  None. SPECIMENS REMOVED:  None. IMPLANTS:  None. ESTIMATED BLOOD LOSS:  None. DESCRIPTION OF PROCEDURE:  The patient ingested the capsule in a typical fashion. After the risks, benefits and alternatives were explained in detail, including the risk of capsule retention in up to 1% to 2% of individuals. The capsule entered the esophagus and the stomach at 1 minute and 4 seconds. It exited the stomach at 20 minutes and 3 seconds for a total of 19 minutes. 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 Further distal in the stomach, in the prepyloric area, once again we saw some exuberant erythema overlying folds not typical of a watermelon stomach, but raises the possibility. Next first duodenal image at 20 minutes and 3 seconds, there was no AVM seen in the duodenum. There were several areas of erythema on tips of villi seen at 22 minutes and 5 seconds and 23 minutes and 35 seconds. Thereafter, we entered the colon at 5 hours 5 minutes and 37 seconds. Just proximal to the colon was some focal erythema seen near the ileocecal valve, which could be an AVM, but which is poorly formed. FINDINGS:  1.   Thickened hypertrophied fold of mucosal erythema and some superficial erosion in the stomach as previously mentioned on EGD. No bleeding in small bowel. 2.  Some possible AVM or nonspecific erythema just proximal to the ileocecal valve. No blood seen in GI tract. RECOMMENDATION:  The patient may require a repeat colonoscopy if he develops any GI bleeding at which time EGD could be entertained to document healing of this mucosal necrosis, otherwise proceed with anticoagulation.         Edward Nichols MD [id number]  D:  10/04/2019 19:25  T:  10/04/2019 23:41  JOB #:  [id number]

## 2019-10-06 ENCOUNTER — HOME CARE VISIT (OUTPATIENT)
Dept: SCHEDULING | Facility: HOME HEALTH | Age: 84
End: 2019-10-06
Payer: MEDICARE

## 2019-10-06 VITALS
TEMPERATURE: 98 F | DIASTOLIC BLOOD PRESSURE: 68 MMHG | BODY MASS INDEX: 34.25 KG/M2 | OXYGEN SATURATION: 97 % | WEIGHT: 225.97 LBS | SYSTOLIC BLOOD PRESSURE: 118 MMHG | HEART RATE: 67 BPM | RESPIRATION RATE: 18 BRPM | HEIGHT: 68 IN

## 2019-10-06 PROCEDURE — 3331090001 HH PPS REVENUE CREDIT

## 2019-10-06 PROCEDURE — G0299 HHS/HOSPICE OF RN EA 15 MIN: HCPCS

## 2019-10-06 PROCEDURE — 400013 HH SOC

## 2019-10-06 PROCEDURE — 3331090002 HH PPS REVENUE DEBIT

## 2019-10-07 ENCOUNTER — HOME CARE VISIT (OUTPATIENT)
Dept: SCHEDULING | Facility: HOME HEALTH | Age: 84
End: 2019-10-07
Payer: MEDICARE

## 2019-10-07 VITALS
SYSTOLIC BLOOD PRESSURE: 118 MMHG | RESPIRATION RATE: 18 BRPM | HEART RATE: 80 BPM | OXYGEN SATURATION: 97 % | DIASTOLIC BLOOD PRESSURE: 90 MMHG | TEMPERATURE: 98.5 F

## 2019-10-07 PROCEDURE — 3331090002 HH PPS REVENUE DEBIT

## 2019-10-07 PROCEDURE — 3331090001 HH PPS REVENUE CREDIT

## 2019-10-07 PROCEDURE — G0151 HHCP-SERV OF PT,EA 15 MIN: HCPCS

## 2019-10-07 NOTE — PROGRESS NOTES
Transitions of Care - Pharmacist Discharge Medication Reconciliation     S/O: Mr. Golden Bosch 80 y.o., referred by HUG team, to pharmacy for transitions of care. Patient was recently hospitalized at [facility] from 9/29/19 to 10/5/19. Patient's PCP: Dariela Sage MD       Past Medical History:   Diagnosis Date    Adverse effect of anesthesia     combative after anesthesia    Alcohol abuse     6 beers/day    Arthritis     CAD (coronary artery disease)     Chronic obstructive pulmonary disease ([facility] [address]     Dyslipidemia 8/16/2017    Dyspepsia and other specified disorders of function of stomach     Dyspnea 8/16/2017    ED (erectile dysfunction) 8/16/2017    Encounter for immunization 8/16/2017    Fatigue 8/16/2017    Flank pain 8/1/2019    GERD (gastroesophageal reflux disease) 8/16/2017    Gout 8/16/2017    Hypertension     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    Morbid obesity ([facility] [address]     On statin therapy 8/16/2017    TESSA on CPAP 1/3/2019    Psychiatric disorder     Depression    Simple chronic bronchitis ([facility] [address] 1/3/2019    TIA (transient ischemic attack) 9/26/4612    Umbilical hernia 15/5/2119     Reason for hospitalization: GI Bleed    HUG/Bundle patient?: YES  Core Measure: Other - GI Bleed    Did patient receive antibiotic therapy during hospitalization?: YES  If yes: For CAP, completed 5 day course of doxycycline on 10/4/19  Discharged on abx?: NO - therapy completed    Is patient on anticoagulation?: NO  Antiplatelet therapy?: ASA 81 mg    Other pertinent findings:   S/p EGD on 9/30/19; gastric lesion concerning for ischemia. EUNICE negative with normal LVEF. Continue medical therapy. 30-day EVR to be set up by cardiology. S/p pillcam study; will follow-up with results outpatient. Okay to continue ASA. Continue PPI for now but EGD results less likely UGIB per provider.   Restarted BB and furosemide after resolution of bleed; HgB stable on discharge  Fioricet added for headache    Admission medication history completed by pharmacy?: NO     Patient was discharged by Dr. Lance Greene with the following medication list:    Discharge Medication List as of 10/5/2019 11:58 AM        START taking these medications    Details   butalbital-acetaminophen-caffeine (FIORICET, ESGIC) -40 mg per tablet Take 1 Tab by mouth every six (6) hours as needed for Headache., Normal, Disp-10 Tab, R-0           CONTINUE these medications which have NOT CHANGED    Details   pantoprazole (PROTONIX) 40 mg tablet Take 1 Tab by mouth daily for 15 days. , Print, Disp-15 Tab, R-0      aspirin 81 mg chewable tablet Take 1 Tab by mouth daily for 30 days. , Print, Disp-30 Tab, R-0      Lactoperoxi/Gluc Oxid/Pot Thio (BIOTENE DRY MOUTH MM) 1 Lozenge by Mucous Membrane route., Historical Med      DULoxetine (CYMBALTA) 30 mg capsule Take 1 Cap by mouth daily. , Normal, Disp-30 Cap, R-6      atorvastatin (LIPITOR) 80 mg tablet TAKE ONE TABLET BY MOUTH DAILY, Normal, Disp-90 Tab, R-3      atenolol (TENORMIN) 50 mg tablet Take 1 Tab by mouth daily. , Normal, Disp-30 Tab, R-5      gabapentin (NEURONTIN) 100 mg capsule Take 200 mg by mouth three (3) times daily. , Historical Med      ANORO ELLIPTA 62.5-25 mcg/actuation inhaler Take 1 Puff by inhalation daily. , Historical Med, KIMBERLY      furosemide (LASIX) 80 mg tablet Take 1 Tab by mouth daily. , Normal, Disp-90 Tab, R-3      allopurinol (ZYLOPRIM) 300 mg tablet Take 1 Tab by mouth daily. , Normal, Disp-30 Tab, R-6           STOP taking these medications       doxycycline (VIBRA-TABS) 100 mg tablet Comments:   Reason for Stopping:               Recommendation(s)/Plan(s): Gabapentin weaned down to 100 mg TID during admission but patient was discharged back on home regimen.  Consider adjusting therapy if appropriate    Other interventions completed by pharmacy (patient education, medication access, changes to AVS, etc.): None    Thank you,  Lucila Freeman, AJCOBD

## 2019-10-08 PROCEDURE — 3331090001 HH PPS REVENUE CREDIT

## 2019-10-08 PROCEDURE — 3331090002 HH PPS REVENUE DEBIT

## 2019-10-09 ENCOUNTER — HOME CARE VISIT (OUTPATIENT)
Dept: HOME HEALTH SERVICES | Facility: HOME HEALTH | Age: 84
End: 2019-10-09
Payer: MEDICARE

## 2019-10-09 VITALS
HEART RATE: 97 BPM | RESPIRATION RATE: 18 BRPM | OXYGEN SATURATION: 97 % | DIASTOLIC BLOOD PRESSURE: 60 MMHG | SYSTOLIC BLOOD PRESSURE: 122 MMHG | TEMPERATURE: 98.5 F

## 2019-10-09 PROCEDURE — 3331090001 HH PPS REVENUE CREDIT

## 2019-10-09 PROCEDURE — 3331090002 HH PPS REVENUE DEBIT

## 2019-10-09 PROCEDURE — G0299 HHS/HOSPICE OF RN EA 15 MIN: HCPCS

## 2019-10-10 PROCEDURE — 3331090001 HH PPS REVENUE CREDIT

## 2019-10-10 PROCEDURE — 3331090002 HH PPS REVENUE DEBIT

## 2019-10-11 ENCOUNTER — HOME CARE VISIT (OUTPATIENT)
Dept: SCHEDULING | Facility: HOME HEALTH | Age: 84
End: 2019-10-11
Payer: MEDICARE

## 2019-10-11 VITALS
SYSTOLIC BLOOD PRESSURE: 140 MMHG | HEART RATE: 70 BPM | DIASTOLIC BLOOD PRESSURE: 70 MMHG | OXYGEN SATURATION: 95 % | RESPIRATION RATE: 18 BRPM

## 2019-10-11 PROCEDURE — 3331090001 HH PPS REVENUE CREDIT

## 2019-10-11 PROCEDURE — G0151 HHCP-SERV OF PT,EA 15 MIN: HCPCS

## 2019-10-11 PROCEDURE — 3331090002 HH PPS REVENUE DEBIT

## 2019-10-12 PROCEDURE — 3331090001 HH PPS REVENUE CREDIT

## 2019-10-12 PROCEDURE — 3331090002 HH PPS REVENUE DEBIT

## 2019-10-13 PROCEDURE — 3331090001 HH PPS REVENUE CREDIT

## 2019-10-13 PROCEDURE — 3331090002 HH PPS REVENUE DEBIT

## 2019-10-14 ENCOUNTER — HOME CARE VISIT (OUTPATIENT)
Dept: SCHEDULING | Facility: HOME HEALTH | Age: 84
End: 2019-10-14
Payer: MEDICARE

## 2019-10-14 VITALS
RESPIRATION RATE: 18 BRPM | OXYGEN SATURATION: 97 % | SYSTOLIC BLOOD PRESSURE: 130 MMHG | TEMPERATURE: 98.5 F | DIASTOLIC BLOOD PRESSURE: 70 MMHG | HEART RATE: 70 BPM

## 2019-10-14 PROCEDURE — G0299 HHS/HOSPICE OF RN EA 15 MIN: HCPCS

## 2019-10-14 PROCEDURE — 3331090001 HH PPS REVENUE CREDIT

## 2019-10-14 PROCEDURE — 3331090002 HH PPS REVENUE DEBIT

## 2019-10-15 PROCEDURE — 3331090001 HH PPS REVENUE CREDIT

## 2019-10-15 PROCEDURE — 3331090002 HH PPS REVENUE DEBIT

## 2019-10-16 ENCOUNTER — HOME CARE VISIT (OUTPATIENT)
Dept: SCHEDULING | Facility: HOME HEALTH | Age: 84
End: 2019-10-16
Payer: MEDICARE

## 2019-10-16 PROCEDURE — 3331090001 HH PPS REVENUE CREDIT

## 2019-10-16 PROCEDURE — G0151 HHCP-SERV OF PT,EA 15 MIN: HCPCS

## 2019-10-16 PROCEDURE — 3331090002 HH PPS REVENUE DEBIT

## 2019-10-17 ENCOUNTER — OFFICE VISIT (OUTPATIENT)
Dept: INTERNAL MEDICINE CLINIC | Age: 84
End: 2019-10-17

## 2019-10-17 VITALS
SYSTOLIC BLOOD PRESSURE: 138 MMHG | HEIGHT: 68 IN | DIASTOLIC BLOOD PRESSURE: 73 MMHG | WEIGHT: 216 LBS | BODY MASS INDEX: 32.74 KG/M2 | HEART RATE: 69 BPM | TEMPERATURE: 97.7 F | OXYGEN SATURATION: 96 %

## 2019-10-17 VITALS
HEART RATE: 67 BPM | SYSTOLIC BLOOD PRESSURE: 140 MMHG | RESPIRATION RATE: 18 BRPM | DIASTOLIC BLOOD PRESSURE: 70 MMHG | OXYGEN SATURATION: 96 %

## 2019-10-17 DIAGNOSIS — I25.10 CORONARY ARTERY DISEASE INVOLVING NATIVE CORONARY ARTERY OF NATIVE HEART WITHOUT ANGINA PECTORIS: ICD-10-CM

## 2019-10-17 DIAGNOSIS — K62.5 RECTAL BLEEDING: ICD-10-CM

## 2019-10-17 DIAGNOSIS — D64.9 ANEMIA, UNSPECIFIED TYPE: ICD-10-CM

## 2019-10-17 DIAGNOSIS — I10 ESSENTIAL HYPERTENSION: ICD-10-CM

## 2019-10-17 PROCEDURE — 3331090002 HH PPS REVENUE DEBIT

## 2019-10-17 PROCEDURE — 3331090001 HH PPS REVENUE CREDIT

## 2019-10-17 RX ORDER — PANTOPRAZOLE SODIUM 40 MG/1
40 TABLET, DELAYED RELEASE ORAL DAILY
Qty: 90 TAB | Refills: 3 | Status: SHIPPED | OUTPATIENT
Start: 2019-10-17 | End: 2020-09-22

## 2019-10-17 NOTE — PROGRESS NOTES
This note will not be viewable in [address] Ai Lozada is a 80 y.o. male and presents with Transitions Of Care  . Subjective:  Mr. Jennifer Coyne presents today for transition of care follow-up after hospitalization for multiple problems including GI bleed. He has multiple medical problems including coronary disease, hypertension, chronic kidney disease, history of recent lumbar laminectomy, and sleep apnea. He had a follow-up hemoglobin after discharge from the hospital which was 10.1 at his gastroenterologist office. He has hemoglobin at discharge was 8.5. His Plavix was discontinued. 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 He has not had any recurring bleeding. He has resumed aspirin 81 mg daily and was placed on pantoprazole in lieu of Prevacid. He complains of itching head to toe which is been present for months. A lot of his blood pressure medication was discontinued because of hypotension while hospitalized. He remains on atenolol. Review of Systems  Constitutional:   Eyes:   negative for visual disturbance and irritation  ENT:   negative for tinnitus,sore throat,nasal congestion,ear pains. hoarseness  Respiratory:  negative for cough, hemoptysis, dyspnea,wheezing  CV:   negative for chest pain, palpitations, lower extremity edema  GI:   negative for nausea, vomiting, diarrhea, abdominal pain,melena  Endo:               negative for polyuria,polydipsia,polyphagia,heat intolerance  Genitourinary: negative for frequency, dysuria and hematuria  Integumentary: negative for rash and pruritus  Hematologic:  negative for easy bruising and gum/nose bleeding  Musculoskel: negative for myalgias, arthralgias, back pain, muscle weakness, joint pain  Neurological:  negative for headaches, dizziness, vertigo, memory problems and gait   Behavl/Psych: negative for feelings of anxiety, depression, mood changes    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    Morbid obesity ([facility] [address]     On statin therapy 8/16/2017    TESSA on CPAP 1/3/2019    Psychiatric disorder     Depression    Simple chronic bronchitis ([facility] [address] 1/3/2019    TIA (transient ischemic attack) 0/93/6775    Umbilical hernia 61/2/1541     Past Surgical History:   Procedure Laterality Date    CARDIAC SURG PROCEDURE UNLIST  1996    Cardiac Stents    CARDIAC SURG PROCEDURE UNLIST  04/2019    EF 61-65%    COLONOSCOPY N/A 9/27/2019    COLONOSCOPY performed by Jimmy Ferguson MD at [facility] ENDOSCOPY    HX HERNIA REPAIR      [facility]    HX HERNIA REPAIR  77/33/34    open umbilical hernia repair mesh    HX UROLOGICAL      HYDROCELECTOMY    UPPER GI ENDOSCOPY,BIOPSY  9/30/2019          Social History     Socioeconomic History    Marital status:      Spouse name: Not on file    Number of children: Not on file    Years of education: Not on file    Highest education level: Not on file   Tobacco Use    Smoking status: Former Smoker     Packs/day: 0.50     Years: 20.00     Pack years: 10.00    Smokeless tobacco: Former User    Tobacco comment: quit about 40  years ago   / used to dip tobaco and dip snuff   Substance and Sexual Activity    Alcohol use: Not Currently     Comment: \"Drinks very little now for about a month \"    Drug use: No     Family History   Problem Relation Age of Onset    Heart Disease Mother     Hypertension Mother     Hypertension Father     Hypertension Sister     Hypertension Brother     Cancer Brother      Current Outpatient Medications   Medication Sig Dispense Refill    pantoprazole (PROTONIX) 40 mg tablet Take 1 Tab by mouth daily. 90 Tab 3    aspirin 81 mg chewable tablet Take 1 Tab by mouth daily for 30 days. 30 Tab 0    Lactoperoxi/Gluc Oxid/Pot Thio (BIOTENE DRY MOUTH MM) 1 Lozenge by Mucous Membrane route.  DULoxetine (CYMBALTA) 30 mg capsule Take 1 Cap by mouth daily. 30 Cap 6    atorvastatin (LIPITOR) 80 mg tablet TAKE ONE TABLET BY MOUTH DAILY 90 Tab 3    atenolol (TENORMIN) 50 mg tablet Take 1 Tab by mouth daily. 30 Tab 5    ANORO ELLIPTA 62.5-25 mcg/actuation inhaler Take 1 Puff by inhalation daily.  furosemide (LASIX) 80 mg tablet Take 1 Tab by mouth daily. 90 Tab 3    gabapentin (NEURONTIN) 100 mg capsule Take 100 mg by mouth three (3) times daily.  butalbital-acetaminophen-caffeine (FIORICET, ESGIC) -40 mg per tablet Take 1 Tab by mouth every six (6) hours as needed for Headache.  10 Tab 0     Allergies   Allergen Reactions    Levaquin [Levofloxacin] Nausea and Vomiting     Reports anxiety, nausea, aching after taking levaquin    Ciprofloxacin Shortness of Breath    Quinolones Shortness of Breath       Objective:  Visit Vitals  /73 (BP 1 Location: Left arm, BP Patient Position: Sitting)   Pulse 69   Temp 97.7 °F (36.5 °C) (Oral)   Ht 5' 8\" (1.727 m)   Wt 216 lb (98 kg)   SpO2 96%   BMI 32.84 kg/m²     Physical Exam:   General appearance - alert, well appearing, and in no distress  Mental status - alert, oriented to person, place, and time  EYE-MAURICE, EOMI, fundi normal, corneas normal, no foreign bodies  ENT-ENT exam normal, no neck nodes or sinus tenderness  Nose - normal and patent, no erythema, discharge or polyps  Mouth - mucous membranes moist, pharynx normal without lesions  Neck - supple, no significant adenopathy   Chest - clear to auscultation, no wheezes, rales or rhonchi, symmetric air entry   Heart - normal rate, regular rhythm, normal S1, S2, no murmurs, rubs, clicks or gallops   Abdomen - soft, nontender, nondistended, no masses or organomegaly  Lymph- no adenopathy palpable  Ext-peripheral pulses normal, no pedal edema, no clubbing or cyanosis  Skin-Warm and dry. no hyperpigmentation, vitiligo, or suspicious lesions  Neuro -alert, oriented, normal speech, no focal findings or movement disorder noted  Musculoskeletal- FROM, no bony abnormalities, no point tenderness    No results found for this visit on 10/17/19. All results for lab orders may not have been returned by the time this encountered was closed. Assessment/Plan:       ICD-10-CM ICD-9-CM    1. Transition of care performed with sharing of clinical summary Z91.89 V15.89    2. Coronary artery disease involving native coronary artery of native heart without angina pectoris I25.10 414.01    3. Essential hypertension I10 401.9    4. Rectal bleeding K62.5 569.3    5. Anemia, unspecified type D64.9 285.9 CBC WITH AUTOMATED DIFF       Orders Placed This Encounter    CBC WITH AUTOMATED DIFF     Standing Status:   Future     Standing Expiration Date:   11/14/2019    pantoprazole (PROTONIX) 40 mg tablet     Sig: Take 1 Tab by mouth daily. Dispense:  90 Tab     Refill:  3       Follow-up and Dispositions    · Return for as scheduled. Plan:    Follow-up with GI and colorectal surgery. If the patient has no further bleeding no further testing or intervention is likely to be required. If he does have recurring bleeding while on baby aspirin may need to consider further intervention. Continue pantoprazole as prescribed. Discontinue allopurinol which may be the source of his pruritus. Follow-up as scheduled.   He is given a order to have a follow-up hemoglobin done in 2 weeks.        I have reviewed with the patient details of the assessment and plan and all questions were answered. Relevent patient education was performed. Verbal and/or written instructions (see AVS) provided. The most recent lab findings were reviewed with the patient. Plan was discussed with patient who verbal expressed understanding. An After Visit Summary was printed and given to the patient.       Reeda Soulier, MD

## 2019-10-17 NOTE — PROGRESS NOTES
Reviewed record in preparation for visit and have obtained necessary documentation. Identified pt with two pt identifiers(name and ). Chief Complaint   Patient presents with    Transitions Of Care        Coordination of Care Questionnaire:  :     1) Have you been to an emergency room, urgent care clinic since your last visit? Yes     Hospitalized since your last visit? Yes 2019-10/5/2019 [facility]              2) Have you seen or consulted any other health care providers outside of [address] [place] [address] since your last visit?

## 2019-10-18 ENCOUNTER — HOME CARE VISIT (OUTPATIENT)
Dept: SCHEDULING | Facility: HOME HEALTH | Age: 84
End: 2019-10-18
Payer: MEDICARE

## 2019-10-18 PROCEDURE — G0151 HHCP-SERV OF PT,EA 15 MIN: HCPCS

## 2019-10-18 PROCEDURE — 3331090002 HH PPS REVENUE DEBIT

## 2019-10-18 PROCEDURE — G0299 HHS/HOSPICE OF RN EA 15 MIN: HCPCS

## 2019-10-18 PROCEDURE — 3331090001 HH PPS REVENUE CREDIT

## 2019-10-19 VITALS
DIASTOLIC BLOOD PRESSURE: 70 MMHG | OXYGEN SATURATION: 96 % | RESPIRATION RATE: 18 BRPM | HEART RATE: 69 BPM | TEMPERATURE: 98 F | SYSTOLIC BLOOD PRESSURE: 120 MMHG

## 2019-10-19 VITALS
HEART RATE: 70 BPM | OXYGEN SATURATION: 98 % | SYSTOLIC BLOOD PRESSURE: 128 MMHG | DIASTOLIC BLOOD PRESSURE: 80 MMHG | RESPIRATION RATE: 18 BRPM | TEMPERATURE: 98.2 F

## 2019-10-19 PROCEDURE — 3331090002 HH PPS REVENUE DEBIT

## 2019-10-19 PROCEDURE — 3331090001 HH PPS REVENUE CREDIT

## 2019-10-20 PROCEDURE — 3331090002 HH PPS REVENUE DEBIT

## 2019-10-20 PROCEDURE — 3331090001 HH PPS REVENUE CREDIT

## 2019-10-21 ENCOUNTER — HOME CARE VISIT (OUTPATIENT)
Dept: SCHEDULING | Facility: HOME HEALTH | Age: 84
End: 2019-10-21
Payer: MEDICARE

## 2019-10-21 VITALS
DIASTOLIC BLOOD PRESSURE: 70 MMHG | RESPIRATION RATE: 18 BRPM | HEART RATE: 70 BPM | TEMPERATURE: 97.8 F | OXYGEN SATURATION: 96 % | SYSTOLIC BLOOD PRESSURE: 130 MMHG

## 2019-10-21 PROCEDURE — 3331090001 HH PPS REVENUE CREDIT

## 2019-10-21 PROCEDURE — G0300 HHS/HOSPICE OF LPN EA 15 MIN: HCPCS

## 2019-10-21 PROCEDURE — G0151 HHCP-SERV OF PT,EA 15 MIN: HCPCS

## 2019-10-21 PROCEDURE — 3331090002 HH PPS REVENUE DEBIT

## 2019-10-22 PROCEDURE — 3331090001 HH PPS REVENUE CREDIT

## 2019-10-22 PROCEDURE — 3331090002 HH PPS REVENUE DEBIT

## 2019-10-23 VITALS
TEMPERATURE: 97.8 F | RESPIRATION RATE: 18 BRPM | OXYGEN SATURATION: 96 % | DIASTOLIC BLOOD PRESSURE: 70 MMHG | HEART RATE: 70 BPM | SYSTOLIC BLOOD PRESSURE: 130 MMHG

## 2019-10-23 PROCEDURE — 3331090001 HH PPS REVENUE CREDIT

## 2019-10-23 PROCEDURE — 3331090002 HH PPS REVENUE DEBIT

## 2019-10-24 ENCOUNTER — HOME CARE VISIT (OUTPATIENT)
Dept: SCHEDULING | Facility: HOME HEALTH | Age: 84
End: 2019-10-24
Payer: MEDICARE

## 2019-10-24 PROCEDURE — 3331090002 HH PPS REVENUE DEBIT

## 2019-10-24 PROCEDURE — G0151 HHCP-SERV OF PT,EA 15 MIN: HCPCS

## 2019-10-24 PROCEDURE — 3331090001 HH PPS REVENUE CREDIT

## 2019-10-25 ENCOUNTER — HOME CARE VISIT (OUTPATIENT)
Dept: SCHEDULING | Facility: HOME HEALTH | Age: 84
End: 2019-10-25
Payer: MEDICARE

## 2019-10-25 VITALS
RESPIRATION RATE: 18 BRPM | DIASTOLIC BLOOD PRESSURE: 70 MMHG | TEMPERATURE: 98.5 F | HEART RATE: 71 BPM | OXYGEN SATURATION: 97 % | SYSTOLIC BLOOD PRESSURE: 130 MMHG

## 2019-10-25 VITALS
HEART RATE: 70 BPM | SYSTOLIC BLOOD PRESSURE: 130 MMHG | DIASTOLIC BLOOD PRESSURE: 70 MMHG | OXYGEN SATURATION: 97 % | RESPIRATION RATE: 18 BRPM | TEMPERATURE: 98 F

## 2019-10-25 PROCEDURE — 3331090002 HH PPS REVENUE DEBIT

## 2019-10-25 PROCEDURE — G0299 HHS/HOSPICE OF RN EA 15 MIN: HCPCS

## 2019-10-25 PROCEDURE — 3331090001 HH PPS REVENUE CREDIT

## 2019-10-26 PROCEDURE — 3331090002 HH PPS REVENUE DEBIT

## 2019-10-26 PROCEDURE — 3331090001 HH PPS REVENUE CREDIT

## 2019-10-27 ENCOUNTER — APPOINTMENT (OUTPATIENT)
Dept: CT IMAGING | Age: 84
DRG: 069 | End: 2019-10-27
Attending: EMERGENCY MEDICINE
Payer: MEDICARE

## 2019-10-27 ENCOUNTER — HOSPITAL ENCOUNTER (INPATIENT)
Age: 84
LOS: 1 days | Discharge: HOME | DRG: 069 | End: 2019-10-28
Attending: EMERGENCY MEDICINE | Admitting: HOSPITALIST
Payer: MEDICARE

## 2019-10-27 DIAGNOSIS — R41.82 ALTERED MENTAL STATUS, UNSPECIFIED ALTERED MENTAL STATUS TYPE: ICD-10-CM

## 2019-10-27 DIAGNOSIS — I10 BENIGN ESSENTIAL HTN: ICD-10-CM

## 2019-10-27 DIAGNOSIS — R53.1 LEFT-SIDED WEAKNESS: Primary | ICD-10-CM

## 2019-10-27 DIAGNOSIS — N17.9 AKI (ACUTE KIDNEY INJURY) (HCC): ICD-10-CM

## 2019-10-27 DIAGNOSIS — G44.201 ACUTE INTRACTABLE TENSION-TYPE HEADACHE: ICD-10-CM

## 2019-10-27 DIAGNOSIS — K62.5 RECTAL BLEEDING: ICD-10-CM

## 2019-10-27 DIAGNOSIS — G45.9 TIA (TRANSIENT ISCHEMIC ATTACK): ICD-10-CM

## 2019-10-27 DIAGNOSIS — E78.5 DYSLIPIDEMIA, GOAL LDL BELOW 70: ICD-10-CM

## 2019-10-27 PROBLEM — R29.810 FACIAL DROOP: Status: ACTIVE | Noted: 2019-10-27

## 2019-10-27 LAB
ALBUMIN SERPL-MCNC: 3.5 G/DL (ref 3.5–5)
ALBUMIN/GLOB SERPL: 0.9 {RATIO} (ref 1.1–2.2)
ALP SERPL-CCNC: 175 U/L (ref 45–117)
ALT SERPL-CCNC: 27 U/L (ref 12–78)
ANION GAP SERPL CALC-SCNC: 11 MMOL/L (ref 5–15)
APPEARANCE UR: CLEAR
AST SERPL-CCNC: 30 U/L (ref 15–37)
BASOPHILS # BLD: 0.1 K/UL (ref 0–0.1)
BASOPHILS NFR BLD: 1 % (ref 0–1)
BILIRUB SERPL-MCNC: 0.3 MG/DL (ref 0.2–1)
BILIRUB UR QL: NEGATIVE
BUN SERPL-MCNC: 26 MG/DL (ref 6–20)
BUN/CREAT SERPL: 14 (ref 12–20)
CALCIUM SERPL-MCNC: 8.8 MG/DL (ref 8.5–10.1)
CHLORIDE SERPL-SCNC: 104 MMOL/L (ref 97–108)
CK SERPL-CCNC: 63 U/L (ref 39–308)
CO2 SERPL-SCNC: 25 MMOL/L (ref 21–32)
COLOR UR: NORMAL
CREAT SERPL-MCNC: 1.83 MG/DL (ref 0.7–1.3)
DIFFERENTIAL METHOD BLD: ABNORMAL
EOSINOPHIL # BLD: 0.2 K/UL (ref 0–0.4)
EOSINOPHIL NFR BLD: 3 % (ref 0–7)
ERYTHROCYTE [DISTWIDTH] IN BLOOD BY AUTOMATED COUNT: 16.8 % (ref 11.5–14.5)
GLOBULIN SER CALC-MCNC: 3.9 G/DL (ref 2–4)
GLUCOSE BLD STRIP.AUTO-MCNC: 118 MG/DL (ref 65–100)
GLUCOSE SERPL-MCNC: 113 MG/DL (ref 65–100)
GLUCOSE UR STRIP.AUTO-MCNC: NEGATIVE MG/DL
HCT VFR BLD AUTO: 38.7 % (ref 36.6–50.3)
HGB BLD-MCNC: 12 G/DL (ref 12.1–17)
HGB UR QL STRIP: NEGATIVE
IMM GRANULOCYTES # BLD AUTO: 0 K/UL (ref 0–0.04)
IMM GRANULOCYTES NFR BLD AUTO: 0 % (ref 0–0.5)
INR PPP: 1.2 (ref 0.9–1.1)
KETONES UR QL STRIP.AUTO: NEGATIVE MG/DL
LEUKOCYTE ESTERASE UR QL STRIP.AUTO: NEGATIVE
LYMPHOCYTES # BLD: 1.1 K/UL (ref 0.8–3.5)
LYMPHOCYTES NFR BLD: 17 % (ref 12–49)
MCH RBC QN AUTO: 30.1 PG (ref 26–34)
MCHC RBC AUTO-ENTMCNC: 31 G/DL (ref 30–36.5)
MCV RBC AUTO: 97 FL (ref 80–99)
MONOCYTES # BLD: 0.6 K/UL (ref 0–1)
MONOCYTES NFR BLD: 9 % (ref 5–13)
NEUTS SEG # BLD: 4.7 K/UL (ref 1.8–8)
NEUTS SEG NFR BLD: 71 % (ref 32–75)
NITRITE UR QL STRIP.AUTO: NEGATIVE
NRBC # BLD: 0 K/UL (ref 0–0.01)
NRBC BLD-RTO: 0 PER 100 WBC
PH UR STRIP: 6 [PH] (ref 5–8)
PLATELET # BLD AUTO: 247 K/UL (ref 150–400)
PMV BLD AUTO: 10 FL (ref 8.9–12.9)
POTASSIUM SERPL-SCNC: 4.2 MMOL/L (ref 3.5–5.1)
PROT SERPL-MCNC: 7.4 G/DL (ref 6.4–8.2)
PROT UR STRIP-MCNC: NEGATIVE MG/DL
PROTHROMBIN TIME: 12.2 SEC (ref 9–11.1)
RBC # BLD AUTO: 3.99 M/UL (ref 4.1–5.7)
SERVICE CMNT-IMP: ABNORMAL
SODIUM SERPL-SCNC: 140 MMOL/L (ref 136–145)
SP GR UR REFRACTOMETRY: 1.01 (ref 1–1.03)
TROPONIN I SERPL-MCNC: <0.05 NG/ML
UROBILINOGEN UR QL STRIP.AUTO: 0.2 EU/DL (ref 0.2–1)
WBC # BLD AUTO: 6.7 K/UL (ref 4.1–11.1)

## 2019-10-27 PROCEDURE — 80053 COMPREHEN METABOLIC PANEL: CPT

## 2019-10-27 PROCEDURE — 70450 CT HEAD/BRAIN W/O DYE: CPT

## 2019-10-27 PROCEDURE — 84484 ASSAY OF TROPONIN QUANT: CPT

## 2019-10-27 PROCEDURE — 3331090001 HH PPS REVENUE CREDIT

## 2019-10-27 PROCEDURE — 82550 ASSAY OF CK (CPK): CPT

## 2019-10-27 PROCEDURE — 70496 CT ANGIOGRAPHY HEAD: CPT

## 2019-10-27 PROCEDURE — 74011250636 HC RX REV CODE- 250/636

## 2019-10-27 PROCEDURE — 85610 PROTHROMBIN TIME: CPT

## 2019-10-27 PROCEDURE — 3331090002 HH PPS REVENUE DEBIT

## 2019-10-27 PROCEDURE — 85025 COMPLETE CBC W/AUTO DIFF WBC: CPT

## 2019-10-27 PROCEDURE — 36415 COLL VENOUS BLD VENIPUNCTURE: CPT

## 2019-10-27 PROCEDURE — 96374 THER/PROPH/DIAG INJ IV PUSH: CPT

## 2019-10-27 PROCEDURE — 0042T CT CODE NEURO PERF W CBF: CPT

## 2019-10-27 PROCEDURE — 65660000000 HC RM CCU STEPDOWN

## 2019-10-27 PROCEDURE — 74011250636 HC RX REV CODE- 250/636: Performed by: EMERGENCY MEDICINE

## 2019-10-27 PROCEDURE — 96375 TX/PRO/DX INJ NEW DRUG ADDON: CPT

## 2019-10-27 PROCEDURE — 82962 GLUCOSE BLOOD TEST: CPT

## 2019-10-27 PROCEDURE — 74011636320 HC RX REV CODE- 636/320: Performed by: EMERGENCY MEDICINE

## 2019-10-27 PROCEDURE — 74011250637 HC RX REV CODE- 250/637: Performed by: EMERGENCY MEDICINE

## 2019-10-27 PROCEDURE — 99285 EMERGENCY DEPT VISIT HI MDM: CPT

## 2019-10-27 PROCEDURE — 81003 URINALYSIS AUTO W/O SCOPE: CPT

## 2019-10-27 RX ORDER — DULOXETIN HYDROCHLORIDE 30 MG/1
30 CAPSULE, DELAYED RELEASE ORAL DAILY
Status: DISCONTINUED | OUTPATIENT
Start: 2019-10-28 | End: 2019-10-28 | Stop reason: HOSPADM

## 2019-10-27 RX ORDER — FENTANYL CITRATE 50 UG/ML
50 INJECTION, SOLUTION INTRAMUSCULAR; INTRAVENOUS
Status: COMPLETED | OUTPATIENT
Start: 2019-10-27 | End: 2019-10-27

## 2019-10-27 RX ORDER — PANTOPRAZOLE SODIUM 40 MG/1
40 TABLET, DELAYED RELEASE ORAL DAILY
Status: DISCONTINUED | OUTPATIENT
Start: 2019-10-28 | End: 2019-10-28 | Stop reason: HOSPADM

## 2019-10-27 RX ORDER — ACETAMINOPHEN 650 MG/1
650 SUPPOSITORY RECTAL
Status: DISCONTINUED | OUTPATIENT
Start: 2019-10-27 | End: 2019-10-28 | Stop reason: HOSPADM

## 2019-10-27 RX ORDER — ATORVASTATIN CALCIUM 40 MG/1
80 TABLET, FILM COATED ORAL
Status: DISCONTINUED | OUTPATIENT
Start: 2019-10-27 | End: 2019-10-28 | Stop reason: HOSPADM

## 2019-10-27 RX ORDER — ONDANSETRON 2 MG/ML
4 INJECTION INTRAMUSCULAR; INTRAVENOUS
Status: COMPLETED | OUTPATIENT
Start: 2019-10-27 | End: 2019-10-27

## 2019-10-27 RX ORDER — DIAZEPAM 10 MG/2ML
2 INJECTION INTRAMUSCULAR
Status: DISCONTINUED | OUTPATIENT
Start: 2019-10-27 | End: 2019-10-27

## 2019-10-27 RX ORDER — GABAPENTIN 100 MG/1
100 CAPSULE ORAL 3 TIMES DAILY
Status: DISCONTINUED | OUTPATIENT
Start: 2019-10-27 | End: 2019-10-28 | Stop reason: HOSPADM

## 2019-10-27 RX ORDER — HYDRALAZINE HYDROCHLORIDE 20 MG/ML
10 INJECTION INTRAMUSCULAR; INTRAVENOUS
Status: DISCONTINUED | OUTPATIENT
Start: 2019-10-27 | End: 2019-10-28 | Stop reason: HOSPADM

## 2019-10-27 RX ORDER — BUTALBITAL, ACETAMINOPHEN AND CAFFEINE 50; 325; 40 MG/1; MG/1; MG/1
1 TABLET ORAL
Status: DISCONTINUED | OUTPATIENT
Start: 2019-10-27 | End: 2019-10-28 | Stop reason: HOSPADM

## 2019-10-27 RX ORDER — ONDANSETRON 4 MG/1
4 TABLET, ORALLY DISINTEGRATING ORAL
Status: COMPLETED | OUTPATIENT
Start: 2019-10-27 | End: 2019-10-27

## 2019-10-27 RX ORDER — BUTALBITAL, ACETAMINOPHEN AND CAFFEINE 50; 325; 40 MG/1; MG/1; MG/1
1 TABLET ORAL
Status: COMPLETED | OUTPATIENT
Start: 2019-10-27 | End: 2019-10-27

## 2019-10-27 RX ORDER — FENTANYL CITRATE 50 UG/ML
INJECTION, SOLUTION INTRAMUSCULAR; INTRAVENOUS
Status: COMPLETED
Start: 2019-10-27 | End: 2019-10-27

## 2019-10-27 RX ORDER — GUAIFENESIN 100 MG/5ML
81 LIQUID (ML) ORAL DAILY
Status: DISCONTINUED | OUTPATIENT
Start: 2019-10-28 | End: 2019-10-28 | Stop reason: HOSPADM

## 2019-10-27 RX ORDER — SODIUM CHLORIDE 0.9 % (FLUSH) 0.9 %
10 SYRINGE (ML) INJECTION
Status: COMPLETED | OUTPATIENT
Start: 2019-10-27 | End: 2019-10-27

## 2019-10-27 RX ORDER — FENTANYL CITRATE 50 UG/ML
25 INJECTION, SOLUTION INTRAMUSCULAR; INTRAVENOUS
Status: COMPLETED | OUTPATIENT
Start: 2019-10-27 | End: 2019-10-27

## 2019-10-27 RX ORDER — SODIUM CHLORIDE 0.9 % (FLUSH) 0.9 %
5-40 SYRINGE (ML) INJECTION AS NEEDED
Status: DISCONTINUED | OUTPATIENT
Start: 2019-10-27 | End: 2019-10-28 | Stop reason: HOSPADM

## 2019-10-27 RX ORDER — SODIUM CHLORIDE 0.9 % (FLUSH) 0.9 %
5-40 SYRINGE (ML) INJECTION EVERY 8 HOURS
Status: DISCONTINUED | OUTPATIENT
Start: 2019-10-27 | End: 2019-10-28 | Stop reason: HOSPADM

## 2019-10-27 RX ORDER — FUROSEMIDE 40 MG/1
80 TABLET ORAL DAILY
Status: DISCONTINUED | OUTPATIENT
Start: 2019-10-28 | End: 2019-10-28

## 2019-10-27 RX ORDER — ACETAMINOPHEN 325 MG/1
650 TABLET ORAL
Status: DISCONTINUED | OUTPATIENT
Start: 2019-10-27 | End: 2019-10-28 | Stop reason: HOSPADM

## 2019-10-27 RX ORDER — LORAZEPAM 2 MG/ML
1 INJECTION INTRAMUSCULAR
Status: COMPLETED | OUTPATIENT
Start: 2019-10-27 | End: 2019-10-27

## 2019-10-27 RX ORDER — ATENOLOL 25 MG/1
50 TABLET ORAL DAILY
Status: DISCONTINUED | OUTPATIENT
Start: 2019-10-28 | End: 2019-10-28 | Stop reason: HOSPADM

## 2019-10-27 RX ADMIN — FENTANYL CITRATE 50 MCG: 50 INJECTION INTRAMUSCULAR; INTRAVENOUS at 21:59

## 2019-10-27 RX ADMIN — IOPAMIDOL 140 ML: 755 INJECTION, SOLUTION INTRAVENOUS at 20:04

## 2019-10-27 RX ADMIN — ONDANSETRON 4 MG: 2 INJECTION INTRAMUSCULAR; INTRAVENOUS at 20:30

## 2019-10-27 RX ADMIN — SODIUM CHLORIDE 1000 ML: 900 INJECTION, SOLUTION INTRAVENOUS at 19:33

## 2019-10-27 RX ADMIN — ONDANSETRON 4 MG: 4 TABLET, ORALLY DISINTEGRATING ORAL at 19:04

## 2019-10-27 RX ADMIN — BUTALBITAL, ACETAMINOPHEN, AND CAFFEINE 1 TABLET: 50; 325; 40 TABLET ORAL at 22:00

## 2019-10-27 RX ADMIN — FENTANYL CITRATE 25 MCG: 50 INJECTION INTRAMUSCULAR; INTRAVENOUS at 19:04

## 2019-10-27 RX ADMIN — Medication 10 ML: at 20:04

## 2019-10-27 RX ADMIN — FENTANYL CITRATE 50 MCG: 50 INJECTION, SOLUTION INTRAMUSCULAR; INTRAVENOUS at 21:59

## 2019-10-27 RX ADMIN — LORAZEPAM 1 MG: 2 INJECTION INTRAMUSCULAR; INTRAVENOUS at 18:18

## 2019-10-27 NOTE — ED NOTES
Pt remains restless and refusing to lay flat on CT table. Pt fighting against staff and trying to stand up from CT table. Pt daughter to CT to reassure pt. Dr. Hermann Limon and ED RN remain in [address] with patient.

## 2019-10-27 NOTE — ED PROVIDER NOTES
EMERGENCY DEPARTMENT HISTORY AND PHYSICAL EXAM      Date: 10/27/2019  Patient Name: Gilma Garcia    History of Presenting Illness     Chief Complaint   Patient presents with    Altered mental status     Granddaughter reports onset around 56 of confusion with headache    Facial Droop     granddaughter reports L lower facial droop onset around 1530        History Provided By: Patient and Caregiver    HPI: Gilma Garcia, 80 y.o. male presents to the ED with cc of possible stroke. The patient was brought in by his family members for new onset of altered mental status, headache and left facial droop. Symptoms started at 1 PM today. At that time, he had more confusion than normal.  He complained of a headache. He denied any trauma, fever or chills. He also denies neck pain. Pain is severe currently. His relatives states that he had a similar episode previously which was treated with medications. At 3:30 PM, the patient developed left facial droop. That prompted the family to bring the patient in. The patient also has nausea and vomiting. He denies chest pain, shortness of breath or abdominal pain. There are no other complaints, changes, or physical findings at this time. PCP: Harriet Caldwell MD    No current facility-administered medications on file prior to encounter. Current Outpatient Medications on File Prior to Encounter   Medication Sig Dispense Refill    Lactoperoxi/Gluc Oxid/Pot Thio (BIOTENE DRY MOUTH MM) 1 Lozenge by Mucous Membrane route as needed (dry mouth).  pantoprazole (PROTONIX) 40 mg tablet Take 1 Tab by mouth daily. 90 Tab 3    gabapentin (NEURONTIN) 100 mg capsule Take 100 mg by mouth three (3) times daily.  butalbital-acetaminophen-caffeine (FIORICET, ESGIC) -40 mg per tablet Take 1 Tab by mouth every six (6) hours as needed for Headache. 10 Tab 0    aspirin 81 mg chewable tablet Take 1 Tab by mouth daily for 30 days.  30 Tab 0    DULoxetine (CYMBALTA) 30 mg capsule Take 1 Cap by mouth daily. 30 Cap 6    atorvastatin (LIPITOR) 80 mg tablet TAKE ONE TABLET BY MOUTH DAILY 90 Tab 3    atenolol (TENORMIN) 50 mg tablet Take 1 Tab by mouth daily. 30 Tab 5    ANORO ELLIPTA 62.5-25 mcg/actuation inhaler Take 1 Puff by inhalation daily.  furosemide (LASIX) 80 mg tablet Take 1 Tab by mouth daily.  80 Tab 3       Past History     Past Medical History:  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    Morbid obesity ([address]     On statin therapy 8/16/2017    TESSA on CPAP 1/3/2019    Psychiatric disorder     Depression    Simple chronic bronchitis ([address] 1/3/2019    TIA (transient ischemic attack) 8/66/5981    Umbilical hernia 16/3/6876       Past Surgical History:  Past Surgical History:   Procedure Laterality Date    CARDIAC SURG PROCEDURE UNLIST  1996    Cardiac Stents    CARDIAC SURG PROCEDURE UNLIST  04/2019    EF 61-65%    COLONOSCOPY N/A 9/27/2019    COLONOSCOPY performed by Travon Goel MD at hospitals ENDOSCOPY    H[facility]    HX HERNIA REPAIR  76/80/93    open umbilical hernia repair mesh    HX UROLOGICAL      HYDROCELECTOMY    UPPER GI ENDOSCOPY,BIOPSY  9/30/2019            Family History:  Family History   Problem Relation Age of Onset    Heart Disease Mother     Hypertension Mother     Hypertension Father     Hypertension Sister     Hypertension Brother    Indira Cancer Brother        Social History:  Social History     Tobacco Use    Smoking status: Former Smoker     Packs/day: 0.50     Years: 20.00     Pack years: 10.00    Smokeless tobacco: Former User    Tobacco comment: quit about 40  years ago   / used to dip tobaco and dip snuff   Substance Use Topics    Alcohol use: Not Currently     Comment: \"Drinks very little now for about a month \"    Drug use: No       Allergies: Allergies   Allergen Reactions    Levaquin [Levofloxacin] Nausea and Vomiting     Reports anxiety, nausea, aching after taking levaquin    Ciprofloxacin Shortness of Breath    Quinolones Shortness of Breath         Review of Systems   Review of Systems   Constitutional: Negative for fever. HENT: Negative for congestion. Eyes: Negative. Respiratory: Negative for shortness of breath. Cardiovascular: Negative for chest pain. Gastrointestinal: Negative for abdominal pain. Endocrine: Negative for polyuria. Genitourinary: Negative for dysuria. Musculoskeletal: Negative for back pain. Skin: Negative. Allergic/Immunologic: Negative for immunocompromised state. Neurological: Positive for weakness and headaches. Psychiatric/Behavioral: Negative. All other systems reviewed and are negative. Physical Exam   Physical Exam   Constitutional: He appears well-developed and well-nourished. He appears distressed. HENT:   Head: Normocephalic and atraumatic. Eyes: Pupils are equal, round, and reactive to light. EOM are normal.   Neck: Normal range of motion. Neck supple. Cardiovascular: Normal rate, regular rhythm, normal heart sounds and intact distal pulses. Pulmonary/Chest: Effort normal and breath sounds normal. He has no wheezes. Abdominal: Soft. Bowel sounds are normal. There is no tenderness. Musculoskeletal: Normal range of motion. He exhibits no edema or tenderness. Neurological: He is alert. No cranial nerve deficit.    Left facial droop, decreased left , sensory intact, A & O x 2 + (knows the year)   Skin: Skin is warm and dry. Psychiatric: He has a normal mood and affect. Nursing note and vitals reviewed. Diagnostic Study Results     Labs -     Recent Results (from the past 12 hour(s))   GLUCOSE, POC    Collection Time: 10/27/19  6:00 PM   Result Value Ref Range    Glucose (POC) 118 (H) 65 - 100 mg/dL    Performed by Jincarl Chavarria    CBC WITH AUTOMATED DIFF    Collection Time: 10/27/19  6:38 PM   Result Value Ref Range    WBC 6.7 4.1 - 11.1 K/uL    RBC 3.99 (L) 4.10 - 5.70 M/uL    HGB 12.0 (L) 12.1 - 17.0 g/dL    HCT 38.7 36.6 - 50.3 %    MCV 97.0 80.0 - 99.0 FL    MCH 30.1 26.0 - 34.0 PG    MCHC 31.0 30.0 - 36.5 g/dL    RDW 16.8 (H) 11.5 - 14.5 %    PLATELET 298 074 - 991 K/uL    MPV 10.0 8.9 - 12.9 FL    NRBC 0.0 0  WBC    ABSOLUTE NRBC 0.00 0.00 - 0.01 K/uL    NEUTROPHILS 71 32 - 75 %    LYMPHOCYTES 17 12 - 49 %    MONOCYTES 9 5 - 13 %    EOSINOPHILS 3 0 - 7 %    BASOPHILS 1 0 - 1 %    IMMATURE GRANULOCYTES 0 0.0 - 0.5 %    ABS. NEUTROPHILS 4.7 1.8 - 8.0 K/UL    ABS. LYMPHOCYTES 1.1 0.8 - 3.5 K/UL    ABS. MONOCYTES 0.6 0.0 - 1.0 K/UL    ABS. EOSINOPHILS 0.2 0.0 - 0.4 K/UL    ABS. BASOPHILS 0.1 0.0 - 0.1 K/UL    ABS. IMM.  GRANS. 0.0 0.00 - 0.04 K/UL    DF AUTOMATED     PROTHROMBIN TIME + INR    Collection Time: 10/27/19  6:38 PM   Result Value Ref Range    INR 1.2 (H) 0.9 - 1.1      Prothrombin time 12.2 (H) 9.0 - 49.2 sec   METABOLIC PANEL, COMPREHENSIVE    Collection Time: 10/27/19  6:38 PM   Result Value Ref Range    Sodium 140 136 - 145 mmol/L    Potassium 4.2 3.5 - 5.1 mmol/L    Chloride 104 97 - 108 mmol/L    CO2 25 21 - 32 mmol/L    Anion gap 11 5 - 15 mmol/L    Glucose 113 (H) 65 - 100 mg/dL    BUN 26 (H) 6 - 20 MG/DL    Creatinine 1.83 (H) 0.70 - 1.30 MG/DL    BUN/Creatinine ratio 14 12 - 20      GFR est AA 43 (L) >60 ml/min/1.73m2    GFR est non-AA 35 (L) >60 ml/min/1.73m2    Calcium 8.8 8.5 - 10.1 MG/DL    Bilirubin, total 0.3 0.2 - 1.0 MG/DL    ALT (SGPT) 27 12 - 78 U/L    AST (SGOT) 30 15 - 37 U/L    Alk. phosphatase 175 (H) 45 - 117 U/L    Protein, total 7.4 6.4 - 8.2 g/dL    Albumin 3.5 3.5 - 5.0 g/dL    Globulin 3.9 2.0 - 4.0 g/dL    A-G Ratio 0.9 (L) 1.1 - 2.2     CK    Collection Time: 10/27/19  6:38 PM   Result Value Ref Range    CK 63 39 - 308 U/L   TROPONIN I    Collection Time: 10/27/19  6:38 PM   Result Value Ref Range    Troponin-I, Qt. <0.05 <0.05 ng/mL   URINALYSIS W/ RFLX MICROSCOPIC    Collection Time: 10/27/19  7:01 PM   Result Value Ref Range    Color YELLOW/STRAW      Appearance CLEAR CLEAR      Specific gravity 1.012 1.003 - 1.030      pH (UA) 6.0 5.0 - 8.0      Protein NEGATIVE  NEG mg/dL    Glucose NEGATIVE  NEG mg/dL    Ketone NEGATIVE  NEG mg/dL    Bilirubin NEGATIVE  NEG      Blood NEGATIVE  NEG      Urobilinogen 0.2 0.2 - 1.0 EU/dL    Nitrites NEGATIVE  NEG      Leukocyte Esterase NEGATIVE  NEG     METABOLIC PANEL, BASIC    Collection Time: 10/28/19  2:17 AM   Result Value Ref Range    Sodium 136 136 - 145 mmol/L    Potassium 4.2 3.5 - 5.1 mmol/L    Chloride 102 97 - 108 mmol/L    CO2 26 21 - 32 mmol/L    Anion gap 8 5 - 15 mmol/L    Glucose 109 (H) 65 - 100 mg/dL    BUN 23 (H) 6 - 20 MG/DL    Creatinine 1.60 (H) 0.70 - 1.30 MG/DL    BUN/Creatinine ratio 14 12 - 20      GFR est AA 50 (L) >60 ml/min/1.73m2    GFR est non-AA 41 (L) >60 ml/min/1.73m2    Calcium 8.9 8.5 - 10.1 MG/DL   TSH 3RD GENERATION    Collection Time: 10/28/19  2:17 AM   Result Value Ref Range    TSH 1.75 0.36 - 3.74 uIU/mL       Radiologic Studies -   CTA CODE NEURO HEAD AND NECK W CONT   Final Result   IMPRESSION:   Examinations significantly limited due to motion artifact. There is no major intracranial vessel occlusion. Moderate to severe chronic microvascular ischemic change with small chronic   remote infarctions. .             CT CODE NEURO PERF W CBF   Final Result   IMPRESSION:   Examinations are significantly limited due to motion artifact. There is no major intracranial vessel occlusion. Moderate to severe chronic microvascular ischemic change with small chronic   remote infarctions. .      CT CODE NEURO HEAD WO CONTRAST   Final Result   IMPRESSION:       Moderate to severe chronic microvascular ischemic change. Mild to moderate cerebral atrophy. MRI BRAIN WO CONT    (Results Pending)     CT Results  (Last 48 hours)    None        CXR Results  (Last 48 hours)    None          Medical Decision Making   I am the first provider for this patient. I reviewed the vital signs, available nursing notes, past medical history, past surgical history, family history and social history. Vital Signs-Reviewed the patient's vital signs. Patient Vitals for the past 12 hrs:   Temp Pulse Resp BP SpO2   10/27/19 1756 97.7 °F (36.5 °C) 73 16 (!) 196/107 97 %         Records Reviewed: Nursing Notes, Old Medical Records, Previous Radiology Studies and Previous Laboratory Studies    Provider Notes (Medical Decision Making):   CVA, ICH, TIA, metabolic    ED Course:   Initial assessment performed. The patients presenting problems have been discussed, and they are in agreement with the care plan formulated and outlined with them. I have encouraged them to ask questions as they arise throughout their visit.     Consult Note:      I discussed the case with Dr. Kenney Patel, Tele-Neurology, who recommends admission        Consult note    The patient is being admitted by the Hospitalist    CRITICAL CARE NOTE :    2:26 AM      IMPENDING DETERIORATION -CNS    ASSOCIATED RISK FACTORS - CNS Decompensation    MANAGEMENT- Bedside Assessment and Supervision of Care    INTERPRETATION -  CT Scan and Blood Pressure    INTERVENTIONS - hemodynamic mngmt and Metobolic interventions    CASE REVIEW - Hospitalist, Medical Sub-Specialist, Nursing and Family    TREATMENT RESPONSE -Improved    PERFORMED BY - Self        NOTES   :      I have spent 30 minutes of critical care time involved in lab review, consultations with specialist, family decision- making, bedside attention and documentation. During this entire length of time I was immediately available to the patient . Sally Garcia MD                                                       Critical Care Time:   30    Disposition:  admit    PLAN:  1. Current Discharge Medication List        2. Follow-up Information    None       Return to ED if worse     Diagnosis     Clinical Impression:   1. Left-sided weakness    2. KATHY (acute kidney injury) ([facility] [address]    3. Altered mental status, unspecified altered mental status type    4. Acute intractable tension-type headache        Attestations:    Sally Garcia MD    Please note that this dictation was completed with [employer], the computer voice recognition software. Quite often unanticipated grammatical, syntax, homophones, and other interpretive errors are inadvertently transcribed by the computer software. Please disregard these errors. Please excuse any errors that have escaped final proofreading. Thank you.

## 2019-10-27 NOTE — ED NOTES
Pt. Mental status improving at this time. Pt. Still restless but able to communicate better and a better historian. Pt. Apologized for \"acting up\" in CT.

## 2019-10-27 NOTE — ED NOTES
Pt unable to tolerate lying flat in CT. Patient yelling at staff stating \"help me. \" Pt also with vomiting in CT. Pt restless. Dr. Rainey Spare to CT. Orders placed. ED RN remains with patient.

## 2019-10-28 ENCOUNTER — APPOINTMENT (OUTPATIENT)
Dept: MRI IMAGING | Age: 84
DRG: 069 | End: 2019-10-28
Attending: HOSPITALIST
Payer: MEDICARE

## 2019-10-28 VITALS
DIASTOLIC BLOOD PRESSURE: 85 MMHG | HEIGHT: 68 IN | WEIGHT: 215.39 LBS | HEART RATE: 67 BPM | SYSTOLIC BLOOD PRESSURE: 154 MMHG | OXYGEN SATURATION: 94 % | BODY MASS INDEX: 32.64 KG/M2 | RESPIRATION RATE: 20 BRPM | TEMPERATURE: 97.8 F

## 2019-10-28 LAB
ANION GAP SERPL CALC-SCNC: 8 MMOL/L (ref 5–15)
BUN SERPL-MCNC: 23 MG/DL (ref 6–20)
BUN/CREAT SERPL: 14 (ref 12–20)
CALCIUM SERPL-MCNC: 8.9 MG/DL (ref 8.5–10.1)
CHLORIDE SERPL-SCNC: 102 MMOL/L (ref 97–108)
CHOLEST SERPL-MCNC: 151 MG/DL
CO2 SERPL-SCNC: 26 MMOL/L (ref 21–32)
CREAT SERPL-MCNC: 1.6 MG/DL (ref 0.7–1.3)
EST. AVERAGE GLUCOSE BLD GHB EST-MCNC: 105 MG/DL
GLUCOSE SERPL-MCNC: 109 MG/DL (ref 65–100)
HBA1C MFR BLD: 5.3 % (ref 4.2–6.3)
HDLC SERPL-MCNC: 30 MG/DL
HDLC SERPL: 5 {RATIO} (ref 0–5)
LDLC SERPL CALC-MCNC: 82 MG/DL (ref 0–100)
LIPID PROFILE,FLP: ABNORMAL
POTASSIUM SERPL-SCNC: 4.2 MMOL/L (ref 3.5–5.1)
SODIUM SERPL-SCNC: 136 MMOL/L (ref 136–145)
TRIGL SERPL-MCNC: 195 MG/DL (ref ?–150)
TSH SERPL DL<=0.05 MIU/L-ACNC: 1.75 UIU/ML (ref 0.36–3.74)
VLDLC SERPL CALC-MCNC: 39 MG/DL

## 2019-10-28 PROCEDURE — 74011250636 HC RX REV CODE- 250/636: Performed by: INTERNAL MEDICINE

## 2019-10-28 PROCEDURE — 70551 MRI BRAIN STEM W/O DYE: CPT

## 2019-10-28 PROCEDURE — 3331090001 HH PPS REVENUE CREDIT

## 2019-10-28 PROCEDURE — 84443 ASSAY THYROID STIM HORMONE: CPT

## 2019-10-28 PROCEDURE — 95816 EEG AWAKE AND DROWSY: CPT | Performed by: PSYCHIATRY & NEUROLOGY

## 2019-10-28 PROCEDURE — 80048 BASIC METABOLIC PNL TOTAL CA: CPT

## 2019-10-28 PROCEDURE — 83036 HEMOGLOBIN GLYCOSYLATED A1C: CPT

## 2019-10-28 PROCEDURE — 36415 COLL VENOUS BLD VENIPUNCTURE: CPT

## 2019-10-28 PROCEDURE — 3331090002 HH PPS REVENUE DEBIT

## 2019-10-28 PROCEDURE — 97535 SELF CARE MNGMENT TRAINING: CPT

## 2019-10-28 PROCEDURE — 97161 PT EVAL LOW COMPLEX 20 MIN: CPT

## 2019-10-28 PROCEDURE — 80061 LIPID PANEL: CPT

## 2019-10-28 PROCEDURE — 74011250637 HC RX REV CODE- 250/637: Performed by: HOSPITALIST

## 2019-10-28 PROCEDURE — 97116 GAIT TRAINING THERAPY: CPT

## 2019-10-28 PROCEDURE — 97165 OT EVAL LOW COMPLEX 30 MIN: CPT

## 2019-10-28 RX ORDER — LORAZEPAM 2 MG/ML
1 INJECTION INTRAMUSCULAR
Status: COMPLETED | OUTPATIENT
Start: 2019-10-28 | End: 2019-10-28

## 2019-10-28 RX ORDER — FUROSEMIDE 40 MG/1
TABLET ORAL
Qty: 45 TAB | Refills: 2 | Status: SHIPPED | OUTPATIENT
Start: 2019-10-28 | End: 2019-11-19 | Stop reason: DRUGHIGH

## 2019-10-28 RX ADMIN — LORAZEPAM 1 MG: 2 INJECTION INTRAMUSCULAR; INTRAVENOUS at 08:38

## 2019-10-28 RX ADMIN — FUROSEMIDE 80 MG: 40 TABLET ORAL at 10:11

## 2019-10-28 RX ADMIN — BUTALBITAL, ACETAMINOPHEN, AND CAFFEINE 1 TABLET: 50; 325; 40 TABLET ORAL at 03:30

## 2019-10-28 RX ADMIN — ASPIRIN 81 MG 81 MG: 81 TABLET ORAL at 10:11

## 2019-10-28 RX ADMIN — DULOXETINE HYDROCHLORIDE 30 MG: 30 CAPSULE, DELAYED RELEASE ORAL at 10:10

## 2019-10-28 RX ADMIN — Medication 10 ML: at 01:16

## 2019-10-28 RX ADMIN — ATENOLOL 50 MG: 25 TABLET ORAL at 10:10

## 2019-10-28 RX ADMIN — Medication 10 ML: at 13:25

## 2019-10-28 RX ADMIN — PANTOPRAZOLE SODIUM 40 MG: 40 TABLET, DELAYED RELEASE ORAL at 10:10

## 2019-10-28 RX ADMIN — Medication 10 ML: at 08:42

## 2019-10-28 RX ADMIN — ATORVASTATIN CALCIUM 80 MG: 40 TABLET, FILM COATED ORAL at 01:16

## 2019-10-28 RX ADMIN — UMECLIDINIUM BROMIDE AND VILANTEROL TRIFENATATE 1 PUFF: 62.5; 25 POWDER RESPIRATORY (INHALATION) at 10:20

## 2019-10-28 NOTE — H&P
Hospitalist Admission Note    NAME: Nolan Valenzuela   :  1934   MRN:  [id number]     Date/Time:  10/27/2019 9:57 PM    Patient PCP: Jimmy Elias MD  ______________________________________________________________________  Given the patient's current clinical presentation, I have a high level of concern for decompensation if discharged from the emergency department. Complex decision making was performed, which includes reviewing the patient's available past medical records, laboratory results, and x-ray films. My assessment of this patient's clinical condition and my plan of care is as follows. Assessment / Plan:    Acute encephalopathy, resolved   mental status seems is back to baseline    Concern for left facial droop, seems resolved per daughter  Suspect TIA will rule out stroke  Chronic headaches off and on  Observation telemetry admission   We will order MRI brain  Continue high-dose statin, and baby aspirin  We will check hemoglobin A1c, TSH and lipid panel  Neurology consult in a.m. Had an echocardiogram done on October 3, 2019  PT and OT eval     CKD stage 3  Creatinine 1.8 which is slightly up than last creatinine 1.2, monitor     CAD hx stent x 1 (26 years ago)  HTN  -continue ASA, statin, lasix and BB    Gout   Off allopurinol for concern that it may be causing itching and burning sensation    COPD, not on home O2  -continue inhaler with nebs prn     TESSA, supposed to be on CPAP at home  -non compliant with CPAP machine per daughter    Hard of hearing, uses hearing aid    Hx TIA   S/p L2-L5 decompression for spinal stenosis and back pain   per notes last admission he was being weaned off gabapentin but discharged on 100 TID dose.      Morbid obesity    Code Status: Full   Surrogate Decision Maker: wife     DVT Prophylaxis: SCD  GI Prophylaxis: not indicated    Baseline: lives with wife, walks with cane.        Subjective:   CHIEF COMPLAINT: Altered mental status and facial droop    HISTORY OF PRESENT ILLNESS:     Harriett Hopkins is a 80 y.o. white male with past medical history significant for coronary artery disease, COPD, dyslipidemia, GERD, hypertension, history of GI bleed, TIA recently discharged from [facility] [address] after being treated for a GI bleed presented to ER with above complaints. Patient is very hard of hearing but at time of my exam his mental status is baseline. Information is obtained from patient and his daughter who is at the bedside. As per daughter since discharge patient has been improving and was baseline state of health until this morning around 1:00 when his wife noticed that he was confused with headache asking some silly questions. Around 3:30 PM granddaughter noticed that patient may have left facial droop and he was brought to emergency room for evaluation. Patient is not a candidate for TPA in setting of recent history of GI bleed and his symptoms were already improving when he arrived to the ER. He had a CT head and CTA head and neck and they were negative for any acute finding. Patient is seen by telemetry neurology in ER and recommended overnight observation admission stroke work-up. At the time I saw patient does not have facial droop and is back to baseline per daughter. We were asked to admit for work up and evaluation of the above problems.      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    Morbid obesity ([facility] [address]     On statin therapy 8/16/2017    TESSA on CPAP 1/3/2019    Psychiatric disorder     Depression    Simple chronic bronchitis ([facility] 1/3/2019    TIA (transient ischemic attack) 1/33/0454    Umbilical hernia 91/8/6446        Past Surgical History:   Procedure Laterality Date    CARDIAC SURG PROCEDURE UNLIST  1996    Cardiac Stents    CARDIAC SURG PROCEDURE UNLIST  04/2019    EF 61-65%    COLONOSCOPY N/A 9/27/2019    COLONOSCOPY performed by Lionel Nowak MD at [facility] ENDOSCOPY     [facility]  85/60/84    open umbilical hernia repair mesh    HX UROLOGICAL      HYDROCELECTOMY    UPPER GI ENDOSCOPY,BIOPSY  9/30/2019            Social History     Tobacco Use    Smoking status: Former Smoker     Packs/day: 0.50     Years: 20.00     Pack years: 10.00    Smokeless tobacco: Former User    Tobacco comment: quit about 40  years ago   / used to dip tobaco and dip snuff   Substance Use Topics    Alcohol use: Not Currently     Comment: \"Drinks very little now for about a month \"        Family History   Problem Relation Age of Onset    Heart Disease Mother     Hypertension Mother     Hypertension Father     Hypertension Sister     Hypertension Brother     Cancer Brother      Allergies   Allergen Reactions    Levaquin [Levofloxacin] Nausea and Vomiting     Reports anxiety, nausea, aching after taking levaquin    Ciprofloxacin Shortness of Breath    Quinolones Shortness of Breath        Prior to Admission medications    Medication Sig Start Date End Date Taking? Authorizing Provider   Lactoperoxi/Gluc Oxid/Pot Thio (BIOTENE DRY MOUTH MM) 1 Lozenge by Mucous Membrane route as needed (dry mouth). 10/22/19   Provider, Historical   pantoprazole (PROTONIX) 40 mg tablet Take 1 Tab by mouth daily. 10/17/19   Morro Boyle MD   gabapentin (NEURONTIN) 100 mg capsule Take 100 mg by mouth three (3) times daily.  10/6/19   Provider, Historical butalbital-acetaminophen-caffeine (FIORICET, ESGIC) -40 mg per tablet Take 1 Tab by mouth every six (6) hours as needed for Headache. 10/4/19   Judy Domingo DO   aspirin 81 mg chewable tablet Take 1 Tab by mouth daily for 30 days. 9/28/19 10/28/19  Cosme Washington MD   DULoxetine (CYMBALTA) 30 mg capsule Take 1 Cap by mouth daily. 8/1/19   Dharmesh Monge MD   atorvastatin (LIPITOR) 80 mg tablet TAKE ONE TABLET BY MOUTH DAILY 7/8/19   Dharmesh Monge MD   atenolol (TENORMIN) 50 mg tablet Take 1 Tab by mouth daily. 7/1/19   Bjorn Song MD   ANORO ELLIPTA 62.5-25 mcg/actuation inhaler Take 1 Puff by inhalation daily. 12/26/18   Provider, Historical   furosemide (LASIX) 80 mg tablet Take 1 Tab by mouth daily. 12/19/18   Dharmesh Monge MD       REVIEW OF SYSTEMS:     I am not able to complete the review of systems because:    The patient is intubated and sedated    The patient has altered mental status due to his acute medical problems    The patient has baseline aphasia from prior stroke(s)    The patient has baseline dementia and is not reliable historian    The patient is in acute medical distress and unable to provide information           Total of 12 systems reviewed as follows:       POSITIVE= underlined text  Negative = text not underlined  General:  fever, chills, sweats, generalized weakness, weight loss/gain,      loss of appetite   Eyes:    blurred vision, eye pain, loss of vision, double vision  ENT:    rhinorrhea, pharyngitis   Respiratory:   cough, sputum production, SOB, MARES, wheezing, pleuritic pain   Cardiology:   chest pain, palpitations, orthopnea, PND, edema, syncope   Gastrointestinal:  abdominal pain , N/V, diarrhea, dysphagia, constipation, bleeding   Genitourinary:  frequency, urgency, dysuria, hematuria, incontinence   Muskuloskeletal :  arthralgia, myalgia, back pain  Hematology:  easy bruising, nose or gum bleeding, lymphadenopathy   Dermatological: rash, ulceration, pruritis, color change / jaundice  Endocrine:   hot flashes or polydipsia   Neurological:  headache, dizziness, confusion, focal weakness, paresthesia,     Speech difficulties, memory loss, gait difficulty  Psychological: Feelings of anxiety, depression, agitation    Objective:   VITALS:    Visit Vitals  /71   Pulse 81   Temp 97.7 °F (36.5 °C)   Resp 22   Ht 5' 8\" (1.727 m)   Wt 97.7 kg (215 lb 6.2 oz)   SpO2 95%   BMI 32.75 kg/m²       PHYSICAL EXAM:    General:    Alert, cooperative, no distress, appears stated age. HEENT: Atraumatic, anicteric sclerae, pink conjunctivae     No oral ulcers, mucosa moist, throat clear, dentition fair  Neck:  Supple, symmetrical,  thyroid: non tender  Lungs:   Clear to auscultation bilaterally. No Wheezing or Rhonchi. No rales. Chest wall:  No tenderness  No Accessory muscle use. Heart:   Regular  rhythm,  No  murmur   No edema  Abdomen:   Soft, non-tender. Not distended. Bowel sounds normal  Extremities: No cyanosis. No clubbing,      Skin turgor normal, Capillary refill normal, Radial dial pulse 2+  Skin:     Not pale. Not Jaundiced  No rashes   Psych:  Good insight. Not depressed. Not anxious or agitated. Neurologic: EOMs intact. No facial asymmetry. No aphasia or slurred speech. Symmetrical strength, Sensation grossly intact.  Alert and oriented X 4.     _______________________________________________________________________  Care Plan discussed with:    Comments   Patient x    Family      RN     Care Manager                    Consultant:      _______________________________________________________________________  Expected  Disposition:   Home with Family x   HH/PT/OT/RN    SNF/LTC    LEILANI    ________________________________________________________________________  TOTAL TIME:   Minutes    Critical Care Provided     Minutes non procedure based      Comments     Reviewed previous records   >50% of visit spent in counseling and coordination of care Discussion with patient and/or family and questions answered       ________________________________________________________________________  Signed: Wilma Gómez MD    Procedures: see electronic medical records for all procedures/Xrays and details which were not copied into this note but were reviewed prior to creation of Plan. LAB DATA REVIEWED:    Recent Results (from the past 24 hour(s))   GLUCOSE, POC    Collection Time: 10/27/19  6:00 PM   Result Value Ref Range    Glucose (POC) 118 (H) 65 - 100 mg/dL    Performed by Winsome Navarro    CBC WITH AUTOMATED DIFF    Collection Time: 10/27/19  6:38 PM   Result Value Ref Range    WBC 6.7 4.1 - 11.1 K/uL    RBC 3.99 (L) 4.10 - 5.70 M/uL    HGB 12.0 (L) 12.1 - 17.0 g/dL    HCT 38.7 36.6 - 50.3 %    MCV 97.0 80.0 - 99.0 FL    MCH 30.1 26.0 - 34.0 PG    MCHC 31.0 30.0 - 36.5 g/dL    RDW 16.8 (H) 11.5 - 14.5 %    PLATELET 069 723 - 250 K/uL    MPV 10.0 8.9 - 12.9 FL    NRBC 0.0 0  WBC    ABSOLUTE NRBC 0.00 0.00 - 0.01 K/uL    NEUTROPHILS 71 32 - 75 %    LYMPHOCYTES 17 12 - 49 %    MONOCYTES 9 5 - 13 %    EOSINOPHILS 3 0 - 7 %    BASOPHILS 1 0 - 1 %    IMMATURE GRANULOCYTES 0 0.0 - 0.5 %    ABS. NEUTROPHILS 4.7 1.8 - 8.0 K/UL    ABS. LYMPHOCYTES 1.1 0.8 - 3.5 K/UL    ABS. MONOCYTES 0.6 0.0 - 1.0 K/UL    ABS. EOSINOPHILS 0.2 0.0 - 0.4 K/UL    ABS. BASOPHILS 0.1 0.0 - 0.1 K/UL    ABS. IMM.  GRANS. 0.0 0.00 - 0.04 K/UL    DF AUTOMATED     PROTHROMBIN TIME + INR    Collection Time: 10/27/19  6:38 PM   Result Value Ref Range    INR 1.2 (H) 0.9 - 1.1      Prothrombin time 12.2 (H) 9.0 - 82.3 sec   METABOLIC PANEL, COMPREHENSIVE    Collection Time: 10/27/19  6:38 PM   Result Value Ref Range    Sodium 140 136 - 145 mmol/L    Potassium 4.2 3.5 - 5.1 mmol/L    Chloride 104 97 - 108 mmol/L    CO2 25 21 - 32 mmol/L    Anion gap 11 5 - 15 mmol/L    Glucose 113 (H) 65 - 100 mg/dL    BUN 26 (H) 6 - 20 MG/DL    Creatinine 1.83 (H) 0.70 - 1.30 MG/DL    BUN/Creatinine ratio 14 12 - 20      GFR est AA 43 (L) >60 ml/min/1.73m2    GFR est non-AA 35 (L) >60 ml/min/1.73m2    Calcium 8.8 8.5 - 10.1 MG/DL    Bilirubin, total 0.3 0.2 - 1.0 MG/DL    ALT (SGPT) 27 12 - 78 U/L    AST (SGOT) 30 15 - 37 U/L    Alk.  phosphatase 175 (H) 45 - 117 U/L    Protein, total 7.4 6.4 - 8.2 g/dL    Albumin 3.5 3.5 - 5.0 g/dL    Globulin 3.9 2.0 - 4.0 g/dL    A-G Ratio 0.9 (L) 1.1 - 2.2     CK    Collection Time: 10/27/19  6:38 PM   Result Value Ref Range    CK 63 39 - 308 U/L   TROPONIN I    Collection Time: 10/27/19  6:38 PM   Result Value Ref Range    Troponin-I, Qt. <0.05 <0.05 ng/mL   URINALYSIS W/ RFLX MICROSCOPIC    Collection Time: 10/27/19  7:01 PM   Result Value Ref Range    Color YELLOW/STRAW      Appearance CLEAR CLEAR      Specific gravity 1.012 1.003 - 1.030      pH (UA) 6.0 5.0 - 8.0      Protein NEGATIVE  NEG mg/dL    Glucose NEGATIVE  NEG mg/dL    Ketone NEGATIVE  NEG mg/dL    Bilirubin NEGATIVE  NEG      Blood NEGATIVE  NEG      Urobilinogen 0.2 0.2 - 1.0 EU/dL    Nitrites NEGATIVE  NEG      Leukocyte Esterase NEGATIVE  NEG

## 2019-10-28 NOTE — DISCHARGE INSTRUCTIONS
Patient Discharge Instructions    Dirk Lopez / [id number] : 1934    Admitted 10/27/2019 Discharged: 10/28/2019         DISCHARGE DIAGNOSIS:     ? TIA    Mild dehydration, reduce lasix               What to do at Home    1. Recommended diet: Cardiac Diet    2. Recommended activity: Activity as tolerated    3. If you experience any of the following symptoms then please call your primary care physician or return to the emergency room if you cannot get hold of your doctor:   Fevers > 100.5, chills   Nausea or vomiting, persistent diarrhea > 24 hours   Blood in stool or black stools   Chest pain or SOB      Follow-up Information     Follow up With Specialties Details Why Contact Info    Zhane Umanzor MD Internal Medicine Schedule an appointment as soon as possible for a visit in 1 week  [address]      Muklu Jade MD Neurology Schedule an appointment as soon as possible for a visit in 4 weeks  [address] [place] [address]  [id number]  [phone]          Reduce lasix to 40 mg alternating with 80 mg every other day, 40 mg, 80 mg, 40 mg etc    CHF specific discharge instructions    Weight:  · Daily weights, Notify your Doctor if Wt gain of 3 lb in a day or 5 lb in a week     Diet :  · Low salt cardiac diet            Information obtained by :  I understand that if any problems occur once I am at home I am to contact my physician. I understand and acknowledge receipt of the instructions indicated above.                                                                                                                                            Physician's or R.N.'s Signature                                                                  Date/Time                                                                                                                                              Patient or Representative Signature Date/Time

## 2019-10-28 NOTE — PROGRESS NOTES
Bedside shift change report given to Josr Narayan RN by BALWINDER CONNORS RN. Report included the following information SBAR, ED Summary, Intake/Output, MAR, Recent Results and Cardiac Rhythm NSR.

## 2019-10-28 NOTE — PROGRESS NOTES
Patient discharged home with family. Discharge instructions and prescription reviewed with patient and family.

## 2019-10-28 NOTE — PHYSICIAN ADVISORY
Short Stay Review Pt Name:  Tod Lim MR#  [id number] [facility]#   [id number] Room and Hospital  3107/01  @ [facility] Hospitalization date  10/27/2019  6:04 PM 
No discharge date for patient encounter. Current Attending Physician  Ezekiel Hankins MD  
 
A discharge order has been placed for this episode of hospital care for Mr. Tod Lim; since this hospital stay is less than two midnights, I reviewed Mr. Sakshi Rush's chart. Mr. Sakshi Rush's healthcare insurance/benefit include: 
Payor: [place] MED[place]ARE / Plan: [place] MEDICARE PART A & B / Product Type: Medicare /  
 
Utilization Review related case summary:  
Age  80 y.o.  
BMI  Body mass index is 32.75 kg/m². PMHx includes  CKD, HTN, CAD Hospital course  The pt was hospitalized for acute encephalopathy and at this time we learn acute CVA and possible seizure are ruled out. Risk of deterioration at the time this patient  was hospitalized     Moderate On the basis of chart review, this patient's hospitalization status Should be changed to OBSERVATION The information in this document is a recommendation to be used for utilization review and utilization management purposes only. This recommendation is not an order. The recommendation is made based on the information reviewed at the time of the referral, is pursuant to the [place] [facility] Conditions of Participation (42 CFR Part 482), and is neither a judgment nor an assessment with regard to the appropriateness or quality of clinical care. For all Managed Care patients: The Criteria are intended solely for use as screening guidelines with respect to the medical appropriateness of healthcare services and not for final clinical or payment determinations concerning the type or level of medical care provided, or proposed to be provided, to a patient.  They help the reviewers determine whether a patient is appropriate for observation or inpatient admission at the time a decision to admit the patient is being made. All efforts are made to apply the pertinent payor criteria ([employer] or [employer]) as well as the clinical judgements based on the information reviewed at the time of the referral.\" Nothing in this document may be used to limit, alter, or affect clinical services provided to the patient named below. Lalo Dhaliwal MD MPH FACP Cell : [phone] Physician Advisor Codie  [address]  
Utilization Review, Care Management CSN:  [id number] MITESH:   [id number] Admitted on :  10/27/2019 Discharge order

## 2019-10-28 NOTE — PROGRESS NOTES
Problem: Self Care Deficits Care Plan (Adult)  Goal: *Acute Goals and Plan of Care (Insert Text)  Description  FUNCTIONAL STATUS PRIOR TO ADMISSION: Patient was modified independent using a single point cane for functional mobility. Patient was modified independent for basic and instrumental ADLs. HOME SUPPORT: The patient lived with wife and granddaughter but did not require assist.    Occupational Therapy Goals  Initiated 10/28/2019  1. Patient will perform grooming, standing at sink for at least 5 min with out LOB, with supervision/set-up within 7 day(s). 2.  Patient will perform lower body dressing with supervision/set-up within 7 day(s). 3.  Patient will perform anterior bathing, seated or standing at sink, with supervision/set-up within 7 day(s). 4.  Patient will perform toilet transfers with modified independence within 7 day(s). 5.  Patient will perform all aspects of toileting with modified independence within 7 day(s). 6.  Patient will utilize energy conservation techniques during functional activities without cues within 7 day(s). Outcome: Progressing Towards Goal     OCCUPATIONAL THERAPY EVALUATION  Patient: Cabrera Dejesus (39 y.o. male)  Date: 10/28/2019  Primary Diagnosis: Facial droop [R29.810]        Precautions: Standard     ASSESSMENT  Based on the objective data described below, the patient presents with occasional LOB during mobility, good and equal UE ROM, equal UE strength bilaterally, no visual deficits, and intact coordination following admission for facial droop and severe HA. Patient now with good facial symmetry but continues to report 7/10 HA. CT head negative and MRI brain pending. He offers excellent efforts this session and is able to demonstrate toileting tasks in bathroom with CGA. Noted 1 LOB in standing with min A to recover, otherwise overall CGA with SPC. Patient requires assist to don socks this session due to use of AE at baseline.  He is agreeable to sit up in chair at end of session. Family present for duration of session. Patient will benefit from acute skilled OT services to maximize safety with ADLs and mobility but anticipate no follow up OT needs at discharge at this time. Current Level of Function Impacting Discharge (ADLs/self-care): overall CGA for ADLs with occasional min A. Functional Outcome Measure: The patient scored Total: 55/100 on the Barthel Index outcome measure which is indicative of 45% impaired ability to care for basic self needs/dependency on others. Other factors to consider for discharge: lives with supportive family members, motivated     Patient will benefit from skilled therapy intervention to address the above noted impairments. PLAN :  Recommendations and Planned Interventions: self care training, functional mobility training, therapeutic exercise, balance training, therapeutic activities, endurance activities, neuromuscular re-education, patient education, home safety training and family training/education    Frequency/Duration: Patient will be followed by occupational therapy 4 times a week to address goals. Recommendation for discharge: (in order for the patient to meet his/her long term goals)  No skilled occupational therapy/ follow up rehabilitation needs identified at this time. This discharge recommendation:  Has been made in collaboration with the attending provider and/or case management    IF patient discharges home will need the following DME: patient owns DME required for discharge       SUBJECTIVE:   Patient stated I can show you how I do it.  -during bed mobility. RN cleared patient for therapy. Patient pleasant and agreeable to participate. Family present at bedside.      OBJECTIVE DATA SUMMARY:   HISTORY:   Past Medical History:   Diagnosis Date    Adverse effect of anesthesia     combative after anesthesia    Alcohol abuse     6 beers/day    Arthritis     CAD (coronary artery disease)     Chronic obstructive pulmonary disease (HCC)     Dyslipidemia 8/16/2017    Dyspepsia and other specified disorders of function of stomach     Dyspnea 8/16/2017    ED (erectile dysfunction) 8/16/2017    Encounter for immunization 8/16/2017    Fatigue 8/16/2017    Flank pain 8/1/2019    GERD (gastroesophageal reflux disease) 8/16/2017    Gout 8/16/2017    Hypertension     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    Morbid obesity ([facility]     On statin therapy 8/16/2017    TESSA on CPAP 1/3/2019    Psychiatric disorder     Depression    Simple chronic bronchitis ([facility] 1/3/2019    TIA (transient ischemic attack) 5/74/7123    Umbilical hernia 61/2/1205     Past Surgical History:   Procedure Laterality Date    CARDIAC SURG PROCEDURE UNLIST  1996    Cardiac Stents    CARDIAC SURG PROCEDURE UNLIST  04/2019    EF 61-65%    COLONOSCOPY N/A 9/27/2019    COLONOSCOPY performed by Todd Juarez MD at [facility] ENDOSCOPY     [facility]  [birth year]    open umbilical hernia repair mesh    HX UROLOGICAL      HYDROCELECTOMY    UPPER GI ENDOSCOPY,BIOPSY  9/30/2019            Expanded or extensive additional review of patient history:     Home Situation  Home Environment: Private residence  # Steps to Enter: 1  Rails to Enter: No  One/Two Story Residence: One story  Living Alone: No  Support Systems: Spouse/Significant Other/Partner, Child(theodora), Family member(s)  Current DME Used/Available at Home: Cane, straight, Transfer bench, Walker, rolling, Shower chair  Tub or Shower Type: Shower    Hand dominance: Right    EXAMINATION OF PERFORMANCE DEFICITS:  Cognitive/Behavioral Status:  Neurologic State: Alert; Appropriate for age  Orientation Level: Oriented X4  Cognition: Follows commands  Perception: Appears intact  Perseveration: No perseveration noted  Safety/Judgement: Awareness of environment; Fall prevention    Hearing:   Auditory  Auditory Impairment: Hard of hearing, bilateral  Hearing Aids/Status: With patient    Vision/Perceptual:    Diplopia: No      Range of Motion:  AROM: Within functional limits In bilateral UEs  PROM: Within functional limits In bilateral UEs    Strength:  Strength: Generally decreased, functional In bilateral UEs    Coordination:  Coordination: Within functional limits In bilateral UEs  Fine Motor Skills-Upper: Left Intact; Right Intact    Gross Motor Skills-Upper: Left Intact; Right Intact    Tone & Sensation:  Tone: Normal  Sensation: Intact    Balance:  Sitting: Intact  Standing: Impaired; Without support  Standing - Static: Good;Constant support;Occasional  Standing - Dynamic : Good;Constant support;Occasional    Functional Mobility and Transfers for ADLs:  Bed Mobility:  Rolling: Modified independent  Supine to Sit: Modified independent  Sit to Supine: Modified independent  Scooting: Modified independent    Transfers:  Sit to Stand: Stand-by assistance  Stand to Sit: Stand-by assistance  Bathroom Mobility: Stand-by assistance;Contact guard assistance  Toilet Transfer : Contact guard assistance    ADL Assessment:  Feeding: Independent    Oral Facial Hygiene/Grooming: Stand-by assistance(in standing)    Bathing: Contact guard assistance(infer based on observations)    Upper Body Dressing: Setup    Lower Body Dressing: Contact guard assistance(patient reports use of AE at home; to pull up over hips)    Toileting: Contact guard assistance;Minimum assistance(during standing clothing management)    ADL Intervention and task modifications:  Lower Body Dressing Assistance  Socks: Total assistance (dependent)(w/o AE; patient reports use of sock aid at home)    Toileting  Bladder Hygiene: Supervision(seated)  Clothing Management: Minimum assistance  Cues: Verbal cues provided;Physical assistance for pants down;Physical assistance for pants up    Cognitive Retraining  Safety/Judgement: Awareness of environment; Fall prevention    Functional Measure:  Barthel Index:    Bathin  Bladder: 10  Bowels: 10  Groomin  Dressin  Feeding: 10  Mobility: 0  Stairs: 0  Toilet Use: 5  Transfer (Bed to Chair and Back): 10  Total: 55/100        The Barthel ADL Index: Guidelines  1. The index should be used as a record of what a patient does, not as a record of what a patient could do. 2. The main aim is to establish degree of independence from any help, physical or verbal, however minor and for whatever reason. 3. The need for supervision renders the patient not independent. 4. A patient's performance should be established using the best available evidence. Asking the patient, friends/relatives and nurses are the usual sources, but direct observation and common sense are also important. However direct testing is not needed. 5. Usually the patient's performance over the preceding 24-48 hours is important, but occasionally longer periods will be relevant. 6. Middle categories imply that the patient supplies over 50 per cent of the effort. 7. Use of aids to be independent is allowed. Silvia Fong., Barthel, D.W. (9728). Functional evaluation: the Barthel Index. [address] [facility] (14)2. [facility] BENNIE Turcios, lGoria Barton., [facility], [address] [place] [address] (). Measuring the change indisability after inpatient rehabilitation; comparison of the responsiveness of the Barthel Index and Functional [place] Measure. Journal of Neurology, Neurosurgery, and Psychiatry, 66(4), 542-644. Shalini Chong N.J.A, MAZIN Pulido, & Clarita Escalera M.A. (2004.) Assessment of post-stroke quality of life in cost-effectiveness studies: The usefulness of the Barthel Index and the EuroQoL-5D.  Quality of Life Research, 15, 163-38     Occupational Therapy Evaluation Charge Determination   History Examination Decision-Making   LOW Complexity : Brief history review  LOW Complexity : 1-3 performance deficits relating to physical, cognitive , or psychosocial skils that result in activity limitations and / or participation restrictions  MEDIUM Complexity : Patient may present with comorbidities that affect occupational performnce. Miniml to moderate modification of tasks or assistance (eg, physical or verbal ) with assesment(s) is necessary to enable patient to complete evaluation       Based on the above components, the patient evaluation is determined to be of the following complexity level: LOW   Pain Rating:  Patient reporting 7/10 HA but not limiting to participation. Activity Tolerance:   Good and SpO2 stable on RA. /81 during session (MD goal for BP <140/90). Patient denied feeling lightheaded or dizzy during session. Please refer to the flowsheet for vital signs taken during this treatment. After treatment patient left in no apparent distress:    Sitting in chair, Call bell within reach, Bed / chair alarm activated, Caregiver / family present and RN notified     COMMUNICATION/EDUCATION:   The patients plan of care was discussed with: Physical Therapist and Registered Nurse. Home safety education was provided and the patient/caregiver indicated understanding., Patient/family have participated as able in goal setting and plan of care. and Patient/family agree to work toward stated goals and plan of care. This patients plan of care is appropriate for delegation to HUGO.     Thank you for this referral.  Kisha Mijares OT  Time Calculation: 27 mins

## 2019-10-28 NOTE — PROGRESS NOTES
Reason for Readmission:   Facial droop            RRAT Score and Risk Level:     34     Level of Readmission:   [address] scheduled: Met with pt's family by bedside          Resources/supports as identified by patient/family: Family support and C          Top Challenges facing patient (as identified by patient/family and CM): Finances/Medication cost?    Medicare    Transportation  Family to transport       Support system or lack thereof? Family support      Living arrangements? Lives with family         Self-care/ADLs/Cognition? Needs limited assistance        Current Advanced Directive/Advance Care Plan:  [facility] for utilizing home health:  Resumption Order               Transition of Care Plan:    Based on readmission, the patient's previous Plan of Care   has been evaluated and/or modified. The current Transition of Care Plan is:           CM completed room visit to complete assessment with pt, with family by bedside. Pt resides with family in their one story home. Pt is known to need limited assistance with ADls, and does limited driving. Pt is active with PCP: seen Oct 2019 and uses [facility]. Pt has DME at home: cane. Pt reported [address] (active) and Inpatient Rehab in the past.    Pt will need a resumption of care order at d.c.  Pt;s family will assist with transport him    JACQUES:    Home with family  2nd IM Medicare Letter    Care Management Interventions  PCP Verified by CM: Yes  Mode of Transport at Discharge:  Other (see comment)  Transition of Care Consult (CM Consult): Discharge Planning  Discharge Durable Medical Equipment: No  Physical Therapy Consult: Yes  Occupational Therapy Consult: Yes  Speech Therapy Consult: No  Current Support Network: Lives with Spouse, Own Home  Confirm Follow Up Transport: Family  Plan discussed with Pt/Family/Caregiver: Yes  Discharge Location  Discharge Placement: [facility] [address], MSW, [facility]

## 2019-10-28 NOTE — PROGRESS NOTES
Physical Therapy:  10/28/19    Orders received and appreciated, chart reviewed, spoke to RN. Patient currently SIA at MRI. Will follow up for PT Evaluation.      Thank you,  VIVIAN PeñaT

## 2019-10-28 NOTE — ED NOTES
PT A&Ox4, poor safety awarness. PT has been observed in CT and throught [facility] shift for pulling lines. Pt daughter states to [facility] that he didn't know he was pulling his IV. [place], RN explained to pt daughter that in fact that he did not understand that he was doing it. PT daughter explained to Tiff Americo that he was not to have mittens on.  AVIVA Mathew complies and states that we are all a team and daughter states she will stay with him. Door remains open, family bedside.

## 2019-10-28 NOTE — PROGRESS NOTES
Transitions of Care - Pharmacist Retrospective Admission Medication History    The patient was not interviewed regarding clarification of the prior to admission medication regimen. Family members were present in room and able to provide history regarding patient's PTA medications. Doses were confirmed via RX Query and medication list from recent PCP office visit. Information Obtained From: Family, RX Query, Medication list from 10/17/19 office visit with Dr. Shahnaz Resendez    Recommendations/Findings: The following amendments were made to the patient's active medication list on file at [facility]:     1) Additions: NONE    2) Removals:   Gabapentin tab    3) Changes: NONE    4) Pertinent Pharmacy Findings:  Family states that patient has been weaned off of gabapentin and is no longer taking this agent at home  Family states that the patient was taking allopurinol PTA up until a few weeks ago when the patient experienced a rash. PCP discontinued allopurinol to see if this agent was causing the rash and it has not been restarted as of today (10/28/19). It appears that the family is still unsure if the allopurinol is what caused the rash. Amlodipine, olmesartan and clopidogrel were discontinued after 19 admission and have not been restarted as of today (10/28/19)    PTA medication list was corrected to the following:     Prior to Admission Medications   Prescriptions Last Dose Informant Patient Reported? Taking? ANORO ELLIPTA 62.5-25 mcg/actuation inhaler 10/26/2019 Family Member Yes Yes   Sig: Take 1 Puff by inhalation daily. DULoxetine (CYMBALTA) 30 mg capsule 10/26/2019 Family Member No Yes   Sig: Take 1 Cap by mouth daily. Lactoperoxi/Gluc Oxid/Pot Thio (BIOTENE DRY MOUTH MM) 2019 Family Member Yes Yes   Si Lozenge by Mucous Membrane route as needed (dry mouth). aspirin 81 mg chewable tablet 10/26/2019 Family Member No Yes   Sig: Take 1 Tab by mouth daily for 30 days.    atenolol (TENORMIN) 50 mg tablet 10/26/2019 Family Member No Yes   Sig: Take 1 Tab by mouth daily. atorvastatin (LIPITOR) 80 mg tablet 10/26/2019 Family Member No Yes   Sig: TAKE ONE TABLET BY MOUTH DAILY   butalbital-acetaminophen-caffeine (FIORICET, ESGIC) -40 mg per tablet 10/21/2019 Family Member No Yes   Sig: Take 1 Tab by mouth every six (6) hours as needed for Headache. furosemide (LASIX) 80 mg tablet 10/26/2019 Family Member No Yes   Sig: Take 1 Tab by mouth daily. pantoprazole (PROTONIX) 40 mg tablet 10/26/2019 Family Member No Yes   Sig: Take 1 Tab by mouth daily.       Facility-Administered Medications: None       Thank you,  Jean Macdonald, PHARMD

## 2019-10-28 NOTE — PROGRESS NOTES
Problem: Falls - Risk of  Goal: *Absence of Falls  Description  Document Melanie Lang Fall Risk and appropriate interventions in the flowsheet.   Outcome: Progressing Towards Goal  Note:   Fall Risk Interventions:  Mobility Interventions: Bed/chair exit alarm    Mentation Interventions: Bed/chair exit alarm, Door open when patient unattended         Elimination Interventions: Bed/chair exit alarm, Call light in reach, Toileting schedule/hourly rounds              Problem: Patient Education: Go to Patient Education Activity  Goal: Patient/Family Education  Outcome: Progressing Towards Goal     Problem: Patient Education: Go to Patient Education Activity  Goal: Patient/Family Education  Outcome: Progressing Towards Goal

## 2019-10-28 NOTE — PROGRESS NOTES
Occupational Therapy:  10/28/19    Orders received and appreciated, chart reviewed, spoke to RN. Patient currently SIA at MRI. Will follow up for OT Evaluation.      Thank you,  Kelsie Johnson, OTR/L

## 2019-10-28 NOTE — ROUTINE PROCESS
* No surgery found * 
* No surgery found * Bedside and Verbal shift change report given to [address] [place] [address] [address] (oncoming nurse) by Stephan Cisneros RN (offgoing nurse). Report included the following information SBAR, Kardex, MAR and Recent Results. [facility] Phone:   [phone] Significant changes during shift:   
1) admission 2) MRI form electronically signed by daughter 3) patient very restless during night, repeatedly picked at lines, pulled off gown, pulled off blankets then said he was cold. Patient Information Negrito Santos 80 y.o. 
10/27/2019  6:04 PM by Derik Barnett MD. Negrito Santos was admitted from Home 
 
Problem List 
 
Patient Active Problem List  
 Diagnosis Date Noted  Facial droop 10/27/2019  Acute blood loss anemia 09/29/2019  Rectal bleeding 09/23/2019  Pneumonia 09/23/2019  Severe sepsis ([address] 09/23/2019  Flank pain 08/01/2019  Spinal stenosis of lumbar region at multiple levels 05/13/2019  Bilateral carotid artery stenosis 04/12/2019  Severe obesity ([address] 01/03/2019  TSESA on CPAP 01/03/2019  Simple chronic bronchitis ([address] 01/03/2019  Fatigue 08/16/2017  CAD (coronary artery disease) 08/16/2017  Dyslipidemia 08/16/2017  On statin therapy 08/16/2017  Gout 08/16/2017  GERD (gastroesophageal reflux disease) 08/16/2017  TIA (transient ischemic attack) 08/16/2017  Dyspnea 08/16/2017  Colon polyps 08/16/2017  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  
Diagnosis Date  Adverse effect of anesthesia   
 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 1/3/2019  TIA (transient ischemic attack) 8/16/2017  Umbilical hernia 09/3/3075 Core Measures: CVA: Yes Yes CHF:No Not applicable PNA:No Not applicable Activity Status: OOB to Chair No 
Ambulated this shift No  
Bed Rest No 
 
Supplemental O2: (If Applicable) NC Yes NRB No 
Venti-mask No 
On 2L Liters/min LINES AND DRAINS: 
 
PIV 
 
DVT prophylaxis: DVT prophylaxis Med- No 
DVT prophylaxis SCD or MOISES- Refused Wounds: (If Applicable) Wounds- No 
 
Location none Patient Safety: 
 
Falls Score Total Score: 4 Safety Level_______ Bed Alarm On? Yes Sitter? No 
 
Plan for upcoming shift: safety, MRI, neuro consult Discharge Plan: No just admitted Active Consults: 
IP CONSULT TO NEUROLOGY 
IP CONSULT TO NEUROLOGY 
IP CONSULT TO HOSPITALIST

## 2019-10-28 NOTE — CONSULTS
NEUROLOGY NOTE     Chief Complaint   Patient presents with    Altered mental status     Granddaughter reports onset around 56 of confusion with headache    Facial Droop     granddaughter reports L lower facial droop onset around 1530        Reason for Consult  I have been asked to see the patient in neurological consultation by Rodgers Mohs, MD to render advice and opinion regarding possible stroke    HPI  Thuan Colon is a 80 y.o. male who presents to the hospital because of altered mental status. He started having mild confusion on 10/26/19 but he was significantly different from his baseline at around 1 pm on 10/27/19. His speech was not slurred but when he was walking, he was leaning to the left. While going to sit on the chair he missed it and fell down. He was also agitated and was not able to hold on to notes and coins in his hands. He was brought to the hospital and pt did have lab work and it was normal. MRI brain is normal as well. He did recently had GI bleed on plavix. He takes aspirin 81 mg at home.      ROS  A ten system review of constitutional, cardiovascular, respiratory, musculoskeletal, endocrine, skin, SHEENT, genitourinary, psychiatric and neurologic systems was obtained and is unremarkable except as stated in HPI     PMH  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    Morbid obesity ([address]     On statin therapy 8/16/2017    TESSA on CPAP 1/3/2019    Psychiatric disorder     Depression    Simple chronic bronchitis (HCC) 1/3/2019    TIA (transient ischemic attack) 8/90/4080    Umbilical hernia 75/3/7703       FH  Family History   Problem Relation Age of Onset    Heart Disease Mother     Hypertension Mother     Hypertension Father     Hypertension Sister     Hypertension Brother     Cancer Brother        SH  Social History     Socioeconomic History    Marital status:      Spouse name: Not on file    Number of children: Not on file    Years of education: Not on file    Highest education level: Not on file   Tobacco Use    Smoking status: Former Smoker     Packs/day: 0.50     Years: 20.00     Pack years: 10.00    Smokeless tobacco: Former User    Tobacco comment: quit about 40  years ago   / used to dip tobaco and dip snuff   Substance and Sexual Activity    Alcohol use: Not Currently     Comment: \"Drinks very little now for about a month \"    Drug use: No       ALLERGIES  Allergies   Allergen Reactions    Levaquin [Levofloxacin] Nausea and Vomiting     Reports anxiety, nausea, aching after taking levaquin    Ciprofloxacin Shortness of Breath    Quinolones Shortness of Breath       PHYSICAL EXAMINATION:   Patient Vitals for the past 24 hrs:   Temp Pulse Resp BP SpO2   10/28/19 0803 97.1 °F (36.2 °C) 68 18 123/79 95 %   10/28/19 0332 99.3 °F (37.4 °C) 66 18 151/79 94 %   10/28/19 0026 98.9 °F (37.2 °C) 77 22 168/58 94 %   10/27/19 2201  75 23  95 %   10/27/19 2200  76 30 153/72 (!) 89 %   10/27/19 2159  80 25  97 %   10/27/19 2145  77 15 153/75 (!) 88 %   10/27/19 2020     95 %   10/27/19 2015    177/71 94 %   10/27/19 1945  78 23 (!) 189/95 (!) 89 %   10/27/19 1900  81 22 144/61 92 %   10/27/19 1857  82 29 144/61 99 %   10/27/19 1756 97.7 °F (36.5 °C) 73 16 (!) 196/107 97 %        General:   General appearance: Pt is in no acute distress   Distal pulses are preserved  Fundoscopic exam: attempted    Neurological Examination:   Mental Status:  AAO x3. Speech is fluent. Follows commands, has normal fund of knowledge, attention, short term recall, comprehension and insight. Cranial Nerves: Visual fields are full. PERRL, Extraocular movements are full. Facial sensation intact. Facial movement intact. Hearing intact to conversation. Palate elevates symmetrically. Shoulder shrug symmetric. Tongue midline. Motor: Strength is 5/5 in all 4 ext. Normal tone. No atrophy. Sensation: Light touch - Normal    Reflexes: DTRs 2+ in UE and 1+ at knees and absent at ankles. Plantar responses downgoing. Coordination/Cerebellar: Intact to finger-nose-finger     Gait: not tested    Skin: No significant bruising or lacerations. LAB DATA REVIEWED:    Recent Results (from the past 24 hour(s))   GLUCOSE, POC    Collection Time: 10/27/19  6:00 PM   Result Value Ref Range    Glucose (POC) 118 (H) 65 - 100 mg/dL    Performed by [facility]    CBC WITH AUTOMATED DIFF    Collection Time: 10/27/19  6:38 PM   Result Value Ref Range    WBC 6.7 4.1 - 11.1 K/uL    RBC 3.99 (L) 4.10 - 5.70 M/uL    HGB 12.0 (L) 12.1 - 17.0 g/dL    HCT 38.7 36.6 - 50.3 %    MCV 97.0 80.0 - 99.0 FL    MCH 30.1 26.0 - 34.0 PG    MCHC 31.0 30.0 - 36.5 g/dL    RDW 16.8 (H) 11.5 - 14.5 %    PLATELET 582 641 - 600 K/uL    MPV 10.0 8.9 - 12.9 FL    NRBC 0.0 0  WBC    ABSOLUTE NRBC 0.00 0.00 - 0.01 K/uL    NEUTROPHILS 71 32 - 75 %    LYMPHOCYTES 17 12 - 49 %    MONOCYTES 9 5 - 13 %    EOSINOPHILS 3 0 - 7 %    BASOPHILS 1 0 - 1 %    IMMATURE GRANULOCYTES 0 0.0 - 0.5 %    ABS. NEUTROPHILS 4.7 1.8 - 8.0 K/UL    ABS. LYMPHOCYTES 1.1 0.8 - 3.5 K/UL    ABS. MONOCYTES 0.6 0.0 - 1.0 K/UL    ABS. EOSINOPHILS 0.2 0.0 - 0.4 K/UL    ABS. BASOPHILS 0.1 0.0 - 0.1 K/UL    ABS. IMM.  GRANS. 0.0 0.00 - 0.04 K/UL    DF AUTOMATED     PROTHROMBIN TIME + INR    Collection Time: 10/27/19  6:38 PM   Result Value Ref Range    INR 1.2 (H) 0.9 - 1.1 Prothrombin time 12.2 (H) 9.0 - 57.6 sec   METABOLIC PANEL, COMPREHENSIVE    Collection Time: 10/27/19  6:38 PM   Result Value Ref Range    Sodium 140 136 - 145 mmol/L    Potassium 4.2 3.5 - 5.1 mmol/L    Chloride 104 97 - 108 mmol/L    CO2 25 21 - 32 mmol/L    Anion gap 11 5 - 15 mmol/L    Glucose 113 (H) 65 - 100 mg/dL    BUN 26 (H) 6 - 20 MG/DL    Creatinine 1.83 (H) 0.70 - 1.30 MG/DL    BUN/Creatinine ratio 14 12 - 20      GFR est AA 43 (L) >60 ml/min/1.73m2    GFR est non-AA 35 (L) >60 ml/min/1.73m2    Calcium 8.8 8.5 - 10.1 MG/DL    Bilirubin, total 0.3 0.2 - 1.0 MG/DL    ALT (SGPT) 27 12 - 78 U/L    AST (SGOT) 30 15 - 37 U/L    Alk.  phosphatase 175 (H) 45 - 117 U/L    Protein, total 7.4 6.4 - 8.2 g/dL    Albumin 3.5 3.5 - 5.0 g/dL    Globulin 3.9 2.0 - 4.0 g/dL    A-G Ratio 0.9 (L) 1.1 - 2.2     CK    Collection Time: 10/27/19  6:38 PM   Result Value Ref Range    CK 63 39 - 308 U/L   TROPONIN I    Collection Time: 10/27/19  6:38 PM   Result Value Ref Range    Troponin-I, Qt. <0.05 <0.05 ng/mL   URINALYSIS W/ RFLX MICROSCOPIC    Collection Time: 10/27/19  7:01 PM   Result Value Ref Range    Color YELLOW/STRAW      Appearance CLEAR CLEAR      Specific gravity 1.012 1.003 - 1.030      pH (UA) 6.0 5.0 - 8.0      Protein NEGATIVE  NEG mg/dL    Glucose NEGATIVE  NEG mg/dL    Ketone NEGATIVE  NEG mg/dL    Bilirubin NEGATIVE  NEG      Blood NEGATIVE  NEG      Urobilinogen 0.2 0.2 - 1.0 EU/dL    Nitrites NEGATIVE  NEG      Leukocyte Esterase NEGATIVE  NEG     METABOLIC PANEL, BASIC    Collection Time: 10/28/19  2:17 AM   Result Value Ref Range    Sodium 136 136 - 145 mmol/L    Potassium 4.2 3.5 - 5.1 mmol/L    Chloride 102 97 - 108 mmol/L    CO2 26 21 - 32 mmol/L    Anion gap 8 5 - 15 mmol/L    Glucose 109 (H) 65 - 100 mg/dL    BUN 23 (H) 6 - 20 MG/DL    Creatinine 1.60 (H) 0.70 - 1.30 MG/DL    BUN/Creatinine ratio 14 12 - 20      GFR est AA 50 (L) >60 ml/min/1.73m2    GFR est non-AA 41 (L) >60 ml/min/1.73m2 Calcium 8.9 8.5 - 10.1 MG/DL   HEMOGLOBIN A1C WITH EAG    Collection Time: 10/28/19  2:17 AM   Result Value Ref Range    Hemoglobin A1c 5.3 4.2 - 6.3 %    Est. average glucose 105 mg/dL   LIPID PANEL    Collection Time: 10/28/19  2:17 AM   Result Value Ref Range    LIPID PROFILE          Cholesterol, total 151 <200 MG/DL    Triglyceride 195 (H) <150 MG/DL    HDL Cholesterol 30 MG/DL    LDL, calculated 82 0 - 100 MG/DL    VLDL, calculated 39 MG/DL    CHOL/HDL Ratio 5.0 0.0 - 5.0     TSH 3RD GENERATION    Collection Time: 10/28/19  2:17 AM   Result Value Ref Range    TSH 1.75 0.36 - 3.74 uIU/mL        HOME MEDS  Prior to Admission Medications   Prescriptions Last Dose Informant Patient Reported? Taking? ANORO ELLIPTA 62.5-25 mcg/actuation inhaler  Significant Other Yes No   Sig: Take 1 Puff by inhalation daily. DULoxetine (CYMBALTA) 30 mg capsule  Significant Other No No   Sig: Take 1 Cap by mouth daily. Lactoperoxi/Gluc Oxid/Pot Thio (BIOTENE DRY MOUTH MM)   Yes No   Si Lozenge by Mucous Membrane route as needed (dry mouth). aspirin 81 mg chewable tablet   No No   Sig: Take 1 Tab by mouth daily for 30 days. atenolol (TENORMIN) 50 mg tablet  Significant Other No No   Sig: Take 1 Tab by mouth daily. atorvastatin (LIPITOR) 80 mg tablet  Significant Other No No   Sig: TAKE ONE TABLET BY MOUTH DAILY   butalbital-acetaminophen-caffeine (FIORICET, ESGIC) -40 mg per tablet   No No   Sig: Take 1 Tab by mouth every six (6) hours as needed for Headache. furosemide (LASIX) 80 mg tablet  Significant Other No No   Sig: Take 1 Tab by mouth daily. gabapentin (NEURONTIN) 100 mg capsule   Yes No   Sig: Take 100 mg by mouth three (3) times daily. pantoprazole (PROTONIX) 40 mg tablet   No No   Sig: Take 1 Tab by mouth daily.       Facility-Administered Medications: None       CURRENT MEDS  Current Facility-Administered Medications   Medication Dose Route Frequency    umeclidinium-vilanterol (ANORO ELLIPTA) 62.5 mcg- 25 mcg/inhalation  1 Puff Inhalation DAILY    aspirin chewable tablet 81 mg  81 mg Oral DAILY    atenolol (TENORMIN) tablet 50 mg  50 mg Oral DAILY    atorvastatin (LIPITOR) tablet 80 mg  80 mg Oral QHS    DULoxetine (CYMBALTA) capsule 30 mg  30 mg Oral DAILY    furosemide (LASIX) tablet 80 mg  80 mg Oral DAILY    gabapentin (NEURONTIN) capsule 100 mg  100 mg Oral TID    pantoprazole (PROTONIX) tablet 40 mg  40 mg Oral DAILY    sodium chloride (NS) flush 5-40 mL  5-40 mL IntraVENous Q8H    influenza vaccine 2019-20 (6 mos+)(PF) (FLUARIX/FLULAVAL/FLUZONE QUAD) injection 0.5 mL  0.5 mL IntraMUSCular PRIOR TO DISCHARGE       Stroke workup    MRI Brain  1. Age-related central volume loss and white matter disease greater than  expected for age and similar to the prior MRI 4/13/19. 2. No acute intracranial abnormality demonstrated. CTA Head and neck   Examinations significantly limited due to motion artifact. There is no major intracranial vessel occlusion. Moderate to severe chronic microvascular ischemic change with small chronic  remote infarctions. .  Limited evaluation of ICA stenosis     EUNICE:   Estimated left ventricular ejection fraction is 56 - 60%       Stroke labs:  HgBA1c    Lab Results   Component Value Date/Time    Hemoglobin A1c 5.3 10/28/2019 02:17 AM     LDL   Lab Results   Component Value Date/Time    LDL, calculated 82 10/28/2019 02:17 AM       IMPRESSION:  Cabrera Dejesus is a 80 y.o. male who presents with altered mental status and mild left sided weakness. Now resolved. Suspect TIA. Metabolic stern is negative. Pt did have mild urinary incontinence during the episode. Will also get EEG to check for any seizure activity, although suspect that it will be normal.     RECOMMENDATIONS:  - Telemetry  - BP goal is less than 140/90  - Continue ASA 81 mg daily. Cannot escalate antiplatelet therapy as he did have gi bleed. - Cont atorvastatin 80 mg daily   - ST/OT/PT     - EEG is pending. Otherwise no other neuro stern. Call with questions. Thank you very much for this consultation.       Jatinder Flynn MD  Neurologist

## 2019-10-28 NOTE — PROCEDURES
Patient Name: Hanna Hale  : 1934  Age: 80 y.o. Ordering physician: No ref. provider found  Date of EEG: 10/28/19   EEG procedure number: [id number]  Diagnosis: altered mental status  Interpreting physician: Daisy Paz MD      ELECTROENCEPHALOGRAM REPORT     PROCEDURE: EEG. CLINICAL INDICATION: The patient is a 80 y.o. male who is being evaluated for baseline electro cerebral activities and to rule out seizure focus. EEG CLASSIFICATION: Normal    DESCRIPTION OF THE RECORD:   The background of this recording contains a posteriorly-located occipital alpha rhythm of 9 Hz that attenuates with eye opening. Throughout the recording, there were no clear areas of focal slowing nor spike or spike-and-wave discharges seen. Hyperventilation was not performed. Photic stimulation produced no response in the posterior head regions. During the recording the patient did not achieve stage II sleep    INTERPRETATION: This is a normal electroencephalogram with the patient awake, showing no clear focal abnormalities or epileptiform activity. A normal EEG doesn't not rule out seizures. Clinical correlation recommended.       Daisy Paz MD  Neurologist

## 2019-10-28 NOTE — PROGRESS NOTES
Problem: Mobility Impaired (Adult and Pediatric)  Goal: *Acute Goals and Plan of Care (Insert Text)  Description  FUNCTIONAL STATUS PRIOR TO ADMISSION: Patient was modified independent using a single point cane for functional mobility. HOME SUPPORT PRIOR TO ADMISSION: The patient lived with his wife but did not require assist for mobility or adls    Physical Therapy Goals  Initiated 10/28/2019  1. Patient will move from supine to sit and sit to supine , scoot up and down and roll side to side in bed with modified independence within 7 day(s). 2.  Patient will transfer from bed to chair and chair to bed with modified independence using the least restrictive device within 7 day(s). 3.  Patient will perform sit to stand with modified independence within 7 day(s). 4.  Patient will ambulate with modified independence for 150 feet with the least restrictive device within 7 day(s). Outcome: Progressing Towards Goal   PHYSICAL THERAPY EVALUATION  Patient: Mohamud Borges (65 y.o. male)  Date: 10/28/2019  Primary Diagnosis: Facial droop [R29.810]        Precautions:          ASSESSMENT  Based on the objective data described below, the patient presents with generalized weakness and fatigue, but with mobility only slightly below his baseline. Pt admitted for facial droop and a Ha. Pt presents without a facial droop and report of a mild Ha. CT and CTA were negative and he has a MRI pending. Pt demonstrates OOB with cga to min a x 1. Pt with a couple of losses of balance with static and dynamic standing, requiring min a x 1 to correct. The losses of balance were most likely due to fatigue from lack of sleep. Pt's granddaughter present and reporting pt is moving like he normally does at his baseline. Gait is slow but steady overall. Current Level of Function Impacting Discharge (mobility/balance): cga/min a x 1    Functional Outcome Measure:   The patient scored 55/100 on the Barthel Index outcome measure which is indicative of 45% impaired function and with adls     Other factors to consider for discharge: good family support, pt presents close to his baseline     Patient will benefit from skilled therapy intervention to address the above noted impairments. PLAN :  Recommendations and Planned Interventions: bed mobility training, transfer training, gait training, therapeutic exercises, patient and family training/education and therapeutic activities      Frequency/Duration: Patient will be followed by physical therapy:  4 times a week to address goals.     Recommendation for discharge: (in order for the patient to meet his/her long term goals)  To return home without formal services    This discharge recommendation:  Has been made in collaboration with the attending provider and/or case management    IF patient discharges home will need the following DME: none         SUBJECTIVE:   Patient stated I am just so sleepy from not sleeping    OBJECTIVE DATA SUMMARY:   HISTORY:    Past Medical History:   Diagnosis Date    Adverse effect of anesthesia     combative after anesthesia    Alcohol abuse     6 beers/day    Arthritis     CAD (coronary artery disease)     Chronic obstructive pulmonary disease ([address]     Dyslipidemia 8/16/2017    Dyspepsia and other specified disorders of function of stomach     Dyspnea 8/16/2017    ED (erectile dysfunction) 8/16/2017    Encounter for immunization 8/16/2017    Fatigue 8/16/2017    Flank pain 8/1/2019    GERD (gastroesophageal reflux disease) 8/16/2017    Gout 8/16/2017    Hypertension     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    Morbid obesity ([address]     On statin therapy 8/16/2017    TESSA on CPAP 1/3/2019    Psychiatric disorder     Depression    Simple chronic bronchitis ([address] 1/3/2019    TIA (transient ischemic attack) 1/77/2287    Umbilical hernia 40/4/7107     Past Surgical History:   Procedure Laterality Date    CARDIAC SURG PROCEDURE UNLIST  1996    Cardiac Stents CARDIAC SURG PROCEDURE UNLIST  04/2019    EF 61-65%    COLONOSCOPY N/A 9/27/2019    COLONOSCOPY performed by Chichi Mejia MD at [place] [address]    HX HERNIA REPAIR  74/99/19    open umbilical hernia repair mesh    HX UROLOGICAL      HYDROCELECTOMY    UPPER GI ENDOSCOPY,BIOPSY  9/30/2019            Personal factors and/or comorbidities impacting plan of care:  none    Home Situation  Home Environment: Private residence  # Steps to Enter: 1  Rails to Enter: No  One/Two Story Residence: One story  Living Alone: No  Support Systems: Spouse/Significant Other/Partner, Child(theodora), Family member(s)  Current DME Used/Available at Home: Duson Mandril, straight, Transfer bench, Walker, rolling, Shower chair  Tub or Shower Type: Shower    EXAMINATION/PRESENTATION/DECISION MAKING:   Critical Behavior:  Neurologic State: Alert, Appropriate for age  Orientation Level: Oriented X4  Cognition: Follows commands  Safety/Judgement: Awareness of environment, Fall prevention  Hearing: Auditory  Auditory Impairment: Hard of hearing, bilateral  Hearing Aids/Status: With patient  Range Of Motion:  AROM: Within functional limits           PROM: Within functional limits           Strength:    Strength: Generally decreased, functional                    Tone & Sensation:   Tone: Normal              Sensation: Intact               Coordination:  Coordination: Within functional limits  Vision:   Diplopia: No  Functional Mobility:  Bed Mobility:  Rolling: Modified independent  Supine to Sit: Modified independent  Sit to Supine: Modified independent  Scooting: Modified independent  Transfers:  Sit to Stand: Stand-by assistance  Stand to Sit: Stand-by assistance                       Balance:   Sitting: Intact  Standing: Impaired; Without support  Standing - Static: Good;Constant support;Occasional  Standing - Dynamic : Good;Constant support;Occasional  Ambulation/Gait Training:  Distance (ft): 26 Feet (ft)  Assistive Device: Gait belt;Cane, straight  Ambulation - Level of Assistance: Contact guard assistance;Stand-by assistance     Gait Description (WDL): Exceptions to WDL  Gait Abnormalities: Decreased step clearance; Path deviations        Base of Support: Widened     Speed/Beverly: Slow  Step Length: Right shortened;Left shortened     Functional Measure:  Barthel Index:    Bathin  Bladder: 10  Bowels: 10  Groomin  Dressin  Feeding: 10  Mobility: 0  Stairs: 0  Toilet Use: 5  Transfer (Bed to Chair and Back): 10  Total: 55/100       The Barthel ADL Index: Guidelines  1. The index should be used as a record of what a patient does, not as a record of what a patient could do. 2. The main aim is to establish degree of independence from any help, physical or verbal, however minor and for whatever reason. 3. The need for supervision renders the patient not independent. 4. A patient's performance should be established using the best available evidence. Asking the patient, friends/relatives and nurses are the usual sources, but direct observation and common sense are also important. However direct testing is not needed. 5. Usually the patient's performance over the preceding 24-48 hours is important, but occasionally longer periods will be relevant. 6. Middle categories imply that the patient supplies over 50 per cent of the effort. 7. Use of aids to be independent is allowed. Jackeline Mejia, Barthel, D.W. ([id number]). Functional evaluation: the Barthel Index. [facility] (14)2. Dann Hickey rose BENNIE Mercedes, Rickie Duran., Maya Kennedy., [place], [address] [place] [address] (). Measuring the change indisability after inpatient rehabilitation; comparison of the responsiveness of the Barthel Index and Functional [place] Measure. Journal of Neurology, Neurosurgery, and Psychiatry, 66(4), 301-207. Daniel Florez, N.J.A, LIBRADO Pulido.JENNIFER, & Radha Hernandez MAlexsanderA. (2004.) Assessment of post-stroke quality of life in cost-effectiveness studies:  The usefulness of the Barthel Index and the EuroQoL-5D. Quality of Life Research, 15, 157-34           Physical Therapy Evaluation Charge Determination   History Examination Presentation Decision-Making   MEDIUM  Complexity : 1-2 comorbidities / personal factors will impact the outcome/ POC  MEDIUM Complexity : 3 Standardized tests and measures addressing body structure, function, activity limitation and / or participation in recreation  MEDIUM Complexity : Evolving with changing characteristics  MEDIUM Complexity : FOTO score of 26-74      Based on the above components, the patient evaluation is determined to be of the following complexity level: MEDIUM    Pain Rating:  N/a    Activity Tolerance:   Good to Fair  Please refer to the flowsheet for vital signs taken during this treatment. After treatment patient left in no apparent distress:   Sitting in chair, Call bell within reach and Caregiver / family present    COMMUNICATION/EDUCATION:   The patients plan of care was discussed with: Occupational Therapist and Registered Nurse. Fall prevention education was provided and the patient/caregiver indicated understanding., Patient/family have participated as able in goal setting and plan of care. and Patient/family agree to work toward stated goals and plan of care.     Thank you for this referral.  Kevin Leone, PT   Time Calculation: 26 mins

## 2019-10-29 ENCOUNTER — HOME CARE VISIT (OUTPATIENT)
Dept: HOME HEALTH SERVICES | Facility: HOME HEALTH | Age: 84
End: 2019-10-29
Payer: MEDICARE

## 2019-10-29 ENCOUNTER — HOME CARE VISIT (OUTPATIENT)
Dept: SCHEDULING | Facility: HOME HEALTH | Age: 84
End: 2019-10-29
Payer: MEDICARE

## 2019-10-29 ENCOUNTER — PATIENT OUTREACH (OUTPATIENT)
Dept: INTERNAL MEDICINE CLINIC | Age: 84
End: 2019-10-29

## 2019-10-29 LAB
BACTERIA SPEC CULT: ABNORMAL
BACTERIA SPEC CULT: ABNORMAL
SERVICE CMNT-IMP: ABNORMAL

## 2019-10-29 PROCEDURE — 3331090001 HH PPS REVENUE CREDIT

## 2019-10-29 PROCEDURE — 3331090002 HH PPS REVENUE DEBIT

## 2019-10-29 NOTE — PROGRESS NOTES
MASOOD spoke to Omer Bauer - NN - with Dr. Daniela Preston office. Attempted to make follow-up appt within 5 days of discharge - NN unable to locate appt time for date within 5 days - only time available was 11/5/2019 at 11:15pm.    CM notified patient's wife of date and time of appt and she confirms that she will be able to transport patient.     Liana Jerez RN, BSN, [address] [place] [address]     [phone]

## 2019-10-29 NOTE — PROGRESS NOTES
Hospital Discharge Follow-Up      Date/Time:  10/29/2019 3:27 PM    Patient was admitted to [facility] on 10/27 and discharged on 10/28 for DX. FACIAL DROOP. The physician discharge summary was not available at the time of outreach. Patient was contacted within 2 business days of discharge. Top Challenges reviewed with the provider     Advance Care Planning:   Does patient have an Advance Directive:  reviewed and current        Method of communication with provider :chart routing    Inpatient RRAT score: 29  Was this a readmission? yes   Patient stated reason for the readmission: L sided weakness, facial droop    Care Transition Nurse (CTN) contacted the patient by telephone to perform post hospital discharge assessment. Verified name and  with patient as identifiers. Provided introduction to self, and explanation of the CTN role. Patient received hospital discharge instructions. CTN reviewed discharge instructions and red flags with patient who verbalized understanding. Patient given an opportunity to ask questions and does not have any further questions or concerns at this time. The patient agrees to contact the PCP office for questions related to their healthcare. CTN provided contact information for future reference. Disease Specific:   N/A    Patients top risk factors for readmission:  none    Home Health orders at discharge: none    Medication(s):   Changed Medications at Discharge:   furosemide 40 mg tablet (LASIX); Take 80 mg alternating with 40 mg every other day    Medication reconciliation was performed with patient, who verbalizes understanding of administration of home medications. There were no barriers to obtaining medications identified at this time.     Referral to Pharm D needed: no     Current Outpatient Medications   Medication Sig    furosemide (LASIX) 40 mg tablet Take 80 mg alternating with 40 mg every other day    Lactoperoxi/Gluc Oxid/Pot Thio (BIOTENE DRY MOUTH MM) 1 Lozenge by Mucous Membrane route as needed (dry mouth).  pantoprazole (PROTONIX) 40 mg tablet Take 1 Tab by mouth daily.  butalbital-acetaminophen-caffeine (FIORICET, ESGIC) -40 mg per tablet Take 1 Tab by mouth every six (6) hours as needed for Headache.  DULoxetine (CYMBALTA) 30 mg capsule Take 1 Cap by mouth daily.  atorvastatin (LIPITOR) 80 mg tablet TAKE ONE TABLET BY MOUTH DAILY    atenolol (TENORMIN) 50 mg tablet Take 1 Tab by mouth daily.  ANORO ELLIPTA 62.5-25 mcg/actuation inhaler Take 1 Puff by inhalation daily. No current facility-administered medications for this visit. [facility] follow up appointment(s):   Future Appointments   Date Time Provider Deanna Wrighti   10/30/2019 To Be Determined Alfa Lipoma [address] [address] [facility]   11/5/2019 11:15 AM Ashanti Alba MD [address] [address]:  information provided as a resource     Goals      Establish PCP relationships and regularly scheduled appointments. 10/29 Pt.has f/u with PCP, Dr. Paul Rodríguez on 11/5 @ 11:15 am, pt.declined earlier appt., pt.has scheduled f/u with (Neurology) Nasreen Hernandez will have wife or g-dtr. call for appt. d/t wife and g-dtr provides transportation. CTN will f/u with pt.in 1-2 weeks. -[facility]    10/30 CTN call [facility] to f/u with therapy, spoke with Daily reports they received the referral today from Dr. Navas Flaquito office, will start SN/PT w/n 48hrs. CTN will f/u with pt.in 1 week. -       Understands red flags post discharge. 10/29 Pt.reports he is doing well, reports weakness to L side has improved, reports he has generalized weakness all over, no reports of headaches. CTN reminded pt.and PHI to report sx such as difficulty walking, dizziness, loss of balance and coordination, difficulty speaking, numbness or paralysis in the face, leg, or arm, most likely on just one side of the body, a sudden headache, especially when accompanied by nausea, vomiting.  Pt.PHI encourage to call 911 or take pt to ED for eval. Pt.and PHI verbalizes understanding. CTN will f/u with pt.in 1-2 weeks. -[address]

## 2019-10-30 DIAGNOSIS — G93.40 ENCEPHALOPATHY: Primary | ICD-10-CM

## 2019-10-30 DIAGNOSIS — R09.02 HYPOXIA: ICD-10-CM

## 2019-10-30 PROCEDURE — 3331090001 HH PPS REVENUE CREDIT

## 2019-10-30 PROCEDURE — 3331090002 HH PPS REVENUE DEBIT

## 2019-10-31 PROCEDURE — 3331090001 HH PPS REVENUE CREDIT

## 2019-10-31 PROCEDURE — 3331090002 HH PPS REVENUE DEBIT

## 2019-11-01 ENCOUNTER — HOME CARE VISIT (OUTPATIENT)
Dept: SCHEDULING | Facility: HOME HEALTH | Age: 84
End: 2019-11-01
Payer: MEDICARE

## 2019-11-01 VITALS
RESPIRATION RATE: 18 BRPM | SYSTOLIC BLOOD PRESSURE: 114 MMHG | DIASTOLIC BLOOD PRESSURE: 62 MMHG | OXYGEN SATURATION: 96 % | TEMPERATURE: 97.3 F | HEART RATE: 71 BPM

## 2019-11-01 VITALS
HEART RATE: 66 BPM | SYSTOLIC BLOOD PRESSURE: 142 MMHG | TEMPERATURE: 98.3 F | OXYGEN SATURATION: 98 % | RESPIRATION RATE: 18 BRPM | DIASTOLIC BLOOD PRESSURE: 84 MMHG

## 2019-11-01 PROCEDURE — G0493 RN CARE EA 15 MIN HH/HOSPICE: HCPCS

## 2019-11-01 PROCEDURE — G0151 HHCP-SERV OF PT,EA 15 MIN: HCPCS

## 2019-11-01 PROCEDURE — 3331090001 HH PPS REVENUE CREDIT

## 2019-11-01 PROCEDURE — 3331090002 HH PPS REVENUE DEBIT

## 2019-11-02 PROCEDURE — 3331090001 HH PPS REVENUE CREDIT

## 2019-11-02 PROCEDURE — 3331090002 HH PPS REVENUE DEBIT

## 2019-11-03 PROCEDURE — 3331090002 HH PPS REVENUE DEBIT

## 2019-11-03 PROCEDURE — 3331090001 HH PPS REVENUE CREDIT

## 2019-11-04 PROCEDURE — 3331090002 HH PPS REVENUE DEBIT

## 2019-11-04 PROCEDURE — 3331090001 HH PPS REVENUE CREDIT

## 2019-11-05 ENCOUNTER — OFFICE VISIT (OUTPATIENT)
Dept: INTERNAL MEDICINE CLINIC | Age: 84
End: 2019-11-05

## 2019-11-05 ENCOUNTER — HOME CARE VISIT (OUTPATIENT)
Dept: SCHEDULING | Facility: HOME HEALTH | Age: 84
End: 2019-11-05
Payer: MEDICARE

## 2019-11-05 VITALS
DIASTOLIC BLOOD PRESSURE: 76 MMHG | HEART RATE: 79 BPM | RESPIRATION RATE: 16 BRPM | HEIGHT: 68 IN | SYSTOLIC BLOOD PRESSURE: 130 MMHG | TEMPERATURE: 98.4 F | BODY MASS INDEX: 32.52 KG/M2 | OXYGEN SATURATION: 95 % | WEIGHT: 214.6 LBS

## 2019-11-05 DIAGNOSIS — Z79.899 ON STATIN THERAPY: ICD-10-CM

## 2019-11-05 DIAGNOSIS — L29.9 PRURITUS: ICD-10-CM

## 2019-11-05 DIAGNOSIS — Z99.89 OSA ON CPAP: ICD-10-CM

## 2019-11-05 DIAGNOSIS — R29.810 FACIAL DROOP: ICD-10-CM

## 2019-11-05 DIAGNOSIS — G47.33 OSA ON CPAP: ICD-10-CM

## 2019-11-05 DIAGNOSIS — G45.9 TIA (TRANSIENT ISCHEMIC ATTACK): ICD-10-CM

## 2019-11-05 DIAGNOSIS — I25.10 CORONARY ARTERY DISEASE INVOLVING NATIVE CORONARY ARTERY OF NATIVE HEART WITHOUT ANGINA PECTORIS: ICD-10-CM

## 2019-11-05 DIAGNOSIS — K62.5 RECTAL BLEEDING: ICD-10-CM

## 2019-11-05 DIAGNOSIS — M1A.00X0 IDIOPATHIC CHRONIC GOUT WITHOUT TOPHUS, UNSPECIFIED SITE: ICD-10-CM

## 2019-11-05 DIAGNOSIS — E78.5 DYSLIPIDEMIA: ICD-10-CM

## 2019-11-05 PROCEDURE — 3331090002 HH PPS REVENUE DEBIT

## 2019-11-05 PROCEDURE — 3331090001 HH PPS REVENUE CREDIT

## 2019-11-05 RX ORDER — DOXEPIN HYDROCHLORIDE 25 MG/1
25 CAPSULE ORAL
Qty: 30 CAP | Refills: 0 | Status: SHIPPED | OUTPATIENT
Start: 2019-11-05 | End: 2019-11-19

## 2019-11-05 RX ORDER — ASPIRIN 81 MG/1
81 TABLET ORAL DAILY
COMMUNITY

## 2019-11-05 NOTE — PROGRESS NOTES
This note will not be viewable in [address] Cabrera Dejesus is a 80 y.o. male and presents with Transitions Of Care  . Subjective:    Mr. Trena March presents today for transition of care follow-up after presenting to the [facility] on 27 October and being discharged on 28 October after having a complete neurological work-up. His work-up included an MRI as well as CT scan which did not demonstrate an acute infarct. He had an EEG that was read as normal.  He was seen by neurology. He is on aspirin 81 mg daily. He does have a history of a recent GI bleed and no further anticoagulation was recommended. Because his creatinine had bumped up slightly his Lasix was decreased to 80 mg alternating with 40 mg every day. The facial droop that he presented with has completely resolved. The agitation that he had at the time of his presentation has completely resolved. There is no evidence for underlying infection. Patient continues to complain of itching at nighttime. This interferes with his sleep. We had discontinued his allopurinol thinking this may be contributing factor. This has not made any significant difference in his symptoms. He is due for a follow-up with GI for an upper endoscopy in the near future. Review of Systems  Constitutional:   Eyes:   negative for visual disturbance and irritation  ENT:   negative for tinnitus,sore throat,nasal congestion,ear pains. hoarseness  Respiratory:  negative for cough, hemoptysis, dyspnea,wheezing  CV:   negative for chest pain, palpitations, lower extremity edema  GI:   negative for nausea, vomiting, diarrhea, abdominal pain,melena  Endo:               negative for polyuria,polydipsia,polyphagia,heat intolerance  Genitourinary: negative for frequency, dysuria and hematuria  Integumentary: negative for rash and pruritus  Hematologic:  negative for easy bruising and gum/nose bleeding  Musculoskel: negative for myalgias, arthralgias, back pain, muscle weakness, joint pain  Neurological:  negative for headaches, dizziness, vertigo, memory problems and gait   Behavl/Psych: negative for feelings of anxiety, depression, mood changes    Past Medical History:   Diagnosis Date    Adverse effect of anesthesia     combative after anesthesia    Alcohol abuse     6 beers/day    Arthritis     CAD (coronary artery disease)     [facility] obstructive pulmonary disease ([facility]     Dyslipidemia 8/16/2017    Dyspepsia and other specified disorders of function of stomach     Dyspnea 8/16/2017    ED (erectile dysfunction) 8/16/2017    Encounter for immunization 8/16/2017    Fatigue 8/16/2017    Flank pain 8/1/2019    GERD (gastroesophageal reflux disease) 8/16/2017    Gout 8/16/2017    Hypertension     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    Morbid obesity ([facility]     On statin therapy 8/16/2017    TESSA on CPAP 1/3/2019    Psychiatric disorder     Depression    Simple chronic bronchitis ([facility] 1/3/2019    TIA (transient ischemic attack) 3/83/7976    Umbilical hernia 75/7/3809     Past Surgical History:   Procedure Laterality Date    CARDIAC SURG PROCEDURE UNLIST  1996    Cardiac Stents    CARDIAC SURG PROCEDURE UNLIST  04/2019    EF 61-65%    COLONOSCOPY N/A 9/27/2019    COLONOSCOPY performed by Humberto Miller MD at [facility] ENDOSCOPY    HX HERNIA REPAIR      RIH    HX HERNIA REPAIR  09/19/28    open umbilical hernia repair mesh    HX UROLOGICAL      HYDROCELECTOMY    UPPER GI ENDOSCOPY,BIOPSY  9/30/2019          Social History     Socioeconomic History    Marital status:      Spouse name: Not on file    Number of children: Not on file    Years of education: Not on file    Highest education level: Not on file   Tobacco Use    Smoking status: Former Smoker     Packs/day: 0.50     Years: 20.00     Pack years: 10.00    Smokeless tobacco: Former User    Tobacco comment: quit about 40  years ago   / used to dip tobaco and dip snuff   Substance and Sexual Activity    Alcohol use: Not Currently     Comment: \"Drinks very little now for about a month \"    Drug use: No     Family History   Problem Relation Age of Onset    Heart Disease Mother     Hypertension Mother     Hypertension Father     Hypertension Sister     Hypertension Brother     Cancer Brother      Current Outpatient Medications   Medication Sig Dispense Refill    aspirin delayed-release 81 mg tablet Take  by mouth daily.  doxepin (SINEQUAN) 25 mg capsule Take 1 Cap by mouth nightly. 30 Cap 0    polyethylene glycol (MIRALAX) 17 gram/dose powder Take 17 g by mouth daily as needed (constipation).  furosemide (LASIX) 40 mg tablet Take 80 mg alternating with 40 mg every other day 45 Tab 2    Lactoperoxi/Gluc Oxid/Pot Thio (BIOTENE DRY MOUTH MM) 1 Lozenge by Mucous Membrane route as needed (dry mouth).  pantoprazole (PROTONIX) 40 mg tablet Take 1 Tab by mouth daily. 90 Tab 3    DULoxetine (CYMBALTA) 30 mg capsule Take 1 Cap by mouth daily. 30 Cap 6    atorvastatin (LIPITOR) 80 mg tablet TAKE ONE TABLET BY MOUTH DAILY 90 Tab 3    atenolol (TENORMIN) 50 mg tablet Take 1 Tab by mouth daily. 30 Tab 5    ANORO ELLIPTA 62.5-25 mcg/actuation inhaler Take 1 Puff by inhalation daily.  butalbital-acetaminophen-caffeine (FIORICET, ESGIC) -40 mg per tablet Take 1 Tab by mouth every six (6) hours as needed for Headache.  10 Tab 0     Allergies   Allergen Reactions    Levaquin [Levofloxacin] Nausea and Vomiting     Reports anxiety, nausea, aching after taking levaquin    Ciprofloxacin Shortness of Breath    Quinolones Shortness of Breath       Objective:  Visit Vitals  /76 (BP 1 Location: Left arm, BP Patient Position: Sitting)   Pulse 79   Temp 98.4 °F (36.9 °C) (Oral)   Resp 16   Ht 5' 8\" (1.727 m)   Wt 214 lb 9.6 oz (97.3 kg)   SpO2 95%   BMI 32.63 kg/m²     Physical Exam:   General appearance - alert, well appearing, and in no distress  Mental status - alert, oriented to person, place, and time  EYE-MAURICE, EOMI, fundi normal, corneas normal, no foreign bodies  ENT-ENT exam normal, no neck nodes or sinus tenderness  Nose - normal and patent, no erythema, discharge or polyps  Mouth - mucous membranes moist, pharynx normal without lesions  Neck - supple, no significant adenopathy   Chest - clear to auscultation, no wheezes, rales or rhonchi, symmetric air entry   Heart - normal rate, regular rhythm, normal S1, S2, no murmurs, rubs, clicks or gallops   Abdomen - soft, nontender, nondistended, no masses or organomegaly  Lymph- no adenopathy palpable  Ext-peripheral pulses normal, no pedal edema, no clubbing or cyanosis  Skin-Warm and dry. no hyperpigmentation, vitiligo, or suspicious lesions  Neuro -alert, oriented, normal speech, no focal findings or movement disorder noted  Musculoskeletal- FROM, no bony abnormalities, no point tenderness    No results found for this visit on 11/05/19. All results for lab orders may not have been returned by the time this encountered was closed. Assessment/Plan:       ICD-10-CM ICD-9-CM    1. Transition of care performed with sharing of clinical summary Z91.89 V15.89    2. TIA (transient ischemic attack) G45.9 435.9    3. Coronary artery disease involving native coronary artery of native heart without angina pectoris I25.10 414.01    4. Rectal bleeding K62.5 569.3    5. TESSA on CPAP G47.33 327.23     Z99.89 V46.8    6. Dyslipidemia E78.5 272.4    7. On statin therapy Z79.899 V58.69    8. Idiopathic chronic gout without tophus, unspecified site M1A. 00X0 274.02    9. Facial droop R29.810 781.94    10. Pruritus L29.9 698.9        Orders Placed This Encounter    aspirin delayed-release 81 mg tablet     Sig: Take  by mouth daily.  doxepin (SINEQUAN) 25 mg capsule     Sig: Take 1 Cap by mouth nightly. Dispense:  30 Cap     Refill:  0     Plan:    The patient's hospitalization and work-up was reviewed with him today. He continues to have itching. We will try doxepin 25 mg nightly for itching and insomnia. He will continue his lower dose of Lasix and see how he does with this. He has not had any significant swelling. He will continue 81 mg aspirin daily. He has not had any recurring rectal bleeding. His hemoglobin was stable during his hospitalization. Follow-up otherwise as planned. I have reviewed with the patient details of the assessment and plan and all questions were answered. Relevent patient education was performed. Verbal and/or written instructions (see AVS) provided. The most recent lab findings were reviewed with the patient. Plan was discussed with patient who verbal expressed understanding. An After Visit Summary was printed and given to the patient.       Abelino Nieves MD

## 2019-11-05 NOTE — PROGRESS NOTES
Reviewed record in preparation for visit and have obtained necessary documentation. Identified pt with two pt identifiers(name and ). No chief complaint on file. Coordination of Care Questionnaire:  :     1) Have you been to an emergency room, urgent care clinic since your last visit? Yes [facility] 10/27/2019      Hospitalized since your last visit? Yes Patient was admitted to [facility] on 10/27 and discharged on 10/28 for DX. FACIAL DROOP. 2) Have you seen or consulted any other health care providers outside of [address] [place] [address] since your last visit?   No

## 2019-11-06 ENCOUNTER — HOME CARE VISIT (OUTPATIENT)
Dept: HOME HEALTH SERVICES | Facility: HOME HEALTH | Age: 84
End: 2019-11-06
Payer: MEDICARE

## 2019-11-06 ENCOUNTER — HOME CARE VISIT (OUTPATIENT)
Dept: SCHEDULING | Facility: HOME HEALTH | Age: 84
End: 2019-11-06
Payer: MEDICARE

## 2019-11-06 VITALS
SYSTOLIC BLOOD PRESSURE: 124 MMHG | TEMPERATURE: 98.3 F | RESPIRATION RATE: 18 BRPM | OXYGEN SATURATION: 97 % | HEART RATE: 63 BPM | DIASTOLIC BLOOD PRESSURE: 74 MMHG

## 2019-11-06 PROCEDURE — G0151 HHCP-SERV OF PT,EA 15 MIN: HCPCS

## 2019-11-06 PROCEDURE — 3331090001 HH PPS REVENUE CREDIT

## 2019-11-06 PROCEDURE — 3331090002 HH PPS REVENUE DEBIT

## 2019-11-07 PROCEDURE — 3331090002 HH PPS REVENUE DEBIT

## 2019-11-07 PROCEDURE — 3331090001 HH PPS REVENUE CREDIT

## 2019-11-08 ENCOUNTER — HOME CARE VISIT (OUTPATIENT)
Dept: SCHEDULING | Facility: HOME HEALTH | Age: 84
End: 2019-11-08
Payer: MEDICARE

## 2019-11-08 VITALS
RESPIRATION RATE: 18 BRPM | HEART RATE: 67 BPM | OXYGEN SATURATION: 96 % | SYSTOLIC BLOOD PRESSURE: 130 MMHG | DIASTOLIC BLOOD PRESSURE: 68 MMHG | TEMPERATURE: 97.8 F

## 2019-11-08 PROCEDURE — 3331090001 HH PPS REVENUE CREDIT

## 2019-11-08 PROCEDURE — 3331090002 HH PPS REVENUE DEBIT

## 2019-11-08 PROCEDURE — G0300 HHS/HOSPICE OF LPN EA 15 MIN: HCPCS

## 2019-11-08 PROCEDURE — G0151 HHCP-SERV OF PT,EA 15 MIN: HCPCS

## 2019-11-09 VITALS — SYSTOLIC BLOOD PRESSURE: 128 MMHG | DIASTOLIC BLOOD PRESSURE: 74 MMHG

## 2019-11-09 PROCEDURE — 3331090001 HH PPS REVENUE CREDIT

## 2019-11-09 PROCEDURE — 3331090002 HH PPS REVENUE DEBIT

## 2019-11-10 PROCEDURE — 3331090002 HH PPS REVENUE DEBIT

## 2019-11-10 PROCEDURE — 3331090001 HH PPS REVENUE CREDIT

## 2019-11-11 ENCOUNTER — HOME CARE VISIT (OUTPATIENT)
Dept: SCHEDULING | Facility: HOME HEALTH | Age: 84
End: 2019-11-11
Payer: MEDICARE

## 2019-11-11 VITALS
TEMPERATURE: 98 F | DIASTOLIC BLOOD PRESSURE: 72 MMHG | SYSTOLIC BLOOD PRESSURE: 126 MMHG | HEART RATE: 72 BPM | RESPIRATION RATE: 18 BRPM

## 2019-11-11 PROCEDURE — G0300 HHS/HOSPICE OF LPN EA 15 MIN: HCPCS

## 2019-11-11 PROCEDURE — 3331090001 HH PPS REVENUE CREDIT

## 2019-11-11 PROCEDURE — 3331090002 HH PPS REVENUE DEBIT

## 2019-11-12 PROCEDURE — 3331090001 HH PPS REVENUE CREDIT

## 2019-11-12 PROCEDURE — 3331090002 HH PPS REVENUE DEBIT

## 2019-11-13 PROCEDURE — 3331090001 HH PPS REVENUE CREDIT

## 2019-11-13 PROCEDURE — 3331090002 HH PPS REVENUE DEBIT

## 2019-11-14 ENCOUNTER — PATIENT OUTREACH (OUTPATIENT)
Dept: INTERNAL MEDICINE CLINIC | Age: 84
End: 2019-11-14

## 2019-11-14 ENCOUNTER — HOME CARE VISIT (OUTPATIENT)
Dept: SCHEDULING | Facility: HOME HEALTH | Age: 84
End: 2019-11-14
Payer: MEDICARE

## 2019-11-14 PROCEDURE — 3331090001 HH PPS REVENUE CREDIT

## 2019-11-14 PROCEDURE — 3331090002 HH PPS REVENUE DEBIT

## 2019-11-14 NOTE — PROGRESS NOTES
Goals      Establish PCP relationships and regularly scheduled appointments. 10/29 Pt.has f/u with PCP, Dr. Manny David on 11/5 @ 11:15 am, pt.declined earlier appt., pt.has scheduled f/u with (Neurology) Callum Travis will have wife or g-dtr. call for appt. d/t wife and g-dtr provides transportation. CTN will f/u with pt.in 1-2 weeks. -[address]    10/30 CTN call [facility] to f/u with therapy, spoke with Daily reports they received the referral today from Dr. Maribell Wilkes office, will start SN/PT w/n 48hrs. CTN will f/u with pt.in 1 week. -[address]    11/14 Pt. reports he is doing well, f/u 11/5 with , reports facial droop resolved, pt.reports he is sleeping better at night, decreased itching, reports he has completed with Home Health therapy. Pt states \"I love fresh kale\", reports he was outside cooking his fresh kale. CTN f/u with pt.in 2-3 weeks. -       Understands red flags post discharge. 10/29 Pt.reports he is doing well, reports weakness to L side has improved, reports he has generalized weakness all over, no reports of headaches. CTN reminded pt.and PHI to report sx such as difficulty walking, dizziness, loss of balance and coordination, difficulty speaking, numbness or paralysis in the face, leg, or arm, most likely on just one side of the body, a sudden headache, especially when accompanied by nausea, vomiting. Pt.PHI encourage to call 911 or take pt to ED for eval. Pt.and PHI verbalizes understanding. CTN will f/u with pt.in 1-2 weeks. -[address]    11/14 Pt.without any reports of s/s listed above, reports he is doing well. CTN will f/u with pt. in 2 weeks. -[address]

## 2019-11-15 PROCEDURE — 3331090001 HH PPS REVENUE CREDIT

## 2019-11-15 PROCEDURE — 3331090002 HH PPS REVENUE DEBIT

## 2019-11-15 NOTE — DISCHARGE SUMMARY
Hospitalist Discharge Note    NAME:  Nolan Valenzuela   :   1934   MRN:   [id number]       Admit date: 10/27/2019    Discharge Date: 10/28/2019    PCP: Jimmy Elias MD    Discharge Diagnoses:    TIA POA    Metabolic encephalopathy, resolved      Chronic headaches POA     CKD stage 3     CAD s/p stent x 1 (26 years ago)    Essential HTN     Gout      COPD, not on home O2     TESSA, supposed to be on CPAP at home     Hard of hearing, uses hearing aid     Hx TIA     S/p L2-L5 decompression for spinal stenosis and back pain       Code Status: Full       Facility-Administered Medications Ordered in Other Encounters   Medication Dose Route Frequency    aspirin chewable tablet 81 mg  81 mg Oral DAILY    haloperidol lactate (HALDOL) injection 2 mg  2 mg IntraVENous Q6H PRN    [START ON 2019] Vancomycin - please send level before starting dose  1 Each Other ONCE    nitroglycerin (NITROBID) 2 % ointment 1 Inch  1 Inch Topical Q6H    melatonin tablet 3 mg  3 mg Oral QHS    albuterol-ipratropium (DUO-NEB) 2.5 MG-0.5 MG/3 ML  3 mL Nebulization Q6H PRN    sodium chloride (NS) flush 5-40 mL  5-40 mL IntraVENous Q8H    sodium chloride (NS) flush 5-40 mL  5-40 mL IntraVENous PRN    acetaminophen (TYLENOL) tablet 650 mg  650 mg Oral Q4H PRN    ondansetron (ZOFRAN) injection 4 mg  4 mg IntraVENous Q4H PRN    atenolol (TENORMIN) tablet 50 mg  50 mg Oral DAILY    atorvastatin (LIPITOR) tablet 80 mg  80 mg Oral DAILY    DULoxetine (CYMBALTA) capsule 30 mg  30 mg Oral DAILY    pantoprazole (PROTONIX) tablet 40 mg  40 mg Oral DAILY    thiamine mononitrate (B-1) tablet 100 mg  100 mg Oral DAILY    sodium chloride (NS) flush 5-40 mL  5-40 mL IntraVENous Q8H    sodium chloride (NS) flush 5-40 mL  5-40 mL IntraVENous PRN    LORazepam (ATIVAN) injection 2 mg  2 mg IntraVENous Q5MIN PRN    LORazepam (ATIVAN) injection 1 mg  1 mg IntraVENous Q4H PRN    acyclovir (ZOVIRAX) 800 mg in 0.9% sodium chloride 250 mL IVPB  800 mg IntraVENous Q12H    ampicillin (OMNIPEN) 2 g in 0.9% sodium chloride (MBP/ADV) 100 mL  2 g IntraVENous Q6H    vancomycin (VANCOCIN) 1500 mg in  ml infusion  1,500 mg IntraVENous Q24H    cefTRIAXone (ROCEPHIN) 2 g in 0.9% sodium chloride (MBP/ADV) 50 mL  2 g IntraVENous Q12H    influenza vaccine 2019-20 (6 mos+)(PF) (FLUARIX/FLULAVAL/FLUZONE QUAD) injection 0.5 mL  0.5 mL IntraMUSCular PRIOR TO DISCHARGE           Follow-up Information     Follow up With Specialties Details Why Contact Info    Amy Emery MD Internal Medicine Schedule an appointment as soon as possible for a visit on 11/5/2019 11:15AM - Please go to your hospital follow-up appt -  [address]      Rober Kraus MD Neurology Schedule an appointment as soon as possible for a visit in 3 weeks  [address] [place], [place] [address]quyn[facility]. [phone]            Time spent on discharge:   I spent greater than 30 minutes on discharge, seeing and examining the patient and reviewing labs, reconciling home meds and new meds, coordinating care with case management, doing the discharge papers and the D/C summary    Discharge disposition: home    Discharge Condition: Stable      Summary of admission H+P(copied from Dr Sean Singh Note):    CHIEF COMPLAINT: Altered mental status and facial droop     HISTORY OF PRESENT ILLNESS:     Librado Parekh is a 80 y.o. white male with past medical history significant for coronary artery disease, COPD, dyslipidemia, GERD, hypertension, history of GI bleed, TIA recently discharged from [facility] after being treated for a GI bleed presented to ER with above complaints. Patient is very hard of hearing but at time of my exam his mental status is baseline. Information is obtained from patient and his daughter who is at the bedside.   As per daughter since discharge patient has been improving and was baseline state of health until this morning around 1:00 when his wife noticed that he was confused with headache asking some silly questions. Around 3:30 PM granddaughter noticed that patient may have left facial droop and he was brought to emergency room for evaluation. Patient is not a candidate for TPA in setting of recent history of GI bleed and his symptoms were already improving when he arrived to the ER. He had a CT head and CTA head and neck and they were negative for any acute finding. Patient is seen by telemetry neurology in ER and recommended overnight observation admission stroke work-up. At the time I saw patient does not have facial droop and is back to baseline per daughter.     We were asked to admit for work up and evaluation of the above problems.           Past Medical History:   Diagnosis Date    Adverse effect of anesthesia       combative after anesthesia    Alcohol abuse       6 beers/day    Arthritis      CAD (coronary artery disease)      Chronic obstructive pulmonary disease ([address]      Dyslipidemia 8/16/2017    Dyspepsia and other specified disorders of function of stomach      Dyspnea 8/16/2017    ED (erectile dysfunction) 8/16/2017    Encounter for immunization 8/16/2017    Fatigue 8/16/2017    Flank pain 8/1/2019    GERD (gastroesophageal reflux disease) 8/16/2017    Gout 8/16/2017    Hypertension      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 . Sulcal and ventricular  prominence. . There is no intracranial hemorrhage, extra-axial collection, mass,  mass effect or midline shift. The basilar cisterns are open. No acute infarct  is identified.  The bone windows demonstrate no abnormalities. The visualized  portions of the paranasal sinuses and mastoid air cells are clear. IMPRESSION:   Moderate to severe chronic microvascular ischemic change. Mild to moderate cerebral atrophy. Admit CTA head and neck read by radiology FINDINGS:  Moderate to severe chronic microvascular ischemic change. Sulcal and ventricular  prominence and lobar. Small remote infarction in the left nely. Small remote  periventricular infarctions in the cerebral white matter. There is a significant  degree of motion artifact which somewhat limited exam. No large pulmonary mass. Sphenoid sinus disease. CTA NECK:   Retropharyngeal course of the carotid arteries. . Motion artifact significantly  limits evaluation. Limited evaluation of ICA stenosis   CTA HEAD:  Dural venous sinuses are patent. A 2 segments are patent. There are A1 segments  bilaterally. M1 segments are patent. Proximal M2 segments are patent. Basilar  artery is patent. Proximal P1 segments are patent. No evaluation of cavernous  and petrous carotid. . The basilar artery and its branches are normal. The  visualized portions of the internal carotid, anterior cerebral, and middle  cerebral arteries are patent. CT PERFUSION:No definite perfusion asymmetry is identified. .  IMPRESSION:  Examinations significantly limited due to motion artifact. There is no major intracranial vessel occlusion. Moderate to severe chronic microvascular ischemic change with small chronic  remote infarctions. MRI brain read by radiology FINDINGS:  The jugular size configuration somewhat greater than expected for age consistent  with volume loss and unchanged from prior exams. Multifocal and confluent T2 hyperintensity also again noted in the white matter  predominantly periventricular and location was some involvement basal ganglia  and brainstem in pattern suggesting chronic small vessel ischemic change.   No abnormal restricted diffusion or other findings of acute or subacute brain  infarction. Single punctate focus of hemosiderin right posterior temporal cortex  in this age group likely of no acute significance. No evidence of acute hemorrhage, mass or abnormal extra-axial fluid collections. Flow voids are present in the vertebral basilar and carotid artery systems. Findings of cervical spondylosis. Craniocervical junction is otherwise  unremarkable  Mild mucosal thickening sphenoid sinus. Other paranasal sinuses and structures  of the skull base are unremarkable  IMPRESSION:  1. Age-related central volume loss and white matter disease greater than  expected for age and similar to the prior MRI 4/13/19. 2. No acute intracranial abnormality demonstrated. EEG read by radiology results:  DESCRIPTION OF THE RECORD:   The background of this recording contains a posteriorly-located occipital alpha rhythm of 9 Hz that attenuates with eye opening. Throughout the recording, there were no clear areas of focal slowing nor spike or spike-and-wave discharges seen. Hyperventilation was not performed. Photic stimulation produced no response in the posterior head regions. During the recording the patient did not achieve stage II sleep  INTERPRETATION: This is a normal electroencephalogram with the patient awake, showing no clear focal abnormalities or epileptiform activity. A normal EEG doesn't not rule out seizures. Clinical correlation recommended.     Hospital course:     Admitted with AMS and left facial drop  No clear infection, no fever or leukocytosis  UA negative  MS improved  Next day was awake and talking, closer to baseline  Neuro exam non focal  CT head and CTA head and neck with no acute events  MRI brain with no CVA  LDL 82, HgBa1c 5.3  Seen by Dr Zora Holley, recommended continue ASA, felt it was consistent with TIA  Recent GI bleed, did not recommend plavix, had been stopped after last admit  D/C to home      Daily note:     Chief Complaint:  F/u facial droop  Symptoms improved, no new complaints  MS back to baseline    Review of Systems:  Fever/chills N   Cough N   SOB/MARES  N   Chest Pain N   Abdominal Pain N   Diarrhea N   Nausea/Vomiting N   Headache N        Yes Past Med History and Social history reviewed. No changes. Yes Current Medication list and allergies reviewed*    Objective:   Vitals:    10/28/19 0803 97.1 °F (36.2 °C) 68 18 123/79 95 %   10/28/19 0332 99.3 °F (37.4 °C) 66 18 151/79 94 %   10/28/19 0026 98.9 °F (37.2 °C) 77 22 168/58 94 %   10/27/19 2201  75 23  95 %   10/27/19 2200  76 30 153/72 (!) 89 %   10/27/19 2159  80 25  97 %   10/27/19 2145  77 15 153/75 (!) 88 %   10/27/19 2020     95 %   10/27/19 2015    177/71 94 %   10/27/19 1945  78 23 (!) 189/95 (!) 89 %   10/27/19 1900  81 22 144/61 92 %   10/27/19 1857  82 29 144/61 99 %   10/27/19 1756 97.7 °F (36.5 °C) 73 16 (!) 196/107 97 %           PHYSICAL EXAM:  General:          Alert, no distress.    Neck:  No meningismus No masses  Lungs:             No accessory muscle use or retractions     Clear BS bilaterally  Heart:              Regular rate and rhythm,  No murmurs No Gallops. No LE edema  Abdomen:      Soft, non-tender. Not distended.  Bowel sounds normal.      Skin:               No rashes  Neurologic:      Alert, follows commands    Motor exam nonfocal    ___________________________________________________    Hospitalist: Michael Mayorga MD     ___________________________________________________    **Lab Data Reviewed:  No results found for this or any previous visit (from the past 24 hour(s)).

## 2019-11-16 PROCEDURE — 3331090001 HH PPS REVENUE CREDIT

## 2019-11-16 PROCEDURE — 3331090002 HH PPS REVENUE DEBIT

## 2019-11-17 ENCOUNTER — HOSPITAL ENCOUNTER (EMERGENCY)
Age: 84
Discharge: HOME | End: 2019-11-17
Attending: EMERGENCY MEDICINE

## 2019-11-17 VITALS
HEART RATE: 72 BPM | DIASTOLIC BLOOD PRESSURE: 53 MMHG | TEMPERATURE: 97.6 F | SYSTOLIC BLOOD PRESSURE: 172 MMHG | OXYGEN SATURATION: 100 % | RESPIRATION RATE: 15 BRPM

## 2019-11-17 DIAGNOSIS — I10 ACCELERATED HYPERTENSION: ICD-10-CM

## 2019-11-17 DIAGNOSIS — G44.209 ACUTE NON INTRACTABLE TENSION-TYPE HEADACHE: Primary | ICD-10-CM

## 2019-11-17 DIAGNOSIS — G43.009 MIGRAINE WITHOUT AURA AND WITHOUT STATUS MIGRAINOSUS, NOT INTRACTABLE: ICD-10-CM

## 2019-11-17 PROCEDURE — 3331090002 HH PPS REVENUE DEBIT

## 2019-11-17 PROCEDURE — 3331090001 HH PPS REVENUE CREDIT

## 2019-11-17 RX ORDER — BUTALBITAL, ACETAMINOPHEN AND CAFFEINE 300; 40; 50 MG/1; MG/1; MG/1
1 CAPSULE ORAL
Qty: 20 CAP | Refills: 0 | Status: SHIPPED | OUTPATIENT
Start: 2019-11-17 | End: 2019-11-19

## 2019-11-17 RX ORDER — BUTALBITAL, ACETAMINOPHEN AND CAFFEINE 300; 40; 50 MG/1; MG/1; MG/1
1 CAPSULE ORAL
Qty: 20 CAP | Refills: 0 | Status: SHIPPED | OUTPATIENT
Start: 2019-11-17 | End: 2019-11-19 | Stop reason: SDUPTHER

## 2019-11-17 NOTE — ED TRIAGE NOTES
Patient arrives to the emergency department via EMS with a chief complaint of a HA since 11A on the sides of his head. Reports taking Tylenol at 11 and 2 that has not provided relief. Reports a similar HA 2 weeks ago that he was seen here for and told it was because he was dehydrated. Reports drinking a lot of water since being discharged. Pt has equal strong , equal smile and was able to walk at baseline. Pt does have tremors which he reports he has \"had bad for a long time. \"

## 2019-11-18 ENCOUNTER — HOSPITAL ENCOUNTER (INPATIENT)
Age: 84
LOS: 6 days | Discharge: HOME HEALTH | DRG: 099 | End: 2019-11-25
Attending: EMERGENCY MEDICINE | Admitting: HOSPITALIST
Payer: MEDICARE

## 2019-11-18 ENCOUNTER — APPOINTMENT (OUTPATIENT)
Dept: CT IMAGING | Age: 84
DRG: 099 | End: 2019-11-18
Attending: EMERGENCY MEDICINE
Payer: MEDICARE

## 2019-11-18 ENCOUNTER — APPOINTMENT (OUTPATIENT)
Dept: GENERAL RADIOLOGY | Age: 84
DRG: 099 | End: 2019-11-18
Attending: EMERGENCY MEDICINE
Payer: MEDICARE

## 2019-11-18 DIAGNOSIS — R50.9 FEVER, UNSPECIFIED FEVER CAUSE: ICD-10-CM

## 2019-11-18 DIAGNOSIS — G93.40 ACUTE ENCEPHALOPATHY: Primary | ICD-10-CM

## 2019-11-18 DIAGNOSIS — R56.9 SEIZURE-LIKE ACTIVITY (HCC): ICD-10-CM

## 2019-11-18 DIAGNOSIS — I16.1 HYPERTENSIVE EMERGENCY: ICD-10-CM

## 2019-11-18 DIAGNOSIS — F10.11 HISTORY OF ETOH ABUSE: ICD-10-CM

## 2019-11-18 LAB
ABO + RH BLD: NORMAL
ALBUMIN SERPL-MCNC: 3.9 G/DL (ref 3.5–5)
ALBUMIN/GLOB SERPL: 0.9 {RATIO} (ref 1.1–2.2)
ALP SERPL-CCNC: 138 U/L (ref 45–117)
ALT SERPL-CCNC: 22 U/L (ref 12–78)
AMMONIA PLAS-SCNC: 56 UMOL/L
ANION GAP SERPL CALC-SCNC: 10 MMOL/L (ref 5–15)
APPEARANCE UR: CLEAR
ARTERIAL PATENCY WRIST A: ABNORMAL
AST SERPL-CCNC: 21 U/L (ref 15–37)
BASE EXCESS BLDV CALC-SCNC: 0 MMOL/L
BASOPHILS # BLD: 0 K/UL (ref 0–0.1)
BASOPHILS NFR BLD: 0 % (ref 0–1)
BDY SITE: ABNORMAL
BILIRUB SERPL-MCNC: 0.4 MG/DL (ref 0.2–1)
BILIRUB UR QL: NEGATIVE
BLOOD GROUP ANTIBODIES SERPL: NORMAL
BUN SERPL-MCNC: 22 MG/DL (ref 6–20)
BUN/CREAT SERPL: 13 (ref 12–20)
CALCIUM SERPL-MCNC: 9.2 MG/DL (ref 8.5–10.1)
CHLORIDE SERPL-SCNC: 101 MMOL/L (ref 97–108)
CO2 SERPL-SCNC: 28 MMOL/L (ref 21–32)
COLOR UR: NORMAL
CREAT SERPL-MCNC: 1.64 MG/DL (ref 0.7–1.3)
DIFFERENTIAL METHOD BLD: ABNORMAL
EOSINOPHIL # BLD: 0.1 K/UL (ref 0–0.4)
EOSINOPHIL NFR BLD: 1 % (ref 0–7)
ERYTHROCYTE [DISTWIDTH] IN BLOOD BY AUTOMATED COUNT: 15.7 % (ref 11.5–14.5)
ETHANOL SERPL-MCNC: <10 MG/DL
GAS FLOW.O2 O2 DELIVERY SYS: ABNORMAL L/MIN
GAS FLOW.O2 SETTING OXYMISER: 3 L/M
GLOBULIN SER CALC-MCNC: 4.2 G/DL (ref 2–4)
GLUCOSE SERPL-MCNC: 150 MG/DL (ref 65–100)
GLUCOSE UR STRIP.AUTO-MCNC: NEGATIVE MG/DL
HCO3 BLDV-SCNC: 26.4 MMOL/L (ref 23–28)
HCT VFR BLD AUTO: 40.4 % (ref 36.6–50.3)
HGB BLD-MCNC: 12.6 G/DL (ref 12.1–17)
HGB UR QL STRIP: NEGATIVE
IMM GRANULOCYTES # BLD AUTO: 0 K/UL (ref 0–0.04)
IMM GRANULOCYTES NFR BLD AUTO: 0 % (ref 0–0.5)
INR PPP: 1.2 (ref 0.9–1.1)
KETONES UR QL STRIP.AUTO: NEGATIVE MG/DL
LEUKOCYTE ESTERASE UR QL STRIP.AUTO: NEGATIVE
LYMPHOCYTES # BLD: 0.8 K/UL (ref 0.8–3.5)
LYMPHOCYTES NFR BLD: 8 % (ref 12–49)
MCH RBC QN AUTO: 29.6 PG (ref 26–34)
MCHC RBC AUTO-ENTMCNC: 31.2 G/DL (ref 30–36.5)
MCV RBC AUTO: 94.8 FL (ref 80–99)
MONOCYTES # BLD: 0.5 K/UL (ref 0–1)
MONOCYTES NFR BLD: 5 % (ref 5–13)
NEUTS SEG # BLD: 8.4 K/UL (ref 1.8–8)
NEUTS SEG NFR BLD: 86 % (ref 32–75)
NITRITE UR QL STRIP.AUTO: NEGATIVE
NRBC # BLD: 0 K/UL (ref 0–0.01)
NRBC BLD-RTO: 0 PER 100 WBC
PCO2 BLDV: 50.4 MMHG (ref 41–51)
PH BLDV: 7.33 [PH] (ref 7.32–7.42)
PH UR STRIP: 7 [PH] (ref 5–8)
PLATELET # BLD AUTO: 249 K/UL (ref 150–400)
PMV BLD AUTO: 10.4 FL (ref 8.9–12.9)
PO2 BLDV: 27 MMHG (ref 25–40)
POTASSIUM SERPL-SCNC: 3.7 MMOL/L (ref 3.5–5.1)
PROT SERPL-MCNC: 8.1 G/DL (ref 6.4–8.2)
PROT UR STRIP-MCNC: NEGATIVE MG/DL
PROTHROMBIN TIME: 12.6 SEC (ref 9–11.1)
RBC # BLD AUTO: 4.26 M/UL (ref 4.1–5.7)
RBC MORPH BLD: ABNORMAL
SAO2 % BLDV: 46 % (ref 65–88)
SODIUM SERPL-SCNC: 139 MMOL/L (ref 136–145)
SP GR UR REFRACTOMETRY: 1.01 (ref 1–1.03)
SPECIMEN EXP DATE BLD: NORMAL
SPECIMEN TYPE: ABNORMAL
TOTAL RESP. RATE, ITRR: 24
TROPONIN I SERPL-MCNC: <0.05 NG/ML
UROBILINOGEN UR QL STRIP.AUTO: 0.2 EU/DL (ref 0.2–1)
WBC # BLD AUTO: 9.8 K/UL (ref 4.1–11.1)

## 2019-11-18 PROCEDURE — 96374 THER/PROPH/DIAG INJ IV PUSH: CPT

## 2019-11-18 PROCEDURE — 85610 PROTHROMBIN TIME: CPT

## 2019-11-18 PROCEDURE — 84484 ASSAY OF TROPONIN QUANT: CPT

## 2019-11-18 PROCEDURE — 36415 COLL VENOUS BLD VENIPUNCTURE: CPT

## 2019-11-18 PROCEDURE — 74011250636 HC RX REV CODE- 250/636: Performed by: EMERGENCY MEDICINE

## 2019-11-18 PROCEDURE — 87040 BLOOD CULTURE FOR BACTERIA: CPT

## 2019-11-18 PROCEDURE — 74011000250 HC RX REV CODE- 250: Performed by: EMERGENCY MEDICINE

## 2019-11-18 PROCEDURE — 74011250636 HC RX REV CODE- 250/636

## 2019-11-18 PROCEDURE — 96375 TX/PRO/DX INJ NEW DRUG ADDON: CPT

## 2019-11-18 PROCEDURE — 81003 URINALYSIS AUTO W/O SCOPE: CPT

## 2019-11-18 PROCEDURE — 70450 CT HEAD/BRAIN W/O DYE: CPT

## 2019-11-18 PROCEDURE — 3331090002 HH PPS REVENUE DEBIT

## 2019-11-18 PROCEDURE — 82140 ASSAY OF AMMONIA: CPT

## 2019-11-18 PROCEDURE — 86900 BLOOD TYPING SEROLOGIC ABO: CPT

## 2019-11-18 PROCEDURE — 85025 COMPLETE CBC W/AUTO DIFF WBC: CPT

## 2019-11-18 PROCEDURE — 74011000258 HC RX REV CODE- 258: Performed by: EMERGENCY MEDICINE

## 2019-11-18 PROCEDURE — 71045 X-RAY EXAM CHEST 1 VIEW: CPT

## 2019-11-18 PROCEDURE — 80307 DRUG TEST PRSMV CHEM ANLYZR: CPT

## 2019-11-18 PROCEDURE — 74011250637 HC RX REV CODE- 250/637: Performed by: EMERGENCY MEDICINE

## 2019-11-18 PROCEDURE — 82803 BLOOD GASES ANY COMBINATION: CPT

## 2019-11-18 PROCEDURE — 99285 EMERGENCY DEPT VISIT HI MDM: CPT

## 2019-11-18 PROCEDURE — 80053 COMPREHEN METABOLIC PANEL: CPT

## 2019-11-18 PROCEDURE — 3331090001 HH PPS REVENUE CREDIT

## 2019-11-18 PROCEDURE — 83605 ASSAY OF LACTIC ACID: CPT

## 2019-11-18 RX ORDER — LORAZEPAM 2 MG/ML
2 INJECTION INTRAMUSCULAR
Status: COMPLETED | OUTPATIENT
Start: 2019-11-18 | End: 2019-11-18

## 2019-11-18 RX ORDER — LIDOCAINE HYDROCHLORIDE 10 MG/ML
10 INJECTION, SOLUTION EPIDURAL; INFILTRATION; INTRACAUDAL; PERINEURAL ONCE
Status: COMPLETED | OUTPATIENT
Start: 2019-11-18 | End: 2019-11-18

## 2019-11-18 RX ORDER — FENTANYL CITRATE 50 UG/ML
50 INJECTION, SOLUTION INTRAMUSCULAR; INTRAVENOUS ONCE
Status: COMPLETED | OUTPATIENT
Start: 2019-11-18 | End: 2019-11-18

## 2019-11-18 RX ORDER — HALOPERIDOL 5 MG/ML
5 INJECTION INTRAMUSCULAR
Status: ACTIVE | OUTPATIENT
Start: 2019-11-18 | End: 2019-11-19

## 2019-11-18 RX ORDER — ONDANSETRON 2 MG/ML
INJECTION INTRAMUSCULAR; INTRAVENOUS
Status: COMPLETED
Start: 2019-11-18 | End: 2019-11-18

## 2019-11-18 RX ORDER — VANCOMYCIN 2 GRAM/500 ML IN 0.9 % SODIUM CHLORIDE INTRAVENOUS
2000
Status: COMPLETED | OUTPATIENT
Start: 2019-11-18 | End: 2019-11-19

## 2019-11-18 RX ORDER — LORAZEPAM 2 MG/ML
1 INJECTION INTRAMUSCULAR
Status: DISCONTINUED | OUTPATIENT
Start: 2019-11-18 | End: 2019-11-18

## 2019-11-18 RX ORDER — ONDANSETRON 2 MG/ML
4 INJECTION INTRAMUSCULAR; INTRAVENOUS
Status: COMPLETED | OUTPATIENT
Start: 2019-11-18 | End: 2019-11-18

## 2019-11-18 RX ORDER — ACETAMINOPHEN 650 MG/1
650 SUPPOSITORY RECTAL
Status: COMPLETED | OUTPATIENT
Start: 2019-11-18 | End: 2019-11-18

## 2019-11-18 RX ORDER — SODIUM CHLORIDE 0.9 % (FLUSH) 0.9 %
10 SYRINGE (ML) INJECTION
Status: COMPLETED | OUTPATIENT
Start: 2019-11-18 | End: 2019-11-18

## 2019-11-18 RX ORDER — LACTULOSE 10 G/15ML
30 SOLUTION ORAL; RECTAL EVERY 6 HOURS
Status: DISCONTINUED | OUTPATIENT
Start: 2019-11-19 | End: 2019-11-19

## 2019-11-18 RX ORDER — DEXAMETHASONE SODIUM PHOSPHATE 4 MG/ML
10 INJECTION, SOLUTION INTRA-ARTICULAR; INTRALESIONAL; INTRAMUSCULAR; INTRAVENOUS; SOFT TISSUE
Status: COMPLETED | OUTPATIENT
Start: 2019-11-18 | End: 2019-11-18

## 2019-11-18 RX ORDER — LABETALOL HYDROCHLORIDE 5 MG/ML
20 INJECTION, SOLUTION INTRAVENOUS
Status: DISPENSED | OUTPATIENT
Start: 2019-11-18 | End: 2019-11-19

## 2019-11-18 RX ORDER — FENTANYL CITRATE 50 UG/ML
INJECTION, SOLUTION INTRAMUSCULAR; INTRAVENOUS
Status: COMPLETED
Start: 2019-11-18 | End: 2019-11-18

## 2019-11-18 RX ADMIN — FENTANYL CITRATE 50 MCG: 50 INJECTION, SOLUTION INTRAMUSCULAR; INTRAVENOUS at 23:36

## 2019-11-18 RX ADMIN — ONDANSETRON 4 MG: 2 INJECTION INTRAMUSCULAR; INTRAVENOUS at 19:21

## 2019-11-18 RX ADMIN — PHENYTOIN SODIUM 1000 MG: 50 INJECTION INTRAMUSCULAR; INTRAVENOUS at 20:41

## 2019-11-18 RX ADMIN — DEXAMETHASONE SODIUM PHOSPHATE 10 MG: 4 INJECTION, SOLUTION INTRAMUSCULAR; INTRAVENOUS at 23:47

## 2019-11-18 RX ADMIN — LIDOCAINE HYDROCHLORIDE 10 ML: 10 INJECTION, SOLUTION EPIDURAL; INFILTRATION; INTRACAUDAL; PERINEURAL at 22:48

## 2019-11-18 RX ADMIN — Medication 10 ML: at 20:51

## 2019-11-18 RX ADMIN — LORAZEPAM 2 MG: 2 INJECTION INTRAMUSCULAR; INTRAVENOUS at 19:38

## 2019-11-18 RX ADMIN — CEFTRIAXONE SODIUM 2 G: 2 INJECTION, POWDER, FOR SOLUTION INTRAMUSCULAR; INTRAVENOUS at 23:48

## 2019-11-18 RX ADMIN — ACETAMINOPHEN 650 MG: 650 SUPPOSITORY RECTAL at 22:53

## 2019-11-18 NOTE — ED PROVIDER NOTES
EMERGENCY DEPARTMENT HISTORY AND PHYSICAL EXAM      Date: 11/18/2019  Patient Name: Melani Yost    Please note that this dictation was completed with [employer], the computer voice recognition software. Quite often unanticipated grammatical, syntax, homophones, and other interpretive errors are inadvertently transcribed by the computer software. Please disregard these errors. Please excuse any errors that have escaped final proofreading. History of Presenting Illness     No chief complaint on file. History Provided By: Patient    HPI: Melani Yost, 80 y.o. male, with a past medical history significant for alcohol abuse, coronary artery disease, COPD presenting the emergency department brought in after family noticed him to be more confused and combative today. This is similar to a prior presentation when he was admitted on 10/27. They report that he at that time became confused, less responsive, very similar to this presentation. He had a work-up which included an MRI, CTA, EEG all of which were relatively unremarkable for significant acute process. He was discharged home. Last night he was seen here in the emergency department for headache, and did not have any imaging at that time. He returns today after family noticed him to be more confused and combative. I am unable to provide obtain any history from the patient. EMS report that he is combative, they report an expressive aphasia and hypertension. Per the physician that saw him last night for headache his headache was not mild and had resolved after short period of time. Not thunderclap, not severe at onset. On arrival here when he was combative sedation was given in order to obtain a CT of the head without contrast.  Neurology consulted. After some sedation the patient became less responsive and then had seizure-like activity. PCP: Ashanti Alba MD    No current facility-administered medications on file prior to encounter. Current Outpatient Medications on File Prior to Encounter   Medication Sig Dispense Refill    butalbital-acetaminophen-caff (FIORICET) -40 mg per capsule Take 1 Cap by mouth every four (4) hours as needed for Pain. Indications: Tension Headache 20 Cap 0    butalbital-acetaminophen-caff (FIORICET) -40 mg per capsule Take 1 Cap by mouth every four (4) hours as needed for Pain. Indications: Tension Headache 20 Cap 0    aspirin delayed-release 81 mg tablet Take  by mouth daily.  doxepin (SINEQUAN) 25 mg capsule Take 1 Cap by mouth nightly. 30 Cap 0    polyethylene glycol (MIRALAX) 17 gram/dose powder Take 17 g by mouth daily as needed (constipation).  furosemide (LASIX) 40 mg tablet Take 80 mg alternating with 40 mg every other day 45 Tab 2    Lactoperoxi/Gluc Oxid/Pot Thio (BIOTENE DRY MOUTH MM) 1 Lozenge by Mucous Membrane route as needed (dry mouth).  pantoprazole (PROTONIX) 40 mg tablet Take 1 Tab by mouth daily. 90 Tab 3    butalbital-acetaminophen-caffeine (FIORICET, ESGIC) -40 mg per tablet Take 1 Tab by mouth every six (6) hours as needed for Headache. 10 Tab 0    DULoxetine (CYMBALTA) 30 mg capsule Take 1 Cap by mouth daily. 30 Cap 6    atorvastatin (LIPITOR) 80 mg tablet TAKE ONE TABLET BY MOUTH DAILY 90 Tab 3    atenolol (TENORMIN) 50 mg tablet Take 1 Tab by mouth daily. 30 Tab 5    ANORO ELLIPTA 62.5-25 mcg/actuation inhaler Take 1 Puff by inhalation daily.          Past History     Past Medical History:  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    Morbid obesity ([facility] [address]     On statin therapy 8/16/2017    TESSA on CPAP 1/3/2019    Psychiatric disorder     Depression    Simple chronic bronchitis ([facility] [address] 1/3/2019    TIA (transient ischemic attack) 3/12/6324    Umbilical hernia 56/0/7498       Past Surgical History:  Past Surgical History:   Procedure Laterality Date    CARDIAC SURG PROCEDURE UNLIST  1996    Cardiac Stents    CARDIAC SURG PROCEDURE UNLIST  04/2019    EF 61-65%    COLONOSCOPY N/A 9/27/2019    COLONOSCOPY performed by Luis Parker MD at [facility] ENDOSCOPY    HX [facility]    HX HERNIA REPAIR  66/08/96    open umbilical hernia repair mesh    HX UROLOGICAL      HYDROCELECTOMY    UPPER GI ENDOSCOPY,BIOPSY  9/30/2019            Family History:  Family History   Problem Relation Age of Onset    Heart Disease Mother     Hypertension Mother     Hypertension Father     Hypertension Sister     Hypertension Brother     Cancer Brother        Social History:  Social History     Tobacco Use    Smoking status: Former Smoker     Packs/day: 0.50     Years: 20.00     Pack years: 10.00    Smokeless tobacco: Former User    Tobacco comment: quit about 40  years ago   / used to dip tobaco and dip snuff   Substance Use Topics    Alcohol use: Not Currently     Comment: \"Drinks very little now for about a month \"    Drug use: No       Allergies: Allergies   Allergen Reactions    Levaquin [Levofloxacin] Nausea and Vomiting     Reports anxiety, nausea, aching after taking levaquin    Ciprofloxacin Shortness of Breath    Quinolones Shortness of Breath         Review of Systems   Review of Systems   Unable to perform ROS: Mental status change       Physical Exam   Physical Exam   Constitutional: Awake alert, combative male, oriented only to self will  HENT:   Head: Normocephalic and atraumatic. Moist mucous membranes   Eyes: Surgical change the right pupil. Left pupil 3 mm reactive. Left eye exhibits no discharge. Neck: Normal range of motion. Neck supple. No tracheal deviation present. Cardiovascular: Normal rate, regular rhythm and normal heart sounds. No murmur heard. Pulmonary/Chest: Effort normal and breath sounds normal. No respiratory distress. no wheezes. no rales. Abdominal: Soft. Bowel sounds are normal. There is no tenderness. There is no rebound and no guarding. Musculoskeletal: Normal range of motion. exhibits no edema, tenderness or deformity. Neurological: Patient can say his name, otherwise he seems to have expressive aphasia. He is combative and swinging at staff. Skin: Skin is warm and dry. No rash noted. No erythema. Psychiatric: behavior is normal.   Nursing note and vitals reviewed. Diagnostic Study Results     Labs -   No results found for this or any previous visit (from the past 12 hour(s)). Radiologic Studies -   CT HEAD WO CONT    (Results Pending)     CT Results  (Last 48 hours)    None        CXR Results  (Last 48 hours)    None            Medical Decision Making   I am the first provider for this patient. I reviewed the vital signs, available nursing notes, past medical history, past surgical history, family history and social history. Vital Signs-Reviewed the patient's vital signs. No data found. Records Reviewed: Nursing Notes, Old Medical Records, Previous Radiology Studies and Previous Laboratory Studies    Provider Notes (Medical Decision Making):   Patient here with encephalopathy, has a history of alcohol abuse, could be hepatic encephalopathy, alcohol withdrawal, doubt subarachnoid hemorrhage. Headache last night not characteristic of subarachnoid hemorrhage. Head imaging is negative  Today. Clinical concern for [facility].   Patient improving after Dilantin, after initial sedation given upon my initial evaluation the patient had a tonic-clonic seizure-like activity. He had already been given Ativan. Decision was made to forego intubation at this time as he was protecting his airway and his seizure activity is resolved. We will plan for further work-up, will attempt to get a CTA if his agitation will allow for another study. I do not think the risk of further sedation outweighs the benefit of further imaging given his similar previous presentation with a negative work-up. ED Course:   Initial assessment performed. The patients presenting problems have been discussed, and they are in agreement with the care plan formulated and outlined with them. I have encouraged them to ask questions as they arise throughout their visit. ED Course as of Nov 18 2255   Mon Nov 18, 2019   2159 Patient unable to tolerate CTA. Clinical suspicion for large vessel occlusion is low. Differential mainly includes seizures, alcohol withdrawal, hepatic encephalopathy. Clinical concern for a large vessel stroke is low. Discussed with family. Advised that if CTA is wanted by family then patient would need intubation, advised that my clinical suspicion for large vessel occlusion is low and risks of intubation are higher than the chance that the patient has a large vessel occlusion and my medical opinion at this time. I think more likely he is having alcohol withdrawal seizure versus new onset of seizure diagnosis. Could also be hyperammonemia or a Warnicke's encephalopathy. Will admit the patient to the hospitalist for further work-up. [AR]   3278 Patient discussed with Dr. Ghulam Pierce, hospitalist.  He was recommending lumbar puncture to assess for subarachnoid hemorrhage. Clinical suspicion for subarachnoid hemorrhage extremely low based off of the history and physical exam.  CT angiography on 10/27 was negative and did not show any aneurysm. He is not anemic and had a negative head CT.   LP would be difficult as patient has been combative and would likely require sedation to the point of needing intubation. For this reason I feel the risk of obtaining a lumbar puncture outweighs any potential benefit as the chance that he has a n subarachnoid hemorrhage that is not seen on CT is not as high as potential damage that could be done by the sedation needed to get the LP done.    [AR]   2221 Antibody screen: NEG [AR]   2254 Patient found to have a fever, Will obtain LP    [AR]      ED Course User Index  [AR] Patricia Goldman DO         Critical Care Time:   I have spent 53 minutes of critical care time in evaluating and treating this patient. This includes time spent at bedside, time with family and decision makers, documentation, review of labs and imaging, and/or consultation with specialists. It does not include time spent on separately billed procedures. This patient presents with a critical illness or injury that acutely impairs one or more vital organ systems such that there is a high probability of imminent or life threatening deterioration in the patient's condition. This case involved decision making of high complexity to assess, manipulate, and support vital organ system failure and/or to prevent further life threatening deterioration of the patient's condition. Failure to initiate these interventions on an urgent basis would likely result in sudden, clinically significant or life threatening deterioration in the patient's condition. Abnormal findings supporting critical care: Encephalopathy, hypertension  Interventions to support critical care: Sedation, specialist consultation, head imaging, IV fluids  Failure to intervene may result in: death, organ failure, Harm to self or others. 11:46 PM  LP attempted unsuccessfully, to attempts were made at L4-L5. Patient was agitated and restless throughout the attempt. No CSF was obtained. 2255  Patient's fever was noted around this time. The patient does not have a leukocytosis.   No other sirs at this time. No need for 30 cc/kg fluid bolus. Disposition:    Patient is being admitted to the hospital by Dr. Marciano Pope. The results of their tests and reasons for their admission have been discussed with them and/or available family. They convey agreement and understanding for the need to be admitted and for their admission diagnosis. Consultation has been made with the inpatient physician specialist for hospitalization. Diagnosis     Clinical Impression:   1. Acute encephalopathy    2. Fever, unspecified fever cause    3. History of ETOH abuse    4. Seizure-like activity ([facility] [address]    5. Hypertensive emergency        Attestations:   This note was completed by Chris Rojo DO

## 2019-11-18 NOTE — ED NOTES
Dr. Yue Okeefe reviewed discharge instructions with the patient. The patient verbalized understanding. All questions and concerns were addressed. The patient declined a wheelchair and is discharged ambulatory in the care of family members with instructions and prescriptions in hand. Pt is alert and oriented x 4. Respirations are clear and unlabored.

## 2019-11-18 NOTE — DISCHARGE INSTRUCTIONS
Thank you for allowing us to take care of you today! We hope we addressed all of your concerns and needs. We strive to provide excellent quality care in the Emergency Department. You will receive a survey after your visit to evaluate the care you were provided. Should you receive a survey from us, we invite you to share your experience and tell us what made it excellent. It was a pleasure serving you, we invite you to share your experience with us, in our pursuit for excellence, should you be selected to receive a survey. The exam and treatment you received in the Emergency Department were for an urgent problem and are not intended as complete care. It is important that you follow up with a doctor, nurse practitioner, or physician assistant for ongoing care. If your symptoms become worse or you do not improve as expected and you are unable to reach your usual health care provider, you should return to the Emergency Department. We are available 24 hours a day. Please take your discharge instructions with you when you go to your follow-up appointment. If you have any problem arranging a follow-up appointment, contact the Emergency Department immediately. If a prescription has been provided, please have it filled as soon as possible to prevent a delay in treatment. Read the entire medication instruction sheet provided to you by the pharmacy. If you have any questions or reservations about taking the medication due to side effects or interactions with other medications, please call your primary care physician or contact the ER to speak with the charge nurse. Make an appointment with your family doctor or the physician you were referred to for follow-up of this visit as instructed on your discharge paperwork, as this is mandatory follow-up. Return to the ER if you are unable to be seen or if you are unable to be seen in a timely manner.     If you have any problem arranging the follow-up visit, contact the Emergency Department immediately. I hope you feel better and thank you again for allow us to provide you with excellent care today at [facility]! Warmest regards,    Justina Toney MD  Emergency Medicine Physician  [facility]        _____________________________________________________________________________________________________________    Vitals:    19 1825   BP: 172/53   BP 1 Location: Right arm   BP Patient Position: At rest   Pulse: 72   Resp: 15   Temp: 97.6 °F (36.4 °C)   SpO2: 100%       No results found for this or any previous visit (from the past 12 hour(s)).     No orders to display     CT Results  (Last 48 hours)    None          Local Primary Care Physicians   [facility] [phone]  MD Arnol Bui, MD Mckenzie Guevara MD [facility] Doctors [phone]  KASHMIR Lagunas MD Delora Newborn, MD [facility] [phone]  [place] [address] [facility] [phone]  MD Liza Arnold, MD Nevaeh Cobos, MD Cheryle Creighton, MD   [facility] [phone]  [facility] BP, MD Margareth Harkins, MD Evelia Christopher, NP [address] [phone]  MD Liang Calzada, MD Susan Sifuentes, MD Sadia Carroll, MD Rosalba Peraza, MD Arianna Guajardo, MD Kuldeep Fletcher, MD   [address] [facility]  Kranthi Jackson MD [facility] [phone]  Cathy Castellano, MD Deanne San, WILBUR Tobar, MD Catrina Mckeon, MD Lawanda Butler, MD Mg Segovia, MD Sharyle Manly, MD   [address] [facility] [phone]  Karie Ann, MD Esha Avalos, [facility]  Carmelo Canas, WILBUR Serrato, MD Faustino Broussard, MD Stoney Lujan, MD Zac England, MD RUSHING [facility] [phone]  MD Ghassan King MD Rupert Countess, MD Tacey Songster, MD Bailey Prima, MD   [facility] [phone]  MD Alphonso Ruiz MD [place] [phone]  MD Matt Sanches, MD Meryle Laurence, MD   [address] [facility] [phone]  MD Latia Li MD Bolivar Cooler, MD Delma Ewing, MD Samara Clapper, MD Pola Sly, NP  Clarita Camacho MD [address] [facility]   [phone]  MD Keerthi Segal MD Maury Lager, MD     [address] [facility] [phone]  Nancy Greenwood, MD Krystyna Jensen, ALEJAP  Myra Feliciano, KAYLEE Feliciano, KAYLEE Presley MD Hersey Kil, WILBUR Prasad DO   Miscellaneous:  Piyush Pineda MD [facility] Departments   For adult and child immunizations, family planning, TB screening, STD testing and women's health services. [facility]: [place] [address] [phone]     [address]: [address] [phone]     [address] [place] [address]        [address] [place] [address]  For primary care services, woman and child wellness, and some clinics providing specialty care. [facility] -- [address] [facility]. [address] [place] [address] [phone]   [address] [facility] [address] [facility] [address] [facility] [address] [facility] [phone]   [address] [facility] [address] [facility] [address] [phone] [address]  [address] [place] [address] [address] [place] [address]  [phone] [address] [facility]  [address] [address] [place] [phone]   [address] [phone]   Beatrice Islas [facility] [address], [address] [facility]   [facility]: [address] [place] [address] [place] [address] [phone]   [employer]  [address] [facility] ([email or web address].it/about/mission. asp)         Sexual Health/Woman Wellness Clinics   For STD/HIV testing and treatment, pregnancy testing and services, men's health, birth control services, LGBT services, and hepatitis/HPV vaccine services. [employer] [facility] [place] [employer] [address] [facility] [address] [facility] [address] Trinidad Tanmay [phone]   [facility] [address], 5th floor [phone]   Pregnancy [address] [place] [address] [phone]        [place] [address] [facility] [address]   [phone]   [place]   [phone]   Women, Infant and Children's Services: [address] [phone]       [facility] [address]    [phone] [address] [facility]   [phone]   [address]   [address]       Patient Education        Tension Headache: Care Instructions  Your Care Instructions  Most headaches are tension headaches. These headaches tend to happen again, especially if you are under stress. A tension headache may cause pain or a feeling of pressure all over your head. You probably can't pinpoint the center of the pain. If you keep getting tension headaches, the best thing you can do to limit them is to find out what is causing them and then make changes in those areas. Follow-up care is a key part of your treatment and safety. Be sure to make and go to all appointments, and call your doctor if you are having problems.  It's also a good idea to know your test results and keep a list of the medicines you take. How can you care for yourself at home? · Rest in a quiet, dark room with a cool cloth on your forehead until your headache is gone. Close your eyes, and try to relax or go to sleep. Don't watch TV or read. Avoid using the computer. · Use a warm, moist towel or a heating pad set on low to relax tight shoulder and neck muscles. · Have someone gently massage your neck and shoulders. · Take pain medicines exactly as directed. ? If the doctor gave you a prescription medicine for pain, take it as prescribed. ? If you are not taking a prescription pain medicine, ask your doctor if you can take an over-the-counter medicine. · Be careful not to take pain medicine more often than the instructions allow, because you may get worse or more frequent headaches when the medicine wears off. · If you get another tension headache, stop what you are doing and sit quietly for a moment. Close your eyes and breathe slowly. Try to relax your head and neck muscles. · Do not ignore new symptoms that occur with a headache, such as fever, weakness or numbness, vision changes, or confusion. These may be signs of a more serious problem. To help prevent headaches  · Keep a headache diary so you can figure out what triggers your headaches. Avoiding triggers may help you prevent headaches. Record when each headache began, how long it lasted, and what the pain was like (throbbing, aching, stabbing, or dull). List anything that may have triggered the headache, such as being physically or emotionally stressed or being anxious or depressed. Other possible triggers are hunger, anger, fatigue, poor posture, and muscle strain. · Find healthy ways to deal with stress. Headaches are most common during or right after stressful times. Take time to relax before and after you do something that has caused a headache in the past.  · Exercise daily to relieve stress. Relaxation exercises may help reduce tension.   · Get plenty of sleep.  · Eat regularly and well. Long periods without food can trigger a headache. · Treat yourself to a massage. Some people find that massages are very helpful in relieving tension. · Try to keep your muscles relaxed by keeping good posture. Check your jaw, face, neck, and shoulder muscles for tension, and try to relax them. When sitting at a desk, change positions often, and stretch for 30 seconds each hour. · Reduce eyestrain from computers by blinking frequently and looking away from the computer screen every so often. Make sure you have proper eyewear and that your monitor is set up properly, about an arm's length away. When should you call for help? Call 911 anytime you think you may need emergency care. For example, call if:    · You have signs of a stroke. These may include:  ? Sudden numbness, paralysis, or weakness in your face, arm, or leg, especially on only one side of your body. ? Sudden vision changes. ? Sudden trouble speaking. ? Sudden confusion or trouble understanding simple statements. ? Sudden problems with walking or balance. ? A sudden, severe headache that is different from past headaches.    Call your doctor now or seek immediate medical care if:    · You have new or worse nausea and vomiting.     · You have a new or higher fever.     · Your headache gets much worse.    Watch closely for changes in your health, and be sure to contact your doctor if:    · You are not getting better after 2 days (48 hours). Where can you learn more? Go to http://rhonda-ko.info/. Enter 17 17 05 in the search box to learn more about \"Tension Headache: Care Instructions. \"  Current as of: March 28, 2019  Content Version: 12.2  © 2928-2702 [employer]. Care instructions adapted under license by [employer] (which disclaims liability or warranty for this information).  If you have questions about a medical condition or this instruction, always ask your healthcare professional. [address] any warranty or liability for your use of this information.

## 2019-11-18 NOTE — ED PROVIDER NOTES
EMERGENCY DEPARTMENT HISTORY AND PHYSICAL EXAM      Please note that this dictation was completed with [employer], the computer voice recognition software. Quite often unanticipated grammatical, syntax, homophones, and other interpretive errors are inadvertently transcribed by the computer software. Please disregard these errors and any errors that have escaped final proofreading. Thank you. Date: 11/17/2019  Patient Name: Shannon Hong  Patient Age and Sex: 80 y.o. male    History of Presenting Illness     Chief Complaint   Patient presents with    Headache     since 11am unrelieved by tylenol at 11a and 2p. hx of tia and hypertension. pt had similar HA 2 weeks ago that took a full day to go away but that one was worse       History Provided By: Patient    HPI: Shannon Hong, 80 y.o. male with past medical history as documented below presents to the ED with c/o of acute onset of mild intensity bitemporal headache onset since 11 AM.  Patient states that the headache came all of a sudden but was moderate intensity. Patient gave him some Tylenol at 11 AM followed by 2 PM.  Patient also had a headache about 2 weeks ago which felt similar. He currently states the pain is gone. He states that he does not want any work-up done and feels ready to go home. He denies any blurry vision, slurred speech, focal weakness. Patient states that he was seen here in the emergency room recently for dehydration and was told to increase his fluid intake which he has. He denies any fever, altered mental status, neck pain. Pt denies any other alleviating or exacerbating factors. Additionally, pt specifically denies any recent fever, chills, nausea, vomiting, abdominal pain, CP, SOB, lightheadedness, dizziness, numbness, weakness, BLE swelling, heart palpitations, urinary sxs, diarrhea, constipation, melena, hematochezia, cough, or congestion. There are no other complaints, changes or physical findings at this time.      PCP: Salma Brown MD    Past History   Past Medical History:  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    Morbid obesity ([address]     On statin therapy 8/16/2017    TESSA on CPAP 1/3/2019    Psychiatric disorder     Depression    Simple chronic bronchitis ([facility] 1/3/2019    TIA (transient ischemic attack) 2/51/2900    Umbilical hernia 05/2/3869       Past Surgical History:  Past Surgical History:   Procedure Laterality Date    CARDIAC SURG PROCEDURE UNLIST  1996    Cardiac Stents    CARDIAC SURG PROCEDURE UNLIST  04/2019    EF 61-65%    COLONOSCOPY N/A 9/27/2019    COLONOSCOPY performed by Bonifacio Kapoor MD at \[facility]\"" ENDOSCOPY    HX [facility]    HX HERNIA REPAIR  42/45/88    open umbilical hernia repair mesh    HX UROLOGICAL      HYDROCELECTOMY    UPPER GI ENDOSCOPY,BIOPSY  9/30/2019            Family History:  Family History   Problem Relation Age of Onset    Heart Disease Mother     Hypertension Mother     Hypertension Father     Hypertension Sister     Hypertension Brother     Cancer Brother        Social History:  Social History     Tobacco Use    Smoking status: Former Smoker     Packs/day: 0.50     Years: 20.00     Pack years: 10.00    Smokeless tobacco: Former User    Tobacco comment: quit about 40  years ago   / used to dip tobaco and dip snuff   Substance Use Topics    Alcohol use: Not Currently     Comment: \"Drinks very little now for about a month \"    Drug use: No       Allergies: Allergies   Allergen Reactions    Levaquin [Levofloxacin] Nausea and Vomiting     Reports anxiety, nausea, aching after taking levaquin    Ciprofloxacin Shortness of Breath    Quinolones Shortness of Breath       Current Medications:  No current facility-administered medications on file prior to encounter. Current Outpatient Medications on File Prior to Encounter   Medication Sig Dispense Refill    aspirin delayed-release 81 mg tablet Take  by mouth daily.  doxepin (SINEQUAN) 25 mg capsule Take 1 Cap by mouth nightly. 30 Cap 0    polyethylene glycol (MIRALAX) 17 gram/dose powder Take 17 g by mouth daily as needed (constipation).  furosemide (LASIX) 40 mg tablet Take 80 mg alternating with 40 mg every other day 45 Tab 2    Lactoperoxi/Gluc Oxid/Pot Thio (BIOTENE DRY MOUTH MM) 1 Lozenge by Mucous Membrane route as needed (dry mouth).  pantoprazole (PROTONIX) 40 mg tablet Take 1 Tab by mouth daily. 90 Tab 3    butalbital-acetaminophen-caffeine (FIORICET, ESGIC) -40 mg per tablet Take 1 Tab by mouth every six (6) hours as needed for Headache. 10 Tab 0    DULoxetine (CYMBALTA) 30 mg capsule Take 1 Cap by mouth daily. 30 Cap 6    atorvastatin (LIPITOR) 80 mg tablet TAKE ONE TABLET BY MOUTH DAILY 90 Tab 3    atenolol (TENORMIN) 50 mg tablet Take 1 Tab by mouth daily. 30 Tab 5    ANORO ELLIPTA 62.5-25 mcg/actuation inhaler Take 1 Puff by inhalation daily. Review of Systems   Review of Systems   Constitutional: Negative. Negative for chills and fever. HENT: Negative. Negative for congestion, facial swelling, rhinorrhea, sore throat, trouble swallowing and voice change. Eyes: Negative. Respiratory: Negative. Negative for apnea, cough, chest tightness, shortness of breath and wheezing. Cardiovascular: Negative. Negative for chest pain, palpitations and leg swelling. Gastrointestinal: Negative. Negative for abdominal distention, abdominal pain, blood in stool, constipation, diarrhea, nausea and vomiting. Endocrine: Negative. Negative for cold intolerance, heat intolerance and polyuria. Genitourinary: Negative. Negative for difficulty urinating, dysuria, flank pain, frequency, hematuria and urgency. Musculoskeletal: Negative. Negative for arthralgias, back pain, myalgias, neck pain and neck stiffness. Skin: Negative. Negative for color change and rash. Neurological: Positive for headaches. Negative for dizziness, syncope, facial asymmetry, speech difficulty, weakness, light-headedness and numbness. Hematological: Negative. Does not bruise/bleed easily. Psychiatric/Behavioral: Negative. Negative for confusion and self-injury. The patient is not nervous/anxious. Physical Exam   Physical Exam   Constitutional: He is oriented to person, place, and time. Vital signs are normal. He appears well-developed and well-nourished. He is cooperative. Non-toxic appearance. HENT:   Head: Normocephalic and atraumatic. Mouth/Throat: Mucous membranes are normal. No posterior oropharyngeal erythema. Eyes: Pupils are equal, round, and reactive to light. Conjunctivae and EOM are normal.   Neck: Normal range of motion. Cardiovascular: Normal rate, regular rhythm, normal heart sounds and intact distal pulses. Exam reveals no gallop and no friction rub. No murmur heard. Pulmonary/Chest: Effort normal and breath sounds normal. No respiratory distress. He has no wheezes. He has no rales. He exhibits no tenderness. Abdominal: Soft. Bowel sounds are normal. He exhibits no distension and no mass. There is no tenderness. There is no rebound and no guarding. Musculoskeletal: Normal range of motion. General: No tenderness, deformity or edema. Neurological: He is alert and oriented to person, place, and time. He displays normal reflexes. No cranial nerve deficit.  He exhibits normal muscle tone. Coordination normal.   Skin: Skin is warm. No rash noted. Psychiatric: He has a normal mood and affect. Nursing note and vitals reviewed. Diagnostic Study Results     Labs -  No results found for this or any previous visit (from the past 24 hour(s)). Radiologic Studies -   No orders to display     CT Results  (Last 48 hours)    None        CXR Results  (Last 48 hours)    None          Medical Decision Making   I am the first provider for this patient. I reviewed the vital signs, available nursing notes, past medical history, past surgical history, family history and social history. Vital Signs-Reviewed the patient's vital signs. Patient Vitals for the past 24 hrs:   Temp Pulse Resp BP SpO2   11/17/19 1825 97.6 °F (36.4 °C) 72 15 172/53 100 %       Pulse Oximetry Analysis - 100% on RA    Cardiac Monitor:   Rate: 72 bpm  Rhythm: Normal Sinus Rhythm      Records Reviewed: Nursing Notes, Old Medical Records, Previous electrocardiograms, Previous Radiology Studies and Previous Laboratory Studies    Provider Notes (Medical Decision Making):   Pt presents with acute headache; afebrile with stable vitals; exam is without focal deficits. DDx: migraine, tension headache, cluster HA, stress, hypertensive urgency, dehydration. HA was gradual onset, pt neuro intact, no nausea/vomiting/focal weakness/sensory change to suggest CVA or ICH. Also pt is not immunocompromised, no fever, no focal weakness to suggest brain abscess. Therefore, no CT head. No neck stiffness, fever, AMS, photophobia to suggest meningitis. Neg kernig and Brudzinski. Therefore no LP. No jaw claudication, visual changes or temporal pain to suggest temporal arteritis, therefore no ESR/CRP. No eye pain, PERRL, no red eye to suggest glaucoma. No risk factors for CO. Will treat pain and reassess. ED Course:   Initial assessment performed.  The patients presenting problems have been discussed, and they are in agreement with the care plan formulated and outlined with them. I have encouraged them to ask questions as they arise throughout their visit. HYPERTENSION COUNSELING   Education was provided to the patient today regarding their hypertension. Patient is made aware of their elevated blood pressure and is instructed to follow up this week with their Primary Care for a recheck. Patient is counseled regarding consequences of chronic, uncontrolled hypertension including kidney disease, heart disease, stroke or even death. Patient states their understanding and agrees to follow up this week. Additionally, during their visit, I discussed sodium restriction, maintaining ideal body weight and regular exercise program as physiologic means to achieve blood pressure control. The patient will strive towards this. I reviewed our electronic medical record system for any past medical records that were available that may contribute to the patient's current condition, the nursing notes and vital signs from today's visit. Jennifer Gill MD    ED Orders Placed :  Orders Placed This Encounter    DISCONTD: butalbital-acetaminophen-caff (FIORICET) -40 mg per capsule    DISCONTD: butalbital-acetaminophen-caff (FIORICET) -40 mg per capsule     Progress Note:  Patient has been reassessed and reports feeling better and symptoms have improved significantly after ED treatment. Patient feels comfortable going home with close follow-up. Gala Rush's final labs and imaging have been reviewed with him and available family and/or caregiver. They have been counseled regarding his diagnosis. He verbally conveys understanding and agreement of the signs, symptoms, diagnosis, treatment and prognosis and additionally agrees to follow up as recommended with Dr. Shravan Gruber MD and/or specialist in 24 - 48 hours. He also agrees with the care-plan we created together and conveys that all of his questions have been answered.   I have also put together some discharge instructions for him that include: 1) educational information regarding their diagnosis, 2) how to care for their diagnosis at home, as well a 3) list of reasons why they would want to return to the ED prior to their follow-up appointment should the patient's condition change or symptoms worsen. I have answered all questions to the patient's satisfaction. Strict return precautions given. He both understood and agreed with plan as discussed. Vital signs stable for discharge. Disposition: DISCHARGE  The pt is ready for discharge. The pt's signs, symptoms, diagnosis, and discharge instructions have been discussed and pt has conveyed their understanding. The pt is to follow up as recommended or return to ER should their symptoms worsen. Plan has been discussed and pt is in agreement. PLAN:  1. Return precautions as discussed. 2.   Discharge Medication List as of 11/17/2019  8:00 PM      START taking these medications    Details   !! butalbital-acetaminophen-caff (FIORICET) -40 mg per capsule Take 1 Cap by mouth every four (4) hours as needed for Pain. Indications: Tension Headache, Print, Disp-20 Cap, R-0      !! butalbital-acetaminophen-caff (FIORICET) -40 mg per capsule Take 1 Cap by mouth every four (4) hours as needed for Pain. Indications: Tension Headache, Print, Disp-20 Cap, R-0       !! - Potential duplicate medications found. Please discuss with provider. CONTINUE these medications which have NOT CHANGED    Details   aspirin delayed-release 81 mg tablet Take  by mouth daily. , Historical Med      doxepin (SINEQUAN) 25 mg capsule Take 1 Cap by mouth nightly., Normal, Disp-30 Cap, R-0      polyethylene glycol (MIRALAX) 17 gram/dose powder Take 17 g by mouth daily as needed (constipation). , Historical Med      furosemide (LASIX) 40 mg tablet Take 80 mg alternating with 40 mg every other day, Print, Disp-45 Tab, R-2      Lactoperoxi/Gluc Oxid/Pot Thio (BIOTENE DRY MOUTH MM) 1 Lozenge by Mucous Membrane route as needed (dry mouth). , Historical Med      pantoprazole (PROTONIX) 40 mg tablet Take 1 Tab by mouth daily. , Normal, Disp-90 Tab, R-3      butalbital-acetaminophen-caffeine (FIORICET, ESGIC) -40 mg per tablet Take 1 Tab by mouth every six (6) hours as needed for Headache., Normal, Disp-10 Tab, R-0      DULoxetine (CYMBALTA) 30 mg capsule Take 1 Cap by mouth daily. , Normal, Disp-30 Cap, R-6      atorvastatin (LIPITOR) 80 mg tablet TAKE ONE TABLET BY MOUTH DAILY, Normal, Disp-90 Tab, R-3      atenolol (TENORMIN) 50 mg tablet Take 1 Tab by mouth daily. , Normal, Disp-30 Tab, R-5      ANORO ELLIPTA 62.5-25 mcg/actuation inhaler Take 1 Puff by inhalation daily. , Historical Med, KIMBERLY           3. Follow-up Information     Follow up With Specialties Details Why [address] Rubina Larson MD Internal Medicine   [address]  [phone]      [facility] EMERGENCY DEPT Emergency Medicine  As needed, If symptoms worsen [address]   [address] [address]  [phone]    [facility]    [address]   [address] [address]  [phone]          Return to ED if worse  Diagnosis     Clinical Impression:   1. Acute non intractable tension-type headache    2. Migraine without aura and without status migrainosus, not intractable    3. Accelerated hypertension        Attestation:  I personally performed the services described in this documentation on this date 11/17/2019 for patient, Neil Munoz. Aries Alfonso MD      This note will not be viewable in [address]

## 2019-11-18 NOTE — ED NOTES
Patient reports 15 minutes ago his HA went away.  Holding off on blood because patient is feeling better

## 2019-11-19 ENCOUNTER — HOSPITAL ENCOUNTER (INPATIENT)
Dept: GENERAL RADIOLOGY | Age: 84
Discharge: HOME | DRG: 099 | End: 2019-11-19
Attending: HOSPITALIST
Payer: MEDICARE

## 2019-11-19 ENCOUNTER — APPOINTMENT (OUTPATIENT)
Dept: MRI IMAGING | Age: 84
DRG: 099 | End: 2019-11-19
Attending: GENERAL ACUTE CARE HOSPITAL
Payer: MEDICARE

## 2019-11-19 PROBLEM — R41.82 ALTERED MENTAL STATE: Status: ACTIVE | Noted: 2019-11-19

## 2019-11-19 LAB
ANION GAP SERPL CALC-SCNC: 8 MMOL/L (ref 5–15)
BUN SERPL-MCNC: 20 MG/DL (ref 6–20)
BUN/CREAT SERPL: 13 (ref 12–20)
CALCIUM SERPL-MCNC: 8.4 MG/DL (ref 8.5–10.1)
CHLORIDE SERPL-SCNC: 106 MMOL/L (ref 97–108)
CO2 SERPL-SCNC: 22 MMOL/L (ref 21–32)
CREAT SERPL-MCNC: 1.5 MG/DL (ref 0.7–1.3)
ERYTHROCYTE [DISTWIDTH] IN BLOOD BY AUTOMATED COUNT: 15.6 % (ref 11.5–14.5)
GLUCOSE SERPL-MCNC: 156 MG/DL (ref 65–100)
HCT VFR BLD AUTO: 36.8 % (ref 36.6–50.3)
HGB BLD-MCNC: 11.4 G/DL (ref 12.1–17)
LACTATE BLD-SCNC: 1.8 MMOL/L (ref 0.4–2)
MCH RBC QN AUTO: 30.1 PG (ref 26–34)
MCHC RBC AUTO-ENTMCNC: 31 G/DL (ref 30–36.5)
MCV RBC AUTO: 97.1 FL (ref 80–99)
NRBC # BLD: 0 K/UL (ref 0–0.01)
NRBC BLD-RTO: 0 PER 100 WBC
PLATELET # BLD AUTO: 195 K/UL (ref 150–400)
PMV BLD AUTO: 10.3 FL (ref 8.9–12.9)
POTASSIUM SERPL-SCNC: 4 MMOL/L (ref 3.5–5.1)
RBC # BLD AUTO: 3.79 M/UL (ref 4.1–5.7)
SODIUM SERPL-SCNC: 136 MMOL/L (ref 136–145)
WBC # BLD AUTO: 8.5 K/UL (ref 4.1–11.1)

## 2019-11-19 PROCEDURE — 74011250636 HC RX REV CODE- 250/636: Performed by: EMERGENCY MEDICINE

## 2019-11-19 PROCEDURE — 74011250636 HC RX REV CODE- 250/636: Performed by: HOSPITALIST

## 2019-11-19 PROCEDURE — 74011250636 HC RX REV CODE- 250/636: Performed by: GENERAL ACUTE CARE HOSPITAL

## 2019-11-19 PROCEDURE — 96375 TX/PRO/DX INJ NEW DRUG ADDON: CPT

## 2019-11-19 PROCEDURE — 74011250637 HC RX REV CODE- 250/637: Performed by: EMERGENCY MEDICINE

## 2019-11-19 PROCEDURE — 85027 COMPLETE CBC AUTOMATED: CPT

## 2019-11-19 PROCEDURE — 65660000000 HC RM CCU STEPDOWN

## 2019-11-19 PROCEDURE — 70551 MRI BRAIN STEM W/O DYE: CPT

## 2019-11-19 PROCEDURE — 74011000258 HC RX REV CODE- 258: Performed by: HOSPITALIST

## 2019-11-19 PROCEDURE — 74011000250 HC RX REV CODE- 250: Performed by: EMERGENCY MEDICINE

## 2019-11-19 PROCEDURE — 3331090001 HH PPS REVENUE CREDIT

## 2019-11-19 PROCEDURE — 80048 BASIC METABOLIC PNL TOTAL CA: CPT

## 2019-11-19 PROCEDURE — 74011250637 HC RX REV CODE- 250/637: Performed by: HOSPITALIST

## 2019-11-19 PROCEDURE — 95816 EEG AWAKE AND DROWSY: CPT | Performed by: HOSPITALIST

## 2019-11-19 PROCEDURE — 74011000258 HC RX REV CODE- 258: Performed by: EMERGENCY MEDICINE

## 2019-11-19 PROCEDURE — 3331090002 HH PPS REVENUE DEBIT

## 2019-11-19 RX ORDER — ASPIRIN 325 MG/1
100 TABLET, FILM COATED ORAL DAILY
Status: DISCONTINUED | OUTPATIENT
Start: 2019-11-19 | End: 2019-11-25 | Stop reason: HOSPADM

## 2019-11-19 RX ORDER — SODIUM CHLORIDE 9 MG/ML
75 INJECTION, SOLUTION INTRAVENOUS CONTINUOUS
Status: DISCONTINUED | OUTPATIENT
Start: 2019-11-19 | End: 2019-11-20

## 2019-11-19 RX ORDER — SODIUM CHLORIDE 0.9 % (FLUSH) 0.9 %
5-40 SYRINGE (ML) INJECTION EVERY 8 HOURS
Status: DISCONTINUED | OUTPATIENT
Start: 2019-11-19 | End: 2019-11-25 | Stop reason: HOSPADM

## 2019-11-19 RX ORDER — LIDOCAINE HYDROCHLORIDE 10 MG/ML
2 INJECTION, SOLUTION EPIDURAL; INFILTRATION; INTRACAUDAL; PERINEURAL
Status: ACTIVE | OUTPATIENT
Start: 2019-11-19 | End: 2019-11-19

## 2019-11-19 RX ORDER — ACETAMINOPHEN 500 MG
1000 TABLET ORAL
COMMUNITY

## 2019-11-19 RX ORDER — FUROSEMIDE 80 MG/1
80 TABLET ORAL DAILY
COMMUNITY
End: 2019-12-12 | Stop reason: DRUGHIGH

## 2019-11-19 RX ORDER — ACETAMINOPHEN 10 MG/ML
1000 INJECTION, SOLUTION INTRAVENOUS ONCE
Status: COMPLETED | OUTPATIENT
Start: 2019-11-19 | End: 2019-11-19

## 2019-11-19 RX ORDER — ATENOLOL 25 MG/1
50 TABLET ORAL DAILY
Status: DISCONTINUED | OUTPATIENT
Start: 2019-11-19 | End: 2019-11-23

## 2019-11-19 RX ORDER — LORAZEPAM 2 MG/ML
2 INJECTION INTRAMUSCULAR
Status: DISCONTINUED | OUTPATIENT
Start: 2019-11-19 | End: 2019-11-25 | Stop reason: HOSPADM

## 2019-11-19 RX ORDER — PANTOPRAZOLE SODIUM 40 MG/1
40 TABLET, DELAYED RELEASE ORAL DAILY
Status: DISCONTINUED | OUTPATIENT
Start: 2019-11-19 | End: 2019-11-25 | Stop reason: HOSPADM

## 2019-11-19 RX ORDER — DULOXETIN HYDROCHLORIDE 30 MG/1
30 CAPSULE, DELAYED RELEASE ORAL DAILY
Status: DISCONTINUED | OUTPATIENT
Start: 2019-11-19 | End: 2019-11-25 | Stop reason: HOSPADM

## 2019-11-19 RX ORDER — ATORVASTATIN CALCIUM 40 MG/1
80 TABLET, FILM COATED ORAL DAILY
Status: DISCONTINUED | OUTPATIENT
Start: 2019-11-19 | End: 2019-11-25 | Stop reason: HOSPADM

## 2019-11-19 RX ORDER — ONDANSETRON 2 MG/ML
4 INJECTION INTRAMUSCULAR; INTRAVENOUS
Status: DISCONTINUED | OUTPATIENT
Start: 2019-11-19 | End: 2019-11-25 | Stop reason: HOSPADM

## 2019-11-19 RX ORDER — VANCOMYCIN/0.9 % SOD CHLORIDE 1.5G/250ML
1500 PLASTIC BAG, INJECTION (ML) INTRAVENOUS EVERY 24 HOURS
Status: DISCONTINUED | OUTPATIENT
Start: 2019-11-20 | End: 2019-11-22

## 2019-11-19 RX ORDER — ACETAMINOPHEN 325 MG/1
650 TABLET ORAL
Status: DISCONTINUED | OUTPATIENT
Start: 2019-11-19 | End: 2019-11-25 | Stop reason: HOSPADM

## 2019-11-19 RX ORDER — LORAZEPAM 2 MG/ML
0.5 INJECTION INTRAMUSCULAR ONCE
Status: COMPLETED | OUTPATIENT
Start: 2019-11-19 | End: 2019-11-19

## 2019-11-19 RX ORDER — SODIUM CHLORIDE 0.9 % (FLUSH) 0.9 %
5-40 SYRINGE (ML) INJECTION AS NEEDED
Status: DISCONTINUED | OUTPATIENT
Start: 2019-11-19 | End: 2019-11-25 | Stop reason: HOSPADM

## 2019-11-19 RX ORDER — AMLODIPINE BESYLATE 2.5 MG/1
2.5 TABLET ORAL DAILY
COMMUNITY
End: 2019-11-25

## 2019-11-19 RX ORDER — FUROSEMIDE 80 MG/1
80 TABLET ORAL
COMMUNITY
End: 2019-12-12 | Stop reason: DRUGHIGH

## 2019-11-19 RX ORDER — LORAZEPAM 2 MG/ML
1 INJECTION INTRAMUSCULAR
Status: DISCONTINUED | OUTPATIENT
Start: 2019-11-19 | End: 2019-11-25 | Stop reason: HOSPADM

## 2019-11-19 RX ADMIN — LORAZEPAM 0.5 MG: 2 INJECTION INTRAMUSCULAR; INTRAVENOUS at 20:14

## 2019-11-19 RX ADMIN — ACETAMINOPHEN 1000 MG: 10 INJECTION, SOLUTION INTRAVENOUS at 02:05

## 2019-11-19 RX ADMIN — Medication 10 ML: at 21:50

## 2019-11-19 RX ADMIN — AMPICILLIN SODIUM 2 G: 2 INJECTION, POWDER, FOR SOLUTION INTRAMUSCULAR; INTRAVENOUS at 08:21

## 2019-11-19 RX ADMIN — AMPICILLIN SODIUM 2 G: 2 INJECTION, POWDER, FOR SOLUTION INTRAMUSCULAR; INTRAVENOUS at 14:47

## 2019-11-19 RX ADMIN — ATORVASTATIN CALCIUM 80 MG: 40 TABLET, FILM COATED ORAL at 08:18

## 2019-11-19 RX ADMIN — ACYCLOVIR SODIUM 800 MG: 50 INJECTION, SOLUTION INTRAVENOUS at 15:28

## 2019-11-19 RX ADMIN — DULOXETINE HYDROCHLORIDE 30 MG: 30 CAPSULE, DELAYED RELEASE ORAL at 12:32

## 2019-11-19 RX ADMIN — PANTOPRAZOLE SODIUM 40 MG: 40 TABLET, DELAYED RELEASE ORAL at 08:19

## 2019-11-19 RX ADMIN — SODIUM CHLORIDE 75 ML/HR: 900 INJECTION, SOLUTION INTRAVENOUS at 05:50

## 2019-11-19 RX ADMIN — FOLIC ACID: 5 INJECTION, SOLUTION INTRAMUSCULAR; INTRAVENOUS; SUBCUTANEOUS at 00:38

## 2019-11-19 RX ADMIN — LORAZEPAM 1 MG: 2 INJECTION INTRAMUSCULAR; INTRAVENOUS at 05:53

## 2019-11-19 RX ADMIN — THIAMINE HYDROCHLORIDE 100 MG: 100 INJECTION, SOLUTION INTRAMUSCULAR; INTRAVENOUS at 00:19

## 2019-11-19 RX ADMIN — ATENOLOL 50 MG: 25 TABLET ORAL at 12:32

## 2019-11-19 RX ADMIN — VANCOMYCIN HYDROCHLORIDE 2000 MG: 10 INJECTION, POWDER, LYOPHILIZED, FOR SOLUTION INTRAVENOUS at 03:21

## 2019-11-19 RX ADMIN — AMPICILLIN SODIUM 2 G: 2 INJECTION, POWDER, FOR SOLUTION INTRAMUSCULAR; INTRAVENOUS at 21:49

## 2019-11-19 RX ADMIN — SODIUM CHLORIDE 75 ML/HR: 900 INJECTION, SOLUTION INTRAVENOUS at 18:08

## 2019-11-19 RX ADMIN — AMPICILLIN SODIUM 2 G: 2 INJECTION, POWDER, FOR SOLUTION INTRAMUSCULAR; INTRAVENOUS at 01:36

## 2019-11-19 RX ADMIN — ACYCLOVIR SODIUM 680 MG: 50 INJECTION, SOLUTION INTRAVENOUS at 00:27

## 2019-11-19 RX ADMIN — Medication 10 ML: at 14:00

## 2019-11-19 RX ADMIN — Medication 100 MG: at 08:19

## 2019-11-19 RX ADMIN — CEFTRIAXONE SODIUM 2 G: 2 INJECTION, POWDER, FOR SOLUTION INTRAMUSCULAR; INTRAVENOUS at 12:38

## 2019-11-19 NOTE — ED NOTES
Pt continues to get tangled in monitor wires.  Discussed with charge RN and family that patient can be spot checked for vital signs as there is no order for telemetry

## 2019-11-19 NOTE — PROGRESS NOTES
Pharmacy Automatic Renal Dosing Protocol - Antimicrobials    Indication for Antimicrobials: Central Nervous System Infection    Current Regimen of Each Antimicrobial:  Vancomycin 2 gm X 1 dose, then Vancomycin 1.5 gm every 24 hrs (Start Date ; Day # 1)  Acyclovir 800 mg every 12 hrs  (Start Date ; Day # 1)  Ampicillin 2 gm every 6 hrs (Start Date ; Day # 1)  Ceftriaxone 1 GM every 12 hrs (Start Date ; Day # 1)    Previous Antimicrobial Therapy:      Goal Level: VANCOMYCIN TROUGH GOAL RANGE    Vancomycin Trough: 15 - 20 mcg/mL  (AUC: 400 - 600 mg/hr/Liter/day)     Date Dose & Interval Measured (mcg/mL) Extrapolated (mcg/mL)                       Date & time of next level:     Significant Cultures:       Radiology / Imaging results: (X-ray, CT scan or MRI):    Paralysis, amputations, malnutrition:    Labs:  Recent Labs     19  0427 19  1916   CREA 1.50* 1.64*   BUN 20 22*   WBC 8.5 9.8     Temp (24hrs), Av.5 °F (38.6 °C), Min:99.9 °F (37.7 °C), Max:102.7 °F (39.3 °C)    Creatinine Clearance (mL/min) or Dialysis:40.5    Impression/Plan:   Central Nervous System Infection /Vancomycin 2 gm X 1 dose, then Vancomycin 1.5 gm every 24 hrs / Acyclovir 800 mg every 12 hrs  / Ampicillin 2 gm every 6 hrs / Ceftriaxone 1 GM every 12 hr  Antimicrobial stop date TBA     Pharmacy will follow daily and adjust medications as appropriate for renal function and/or serum levels. Thank you,  Lucie Odell PHARMD    Recommended duration of therapy  [email or web address]/Pharmacy/Clinical%20Companion/[email or web address]. docx    Renal Dosing  [email or web address]/Pharmacy/Clinical%20Companion/Renal%[email or web address]. pdf

## 2019-11-19 NOTE — PROGRESS NOTES
Spoke with pt and his family at length. Discussed differential diagnosis in regards to pt's medical condition. Discussed importance of lumbar puncture, including the risks and benefits. Pt's granddaughter stated that she was \"taking responsibility to make the decision to not have a lumbar puncture. \"  Will continue IV abx. Plan for Brain MRI. Continue telemetry monitoring.

## 2019-11-19 NOTE — PROGRESS NOTES
Pharmacy Clarification of the Prior to Admission Medication Regimen Retrospective to the Admission Medication Reconciliation    The patient was not interviewed regarding clarification of the prior to admission medication regimen due to AMS. Patient's wife and granddaughter, Zeina Damon, were present in room and obtained permission from patient to discuss drug regimen with visitor(s) present. Patient's granddaughter was questioned regarding use of any other inhalers, topical products, over the counter medications, herbal medications, vitamin products or ophthalmic/nasal/otic medication use. Information Obtained From: Patient's granddaughter, RX Query    Recommendations/Findings: The following amendments were made to the patient's active medication list on file at [facility]:     1) Additions:   amLODIPine (NORVASC) 2.5 mg tablet  acetaminophen (TYLENOL) 500 mg tablet    2) Removals:   fioricet  doxepin  Biotene mouthwash    3) Changes: NONE    4) Pertinent Pharmacy Findings:  Patient's granddaughter, Zeina Damon, manages the patient's medications     PTA medication list was corrected to the following:     Prior to Admission Medications   Prescriptions Last Dose Informant Patient Reported? Taking? ANORO ELLIPTA 62.5-25 mcg/actuation inhaler 11/18/2019 at Unknown time Family Member Yes Yes   Sig: Take 1 Puff by inhalation daily. DULoxetine (CYMBALTA) 30 mg capsule 11/18/2019 at Unknown time Family Member No Yes   Sig: Take 1 Cap by mouth daily. acetaminophen (TYLENOL) 500 mg tablet 11/17/2019 at Unknown time Family Member Yes Yes   Sig: Take 1,000 mg by mouth every six (6) hours as needed for Pain. amLODIPine (NORVASC) 2.5 mg tablet 11/18/2019 at Unknown time Family Member Yes Yes   Sig: Take 2.5 mg by mouth daily. aspirin delayed-release 81 mg tablet 11/18/2019 at Unknown time Family Member Yes Yes   Sig: Take 81 mg by mouth daily.    atenolol (TENORMIN) 50 mg tablet 11/18/2019 at Unknown time Family Member No Yes Sig: Take 1 Tab by mouth daily. atorvastatin (LIPITOR) 80 mg tablet 11/18/2019 at Unknown time Family Member No Yes   Sig: TAKE ONE TABLET BY MOUTH DAILY   furosemide (LASIX) 80 mg tablet 11/17/2019 at Unknown time Family Member Yes Yes   Sig: Take 80 mg by mouth every fourty-eight (48) hours. Patient alternates 80 mg and 40 mg every other day   furosemide (LASIX) 80 mg tablet 11/18/2019 at Unknown time Family Member Yes Yes   Sig: Take 40 mg by mouth every fourty-eight (48) hours. Patient alternates 80 mg and 40 mg every other day   pantoprazole (PROTONIX) 40 mg tablet 11/18/2019 at Unknown time Family Member No Yes   Sig: Take 1 Tab by mouth daily. polyethylene glycol (MIRALAX) 17 gram/dose powder 11/5/2019 at Unknown time Family Member Yes Yes   Sig: Take 17 g by mouth daily as needed (constipation).       Facility-Administered Medications: None          Thank you,  Carmelia Buerger, CPhT  Medication History Pharmacy Technician

## 2019-11-19 NOTE — PROCEDURES
[address], [address]      Electroencephalogram    Procedure ID: MRA  Procedure Date: 11/19/2019   Patient Name: Elizabeth Coulter YOB: 1934   Procedure Type: Routine Medical Record No: [id number]     INDICATION: Seizure, Altered mental status    Medications:  Current Facility-Administered Medications   Medication Dose Route Frequency    sodium chloride (NS) flush 5-40 mL  5-40 mL IntraVENous Q8H    sodium chloride (NS) flush 5-40 mL  5-40 mL IntraVENous PRN    acetaminophen (TYLENOL) tablet 650 mg  650 mg Oral Q4H PRN    ondansetron (ZOFRAN) injection 4 mg  4 mg IntraVENous Q4H PRN    atenolol (TENORMIN) tablet 50 mg  50 mg Oral DAILY    atorvastatin (LIPITOR) tablet 80 mg  80 mg Oral DAILY    DULoxetine (CYMBALTA) capsule 30 mg  30 mg Oral DAILY    pantoprazole (PROTONIX) tablet 40 mg  40 mg Oral DAILY    thiamine mononitrate (B-1) tablet 100 mg  100 mg Oral DAILY    sodium chloride (NS) flush 5-40 mL  5-40 mL IntraVENous Q8H    sodium chloride (NS) flush 5-40 mL  5-40 mL IntraVENous PRN    LORazepam (ATIVAN) injection 2 mg  2 mg IntraVENous Q5MIN PRN    LORazepam (ATIVAN) injection 1 mg  1 mg IntraVENous Q4H PRN    0.9% sodium chloride infusion  75 mL/hr IntraVENous CONTINUOUS    acyclovir (ZOVIRAX) 800 mg in 0.9% sodium chloride 250 mL IVPB  800 mg IntraVENous Q12H    ampicillin (OMNIPEN) 2 g in 0.9% sodium chloride (MBP/ADV) 100 mL  2 g IntraVENous Q6H    cefTRIAXone (ROCEPHIN) 1 g in 0.9% sodium chloride (MBP/ADV) 50 mL  1 g IntraVENous Q12H    [START ON 11/20/2019] vancomycin (VANCOCIN) 1500 mg in  ml infusion  1,500 mg IntraVENous Q24H     Current Outpatient Medications   Medication Sig    furosemide (LASIX) 80 mg tablet Take 80 mg by mouth every fourty-eight (48) hours.  Patient alternates 80 mg and 40 mg every other day    furosemide (LASIX) 80 mg tablet Take 40 mg by mouth every fourty-eight (48) hours. Patient alternates 80 mg and 40 mg every other day    amLODIPine (NORVASC) 2.5 mg tablet Take 2.5 mg by mouth daily.  acetaminophen (TYLENOL) 500 mg tablet Take 1,000 mg by mouth every six (6) hours as needed for Pain.  aspirin delayed-release 81 mg tablet Take 81 mg by mouth daily.  polyethylene glycol (MIRALAX) 17 gram/dose powder Take 17 g by mouth daily as needed (constipation).  pantoprazole (PROTONIX) 40 mg tablet Take 1 Tab by mouth daily.  DULoxetine (CYMBALTA) 30 mg capsule Take 1 Cap by mouth daily.  atorvastatin (LIPITOR) 80 mg tablet TAKE ONE TABLET BY MOUTH DAILY    atenolol (TENORMIN) 50 mg tablet Take 1 Tab by mouth daily.  ANORO ELLIPTA 62.5-25 mcg/actuation inhaler Take 1 Puff by inhalation daily. Facility-Administered Medications Ordered in Other Encounters   Medication Dose Route Frequency    lidocaine (PF) (XYLOCAINE) 10 mg/mL (1 %) injection 2 mL  2 mL SubCUTAneous RAD ONCE       DESCRIPTION OF PROCEDURE: Electrodes were applied in accordance with the international 10-20 system of electrode placement. EEG was reviewed in both bipolar and referential montages    Description of Activity:  During brief period of wakefulness, there is continuous runs of 8-9 Hz symmetric posterior alpha rhythm. During drowsiness, there is attenuation of the alpha rhythm and low voltage theta activity occurs bilaterally. Sleep spindles occur and are symmetric. No sharp or spike discharges, seizures or epileptiform discharges seen. No focal asymmetry. Clinical Interpretation: This EEG, performed during brief period of wakefulness and mostly sleep is normal. There is no focal asymmetry, seizures or epileptiform discharges seen.

## 2019-11-19 NOTE — CONSULTS
Neurology Note    Patient ID:  Alix Morgan  [id number]  2 y.o.  1934      Date of Consultation:  November 19, 2019    Referring Physician: Dr. Karl Rain    Reason for Consultation:  Altered mental status      Subjective: confusion       History of Present Illness:   Alix Morgan is a 80 y.o. male who presented to the hospital last evening with confusion and agitation. He does have a prior history of hypertension, coronary artery disease and obstructive sleep apnea. He did have a mild fever reported by EMS. He did develop a tonic-clonic seizure activity while in the emergency department. A lum puncture was attempted in the emergency department and was unsuccessful. He was placed on empiric treatment for meningitis with acyclovir, ampicillin, ceftriaxone, and vancomycin. The patient states that he currently denies any pain. He does feel that he is confused. He does report that the one thing that is different is that he is shaking in his arms more than he had previously. Upon review of the medical records, he was seen by 1 of my colleagues, Dr. Nohemi Ledezma, in late October 2019 for altered mental status. His brain MRI did not reveal any acute process and this was felt to possibly be related to a TIA.   A EEG was obtained during the hospitalization which was normal.      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    Morbid obesity ([address]     On statin therapy 8/16/2017    TESSA on CPAP 1/3/2019    Psychiatric disorder     Depression    Simple chronic bronchitis ([address] 1/3/2019    TIA (transient ischemic attack) 0/43/3243    Umbilical hernia 01/3/3528        Past Surgical History:   Procedure Laterality Date    CARDIAC SURG PROCEDURE UNLIST  1996    Cardiac Stents    CARDIAC SURG PROCEDURE UNLIST  04/2019    EF 61-65%    COLONOSCOPY N/A 9/27/2019    COLONOSCOPY performed by Cass[facility] ENDOSCOPY    HX [facility]  11/91/52    open umbilical hernia repair mesh    HX UROLOGICAL      HYDROCELECTOMY    UPPER GI ENDOSCOPY,BIOPSY  9/30/2019             Family History   Problem Relation Age of Onset    Heart Disease Mother     Hypertension Mother     Hypertension Father     Hypertension Sister     Hypertension Brother     Cancer Brother         Social History     Tobacco Use    Smoking status: Former Smoker     Packs/day: 0.50     Years: 20.00     Pack years: 10.00    Smokeless tobacco: Former User    Tobacco comment: quit about 40  years ago   / used to dip tobaco and dip snuff   Substance Use Topics    Alcohol use: Not Currently     Comment: \"Drinks very little now for about a month \"        Allergies   Allergen Reactions    Levaquin [Levofloxacin] Nausea and Vomiting     Reports anxiety, nausea, aching after taking levaquin    Ciprofloxacin Shortness of Breath    Quinolones Shortness of Breath        Prior to Admission medications    Medication Sig Start Date End Date Taking? Authorizing Provider   furosemide (LASIX) 80 mg tablet Take 80 mg by mouth every fourty-eight (48) hours. Patient alternates 80 mg and 40 mg every other day   Yes Provider, Historical   furosemide (LASIX) 80 mg tablet Take 40 mg by mouth every fourty-eight (48) hours. Patient alternates 80 mg and 40 mg every other day   Yes Provider, Historical   amLODIPine (NORVASC) 2.5 mg tablet Take 2.5 mg by mouth daily.    Yes Provider, Historical   acetaminophen (TYLENOL) 500 mg tablet Take 1,000 mg by mouth every six (6) hours as needed for Pain. Yes Provider, Historical   aspirin delayed-release 81 mg tablet Take 81 mg by mouth daily. Yes Provider, Historical   polyethylene glycol (MIRALAX) 17 gram/dose powder Take 17 g by mouth daily as needed (constipation). Yes Provider, Historical   pantoprazole (PROTONIX) 40 mg tablet Take 1 Tab by mouth daily. 10/17/19  Yes Harriet Caldwell MD   DULoxetine (CYMBALTA) 30 mg capsule Take 1 Cap by mouth daily. 8/1/19  Yes Harriet Caldwell MD   atorvastatin (LIPITOR) 80 mg tablet TAKE ONE TABLET BY MOUTH DAILY 7/8/19  Yes Harriet Caldwell MD   atenolol (TENORMIN) 50 mg tablet Take 1 Tab by mouth daily. 7/1/19  Yes Dann Garcia MD   ANORO ELLIPTA 62.5-25 mcg/actuation inhaler Take 1 Puff by inhalation daily.  12/26/18  Yes Provider, Historical     Current Facility-Administered Medications   Medication Dose Route Frequency    sodium chloride (NS) flush 5-40 mL  5-40 mL IntraVENous Q8H    sodium chloride (NS) flush 5-40 mL  5-40 mL IntraVENous PRN    acetaminophen (TYLENOL) tablet 650 mg  650 mg Oral Q4H PRN    ondansetron (ZOFRAN) injection 4 mg  4 mg IntraVENous Q4H PRN    atenolol (TENORMIN) tablet 50 mg  50 mg Oral DAILY    atorvastatin (LIPITOR) tablet 80 mg  80 mg Oral DAILY    DULoxetine (CYMBALTA) capsule 30 mg  30 mg Oral DAILY    pantoprazole (PROTONIX) tablet 40 mg  40 mg Oral DAILY    thiamine mononitrate (B-1) tablet 100 mg  100 mg Oral DAILY    sodium chloride (NS) flush 5-40 mL  5-40 mL IntraVENous Q8H    sodium chloride (NS) flush 5-40 mL  5-40 mL IntraVENous PRN    LORazepam (ATIVAN) injection 2 mg  2 mg IntraVENous Q5MIN PRN    LORazepam (ATIVAN) injection 1 mg  1 mg IntraVENous Q4H PRN    0.9% sodium chloride infusion  75 mL/hr IntraVENous CONTINUOUS    acyclovir (ZOVIRAX) 800 mg in 0.9% sodium chloride 250 mL IVPB  800 mg IntraVENous Q12H    ampicillin (OMNIPEN) 2 g in 0.9% sodium chloride (MBP/ADV) 100 mL  2 g IntraVENous Q6H    [START ON 11/20/2019] vancomycin (VANCOCIN) 1500 mg in  ml infusion  1,500 mg IntraVENous Q24H    cefTRIAXone (ROCEPHIN) 2 g in 0.9% sodium chloride (MBP/ADV) 50 mL  2 g IntraVENous Q12H     Facility-Administered Medications Ordered in Other Encounters   Medication Dose Route Frequency    lidocaine (PF) (XYLOCAINE) 10 mg/mL (1 %) injection 2 mL  2 mL SubCUTAneous RAD ONCE       Review of Systems:      [x]Unable to obtain  ROS due to  [x]mental status change     Objective:     Visit Vitals  /87   Pulse 73   Temp 97.9 °F (36.6 °C)   Resp 18   Wt 212 lb (96.2 kg)   SpO2 94%   BMI 32.23 kg/m²       Physical Exam:      General:  appears well nourished in no acute distress  Neck: no carotid bruits  Lungs: clear to auscultation  Heart:  no murmurs, regular rate  Lower extremity: peripheral pulses palpable and no edema  Skin: intact. No rashes noted. Neurological exam:    Awake, alert, oriented to person. He does not know the year or his current location. He can follow simple one-step commands  He was unable to say the days of the week for me. His attention and concentration were limited. His speech was slightly dysarthric. Speech: no dysarthria  Fund of knowledge was reduced    Cranial nerves:   II-XII were tested    Perrrla  Fundoscopic examination was attempted, however the patient had a difficult time cooperating  Visual fields: He does blink to visual threat  Eomi, no evidence of nystagmus  Facial sensation:  normal and symmetric  Facial motor: normal and symmetric  Hearing diminished  SCM strength intact  Tongue: midline without fasciculations    Motor: Tone normal    No evidence of fasciculations    Strength:  He does appear to be 5 out of 5 throughout his upper and lower extremities. There is no clear focal abnormalities noted.   Definitive muscle testing was limited due to his cooperation    Sensory:  Upper extremity: intact to pp  Lower extremity: intact to pp    Reflexes:    Right Left  Biceps  2 2  Triceps 2 2  Brachiorad. 2 2  Patella  1 1  Achilles 1 1    Plantar response:  flexor bilaterally      Cerebellar testing: He does have a high amplitude tremor in his upper extremities with action. This is worse on his right than his left. He reports that he has not had this before. Gait was not assessed due to the acute care setting. Labs:     Lab Results   Component Value Date/Time    Hemoglobin A1c 5.3 10/28/2019 02:17 AM    Sodium 136 11/19/2019 04:27 AM    Potassium 4.0 11/19/2019 04:27 AM    Chloride 106 11/19/2019 04:27 AM    Glucose 156 (H) 11/19/2019 04:27 AM    BUN 20 11/19/2019 04:27 AM    Creatinine 1.50 (H) 11/19/2019 04:27 AM    Calcium 8.4 (L) 11/19/2019 04:27 AM    WBC 8.5 11/19/2019 04:27 AM    HCT 36.8 11/19/2019 04:27 AM    HGB 11.4 (L) 11/19/2019 04:27 AM    PLATELET 486 03/45/5186 04:27 AM       Imaging:    Results from Hospital Encounter encounter on 10/27/19   MRI BRAIN WO CONT    Narrative EXAM:  MRI BRAIN WO CONT  INDICATION: Patient presents to the ED with granddaughter reporting acute onset  at 1300 hours of confusion and left lower facial droop onset at 1530 hours. TECHNIQUE:  Sagittal T1, axial FLAIR, T2,T1 and gradient echo images as well as coronal T2  weighted images and axial diffusion weighted images of the head were obtained. COMPARISON:  CTA 10/27/19  FINDINGS:  The jugular size configuration somewhat greater than expected for age consistent  with volume loss and unchanged from prior exams. Multifocal and confluent T2 hyperintensity also again noted in the white matter  predominantly periventricular and location was some involvement basal ganglia  and brainstem in pattern suggesting chronic small vessel ischemic change. No abnormal restricted diffusion or other findings of acute or subacute brain  infarction.  Single punctate focus of hemosiderin right posterior temporal cortex  in this age group likely of no acute significance. No evidence of acute hemorrhage, mass or abnormal extra-axial fluid collections. Flow voids are present in the vertebral basilar and carotid artery systems. Findings of cervical spondylosis. Craniocervical junction is otherwise  unremarkable  Mild mucosal thickening sphenoid sinus. Other paranasal sinuses and structures  of the skull base are unremarkable. Impression IMPRESSION:  1. Age-related central volume loss and white matter disease greater than  expected for age and similar to the prior MRI 4/13/19. 2. No acute intracranial abnormality demonstrated. Results from [place] encounter on 11/18/19   CT HEAD WO CONT    Addendum Addendum:   Additional impression #3: Air-fluid level in the left maxillary sinus, consistent with acute sinusitis. Correlate clinically. Opal Rivera MD 11/18/2019  7:41 PM          Narrative EXAM: CT HEAD WO CONT    INDICATION: trauma, headache    COMPARISON: 10/27/2019. CONTRAST: None. TECHNIQUE: Unenhanced CT of the head was performed using 5 mm images. Brain and  bone windows were generated. CT dose reduction was achieved through use of a  standardized protocol tailored for this examination and automatic exposure  control for dose modulation. FINDINGS:  Generalized prominence of cerebral sulci and ventricles is increased but stable  and commensurate with age. . Periventricular white matter low-density is  moderately severe and diffuse, with punctate low density in the right basal  ganglia region, consistent with small lacunar infarctions, all stable. . There is  no intracranial hemorrhage, extra-axial collection, mass, mass effect or midline  shift. The basilar cisterns are open. No acute infarct is identified. The bone  windows demonstrate no abnormalities. Small air-fluid level in the left  maxillary sinus.  Significant mucoperiosteal thickening in the sphenoid air  cells. .      Impression IMPRESSION:   1. No acute intracranial abnormality. 2. Microvascular ischemic, old ischemic and other stable age-related change. I did review the head CT performed on November 18, 2019. There is significant amount of atrophy and periventricular micro-ischemic changes. Creatinine 1.5  White blood cell count 8.5    Assessment and Plan:     Patient is a 25-year-old gentleman with multiple medical problems who presents with altered mental status. His neurological examination is notable for poor cognition and a new tremor in his bilateral upper extremities. There was reportedly a convulsive event upon arrival to the ED. Encephalopathy:  The differential for this does include an acute stroke versus seizure with postictal Ruy's paralysis versus metabolic or infectious etiology. I do agree with continuing antibiotics for possible meningitis/encephalitis and performing lumbar puncture. I do feel he should obtain an MRI of his brain with and without contrast.  I would hold on any anticonvulsant medication at this time as his EEG was normal.  Treat mild metabolic disturbances    Vascular health:  He does have multiple risk factors for stroke including:  Hypertension, coronary artery disease, hyperlipidemia  Continue aspirin, aggressive blood pressure control, and statin therapy. Neurology will continue to follow along.             Signed By:  Jaziel Arias DO FAAN    November 19, 2019

## 2019-11-19 NOTE — ED NOTES
The rationale for LP was explained to the patient's wife and granddaughter. Pt's family is refusing to consent to the LP at this time as they feel that the testosterone boosting medication can explain all of his symptomatology including the fever. \"Seizures can cause fever. \"

## 2019-11-19 NOTE — ED NOTES
Patient had a short run of unsustained VTACH. Pt denied chest pain or shortness of breath.  Strip shown to Dr. Cici Rodriguez and placed in patient's chart

## 2019-11-19 NOTE — ED NOTES
Dr. Adam Villa at bedside. Verbal order to cancel the rectal lactulose at this time.  Made aware that temperature is still elevated despite rectal tylenol

## 2019-11-19 NOTE — H&P
HISTORY AND PHYSICAL      PCP: Hanna Garcia MD  History source: the patient's family, ER      CC: altered mental state      HPI: 80 y.o man with EtOH abuse (reportedly quit 3 months ago), HTN, CAD, TESSA, who presents with AMS. He is a limited historian. His family reports that around 5 PM today he became more confused and agitated, oriented to person only, and was holding onto his head like he had a headache. No known fever but EMS reportedly noted a temperature of 100.7 F per the family. He was sedated in the ED and is a poor historian. After sedation he was noted to have a tonic-clonic seizure. Chart review reveals he was in October 2019 for hematochezia. During that hospital stay he had an episode of confusion and abnormal gait prompting a work-up including an MRI of the brain and an EEG, chart also notes that he had a headache that was treated with Fioricet.       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    Morbid obesity ([address]     On statin therapy 8/16/2017    TESSA on CPAP 1/3/2019    Psychiatric disorder     Depression    Simple chronic bronchitis ([address] 1/3/2019    TIA (transient ischemic attack) 0/90/8631    Umbilical hernia 07/8/3270     Past Surgical History:   Procedure Laterality Date    CARDIAC SURG PROCEDURE UNLIST  1996    Cardiac Stents    CARDIAC SURG PROCEDURE UNLIST  04/2019    EF 61-65%    COLONOSCOPY N/A 9/27/2019    COLONOSCOPY performed by Colleen Angelucci, MD at [facility] ENDOSCOPY    HX HERNIA REPAIR      [facility]    HX HERNIA REPAIR  94/58/66    open umbilical hernia repair mesh    HX UROLOGICAL      HYDROCELECTOMY    UPPER GI ENDOSCOPY,BIOPSY  9/30/2019            Home meds:   Prior to Admission medications    Medication Sig Start Date End Date Taking? Authorizing Provider   butalbital-acetaminophen-caff (FIORICET) -40 mg per capsule Take 1 Cap by mouth every four (4) hours as needed for Pain. Indications: Tension Headache 11/17/19   Liot Araiza MD   butalbital-acetaminophen-caff (FIORICET) -40 mg per capsule Take 1 Cap by mouth every four (4) hours as needed for Pain. Indications: Tension Headache 11/17/19   Lito Araiza MD   aspirin delayed-release 81 mg tablet Take  by mouth daily. Provider, Historical   doxepin (SINEQUAN) 25 mg capsule Take 1 Cap by mouth nightly. 11/5/19   Harvey Lei MD   polyethylene glycol [facility]) 17 gram/dose powder Take 17 g by mouth daily as needed (constipation). Provider, Historical   furosemide (LASIX) 40 mg tablet Take 80 mg alternating with 40 mg every other day 10/28/19   Mariah Ely MD   Lactoperoxi/Gluc Oxid/Pot Thio (BIOTENE DRY MOUTH MM) 1 Lozenge by Mucous Membrane route as needed (dry mouth). 10/22/19   Provider, Historical   pantoprazole (PROTONIX) 40 mg tablet Take 1 Tab by mouth daily. 10/17/19   Harvey Lei MD   butalbital-acetaminophen-caffeine (FIORICET, ESGIC) -40 mg per tablet Take 1 Tab by mouth every six (6) hours as needed for Headache. 10/4/19   Eric Domingo, DO   DULoxetine (CYMBALTA) 30 mg capsule Take 1 Cap by mouth daily. 8/1/19   Harvey Lei MD   atorvastatin (LIPITOR) 80 mg tablet TAKE ONE TABLET BY MOUTH DAILY 7/8/19   Harvey Lei MD   atenolol (TENORMIN) 50 mg tablet Take 1 Tab by mouth daily.  7/1/19   Samantha Black MD   ANORO ELLIPTA 62.5-25 mcg/actuation inhaler Take 1 Puff by inhalation daily. 12/26/18   Provider, Historical       Allergies: Allergies   Allergen Reactions    Levaquin [Levofloxacin] Nausea and Vomiting     Reports anxiety, nausea, aching after taking levaquin    Ciprofloxacin Shortness of Breath    Quinolones Shortness of Breath       FH:  Family History   Problem Relation Age of Onset    Heart Disease Mother     Hypertension Mother     Hypertension Father     Hypertension Sister     Hypertension Brother     Cancer Brother        SH:  Social History     Tobacco Use    Smoking status: Former Smoker     Packs/day: 0.50     Years: 20.00     Pack years: 10.00    Smokeless tobacco: Former User    Tobacco comment: quit about 40  years ago   / used to dip tobaco and dip snuff   Substance Use Topics    Alcohol use: Not Currently     Comment: \"Drinks very little now for about a month \"       ROS: Review of systems not obtained due to patient factors.       PHYSICAL EXAM:  Visit Vitals  /68   Pulse 84   Temp (!) 102.1 °F (38.9 °C)   Resp 25   Wt 96.2 kg (212 lb)   SpO2 93%   BMI 32.23 kg/m²       Gen: NAD, non-toxic, drowsy  HEENT: anicteric sclerae, normal conjunctiva, oropharynx clear, MM dry  Neck: supple, trachea midline, no adenopathy  Heart: RRR, no MRG, no JVD, no peripheral edema  Lungs: CTA b/l, non-labored respirations  Abd: soft, NT, ND, BS+  Extr: warm  Skin: dry, no rash  Neuro/psych: he is drowsy, intermittently restless, follows some simple commands, PERRL, grossly non-focal      Labs/Imaging:  Recent Results (from the past 24 hour(s))   CBC WITH AUTOMATED DIFF    Collection Time: 11/18/19  7:16 PM   Result Value Ref Range    WBC 9.8 4.1 - 11.1 K/uL    RBC 4.26 4.10 - 5.70 M/uL    HGB 12.6 12.1 - 17.0 g/dL    HCT 40.4 36.6 - 50.3 %    MCV 94.8 80.0 - 99.0 FL    MCH 29.6 26.0 - 34.0 PG    MCHC 31.2 30.0 - 36.5 g/dL    RDW 15.7 (H) 11.5 - 14.5 %    PLATELET 490 836 - 344 K/uL    MPV 10.4 8.9 - 12.9 FL    NRBC 0.0 0  WBC    ABSOLUTE NRBC 0. 00 0.00 - 0.01 K/uL    NEUTROPHILS 86 (H) 32 - 75 %    LYMPHOCYTES 8 (L) 12 - 49 %    MONOCYTES 5 5 - 13 %    EOSINOPHILS 1 0 - 7 %    BASOPHILS 0 0 - 1 %    IMMATURE GRANULOCYTES 0 0.0 - 0.5 %    ABS. NEUTROPHILS 8.4 (H) 1.8 - 8.0 K/UL    ABS. LYMPHOCYTES 0.8 0.8 - 3.5 K/UL    ABS. MONOCYTES 0.5 0.0 - 1.0 K/UL    ABS. EOSINOPHILS 0.1 0.0 - 0.4 K/UL    ABS. BASOPHILS 0.0 0.0 - 0.1 K/UL    ABS. IMM. GRANS. 0.0 0.00 - 0.04 K/UL    DF AUTOMATED      RBC COMMENTS NORMOCYTIC, NORMOCHROMIC     TYPE & SCREEN    Collection Time: 11/18/19  7:16 PM   Result Value Ref Range    Crossmatch Expiration 11/21/2019     ABO/Rh(D) B POSITIVE     Antibody screen NEG    PROTHROMBIN TIME + INR    Collection Time: 11/18/19  7:16 PM   Result Value Ref Range    INR 1.2 (H) 0.9 - 1.1      Prothrombin time 12.6 (H) 9.0 - 12.5 sec   METABOLIC PANEL, COMPREHENSIVE    Collection Time: 11/18/19  7:16 PM   Result Value Ref Range    Sodium 139 136 - 145 mmol/L    Potassium 3.7 3.5 - 5.1 mmol/L    Chloride 101 97 - 108 mmol/L    CO2 28 21 - 32 mmol/L    Anion gap 10 5 - 15 mmol/L    Glucose 150 (H) 65 - 100 mg/dL    BUN 22 (H) 6 - 20 MG/DL    Creatinine 1.64 (H) 0.70 - 1.30 MG/DL    BUN/Creatinine ratio 13 12 - 20      GFR est AA 49 (L) >60 ml/min/1.73m2    GFR est non-AA 40 (L) >60 ml/min/1.73m2    Calcium 9.2 8.5 - 10.1 MG/DL    Bilirubin, total 0.4 0.2 - 1.0 MG/DL    ALT (SGPT) 22 12 - 78 U/L    AST (SGOT) 21 15 - 37 U/L    Alk.  phosphatase 138 (H) 45 - 117 U/L    Protein, total 8.1 6.4 - 8.2 g/dL    Albumin 3.9 3.5 - 5.0 g/dL    Globulin 4.2 (H) 2.0 - 4.0 g/dL    A-G Ratio 0.9 (L) 1.1 - 2.2     ETHYL ALCOHOL    Collection Time: 11/18/19  7:16 PM   Result Value Ref Range    ALCOHOL(ETHYL),SERUM <10 <10 MG/DL   TROPONIN I    Collection Time: 11/18/19  7:16 PM   Result Value Ref Range    Troponin-I, Qt. <0.05 <0.05 ng/mL   POC VENOUS BLOOD GAS    Collection Time: 11/18/19  8:08 PM   Result Value Ref Range    Device: NASAL CANNULA      Flow rate (POC) 3 L/M    pH, venous (POC) 7.328 7.32 - 7.42      pCO2, venous (POC) 50.4 41 - 51 MMHG    pO2, venous (POC) 27 25 - 40 mmHg    HCO3, venous (POC) 26.4 23.0 - 28.0 MMOL/L    sO2, venous (POC) 46 (L) 65 - 88 %    Base excess, venous (POC) 0 mmol/L    Allens test (POC) N/A      Total resp. rate 24      Site OTHER      Specimen type (POC) VENOUS BLOOD     AMMONIA    Collection Time: 11/18/19 10:00 PM   Result Value Ref Range    Ammonia 56 (H) <32 UMOL/L   URINALYSIS W/ RFLX MICROSCOPIC    Collection Time: 11/18/19 10:19 PM   Result Value Ref Range    Color YELLOW/STRAW      Appearance CLEAR CLEAR      Specific gravity 1.011 1.003 - 1.030      pH (UA) 7.0 5.0 - 8.0      Protein NEGATIVE  NEG mg/dL    Glucose NEGATIVE  NEG mg/dL    Ketone NEGATIVE  NEG mg/dL    Bilirubin NEGATIVE  NEG      Blood NEGATIVE  NEG      Urobilinogen 0.2 0.2 - 1.0 EU/dL    Nitrites NEGATIVE  NEG      Leukocyte Esterase NEGATIVE  NEG         Recent Labs     11/18/19 1916   WBC 9.8   HGB 12.6   HCT 40.4        Recent Labs     11/18/19 1916      K 3.7      CO2 28   BUN 22*   CREA 1.64*   *   CA 9.2     Recent Labs     11/18/19 1916   SGOT 21   ALT 22   *   TBILI 0.4   TP 8.1   ALB 3.9   GLOB 4.2*       Recent Labs     11/18/19 1916   TROIQ <0.05       Recent Labs     11/18/19 1916   INR 1.2*   PTP 12.6*        No results for input(s): PH, PCO2, PO2 in the last 72 hours. Ct Head Wo Cont    Addendum Date: 11/18/2019    Addendum: Additional impression #3: Air-fluid level in the left maxillary sinus, consistent with acute sinusitis. Correlate clinically. Result Date: 11/18/2019  IMPRESSION: 1. No acute intracranial abnormality. 2. Microvascular ischemic, old ischemic and other stable age-related change. Xr Chest Port    Result Date: 11/19/2019  IMPRESSION: Diminished lung volumes with mild bibasilar atelectasis.            Assessment & Plan:     AMS: (POA) with a headache noted on presentation, seizure in the ED, and fever. Concern for meningitis  -LP attempted in the ED unsuccessful, will request LP by IR  -place on empiric tx for meningitis for now w/ acyclovir, ampicillin, ceftriaxone, and vancomycin (hx of MRSA PNA)  -mild ammonia elevation is non-specific in the absence of chronic liver disease    Seizure:  -PRN lorazepam  -s/p Dilantin in the ED  -consult neurology  -EEG  -avoid haloperidol for sedation    Fever: possible meningitis as above.  CT scan also notes sinusitis though clinically no evidence of this on history per the family or exam  -f/u blood cultures    History of alcohol abuse: reportedly quit 3 months ago    Acute renal insufficiency: slightly worse than baseline; monitor    HTN:  -continue atenolol    CAD    Hyperlipidemia    TESSA    History of GI bleed    DVT ppx: SCDs until LP  Code status: full  Disposition: TBD    Signed By: Krystina Cole MD     November 19, 2019

## 2019-11-19 NOTE — PROGRESS NOTES

## 2019-11-19 NOTE — ED NOTES
Bedside shift change report given to [address] (oncoming nurse) by Meng Donaldson RN (offgoing nurse). Report included the following information SBAR, ED Summary, MAR and Recent Results. Assumed care of patient who is lying quietly on the stretcher in no apparent distress. Pt is alert and oriented x 4. Respirations are even and unlabored. No needs are expressed at this time. Call bell within reach. Side rails x 2. Will continue to monitor.

## 2019-11-19 NOTE — PROGRESS NOTES
Reason for Readmission:     Acute encephalopathy, fever         RRAT Score and Risk Level:     34 high risk     Level of Readmission:    Level 1 (last admission 10/27-10/28 for facial droop)      Care Conference scheduled: Unit IDR rounds      Did you attend your follow up appointment (s): If not, why not:  Yes, saw PCP 11/5       Resources/supports as identified by patient/family:   Family support       Top Challenges facing patient (as identified by patient/family and CM): Finances/Medication cost?     No challenges reported  Transportation      Pt's wife can transport at [place] or lack thereof?     family  Living arrangements? Lives with wife and granddaughter, 1 story house, 1 step to enter  Self-care/ADLs/Cognition? Family supervision with ADLs       Current Advanced Directive/Advance Care Plan: On file 10/19           Plan for utilizing home health:   Current with [facility] nursing and PT, OT             Transition of Care Plan:    Based on readmission, the patient's previous Plan of Care discharge home with PCP follow up and [facility]   has been evaluated and/or modified. The current Transition of Care Plan is:          1. Patient in ED bed waiting for inpatient admission  2. Patient will need 2nd IM letter at discharge  3. Patient's wife prefers for patient to discharge home with family assistance and resumption of home health. Patient's wife requests medical team recommendations for best discharge disposition    Patient is a 80year old male admitted 11/19 for acute encephalopathy and fever. Patient resting in bed, CM met with his wife and granddaughter outside room. Demographic information verified and correct. Insurance information verified and correct. Patient lives with his wife and granddaughter, no home oxygen, uses a cane for ambulation and is current with [facility]. Patient uses [facility] in [place].   Patient's wife states he needs supervision assistance for ADLs and she or his granddaughter can transport at discharge    Care Management Interventions  PCP Verified by CM: Yes(Nicolas Huddleston MD)  Mode of Transport at Discharge:  Other (see comment)(pt's wife can transport at [place])  Transition of Care Consult (CM Consult): Discharge Planning  Discharge Durable Medical Equipment: No  Physical Therapy Consult: No  Occupational Therapy Consult: No  Speech Therapy Consult: No  Current Support Network: Lives with Spouse, Own Home(1 story house, 1 step to enter)  Confirm Follow Up Transport: Family  Plan discussed with Pt/Family/Caregiver: Yes  Discharge Location  Discharge Placement: [address], RN, [place]  Care Management  [phone]

## 2019-11-19 NOTE — PROGRESS NOTES
Problem: Falls - Risk of  Goal: *Absence of Falls  Description  Document Donata Carrel Fall Risk and appropriate interventions in the flowsheet. Outcome: Progressing Towards Goal  Note:   Fall Risk Interventions:  Mobility Interventions: Bed/chair exit alarm, OT consult for ADLs, PT Consult for assist device competence, Patient to call before getting OOB, PT Consult for mobility concerns    Mentation Interventions: Adequate sleep, hydration, pain control, Bed/chair exit alarm, Door open when patient unattended, Evaluate medications/consider consulting pharmacy, Gait belt with transfers/ambulation, Family/sitter at bedside, Increase mobility, More frequent rounding, Reorient patient, Toileting rounds, Update white board    Medication Interventions: Assess postural VS orthostatic hypotension, Bed/chair exit alarm, Evaluate medications/consider consulting pharmacy, Patient to call before getting OOB, Teach patient to arise slowly, Utilize gait belt for transfers/ambulation    Elimination Interventions: Bed/chair exit alarm, Call light in reach, Patient to call for help with toileting needs, Stay With Me (per policy), Toilet paper/wipes in reach, Toileting schedule/hourly rounds, Urinal in reach              Problem: Patient Education: Go to Patient Education Activity  Goal: Patient/Family Education  Outcome: Progressing Towards Goal     Problem: Pressure Injury - Risk of  Goal: *Prevention of pressure injury  Description  Document Aquiles Scale and appropriate interventions in the flowsheet. Outcome: Progressing Towards Goal  Note:   Pressure Injury Interventions:  Sensory Interventions: Assess changes in LOC, Assess need for specialty bed, Avoid rigorous massage over bony prominences, Chair cushion, Check visual cues for pain, Discuss PT/OT consult with provider, Float heels, Keep linens dry and wrinkle-free, Maintain/enhance activity level, Minimize linen layers, Turn and reposition approx.  every two hours (pillows and wedges if needed)    Moisture Interventions: Absorbent underpads, Apply protective barrier, creams and emollients, Assess need for specialty bed, Check for incontinence Q2 hours and as needed, Limit adult briefs, Maintain skin hydration (lotion/cream), Minimize layers, Moisture barrier    Activity Interventions: Assess need for specialty bed, Increase time out of bed, PT/OT evaluation         Nutrition Interventions: Document food/fluid/supplement intake    Friction and Shear Interventions: Apply protective barrier, creams and emollients, Feet elevated on foot rest, Foam dressings/transparent film/skin sealants, HOB 30 degrees or less, Lift sheet, Lift team/patient mobility team, Minimize layers, Sit at 90-degree angle, Transfer aides:transfer board/[place] lift/ceiling lift, Transferring/repositioning devices                Problem: Patient Education: Go to Patient Education Activity  Goal: Patient/Family Education  Outcome: Progressing Towards Goal     Problem: Seizure Disorder (Adult)  Goal: *STG: Remains free of seizure activity  Outcome: Progressing Towards Goal  Goal: *STG: Maintains lab values within therapeutic range  Outcome: Progressing Towards Goal  Goal: *STG/LTG: Complies with medication therapy  Outcome: Progressing Towards Goal  Goal: *STG: Remains free of injury during seizure activity  Outcome: Progressing Towards Goal  Goal: *STG: Remains safe in hospital  Outcome: Progressing Towards Goal  Goal: Interventions  Outcome: Progressing Towards Goal     Problem: Patient Education: Go to Patient Education Activity  Goal: Patient/Family Education  Outcome: Progressing Towards Goal

## 2019-11-19 NOTE — ED NOTES
Pt unable to get CT d/t agitation and AMS. Returned to bed and reattached to monitor. Dr. Forest Gill notified.

## 2019-11-19 NOTE — ED NOTES
TRANSFER - OUT REPORT:    Verbal report given to Augusto CHICAS (name) on Liz Tan  being transferred to Room 3112 Neuro Tele (unit) for routine progression of care. Report consisted of patients Situation, Background, Assessment and   Recommendations(SBAR). Information from the following report(s) SBAR, ED Summary, [facility] and Recent Results was reviewed with the receiving nurse. Lines:   Peripheral IV 11/18/19 Right Antecubital (Active)   Site Assessment Clean, dry, & intact 11/18/2019  7:01 PM   Phlebitis Assessment 0 11/18/2019  7:01 PM   Infiltration Assessment 0 11/18/2019  7:01 PM   Dressing Status Clean, dry, & intact 11/18/2019  7:01 PM   Dressing Type Transparent 11/18/2019  7:01 PM   Hub Color/Line Status Flushed;Positional;Patent 11/18/2019  7:01 PM       Peripheral IV 11/18/19 Left Antecubital (Active)   Site Assessment Clean, dry, & intact 11/18/2019  8:26 PM   Phlebitis Assessment 0 11/18/2019  8:26 PM   Infiltration Assessment 0 11/18/2019  8:26 PM   Dressing Status Clean, dry, & intact 11/18/2019  8:26 PM   Dressing Type Transparent 11/18/2019  8:26 PM   Hub Color/Line Status Flushed;Patent 11/18/2019  8:26 PM       Peripheral IV 11/19/19 Left Hand (Active)        Opportunity for questions and clarification was provided.

## 2019-11-19 NOTE — PROGRESS NOTES
Pharmacy - P&T Dosing    Pharmacy dosing provided for ceftriaxone for this 80 y.o. male being treated for r/o meningitis    Recent Labs     19  0427 19  1916   CREA 1.50* 1.64*   BUN 20 22*   INR  --  1.2*   HGB 11.4* 12.6    249   ALB  --  3.9   SGOT  --  21   ALT  --  22   TBILI  --  0.4     Temp (24hrs), Av.7 °F (38.2 °C), Min:97.9 °F (36.6 °C), Max:102.7 °F (39.3 °C)    Estimated Creatinine Clearance (ml/min): 40.5 ml/min     Impression/Plan:   - Change from ceftriaxone 1 gram q 12 hours to ceftriaxone 2 grams q12hr for indicaiton of (r/o) meningitis - higher dose necessary for adequate CNS drug levels. Pharmacy will follow daily and adjust as appropriate.     Thank you,  Yuliya Dixon, PHARMD

## 2019-11-19 NOTE — ED NOTES
IR lumbar puncture is delayed because family wants to make hospitalist aware that pt has been taking testosterone boosting medication x 3 days without previously making family aware. They are wondering whether or not to continue with lp because they think this medication may be responsible for pt's sx and presentation. Attempting to contact Dr Shari Cherry.

## 2019-11-20 ENCOUNTER — APPOINTMENT (OUTPATIENT)
Dept: GENERAL RADIOLOGY | Age: 84
DRG: 099 | End: 2019-11-20
Attending: HOSPITALIST
Payer: MEDICARE

## 2019-11-20 LAB
ALBUMIN SERPL-MCNC: 3.3 G/DL (ref 3.5–5)
ALBUMIN/GLOB SERPL: 0.9 {RATIO} (ref 1.1–2.2)
ALP SERPL-CCNC: 103 U/L (ref 45–117)
ALT SERPL-CCNC: 21 U/L (ref 12–78)
ANION GAP SERPL CALC-SCNC: 7 MMOL/L (ref 5–15)
AST SERPL-CCNC: 24 U/L (ref 15–37)
ATRIAL RATE: 89 BPM
BASOPHILS # BLD: 0 K/UL (ref 0–0.1)
BASOPHILS NFR BLD: 0 % (ref 0–1)
BILIRUB SERPL-MCNC: 0.4 MG/DL (ref 0.2–1)
BUN SERPL-MCNC: 24 MG/DL (ref 6–20)
BUN/CREAT SERPL: 15 (ref 12–20)
CALCIUM SERPL-MCNC: 8.5 MG/DL (ref 8.5–10.1)
CALCULATED P AXIS, ECG09: 84 DEGREES
CALCULATED R AXIS, ECG10: -4 DEGREES
CALCULATED T AXIS, ECG11: 74 DEGREES
CHLORIDE SERPL-SCNC: 109 MMOL/L (ref 97–108)
CK SERPL-CCNC: 155 U/L (ref 39–308)
CO2 SERPL-SCNC: 26 MMOL/L (ref 21–32)
CREAT SERPL-MCNC: 1.65 MG/DL (ref 0.7–1.3)
DIAGNOSIS, 93000: NORMAL
DIFFERENTIAL METHOD BLD: ABNORMAL
EOSINOPHIL # BLD: 0 K/UL (ref 0–0.4)
EOSINOPHIL NFR BLD: 0 % (ref 0–7)
ERYTHROCYTE [DISTWIDTH] IN BLOOD BY AUTOMATED COUNT: 15.6 % (ref 11.5–14.5)
GLOBULIN SER CALC-MCNC: 3.5 G/DL (ref 2–4)
GLUCOSE BLD STRIP.AUTO-MCNC: 115 MG/DL (ref 65–100)
GLUCOSE SERPL-MCNC: 119 MG/DL (ref 65–100)
HCT VFR BLD AUTO: 33.9 % (ref 36.6–50.3)
HGB BLD-MCNC: 10.8 G/DL (ref 12.1–17)
IMM GRANULOCYTES # BLD AUTO: 0 K/UL (ref 0–0.04)
IMM GRANULOCYTES NFR BLD AUTO: 0 % (ref 0–0.5)
LYMPHOCYTES # BLD: 0.7 K/UL (ref 0.8–3.5)
LYMPHOCYTES NFR BLD: 6 % (ref 12–49)
MCH RBC QN AUTO: 29.8 PG (ref 26–34)
MCHC RBC AUTO-ENTMCNC: 31.9 G/DL (ref 30–36.5)
MCV RBC AUTO: 93.6 FL (ref 80–99)
MONOCYTES # BLD: 0.9 K/UL (ref 0–1)
MONOCYTES NFR BLD: 8 % (ref 5–13)
NEUTS SEG # BLD: 9.6 K/UL (ref 1.8–8)
NEUTS SEG NFR BLD: 86 % (ref 32–75)
NRBC # BLD: 0 K/UL (ref 0–0.01)
NRBC BLD-RTO: 0 PER 100 WBC
P-R INTERVAL, ECG05: 184 MS
PLATELET # BLD AUTO: 243 K/UL (ref 150–400)
PMV BLD AUTO: 10.5 FL (ref 8.9–12.9)
POTASSIUM SERPL-SCNC: 3.4 MMOL/L (ref 3.5–5.1)
PROT SERPL-MCNC: 6.8 G/DL (ref 6.4–8.2)
Q-T INTERVAL, ECG07: 374 MS
QRS DURATION, ECG06: 86 MS
QTC CALCULATION (BEZET), ECG08: 455 MS
RBC # BLD AUTO: 3.62 M/UL (ref 4.1–5.7)
RBC MORPH BLD: ABNORMAL
SERVICE CMNT-IMP: ABNORMAL
SODIUM SERPL-SCNC: 142 MMOL/L (ref 136–145)
TROPONIN I SERPL-MCNC: 0.29 NG/ML
TROPONIN I SERPL-MCNC: 0.38 NG/ML
TROPONIN I SERPL-MCNC: 0.47 NG/ML
VENTRICULAR RATE, ECG03: 89 BPM
WBC # BLD AUTO: 11.2 K/UL (ref 4.1–11.1)

## 2019-11-20 PROCEDURE — 74011000258 HC RX REV CODE- 258: Performed by: HOSPITALIST

## 2019-11-20 PROCEDURE — 93005 ELECTROCARDIOGRAM TRACING: CPT

## 2019-11-20 PROCEDURE — 36415 COLL VENOUS BLD VENIPUNCTURE: CPT

## 2019-11-20 PROCEDURE — 3331090001 HH PPS REVENUE CREDIT

## 2019-11-20 PROCEDURE — 82550 ASSAY OF CK (CPK): CPT

## 2019-11-20 PROCEDURE — 77010033678 HC OXYGEN DAILY

## 2019-11-20 PROCEDURE — 94760 N-INVAS EAR/PLS OXIMETRY 1: CPT

## 2019-11-20 PROCEDURE — 84484 ASSAY OF TROPONIN QUANT: CPT

## 2019-11-20 PROCEDURE — 74011250637 HC RX REV CODE- 250/637: Performed by: HOSPITALIST

## 2019-11-20 PROCEDURE — 82962 GLUCOSE BLOOD TEST: CPT

## 2019-11-20 PROCEDURE — 85025 COMPLETE CBC W/AUTO DIFF WBC: CPT

## 2019-11-20 PROCEDURE — 74011000250 HC RX REV CODE- 250: Performed by: HOSPITALIST

## 2019-11-20 PROCEDURE — 74011250637 HC RX REV CODE- 250/637: Performed by: INTERNAL MEDICINE

## 2019-11-20 PROCEDURE — 65660000000 HC RM CCU STEPDOWN

## 2019-11-20 PROCEDURE — 3331090002 HH PPS REVENUE DEBIT

## 2019-11-20 PROCEDURE — 80053 COMPREHEN METABOLIC PANEL: CPT

## 2019-11-20 PROCEDURE — 74011250636 HC RX REV CODE- 250/636: Performed by: HOSPITALIST

## 2019-11-20 PROCEDURE — 74011250636 HC RX REV CODE- 250/636: Performed by: GENERAL ACUTE CARE HOSPITAL

## 2019-11-20 RX ORDER — LANOLIN ALCOHOL/MO/W.PET/CERES
3 CREAM (GRAM) TOPICAL
Status: DISCONTINUED | OUTPATIENT
Start: 2019-11-20 | End: 2019-11-22

## 2019-11-20 RX ORDER — GUAIFENESIN 100 MG/5ML
81 LIQUID (ML) ORAL DAILY
Status: DISCONTINUED | OUTPATIENT
Start: 2019-11-20 | End: 2019-11-25 | Stop reason: HOSPADM

## 2019-11-20 RX ORDER — IPRATROPIUM BROMIDE AND ALBUTEROL SULFATE 2.5; .5 MG/3ML; MG/3ML
3 SOLUTION RESPIRATORY (INHALATION)
Status: DISCONTINUED | OUTPATIENT
Start: 2019-11-20 | End: 2019-11-25 | Stop reason: HOSPADM

## 2019-11-20 RX ORDER — ALPRAZOLAM 0.5 MG/1
0.5 TABLET ORAL ONCE
Status: COMPLETED | OUTPATIENT
Start: 2019-11-20 | End: 2019-11-20

## 2019-11-20 RX ORDER — HALOPERIDOL 5 MG/ML
2 INJECTION INTRAMUSCULAR
Status: DISCONTINUED | OUTPATIENT
Start: 2019-11-20 | End: 2019-11-25 | Stop reason: HOSPADM

## 2019-11-20 RX ORDER — LABETALOL HYDROCHLORIDE 5 MG/ML
10 INJECTION, SOLUTION INTRAVENOUS ONCE
Status: COMPLETED | OUTPATIENT
Start: 2019-11-20 | End: 2019-11-20

## 2019-11-20 RX ADMIN — AMPICILLIN SODIUM 2 G: 2 INJECTION, POWDER, FOR SOLUTION INTRAMUSCULAR; INTRAVENOUS at 08:17

## 2019-11-20 RX ADMIN — ATORVASTATIN CALCIUM 80 MG: 40 TABLET, FILM COATED ORAL at 08:14

## 2019-11-20 RX ADMIN — NITROGLYCERIN 1 INCH: 20 OINTMENT TOPICAL at 18:05

## 2019-11-20 RX ADMIN — ACYCLOVIR SODIUM 800 MG: 50 INJECTION, SOLUTION INTRAVENOUS at 07:28

## 2019-11-20 RX ADMIN — CEFTRIAXONE SODIUM 2 G: 2 INJECTION, POWDER, FOR SOLUTION INTRAMUSCULAR; INTRAVENOUS at 00:35

## 2019-11-20 RX ADMIN — PANTOPRAZOLE SODIUM 40 MG: 40 TABLET, DELAYED RELEASE ORAL at 08:14

## 2019-11-20 RX ADMIN — AMPICILLIN SODIUM 2 G: 2 INJECTION, POWDER, FOR SOLUTION INTRAMUSCULAR; INTRAVENOUS at 14:22

## 2019-11-20 RX ADMIN — Medication 10 ML: at 13:07

## 2019-11-20 RX ADMIN — MELATONIN 3 MG: at 20:44

## 2019-11-20 RX ADMIN — ASPIRIN 81 MG 81 MG: 81 TABLET ORAL at 08:14

## 2019-11-20 RX ADMIN — HALOPERIDOL LACTATE 2 MG: 5 INJECTION, SOLUTION INTRAMUSCULAR at 20:06

## 2019-11-20 RX ADMIN — Medication 10 ML: at 05:43

## 2019-11-20 RX ADMIN — ATENOLOL 50 MG: 25 TABLET ORAL at 08:14

## 2019-11-20 RX ADMIN — NITROGLYCERIN 1 INCH: 20 OINTMENT TOPICAL at 23:47

## 2019-11-20 RX ADMIN — CEFTRIAXONE SODIUM 2 G: 2 INJECTION, POWDER, FOR SOLUTION INTRAMUSCULAR; INTRAVENOUS at 12:53

## 2019-11-20 RX ADMIN — NITROGLYCERIN 1 INCH: 20 OINTMENT TOPICAL at 12:54

## 2019-11-20 RX ADMIN — AMPICILLIN SODIUM 2 G: 2 INJECTION, POWDER, FOR SOLUTION INTRAMUSCULAR; INTRAVENOUS at 22:46

## 2019-11-20 RX ADMIN — Medication 100 MG: at 08:14

## 2019-11-20 RX ADMIN — HALOPERIDOL LACTATE 2 MG: 5 INJECTION, SOLUTION INTRAMUSCULAR at 10:04

## 2019-11-20 RX ADMIN — DULOXETINE HYDROCHLORIDE 30 MG: 30 CAPSULE, DELAYED RELEASE ORAL at 08:14

## 2019-11-20 RX ADMIN — LABETALOL HYDROCHLORIDE 10 MG: 5 INJECTION INTRAVENOUS at 20:44

## 2019-11-20 RX ADMIN — HALOPERIDOL LACTATE 2 MG: 5 INJECTION, SOLUTION INTRAMUSCULAR at 15:07

## 2019-11-20 RX ADMIN — ALPRAZOLAM 0.5 MG: 0.5 TABLET ORAL at 20:44

## 2019-11-20 RX ADMIN — ACYCLOVIR SODIUM 800 MG: 50 INJECTION, SOLUTION INTRAVENOUS at 19:50

## 2019-11-20 RX ADMIN — Medication 10 ML: at 22:50

## 2019-11-20 RX ADMIN — VANCOMYCIN HYDROCHLORIDE 1500 MG: 10 INJECTION, POWDER, LYOPHILIZED, FOR SOLUTION INTRAVENOUS at 09:24

## 2019-11-20 RX ADMIN — AMPICILLIN SODIUM 2 G: 2 INJECTION, POWDER, FOR SOLUTION INTRAMUSCULAR; INTRAVENOUS at 04:00

## 2019-11-20 RX ADMIN — CEFTRIAXONE SODIUM 2 G: 2 INJECTION, POWDER, FOR SOLUTION INTRAMUSCULAR; INTRAVENOUS at 23:47

## 2019-11-20 NOTE — PROGRESS NOTES
Hospitalist Progress Note    NAME: Phyllis Godinez   :  1934   MRN:  [id number]       Assessment / Plan:  AMS  Continue telemetry monitoring  Pt afebrile for the past 24 hours. Family continues to refuse LP - however this may change as pt's daughter is returning back from her trip today and will be at the hospital tomorrow. In the interim, will continue with IV abx. Brain MRI w/o contrast was done [facility] ordered with and without but order was cancelled by someone] - limited study that did not show any acute infarct  Neurology evals appareciated    Gram positive bacteremia  Unclear if contaminant, 2 bottles flagged thus far, gram positive cocci growing in   Continue IV abx for now  Repeat Bcx tmr Am    Transient episode of CP  Elevated troponin  Cardiology evals noted - medical management for now  Trend troponins until downtrending    KATHY  Continue to monitor    History of alcohol abuse: reportedly quit 3 months ago  Elevated ammonia  LFTs pending for today   Ammonia 53  Will image Liver if LFTs abnormal    HTN  CAD  HLD  TESSA  Resume home meds    30.0 - 39.9 Obese / Body mass index is 32.23 kg/m². Code status: Full  Prophylaxis: SCD's  Recommended Disposition: TBD     Subjective:     Chief Complaint / Reason for Physician Visit  Encephalopathic. Updated family at bedside. Discussed with RN events overnight. Review of Systems:  Symptom Y/N Comments  Symptom Y/N Comments   Fever/Chills    Chest Pain     Poor Appetite    Edema     Cough    Abdominal Pain     Sputum    Joint Pain     SOB/MARES    Pruritis/Rash     Nausea/vomit    Tolerating PT/OT     Diarrhea    Tolerating Diet     Constipation    Other       Could NOT obtain due to:      Objective:     VITALS:   Last 24hrs VS reviewed since prior progress note.  Most recent are:  Patient Vitals for the past 24 hrs:   Temp Pulse Resp BP SpO2   19 1233    162/87    19 1232 98.8 °F (37.1 °C) 93 30 (!) 213/93 97 %   19 0757 98 °F (36.7 °C) 85 20 179/90 93 %   11/20/19 0527 98 °F (36.7 °C) 88 19 158/90 93 %   11/20/19 0513 98 °F (36.7 °C) (!) 115 (!) 40 (!) 170/95 93 %   11/19/19 2351 97.5 °F (36.4 °C) 91 20 171/80 92 %   11/19/19 2024 98.4 °F (36.9 °C) 97 20 129/63 93 %   11/19/19 1541 97.6 °F (36.4 °C) 69 18 114/57 95 %       Intake/Output Summary (Last 24 hours) at 11/20/2019 1405  Last data filed at 11/20/2019 1329  Gross per 24 hour   Intake 180 ml   Output 750 ml   Net -570 ml        PHYSICAL EXAM:  General: Alert, cooperative, no acute distress    EENT:  EOMI. Anicteric sclerae. MMM  Resp:  CTA bilaterally, no wheezing or rales. No accessory muscle use  CV:  Regular  rhythm,  No edema  GI:  Soft, Non distended, Non tender.  +Bowel sounds  Neurologic:  Alert and oriented X person, not place or time, follows simple commands, moving all extremities  Psych:   Poor insight  Skin:  No rashes. No jaundice    Reviewed most current lab test results and cultures  YES  Reviewed most current radiology test results   YES  Review and summation of old records today    NO  Reviewed patient's current orders and MAR    YES  PMH/ reviewed - no change compared to H&P  ________________________________________________________________________  Care Plan discussed with:    Comments   Patient x    Family  x    RN x    Care Manager x    Consultant  x                      Multidiciplinary team rounds were held today with , nursing, pharmacist and clinical coordinator. Patient's plan of care was discussed; medications were reviewed and discharge planning was addressed.      ________________________________________________________________________  Total NON critical care TIME:  35   Minutes    Total CRITICAL CARE TIME Spent:   Minutes non procedure based      Comments   >50% of visit spent in counseling and coordination of care     ________________________________________________________________________  Carlos Sinclair MD     Procedures: see electronic medical records for all procedures/Xrays and details which were not copied into this note but were reviewed prior to creation of Plan. LABS:  I reviewed today's most current labs and imaging studies.   Pertinent labs include:  Recent Labs     11/20/19  1027 11/19/19 0427 11/18/19 1916   WBC 11.2* 8.5 9.8   HGB 10.8* 11.4* 12.6   HCT 33.9* 36.8 40.4    195 249     Recent Labs     11/20/19  1027 11/19/19  0427 11/18/19 1916    136 139   K 3.4* 4.0 3.7   * 106 101   CO2 26 22 28   * 156* 150*   BUN 24* 20 22*   CREA 1.65* 1.50* 1.64*   CA 8.5 8.4* 9.2   ALB 3.3*  --  3.9   TBILI 0.4  --  0.4   SGOT 24  --  21   ALT 21  --  22   INR  --   --  1.2*       Signed: Franklyn Espinoza MD

## 2019-11-20 NOTE — PROGRESS NOTES
* No surgery found *  * No surgery found *  Bedside shift change report given to Serafin Salmeron (oncoming nurse) by Awilda Glynn (offgoing nurse). Report included the following information SBAR, Kardex, ED Summary, Intake/Output, MAR and Recent Results. Zone Phone:   [phone]    Significant changes during shift:  @2100; Patient had received ativan before MRI. Since patient came to the unit patient became more confused and very impulsive. Patient removed all IV he previously had. RN placed IV and patient removed IV again. Patient constantly getting out of bed and pulling on telemetry leads. Patient is not redirectable. Unit staff in room with patient for safety. @0540; Rapid responds called for complained of chest pain.  MD Danay Perez attended rapid and ordered EKG and trop level        Patient Information    Gilma Garcia  80 y.o.  11/18/2019  6:47 PM by Ramona Hill MD. Gilma Garcia was admitted from Home    Problem List    Patient Active Problem List    Diagnosis Date Noted    Altered mental state 11/19/2019    Facial droop 10/27/2019    Acute blood loss anemia 09/29/2019    Rectal bleeding 09/23/2019    Pneumonia 09/23/2019    Severe sepsis ([address] 09/23/2019    Flank pain 08/01/2019    Spinal stenosis of lumbar region at multiple levels 05/13/2019    Bilateral carotid artery stenosis 04/12/2019    Severe obesity ([address] 01/03/2019    TESSA on CPAP 01/03/2019    Simple chronic bronchitis ([address] 01/03/2019    Fatigue 08/16/2017    CAD (coronary artery disease) 08/16/2017    Dyslipidemia 08/16/2017    On statin therapy 08/16/2017    Gout 08/16/2017    GERD (gastroesophageal reflux disease) 08/16/2017    TIA (transient ischemic attack) 08/16/2017    Dyspnea 08/16/2017    Colon polyps 08/16/2017    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    Hypertension 08/16/2017    ED (erectile dysfunction) 08/16/2017    Hypoxia 04/23/2015    Neurogenic claudication 04/23/2015     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    Morbid obesity ([facility] [address]     On statin therapy 8/16/2017    TESSA on CPAP 1/3/2019    Psychiatric disorder     Depression    Simple chronic bronchitis ([facility] [address] 1/3/2019    TIA (transient ischemic attack) 8/34/4815    Umbilical hernia 49/8/5316         Core Measures:    CVA: No No  CHF:No No  PNA:No No    Activity Status:    OOB to Chair Yes  Ambulated this shift Yes   Bed Rest No    LINES AND DRAINS:    PIV    DVT prophylaxis:    DVT prophylaxis Med- Yes  DVT prophylaxis SCD or MOISES- No     Wounds: (If Applicable)    Wounds- No    Patient Safety:    Falls Score Total Score: 4  Safety Level_______  Bed Alarm On? Yes  Sitter?  No    Plan for upcoming shift: Falls prevention    Discharge Plan: TBD    Active Consults:  IP CONSULT TO NEUROLOGY  IP CONSULT TO CARDIOLOGY

## 2019-11-20 NOTE — CONSULTS
Consult    NAME: Mohamud Borges   :  1934   MRN:  [id number]     Date/Time:  2019 10:24 AM    Patient PCP: Wilma Riojas MD  ________________________________________________________________________      Problem List:      Recent GI bleed 10/2019 found to have ischemia, EUNICE and EVR negative  Now with encephalopathy, fever  Chest pain with increased troponin    1. CAD s/p prior PTCA of the LAD in .  Cath /10 w/ mild diffuse disease; nml EF.  MPI 3/18 w/ EF 71% and no ischemia or infarct  2. Echo  w/ EF 60-65%, mild cLVH, mild PHTN, EUNICE 10/2019  Nl EF, no thrombus  3. Sinus, EVR  with no afib  4. Mild aortic stenosis  5. Chronic venous insufficiency s/p left GSV venaseal  and right GSV venaseal 3/19  6. Chronic kidney disease; Stg 3  7. Gout  8. COPD  9. TESSA on CPAP  10. Remote TIA   11. Hypertension  12. Hyperlipidemia  13. Former smoker  15. [place]  15. Cardiologist:  Rey Dangelo          Assessment/Plan:         Episode of chest pain. ECG with no acute ST changes  Increased troponin up to 0.4, consistent with NSTEMI, at this point likely demand related, would rx medically to start off with. Encephalopathy, fever unclear currently on abx.      - Cont ASA  - Cont atenolol  - Add NTP  - Holding amlodipine, can resume soon  - Holding furosemide, fluids stopped, currently appears euvolemic  - Cont statin                         [x]? High complexity decision making was performed in this patient at high risk for decompensation with multiple organ involvement. Subjective:   CHIEF COMPLAINT: Altered mental status. HISTORY OF PRESENT ILLNESS:       Mohamud Borges, 80 y.o. male, with a past medical history significant for alcohol abuse, coronary artery disease, COPD presenting the emergency department brought in after family noticed him to be more confused and combative today. This is similar to a prior presentation when he was admitted on 10/27.   They report that he at that time became confused, less responsive, very similar to this presentation. He had a work-up which included an MRI, CTA, EEG all of which were relatively unremarkable for significant acute process. He was discharged home. Last night he was seen here in the emergency department for headache, and did not have any imaging at that time. He returns today after family noticed him to be more confused and combative. I am unable to provide obtain any history from the patient. EMS report that he is combative, they report an expressive aphasia and hypertension. Per the physician that saw him last night for headache his headache was not mild and had resolved after short period of time. Not thunderclap, not severe at onset. On arrival here when he was combative sedation was given in order to obtain a CT of the head without contrast.  Neurology consulted. After some sedation the patient became less responsive and then had seizure-like activity.     Currently with encephalopathy, delerium. With 1:1 siitter, wife and grandkids at bedside. Complained of chest pain this AM, rapid response, ECG ok, tropinin mildly elevated. No clear chest pain at this time, but not good historian. We were asked to consult for work up and evaluation of the above problems.      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    Morbid obesity ([facility] [address] [facility]     On statin therapy 8/16/2017    TESSA on CPAP 1/3/2019    Psychiatric disorder     Depression    Simple chronic bronchitis (HCC) 1/3/2019    TIA (transient ischemic attack) 1/54/0661    Umbilical hernia 90/7/3394      Past Surgical History:   Procedure Laterality Date    CARDIAC SURG PROCEDURE UNLIST  1996    Cardiac Stents    CARDIAC SURG PROCEDURE UNLIST  04/2019    EF 61-65%    COLONOSCOPY N/A 9/27/2019    COLONOSCOPY performed by Wil Ratliff MD at [facility] ENDOSCOPY    HX HERNIA REPAIR      RIH    HX HERNIA REPAIR  24/97/35    open umbilical hernia repair mesh    HX UROLOGICAL      HYDROCELECTOMY    UPPER GI ENDOSCOPY,BIOPSY  9/30/2019          Allergies   Allergen Reactions    Levaquin [Levofloxacin] Nausea and Vomiting     Reports anxiety, nausea, aching after taking levaquin    Ciprofloxacin Shortness of Breath    Quinolones Shortness of Breath      Meds:  See below  Social History     Tobacco Use    Smoking status: Former Smoker     Packs/day: 0.50     Years: 20.00     Pack years: 10.00    Smokeless tobacco: Former User    Tobacco comment: quit about 40  years ago   / used to dip tobaco and dip snuff   Substance Use Topics    Alcohol use: Not Currently     Comment: \"Drinks very little now for about a month \"      Family History   Problem Relation Age of Onset    Heart Disease Mother     Hypertension Mother     Hypertension Father     Hypertension Sister     Hypertension Brother     Cancer Brother        REVIEW OF SYSTEMS:     [x]         Unable to obtain  ROS due to Delerium   []         Total of 12 systems reviewed as follows:     Total of 12 systems reviewed as follows:       POSITIVE= Bold text  Negative = normal text  General:  fever, chills, sweats, generalized weakness, weight loss/gain,      loss of appetite   Eyes:    blurred vision, eye pain, loss of vision, double vision  ENT:    rhinorrhea, pharyngitis   Respiratory:   cough, sputum production, SOB, MARES, wheezing, pleuritic pain   Cardiology: chest pain, palpitations, orthopnea, PND, edema, syncope   Gastrointestinal:  abdominal pain , N/V, diarrhea, dysphagia, constipation, bleeding   Genitourinary:  frequency, urgency, dysuria, hematuria, incontinence   Muskuloskeletal :  arthralgia, myalgia, back pain  Hematology:  easy bruising, nose or gum bleeding, lymphadenopathy   Dermatological: rash, ulceration, pruritis, color change / jaundice  Endocrine:   hot flashes or polydipsia   Neurological:  headache, dizziness, confusion, focal weakness, paresthesia,     Speech difficulties, memory loss, gait difficulty  Psychological: Feelings of anxiety, depression, agitation    Objective:      Physical Exam:    Last 24hrs VS reviewed since prior progress note. Most recent are:    Visit Vitals  /90 (BP 1 Location: Left arm, BP Patient Position: Sitting)   Pulse 85   Temp 98 °F (36.7 °C)   Resp 20   Wt 96.2 kg (212 lb)   SpO2 93%   BMI 32.23 kg/m²       Intake/Output Summary (Last 24 hours) at 11/20/2019 1024  Last data filed at 11/19/2019 2156  Gross per 24 hour   Intake    Output 200 ml   Net -200 ml        General Appearance: Well developed, well nourished, alert, oriented to self only,    no acute distress. Ears/Nose/Mouth/Throat: Pupils equal and round, Hearing grossly normal.  Neck: Supple. JVP within normal limits. Carotids good upstrokes, with no bruit. Chest: Lungs clear to auscultation bilaterally. Cardiovascular: Regular rate and rhythm, S1S2 normal, no murmur, rubs, gallops. Abdomen: Soft, non-tender, bowel sounds are active. No organomegaly. Extremities: No edema bilaterally. Femoral pulses +1, Distal Pulses +1. Skin: Warm and dry. Neuro: CN II-XII grossly intact, Strength and sensation grossly intact. Data:      Prior to Admission medications    Medication Sig Start Date End Date Taking? Authorizing Provider   furosemide (LASIX) 80 mg tablet Take 80 mg by mouth every fourty-eight (48) hours.  Patient alternates 80 mg and 40 mg every other day   Yes Provider, Historical   furosemide (LASIX) 80 mg tablet Take 40 mg by mouth every fourty-eight (48) hours. Patient alternates 80 mg and 40 mg every other day   Yes Provider, Historical   amLODIPine (NORVASC) 2.5 mg tablet Take 2.5 mg by mouth daily. Yes Provider, Historical   acetaminophen (TYLENOL) 500 mg tablet Take 1,000 mg by mouth every six (6) hours as needed for Pain. Yes Provider, Historical   aspirin delayed-release 81 mg tablet Take 81 mg by mouth daily. Yes Provider, Historical   polyethylene glycol (MIRALAX) 17 gram/dose powder Take 17 g by mouth daily as needed (constipation). Yes Provider, Historical   pantoprazole (PROTONIX) 40 mg tablet Take 1 Tab by mouth daily. 10/17/19  Yes Hannah Bradford MD   DULoxetine (CYMBALTA) 30 mg capsule Take 1 Cap by mouth daily. 8/1/19  Yes Hannah Bradford MD   atorvastatin (LIPITOR) 80 mg tablet TAKE ONE TABLET BY MOUTH DAILY 7/8/19  Yes Hannah Bradford MD   atenolol (TENORMIN) 50 mg tablet Take 1 Tab by mouth daily. 7/1/19  Yes Srikanth Jewell MD   ANORO ELLIPTA 62.5-25 mcg/actuation inhaler Take 1 Puff by inhalation daily.  12/26/18  Yes Provider, Historical       Recent Results (from the past 24 hour(s))   EKG, 12 LEAD, SUBSEQUENT    Collection Time: 11/20/19  5:21 AM   Result Value Ref Range    Ventricular Rate 89 BPM    Atrial Rate 89 BPM    P-R Interval 184 ms    QRS Duration 86 ms    Q-T Interval 374 ms    QTC Calculation (Bezet) 455 ms    Calculated P Axis 84 degrees    Calculated R Axis -4 degrees    Calculated T Axis 74 degrees    Diagnosis       ** Poor data quality, interpretation may be adversely affected  Sinus rhythm  Nonspecific ST and T wave abnormality    Confirmed by Triston Mcgrath MD, Laine Hood ([id number]) on 11/20/2019 9:11:47 AM     GLUCOSE, POC    Collection Time: 11/20/19  5:24 AM   Result Value Ref Range    Glucose (POC) 115 (H) 65 - 100 mg/dL    Performed by Amelie Oquendo (RN)    TROPONIN I    Collection Time: 11/20/19 5:26 AM   Result Value Ref Range    Troponin-I, Qt. 0.29 (H) <0.05 ng/mL

## 2019-11-20 NOTE — PROGRESS NOTES
Patient unable to do MRI--crawling out of the scanner. Exam will be cancelled for this evening.  tre

## 2019-11-20 NOTE — CONSULTS
Neurology Note    Patient ID:  Golden Bosch  [id number]  89 y.o.  1934      Date of Consultation:  November 20, 2019    Subjective: confusion       History of Present Illness:   Golden Bosch is a 80 y.o. male who presented to the hospital last evening with confusion and agitation. He does have a prior history of hypertension, coronary artery disease and obstructive sleep apnea. He did have a mild fever reported by EMS. He did develop a tonic-clonic seizure activity while in the emergency department. A lumbar puncture was attempted in the emergency department and was unsuccessful. He was placed on empiric treatment for meningitis with acyclovir, ampicillin, ceftriaxone, and vancomycin. A brain MRI was attempted overnight but the patient had a difficult time lying still for it. There was no acute diffusion restriction noted. He did have his EEG performed which revealed no ongoing seizure activity or focus for seizures.     The patient is still confused this morning    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    Morbid obesity ([address]     On statin therapy 8/16/2017    TSESA on CPAP 1/3/2019    Psychiatric disorder     Depression    Simple chronic bronchitis ([address] 1/3/2019    TIA (transient ischemic attack) 5/20/2631    Umbilical hernia 45/7/2347        Past Surgical History:   Procedure Laterality Date    CARDIAC SURG PROCEDURE UNLIST  1996    Cardiac Stents    CARDIAC SURG PROCEDURE UNLIST  04/2019    EF 61-65%    COLONOSCOPY N/A 9/27/2019    COLONOSCOPY performed by Radha Pinedo MD at [facility] ENDOSCOPY     [facility]  80/62/19    open umbilical hernia repair mesh    HX UROLOGICAL      HYDROCELECTOMY    UPPER GI ENDOSCOPY,BIOPSY  9/30/2019             Family History   Problem Relation Age of Onset    Heart Disease Mother     Hypertension Mother     Hypertension Father     Hypertension Sister     Hypertension Brother     Cancer Brother         Social History     Tobacco Use    Smoking status: Former Smoker     Packs/day: 0.50     Years: 20.00     Pack years: 10.00    Smokeless tobacco: Former User    Tobacco comment: quit about 40  years ago   / used to dip tobaco and dip snuff   Substance Use Topics    Alcohol use: Not Currently     Comment: \"Drinks very little now for about a month \"        Allergies   Allergen Reactions    Levaquin [Levofloxacin] Nausea and Vomiting     Reports anxiety, nausea, aching after taking levaquin    Ciprofloxacin Shortness of Breath    Quinolones Shortness of Breath        Prior to Admission medications    Medication Sig Start Date End Date Taking? Authorizing Provider   furosemide (LASIX) 80 mg tablet Take 80 mg by mouth every fourty-eight (48) hours. Patient alternates 80 mg and 40 mg every other day   Yes Provider, Historical   furosemide (LASIX) 80 mg tablet Take 40 mg by mouth every fourty-eight (48) hours. Patient alternates 80 mg and 40 mg every other day   Yes Provider, Historical   amLODIPine (NORVASC) 2.5 mg tablet Take 2.5 mg by mouth daily. Yes Provider, Historical   acetaminophen (TYLENOL) 500 mg tablet Take 1,000 mg by mouth every six (6) hours as needed for Pain. Yes Provider, Historical   aspirin delayed-release 81 mg tablet Take 81 mg by mouth daily.    Yes Provider, Historical   polyethylene glycol (MIRALAX) 17 gram/dose powder Take 17 g by mouth daily as needed (constipation). Yes Provider, Historical   pantoprazole (PROTONIX) 40 mg tablet Take 1 Tab by mouth daily. 10/17/19  Yes Kylie Suárez MD   DULoxetine (CYMBALTA) 30 mg capsule Take 1 Cap by mouth daily. 8/1/19  Yes Kylie Suárez MD   atorvastatin (LIPITOR) 80 mg tablet TAKE ONE TABLET BY MOUTH DAILY 7/8/19  Yes Kylie Suárez MD   atenolol (TENORMIN) 50 mg tablet Take 1 Tab by mouth daily. 7/1/19  Yes Gabby Holcomb MD   ANORO ELLIPTA 62.5-25 mcg/actuation inhaler Take 1 Puff by inhalation daily.  12/26/18  Yes Provider, Historical     Current Facility-Administered Medications   Medication Dose Route Frequency    aspirin chewable tablet 81 mg  81 mg Oral DAILY    haloperidol lactate (HALDOL) injection 2 mg  2 mg IntraVENous Q6H PRN    [START ON 11/21/2019] Vancomycin - please send level before starting dose  1 Each Other ONCE    nitroglycerin (NITROBID) 2 % ointment 1 Inch  1 Inch Topical Q6H    sodium chloride (NS) flush 5-40 mL  5-40 mL IntraVENous Q8H    sodium chloride (NS) flush 5-40 mL  5-40 mL IntraVENous PRN    acetaminophen (TYLENOL) tablet 650 mg  650 mg Oral Q4H PRN    ondansetron (ZOFRAN) injection 4 mg  4 mg IntraVENous Q4H PRN    atenolol (TENORMIN) tablet 50 mg  50 mg Oral DAILY    atorvastatin (LIPITOR) tablet 80 mg  80 mg Oral DAILY    DULoxetine (CYMBALTA) capsule 30 mg  30 mg Oral DAILY    pantoprazole (PROTONIX) tablet 40 mg  40 mg Oral DAILY    thiamine mononitrate (B-1) tablet 100 mg  100 mg Oral DAILY    sodium chloride (NS) flush 5-40 mL  5-40 mL IntraVENous Q8H    sodium chloride (NS) flush 5-40 mL  5-40 mL IntraVENous PRN    LORazepam (ATIVAN) injection 2 mg  2 mg IntraVENous Q5MIN PRN    LORazepam (ATIVAN) injection 1 mg  1 mg IntraVENous Q4H PRN    acyclovir (ZOVIRAX) 800 mg in 0.9% sodium chloride 250 mL IVPB  800 mg IntraVENous Q12H    ampicillin (OMNIPEN) 2 g in 0.9% sodium chloride (MBP/ADV) 100 mL  2 g IntraVENous Q6H  vancomycin (VANCOCIN) 1500 mg in  ml infusion  1,500 mg IntraVENous Q24H    cefTRIAXone (ROCEPHIN) 2 g in 0.9% sodium chloride (MBP/ADV) 50 mL  2 g IntraVENous Q12H    influenza vaccine 2019-20 (6 mos+)(PF) (FLUARIX/FLULAVAL/FLUZONE QUAD) injection 0.5 mL  0.5 mL IntraMUSCular PRIOR TO DISCHARGE       Review of Systems:      [x]Unable to obtain  ROS due to  [x]mental status change     Objective:     Visit Vitals  /90 (BP 1 Location: Left arm, BP Patient Position: Sitting)   Pulse 85   Temp 98 °F (36.7 °C)   Resp 20   Wt 212 lb (96.2 kg)   SpO2 93%   BMI 32.23 kg/m²       Physical Exam:      General:  appears well nourished in no acute distress  Neck: no carotid bruits  Lungs: clear to auscultation  Heart:  no murmurs, regular rate  Lower extremity: peripheral pulses palpable and no edema  Skin: intact. No rashes noted. Neurological exam:    Awake, alert, oriented to person. He does not know the year or his current location. He did not know the month. He did know the name of the president. He was able to perform simple calculations. He can follow simple one-step commands. However needs to be continually redirected. He was unable to say the months of the years for me correctly. Per the sitter who was in the room, he seeing visual hallucinations earlier today. His attention and concentration were limited. Fund of knowledge was reduced    Cranial nerves:   II-XII were tested    [employer] fields: He does blink to visual threat  Eomi, no evidence of nystagmus  Facial sensation:  normal and symmetric  Facial motor: normal and symmetric  Hearing diminished  SCM strength intact  Tongue: midline without fasciculations    Motor: Tone normal    No evidence of fasciculations    Strength:  He does appear to be 5 out of 5 throughout his upper and lower extremities. There is no clear focal abnormalities noted.   Definitive muscle testing was limited due to his cooperation    Sensory:  Upper extremity: intact to pp  Lower extremity: intact to pp    Reflexes:    Right Left  Biceps  2 2  Triceps 2 2  Brachiorad. 2 2  Patella  1 1  Achilles 1 1    Plantar response:  flexor bilaterally      Cerebellar testing: He does have a high amplitude tremor in his upper extremities with action. This is worse on his right than his left. Gait was not assessed due to the acute care setting. Labs:     Lab Results   Component Value Date/Time    Hemoglobin A1c 5.3 10/28/2019 02:17 AM    Sodium 142 11/20/2019 10:27 AM    Potassium 3.4 (L) 11/20/2019 10:27 AM    Chloride 109 (H) 11/20/2019 10:27 AM    Glucose 119 (H) 11/20/2019 10:27 AM    BUN 24 (H) 11/20/2019 10:27 AM    Creatinine 1.65 (H) 11/20/2019 10:27 AM    Calcium 8.5 11/20/2019 10:27 AM    WBC 11.2 (H) 11/20/2019 10:27 AM    HCT 33.9 (L) 11/20/2019 10:27 AM    HGB 10.8 (L) 11/20/2019 10:27 AM    PLATELET 148 92/68/5313 10:27 AM       Imaging:    Results from Hospital Encounter encounter on 11/18/19   MRI BRAIN WO CONT    Narrative EXAM: MRI BRAIN WO CONT    INDICATION: AMS    COMPARISON: None. CONTRAST: None. TECHNIQUE:    Single sequence of diffusion imaging was performed. The patient could not  tolerate further imaging. FINDINGS:  Diffusion imaging demonstrates no evidence of acute ischemia. Impression IMPRESSION:   Limited study. No evidence of acute ischemia. Results from [place] encounter on 11/18/19   CT HEAD WO CONT    Addendum Addendum:   Additional impression #3: Air-fluid level in the left maxillary sinus, consistent with acute sinusitis. Correlate clinically. Benji Larsen MD 11/18/2019  7:41 PM          Narrative EXAM: CT HEAD WO CONT    INDICATION: trauma, headache    COMPARISON: 10/27/2019. CONTRAST: None. TECHNIQUE: Unenhanced CT of the head was performed using 5 mm images. Brain and  bone windows were generated.   CT dose reduction was achieved through use of a  standardized protocol tailored for this examination and automatic exposure  control for dose modulation. FINDINGS:  Generalized prominence of cerebral sulci and ventricles is increased but stable  and commensurate with age. . Periventricular white matter low-density is  moderately severe and diffuse, with punctate low density in the right basal  ganglia region, consistent with small lacunar infarctions, all stable. . There is  no intracranial hemorrhage, extra-axial collection, mass, mass effect or midline  shift. The basilar cisterns are open. No acute infarct is identified. The bone  windows demonstrate no abnormalities. Small air-fluid level in the left  maxillary sinus. Significant mucoperiosteal thickening in the sphenoid air  cells. .      Impression IMPRESSION:   1. No acute intracranial abnormality. 2. Microvascular ischemic, old ischemic and other stable age-related change. White blood cell count was 11.2. Creatinine was 1.65  Troponin elevated to 0.47    Assessment and Plan:     Patient is a 80-year-old gentleman with multiple medical problems who presents with altered mental status. His neurological examination is notable for poor cognition and a new tremor in his bilateral upper extremities. There was reportedly a convulsive event upon arrival to the ED. Encephalopathy:  The differential for this includes an acute encephalitis versus meningitis versus metabolic encephalopathy. A stepwise progression of a vascular dementia would also need to then be considered. Less likely to be prolonged postictal state from a seizure as we are greater than 24 hours from admission, however plausible. I do agree with continuing antibiotics and antiviral medications for possible meningitis/encephalitis and performing lumbar puncture.   I do feel he should obtain an MRI of his brain with and without contrast.    I would hold on any anticonvulsant medication at this time as his EEG was normal.  Treat mild metabolic disturbances    Vascular health:  He does have multiple risk factors for stroke including:  Hypertension, coronary artery disease, hyperlipidemia  Continue aspirin, aggressive blood pressure control, and statin therapy. Neurology will continue to follow along. I spent  35   minutes providing care to this  acutely ill inpatient with > 50% of the time counseling and assisting in the coordination of care of the patient on the patient's hospital floor/unit.          Signed By:  Liza Koo DO FAAN    November 20, 2019

## 2019-11-20 NOTE — PROGRESS NOTES
Bedside shift change report given to Jarek andres (oncoming nurse) by Sonido Jay (offgoing nurse). Report included the following information SBAR, Kardex, Procedure Summary, Intake/Output, MAR and Recent Results.

## 2019-11-20 NOTE — PROGRESS NOTES
Responded to rapid response chest pain. Arrived to room to find patient with stable vital signs and complaining of chest pain. When asked to describe the pain, the patient pointed at his abdomen and said tea would make it better. Patient was unable to give a description for his chest/abdominal pain and then denied having chest pain. EKG and troponin obtained. Dr. Michelle Akhtar arrived at bedside to read EKG. Patient ok to remain on further unit. Will follow up with lab results.

## 2019-11-20 NOTE — PROGRESS NOTES
11/20/19 0513   Vital Signs   Temp 98 °F (36.7 °C)   Temp Source Oral   Pulse (Heart Rate) (!) 115   Heart Rate Source Monitor   Cardiac Rhythm Sinus Tach   Resp Rate (!) 40   O2 Sat (%) 93 %   Level of Consciousness Alert   BP (!) 170/95   MAP (Calculated) 120   BP 1 Method Automatic   BP 1 Location Left arm   BP Patient Position At rest   MEWS Score 5   PAINAD (Pain Assessment in Advanced Dementia)   Breathing 2   Negative Vocalization 1   Facial Expression 1   Body Language 2   Consolability 2   PAINAD Score 8   Patient's breathing noted to be labored. Patient complained of chest pain. Vitals are as charted. Rapid responds called and in route to see patient.

## 2019-11-20 NOTE — PROGRESS NOTES
Responded to code chest pain. EKG one and reviewed. Serial Tn. Stop IVF as BP running high. Cardiology consult. Start ASA.

## 2019-11-20 NOTE — PROGRESS NOTES
* No surgery found *  * No surgery found *  Bedside shift change report given to Dorys Villasenor (oncoming nurse) by Yecenia Terry (offgoing nurse). Report included the following information SBAR, Kardex, ED Summary, Intake/Output, MAR and Recent Results.     Zone Phone:   [phone]    Significant changes during shift:  Patient admitted to [facility]    Patient Information    Chadwick Case  80 y.o.  11/18/2019  6:47 PM by Suresh Garcia MD. Chadwick Case was admitted from Home    Problem List    Patient Active Problem List    Diagnosis Date Noted    Altered mental state 11/19/2019    Facial droop 10/27/2019    Acute blood loss anemia 09/29/2019    Rectal bleeding 09/23/2019    Pneumonia 09/23/2019    Severe sepsis ([address] 09/23/2019    Flank pain 08/01/2019    Spinal stenosis of lumbar region at multiple levels 05/13/2019    Bilateral carotid artery stenosis 04/12/2019    Severe obesity ([address] 01/03/2019    TESSA on CPAP 01/03/2019    Simple chronic bronchitis ([address] 01/03/2019    Fatigue 08/16/2017    CAD (coronary artery disease) 08/16/2017    Dyslipidemia 08/16/2017    On statin therapy 08/16/2017    Gout 08/16/2017    GERD (gastroesophageal reflux disease) 08/16/2017    TIA (transient ischemic attack) 08/16/2017    Dyspnea 08/16/2017    Colon polyps 08/16/2017    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    Hypertension 08/16/2017    ED (erectile dysfunction) 08/16/2017    Hypoxia 04/23/2015    Neurogenic claudication 04/23/2015     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    Morbid obesity ([facility]     On statin therapy 8/16/2017    TESSA on CPAP 1/3/2019    Psychiatric disorder     Depression    Simple chronic bronchitis ([facility] 1/3/2019    TIA (transient ischemic attack) 5/36/6726    Umbilical hernia 52/3/2882         Core Measures:    CVA: No No  CHF:No No  PNA:No No    Activity Status:    OOB to Chair Yes  Ambulated this shift Yes   Bed Rest No    LINES AND DRAINS:    PIV    DVT prophylaxis:    DVT prophylaxis Med- Yes  DVT prophylaxis SCD or MOISES- No     Wounds: (If Applicable)    Wounds- No    Patient Safety:    Falls Score Total Score: 4  Safety Level_______  Bed Alarm On? Yes  Sitter?  No    Plan for upcoming shift: MRI    Discharge Plan: TBD    Active Consults:  IP CONSULT TO NEUROLOGY

## 2019-11-21 ENCOUNTER — HOME CARE VISIT (OUTPATIENT)
Dept: SCHEDULING | Facility: HOME HEALTH | Age: 84
End: 2019-11-21
Payer: MEDICARE

## 2019-11-21 LAB
ANION GAP SERPL CALC-SCNC: 6 MMOL/L (ref 5–15)
BASOPHILS # BLD: 0 K/UL (ref 0–0.1)
BASOPHILS NFR BLD: 1 % (ref 0–1)
BUN SERPL-MCNC: 22 MG/DL (ref 6–20)
BUN/CREAT SERPL: 16 (ref 12–20)
CALCIUM SERPL-MCNC: 8.2 MG/DL (ref 8.5–10.1)
CHLORIDE SERPL-SCNC: 109 MMOL/L (ref 97–108)
CK SERPL-CCNC: 167 U/L (ref 39–308)
CO2 SERPL-SCNC: 27 MMOL/L (ref 21–32)
COMMENT, HOLDF: NORMAL
CREAT SERPL-MCNC: 1.39 MG/DL (ref 0.7–1.3)
DATE LAST DOSE: ABNORMAL
DIFFERENTIAL METHOD BLD: ABNORMAL
EOSINOPHIL # BLD: 0.1 K/UL (ref 0–0.4)
EOSINOPHIL NFR BLD: 2 % (ref 0–7)
ERYTHROCYTE [DISTWIDTH] IN BLOOD BY AUTOMATED COUNT: 15.7 % (ref 11.5–14.5)
GLUCOSE SERPL-MCNC: 94 MG/DL (ref 65–100)
HCT VFR BLD AUTO: 33 % (ref 36.6–50.3)
HGB BLD-MCNC: 10 G/DL (ref 12.1–17)
IMM GRANULOCYTES # BLD AUTO: 0 K/UL (ref 0–0.04)
IMM GRANULOCYTES NFR BLD AUTO: 0 % (ref 0–0.5)
LYMPHOCYTES # BLD: 1.1 K/UL (ref 0.8–3.5)
LYMPHOCYTES NFR BLD: 17 % (ref 12–49)
MAGNESIUM SERPL-MCNC: 2.1 MG/DL (ref 1.6–2.4)
MCH RBC QN AUTO: 29.5 PG (ref 26–34)
MCHC RBC AUTO-ENTMCNC: 30.3 G/DL (ref 30–36.5)
MCV RBC AUTO: 97.3 FL (ref 80–99)
MONOCYTES # BLD: 0.7 K/UL (ref 0–1)
MONOCYTES NFR BLD: 11 % (ref 5–13)
NEUTS SEG # BLD: 4.4 K/UL (ref 1.8–8)
NEUTS SEG NFR BLD: 69 % (ref 32–75)
NRBC # BLD: 0 K/UL (ref 0–0.01)
NRBC BLD-RTO: 0 PER 100 WBC
PHOSPHATE SERPL-MCNC: 4.2 MG/DL (ref 2.6–4.7)
PLATELET # BLD AUTO: 209 K/UL (ref 150–400)
PMV BLD AUTO: 10.4 FL (ref 8.9–12.9)
POTASSIUM SERPL-SCNC: 3.8 MMOL/L (ref 3.5–5.1)
RBC # BLD AUTO: 3.39 M/UL (ref 4.1–5.7)
REPORTED DOSE,DOSE: ABNORMAL UNITS
REPORTED DOSE/TIME,TMG: ABNORMAL
SAMPLES BEING HELD,HOLD: NORMAL
SODIUM SERPL-SCNC: 142 MMOL/L (ref 136–145)
VANCOMYCIN TROUGH SERPL-MCNC: 24.1 UG/ML (ref 5–10)
WBC # BLD AUTO: 6.4 K/UL (ref 4.1–11.1)

## 2019-11-21 PROCEDURE — 74011000250 HC RX REV CODE- 250: Performed by: HOSPITALIST

## 2019-11-21 PROCEDURE — 3331090002 HH PPS REVENUE DEBIT

## 2019-11-21 PROCEDURE — 94760 N-INVAS EAR/PLS OXIMETRY 1: CPT

## 2019-11-21 PROCEDURE — 83735 ASSAY OF MAGNESIUM: CPT

## 2019-11-21 PROCEDURE — 3331090001 HH PPS REVENUE CREDIT

## 2019-11-21 PROCEDURE — 74011250637 HC RX REV CODE- 250/637: Performed by: INTERNAL MEDICINE

## 2019-11-21 PROCEDURE — 77010033678 HC OXYGEN DAILY

## 2019-11-21 PROCEDURE — 82550 ASSAY OF CK (CPK): CPT

## 2019-11-21 PROCEDURE — 85025 COMPLETE CBC W/AUTO DIFF WBC: CPT

## 2019-11-21 PROCEDURE — 84100 ASSAY OF PHOSPHORUS: CPT

## 2019-11-21 PROCEDURE — 74011250636 HC RX REV CODE- 250/636: Performed by: HOSPITALIST

## 2019-11-21 PROCEDURE — 80202 ASSAY OF VANCOMYCIN: CPT

## 2019-11-21 PROCEDURE — 36415 COLL VENOUS BLD VENIPUNCTURE: CPT

## 2019-11-21 PROCEDURE — 80048 BASIC METABOLIC PNL TOTAL CA: CPT

## 2019-11-21 PROCEDURE — 74011000258 HC RX REV CODE- 258: Performed by: HOSPITALIST

## 2019-11-21 PROCEDURE — 65660000000 HC RM CCU STEPDOWN

## 2019-11-21 PROCEDURE — 74011250637 HC RX REV CODE- 250/637: Performed by: GENERAL ACUTE CARE HOSPITAL

## 2019-11-21 PROCEDURE — 74011250637 HC RX REV CODE- 250/637: Performed by: HOSPITALIST

## 2019-11-21 RX ORDER — AMLODIPINE BESYLATE 5 MG/1
2.5 TABLET ORAL DAILY
Status: DISCONTINUED | OUTPATIENT
Start: 2019-11-21 | End: 2019-11-21

## 2019-11-21 RX ORDER — ISOSORBIDE MONONITRATE 30 MG/1
30 TABLET, EXTENDED RELEASE ORAL DAILY
Status: DISCONTINUED | OUTPATIENT
Start: 2019-11-21 | End: 2019-11-25 | Stop reason: HOSPADM

## 2019-11-21 RX ORDER — AMLODIPINE BESYLATE 5 MG/1
10 TABLET ORAL DAILY
Status: DISCONTINUED | OUTPATIENT
Start: 2019-11-21 | End: 2019-11-25 | Stop reason: HOSPADM

## 2019-11-21 RX ADMIN — MELATONIN 3 MG: at 20:56

## 2019-11-21 RX ADMIN — ISOSORBIDE MONONITRATE 30 MG: 30 TABLET, EXTENDED RELEASE ORAL at 09:58

## 2019-11-21 RX ADMIN — PANTOPRAZOLE SODIUM 40 MG: 40 TABLET, DELAYED RELEASE ORAL at 09:57

## 2019-11-21 RX ADMIN — ASPIRIN 81 MG 81 MG: 81 TABLET ORAL at 09:58

## 2019-11-21 RX ADMIN — ATENOLOL 50 MG: 25 TABLET ORAL at 09:59

## 2019-11-21 RX ADMIN — AMPICILLIN SODIUM 2 G: 2 INJECTION, POWDER, FOR SOLUTION INTRAMUSCULAR; INTRAVENOUS at 05:38

## 2019-11-21 RX ADMIN — IPRATROPIUM BROMIDE AND ALBUTEROL SULFATE 3 ML: .5; 3 SOLUTION RESPIRATORY (INHALATION) at 06:02

## 2019-11-21 RX ADMIN — Medication 10 ML: at 15:11

## 2019-11-21 RX ADMIN — NITROGLYCERIN 1 INCH: 20 OINTMENT TOPICAL at 05:37

## 2019-11-21 RX ADMIN — ACYCLOVIR SODIUM 800 MG: 50 INJECTION, SOLUTION INTRAVENOUS at 15:10

## 2019-11-21 RX ADMIN — AMPICILLIN SODIUM 2 G: 2 INJECTION, POWDER, FOR SOLUTION INTRAMUSCULAR; INTRAVENOUS at 17:16

## 2019-11-21 RX ADMIN — CEFTRIAXONE SODIUM 2 G: 2 INJECTION, POWDER, FOR SOLUTION INTRAMUSCULAR; INTRAVENOUS at 23:26

## 2019-11-21 RX ADMIN — Medication 10 ML: at 10:01

## 2019-11-21 RX ADMIN — CEFTRIAXONE SODIUM 2 G: 2 INJECTION, POWDER, FOR SOLUTION INTRAMUSCULAR; INTRAVENOUS at 13:39

## 2019-11-21 RX ADMIN — DULOXETINE HYDROCHLORIDE 30 MG: 30 CAPSULE, DELAYED RELEASE ORAL at 09:57

## 2019-11-21 RX ADMIN — Medication 100 MG: at 09:59

## 2019-11-21 RX ADMIN — AMLODIPINE BESYLATE 10 MG: 5 TABLET ORAL at 09:59

## 2019-11-21 RX ADMIN — ATORVASTATIN CALCIUM 80 MG: 40 TABLET, FILM COATED ORAL at 09:58

## 2019-11-21 RX ADMIN — VANCOMYCIN HYDROCHLORIDE 1500 MG: 10 INJECTION, POWDER, LYOPHILIZED, FOR SOLUTION INTRAVENOUS at 03:28

## 2019-11-21 RX ADMIN — AMPICILLIN SODIUM 2 G: 2 INJECTION, POWDER, FOR SOLUTION INTRAMUSCULAR; INTRAVENOUS at 10:01

## 2019-11-21 RX ADMIN — AMPICILLIN SODIUM 2 G: 2 INJECTION, POWDER, FOR SOLUTION INTRAMUSCULAR; INTRAVENOUS at 20:57

## 2019-11-21 RX ADMIN — Medication 10 ML: at 05:38

## 2019-11-21 NOTE — PROGRESS NOTES
RAPID RESPONSE TEAM- Follow Up    Rounded on patient due to recent rapid response. Discussed with primary RN, Roscoe Mistry. No acute concerns. Patient was agitated against staff at start of shift, has received PRN medication. Vital signs taken during agitation, /80, RR 36.  Patient now resting in bed, labetalol 10 mg given for BP. Will continue to follow BP trend. No RRT interventions indicated at this time. Please call with any questions or concerns.      Feliz Graves  Rapid Response RN  Nelsy Thompson

## 2019-11-21 NOTE — PROGRESS NOTES
Hospitalist Progress Note    NAME: Fritz Calixto   :  1934   MRN:  [id number]       Assessment / Plan:  AMS  Continue telemetry monitoring  Pt afebrile for the past 24 hours. Family continues to refuse LP - however this may change as pt's daughter is returning back from her trip today and will be at the hospital tomorrow. In the interim, will continue with IV abx. Brain MRI w/o contrast was done [facility] ordered with and without but order was cancelled by someone] - limited study that did not show any acute infarct  Neurology evals appareciated    :  Pt's mental status has improved this morning - hopeful that this will continue. Will continue IV abx    Gram positive bacteremia  Unclear if contaminant, 2 bottles flagged thus far, gram positive cocci growing in 1/4  Continue IV abx for now  Repeat Bcx tmr Am    :  Repeat BCx in the AM  BCx still prelim gram positive cocci, now in 2/4 bottles    Transient episode of CP  Elevated troponin  Cardiology evals noted - medical management for now  Trend troponins until downtrending    KATHY - improving  Continue to monitor    History of alcohol abuse: reportedly quit 3 months ago  Elevated ammonia  LFTs wnl  Ammonia 53    HTN  CAD  HLD  TESSA  Resume home meds    30.0 - 39.9 Obese / Body mass index is 32.23 kg/m². Code status: Full  Prophylaxis: SCD's  Recommended Disposition: TBD     Subjective:     Chief Complaint / Reason for Physician Visit  Pt's daughter at bedside today. Updated on condition and plan. Pt more alert and conversant today. Discussed with RN events overnight.      Review of Systems:  Symptom Y/N Comments  Symptom Y/N Comments   Fever/Chills    Chest Pain     Poor Appetite    Edema     Cough    Abdominal Pain     Sputum    Joint Pain     SOB/MARES    Pruritis/Rash     Nausea/vomit    Tolerating PT/OT     Diarrhea    Tolerating Diet     Constipation    Other       Could NOT obtain due to: AMS     Objective:     VITALS:   Last 24hrs VS reviewed since prior progress note. Most recent are:  Patient Vitals for the past 24 hrs:   Temp Pulse Resp BP SpO2   11/21/19 0750 97.9 °F (36.6 °C) 68 20 154/80 97 %   11/21/19 0659 97.5 °F (36.4 °C) 78 20 176/79 96 %   11/21/19 0323  (!) 59 16 142/72 99 %   11/20/19 2300  73 18 137/72 96 %   11/20/19 1945 97.8 °F (36.6 °C) 88 (!) 36 (!) 210/80 96 %   11/20/19 1901    (!) 178/100    11/20/19 1805 97.6 °F (36.4 °C) 78 22 (!) 193/92 98 %   11/20/19 1522 98.3 °F (36.8 °C) 80 20 174/82 96 %   11/20/19 1233    162/87    11/20/19 1232 98.8 °F (37.1 °C) 93 30 (!) 213/93 97 %       Intake/Output Summary (Last 24 hours) at 11/21/2019 1101  Last data filed at 11/21/2019 3143  Gross per 24 hour   Intake 420 ml   Output 1325 ml   Net -905 ml        PHYSICAL EXAM:  General: Alert, cooperative, no acute distress    EENT:  EOMI. Anicteric sclerae. MMM  Resp:  CTA bilaterally, no wheezing or rales. No accessory muscle use  CV:  Regular  rhythm,  No edema  GI:  Soft, Non distended, Non tender.  +Bowel sounds  Neurologic:  Alert and oriented X person, not place or time, follows simple commands, moving all extremities  Psych:   Poor insight  Skin:  No rashes. No jaundice    Reviewed most current lab test results and cultures  YES  Reviewed most current radiology test results   YES  Review and summation of old records today    NO  Reviewed patient's current orders and MAR    YES  PMH/SH reviewed - no change compared to H&P  ________________________________________________________________________  Care Plan discussed with:    Comments   Patient x    Family  x    RN x    Care Manager x    Consultant  x                      Multidiciplinary team rounds were held today with , nursing, pharmacist and clinical coordinator. Patient's plan of care was discussed; medications were reviewed and discharge planning was addressed.      ________________________________________________________________________  Total NON critical care TIME:  35   Minutes    Total CRITICAL CARE TIME Spent:   Minutes non procedure based      Comments   >50% of visit spent in counseling and coordination of care     ________________________________________________________________________  Mark Stringer MD     Procedures: see electronic medical records for all procedures/Xrays and details which were not copied into this note but were reviewed prior to creation of Plan. LABS:  I reviewed today's most current labs and imaging studies.   Pertinent labs include:  Recent Labs     11/21/19 0334 11/20/19  1027 11/19/19 0427   WBC 6.4 11.2* 8.5   HGB 10.0* 10.8* 11.4*   HCT 33.0* 33.9* 36.8    243 195     Recent Labs     11/21/19 0334 11/20/19  1027 11/19/19  0427 11/18/19  1916    142 136 139   K 3.8 3.4* 4.0 3.7   * 109* 106 101   CO2 27 26 22 28   GLU 94 119* 156* 150*   BUN 22* 24* 20 22*   CREA 1.39* 1.65* 1.50* 1.64*   CA 8.2* 8.5 8.4* 9.2   MG 2.1  --   --   --    PHOS 4.2  --   --   --    ALB  --  3.3*  --  3.9   TBILI  --  0.4  --  0.4   SGOT  --  24  --  21   ALT  --  21  --  22   INR  --   --   --  1.2*       Signed: Mark Stringer MD

## 2019-11-21 NOTE — PROGRESS NOTES
Progress Note      11/21/2019 8:42 AM  NAME: Ozzie Arvizu   MRN:  [id number]   Admit Diagnosis: Altered mental state [R41.82]      Problem List:      Recent GI bleed 10/2019 found to have ischemia, EUNICE and EVR negative  Now with encephalopathy, fever  Chest pain with increased troponin     1. CAD s/p prior PTCA of the LAD in '96.  Cath /10 w/ mild diffuse disease; nml EF.  MPI 3/18 w/ EF 71% and no ischemia or infarct  2. Echo 4/19 w/ EF 60-65%, mild cLVH, mild PHTN, EUNICE 10/2019  Nl EF, no thrombus  3. Sinus, EVR 2019 with no afib  4. Mild aortic stenosis  5. Chronic venous insufficiency s/p left GSV venaseal 2/19 and right GSV venaseal 3/19  6. Chronic kidney disease; Stg 3  7. Gout  8. COPD  9. TESSA on CPAP  10. Remote TIA 4/19  11. Hypertension  12. Hyperlipidemia  13. Former smoker  15. [place]     Assessment/Plan:   Episode of chest pain; resolved  ECG with no acute ST changes  Increased troponin up to 0.4; peak  Encephalopathy, fever unclear currently on abx. Seems coherent today. AnOx3. Reattempt tap +/- MRI. Dtr here now.     - Cont ASA  - Cont atenolol  - Stop NTP; change to ISMN  - Resume norvasc; @ 10mg  - Holding furosemide, fluids stopped, currently appears euvolemic  - Cont statin         [x]       High complexity decision making was performed in this patient at high risk for decompensation with multiple organ involvement. Subjective:     Ozzie Arvizu denies chest pain, dyspnea. Discussed with RN events overnight. Review of Systems:    Symptom Y/N Comments  Symptom Y/N Comments   Fever/Chills N   Chest Pain N    Poor Appetite N   Edema N    Cough N   Abdominal Pain N    Sputum N   Joint Pain N    SOB/MARES N   Pruritis/Rash N    Nausea/vomit N   Tolerating PT/OT Y    Diarrhea N   Tolerating Diet Y    Constipation N   Other       Could NOT obtain due to:      Objective:      Physical Exam:    Last 24hrs VS reviewed since prior progress note.  Most recent are:    Visit Vitals  BP 154/80 (BP 1 Location: Left arm, BP Patient Position: At rest)   Pulse 68   Temp 97.9 °F (36.6 °C)   Resp 20   Wt 96.2 kg (212 lb)   SpO2 97%   BMI 32.23 kg/m²       Intake/Output Summary (Last 24 hours) at 11/21/2019 0842  Last data filed at 11/21/2019 9412  Gross per 24 hour   Intake 180 ml   Output 1525 ml   Net -1345 ml        General Appearance: Well developed, well nourished, alert & oriented x 3,    no acute distress. Ears/Nose/Mouth/Throat: Hearing grossly normal.  Neck: Supple. Chest: Lungs clear to auscultation bilaterally. Cardiovascular: Regular rate and rhythm, S1S2 normal, no murmur. Abdomen: Soft, non-tender, bowel sounds are active. Extremities: No edema bilaterally. Skin: Warm and dry. []         Post-cath site without hematoma, bruit, tenderness, or thrill. Distal pulses intact. PMH/ reviewed - no change compared to H&P    Data Review    Telemetry: sinus rhythm     EKG:   [x]  No new EKG for review    Lab Data Personally Reviewed:    Recent Labs     11/21/19  0334 11/20/19  1027   WBC 6.4 11.2*   HGB 10.0* 10.8*   HCT 33.0* 33.9*    243     Recent Labs     11/18/19  1916   INR 1.2*   PTP 12.6*      Recent Labs     11/21/19  0334 11/20/19  1027 11/19/19  0427    142 136   K 3.8 3.4* 4.0   * 109* 106   CO2 27 26 22   BUN 22* 24* 20   CREA 1.39* 1.65* 1.50*   GLU 94 119* 156*   CA 8.2* 8.5 8.4*   MG 2.1  --   --      Recent Labs     11/20/19  1435 11/20/19  1027 11/20/19  0526   TROIQ 0.38* 0.47* 0.29*     Lab Results   Component Value Date/Time    Cholesterol, total 151 10/28/2019 02:17 AM    HDL Cholesterol 30 10/28/2019 02:17 AM    LDL, calculated 82 10/28/2019 02:17 AM    Triglyceride 195 (H) 10/28/2019 02:17 AM    CHOL/HDL Ratio 5.0 10/28/2019 02:17 AM       Recent Labs     11/20/19  1027 11/18/19  1916   SGOT 24 21    138*   TP 6.8 8.1   ALB 3.3* 3.9   GLOB 3.5 4.2*     No results for input(s): PH, PCO2, PO2 in the last 72 hours.     Medications Personally Reviewed:    Current Facility-Administered Medications   Medication Dose Route Frequency    amLODIPine (NORVASC) tablet 10 mg  10 mg Oral DAILY    aspirin chewable tablet 81 mg  81 mg Oral DAILY    haloperidol lactate (HALDOL) injection 2 mg  2 mg IntraVENous Q6H PRN    Vancomycin - please send level before starting dose  1 Each Other ONCE    nitroglycerin (NITROBID) 2 % ointment 1 Inch  1 Inch Topical Q6H    melatonin tablet 3 mg  3 mg Oral QHS    albuterol-ipratropium (DUO-NEB) 2.5 MG-0.5 MG/3 ML  3 mL Nebulization Q6H PRN    sodium chloride (NS) flush 5-40 mL  5-40 mL IntraVENous Q8H    sodium chloride (NS) flush 5-40 mL  5-40 mL IntraVENous PRN    acetaminophen (TYLENOL) tablet 650 mg  650 mg Oral Q4H PRN    ondansetron (ZOFRAN) injection 4 mg  4 mg IntraVENous Q4H PRN    atenolol (TENORMIN) tablet 50 mg  50 mg Oral DAILY    atorvastatin (LIPITOR) tablet 80 mg  80 mg Oral DAILY    DULoxetine (CYMBALTA) capsule 30 mg  30 mg Oral DAILY    pantoprazole (PROTONIX) tablet 40 mg  40 mg Oral DAILY    thiamine mononitrate (B-1) tablet 100 mg  100 mg Oral DAILY    sodium chloride (NS) flush 5-40 mL  5-40 mL IntraVENous Q8H    sodium chloride (NS) flush 5-40 mL  5-40 mL IntraVENous PRN    LORazepam (ATIVAN) injection 2 mg  2 mg IntraVENous Q5MIN PRN    LORazepam (ATIVAN) injection 1 mg  1 mg IntraVENous Q4H PRN    acyclovir (ZOVIRAX) 800 mg in 0.9% sodium chloride 250 mL IVPB  800 mg IntraVENous Q12H    ampicillin (OMNIPEN) 2 g in 0.9% sodium chloride (MBP/ADV) 100 mL  2 g IntraVENous Q6H    vancomycin (VANCOCIN) 1500 mg in  ml infusion  1,500 mg IntraVENous Q24H    cefTRIAXone (ROCEPHIN) 2 g in 0.9% sodium chloride (MBP/ADV) 50 mL  2 g IntraVENous Q12H    influenza vaccine 2019-20 (6 mos+)(PF) (FLUARIX/FLULAVAL/FLUZONE QUAD) injection 0.5 mL  0.5 mL IntraMUSCular PRIOR TO DISCHARGE         Lopez Mccormick III DO

## 2019-11-21 NOTE — PROGRESS NOTES
Reviewed notes. Pt admitted with agitation. Altered mental status. Has been delirious through the day. Has Ativan and Haldol PRN. Recommended RN gave Haldol. Will add a one time dose of Xanax . melatonin  Labetalol for elevated BP  Pt  Has a sitter

## 2019-11-21 NOTE — PROGRESS NOTES
Patient was very agitated,trying to get out of bed, aggressive toward staff. vitals taken bp was 210/80, rr 36, hr 94. 4 staff members to get patient back in bed. Patient very MARES, audible wheezing. MD paged of vitals and situation. Haldol was given but did not seem to affect patient. Per prior nurse and family ativan makes patient worse. This nurse called family to see if they were able to come sit with patient and calm him down. Granddaughter is now with pateint. Patient seems to be doing better at this time. Labetolol given for BP, melatonin and xanex given per order.

## 2019-11-21 NOTE — PROGRESS NOTES
Problem: Falls - Risk of  Goal: *Absence of Falls  Description  Document Waldo Alex Fall Risk and appropriate interventions in the flowsheet. 11/21/2019 0637 by Bridget Valentin  Outcome: Progressing Towards Goal  Note: Fall Risk Interventions:  Mobility Interventions: Bed/chair exit alarm    Mentation Interventions: Bed/chair exit alarm, Evaluate medications/consider consulting pharmacy, Door open when patient unattended    Medication Interventions: Bed/chair exit alarm, Patient to call before getting OOB, Evaluate medications/consider consulting pharmacy    Elimination Interventions: Bed/chair exit alarm, Call light in reach, Patient to call for help with toileting needs           11/21/2019 0634 by Bridget Valentin  Outcome: Progressing Towards Goal  Note: Fall Risk Interventions:  Mobility Interventions: Bed/chair exit alarm    Mentation Interventions: Bed/chair exit alarm, Evaluate medications/consider consulting pharmacy, Door open when patient unattended    Medication Interventions: Bed/chair exit alarm, Patient to call before getting OOB, Evaluate medications/consider consulting pharmacy    Elimination Interventions: Bed/chair exit alarm, Call light in reach, Patient to call for help with toileting needs              Problem: Patient Education: Go to Patient Education Activity  Goal: Patient/Family Education  11/21/2019 0637 by Bridget aVlentin  Outcome: Progressing Towards Goal  11/21/2019 0634 by Bridget Valentin  Outcome: Progressing Towards Goal     Problem: Pressure Injury - Risk of  Goal: *Prevention of pressure injury  Description  Document Aquiles Scale and appropriate interventions in the flowsheet.   11/21/2019 7839 by Bridget Valentin  Outcome: Progressing Towards Goal  Note: Pressure Injury Interventions:  Sensory Interventions: Assess changes in LOC    Moisture Interventions: Absorbent underpads    Activity Interventions: Increase time out of bed, Pressure redistribution bed/mattress(bed type)    Mobility Interventions: Pressure redistribution bed/mattress (bed type)    Nutrition Interventions: Document food/fluid/supplement intake    Friction and Shear Interventions: Apply protective barrier, creams and emollients             11/21/2019 0634 by Zina Mcmullen  Outcome: Progressing Towards Goal  Note: Pressure Injury Interventions:  Sensory Interventions: Assess changes in LOC    Moisture Interventions: Absorbent underpads    Activity Interventions: Increase time out of bed, Pressure redistribution bed/mattress(bed type)    Mobility Interventions: Pressure redistribution bed/mattress (bed type)    Nutrition Interventions: Document food/fluid/supplement intake    Friction and Shear Interventions: Apply protective barrier, creams and emollients                Problem: Patient Education: Go to Patient Education Activity  Goal: Patient/Family Education  11/21/2019 0637 by Zina Mcmullen  Outcome: Progressing Towards Goal  11/21/2019 0634 by Zina Mcmullen  Outcome: Progressing Towards Goal     Problem: Seizure Disorder (Adult)  Goal: *STG: Remains free of seizure activity  11/21/2019 0637 by Zina Mcmullen  Outcome: Progressing Towards Goal  11/21/2019 0634 by Zina Mcmullen  Outcome: Progressing Towards Goal  Goal: *STG: Maintains lab values within therapeutic range  11/21/2019 0637 by Zina Mcmullen  Outcome: Progressing Towards Goal  11/21/2019 0634 by Zina Mcmullen  Outcome: Progressing Towards Goal  Goal: *STG/LTG: Complies with medication therapy  11/21/2019 0637 by Zina Mcmullen  Outcome: Progressing Towards Goal  11/21/2019 0634 by Zina Mcmullen  Outcome: Progressing Towards Goal  Goal: *STG: Remains free of injury during seizure activity  11/21/2019 0637 by Zina Mcmullen  Outcome: Progressing Towards Goal  11/21/2019 0634 by Zina Mcmullen  Outcome: Progressing Towards Goal  Goal: *STG: Remains safe in hospital  11/21/2019 0637 by Annemarie Baig M  Outcome: Progressing Towards Goal  11/21/2019 0634 by Hector Franco  Outcome: Progressing Towards Goal  Goal: Interventions  11/21/2019 3265 by Hector Franco  Outcome: Progressing Towards Goal  11/21/2019 0634 by Hector Franco  Outcome: Progressing Towards Goal     Problem: Patient Education: Go to Patient Education Activity  Goal: Patient/Family Education  11/21/2019 0637 by Hector Franco  Outcome: Progressing Towards Goal  11/21/2019 0634 by Hector Franco  Outcome: Progressing Towards Goal     Problem: Risk for Spread of Infection  Goal: Prevent transmission of infectious organism to others  Description  Prevent the transmission of infectious organisms to other patients, staff members, and visitors.   11/21/2019 0637 by Hector Franco  Outcome: Progressing Towards Goal  11/21/2019 0634 by Hector Franco  Outcome: Progressing Towards Goal     Problem: Patient Education:  Go to Education Activity  Goal: Patient/Family Education  11/21/2019 0637 by Hector Franco  Outcome: Progressing Towards Goal  11/21/2019 0634 by Hector Franco  Outcome: Progressing Towards Goal

## 2019-11-21 NOTE — PROGRESS NOTES
Pharmacy Automatic Renal Dosing Protocol - Antimicrobials    Indication for Antimicrobials: meningitis      Current Regimen of Each Antimicrobial:  Vancomycin 2000 mg x 1, then 1500 mg every 24 hours (Start Date ; Day # 3)  Ceftriaxone 2 gm every 12 hours (, day 3)  Ampicillin 2 gm q6h (, day 3)  Acyclovir 800 mg every 12 hours (, day 3)    Previous Antimicrobial Therapy:    Goal Level: VANCOMYCIN TROUGH GOAL RANGE    Vancomycin Trough: 15 - 20 mcg/mL  (AUC: 400 - 600 mg/hr/Liter/day)     Date Dose & Interval Measured (mcg/mL) Extrapolated (mcg/mL)    1500 mg q24h 24.1 N/A                 Date & time of next level:     Significant Cultures:    blood: staph coag neg  - pending    Radiology / Imaging results: (X-ray, CT scan or MRI):     Paralysis, amputations, malnutrition:     Labs:  Recent Labs     19  0334 19  1027 19  0427   CREA 1.39* 1.65* 1.50*   BUN 22* 24* 20   WBC 6.4 11.2* 8.5     Temp (24hrs), Av °F (36.7 °C), Min:97.5 °F (36.4 °C), Max:98.8 °F (37.1 °C)    Creatinine Clearance (mL/min) or Dialysis: 38    Impression/Plan:   · Vancomycin dose was given at incorrect time and was given just prior to the level, so it is not a true trough and cannot be used for dosing. Repeat trough ordered for tomorrow morning  · Antimicrobial stop date pending     Pharmacy will follow daily and adjust medications as appropriate for renal function and/or serum levels.     Thank you,  Elmer Isaac, PHARMD

## 2019-11-21 NOTE — PROGRESS NOTES
* No surgery found *  * No surgery found *  Bedside and Verbal shift change report given to dari (oncoming nurse) by Caryle Hun (offgoing nurse). Report included the following information SBAR, Kardex, Recent Results and Med Rec Status.     Zone Phone:   [phone]      Significant changes during shift:  New admit        Patient Information    Max Mckeon  80 y.o.  11/18/2019  6:47 PM by Amy Pak MD. Max Mckeon was admitted from [facility]    Problem List    Patient Active Problem List    Diagnosis Date Noted    Altered mental state 11/19/2019    Facial droop 10/27/2019    Acute blood loss anemia 09/29/2019    Rectal bleeding 09/23/2019    Pneumonia 09/23/2019    Severe sepsis ([address] 09/23/2019    Flank pain 08/01/2019    Spinal stenosis of lumbar region at multiple levels 05/13/2019    Bilateral carotid artery stenosis 04/12/2019    Severe obesity ([address] 01/03/2019    TESSA on CPAP 01/03/2019    Simple chronic bronchitis ([address] 01/03/2019    Fatigue 08/16/2017    CAD (coronary artery disease) 08/16/2017    Dyslipidemia 08/16/2017    On statin therapy 08/16/2017    Gout 08/16/2017    GERD (gastroesophageal reflux disease) 08/16/2017    TIA (transient ischemic attack) 08/16/2017    Dyspnea 08/16/2017    Colon polyps 08/16/2017    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    Hypertension 08/16/2017    ED (erectile dysfunction) 08/16/2017    Hypoxia 04/23/2015    Neurogenic claudication 04/23/2015     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    Morbid obesity ([facility]     On statin therapy 8/16/2017    TESSA on CPAP 1/3/2019    Psychiatric disorder     Depression    Simple chronic bronchitis ([facility] 1/3/2019    TIA (transient ischemic attack) 6/39/2210    Umbilical hernia 26/0/8141         Core Measures:    CVA: Yes Yes  CHF:No No  PNA:No No    Activity Status:    OOB to Chair No  Ambulated this shift No   Bed Rest No    Supplemental O2: (If Applicable)    NC No  NRB No  Venti-mask No  On  Liters/min      LINES AND DRAINS:    Central Line? No Placement date  Reason Medically Necessary     PICC LINE? No Placement date Reason Medically Necessary     Urinary Catheter? No Placement Date  Reason Medically Necessary     DVT prophylaxis:    DVT prophylaxis Med- No  DVT prophylaxis SCD or MOISES- No     Wounds: (If Applicable)    Wounds- No    Location     Patient Safety:    Falls Score Total Score: 3  Safety Level_______  Bed Alarm On? No  Sitter?  No    Plan for upcoming shift: echo, mri, pt/ot/slp        Discharge Plan: No     Active Consults:  IP CONSULT TO NEUROLOGY  IP CONSULT TO CARDIOLOGY

## 2019-11-21 NOTE — PROGRESS NOTES
Problem: Falls - Risk of  Goal: *Absence of Falls  Description  Document Lina Stade Fall Risk and appropriate interventions in the flowsheet. Outcome: Progressing Towards Goal  Note: Fall Risk Interventions:  Mobility Interventions: Bed/chair exit alarm    Mentation Interventions: Bed/chair exit alarm, Evaluate medications/consider consulting pharmacy, Door open when patient unattended    Medication Interventions: Bed/chair exit alarm, Patient to call before getting OOB, Evaluate medications/consider consulting pharmacy    Elimination Interventions: Bed/chair exit alarm, Call light in reach, Patient to call for help with toileting needs              Problem: Patient Education: Go to Patient Education Activity  Goal: Patient/Family Education  Outcome: Progressing Towards Goal     Problem: Pressure Injury - Risk of  Goal: *Prevention of pressure injury  Description  Document Aquiles Scale and appropriate interventions in the flowsheet.   Outcome: Progressing Towards Goal  Note: Pressure Injury Interventions:  Sensory Interventions: Assess changes in LOC    Moisture Interventions: Absorbent underpads    Activity Interventions: Increase time out of bed, Pressure redistribution bed/mattress(bed type)    Mobility Interventions: Pressure redistribution bed/mattress (bed type)    Nutrition Interventions: Document food/fluid/supplement intake    Friction and Shear Interventions: Apply protective barrier, creams and emollients                Problem: Patient Education: Go to Patient Education Activity  Goal: Patient/Family Education  Outcome: Progressing Towards Goal     Problem: Seizure Disorder (Adult)  Goal: *STG: Remains free of seizure activity  Outcome: Progressing Towards Goal  Goal: *STG: Maintains lab values within therapeutic range  Outcome: Progressing Towards Goal  Goal: *STG/LTG: Complies with medication therapy  Outcome: Progressing Towards Goal  Goal: *STG: Remains free of injury during seizure activity  Outcome: Progressing Towards Goal  Goal: *STG: Remains safe in hospital  Outcome: Progressing Towards Goal  Goal: Interventions  Outcome: Progressing Towards Goal     Problem: Patient Education: Go to Patient Education Activity  Goal: Patient/Family Education  Outcome: Progressing Towards Goal     Problem: Risk for Spread of Infection  Goal: Prevent transmission of infectious organism to others  Description  Prevent the transmission of infectious organisms to other patients, staff members, and visitors.   Outcome: Progressing Towards Goal     Problem: Patient Education:  Go to Education Activity  Goal: Patient/Family Education  Outcome: Progressing Towards Goal

## 2019-11-22 ENCOUNTER — HOME CARE VISIT (OUTPATIENT)
Dept: HOME HEALTH SERVICES | Facility: HOME HEALTH | Age: 84
End: 2019-11-22
Payer: MEDICARE

## 2019-11-22 LAB
DATE LAST DOSE: ABNORMAL
REPORTED DOSE,DOSE: ABNORMAL UNITS
REPORTED DOSE/TIME,TMG: ABNORMAL
VANCOMYCIN TROUGH SERPL-MCNC: 11.7 UG/ML (ref 5–10)

## 2019-11-22 PROCEDURE — 87040 BLOOD CULTURE FOR BACTERIA: CPT

## 2019-11-22 PROCEDURE — 3331090001 HH PPS REVENUE CREDIT

## 2019-11-22 PROCEDURE — 65660000000 HC RM CCU STEPDOWN

## 2019-11-22 PROCEDURE — 97161 PT EVAL LOW COMPLEX 20 MIN: CPT | Performed by: PHYSICAL THERAPIST

## 2019-11-22 PROCEDURE — 74011250637 HC RX REV CODE- 250/637: Performed by: HOSPITALIST

## 2019-11-22 PROCEDURE — 36415 COLL VENOUS BLD VENIPUNCTURE: CPT

## 2019-11-22 PROCEDURE — 74011000250 HC RX REV CODE- 250: Performed by: INTERNAL MEDICINE

## 2019-11-22 PROCEDURE — 74011000258 HC RX REV CODE- 258: Performed by: HOSPITALIST

## 2019-11-22 PROCEDURE — 94640 AIRWAY INHALATION TREATMENT: CPT

## 2019-11-22 PROCEDURE — 74011250636 HC RX REV CODE- 250/636: Performed by: HOSPITALIST

## 2019-11-22 PROCEDURE — 80202 ASSAY OF VANCOMYCIN: CPT

## 2019-11-22 PROCEDURE — 77010033678 HC OXYGEN DAILY

## 2019-11-22 PROCEDURE — 74011250637 HC RX REV CODE- 250/637: Performed by: INTERNAL MEDICINE

## 2019-11-22 PROCEDURE — 94760 N-INVAS EAR/PLS OXIMETRY 1: CPT

## 2019-11-22 PROCEDURE — 74011000250 HC RX REV CODE- 250: Performed by: HOSPITALIST

## 2019-11-22 PROCEDURE — 74011250636 HC RX REV CODE- 250/636: Performed by: GENERAL ACUTE CARE HOSPITAL

## 2019-11-22 PROCEDURE — 3331090002 HH PPS REVENUE DEBIT

## 2019-11-22 PROCEDURE — 74011250637 HC RX REV CODE- 250/637: Performed by: GENERAL ACUTE CARE HOSPITAL

## 2019-11-22 RX ORDER — VANCOMYCIN HYDROCHLORIDE
1250
Status: DISCONTINUED | OUTPATIENT
Start: 2019-11-22 | End: 2019-11-24

## 2019-11-22 RX ORDER — HYDRALAZINE HYDROCHLORIDE 25 MG/1
25 TABLET, FILM COATED ORAL 3 TIMES DAILY
Status: DISCONTINUED | OUTPATIENT
Start: 2019-11-22 | End: 2019-11-22

## 2019-11-22 RX ORDER — LANOLIN ALCOHOL/MO/W.PET/CERES
10 CREAM (GRAM) TOPICAL
Status: DISCONTINUED | OUTPATIENT
Start: 2019-11-22 | End: 2019-11-25 | Stop reason: HOSPADM

## 2019-11-22 RX ORDER — HYDRALAZINE HYDROCHLORIDE 25 MG/1
50 TABLET, FILM COATED ORAL 3 TIMES DAILY
Status: DISCONTINUED | OUTPATIENT
Start: 2019-11-22 | End: 2019-11-25

## 2019-11-22 RX ORDER — LABETALOL HYDROCHLORIDE 5 MG/ML
20 INJECTION, SOLUTION INTRAVENOUS ONCE
Status: COMPLETED | OUTPATIENT
Start: 2019-11-22 | End: 2019-11-22

## 2019-11-22 RX ORDER — POLYETHYLENE GLYCOL 3350 17 G/17G
17 POWDER, FOR SOLUTION ORAL DAILY
Status: DISCONTINUED | OUTPATIENT
Start: 2019-11-22 | End: 2019-11-25 | Stop reason: HOSPADM

## 2019-11-22 RX ADMIN — HYDRALAZINE HYDROCHLORIDE 50 MG: 25 TABLET, FILM COATED ORAL at 22:07

## 2019-11-22 RX ADMIN — AMPICILLIN SODIUM 2 G: 2 INJECTION, POWDER, FOR SOLUTION INTRAMUSCULAR; INTRAVENOUS at 15:24

## 2019-11-22 RX ADMIN — ACYCLOVIR SODIUM 800 MG: 50 INJECTION, SOLUTION INTRAVENOUS at 16:10

## 2019-11-22 RX ADMIN — AMPICILLIN SODIUM 2 G: 2 INJECTION, POWDER, FOR SOLUTION INTRAMUSCULAR; INTRAVENOUS at 10:50

## 2019-11-22 RX ADMIN — ACYCLOVIR SODIUM 800 MG: 50 INJECTION, SOLUTION INTRAVENOUS at 02:52

## 2019-11-22 RX ADMIN — Medication 10 ML: at 15:24

## 2019-11-22 RX ADMIN — POLYETHYLENE GLYCOL (3350) 17 G: 17 POWDER, FOR SOLUTION ORAL at 10:45

## 2019-11-22 RX ADMIN — AMPICILLIN SODIUM 2 G: 2 INJECTION, POWDER, FOR SOLUTION INTRAMUSCULAR; INTRAVENOUS at 20:06

## 2019-11-22 RX ADMIN — ASPIRIN 81 MG 81 MG: 81 TABLET ORAL at 10:48

## 2019-11-22 RX ADMIN — ONDANSETRON 4 MG: 2 INJECTION INTRAMUSCULAR; INTRAVENOUS at 08:19

## 2019-11-22 RX ADMIN — ISOSORBIDE MONONITRATE 30 MG: 30 TABLET, EXTENDED RELEASE ORAL at 10:47

## 2019-11-22 RX ADMIN — VANCOMYCIN HYDROCHLORIDE 1500 MG: 10 INJECTION, POWDER, LYOPHILIZED, FOR SOLUTION INTRAVENOUS at 05:39

## 2019-11-22 RX ADMIN — IPRATROPIUM BROMIDE AND ALBUTEROL SULFATE 3 ML: .5; 3 SOLUTION RESPIRATORY (INHALATION) at 05:02

## 2019-11-22 RX ADMIN — VANCOMYCIN HYDROCHLORIDE 1250 MG: 10 INJECTION, POWDER, LYOPHILIZED, FOR SOLUTION INTRAVENOUS at 22:00

## 2019-11-22 RX ADMIN — Medication 10 ML: at 06:00

## 2019-11-22 RX ADMIN — DULOXETINE HYDROCHLORIDE 30 MG: 30 CAPSULE, DELAYED RELEASE ORAL at 10:46

## 2019-11-22 RX ADMIN — Medication 10 ML: at 22:08

## 2019-11-22 RX ADMIN — PANTOPRAZOLE SODIUM 40 MG: 40 TABLET, DELAYED RELEASE ORAL at 10:48

## 2019-11-22 RX ADMIN — AMLODIPINE BESYLATE 10 MG: 5 TABLET ORAL at 10:46

## 2019-11-22 RX ADMIN — MELATONIN 10.5 MG: at 22:07

## 2019-11-22 RX ADMIN — AMPICILLIN SODIUM 2 G: 2 INJECTION, POWDER, FOR SOLUTION INTRAMUSCULAR; INTRAVENOUS at 02:13

## 2019-11-22 RX ADMIN — HYDRALAZINE HYDROCHLORIDE 50 MG: 25 TABLET, FILM COATED ORAL at 16:10

## 2019-11-22 RX ADMIN — LABETALOL HYDROCHLORIDE 20 MG: 5 INJECTION INTRAVENOUS at 00:33

## 2019-11-22 RX ADMIN — Medication 100 MG: at 10:47

## 2019-11-22 RX ADMIN — ATORVASTATIN CALCIUM 80 MG: 40 TABLET, FILM COATED ORAL at 10:48

## 2019-11-22 RX ADMIN — HYDRALAZINE HYDROCHLORIDE 25 MG: 25 TABLET, FILM COATED ORAL at 10:46

## 2019-11-22 RX ADMIN — ATENOLOL 50 MG: 25 TABLET ORAL at 10:49

## 2019-11-22 NOTE — PROGRESS NOTES
Pharmacy Automatic Renal Dosing Protocol - Antimicrobials  Indication for Antimicrobials: meningitis, bacteremia, Hx of MRSA    Current Regimen of Each Antimicrobial:  Vancomycin 2000 mg x 1, then 1500 mg every 24 hours (Start Date ; Day # 4)  Ceftriaxone 2 gm every 12 hours (, day 4)  Ampicillin 2 gm q6h (, day 4)  Acyclovir 800 mg every 12 hours (, day 4)    Previous Antimicrobial Therapy:    Goal Level: VANCOMYCIN TROUGH GOAL RANGE    Vancomycin Trough: 15 - 20 mcg/mL  (AUC: 400 - 600 mg/hr/Liter/day)     Date Dose & Interval Measured (mcg/mL) Extrapolated (mcg/mL)    1500 mg q24h 24.1 N/A    @ 04:24 1500 mg IV q 24h 11.7 12.2           Date & time of next level: 19    Significant Cultures:    blood: staph coag neg  - pending  19 - Blood = pending    Radiology / Imaging results: (X-ray, CT scan or MRI):     Paralysis, amputations, malnutrition:     Labs:  Recent Labs     19  0334 19  1027   CREA 1.39* 1.65*   BUN 22* 24*   WBC 6.4 11.2*     Temp (24hrs), Av.1 °F (36.7 °C), Min:97.4 °F (36.3 °C), Max:98.7 °F (37.1 °C)    Creatinine Clearance (mL/min) or Dialysis: 38    Impression/Plan:   · Creatinine has improved, afebrile, doses appropriate as above  · No LP as of yet, bacteremia  · Current trough level low, Crcl improved  · Will adjust Vancomycin to 1250 mg IV q 18h for improved Crcl to target trough 15-20 mcg/ml, AUC of 572. (Will start at 6136 to avoid conflict with other IVPB)  · Daily BMP ordered per Vancomycin dosing protocol  · Antimicrobial stop date pending     Pharmacy will follow daily and adjust medications as appropriate for renal function and/or serum levels.     Thank you,  Polly Lopez, [facility]

## 2019-11-22 NOTE — PROGRESS NOTES
During vital signs the PCT reported that the patients BP was 205/105 at 0004. This nurse paged md for prn order to give patient. The order was placed was placed at at 0026, and given at [address]. Between that time the granddaughter was rude to the PCT trying to get vitals, and when this nurse was across the talbot I heard the patient say to her grandfather \"since no one wants to come in here I'm going to call a rapid response\". The granddaughter proceeded to come out the room and loudly and say \"I know yall are busy but I'm going to call a rapid response since no one has come in here\" this nurse was in another patients room across the talbot and heard her. This nurse informed her that a page has already been place to a MD.  Prior to this event This nurse had been in the patient's room 30 mins prior (2326) hanging.

## 2019-11-22 NOTE — PROGRESS NOTES
Problem: Falls - Risk of  Goal: *Absence of Falls  Description  Document Jazlyn Licona Fall Risk and appropriate interventions in the flowsheet. Outcome: Progressing Towards Goal  Note: Fall Risk Interventions:  Mobility Interventions: Patient to call before getting OOB    Mentation Interventions: Bed/chair exit alarm, Evaluate medications/consider consulting pharmacy    Medication Interventions: Bed/chair exit alarm    Elimination Interventions: Bed/chair exit alarm              Problem: Patient Education: Go to Patient Education Activity  Goal: Patient/Family Education  Outcome: Progressing Towards Goal     Problem: Pressure Injury - Risk of  Goal: *Prevention of pressure injury  Description  Document Aquiles Scale and appropriate interventions in the flowsheet.   Outcome: Progressing Towards Goal  Note: Pressure Injury Interventions:  Sensory Interventions: Assess changes in LOC    Moisture Interventions: Absorbent underpads    Activity Interventions: Increase time out of bed    Mobility Interventions: Pressure redistribution bed/mattress (bed type)    Nutrition Interventions: Document food/fluid/supplement intake    Friction and Shear Interventions: Feet elevated on foot rest                Problem: Patient Education: Go to Patient Education Activity  Goal: Patient/Family Education  Outcome: Progressing Towards Goal     Problem: Seizure Disorder (Adult)  Goal: *STG: Remains free of seizure activity  Outcome: Progressing Towards Goal  Goal: *STG: Maintains lab values within therapeutic range  Outcome: Progressing Towards Goal  Goal: *STG/LTG: Complies with medication therapy  Outcome: Progressing Towards Goal  Goal: *STG: Remains free of injury during seizure activity  Outcome: Progressing Towards Goal  Goal: *STG: Remains safe in hospital  Outcome: Progressing Towards Goal  Goal: Interventions  Outcome: Progressing Towards Goal     Problem: Patient Education: Go to Patient Education Activity  Goal: Patient/Family Education  Outcome: Progressing Towards Goal     Problem: Risk for Spread of Infection  Goal: Prevent transmission of infectious organism to others  Description  Prevent the transmission of infectious organisms to other patients, staff members, and visitors.   Outcome: Progressing Towards Goal     Problem: Patient Education:  Go to Education Activity  Goal: Patient/Family Education  Outcome: Progressing Towards Goal     Problem: Breathing Pattern - Ineffective  Goal: *Absence of hypoxia  Outcome: Progressing Towards Goal

## 2019-11-22 NOTE — PROGRESS NOTES
Problem: Mobility Impaired (Adult and Pediatric)  Goal: *Acute Goals and Plan of Care (Insert Text)  Description  FUNCTIONAL STATUS PRIOR TO ADMISSION: Patient was modified independent using a single point cane for functional mobility. HOME SUPPORT PRIOR TO ADMISSION: The patient lived with wife but did not require assist.    Physical Therapy Goals  Initiated 11/22/2019  1. Patient will move from supine to sit and sit to supine  in bed with independence within 7 day(s). 2.  Patient will transfer from bed to chair and chair to bed with supervision/set-up using the least restrictive device within 7 day(s). 3.  Patient will perform sit to stand with independence within 7 day(s). 4.  Patient will ambulate with minimal assistance/contact guard assist for 150 feet with the least restrictive device within 7 day(s). 5.  Patient will ascend/descend 1 stairs with no handrail(s) with minimal assistance/contact guard assist within 7 day(s). Note:   PHYSICAL THERAPY EVALUATION  Patient: Alix Morgan (78 y.o. male)  Date: 11/22/2019  Primary Diagnosis: Altered mental state [R41.82]        Precautions: fall         ASSESSMENT  Based on the objective data described below, the patient presents with generalized weakness, slightly impaired balance, decreased activity tolerance, and decreased functional mobility skills. Patient up in chair upon arrival, eating lunch, agreeable to PT. Patient on 2L O2 throughout session. Sit to stand with supervision. Patient ambulated in room 36' with no assistive device but holding onto furniture and wall. Patient slightly unsteady at times. Will try cane for ambulation next visit. Returned to chair after ambulating. No SOB noted. Patient likely close to baseline for mobility. Patient may benefit from HHPT at discharge vs. No further therapy depending on progress with therapy.     Current Level of Function Impacting Discharge (mobility/balance): CGA    Functional Outcome Measure: The patient scored 16/20 on the Elder Mobility scale outcome measure which is indicative of minor impairment in mobility. Other factors to consider for discharge: decreased safety awareness     Patient will benefit from skilled therapy intervention to address the above noted impairments. PLAN :  Recommendations and Planned Interventions: transfer training, gait training, therapeutic exercises, and therapeutic activities      Frequency/Duration: Patient will be followed by physical therapy:  3 times a week to address goals. Recommendation for discharge: (in order for the patient to meet his/her long term goals)  Physical therapy at least 2 days/week in the home     This discharge recommendation:  Has not yet been discussed the attending provider and/or case management    IF patient discharges home will need the following DME: patient owns DME required for discharge         SUBJECTIVE:   Patient stated [facility] lunch just came.     OBJECTIVE DATA SUMMARY:   HISTORY:    Past Medical History:   Diagnosis Date    Adverse effect of anesthesia     combative after anesthesia    Alcohol abuse     6 beers/day    Arthritis     CAD (coronary artery disease)     Chronic obstructive pulmonary disease (HCC)     Dyslipidemia 8/16/2017    Dyspepsia and other specified disorders of function of stomach     Dyspnea 8/16/2017    ED (erectile dysfunction) 8/16/2017    Encounter for immunization 8/16/2017    Fatigue 8/16/2017    Flank pain 8/1/2019    GERD (gastroesophageal reflux disease) 8/16/2017    Gout 8/16/2017    Hypertension     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    Morbid obesity ([address]     On statin therapy 8/16/2017    TESSA on CPAP 1/3/2019    Psychiatric disorder     Depression    Simple chronic bronchitis ([address] 1/3/2019    TIA (transient ischemic attack) 1/10/9814    Umbilical hernia 06/8/8231     Past Surgical History:   Procedure Laterality Date    CARDIAC SURG PROCEDURE UNLIST  1996    Cardiac Stents CARDIAC SURG PROCEDURE UNLIST  04/2019    EF 61-65%    COLONOSCOPY N/A 9/27/2019    COLONOSCOPY performed by Debi Adams MD at [place] [address]    HX HERNIA REPAIR  65/35/59    open umbilical hernia repair mesh    HX UROLOGICAL      HYDROCELECTOMY    UPPER GI ENDOSCOPY,BIOPSY  9/30/2019            Personal factors and/or comorbidities impacting plan of care: safety awareness    Home Situation  Home Environment: Independent living  # Steps to Enter: 1  Rails to Enter: No  One/Two Story Residence: One story  Living Alone: No  Support Systems: Family member(s)  Patient Expects to be Discharged to[de-identified] Private residence  Current DME Used/Available at Home: [place] [employer], rolling, Claretta Sedgwick, quad, Shower chair, Grab bars    EXAMINATION/PRESENTATION/DECISION MAKING:   Critical Behavior:  Neurologic State: Alert  Orientation Level: Oriented to person, Oriented to place, Oriented to situation  Cognition: Follows commands  Safety/Judgement: Awareness of environment, Decreased awareness of need for safety  Hearing:   Auditory  Auditory Impairment: None  Skin:  extremities intact  Edema: none noted  Range Of Motion:  AROM: Generally decreased, functional(except left shoulder limited to 90 degrees flexion)           PROM: Generally decreased, functional(except left shoulder)           Strength:    Strength: Generally decreased, functional                    Tone & Sensation:   Tone: Normal              Sensation: Intact               Coordination:  Coordination: Within functional limits  Vision:      Functional Mobility:  Bed Mobility:              Transfers:  Sit to Stand: Supervision  Stand to Sit: Supervision                       Balance:   Sitting: Intact  Standing: Impaired  Standing - Static: Constant support;Good  Standing - Dynamic : Fair;Unsupported  Ambulation/Gait Training:     Assistive Device: Gait belt  Ambulation - Level of Assistance: Contact guard assistance           Right Side Weight Bearing: Full  Left Side Weight Bearing: Full        Speed/Beverly: Pace decreased (<100 feet/min)  Step Length: Left shortened;Right shortened                                   Functional Measure:    Elder Mobility Scale    16/20         Scores under 10 - generally these patients are dependent in mobility maneuvers; require help with  basic ADL, such as transfers, toileting and dressing. Scores between 10 - 13 - generally these patients are borderline in terms of safe mobility and  independence in ADL i.e. they require some help with some mobility maneuvers. Scores over 14 - Generally these patients are able to perform mobility maneuvers alone and safely  and are independent in basic ADL. Physical Therapy Evaluation Charge Determination   History Examination Presentation Decision-Making   MEDIUM  Complexity : 1-2 comorbidities / personal factors will impact the outcome/ POC  MEDIUM Complexity : 3 Standardized tests and measures addressing body structure, function, activity limitation and / or participation in recreation  LOW Complexity : Stable, uncomplicated  LOW Complexity : FOTO score of       Based on the above components, the patient evaluation is determined to be of the following complexity level: LOW     Pain Rating:  none    Activity Tolerance:   Good  Please refer to the flowsheet for vital signs taken during this treatment. After treatment patient left in no apparent distress:   Sitting in chair, Call bell within reach, and Caregiver / family present    COMMUNICATION/EDUCATION:   The patients plan of care was discussed with: Registered Nurse. Fall prevention education was provided and the patient/caregiver indicated understanding. and Patient/family agree to work toward stated goals and plan of care.     Thank you for this referral.  Cortes Sanches, PT   Time Calculation: 16 mins

## 2019-11-22 NOTE — HOME CARE
Patient is currently open to [facility] for SN with a certification period ending 12/4/19. Resumption of care order will be required prior to discharge.

## 2019-11-22 NOTE — PROGRESS NOTES
Progress Note      11/22/2019 8:42 AM  NAME: Ledy Nicole   MRN:  [id number]   Admit Diagnosis: Altered mental state [R41.82]      Problem List:      Recent GI bleed 10/2019 found to have ischemia, EUNICE and EVR negative  Now with encephalopathy, fever  Chest pain with increased troponin     1. CAD s/p prior PTCA of the LAD in '96.  Cath /10 w/ mild diffuse disease; nml EF.  MPI 3/18 w/ EF 71% and no ischemia or infarct  2. Echo 4/19 w/ EF 60-65%, mild cLVH, mild PHTN, EUNICE 10/2019  Nl EF, no thrombus  3. Sinus, EVR 2019 with no afib  4. Mild aortic stenosis  5. Chronic venous insufficiency s/p left GSV venaseal 2/19 and right GSV venaseal 3/19  6. Chronic kidney disease; Stg 3  7. Gout  8. COPD  9. TESSA on CPAP  10. Remote TIA 4/19  11. Hypertension  12. Hyperlipidemia  13. Former smoker  15. [place]     Assessment/Plan:   Episode of chest pain; resolved  ECG with no acute ST changes  Increased troponin up to 0.4; peak  Encephalopathy, fever unclear currently on abx. Coherent. AnOx3. Reattempt tap +/- MRI? Per neuro.     - Cont ASA  - Cont atenolol  - Cont ISMN 30mg  - Cont norvasc 10mg  - Add hydralazine 50mg TID  - Holding furosemide, fluids stopped, currently appears euvolemic  - Cont statin         [x]       High complexity decision making was performed in this patient at high risk for decompensation with multiple organ involvement. Subjective:     Ledy Nicole denies chest pain, dyspnea. Discussed with RN events overnight. Review of Systems:    Symptom Y/N Comments  Symptom Y/N Comments   Fever/Chills N   Chest Pain N    Poor Appetite N   Edema N    Cough N   Abdominal Pain N    Sputum N   Joint Pain N    SOB/MARES N   Pruritis/Rash N    Nausea/vomit N   Tolerating PT/OT Y    Diarrhea N   Tolerating Diet Y    Constipation N   Other       Could NOT obtain due to:      Objective:      Physical Exam:    Last 24hrs VS reviewed since prior progress note.  Most recent are:    Visit Vitals  /82 Pulse 70   Temp 98.7 °F (37.1 °C)   Resp 20   Wt 96.2 kg (212 lb)   SpO2 96%   BMI 32.23 kg/m²       Intake/Output Summary (Last 24 hours) at 11/22/2019 9289  Last data filed at 11/22/2019 0018  Gross per 24 hour   Intake 240 ml   Output 525 ml   Net -285 ml        General Appearance: Well developed, well nourished, alert & oriented x 3,    no acute distress. Ears/Nose/Mouth/Throat: Hearing grossly normal.  Neck: Supple. Chest: Lungs clear to auscultation bilaterally. Cardiovascular: Regular rate and rhythm, S1S2 normal, no murmur. Abdomen: Soft, non-tender, bowel sounds are active. Extremities: No edema bilaterally. Skin: Warm and dry. []         Post-cath site without hematoma, bruit, tenderness, or thrill. Distal pulses intact. PMH/SH reviewed - no change compared to H&P    Data Review    Telemetry: sinus rhythm     EKG:   [x]  No new EKG for review    Lab Data Personally Reviewed:    Recent Labs     11/21/19  0334 11/20/19  1027   WBC 6.4 11.2*   HGB 10.0* 10.8*   HCT 33.0* 33.9*    243     No results for input(s): INR, PTP, APTT, INREXT, INREXT in the last 72 hours. Recent Labs     11/21/19  0334 11/20/19  1027    142   K 3.8 3.4*   * 109*   CO2 27 26   BUN 22* 24*   CREA 1.39* 1.65*   GLU 94 119*   CA 8.2* 8.5   MG 2.1  --      Recent Labs     11/20/19  1435 11/20/19  1027 11/20/19  0526   TROIQ 0.38* 0.47* 0.29*     Lab Results   Component Value Date/Time    Cholesterol, total 151 10/28/2019 02:17 AM    HDL Cholesterol 30 10/28/2019 02:17 AM    LDL, calculated 82 10/28/2019 02:17 AM    Triglyceride 195 (H) 10/28/2019 02:17 AM    CHOL/HDL Ratio 5.0 10/28/2019 02:17 AM       Recent Labs     11/20/19  1027   SGOT 24      TP 6.8   ALB 3.3*   GLOB 3.5     No results for input(s): PH, PCO2, PO2 in the last 72 hours.     Medications Personally Reviewed:    Current Facility-Administered Medications   Medication Dose Route Frequency    polyethylene glycol (MIRALAX) packet 17 g 17 g Oral DAILY    hydrALAZINE (APRESOLINE) tablet 25 mg  25 mg Oral TID    amLODIPine (NORVASC) tablet 10 mg  10 mg Oral DAILY    isosorbide mononitrate ER (IMDUR) tablet 30 mg  30 mg Oral DAILY    aspirin chewable tablet 81 mg  81 mg Oral DAILY    haloperidol lactate (HALDOL) injection 2 mg  2 mg IntraVENous Q6H PRN    melatonin tablet 3 mg  3 mg Oral QHS    albuterol-ipratropium (DUO-NEB) 2.5 MG-0.5 MG/3 ML  3 mL Nebulization Q6H PRN    sodium chloride (NS) flush 5-40 mL  5-40 mL IntraVENous Q8H    sodium chloride (NS) flush 5-40 mL  5-40 mL IntraVENous PRN    acetaminophen (TYLENOL) tablet 650 mg  650 mg Oral Q4H PRN    ondansetron (ZOFRAN) injection 4 mg  4 mg IntraVENous Q4H PRN    atenolol (TENORMIN) tablet 50 mg  50 mg Oral DAILY    atorvastatin (LIPITOR) tablet 80 mg  80 mg Oral DAILY    DULoxetine (CYMBALTA) capsule 30 mg  30 mg Oral DAILY    pantoprazole (PROTONIX) tablet 40 mg  40 mg Oral DAILY    thiamine mononitrate (B-1) tablet 100 mg  100 mg Oral DAILY    sodium chloride (NS) flush 5-40 mL  5-40 mL IntraVENous Q8H    sodium chloride (NS) flush 5-40 mL  5-40 mL IntraVENous PRN    LORazepam (ATIVAN) injection 2 mg  2 mg IntraVENous Q5MIN PRN    LORazepam (ATIVAN) injection 1 mg  1 mg IntraVENous Q4H PRN    acyclovir (ZOVIRAX) 800 mg in 0.9% sodium chloride 250 mL IVPB  800 mg IntraVENous Q12H    ampicillin (OMNIPEN) 2 g in 0.9% sodium chloride (MBP/ADV) 100 mL  2 g IntraVENous Q6H    vancomycin (VANCOCIN) 1500 mg in  ml infusion  1,500 mg IntraVENous Q24H    cefTRIAXone (ROCEPHIN) 2 g in 0.9% sodium chloride (MBP/ADV) 50 mL  2 g IntraVENous Q12H    influenza vaccine 2019-20 (6 mos+)(PF) (FLUARIX/FLULAVAL/FLUZONE QUAD) injection 0.5 mL  0.5 mL IntraMUSCular PRIOR TO DISCHARGE         Arnulfo Kingston III, DO

## 2019-11-22 NOTE — PROGRESS NOTES
* No surgery found *  * No surgery found *  Bedside and Verbal shift change report given to Katerina (oncoming nurse) by Niki Lo (offgoing nurse). Report included the following information SBAR, Kardex, Recent Results and Med Rec Status.     Zone Phone:   [phone]      Significant changes during shift:  none        Patient Information    Cathie Nguyen  80 y.o.  11/18/2019  6:47 PM by Corinne Parmar MD. Cathie Nguyen was admitted from Assisted Living    Problem List    Patient Active Problem List    Diagnosis Date Noted    Altered mental state 11/19/2019    Facial droop 10/27/2019    Acute blood loss anemia 09/29/2019    Rectal bleeding 09/23/2019    Pneumonia 09/23/2019    Severe sepsis ([address] 09/23/2019    Flank pain 08/01/2019    Spinal stenosis of lumbar region at multiple levels 05/13/2019    Bilateral carotid artery stenosis 04/12/2019    Severe obesity ([address] 01/03/2019    TESSA on CPAP 01/03/2019    Simple chronic bronchitis ([address] 01/03/2019    Fatigue 08/16/2017    CAD (coronary artery disease) 08/16/2017    Dyslipidemia 08/16/2017    On statin therapy 08/16/2017    Gout 08/16/2017    GERD (gastroesophageal reflux disease) 08/16/2017    TIA (transient ischemic attack) 08/16/2017    Dyspnea 08/16/2017    Colon polyps 08/16/2017    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    Hypertension 08/16/2017    ED (erectile dysfunction) 08/16/2017    Hypoxia 04/23/2015    Neurogenic claudication 04/23/2015     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 No Placement date  Reason Medically Necessary     PICC LINE? No Placement date Reason Medically Necessary     Urinary Catheter? No Placement Date  Reason Medically Necessary     DVT prophylaxis:    DVT prophylaxis Med- No  DVT prophylaxis SCD or MOISES- No     Wounds: (If Applicable)    Wounds- No    Location     Patient Safety:    Falls Score Total Score: 3  Safety Level_______  Bed Alarm On? No  Sitter?  No    Plan for upcoming shift: echo, mri, pt/ot/slp        Discharge Plan: No     Active Consults:  IP CONSULT TO NEUROLOGY  IP CONSULT TO CARDIOLOGY

## 2019-11-22 NOTE — PROGRESS NOTES
Hospitalist Progress Note    NAME: Chadwick Case   :  1934   MRN:  [id number]       Assessment / Plan:  AMS - improving   Continue telemetry monitoring  Pt afebrile for the past 24 hours. Family continues to refuse LP - however this may change as pt's daughter is returning back from her trip today and will be at the hospital tomorrow. In the interim, will continue with IV abx. Brain MRI w/o contrast was done [facility] ordered with and without but order was cancelled by someone] - limited study that did not show any acute infarct  Neurology evals appareciated    :  Pt's mental status has improved this morning - hopeful that this will continue. Will continue IV abx    :  MS markedly improved over the past 24 hours. Family state that he has significant anxiety. Unclear whether this was meningitis in the first place ? Will complete course on . Gram positive bacteremia - likely contaminant  Unclear if contaminant, 2 bottles flagged thus far, gram positive cocci growing in 1/4  Continue IV abx for now  Repeat Bcx tmr Am    :  Repeat BCx in the AM  BCx still prelim gram positive cocci, now in 2/4 bottles    :  Coag negative staph - likely contaminant. Repeat Cx NGTD    Transient episode of CP  Elevated troponin  Cardiology evals noted - medical management for now  Trend troponins until downtrending    KATHY - improving  Continue to monitor    History of alcohol abuse: reportedly quit 3 months ago  Elevated ammonia  LFTs wnl  Ammonia 53    HTN  CAD  HLD  TESSA  Resume home meds    30.0 - 39.9 Obese / Body mass index is 32.23 kg/m². Code status: Full  Prophylaxis: SCD's  Recommended Disposition: TBD     Subjective:     Chief Complaint / Reason for Physician Visit  Pt anxious - far more alert and conversant. Called me Dr. Roby Valentine for the first time, normally has been referring to me as Dr. Lisa Lundy. Discussed with RN events overnight.      Review of Systems:  Symptom Y/N Comments  Symptom Y/N Comments   Fever/Chills n   Chest Pain n    Poor Appetite n   Edema     Cough n   Abdominal Pain n    Sputum    Joint Pain     SOB/MARES n   Pruritis/Rash     Nausea/vomit    Tolerating PT/OT y    Diarrhea    Tolerating Diet y    Constipation    Other       Could NOT obtain due to:      Objective:     VITALS:   Last 24hrs VS reviewed since prior progress note. Most recent are:  Patient Vitals for the past 24 hrs:   Temp Pulse Resp BP SpO2   11/22/19 1122 97.4 °F (36.3 °C) 83 18 166/90 95 %   11/22/19 0835 97.4 °F (36.3 °C) 64 18 (!) 176/98 97 %   11/22/19 0505     96 %   11/22/19 0433 98.7 °F (37.1 °C) 70 20 170/82 95 %   11/22/19 0125    180/80    11/22/19 0004    (!) 202/105    11/22/19 0001 98.4 °F (36.9 °C) 74 18 (!) 205/105 97 %   11/21/19 2008 98.5 °F (36.9 °C) 78 20 (!) 175/91 98 %   11/21/19 1456 97.8 °F (36.6 °C) 74 20 154/72 97 %       Intake/Output Summary (Last 24 hours) at 11/22/2019 1318  Last data filed at 11/22/2019 0018  Gross per 24 hour   Intake    Output 325 ml   Net -325 ml        PHYSICAL EXAM:  General: Alert, cooperative, no acute distress    EENT:  EOMI. Anicteric sclerae. MMM  Resp:  CTA bilaterally, no wheezing or rales. No accessory muscle use  CV:  Regular  rhythm,  No edema  GI:  Soft, Non distended, Non tender.  +Bowel sounds  Neurologic:  Alert and oriented X person and place, normal speech, follows commands  Psych:   Poor insight  Skin:  No rashes.   No jaundice    Reviewed most current lab test results and cultures  YES  Reviewed most current radiology test results   YES  Review and summation of old records today    NO  Reviewed patient's current orders and MAR    YES  PMH/SH reviewed - no change compared to H&P  ________________________________________________________________________  Care Plan discussed with:    Comments   Patient x    Family  x    RN x    Care Manager x    Consultant  x                      Multidiciplinary team rounds were held today with case manager, nursing, pharmacist and clinical coordinator. Patient's plan of care was discussed; medications were reviewed and discharge planning was addressed. ________________________________________________________________________  Total NON critical care TIME:  35   Minutes    Total CRITICAL CARE TIME Spent:   Minutes non procedure based      Comments   >50% of visit spent in counseling and coordination of care     ________________________________________________________________________  Sophia Naqvi MD     Procedures: see electronic medical records for all procedures/Xrays and details which were not copied into this note but were reviewed prior to creation of Plan. LABS:  I reviewed today's most current labs and imaging studies.   Pertinent labs include:  Recent Labs     11/21/19  0334 11/20/19  1027   WBC 6.4 11.2*   HGB 10.0* 10.8*   HCT 33.0* 33.9*    243     Recent Labs     11/21/19  0334 11/20/19  1027    142   K 3.8 3.4*   * 109*   CO2 27 26   GLU 94 119*   BUN 22* 24*   CREA 1.39* 1.65*   CA 8.2* 8.5   MG 2.1  --    PHOS 4.2  --    ALB  --  3.3*   TBILI  --  0.4   SGOT  --  24   ALT  --  21       Signed: Sophia Naqvi MD

## 2019-11-22 NOTE — PROGRESS NOTES
Bedside and Verbal shift change report given to dari (oncoming nurse) by Tameka Gandhi (offgoing nurse).  Report included the following information SBAR, Kardex, Recent Results and Med Rec Status.     Zone Phone:   [phone]        Significant changes during shift:  patient needs prn bp medications           Patient Information     Mohamud Borges  80 y.o.  11/18/2019  6:47 PM by Norma Salazar MD. Mohamud Borges was admitted from [address]  Problem List          Patient Active Problem List     Diagnosis Date Noted    Altered mental state 11/19/2019    Facial droop 10/27/2019    Acute blood loss anemia 09/29/2019    Rectal bleeding 09/23/2019    Pneumonia 09/23/2019    Severe sepsis ([address] 09/23/2019    Flank pain 08/01/2019    Spinal stenosis of lumbar region at multiple levels 05/13/2019    Bilateral carotid artery stenosis 04/12/2019    Severe obesity ([address] 01/03/2019    TESSA on CPAP 01/03/2019    Simple chronic bronchitis ([address] 01/03/2019    Fatigue 08/16/2017    CAD (coronary artery disease) 08/16/2017    Dyslipidemia 08/16/2017    On statin therapy 08/16/2017    Gout 08/16/2017    GERD (gastroesophageal reflux disease) 08/16/2017    TIA (transient ischemic attack) 08/16/2017    Dyspnea 08/16/2017    Colon polyps 08/16/2017    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    Hypertension 08/16/2017    ED (erectile dysfunction) 08/16/2017    Hypoxia 04/23/2015    Neurogenic claudication 04/23/2015      Past Medical History:   Diagnosis Date    Adverse effect of anesthesia       combative after anesthesia    Alcohol abuse       6 beers/day    Arthritis      CAD (coronary artery disease)      Chronic obstructive pulmonary disease ([address]      Dyslipidemia 8/16/2017    Dyspepsia and other specified disorders of function of stomach      Dyspnea 8/16/2017    ED (erectile dysfunction) 8/16/2017    Encounter for immunization 8/16/2017    Fatigue 8/16/2017    Flank pain 8/1/2019    GERD (gastroesophageal reflux disease) 8/16/2017    Gout 8/16/2017    Hypertension      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    Morbid obesity [address]      On statin therapy 8/16/2017    TESSA on CPAP 1/3/2019    Psychiatric disorder       Depression    Simple chronic bronchitis ([facility] 1/3/2019    TIA (transient ischemic attack) 1/03/1047    Umbilical hernia 18/1/3931            Core Measures:     CVA: Yes Yes  CHF:No No  PNA:No No     Activity Status:     OOB to Chair No  Ambulated this shift No   Bed Rest No     Supplemental O2: (If Applicable)     NC No  NRB No  Venti-mask No  On  Liters/min        LINES AND DRAINS:     Central Line? No Placement date  Reason Medically Necessary      PICC LINE? No Placement date Reason Medically Necessary      Urinary Catheter? No Placement Date  Reason Medically Necessary      DVT prophylaxis:     DVT prophylaxis Med- No  DVT prophylaxis SCD or MOISES- No      Wounds: (If Applicable)     Wounds- No     Location      Patient Safety:     Falls Score Total Score: 3  Safety Level_______  Bed Alarm On? No  Sitter?  No     Plan for upcoming shift: echo, mri, pt/ot/slp           Discharge Plan: No      Active Consults:  IP CONSULT TO NEUROLOGY  IP CONSULT TO CARDIOLOGY

## 2019-11-22 NOTE — PROGRESS NOTES
Neurology Note    Patient ID:  Thuan Colon  [id number]  4 y.o.  1934      Date of Consultation:  November 22, 2019    Subjective: I am much less confused       History of Present Illness:   Thuan Colon is a 80 y.o. male who presented to the hospital  with confusion and agitation. He does have a prior history of hypertension, coronary artery disease and obstructive sleep apnea. He did have a mild fever reported by EMS. He did develop a tonic-clonic seizure activity while in the emergency department. A lumbar puncture was attempted in the emergency department and was unsuccessful. He was placed on empiric treatment for meningitis with acyclovir, ampicillin, ceftriaxone, and vancomycin. The patient has become much more alert and interactive. He denies any complaints today of numbness, tingling, or weakness. He was able to walk a bit earlier today.   His wife and daughter who are in the room do think he is also notably improved    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    Morbid obesity ([address]     On statin therapy 8/16/2017    TESSA on CPAP 1/3/2019    Psychiatric disorder     Depression    Simple chronic bronchitis ([address] 1/3/2019    TIA (transient ischemic attack) 7/73/6093    Umbilical hernia 78/8/9517        Past Surgical History:   Procedure Laterality Date   [facility]    Cardiac Stents    CARDIAC SURG PROCEDURE UNLIST  04/2019    EF 61-65%    COLONOSCOPY N/A 9/27/2019    COLONOSCOPY performed by America Bedoya MD at [facility] ENDOSCOPY    HX HERNIA REPAIR      [facility]    [address]  54/05/87    open umbilical hernia repair mesh    HX UROLOGICAL      HYDROCELECTOMY    UPPER GI ENDOSCOPY,BIOPSY  9/30/2019             Family History   Problem Relation Age of Onset    Heart Disease Mother     Hypertension Mother     Hypertension Father     Hypertension Sister     Hypertension Brother     Cancer Brother         Social History     Tobacco Use    Smoking status: Former Smoker     Packs/day: 0.50     Years: 20.00     Pack years: 10.00    Smokeless tobacco: Former User    Tobacco comment: quit about 40  years ago   / used to dip tobaco and dip snuff   Substance Use Topics    Alcohol use: Not Currently     Comment: \"Drinks very little now for about a month \"        Allergies   Allergen Reactions    Levaquin [Levofloxacin] Nausea and Vomiting     Reports anxiety, nausea, aching after taking levaquin    Ciprofloxacin Shortness of Breath    Quinolones Shortness of Breath        Prior to Admission medications    Medication Sig Start Date End Date Taking? Authorizing Provider   furosemide (LASIX) 80 mg tablet Take 80 mg by mouth every fourty-eight (48) hours. Patient alternates 80 mg and 40 mg every other day   Yes Provider, Historical   furosemide (LASIX) 80 mg tablet Take 40 mg by mouth every fourty-eight (48) hours. Patient alternates 80 mg and 40 mg every other day   Yes Provider, Historical   amLODIPine (NORVASC) 2.5 mg tablet Take 2.5 mg by mouth daily. Yes Provider, Historical   acetaminophen (TYLENOL) 500 mg tablet Take 1,000 mg by mouth every six (6) hours as needed for Pain. Yes Provider, Historical   aspirin delayed-release 81 mg tablet Take 81 mg by mouth daily. Yes Provider, Historical   polyethylene glycol (MIRALAX) 17 gram/dose powder Take 17 g by mouth daily as needed (constipation). Yes Provider, Historical   pantoprazole (PROTONIX) 40 mg tablet Take 1 Tab by mouth daily. 10/17/19  Yes Sukhjinder Donohue MD   DULoxetine (CYMBALTA) 30 mg capsule Take 1 Cap by mouth daily. 8/1/19  Yes Sukhjinder Donohue MD   atorvastatin (LIPITOR) 80 mg tablet TAKE ONE TABLET BY MOUTH DAILY 7/8/19  Yes Sukhjinder Donohue MD   atenolol (TENORMIN) 50 mg tablet Take 1 Tab by mouth daily. 7/1/19  Yes Zack Ibarra MD   ANORO ELLIPTA 62.5-25 mcg/actuation inhaler Take 1 Puff by inhalation daily.  12/26/18  Yes Provider, Historical     Current Facility-Administered Medications   Medication Dose Route Frequency    polyethylene glycol (MIRALAX) packet 17 g  17 g Oral DAILY    vancomycin (VANCOCIN) 1250 mg in  ml infusion  1,250 mg IntraVENous Q18H    melatonin tablet 10.5 mg  10.5 mg Oral QHS    hydrALAZINE (APRESOLINE) tablet 50 mg  50 mg Oral TID    amLODIPine (NORVASC) tablet 10 mg  10 mg Oral DAILY    isosorbide mononitrate ER (IMDUR) tablet 30 mg  30 mg Oral DAILY    aspirin chewable tablet 81 mg  81 mg Oral DAILY    haloperidol lactate (HALDOL) injection 2 mg  2 mg IntraVENous Q6H PRN    albuterol-ipratropium (DUO-NEB) 2.5 MG-0.5 MG/3 ML  3 mL Nebulization Q6H PRN    sodium chloride (NS) flush 5-40 mL  5-40 mL IntraVENous Q8H    sodium chloride (NS) flush 5-40 mL  5-40 mL IntraVENous PRN    acetaminophen (TYLENOL) tablet 650 mg  650 mg Oral Q4H PRN    ondansetron (ZOFRAN) injection 4 mg  4 mg IntraVENous Q4H PRN    atenolol (TENORMIN) tablet 50 mg  50 mg Oral DAILY    atorvastatin (LIPITOR) tablet 80 mg  80 mg Oral DAILY    DULoxetine (CYMBALTA) capsule 30 mg  30 mg Oral DAILY    pantoprazole (PROTONIX) tablet 40 mg  40 mg Oral DAILY    thiamine mononitrate (B-1) tablet 100 mg  100 mg Oral DAILY    sodium chloride (NS) flush 5-40 mL  5-40 mL IntraVENous Q8H    sodium chloride (NS) flush 5-40 mL  5-40 mL IntraVENous PRN    LORazepam (ATIVAN) injection 2 mg  2 mg IntraVENous Q5MIN PRN    LORazepam (ATIVAN) injection 1 mg  1 mg IntraVENous Q4H PRN    acyclovir (ZOVIRAX) 800 mg in 0.9% sodium chloride 250 mL IVPB  800 mg IntraVENous Q12H    ampicillin (OMNIPEN) 2 g in 0.9% sodium chloride (MBP/ADV) 100 mL  2 g IntraVENous Q6H    cefTRIAXone (ROCEPHIN) 2 g in 0.9% sodium chloride (MBP/ADV) 50 mL  2 g IntraVENous Q12H    influenza vaccine 2019-20 (6 mos+)(PF) (FLUARIX/FLULAVAL/FLUZONE QUAD) injection 0.5 mL  0.5 mL IntraMUSCular PRIOR TO DISCHARGE       Review of Systems:      General, constitutional: mild confusion, yet improved  Eyes, vision: negative  Ears, nose, throat: negative  Cardiovascular, heart: negative  Respiratory: negative  Gastrointestinal: negative  Genitourinary: negative  Musculoskeletal: negative  Skin and integumentary: negative  Psychiatric: negative  Endocrine: negative  Neurological: negative, except for HPI  Hematologic/lymphatic: negative  Allergy/immunology: negative  Objective:     Visit Vitals  /90   Pulse 83   Temp 97.4 °F (36.3 °C)   Resp 18   Wt 212 lb (96.2 kg)   SpO2 95%   BMI 32.23 kg/m²       Physical Exam:    General:  appears well nourished in no acute distress  Neck: no carotid bruits  Lungs: clear to auscultation  Heart:  no murmurs, regular rate  Lower extremity: peripheral pulses palpable and no edema  Skin: intact. No rashes noted. Neurological exam:    Awake, alert, oriented to person place and year. He does know that he is in the hospital.  He can say the days of the week and months of the year for me. He was able to perform simple calculations. He was able to follow one and two-step commands. He did recognize his wife and daughter in the room. He was unable to tell me his birthdate and his anniversary date.     His overall cognitive status is notably improved from 2 days prior    Cranial nerves:   II-XII were tested    Perrrla    Visual fields: He does blink to visual threat  Eomi, no evidence of nystagmus  Facial sensation:  normal and symmetric  Facial motor: normal and symmetric  Hearing diminished  SCM strength intact  Tongue: midline without fasciculations    Motor: Tone normal    No evidence of fasciculations    Strength:  He does appear to be 5 out of 5 throughout his upper and lower extremities. There is no clear focal abnormalities noted. Sensory:  Upper extremity: intact to pp  Lower extremity: intact to pp    Reflexes:    Right Left  Biceps  2 2  Triceps 2 2  Brachiorad. 2 2  Patella  1 1  Achilles 1 1    Plantar response:  flexor bilaterally      Cerebellar testing: His tremor is much less apparent than it was 2 days ago. There is a mild action tremor present    Labs:     Lab Results   Component Value Date/Time    Hemoglobin A1c 5.3 10/28/2019 02:17 AM    Sodium 142 11/21/2019 03:34 AM    Potassium 3.8 11/21/2019 03:34 AM    Chloride 109 (H) 11/21/2019 03:34 AM    Glucose 94 11/21/2019 03:34 AM    BUN 22 (H) 11/21/2019 03:34 AM    Creatinine 1.39 (H) 11/21/2019 03:34 AM    Calcium 8.2 (L) 11/21/2019 03:34 AM    WBC 6.4 11/21/2019 03:34 AM    HCT 33.0 (L) 11/21/2019 03:34 AM    HGB 10.0 (L) 11/21/2019 03:34 AM    PLATELET 787 11/96/7595 03:34 AM       Imaging:    Results from Hospital Encounter encounter on 11/18/19   MRI BRAIN WO CONT    Narrative EXAM: MRI BRAIN WO CONT    INDICATION: AMS    COMPARISON: None. CONTRAST: None. TECHNIQUE:    Single sequence of diffusion imaging was performed. The patient could not  tolerate further imaging. FINDINGS:  Diffusion imaging demonstrates no evidence of acute ischemia. Impression IMPRESSION:   Limited study. No evidence of acute ischemia. Results from [place] encounter on 11/18/19   CT HEAD WO CONT    Addendum Addendum:   Additional impression #3: Air-fluid level in the left maxillary sinus, consistent with acute sinusitis. Correlate clinically.       Kell José MD 11/18/2019  7:41 PM          Narrative EXAM: CT HEAD WO CONT    INDICATION: trauma, headache    COMPARISON: 10/27/2019. CONTRAST: None. TECHNIQUE: Unenhanced CT of the head was performed using 5 mm images. Brain and  bone windows were generated. CT dose reduction was achieved through use of a  standardized protocol tailored for this examination and automatic exposure  control for dose modulation. FINDINGS:  Generalized prominence of cerebral sulci and ventricles is increased but stable  and commensurate with age. . Periventricular white matter low-density is  moderately severe and diffuse, with punctate low density in the right basal  ganglia region, consistent with small lacunar infarctions, all stable. . There is  no intracranial hemorrhage, extra-axial collection, mass, mass effect or midline  shift. The basilar cisterns are open. No acute infarct is identified. The bone  windows demonstrate no abnormalities. Small air-fluid level in the left  maxillary sinus. Significant mucoperiosteal thickening in the sphenoid air  cells. .      Impression IMPRESSION:   1. No acute intracranial abnormality. 2. Microvascular ischemic, old ischemic and other stable age-related change. Assessment and Plan:     Patient is a 80-year-old gentleman with multiple medical problems who presents with altered mental status. His neurological examination is notable for poor cognition and a new tremor in his bilateral upper extremities. There was reportedly a convulsive event upon arrival to the ED. Encephalopathy:  The differential for this includes an acute encephalitis versus meningitis versus metabolic encephalopathy. This may be multifactorial.  A stepwise progression of a vascular dementia would also need to then be considered. He has continued to improve since hospitalization. His abbreviated MRI showed no acute diffusion restriction. His EEG revealed no focal abnormalities. I would recommend a full treatment course for viral encephalitis.     I do think a follow-up completed brain MRI with contrast could still show some enhancement but would also help if there was a stepwise progression of a possible vascular dementia. I would hold on any anticonvulsant medication at this time as his EEG was normal.  Treat mild metabolic disturbances  Vascular health:  He does have multiple risk factors for stroke including:  Hypertension, coronary artery disease, hyperlipidemia  Continue aspirin, aggressive blood pressure control, and statin therapy. Neurology will continue to follow along. I spent  35   minutes providing care to this  acutely ill inpatient with > 50% of the time counseling and assisting in the coordination of care of the patient on the patient's hospital floor/unit.   I spent considerable time talking to his wife and daughter today        Signed By:  Solitario Valentin DO FAAN    November 22, 2019

## 2019-11-23 ENCOUNTER — APPOINTMENT (OUTPATIENT)
Dept: MRI IMAGING | Age: 84
DRG: 099 | End: 2019-11-23
Attending: GENERAL ACUTE CARE HOSPITAL
Payer: MEDICARE

## 2019-11-23 LAB
ANION GAP SERPL CALC-SCNC: 4 MMOL/L (ref 5–15)
BUN SERPL-MCNC: 12 MG/DL (ref 6–20)
BUN/CREAT SERPL: 11 (ref 12–20)
CALCIUM SERPL-MCNC: 8.3 MG/DL (ref 8.5–10.1)
CHLORIDE SERPL-SCNC: 109 MMOL/L (ref 97–108)
CO2 SERPL-SCNC: 29 MMOL/L (ref 21–32)
CREAT SERPL-MCNC: 1.11 MG/DL (ref 0.7–1.3)
GLUCOSE SERPL-MCNC: 109 MG/DL (ref 65–100)
POTASSIUM SERPL-SCNC: 3.4 MMOL/L (ref 3.5–5.1)
SODIUM SERPL-SCNC: 142 MMOL/L (ref 136–145)

## 2019-11-23 PROCEDURE — 74011250637 HC RX REV CODE- 250/637: Performed by: INTERNAL MEDICINE

## 2019-11-23 PROCEDURE — 77030029684 HC NEB SM VOL KT MONA -A

## 2019-11-23 PROCEDURE — 74011000258 HC RX REV CODE- 258: Performed by: HOSPITALIST

## 2019-11-23 PROCEDURE — 74011250636 HC RX REV CODE- 250/636: Performed by: HOSPITALIST

## 2019-11-23 PROCEDURE — 94760 N-INVAS EAR/PLS OXIMETRY 1: CPT

## 2019-11-23 PROCEDURE — 74011250637 HC RX REV CODE- 250/637: Performed by: HOSPITALIST

## 2019-11-23 PROCEDURE — 3331090002 HH PPS REVENUE DEBIT

## 2019-11-23 PROCEDURE — 36415 COLL VENOUS BLD VENIPUNCTURE: CPT

## 2019-11-23 PROCEDURE — 80048 BASIC METABOLIC PNL TOTAL CA: CPT

## 2019-11-23 PROCEDURE — 3331090001 HH PPS REVENUE CREDIT

## 2019-11-23 PROCEDURE — 74011000250 HC RX REV CODE- 250: Performed by: HOSPITALIST

## 2019-11-23 PROCEDURE — 70553 MRI BRAIN STEM W/O & W/DYE: CPT

## 2019-11-23 PROCEDURE — 65660000000 HC RM CCU STEPDOWN

## 2019-11-23 PROCEDURE — 74011250636 HC RX REV CODE- 250/636: Performed by: GENERAL ACUTE CARE HOSPITAL

## 2019-11-23 PROCEDURE — 97165 OT EVAL LOW COMPLEX 30 MIN: CPT

## 2019-11-23 PROCEDURE — 74011250637 HC RX REV CODE- 250/637: Performed by: GENERAL ACUTE CARE HOSPITAL

## 2019-11-23 PROCEDURE — 97535 SELF CARE MNGMENT TRAINING: CPT

## 2019-11-23 RX ORDER — HALOPERIDOL 5 MG/ML
3 INJECTION INTRAMUSCULAR ONCE
Status: COMPLETED | OUTPATIENT
Start: 2019-11-23 | End: 2019-11-23

## 2019-11-23 RX ORDER — FUROSEMIDE 10 MG/ML
20 INJECTION INTRAMUSCULAR; INTRAVENOUS ONCE
Status: ACTIVE | OUTPATIENT
Start: 2019-11-23 | End: 2019-11-24

## 2019-11-23 RX ORDER — METOPROLOL TARTRATE 5 MG/5ML
5 INJECTION INTRAVENOUS
Status: DISCONTINUED | OUTPATIENT
Start: 2019-11-23 | End: 2019-11-25 | Stop reason: HOSPADM

## 2019-11-23 RX ORDER — FUROSEMIDE 40 MG/1
40 TABLET ORAL DAILY
Status: DISCONTINUED | OUTPATIENT
Start: 2019-11-23 | End: 2019-11-24

## 2019-11-23 RX ORDER — DOCUSATE SODIUM 100 MG/1
100 CAPSULE, LIQUID FILLED ORAL 2 TIMES DAILY
Status: DISCONTINUED | OUTPATIENT
Start: 2019-11-23 | End: 2019-11-25 | Stop reason: HOSPADM

## 2019-11-23 RX ORDER — ATENOLOL 25 MG/1
100 TABLET ORAL DAILY
Status: DISCONTINUED | OUTPATIENT
Start: 2019-11-23 | End: 2019-11-25 | Stop reason: HOSPADM

## 2019-11-23 RX ORDER — GADOTERATE MEGLUMINE 376.9 MG/ML
20 INJECTION INTRAVENOUS
Status: COMPLETED | OUTPATIENT
Start: 2019-11-23 | End: 2019-11-23

## 2019-11-23 RX ADMIN — ISOSORBIDE MONONITRATE 30 MG: 30 TABLET, EXTENDED RELEASE ORAL at 08:34

## 2019-11-23 RX ADMIN — HYDRALAZINE HYDROCHLORIDE 50 MG: 25 TABLET, FILM COATED ORAL at 17:35

## 2019-11-23 RX ADMIN — MELATONIN 10.5 MG: at 21:23

## 2019-11-23 RX ADMIN — ACETAMINOPHEN 650 MG: 325 TABLET ORAL at 19:35

## 2019-11-23 RX ADMIN — ONDANSETRON 4 MG: 2 INJECTION INTRAMUSCULAR; INTRAVENOUS at 10:40

## 2019-11-23 RX ADMIN — Medication 100 MG: at 08:34

## 2019-11-23 RX ADMIN — Medication 10 ML: at 03:53

## 2019-11-23 RX ADMIN — GADOTERATE MEGLUMINE 20 ML: 376.9 INJECTION INTRAVENOUS at 12:46

## 2019-11-23 RX ADMIN — Medication 10 ML: at 21:24

## 2019-11-23 RX ADMIN — PANTOPRAZOLE SODIUM 40 MG: 40 TABLET, DELAYED RELEASE ORAL at 08:33

## 2019-11-23 RX ADMIN — DULOXETINE HYDROCHLORIDE 30 MG: 30 CAPSULE, DELAYED RELEASE ORAL at 08:33

## 2019-11-23 RX ADMIN — ASPIRIN 81 MG 81 MG: 81 TABLET ORAL at 08:33

## 2019-11-23 RX ADMIN — ACYCLOVIR SODIUM 800 MG: 50 INJECTION, SOLUTION INTRAVENOUS at 03:40

## 2019-11-23 RX ADMIN — CEFTRIAXONE SODIUM 2 G: 2 INJECTION, POWDER, FOR SOLUTION INTRAMUSCULAR; INTRAVENOUS at 13:39

## 2019-11-23 RX ADMIN — AMPICILLIN SODIUM 2 G: 2 INJECTION, POWDER, FOR SOLUTION INTRAMUSCULAR; INTRAVENOUS at 09:08

## 2019-11-23 RX ADMIN — AMLODIPINE BESYLATE 10 MG: 5 TABLET ORAL at 08:33

## 2019-11-23 RX ADMIN — HYDRALAZINE HYDROCHLORIDE 50 MG: 25 TABLET, FILM COATED ORAL at 21:24

## 2019-11-23 RX ADMIN — VANCOMYCIN HYDROCHLORIDE 1250 MG: 10 INJECTION, POWDER, LYOPHILIZED, FOR SOLUTION INTRAVENOUS at 16:00

## 2019-11-23 RX ADMIN — AMPICILLIN SODIUM 2 G: 2 INJECTION, POWDER, FOR SOLUTION INTRAMUSCULAR; INTRAVENOUS at 20:41

## 2019-11-23 RX ADMIN — Medication 10 ML: at 14:00

## 2019-11-23 RX ADMIN — HYDRALAZINE HYDROCHLORIDE 50 MG: 25 TABLET, FILM COATED ORAL at 08:33

## 2019-11-23 RX ADMIN — AMPICILLIN SODIUM 2 G: 2 INJECTION, POWDER, FOR SOLUTION INTRAMUSCULAR; INTRAVENOUS at 02:44

## 2019-11-23 RX ADMIN — ATORVASTATIN CALCIUM 80 MG: 40 TABLET, FILM COATED ORAL at 08:33

## 2019-11-23 RX ADMIN — POLYETHYLENE GLYCOL (3350) 17 G: 17 POWDER, FOR SOLUTION ORAL at 08:31

## 2019-11-23 RX ADMIN — ACYCLOVIR SODIUM 800 MG: 50 INJECTION, SOLUTION INTRAVENOUS at 15:32

## 2019-11-23 RX ADMIN — FUROSEMIDE 40 MG: 40 TABLET ORAL at 19:41

## 2019-11-23 RX ADMIN — AMPICILLIN SODIUM 2 G: 2 INJECTION, POWDER, FOR SOLUTION INTRAMUSCULAR; INTRAVENOUS at 13:39

## 2019-11-23 RX ADMIN — CEFTRIAXONE SODIUM 2 G: 2 INJECTION, POWDER, FOR SOLUTION INTRAMUSCULAR; INTRAVENOUS at 00:59

## 2019-11-23 RX ADMIN — HALOPERIDOL LACTATE 3 MG: 5 INJECTION INTRAMUSCULAR at 11:49

## 2019-11-23 RX ADMIN — METOPROLOL TARTRATE 5 MG: 5 INJECTION INTRAVENOUS at 06:35

## 2019-11-23 RX ADMIN — ATENOLOL 100 MG: 25 TABLET ORAL at 08:33

## 2019-11-23 NOTE — PROGRESS NOTES
Occupational Therapy    Orders received, chart reviewed, cleared by RN. Seen for OT evaluation, family present and hopefully for discharge back home with family assist, recommend return home with 24/7 supervision and high guard for fall prevention. Formal note to follow,    Minnie Fulton, MS OTR/L

## 2019-11-23 NOTE — PROGRESS NOTES
Problem: Falls - Risk of  Goal: *Absence of Falls  Description  Document Guerrero Rodrigues Fall Risk and appropriate interventions in the flowsheet. Outcome: Progressing Towards Goal  Note: Fall Risk Interventions:  Mobility Interventions: Bed/chair exit alarm, OT consult for ADLs, Patient to call before getting OOB, PT Consult for mobility concerns, PT Consult for assist device competence    Mentation Interventions: Adequate sleep, hydration, pain control, Bed/chair exit alarm, Door open when patient unattended, Evaluate medications/consider consulting pharmacy, Gait belt with transfers/ambulation, More frequent rounding, Reorient patient, Toileting rounds, Update white board    Medication Interventions: Bed/chair exit alarm, Assess postural VS orthostatic hypotension, Evaluate medications/consider consulting pharmacy, Patient to call before getting OOB, Teach patient to arise slowly, Utilize gait belt for transfers/ambulation    Elimination Interventions: Bed/chair exit alarm, Call light in reach, Patient to call for help with toileting needs, Stay With Me (per policy), Toilet paper/wipes in reach, Toileting schedule/hourly rounds, Urinal in reach              Problem: Patient Education: Go to Patient Education Activity  Goal: Patient/Family Education  Outcome: Progressing Towards Goal     Problem: Pressure Injury - Risk of  Goal: *Prevention of pressure injury  Description  Document Aquiles Scale and appropriate interventions in the flowsheet. Outcome: Progressing Towards Goal  Note: Pressure Injury Interventions:  Sensory Interventions: Assess changes in LOC, Assess need for specialty bed, Avoid rigorous massage over bony prominences, Chair cushion, Check visual cues for pain, Discuss PT/OT consult with provider, Keep linens dry and wrinkle-free, Float heels, Maintain/enhance activity level, Minimize linen layers, Turn and reposition approx.  every two hours (pillows and wedges if needed)    Moisture Interventions: Absorbent underpads, Apply protective barrier, creams and emollients, Assess need for specialty bed, Check for incontinence Q2 hours and as needed, Limit adult briefs, Maintain skin hydration (lotion/cream), Minimize layers, Moisture barrier    Activity Interventions: Increase time out of bed, PT/OT evaluation    Mobility Interventions: Assess need for specialty bed, Chair cushion, PT/OT evaluation, Turn and reposition approx. every two hours(pillow and wedges), HOB 30 degrees or less, Float heels    Nutrition Interventions: Document food/fluid/supplement intake    Friction and Shear Interventions: Apply protective barrier, creams and emollients, Feet elevated on foot rest, Minimize layers                Problem: Patient Education: Go to Patient Education Activity  Goal: Patient/Family Education  Outcome: Progressing Towards Goal     Problem: Seizure Disorder (Adult)  Goal: *STG: Remains free of seizure activity  Outcome: Progressing Towards Goal  Goal: *STG: Maintains lab values within therapeutic range  Outcome: Progressing Towards Goal  Goal: *STG/LTG: Complies with medication therapy  Outcome: Progressing Towards Goal  Goal: *STG: Remains free of injury during seizure activity  Outcome: Progressing Towards Goal  Goal: *STG: Remains safe in hospital  Outcome: Progressing Towards Goal  Goal: Interventions  Outcome: Progressing Towards Goal     Problem: Patient Education: Go to Patient Education Activity  Goal: Patient/Family Education  Outcome: Progressing Towards Goal     Problem: Risk for Spread of Infection  Goal: Prevent transmission of infectious organism to others  Description  Prevent the transmission of infectious organisms to other patients, staff members, and visitors.   Outcome: Progressing Towards Goal     Problem: Patient Education:  Go to Education Activity  Goal: Patient/Family Education  Outcome: Progressing Towards Goal     Problem: Breathing Pattern - Ineffective  Goal: *Absence of hypoxia  Outcome: Progressing Towards Goal     Problem: Patient Education: Go to Patient Education Activity  Goal: Patient/Family Education  Outcome: Progressing Towards Goal     Problem: Patient Education: Go to Patient Education Activity  Goal: Patient/Family Education  Outcome: Progressing Towards Goal

## 2019-11-23 NOTE — PROGRESS NOTES
Patient BP elevated. No PRN BP medications available, contacted tele-hospitalist.  Metropolol ordered and given with no change to BP.

## 2019-11-23 NOTE — PROGRESS NOTES
Bedside and Verbal shift change report given to Ricarda (oncoming nurse) by Simone (offgoing nurse). Report included the following information SBAR, Kardex, Recent Results and Med Rec Status.     Zone Phone:   [phone]        Significant changes during shift:    patient continues hydralazine,  hospitalist contacted in regards to continued high SBP. Patient received multiple rounds of ABX.                  Patient Information     Han Corcoran  80 y.o.  11/18/2019  6:47 PM by Antonieta Sanchez MD. Han Corcoran was admitted from [address] [place] [address] [address]  Problem List          Patient Active Problem List     Diagnosis Date Noted    Altered mental state 11/19/2019    Facial droop 10/27/2019    Acute blood loss anemia 09/29/2019    Rectal bleeding 09/23/2019    Pneumonia 09/23/2019    Severe sepsis ([address] 09/23/2019    Flank pain 08/01/2019    Spinal stenosis of lumbar region at multiple levels 05/13/2019    Bilateral carotid artery stenosis 04/12/2019    Severe obesity ([address] 01/03/2019    TESSA on CPAP 01/03/2019    Simple chronic bronchitis ([address] 01/03/2019    Fatigue 08/16/2017    CAD (coronary artery disease) 08/16/2017    Dyslipidemia 08/16/2017    On statin therapy 08/16/2017    Gout 08/16/2017    GERD (gastroesophageal reflux disease) 08/16/2017    TIA (transient ischemic attack) 08/16/2017    Dyspnea 08/16/2017    Colon polyps 08/16/2017    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    Hypertension 08/16/2017    ED (erectile dysfunction) 08/16/2017    Hypoxia 04/23/2015    Neurogenic claudication 04/23/2015      Past Medical History:   Diagnosis Date    Adverse effect of anesthesia       combative after anesthesia    Alcohol abuse       6 beers/day    Arthritis      CAD (coronary artery disease)      Chronic obstructive pulmonary disease ([address]      Dyslipidemia 8/16/2017    Dyspepsia and other specified disorders of function of stomach      Dyspnea 8/16/2017    ED (erectile dysfunction) 8/16/2017  Encounter for immunization 8/16/2017    Fatigue 8/16/2017    Flank pain 8/1/2019    GERD (gastroesophageal reflux disease) 8/16/2017    Gout 8/16/2017    Hypertension      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    Morbid obesity ([email or web address]      On statin therapy 8/16/2017    TESSA on CPAP 1/3/2019    Psychiatric disorder       Depression    Simple chronic [facility]itis [facility] 1/3/2019    TIA (transient ischemic attack) 4/11/0666    Umbilical hernia 76/5/2401            Core Measures:     CVA: Yes Yes  CHF:No No  PNA:No No     Activity Status:     OOB to Chair No  Ambulated this shift No   Bed Rest No     Supplemental O2: (If Applicable)     NC No  NRB No  Venti-mask No  On  Liters/min        LINES AND DRAINS:     Central Line? No Placement date  Reason Medically Necessary      PICC LINE? No Placement date Reason Medically Necessary      Urinary Catheter? No Placement Date  Reason Medically Necessary      DVT prophylaxis:     DVT prophylaxis Med- No  DVT prophylaxis SCD or MOISES- No      Wounds: (If Applicable)     Wounds- No     Location      Patient Safety:     Falls Score Total Score: 3  Safety Level_______  Bed Alarm On? No  Sitter? No     Plan for upcoming shift:   Possible MRI?    ABX therapy   BP management  Safety            Discharge Plan: No      Active Consults:  IP CONSULT TO NEUROLOGY  IP CONSULT TO CARDIOLOGY

## 2019-11-23 NOTE — PROGRESS NOTES
NEUROLOGY NOTE     Chief Complaint   Patient presents with    Altered mental status       Subjective  Pt has improved significantly    Pt presented with mild fever, alt mental status and did have a seizure in ER. MRI brain and EEG were normal.       ROS:  A ten system review of constitutional, cardiovascular, respiratory, musculoskeletal, endocrine, skin, SHEENT, genitourinary, psychiatric and neurologic systems was obtained and is unremarkable except as stated in HPI     EXAMINATION:   Patient Vitals for the past 24 hrs:   Temp Pulse Resp BP SpO2   11/23/19 0819 97.9 °F (36.6 °C) 64 18 (!) 201/98 95 %   11/23/19 0600    (!) 186/92    11/23/19 0430    187/84    11/23/19 0424 97.7 °F (36.5 °C) 80 16 (!) 195/92 97 %   11/22/19 2321 97.9 °F (36.6 °C) 75 17 163/67 93 %   11/22/19 1538 97.5 °F (36.4 °C) 62 18 144/68 91 %        General:   General appearance: Pt is in no acute distress   Distal pulses are preserved    Neurological Examination:   Mental Status: AAO x3. Speech is fluent. Follows commands, has normal fund of knowledge, attention, short term recall, comprehension and insight. Cranial Nerves: Visual fields are full. PERRL, Extraocular movements are full. Facial sensation intact. Facial movement intact. Hearing intact to conversation. Palate elevates symmetrically. Shoulder shrug symmetric. Tongue midline. Motor: Strength is 5/5 in all 4 ext. Normal tone. No atrophy. Sensation: Light touch - Normal    Coordination/Cerebellar: Intact to finger-nose-finger     Skin: No significant bruising or lacerations.     LAB DATA REVIEWED:    Recent Results (from the past 24 hour(s))   METABOLIC PANEL, BASIC    Collection Time: 11/23/19  2:47 AM   Result Value Ref Range    Sodium 142 136 - 145 mmol/L    Potassium 3.4 (L) 3.5 - 5.1 mmol/L    Chloride 109 (H) 97 - 108 mmol/L    CO2 29 21 - 32 mmol/L    Anion gap 4 (L) 5 - 15 mmol/L    Glucose 109 (H) 65 - 100 mg/dL    BUN 12 6 - 20 MG/DL    Creatinine 1.11 0.70 - 1.30 MG/DL    BUN/Creatinine ratio 11 (L) 12 - 20      GFR est AA >60 >60 ml/min/1.73m2    GFR est non-AA >60 >60 ml/min/1.73m2    Calcium 8.3 (L) 8.5 - 10.1 MG/DL        Last Lipid:    Lab Results   Component Value Date/Time    LDL, calculated 82 10/28/2019 02:17 AM     HbA1c:   Lab Results   Component Value Date/Time    Hemoglobin A1c 5.3 10/28/2019 02:17 AM       Imaging Review  11/19/2019  MRI brain without contrast  No evidence of acute infarction    EEG  Normal      MEDICATIONS:  Current Facility-Administered Medications   Medication Dose Route Frequency    atenolol (TENORMIN) tablet 100 mg  100 mg Oral DAILY    docusate sodium (COLACE) capsule 100 mg  100 mg Oral BID    [START ON 11/24/2019] Vancomycin Trough  1 Each Other ONCE    polyethylene glycol (MIRALAX) packet 17 g  17 g Oral DAILY    vancomycin (VANCOCIN) 1250 mg in  ml infusion  1,250 mg IntraVENous Q18H    melatonin tablet 10.5 mg  10.5 mg Oral QHS    hydrALAZINE (APRESOLINE) tablet 50 mg  50 mg Oral TID    amLODIPine (NORVASC) tablet 10 mg  10 mg Oral DAILY    isosorbide mononitrate ER (IMDUR) tablet 30 mg  30 mg Oral DAILY    aspirin chewable tablet 81 mg  81 mg Oral DAILY    sodium chloride (NS) flush 5-40 mL  5-40 mL IntraVENous Q8H    atorvastatin (LIPITOR) tablet 80 mg  80 mg Oral DAILY    DULoxetine (CYMBALTA) capsule 30 mg  30 mg Oral DAILY    pantoprazole (PROTONIX) tablet 40 mg  40 mg Oral DAILY    thiamine mononitrate (B-1) tablet 100 mg  100 mg Oral DAILY    sodium chloride (NS) flush 5-40 mL  5-40 mL IntraVENous Q8H    acyclovir (ZOVIRAX) 800 mg in 0.9% sodium chloride 250 mL IVPB  800 mg IntraVENous Q12H    ampicillin (OMNIPEN) 2 g in 0.9% sodium chloride (MBP/ADV) 100 mL  2 g IntraVENous Q6H    cefTRIAXone (ROCEPHIN) 2 g in 0.9% sodium chloride (MBP/ADV) 50 mL  2 g IntraVENous Q12H    influenza vaccine 2019-20 (6 mos+)(PF) (FLUARIX/FLULAVAL/FLUZONE QUAD) injection 0.5 mL  0.5 mL IntraMUSCular PRIOR TO DISCHARGE       Assessment/Plan  Alix Morgan is a 80 y.o. male who presented to the hospital for management of alt mental status and had a seizure as well. MRI brain and EEG are normal.     - Cont Abx course as there has been improvement of symptoms.   - Seizure precautions.          Signed:  Janelle Hines MD  Neurologist

## 2019-11-23 NOTE — PROGRESS NOTES
**Consult Information**  Member Facility: [facility] Tammie Sanchez MRN: [id number]  Consult ID: [id number]  Facility Time Zone: ET  Date and Time of Consult: 11/23/2019 06:04:01 AM  Requesting Clinician: Rolando Slater  Patient Name: Clifford Lyman  YOB: 1934  Gender: Male    **Clinical Note**  Clinical Note: BP appears to be markedly elevated with HR > 100.     - increase dose of atenolol to 100 mg daily. - prn IV lopressor as well for BP control. **Attestation**  Interaction Mode: Electronic Interaction  Interaction Attestation: Interprofessional internet consultation was delivered through telephonic and/ or electronic communication upon the request of the patients treating physician, while the requesting and the rendering provider were not in the same physical location. Written report was provided to the requesting provider.   Evaluation Duration (mins): 3

## 2019-11-23 NOTE — PROGRESS NOTES
MRI PENDING      Mri screening sheet needs to be completed and signed    Call [phone] when this is done

## 2019-11-23 NOTE — PROGRESS NOTES
Hospitalist Progress Note    NAME: Betty Moses   :  1934   MRN:  [id number]       Assessment / Plan:  AMS - improving   Continue telemetry monitoring  Pt afebrile for the past 24 hours. Family continues to refuse LP - however this may change as pt's daughter is returning back from her trip today and will be at the hospital tomorrow. In the interim, will continue with IV abx. Brain MRI w/o contrast was done [facility] ordered with and without but order was cancelled by someone] - limited study that did not show any acute infarct  Neurology evals appareciated    :  Pt's mental status has improved this morning - hopeful that this will continue. Will continue IV abx    :  MS markedly improved over the past 24 hours. Family state that he has significant anxiety. Unclear whether this was meningitis in the first place ? Will complete course on .    :  Brain MRI w/ contrast: IMPRESSION:  1. Evaluation is limited by motion. No acute intracranial abnormality. 2. Unchanged generalized parenchymal volume loss and moderate chronic microvascular ischemic disease. Therefore no clear evidence of encephalitis noted   Will continue abx till Monday. Gram positive bacteremia - likely contaminant  Unclear if contaminant, 2 bottles flagged thus far, gram positive cocci growing in 1/4  Continue IV abx for now  Repeat Bcx tmr Am    :  Repeat BCx in the AM  BCx still prelim gram positive cocci, now in 2/4 bottles    :  Coag negative staph - likely contaminant. Repeat Cx NGTD    :  Repeat Cx NGTD 1 day    Transient episode of CP  Elevated troponin  Cardiology evals noted - medical management for now  Trend troponins until downtrending    KATHY - improving  Continue to monitor    History of alcohol abuse: reportedly quit 3 months ago  Elevated ammonia  LFTs wnl  Ammonia 53    HTN  CAD  HLD  TESSA  Resume home meds    30.0 - 39.9 Obese / Body mass index is 32.23 kg/m².     Code status: Full  Prophylaxis: SCD's  Recommended Disposition: TBD     Subjective:     Chief Complaint / Reason for Physician Visit  Pt endorses feeling well today. Denies any complaints. Updated family at bedside. Discussed with RN events overnight. Review of Systems:  Symptom Y/N Comments  Symptom Y/N Comments   Fever/Chills n   Chest Pain n    Poor Appetite n   Edema     Cough n   Abdominal Pain n    Sputum    Joint Pain     SOB/MARES n   Pruritis/Rash     Nausea/vomit    Tolerating PT/OT y    Diarrhea    Tolerating Diet y    Constipation    Other       Could NOT obtain due to:      Objective:     VITALS:   Last 24hrs VS reviewed since prior progress note. Most recent are:  Patient Vitals for the past 24 hrs:   Temp Pulse Resp BP SpO2   11/23/19 1331 97.2 °F (36.2 °C) 63 18 154/79 93 %   11/23/19 0819 97.9 °F (36.6 °C) 64 18 (!) 201/98 95 %   11/23/19 0600    (!) 186/92    11/23/19 0430    187/84    11/23/19 0424 97.7 °F (36.5 °C) 80 16 (!) 195/92 97 %   11/22/19 2321 97.9 °F (36.6 °C) 75 17 163/67 93 %   11/22/19 1538 97.5 °F (36.4 °C) 62 18 144/68 91 %       Intake/Output Summary (Last 24 hours) at 11/23/2019 1510  Last data filed at 11/23/2019 0340  Gross per 24 hour   Intake    Output 400 ml   Net -400 ml        PHYSICAL EXAM:  General: Alert, cooperative, no acute distress    EENT:  EOMI. Anicteric sclerae. MMM  Resp:  CTA bilaterally, no wheezing or rales. No accessory muscle use  CV:  Regular  rhythm,  No edema  GI:  Soft, Non distended, Non tender.  +Bowel sounds  Neurologic:  Alert and oriented X person and place, normal speech, follows commands  Psych:   Poor insight  Skin:  No rashes.   No jaundice    Reviewed most current lab test results and cultures  YES  Reviewed most current radiology test results   YES  Review and summation of old records today    NO  Reviewed patient's current orders and MAR    YES  PMH/SH reviewed - no change compared to H&P  ________________________________________________________________________  Care Plan discussed with:    Comments   Patient x    Family  x    RN x    Care Manager x    Consultant  x                      Multidiciplinary team rounds were held today with , nursing, pharmacist and clinical coordinator. Patient's plan of care was discussed; medications were reviewed and discharge planning was addressed. ________________________________________________________________________  Total NON critical care TIME:  25   Minutes    Total CRITICAL CARE TIME Spent:   Minutes non procedure based      Comments   >50% of visit spent in counseling and coordination of care     ________________________________________________________________________  Juanita Villafana MD     Procedures: see electronic medical records for all procedures/Xrays and details which were not copied into this note but were reviewed prior to creation of Plan. LABS:  I reviewed today's most current labs and imaging studies.   Pertinent labs include:  Recent Labs     11/21/19  0334   WBC 6.4   HGB 10.0*   HCT 33.0*        Recent Labs     11/23/19  0247 11/21/19  0334    142   K 3.4* 3.8   * 109*   CO2 29 27   * 94   BUN 12 22*   CREA 1.11 1.39*   CA 8.3* 8.2*   MG  --  2.1   PHOS  --  4.2       Signed: Juanita Villafana MD

## 2019-11-23 NOTE — PROGRESS NOTES
Problem: Falls - Risk of  Goal: *Absence of Falls  Description  Document Krystal Bales Fall Risk and appropriate interventions in the flowsheet. Outcome: Progressing Towards Goal  Note: Fall Risk Interventions:  Mobility Interventions: Bed/chair exit alarm, Patient to call before getting OOB    Mentation Interventions: Adequate sleep, hydration, pain control, Bed/chair exit alarm    Medication Interventions: Bed/chair exit alarm, Patient to call before getting OOB, Teach patient to arise slowly    Elimination Interventions: Bed/chair exit alarm, Call light in reach              Problem: Patient Education: Go to Patient Education Activity  Goal: Patient/Family Education  Outcome: Progressing Towards Goal     Problem: Pressure Injury - Risk of  Goal: *Prevention of pressure injury  Description  Document Aquiles Scale and appropriate interventions in the flowsheet.   Outcome: Progressing Towards Goal  Note: Pressure Injury Interventions:  Sensory Interventions: Keep linens dry and wrinkle-free, Maintain/enhance activity level, Minimize linen layers, Check visual cues for pain, Monitor skin under medical devices    Moisture Interventions: Absorbent underpads    Activity Interventions: PT/OT evaluation    Mobility Interventions: PT/OT evaluation, Pressure redistribution bed/mattress (bed type)    Nutrition Interventions: Document food/fluid/supplement intake, Offer support with meals,snacks and hydration    Friction and Shear Interventions: Apply protective barrier, creams and emollients, Feet elevated on foot rest, Minimize layers                Problem: Patient Education: Go to Patient Education Activity  Goal: Patient/Family Education  Outcome: Progressing Towards Goal     Problem: Seizure Disorder (Adult)  Goal: *STG: Remains free of seizure activity  Outcome: Progressing Towards Goal  Goal: *STG: Maintains lab values within therapeutic range  Outcome: Progressing Towards Goal  Goal: *STG/LTG: Complies with medication therapy  Outcome: Progressing Towards Goal  Goal: *STG: Remains free of injury during seizure activity  Outcome: Progressing Towards Goal  Goal: *STG: Remains safe in hospital  Outcome: Progressing Towards Goal  Goal: Interventions  Outcome: Progressing Towards Goal     Problem: Patient Education: Go to Patient Education Activity  Goal: Patient/Family Education  Outcome: Progressing Towards Goal     Problem: Risk for Spread of Infection  Goal: Prevent transmission of infectious organism to others  Description  Prevent the transmission of infectious organisms to other patients, staff members, and visitors.   Outcome: Progressing Towards Goal     Problem: Patient Education:  Go to Education Activity  Goal: Patient/Family Education  Outcome: Progressing Towards Goal     Problem: Breathing Pattern - Ineffective  Goal: *Absence of hypoxia  Outcome: Progressing Towards Goal     Problem: Patient Education: Go to Patient Education Activity  Goal: Patient/Family Education  Outcome: Progressing Towards Goal

## 2019-11-23 NOTE — PROGRESS NOTES
Bedside and Verbal shift change report given to Sahil Araujo (oncoming nurse) by Simone (offgoing nurse).  Report included the following information SBAR, Kardex, Recent Results and Med Rec Status.     Zone Phone:   [phone]        Significant changes during shift:   Patient continues hydralazine, patient received multiple rounds of ABX, edema in legs has gotten worse due to not taking lasix (MD was notified and patient was restarted on lasix), MRI completed     Patient Information     Han Corcoran  80 y.o.  11/18/2019  6:47 PM by Antonieta Sanchez MD. Han Corcoran was admitted from [address]  Problem List          Patient Active Problem List     Diagnosis Date Noted    Altered mental state 11/19/2019    Facial droop 10/27/2019    Acute blood loss anemia 09/29/2019    Rectal bleeding 09/23/2019    Pneumonia 09/23/2019    Severe sepsis ([address] 09/23/2019    Flank pain 08/01/2019    Spinal stenosis of lumbar region at multiple levels 05/13/2019    Bilateral carotid artery stenosis 04/12/2019    Severe obesity ([address] 01/03/2019    TESSA on CPAP 01/03/2019    Simple chronic bronchitis ([address] 01/03/2019    Fatigue 08/16/2017    CAD (coronary artery disease) 08/16/2017    Dyslipidemia 08/16/2017    On statin therapy 08/16/2017    Gout 08/16/2017    GERD (gastroesophageal reflux disease) 08/16/2017    TIA (transient ischemic attack) 08/16/2017    Dyspnea 08/16/2017    Colon polyps 08/16/2017    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    Hypertension 08/16/2017    ED (erectile dysfunction) 08/16/2017    Hypoxia 04/23/2015    Neurogenic claudication 04/23/2015      Past Medical History:   Diagnosis Date    Adverse effect of anesthesia       combative after anesthesia    Alcohol abuse       6 beers/day    Arthritis      CAD (coronary artery disease)      Chronic obstructive pulmonary disease ([address]      Dyslipidemia 8/16/2017    Dyspepsia and other specified disorders of function of stomach      Dyspnea 8/16/2017    ED (erectile dysfunction) 8/16/2017    Encounter for immunization 8/16/2017    Fatigue 8/16/2017    Flank pain 8/1/2019    GERD (gastroesophageal reflux disease) 8/16/2017    Gout 8/16/2017    Hypertension      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    Morbid obesity ([facility]      On statin therapy 8/16/2017    TESSA on CPAP 1/3/2019    Psychiatric disorder       Depression    Simple chronic bronchitis ([facility] 1/3/2019    TIA (transient ischemic attack) 6/43/2423    Umbilical hernia 75/2/9176      Core Measures:     CVA: Yes Yes  CHF:No No  PNA:No No     Activity Status:     OOB to Chair Yes  Ambulated this shift Yes  Bed Rest No     Supplemental O2: (If Applicable)     NC No  NRB No  Venti-mask No  On  Liters/min     LINES AND DRAINS:     Central Line? No Placement date  Reason Medically Necessary      PICC LINE? No Placement date Reason Medically Necessary      Urinary Catheter? No Placement Date  Reason Medically Necessary      DVT prophylaxis:     DVT prophylaxis Med- No  DVT prophylaxis SCD or MOISES- Yes      Wounds: (If Applicable)     Wounds- No     Location      Patient Safety:     Falls Score Total Score: 3  Safety Level_______  Bed Alarm On? No  Sitter?  No     Plan for upcoming shift:   ABX therapy   BP management  Safety         Discharge Plan: No      Active Consults:  IP CONSULT TO NEUROLOGY  IP CONSULT TO CARDIOLOGY

## 2019-11-23 NOTE — PROGRESS NOTES
Problem: Self Care Deficits Care Plan (Adult)  Goal: *Acute Goals and Plan of Care (Insert Text)  Description    FUNCTIONAL STATUS PRIOR TO ADMISSION: Patient was modified independent using a single point cane for functional mobility, setup for ADls and bathing at times. HOME SUPPORT: The patient lived with family member (grand-daughter), wife at home but also needs assist.    Occupational Therapy Goals  Initiated 11/23/2019  1. Patient will perform grooming standing at sink with supervision/set-up within 7 day(s). 2.  Patient will perform upper body dressing with supervision/set-up within 7 day(s). 3.  Patient will perform lower body dressing with supervision/set-up within 7 day(s). 4.  Patient will perform toilet transfers with supervision/set-up within 7 day(s). 5.  Patient will perform all aspects of toileting with supervision/set-up within 7 day(s). 6.  Patient will participate in upper extremity therapeutic exercise/activities with independence for 10 minutes within 7 day(s). 7.  Patient will utilize energy conservation techniques during functional activities with verbal cues within 7 day(s). Outcome: Progressing Towards Goal   OCCUPATIONAL THERAPY EVALUATION  Patient: Elizabeth Coulter (60 y.o. male)  Date: 11/23/2019  Primary Diagnosis: Altered mental state [R41.82]        Precautions:   Contact, Fall    ASSESSMENT  Based on the objective data described below, the patient presents with impaired balance, safety awareness and insight into deficits with impulsive behavior and fall risk during session with HHA x1, no strength or coordination deficits in B UE or vision noted, family present encouraging therapy, below baseline for mod I at home living with family member and wife, has assist with IADLs and some ADls as needed. Most likely near baseline of AMS continues to clear, MRI pending. .    Current Level of Function Impacting Discharge (ADLs/self-care): min A for LB ADLs to SBA for standing ADls and bathroom due to impulsive behavior at times    Functional Outcome Measure: The patient scored Total: 45/100 on the Barthel Index outcome measure which is indicative of 55% impaired ability to care for basic self needs/dependency on others; inferred 100% dependency on others for instrumental ADLs. Other factors to consider for discharge: lives with family members, very hard of hearing, wife present but also with cognition deficits     Patient will benefit from skilled therapy intervention to address the above noted impairments. PLAN :  Recommendations and Planned Interventions: self care training, functional mobility training, therapeutic exercise, balance training, therapeutic activities, endurance activities, patient education, and home safety training    Frequency/Duration: Patient will be followed by occupational therapy 3 times a week to address goals. Recommendation for discharge: (in order for the patient to meet his/her long term goals)  No skilled occupational therapy/ follow up rehabilitation needs identified at this time. This discharge recommendation:  A follow-up discussion with the attending provider and/or case management is planned    IF patient discharges home will need the following DME: to be determined (TBD) and may need shower chair or RW       SUBJECTIVE:   Patient stated I am just fine. 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    Morbid obesity ([facility]     On statin therapy 8/16/2017    TESSA on CPAP 1/3/2019    Psychiatric disorder     Depression    Simple chronic bronchitis ([facility] 1/3/2019    TIA (transient ischemic attack) 0/95/8950    Umbilical hernia 59/9/8035     Past Surgical History:   Procedure Laterality Date    CARDIAC SURG PROCEDURE UNLIST  1996    Cardiac Stents    CARDIAC SURG PROCEDURE UNLIST  04/2019    EF 61-65%    COLONOSCOPY N/A 9/27/2019    COLONOSCOPY performed by Cassandra Wheeler MD at [address]  47/31/17    open umbilical hernia repair mesh    HX UROLOGICAL      HYDROCELECTOMY    UPPER GI ENDOSCOPY,BIOPSY  9/30/2019            Expanded or extensive additional review of patient history:     Home Situation  Home Environment: Private residence  # Steps to Enter: 1  Rails to Enter: No  One/Two Story Residence: One story  Living Alone: No  Support Systems: Family member(s)  Patient Expects to be Discharged to[de-identified] Private residence  Current DME Used/Available at Home: [address], [address], [address] [facility], [place], quad, Shower chair, Grab bars    Hand dominance: Right    EXAMINATION OF PERFORMANCE DEFICITS:  Cognitive/Behavioral Status:  Neurologic State: Alert  Orientation Level: Oriented X4  Cognition: Follows commands        Safety/Judgement: Decreased insight into deficits; Decreased awareness of need for assistance;Decreased awareness of need for safety;Decreased awareness of environment    Skin: intact    Edema: none noted    Hearing: Auditory  Auditory Impairment: None    Vision/Perceptual:                                Corrective Lenses: Reading glasses    Range of Motion:  B UE  AROM: Generally decreased, functional                         Strength:  B UE 5/5  Strength: Generally decreased, functional                Coordination:  Coordination: Within functional limits  Fine Motor Skills-Upper: Left Intact; Right Intact         Tone & Sensation:  B UE  normal                            Balance:  Sitting: Intact  Standing: Impaired  Standing - Static: Constant support;Good  Standing - Dynamic : Fair;Unsupported    Functional Mobility and Transfers for ADLs:  Bed Mobility:  Supine to Sit: Supervision    Transfers:  Sit to Stand: Contact guard assistance  Stand to Sit: Stand-by assistance  Bathroom Mobility: Stand-by assistance  Toilet Transfer : Stand-by assistance    ADL Assessment:  Feeding: Independent    Oral Facial Hygiene/Grooming: Setup    Bathing: Minimum assistance    Upper Body Dressing: Setup    Lower Body Dressing: Moderate assistance    Toileting: Minimum assistance(CM, steadiness)         Completed OT evaluation and ADLs seated EOB and standing as able with HHA at times for balance. Educated on safety and endurance training with encouragement for full participation in ADLs while in hospital. Good understanding noted. ADL Intervention and task modifications:  Feeding  Container Management: Set-up  Food to Mouth: Independent  Drink to Mouth: Independent    Grooming  Washing Face: Set-up  Washing Hands: Set-up       Completed OT evaluation and ADLs seated EOB and standing as able with HHA for balance. Educated on safety and endurance training with encouragement for full participation in ADLs while in hospital. Good to FAIR understanding noted. Lower Body Dressing Assistance  Socks: Minimum assistance  Leg Crossed Method Used: No  Position Performed: Seated in chair    Toileting  Bladder Hygiene: Stand-by assistance  Clothing Management: Minimum assistance    Cognitive Retraining  Safety/Judgement: Decreased insight into deficits; Decreased awareness of need for assistance;Decreased awareness of need for safety;Decreased awareness of environment    Therapeutic Exercise:     Functional Measure:  Barthel Index:    Bathin  Bladder: 5  Bowels: 10  Groomin  Dressin  Feedin  Mobility: 0  Stairs: 0  Toilet Use: 5  Transfer (Bed to Chair and Back): 10  Total: 45/100        The Barthel ADL Index: Guidelines  1. The index should be used as a record of what a patient does, not as a record of what a patient could do. 2. The main aim is to establish degree of independence from any help, physical or verbal, however minor and for whatever reason. 3. The need for supervision renders the patient not independent. 4. A patient's performance should be established using the best available evidence. Asking the patient, friends/relatives and nurses are the usual sources, but direct observation and common sense are also important. However direct testing is not needed. 5. Usually the patient's performance over the preceding 24-48 hours is important, but occasionally longer periods will be relevant. 6. Middle categories imply that the patient supplies over 50 per cent of the effort. 7. Use of aids to be independent is allowed. Logan Fitch., Barthel, D.W. (1906). Functional evaluation: the Barthel Index. [facility] (14)2. BENNIE Gandhi, Madhuri Wu., Jadon Wolf., [place], [address] [place] [address] (1999). Measuring the change indisability after inpatient rehabilitation; comparison of the responsiveness of the Barthel Index and Functional [place] Measure. Journal of Neurology, Neurosurgery, and Psychiatry, 66(4), 265-590. SARAH Drew, MAZIN Pulido, & Stacy Ahuja MKOTA. (2004.) Assessment of post-stroke quality of life in cost-effectiveness studies: The usefulness of the Barthel Index and the EuroQoL-5D.  Quality of Life Research, 15, 620-45         Occupational Therapy Evaluation Charge Determination   History Examination Decision-Making   LOW Complexity : Brief history review  MEDIUM Complexity : 3-5 performance deficits relating to physical, cognitive , or psychosocial skils that result in activity limitations and / or participation restrictions LOW Complexity : No comorbidities that affect functional and no verbal or physical assistance needed to complete eval tasks       Based on the above components, the patient evaluation is determined to be of the following complexity level: LOW   Pain Rating:  None noted    Activity Tolerance:   Fair  Please refer to the flowsheet for vital signs taken during this treatment. After treatment patient left in no apparent distress:    Sitting in chair, Call bell within reach, Bed / chair alarm activated, and Caregiver / family present    COMMUNICATION/EDUCATION:   The patients plan of care was discussed with: Physical Therapist and Registered Nurse. Home safety education was provided and the patient/caregiver indicated understanding., Patient/family have participated as able in goal setting and plan of care. , and Patient/family agree to work toward stated goals and plan of care. This patients plan of care is appropriate for delegation to [facility] [place].     Thank you for this referral.  Breonna Fulton OT  Time Calculation: 26 mins

## 2019-11-24 LAB
AMMONIA PLAS-SCNC: 32 UMOL/L
ANION GAP SERPL CALC-SCNC: 7 MMOL/L (ref 5–15)
BACTERIA SPEC CULT: ABNORMAL
BACTERIA SPEC CULT: ABNORMAL
BUN SERPL-MCNC: 15 MG/DL (ref 6–20)
BUN/CREAT SERPL: 14 (ref 12–20)
CALCIUM SERPL-MCNC: 8.6 MG/DL (ref 8.5–10.1)
CHLORIDE SERPL-SCNC: 109 MMOL/L (ref 97–108)
CO2 SERPL-SCNC: 26 MMOL/L (ref 21–32)
CREAT SERPL-MCNC: 1.1 MG/DL (ref 0.7–1.3)
DATE LAST DOSE: ABNORMAL
GLUCOSE SERPL-MCNC: 110 MG/DL (ref 65–100)
POTASSIUM SERPL-SCNC: 3.4 MMOL/L (ref 3.5–5.1)
REPORTED DOSE,DOSE: ABNORMAL UNITS
REPORTED DOSE/TIME,TMG: ABNORMAL
SERVICE CMNT-IMP: ABNORMAL
SODIUM SERPL-SCNC: 142 MMOL/L (ref 136–145)
VANCOMYCIN TROUGH SERPL-MCNC: 12.7 UG/ML (ref 5–10)

## 2019-11-24 PROCEDURE — 74011250637 HC RX REV CODE- 250/637: Performed by: HOSPITALIST

## 2019-11-24 PROCEDURE — 74011250637 HC RX REV CODE- 250/637: Performed by: GENERAL ACUTE CARE HOSPITAL

## 2019-11-24 PROCEDURE — 74011250637 HC RX REV CODE- 250/637: Performed by: INTERNAL MEDICINE

## 2019-11-24 PROCEDURE — 94760 N-INVAS EAR/PLS OXIMETRY 1: CPT

## 2019-11-24 PROCEDURE — 65660000000 HC RM CCU STEPDOWN

## 2019-11-24 PROCEDURE — 80202 ASSAY OF VANCOMYCIN: CPT

## 2019-11-24 PROCEDURE — 82140 ASSAY OF AMMONIA: CPT

## 2019-11-24 PROCEDURE — 74011000250 HC RX REV CODE- 250: Performed by: HOSPITALIST

## 2019-11-24 PROCEDURE — 74011250636 HC RX REV CODE- 250/636: Performed by: GENERAL ACUTE CARE HOSPITAL

## 2019-11-24 PROCEDURE — 74011000258 HC RX REV CODE- 258: Performed by: HOSPITALIST

## 2019-11-24 PROCEDURE — 36415 COLL VENOUS BLD VENIPUNCTURE: CPT

## 2019-11-24 PROCEDURE — 80048 BASIC METABOLIC PNL TOTAL CA: CPT

## 2019-11-24 PROCEDURE — 74011250636 HC RX REV CODE- 250/636: Performed by: HOSPITALIST

## 2019-11-24 PROCEDURE — 3331090002 HH PPS REVENUE DEBIT

## 2019-11-24 PROCEDURE — 3331090001 HH PPS REVENUE CREDIT

## 2019-11-24 RX ORDER — FUROSEMIDE 40 MG/1
80 TABLET ORAL DAILY
Status: DISCONTINUED | OUTPATIENT
Start: 2019-11-24 | End: 2019-11-25 | Stop reason: HOSPADM

## 2019-11-24 RX ORDER — BUTALBITAL, ACETAMINOPHEN AND CAFFEINE 50; 325; 40 MG/1; MG/1; MG/1
1 TABLET ORAL
Status: DISCONTINUED | OUTPATIENT
Start: 2019-11-24 | End: 2019-11-25 | Stop reason: HOSPADM

## 2019-11-24 RX ORDER — VANCOMYCIN HYDROCHLORIDE
1250
Status: DISCONTINUED | OUTPATIENT
Start: 2019-11-24 | End: 2019-11-25 | Stop reason: HOSPADM

## 2019-11-24 RX ORDER — CLONIDINE HYDROCHLORIDE 0.1 MG/1
0.1 TABLET ORAL 2 TIMES DAILY
Status: DISCONTINUED | OUTPATIENT
Start: 2019-11-24 | End: 2019-11-25

## 2019-11-24 RX ADMIN — AMLODIPINE BESYLATE 10 MG: 5 TABLET ORAL at 10:12

## 2019-11-24 RX ADMIN — ISOSORBIDE MONONITRATE 30 MG: 30 TABLET, EXTENDED RELEASE ORAL at 10:13

## 2019-11-24 RX ADMIN — Medication 1250 MG: at 13:00

## 2019-11-24 RX ADMIN — DULOXETINE HYDROCHLORIDE 30 MG: 30 CAPSULE, DELAYED RELEASE ORAL at 10:12

## 2019-11-24 RX ADMIN — ASPIRIN 81 MG 81 MG: 81 TABLET ORAL at 10:12

## 2019-11-24 RX ADMIN — POLYETHYLENE GLYCOL (3350) 17 G: 17 POWDER, FOR SOLUTION ORAL at 10:12

## 2019-11-24 RX ADMIN — BUTALBITAL, ACETAMINOPHEN, AND CAFFEINE 1 TABLET: 50; 325; 40 TABLET ORAL at 04:20

## 2019-11-24 RX ADMIN — Medication 10 ML: at 14:00

## 2019-11-24 RX ADMIN — MELATONIN 10.5 MG: at 21:33

## 2019-11-24 RX ADMIN — DOCUSATE SODIUM 100 MG: 100 CAPSULE, LIQUID FILLED ORAL at 10:12

## 2019-11-24 RX ADMIN — CEFTRIAXONE SODIUM 2 G: 2 INJECTION, POWDER, FOR SOLUTION INTRAMUSCULAR; INTRAVENOUS at 15:38

## 2019-11-24 RX ADMIN — METOPROLOL TARTRATE 5 MG: 5 INJECTION INTRAVENOUS at 04:29

## 2019-11-24 RX ADMIN — HYDRALAZINE HYDROCHLORIDE 50 MG: 25 TABLET, FILM COATED ORAL at 17:18

## 2019-11-24 RX ADMIN — AMPICILLIN SODIUM 2 G: 2 INJECTION, POWDER, FOR SOLUTION INTRAMUSCULAR; INTRAVENOUS at 20:39

## 2019-11-24 RX ADMIN — Medication 10 ML: at 21:34

## 2019-11-24 RX ADMIN — ACYCLOVIR SODIUM 800 MG: 50 INJECTION, SOLUTION INTRAVENOUS at 03:50

## 2019-11-24 RX ADMIN — AMPICILLIN SODIUM 2 G: 2 INJECTION, POWDER, FOR SOLUTION INTRAMUSCULAR; INTRAVENOUS at 15:39

## 2019-11-24 RX ADMIN — FUROSEMIDE 80 MG: 40 TABLET ORAL at 10:12

## 2019-11-24 RX ADMIN — CLONIDINE HYDROCHLORIDE 0.1 MG: 0.1 TABLET ORAL at 17:18

## 2019-11-24 RX ADMIN — ACYCLOVIR SODIUM 800 MG: 50 INJECTION, SOLUTION INTRAVENOUS at 17:17

## 2019-11-24 RX ADMIN — ATENOLOL 100 MG: 25 TABLET ORAL at 10:12

## 2019-11-24 RX ADMIN — Medication 100 MG: at 10:12

## 2019-11-24 RX ADMIN — HYDRALAZINE HYDROCHLORIDE 50 MG: 25 TABLET, FILM COATED ORAL at 10:13

## 2019-11-24 RX ADMIN — CEFTRIAXONE SODIUM 2 G: 2 INJECTION, POWDER, FOR SOLUTION INTRAMUSCULAR; INTRAVENOUS at 00:44

## 2019-11-24 RX ADMIN — AMPICILLIN SODIUM 2 G: 2 INJECTION, POWDER, FOR SOLUTION INTRAMUSCULAR; INTRAVENOUS at 01:26

## 2019-11-24 RX ADMIN — AMPICILLIN SODIUM 2 G: 2 INJECTION, POWDER, FOR SOLUTION INTRAMUSCULAR; INTRAVENOUS at 10:11

## 2019-11-24 RX ADMIN — ATORVASTATIN CALCIUM 80 MG: 40 TABLET, FILM COATED ORAL at 10:12

## 2019-11-24 RX ADMIN — HYDRALAZINE HYDROCHLORIDE 50 MG: 25 TABLET, FILM COATED ORAL at 21:33

## 2019-11-24 RX ADMIN — CLONIDINE HYDROCHLORIDE 0.1 MG: 0.1 TABLET ORAL at 13:06

## 2019-11-24 RX ADMIN — ONDANSETRON 4 MG: 2 INJECTION INTRAMUSCULAR; INTRAVENOUS at 13:06

## 2019-11-24 RX ADMIN — PANTOPRAZOLE SODIUM 40 MG: 40 TABLET, DELAYED RELEASE ORAL at 10:12

## 2019-11-24 NOTE — PROGRESS NOTES
Problem: Falls - Risk of  Goal: *Absence of Falls  Description  Document Gume Cruz Fall Risk and appropriate interventions in the flowsheet. Outcome: Progressing Towards Goal  Note: Fall Risk Interventions:  Mobility Interventions: Bed/chair exit alarm    Mentation Interventions: Adequate sleep, hydration, pain control    Medication Interventions: Bed/chair exit alarm    Elimination Interventions: Bed/chair exit alarm              Problem: Patient Education: Go to Patient Education Activity  Goal: Patient/Family Education  Outcome: Progressing Towards Goal     Problem: Pressure Injury - Risk of  Goal: *Prevention of pressure injury  Description  Document Aquiles Scale and appropriate interventions in the flowsheet.   Outcome: Progressing Towards Goal  Note: Pressure Injury Interventions:  Sensory Interventions: Assess changes in LOC    Moisture Interventions: Absorbent underpads    Activity Interventions: Increase time out of bed    Mobility Interventions: Assess need for specialty bed    Nutrition Interventions: Document food/fluid/supplement intake    Friction and Shear Interventions: Apply protective barrier, creams and emollients                Problem: Patient Education: Go to Patient Education Activity  Goal: Patient/Family Education  Outcome: Progressing Towards Goal     Problem: Seizure Disorder (Adult)  Goal: *STG: Remains free of seizure activity  Outcome: Progressing Towards Goal  Goal: *STG: Maintains lab values within therapeutic range  Outcome: Progressing Towards Goal  Goal: *STG/LTG: Complies with medication therapy  Outcome: Progressing Towards Goal  Goal: *STG: Remains free of injury during seizure activity  Outcome: Progressing Towards Goal  Goal: *STG: Remains safe in hospital  Outcome: Progressing Towards Goal  Goal: Interventions  Outcome: Progressing Towards Goal     Problem: Risk for Spread of Infection  Goal: Prevent transmission of infectious organism to others  Description  Prevent the transmission of infectious organisms to other patients, staff members, and visitors.   Outcome: Progressing Towards Goal     Problem: Patient Education: Go to Patient Education Activity  Goal: Patient/Family Education  Outcome: Progressing Towards Goal     Problem: Breathing Pattern - Ineffective  Goal: *Absence of hypoxia  Outcome: Progressing Towards Goal     Problem: Patient Education: Go to Patient Education Activity  Goal: Patient/Family Education  Outcome: Progressing Towards Goal

## 2019-11-24 NOTE — PROGRESS NOTES
Hospitalist Progress Note    NAME: Ozzie Arvizu   :  1934   MRN:  [id number]       Assessment / Plan:  AMS - improving, back to baseline  Continue telemetry monitoring  Pt afebrile for the past 24 hours. Family continues to refuse LP - however this may change as pt's daughter is returning back from her trip today and will be at the hospital tomorrow. In the interim, will continue with IV abx. Brain MRI w/o contrast was done [facility] ordered with and without but order was cancelled by someone] - limited study that did not show any acute infarct  Neurology evals appareciated    :  Pt's mental status has improved this morning - hopeful that this will continue. Will continue IV abx    :  MS markedly improved over the past 24 hours. Family state that he has significant anxiety. Unclear whether this was meningitis in the first place ? Will complete course on .    :  Brain MRI w/ contrast: IMPRESSION:  1. Evaluation is limited by motion. No acute intracranial abnormality. 2. Unchanged generalized parenchymal volume loss and moderate chronic microvascular ischemic disease. Therefore no clear evidence of encephalitis noted   Will continue abx till Monday. :  MRI Brain w/ contrast negative for encephalitis, motion limited. Plan to place PICC line tomorrow and discharge on Acyclovir for an additional 7 days to complete 14 day course for presumed viral encephalitis    Gram positive bacteremia - likely contaminant  Unclear if contaminant, 2 bottles flagged thus far, gram positive cocci growing in 1/4  Continue IV abx for now  Repeat Bcx tmr Am    :  Repeat BCx in the AM  BCx still prelim gram positive cocci, now in 2/4 bottles    :  Coag negative staph - likely contaminant.  Repeat Cx NGTD    :  Repeat Cx NGTD 2 day    Transient episode of CP  Elevated troponin  Cardiology evals noted - medical management for now  Trend troponins until downtrending    KATHY - improving  Continue to monitor    History of alcohol abuse: reportedly quit 3 months ago  Elevated ammonia  LFTs wnl  Ammonia 53    HTN  CAD  HLD  TESSA  Resume home meds  Added Clonidine 0.1mg BID. Resume home Lasix due to LE swelling. 30.0 - 39.9 Obese / Body mass index is 32.23 kg/m². Code status: Full  Prophylaxis: SCD's  Recommended Disposition: TBD     Subjective:     Chief Complaint / Reason for Physician Visit  Pt endorses feeling well today. Complaining of LE swelling. Updated family at bedside. Discussed with RN events overnight. Review of Systems:  Symptom Y/N Comments  Symptom Y/N Comments   Fever/Chills n   Chest Pain n    Poor Appetite n   Edema y    Cough n   Abdominal Pain n    Sputum    Joint Pain     SOB/MARES n   Pruritis/Rash     Nausea/vomit    Tolerating PT/OT y    Diarrhea    Tolerating Diet y    Constipation    Other       Could NOT obtain due to:      Objective:     VITALS:   Last 24hrs VS reviewed since prior progress note. Most recent are:  Patient Vitals for the past 24 hrs:   Temp Pulse Resp BP SpO2   11/24/19 0804 97.5 °F (36.4 °C) 65 20 178/85 96 %   11/24/19 0423 98 °F (36.7 °C) 73 18 176/65 96 %   11/24/19 0020 98 °F (36.7 °C) 80 20 166/88 98 %   11/23/19 2124  70  196/87    11/23/19 1932 97.5 °F (36.4 °C) 64  180/70 97 %   11/23/19 1508 97.4 °F (36.3 °C) 62 18 148/72 96 %   11/23/19 1331 97.2 °F (36.2 °C) 63 18 154/79 93 %       Intake/Output Summary (Last 24 hours) at 11/24/2019 1001  Last data filed at 11/24/2019 0734  Gross per 24 hour   Intake 400 ml   Output 2100 ml   Net -1700 ml        PHYSICAL EXAM:  General: Alert, cooperative, no acute distress    EENT:  EOMI. Anicteric sclerae. MMM  Resp:  CTA bilaterally, no wheezing or rales. No accessory muscle use  CV:  Regular  rhythm, 2+ Pitting edema  GI:  Soft, Non distended, Non tender.  +Bowel sounds  Neurologic:  Alert and oriented X 3, normal speech, follows commands  Psych:   Fair insight  Skin:  No rashes.   No jaundice    Reviewed most current lab test results and cultures  YES  Reviewed most current radiology test results   YES  Review and summation of old records today    NO  Reviewed patient's current orders and MAR    YES  PMH/SH reviewed - no change compared to H&P  ________________________________________________________________________  Care Plan discussed with:    Comments   Patient x    Family  x    RN x    Care Manager x    Consultant  x                      Multidiciplinary team rounds were held today with , nursing, pharmacist and clinical coordinator. Patient's plan of care was discussed; medications were reviewed and discharge planning was addressed. ________________________________________________________________________  Total NON critical care TIME:  25   Minutes    Total CRITICAL CARE TIME Spent:   Minutes non procedure based      Comments   >50% of visit spent in counseling and coordination of care     ________________________________________________________________________  Milton Hill MD     Procedures: see electronic medical records for all procedures/Xrays and details which were not copied into this note but were reviewed prior to creation of Plan. LABS:  I reviewed today's most current labs and imaging studies. Pertinent labs include:  No results for input(s): WBC, HGB, HCT, PLT, HGBEXT, HCTEXT, PLTEXT, HGBEXT, HCTEXT, PLTEXT in the last 72 hours.   Recent Labs     11/24/19  0101 11/23/19  0247    142   K 3.4* 3.4*   * 109*   CO2 26 29   * 109*   BUN 15 12   CREA 1.10 1.11   CA 8.6 8.3*       Signed: Milton Hill MD

## 2019-11-24 NOTE — PROGRESS NOTES
Bedside and Verbal shift change report given to Rabia Nascimento (oncoming nurse) by Maureen Victor (offgoing nurse).  Report included the following information SBAR, Kardex, Recent Results and Med Rec Status.     Zone Phone:   [phone]        Significant changes during shift:   Continued abx, talked of possible DC tomorrow     Patient Information     Mohamud Borges  80 y.o.  11/18/2019  6:47 PM by Norma Salazar MD. Mohamud Borges was admitted from [address] [place] [address] [address]  Problem List          Patient Active Problem List     Diagnosis Date Noted    Altered mental state 11/19/2019    Facial droop 10/27/2019    Acute blood loss anemia 09/29/2019    Rectal bleeding 09/23/2019    Pneumonia 09/23/2019    Severe sepsis ([address] 09/23/2019    Flank pain 08/01/2019    Spinal stenosis of lumbar region at multiple levels 05/13/2019    Bilateral carotid artery stenosis 04/12/2019    Severe obesity ([address] 01/03/2019    TESSA on CPAP 01/03/2019    Simple chronic bronchitis ([address] 01/03/2019    Fatigue 08/16/2017    CAD (coronary artery disease) 08/16/2017    Dyslipidemia 08/16/2017    On statin therapy 08/16/2017    Gout 08/16/2017    GERD (gastroesophageal reflux disease) 08/16/2017    TIA (transient ischemic attack) 08/16/2017    Dyspnea 08/16/2017    Colon polyps 08/16/2017    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    Hypertension 08/16/2017    ED (erectile dysfunction) 08/16/2017    Hypoxia 04/23/2015    Neurogenic claudication 04/23/2015      Past Medical History:   Diagnosis Date    Adverse effect of anesthesia       combative after anesthesia    Alcohol abuse       6 beers/day    Arthritis      CAD (coronary artery disease)      Chronic obstructive pulmonary disease ([address]      Dyslipidemia 8/16/2017    Dyspepsia and other specified disorders of function of stomach      Dyspnea 8/16/2017    ED (erectile dysfunction) 8/16/2017    Encounter for immunization 8/16/2017    Fatigue 8/16/2017    Flank pain 8/1/2019    GERD (gastroesophageal reflux disease) 8/16/2017    Gout 8/16/2017    Hypertension      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    Morbid obesity ([facility]      On statin therapy 8/16/2017    TESSA on CPAP 1/3/2019    Psychiatric disorder       Depression    Simple chronic bronchitis ([facility] 1/3/2019    TIA (transient ischemic attack) 6/21/3646    Umbilical hernia 69/2/7373      Core Measures:     CVA: Yes Yes  CHF:No No  PNA:No No     Activity Status:     OOB to Chair Yes  Ambulated this shift Yes  Bed Rest No     Supplemental O2: (If Applicable)     NC No  NRB No  Venti-mask No  On  Liters/min     LINES AND DRAINS:     PIV     DVT prophylaxis:     DVT prophylaxis Med- No  DVT prophylaxis SCD or MOISES- Yes      Wounds: (If Applicable)     Wounds- No     Location      Patient Safety:     Falls Score Total Score: 3  Safety Level_______  Bed Alarm On? No  Sitter?  No     Plan for upcoming shift:   ABX therapy   BP management  Safety      Discharge Plan: No      Active Consults:  IP CONSULT TO NEUROLOGY  IP CONSULT TO CARDIOLOGY

## 2019-11-24 NOTE — PROGRESS NOTES
Bedside and Verbal shift change report given to Bonita Odell (oncoming nurse) Bonita Odell (offgoing nurse). Report included the following information SBAR, Kardex, Recent Results and Med Rec Status.     Zone Phone:   [phone]        Significant changes during shift:   @2020Patient given lasix 40mg PO. Family wants RN to call MD for patient to have \"IV lasix to get the fluids out faster semaj his feet hurt. \"  Hospitalist paged awaiting responds  @05:20. Patient voiding well after PO laxis given.  Falls precaution maintained.      Patient Information     Tod Lim  80 y.o.  11/18/2019  6:47 PM by Micki Bruner MD. [facility]     Problem List          Patient Active Problem List     Diagnosis Date Noted    Altered mental state 11/19/2019    Facial droop 10/27/2019    Acute blood loss anemia 09/29/2019    Rectal bleeding 09/23/2019    Pneumonia 09/23/2019    Severe sepsis ([address] 09/23/2019    Flank pain 08/01/2019    Spinal stenosis of lumbar region at multiple levels 05/13/2019    Bilateral carotid artery stenosis 04/12/2019    Severe obesity ([address] 01/03/2019    TESSA on CPAP 01/03/2019    Simple chronic bronchitis ([address] 01/03/2019    Fatigue 08/16/2017    CAD (coronary artery disease) 08/16/2017    Dyslipidemia 08/16/2017    On statin therapy 08/16/2017    Gout 08/16/2017    GERD (gastroesophageal reflux disease) 08/16/2017    TIA (transient ischemic attack) 08/16/2017    Dyspnea 08/16/2017    Colon polyps 08/16/2017    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    Hypertension 08/16/2017    ED (erectile dysfunction) 08/16/2017    Hypoxia 04/23/2015    Neurogenic claudication 04/23/2015      Past Medical History:   Diagnosis Date    Adverse effect of anesthesia       combative after anesthesia    Alcohol abuse       6 beers/day    Arthritis      CAD (coronary artery disease)      Chronic obstructive pulmonary disease ([address]      Dyslipidemia 8/16/2017    Dyspepsia and other specified disorders of function of stomach      Dyspnea 8/16/2017    ED (erectile dysfunction) 8/16/2017    Encounter for immunization 8/16/2017    Fatigue 8/16/2017    Flank pain 8/1/2019    GERD (gastroesophageal reflux disease) 8/16/2017    Gout 8/16/2017    Hypertension      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    Morbid obesity ([facility]      On statin therapy 8/16/2017    TESSA on CPAP 1/3/2019    Psychiatric disorder       Depression    Simple chronic bronchitis ([facility] 1/3/2019    TIA (transient ischemic attack) 4/85/8475    Umbilical hernia 89/6/8078      Core Measures:     CVA: Yes Yes  CHF:No No  PNA:No No     Activity Status:     OOB to Chair Yes  Ambulated this shift Yes  Bed Rest No     Supplemental O2: (If Applicable)     NC No  NRB No  Venti-mask No  On  Liters/min     LINES AND DRAINS:     Central Line? No Placement date  Reason Medically Necessary      PICC LINE? No Placement date Reason Medically Necessary      Urinary Catheter? No Placement Date  Reason Medically Necessary      DVT prophylaxis:     DVT prophylaxis Med- No  DVT prophylaxis SCD or MOISES- Yes      Wounds: (If Applicable)     Wounds- No     Location      Patient Safety:     Falls Score Total Score: 3  Safety Level_______  Bed Alarm On? No  Sitter?  No     Plan for upcoming shift:   ABX therapy   BP management  Safety         Discharge Plan: No      Active Consults:  IP CONSULT TO NEUROLOGY  IP CONSULT TO CARDIOLOGY

## 2019-11-24 NOTE — PROGRESS NOTES
Progress Note      11/24/2019   NAME: Dirk Lopez   MRN:  [id number]   Admit Diagnosis: Altered mental state [R41.82]      Problem List:      Recent GI bleed 10/2019 found to have ischemia, EUNICE and EVR negative. Then with encephalopathy, fever, chest pain with increased troponin.     1. CAD s/p prior PTCA of the LAD in '96.  Cath /10 w/ mild diffuse disease; nml EF.  MPI 3/18 w/ EF 71% and no ischemia or infarct  2. Echo 4/19 w/ EF 60-65%, mild cLVH, mild PHTN, EUNICE 10/2019  Nl EF, no thrombus  3. Sinus, EVR 2019 with no afib  4. Mild aortic stenosis  5. Chronic venous insufficiency s/p left GSV venaseal 2/19 and right GSV venaseal 3/19  6. Chronic kidney disease; Stg 3  7. Gout  8. COPD  9. TESSA on CPAP  10. Remote TIA 4/19  11. Hypertension  12. Hyperlipidemia  13. Former smoker  15. [facility]       Assessment/Plan:      - Cont ASA  - Cont atenolol  - Cont ISMN 30mg  - Cont norvasc 10mg  - Add hydralazine 50mg TID  - Back on furosemide  - Cont statin    Doing much better from an overall standpoint. Cognitively in a good place. Dr. Jayde Marie will be back tomorrow. [x]       High complexity decision making was performed in this patient    Subjective:     Dirk Lopez denies chest pain, dyspnea. Discussed with RN events overnight. Review of Systems:    Symptom Y/N Comments  Symptom Y/N Comments   Fever/Chills N   Chest Pain N    Poor Appetite N   Edema N    Cough N   Abdominal Pain N    Sputum N   Joint Pain N    SOB/MARES N   Pruritis/Rash N    Nausea/vomit N   Tolerating PT/OT Y    Diarrhea N   Tolerating Diet Y    Constipation N   Other       Could NOT obtain due to:      Objective:      Physical Exam:    Last 24hrs VS reviewed since prior progress note.  Most recent are:    Visit Vitals  /79 (BP 1 Location: Left arm, BP Patient Position: At rest)   Pulse 70   Temp 97.8 °F (36.6 °C)   Resp 20   Wt 96.2 kg (212 lb)   SpO2 94%   BMI 32.23 kg/m²       Intake/Output Summary (Last 24 hours) at 11/24/2019 1243  Last data filed at 11/24/2019 1143  Gross per 24 hour   Intake 400 ml   Output 2400 ml   Net -2000 ml        General Appearance: Well developed, well nourished, alert & oriented x 3,    no acute distress. Ears/Nose/Mouth/Throat: Hearing grossly normal.  Neck: Supple. Chest: Lungs clear to auscultation bilaterally. Cardiovascular: Regular rate and rhythm, S1S2 normal, no murmur. Abdomen: Soft, non-tender, bowel sounds are active. Extremities: No edema bilaterally. Skin: Warm and dry. []         Post-cath site without hematoma, bruit, tenderness, or thrill. Distal pulses intact. PMH/SH reviewed - no change compared to H&P    Data Review    Telemetry: sinus rhythm     EKG:   [x]  No new EKG for review    Lab Data Personally Reviewed:    No results for input(s): WBC, HGB, HCT, PLT, HGBEXT, HCTEXT, PLTEXT, HGBEXT, HCTEXT, PLTEXT in the last 72 hours. No results for input(s): INR, PTP, APTT, INREXT, INREXT in the last 72 hours. Recent Labs     11/24/19  0101 11/23/19  0247    142   K 3.4* 3.4*   * 109*   CO2 26 29   BUN 15 12   CREA 1.10 1.11   * 109*   CA 8.6 8.3*     No results for input(s): CPK, CKNDX, TROIQ in the last 72 hours. No lab exists for component: CPKMB  Lab Results   Component Value Date/Time    Cholesterol, total 151 10/28/2019 02:17 AM    HDL Cholesterol 30 10/28/2019 02:17 AM    LDL, calculated 82 10/28/2019 02:17 AM    Triglyceride 195 (H) 10/28/2019 02:17 AM    CHOL/HDL Ratio 5.0 10/28/2019 02:17 AM       No results for input(s): SGOT, GPT, AP, TBIL, TP, ALB, GLOB, GGT, AML, LPSE in the last 72 hours. No lab exists for component: AMYP, HLPSE  No results for input(s): PH, PCO2, PO2 in the last 72 hours.     Medications Personally Reviewed:    Current Facility-Administered Medications   Medication Dose Route Frequency    butalbital-acetaminophen-caffeine (FIORICET, ESGIC) -40 mg per tablet 1 Tab  1 Tab Oral Q6H PRN    furosemide (LASIX) tablet 80 mg  80 mg Oral DAILY    cloNIDine HCl (CATAPRES) tablet 0.1 mg  0.1 mg Oral BID    vancomycin (VANCOCIN) 1250 mg in  ml infusion  1,250 mg IntraVENous Q16H    metoprolol (LOPRESSOR) injection 5 mg  5 mg IntraVENous Q6H PRN    atenolol (TENORMIN) tablet 100 mg  100 mg Oral DAILY    docusate sodium (COLACE) capsule 100 mg  100 mg Oral BID    polyethylene glycol (MIRALAX) packet 17 g  17 g Oral DAILY    melatonin tablet 10.5 mg  10.5 mg Oral QHS    hydrALAZINE (APRESOLINE) tablet 50 mg  50 mg Oral TID    amLODIPine (NORVASC) tablet 10 mg  10 mg Oral DAILY    isosorbide mononitrate ER (IMDUR) tablet 30 mg  30 mg Oral DAILY    aspirin chewable tablet 81 mg  81 mg Oral DAILY    haloperidol lactate (HALDOL) injection 2 mg  2 mg IntraVENous Q6H PRN    albuterol-ipratropium (DUO-NEB) 2.5 MG-0.5 MG/3 ML  3 mL Nebulization Q6H PRN    sodium chloride (NS) flush 5-40 mL  5-40 mL IntraVENous Q8H    sodium chloride (NS) flush 5-40 mL  5-40 mL IntraVENous PRN    acetaminophen (TYLENOL) tablet 650 mg  650 mg Oral Q4H PRN    ondansetron (ZOFRAN) injection 4 mg  4 mg IntraVENous Q4H PRN    atorvastatin (LIPITOR) tablet 80 mg  80 mg Oral DAILY    DULoxetine (CYMBALTA) capsule 30 mg  30 mg Oral DAILY    pantoprazole (PROTONIX) tablet 40 mg  40 mg Oral DAILY    thiamine mononitrate (B-1) tablet 100 mg  100 mg Oral DAILY    sodium chloride (NS) flush 5-40 mL  5-40 mL IntraVENous Q8H    sodium chloride (NS) flush 5-40 mL  5-40 mL IntraVENous PRN    LORazepam (ATIVAN) injection 2 mg  2 mg IntraVENous Q5MIN PRN    LORazepam (ATIVAN) injection 1 mg  1 mg IntraVENous Q4H PRN    acyclovir (ZOVIRAX) 800 mg in 0.9% sodium chloride 250 mL IVPB  800 mg IntraVENous Q12H    ampicillin (OMNIPEN) 2 g in 0.9% sodium chloride (MBP/ADV) 100 mL  2 g IntraVENous Q6H    cefTRIAXone (ROCEPHIN) 2 g in 0.9% sodium chloride (MBP/ADV) 50 mL  2 g IntraVENous Q12H    influenza vaccine 2019-20 (6 mos+)(PF) (FLUARIX/FLULAVAL/FLUZONE QUAD) injection 0.5 mL  0.5 mL IntraMUSCular PRIOR TO DISCHARGE         Stacy Fontenot MD

## 2019-11-24 NOTE — PROGRESS NOTES
Pharmacy Automatic Renal Dosing Protocol - Antimicrobials  Indication for Antimicrobials: meningitis, bacteremia, Hx of MRSA    Current Regimen of Each Antimicrobial:  Vancomycin 1250 mg every 18 hours (Start Date ; Day # 6)  Ceftriaxone 2 gm every 12 hours (, day 6)  Ampicillin 2 gm q6h (, day 6)  Acyclovir 800 mg every 12 hours (, day 6)    Previous Antimicrobial Therapy:    Goal Level: VANCOMYCIN TROUGH GOAL RANGE    Vancomycin Trough: 15 - 20 mcg/mL  (AUC: 400 - 600 mg/hr/Liter/day)     Date Dose & Interval Measured (mcg/mL) Extrapolated (mcg/mL)    1500 mg q24h 24.1 N/A    @ 04:24 1500 mg IV q 24h 11.7 12.2    @ 11:15 1250 mg IV Q18H 12.7 13.55     Date & time of next level: 19    Significant Cultures:    blood: staph coag neg / - Final  19 - Blood = NG - pending    Radiology / Imaging results: (X-ray, CT scan or MRI):     Paralysis, amputations, malnutrition:     Labs:  Recent Labs     19  0101 19  0247   CREA 1.10 1.11   BUN 15 12     Temp (24hrs), Av.6 °F (36.4 °C), Min:97.2 °F (36.2 °C), Max:98 °F (36.7 °C)    Creatinine Clearance (mL/min) or Dialysis: 47.5    Impression/Plan:   Creatinine has improved, afebrile, doses appropriate as above  Disregard below, vanc trough just resulted. Level subtherapeutic at 12.7, extrapolated to 13.55. Will increase regimen to 1250 mg IV Q16H for an expected trough of 16.19, .1. Daily BMP ordered per Vancomycin dosing protocol  Antimicrobial stop date pending     Pharmacy will follow daily and adjust medications as appropriate for renal function and/or serum levels. Thank you,  Frank Mckeon, PHARMD    Recommended duration of therapy  [email or web address]. docx    Renal Dosing  [email or web address]. pdf

## 2019-11-24 NOTE — PROGRESS NOTES
Problem: Falls - Risk of  Goal: *Absence of Falls  Description  Document Jaida Garner Fall Risk and appropriate interventions in the flowsheet. Outcome: Progressing Towards Goal  Note: Fall Risk Interventions:  Mobility Interventions: Bed/chair exit alarm, OT consult for ADLs, Patient to call before getting OOB, PT Consult for mobility concerns, PT Consult for assist device competence    Mentation Interventions: Adequate sleep, hydration, pain control    Medication Interventions: Assess postural VS orthostatic hypotension, Bed/chair exit alarm, Evaluate medications/consider consulting pharmacy, Patient to call before getting OOB, Teach patient to arise slowly, Utilize gait belt for transfers/ambulation    Elimination Interventions: Bed/chair exit alarm, Call light in reach, Patient to call for help with toileting needs, Stay With Me (per policy), Toilet paper/wipes in reach, Toileting schedule/hourly rounds              Problem: Patient Education: Go to Patient Education Activity  Goal: Patient/Family Education  Outcome: Progressing Towards Goal     Problem: Pressure Injury - Risk of  Goal: *Prevention of pressure injury  Description  Document Aquiles Scale and appropriate interventions in the flowsheet. Outcome: Progressing Towards Goal  Note: Pressure Injury Interventions:  Sensory Interventions: Assess changes in LOC, Assess need for specialty bed, Avoid rigorous massage over bony prominences, Chair cushion, Check visual cues for pain, Discuss PT/OT consult with provider, Float heels, Keep linens dry and wrinkle-free, Maintain/enhance activity level, Minimize linen layers, Turn and reposition approx.  every two hours (pillows and wedges if needed)    Moisture Interventions: Assess need for specialty bed, Absorbent underpads, Apply protective barrier, creams and emollients, Check for incontinence Q2 hours and as needed, Limit adult briefs, Maintain skin hydration (lotion/cream), Moisture barrier, Minimize layers    Activity Interventions: Assess need for specialty bed, Increase time out of bed, PT/OT evaluation    Mobility Interventions: Assess need for specialty bed, Chair cushion, PT/OT evaluation, Turn and reposition approx. every two hours(pillow and wedges)    Nutrition Interventions: Document food/fluid/supplement intake    Friction and Shear Interventions: Apply protective barrier, creams and emollients                Problem: Patient Education: Go to Patient Education Activity  Goal: Patient/Family Education  Outcome: Progressing Towards Goal     Problem: Seizure Disorder (Adult)  Goal: *STG: Remains free of seizure activity  Outcome: Progressing Towards Goal  Goal: *STG: Maintains lab values within therapeutic range  Outcome: Progressing Towards Goal  Goal: *STG/LTG: Complies with medication therapy  Outcome: Progressing Towards Goal  Goal: *STG: Remains free of injury during seizure activity  Outcome: Progressing Towards Goal  Goal: *STG: Remains safe in hospital  Outcome: Progressing Towards Goal  Goal: Interventions  Outcome: Progressing Towards Goal     Problem: Patient Education: Go to Patient Education Activity  Goal: Patient/Family Education  Outcome: Progressing Towards Goal     Problem: Risk for Spread of Infection  Goal: Prevent transmission of infectious organism to others  Description  Prevent the transmission of infectious organisms to other patients, staff members, and visitors.   Outcome: Progressing Towards Goal     Problem: Patient Education:  Go to Education Activity  Goal: Patient/Family Education  Outcome: Progressing Towards Goal     Problem: Breathing Pattern - Ineffective  Goal: *Absence of hypoxia  Outcome: Progressing Towards Goal     Problem: Patient Education: Go to Patient Education Activity  Goal: Patient/Family Education  Outcome: Progressing Towards Goal     Problem: Patient Education: Go to Patient Education Activity  Goal: Patient/Family Education  Outcome: Progressing Towards Goal

## 2019-11-25 VITALS
HEART RATE: 56 BPM | OXYGEN SATURATION: 96 % | RESPIRATION RATE: 18 BRPM | SYSTOLIC BLOOD PRESSURE: 155 MMHG | TEMPERATURE: 97.6 F | DIASTOLIC BLOOD PRESSURE: 58 MMHG | BODY MASS INDEX: 32.23 KG/M2 | WEIGHT: 212 LBS

## 2019-11-25 LAB
ANION GAP SERPL CALC-SCNC: 7 MMOL/L (ref 5–15)
BUN SERPL-MCNC: 13 MG/DL (ref 6–20)
BUN/CREAT SERPL: 11 (ref 12–20)
CALCIUM SERPL-MCNC: 8.2 MG/DL (ref 8.5–10.1)
CHLORIDE SERPL-SCNC: 107 MMOL/L (ref 97–108)
CO2 SERPL-SCNC: 28 MMOL/L (ref 21–32)
CREAT SERPL-MCNC: 1.15 MG/DL (ref 0.7–1.3)
GLUCOSE SERPL-MCNC: 103 MG/DL (ref 65–100)
POTASSIUM SERPL-SCNC: 3.4 MMOL/L (ref 3.5–5.1)
SODIUM SERPL-SCNC: 142 MMOL/L (ref 136–145)

## 2019-11-25 PROCEDURE — C1751 CATH, INF, PER/CENT/MIDLINE: HCPCS

## 2019-11-25 PROCEDURE — 80048 BASIC METABOLIC PNL TOTAL CA: CPT

## 2019-11-25 PROCEDURE — 74011000250 HC RX REV CODE- 250: Performed by: HOSPITALIST

## 2019-11-25 PROCEDURE — 3331090002 HH PPS REVENUE DEBIT

## 2019-11-25 PROCEDURE — 36573 INSJ PICC RS&I 5 YR+: CPT | Performed by: GENERAL ACUTE CARE HOSPITAL

## 2019-11-25 PROCEDURE — 3331090001 HH PPS REVENUE CREDIT

## 2019-11-25 PROCEDURE — 94760 N-INVAS EAR/PLS OXIMETRY 1: CPT

## 2019-11-25 PROCEDURE — 74011250637 HC RX REV CODE- 250/637: Performed by: HOSPITALIST

## 2019-11-25 PROCEDURE — 36415 COLL VENOUS BLD VENIPUNCTURE: CPT

## 2019-11-25 PROCEDURE — 02HV33Z INSERTION OF INFUSION DEVICE INTO SUPERIOR VENA CAVA, PERCUTANEOUS APPROACH: ICD-10-PCS | Performed by: GENERAL ACUTE CARE HOSPITAL

## 2019-11-25 PROCEDURE — A9575 INJ GADOTERATE MEGLUMI 0.1ML: HCPCS | Performed by: GENERAL ACUTE CARE HOSPITAL

## 2019-11-25 PROCEDURE — 74011250636 HC RX REV CODE- 250/636: Performed by: HOSPITALIST

## 2019-11-25 PROCEDURE — 74011250637 HC RX REV CODE- 250/637: Performed by: INTERNAL MEDICINE

## 2019-11-25 PROCEDURE — 74011250636 HC RX REV CODE- 250/636: Performed by: GENERAL ACUTE CARE HOSPITAL

## 2019-11-25 PROCEDURE — 77030018786 HC NDL GD F/USND BARD -B

## 2019-11-25 PROCEDURE — 74011250637 HC RX REV CODE- 250/637: Performed by: GENERAL ACUTE CARE HOSPITAL

## 2019-11-25 PROCEDURE — 74011000258 HC RX REV CODE- 258: Performed by: HOSPITALIST

## 2019-11-25 PROCEDURE — 76937 US GUIDE VASCULAR ACCESS: CPT

## 2019-11-25 RX ORDER — HYDRALAZINE HYDROCHLORIDE 25 MG/1
75 TABLET, FILM COATED ORAL 3 TIMES DAILY
Status: DISCONTINUED | OUTPATIENT
Start: 2019-11-25 | End: 2019-11-25 | Stop reason: HOSPADM

## 2019-11-25 RX ORDER — ALLOPURINOL 300 MG/1
TABLET ORAL
Qty: 90 TAB | Refills: 3 | Status: SHIPPED | OUTPATIENT
Start: 2019-11-25 | End: 2020-09-21 | Stop reason: SDDI

## 2019-11-25 RX ORDER — ATENOLOL 100 MG/1
100 TABLET ORAL DAILY
Qty: 30 TAB | Refills: 0 | Status: SHIPPED | OUTPATIENT
Start: 2019-11-26 | End: 2020-04-20 | Stop reason: SDUPTHER

## 2019-11-25 RX ORDER — ASPIRIN 325 MG/1
100 TABLET, FILM COATED ORAL DAILY
Qty: 30 TAB | Refills: 0 | Status: SHIPPED | OUTPATIENT
Start: 2019-11-26 | End: 2019-12-27 | Stop reason: SDUPTHER

## 2019-11-25 RX ORDER — CLONIDINE HYDROCHLORIDE 0.2 MG/1
0.2 TABLET ORAL 2 TIMES DAILY
Qty: 60 TAB | Refills: 0 | Status: SHIPPED | OUTPATIENT
Start: 2019-11-25 | End: 2019-12-12 | Stop reason: ALTCHOICE

## 2019-11-25 RX ORDER — HYDRALAZINE HYDROCHLORIDE 50 MG/1
50 TABLET, FILM COATED ORAL 3 TIMES DAILY
Qty: 90 TAB | Refills: 0 | Status: SHIPPED | OUTPATIENT
Start: 2019-11-25 | End: 2019-12-27 | Stop reason: SDUPTHER

## 2019-11-25 RX ORDER — ATENOLOL 50 MG/1
50 TABLET ORAL DAILY
Qty: 30 TAB | Refills: 5 | Status: SHIPPED | OUTPATIENT
Start: 2019-11-25 | End: 2019-12-12 | Stop reason: DRUGHIGH

## 2019-11-25 RX ORDER — ISOSORBIDE MONONITRATE 30 MG/1
30 TABLET, EXTENDED RELEASE ORAL DAILY
Qty: 30 TAB | Refills: 0 | Status: SHIPPED | OUTPATIENT
Start: 2019-11-26 | End: 2019-12-27 | Stop reason: SDUPTHER

## 2019-11-25 RX ORDER — CLONIDINE HYDROCHLORIDE 0.1 MG/1
0.2 TABLET ORAL 2 TIMES DAILY
Status: DISCONTINUED | OUTPATIENT
Start: 2019-11-25 | End: 2019-11-25 | Stop reason: HOSPADM

## 2019-11-25 RX ORDER — DOCUSATE SODIUM 100 MG/1
100 CAPSULE, LIQUID FILLED ORAL 2 TIMES DAILY
Qty: 60 CAP | Refills: 2 | Status: SHIPPED | OUTPATIENT
Start: 2019-11-25 | End: 2020-02-23

## 2019-11-25 RX ORDER — AMLODIPINE BESYLATE 10 MG/1
10 TABLET ORAL DAILY
Qty: 30 TAB | Refills: 0 | Status: SHIPPED | OUTPATIENT
Start: 2019-11-26 | End: 2019-12-12 | Stop reason: SINTOL

## 2019-11-25 RX ADMIN — Medication 100 MG: at 09:27

## 2019-11-25 RX ADMIN — ATENOLOL 100 MG: 25 TABLET ORAL at 09:26

## 2019-11-25 RX ADMIN — PANTOPRAZOLE SODIUM 40 MG: 40 TABLET, DELAYED RELEASE ORAL at 09:26

## 2019-11-25 RX ADMIN — CLONIDINE HYDROCHLORIDE 0.2 MG: 0.1 TABLET ORAL at 09:26

## 2019-11-25 RX ADMIN — FUROSEMIDE 80 MG: 40 TABLET ORAL at 09:25

## 2019-11-25 RX ADMIN — ISOSORBIDE MONONITRATE 30 MG: 30 TABLET, EXTENDED RELEASE ORAL at 09:27

## 2019-11-25 RX ADMIN — AMPICILLIN SODIUM 2 G: 2 INJECTION, POWDER, FOR SOLUTION INTRAMUSCULAR; INTRAVENOUS at 02:43

## 2019-11-25 RX ADMIN — METOPROLOL TARTRATE 5 MG: 5 INJECTION INTRAVENOUS at 06:34

## 2019-11-25 RX ADMIN — ASPIRIN 81 MG 81 MG: 81 TABLET ORAL at 09:24

## 2019-11-25 RX ADMIN — ACYCLOVIR SODIUM 800 MG: 50 INJECTION, SOLUTION INTRAVENOUS at 03:29

## 2019-11-25 RX ADMIN — CEFTRIAXONE SODIUM 2 G: 2 INJECTION, POWDER, FOR SOLUTION INTRAMUSCULAR; INTRAVENOUS at 01:36

## 2019-11-25 RX ADMIN — AMLODIPINE BESYLATE 10 MG: 5 TABLET ORAL at 09:26

## 2019-11-25 RX ADMIN — ATORVASTATIN CALCIUM 80 MG: 40 TABLET, FILM COATED ORAL at 09:26

## 2019-11-25 RX ADMIN — Medication 10 ML: at 02:44

## 2019-11-25 RX ADMIN — Medication 10 ML: at 05:55

## 2019-11-25 RX ADMIN — Medication 1250 MG: at 07:30

## 2019-11-25 RX ADMIN — AMPICILLIN SODIUM 2 G: 2 INJECTION, POWDER, FOR SOLUTION INTRAMUSCULAR; INTRAVENOUS at 09:41

## 2019-11-25 RX ADMIN — HYDRALAZINE HYDROCHLORIDE 75 MG: 25 TABLET, FILM COATED ORAL at 16:33

## 2019-11-25 RX ADMIN — ACYCLOVIR SODIUM 800 MG: 50 INJECTION, SOLUTION INTRAVENOUS at 16:42

## 2019-11-25 RX ADMIN — HYDRALAZINE HYDROCHLORIDE 75 MG: 25 TABLET, FILM COATED ORAL at 09:25

## 2019-11-25 RX ADMIN — DULOXETINE HYDROCHLORIDE 30 MG: 30 CAPSULE, DELAYED RELEASE ORAL at 09:25

## 2019-11-25 NOTE — PROGRESS NOTES
Bedside and Verbal shift change report given to Radha Martines RN  (oncoming nurse) by Nabil Yousif (offgoing nurse).  Report included the following information SBAR, Kardex, Recent Results and Med Rec Status.     Zone Phone:   [phone]        Significant changes during shift:   Continued abx, talked of possible DC tomorrow     Patient Information     Max Mckeon  80 y.o.  11/18/2019  6:47 PM by Amy aPk MD. Max cMkeon was admitted from [address] [place] [address] [address]  Problem List          Patient Active Problem List     Diagnosis Date Noted    Altered mental state 11/19/2019    Facial droop 10/27/2019    Acute blood loss anemia 09/29/2019    Rectal bleeding 09/23/2019    Pneumonia 09/23/2019    Severe sepsis ([address] 09/23/2019    Flank pain 08/01/2019    Spinal stenosis of lumbar region at multiple levels 05/13/2019    Bilateral carotid artery stenosis 04/12/2019    Severe obesity ([address] 01/03/2019    TESSA on CPAP 01/03/2019    Simple chronic bronchitis ([address] 01/03/2019    Fatigue 08/16/2017    CAD (coronary artery disease) 08/16/2017    Dyslipidemia 08/16/2017    On statin therapy 08/16/2017    Gout 08/16/2017    GERD (gastroesophageal reflux disease) 08/16/2017    TIA (transient ischemic attack) 08/16/2017    Dyspnea 08/16/2017    Colon polyps 08/16/2017    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    Hypertension 08/16/2017    ED (erectile dysfunction) 08/16/2017    Hypoxia 04/23/2015    Neurogenic claudication 04/23/2015      Past Medical History:   Diagnosis Date    Adverse effect of anesthesia       combative after anesthesia    Alcohol abuse       6 beers/day    Arthritis      CAD (coronary artery disease)      Chronic obstructive pulmonary disease ([address]      Dyslipidemia 8/16/2017    Dyspepsia and other specified disorders of function of stomach      Dyspnea 8/16/2017    ED (erectile dysfunction) 8/16/2017    Encounter for immunization 8/16/2017    Fatigue 8/16/2017    Flank pain 8/1/2019    GERD (gastroesophageal reflux disease) 8/16/2017    Gout 8/16/2017    Hypertension      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    Morbid obesity ([facility]      On statin therapy 8/16/2017    TESSA on CPAP 1/3/2019    Psychiatric disorder       Depression    Simple chronic bronchitis ([facility] 1/3/2019    TIA (transient ischemic attack) 0/99/7712    Umbilical hernia 39/3/6142      Core Measures:     CVA: Yes Yes  CHF:No No  PNA:No No     Activity Status:     OOB to Chair Yes  Ambulated this shift Yes  Bed Rest No     Supplemental O2: (If Applicable)     NC No  NRB No  Venti-mask No  On  Liters/min     LINES AND DRAINS:     PIV     DVT prophylaxis:     DVT prophylaxis Med- No  DVT prophylaxis SCD or MOISES- Yes      Wounds: (If Applicable)     Wounds- No     Location      Patient Safety:     Falls Score Total Score: 3  Safety Level_______  Bed Alarm On? No  Sitter?  No     Plan for upcoming shift:   ABX therapy   BP management  Safety      Discharge Plan: No      Active Consults:  IP CONSULT TO NEUROLOGY  IP CONSULT TO CARDIOLOGY

## 2019-11-25 NOTE — PROGRESS NOTES
Progress Note      11/25/2019   NAME: Chadwick Case   MRN:  [id number]   Admit Diagnosis: Altered mental state [R41.82]      Problem List:      Recent GI bleed 10/2019 found to have ischemia, EUNICE and EVR negative. Then with encephalopathy, fever, chest pain with increased troponin.     1. CAD s/p prior PTCA of the LAD in '96.  Cath /10 w/ mild diffuse disease; nml EF.  MPI 3/18 w/ EF 71% and no ischemia or infarct  2. Echo 4/19 w/ EF 60-65%, mild cLVH, mild PHTN, EUNICE 10/2019  Nl EF, no thrombus  3. Sinus, EVR 2019 with no afib  4. Mild aortic stenosis  5. Chronic venous insufficiency s/p left GSV venaseal 2/19 and right GSV venaseal 3/19  6. Chronic kidney disease; Stg 3  7. Gout  8. COPD  9. TESSA on CPAP  10. Remote TIA 4/19  11. Hypertension  12. Hyperlipidemia  13. Former smoker  15. [facility]       Assessment/Plan:      - Cont ASA  - Cont atenolol  - Cont ISMN 30mg  - Cont norvasc 10mg  - Increase hydralazine to 75mg TID  - Back on furosemide  - Cont statin  - Being discharged; see me as scheduled         [x]       High complexity decision making was performed in this patient    Subjective:     Chadwick Case denies chest pain, dyspnea. Discussed with RN events overnight. Review of Systems:    Symptom Y/N Comments  Symptom Y/N Comments   Fever/Chills N   Chest Pain N    Poor Appetite N   Edema N    Cough N   Abdominal Pain N    Sputum N   Joint Pain N    SOB/MARES N   Pruritis/Rash N    Nausea/vomit N   Tolerating PT/OT Y    Diarrhea N   Tolerating Diet Y    Constipation N   Other       Could NOT obtain due to:      Objective:      Physical Exam:    Last 24hrs VS reviewed since prior progress note.  Most recent are:    Visit Vitals  /84   Pulse 65   Temp 98.9 °F (37.2 °C)   Resp 19   Wt 96.2 kg (212 lb)   SpO2 95%   BMI 32.23 kg/m²       Intake/Output Summary (Last 24 hours) at 11/25/2019 0701  Last data filed at 11/25/2019 0407  Gross per 24 hour   Intake 1200 ml   Output 1800 ml   Net -600 ml General Appearance: Well developed, well nourished, alert & oriented x 3,    no acute distress. Ears/Nose/Mouth/Throat: Hearing grossly normal.  Neck: Supple. Chest: Lungs clear to auscultation bilaterally. Cardiovascular: Regular rate and rhythm, S1S2 normal, no murmur. Abdomen: Soft, non-tender, bowel sounds are active. Extremities: No edema bilaterally. Skin: Warm and dry. []         Post-cath site without hematoma, bruit, tenderness, or thrill. Distal pulses intact. PMH/SH reviewed - no change compared to H&P    Data Review    Telemetry: sinus rhythm     EKG:   [x]  No new EKG for review    Lab Data Personally Reviewed:    No results for input(s): WBC, HGB, HCT, PLT, HGBEXT, HCTEXT, PLTEXT, HGBEXT, HCTEXT, PLTEXT in the last 72 hours. No results for input(s): INR, PTP, APTT, INREXT, INREXT in the last 72 hours. Recent Labs     11/25/19  0139 11/24/19  0101 11/23/19  0247    142 142   K 3.4* 3.4* 3.4*    109* 109*   CO2 28 26 29   BUN 13 15 12   CREA 1.15 1.10 1.11   * 110* 109*   CA 8.2* 8.6 8.3*     No results for input(s): CPK, CKNDX, TROIQ in the last 72 hours. No lab exists for component: CPKMB  Lab Results   Component Value Date/Time    Cholesterol, total 151 10/28/2019 02:17 AM    HDL Cholesterol 30 10/28/2019 02:17 AM    LDL, calculated 82 10/28/2019 02:17 AM    Triglyceride 195 (H) 10/28/2019 02:17 AM    CHOL/HDL Ratio 5.0 10/28/2019 02:17 AM       No results for input(s): SGOT, GPT, AP, TBIL, TP, ALB, GLOB, GGT, AML, LPSE in the last 72 hours. No lab exists for component: AMYP, HLPSE  No results for input(s): PH, PCO2, PO2 in the last 72 hours.     Medications Personally Reviewed:    Current Facility-Administered Medications   Medication Dose Route Frequency    hydrALAZINE (APRESOLINE) tablet 75 mg  75 mg Oral TID    butalbital-acetaminophen-caffeine (FIORICET, ESGIC) -40 mg per tablet 1 Tab  1 Tab Oral Q6H PRN    furosemide (LASIX) tablet 80 mg  80 mg Oral DAILY    cloNIDine HCl (CATAPRES) tablet 0.1 mg  0.1 mg Oral BID    vancomycin (VANCOCIN) 1250 mg in  ml infusion  1,250 mg IntraVENous Q16H    metoprolol (LOPRESSOR) injection 5 mg  5 mg IntraVENous Q6H PRN    atenolol (TENORMIN) tablet 100 mg  100 mg Oral DAILY    docusate sodium (COLACE) capsule 100 mg  100 mg Oral BID    polyethylene glycol (MIRALAX) packet 17 g  17 g Oral DAILY    melatonin tablet 10.5 mg  10.5 mg Oral QHS    amLODIPine (NORVASC) tablet 10 mg  10 mg Oral DAILY    isosorbide mononitrate ER (IMDUR) tablet 30 mg  30 mg Oral DAILY    aspirin chewable tablet 81 mg  81 mg Oral DAILY    haloperidol lactate (HALDOL) injection 2 mg  2 mg IntraVENous Q6H PRN    albuterol-ipratropium (DUO-NEB) 2.5 MG-0.5 MG/3 ML  3 mL Nebulization Q6H PRN    sodium chloride (NS) flush 5-40 mL  5-40 mL IntraVENous Q8H    sodium chloride (NS) flush 5-40 mL  5-40 mL IntraVENous PRN    acetaminophen (TYLENOL) tablet 650 mg  650 mg Oral Q4H PRN    ondansetron (ZOFRAN) injection 4 mg  4 mg IntraVENous Q4H PRN    atorvastatin (LIPITOR) tablet 80 mg  80 mg Oral DAILY    DULoxetine (CYMBALTA) capsule 30 mg  30 mg Oral DAILY    pantoprazole (PROTONIX) tablet 40 mg  40 mg Oral DAILY    thiamine mononitrate (B-1) tablet 100 mg  100 mg Oral DAILY    sodium chloride (NS) flush 5-40 mL  5-40 mL IntraVENous Q8H    sodium chloride (NS) flush 5-40 mL  5-40 mL IntraVENous PRN    LORazepam (ATIVAN) injection 2 mg  2 mg IntraVENous Q5MIN PRN    LORazepam (ATIVAN) injection 1 mg  1 mg IntraVENous Q4H PRN    acyclovir (ZOVIRAX) 800 mg in 0.9% sodium chloride 250 mL IVPB  800 mg IntraVENous Q12H    ampicillin (OMNIPEN) 2 g in 0.9% sodium chloride (MBP/ADV) 100 mL  2 g IntraVENous Q6H    cefTRIAXone (ROCEPHIN) 2 g in 0.9% sodium chloride (MBP/ADV) 50 mL  2 g IntraVENous Q12H    influenza vaccine 2019-20 (6 mos+)(PF) (FLUARIX/FLULAVAL/FLUZONE QUAD) injection 0.5 mL  0.5 mL IntraMUSCular PRIOR TO DISCHARGE Lopez Mccormick III, DO

## 2019-11-25 NOTE — DISCHARGE SUMMARY
Hospitalist Discharge Summary     Patient ID:  Ozzie Arvizu  [id number]  16 y.o.  1934  11/18/2019    PCP on record: Faith Patrick MD    Admit date: 11/18/2019  Discharge date and time: 11/25/2019    DISCHARGE DIAGNOSIS:  See below    CONSULTATIONS:  IP CONSULT TO NEUROLOGY  IP CONSULT TO CARDIOLOGY    Excerpted HPI from H&P of Christine Tam MD:  80 y.o man with EtOH abuse (reportedly quit 3 months ago), HTN, CAD, TESSA, who presents with AMS. He is a limited historian. His family reports that around 5 PM today he became more confused and agitated, oriented to person only, and was holding onto his head like he had a headache. No known fever but EMS reportedly noted a temperature of 100.7 F per the family. He was sedated in the ED and is a poor historian. After sedation he was noted to have a tonic-clonic seizure. Chart review reveals he was in October 2019 for hematochezia. During that hospital stay he had an episode of confusion and abnormal gait prompting a work-up including an MRI of the brain and an EEG, chart also notes that he had a headache that was treated with Fioricet.  ______________________________________________________________________  DISCHARGE SUMMARY/HOSPITAL COURSE:  for full details see H&P, daily progress notes, labs, consult notes. AMS - improved, back to baseline  ? Viral encephalitis  Expressive aphasia secondary to above? resolved  Continue telemetry monitoring  Pt afebrile for the past 24 hours. Family continues to refuse LP - however this may change as pt's daughter is returning back from her trip today and will be at the hospital tomorrow. In the interim, will continue with IV abx.   Brain MRI w/o contrast was done [facility] ordered with and without but order was cancelled by someone] - limited study that did not show any acute infarct  Neurology evals appareciated    11/21:  Pt's mental status has improved this morning - hopeful that this will continue. Will continue IV abx    11/22:  MS markedly improved over the past 24 hours. Family state that he has significant anxiety. Unclear whether this was meningitis in the first place ? Will complete course on 11/25.    11/23:  Brain MRI w/ contrast: IMPRESSION:  1. Evaluation is limited by motion. No acute intracranial abnormality. 2. Unchanged generalized parenchymal volume loss and moderate chronic microvascular ischemic disease. Therefore no clear evidence of encephalitis noted   Will continue abx till Monday.     11/24:  MRI Brain w/ contrast negative for encephalitis, motion limited. Plan to place PICC line tomorrow and discharge on Acyclovir for an additional 7 days to complete 14 day course for presumed viral encephalitis    11/25:  PICC line placed. Will discharge on 800mg q12hr for 14 more doses. Pt stable for discharge. Family updated and agree on plan. Pt to follow up with PCP in 2 weeks. LP was never done as family did not agree so pt is empirically being treated. Pt's PICC line to be removed by [facility] after last dose of Acyclovir. Gram positive bacteremia - likely contaminant  Unclear if contaminant, 2 bottles flagged thus far, gram positive cocci growing in 1/4  Continue IV abx for now  Repeat Bcx tmr Am    11/21:  Repeat BCx in the AM  BCx still prelim gram positive cocci, now in 2/4 bottles    11/22:  Coag negative staph - likely contaminant. Repeat Cx NGTD    11/24:  Repeat Cx NGTD 2 day    11/25:  Repeat Cx NGTD     Transient episode of CP - resolved  Elevated troponin  Cardiology evals noted - medical management for now  Trend troponins until downtrending    Cr elevated - back to baseline, not KATHY as per RIFLE criteria  History of alcohol abuse: reportedly quit 3 months ago  Elevated ammonia  LFTs wnl  Ammonia 53     HTN  CAD  HLD  TESSA  Resume home meds  Added Clonidine 0.1mg BID.    Resume home Lasix due to LE swelling.  _______________________________________________________________________  Patient seen and examined by me on discharge day. Pertinent Findings:  Gen:    Not in distress  Chest: Clear lungs  CVS:   Regular rhythm. 1+ pitting edema  Abd:  Soft, not distended, not tender  Neuro:  Alert, oriented x3, no focal deficits  _______________________________________________________________________  DISCHARGE MEDICATIONS:   Current Discharge Medication List      START taking these medications    Details   acyclovir 795 mg 795 mg by IntraVENous route every twelve (12) hours for 14 doses. Qty: 14 Dose, Refills: 0      cloNIDine HCl (CATAPRES) 0.2 mg tablet Take 1 Tab by mouth two (2) times a day. Qty: 60 Tab, Refills: 0      docusate sodium (COLACE) 100 mg capsule Take 1 Cap by mouth two (2) times a day for 90 days. Qty: 60 Cap, Refills: 2      hydrALAZINE (APRESOLINE) 50 mg tablet Take 1 Tab by mouth three (3) times daily. Qty: 90 Tab, Refills: 0      isosorbide mononitrate ER (IMDUR) 30 mg tablet Take 1 Tab by mouth daily. Qty: 30 Tab, Refills: 0      thiamine mononitrate (B-1) 100 mg tablet Take 1 Tab by mouth daily. Qty: 30 Tab, Refills: 0         CONTINUE these medications which have CHANGED    Details   amLODIPine (NORVASC) 10 mg tablet Take 1 Tab by mouth daily. Qty: 30 Tab, Refills: 0      atenolol (TENORMIN) 100 mg tablet Take 1 Tab by mouth daily. Qty: 30 Tab, Refills: 0         CONTINUE these medications which have NOT CHANGED    Details   !! furosemide (LASIX) 80 mg tablet Take 80 mg by mouth every fourty-eight (48) hours. Patient alternates 80 mg and 40 mg every other day      !! furosemide (LASIX) 80 mg tablet Take 40 mg by mouth every fourty-eight (48) hours. Patient alternates 80 mg and 40 mg every other day      acetaminophen (TYLENOL) 500 mg tablet Take 1,000 mg by mouth every six (6) hours as needed for Pain. aspirin delayed-release 81 mg tablet Take 81 mg by mouth daily. polyethylene glycol (MIRALAX) 17 gram/dose powder Take 17 g by mouth daily as needed (constipation). pantoprazole (PROTONIX) 40 mg tablet Take 1 Tab by mouth daily. Qty: 90 Tab, Refills: 3      DULoxetine (CYMBALTA) 30 mg capsule Take 1 Cap by mouth daily. Qty: 30 Cap, Refills: 6      atorvastatin (LIPITOR) 80 mg tablet TAKE ONE TABLET BY MOUTH DAILY  Qty: 90 Tab, Refills: 3      ANORO ELLIPTA 62.5-25 mcg/actuation inhaler Take 1 Puff by inhalation daily. allopurinol (ZYLOPRIM) 300 mg tablet TAKE ONE TABLET BY MOUTH DAILY  Qty: 90 Tab, Refills: 3       !! - Potential duplicate medications found. Please discuss with provider. STOP taking these medications       butalbital-acetaminophen-caff (FIORICET) -40 mg per capsule Comments:   Reason for Stopping:         butalbital-acetaminophen-caffeine (FIORICET, ESGIC) -40 mg per tablet Comments:   Reason for Stopping:                 Patient Follow Up Instructions: Activity: Activity as tolerated  Diet: Cardiac Diet  Wound Care: None needed  Access: PICC line care as per [facility]. Follow-up Information     Follow up With Specialties Details Why [address] [facility] On 11/25/2019 THIS  [address]   IF YOU DO NOT HEAR FROM THEM WITHIN 24-48HRS, PLEASE CONTACT THEM DIRECTLY [address] [facility]  1st Floor  [place] [place] [address] [phone]        ________________________________________________________________    Risk of deterioration: Low    Condition at Discharge:  Stable  __________________________________________________________________    Disposition  Home with family and home health services    ____________________________________________________________________    Code Status: Full Code  ___________________________________________________________________      Total time in minutes spent coordinating this discharge (includes going over instructions, follow-up, prescriptions, and preparing report for sign off to her PCP) :  35 minutes    Signed:  Patricia Bourgeois MD

## 2019-11-25 NOTE — PROGRESS NOTES
Provided discharge instructions, prescriptions and appointments. Reviewed with patient and family, all of whom expressed understanding. Patient wheeled out to family vehicle at entrance.

## 2019-11-25 NOTE — PROGRESS NOTES
Problem: Falls - Risk of  Goal: *Absence of Falls  Description  Document Julia Velasco Fall Risk and appropriate interventions in the flowsheet. Outcome: Progressing Towards Goal  Note: Fall Risk Interventions:  Mobility Interventions: Bed/chair exit alarm    Mentation Interventions: Adequate sleep, hydration, pain control    Medication Interventions: Assess postural VS orthostatic hypotension    Elimination Interventions: Bed/chair exit alarm              Problem: Patient Education: Go to Patient Education Activity  Goal: Patient/Family Education  Outcome: Progressing Towards Goal     Problem: Pressure Injury - Risk of  Goal: *Prevention of pressure injury  Description  Document Aquiles Scale and appropriate interventions in the flowsheet.   Outcome: Progressing Towards Goal  Note: Pressure Injury Interventions:  Sensory Interventions: Assess changes in LOC    Moisture Interventions: Absorbent underpads    Activity Interventions: Assess need for specialty bed    Mobility Interventions: HOB 30 degrees or less    Nutrition Interventions: Document food/fluid/supplement intake    Friction and Shear Interventions: Apply protective barrier, creams and emollients                Problem: Patient Education: Go to Patient Education Activity  Goal: Patient/Family Education  Outcome: Progressing Towards Goal     Problem: Seizure Disorder (Adult)  Goal: *STG: Remains free of seizure activity  Outcome: Progressing Towards Goal  Goal: *STG: Maintains lab values within therapeutic range  Outcome: Progressing Towards Goal  Goal: *STG/LTG: Complies with medication therapy  Outcome: Progressing Towards Goal  Goal: *STG: Remains free of injury during seizure activity  Outcome: Progressing Towards Goal  Goal: *STG: Remains safe in hospital  Outcome: Progressing Towards Goal  Goal: Interventions  Outcome: Progressing Towards Goal     Problem: Patient Education: Go to Patient Education Activity  Goal: Patient/Family Education  Outcome: Progressing Towards Goal

## 2019-11-25 NOTE — PROGRESS NOTES
PICC Education: Explained reason and rationale for PICC placement along with providing education in order to make an informed consent including nature, risks, benefits, potential complications, care and maintenance of PICC line. The opportunity for questions or concerns was given. A 'Patient PICC Handbook was also provided. Patient Sunday Massjayashree gave consent for PICC procedure to be done at the bedside. Patient and family verbalizes understanding at this time. Right arm assessed. Right basilic vein not compressible. Procedure:  Time out completed. Pre procedure assessment done. Maximum sterile barrier precautions observed throughout procedure. Lidocaine 1% 5 ml sc given prior to cannulation  Cannulated Brachial vein using ultrasound guidance and modified seldinger technique. Inserted Single lumen 4 fr PICC in  Right arm using, [employer] Tip Location System and 3CG   Patient has sinus rhythm. Tip Positioning System indicating tall P wave and no negative deflection before P wave which would indicate the PICC tip is properly placed in the distal SVC or the CAJ. PICC tip was confirmed by 2 PICC nurses and 3CG printout was placed on patients chart. Blood return verified and flushed with 20ml NS in each port. Sterile dressing applied with Biopatch, Stat loc, occlusive dressing per protocol. Curios caps applied to each port. Reason for access :  Long term antibiotics. 14 days of acyclovir. Complications related to insertion;  none. Patient tolerated procedure well with minimal blood loss. PICC procedure performed by: Lori Hogan RN, BSN  Assisted by: SHELLI CantuN, RN, CRNI  Right  arm circumference: 34 cm. Catheter internal length:  42  Catheter external length: hub  PICC catheter occupies 9% of vein  Brand of catheter BARD SOLO POWER PICC,  REF: # [id number]   LOT: # [id number]    EXP: 2020-09-30.   Primary nurse Murphy Garrison, notified PICC line may be used and to hang new infusion tubing prior to use.      Marium Dickerson RN, BSN, [facility]    Vascular Access Nurse

## 2019-11-25 NOTE — TELEPHONE ENCOUNTER
PCP: Dari Franco MD    Last appt: 11/5/2019  No future appointments. Requested Prescriptions     Pending Prescriptions Disp Refills    atenolol (TENORMIN) 50 mg tablet 30 Tab 5     Sig: Take 1 Tab by mouth daily.        Prior labs and Blood pressures:  BP Readings from Last 3 Encounters:   11/25/19 151/81   11/17/19 172/53   11/11/19 126/72     Lab Results   Component Value Date/Time    Sodium 142 11/25/2019 01:39 AM    Potassium 3.4 (L) 11/25/2019 01:39 AM    Chloride 107 11/25/2019 01:39 AM    CO2 28 11/25/2019 01:39 AM    Anion gap 7 11/25/2019 01:39 AM    Glucose 103 (H) 11/25/2019 01:39 AM    BUN 13 11/25/2019 01:39 AM    Creatinine 1.15 11/25/2019 01:39 AM    BUN/Creatinine ratio 11 (L) 11/25/2019 01:39 AM    GFR est AA >60 11/25/2019 01:39 AM    GFR est non-AA >60 11/25/2019 01:39 AM    Calcium 8.2 (L) 11/25/2019 01:39 AM     Lab Results   Component Value Date/Time    Hemoglobin A1c 5.3 10/28/2019 02:17 AM     Lab Results   Component Value Date/Time    Cholesterol, total 151 10/28/2019 02:17 AM    Cholesterol (POC) 171.0 09/08/2017 10:42 AM    HDL Cholesterol 30 10/28/2019 02:17 AM    HDL Cholesterol (POC) 36.0 09/08/2017 10:42 AM    LDL Cholesterol (POC) 70.4 09/08/2017 10:42 AM    LDL, calculated 82 10/28/2019 02:17 AM    VLDL, calculated 39 10/28/2019 02:17 AM    Triglyceride 195 (H) 10/28/2019 02:17 AM    Triglycerides (POC) 323.0 (A) 09/08/2017 10:42 AM    CHOL/HDL Ratio 5.0 10/28/2019 02:17 AM     Lab Results   Component Value Date/Time    Vitamin D 25-Hydroxy 13.7 (L) 05/09/2019 07:16 AM       Lab Results   Component Value Date/Time    TSH 1.75 10/28/2019 02:17 AM

## 2019-11-25 NOTE — PROGRESS NOTES
JACQUES:    IV antibiotic   HHC  2nd IM Medicare Letter  Home with Family    CM: Velasquez Petersen is currently working with pt in the Neuro Unit. CM consulted with MD regarding pt needing HHC and IV Home antibiotics. CM completed room visit with pt with family by bedside. Family aware of the following recommendations and agreeable to services. CM informed pt and family that she will submit referral for medication pricing, and will inform them of the following cost (if any). CM informed pt/family that referral will be sent to [facility] pending). Pt informed of FOC and 2nd IM Medicare Letter (signed) placed in chart. CM informed that he will be d/c on today     UPDATE: 9:58AM    CM informed that pt has been accepted to [facility]. CM will inform pt. CM informed that pt Marleny Acuna is still pending for [employer] (infusion company). CM informed that pt will have payment plan options. UPDATE: 11:13AM    CM informed that pt's antibiotics will be covered at 100% under patients Medicare B plan. CM will inform MD of the following, to proceed with picc insertion.     Velasquez Petersen, MELONY, [address] [place] [address]

## 2019-11-26 ENCOUNTER — HOME CARE VISIT (OUTPATIENT)
Dept: SCHEDULING | Facility: HOME HEALTH | Age: 84
End: 2019-11-26
Payer: MEDICARE

## 2019-11-26 PROCEDURE — G0493 RN CARE EA 15 MIN HH/HOSPICE: HCPCS

## 2019-11-26 PROCEDURE — 3331090002 HH PPS REVENUE DEBIT

## 2019-11-26 PROCEDURE — 3331090001 HH PPS REVENUE CREDIT

## 2019-11-27 ENCOUNTER — PATIENT OUTREACH (OUTPATIENT)
Dept: INTERNAL MEDICINE CLINIC | Age: 84
End: 2019-11-27

## 2019-11-27 ENCOUNTER — TELEPHONE (OUTPATIENT)
Dept: INTERNAL MEDICINE CLINIC | Age: 84
End: 2019-11-27

## 2019-11-27 VITALS
HEART RATE: 70 BPM | TEMPERATURE: 98.2 F | RESPIRATION RATE: 18 BRPM | SYSTOLIC BLOOD PRESSURE: 140 MMHG | OXYGEN SATURATION: 96 % | DIASTOLIC BLOOD PRESSURE: 70 MMHG

## 2019-11-27 PROCEDURE — 3331090002 HH PPS REVENUE DEBIT

## 2019-11-27 PROCEDURE — 3331090001 HH PPS REVENUE CREDIT

## 2019-11-27 RX ORDER — ONDANSETRON 4 MG/1
4 TABLET, ORALLY DISINTEGRATING ORAL
Qty: 30 TAB | Refills: 0 | Status: SHIPPED | OUTPATIENT
Start: 2019-11-27 | End: 2019-12-12 | Stop reason: SDUPTHER

## 2019-11-27 NOTE — PROGRESS NOTES
Hospital Discharge Follow-Up      Date/Time:  2019 9:49 AM    Patient was admitted to [facility] on  and discharged on  for DX. AMS ? PRESUMED VIRAL ENCEPHALITIS. The physician discharge summary was available at the time of outreach. Patient was contacted within 3 business days of discharge. Top Challenges reviewed with the provider     Advance Care Planning:   Does patient have an Advance Directive:  reviewed and current     PHI get med from Pharmacy:  Thiamine mononitrate     Method of communication with provider :chart routing    Inpatient RRAT score: 40  Was this a readmission? yes   Patient stated reason for the readmission: headache, fever    Care Transition Nurse (CTN) contacted the patient by telephone to perform post hospital discharge assessment. Verified name and  with patient as identifiers. Provided introduction to self, and explanation of the CTN role. Patient received hospital discharge instructions. CTN reviewed discharge instructions and red flags with patient who verbalized understanding. Patient given an opportunity to ask questions and does not have any further questions or concerns at this time. The patient agrees to contact the PCP office for questions related to their healthcare. CTN provided contact information for future reference. Disease Specific:   N/A    Patients top risk factors for readmission:  medication management, PCP relationship    Home Health orders at discharge: PT, OT, SN, resumption of care  [address]: -  Date of initial visit: resumption of care     Durable Medical Equipment ordered at discharge: IV medication  [address]: [facility]   Durable Medical Equipment received:     Medication(s):   New Medications at Discharge:   acyclovir 795 mg 795 mg by IntraVENous route every twelve (12) hours for 14 doses.   Qty: 14 Dose, Refills: 0       cloNIDine HCl (CATAPRES) 0.2 mg tablet Take 1 Tab by mouth two (2) times a day. Qty: 60 Tab, Refills: 0       docusate sodium (COLACE) 100 mg capsule Take 1 Cap by mouth two (2) times a day for 90 days. Qty: 60 Cap, Refills: 2       hydrALAZINE (APRESOLINE) 50 mg tablet Take 1 Tab by mouth three (3) times daily. Qty: 90 Tab, Refills: 0       isosorbide mononitrate ER (IMDUR) 30 mg tablet Take 1 Tab by mouth daily. Qty: 30 Tab, Refills: 0       thiamine mononitrate (B-1) 100 mg tablet Take 1 Tab by mouth daily. Qty: 30 Tab, Refills: 0     Changed Medications at Discharge:   amLODIPine (NORVASC) 10 mg tablet Take 1 Tab by mouth daily. Qty: 30 Tab, Refills: 0       atenolol (TENORMIN) 100 mg tablet Take 1 Tab by mouth daily. Qty: 30 Tab, Refills: 0     Discontinued Medications at Discharge:   butalbital-acetaminophen-caff (FIORICET) -40 mg per capsule             butalbital-acetaminophen-caffeine (FIORICET, ESGIC) -40 mg per tablet      Medication reconciliation was performed with patient, who verbalizes understanding of administration of home medications. There were no barriers to obtaining medications identified at this time. Referral to Pharm D needed: no     Current Outpatient Medications   Medication Sig    melatonin 5 mg cap capsule Take 5 mg by mouth nightly.  allopurinol (ZYLOPRIM) 300 mg tablet TAKE ONE TABLET BY MOUTH DAILY    atenolol (TENORMIN) 50 mg tablet Take 1 Tab by mouth daily.  acyclovir 795 mg 795 mg by IntraVENous route every twelve (12) hours for 14 doses.  amLODIPine (NORVASC) 10 mg tablet Take 1 Tab by mouth daily.  atenolol (TENORMIN) 100 mg tablet Take 1 Tab by mouth daily.  cloNIDine HCl (CATAPRES) 0.2 mg tablet Take 1 Tab by mouth two (2) times a day.  docusate sodium (COLACE) 100 mg capsule Take 1 Cap by mouth two (2) times a day for 90 days.  hydrALAZINE (APRESOLINE) 50 mg tablet Take 1 Tab by mouth three (3) times daily.     isosorbide mononitrate ER (IMDUR) 30 mg tablet Take 1 Tab by mouth daily.  thiamine mononitrate (B-1) 100 mg tablet Take 1 Tab by mouth daily.  furosemide (LASIX) 80 mg tablet Take 80 mg by mouth every fourty-eight (48) hours. Patient alternates 80 mg and 40 mg every other day    furosemide (LASIX) 80 mg tablet Take 40 mg by mouth every fourty-eight (48) hours. Patient alternates 80 mg and 40 mg every other day    acetaminophen (TYLENOL) 500 mg tablet Take 1,000 mg by mouth every six (6) hours as needed for Pain.  aspirin delayed-release 81 mg tablet Take 81 mg by mouth daily.  polyethylene glycol (MIRALAX) 17 gram/dose powder Take 17 g by mouth daily as needed (constipation).  pantoprazole (PROTONIX) 40 mg tablet Take 1 Tab by mouth daily.  DULoxetine (CYMBALTA) 30 mg capsule Take 1 Cap by mouth daily.  atorvastatin (LIPITOR) 80 mg tablet TAKE ONE TABLET BY MOUTH DAILY    ANORO ELLIPTA 62.5-25 mcg/actuation inhaler Take 1 Puff by inhalation daily. No current facility-administered medications for this visit. [facility]:  information provided as a resource     Goals      Establish PCP relationships and regularly scheduled appointments. 10/29 Pt.has f/u with PCP, Dr. Amelia Taylor on 11/5 @ 11:15 am, pt.declined earlier appt., pt.has scheduled f/u with (Neurology) Wily Ward will have wife or g-dtr. call for appt. d/t wife and g-dtr provides transportation. CTN will f/u with pt.in 1-2 weeks. -[facility]    10/30 CTN call [facility] to f/u with therapy, spoke with Daily reports they received the referral today from Dr. Flaquito Fitch office, will start SN/PT w/n 48hrs. CTN will f/u with pt.in 1 week. -[facility]    11/14 Pt. reports he is doing well, f/u 11/5 with , reports facial droop resolved, pt.reports he is sleeping better at night, decreased itching, reports he has completed with Home Health therapy. Pt states \"I love fresh kale\", reports he was outside cooking his fresh kale. CTN f/u with pt.in 2-3 weeks.  -[facility]    11/27   ED [facility] 11/18-11/25 DX. AMS ? PRESUMED VIRAL ENCEPHALITIS, Pt.reports c/o stomach pain, center of abd, non-radiating, nausea no vomiting, report normally have BM daily, no BM in since 11/25, pt.reports he thinks stomach pain caused taking ABX. CTN encourage pt.to talk with PCP, pt.wife reports she will call office for appt. and inform physician of pt.stomach discomfort. CTN informed PHI if she unable to speak with nurse or make earlier appt. to contact CTN. CTN provided pt information on [employer] [phone])  explain briefly type of service;contact information provided. CTN will f/u with pt.in 1 week. -                Understands red flags post discharge. 10/29 Pt.reports he is doing well, reports weakness to L side has improved, reports he has generalized weakness all over, no reports of headaches. CTN reminded pt.and PHI to report sx such as difficulty walking, dizziness, loss of balance and coordination, difficulty speaking, numbness or paralysis in the face, leg, or arm, most likely on just one side of the body, a sudden headache, especially when accompanied by nausea, vomiting. Pt.PHI encourage to call 911 or take pt to ED for eval. Pt.and PHI verbalizes understanding. CTN will f/u with pt.in 1-2 weeks. -[facility]    11/14 Pt.without any reports of s/s listed above, reports he is doing well. CTN will f/u with pt. in 2 weeks. -[facility]    11/17 ED [facility] 11/18-11/25 DX. AMS ? PRESUMED VIRAL ENCEPHALITIS, CTN reminded pt.to take all medications as directed, continue with monitor s/s such as HA, fever, pain in muscles and joints, weakness and fatigue, seizures, confusion, speech or hearing difficulty, pt.encourged to reports sx to PCP asap. Pt.verbalizes understanding, CTN will f/u with pt.in 1 week. -[facility]

## 2019-11-28 LAB
BACTERIA SPEC CULT: NORMAL
SERVICE CMNT-IMP: NORMAL

## 2019-11-28 PROCEDURE — 3331090001 HH PPS REVENUE CREDIT

## 2019-11-28 PROCEDURE — 3331090002 HH PPS REVENUE DEBIT

## 2019-11-29 PROCEDURE — 3331090002 HH PPS REVENUE DEBIT

## 2019-11-29 PROCEDURE — 3331090001 HH PPS REVENUE CREDIT

## 2019-11-29 NOTE — CDMP QUERY
Documentation of Acute Renal Chata Romo is noted in the progress notes. Currently the patient does not meet RIFLE criteria (BSV approved) to support this diagnosis. If you are using another criteria to support this diagnosis, please document this in your progress note. Otherwise, please document in the progress notes the clinical indicators that support this diagnosis or state that the diagnosis has been ruled out. Current documentation: 
Creatinine: 1.64 on admission decreased to 1.10 and 1.15 at discharge. H&P: 
Acute renal insufficiency: slightly worse than baseline; monitor RIFLE  (BSV Approved) RISK:  Increased SCr x 1.5 or GFR decrease > 25% (within 7 days) INJURY:  Increased SCr x 2.0 or GFR decreased > 50% FAILURE:  Increased SCr x 3.0 or GFR decrease > 75% or SCr >4.0 mg/dL or acute increase >0.5 mg/dL LOSS:  Persistent acute renal failure = complete loss of kidney function > 4 weeks END STAGE:  End stage of kidney disease > 3 months AKIN 
 
STAGE  1:  Increase in SCr >/= 0.3 mg/dL or >/= 150% to 200% (1.5 to 2-fold) from baseline (within 48 hours) STAGE  2:  Increase in SCr to more than 200% to 300% (>2-3 fold) from baseline STAGE  3:  Increase in SCr to more than 300% (>3-fold) from baseline or SCr >/= 4.0 mg/dL with an acute increase of at least 0.5 mg/dL or initiation of renal replacement therapy KDIGO 
 
STAGE  1:  Increase in SCr by >/= 0.3 mg/dL within 48 hours or increase in SCr 1.5 to 1.9 times baseline which is known or presumed to have occurred within the prior 7 days STAGE  2:  Increase in SCr to 2.0 to 2.9 times baseline STAGE  3:  Increase in SCr to 3.0 times baseline or increase in SCr to >/= baseline or increase in SCr to >/= 4.0 mg/dL or initiation of renal replacement therapy Please clarify and document your clinical opinion in the progress notes and discharge summary including the definitive and/or presumptive diagnosis, (suspected or probable), related to the above clinical findings. Please include clinical findings supporting your diagnosis. Thank you ARUN Jeong, RN, [address] [place] [address] Supervisor -ED [facility], [facility]

## 2019-11-29 NOTE — CDMP QUERY
Patient was admitted with suspected viral  encephalitis. There is noted documentation of a history of dementia, and expressive aphasia. After study, can the suspected etiology of the patient's expressive aphasia be further specified as: 
 
=> Viral encephalitis 
=> Dementia 
=> Other_______ 
=> Unable to determine Thank you Jeanie Higgins, BSN, RN, [address] [place] [address] Supervisor -[facility], [facility]

## 2019-11-30 PROCEDURE — 3331090001 HH PPS REVENUE CREDIT

## 2019-11-30 PROCEDURE — 3331090002 HH PPS REVENUE DEBIT

## 2019-12-01 PROCEDURE — 3331090001 HH PPS REVENUE CREDIT

## 2019-12-01 PROCEDURE — 3331090002 HH PPS REVENUE DEBIT

## 2019-12-02 ENCOUNTER — OFFICE VISIT (OUTPATIENT)
Dept: INTERNAL MEDICINE CLINIC | Age: 84
End: 2019-12-02

## 2019-12-02 VITALS
TEMPERATURE: 96.8 F | WEIGHT: 220.2 LBS | BODY MASS INDEX: 33.37 KG/M2 | HEART RATE: 60 BPM | RESPIRATION RATE: 20 BRPM | SYSTOLIC BLOOD PRESSURE: 122 MMHG | HEIGHT: 68 IN | DIASTOLIC BLOOD PRESSURE: 56 MMHG | OXYGEN SATURATION: 92 %

## 2019-12-02 DIAGNOSIS — A86 VIRAL ENCEPHALITIS: ICD-10-CM

## 2019-12-02 DIAGNOSIS — I10 ESSENTIAL HYPERTENSION: ICD-10-CM

## 2019-12-02 DIAGNOSIS — D64.9 ANEMIA, UNSPECIFIED TYPE: Primary | ICD-10-CM

## 2019-12-02 LAB
A-G RATIO,AGRAT: 1.3 RATIO
ALBUMIN SERPL-MCNC: 3.9 G/DL (ref 3.9–5.4)
ALP SERPL-CCNC: 120 U/L (ref 38–126)
ALT SERPL-CCNC: 21 U/L (ref 0–50)
ANION GAP SERPL CALC-SCNC: 11 MMOL/L
AST SERPL W P-5'-P-CCNC: 26 U/L (ref 14–36)
BILIRUB SERPL-MCNC: 0.5 MG/DL (ref 0.2–1.3)
BUN SERPL-MCNC: 21 MG/DL (ref 9–20)
BUN/CREATININE RATIO,BUCR: 14 RATIO
CALCIUM SERPL-MCNC: 9.3 MG/DL (ref 8.4–10.2)
CHLORIDE SERPL-SCNC: 101 MMOL/L (ref 98–107)
CO2 SERPL-SCNC: 28 MMOL/L (ref 22–32)
CREAT SERPL-MCNC: 1.5 MG/DL (ref 0.8–1.5)
GLOBULIN,GLOB: 3.1
GLUCOSE SERPL-MCNC: 100 MG/DL (ref 75–110)
POTASSIUM SERPL-SCNC: 5 MMOL/L (ref 3.6–5)
PROT SERPL-MCNC: 7 G/DL (ref 6.3–8.2)
SODIUM SERPL-SCNC: 140 MMOL/L (ref 137–145)

## 2019-12-02 PROCEDURE — 3331090002 HH PPS REVENUE DEBIT

## 2019-12-02 PROCEDURE — 3331090001 HH PPS REVENUE CREDIT

## 2019-12-02 NOTE — PROGRESS NOTES
This note will not be viewable in [address] Mann Beth is a 80 y.o. male and presents with Transitions Of Care  . Subjective:      Mr. Praful Henderson presents today for transition of care follow-up after being hospitalized at [facility] with acute mental status changes and a fever. He was unable to get an LP and was placed empirically on IV antibiotics and acyclovir. Is felt he most likely had a viral encephalitis. He was seen by neurology as well as cardiology. He had a mildly elevated troponin. His blood pressure was poorly controlled. He has completed a course of antibiotics and was sent home with a PICC line on IV acyclovir. He completed his last dose today. His PICC line was removed today. Review of Systems  Constitutional:   Eyes:   negative for visual disturbance and irritation  ENT:   negative for tinnitus,sore throat,nasal congestion,ear pains. hoarseness  Respiratory:  negative for cough, hemoptysis, dyspnea,wheezing  CV:   negative for chest pain, palpitations, lower extremity edema  GI:   negative for nausea, vomiting, diarrhea, abdominal pain,melena  Endo:               negative for polyuria,polydipsia,polyphagia,heat intolerance  Genitourinary: negative for frequency, dysuria and hematuria  Integumentary: negative for rash and pruritus  Hematologic:  negative for easy bruising and gum/nose bleeding  Musculoskel: negative for myalgias, arthralgias, back pain, muscle weakness, joint pain  Neurological:  negative for headaches, dizziness, vertigo, memory problems and gait   Behavl/Psych: negative for feelings of anxiety, depression, mood changes    Past Medical History:   Diagnosis Date    Adverse effect of anesthesia     combative after anesthesia    Alcohol abuse     6 beers/day    Arthritis     CAD (coronary artery disease)     Chronic obstructive pulmonary disease ([facility]     Dyslipidemia 8/16/2017    Dyspepsia and other specified disorders of function of stomach     Dyspnea 8/16/2017    ED (erectile dysfunction) 8/16/2017    Encounter for immunization 8/16/2017    Fatigue 8/16/2017    Flank pain 8/1/2019    GERD (gastroesophageal reflux disease) 8/16/2017    Gout 8/16/2017    Hypertension     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    Morbid obesity ([address]     On statin therapy 8/16/2017    TESSA on CPAP 1/3/2019    Psychiatric disorder     Depression    Simple chronic bronchitis (HCC) 1/3/2019    TIA (transient ischemic attack) 5/85/6954    Umbilical hernia 91/3/6039    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     Past Surgical History:   Procedure Laterality Date    CARDIAC SURG PROCEDURE UNLIST  1996    Cardiac Stents    CARDIAC SURG PROCEDURE UNLIST  04/2019    EF 61-65%    COLONOSCOPY N/A 9/27/2019    COLONOSCOPY performed by Jeffrey Villasenor MD at [facility] ENDOSCOPY    HX HERNIA REPAIR      RI    HX HERNIA REPAIR  38/52/02    open umbilical hernia repair mesh    HX UROLOGICAL      HYDROCELECTOMY    UPPER GI ENDOSCOPY,BIOPSY  9/30/2019          Social History     Socioeconomic History    Marital status:      Spouse name: Not on file    Number of children: Not on file    Years of education: Not on file    Highest education level: Not on file   Tobacco Use    Smoking status: Former Smoker     Packs/day: 0.50     Years: 20.00     Pack years: 10.00    Smokeless tobacco: Former User    Tobacco comment: quit about 40  years ago   / used to dip tobaco and dip snuff   Substance and Sexual Activity    Alcohol use: Not Currently     Comment: \"Drinks very little now for about a month \"    Drug use: No     Family History   Problem Relation Age of Onset    Heart Disease Mother     Hypertension Mother     Hypertension Father     Hypertension Sister     Hypertension Brother     Cancer Brother      Current Outpatient Medications   Medication Sig Dispense Refill    ondansetron (ZOFRAN ODT) 4 mg disintegrating tablet Take 1 Tab by mouth every eight (8) hours as needed for Nausea. 30 Tab 0    melatonin 5 mg cap capsule Take 5 mg by mouth nightly. Pt. Take 2 tabs a night      atenolol (TENORMIN) 100 mg tablet Take 1 Tab by mouth daily. 30 Tab 0    cloNIDine HCl (CATAPRES) 0.2 mg tablet Take 1 Tab by mouth two (2) times a day. 60 Tab 0    hydrALAZINE (APRESOLINE) 50 mg tablet Take 1 Tab by mouth three (3) times daily. 90 Tab 0    isosorbide mononitrate ER (IMDUR) 30 mg tablet Take 1 Tab by mouth daily. 30 Tab 0    thiamine mononitrate (B-1) 100 mg tablet Take 1 Tab by mouth daily. 30 Tab 0    furosemide (LASIX) 80 mg tablet Take 80 mg by mouth daily. Patient taking 80mg daily      acetaminophen (TYLENOL) 500 mg tablet Take 1,000 mg by mouth every six (6) hours as needed for Pain.  aspirin delayed-release 81 mg tablet Take 81 mg by mouth daily.  polyethylene glycol (MIRALAX) 17 gram/dose powder Take 17 g by mouth daily as needed (constipation).  pantoprazole (PROTONIX) 40 mg tablet Take 1 Tab by mouth daily. 90 Tab 3    DULoxetine (CYMBALTA) 30 mg capsule Take 1 Cap by mouth daily. 30 Cap 6    atorvastatin (LIPITOR) 80 mg tablet TAKE ONE TABLET BY MOUTH DAILY 90 Tab 3    ANORO ELLIPTA 62.5-25 mcg/actuation inhaler Take 1 Puff by inhalation daily.  heparin, porcine, 10 unit/mL injection 30 Units by IntraVENous route daily.  sodium chloride (NORMAL SALINE FLUSH) 10-40 mL by IntraVENous route daily.  allopurinol (ZYLOPRIM) 300 mg tablet TAKE ONE TABLET BY MOUTH DAILY 90 Tab 3    atenolol (TENORMIN) 50 mg tablet Take 1 Tab by mouth daily. (Patient taking differently: Take 50 mg by mouth daily. (Pt. Taking 2 tabs = 100 mg)) 30 Tab 5    acyclovir 795 mg 795 mg by IntraVENous route every twelve (12) hours for 14 doses. 14 Dose 0    amLODIPine (NORVASC) 10 mg tablet Take 1 Tab by mouth daily. 30 Tab 0    docusate sodium (COLACE) 100 mg capsule Take 1 Cap by mouth two (2) times a day for 90 days.  60 Cap 2    furosemide (LASIX) 80 mg tablet Take 80 mg by mouth every fourty-eight (48) hours. Patient alternates 80 mg and 40 mg every other day       Allergies   Allergen Reactions    Levaquin [Levofloxacin] Nausea and Vomiting     Reports anxiety, nausea, aching after taking levaquin    Ciprofloxacin Shortness of Breath    Quinolones Shortness of Breath       Objective:  Visit Vitals  /56 (BP 1 Location: Left arm, BP Patient Position: Sitting)   Pulse 60   Temp 96.8 °F (36 °C) (Axillary)   Resp 20   Ht 5' 8\" (1.727 m)   Wt 220 lb 3.2 oz (99.9 kg)   SpO2 92%   BMI 33.48 kg/m²     Physical Exam:   General appearance - alert, well appearing, and in no distress  Mental status - alert, oriented to person, place, and time  EYE-MAURICE, EOMI, fundi normal, corneas normal, no foreign bodies  ENT-ENT exam normal, no neck nodes or sinus tenderness  Nose - normal and patent, no erythema, discharge or polyps  Mouth - mucous membranes moist, pharynx normal without lesions  Neck - supple, no significant adenopathy   Chest - clear to auscultation, no wheezes, rales or rhonchi, symmetric air entry   Heart - normal rate, regular rhythm, normal S1, S2, no murmurs, rubs, clicks or gallops   Abdomen - soft, nontender, nondistended, no masses or organomegaly  Lymph- no adenopathy palpable  Ext-peripheral pulses normal, no pedal edema, no clubbing or cyanosis  Skin-Warm and dry. no hyperpigmentation, vitiligo, or suspicious lesions  Neuro -alert, oriented, normal speech, no focal findings or movement disorder noted  Musculoskeletal- FROM, no bony abnormalities, no point tenderness    No results found for this visit on 12/02/19. All results for lab orders may not have been returned by the time this encountered was closed. Assessment/Plan:       ICD-10-CM ICD-9-CM    1. Anemia, unspecified type D64.9 285.9 CBC WITH AUTOMATED DIFF   2. Essential hypertension H01 861.2 METABOLIC PANEL, COMPREHENSIVE   3.  Viral encephalitis A86 049.9        Orders Placed This Encounter    CBC WITH AUTOMATED DIFF    METABOLIC PANEL, COMPREHENSIVE ([facility]       Plan:    Blood pressure is stable today. His PICC line was removed by me without complication. His mental status has returned to baseline. We will reevaluate his labs and make further recommendations based on these results. Follow-up in 2 weeks. I have reviewed with the patient details of the assessment and plan and all questions were answered. Relevent patient education was performed. Verbal and/or written instructions (see AVS) provided. The most recent lab findings were reviewed with the patient. Plan was discussed with patient who verbal expressed understanding. An After Visit Summary was printed and given to the patient.       Moe Roy MD

## 2019-12-02 NOTE — PROGRESS NOTES
Reviewed record in preparation for visit and have obtained necessary documentation. Identified pt with two pt identifiers(name and ). Chief Complaint   Patient presents with    Transitions Of Care        Coordination of Care Questionnaire:  :     1) Have you been to an emergency room, urgent care clinic since your last visit? Yes 2019 [facility]    Hospitalized since your last visit? Yes [facility] 2019-2019              2) Have you seen or consulted any other health care providers outside of [address] [place] [address] since your last visit?  No

## 2019-12-03 ENCOUNTER — HOME CARE VISIT (OUTPATIENT)
Dept: SCHEDULING | Facility: HOME HEALTH | Age: 84
End: 2019-12-03
Payer: MEDICARE

## 2019-12-03 VITALS
TEMPERATURE: 98.4 F | SYSTOLIC BLOOD PRESSURE: 124 MMHG | HEART RATE: 69 BPM | DIASTOLIC BLOOD PRESSURE: 60 MMHG | OXYGEN SATURATION: 96 % | RESPIRATION RATE: 16 BRPM

## 2019-12-03 LAB
BASOPHILS # BLD AUTO: 0.1 X10E3/UL (ref 0–0.2)
BASOPHILS NFR BLD AUTO: 1 %
EOSINOPHIL # BLD AUTO: 0.2 X10E3/UL (ref 0–0.4)
EOSINOPHIL NFR BLD AUTO: 3 %
ERYTHROCYTE [DISTWIDTH] IN BLOOD BY AUTOMATED COUNT: 16.2 % (ref 12.3–15.4)
HCT VFR BLD AUTO: 34.2 % (ref 37.5–51)
HGB BLD-MCNC: 11.6 G/DL (ref 13–17.7)
IMM GRANULOCYTES # BLD AUTO: 0 X10E3/UL (ref 0–0.1)
IMM GRANULOCYTES NFR BLD AUTO: 0 %
LYMPHOCYTES # BLD AUTO: 1.1 X10E3/UL (ref 0.7–3.1)
LYMPHOCYTES NFR BLD AUTO: 15 %
MCH RBC QN AUTO: 31.4 PG (ref 26.6–33)
MCHC RBC AUTO-ENTMCNC: 33.9 G/DL (ref 31.5–35.7)
MCV RBC AUTO: 92 FL (ref 79–97)
MONOCYTES # BLD AUTO: 0.8 X10E3/UL (ref 0.1–0.9)
MONOCYTES NFR BLD AUTO: 12 %
NEUTROPHILS # BLD AUTO: 5.2 X10E3/UL (ref 1.4–7)
NEUTROPHILS NFR BLD AUTO: 69 %
PLATELET # BLD AUTO: 285 X10E3/UL (ref 150–450)
RBC # BLD AUTO: 3.7 X10E6/UL (ref 4.14–5.8)
WBC # BLD AUTO: 7.3 X10E3/UL (ref 3.4–10.8)

## 2019-12-03 PROCEDURE — 3331090001 HH PPS REVENUE CREDIT

## 2019-12-03 PROCEDURE — G0299 HHS/HOSPICE OF RN EA 15 MIN: HCPCS

## 2019-12-03 PROCEDURE — 3331090002 HH PPS REVENUE DEBIT

## 2019-12-12 ENCOUNTER — OFFICE VISIT (OUTPATIENT)
Dept: INTERNAL MEDICINE CLINIC | Age: 84
End: 2019-12-12

## 2019-12-12 VITALS
SYSTOLIC BLOOD PRESSURE: 122 MMHG | BODY MASS INDEX: 32.8 KG/M2 | WEIGHT: 216.4 LBS | HEART RATE: 62 BPM | DIASTOLIC BLOOD PRESSURE: 64 MMHG | OXYGEN SATURATION: 96 % | TEMPERATURE: 97 F | HEIGHT: 68 IN

## 2019-12-12 DIAGNOSIS — K21.9 GASTROESOPHAGEAL REFLUX DISEASE WITHOUT ESOPHAGITIS: ICD-10-CM

## 2019-12-12 DIAGNOSIS — G47.33 OSA ON CPAP: ICD-10-CM

## 2019-12-12 DIAGNOSIS — M1A.00X0 IDIOPATHIC CHRONIC GOUT WITHOUT TOPHUS, UNSPECIFIED SITE: ICD-10-CM

## 2019-12-12 DIAGNOSIS — I25.10 CORONARY ARTERY DISEASE INVOLVING NATIVE CORONARY ARTERY OF NATIVE HEART WITHOUT ANGINA PECTORIS: Primary | ICD-10-CM

## 2019-12-12 DIAGNOSIS — I10 ESSENTIAL HYPERTENSION: ICD-10-CM

## 2019-12-12 DIAGNOSIS — E78.5 DYSLIPIDEMIA: ICD-10-CM

## 2019-12-12 DIAGNOSIS — Z79.899 ON STATIN THERAPY: ICD-10-CM

## 2019-12-12 DIAGNOSIS — Z99.89 OSA ON CPAP: ICD-10-CM

## 2019-12-12 DIAGNOSIS — M48.061 SPINAL STENOSIS OF LUMBAR REGION AT MULTIPLE LEVELS: ICD-10-CM

## 2019-12-12 RX ORDER — ONDANSETRON 4 MG/1
4 TABLET, ORALLY DISINTEGRATING ORAL
Qty: 30 TAB | Refills: 0 | Status: SHIPPED | OUTPATIENT
Start: 2019-12-12 | End: 2020-09-21

## 2019-12-12 NOTE — PROGRESS NOTES
Reviewed record in preparation for visit and have obtained necessary documentation. Identified pt with two pt identifiers(name and ). Chief Complaint   Patient presents with    Follow-up        Coordination of Care Questionnaire:  :     1) Have you been to an emergency room, urgent care clinic since your last visit? No      Hospitalized since your last visit? No               2) Have you seen or consulted any other health care providers outside of [address] [place] [address] since your last visit?  No

## 2019-12-12 NOTE — PROGRESS NOTES
This note will not be viewable in [address] Suraj Olmstead is a 80 y.o. male and presents with Follow-up  . Subjective:    Mr. Viktor Garner presents today for follow-up of several problems including hypertension, hyperlipidemia, gout, GERD, history of TIA, obstructive sleep apnea, mild carotid artery disease, history of spinal stenosis and recent hospitalization for altered mental status. He was treated empirically for encephalitis and completed a course of acyclovir as well as antibiotics empirically. He had refused an LP. He was discharged on multiple medications. He had significant swelling of his lower extremities and his amlodipine was discontinued. He was on furosemide 80 mg daily with a creatinine 1.2. He states he feels weak and glassy eyed after he takes his medication at about 8 or 9 in the morning. He complains of urinary frequency and nocturia. He denies chest pain, shortness of breath, PND, orthopnea. His pedal edema has resolved. He does tend to have some constipation. He has been taking Metamucil. He responds to MiraLAX. He has some intermittent nausea. This is usually present in the mornings when he awakens. Review of Systems  Constitutional:   Eyes:   negative for visual disturbance and irritation  ENT:   negative for tinnitus,sore throat,nasal congestion,ear pains. hoarseness  Respiratory:  negative for cough, hemoptysis, dyspnea,wheezing  CV:   negative for chest pain, palpitations, lower extremity edema  GI:   negative for nausea, vomiting, diarrhea, abdominal pain,melena  Endo:               negative for polyuria,polydipsia,polyphagia,heat intolerance  Genitourinary: negative for frequency, dysuria and hematuria  Integumentary: negative for rash and pruritus  Hematologic:  negative for easy bruising and gum/nose bleeding  Musculoskel: negative for myalgias, arthralgias, back pain, muscle weakness, joint pain  Neurological:  negative for headaches, dizziness, vertigo, memory problems and gait   Behavl/Psych: negative for feelings of anxiety, depression, mood changes    Past Medical History:   Diagnosis Date    Adverse effect of anesthesia     combative after anesthesia    Alcohol abuse     6 beers/day    Arthritis     CAD (coronary artery disease)     Chronic obstructive pulmonary disease ([facility]     Dyslipidemia 8/16/2017    Dyspepsia and other specified disorders of function of stomach     Dyspnea 8/16/2017    ED (erectile dysfunction) 8/16/2017    Encounter for immunization 8/16/2017    Fatigue 8/16/2017    Flank pain 8/1/2019    GERD (gastroesophageal reflux disease) 8/16/2017    Gout 8/16/2017    Hypertension     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    Morbid obesity ([facility]     On statin therapy 8/16/2017    TESSA on CPAP 1/3/2019    Psychiatric disorder     Depression    Simple chronic bronchitis ([facility] 1/3/2019    TIA (transient ischemic attack) 3/75/5318    Umbilical hernia 55/6/1771    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     Past Surgical History:   Procedure Laterality Date    CARDIAC SURG PROCEDURE UNLIST  1996    Cardiac Stents    CARDIAC SURG PROCEDURE UNLIST  04/2019    EF 61-65%    COLONOSCOPY N/A 9/27/2019    COLONOSCOPY performed by Santo Krause [facility]    HX HERNIA REPAIR      RIH    HX HERNIA REPAIR  62/68/77    open umbilical hernia repair mesh    HX UROLOGICAL      HYDROCELECTOMY    UPPER GI ENDOSCOPY,BIOPSY  9/30/2019          Social History     Socioeconomic History    Marital status:      Spouse name: Not on file    Number of children: Not on file    Years of education: Not on file    Highest education level: Not on file   Tobacco Use    Smoking status: Former Smoker     Packs/day: 0.50     Years: 20.00     Pack years: 10.00    Smokeless tobacco: Former User    Tobacco comment: quit about 40  years ago   / used to dip tobaco and dip snuff   Substance and Sexual Activity    Alcohol use: Not Currently     Comment: \"Drinks very little now for about a month \"    Drug use: No     Family History   Problem Relation Age of Onset    Heart Disease Mother     Hypertension Mother     Hypertension Father     Hypertension Sister     Hypertension Brother     Cancer Brother      Current Outpatient Medications   Medication Sig Dispense Refill    ondansetron (ZOFRAN ODT) 4 mg disintegrating tablet Take 1 Tab by mouth every eight (8) hours as needed for Nausea. 30 Tab 0    melatonin 5 mg cap capsule Take 5 mg by mouth nightly. Pt. Take 2 tabs a night      hydrALAZINE (APRESOLINE) 50 mg tablet Take 1 Tab by mouth three (3) times daily. 90 Tab 0    isosorbide mononitrate ER (IMDUR) 30 mg tablet Take 1 Tab by mouth daily. 30 Tab 0    thiamine mononitrate (B-1) 100 mg tablet Take 1 Tab by mouth daily. 30 Tab 0    acetaminophen (TYLENOL) 500 mg tablet Take 1,000 mg by mouth every six (6) hours as needed for Pain.  aspirin delayed-release 81 mg tablet Take 81 mg by mouth daily.  polyethylene glycol (MIRALAX) 17 gram/dose powder Take 17 g by mouth daily as needed (constipation).  pantoprazole (PROTONIX) 40 mg tablet Take 1 Tab by mouth daily. 90 Tab 3    DULoxetine (CYMBALTA) 30 mg capsule Take 1 Cap by mouth daily. 30 Cap 6    atorvastatin (LIPITOR) 80 mg tablet TAKE ONE TABLET BY MOUTH DAILY 90 Tab 3    ANORO ELLIPTA 62.5-25 mcg/actuation inhaler Take 1 Puff by inhalation daily.  allopurinol (ZYLOPRIM) 300 mg tablet TAKE ONE TABLET BY MOUTH DAILY 90 Tab 3    atenolol (TENORMIN) 100 mg tablet Take 1 Tab by mouth daily. 30 Tab 0    docusate sodium (COLACE) 100 mg capsule Take 1 Cap by mouth two (2) times a day for 90 days.  60 Cap 2     Allergies   Allergen Reactions    Levaquin [Levofloxacin] Nausea and Vomiting     Reports anxiety, nausea, aching after taking levaquin    Ciprofloxacin Shortness of Breath    Quinolones Shortness of Breath       Objective:  Visit Vitals  /64 (BP 1 Location: Left arm, BP Patient Position: Sitting)   Pulse 62   Temp 97 °F (36.1 °C) (Oral)   Ht 5' 8\" (1.727 m)   Wt 216 lb 6.4 oz (98.2 kg)   SpO2 96%   BMI 32.90 kg/m²     Physical Exam:   General appearance - alert, well appearing, and in no distress  Mental status - alert, oriented to person, place, and time  EYE-MAURICE, EOMI, fundi normal, corneas normal, no foreign bodies  ENT-ENT exam normal, no neck nodes or sinus tenderness  Nose - normal and patent, no erythema, discharge or polyps  Mouth - mucous membranes moist, pharynx normal without lesions  Neck - supple, no significant adenopathy   Chest - clear to auscultation, no wheezes, rales or rhonchi, symmetric air entry   Heart - normal rate, regular rhythm, normal S1, S2, no murmurs, rubs, clicks or gallops   Abdomen - soft, nontender, nondistended, no masses or organomegaly  Lymph- no adenopathy palpable  Ext-peripheral pulses normal, no pedal edema, no clubbing or cyanosis  Skin-Warm and dry. no hyperpigmentation, vitiligo, or suspicious lesions  Neuro -alert, oriented, normal speech, no focal findings or movement disorder noted  Musculoskeletal- FROM, no bony abnormalities, no point tenderness    No results found for this visit on 12/12/19. All results for lab orders may not have been returned by the time this encountered was closed. Assessment/Plan:       ICD-10-CM ICD-9-CM    1. Coronary artery disease involving native coronary artery of native heart without angina pectoris I25.10 414.01    2. Essential hypertension I10 401.9    3. Gastroesophageal reflux disease without esophagitis K21.9 530.81    4. TESSA on CPAP G47.33 327.23     Z99.89 V46.8    5. Dyslipidemia E78.5 272.4    6. On statin therapy Z79.899 V58.69    7. Idiopathic chronic gout without tophus, unspecified site M1A. 00X0 274.02    8. Spinal stenosis of lumbar region at multiple levels M48.061 724.02        No orders of the defined types were placed in this encounter. Plan:    Discontinue clonidine. Decrease furosemide to 40 mg daily based on his serum creatinine. His blood pressure is stable. He is alert. He has not had any alcohol intake since before his hospitalization. He remains on thiamine daily. Follow-up in 2 weeks for reevaluation. We reviewed his labs today which are stable including his hemoglobin and renal function. I have reviewed with the patient details of the assessment and plan and all questions were answered. Relevent patient education was performed. Verbal and/or written instructions (see AVS) provided. The most recent lab findings were reviewed with the patient. Plan was discussed with patient who verbal expressed understanding. An After Visit Summary was printed and given to the patient.       Brody Farley MD

## 2019-12-20 ENCOUNTER — TELEPHONE (OUTPATIENT)
Dept: INTERNAL MEDICINE CLINIC | Age: 84
End: 2019-12-20

## 2019-12-20 NOTE — TELEPHONE ENCOUNTER
Grandson Sandy Engel has a question on two medications his grandfather Gissel Arvizu was taking. Isosorbide Mononitrate ER  Thiamine Mononitrate    He wanted to make sure you did not want him to continuing taking these two medication. Please advise.

## 2019-12-27 ENCOUNTER — OFFICE VISIT (OUTPATIENT)
Dept: INTERNAL MEDICINE CLINIC | Age: 84
End: 2019-12-27

## 2019-12-27 VITALS
HEART RATE: 67 BPM | BODY MASS INDEX: 32.28 KG/M2 | WEIGHT: 213 LBS | HEIGHT: 68 IN | TEMPERATURE: 99.1 F | DIASTOLIC BLOOD PRESSURE: 74 MMHG | RESPIRATION RATE: 20 BRPM | SYSTOLIC BLOOD PRESSURE: 128 MMHG | OXYGEN SATURATION: 91 %

## 2019-12-27 DIAGNOSIS — Z23 ENCOUNTER FOR IMMUNIZATION: ICD-10-CM

## 2019-12-27 DIAGNOSIS — I25.10 CORONARY ARTERY DISEASE INVOLVING NATIVE CORONARY ARTERY OF NATIVE HEART WITHOUT ANGINA PECTORIS: ICD-10-CM

## 2019-12-27 DIAGNOSIS — Z99.89 OSA ON CPAP: ICD-10-CM

## 2019-12-27 DIAGNOSIS — Z79.899 ON STATIN THERAPY: ICD-10-CM

## 2019-12-27 DIAGNOSIS — I10 ESSENTIAL HYPERTENSION: Primary | ICD-10-CM

## 2019-12-27 DIAGNOSIS — E78.5 DYSLIPIDEMIA: ICD-10-CM

## 2019-12-27 DIAGNOSIS — G47.33 OSA ON CPAP: ICD-10-CM

## 2019-12-27 DIAGNOSIS — M48.061 SPINAL STENOSIS OF LUMBAR REGION AT MULTIPLE LEVELS: ICD-10-CM

## 2019-12-27 LAB
ANION GAP SERPL CALC-SCNC: 12 MMOL/L
BUN SERPL-MCNC: 20 MG/DL (ref 9–20)
CALCIUM SERPL-MCNC: 9.5 MG/DL (ref 8.4–10.2)
CHLORIDE SERPL-SCNC: 104 MMOL/L (ref 98–107)
CO2 SERPL-SCNC: 27 MMOL/L (ref 22–32)
CREAT SERPL-MCNC: 1.4 MG/DL (ref 0.8–1.5)
GLUCOSE SERPL-MCNC: 112 MG/DL (ref 75–110)
POTASSIUM SERPL-SCNC: 4.4 MMOL/L (ref 3.6–5)
SODIUM SERPL-SCNC: 143 MMOL/L (ref 137–145)

## 2019-12-27 RX ORDER — ISOSORBIDE MONONITRATE 30 MG/1
30 TABLET, EXTENDED RELEASE ORAL DAILY
Qty: 30 TAB | Refills: 3 | Status: SHIPPED | OUTPATIENT
Start: 2019-12-27 | End: 2020-04-13

## 2019-12-27 RX ORDER — HYDRALAZINE HYDROCHLORIDE 50 MG/1
50 TABLET, FILM COATED ORAL 3 TIMES DAILY
Qty: 90 TAB | Refills: 1 | Status: SHIPPED | OUTPATIENT
Start: 2019-12-27 | End: 2020-03-01

## 2019-12-27 RX ORDER — FUROSEMIDE 80 MG/1
TABLET ORAL DAILY
COMMUNITY
End: 2020-03-16

## 2019-12-27 RX ORDER — ASPIRIN 325 MG/1
100 TABLET, FILM COATED ORAL DAILY
Qty: 30 TAB | Refills: 3 | Status: SHIPPED | OUTPATIENT
Start: 2019-12-27 | End: 2020-04-27

## 2019-12-27 NOTE — PROGRESS NOTES
Mohamud Borges is a 80 y.o. male presenting for Follow Up Chronic Condition (2 week fu)  . 1. Have you been to the ER, urgent care clinic since your last visit? Hospitalized since your last visit? No    2. Have you seen or consulted any other health care providers outside of the [address] [place] [address] since your last visit? Include any pap smears or colon screening. No    Fall Risk Assessment, last 12 mths 1/3/2019   Able to walk? Yes   Fall in past 12 months? No         Abuse Screening Questionnaire 4/26/2019   Do you ever feel afraid of your partner? N   Are you in a relationship with someone who physically or mentally threatens you? N   Is it safe for you to go home? Y       3 most recent PHQ Screens 4/12/2019   PHQ Not Done Patient Decline   Little interest or pleasure in doing things -   Feeling down, depressed, irritable, or hopeless -   Total Score PHQ 2 -       There are no discontinued medications. After obtaining written consent and per orders of Dr. Corby Dawn, injection of Fluad given by Selin Scales. Patient tolerated procedure well. VIS was given to them. No reactions noted.

## 2019-12-27 NOTE — PROGRESS NOTES
This note will not be viewable in [address] Betty Moses is a 80 y.o. male and presents with Follow Up Chronic Condition (2 week fu)  . Subjective:      Mr. Rogers Nicely presents today for follow-up with significant improvement in his shortness of breath and lower extremity edema. His blood pressure is well controlled. He remains on furosemide 80 mg daily. He denies any PND orthopnea or pedal edema. He does complain that he has decreased range of motion and has difficulty moving around. He specifically notes that he has difficulty wiping himself after a BM. This has been quite problematic for him. Review of Systems  Constitutional:   Eyes:   negative for visual disturbance and irritation  ENT:   negative for tinnitus,sore throat,nasal congestion,ear pains. hoarseness  Respiratory:  negative for cough, hemoptysis, dyspnea,wheezing  CV:   negative for chest pain, palpitations, lower extremity edema  GI:   negative for nausea, vomiting, diarrhea, abdominal pain,melena  Endo:               negative for polyuria,polydipsia,polyphagia,heat intolerance  Genitourinary: negative for frequency, dysuria and hematuria  Integumentary: negative for rash and pruritus  Hematologic:  negative for easy bruising and gum/nose bleeding  Musculoskel: negative for myalgias, arthralgias, back pain, muscle weakness, joint pain  Neurological:  negative for headaches, dizziness, vertigo, memory problems and gait   Behavl/Psych: negative for feelings of anxiety, depression, mood changes    Past Medical History:   Diagnosis Date    Adverse effect of anesthesia     combative after anesthesia    Alcohol abuse     6 beers/day    Arthritis     CAD (coronary artery disease)     [facility] obstructive pulmonary disease ([facility]     Dyslipidemia 8/16/2017    Dyspepsia and other specified disorders of function of stomach     Dyspnea 8/16/2017    ED (erectile dysfunction) 8/16/2017    Encounter for immunization 8/16/2017    Fatigue 8/16/2017    Flank pain 8/1/2019    GERD (gastroesophageal reflux disease) 8/16/2017    Gout 8/16/2017    Hypertension     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    Morbid obesity ([address]     On statin therapy 8/16/2017    TESSA on CPAP 1/3/2019    Psychiatric disorder     Depression    Simple chronic bronchitis ([facility]) 1/3/2019    TIA (transient ischemic attack) 9/91/3583    Umbilical hernia 39/7/0374    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     Past Surgical History:   Procedure Laterality Date    CARDIAC SURG PROCEDURE UNLIST  1996    Cardiac Stents    CARDIAC SURG PROCEDURE UNLIST  04/2019    EF 61-65%    COLONOSCOPY N/A 9/27/2019    COL[facility] performed [facility] Humberto [facility] at [facility] END[facility]COPY    HX HERNIA REPAIR      RIH    HX HERNIA REPAIR  34/83/63    open umbilical hernia repair mesh    HX UROLOGICAL      HYDROCELECTOMY    UPPER GI ENDOSCOPY,BIOPSY  9/30/2019          Social History     Socioeconomic History    Marital status:      Spouse name: Not on file    Number of children: Not on file    Years of education: Not on file    Highest education level: Not on file   Tobacco Use    Smoking status: Former Smoker     Packs/day: 0.50     Years: 20.00     Pack years: 10.00    Smokeless tobacco: Former User    Tobacco comment: quit about 40  years ago   / used to dip tobaco and dip snuff   Substance and Sexual Activity    Alcohol use: Not Currently     Comment: \"Drinks very little now for about a month \"    Drug use: No     Family History   Problem Relation Age of Onset    Heart Disease Mother     Hypertension Mother     Hypertension Father     Hypertension Sister     Hypertension Brother     Cancer Brother      Current Outpatient Medications   Medication Sig Dispense Refill    furosemide (LASIX) 80 mg tablet Take  by mouth daily.  isosorbide mononitrate ER (IMDUR) 30 mg tablet Take 1 Tab by mouth daily.  30 Tab 3    thiamine mononitrate (B-1) 100 mg tablet Take 1 Tab by mouth daily. 30 Tab 3    hydrALAZINE (APRESOLINE) 50 mg tablet Take 1 Tab by mouth three (3) times daily. 90 Tab 1    melatonin 5 mg cap capsule Take 5 mg by mouth nightly. Pt. Take 2 tabs a night      atenolol (TENORMIN) 100 mg tablet Take 1 Tab by mouth daily. 30 Tab 0    acetaminophen (TYLENOL) 500 mg tablet Take 1,000 mg by mouth every six (6) hours as needed for Pain.  aspirin delayed-release 81 mg tablet Take 81 mg by mouth daily.  polyethylene glycol (MIRALAX) 17 gram/dose powder Take 17 g by mouth daily as needed (constipation).  pantoprazole (PROTONIX) 40 mg tablet Take 1 Tab by mouth daily. 90 Tab 3    DULoxetine (CYMBALTA) 30 mg capsule Take 1 Cap by mouth daily. 30 Cap 6    atorvastatin (LIPITOR) 80 mg tablet TAKE ONE TABLET BY MOUTH DAILY 90 Tab 3    ANORO ELLIPTA 62.5-25 mcg/actuation inhaler Take 1 Puff by inhalation daily.  ondansetron (ZOFRAN ODT) 4 mg disintegrating tablet Take 1 Tab by mouth every eight (8) hours as needed for Nausea. 30 Tab 0    allopurinol (ZYLOPRIM) 300 mg tablet TAKE ONE TABLET BY MOUTH DAILY 90 Tab 3    docusate sodium (COLACE) 100 mg capsule Take 1 Cap by mouth two (2) times a day for 90 days.  60 Cap 2     Allergies   Allergen Reactions    Levaquin [Levofloxacin] Nausea and Vomiting     Reports anxiety, nausea, aching after taking levaquin    Ciprofloxacin Shortness of Breath    Quinolones Shortness of Breath       Objective:  Visit Vitals  /74 (BP 1 Location: Left arm, BP Patient Position: Sitting)   Pulse 67   Temp 99.1 °F (37.3 °C) (Oral)   Resp 20   Ht 5' 8\" (1.727 m)   Wt 213 lb (96.6 kg)   SpO2 91%   BMI 32.39 kg/m²     Physical Exam:   General appearance - alert, well appearing, and in no distress  Mental status - alert, oriented to person, place, and time  EYE-MAURICE, EOMI, fundi normal, corneas normal, no foreign bodies  ENT-ENT exam normal, no neck nodes or sinus tenderness  Nose - normal and patent, no erythema, discharge or polyps  Mouth - mucous membranes moist, pharynx normal without lesions  Neck - supple, no significant adenopathy   Chest - clear to auscultation, no wheezes, rales or rhonchi, symmetric air entry   Heart - normal rate, regular rhythm, normal S1, S2, no murmurs, rubs, clicks or gallops   Abdomen - soft, nontender, nondistended, no masses or organomegaly  Lymph- no adenopathy palpable  Ext-peripheral pulses normal, no pedal edema, no clubbing or cyanosis  Skin-Warm and dry. no hyperpigmentation, vitiligo, or suspicious lesions  Neuro -alert, oriented, normal speech, no focal findings or movement disorder noted  Musculoskeletal-  decreased range of motion of spine and shoulders, surgical scar of lumbar spine noted    No results found for this visit on 12/27/19. All results for lab orders may not have been returned by the time this encountered was closed. Assessment/Plan:       ICD-10-CM ICD-9-CM    1. Essential hypertension J99 157.5 METABOLIC PANEL, BASIC   2. Encounter for immunization Z23 V03.89 INFLUENZA VACCINE INACTIVATED (IIV), SUBUNIT, ADJUVANTED, IM      ADMIN INFLUENZA VIRUS VAC   3. Spinal stenosis of lumbar region at multiple levels M48.061 724.02 REFERRAL TO PHYSICAL THERAPY   4. Coronary artery disease involving native coronary artery of native heart without angina pectoris I25.10 414.01    5. TESSA on CPAP G47.33 327.23     Z99.89 V46.8    6. Dyslipidemia E78.5 272.4    7.  On statin therapy Z79.899 V58.69        Orders Placed This Encounter    Influenza Vaccine Inactivated (IIV)(FLUAD), Subunit, Adjuvanted, IM, (17407)    METABOLIC PANEL, BASIC ([address])    [facility]     Referral Priority:   Routine     Referral Type:   PT/OT/ST     Referral Reason:   Specialty Services Required     Referral Location:   [address]     Requested Specialty:   Physical Therapy     Number of Visits Requested:   1    Administration fee () for Medicare insured patients    furosemide (LASIX) 80 mg tablet     Sig: Take  by mouth daily.  isosorbide mononitrate ER (IMDUR) 30 mg tablet     Sig: Take 1 Tab by mouth daily. Dispense:  30 Tab     Refill:  3    thiamine mononitrate (B-1) 100 mg tablet     Sig: Take 1 Tab by mouth daily. Dispense:  30 Tab     Refill:  3    hydrALAZINE (APRESOLINE) 50 mg tablet     Sig: Take 1 Tab by mouth three (3) times daily. Dispense:  90 Tab     Refill:  1       Follow-up and Dispositions    · Return in about 2 months (around 2/27/2020). I have reviewed with the patient details of the assessment and plan and all questions were answered. Relevent patient education was performed. Verbal and/or written instructions (see AVS) provided. The most recent lab findings were reviewed with the patient. Plan was discussed with patient who verbal expressed understanding. An After Visit Summary was printed and given to the patient.       Katarina Casillas MD

## 2020-01-14 ENCOUNTER — HOSPITAL ENCOUNTER (OUTPATIENT)
Dept: PHYSICAL THERAPY | Age: 85
Discharge: HOME | End: 2020-01-14
Payer: MEDICARE

## 2020-01-14 PROCEDURE — 97110 THERAPEUTIC EXERCISES: CPT

## 2020-01-14 PROCEDURE — 97162 PT EVAL MOD COMPLEX 30 MIN: CPT

## 2020-01-14 NOTE — PROGRESS NOTES
[address] [address] (MOB IV), [address] [facility] Jossue Mckeon  Phone: [phone] Fax: [phone]     Plan of Care/Statement of Necessity for Physical Therapy Services  2-15    Patient name: Lawrence Nieto  : 1934  Provider#: [id number]  Referral source: Wily Horne MD      Medical/Treatment Diagnosis: Low back pain [M54.5]      Prior Hospitalization: see medical history     Comorbidities: Arthritis, bmi over 30, COPD, GI disease, high blood pressure, visual and hearing impairments  Prior Level of Function: completed 20 mins of exercise 3 times per week  Medications: Verified on Patient Summary List  Start of Care: 20      Onset Date: Chronic   The Plan of Care and following information is based on the information from the initial evaluation. Assessment/ key information: Patient presents with generalized hypomobility of trunk and shoulders limiting ability to complete functional activities (chief complaint is difficulty with toileting). As of note, the patient did have L2-L5 lumbar fusion in May 2019, but was able to perform daily activities well until a GI bleed in the Fall of . The patient additionally did demonstrate deficits noted with activity tolerance and strength. The patient will benefit from guided therapeutic interventions such as therex for strengthening and neuromuscular re-eduction, manual techniques for joint mobility and soft tissue extensibility, and modalities for pain management in order to improve functional mobility with daily activities. Evaluation Complexity History MEDIUM  Complexity : 1-2 comorbidities / personal factors will impact the outcome/ POC ; Examination MEDIUM Complexity : 3 Standardized tests and measures addressing body structure, function, activity limitation and / or participation in recreation  ;Presentation MEDIUM Complexity : Evolving with changing characteristics  ; Clinical Decision Making MEDIUM Complexity : FOTO score of 26-74  Overall Complexity Rating: MEDIUM    Problem List: decrease ROM, decrease strength, impaired gait/ balance, decrease ADL/ functional abilitiies, decrease activity tolerance, decrease flexibility/ joint mobility and decrease transfer abilities   Treatment Plan may include any combination of the following: Therapeutic exercise, Therapeutic activities, Neuromuscular re-education, Physical agent/modality, Gait/balance training, Manual therapy, Patient education, Self Care training, Functional mobility training, Home safety training and Stair training  Patient / Family readiness to learn indicated by: asking questions, trying to perform skills and interest  Persons(s) to be included in education: patient (P)  Barriers to Learning/Limitations: None  Patient Goal (s): achieve enough mobility to wipe himself after a bowel movement  Patient Self Reported Health Status: good  Rehabilitation Potential: good    Short Term Goals: To be accomplished in 2 treatments:                         1.) The patient will be independent with their HEP consistently for at least one week. Long Term Goals: To be accomplished in 16 treatments:                         1.) The patient will report ability to wipe himself after a bowl movement. 2.) The patient will improve their single leg balance time to 5 seconds bilaterally to show improvements in functional ability. 3.) The patient will improve their FOTO score from 52 to at least 54 to show improvements in functional mobility. Frequency / Duration: Patient to be seen 2 times per week for 16 treatments.     Patient/ Caregiver education and instruction: self care, activity modification and exercises    [x]  Plan of care has been reviewed with PTA        Certification Period: 1/14/20-4/14/20  Monik Jennings 1/14/2020     ________________________________________________________________________    I certify that the above Therapy Services are being furnished while the patient is under my care. I agree with the treatment plan and certify that this therapy is necessary.     [de-identified] Signature:____________________  Date:____________Time: _________

## 2020-01-14 NOTE — PROGRESS NOTES
PT INITIAL EVALUATION NOTE - [facility] 2-15    Patient Name: Trudy Alonso  Date:2020  : 1934  [x]  Patient  Verified  Payor: Trinidad Damico / Plan: [place] MED[place] & B / Product Type: Medicare /    In time: 1:10 pm  Out time: 2:10 pm  Total Treatment Time (min): 60 min  Total Timed Codes (min): 35 min  1:1 Treatment Time ( only): 60 min   Visit #: 1     Treatment Area: Low back pain [M54.5]    SUBJECTIVE  Pain Level (0-10 scale): 0 (0-0/10)  Any medication changes, allergies to medications, adverse drug reactions, diagnosis change, or new procedure performed?: [] No    [x] Yes (see summary sheet for update)  Subjective:    Pt reports having a lumbar fusion in May of 2019, however since then has had many health problems that has stopped him from being active. He also reports an episode in 2019 in which he was passing blood through his stool and was hospitalized and reports getting 5 pints of blood. After this he reports going through a successful course of rehab at [facility] for gait and balance however now is still having problems primarily with wiping himself after bowel movement. He does state that he is keeping up with his exercises he was given at [facility] without any troubles.     OBJECTIVE/EXAMINATION  Other Observations:  Increased stance time and exagerrated left sided trunk lean during LLE midstance     Shoulder ROM:  AROM   PROM   Flexion   WFL      Extension        Abduction  WFL      Adduction        IR   Hand to t10 frida     ER   Hand to       Joint Mobility Assessment: Glenohumeral: nt    Flexibility: Pt reports tightness in upper back     UPPER QUARTER   MUSCLE STRENGTH  KEY       R  L  0 - No Contraction   Flexion  4  5  1 - Trace    Extension     2 - Poor    Abduction 4  4+  3 - Fair     IR  4+  4+  4 - Good    ER  4+  4+  5 - Normal       Neurological: Reflexes / Sensations:   Special Tests:       30s STS: 9 times 5x STS: 18s  SLS: R= 3s; L= 1s  TUs  2MWT: 3.5 laps    35 min Therapeutic Exercise:  [x] See flow sheet :   Rationale: increase ROM, increase strength, improve coordination, improve balance and increase proprioception to improve the patients ability to complete ADL's without restriction            With   [x] TE   [] TA   [] neuro   [] other: Patient Education: [x] Review HEP    [x] Progressed/Changed HEP based on:   [x] positioning   [x] body mechanics   [] transfers   [] heat/ice application    [] other:      Other Objective/Functional Measures: FOTO = 52    Pain Level (0-10 scale) post treatment: 0    ASSESSMENT/Changes in Function:     [x]  See Plan of [address] [place] [address], [facility] 1/14/2020

## 2020-01-17 ENCOUNTER — HOSPITAL ENCOUNTER (OUTPATIENT)
Dept: PHYSICAL THERAPY | Age: 85
End: 2020-01-17
Payer: MEDICARE

## 2020-01-23 ENCOUNTER — HOSPITAL ENCOUNTER (OUTPATIENT)
Dept: PHYSICAL THERAPY | Age: 85
Discharge: HOME | End: 2020-01-23
Payer: MEDICARE

## 2020-01-23 PROCEDURE — 97110 THERAPEUTIC EXERCISES: CPT | Performed by: PHYSICAL THERAPIST

## 2020-01-23 NOTE — PROGRESS NOTES
PT DAILY TREATMENT NOTE - [facility] 2-15    Patient Name: Steph Etienne  Date:2020  : 1934  [x]  Patient  Verified  Payor: VA MEDICARE / Plan: [place] MEDICARE PART A & B / Product Type: Medicare /    In time:11:58am  Out time:12:45  Total Treatment Time (min): 47  Total Timed Codes (min): 47  1:1 Treatment Time ( [address] only): 52   Visit #:  2    Treatment Area: Low back pain [M54.5]    SUBJECTIVE  Pain Level (0-10 scale): 0  Any medication changes, allergies to medications, adverse drug reactions, diagnosis change, or new procedure performed?: [x] No    [] Yes (see summary sheet for update)  Subjective functional status/changes:   [] No changes reported  Patient reports only doing some of the exercises at home because he was not feeling well and was very busy over the past week. He does notice a lot of improvement in his mobility overall though. OBJECTIVE     47 min Therapeutic Exercise:  [x] See flow sheet :   Rationale: increase ROM, increase strength, improve coordination, improve balance and increase proprioception to improve the patients ability to complete ADL's. With   [x] TE   [] TA   [] neuro   [] other: Patient Education: [x] Review HEP    [x] Progressed/Changed HEP based on:   [x] positioning   [x] body mechanics   [] transfers   [] heat/ice application    [] other:      Other Objective/Functional Measures: none noted     Pain Level (0-10 scale) post treatment: 0    ASSESSMENT/Changes in Function:   Pt demonstrated good improvement in UE and spine mobility since initial eval and after therex this visit. Will continue to progress as tolerated.   Patient will continue to benefit from skilled PT services to modify and progress therapeutic interventions, address functional mobility deficits, address ROM deficits, address strength deficits, analyze and address soft tissue restrictions, analyze and cue movement patterns, analyze and modify body mechanics/ergonomics, assess and modify postural abnormalities, address imbalance/dizziness and instruct in home and community integration to attain remaining goals. [x]  See Plan of Care  []  See progress note/recertification  []  See Discharge Summary         Progress towards goals / Updated goals:  Patient is progressing towards goals as expected.     PLAN  [x]  Upgrade activities as tolerated     [x]  Continue plan of care  [x]  Update interventions per flow sheet       []  Discharge due to:_  []  Other:_      Hector Garzon, PT 1/23/2020

## 2020-01-29 ENCOUNTER — HOSPITAL ENCOUNTER (OUTPATIENT)
Dept: PHYSICAL THERAPY | Age: 85
Discharge: HOME | End: 2020-01-29
Payer: MEDICARE

## 2020-01-29 PROCEDURE — 97110 THERAPEUTIC EXERCISES: CPT | Performed by: PHYSICAL THERAPIST

## 2020-01-29 NOTE — PROGRESS NOTES
PT DAILY TREATMENT NOTE - [facility] 2-15    Patient Name: Loraine Nelson  Date:2020  : 1934  [x]  Patient  Verified  Payor: Lory Joyner / Plan: [place] MEDICARE PART A & B / Product Type: Medicare /    In time: 10:00am  Out time: 10:43am  Total Treatment Time (min): 43  Total Timed Codes (min): 43  1:1 Treatment Time ( only): 43   Visit #:  3     Treatment Area: Low back pain [M54.5]    SUBJECTIVE  Pain Level (0-10 scale): 0  Any medication changes, allergies to medications, adverse drug reactions, diagnosis change, or new procedure performed?: [x] No    [] Yes (see summary sheet for update)  Subjective functional status/changes:   [] No changes reported  Patient reports being a little sore in his low back, but he does still feel like he is gaining mobility in his back. Reports he is \"almost there\". OBJECTIVE     43 min Therapeutic Exercise:  [x] See flow sheet :   Rationale: increase ROM, increase strength, improve coordination, improve balance and increase proprioception to improve the patients ability to complete ADL's. With   [x] TE   [] TA   [] neuro   [] other: Patient Education: [x] Review HEP    [x] Progressed/Changed HEP based on:   [x] positioning   [x] body mechanics   [] transfers   [] heat/ice application    [] other:      Other Objective/Functional Measures: none noted     Pain Level (0-10 scale) post treatment: 0    ASSESSMENT/Changes in Function:   Pt continuing to demonstrate increase mobility, primarily through his seated lumbar flexion.    Patient will continue to benefit from skilled PT services to modify and progress therapeutic interventions, address functional mobility deficits, address ROM deficits, address strength deficits, analyze and address soft tissue restrictions, analyze and cue movement patterns, analyze and modify body mechanics/ergonomics, assess and modify postural abnormalities, address imbalance/dizziness and instruct in home and community integration to attain remaining goals. [x]  See Plan of Care  []  See progress note/recertification  []  See Discharge Summary         Progress towards goals / Updated goals:  Patient is progressing towards goals as expected.     PLAN  [x]  Upgrade activities as tolerated     [x]  Continue plan of care  [x]  Update interventions per flow sheet       []  Discharge due to:_  []  Other:_      Kathleen Clement, PT 1/29/2020

## 2020-02-04 ENCOUNTER — HOSPITAL ENCOUNTER (OUTPATIENT)
Dept: PHYSICAL THERAPY | Age: 85
Discharge: HOME | End: 2020-02-04
Payer: MEDICARE

## 2020-02-04 PROCEDURE — 97110 THERAPEUTIC EXERCISES: CPT

## 2020-02-04 NOTE — PROGRESS NOTES
PT DAILY TREATMENT NOTE - [facility] 2-15    Patient Name: Kady Wolf  BCMZ:[birth year]  : 1934  [x]  Patient  Verified  Payor: VA MEDICARE / Plan: VA MEDICARE PART A & B / Product Type: Medicare /    In time:1000  Out time:1051  Total Treatment Time (min): 51  Total Timed Codes (min): 51  1:1 Treatment Time ( only): 28   Visit #:  4    Treatment Area: Low back pain [M54.5]    SUBJECTIVE  Pain Level (0-10 scale): 0/10  Any medication changes, allergies to medications, adverse drug reactions, diagnosis change, or new procedure performed?: [x] No    [] Yes (see summary sheet for update)  Subjective functional status/changes:   [] No changes reported  Patient reports his R shoulder felt a bit painful, he thinks he just slept on it wrong. OBJECTIVE    51 min Therapeutic Exercise:  [x] See flow sheet :   Rationale: increase ROM and increase strength to improve the patients ability to perform ADLs    With   [] TE   [] TA   [] neuro   [] other: Patient Education: [x] Review HEP    [] Progressed/Changed HEP based on:   [] positioning   [] body mechanics   [] transfers   [] heat/ice application    [] other:      Other Objective/Functional Measures: none noted     Pain Level (0-10 scale) post treatment: 0/10    ASSESSMENT/Changes in Function:   Patient demonstrates a good understanding of proper form with therex. Will demonstrate slight upper trap compensation with scapular retraction. Will continue to progress as tolerated. Patient will continue to benefit from skilled PT services to modify and progress therapeutic interventions, address functional mobility deficits, address ROM deficits, address strength deficits, analyze and address soft tissue restrictions, analyze and cue movement patterns, analyze and modify body mechanics/ergonomics and assess and modify postural abnormalities to attain remaining goals.      [x]  See Plan of Care  []  See progress note/recertification  []  See Discharge Summary Progress towards goals / Updated goals:  Patient is progressing towards goals.      PLAN  [x]  Upgrade activities as tolerated     [x]  Continue plan of care  [x]  Update interventions per flow sheet       []  Discharge due to:_  []  Other:_      Janyth Kocher 2/4/2020

## 2020-02-07 ENCOUNTER — HOSPITAL ENCOUNTER (OUTPATIENT)
Dept: PHYSICAL THERAPY | Age: 85
Discharge: HOME | End: 2020-02-07
Payer: MEDICARE

## 2020-02-07 PROCEDURE — 97110 THERAPEUTIC EXERCISES: CPT | Performed by: PHYSICAL THERAPIST

## 2020-02-07 NOTE — PROGRESS NOTES
PT DAILY TREATMENT NOTE - [facility] 2-15    Patient Name: Ervin Guzmán  Date:2020  : 1934  [x]  Patient  Verified  Payor: VA MEDICARE / Plan: VA MEDICARE PART A & B / Product Type: Medicare /    In time: 11:00pm  Out time: 11:50am  Total Treatment Time (min):  50  Total Timed Codes (min): 50  1:1 Treatment Time ([facility] only): 50   Visit #:  5     Treatment Area: Low back pain [M54.5]    SUBJECTIVE  Pain Level (0-10 scale): 0/10  Any medication changes, allergies to medications, adverse drug reactions, diagnosis change, or new procedure performed?: [x] No  [] Yes (see summary sheet for update)  Subjective functional status/changes:   [] No changes reported  Patient reports he knows this is his last visit, and he is confident that if he continues his exercises at home he will maintain and continue his progress. OBJECTIVE    50 min Therapeutic Exercise:  [x] See flow sheet :   Rationale: increase ROM and increase strength to improve the patients ability to perform ADLs    With   [x] TE   [] TA   [] neuro   [] other: Patient Education: [x] Review HEP    [x] Progressed/Changed HEP based on:   [x] positioning   [x] body mechanics   [] transfers   [] heat/ice application    [] other:      Other Objective/Functional Measures: FOTO = 46 (52 at eval)     Pain Level (0-10 scale) post treatment: 0/10    ASSESSMENT/Changes in Function:   Pt demonstrated good improvements in overall mobility, balance and strength since beginning of therapy; demonstrated complete understanding of HEP to be cleared for discharge. []  See Plan of Care  []  See progress note/recertification  [x]  See Discharge Summary         Progress towards goals / Updated goals:  Patient has met goals.     PLAN  []  Upgrade activities as tolerated     []  Continue plan of care  []  Update interventions per flow sheet       [x]  Discharge due to: Goals MET  []  Other:Jaziel Garsia, PT 2020

## 2020-02-07 NOTE — ANCILLARY DISCHARGE INSTRUCTIONS
[facility] . [address] [place] Phone: [phone] Fax: [phone] Discharge Summary 2-15 Patient name: Craig Barnett  : 1934  Provider#: [id number] Referral source: Yusef Monreal MD     
Medical/Treatment Diagnosis: Low back pain [M54.5] Prior Hospitalization: see medical history Comorbidities: See Plan of Care Prior Level of Function: See Plan of Care Medications: Verified on Patient Summary List 
 
Start of Care: 20      Onset Date:Chronic Visits from Start of Care: 5     Missed Visits: 2 Reporting Period : 20 to 20 Assessment/Summary of care: The patient has made good progress in his gait and balance as shown in the summary below. Today (vs Eval): 
 30s STS: 9 time (9 times); 5x STS: 17s (18s) SLS: R= 5s (3s); L= 4s; (1s) TUs; (14s) 2MWT: 4 laps (3.5 laps) Additionally, the patient has reported attainment of all goals he hoped through therapy, as well as independence with HEP,  and verbalized understanding of continuing exercises at home to maintain and continue progress. Short Term Goals: To be accomplished in 2 treatments: 
                       8.) The patient will be independent with their HEP consistently for at least one week. MET Long Term Goals: To be accomplished in 16 treatments: 
                       0.) The patient will report ability to wipe himself after a bowl movement. MET 
                       7.) The patient will improve their single leg balance time to 5 seconds bilaterally to show improvements in functional ability. MET on right leg only 
                       3.) The patient will improve their FOTO score from 52 to at least 54 to show improvements in functional mobility. NOT MET Frequency / Duration: Patient to be seen 2 times per week for 16 treatments.  
 
 
 
RECOMMENDATIONS: 
 [x]Discontinue therapy: [x]Patient has reached or is progressing toward set goals []Patient is non-compliant or has abdicated 
   []Due to lack of appreciable progress towards set goals [x]Other: agreement that patient has made good progress and will continue to do so through compliance with HEP. Jessica Garsia, PT 2/7/2020

## 2020-02-21 ENCOUNTER — TELEPHONE (OUTPATIENT)
Dept: INTERNAL MEDICINE CLINIC | Age: 85
End: 2020-02-21

## 2020-02-21 RX ORDER — OSELTAMIVIR PHOSPHATE 75 MG/1
75 CAPSULE ORAL DAILY
Qty: 10 CAP | Refills: 0 | Status: SHIPPED | OUTPATIENT
Start: 2020-02-21 | End: 2020-03-02

## 2020-02-21 NOTE — TELEPHONE ENCOUNTER
Daughter Ene calling to see if Dr. London Wall would call in Tamiflu for her dad García Juárez. Her daughter and boyfriend live with them and they tested positive two days ago with the flu. Please advise.     Pharmacy:  [facility]

## 2020-02-28 ENCOUNTER — OFFICE VISIT (OUTPATIENT)
Dept: INTERNAL MEDICINE CLINIC | Age: 85
End: 2020-02-28

## 2020-02-28 VITALS
DIASTOLIC BLOOD PRESSURE: 62 MMHG | BODY MASS INDEX: 33.07 KG/M2 | SYSTOLIC BLOOD PRESSURE: 124 MMHG | RESPIRATION RATE: 18 BRPM | HEIGHT: 68 IN | TEMPERATURE: 97.6 F | HEART RATE: 56 BPM | WEIGHT: 218.2 LBS | OXYGEN SATURATION: 94 %

## 2020-02-28 DIAGNOSIS — J41.0 SIMPLE CHRONIC BRONCHITIS (HCC): ICD-10-CM

## 2020-02-28 DIAGNOSIS — G47.33 OSA ON CPAP: ICD-10-CM

## 2020-02-28 DIAGNOSIS — I10 ESSENTIAL HYPERTENSION: Primary | ICD-10-CM

## 2020-02-28 DIAGNOSIS — E78.5 DYSLIPIDEMIA: ICD-10-CM

## 2020-02-28 DIAGNOSIS — E66.01 SEVERE OBESITY (HCC): ICD-10-CM

## 2020-02-28 DIAGNOSIS — Z99.89 OSA ON CPAP: ICD-10-CM

## 2020-02-28 DIAGNOSIS — Z79.899 ON STATIN THERAPY: ICD-10-CM

## 2020-02-28 LAB
A-G RATIO,AGRAT: 1.3 RATIO
ALBUMIN SERPL-MCNC: 4.3 G/DL (ref 3.9–5.4)
ALP SERPL-CCNC: 99 U/L (ref 38–126)
ALT SERPL-CCNC: 27 U/L (ref 0–50)
ANION GAP SERPL CALC-SCNC: 9 MMOL/L
AST SERPL W P-5'-P-CCNC: 43 U/L (ref 14–36)
BILIRUB SERPL-MCNC: 1 MG/DL (ref 0.2–1.3)
BILIRUB UR QL: NEGATIVE
BUN SERPL-MCNC: 24 MG/DL (ref 9–20)
BUN/CREATININE RATIO,BUCR: 16 RATIO
CALCIUM SERPL-MCNC: 9.2 MG/DL (ref 8.4–10.2)
CHLORIDE SERPL-SCNC: 105 MMOL/L (ref 98–107)
CHOL/HDL RATIO,CHHD: 5 RATIO (ref 0–4)
CHOLEST SERPL-MCNC: 145 MG/DL (ref 0–200)
CK SERPL-CCNC: 69 U/L (ref 30–135)
CLARITY: CLEAR
CO2 SERPL-SCNC: 26 MMOL/L (ref 22–32)
COLOR UR: ABNORMAL
CREAT SERPL-MCNC: 1.5 MG/DL (ref 0.8–1.5)
GLOBULIN,GLOB: 3.3
GLUCOSE 24H UR-MRATE: NEGATIVE G/(24.H)
GLUCOSE SERPL-MCNC: 91 MG/DL (ref 75–110)
HDLC SERPL-MCNC: 32 MG/DL (ref 35–130)
HGB UR QL STRIP: NEGATIVE
KETONES UR QL STRIP.AUTO: NEGATIVE
LDL/HDL RATIO,LDHD: 2 RATIO
LDLC SERPL CALC-MCNC: 66 MG/DL (ref 0–130)
LEUKOCYTE ESTERASE: NEGATIVE
NITRITE UR QL STRIP.AUTO: NEGATIVE
PH UR STRIP: 6 [PH] (ref 5–7)
POTASSIUM SERPL-SCNC: ABNORMAL MMOL/L (ref 3.6–5)
PROT SERPL-MCNC: 7.6 G/DL (ref 6.3–8.2)
PROT UR STRIP-MCNC: ABNORMAL MG/DL
RBC #/AREA URNS HPF: 0 #/HPF
SODIUM SERPL-SCNC: 140 MMOL/L (ref 137–145)
SP GR UR REFRACTOMETRY: 1.02 (ref 1–1.03)
TRIGL SERPL-MCNC: 236 MG/DL (ref 0–200)
UROBILINOGEN UR QL STRIP.AUTO: NEGATIVE
VLDLC SERPL CALC-MCNC: 47 MG/DL
WBC URNS QL MICRO: ABNORMAL #/HPF

## 2020-02-28 NOTE — PROGRESS NOTES
Reviewed record in preparation for visit and have obtained necessary documentation. Identified pt with two pt identifiers(name and ). Chief Complaint   Patient presents with    Hypertension        Coordination of Care Questionnaire:  :     1) Have you been to an emergency room, urgent care clinic since your last visit? No     Hospitalized since your last visit? No               2) Have you seen or consulted any other health care providers outside of [address] [place] [address] since your last visit?  Yes Dr Milind Schmidt

## 2020-02-28 NOTE — PROGRESS NOTES
Labs are all stable however potassium is elevated. The specimen is hemolyzed. Please have a follow-up potassium drawn to be scheduled first of next week.

## 2020-02-28 NOTE — PROGRESS NOTES
Verified two paint identifiers, name and . Patient provided with lab results.  No questions at this time

## 2020-02-28 NOTE — PROGRESS NOTES
This note will not be viewable in [address] Baron Valdovinos is a 80 y.o. male and presents with Hypertension  . Subjective:      Mr. Alli Jordan presents today for follow-up of hypertension, hyperlipidemia, monitoring statin therapy, reflux, back pain, and mild COPD. He has had follow-up with nephrology, pulmonary, and cardiology. He denies any side effects from his medications. He is having to urinate more frequently since he has been on a higher dose of Lasix. He states he is drinking about 64 ounces of fluid daily. His blood pressure looks good. He denies any shortness of breath, chest pain, palpitations, PND, orthopnea, or pedal edema. He has nocturia x2. Review of Systems  Constitutional:   Eyes:   negative for visual disturbance and irritation  ENT:   negative for tinnitus,sore throat,nasal congestion,ear pains. hoarseness  Respiratory:  negative for cough, hemoptysis, dyspnea,wheezing  CV:   negative for chest pain, palpitations, lower extremity edema  GI:   negative for nausea, vomiting, diarrhea, abdominal pain,melena  Endo:               negative for polyuria,polydipsia,polyphagia,heat intolerance  Genitourinary: negative for frequency, dysuria and hematuria  Integumentary: negative for rash and pruritus  Hematologic:  negative for easy bruising and gum/nose bleeding  Musculoskel: negative for myalgias, arthralgias, back pain, muscle weakness, joint pain  Neurological:  negative for headaches, dizziness, vertigo, memory problems and gait   Behavl/Psych: negative for feelings of anxiety, depression, mood changes    Past Medical History:   Diagnosis Date    Adverse effect of anesthesia     combative after anesthesia    Alcohol abuse     6 beers/day    Arthritis     CAD (coronary artery disease)     Chronic obstructive pulmonary disease ([facility]     Dyslipidemia 8/16/2017    Dyspepsia and other specified disorders of function of stomach     Dyspnea 8/16/2017    ED (erectile dysfunction) 8/16/2017    Encounter for immunization 8/16/2017    Fatigue 8/16/2017    Flank pain 8/1/2019    GERD (gastroesophageal reflux disease) 8/16/2017    Gout 8/16/2017    Hypertension     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    Morbid obesity ([address]     On statin therapy 8/16/2017    TESSA on CPAP 1/3/2019    Psychiatric disorder     Depression    Simple chronic bronchitis (HCC) 1/3/2019    TIA (transient ischemic attack) 6/14/7100    Umbilical hernia 41/6/7882    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     Past Surgical History:   Procedure Laterality Date    CARDIAC SURG PROCEDURE UNLIST  1996    Cardiac Stents    CARDIAC SURG PROCEDURE UNLIST  04/2019    EF 61-65%    COLONOSCOPY N/A 9/27/2019    COLONOSCOPY performed by Roderick Sánchez MD at [facility] ENDOSCOPY    HX HERNIA REPAIR      RIH    HX HERNIA REPAIR  97/48/69    open umbilical hernia repair mesh    HX UROLOGICAL      HYDROCELECTOMY    UPPER GI ENDOSCOPY,BIOPSY  9/30/2019          Social History     Socioeconomic History    Marital status:      Spouse name: Not on file    Number of children: Not on file    Years of education: Not on file    Highest education level: Not on file   Tobacco Use    Smoking status: Former Smoker     Packs/day: 0.50     Years: 20.00     Pack years: 10.00    Smokeless tobacco: Former User    Tobacco comment: quit about 40  years ago   / used to dip tobaco and dip snuff   Substance and Sexual Activity    Alcohol use: Not Currently     Comment: \"Drinks very little now for about a month \"    Drug use: No     Family History   Problem Relation Age of Onset    Heart Disease Mother     Hypertension Mother     Hypertension Father     Hypertension Sister     Hypertension Brother     Cancer Brother      Current Outpatient Medications   Medication Sig Dispense Refill    furosemide (LASIX) 80 mg tablet Take  by mouth daily.  isosorbide mononitrate ER (IMDUR) 30 mg tablet Take 1 Tab by mouth daily.  30 Tab 3    thiamine mononitrate (B-1) 100 mg tablet Take 1 Tab by mouth daily. 30 Tab 3    hydrALAZINE (APRESOLINE) 50 mg tablet Take 1 Tab by mouth three (3) times daily. 90 Tab 1    melatonin 5 mg cap capsule Take 5 mg by mouth nightly. Pt. Take 2 tabs a night      atenolol (TENORMIN) 100 mg tablet Take 1 Tab by mouth daily. 30 Tab 0    aspirin delayed-release 81 mg tablet Take 81 mg by mouth daily.  polyethylene glycol (MIRALAX) 17 gram/dose powder Take 17 g by mouth daily as needed (constipation).  pantoprazole (PROTONIX) 40 mg tablet Take 1 Tab by mouth daily. 90 Tab 3    DULoxetine (CYMBALTA) 30 mg capsule Take 1 Cap by mouth daily. 30 Cap 6    atorvastatin (LIPITOR) 80 mg tablet TAKE ONE TABLET BY MOUTH DAILY 90 Tab 3    ANORO ELLIPTA 62.5-25 mcg/actuation inhaler Take 1 Puff by inhalation daily.  oseltamivir (TAMIFLU) 75 mg capsule Take 1 Cap by mouth daily for 10 days. 10 Cap 0    ondansetron (ZOFRAN ODT) 4 mg disintegrating tablet Take 1 Tab by mouth every eight (8) hours as needed for Nausea. 30 Tab 0    allopurinol (ZYLOPRIM) 300 mg tablet TAKE ONE TABLET BY MOUTH DAILY 90 Tab 3    acetaminophen (TYLENOL) 500 mg tablet Take 1,000 mg by mouth every six (6) hours as needed for Pain.        Allergies   Allergen Reactions    Levaquin [Levofloxacin] Nausea and Vomiting     Reports anxiety, nausea, aching after taking levaquin    Ciprofloxacin Shortness of Breath    Quinolones Shortness of Breath       Objective:  Visit Vitals  /62 (BP 1 Location: Left arm, BP Patient Position: Sitting)   Pulse (!) 56   Temp 97.6 °F (36.4 °C) (Oral)   Resp 18   Ht 5' 8\" (1.727 m)   Wt 218 lb 3.2 oz (99 kg)   SpO2 94%   BMI 33.18 kg/m²     Physical Exam:   General appearance - alert, well appearing, and in no distress  Mental status - alert, oriented to person, place, and time  EYE-MAURICE, EOMI, fundi normal, corneas normal, no foreign bodies  ENT-ENT exam normal, no neck nodes or sinus tenderness  Nose - normal and patent, no erythema, discharge or polyps  Mouth - mucous membranes moist, pharynx normal without lesions  Neck - supple, no significant adenopathy   Chest - clear to auscultation, no wheezes, rales or rhonchi, symmetric air entry   Heart - normal rate, regular rhythm, normal S1, S2, no murmurs, rubs, clicks or gallops   Abdomen - soft, nontender, nondistended, no masses or organomegaly  Lymph- no adenopathy palpable  Ext-peripheral pulses normal, no pedal edema, no clubbing or cyanosis  Skin-Warm and dry. no hyperpigmentation, vitiligo, or suspicious lesions  Neuro -alert, oriented, normal speech, no focal findings or movement disorder noted  Musculoskeletal- FROM, no bony abnormalities, no point tenderness    No results found for this visit on 02/28/20. All results for lab orders may not have been returned by the time this encountered was closed. Assessment/Plan:       ICD-10-CM ICD-9-CM    1. Essential hypertension S40 345.4 METABOLIC PANEL, COMPREHENSIVE      URINALYSIS W/O MICRO   2. TESSA on CPAP G47.33 327.23     Z99.89 V46.8    3. Simple chronic bronchitis (HCC) J41.0 491.0    4. Dyslipidemia E78.5 272.4 LIPID PANEL   5. On statin therapy Z79.899 V58.69 CK       Orders Placed This Encounter    CK ([facility])    LIPID PANEL ([facility])    METABOLIC PANEL, COMPREHENSIVE ([facility])    URINALYSIS W/O MICRO ([facility])       Plan:    Continue current medical regimen as outlined above. Further recommendations based on labs as ordered. I have reviewed with the patient details of the assessment and plan and all questions were answered. Relevent patient education was performed. Verbal and/or written instructions (see AVS) provided. The most recent lab findings were reviewed with the patient. Plan was discussed with patient who verbal expressed understanding. An After Visit Summary was printed and given to the patient.       Itzel Gloria MD    Discussed the patient's BMI with him. The BMI follow up plan is as follows:     dietary management education, guidance, and counseling  encourage exercise  monitor weight  prescribed dietary intake    An After Visit Summary was printed and given to the patient.

## 2020-02-28 NOTE — PATIENT INSTRUCTIONS
Body Mass Index: Care Instructions Your Care Instructions Body mass index (BMI) can help you see if your weight is raising your risk for health problems. It uses a formula to compare how much you weigh with how tall you are. · A BMI lower than 18.5 is considered underweight. · A BMI between 18.5 and 24.9 is considered healthy. · A BMI between 25 and 29.9 is considered overweight. A BMI of 30 or higher is considered obese. If your BMI is in the normal range, it means that you have a lower risk for weight-related health problems. If your BMI is in the overweight or obese range, you may be at increased risk for weight-related health problems, such as high blood pressure, heart disease, stroke, arthritis or joint pain, and diabetes. If your BMI is in the underweight range, you may be at increased risk for health problems such as fatigue, lower protection (immunity) against illness, muscle loss, bone loss, hair loss, and hormone problems. BMI is just one measure of your risk for weight-related health problems. You may be at higher risk for health problems if you are not active, you eat an unhealthy diet, or you drink too much alcohol or use tobacco products. Follow-up care is a key part of your treatment and safety. Be sure to make and go to all appointments, and call your doctor if you are having problems. It's also a good idea to know your test results and keep a list of the medicines you take. How can you care for yourself at home? · Practice healthy eating habits. This includes eating plenty of fruits, vegetables, whole grains, lean protein, and low-fat dairy. · If your doctor recommends it, get more exercise. Walking is a good choice. Bit by bit, increase the amount you walk every day. Try for at least 30 minutes on most days of the week. · Do not smoke. Smoking can increase your risk for health problems.  If you need help quitting, talk to your doctor about stop-smoking programs and medicines. These can increase your chances of quitting for good. · Limit alcohol to 2 drinks a day for men and 1 drink a day for women. Too much alcohol can cause health problems. If you have a BMI higher than 25 · Your doctor may do other tests to check your risk for weight-related health problems. This may include measuring the distance around your waist. A waist measurement of more than 40 inches in men or 35 inches in women can increase the risk of weight-related health problems. · Talk with your doctor about steps you can take to stay healthy or improve your health. You may need to make lifestyle changes to lose weight and stay healthy, such as changing your diet and getting regular exercise. If you have a BMI lower than 18.5 · Your doctor may do other tests to check your risk for health problems. · Talk with your doctor about steps you can take to stay healthy or improve your health. You may need to make lifestyle changes to gain or maintain weight and stay healthy, such as getting more healthy foods in your diet and doing exercises to build muscle. Where can you learn more? Go to http://rhonda-ko.info/. Enter S176 in the search box to learn more about \"Body Mass Index: Care Instructions. \" Current as of: October 13, 2016 Content Version: 11.4 © 2698-0270 [employer]. Care instructions adapted under license by [employer] (which disclaims liability or warranty for this information). If you have questions about a medical condition or this instruction, always ask your healthcare professional. [address] any warranty or liability for your use of this information.

## 2020-03-02 ENCOUNTER — LAB ONLY (OUTPATIENT)
Dept: INTERNAL MEDICINE CLINIC | Age: 85
End: 2020-03-02

## 2020-03-02 DIAGNOSIS — E87.5 HYPERKALEMIA: Primary | ICD-10-CM

## 2020-03-02 LAB — POTASSIUM SERPL-SCNC: 4.3 MMOL/L (ref 3.6–5)

## 2020-03-16 RX ORDER — FUROSEMIDE 80 MG/1
TABLET ORAL
Qty: 90 TAB | Refills: 2 | Status: SHIPPED | OUTPATIENT
Start: 2020-03-16 | End: 2021-02-01

## 2020-03-16 RX ORDER — DULOXETIN HYDROCHLORIDE 30 MG/1
CAPSULE, DELAYED RELEASE ORAL
Qty: 30 CAP | Refills: 5 | Status: SHIPPED | OUTPATIENT
Start: 2020-03-16 | End: 2020-09-16

## 2020-04-13 RX ORDER — ISOSORBIDE MONONITRATE 30 MG/1
TABLET, EXTENDED RELEASE ORAL
Qty: 30 TAB | Refills: 2 | Status: SHIPPED | OUTPATIENT
Start: 2020-04-13 | End: 2020-07-28

## 2020-04-20 RX ORDER — ATENOLOL 100 MG/1
100 TABLET ORAL DAILY
Qty: 30 TAB | Refills: 3 | Status: SHIPPED | OUTPATIENT
Start: 2020-04-20 | End: 2020-06-28

## 2020-04-20 NOTE — TELEPHONE ENCOUNTER
Requested Prescriptions     Pending Prescriptions Disp Refills    atenoloL (TENORMIN) 100 mg tablet 30 Tab 0     Sig: Take 1 Tab by mouth daily.

## 2020-04-27 RX ORDER — ASPIRIN 325 MG/1
TABLET, FILM COATED ORAL
Qty: 30 TAB | Refills: 2 | Status: SHIPPED | OUTPATIENT
Start: 2020-04-27 | End: 2020-07-28

## 2020-06-23 RX ORDER — ATORVASTATIN CALCIUM 80 MG/1
TABLET, FILM COATED ORAL
Qty: 90 TAB | Refills: 2 | Status: SHIPPED | OUTPATIENT
Start: 2020-06-23 | End: 2021-03-15

## 2020-06-26 ENCOUNTER — OFFICE VISIT (OUTPATIENT)
Dept: INTERNAL MEDICINE CLINIC | Age: 85
End: 2020-06-26

## 2020-06-26 VITALS
OXYGEN SATURATION: 96 % | DIASTOLIC BLOOD PRESSURE: 72 MMHG | SYSTOLIC BLOOD PRESSURE: 124 MMHG | TEMPERATURE: 97.4 F | HEIGHT: 68 IN | RESPIRATION RATE: 20 BRPM | HEART RATE: 52 BPM | BODY MASS INDEX: 33.62 KG/M2 | WEIGHT: 221.8 LBS

## 2020-06-26 DIAGNOSIS — J20.9 ACUTE BRONCHITIS, UNSPECIFIED ORGANISM: ICD-10-CM

## 2020-06-26 DIAGNOSIS — M1A.00X0 IDIOPATHIC CHRONIC GOUT WITHOUT TOPHUS, UNSPECIFIED SITE: ICD-10-CM

## 2020-06-26 DIAGNOSIS — Z79.899 ON STATIN THERAPY: ICD-10-CM

## 2020-06-26 DIAGNOSIS — I10 ESSENTIAL HYPERTENSION: Primary | ICD-10-CM

## 2020-06-26 DIAGNOSIS — I25.118 CORONARY ARTERY DISEASE OF NATIVE ARTERY OF NATIVE HEART WITH STABLE ANGINA PECTORIS (HCC): ICD-10-CM

## 2020-06-26 DIAGNOSIS — E78.5 DYSLIPIDEMIA: ICD-10-CM

## 2020-06-26 RX ORDER — DOXYCYCLINE 100 MG/1
100 TABLET ORAL 2 TIMES DAILY
Qty: 20 TAB | Refills: 0 | Status: SHIPPED | OUTPATIENT
Start: 2020-06-26 | End: 2020-07-06

## 2020-06-26 NOTE — PROGRESS NOTES
This note will not be viewable in [address] Annette Patel is a 80 y.o. male and presents with Hypertension; Coronary Artery Disease; and COPD  . Subjective:      Mr. Arnold Cottrell presents today for follow-up. Problems include hypertension, hyperlipidemia, coronary disease, gout, COPD. He is doing well on his current medical regimen. He has had a cough over the past few days productive of scant amount of yellow sputum. He denies fever. He has no pleuritic chest pain or shortness of breath. He has no PND orthopnea or pedal edema. He reports no complications with his medications. He has had no gouty arthritis. Review of Systems  Constitutional:   Eyes:   negative for visual disturbance and irritation  ENT:   negative for tinnitus,sore throat,nasal congestion,ear pains. hoarseness  Respiratory:  negative for cough, hemoptysis, dyspnea,wheezing  CV:   negative for chest pain, palpitations, lower extremity edema  GI:   negative for nausea, vomiting, diarrhea, abdominal pain,melena  Endo:               negative for polyuria,polydipsia,polyphagia,heat intolerance  Genitourinary: negative for frequency, dysuria and hematuria  Integumentary: negative for rash and pruritus  Hematologic:  negative for easy bruising and gum/nose bleeding  Musculoskel: negative for myalgias, arthralgias, back pain, muscle weakness, joint pain  Neurological:  negative for headaches, dizziness, vertigo, memory problems and gait   Behavl/Psych: negative for feelings of anxiety, depression, mood changes    Past Medical History:   Diagnosis Date    Adverse effect of anesthesia     combative after anesthesia    Alcohol abuse     6 beers/day    Arthritis     CAD (coronary artery disease)     Chronic obstructive pulmonary disease (HCC)     [facility]     Dyslipidemia 8/16/2017    Dyspepsia and other specified disorders of function of stomach     Dyspnea 8/16/2017    ED (erectile dysfunction) 8/16/2017    Encounter for immunization 8/16/2017    Fatigue 8/16/2017    Flank pain 8/1/2019    GERD (gastroesophageal reflux disease) 8/16/2017    Gout 8/16/2017    Hypertension     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    Morbid obesity [facility] [facility]     On statin therapy 8/16/2017    TESSA on CPAP 1/3/2019    Psychiatric disorder     Depression    Simple chronic bronchitis ([facility] [address] 1/3/2019    TIA (transient ischemic attack) 6/24/5327    Umbilical hernia 17/2/5362    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     Past Surgical History:   Procedure Laterality Date    CARDIAC SURG PROCEDURE UNLIST  1996    Cardiac Stents    CARDIAC SURG PROCEDURE UNLIST  04/2019    EF 61-65%    COLONOSCOPY N/A 9/27/2019    COLONOSCOPY performed by Jaren More MD at hospitals ENDOSCOPY    HX HERNIA REPAIR      RIH    HX HERNIA REPAIR  12/68/22    open umbilical hernia repair mesh    HX UROLOGICAL      HYDROCELECTOMY    UPPER GI ENDOSCOPY,BIOPSY  9/30/2019          Social History     Socioeconomic History    Marital status:      Spouse name: Not on file    Number of children: Not on file    Years of education: Not on file    Highest education level: Not on file   Tobacco Use    Smoking status: Former Smoker     Packs/day: 0.50     Years: 20.00     Pack years: 10.00    Smokeless tobacco: Former User    Tobacco comment: quit about 40  years ago   / used to dip tobaco and dip snuff   Substance and Sexual Activity    Alcohol use: Not Currently     Comment: \"Drinks very little now for about a month \"    Drug use: No     Family History   Problem Relation Age of Onset    Heart Disease Mother     Hypertension Mother     Hypertension Father     Hypertension Sister     Hypertension Brother     Cancer Brother      Current Outpatient Medications   Medication Sig Dispense Refill    doxycycline (ADOXA) 100 mg tablet Take 1 Tab by mouth two (2) times a day for 10 days.  20 Tab 0    atorvastatin (LIPITOR) 80 mg tablet TAKE ONE TABLET BY MOUTH DAILY 90 Tab 2    thiamine mononitrate (B-1) 100 mg tablet TAKE ONE TABLET BY MOUTH DAILY 30 Tab 2    atenoloL (TENORMIN) 100 mg tablet Take 1 Tab by mouth daily. 30 Tab 3    isosorbide mononitrate ER (IMDUR) 30 mg tablet TAKE ONE TABLET BY MOUTH DAILY 30 Tab 2    furosemide (LASIX) 80 mg tablet TAKE ONE TABLET BY MOUTH DAILY 90 Tab 2    DULoxetine (CYMBALTA) 30 mg capsule TAKE ONE CAPSULE BY MOUTH DAILY 30 Cap 5    hydrALAZINE (APRESOLINE) 50 mg tablet TAKE ONE TABLET BY MOUTH THREE TIMES A DAY 90 Tab 5    melatonin 5 mg cap capsule Take 5 mg by mouth nightly. Pt. Take 2 tabs a night      aspirin delayed-release 81 mg tablet Take 81 mg by mouth daily.  pantoprazole (PROTONIX) 40 mg tablet Take 1 Tab by mouth daily. 90 Tab 3    ANORO ELLIPTA 62.5-25 mcg/actuation inhaler Take 1 Puff by inhalation daily.  ondansetron (ZOFRAN ODT) 4 mg disintegrating tablet Take 1 Tab by mouth every eight (8) hours as needed for Nausea. 30 Tab 0    allopurinol (ZYLOPRIM) 300 mg tablet TAKE ONE TABLET BY MOUTH DAILY 90 Tab 3    acetaminophen (TYLENOL) 500 mg tablet Take 1,000 mg by mouth every six (6) hours as needed for Pain.  polyethylene glycol (MIRALAX) 17 gram/dose powder Take 17 g by mouth daily as needed (constipation).        Allergies   Allergen Reactions    Levaquin [Levofloxacin] Nausea and Vomiting     Reports anxiety, nausea, aching after taking levaquin    Ciprofloxacin Shortness of Breath    Quinolones Shortness of Breath       Objective:  Visit Vitals  /72 (BP 1 Location: Left arm, BP Patient Position: Sitting)   Pulse (!) 52   Temp 97.4 °F (36.3 °C) (Oral)   Resp 20   Ht 5' 8\" (1.727 m)   Wt 221 lb 12.8 oz (100.6 kg)   SpO2 96%   BMI 33.72 kg/m²     Physical Exam:   General appearance - alert, well appearing, and in no distress  Mental status - alert, oriented to person, place, and time  EYE-MAURICE, EOMI, fundi normal, corneas normal, no foreign bodies  ENT-ENT exam normal, no neck nodes or sinus tenderness  Nose - normal and patent, no erythema, discharge or polyps  Mouth - mucous membranes moist, pharynx normal without lesions  Neck - supple, no significant adenopathy   Chest - clear to auscultation, no wheezes, rales or rhonchi, symmetric air entry   Heart - normal rate, regular rhythm, normal S1, S2, no murmurs, rubs, clicks or gallops   Abdomen - soft, nontender, nondistended, no masses or organomegaly  Lymph- no adenopathy palpable  Ext-peripheral pulses normal, no pedal edema, no clubbing or cyanosis  Skin-Warm and dry. no hyperpigmentation, vitiligo, or suspicious lesions  Neuro -alert, oriented, normal speech, no focal findings or movement disorder noted  Musculoskeletal- FROM, no bony abnormalities, no point tenderness    No results found for this visit on 06/26/20. All results for lab orders may not have been returned by the time this encountered was closed. Assessment/Plan:       ICD-10-CM ICD-9-CM    1. Essential hypertension I10 401.9    2. Coronary artery disease of native artery of native heart with stable angina pectoris ([facility] I25.118 414.01      413.9    3. Idiopathic chronic gout without tophus, unspecified site M1A. 00X0 274.02    4. On statin therapy Z79.899 V58.69    5. Dyslipidemia E78.5 272.4    6. Acute bronchitis, unspecified organism J20.9 466.0        Orders Placed This Encounter    doxycycline (ADOXA) 100 mg tablet     Sig: Take 1 Tab by mouth two (2) times a day for 10 days. Dispense:  20 Tab     Refill:  0     Plan:    Given his underlying history of COPD and cough reductive of scant amount of discolored sputum he will be placed on an antibiotic. He will continue his other medications as prescribed and follow-up as planned in 3 months. He will be due for labs at that time. I have reviewed with the patient details of the assessment and plan and all questions were answered. Relevent patient education was performed.  Verbal and/or written instructions (see AVS) provided. The most recent lab findings were reviewed with the patient. Plan was discussed with patient who verbal expressed understanding. An After Visit Summary was printed and given to the patient.       Parag Steward MD

## 2020-06-26 NOTE — PROGRESS NOTES
Reviewed record in preparation for visit and have obtained necessary documentation. Identified pt with two pt identifiers(name and ). Chief Complaint   Patient presents with    Hypertension    Coronary Artery Disease    COPD        Coordination of Care Questionnaire:  :     1) Have you been to an emergency room, urgent care clinic since your last visit? No     Hospitalized since your last visit? No                2) Have you seen or consulted any other health care providers outside of [address] [place] [address] since your last visit?  No

## 2020-06-28 RX ORDER — ATENOLOL 100 MG/1
TABLET ORAL
Qty: 90 TAB | Refills: 2 | Status: SHIPPED | OUTPATIENT
Start: 2020-06-28 | End: 2021-04-12

## 2020-08-27 RX ORDER — HYDRALAZINE HYDROCHLORIDE 50 MG/1
TABLET, FILM COATED ORAL
Qty: 90 TAB | Refills: 4 | Status: SHIPPED | OUTPATIENT
Start: 2020-08-27 | End: 2020-12-21

## 2020-09-16 RX ORDER — DULOXETIN HYDROCHLORIDE 30 MG/1
CAPSULE, DELAYED RELEASE ORAL
Qty: 90 CAP | Refills: 4 | Status: SHIPPED | OUTPATIENT
Start: 2020-09-16 | End: 2021-08-19 | Stop reason: SDUPTHER

## 2020-09-21 ENCOUNTER — PATIENT MESSAGE (OUTPATIENT)
Dept: INTERNAL MEDICINE CLINIC | Age: 85
End: 2020-09-21

## 2020-09-21 ENCOUNTER — OFFICE VISIT (OUTPATIENT)
Dept: INTERNAL MEDICINE CLINIC | Age: 85
End: 2020-09-21
Payer: MEDICARE

## 2020-09-21 VITALS
OXYGEN SATURATION: 96 % | HEART RATE: 52 BPM | RESPIRATION RATE: 18 BRPM | HEIGHT: 68 IN | WEIGHT: 226 LBS | DIASTOLIC BLOOD PRESSURE: 62 MMHG | TEMPERATURE: 98.4 F | SYSTOLIC BLOOD PRESSURE: 130 MMHG | BODY MASS INDEX: 34.25 KG/M2

## 2020-09-21 DIAGNOSIS — M1A.00X0 IDIOPATHIC CHRONIC GOUT WITHOUT TOPHUS, UNSPECIFIED SITE: ICD-10-CM

## 2020-09-21 DIAGNOSIS — G47.33 OSA ON CPAP: ICD-10-CM

## 2020-09-21 DIAGNOSIS — I25.10 CORONARY ARTERY DISEASE INVOLVING NATIVE CORONARY ARTERY OF NATIVE HEART WITHOUT ANGINA PECTORIS: ICD-10-CM

## 2020-09-21 DIAGNOSIS — M48.061 SPINAL STENOSIS OF LUMBAR REGION AT MULTIPLE LEVELS: ICD-10-CM

## 2020-09-21 DIAGNOSIS — Z13.31 SCREENING FOR DEPRESSION: ICD-10-CM

## 2020-09-21 DIAGNOSIS — Z99.89 OSA ON CPAP: ICD-10-CM

## 2020-09-21 DIAGNOSIS — K21.9 GASTROESOPHAGEAL REFLUX DISEASE WITHOUT ESOPHAGITIS: ICD-10-CM

## 2020-09-21 DIAGNOSIS — Z13.6 SCREENING FOR ISCHEMIC HEART DISEASE: ICD-10-CM

## 2020-09-21 DIAGNOSIS — N18.30 CKD (CHRONIC KIDNEY DISEASE) STAGE 3, GFR 30-59 ML/MIN (HCC): ICD-10-CM

## 2020-09-21 DIAGNOSIS — Z00.00 MEDICARE ANNUAL WELLNESS VISIT, SUBSEQUENT: Primary | ICD-10-CM

## 2020-09-21 DIAGNOSIS — I10 ESSENTIAL HYPERTENSION: ICD-10-CM

## 2020-09-21 DIAGNOSIS — E78.5 DYSLIPIDEMIA: ICD-10-CM

## 2020-09-21 DIAGNOSIS — Z79.899 ON STATIN THERAPY: ICD-10-CM

## 2020-09-21 DIAGNOSIS — J43.1 PANLOBULAR EMPHYSEMA (HCC): ICD-10-CM

## 2020-09-21 LAB
BILIRUB UR QL: NEGATIVE
CK SERPL-CCNC: 98 U/L (ref 30–135)
CLARITY: CLEAR
COLOR UR: NORMAL
GLUCOSE 24H UR-MRATE: NEGATIVE G/(24.H)
HGB UR QL STRIP: NEGATIVE
KETONES UR QL STRIP.AUTO: NEGATIVE
LEUKOCYTE ESTERASE: NEGATIVE
NITRITE UR QL STRIP.AUTO: NEGATIVE
PH UR STRIP: 6.5 [PH] (ref 5–7)
PROT UR STRIP-MCNC: NEGATIVE MG/DL
SP GR UR REFRACTOMETRY: 1.01 (ref 1–1.03)
UROBILINOGEN UR QL STRIP.AUTO: NEGATIVE

## 2020-09-21 PROCEDURE — G0444 DEPRESSION SCREEN ANNUAL: HCPCS | Performed by: INTERNAL MEDICINE

## 2020-09-21 PROCEDURE — G8417 CALC BMI ABV UP PARAM F/U: HCPCS | Performed by: INTERNAL MEDICINE

## 2020-09-21 PROCEDURE — G8752 SYS BP LESS 140: HCPCS | Performed by: INTERNAL MEDICINE

## 2020-09-21 PROCEDURE — G8432 DEP SCR NOT DOC, RNG: HCPCS | Performed by: INTERNAL MEDICINE

## 2020-09-21 PROCEDURE — G8427 DOCREV CUR MEDS BY ELIG CLIN: HCPCS | Performed by: INTERNAL MEDICINE

## 2020-09-21 PROCEDURE — 82550 ASSAY OF CK (CPK): CPT | Performed by: INTERNAL MEDICINE

## 2020-09-21 PROCEDURE — 1101F PT FALLS ASSESS-DOCD LE1/YR: CPT | Performed by: INTERNAL MEDICINE

## 2020-09-21 PROCEDURE — G8536 NO DOC ELDER MAL SCRN: HCPCS | Performed by: INTERNAL MEDICINE

## 2020-09-21 PROCEDURE — 81003 URINALYSIS AUTO W/O SCOPE: CPT | Performed by: INTERNAL MEDICINE

## 2020-09-21 PROCEDURE — G8754 DIAS BP LESS 90: HCPCS | Performed by: INTERNAL MEDICINE

## 2020-09-21 PROCEDURE — 99214 OFFICE O/P EST MOD 30 MIN: CPT | Performed by: INTERNAL MEDICINE

## 2020-09-21 PROCEDURE — G0439 PPPS, SUBSEQ VISIT: HCPCS | Performed by: INTERNAL MEDICINE

## 2020-09-21 NOTE — PROGRESS NOTES
Renetta Dixon presents today at the clinic for    Chief Complaint   Patient presents with    Hypertension     follow up    COPD     follow up    Cholesterol Problem     follow up    GERD     follow up        Wt Readings from Last 3 Encounters:   09/21/20 226 lb (102.5 kg)   06/26/20 221 lb 12.8 oz (100.6 kg)   02/28/20 218 lb 3.2 oz (99 kg)     Temp Readings from Last 3 Encounters:   09/21/20 98.4 °F (36.9 °C) (Oral)   06/26/20 97.4 °F (36.3 °C) (Oral)   02/28/20 97.6 °F (36.4 °C) (Oral)     BP Readings from Last 3 Encounters:   09/21/20 130/62   06/26/20 124/72   02/28/20 124/62     Pulse Readings from Last 3 Encounters:   09/21/20 (!) 52   06/26/20 (!) 52   02/28/20 (!) 56       Health Maintenance Due   Topic    DTaP/Tdap/Td series (1 - Tdap)    Shingrix Vaccine Age 50> (1 of 2)    Medicare Yearly Exam     Flu Vaccine (1)         Learning Assessment:  :     Learning Assessment 8/21/2018 9/8/2017 11/9/2015   PRIMARY LEARNER Patient Patient Patient   HIGHEST LEVEL OF EDUCATION - PRIMARY LEARNER  - GRADUATED HIGH SCHOOL OR GED -   BARRIERS PRIMARY LEARNER - NONE -   CO-LEARNER CAREGIVER - No -   PRIMARY LANGUAGE ENGLISH ENGLISH ENGLISH   LEARNER PREFERENCE PRIMARY LISTENING LISTENING LISTENING   ANSWERED BY patient patient patient   RELATIONSHIP SELF SELF SELF       Depression Screening:  :     3 most recent PHQ Screens 2/28/2020   PHQ Not Done -   Little interest or pleasure in doing things Not at all   Feeling down, depressed, irritable, or hopeless Not at all   Total Score PHQ 2 0       Fall Risk Assessment:  :     Fall Risk Assessment, last 12 mths 2/28/2020   Able to walk? Yes   Fall in past 12 months? No       Abuse Screening:  :     Abuse Screening Questionnaire 4/26/2019 8/21/2018 7/27/2018 9/8/2017   Do you ever feel afraid of your partner? N N N N   Are you in a relationship with someone who physically or mentally threatens you? N N N N   Is it safe for you to go home?  Claudy Curran       Coordination of Care Questionnaire:  :     1. Have you been to the ER, urgent care clinic since your last visit? Hospitalized since your last visit? no    2. Have you seen or consulted any other health care providers outside of the [address] [place] [address] since your last visit? Include any pap smears or colon screening.  no

## 2020-09-21 NOTE — PROGRESS NOTES
This note will not be viewable in [address] Suraj Olmstead is a 80 y.o. male and presents with Hypertension (follow up); COPD (follow up); Cholesterol Problem (follow up); and GERD (follow up)  . Subjective:  Mr. Viktor Garner presents today for follow-up of several problems including hypertension, coronary disease, COPD, hyperlipidemia, monitoring statin therapy. He is doing well on his current medical regimen. He has some mild renal insufficiency with his last creatinine 1.5 and a GFR consistent with chronic kidney disease stage III. He denies any shortness of breath, chest pain, palpitations, PND, orthopnea, or pedal edema. He does have some ED and request a prescription for Viagra but he is on isosorbide mononitrate which is a contraindication for this medication. He has an intermittent cough productive of sputum with an underlying history of COPD. He does have follow-up with pulmonary as well as cardiology. Review of Systems  Constitutional:   Eyes:   negative for visual disturbance and irritation  ENT:   negative for tinnitus,sore throat,nasal congestion,ear pains. hoarseness  Respiratory:  negative for  hemoptysis, dyspnea,wheezing  CV:   negative for chest pain, palpitations, lower extremity edema  GI:   negative for nausea, vomiting, diarrhea, abdominal pain,melena  Endo:               negative for polyuria,polydipsia,polyphagia,heat intolerance  Genitourinary: negative for frequency, dysuria and hematuria  Integumentary: negative for rash and pruritus  Hematologic:  negative for easy bruising and gum/nose bleeding  Musculoskel: negative for myalgias, arthralgias, back pain, muscle weakness, joint pain  Neurological:  negative for headaches, dizziness, vertigo, memory problems and gait   Behavl/Psych: negative for feelings of anxiety, depression, mood changes    Past Medical History:   Diagnosis Date    Adverse effect of anesthesia     combative after anesthesia    Alcohol abuse     6 beers/day    Arthritis     CAD (coronary artery disease)     Chronic obstructive pulmonary disease (HCC)     Chronic obstructive pulmonary disease (HCC)     CKD (chronic kidney disease) stage 3, GFR 30-59 ml/min ([address] 9/21/2020    Dyslipidemia 8/16/2017    Dyspepsia and other specified disorders of function of stomach     Dyspnea 8/16/2017    ED (erectile dysfunction) 8/16/2017    Encounter for immunization 8/16/2017    Fatigue 8/16/2017    Flank pain 8/1/2019    GERD (gastroesophageal reflux disease) 8/16/2017    Gout 8/16/2017    Hypertension     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    Morbid obesity ([address]     On statin therapy 8/16/2017    TESSA on CPAP 1/3/2019    Psychiatric disorder     Depression    Simple chronic bronchitis ([address] 1/3/2019    TIA (transient ischemic attack) 2/79/0253    Umbilical hernia 91/9/8483    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     Past Surgical History:   Procedure Laterality Date    CARDIAC SURG PROCEDURE UNLIST  1996    Cardiac Stents    CARDIAC SURG PROCEDURE UNLIST  04/2019    EF 61-65%    COLONOSCOPY N/A 9/27/2019    COLONOSCOPY performed by [facility]    HX HERNIA REPAIR      RIH    HX HERNIA REPAIR  12/28/17    open umbilical hernia repair mesh    HX UROLOGICAL      HYDROCELECTOMY    UPPER GI ENDOSCOPY,BIOPSY  9/30/2019          Social History     Socioeconomic History    Marital status:      Spouse name: Not on file    Number of children: Not on file    Years of education: Not on file    Highest education level: Not on file   Tobacco Use    Smoking status: Former Smoker     Packs/day: 0.50     Years: 20.00     Pack years: 10.00    Smokeless tobacco: Former User    Tobacco comment: quit about 40  years ago   / used to dip tobaco and dip snuff   Substance and Sexual Activity    Alcohol use: Not Currently     Comment: \"Drinks very little now for about a month \"    Drug use: No     Family History   Problem Relation Age of Onset    Heart Disease Mother     Hypertension Mother     Hypertension Father     Hypertension Sister     Hypertension Brother     Cancer Brother      Current Outpatient Medications   Medication Sig Dispense Refill    DULoxetine (CYMBALTA) 30 mg capsule TAKE ONE CAPSULE BY MOUTH DAILY 90 Cap 4    hydrALAZINE (APRESOLINE) 50 mg tablet TAKE ONE TABLET BY MOUTH THREE TIMES A DAY 90 Tab 4    isosorbide mononitrate ER (IMDUR) 30 mg tablet TAKE ONE TABLET BY MOUTH DAILY 30 Tab 5    thiamine mononitrate (B-1) 100 mg tablet TAKE ONE TABLET BY MOUTH DAILY 30 Tab 5    atenoloL (TENORMIN) 100 mg tablet TAKE ONE TABLET BY MOUTH DAILY 90 Tab 2    atorvastatin (LIPITOR) 80 mg tablet TAKE ONE TABLET BY MOUTH DAILY 90 Tab 2    furosemide (LASIX) 80 mg tablet TAKE ONE TABLET BY MOUTH DAILY 90 Tab 2    melatonin 5 mg cap capsule Take 5 mg by mouth nightly. Pt. Take 2 tabs a night      acetaminophen (TYLENOL) 500 mg tablet Take 1,000 mg by mouth every six (6) hours as needed for Pain.  aspirin delayed-release 81 mg tablet Take 81 mg by mouth daily.  pantoprazole (PROTONIX) 40 mg tablet Take 1 Tab by mouth daily. 90 Tab 3    ANORO ELLIPTA 62.5-25 mcg/actuation inhaler Take 1 Puff by inhalation daily.        Allergies   Allergen Reactions    Levaquin [Levofloxacin] Nausea and Vomiting     Reports anxiety, nausea, aching after taking levaquin    Ciprofloxacin Shortness of Breath    Quinolones Shortness of Breath       Objective:  Visit Vitals  /62 (BP 1 Location: Left arm, BP Patient Position: Sitting)   Pulse (!) 52   Temp 98.4 °F (36.9 °C) (Oral)   Resp 18   Ht 5' 8\" (1.727 m)   Wt 226 lb (102.5 kg)   SpO2 96%   BMI 34.36 kg/m²     Physical Exam:   General appearance - alert, well appearing, and in no distress  Mental status - alert, oriented to person, place, and time  EYE-MAURICE, EOMI, fundi normal, corneas normal, no foreign bodies  ENT-ENT exam normal, no neck nodes or sinus tenderness  Nose - normal and patent, no erythema, discharge or polyps  Mouth - mucous membranes moist, pharynx normal without lesions  Neck - supple, no significant adenopathy   Chest - clear to auscultation, no wheezes, rales or rhonchi, symmetric air entry   Heart - normal rate, regular rhythm, normal S1, S2, no murmurs, rubs, clicks or gallops   Abdomen - soft, nontender, nondistended, no masses or organomegaly  Lymph- no adenopathy palpable  Ext-peripheral pulses normal, no pedal edema, no clubbing or cyanosis  Skin-Warm and dry. no hyperpigmentation, vitiligo, or suspicious lesions  Neuro -alert, oriented, normal speech, no focal findings or movement disorder noted  Musculoskeletal- FROM, no bony abnormalities, no point tenderness    No results found for this visit on 09/21/20. All results for lab orders may not have been returned by the time this encountered was closed. Assessment/Plan:       ICD-10-CM ICD-9-CM    1. Coronary artery disease involving native coronary artery of native heart without angina pectoris  C11.63 873.39 METABOLIC PANEL, COMPREHENSIVE   2. Essential hypertension  M99 111.4 METABOLIC PANEL, COMPREHENSIVE      URINALYSIS W/O MICRO   3. Gastroesophageal reflux disease without esophagitis  K21.9 530.81    4. Panlobular emphysema (HCC)  J43.1 492.8    5. TESSA on CPAP  G47.33 327.23     Z99.89 V46.8    6. Dyslipidemia  E78.5 272.4 CK      LIPID PANEL   7. On statin therapy  Z79.899 V58.69 CK      LIPID PANEL   8. Idiopathic chronic gout without tophus, unspecified site  M1A. 00X0 274.02 URIC ACID   9. Spinal stenosis of lumbar region at multiple levels  M48.061 724.02    10. CKD (chronic kidney disease) stage 3, GFR 30-59 ml/min ([facility])  N18.3 585.3    11.  Erectile dysfunction, unspecified erectile dysfunction type  N52.9 607.84        Orders Placed This Encounter    CK ([facility])    LIPID PANEL ([facility])    METABOLIC PANEL, COMPREHENSIVE ([facility])    URIC ACID ([facility])    URINALYSIS W/O MICRO ([facility])       Plan:    Continue current medical regimen as outlined above. Further recommendations based on labs as ordered. Return to clinic in 6 months unless otherwise indicated. I recommended he get the flu vaccine this fall which is not available in the office at this time. He will either call back for this to be done here or have this done at his local pharmacy. I have reviewed with the patient details of the assessment and plan and all questions were answered. Relevent patient education was performed. Verbal and/or written instructions (see AVS) provided. The most recent lab findings were reviewed with the patient. Plan was discussed with patient who verbal expressed understanding. An After Visit Summary was printed and given to the patient. Steve West MD    This is the Subsequent Medicare Annual Wellness Exam, performed 12 months or more after the Initial AWV or the last Subsequent AWV    I have reviewed the patient's medical history in detail and updated the computerized patient record.      History     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    Hypertension I10    ED (erectile dysfunction) N52.9    Severe obesity (HCC) E66.01    TESSA on CPAP G47.33, Z99.89    Simple chronic bronchitis (HCC) J41.0    Bilateral carotid artery stenosis I65.23    Spinal stenosis of lumbar region at multiple levels M48.061    Flank pain R10.9    Rectal bleeding K62.5    Pneumonia J18.9    Severe sepsis (HCC) A41.9, R65.20    Acute blood loss anemia D62    Facial droop R29.810    Altered mental state R41.82    Viral encephalitis A86    Coronary artery disease with stable angina pectoris (HCC) I25.118    Chronic obstructive pulmonary disease (HCC) J44.9    CKD (chronic kidney disease) stage 3, GFR 30-59 ml/min ([facility]) N18.3     Past Medical History:   Diagnosis Date    Adverse effect of anesthesia     combative after anesthesia    Alcohol abuse     6 beers/day    Arthritis     CAD (coronary artery disease)     Chronic obstructive pulmonary disease (HCC)     Chronic obstructive pulmonary disease (HCC)     CKD (chronic kidney disease) stage 3, GFR 30-59 ml/min ([address] 9/21/2020    Dyslipidemia 8/16/2017    Dyspepsia and other specified disorders of function of stomach     Dyspnea 8/16/2017    ED (erectile dysfunction) 8/16/2017    Encounter for immunization 8/16/2017    Fatigue 8/16/2017    Flank pain 8/1/2019    GERD (gastroesophageal reflux disease) 8/16/2017    Gout 8/16/2017    Hypertension     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    Morbid obesity ([address]     On statin therapy 8/16/2017    TESSA on CPAP 1/3/2019    Psychiatric disorder     Depression    Simple chronic bronchitis ([address] 1/3/2019    TIA (transient ischemic attack) 6/56/1447    Umbilical hernia 63/5/0039    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      Past Surgical History:   Procedure Laterality Date    CARDIAC SURG PROCEDURE UNLIST  1996    Cardiac Stents    CARDIAC SURG PROCEDURE UNLIST  04/2019    EF 61-65%    COLONOSCOPY N/A 9/27/2019    [facility] END[facility]COPY    [facility]PAIR      [facility]    HX HERNIA REPAIR  70/11/32    open umbilical hernia repair mesh    HX UROLOGICAL      HYDROCELECTOMY    UPPER GI ENDOSCOPY,BIOPSY  9/30/2019          Current Outpatient Medications   Medication Sig Dispense Refill    DULoxetine (CYMBALTA) 30 mg capsule TAKE ONE CAPSULE BY MOUTH DAILY 90 Cap 4    hydrALAZINE (APRESOLINE) 50 mg tablet TAKE ONE TABLET BY MOUTH THREE TIMES A DAY 90 Tab 4  isosorbide mononitrate ER (IMDUR) 30 mg tablet TAKE ONE TABLET BY MOUTH DAILY 30 Tab 5    thiamine mononitrate (B-1) 100 mg tablet TAKE ONE TABLET BY MOUTH DAILY 30 Tab 5    atenoloL (TENORMIN) 100 mg tablet TAKE ONE TABLET BY MOUTH DAILY 90 Tab 2    atorvastatin (LIPITOR) 80 mg tablet TAKE ONE TABLET BY MOUTH DAILY 90 Tab 2    furosemide (LASIX) 80 mg tablet TAKE ONE TABLET BY MOUTH DAILY 90 Tab 2    melatonin 5 mg cap capsule Take 5 mg by mouth nightly. Pt. Take 2 tabs a night      acetaminophen (TYLENOL) 500 mg tablet Take 1,000 mg by mouth every six (6) hours as needed for Pain.  aspirin delayed-release 81 mg tablet Take 81 mg by mouth daily.  pantoprazole (PROTONIX) 40 mg tablet Take 1 Tab by mouth daily. 90 Tab 3    ANORO ELLIPTA 62.5-25 mcg/actuation inhaler Take 1 Puff by inhalation daily.        Allergies   Allergen Reactions    Levaquin [Levofloxacin] Nausea and Vomiting     Reports anxiety, nausea, aching after taking levaquin    Ciprofloxacin Shortness of Breath    Quinolones Shortness of Breath       Family History   Problem Relation Age of Onset    Heart Disease Mother     Hypertension Mother     Hypertension Father     Hypertension Sister     Hypertension Brother     Cancer Brother      Social History     Tobacco Use    Smoking status: Former Smoker     Packs/day: 0.50     Years: 20.00     Pack years: 10.00    Smokeless tobacco: Former User    Tobacco comment: quit about 40  years ago   / used to dip tobaco and dip snuff   Substance Use Topics    Alcohol use: Not Currently     Comment: \"Drinks very little now for about a month \"       Depression Risk Factor Screening:     3 most recent PHQ Screens 2/28/2020   PHQ Not Done -   Little interest or pleasure in doing things Not at all   Feeling down, depressed, irritable, or hopeless Not at all   Total Score PHQ 2 0       Alcohol Risk Screen   Do you average more than 1 drink per night or more than 7 drinks a week: No    In the past three months have you have had more than 4 drinks containing alcohol on one occasion: No        Functional Ability and Level of Safety:   Hearing: Hearing is good. With hearing aids     Activities of Daily Living: The home contains: no safety equipment. Patient does total self care     Ambulation: with no difficulty     Fall Risk:  Fall Risk Assessment, last 12 mths 2/28/2020   Able to walk? Yes   Fall in past 12 months? No     Abuse Screen:  Patient is not abused       Cognitive Screening   Has your family/caregiver stated any concerns about your memory: no     Cognitive Screening: Normal - Verbal Fluency Test    Patient Care Team   Patient Care Team:  Spencer Brown MD as PCP - General (Internal Medicine)  Spencer Brown MD as PCP [facility]  Kelin Holbrook MD as Surgeon (General Surgery)    Assessment/Plan   Education and counseling provided:  Are appropriate based on today's review and evaluation    Diagnoses and all orders for this visit:    1. Medicare annual wellness visit, subsequent    2. Coronary artery disease involving native coronary artery of native heart without angina pectoris  -     METABOLIC PANEL, COMPREHENSIVE    3. Essential hypertension  -     METABOLIC PANEL, COMPREHENSIVE  -     URINALYSIS W/O MICRO    4. Gastroesophageal reflux disease without esophagitis    5. Panlobular emphysema ([address]    6. TESSA on CPAP    7. Dyslipidemia  -     CK  -     LIPID PANEL    8. On statin therapy  -     CK  -     LIPID PANEL    9. Idiopathic chronic gout without tophus, unspecified site  -     URIC ACID    10. Spinal stenosis of lumbar region at multiple levels    11. CKD (chronic kidney disease) stage 3, GFR 30-59 ml/min ([facility])    12. Screening for depression  -     [facility]    13.  Screening for ischemic heart disease        Health Maintenance Due   Topic Date Due    DTaP/Tdap/Td series (1 - Tdap) 10/08/1955    Shingrix Vaccine Age 50> (1 of 2) 10/08/1984    Medicare Yearly Exam  09/24/2019    Flu Vaccine (1) 09/01/2020

## 2020-09-21 NOTE — PATIENT INSTRUCTIONS
Medicare Wellness Visit, Male The best way to live healthy is to have a lifestyle where you eat a well-balanced diet, exercise regularly, limit alcohol use, and quit all forms of tobacco/nicotine, if applicable. Regular preventive services are another way to keep healthy. Preventive services (vaccines, screening tests, monitoring & exams) can help personalize your care plan, which helps you manage your own care. Screening tests can find health problems at the earliest stages, when they are easiest to treat. [facility] follows the current, evidence-based guidelines published by the [facility] ([facility]) when recommending preventive services for our patients. Because we follow these guidelines, sometimes recommendations change over time as research supports it. (For example, a prostate screening blood test is no longer routinely recommended for men with no symptoms). Of course, you and your doctor may decide to screen more often for some diseases, based on your risk and co-morbidities (chronic disease you are already diagnosed with). Preventive services for you include: - Medicare offers their members a free annual wellness visit, which is time for you and your primary care provider to discuss and plan for your preventive service needs. Take advantage of this benefit every year! 
-All adults over age 72 should receive the recommended pneumonia vaccines. Current USPSTF guidelines recommend a series of two vaccines for the best pneumonia protection.  
-All adults should have a flu vaccine yearly and tetanus vaccine every 10 years. 
-All adults age 48 and older should receive the shingles vaccines (series of two vaccines).       
-All adults age 38-68 who are overweight should have a diabetes screening test once every three years.  
-Other screening tests & preventive services for persons with diabetes include: an eye exam to screen for diabetic retinopathy, a kidney function test, a foot exam, and stricter control over your cholesterol.  
-Cardiovascular screening for adults with routine risk involves an electrocardiogram (ECG) at intervals determined by the provider.  
-Colorectal cancer screening should be done for adults age 54-65 with no increased risk factors for colorectal cancer. There are a number of acceptable methods of screening for this type of cancer. Each test has its own benefits and drawbacks. Discuss with your provider what is most appropriate for you during your annual wellness visit. The different tests include: colonoscopy (considered the best screening method), a fecal occult blood test, a fecal DNA test, and sigmoidoscopy. 
-All adults born between [facility] should be screened once for Hepatitis C. 
-An Abdominal Aortic Aneurysm (AAA) Screening is recommended for men age 73-68 who has ever smoked in their lifetime. Here is a list of your current Health Maintenance items (your personalized list of preventive services) with a due date: 
Health Maintenance Due Topic Date Due  
 DTaP/Tdap/Td  (1 - Tdap) 10[birth year]08/1955  Shingles Vaccine (1 of 2) 10/08/1984 [address] [place] [address] Annual Well Visit  09/24/2019  Yearly Flu Vaccine (1) 09/01/2020

## 2020-09-22 RX ORDER — PANTOPRAZOLE SODIUM 40 MG/1
TABLET, DELAYED RELEASE ORAL
Qty: 90 TAB | Refills: 2 | Status: ON HOLD | OUTPATIENT
Start: 2020-09-22 | End: 2021-06-08 | Stop reason: SDUPTHER

## 2020-09-23 ENCOUNTER — LAB ONLY (OUTPATIENT)
Dept: INTERNAL MEDICINE CLINIC | Age: 85
End: 2020-09-23
Payer: MEDICARE

## 2020-09-23 DIAGNOSIS — Z79.899 ON STATIN THERAPY: ICD-10-CM

## 2020-09-23 DIAGNOSIS — E78.5 DYSLIPIDEMIA: ICD-10-CM

## 2020-09-23 DIAGNOSIS — M1A.00X0 IDIOPATHIC CHRONIC GOUT WITHOUT TOPHUS, UNSPECIFIED SITE: ICD-10-CM

## 2020-09-23 DIAGNOSIS — I25.10 CORONARY ARTERY DISEASE INVOLVING NATIVE CORONARY ARTERY OF NATIVE HEART WITHOUT ANGINA PECTORIS: Primary | ICD-10-CM

## 2020-09-23 DIAGNOSIS — N18.30 CKD (CHRONIC KIDNEY DISEASE) STAGE 3, GFR 30-59 ML/MIN (HCC): ICD-10-CM

## 2020-09-23 DIAGNOSIS — I10 ESSENTIAL HYPERTENSION: ICD-10-CM

## 2020-09-23 LAB
A-G RATIO,AGRAT: 1.3 RATIO
ALBUMIN SERPL-MCNC: 4 G/DL (ref 3.9–5.4)
ALP SERPL-CCNC: 116 U/L (ref 38–126)
ALT SERPL-CCNC: 28 U/L (ref 0–50)
ANION GAP SERPL CALC-SCNC: 10 MMOL/L
AST SERPL W P-5'-P-CCNC: 36 U/L (ref 14–36)
BILIRUB SERPL-MCNC: 0.9 MG/DL (ref 0.2–1.3)
BUN SERPL-MCNC: 26 MG/DL (ref 9–20)
BUN/CREATININE RATIO,BUCR: 16 RATIO
CALCIUM SERPL-MCNC: 9.1 MG/DL (ref 8.4–10.2)
CHLORIDE SERPL-SCNC: 102 MMOL/L (ref 98–107)
CHOL/HDL RATIO,CHHD: 5 RATIO (ref 0–4)
CHOLEST SERPL-MCNC: 157 MG/DL (ref 0–200)
CK SERPL-CCNC: 117 U/L (ref 30–135)
CO2 SERPL-SCNC: 31 MMOL/L (ref 22–32)
CREAT SERPL-MCNC: 1.6 MG/DL (ref 0.8–1.5)
GLOBULIN,GLOB: 3
GLUCOSE SERPL-MCNC: 123 MG/DL (ref 75–110)
HDLC SERPL-MCNC: 29 MG/DL (ref 35–130)
LDL/HDL RATIO,LDHD: 2 RATIO
LDLC SERPL CALC-MCNC: 68 MG/DL (ref 0–130)
POTASSIUM SERPL-SCNC: 5.2 MMOL/L (ref 3.6–5)
PROT SERPL-MCNC: 7 G/DL (ref 6.3–8.2)
SODIUM SERPL-SCNC: 143 MMOL/L (ref 137–145)
TRIGL SERPL-MCNC: 299 MG/DL (ref 0–200)
URATE SERPL-MCNC: 10.3 MG/DL (ref 3.5–8.5)
VLDLC SERPL CALC-MCNC: 60 MG/DL

## 2020-09-23 PROCEDURE — 84550 ASSAY OF BLOOD/URIC ACID: CPT | Performed by: INTERNAL MEDICINE

## 2020-09-23 PROCEDURE — 80053 COMPREHEN METABOLIC PANEL: CPT | Performed by: INTERNAL MEDICINE

## 2020-09-23 PROCEDURE — 82550 ASSAY OF CK (CPK): CPT | Performed by: INTERNAL MEDICINE

## 2020-09-23 PROCEDURE — 80061 LIPID PANEL: CPT | Performed by: INTERNAL MEDICINE

## 2020-12-04 ENCOUNTER — CLINICAL SUPPORT (OUTPATIENT)
Dept: INTERNAL MEDICINE CLINIC | Age: 85
End: 2020-12-04
Payer: MEDICARE

## 2020-12-04 VITALS
TEMPERATURE: 97.7 F | DIASTOLIC BLOOD PRESSURE: 72 MMHG | HEIGHT: 68 IN | RESPIRATION RATE: 18 BRPM | OXYGEN SATURATION: 94 % | WEIGHT: 227.8 LBS | HEART RATE: 60 BPM | SYSTOLIC BLOOD PRESSURE: 120 MMHG | BODY MASS INDEX: 34.53 KG/M2

## 2020-12-04 DIAGNOSIS — Z23 NEEDS FLU SHOT: Primary | ICD-10-CM

## 2020-12-04 PROCEDURE — 90694 VACC AIIV4 NO PRSRV 0.5ML IM: CPT | Performed by: INTERNAL MEDICINE

## 2020-12-04 PROCEDURE — G0008 ADMIN INFLUENZA VIRUS VAC: HCPCS | Performed by: INTERNAL MEDICINE

## 2020-12-04 NOTE — PROGRESS NOTES
Alix Sexton is 80 y.o. male    Chief Complaint   Patient presents with    Immunization/Injection     flu shot       Visit Vitals  /72 (BP 1 Location: Left arm, BP Patient Position: Sitting)   Pulse 60   Temp 97.7 °F (36.5 °C) (Oral)   Resp 18   Ht 5' 8\" (1.727 m)   Wt 227 lb 12.8 oz (103.3 kg)   SpO2 94%   BMI 34.64 kg/m²         1. Have you been to the ER, urgent care clinic or hospitalized since your last visit? No     2. Have you seen or consulted any other health care providers outside of the [facility] since your last visit? Include any pap smears or colon screening. No      Administered influenza vaccine in left deltoid patient tolerated injection well.     XIG#:[id number]    Exp:6/30/21    XEL:[phone]

## 2020-12-04 NOTE — PATIENT INSTRUCTIONS
Vaccine Information Statement    Influenza (Flu) Vaccine (Inactivated or Recombinant): What You Need to Know    Many Vaccine Information Statements are available in Nepali and other languages. See www.immunize.org/vis  Hojas de información sobre vacunas están disponibles en español y en muchos otros idiomas. Visite www.immunize.org/vis    1. Why get vaccinated? Influenza vaccine can prevent influenza (flu). Flu is a contagious disease that spreads around the [facility] every year, usually between October and May. Anyone can get the flu, but it is more dangerous for some people. Infants and young children, people 72years of age and older, pregnant women, and people with certain health conditions or a weakened immune system are at greatest risk of flu complications. Pneumonia, bronchitis, sinus infections and ear infections are examples of flu-related complications. If you have a medical condition, such as heart disease, cancer or diabetes, flu can make it worse. Flu can cause fever and chills, sore throat, muscle aches, fatigue, cough, headache, and runny or stuffy nose. Some people may have vomiting and diarrhea, though this is more common in children than adults. Each year thousands of people in the [facility] die from flu, and many more are hospitalized. Flu vaccine prevents millions of illnesses and flu-related visits to the doctor each year. 2. Influenza vaccines     CDC recommends everyone 10months of age and older get vaccinated every flu season. Children 6 months through 6years of age may need 2 doses during a single flu season. Everyone else needs only 1 dose each flu season. It takes about 2 weeks for protection to develop after vaccination. There are many flu viruses, and they are always changing. Each year a new flu vaccine is made to protect against three or four viruses that are likely to cause disease in the upcoming flu season.  Even when the vaccine doesnt exactly match these viruses, it may still provide some protection. Influenza vaccine does not cause flu. Influenza vaccine may be given at the same time as other vaccines. 3. Talk with your health care provider    Tell your vaccine provider if the person getting the vaccine:   Has had an allergic reaction after a previous dose of influenza vaccine, or has any severe, life-threatening allergies.  Has ever had Guillain-Barré Syndrome (also called GBS). In some cases, your health care provider may decide to postpone influenza vaccination to a future visit. People with minor illnesses, such as a cold, may be vaccinated. People who are moderately or severely ill should usually wait until they recover before getting influenza vaccine. Your health care provider can give you more information. 4. Risks of a reaction     Soreness, redness, and swelling where shot is given, fever, muscle aches, and headache can happen after influenza vaccine.  There may be a very small increased risk of Guillain-Barré Syndrome (GBS) after inactivated influenza vaccine (the flu shot). Horacio Comings children who get the flu shot along with pneumococcal vaccine (PCV13), and/or DTaP vaccine at the same time might be slightly more likely to have a seizure caused by fever. Tell your health care provider if a child who is getting flu vaccine has ever had a seizure. People sometimes faint after medical procedures, including vaccination. Tell your provider if you feel dizzy or have vision changes or ringing in the ears. As with any medicine, there is a very remote chance of a vaccine causing a severe allergic reaction, other serious injury, or death. 5. What if there is a serious problem? An allergic reaction could occur after the vaccinated person leaves the clinic.  If you see signs of a severe allergic reaction (hives, swelling of the face and throat, difficulty breathing, a fast heartbeat, dizziness, or weakness), call 9-1-1 and get the person to the nearest hospital.    For other signs that concern you, call your health care provider. Adverse reactions should be reported to the Vaccine Adverse Event Reporting System (VAERS). Your health care provider will usually file this report, or you can do it yourself. Visit the VAERS website at www.vaers. [facility].gov or call 6-176.104.7755. VAERS is only for reporting reactions, and VAERS staff do not give medical advice. 6. The National Vaccine Injury Compensation Program    The [facility] Vaccine Injury Compensation Program (VICP) is a federal program that was created to compensate people who may have been injured by certain vaccines. Visit the VICP website at www.[facility].gov/vaccinecompensation or call 8-113.233.1536 to learn about the program and about filing a claim. There is a time limit to file a claim for compensation. 7. How can I learn more?  Ask your health care provider.  Call your local or state health department.  Contact the Centers for Disease Control and Prevention (CDC):  - Call 4-471.315.8805 (1-800-CDC-INFO) or  - Visit CDCs influenza website at www.cdc.gov/flu    Vaccine Information Statement (Interim)  Inactivated Influenza Vaccine   8/15/2019  [address] 150CC-92   Department of Health and Human Services  Centers for Disease Control and Prevention    Office Use Only

## 2020-12-21 RX ORDER — LANOLIN ALCOHOL/MO/W.PET/CERES
CREAM (GRAM) TOPICAL
Qty: 90 TAB | Refills: 4 | Status: SHIPPED | OUTPATIENT
Start: 2020-12-21 | End: 2021-12-27 | Stop reason: SDUPTHER

## 2020-12-21 RX ORDER — HYDRALAZINE HYDROCHLORIDE 50 MG/1
TABLET, FILM COATED ORAL
Qty: 270 TAB | Refills: 3 | Status: SHIPPED
Start: 2020-12-21 | End: 2021-05-03

## 2020-12-21 RX ORDER — ISOSORBIDE MONONITRATE 30 MG/1
TABLET, EXTENDED RELEASE ORAL
Qty: 90 TAB | Refills: 4 | Status: SHIPPED | OUTPATIENT
Start: 2020-12-21 | End: 2022-04-26 | Stop reason: SDUPTHER

## 2020-12-29 ENCOUNTER — TELEPHONE (OUTPATIENT)
Dept: INTERNAL MEDICINE CLINIC | Age: 85
End: 2020-12-29

## 2020-12-29 NOTE — TELEPHONE ENCOUNTER
Patients wife called stating the patient has had a cough all yesterday and today. They would like an appointment with Dr. Sherron Del Rio but are not sure if this could be related to covid. Please advise.   CB: [phone]

## 2020-12-31 NOTE — TELEPHONE ENCOUNTER
Spoke to patient's wife and they were exposed to Covid and is awaiting testing in the meantime they will try some over the counter cough medicine.

## 2021-01-05 ENCOUNTER — TELEPHONE (OUTPATIENT)
Dept: INTERNAL MEDICINE CLINIC | Age: 86
End: 2021-01-05

## 2021-01-05 NOTE — TELEPHONE ENCOUNTER
Patients daughter Manjula Sanders calling to let Dr. Dougherty [place] know that her dad Carolina Conklin is congested, coughing & SOB. She stated that she tested positive on 1/4/21 for covid and her son positive on 12/29/20. Her dad Mr. Leora Fernando went to [facility] on 12/29/20 & 1/1/21 and his results came back negative both times. Should he go back and get retested and what should he take? Please advise.

## 2021-03-01 ENCOUNTER — IMMUNIZATION (OUTPATIENT)
Dept: INTERNAL MEDICINE CLINIC | Age: 86
End: 2021-03-01
Payer: MEDICARE

## 2021-03-01 DIAGNOSIS — Z23 ENCOUNTER FOR IMMUNIZATION: Primary | ICD-10-CM

## 2021-03-01 PROCEDURE — 0001A COVID-19, MRNA, LNP-S, PF, 30MCG/0.3ML DOSE(PFIZER): CPT | Performed by: FAMILY MEDICINE

## 2021-03-01 PROCEDURE — 91300 COVID-19, MRNA, LNP-S, PF, 30MCG/0.3ML DOSE(PFIZER): CPT | Performed by: FAMILY MEDICINE

## 2021-03-15 RX ORDER — ATORVASTATIN CALCIUM 80 MG/1
TABLET, FILM COATED ORAL
Qty: 90 TAB | Refills: 1 | Status: SHIPPED | OUTPATIENT
Start: 2021-03-15 | End: 2021-12-06

## 2021-03-22 ENCOUNTER — IMMUNIZATION (OUTPATIENT)
Dept: INTERNAL MEDICINE CLINIC | Age: 86
End: 2021-03-22
Payer: MEDICARE

## 2021-03-22 DIAGNOSIS — Z23 ENCOUNTER FOR IMMUNIZATION: Primary | ICD-10-CM

## 2021-03-22 PROCEDURE — 0002A COVID-19, MRNA, LNP-S, PF, 30MCG/0.3ML DOSE(PFIZER): CPT | Performed by: FAMILY MEDICINE

## 2021-03-22 PROCEDURE — 91300 COVID-19, MRNA, LNP-S, PF, 30MCG/0.3ML DOSE(PFIZER): CPT | Performed by: FAMILY MEDICINE

## 2021-04-12 RX ORDER — ATENOLOL 100 MG/1
TABLET ORAL
Qty: 90 TAB | Refills: 1 | Status: SHIPPED | OUTPATIENT
Start: 2021-04-12 | End: 2021-04-14

## 2021-04-14 RX ORDER — ATENOLOL 100 MG/1
TABLET ORAL
Qty: 90 TAB | Refills: 1 | Status: ON HOLD | OUTPATIENT
Start: 2021-04-14 | End: 2021-06-08 | Stop reason: SDUPTHER

## 2021-04-18 ENCOUNTER — APPOINTMENT (OUTPATIENT)
Dept: CT IMAGING | Age: 86
DRG: 064 | End: 2021-04-18
Attending: NEUROLOGICAL SURGERY
Payer: MEDICARE

## 2021-04-18 ENCOUNTER — HOSPITAL ENCOUNTER (EMERGENCY)
Age: 86
Discharge: ANOTHER HOSPITAL | End: 2021-04-18
Attending: EMERGENCY MEDICINE
Payer: MEDICARE

## 2021-04-18 ENCOUNTER — APPOINTMENT (OUTPATIENT)
Dept: GENERAL RADIOLOGY | Age: 86
End: 2021-04-18
Attending: EMERGENCY MEDICINE
Payer: MEDICARE

## 2021-04-18 ENCOUNTER — HOSPITAL ENCOUNTER (INPATIENT)
Age: 86
LOS: 15 days | Discharge: NURSING, REHAB OR LONG-TERM CARE | DRG: 064 | End: 2021-05-03
Attending: HOSPITALIST | Admitting: STUDENT IN AN ORGANIZED HEALTH CARE EDUCATION/TRAINING PROGRAM
Payer: MEDICARE

## 2021-04-18 ENCOUNTER — APPOINTMENT (OUTPATIENT)
Dept: CT IMAGING | Age: 86
End: 2021-04-18
Attending: STUDENT IN AN ORGANIZED HEALTH CARE EDUCATION/TRAINING PROGRAM
Payer: MEDICARE

## 2021-04-18 VITALS
TEMPERATURE: 97.8 F | RESPIRATION RATE: 27 BRPM | OXYGEN SATURATION: 90 % | SYSTOLIC BLOOD PRESSURE: 158 MMHG | HEART RATE: 73 BPM | DIASTOLIC BLOOD PRESSURE: 60 MMHG

## 2021-04-18 DIAGNOSIS — I62.9 INTRACRANIAL HEMORRHAGE (HCC): ICD-10-CM

## 2021-04-18 DIAGNOSIS — I61.5 RIGHT-SIDED NONTRAUMATIC INTRAVENTRICULAR INTRACEREBRAL HEMORRHAGE (HCC): Primary | ICD-10-CM

## 2021-04-18 DIAGNOSIS — I61.9 INTRAPARENCHYMAL HEMORRHAGE OF BRAIN (HCC): ICD-10-CM

## 2021-04-18 LAB
ALBUMIN SERPL-MCNC: 3.9 G/DL (ref 3.5–5)
ALBUMIN/GLOB SERPL: 1 {RATIO} (ref 1.1–2.2)
ALP SERPL-CCNC: 106 U/L (ref 45–117)
ALT SERPL-CCNC: 27 U/L (ref 12–78)
ANION GAP SERPL CALC-SCNC: 8 MMOL/L (ref 5–15)
APPEARANCE UR: CLEAR
ARTERIAL PATENCY WRIST A: YES
AST SERPL-CCNC: 31 U/L (ref 15–37)
BACTERIA URNS QL MICRO: NEGATIVE /HPF
BASE EXCESS BLD CALC-SCNC: 3 MMOL/L
BASOPHILS # BLD: 0 K/UL (ref 0–0.1)
BASOPHILS NFR BLD: 0 % (ref 0–1)
BDY SITE: ABNORMAL
BILIRUB SERPL-MCNC: 0.6 MG/DL (ref 0.2–1)
BILIRUB UR QL: NEGATIVE
BUN SERPL-MCNC: 24 MG/DL (ref 6–20)
BUN/CREAT SERPL: 14 (ref 12–20)
CA-I BLD-SCNC: 1.21 MMOL/L (ref 1.12–1.32)
CALCIUM SERPL-MCNC: 9.4 MG/DL (ref 8.5–10.1)
CHLORIDE SERPL-SCNC: 104 MMOL/L (ref 97–108)
CO2 SERPL-SCNC: 27 MMOL/L (ref 21–32)
COLOR UR: NORMAL
CREAT SERPL-MCNC: 1.73 MG/DL (ref 0.7–1.3)
DIFFERENTIAL METHOD BLD: ABNORMAL
EOSINOPHIL # BLD: 0 K/UL (ref 0–0.4)
EOSINOPHIL NFR BLD: 0 % (ref 0–7)
EPITH CASTS URNS QL MICRO: NORMAL /LPF
ERYTHROCYTE [DISTWIDTH] IN BLOOD BY AUTOMATED COUNT: 14.9 % (ref 11.5–14.5)
GAS FLOW.O2 O2 DELIVERY SYS: ABNORMAL L/MIN
GAS FLOW.O2 SETTING OXYMISER: 2 L/M
GLOBULIN SER CALC-MCNC: 4 G/DL (ref 2–4)
GLUCOSE BLD STRIP.AUTO-MCNC: 135 MG/DL (ref 65–100)
GLUCOSE SERPL-MCNC: 159 MG/DL (ref 65–100)
GLUCOSE UR STRIP.AUTO-MCNC: NEGATIVE MG/DL
HCO3 BLD-SCNC: 27.1 MMOL/L (ref 22–26)
HCT VFR BLD AUTO: 40.7 % (ref 36.6–50.3)
HGB BLD-MCNC: 13.7 G/DL (ref 12.1–17)
HGB UR QL STRIP: NEGATIVE
HYALINE CASTS URNS QL MICRO: NORMAL /LPF (ref 0–5)
IMM GRANULOCYTES # BLD AUTO: 0 K/UL (ref 0–0.04)
IMM GRANULOCYTES NFR BLD AUTO: 0 % (ref 0–0.5)
KETONES UR QL STRIP.AUTO: NEGATIVE MG/DL
LACTATE SERPL-SCNC: 1.5 MMOL/L (ref 0.4–2)
LEUKOCYTE ESTERASE UR QL STRIP.AUTO: NEGATIVE
LIPASE SERPL-CCNC: 108 U/L (ref 73–393)
LYMPHOCYTES # BLD: 0.9 K/UL (ref 0.8–3.5)
LYMPHOCYTES NFR BLD: 8 % (ref 12–49)
MAGNESIUM SERPL-MCNC: 1.8 MG/DL (ref 1.6–2.4)
MCH RBC QN AUTO: 30.9 PG (ref 26–34)
MCHC RBC AUTO-ENTMCNC: 33.7 G/DL (ref 30–36.5)
MCV RBC AUTO: 91.7 FL (ref 80–99)
MONOCYTES # BLD: 0.5 K/UL (ref 0–1)
MONOCYTES NFR BLD: 5 % (ref 5–13)
NEUTS SEG # BLD: 9.6 K/UL (ref 1.8–8)
NEUTS SEG NFR BLD: 87 % (ref 32–75)
NITRITE UR QL STRIP.AUTO: NEGATIVE
NRBC # BLD: 0 K/UL (ref 0–0.01)
NRBC BLD-RTO: 0 PER 100 WBC
O2/TOTAL GAS SETTING VFR VENT: 28 %
PCO2 BLD: 39.3 MMHG (ref 35–45)
PH BLD: 7.45 [PH] (ref 7.35–7.45)
PH UR STRIP: 6 [PH] (ref 5–8)
PLATELET # BLD AUTO: 241 K/UL (ref 150–400)
PMV BLD AUTO: 9.8 FL (ref 8.9–12.9)
PO2 BLD: 56 MMHG (ref 80–100)
POTASSIUM SERPL-SCNC: 3.7 MMOL/L (ref 3.5–5.1)
PROT SERPL-MCNC: 7.9 G/DL (ref 6.4–8.2)
PROT UR STRIP-MCNC: NEGATIVE MG/DL
RBC # BLD AUTO: 4.44 M/UL (ref 4.1–5.7)
RBC #/AREA URNS HPF: NORMAL /HPF (ref 0–5)
SAO2 % BLD: 90 % (ref 92–97)
SERVICE CMNT-IMP: ABNORMAL
SODIUM SERPL-SCNC: 139 MMOL/L (ref 136–145)
SP GR UR REFRACTOMETRY: 1.01 (ref 1–1.03)
SPECIMEN TYPE: ABNORMAL
TOTAL RESP. RATE, ITRR: 27
TROPONIN I BLD-MCNC: <0.04 NG/ML (ref 0–0.08)
TROPONIN I SERPL-MCNC: <0.05 NG/ML
TSH SERPL DL<=0.05 MIU/L-ACNC: 2.78 UIU/ML (ref 0.36–3.74)
UA: UC IF INDICATED,UAUC: NORMAL
UROBILINOGEN UR QL STRIP.AUTO: 0.2 EU/DL (ref 0.2–1)
WBC # BLD AUTO: 11.1 K/UL (ref 4.1–11.1)
WBC URNS QL MICRO: NORMAL /HPF (ref 0–4)

## 2021-04-18 PROCEDURE — 84443 ASSAY THYROID STIM HORMONE: CPT

## 2021-04-18 PROCEDURE — 74011000258 HC RX REV CODE- 258: Performed by: EMERGENCY MEDICINE

## 2021-04-18 PROCEDURE — 70450 CT HEAD/BRAIN W/O DYE: CPT

## 2021-04-18 PROCEDURE — 36600 WITHDRAWAL OF ARTERIAL BLOOD: CPT

## 2021-04-18 PROCEDURE — 85025 COMPLETE CBC W/AUTO DIFF WBC: CPT

## 2021-04-18 PROCEDURE — 36415 COLL VENOUS BLD VENIPUNCTURE: CPT

## 2021-04-18 PROCEDURE — 82962 GLUCOSE BLOOD TEST: CPT

## 2021-04-18 PROCEDURE — 96366 THER/PROPH/DIAG IV INF ADDON: CPT

## 2021-04-18 PROCEDURE — 71045 X-RAY EXAM CHEST 1 VIEW: CPT

## 2021-04-18 PROCEDURE — 96375 TX/PRO/DX INJ NEW DRUG ADDON: CPT

## 2021-04-18 PROCEDURE — 74011250636 HC RX REV CODE- 250/636: Performed by: STUDENT IN AN ORGANIZED HEALTH CARE EDUCATION/TRAINING PROGRAM

## 2021-04-18 PROCEDURE — 74011000250 HC RX REV CODE- 250: Performed by: STUDENT IN AN ORGANIZED HEALTH CARE EDUCATION/TRAINING PROGRAM

## 2021-04-18 PROCEDURE — 93005 ELECTROCARDIOGRAM TRACING: CPT

## 2021-04-18 PROCEDURE — 96374 THER/PROPH/DIAG INJ IV PUSH: CPT

## 2021-04-18 PROCEDURE — 77030012794 HC KT NSL CANN/HGH TRAN -A

## 2021-04-18 PROCEDURE — 65610000006 HC RM INTENSIVE CARE

## 2021-04-18 PROCEDURE — 74011000250 HC RX REV CODE- 250: Performed by: EMERGENCY MEDICINE

## 2021-04-18 PROCEDURE — 74176 CT ABD & PELVIS W/O CONTRAST: CPT

## 2021-04-18 PROCEDURE — 83605 ASSAY OF LACTIC ACID: CPT

## 2021-04-18 PROCEDURE — 99285 EMERGENCY DEPT VISIT HI MDM: CPT

## 2021-04-18 PROCEDURE — 81001 URINALYSIS AUTO W/SCOPE: CPT

## 2021-04-18 PROCEDURE — 83735 ASSAY OF MAGNESIUM: CPT

## 2021-04-18 PROCEDURE — 74011000250 HC RX REV CODE- 250: Performed by: NURSE PRACTITIONER

## 2021-04-18 PROCEDURE — 80053 COMPREHEN METABOLIC PANEL: CPT

## 2021-04-18 PROCEDURE — 83690 ASSAY OF LIPASE: CPT

## 2021-04-18 PROCEDURE — 74011250636 HC RX REV CODE- 250/636: Performed by: NURSE PRACTITIONER

## 2021-04-18 PROCEDURE — 96365 THER/PROPH/DIAG IV INF INIT: CPT

## 2021-04-18 PROCEDURE — 82803 BLOOD GASES ANY COMBINATION: CPT

## 2021-04-18 PROCEDURE — 84484 ASSAY OF TROPONIN QUANT: CPT

## 2021-04-18 PROCEDURE — 94640 AIRWAY INHALATION TREATMENT: CPT

## 2021-04-18 PROCEDURE — 74011250636 HC RX REV CODE- 250/636: Performed by: EMERGENCY MEDICINE

## 2021-04-18 RX ORDER — DEXMEDETOMIDINE HYDROCHLORIDE 4 UG/ML
.1-1.5 INJECTION, SOLUTION INTRAVENOUS
Status: DISCONTINUED | OUTPATIENT
Start: 2021-04-19 | End: 2021-04-23

## 2021-04-18 RX ORDER — IPRATROPIUM BROMIDE AND ALBUTEROL SULFATE 2.5; .5 MG/3ML; MG/3ML
3 SOLUTION RESPIRATORY (INHALATION)
Status: COMPLETED | OUTPATIENT
Start: 2021-04-18 | End: 2021-04-18

## 2021-04-18 RX ORDER — ONDANSETRON 2 MG/ML
4 INJECTION INTRAMUSCULAR; INTRAVENOUS
Status: COMPLETED | OUTPATIENT
Start: 2021-04-18 | End: 2021-04-18

## 2021-04-18 RX ORDER — DIAZEPAM 10 MG/2ML
5 INJECTION INTRAMUSCULAR ONCE
Status: COMPLETED | OUTPATIENT
Start: 2021-04-19 | End: 2021-04-18

## 2021-04-18 RX ADMIN — ONDANSETRON 4 MG: 2 INJECTION INTRAMUSCULAR; INTRAVENOUS at 15:56

## 2021-04-18 RX ADMIN — IPRATROPIUM BROMIDE AND ALBUTEROL SULFATE 3 ML: .5; 3 SOLUTION RESPIRATORY (INHALATION) at 19:09

## 2021-04-18 RX ADMIN — SODIUM CHLORIDE 5 MG/HR: 9 INJECTION, SOLUTION INTRAVENOUS at 19:20

## 2021-04-18 RX ADMIN — SODIUM CHLORIDE 5 MG/HR: 9 INJECTION, SOLUTION INTRAVENOUS at 18:05

## 2021-04-18 RX ADMIN — DIAZEPAM 5 MG: 5 INJECTION, SOLUTION INTRAMUSCULAR; INTRAVENOUS at 23:32

## 2021-04-18 RX ADMIN — SODIUM CHLORIDE 5 MG/HR: 900 INJECTION, SOLUTION INTRAVENOUS at 23:35

## 2021-04-18 RX ADMIN — SODIUM CHLORIDE 500 ML: 9 INJECTION, SOLUTION INTRAVENOUS at 16:27

## 2021-04-18 RX ADMIN — ONDANSETRON 4 MG: 2 INJECTION INTRAMUSCULAR; INTRAVENOUS at 19:06

## 2021-04-18 RX ADMIN — DEXMEDETOMIDINE HYDROCHLORIDE 1.5 MCG/KG/HR: 400 INJECTION INTRAVENOUS at 23:26

## 2021-04-18 NOTE — ED PROVIDER NOTES
EMERGENCY DEPARTMENT HISTORY AND PHYSICAL EXAM          Date: 4/18/2021  Patient Name: James Ayon  Attending of Record: Michele Shelter    History of Presenting Illness     Chief Complaint   Patient presents with    Altered mental status     pt has been complaing of headache and stomach ache x2 days, now patient is telling his family hes crazy and family reports as the day goes on he seems more altered. Family reports hes been putting his finger down his throat to vomit yesterday       History Provided By: Patient and Patient's Daughter    HPI: James Ayon is a 80 y.o. male, with history of hypertension, CAD status post PCI, TIA, OPD history of viral encephalitis and CKD presenting with concerns of headache, abdominal pain, nausea, and mental status changes per daughter. History is gathered from both patient and daughter, she states that he has been telling her that he feels crazy today he feels out of his head. She has been planing of headache x2 days, also associated with use abdominal pain and nausea/vomiting. Patient today that he feels out of his head and has been more confused. Per daughter patient has history of viral encephalitis and symptoms presented similarly at that time. Patient was in his usual state of health until 2 days ago, denies any fever, chest pain, shortness of breath. no changes in urination, one episode of soft stool yesterday but no other diarrhea. Patient able to ambulate into room, able to answer questions appropriately and states that he has no other complaints today. PCP: Elias Rivera MD    There are no other complaints, changes, or physical findings at this time.      Current Facility-Administered Medications   Medication Dose Route Frequency Provider Last Rate Last Admin    ondansetron (ZOFRAN) injection 4 mg  4 mg IntraVENous Heather Bonner MD         Current Outpatient Medications   Medication Sig Dispense Refill    atenoloL (TENORMIN) 100 mg tablet TAKE ONE TABLET BY MOUTH DAILY 90 Tab 1    atorvastatin (LIPITOR) 80 mg tablet TAKE ONE TABLET BY MOUTH DAILY 90 Tab 1    furosemide (LASIX) 80 mg tablet TAKE ONE TABLET BY MOUTH DAILY 90 Tab 1    thiamine HCL (B-1) 100 mg tablet TAKE ONE BY MOUTH DAILY 90 Tab 4    isosorbide mononitrate ER (IMDUR) 30 mg tablet TAKE ONE TABLET BY MOUTH DAILY 90 Tab 4    hydrALAZINE (APRESOLINE) 50 mg tablet TAKE ONE TABLET BY MOUTH THREE TIMES A  Tab 3    pantoprazole (PROTONIX) 40 mg tablet TAKE ONE TABLET BY MOUTH DAILY 90 Tab 2    DULoxetine (CYMBALTA) 30 mg capsule TAKE ONE CAPSULE BY MOUTH DAILY 90 Cap 4    melatonin 5 mg cap capsule Take 5 mg by mouth nightly. Pt. Take 2 tabs a night      acetaminophen (TYLENOL) 500 mg tablet Take 1,000 mg by mouth every six (6) hours as needed for Pain.  aspirin delayed-release 81 mg tablet Take 81 mg by mouth daily.  ANORO ELLIPTA 62.5-25 mcg/actuation inhaler Take 1 Puff by inhalation daily.          Past History     Past Medical History:  Past Medical History:   Diagnosis Date    Adverse effect of anesthesia     combative after anesthesia    Alcohol abuse     6 beers/day    Arthritis     CAD (coronary artery disease)     Chronic obstructive pulmonary disease (HCC)     Chronic obstructive pulmonary disease (HCC)     CKD (chronic kidney disease) stage 3, GFR 30-59 ml/min ([address] 9/21/2020    Dyslipidemia 8/16/2017    Dyspepsia and other specified disorders of function of stomach     Dyspnea 8/16/2017    ED (erectile dysfunction) 8/16/2017    Encounter for immunization 8/16/2017    Fatigue 8/16/2017    Flank pain 8/1/2019    GERD (gastroesophageal reflux disease) 8/16/2017    Gout 8/16/2017    Hypertension     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    Morbid obesity ([address]     On statin therapy 8/16/2017    TESSA on CPAP 1/3/2019    Psychiatric disorder     Depression    Simple chronic bronchitis ([address] 1/3/2019    TIA (transient ischemic attack) 8/16/2017    Umbilical hernia 44/0/3614    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       Past Surgical History:  Past Surgical History:   Procedure Laterality Date    COLONOSCOPY N/A 9/27/2019    COLONOSCOPY performed by [facility] at [facility] ENDOSCOPY    HX HERNIA REPAIR      RIH    HX HERNIA REPAIR  50/17/48    open umbilical hernia repair mesh    HX ORTHOPAEDIC      HX UROLOGICAL      HYDROCELECTOMY    [place] CARDIAC SURG PROCEDURE UNLIST  1996    Cardiac Stents    [place] CARDIAC SURG PROCEDURE UNLIST  04/2019    EF 61-65%    UPPER GI ENDOSCOPY,BIOPSY  9/30/2019            Family History:  Family History   Problem Relation Age of Onset    Heart Disease Mother     Hypertension Mother     Hypertension Father     Hypertension Sister     Hypertension Brother     Cancer Brother        Social History:  Social History     Tobacco Use    Smoking status: Former Smoker     Packs/day: 0.50     Years: 20.00     Pack years: 10.00    Smokeless tobacco: Former User    Tobacco comment: quit about 40  years ago   / used to dip tobaco and dip snuff   Substance Use Topics    Alcohol use: Yes     Comment: \"Drinks very little now for about a month \"    Drug use: No       Allergies: Allergies   Allergen Reactions    Levaquin [Levofloxacin] Nausea and Vomiting     Reports anxiety, nausea, aching after taking levaquin    Ciprofloxacin Shortness of Breath    Quinolones Shortness of Breath         Review of Systems   Review of Systems   Constitutional: Positive for activity change. Negative for chills, fatigue and fever. HENT: Negative for ear pain, sinus pain and sore throat. Eyes: Negative for pain. Respiratory: Negative for cough, chest tightness and shortness of breath. Cardiovascular: Negative for chest pain and leg swelling. Gastrointestinal: Positive for abdominal pain, diarrhea, nausea and vomiting. Negative for constipation. Genitourinary: Negative for difficulty urinating and flank pain.    Musculoskeletal: Negative for arthralgias, back pain, myalgias, neck pain and neck stiffness. Skin: Negative for rash. Neurological: Positive for tremors and headaches. Negative for dizziness, syncope, speech difficulty, weakness, light-headedness and numbness. Family states tremors at baseline   Psychiatric/Behavioral: Positive for behavioral problems and confusion. Physical Exam   Physical Exam  Vitals signs reviewed. Constitutional:       General: He is not in acute distress. Appearance: He is not ill-appearing. HENT:      Head: Normocephalic and atraumatic. Mouth/Throat:      Mouth: Mucous membranes are moist.   Eyes:      Extraocular Movements: Extraocular movements intact. Conjunctiva/sclera: Conjunctivae normal.      Pupils: Pupils are equal, round, and reactive to light. Neck:      Musculoskeletal: Normal range of motion and neck supple. No neck rigidity. Cardiovascular:      Rate and Rhythm: Normal rate. Heart sounds: Normal heart sounds. Pulmonary:      Effort: Pulmonary effort is normal.      Breath sounds: Normal breath sounds. No wheezing or rales. Abdominal:      Palpations: Abdomen is soft. Tenderness: There is abdominal tenderness. There is no guarding or rebound. Comments: Patient with diffuse tenderness to palpation, negative rebound, negative guarding   Musculoskeletal:         General: No deformity. Skin:     General: Skin is warm and dry. Neurological:      General: No focal deficit present. Mental Status: He is alert and oriented to person, place, and time. Cranial Nerves: No cranial nerve deficit, dysarthria or facial asymmetry. Sensory: No sensory deficit. Motor: No weakness.       Coordination: Coordination normal.      Comments: Patient without any focal neurologic deficits, and oriented x3, able to participate in exam and answer questions appropriately   Psychiatric:         Mood and Affect: Mood normal.         Speech: Speech normal. Behavior: Behavior normal.          Diagnostic Study Results     Labs -     Recent Results (from the past 12 hour(s))   EKG, 12 LEAD, INITIAL    Collection Time: 04/18/21  3:00 PM   Result Value Ref Range    Ventricular Rate 54 BPM    Atrial Rate 54 BPM    P-R Interval 184 ms    QRS Duration 90 ms    Q-T Interval 500 ms    QTC Calculation (Bezet) 474 ms    Calculated P Axis 95 degrees    Calculated R Axis -7 degrees    Calculated T Axis 55 degrees    Diagnosis       Sinus bradycardia  When compared with ECG of 20-NOV-2019 05:21,  Vent. rate has decreased BY  35 BPM  ST no longer depressed in Lateral leads     CBC WITH AUTOMATED DIFF    Collection Time: 04/18/21  3:14 PM   Result Value Ref Range    WBC 11.1 4.1 - 11.1 K/uL    RBC 4.44 4.10 - 5.70 M/uL    HGB 13.7 12.1 - 17.0 g/dL    HCT 40.7 36.6 - 50.3 %    MCV 91.7 80.0 - 99.0 FL    MCH 30.9 26.0 - 34.0 PG    MCHC 33.7 30.0 - 36.5 g/dL    RDW 14.9 (H) 11.5 - 14.5 %    PLATELET 651 911 - 726 K/uL    MPV 9.8 8.9 - 12.9 FL    NRBC 0.0 0  WBC    ABSOLUTE NRBC 0.00 0.00 - 0.01 K/uL    NEUTROPHILS 87 (H) 32 - 75 %    LYMPHOCYTES 8 (L) 12 - 49 %    MONOCYTES 5 5 - 13 %    EOSINOPHILS 0 0 - 7 %    BASOPHILS 0 0 - 1 %    IMMATURE GRANULOCYTES 0 0.0 - 0.5 %    ABS. NEUTROPHILS 9.6 (H) 1.8 - 8.0 K/UL    ABS. LYMPHOCYTES 0.9 0.8 - 3.5 K/UL    ABS. MONOCYTES 0.5 0.0 - 1.0 K/UL    ABS. EOSINOPHILS 0.0 0.0 - 0.4 K/UL    ABS. BASOPHILS 0.0 0.0 - 0.1 K/UL    ABS. IMM. GRANS. 0.0 0.00 - 0.04 K/UL    DF AUTOMATED         Radiologic Studies -   CT HEAD WO CONT   Final Result   2 foci of parenchymal hemorrhage adjacent to the right lateral ventricle   associated with significant intraventricular hemorrhage. Findings communicated   to the ER by CT. CT ABD PELV WO CONT   Final Result   No acute findings. Calcific plaque at the origin of the SMA.       XR CHEST PORT    (Results Pending)     CT Results  (Last 48 hours)    None        CXR Results  (Last 48 hours) None            Medical Decision Making   I am the first provider for this patient. I reviewed the vital signs, available nursing notes, past medical history, past surgical history, family history and social history. Vital Signs-Reviewed the patient's vital signs. Patient Vitals for the past 12 hrs:   Temp Pulse Resp BP SpO2   04/18/21 1441 97.3 °F (36.3 °C) (!) 58 14 (!) 164/103 100 %       ECG Interpretation: NSR    Records Reviewed: Nursing Notes and Old Medical Records    Provider Notes (Medical Decision Making):   DDx: Patient is an 14-year-old male with history of stated above presenting with concerns of behavior changes, headache, diffuse abdominal pain and vomiting x2 days. States that he been having abdominal pain, has also been complaining of headache and persistent vomiting. According to family has been acting like himself and telling them that he feels crazy and out of his head. Patient denies any fevers, has no neck stiffness, or other recent changes. Patient has no focal neurologic deficits on exam.  Given history of headache with nausea and vomiting and no altered mental status will obtain CT head. We will also obtain CT abdomen given age and history of diffuse abdominal pain with vomiting. Will obtain broad work-up for possible metabolic causes of encephalopathy, will obtain CMP, lipase, lactate, CBC, urine and reassess patient. also Zofran for nausea    ED Course and Progress Notes:   Initial assessment performed. The patients presenting problems have been discussed, and they are in agreement with the care plan formulated and outlined with them. I have encouraged them to ask questions as they arise throughout their visit.     ED Course as of Apr 18 1746   Sun Apr 18, 2021 1738 Notified of ICH, starting Nicardipine gtt and will discuss with NSGY and transfer to [facility]     [RN]      ED Course User Index  [RN] Veto Salinas MD           Critical Care:  CRITICAL CARE NOTE :    3:41 PM  IMPENDING DETERIORATION -Cardiovascular and CNS  ASSOCIATED RISK FACTORS - CNS Decompensation  MANAGEMENT- Bedside Assessment  INTERPRETATION -  CT Scan and Blood Pressure  INTERVENTIONS - hemodynamic mngmt and Neurologic interventions   CASE REVIEW - Hospitalist/Intensivist and Medical Sub-Specialist  TREATMENT RESPONSE -Stable  PERFORMED BY - Resident    NOTES   :    Emerald Guevara, have spent 31 minutes of critical care time involved in lab review, consultations with specialist, family decision- making, bedside attention and documentation. This time excludes time spent in any separate billed procedures. During this entire length of time I was immediately available to the patient. Diagnosis     Clinical Impression:   1. Right-sided nontraumatic intraventricular intracerebral hemorrhage ([address]    2.  Intraparenchymal hemorrhage of brain [facility])        Disposition:Transfer    DISCHARGE PLAN:        Resident Signature:   Signed By: Alo De La Fuente MD     April 18, 2021

## 2021-04-18 NOTE — CONSULTS
Spoke with er md at [facility] an ct reviewed. Agree with transfer to [facility] icu. bp control and serial imaging.   May need evd in next 24-48 hrs depending on course

## 2021-04-18 NOTE — ED NOTES
Assumed care of pt from triage. Pt daughter at bedside. Pt daughter reporting that pt has been nausea and vomited yesterday at 0500 and since has been complaining of a headache and feeling \"crazy\" and \"out of his head. \" Pt was tx w/ pepto bismol earlier today but has been persistently dry heaving and trying to induce vomiting. Pt reporting abdominal pain and nausea at this time. Pt has hx of COPD and feels more SOB than usual today. Pt denies any dizziness and CP. Pt daughter reporting close family member had norovirus that resolved about a week ago. Additionally, pt daughter reports hx of viral encephalopathy     1600 Pt sitting at end of stretcher w/ daughter at bedside. SOB noticeable and pt dry heaving. Pt reports feeling warm despite being afebrile. Pt medicated per orders. 0 Pt was assisted to upright position in stretcher. 1715 Pt returned from CT and is sitting in stretcher. Pt reporting no nausea at this time and improved abd pain which is 5/10. Pt continues to report feeling warm despite being afebrile. Pt respiratory effort is unchanged. 1745 MD at bedside     1800 Pt resting more comfortably in stretcher. On the monitor x 3.     1813 Pt provided wet wash cloth to be applied to forehead. 1840 Pt increasingly nausea and dry heaving. MD notified and verbal order received     1850 MD notified of pt 02 sats at 87%. Verbal orders received. Resident at bedside verbal orders received. 200 Spoke with transfer center and bed should be ready in about 45 min. RN to call back at that time.

## 2021-04-18 NOTE — ED NOTES
Received pt from [facility]. Pt resting in stretcher with nebulizer. Family at bedside. Pt alert and follows commands. Call bell in reach.

## 2021-04-19 ENCOUNTER — APPOINTMENT (OUTPATIENT)
Dept: CT IMAGING | Age: 86
DRG: 064 | End: 2021-04-19
Attending: NEUROLOGICAL SURGERY
Payer: MEDICARE

## 2021-04-19 LAB
ANION GAP SERPL CALC-SCNC: 9 MMOL/L (ref 5–15)
APTT PPP: 37.6 SEC (ref 22.1–31)
ARTERIAL PATENCY WRIST A: ABNORMAL
ATRIAL RATE: 54 BPM
ATRIAL RATE: 67 BPM
B PERT DNA SPEC QL NAA+PROBE: NOT DETECTED
BASE EXCESS BLDA CALC-SCNC: 0.5 MMOL/L
BASOPHILS # BLD: 0 K/UL (ref 0–0.1)
BASOPHILS NFR BLD: 0 % (ref 0–1)
BDY SITE: ABNORMAL
BORDETELLA PARAPERTUSSIS PCR, BORPAR: NOT DETECTED
BUN SERPL-MCNC: 28 MG/DL (ref 6–20)
BUN/CREAT SERPL: 15 (ref 12–20)
C PNEUM DNA SPEC QL NAA+PROBE: NOT DETECTED
CA-I BLD-SCNC: 1.15 MMOL/L (ref 1.13–1.32)
CALCIUM SERPL-MCNC: 8.8 MG/DL (ref 8.5–10.1)
CALCULATED P AXIS, ECG09: 79 DEGREES
CALCULATED P AXIS, ECG09: 95 DEGREES
CALCULATED R AXIS, ECG10: -16 DEGREES
CALCULATED R AXIS, ECG10: -7 DEGREES
CALCULATED T AXIS, ECG11: 35 DEGREES
CALCULATED T AXIS, ECG11: 55 DEGREES
CHLORIDE SERPL-SCNC: 107 MMOL/L (ref 97–108)
CHOLEST SERPL-MCNC: 144 MG/DL
CO2 SERPL-SCNC: 26 MMOL/L (ref 21–32)
CREAT SERPL-MCNC: 1.86 MG/DL (ref 0.7–1.3)
DIAGNOSIS, 93000: NORMAL
DIAGNOSIS, 93000: NORMAL
DIFFERENTIAL METHOD BLD: ABNORMAL
EOSINOPHIL # BLD: 0 K/UL (ref 0–0.4)
EOSINOPHIL NFR BLD: 0 % (ref 0–7)
ERYTHROCYTE [DISTWIDTH] IN BLOOD BY AUTOMATED COUNT: 15 % (ref 11.5–14.5)
EST. AVERAGE GLUCOSE BLD GHB EST-MCNC: 123 MG/DL
FLUAV H1 2009 PAND RNA SPEC QL NAA+PROBE: NOT DETECTED
FLUAV H1 RNA SPEC QL NAA+PROBE: NOT DETECTED
FLUAV H3 RNA SPEC QL NAA+PROBE: NOT DETECTED
FLUAV SUBTYP SPEC NAA+PROBE: NOT DETECTED
FLUBV RNA SPEC QL NAA+PROBE: NOT DETECTED
GLUCOSE BLD STRIP.AUTO-MCNC: 113 MG/DL (ref 65–100)
GLUCOSE BLD STRIP.AUTO-MCNC: 123 MG/DL (ref 65–100)
GLUCOSE SERPL-MCNC: 157 MG/DL (ref 65–100)
HADV DNA SPEC QL NAA+PROBE: NOT DETECTED
HBA1C MFR BLD: 5.9 % (ref 4–5.6)
HCO3 BLDA-SCNC: 26 MMOL/L (ref 22–26)
HCOV 229E RNA SPEC QL NAA+PROBE: NOT DETECTED
HCOV HKU1 RNA SPEC QL NAA+PROBE: NOT DETECTED
HCOV NL63 RNA SPEC QL NAA+PROBE: NOT DETECTED
HCOV OC43 RNA SPEC QL NAA+PROBE: NOT DETECTED
HCT VFR BLD AUTO: 36.3 % (ref 36.6–50.3)
HDLC SERPL-MCNC: 33 MG/DL
HDLC SERPL: 4.4 {RATIO} (ref 0–5)
HGB BLD-MCNC: 11.7 G/DL (ref 12.1–17)
HMPV RNA SPEC QL NAA+PROBE: NOT DETECTED
HPIV1 RNA SPEC QL NAA+PROBE: NOT DETECTED
HPIV2 RNA SPEC QL NAA+PROBE: NOT DETECTED
HPIV3 RNA SPEC QL NAA+PROBE: NOT DETECTED
HPIV4 RNA SPEC QL NAA+PROBE: NOT DETECTED
IMM GRANULOCYTES # BLD AUTO: 0 K/UL
IMM GRANULOCYTES NFR BLD AUTO: 0 %
INR PPP: 1.3 (ref 0.9–1.1)
LACTATE SERPL-SCNC: 1.9 MMOL/L (ref 0.4–2)
LDLC SERPL CALC-MCNC: 74.8 MG/DL (ref 0–100)
LIPID PROFILE,FLP: ABNORMAL
LYMPHOCYTES # BLD: 0.6 K/UL (ref 0.8–3.5)
LYMPHOCYTES NFR BLD: 5 % (ref 12–49)
M PNEUMO DNA SPEC QL NAA+PROBE: NOT DETECTED
MAGNESIUM SERPL-MCNC: 1.8 MG/DL (ref 1.6–2.4)
MCH RBC QN AUTO: 30.5 PG (ref 26–34)
MCHC RBC AUTO-ENTMCNC: 32.2 G/DL (ref 30–36.5)
MCV RBC AUTO: 94.5 FL (ref 80–99)
MONOCYTES # BLD: 0.7 K/UL (ref 0–1)
MONOCYTES NFR BLD: 6 % (ref 5–13)
NEUTS BAND NFR BLD MANUAL: 4 % (ref 0–6)
NEUTS SEG # BLD: 10.2 K/UL (ref 1.8–8)
NEUTS SEG NFR BLD: 85 % (ref 32–75)
NRBC # BLD: 0 K/UL (ref 0–0.01)
NRBC BLD-RTO: 0 PER 100 WBC
P-R INTERVAL, ECG05: 160 MS
P-R INTERVAL, ECG05: 184 MS
PCO2 BLDA: 46 MMHG (ref 35–45)
PH BLDA: 7.37 [PH] (ref 7.35–7.45)
PHOSPHATE SERPL-MCNC: 3.8 MG/DL (ref 2.6–4.7)
PLATELET # BLD AUTO: 215 K/UL (ref 150–400)
PMV BLD AUTO: 10.3 FL (ref 8.9–12.9)
PO2 BLDA: 73 MMHG (ref 80–100)
POTASSIUM SERPL-SCNC: 3.6 MMOL/L (ref 3.5–5.1)
PROTHROMBIN TIME: 13.5 SEC (ref 9–11.1)
Q-T INTERVAL, ECG07: 476 MS
Q-T INTERVAL, ECG07: 500 MS
QRS DURATION, ECG06: 90 MS
QRS DURATION, ECG06: 90 MS
QTC CALCULATION (BEZET), ECG08: 474 MS
QTC CALCULATION (BEZET), ECG08: 502 MS
RBC # BLD AUTO: 3.84 M/UL (ref 4.1–5.7)
RBC MORPH BLD: ABNORMAL
RBC MORPH BLD: ABNORMAL
RSV RNA SPEC QL NAA+PROBE: NOT DETECTED
RV+EV RNA SPEC QL NAA+PROBE: NOT DETECTED
SAO2 % BLD: 94 % (ref 92–97)
SAO2% DEVICE SAO2% SENSOR NAME: ABNORMAL
SARS-COV-2 PCR, COVPCR: NOT DETECTED
SERVICE CMNT-IMP: ABNORMAL
SERVICE CMNT-IMP: ABNORMAL
SODIUM SERPL-SCNC: 142 MMOL/L (ref 136–145)
SPECIMEN SITE: ABNORMAL
THERAPEUTIC RANGE,PTTT: ABNORMAL SECS (ref 58–77)
TRIGL SERPL-MCNC: 181 MG/DL (ref ?–150)
VENTRICULAR RATE, ECG03: 54 BPM
VENTRICULAR RATE, ECG03: 67 BPM
VLDLC SERPL CALC-MCNC: 36.2 MG/DL
WBC # BLD AUTO: 11.5 K/UL (ref 4.1–11.1)

## 2021-04-19 PROCEDURE — 85730 THROMBOPLASTIN TIME PARTIAL: CPT

## 2021-04-19 PROCEDURE — 84100 ASSAY OF PHOSPHORUS: CPT

## 2021-04-19 PROCEDURE — 85610 PROTHROMBIN TIME: CPT

## 2021-04-19 PROCEDURE — 74011000250 HC RX REV CODE- 250: Performed by: ANESTHESIOLOGY

## 2021-04-19 PROCEDURE — 36415 COLL VENOUS BLD VENIPUNCTURE: CPT

## 2021-04-19 PROCEDURE — 65610000006 HC RM INTENSIVE CARE

## 2021-04-19 PROCEDURE — 70450 CT HEAD/BRAIN W/O DYE: CPT

## 2021-04-19 PROCEDURE — 83735 ASSAY OF MAGNESIUM: CPT

## 2021-04-19 PROCEDURE — 83605 ASSAY OF LACTIC ACID: CPT

## 2021-04-19 PROCEDURE — 74011000250 HC RX REV CODE- 250: Performed by: NURSE PRACTITIONER

## 2021-04-19 PROCEDURE — 82962 GLUCOSE BLOOD TEST: CPT

## 2021-04-19 PROCEDURE — 85025 COMPLETE CBC W/AUTO DIFF WBC: CPT

## 2021-04-19 PROCEDURE — 80061 LIPID PANEL: CPT

## 2021-04-19 PROCEDURE — 74011250636 HC RX REV CODE- 250/636: Performed by: ANESTHESIOLOGY

## 2021-04-19 PROCEDURE — 99222 1ST HOSP IP/OBS MODERATE 55: CPT | Performed by: PSYCHIATRY & NEUROLOGY

## 2021-04-19 PROCEDURE — 74011250636 HC RX REV CODE- 250/636: Performed by: NURSE PRACTITIONER

## 2021-04-19 PROCEDURE — 0202U NFCT DS 22 TRGT SARS-COV-2: CPT

## 2021-04-19 PROCEDURE — 36600 WITHDRAWAL OF ARTERIAL BLOOD: CPT

## 2021-04-19 PROCEDURE — 82803 BLOOD GASES ANY COMBINATION: CPT

## 2021-04-19 PROCEDURE — 77010033711 HC HIGH FLOW OXYGEN

## 2021-04-19 PROCEDURE — 80048 BASIC METABOLIC PNL TOTAL CA: CPT

## 2021-04-19 PROCEDURE — 83036 HEMOGLOBIN GLYCOSYLATED A1C: CPT

## 2021-04-19 RX ORDER — SODIUM CHLORIDE 0.9 % (FLUSH) 0.9 %
5-40 SYRINGE (ML) INJECTION AS NEEDED
Status: DISCONTINUED | OUTPATIENT
Start: 2021-04-19 | End: 2021-05-03 | Stop reason: HOSPADM

## 2021-04-19 RX ORDER — FLUMAZENIL 0.1 MG/ML
INJECTION INTRAVENOUS
Status: DISPENSED
Start: 2021-04-19 | End: 2021-04-19

## 2021-04-19 RX ORDER — INSULIN LISPRO 100 [IU]/ML
INJECTION, SOLUTION INTRAVENOUS; SUBCUTANEOUS EVERY 6 HOURS
Status: DISCONTINUED | OUTPATIENT
Start: 2021-04-19 | End: 2021-04-30

## 2021-04-19 RX ORDER — PROMETHAZINE HYDROCHLORIDE 25 MG/1
12.5 TABLET ORAL
Status: DISCONTINUED | OUTPATIENT
Start: 2021-04-19 | End: 2021-05-03 | Stop reason: HOSPADM

## 2021-04-19 RX ORDER — LORAZEPAM 2 MG/ML
2 INJECTION INTRAMUSCULAR
Status: DISCONTINUED | OUTPATIENT
Start: 2021-04-19 | End: 2021-04-22

## 2021-04-19 RX ORDER — ACETAMINOPHEN 325 MG/1
650 TABLET ORAL
Status: DISCONTINUED | OUTPATIENT
Start: 2021-04-19 | End: 2021-05-03 | Stop reason: HOSPADM

## 2021-04-19 RX ORDER — FLUMAZENIL 0.1 MG/ML
0.3 INJECTION INTRAVENOUS ONCE
Status: ACTIVE | OUTPATIENT
Start: 2021-04-19 | End: 2021-04-19

## 2021-04-19 RX ORDER — LORAZEPAM 2 MG/ML
1 INJECTION INTRAMUSCULAR
Status: DISCONTINUED | OUTPATIENT
Start: 2021-04-19 | End: 2021-04-22

## 2021-04-19 RX ORDER — DEXTROSE 50 % IN WATER (D50W) INTRAVENOUS SYRINGE
25-50 AS NEEDED
Status: DISCONTINUED | OUTPATIENT
Start: 2021-04-19 | End: 2021-05-03 | Stop reason: HOSPADM

## 2021-04-19 RX ORDER — POLYETHYLENE GLYCOL 3350 17 G/17G
17 POWDER, FOR SOLUTION ORAL DAILY PRN
Status: DISCONTINUED | OUTPATIENT
Start: 2021-04-19 | End: 2021-05-03 | Stop reason: HOSPADM

## 2021-04-19 RX ORDER — HYDRALAZINE HYDROCHLORIDE 20 MG/ML
10 INJECTION INTRAMUSCULAR; INTRAVENOUS
Status: DISCONTINUED | OUTPATIENT
Start: 2021-04-19 | End: 2021-05-03 | Stop reason: HOSPADM

## 2021-04-19 RX ORDER — FLUMAZENIL 0.1 MG/ML
0.2 INJECTION INTRAVENOUS ONCE
Status: COMPLETED | OUTPATIENT
Start: 2021-04-19 | End: 2021-04-19

## 2021-04-19 RX ORDER — DIAZEPAM 10 MG/2ML
5 INJECTION INTRAMUSCULAR ONCE
Status: COMPLETED | OUTPATIENT
Start: 2021-04-19 | End: 2021-04-19

## 2021-04-19 RX ORDER — SODIUM CHLORIDE 0.9 % (FLUSH) 0.9 %
5-40 SYRINGE (ML) INJECTION EVERY 8 HOURS
Status: DISCONTINUED | OUTPATIENT
Start: 2021-04-19 | End: 2021-05-03 | Stop reason: HOSPADM

## 2021-04-19 RX ORDER — LABETALOL HYDROCHLORIDE 5 MG/ML
10 INJECTION, SOLUTION INTRAVENOUS
Status: DISCONTINUED | OUTPATIENT
Start: 2021-04-19 | End: 2021-05-03 | Stop reason: HOSPADM

## 2021-04-19 RX ORDER — ACETAMINOPHEN 650 MG/1
650 SUPPOSITORY RECTAL
Status: DISCONTINUED | OUTPATIENT
Start: 2021-04-19 | End: 2021-05-03 | Stop reason: HOSPADM

## 2021-04-19 RX ORDER — DIAZEPAM 10 MG/2ML
2 INJECTION INTRAMUSCULAR
Status: COMPLETED | OUTPATIENT
Start: 2021-04-19 | End: 2021-04-19

## 2021-04-19 RX ORDER — MAGNESIUM SULFATE 100 %
4 CRYSTALS MISCELLANEOUS AS NEEDED
Status: DISCONTINUED | OUTPATIENT
Start: 2021-04-19 | End: 2021-05-03 | Stop reason: HOSPADM

## 2021-04-19 RX ORDER — ONDANSETRON 2 MG/ML
4 INJECTION INTRAMUSCULAR; INTRAVENOUS
Status: DISCONTINUED | OUTPATIENT
Start: 2021-04-19 | End: 2021-05-03 | Stop reason: HOSPADM

## 2021-04-19 RX ADMIN — DEXMEDETOMIDINE HYDROCHLORIDE 1.5 MCG/KG/HR: 400 INJECTION INTRAVENOUS at 10:28

## 2021-04-19 RX ADMIN — LORAZEPAM 2 MG: 2 INJECTION INTRAMUSCULAR; INTRAVENOUS at 11:13

## 2021-04-19 RX ADMIN — FAMOTIDINE 20 MG: 10 INJECTION, SOLUTION INTRAVENOUS at 11:12

## 2021-04-19 RX ADMIN — DEXMEDETOMIDINE HYDROCHLORIDE 0.5 MCG/KG/HR: 400 INJECTION INTRAVENOUS at 08:22

## 2021-04-19 RX ADMIN — LORAZEPAM 1 MG: 2 INJECTION INTRAMUSCULAR; INTRAVENOUS at 16:39

## 2021-04-19 RX ADMIN — Medication 10 ML: at 22:00

## 2021-04-19 RX ADMIN — Medication 10 ML: at 13:04

## 2021-04-19 RX ADMIN — DIAZEPAM 2 MG: 5 INJECTION, SOLUTION INTRAMUSCULAR; INTRAVENOUS at 09:38

## 2021-04-19 RX ADMIN — DIAZEPAM 5 MG: 5 INJECTION, SOLUTION INTRAMUSCULAR; INTRAVENOUS at 01:31

## 2021-04-19 RX ADMIN — LORAZEPAM 1 MG: 2 INJECTION INTRAMUSCULAR; INTRAVENOUS at 21:27

## 2021-04-19 RX ADMIN — DEXMEDETOMIDINE HYDROCHLORIDE 1.3 MCG/KG/HR: 400 INJECTION INTRAVENOUS at 11:58

## 2021-04-19 RX ADMIN — DEXMEDETOMIDINE HYDROCHLORIDE 1.4 MCG/KG/HR: 400 INJECTION INTRAVENOUS at 01:34

## 2021-04-19 RX ADMIN — Medication 10 ML: at 01:27

## 2021-04-19 RX ADMIN — Medication 10 ML: at 05:13

## 2021-04-19 RX ADMIN — DEXMEDETOMIDINE HYDROCHLORIDE 0.5 MCG/KG/HR: 400 INJECTION INTRAVENOUS at 15:59

## 2021-04-19 RX ADMIN — FLUMAZENIL 0.2 MG: 0.1 INJECTION, SOLUTION INTRAVENOUS at 00:41

## 2021-04-19 RX ADMIN — LORAZEPAM 1 MG: 2 INJECTION INTRAMUSCULAR; INTRAVENOUS at 23:08

## 2021-04-19 NOTE — PROGRESS NOTES
Examined patient who is transfer for IPH/IVH. BP slightly above goal of <140, however nicardipine gtt being titrated. ICH score 3. He was initially agitated upon arrival requiring benzos and precedex which is a decline from his reported presentation at outside hospital. Neuro exam now hard to obtain with sedatives on board. Minimal withdrawal, no eye opening. Repeat CTH performed showing no interval change in hemorrhage. RN spoke with Alma Coffey no surgical recommendations at this time. Precedex stopped. Full consult note to follow. D/w Dr. Kecia Lew.

## 2021-04-19 NOTE — CONSULTS
Ct looks stable to me without enlarging ventricles or hemorrhage compare to previous ct.   Repeat ct tomorrow am

## 2021-04-19 NOTE — ED NOTES
1940: Pt stating that he can not breath. Pt on 2.5L NC and is 89%-95%. Pt shaking has increased. MD notified. 1950: MD at bedside reassessing pt. EKG, POC trop, and ABG ordered. RT called for ABG. EKG and POC Trop in process. 2015: POC Trop negative. Unremarkable EKG.

## 2021-04-19 NOTE — PROGRESS NOTES
0730: Bedside and Verbal shift change report given to Janet Roe RN (oncoming nurse) by Geena Cisneros RN (offgoing nurse). Report included the following information SBAR, Kardex, ED Summary, Intake/Output, MAR, Accordion, Recent Results, Med Rec Status, Cardiac Rhythm NSe, Alarm Parameters  and Dual Neuro Assessment.

## 2021-04-19 NOTE — CONSULTS
Neurology Consult  Matty Ruiz NP    Patient: Birgit Ballesteros MRN: [id number]  SSN: [id number]    YOB: 1934  Age: 80 y.o. Sex: male      Chief Complaint: Headache    Subjective:      Birgit Ballesteros is a 80 y.o. male with a pmhx of CAD on ASA daily, COPD, CKD 3, HLD, HTN, TESSA, TIA, viral encephalitis and ETOH abuse who presented to [facility] ER on 4/18/21 with a 1 day history of nausea, vomiting, headache and AMS. Per chart review, while at [facility] he was oriented x 3, able to participate in his exam and answered questions appropriately. A CTH was performed which 2 foci of IPH adjacent to the right lateral ventricle as well as significant intraventricular hemorrhage. His SBP's ranged from 159-172 prior to initiation of cardene gtt. Neurosurgery was consulted and recommended transfer to [facility] with serial imaging. He was then transferred to [facility] ICU for further care and monitoring. Upon arrival he was extremely agitated and disoriented so he was given 5mg valium and started on a precedex gtt. He was so heavily sedated that it was difficult to establish a clinical trend and the benzodiazepines were ultimately reversed and precedex stopped. After a brief pause in sedation he once again became alert and agitated. A CTH was repeated which showed no interval change in hemorrhage. Neurology was consulted for further recommendations.      Past Medical History:   Diagnosis Date    Adverse effect of anesthesia     combative after anesthesia    Alcohol abuse     6 beers/day    Arthritis     CAD (coronary artery disease)     Chronic obstructive pulmonary disease (HCC)     Chronic obstructive pulmonary disease (HCC)     CKD (chronic kidney disease) stage 3, GFR 30-59 ml/min ([facility] [address] 9/21/2020    Dyslipidemia 8/16/2017    Dyspepsia and other specified disorders of function of stomach     Dyspnea 8/16/2017    ED (erectile dysfunction) 8/16/2017    Encounter for immunization 8/16/2017    Fatigue 8/16/2017    Flank pain 8/1/2019    GERD (gastroesophageal reflux disease) 8/16/2017    Gout 8/16/2017    Hypertension     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 [address]     On statin therapy 8/16/2017    TESSA on CPAP 1/3/2019    Psychiatric disorder     Depression    Simple chronic bronchitis ([facility] [address] 1/3/2019    TIA (transient ischemic attack) 2/08/3341    Umbilical hernia 13/9/0785    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     Family History   Problem Relation Age of Onset    Heart Disease Mother     Hypertension Mother     Hypertension Father     Hypertension Sister     Hypertension Brother     Cancer Brother      Social History     Tobacco Use    Smoking status: Former Smoker     Packs/day: 0.50     Years: 20.00     Pack years: 10.00    Smokeless tobacco: Former User    Tobacco comment: quit about 40  years ago   / used to dip tobaco and dip snuff   Substance Use Topics    Alcohol use: Yes     Comment: \"Drinks very little now for about a month \"      Prior to Admission Medications   Prescriptions Last Dose Informant Patient Reported? Taking? ANORO ELLIPTA 62.5-25 mcg/actuation inhaler  Family Member Yes No   Sig: Take 1 Puff by inhalation daily. DULoxetine (CYMBALTA) 30 mg capsule   No No   Sig: TAKE ONE CAPSULE BY MOUTH DAILY   acetaminophen (TYLENOL) 500 mg tablet  Family Member Yes No   Sig: Take 1,000 mg by mouth every six (6) hours as needed for Pain. aspirin delayed-release 81 mg tablet  Family Member Yes No   Sig: Take 81 mg by mouth daily.    atenoloL (TENORMIN) 100 mg tablet   No No   Sig: TAKE ONE TABLET BY MOUTH DAILY   atorvastatin (LIPITOR) 80 mg tablet   No No   Sig: TAKE ONE TABLET BY MOUTH DAILY   furosemide (LASIX) 80 mg tablet   No No   Sig: TAKE ONE TABLET BY MOUTH DAILY   hydrALAZINE (APRESOLINE) 50 mg tablet   No No   Sig: TAKE ONE TABLET BY MOUTH THREE TIMES A DAY   isosorbide mononitrate ER (IMDUR) 30 mg tablet   No No   Sig: TAKE ONE TABLET BY MOUTH DAILY   melatonin 5 mg cap capsule   Yes No   Sig: Take 5 mg by mouth nightly. Pt. Take 2 tabs a night   pantoprazole (PROTONIX) 40 mg tablet   No No   Sig: TAKE ONE TABLET BY MOUTH DAILY   thiamine HCL (B-1) 100 mg tablet   No No   Sig: TAKE ONE BY MOUTH DAILY      Facility-Administered Medications: None       Allergies   Allergen Reactions    Levaquin [Levofloxacin] Nausea and Vomiting     Reports anxiety, nausea, aching after taking levaquin    Ciprofloxacin Shortness of Breath    Quinolones Shortness of Breath       Review of Systems:  Review of systems not obtained due to patient factors. Objective:     Vitals:    04/18/21 2215 04/18/21 2300 04/18/21 2356 04/19/21 0000   BP: (!) 147/103 (!) 159/96 (!) 91/47 (!) 88/46   Pulse: 85 94 75 72   Resp: 19 25 (!) 37 (!) 36   Temp: 98.4 °F (36.9 °C)   98.4 °F (36.9 °C)   SpO2: 95% 96% 98% 93%   Weight:  99.5 kg (219 lb 5.7 oz)        Physical Exam:  GENERAL: Agitated, restless  SKIN: Warm, dry, color appropriate for ethnicity. Neurologic Exam:  Mental Status:  Intermittently alert, eyes open to noxious stimuli, oriented to self only. Not following commands. Impulsive behavior. Language:    Minimal speech. No comprehension or naming of objects. Cranial Nerves:   Pupils 3 mm and reactive to light. Left pupil round, right pupil ovoid shape. Blinks to threat. Gaze midline. Facial sensation intact. Face grossly symmetric. Hearing grossly intact bilaterally. No dysarthria. Shoulder shrug 5/5 bilaterally. Motor:    Unable to follow commands for strength grading, however purposeful movement x 4 extremities in restraints. Bulk and tone normal. .     No involuntary movements. Sensation:    Sensation intact throughout to light touch. Coordination & Gait: Gait deferred, ataxia not able to be tested.      Labs:  Lab Results   Component Value Date/Time    WBC 11.1 04/18/2021 03:14 PM    HGB (POC) 14.9 07/27/2018 02:38 PM    HGB 13.7 04/18/2021 03:14 PM    Hematocrit (POC) 45 05/13/2019 07:16 PM    HCT 40.7 04/18/2021 03:14 PM    PLATELET 111 87/26/2852 03:14 PM    MCV 91.7 04/18/2021 03:14 PM      Lab Results   Component Value Date/Time    Sodium 139 04/18/2021 03:14 PM    Potassium 3.7 04/18/2021 03:14 PM    Chloride 104 04/18/2021 03:14 PM    CO2 27 04/18/2021 03:14 PM    Anion gap 8 04/18/2021 03:14 PM    Glucose 159 (H) 04/18/2021 03:14 PM    BUN 24 (H) 04/18/2021 03:14 PM    Creatinine 1.73 (H) 04/18/2021 03:14 PM    BUN/Creatinine ratio 14 04/18/2021 03:14 PM    GFR est AA 46 (L) 04/18/2021 03:14 PM    GFR est non-AA 38 (L) 04/18/2021 03:14 PM    Calcium 9.4 04/18/2021 03:14 PM     Lab Results   Component Value Date/Time    .00 09/23/2020 08:33 AM    CK - MB 2.7 04/24/2015 05:11 AM    CK-MB Index 4.3 (H) 04/24/2015 05:11 AM    Troponin-I, Qt. <0.05 04/18/2021 03:42 PM       Imaging:  CT Results (maximum last 3): Results from [place] encounter on 04/18/21   CT HEAD WO CONT    Narrative EXAM: CT HEAD WO CONT  Medical history: Neuro changes  INDICATION: neuro changes    COMPARISON: 4/18/2021. CONTRAST: None. TECHNIQUE: Unenhanced CT of the head was performed using 5 mm images. Brain and  bone windows were generated. Coronal and sagittal reformats. CT dose reduction  was achieved through use of a standardized protocol tailored for this  examination and automatic exposure control for dose modulation. FINDINGS:  Ventriculomegaly and periventricular hypodensity is not changed. Punctate foci  of hyperdensity adjacent to the posterior horn of the right lateral ventricle  unchanged. Intraventricular hemorrhage on the right and on the left is not  significantly changed. Ventricular hemorrhage in the third and fourth ventricles  also not significantly changed. . No new midline shift. Vertebral vascular  calcifications. . . .    The bone windows demonstrate no abnormalities.  The visualized portions of the  paranasal sinuses and mastoid air cells are clear. Impression No significant interval change in extensive intraventricular hemorrhage. CT HEAD WO CONT    Narrative EXAM: CT HEAD WO CONT    INDICATION: Headache, vomminting, ams    COMPARISON: None. CONTRAST: None. TECHNIQUE: Unenhanced CT of the head was performed using 5 mm images. Brain and  bone windows were generated. Coronal and sagittal reformats. CT dose reduction  was achieved through use of a standardized protocol tailored for this  examination and automatic exposure control for dose modulation. FINDINGS:  The ventricles and sulci are enlarged. There is periventricular hypoattenuation  compatible with chronic small vessel ischemic changes. There are 2 small foci of  parenchymal hemorrhage adjacent to the right lateral ventricle, and there is  significant bilateral intraventricular hemorrhage as well as third and fourth  ventricle hemorrhage. . The basilar cisterns are open. .    The bone windows demonstrate no abnormalities. The visualized portions of the  paranasal sinuses and mastoid air cells are clear. Impression 2 foci of parenchymal hemorrhage adjacent to the right lateral ventricle  associated with significant intraventricular hemorrhage. Findings communicated  to the ER by CT.          Assessment:     Hospital Problems  Date Reviewed: 9/21/2020          Codes Class Noted POA    Intracranial hemorrhage ([facility] [address] ICD-10-CM: I62.9  ICD-9-CM: 432.9  4/18/2021 Unknown            Plan:   1) IPH with IVH, ICH score: 3   - Initially seen on CT head at [facility], CT repeated upon arrival to [facility] showing stable hemorrhage and unchanged ventricles   - Repeat CTH today at 0800   - SBP goal less than 140, Cardene/Labetalol PRN   - q1 neuro checks    - A1C and lipid panel pending, LDL goal <70              - PT/OT/SLP evals   - Wean sedation as able               - Stroke education   - NSGY following       I have discussed the diagnosis and the intended plan as seen in the above orders with Dr. Constance Rascon, further recommendations to follow. Thank you for this consult and participating in the care of this patient.   Signed By: Lito Capellan NP     April 19, 2021

## 2021-04-19 NOTE — ED NOTES
TRANSFER - OUT REPORT:    Verbal report given to Leticia Carlson RN(name) on Abraham Hernandez  being transferred to [facility] CCU room 7119(unit) for routine progression of care       Report consisted of patients Situation, Background, Assessment and   Recommendations(SBAR). Information from the following report(s) SBAR, ED Summary, [facility] and Recent Results was reviewed with the receiving nurse. Lines:   PICC Single Lumen 11/25/19 Right;Brachial (Active)       Peripheral IV 04/18/21 Left Antecubital (Active)   Site Assessment Clean, dry, & intact 04/18/21 1517   Phlebitis Assessment 0 04/18/21 1517   Infiltration Assessment 0 04/18/21 1517   Dressing Status Clean, dry, & intact 04/18/21 1517   Hub Color/Line Status Pink 04/18/21 1517   Action Taken Blood drawn 04/18/21 1517       Peripheral IV 04/18/21 Right Antecubital (Active)   Site Assessment Clean, dry, & intact 04/18/21 1757   Phlebitis Assessment 0 04/18/21 1757   Infiltration Assessment 0 04/18/21 1757   Dressing Status Clean, dry, & intact 04/18/21 1757   Dressing Type Tape 04/18/21 1757        Opportunity for questions and clarification was provided.       Patient transported with:   Monitor   O2 at 3L NC  [facility]

## 2021-04-19 NOTE — PROGRESS NOTES
Neurosurgery Progress Note  Micky Richardson, [facility]          Admit Date: 2021   LOS: 1 day        Daily Progress Note: 2021        Subjective: The patient presented from home with headache, nausea, vomiting. He also told his daughter he felt \"crazy in the head\" yesterday. 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 He has had headaches for the past 2 days. He presented to the ER at [facility]. His head CT revealed intraventricular hemorrhage. He transferred to [address] [place] [address] for further evaluation. He has been agitated, requiring precedex an mitts. He did receive flumazenil last night because of difficulty obtaining neuro exam after receiving valium. This morning he is wide awake in bed, extremely restless, on precedex. Denies numbness, tingling, chest pain, leg pain, nausea, vomiting, difficulty swallowing, and dyspnea. Objective:     Vital signs  Temp (24hrs), Av °F (36.7 °C), Min:97.3 °F (36.3 °C), Max:98.4 °F (36.9 °C)    07 - 1900  In: 90.9 [I.V.:90.9]  Out: 440 [Urine:440]  1901 -  0700  In: 178.8 [I.V.:178.8]  Out: 280 [Urine:280]    Visit Vitals  BP (!) 122/59 (BP 1 Location: Left arm)   Pulse 60   Temp 98.2 °F (36.8 °C)   Resp (!) 38   Ht 5' 8\" (1.727 m) Comment: From documentation on 20   Wt 99.5 kg (219 lb 5.7 oz)   SpO2 93%   BMI 33.35 kg/m²    O2 Flow Rate (L/min): 40 l/min O2 Device: Hi flow nasal cannula, Heated     Pain control  Pain Assessment  Pain Scale 1: Numeric (0 - 10)  Pain Intensity 1: 0  Pain Intervention(s) 1: MD notified (comment)    PT/OT  Gait                 Physical Exam:  Gen:NAD. Neuro: A&Ox3 except the month he thinks is July. Follows commands. Speech clear. Affect agitated. PERRL. EOMI. Face symmetric. [place]. Tongue midline. PEREZ spontaneously. Strength 5/5 in UE and LE BL. Negative drift. Gait deferred.     CT head without contrast on 21 at 1713 shows 2 foci of parenchymal hemorrhage adjacent to the right lateral ventricle associated with significant intraventricular hemorrhage. CT head without contrast on 04/18/21 at 2353 shows no significant interval change in extensive intraventricular hemorrhage. CT head without contrast on 04/19/21 shows no significant change in acute intraventricular hemorrhage and mild ventriculomegaly. 24 hour results:    Recent Results (from the past 24 hour(s))   EKG, 12 LEAD, INITIAL    Collection Time: 04/18/21  3:00 PM   Result Value Ref Range    Ventricular Rate 54 BPM    Atrial Rate 54 BPM    P-R Interval 184 ms    QRS Duration 90 ms    Q-T Interval 500 ms    QTC Calculation (Bezet) 474 ms    Calculated P Axis 95 degrees    Calculated R Axis -7 degrees    Calculated T Axis 55 degrees    Diagnosis       Sinus bradycardia  When compared with ECG of 20-NOV-2019 05:21,  Vent. rate has decreased BY  35 BPM  ST no longer depressed in Lateral leads  Confirmed by Stephan Meadows PASHLEY ([id number]) on 4/19/2021 8:39:36 AM     CBC WITH AUTOMATED DIFF    Collection Time: 04/18/21  3:14 PM   Result Value Ref Range    WBC 11.1 4.1 - 11.1 K/uL    RBC 4.44 4.10 - 5.70 M/uL    HGB 13.7 12.1 - 17.0 g/dL    HCT 40.7 36.6 - 50.3 %    MCV 91.7 80.0 - 99.0 FL    MCH 30.9 26.0 - 34.0 PG    MCHC 33.7 30.0 - 36.5 g/dL    RDW 14.9 (H) 11.5 - 14.5 %    PLATELET 957 160 - 750 K/uL    MPV 9.8 8.9 - 12.9 FL    NRBC 0.0 0  WBC    ABSOLUTE NRBC 0.00 0.00 - 0.01 K/uL    NEUTROPHILS 87 (H) 32 - 75 %    LYMPHOCYTES 8 (L) 12 - 49 %    MONOCYTES 5 5 - 13 %    EOSINOPHILS 0 0 - 7 %    BASOPHILS 0 0 - 1 %    IMMATURE GRANULOCYTES 0 0.0 - 0.5 %    ABS. NEUTROPHILS 9.6 (H) 1.8 - 8.0 K/UL    ABS. LYMPHOCYTES 0.9 0.8 - 3.5 K/UL    ABS. MONOCYTES 0.5 0.0 - 1.0 K/UL    ABS. EOSINOPHILS 0.0 0.0 - 0.4 K/UL    ABS. BASOPHILS 0.0 0.0 - 0.1 K/UL    ABS. IMM.  GRANS. 0.0 0.00 - 0.04 K/UL    DF AUTOMATED     METABOLIC PANEL, COMPREHENSIVE    Collection Time: 04/18/21  3:14 PM   Result Value Ref Range    Sodium 139 136 - 145 mmol/L Potassium 3.7 3.5 - 5.1 mmol/L    Chloride 104 97 - 108 mmol/L    CO2 27 21 - 32 mmol/L    Anion gap 8 5 - 15 mmol/L    Glucose 159 (H) 65 - 100 mg/dL    BUN 24 (H) 6 - 20 MG/DL    Creatinine 1.73 (H) 0.70 - 1.30 MG/DL    BUN/Creatinine ratio 14 12 - 20      GFR est AA 46 (L) >60 ml/min/1.73m2    GFR est non-AA 38 (L) >60 ml/min/1.73m2    Calcium 9.4 8.5 - 10.1 MG/DL    Bilirubin, total 0.6 0.2 - 1.0 MG/DL    ALT (SGPT) 27 12 - 78 U/L    AST (SGOT) 31 15 - 37 U/L    Alk.  phosphatase 106 45 - 117 U/L    Protein, total 7.9 6.4 - 8.2 g/dL    Albumin 3.9 3.5 - 5.0 g/dL    Globulin 4.0 2.0 - 4.0 g/dL    A-G Ratio 1.0 (L) 1.1 - 2.2     TROPONIN I    Collection Time: 04/18/21  3:42 PM   Result Value Ref Range    Troponin-I, Qt. <0.05 <0.05 ng/mL   LIPASE    Collection Time: 04/18/21  3:42 PM   Result Value Ref Range    Lipase 108 73 - 393 U/L   LACTIC ACID    Collection Time: 04/18/21  3:42 PM   Result Value Ref Range    Lactic acid 1.5 0.4 - 2.0 MMOL/L   MAGNESIUM    Collection Time: 04/18/21  3:42 PM   Result Value Ref Range    Magnesium 1.8 1.6 - 2.4 mg/dL   TSH 3RD GENERATION    Collection Time: 04/18/21  3:42 PM   Result Value Ref Range    TSH 2.78 0.36 - 3.74 uIU/mL   URINALYSIS W/ REFLEX CULTURE    Collection Time: 04/18/21  4:49 PM    Specimen: Urine   Result Value Ref Range    Color YELLOW/STRAW      Appearance CLEAR CLEAR      Specific gravity 1.013 1.003 - 1.030      pH (UA) 6.0 5.0 - 8.0      Protein Negative NEG mg/dL    Glucose Negative NEG mg/dL    Ketone Negative NEG mg/dL    Bilirubin Negative NEG      Blood Negative NEG      Urobilinogen 0.2 0.2 - 1.0 EU/dL    Nitrites Negative NEG      Leukocyte Esterase Negative NEG      WBC 0-4 0 - 4 /hpf    RBC 0-5 0 - 5 /hpf    Epithelial cells FEW FEW /lpf    Bacteria Negative NEG /hpf    UA:UC IF INDICATED CULTURE NOT INDICATED BY UA RESULT CNI      Hyaline cast 0-2 0 - 5 /lpf   GLUCOSE, POC    Collection Time: 04/18/21  6:29 PM   Result Value Ref Range Glucose (POC) 135 (H) 65 - 100 mg/dL    Performed by Genesis Lockhart. EDT    EKG, 12 LEAD, INITIAL    Collection Time: 04/18/21  7:55 PM   Result Value Ref Range    Ventricular Rate 67 BPM    Atrial Rate 67 BPM    P-R Interval 160 ms    QRS Duration 90 ms    Q-T Interval 476 ms    QTC Calculation (Bezet) 502 ms    Calculated P Axis 79 degrees    Calculated R Axis -16 degrees    Calculated T Axis 35 degrees    Diagnosis       Normal sinus rhythm  Prolonged QT    Confirmed by JEAN Winter ([id number]) on 4/19/2021 8:40:13 AM     POC TROPONIN-I    Collection Time: 04/18/21  8:04 PM   Result Value Ref Range    Troponin-I (POC) <0.04 0.00 - 0.08 ng/mL   POC EG7    Collection Time: 04/18/21  8:32 PM   Result Value Ref Range    Calcium, ionized (POC) 1.21 1.12 - 1.32 mmol/L    FIO2 (POC) 28 %    pH (POC) 7.45 7.35 - 7.45      pCO2 (POC) 39.3 35.0 - 45.0 MMHG    pO2 (POC) 56 (L) 80 - 100 MMHG    HCO3 (POC) 27.1 (H) 22 - 26 MMOL/L    Base excess (POC) 3 mmol/L    sO2 (POC) 90 (L) 92 - 97 %    Site RIGHT RADIAL      Device: NASAL CANNULA      Flow rate (POC) 2 L/M    Allens test (POC) YES      Specimen type (POC) ARTERIAL      Total resp.  rate 27     BLOOD GAS + IONIZED CALCIUM    Collection Time: 04/19/21 12:50 AM   Result Value Ref Range    pH 7.37 7.35 - 7.45      PCO2 46 (H) 35 - 45 mmHg    PO2 73 (L) 80 - 100 mmHg    BICARBONATE 26 22 - 26 mmol/L    BASE EXCESS 0.5 mmol/L    O2 SAT 94 92 - 97 %    Calcium, ionized 1.15 1.13 - 1.32 mmol/L    O2 METHOD NONREBREATHER MASK      Sample source ARTERIAL      SITE RIGHT BRACHIAL      BUTCH'S TEST NOT APPLICABLE     CBC WITH AUTOMATED DIFF    Collection Time: 04/19/21 12:59 AM   Result Value Ref Range    WBC 11.5 (H) 4.1 - 11.1 K/uL    RBC 3.84 (L) 4.10 - 5.70 M/uL    HGB 11.7 (L) 12.1 - 17.0 g/dL    HCT 36.3 (L) 36.6 - 50.3 %    MCV 94.5 80.0 - 99.0 FL    MCH 30.5 26.0 - 34.0 PG    MCHC 32.2 30.0 - 36.5 g/dL    RDW 15.0 (H) 11.5 - 14.5 %    PLATELET 863 648 - 460 K/uL    MPV 10.3 8.9 - 12.9 FL    NRBC 0.0 0  WBC    ABSOLUTE NRBC 0.00 0.00 - 0.01 K/uL    NEUTROPHILS 85 (H) 32 - 75 %    BAND NEUTROPHILS 4 0 - 6 %    LYMPHOCYTES 5 (L) 12 - 49 %    MONOCYTES 6 5 - 13 %    EOSINOPHILS 0 0 - 7 %    BASOPHILS 0 0 - 1 %    IMMATURE GRANULOCYTES 0 %    ABS. NEUTROPHILS 10.2 (H) 1.8 - 8.0 K/UL    ABS. LYMPHOCYTES 0.6 (L) 0.8 - 3.5 K/UL    ABS. MONOCYTES 0.7 0.0 - 1.0 K/UL    ABS. EOSINOPHILS 0.0 0.0 - 0.4 K/UL    ABS. BASOPHILS 0.0 0.0 - 0.1 K/UL    ABS. IMM.  GRANS. 0.0 K/UL    DF MANUAL      RBC COMMENTS ANISOCYTOSIS  1+        RBC COMMENTS MACROCYTOSIS  1+       MAGNESIUM    Collection Time: 04/19/21 12:59 AM   Result Value Ref Range    Magnesium 1.8 1.6 - 2.4 mg/dL   PHOSPHORUS    Collection Time: 04/19/21 12:59 AM   Result Value Ref Range    Phosphorus 3.8 2.6 - 4.7 MG/DL   METABOLIC PANEL, BASIC    Collection Time: 04/19/21 12:59 AM   Result Value Ref Range    Sodium 142 136 - 145 mmol/L    Potassium 3.6 3.5 - 5.1 mmol/L    Chloride 107 97 - 108 mmol/L    CO2 26 21 - 32 mmol/L    Anion gap 9 5 - 15 mmol/L    Glucose 157 (H) 65 - 100 mg/dL    BUN 28 (H) 6 - 20 MG/DL    Creatinine 1.86 (H) 0.70 - 1.30 MG/DL    BUN/Creatinine ratio 15 12 - 20      GFR est AA 42 (L) >60 ml/min/1.73m2    GFR est non-AA 35 (L) >60 ml/min/1.73m2    Calcium 8.8 8.5 - 10.1 MG/DL   PROTHROMBIN TIME + INR    Collection Time: 04/19/21 12:59 AM   Result Value Ref Range    INR 1.3 (H) 0.9 - 1.1      Prothrombin time 13.5 (H) 9.0 - 11.1 sec   LACTIC ACID    Collection Time: 04/19/21 12:59 AM   Result Value Ref Range    Lactic acid 1.9 0.4 - 2.0 MMOL/L   PTT    Collection Time: 04/19/21 12:59 AM   Result Value Ref Range    aPTT 37.6 (H) 22.1 - 31.0 sec    aPTT, therapeutic range     58.0 - 77.0 SECS   LIPID PANEL    Collection Time: 04/19/21 12:59 AM   Result Value Ref Range    LIPID PROFILE          Cholesterol, total 144 <200 MG/DL    Triglyceride 181 (H) <150 MG/DL    HDL Cholesterol 33 MG/DL    LDL, calculated 74.8 0 - 100 MG/DL    VLDL, calculated 36.2 MG/DL    CHOL/HDL Ratio 4.4 0.0 - 5.0     HEMOGLOBIN A1C WITH EAG    Collection Time: 04/19/21 12:59 AM   Result Value Ref Range    Hemoglobin A1c 5.9 (H) 4.0 - 5.6 %    Est. average glucose 123 mg/dL   RESPIRATORY VIRUS PANEL W/COVID-19, PCR    Collection Time: 04/19/21  6:52 AM    Specimen: Nasopharyngeal   Result Value Ref Range    Adenovirus Not detected NOTD      Coronavirus 229E Not detected NOTD      Coronavirus HKU1 Not detected NOTD      Coronavirus CVNL63 Not detected NOTD      Coronavirus OC43 Not detected NOTD      Metapneumovirus Not detected NOTD      Rhinovirus and Enterovirus Not detected NOTD      Influenza A Not detected NOTD      Influenza A, subtype H1 Not detected NOTD      Influenza A, subtype H3 Not detected NOTD      INFLUENZA A H1N1 PCR Not detected NOTD      Influenza B Not detected NOTD      Parainfluenza 1 Not detected NOTD      Parainfluenza 2 Not detected NOTD      Parainfluenza 3 Not detected NOTD      Parainfluenza virus 4 Not detected NOTD      RSV by PCR Not detected NOTD      B. parapertussis, PCR Not detected NOTD      Bordetella pertussis - PCR Not detected NOTD      Chlamydophila pneumoniae DNA, QL, PCR Not detected NOTD      Mycoplasma pneumoniae DNA, QL, PCR Not detected NOTD      SARS-CoV-2, PCR Not detected NOTD     GLUCOSE, POC    Collection Time: 04/19/21 11:15 AM   Result Value Ref Range    Glucose (POC) 123 (H) 65 - 100 mg/dL    Performed by Zac Stairs           Assessment:     Active Problems:    Intracranial hemorrhage ([facility] [address] (4/18/2021)        Plan:   1. IVH with mild ventriculomegaly   - neuro checks   - CT scan with stable ventricle size. On pt MRI in 2019, he appeared to have slightly enlarged ventricles then so this may be his baseline. Sulci are open bilaterally. Would not place EVD currently   - repeat head CT in am  2. HTN   - SBP less than 160 to normotensive   - Cardene PRN  3.  Alcohol abuse   - CIWA and treat DTs but try to not oversedate    Activity: up with assist  DVT ppx: SCDs  Dispo: tbd    Plan d/w Dr. Mirza Kalpana, ICU nurse, intensivist.      Faby Juarez NP

## 2021-04-19 NOTE — PROGRESS NOTES
2035: TRANSFER - IN REPORT:    Verbal report received from Evita RN(name) on Carlos Enrique Lozano  being received from [facility] ED(unit) for routine progression of care      Report consisted of patients Situation, Background, Assessment and   Recommendations(SBAR). Information from the following report(s) SBAR, Kardex, ED Summary, MAR, Recent Results and Cardiac Rhythm NSR was reviewed with the receiving nurse. Opportunity for questions and clarification was provided. Assessment completed upon patients arrival to unit and care assumed. 2215: Pt arrived onto unit. Primary Nurse Cintia Cat RN and AVIVA Lynn performed a dual skin assessment on this patient No impairment noted  Aquiles score is 15  Pt very confused, oriented only to self. Attempted to get up from bed, pt becoming very restless and combative. Orders received for precedex gtt and restraints. 2300: Notified Dr. Beckie Herrera regarding change in neuro status. Orders received to get stat head CT and consults to neurology. One time dose of valium given. 2353: In CT, pt started to be restless. Gave valium. CT completed. 0000: Pt very lethargic, with low O2 sats, and SBP in 80s. Placed on nonrebreather. Stopped cardene and precedex gtt. Gave ordered flumazenil. ABG obtained. 0100: Pt placed on hi mike nasal cannula. Pt now agitated and required another one time dose of valium and precedex gtt restarted.

## 2021-04-19 NOTE — H&P
[facility]    ICU Team H&P Note    Name: Betty Moses   : 1934   MRN: [id number]   Date: 2021      Subjective:   Progress Note: 2021      Patient is asked to be seen by Dr. Reji Wang for IVH, necessitating the need for possible ICU care. Reason for ICU Admission: Intraventricular hemorrhage. This is an 80year old male that presented to [facility] ED for 2 day hx of headache and confusion. He was seen at the OSH and was found to have an intraventricular hemorrhage and he was then transferred to [facility] for neurosurgical services. He presented to the ICU in an agitated state which required precedex infusion and IV bolus of valium. Neurosurgery was contacted when the patient was at ED [facility]. POD:* No surgery found *      Active Problem List:     Problem List  Date Reviewed: 2020          Codes Class    Intracranial hemorrhage (HCC) ICD-10-CM: I62.9  ICD-9-CM: 432.9         CKD (chronic kidney disease) stage 3, GFR 30-59 ml/min ([facility] [address] ICD-10-CM: N18.30  ICD-9-CM: 483. 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 9         Altered mental state ICD-10-CM: R41.82  ICD-9-CM: 780.97         Facial droop ICD-10-CM: R29.810  ICD-9-CM: 781.94         Acute blood loss anemia ICD-10-CM: D62  ICD-9-CM: 285.1         Rectal bleeding ICD-10-CM: K62.5  ICD-9-CM: 569.3         Pneumonia ICD-10-CM: J18.9  ICD-9-CM: 486         Severe sepsis (HCC) ICD-10-CM: A41.9, R65.20  ICD-9-CM: 038.9, 995.92         Flank pain ICD-10-CM: R10.9  ICD-9-CM: 789.09         Spinal stenosis of lumbar region at multiple levels ICD-10-CM: M48.061  ICD-9-CM: 724.02         Bilateral carotid artery stenosis ICD-10-CM: I65.23  ICD-9-CM: 433.10, 433.30         Severe obesity (HCC) ICD-10-CM: E66.01  ICD-9-CM: 278.01 TESSA on CPAP ICD-10-CM: G47.33, Z99.89  ICD-9-CM: 327.23, V46.8         Simple chronic bronchitis (HCC) ICD-10-CM: J41.0  ICD-9-CM: 491.0         Fatigue ICD-10-CM: R53.83  ICD-9-CM: 780.79         CAD (coronary artery disease) ICD-10-CM: I25.10  ICD-9-CM: 414.00         Dyslipidemia ICD-10-CM: E78.5  ICD-9-CM: 272.4         On statin therapy ICD-10-CM: Z79.899  ICD-9-CM: V58.69         Gout ICD-10-CM: M10.9  ICD-9-CM: 274.9         GERD (gastroesophageal reflux disease) ICD-10-CM: K21.9  ICD-9-CM: 530.81         TIA (transient ischemic attack) ICD-10-CM: G45.9  ICD-9-CM: 435.9         Dyspnea ICD-10-CM: R06.00  ICD-9-CM: 786.09         Colon polyps ICD-10-CM: K63.5  ICD-9-CM: 211.3         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  ICD-9-CM: 528.6         Hypertension ICD-10-CM: I10  ICD-9-CM: 401.9         ED (erectile dysfunction) ICD-10-CM: N52.9  ICD-9-CM: 607.84         Hypoxia ICD-10-CM: R09.02  ICD-9-CM: 799.02         Neurogenic claudication ICD-10-CM: M48.062  ICD-9-CM: 724.03               Past Medical History:      has a past medical history of Adverse effect of anesthesia, Alcohol abuse, Arthritis, CAD (coronary artery disease), Chronic obstructive pulmonary disease ([facility] [address] Chronic obstructive pulmonary disease ([facility] [address] CKD (chronic kidney disease) stage 3, GFR 30-59 ml/min ([facility] [address] (9/21/2020), Dyslipidemia (8/16/2017), Dyspepsia and other specified disorders of function of stomach, Dyspnea (8/16/2017), ED (erectile dysfunction) (8/16/2017), Encounter for immunization (8/16/2017), Fatigue (8/16/2017), Flank pain (8/1/2019), GERD (gastroesophageal reflux disease) (8/16/2017), Gout (8/16/2017), Hypertension, 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     Past Surgical History:      has a past surgical history that includes pr cardiac surg procedure unlist (1996); pr cardiac surg procedure unlist (04/2019); hx hernia repair; hx hernia repair (11/09/15); hx urological; colonoscopy (N/A, 9/27/2019); upper gi endoscopy,biopsy (9/30/2019); and hx orthopaedic. Home Medications:     Prior to Admission medications    Medication Sig Start Date End Date Taking? Authorizing Provider   atenoloL (TENORMIN) 100 mg tablet TAKE ONE TABLET BY MOUTH DAILY 4/14/21   Herminia Nixon MD   atorvastatin (LIPITOR) 80 mg tablet TAKE ONE TABLET BY MOUTH DAILY 3/15/21   Herminia Nixon MD   furosemide (LASIX) 80 mg tablet TAKE ONE TABLET BY MOUTH DAILY 2/1/21   Herminia Nixon MD   thiamine HCL (B-1) 100 mg tablet TAKE ONE BY MOUTH DAILY 12/21/20   Herminia Nixon MD   isosorbide mononitrate ER (IMDUR) 30 mg tablet TAKE ONE TABLET BY MOUTH DAILY 12/21/20   Herminia Nixon MD   hydrALAZINE (APRESOLINE) 50 mg tablet TAKE ONE TABLET BY MOUTH THREE TIMES A DAY 12/21/20   Herminia Nixon MD   pantoprazole (PROTONIX) 40 mg tablet TAKE ONE TABLET BY MOUTH DAILY 9/22/20   Herminia Nixon MD   DULoxetine (CYMBALTA) 30 mg capsule TAKE ONE CAPSULE BY MOUTH DAILY 9/16/20   Herminia Nixon MD   melatonin 5 mg cap capsule Take 5 mg by mouth nightly. Pt. Take 2 tabs a night    Provider, Historical   acetaminophen (TYLENOL) 500 mg tablet Take 1,000 mg by mouth every six (6) hours as needed for Pain. Provider, Historical   aspirin delayed-release 81 mg tablet Take 81 mg by mouth daily. Provider, Historical   ANORO ELLIPTA 62.5-25 mcg/actuation inhaler Take 1 Puff by inhalation daily. 12/26/18   Provider, Historical       Allergies/Social/Family History:      Allergies   Allergen Reactions    Levaquin [Levofloxacin] Nausea and Vomiting     Reports anxiety, nausea, aching after taking levaquin    Ciprofloxacin Shortness of Breath    Quinolones Shortness of Breath Social History     Tobacco Use    Smoking status: Former Smoker     Packs/day: 0.50     Years: 20.00     Pack years: 10.00    Smokeless tobacco: Former User    Tobacco comment: quit about 40  years ago   / used to dip tobaco and dip snuff   Substance Use Topics    Alcohol use: Yes     Comment: \"Drinks very little now for about a month \"      Family History   Problem Relation Age of Onset    Heart Disease Mother     Hypertension Mother     Hypertension Father     Hypertension Sister     Hypertension Brother     Cancer Brother        Review of Systems:     Review of systems not obtained due to patient factors. Objective:   Vital Signs:  Visit Vitals  BP (!) 135/90   Pulse 81   Temp 98.4 °F (36.9 °C)   Resp 21   Wt 99.5 kg (219 lb 5.7 oz)   SpO2 (!) 89%   BMI 33.35 kg/m²        Temp (24hrs), Av.1 °F (36.7 °C), Min:97.3 °F (36.3 °C), Max:98.4 °F (36.9 °C)           Intake/Output:   No intake or output data in the 24 hours ending 21 0114    Physical Exam:    General:  Agitated, well noursished, well developed, appears stated age   Eyes:  Sclera anicteric. Pupils equally round and reactive to light. Mouth/Throat: Mucous membranes normal, oral pharynx clear   Neck: Supple   Lungs:   Clear to auscultation bilaterally, good effort   CV:  Regular rate and rhythm,no murmur, click, rub or gallop   Abdomen:   Soft, non-tender.  bowel sounds normal. non-distended   Extremities: No cyanosis or edema   Skin: Skin color, texture, turgor normal. no acute rash or lesions   Lymph nodes: Cervical and supraclavicular normal   Musculoskeletal: No swelling or deformity   Lines/Devices:  Intact, no erythema, drainage or tenderness   Psych: Agitated       LABS AND  DATA: Personally reviewed  Recent Labs     21  1514   WBC 11.1   HGB 13.7   HCT 40.7        Recent Labs     21  1542 21  1514   NA  --  139   K  --  3.7   CL  --  104   CO2  --  27   BUN  --  24*   CREA  --  1.73*   GLU  --  159* CA  --  9.4   MG 1.8  --      Recent Labs     04/18/21  1542 04/18/21  1514   AP  --  106   TP  --  7.9   ALB  --  3.9   GLOB  --  4.0   LPSE 108  --      No results for input(s): INR, PTP, APTT, INREXT in the last 72 hours. Recent Labs     04/18/21  2032   PHI 7.45   PCO2I 39.3   PO2I 56*   FIO2I 28     Recent Labs     04/18/21  1542   TROIQ <0.05         Ventilator Settings:  Mode Rate Tidal Volume Pressure FiO2 PEEP                        MEDS: Reviewed    Chest X-Ray: personally reviewed and report checked    Assessment:     ICU Problems:  Intracerebral Hemorrhage    ICU Comprehensive Plan of Care:   Plans for this Shift:   1. Repeat CTH in am  2. Ensure adequate oxygenation  3. NPO for now  4. No DVTP secondary to [address]  5. HOB above 30 degrees  6. Precedex and valium PRN for agitation  7. Avoid hypertension/hypotension  8.  Covid rule out, labs pending      Multidisciplinary Rounds Completed:  Pending    ABCDEF Bundle/Checklist  Pain Medications: Acetaminophen  Target RASS: 0 - Alert & Calm - Spontaneously pays attention to caregiver  Sedation Medications: Precedex  CAM-ICU:  Negative  Mobility: Poor  PT/OT: Evaluation as needed  Restraints: Soft wrist restraints  Discussed Plan of Care (goals of care): No  Addressed Code Status: Full Code    CARDIOVASCULAR  Cardiac Gtts: None  SBP Goal of: > 110 mmHg and < 160 mmHg  MAP Goal of: > 65 mmHg  Transfusion Trigger (Hgb): <8 g/dL    RESPIRATORY  Vent Goals:   Head of bed > 30 degrees  DVT Prophylaxis (if no, list reason): SCD's or Sequential Compression Device   SPO2 Goal: > 92%  Pulmonary toilet: Incentive Spirometry     GI/  Yu Catheter Present: Yes  GI Prophylaxis: Not at this time   Nutrition: No   IVFs: Saline lock  Bowel Movement: No  Bowel Regimen: None needed at this time  Insulin: As needed    ANTIBIOTICS  Antibiotics: NA    T/L/D  Tubes: None  Lines: Peripheral IV  Drains: Yu Catheter    SPECIAL EQUIPMENT  None    DISPOSITION  Stay in ICU    CRITICAL CARE CONSULTANT NOTE  I had a face to face encounter with the patient, reviewed and interpreted patient data including clinical events, labs, images, vital signs, I/O's, and examined patient. I have discussed the case and the plan and management of the patient's care with the consulting services, the bedside nurses and the respiratory therapist.      NOTE OF PERSONAL INVOLVEMENT IN CARE   This patient has a high probability of imminent, clinically significant deterioration, which requires the highest level of preparedness to intervene urgently. I participated in the decision-making and personally managed or directed the management of the following life and organ supporting interventions that required my frequent assessment to treat or prevent imminent deterioration. I personally spent 60 minutes of critical care time. This is time spent at this critically ill patient's bedside actively involved in patient care as well as the coordination of care and discussions with the patient's family. This does not include any procedural time which has been billed separately.     Juna Dubin, MSN, APRN, ACNP, [id number] [facility] [address]  Staff [address] [facility]  4/19/2021

## 2021-04-20 ENCOUNTER — APPOINTMENT (OUTPATIENT)
Dept: GENERAL RADIOLOGY | Age: 86
DRG: 064 | End: 2021-04-20
Attending: ANESTHESIOLOGY
Payer: MEDICARE

## 2021-04-20 ENCOUNTER — APPOINTMENT (OUTPATIENT)
Dept: CT IMAGING | Age: 86
DRG: 064 | End: 2021-04-20
Attending: NURSE PRACTITIONER
Payer: MEDICARE

## 2021-04-20 LAB
GLUCOSE BLD STRIP.AUTO-MCNC: 100 MG/DL (ref 65–100)
GLUCOSE BLD STRIP.AUTO-MCNC: 104 MG/DL (ref 65–100)
GLUCOSE BLD STRIP.AUTO-MCNC: 110 MG/DL (ref 65–100)
GLUCOSE BLD STRIP.AUTO-MCNC: 90 MG/DL (ref 65–100)
SERVICE CMNT-IMP: ABNORMAL
SERVICE CMNT-IMP: ABNORMAL
SERVICE CMNT-IMP: NORMAL
SERVICE CMNT-IMP: NORMAL

## 2021-04-20 PROCEDURE — 74011000250 HC RX REV CODE- 250: Performed by: NURSE PRACTITIONER

## 2021-04-20 PROCEDURE — APPSS30 APP SPLIT SHARED TIME 16-30 MINUTES: Performed by: NURSE PRACTITIONER

## 2021-04-20 PROCEDURE — 77010033711 HC HIGH FLOW OXYGEN

## 2021-04-20 PROCEDURE — 74011250636 HC RX REV CODE- 250/636: Performed by: ANESTHESIOLOGY

## 2021-04-20 PROCEDURE — 94760 N-INVAS EAR/PLS OXIMETRY 1: CPT

## 2021-04-20 PROCEDURE — 74011000250 HC RX REV CODE- 250: Performed by: ANESTHESIOLOGY

## 2021-04-20 PROCEDURE — 99232 SBSQ HOSP IP/OBS MODERATE 35: CPT | Performed by: PSYCHIATRY & NEUROLOGY

## 2021-04-20 PROCEDURE — 65610000006 HC RM INTENSIVE CARE

## 2021-04-20 PROCEDURE — 82962 GLUCOSE BLOOD TEST: CPT

## 2021-04-20 PROCEDURE — 74011250636 HC RX REV CODE- 250/636: Performed by: NURSE PRACTITIONER

## 2021-04-20 PROCEDURE — 74011250637 HC RX REV CODE- 250/637: Performed by: ANESTHESIOLOGY

## 2021-04-20 PROCEDURE — 74018 RADEX ABDOMEN 1 VIEW: CPT

## 2021-04-20 PROCEDURE — 70450 CT HEAD/BRAIN W/O DYE: CPT

## 2021-04-20 RX ORDER — QUETIAPINE FUMARATE 25 MG/1
25 TABLET, FILM COATED ORAL
Status: DISCONTINUED | OUTPATIENT
Start: 2021-04-20 | End: 2021-04-20

## 2021-04-20 RX ORDER — QUETIAPINE FUMARATE 25 MG/1
25 TABLET, FILM COATED ORAL 2 TIMES DAILY
Status: DISCONTINUED | OUTPATIENT
Start: 2021-04-20 | End: 2021-04-21

## 2021-04-20 RX ORDER — HALOPERIDOL 5 MG/ML
2 INJECTION INTRAMUSCULAR ONCE
Status: COMPLETED | OUTPATIENT
Start: 2021-04-20 | End: 2021-04-20

## 2021-04-20 RX ADMIN — DEXMEDETOMIDINE HYDROCHLORIDE 0.4 MCG/KG/HR: 400 INJECTION INTRAVENOUS at 06:02

## 2021-04-20 RX ADMIN — POTASSIUM BICARBONATE 40 MEQ: 782 TABLET, EFFERVESCENT ORAL at 16:36

## 2021-04-20 RX ADMIN — DEXMEDETOMIDINE HYDROCHLORIDE 0.5 MCG/KG/HR: 400 INJECTION INTRAVENOUS at 20:57

## 2021-04-20 RX ADMIN — Medication 10 ML: at 06:00

## 2021-04-20 RX ADMIN — HALOPERIDOL LACTATE 2 MG: 5 INJECTION, SOLUTION INTRAMUSCULAR at 08:41

## 2021-04-20 RX ADMIN — LORAZEPAM 2 MG: 2 INJECTION INTRAMUSCULAR; INTRAVENOUS at 11:26

## 2021-04-20 RX ADMIN — LORAZEPAM 2 MG: 2 INJECTION INTRAMUSCULAR; INTRAVENOUS at 04:50

## 2021-04-20 RX ADMIN — FAMOTIDINE 20 MG: 10 INJECTION, SOLUTION INTRAVENOUS at 11:26

## 2021-04-20 RX ADMIN — SODIUM CHLORIDE 2.5 MG/HR: 900 INJECTION, SOLUTION INTRAVENOUS at 13:51

## 2021-04-20 RX ADMIN — LORAZEPAM 2 MG: 2 INJECTION INTRAMUSCULAR; INTRAVENOUS at 14:26

## 2021-04-20 RX ADMIN — DEXMEDETOMIDINE HYDROCHLORIDE 0.2 MCG/KG/HR: 400 INJECTION INTRAVENOUS at 09:15

## 2021-04-20 RX ADMIN — SODIUM CHLORIDE 5 MG/HR: 900 INJECTION, SOLUTION INTRAVENOUS at 05:37

## 2021-04-20 RX ADMIN — HYDRALAZINE HYDROCHLORIDE 10 MG: 20 INJECTION INTRAMUSCULAR; INTRAVENOUS at 03:55

## 2021-04-20 RX ADMIN — LORAZEPAM 2 MG: 2 INJECTION INTRAMUSCULAR; INTRAVENOUS at 19:12

## 2021-04-20 RX ADMIN — QUETIAPINE FUMARATE 25 MG: 25 TABLET ORAL at 21:57

## 2021-04-20 RX ADMIN — DEXMEDETOMIDINE HYDROCHLORIDE 0.4 MCG/KG/HR: 400 INJECTION INTRAVENOUS at 13:13

## 2021-04-20 RX ADMIN — Medication 40 ML: at 13:51

## 2021-04-20 RX ADMIN — LORAZEPAM 1 MG: 2 INJECTION INTRAMUSCULAR; INTRAVENOUS at 04:13

## 2021-04-20 NOTE — PROGRESS NOTES
[facility]    ICU TEAM Progress Note    Name: Radhames Negro   : 1934   MRN: [id number]   Date: 2021           ICU Assessment     1. Intraventricular hemorrhage with ventriculomegaly  2. Altered mental status  3. Acute metabolic encephalopathy  4. Possible alcohol withdrawal  5. Hypertension             ICU Comprehensive Plan of Care:     1. Neurological System: IVH with mild ventriculomegaly complicated by altered mental status requiring precedex infusion. Neurosurgery following and no intervention right now. AMS likely a result of IVH and not alcohol withdrawal but we are treating just in case. - Continue precedex for agitation.    - Trialing antipsychotics (haldol) to get off precedex   - Continuing CIWA with ativan   - NSGY following.    - q2h neurochecks   - SBP < 140, currently on nicardipine    2. Cardiovascular System: He has a strict SBP goal per neurosurgery and is requiring cardene to achieve the goal   - Continue cardene for SBP < 140   - Once taking PO can out back on oral medications   - Transfusion Trigger (Hgb): <7 g/dL   - Keep K > 4; Mg > 2     3. Respiratory System: His agitation is causing hypoxia requiring HFNC. When he pulls it off he desaturates to the low 80s. - Continue HFNC and wean to NC if possible    - Pulmonary toilet: Incentive Spirometry    - SpO2 Goal: > 92%   - DVT Prophylaxis: SCD's or Sequential Compression Device     4. Renal/GI/Endocrine System   - Will attempt to place NGT or DHT today to get enteral access   - IVFs: none at this time   - Ulcer Prophylaxis: Pepcid (famotidine)    - Bowel Regimen: None needed at this time   - Feeding:  No pending speech   - Blood Sugar Goal 120-180 - Glycemic Control: Insulin    5. Infectious Disease: No concern for infection at this time. - Indwelling Catheter:   - Tubes: None   - Lines: Peripheral IV   - Drains: Yu Catheter      6. PT/OT: PT consulted and on board and OT consulted and on board     7.  Goals of Care Discussion with family Yes     8. Plan of Care/Code Status: Full Code    9. Discussed Care Plan with Bedside RN    10. Documentation of Current Medications    Subjective:   Progress Note: 4/20/2021      Reason for ICU Admission: Intraventricular hemorrhage.      This is an 80year old male that presented to [facility] ED for 2 day hx of headache and confusion. He was seen at the OSH and was found to have an intraventricular hemorrhage and he was then transferred to [address] [place] [address] for neurosurgical services. He presented to the ICU in an agitated state which required precedex infusion and IV bolus of valium. Overnight Events:   4/20/2021  - Increased agitation at night requiring precedex drip again.  - Received 10mg ativan in last 24 hours. Active Problem List:     Problem List  Date Reviewed: 9/21/2020          Codes Class    Intracranial hemorrhage (HCC) ICD-10-CM: I62.9  ICD-9-CM: 432.9         CKD (chronic kidney disease) stage 3, GFR 30-59 ml/min ([facility] ICD-10-CM: N18.30  ICD-9-CM: 757. 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 9         Altered mental state ICD-10-CM: R41.82  ICD-9-CM: 780.97         Facial droop ICD-10-CM: R29.810  ICD-9-CM: 781.94         Acute blood loss anemia ICD-10-CM: D62  ICD-9-CM: 285.1         Rectal bleeding ICD-10-CM: K62.5  ICD-9-CM: 569.3         Pneumonia ICD-10-CM: J18.9  ICD-9-CM: 486         Severe sepsis (HCC) ICD-10-CM: A41.9, R65.20  ICD-9-CM: 038.9, 995.92         Flank pain ICD-10-CM: R10.9  ICD-9-CM: 789.09         Spinal stenosis of lumbar region at multiple levels ICD-10-CM: M48.061  ICD-9-CM: 724.02         Bilateral carotid artery stenosis ICD-10-CM: I65.23  ICD-9-CM: 433.10, 433.30         Severe obesity (HCC) ICD-10-CM: E66.01  ICD-9-CM: 278.01         TESSA on CPAP ICD-10-CM: G47.33, Z99.89  ICD-9-CM: 327.23, V46.8         Simple chronic bronchitis (HCC) ICD-10-CM: J41.0  ICD-9-CM: 491.0         Fatigue ICD-10-CM: R53.83  ICD-9-CM: 780.79         CAD (coronary artery disease) ICD-10-CM: I25.10  ICD-9-CM: 414.00         Dyslipidemia ICD-10-CM: E78.5  ICD-9-CM: 272.4         On statin therapy ICD-10-CM: Z79.899  ICD-9-CM: V58.69         Gout ICD-10-CM: M10.9  ICD-9-CM: 274.9         GERD (gastroesophageal reflux disease) ICD-10-CM: K21.9  ICD-9-CM: 530.81         TIA (transient ischemic attack) ICD-10-CM: G45.9  ICD-9-CM: 435.9         Dyspnea ICD-10-CM: R06.00  ICD-9-CM: 786.09         Colon polyps ICD-10-CM: K63.5  ICD-9-CM: 211.3         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  ICD-9-CM: 528.6         Hypertension ICD-10-CM: I10  ICD-9-CM: 401.9         ED (erectile dysfunction) ICD-10-CM: N52.9  ICD-9-CM: 607.84         Hypoxia ICD-10-CM: R09.02  ICD-9-CM: 799.02         Neurogenic claudication ICD-10-CM: M48.062  ICD-9-CM: 724.03               Past Medical History:      has a past medical history of Adverse effect of anesthesia, Alcohol abuse, Arthritis, CAD (coronary artery disease), Chronic obstructive pulmonary disease ([facility] Chronic obstructive pulmonary disease ([facility] CKD (chronic kidney disease) stage 3, GFR 30-59 ml/min ([facility] (9/21/2020), Dyslipidemia (8/16/2017), Dyspepsia and other specified disorders of function of stomach, Dyspnea (8/16/2017), ED (erectile dysfunction) (8/16/2017), Encounter for immunization (8/16/2017), Fatigue (8/16/2017), Flank pain (8/1/2019), GERD (gastroesophageal reflux disease) (8/16/2017), Gout (8/16/2017), Hypertension, 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     Past Surgical History:      has a past surgical history that includes pr cardiac surg procedure unlist (1996); pr cardiac surg procedure unlist (04/2019); hx hernia repair; hx hernia repair (11/09/15); hx urological; colonoscopy (N/A, 9/27/2019); upper gi endoscopy,biopsy (9/30/2019); and hx orthopaedic. Home Medications:     Prior to Admission medications    Medication Sig Start Date End Date Taking? Authorizing Provider   atenoloL (TENORMIN) 100 mg tablet TAKE ONE TABLET BY MOUTH DAILY 4/14/21   Herminia Nixon MD   atorvastatin (LIPITOR) 80 mg tablet TAKE ONE TABLET BY MOUTH DAILY 3/15/21   Herminia Nixon MD   furosemide (LASIX) 80 mg tablet TAKE ONE TABLET BY MOUTH DAILY 2/1/21   Herminia Nixon MD   thiamine HCL (B-1) 100 mg tablet TAKE ONE BY MOUTH DAILY 12/21/20   Herminia Nixon MD   isosorbide mononitrate ER (IMDUR) 30 mg tablet TAKE ONE TABLET BY MOUTH DAILY 12/21/20   Herminia Nixon MD   hydrALAZINE (APRESOLINE) 50 mg tablet TAKE ONE TABLET BY MOUTH THREE TIMES A DAY 12/21/20   Herminia iNxon MD   pantoprazole (PROTONIX) 40 mg tablet TAKE ONE TABLET BY MOUTH DAILY 9/22/20   Herminia Nixon MD   DULoxetine (CYMBALTA) 30 mg capsule TAKE ONE CAPSULE BY MOUTH DAILY 9/16/20   Herminia Nixon MD   melatonin 5 mg cap capsule Take 5 mg by mouth nightly. Pt. Take 2 tabs a night    Provider, Historical   acetaminophen (TYLENOL) 500 mg tablet Take 1,000 mg by mouth every six (6) hours as needed for Pain. Provider, Historical   aspirin delayed-release 81 mg tablet Take 81 mg by mouth daily. Provider, Historical   ANORO ELLIPTA 62.5-25 mcg/actuation inhaler Take 1 Puff by inhalation daily. 12/26/18   Provider, Historical       Allergies/Social/Family History:      Allergies   Allergen Reactions    Levaquin [Levofloxacin] Nausea and Vomiting     Reports anxiety, nausea, aching after taking levaquin    Ciprofloxacin Shortness of Breath    Quinolones Shortness of Breath      Social History Tobacco Use    Smoking status: Former Smoker     Packs/day: 0.50     Years: 20.00     Pack years: 10.00    Smokeless tobacco: Former User    Tobacco comment: quit about 40  years ago   / used to dip tobaco and dip snuff   Substance Use Topics    Alcohol use: Yes     Comment: \"Drinks very little now for about a month \"      Family History   Problem Relation Age of Onset    Heart Disease Mother     Hypertension Mother     Hypertension Father     Hypertension Sister     Hypertension Brother     Cancer Brother        Review of Systems:     Pertinent items are noted in HPI. Objective:   Vital Signs:  Visit Vitals  /60   Pulse 70   Temp 100 °F (37.8 °C)   Resp (!) 34   Ht 5' 8\" (1.727 m)   Wt 99.5 kg (219 lb 5.7 oz)   SpO2 97%   BMI 33.35 kg/m²    O2 Flow Rate (L/min): 50 l/min O2 Device: Hi flow nasal cannula Temp (24hrs), Av.7 °F (37.1 °C), Min:98 °F (36.7 °C), Max:100 °F (37.8 °C)           Intake/Output:     Intake/Output Summary (Last 24 hours) at 2021 1428  Last data filed at 2021 1400  Gross per 24 hour   Intake 122.81 ml   Output 1550 ml   Net -1427.19 ml       Physical Exam:    General:  Agitated, well noursished, well developed, appears stated age   Eyes:  Sclera anicteric. Pupils equally round and reactive to light. Mouth/Throat: Mucous membranes normal, oral pharynx clear   Neck: Supple   Lungs:   Clear to auscultation bilaterally, good effort. HFNC in place. CV:  Regular rate and rhythm,no murmur, click, rub or gallop   Abdomen:   Soft, non-tender.  bowel sounds normal. non-distended   Extremities: No cyanosis or edema   Skin: Skin color, texture, turgor normal. no acute rash or lesions   Lymph nodes: Cervical and supraclavicular normal   Musculoskeletal: No swelling or deformity   Lines/Devices:  Intact, no erythema, drainage or tenderness   Psych: Agitated          LABS AND  DATA: Personally reviewed  Recent Labs     21  0059 21  1514   WBC 11.5* 11.1 HGB 11.7* 13.7   HCT 36.3* 40.7    241     Recent Labs     04/19/21  0059 04/18/21  1542 04/18/21  1514     --  139   K 3.6  --  3.7     --  104   CO2 26  --  27   BUN 28*  --  24*   CREA 1.86*  --  1.73*   *  --  159*   CA 8.8  --  9.4   MG 1.8 1.8  --    PHOS 3.8  --   --      Recent Labs     04/18/21  1542 04/18/21  1514   AP  --  106   TP  --  7.9   ALB  --  3.9   GLOB  --  4.0   LPSE 108  --      Recent Labs     04/19/21  0059   INR 1.3*   PTP 13.5*   APTT 37.6*      Recent Labs     04/18/21  2032   PHI 7.45   PCO2I 39.3   PO2I 56*   FIO2I 28     Recent Labs     04/18/21  1542   TROIQ <0.05       Hemodynamics:   PAP:   CO:     Wedge:   CI:     CVP:    SVR:       PVR:       Ventilator Settings:  Mode Rate Tidal Volume Pressure FiO2 PEEP            85 %       Peak airway pressure:      Minute ventilation:          MEDS: Reviewed    Chest X-Ray:  CXR Results  (Last 48 hours)               04/18/21 1922  XR CHEST PORT Final result    Impression:  No acute process identified. Narrative:  Clinical history: sob   INDICATION:   sob   COMPARISON: 11/18/2019       FINDINGS:   AP portable upright view of the chest demonstrates a stable enlarged   cardiopericardial silhouette. There is no pleural effusion. .There is no focal   consolidation. .There is no pneumothorax. . Patient is on a cardiac monitor. ECHO:  4/13/2020: Nml BiV function    Multidisciplinary Rounds Completed: Yes    ABCDEF Bundle/Checklist Completed:  Yes    SPECIAL EQUIPMENT  None    DISPOSITION  Stay in ICU    CRITICAL CARE CONSULTANT NOTE  I had a face to face encounter with the patient, reviewed and interpreted patient data including clinical events, labs, images, vital signs, I/O's, and examined patient.   I have discussed the case and the plan and management of the patient's care with the consulting services, the bedside nurses and the respiratory therapist.      NOTE OF PERSONAL INVOLVEMENT IN CARE   This patient has a high probability of imminent, clinically significant deterioration, which requires the highest level of preparedness to intervene urgently. I participated in the decision-making and personally managed or directed the management of the following life and organ supporting interventions that required my frequent assessment to treat or prevent imminent deterioration. I personally spent 39 minutes of critical care time. This is time spent at this critically ill patient's bedside actively involved in patient care as well as the coordination of care. This does not include any procedural time which has been billed separately.     Kristie Ward MD  Staff Intensivist/Anesthesiologist  [facility]  4/20/2021

## 2021-04-20 NOTE — PROGRESS NOTES
Comprehensive Nutrition Assessment    Type and Reason for Visit: Initial, Consult    Nutrition Recommendations/Plan:      Start enteral nutrition support: Osmolite 1.5 @ 60 ml/hr with 2 packets Prosource daily and 160 ml water flush q 4 hr    Monitor lytes daily    Nutrition Assessment:    Pt admitted with IVH. PMHx: Alcohol abuse, COPD, CKD 3, HLD, GERD, HTN. Conservative treatment-surgery not indicated. Question alcohol withdrawal? Acute metabolic encephalopathy. Mr Eula Hughes was well-nourished PTA per daughter. No weight loss or decline in appetite prior to this admission. Since pt remains encephalopathic plan is to place DHT with bridle today. Lytes WNL. BG elevated-suspect d/t stress. No history of DM. Recommended tube feeding to meet estimated needs: Osmolite 1.5 @ 60 ml/hr with 2 packets Prosource daily and 160 ml water flush q 4 hr. This will provide 1320 ml, 2100 calories, 113 gm protein and 2085 ml free water (tube feeding/flush) per day to meet estimated needs. Malnutrition Assessment:  Malnutrition Status:  No malnutrition    Context:  Acute illness       Nutritionally Significant Medications:   Pepcid, Humalog, Effer-K    Estimated Daily Nutrient Needs:  Energy (kcal): 2035 (MSJ x 1.2); Weight Used for Energy Requirements: Current(100 kg)  Protein (g): 105-119 (1.5-1.7g/kg IBW); Weight Used for Protein Requirements: Ideal(70 kg)  Fluid (ml/day): 2035 ml; Method Used for Fluid Requirements: 1 ml/kcal    Nutrition Related Findings:       BM: PTA  Edema: None  Wounds:  None       Current Nutrition Therapies:   Diet: NPO  Additional Caloric Sources:  None    Anthropometric Measures:  · Height:  5' 8\" (172.7 cm)  · Current Body Wt:  99.5 kg (219 lb 5.7 oz)   · Ideal Body Wt:  154#:  142.4 %   · BMI Categories:  Obese class 1 (BMI 30.0-34. 9)     Wt Readings from Last 10 Encounters:   04/18/21 99.5 kg (219 lb 5.7 oz)   12/04/20 103.3 kg (227 lb 12.8 oz)   09/21/20 102.5 kg (226 lb)   06/26/20 100.6 kg (221 lb 12.8 oz)   02/28/20 99 kg (218 lb 3.2 oz)   12/27/19 96.6 kg (213 lb)   12/12/19 98.2 kg (216 lb 6.4 oz)   12/02/19 99.9 kg (220 lb 3.2 oz)   11/18/19 96.2 kg (212 lb)   11/05/19 97.3 kg (214 lb 9.6 oz)       Nutrition Diagnosis:   · Inadequate oral intake related to cognitive or neurological impairment as evidenced by NPO or clear liquid status due to medical condition    Nutrition Interventions:   Food and/or Nutrient Delivery: Start tube feeding  Nutrition Education and Counseling: No recommendations at this time  Coordination of Nutrition Care: Continue to monitor while inpatient, Interdisciplinary rounds    Goals: Tolerate tube feeding at goal in next 1-2 days. Nutrition Monitoring and Evaluation:   Behavioral-Environmental Outcomes: None identified  Food/Nutrient Intake Outcomes: Enteral nutrition intake/tolerance  Physical Signs/Symptoms Outcomes: GI status, Weight, Biochemical data    Discharge Planning:     Too soon to determine      Bill Huffman RD CNSC  Contact: [employer]

## 2021-04-20 NOTE — PROGRESS NOTES
1000: Bedside and Verbal shift change report given to Marleny Higgins RN (oncoming nurse) by CHEL Serrano RN (offgoing nurse). Report included the following information SBAR, Kardex, Intake/Output, MAR, Recent Results, Cardiac Rhythm NSR, Alarm Parameters  and Dual Neuro Assessment. Shift Summary: Cardene gtt titrated off, maintain SBP <140. Precedex titrated throughout shift, infusing 0.5 mcg/kg/hr. CIWA 7-15. PRN Ativan 2 mg given x3. Dobhoff placed and Tube Feed started per orders. Family support provided to daughter at patient's bedside throughout shift. 1930: Bedside and Verbal shift change report given to ELVIA Hatfield (oncoming nurse) by Marleny Higgins RN (offgoing nurse). Report included the following information SBAR, Kardex, Intake/Output, MAR, Recent Results, Cardiac Rhythm Sinus Bradycardia, Alarm Parameters  and Dual Neuro Assessment.

## 2021-04-20 NOTE — PROGRESS NOTES
Neurosurgery Progress Note  Taniya Gómez, [facility]          Admit Date: 2021   LOS: 2 days        Daily Progress Note: 2021    HPI: The patient presented from home with headache, nausea, vomiting. He also told his daughter he felt \"crazy in the head\" yesterday. 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 He has had headaches for the past 2 days. He presented to the ER at [address] [facility]. His head CT revealed intraventricular hemorrhage. He transferred to [facility] for further evaluation. He has been agitated, requiring precedex an mitts. He did receive flumazenil last night because of difficulty obtaining neuro exam after receiving valium. This morning he is wide awake in bed, extremely restless, on precedex. Subjective: The patient was extremely restless overnight and got out of restraints multiple times trying to climb out of bed. He is still on hi-flow. He settled down with 2 mg of Haldol today but he does wake up easily. Unable to obtain ROS due to AMS. Objective:     Vital signs  Temp (24hrs), Av.4 °F (36.9 °C), Min:98 °F (36.7 °C), Max:99.1 °F (37.3 °C)   701 - 1900  In: -   Out: 100 [Urine:100]  1901 -  07  In: 392.5 [I.V.:392.5]  Out:  [Urine:]    Visit Vitals  BP (!) 184/88   Pulse 62   Temp 98.2 °F (36.8 °C)   Resp 28   Ht 5' 8\" (1.727 m) Comment: From documentation on 20   Wt 99.5 kg (219 lb 5.7 oz)   SpO2 93%   BMI 33.35 kg/m²    O2 Flow Rate (L/min): 50 l/min O2 Device: Hi flow nasal cannula, Heated     Pain control  Pain Assessment  Pain Scale 1: Numeric (0 - 10)  Pain Intensity 1: 0  Pain Intervention(s) 1: MD notified (comment)    PT/OT  Gait                 Physical Exam:  Gen:NAD. Neuro: A&Ox2 Not able to tell me the year. Follows commands in lower extremities. Hunt Savant Speech clear. Affect agitated. PERRL. EOMI. Face symmetric. Extremely [place]. Tongue midline. PEREZ spontaneously. Gait deferred.     CT head without contrast on 04/18/21 at [phone] shows 2 foci of parenchymal hemorrhage adjacent to the right lateral ventricle associated with significant intraventricular hemorrhage. CT head without contrast on 04/18/21 at 2353 shows no significant interval change in extensive intraventricular hemorrhage. CT head without contrast on 04/19/21 shows no significant change in acute intraventricular hemorrhage and mild ventriculomegaly. CT head without contrast on 04/20/21 shows stable intraventricular hemorrhage and mild ventriculomegaly as described    24 hour results:    Recent Results (from the past 24 hour(s))   GLUCOSE, POC    Collection Time: 04/19/21 11:15 AM   Result Value Ref Range    Glucose (POC) 123 (H) 65 - 100 mg/dL    Performed by [facility] [address] [place] [address], POC    Collection Time: 04/19/21  6:20 PM   Result Value Ref Range    Glucose (POC) 113 (H) 65 - 100 mg/dL    Performed by [facility], POC    Collection Time: 04/20/21 12:18 AM   Result Value Ref Range    Glucose (POC) 90 65 - 100 mg/dL    Performed by Gonsalo Luvishnu    GLUCOSE, POC    Collection Time: 04/20/21  5:44 AM   Result Value Ref Range    Glucose (POC) 104 (H) 65 - 100 mg/dL    Performed by Gonsalo Luke           Assessment:     Active Problems:    Intracranial hemorrhage ([address] (4/18/2021)        Plan:   1. IVH with mild ventriculomegaly   - neuro checks   - CT scan with stable ventricle size. On pt MRI in 2019, he appeared to have slightly enlarged ventricles then so this may be his baseline. Sulci are open bilaterally. - repeat head CT stable. Cont to treat patient conservatively without EVD. 2. HTN   - SBP less than 160 to normotensive   - Cardene PRN   - consider placing an NG tube to restart home meds   - Intensivist following  3. Alcohol abuse   - CIWA and treat DTs but try to not oversedate  4.  Acute metabolic encephalopathy   - multi-factorial   - supportive care   - consider seroquel 25 mg at bedtime if NG tube is placed for meds   - change neuro checks to q2h to help prevent delirium   - restraints PRN   - Intensivist following    Activity: up with assist  DVT ppx: SCDs  Dispo: tbd    Plan d/w Dr. Tiffany Murpyh, ICU nurse, intensivist.      Myesha Arias NP

## 2021-04-20 NOTE — PROGRESS NOTES
Neurology Progress Note  Sarah Khan, WILBUR    Admit Date: 2021   LOS: 2 days      Daily Progress Note: 2021    HPI:Allen D Burnie Osler is a 80 y.o. male with a pmhx of CAD on ASA daily, COPD, CKD 3, HLD, HTN, TESSA, TIA, viral encephalitis and ETOH abuse who presented to [facility] ER on 21 with a 1 day history of nausea, vomiting, headache and AMS. Per chart review, while at ED [facility] he was oriented x 3, able to participate in his exam and answered questions appropriately. A CTH was performed which 2 foci of IPH adjacent to the right lateral ventricle as well as significant intraventricular hemorrhage. His SBP's ranged from 159-172 prior to initiation of cardene gtt. Neurosurgery was consulted and recommended transfer to [facility] with serial imaging. He was then transferred to [facility] ICU for further care and monitoring. Upon arrival he was extremely agitated and disoriented so he was given 5mg valium and started on a precedex gtt. He was so heavily sedated that it was difficult to establish a clinical trend and the benzodiazepines were ultimately reversed and precedex stopped. After a brief pause in sedation he once again became alert and agitated. A CTH was repeated which showed no interval change in hemorrhage. Neurology was consulted for further recommendations. Subjective:     No neuro events overnight, has remained restless and agitated requiring several doses of ativan admin. Allergies   Allergen Reactions    Levaquin [Levofloxacin] Nausea and Vomiting     Reports anxiety, nausea, aching after taking levaquin    Ciprofloxacin Shortness of Breath    Quinolones Shortness of Breath     Review of Systems:  Review of systems not obtained due to patient factors.     Objective:   Vital signs  Temp (24hrs), Av.3 °F (36.8 °C), Min:98 °F (36.7 °C), Max:99.1 °F (37.3 °C)   1901 -  0700  In: 0   Out: 605 [Urine:605]  701 -  1900  In: 392.5 [I.V.:392.5]  Out: 1200 [Urine:1200]  Visit Vitals  BP (!) 184/88   Pulse 62   Temp 98.2 °F (36.8 °C)   Resp 28   Ht 5' 8\" (1.727 m) Comment: From documentation on 12/4/20   Wt 219 lb 5.7 oz (99.5 kg)   SpO2 91%   BMI 33.35 kg/m²    O2 Flow Rate (L/min): 30 l/min O2 Device: Heated, Hi flow nasal cannula   Vitals:    04/20/21 0238 04/20/21 0300 04/20/21 0351 04/20/21 0400   BP:  (!) 168/88 (!) 184/88    Pulse: (!) 57 (!) 57 62    Resp: 28 22 28    Temp:    98.2 °F (36.8 °C)   SpO2: 97% 96% 91%    Weight:       Height:          Physical Exam:  GENERAL: Agitated, restless  SKIN: Warm, dry, color appropriate for ethnicity.      Neurologic Exam:  Mental Status:             Intermittently alert, eyes open to noxious stimuli, oriented to self only. Not following commands. Impulsive behavior.     Language:                   Minimal speech. No comprehension or naming of objects.      Cranial Nerves:           Pupils 3 mm and reactive to light. Left pupil round, right pupil ovoid shape. Blinks to threat. Gaze midline. Facial sensation appears to be intact. Face grossly symmetric. Very hard of hearing (baseline). Shoulder shrug 5/5 bilaterally.                                         Motor:                          Unable to follow commands for strength grading, however purposeful movement x 4 extremities in restraints. Bulk and tone normal. .                                      No involuntary movements.     Sensation:                   Sensation intact throughout to light touch.     Coordination & Gait:   Gait deferred, ataxia not able to be tested.    Labs:  Lab Results   Component Value Date/Time    WBC 11.5 (H) 04/19/2021 12:59 AM    HGB (POC) 14.9 07/27/2018 02:38 PM    HGB 11.7 (L) 04/19/2021 12:59 AM Hematocrit (POC) 45 05/13/2019 07:16 PM    HCT 36.3 (L) 04/19/2021 12:59 AM    PLATELET 359 88/11/8056 12:59 AM    MCV 94.5 04/19/2021 12:59 AM     Lab Results   Component Value Date/Time    Sodium 142 04/19/2021 12:59 AM    Potassium 3.6 04/19/2021 12:59 AM    Chloride 107 04/19/2021 12:59 AM    CO2 26 04/19/2021 12:59 AM    Anion gap 9 04/19/2021 12:59 AM    Glucose 157 (H) 04/19/2021 12:59 AM    BUN 28 (H) 04/19/2021 12:59 AM    Creatinine 1.86 (H) 04/19/2021 12:59 AM    BUN/Creatinine ratio 15 04/19/2021 12:59 AM    GFR est AA 42 (L) 04/19/2021 12:59 AM    GFR est non-AA 35 (L) 04/19/2021 12:59 AM    Calcium 8.8 04/19/2021 12:59 AM     Imaging:  CT Results (maximum last 3): Results from [place] encounter on 04/18/21   CT HEAD WO CONT    Narrative EXAM: CT HEAD WO CONT  Clinical history: Evaluate intraventricular hemorrhage  INDICATION: IVH    COMPARISON: 4/19/2021. CONTRAST: None. TECHNIQUE: Unenhanced CT of the head was performed using 5 mm images. Brain and  bone windows were generated. Coronal and sagittal reformats. CT dose reduction  was achieved through use of a standardized protocol tailored for this  examination and automatic exposure control for dose modulation. FINDINGS:  Intraventricular hemorrhage throughout the lateral ventricles left greater than  right, as well as in the third and fourth ventricles without significant  interval change in amount of hemorrhage or mild ventricular dilatation. 2 small  round foci of hemorrhage along the lateral lining of the posterior right  ventricular body without change. No new intracranial hemorrhage. Patchy  periventricular white matter hypodensities similar to prior study. Vertebral  vascular calcifications.     Impression Stable intraventricular hemorrhage and mild ventriculomegaly as described   CT HEAD WO CONT    Narrative INDICATION: ivh    EXAM:  HEAD CT WITHOUT CONTRAST    COMPARISON: 4/18/2021    TECHNIQUE:  Routine noncontrast axial head CT was performed. Sagittal and  coronal reconstructions were generated. CT dose reduction was achieved through use of a standardized protocol tailored  for this examination and automatic exposure control for dose modulation. FINDINGS:    Intraventricular hemorrhage throughout the lateral ventricles left greater than  right, as well as in the third and fourth ventricles without significant  interval change in amount of hemorrhage or mild ventricular dilatation. 2 small  round foci of hemorrhage along the lateral lining of the posterior right  ventricular body without change. No new intracranial hemorrhage. Patchy  periventricular white matter hypodensities similar to prior study. Paranasal  sinuses and mastoid air cells are clear. Impression No significant change in acute intraventricular hemorrhage and mild  ventriculomegaly as described. CT HEAD WO CONT    Narrative EXAM: CT HEAD WO CONT  Medical history: Neuro changes  INDICATION: neuro changes    COMPARISON: 4/18/2021. CONTRAST: None. TECHNIQUE: Unenhanced CT of the head was performed using 5 mm images. Brain and  bone windows were generated. Coronal and sagittal reformats. CT dose reduction  was achieved through use of a standardized protocol tailored for this  examination and automatic exposure control for dose modulation. FINDINGS:  Ventriculomegaly and periventricular hypodensity is not changed. Punctate foci  of hyperdensity adjacent to the posterior horn of the right lateral ventricle  unchanged. Intraventricular hemorrhage on the right and on the left is not  significantly changed. Ventricular hemorrhage in the third and fourth ventricles  also not significantly changed. . No new midline shift. Vertebral vascular  calcifications. . . .    The bone windows demonstrate no abnormalities. The visualized portions of the  paranasal sinuses and mastoid air cells are clear.       Impression No significant interval change in extensive intraventricular hemorrhage. Assessment:   Active Problems:    Intracranial hemorrhage ([facility] [address] (4/18/2021)      Plan:      IPH with IVH, ICH score: 3              - Initially [place] [id number], CT repeated upon arrival to [facility] showing stable hemorrhage and unchanged ventricles              - Repeat CT head this am stable              - SBP goal less than 140, Cardene/Labetalol PRN              - q1 neuro checks               - A1C and lipid panel pending, LDL goal <70              - PT/OT/SLP evals              - Wean sedation as able, consider nighttime seroquel to assist with agitation               - Stroke education              - NSGY following, no surgical intervention at this time    Plan discussed with Dr. Ag Gutierrez, further recommendations to follow.      Kody Garcia NP  Neurocritical Care Nurse Practitioner

## 2021-04-20 NOTE — PROGRESS NOTES
Reviewed chart for transitions of care, and discussed in rounds. CM met with patient's daughter Dick Brower [phone] to introduce self and explain role. Confirmed demographic information  Baseline:   ADLs/IDALS: Independent PTA  Previous Home Health:Yes, daughter cannot recall the name of the agency  Previous SNF/IPR:None  ER Contact: Daughter Dick Brower (Latonya) [phone]    Patient lives in a single level house with one step to enter. Patient is independent with ADLs/IDALs   Patient has used a cane recently. Patient's preferred pharmacy is [facility]  located in [place]. Transportation TBD  Reason for Admission:  IVH                     RUR Score:  18%                   Plan for utilizing home health:          PCP: First and Last name:  aSmy Reaves MD     Name of Practice:    Are you a current patient: Yes/No: Yes   Approximate date of last visit:    Can you participate in a virtual visit with your PCP: Yes                    Current Advanced Directive/Advance Care Plan: Full Code      Healthcare Decision Maker:   Click here to complete [employer] including selection of the Healthcare Decision Maker Relationship (ie \"Primary\")             Primary Decision Maker: Tiffanie Mae - Daughter - [phone]                  Care manager met with patient's daughter Americo Turner to offer support. Patient presented to the ED at [facility] with a 2 day history of confusion and headache. Patient was started in precedex for agitation and transferred to [facility] for Neurosurgery evaluation. Head CT:shows 2 foci of parenchymal hemorrhage adjacent to the right lateral ventricle associated with significant intraventricular hemorrhage. Per notes, surgery not indicated at this time. Car management will follow for transitions of care.    Leah Castañeda RN,Care Management

## 2021-04-20 NOTE — PROGRESS NOTES
1930: Bedside and Verbal shift change report given to CHEL Ayon RN (oncoming nurse) by JOBY Marshall (offgoing nurse). Report included the following information SBAR, Kardex, ED Summary, Intake/Output, MAR, Accordion, Recent Results, Cardiac Rhythm SR/SB, Alarm Parameters  and Dual Neuro Assessment. 2230: Updates given to pt's daughter, Carmen Barrera, via telephone. 0000: This RN found pt out of mitts and out of one restraint. Re-oriented pt to situation and place and placed restraints back on pt. 0216: Pt transported to CT on the monitor. 2112: Pt returned to floor from CT. Tolerated well. 0335: Pt found out of restraints, attempting to get out of bed. Pt moved from ICU 19 to ICU 6 which is closer to nurse's station. 0530: Pt is restless in bed and continues to be hypertensive despite PRNs given. Cardene started to maintain goal of SBP<160.    0600: Pt remains very agitated, SOB and in respiratory distress, out of restraints, and attempting to get out of bed despite multiple doses of PRN Ativan. Precedex restarted. 0092: Updates given to pt's daughter, Carmen Barrera, via telephone. Discussed overnight events that pt continuously attempts to get out of bed, pulls HiFlow off, has gotten out of restraints and is danger to self. Discussed potential of NGT placement to obtain P.O. access for long term antihypertensives and antianxiety medications. Daughter unsure if this falls within goals of care. Will notified day shift team.     0820: Pt very agitated in bed and pulled HiFlow nasal cannula off with O2 sats 83%, one mitt pulled off by pt. Notified Intensivist. Orders received to give one time dose 2mg Haldol.

## 2021-04-21 ENCOUNTER — APPOINTMENT (OUTPATIENT)
Dept: GENERAL RADIOLOGY | Age: 86
DRG: 064 | End: 2021-04-21
Attending: ANESTHESIOLOGY
Payer: MEDICARE

## 2021-04-21 LAB
ALBUMIN SERPL-MCNC: 3.2 G/DL (ref 3.5–5)
ALBUMIN/GLOB SERPL: 0.9 {RATIO} (ref 1.1–2.2)
ALP SERPL-CCNC: 90 U/L (ref 45–117)
ALT SERPL-CCNC: 26 U/L (ref 12–78)
ANION GAP SERPL CALC-SCNC: 6 MMOL/L (ref 5–15)
AST SERPL-CCNC: 37 U/L (ref 15–37)
BASOPHILS # BLD: 0.1 K/UL (ref 0–0.1)
BASOPHILS NFR BLD: 1 % (ref 0–1)
BILIRUB SERPL-MCNC: 0.6 MG/DL (ref 0.2–1)
BUN SERPL-MCNC: 31 MG/DL (ref 6–20)
BUN/CREAT SERPL: 20 (ref 12–20)
CALCIUM SERPL-MCNC: 8.3 MG/DL (ref 8.5–10.1)
CHLORIDE SERPL-SCNC: 113 MMOL/L (ref 97–108)
CO2 SERPL-SCNC: 26 MMOL/L (ref 21–32)
CREAT SERPL-MCNC: 1.53 MG/DL (ref 0.7–1.3)
DIFFERENTIAL METHOD BLD: ABNORMAL
EOSINOPHIL # BLD: 0.1 K/UL (ref 0–0.4)
EOSINOPHIL NFR BLD: 1 % (ref 0–7)
ERYTHROCYTE [DISTWIDTH] IN BLOOD BY AUTOMATED COUNT: 15.2 % (ref 11.5–14.5)
GLOBULIN SER CALC-MCNC: 3.6 G/DL (ref 2–4)
GLUCOSE BLD STRIP.AUTO-MCNC: 122 MG/DL (ref 65–100)
GLUCOSE BLD STRIP.AUTO-MCNC: 137 MG/DL (ref 65–100)
GLUCOSE BLD STRIP.AUTO-MCNC: 142 MG/DL (ref 65–100)
GLUCOSE BLD STRIP.AUTO-MCNC: 144 MG/DL (ref 65–100)
GLUCOSE BLD STRIP.AUTO-MCNC: 159 MG/DL (ref 65–100)
GLUCOSE SERPL-MCNC: 134 MG/DL (ref 65–100)
HCT VFR BLD AUTO: 38.9 % (ref 36.6–50.3)
HGB BLD-MCNC: 12.3 G/DL (ref 12.1–17)
IMM GRANULOCYTES # BLD AUTO: 0 K/UL (ref 0–0.04)
IMM GRANULOCYTES NFR BLD AUTO: 0 % (ref 0–0.5)
LYMPHOCYTES # BLD: 0.7 K/UL (ref 0.8–3.5)
LYMPHOCYTES NFR BLD: 6 % (ref 12–49)
MAGNESIUM SERPL-MCNC: 2.4 MG/DL (ref 1.6–2.4)
MCH RBC QN AUTO: 30.5 PG (ref 26–34)
MCHC RBC AUTO-ENTMCNC: 31.6 G/DL (ref 30–36.5)
MCV RBC AUTO: 96.5 FL (ref 80–99)
MONOCYTES # BLD: 1.1 K/UL (ref 0–1)
MONOCYTES NFR BLD: 10 % (ref 5–13)
NEUTS SEG # BLD: 9.2 K/UL (ref 1.8–8)
NEUTS SEG NFR BLD: 82 % (ref 32–75)
NRBC # BLD: 0 K/UL (ref 0–0.01)
NRBC BLD-RTO: 0 PER 100 WBC
PLATELET # BLD AUTO: 203 K/UL (ref 150–400)
PMV BLD AUTO: 10.3 FL (ref 8.9–12.9)
POTASSIUM SERPL-SCNC: 4 MMOL/L (ref 3.5–5.1)
PROT SERPL-MCNC: 6.8 G/DL (ref 6.4–8.2)
RBC # BLD AUTO: 4.03 M/UL (ref 4.1–5.7)
RBC MORPH BLD: ABNORMAL
RBC MORPH BLD: ABNORMAL
SERVICE CMNT-IMP: ABNORMAL
SODIUM SERPL-SCNC: 145 MMOL/L (ref 136–145)
WBC # BLD AUTO: 11.2 K/UL (ref 4.1–11.1)

## 2021-04-21 PROCEDURE — 74011250636 HC RX REV CODE- 250/636: Performed by: ANESTHESIOLOGY

## 2021-04-21 PROCEDURE — 80053 COMPREHEN METABOLIC PANEL: CPT

## 2021-04-21 PROCEDURE — 74011250636 HC RX REV CODE- 250/636: Performed by: NURSE PRACTITIONER

## 2021-04-21 PROCEDURE — 99232 SBSQ HOSP IP/OBS MODERATE 35: CPT | Performed by: PSYCHIATRY & NEUROLOGY

## 2021-04-21 PROCEDURE — 74011000250 HC RX REV CODE- 250: Performed by: NURSE PRACTITIONER

## 2021-04-21 PROCEDURE — 74011636637 HC RX REV CODE- 636/637: Performed by: ANESTHESIOLOGY

## 2021-04-21 PROCEDURE — 77010033711 HC HIGH FLOW OXYGEN

## 2021-04-21 PROCEDURE — 87070 CULTURE OTHR SPECIMN AEROBIC: CPT

## 2021-04-21 PROCEDURE — 85025 COMPLETE CBC W/AUTO DIFF WBC: CPT

## 2021-04-21 PROCEDURE — 71045 X-RAY EXAM CHEST 1 VIEW: CPT

## 2021-04-21 PROCEDURE — 74011250637 HC RX REV CODE- 250/637: Performed by: ANESTHESIOLOGY

## 2021-04-21 PROCEDURE — 36415 COLL VENOUS BLD VENIPUNCTURE: CPT

## 2021-04-21 PROCEDURE — 94760 N-INVAS EAR/PLS OXIMETRY 1: CPT

## 2021-04-21 PROCEDURE — APPSS30 APP SPLIT SHARED TIME 16-30 MINUTES: Performed by: NURSE PRACTITIONER

## 2021-04-21 PROCEDURE — 87077 CULTURE AEROBIC IDENTIFY: CPT

## 2021-04-21 PROCEDURE — 65610000006 HC RM INTENSIVE CARE

## 2021-04-21 PROCEDURE — 83735 ASSAY OF MAGNESIUM: CPT

## 2021-04-21 PROCEDURE — 87186 SC STD MICRODIL/AGAR DIL: CPT

## 2021-04-21 PROCEDURE — 74011250637 HC RX REV CODE- 250/637: Performed by: NURSE PRACTITIONER

## 2021-04-21 PROCEDURE — 82962 GLUCOSE BLOOD TEST: CPT

## 2021-04-21 RX ORDER — QUETIAPINE FUMARATE 25 MG/1
25 TABLET, FILM COATED ORAL ONCE
Status: COMPLETED | OUTPATIENT
Start: 2021-04-21 | End: 2021-04-21

## 2021-04-21 RX ORDER — FUROSEMIDE 10 MG/ML
60 INJECTION INTRAMUSCULAR; INTRAVENOUS ONCE
Status: COMPLETED | OUTPATIENT
Start: 2021-04-21 | End: 2021-04-21

## 2021-04-21 RX ORDER — DOCUSATE SODIUM 50 MG/5ML
100 LIQUID ORAL 2 TIMES DAILY
Status: DISCONTINUED | OUTPATIENT
Start: 2021-04-21 | End: 2021-04-22

## 2021-04-21 RX ORDER — QUETIAPINE FUMARATE 25 MG/1
50 TABLET, FILM COATED ORAL 2 TIMES DAILY
Status: DISCONTINUED | OUTPATIENT
Start: 2021-04-21 | End: 2021-04-22

## 2021-04-21 RX ORDER — POLYETHYLENE GLYCOL 3350 17 G/17G
17 POWDER, FOR SOLUTION ORAL DAILY
Status: DISCONTINUED | OUTPATIENT
Start: 2021-04-21 | End: 2021-04-22

## 2021-04-21 RX ORDER — HYDRALAZINE HYDROCHLORIDE 50 MG/1
25 TABLET, FILM COATED ORAL EVERY 8 HOURS
Status: DISCONTINUED | OUTPATIENT
Start: 2021-04-21 | End: 2021-04-22

## 2021-04-21 RX ADMIN — Medication 10 ML: at 06:16

## 2021-04-21 RX ADMIN — POLYETHYLENE GLYCOL 3350 17 G: 17 POWDER, FOR SOLUTION ORAL at 11:46

## 2021-04-21 RX ADMIN — LANSOPRAZOLE 30 MG: KIT at 12:47

## 2021-04-21 RX ADMIN — FUROSEMIDE 60 MG: 10 INJECTION, SOLUTION INTRAMUSCULAR; INTRAVENOUS at 17:45

## 2021-04-21 RX ADMIN — ACETAMINOPHEN 650 MG: 325 TABLET, FILM COATED ORAL at 17:39

## 2021-04-21 RX ADMIN — Medication 10 ML: at 01:38

## 2021-04-21 RX ADMIN — Medication 10 ML: at 22:13

## 2021-04-21 RX ADMIN — INSULIN LISPRO 2 UNITS: 100 INJECTION, SOLUTION INTRAVENOUS; SUBCUTANEOUS at 17:34

## 2021-04-21 RX ADMIN — Medication 20 ML: at 14:17

## 2021-04-21 RX ADMIN — DEXMEDETOMIDINE HYDROCHLORIDE 0.8 MCG/KG/HR: 400 INJECTION INTRAVENOUS at 08:27

## 2021-04-21 RX ADMIN — QUETIAPINE FUMARATE 50 MG: 25 TABLET ORAL at 20:26

## 2021-04-21 RX ADMIN — INSULIN LISPRO 2 UNITS: 100 INJECTION, SOLUTION INTRAVENOUS; SUBCUTANEOUS at 11:46

## 2021-04-21 RX ADMIN — DOCUSATE SODIUM 100 MG: 50 LIQUID ORAL at 11:46

## 2021-04-21 RX ADMIN — QUETIAPINE FUMARATE 25 MG: 25 TABLET ORAL at 08:27

## 2021-04-21 RX ADMIN — DEXMEDETOMIDINE HYDROCHLORIDE 0.8 MCG/KG/HR: 400 INJECTION INTRAVENOUS at 04:56

## 2021-04-21 RX ADMIN — QUETIAPINE FUMARATE 25 MG: 25 TABLET ORAL at 11:46

## 2021-04-21 RX ADMIN — SODIUM CHLORIDE 2.5 MG/HR: 900 INJECTION, SOLUTION INTRAVENOUS at 09:53

## 2021-04-21 RX ADMIN — LABETALOL HYDROCHLORIDE 10 MG: 5 INJECTION INTRAVENOUS at 14:15

## 2021-04-21 RX ADMIN — LORAZEPAM 1 MG: 2 INJECTION INTRAMUSCULAR; INTRAVENOUS at 01:53

## 2021-04-21 RX ADMIN — HYDRALAZINE HYDROCHLORIDE 10 MG: 20 INJECTION INTRAMUSCULAR; INTRAVENOUS at 02:28

## 2021-04-21 RX ADMIN — DEXMEDETOMIDINE HYDROCHLORIDE 0.7 MCG/KG/HR: 400 INJECTION INTRAVENOUS at 19:44

## 2021-04-21 RX ADMIN — FUROSEMIDE 60 MG: 10 INJECTION, SOLUTION INTRAMUSCULAR; INTRAVENOUS at 06:23

## 2021-04-21 RX ADMIN — HYDRALAZINE HYDROCHLORIDE 25 MG: 50 TABLET, FILM COATED ORAL at 22:07

## 2021-04-21 RX ADMIN — HYDRALAZINE HYDROCHLORIDE 25 MG: 50 TABLET, FILM COATED ORAL at 11:46

## 2021-04-21 NOTE — PROGRESS NOTES
04/21/21 0742   Oxygen Therapy   O2 Sat (%) 95 %   Pulse via Oximetry 73 beats per minute   O2 Device Heated; Hi flow nasal cannula   Skin Assessment Clean, dry, & intact   O2 Flow Rate (L/min) 50 l/min   O2 Temperature 93.2 °F (34 °C)   FIO2 (%) 77 %  (weaned)   weaned

## 2021-04-21 NOTE — PROGRESS NOTES
1930- Bedside and Verbal shift change report given to Tracey RN (oncoming nurse) by Jazz Liu RN (offgoing nurse). Report included the following information SBAR, Kardex, ED Summary, Procedure Summary, Intake/Output, MAR, Accordion, Recent Results, Med Rec Status, Cardiac Rhythm NSR, Alarm Parameters  and Dual Neuro Assessment     Pt continued to be restless throughout the night. One PRN dose of ativan given and precedex was titrated accordingly, see MAR. Cardene was restarted after PRN hydralazine did not manage increasing SBP, see MAR. Daughter Jacklyn Campbell was updated x3 throughout the night, at bedside and twice via phone. 0730- Bedside shift change report given to [employer] (oncoming nurse) by Tracey RN (offgoing nurse).  Report included the following information SBAR, Kardex, ED Summary, Procedure Summary, Intake/Output, MAR, Accordion, Recent Results, Med Rec Status, Cardiac Rhythm NSR with PACs, Alarm Parameters  and Dual Neuro Assessment     Cardene @ 2.5mg  Precedex@ 0.8 mcg

## 2021-04-21 NOTE — PROGRESS NOTES
Problem: Risk for Spread of Infection  Goal: Prevent transmission of infectious organism to others  Description: Prevent the transmission of infectious organisms to other patients, staff members, and visitors. Outcome: Progressing Towards Goal     Problem: Non-Violent Restraints  Goal: Removal from restraints as soon as assessed to be safe  Outcome: Progressing Towards Goal  Goal: No harm/injury to patient while restraints in use  Outcome: Progressing Towards Goal  Goal: Patient's dignity will be maintained  Outcome: Progressing Towards Goal  Goal: Patient Interventions  Outcome: Progressing Towards Goal     Problem: Falls - Risk of  Goal: *Absence of Falls  Description: Document Joao Tobar Fall Risk and appropriate interventions in the flowsheet. Outcome: Progressing Towards Goal  Note: Fall Risk Interventions:       Mentation Interventions: Adequate sleep, hydration, pain control    Medication Interventions: Evaluate medications/consider consulting pharmacy    Elimination Interventions: Patient to call for help with toileting needs              Problem: Patient Education: Go to Patient Education Activity  Goal: Patient/Family Education  Outcome: Progressing Towards Goal     Problem: Pressure Injury - Risk of  Goal: *Prevention of pressure injury  Description: Document Aquiles Scale and appropriate interventions in the flowsheet.   Outcome: Progressing Towards Goal  Note: Pressure Injury Interventions:  Sensory Interventions: Assess need for specialty bed, Assess changes in LOC    Moisture Interventions: Absorbent underpads, Internal/External urinary devices, Minimize layers    Activity Interventions: Pressure redistribution bed/mattress(bed type)    Mobility Interventions: Pressure redistribution bed/mattress (bed type), HOB 30 degrees or less    Nutrition Interventions: Document food/fluid/supplement intake    Friction and Shear Interventions: Apply protective barrier, creams and emollients, HOB 30 degrees or less, Minimize layers                Problem: Patient Education: Go to Patient Education Activity  Goal: Patient/Family Education  Outcome: Progressing Towards Goal     Problem: Nutrition Deficit  Goal: *Optimize nutritional status  Outcome: Progressing Towards Goal     Problem: TIA/CVA Stroke: 0-24 hours  Goal: Nutrition/Diet  Outcome: Progressing Towards Goal  Goal: Discharge Planning  Outcome: Progressing Towards Goal  Goal: Medications  Outcome: Progressing Towards Goal  Goal: Treatments/Interventions/Procedures  Outcome: Progressing Towards Goal  Goal: Minimize risk of bleeding post-thrombolytic infusion  Outcome: Progressing Towards Goal  Goal: Monitor for complications post-thrombolytic infusion  Outcome: Progressing Towards Goal  Goal: *Hemodynamically stable  Outcome: Progressing Towards Goal  Goal: *Absence of Signs of Aspiration on Current Diet  Outcome: Progressing Towards Goal  Goal: *Absence of deep venous thrombosis signs and symptoms(Stroke Metric)  Outcome: Progressing Towards Goal  Goal: *Stroke education started(Stroke Metric)  Outcome: Progressing Towards Goal  Goal: *Dysphagia screen performed(Stroke Metric)  Outcome: Progressing Towards Goal  Goal: *Rehab consulted(Stroke Metric)  Outcome: Progressing Towards Goal     Problem: TIA/CVA Stroke: Day 2 Until Discharge  Goal: Activity/Safety  Outcome: Progressing Towards Goal  Goal: Nutrition/Diet  Outcome: Progressing Towards Goal  Goal: Discharge Planning  Outcome: Progressing Towards Goal  Goal: Medications  Outcome: Progressing Towards Goal  Goal: Treatments/Interventions/Procedures  Outcome: Progressing Towards Goal  Goal: *Absence of aspiration  Outcome: Progressing Towards Goal  Goal: *Absence of deep venous thrombosis signs and symptoms(Stroke Metric)  Outcome: Progressing Towards Goal  Goal: *Optimal pain control at patient's stated goal  Outcome: Progressing Towards Goal  Goal: *Tolerating diet  Outcome: Progressing Towards Goal  Goal: *Stroke education continued(Stroke Metric)  Outcome: Progressing Towards Goal

## 2021-04-21 NOTE — PROGRESS NOTES
0730: Bedside and Verbal shift change report given to Lionel Shultz RN (oncoming nurse) by ELVIA Khan (offgoing nurse). Report included the following information SBAR, Kardex, Intake/Output, MAR, Recent Results, Cardiac Rhythm NSR, Alarm Parameters  and Dual Neuro Assessment. Shift Summary: Cardene gtt titrated off, maintain SBP <160. PRN Labetalol given x1. Precedex titrated throughout shift, infusing 0.7 mcg/kg/hr. TMax 101.1, PRN Tylenol given x1. Dobhoff placed and Tube Feed infusing goal 60 ml/hr. Family support provided to daughter at patient's bedside throughout shift.     1930: Bedside and Verbal shift change report given to Moraima Curtis RN (oncoming nurse) by Lionel Shultz RN (offgoing nurse). Report included the following information SBAR, Kardex, Intake/Output, MAR, Recent Results, Cardiac Rhythm Sinus Bradycardia, Alarm Parameters  and Dual Neuro Assessment.

## 2021-04-21 NOTE — PROGRESS NOTES
Neurosurgery Progress Note  Jose Maria Duran, ACNP-BC          Admit Date: 2021   LOS: 3 days        Daily Progress Note: 2021    HPI: The patient presented from home with headache, nausea, vomiting. He also told his daughter he felt \"crazy in the head\" yesterday. 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 He has had headaches for the past 2 days. He presented to the ER at [facility]. His head CT revealed intraventricular hemorrhage. He transferred to [address] [place] [address] for further evaluation. He has been agitated, requiring precedex an mitts. He did receive flumazenil last night because of difficulty obtaining neuro exam after receiving valium. This morning he is wide awake in bed, extremely restless, on precedex. Subjective: The patient received seroquel 25 mg last night and this morning. He also required 1mg IV ativan overnight. He is lethargic this morning. His daughter is at bedside. Unable to obtain ROS due to AMS. Objective:     Vital signs  Temp (24hrs), Av.5 °F (37.5 °C), Min:98.3 °F (36.8 °C), Max:100 °F (37.8 °C)    07 -  1900  In: 728 [I.V.:203]  Out: 900 [Urine:900]  1901 -  0700  In: 1597.9 [I.V.:797.9]  Out: 8649 [Urine:1855]    Visit Vitals  /67   Pulse 77   Temp 99.8 °F (37.7 °C)   Resp 26   Ht 5' 8\" (1.727 m)   Wt 99.5 kg (219 lb 5.7 oz)   SpO2 97%   BMI 33.35 kg/m²    O2 Flow Rate (L/min): 50 l/min O2 Device: Hi flow nasal cannula     Pain control  Pain Assessment  Pain Scale 1: Adult Nonverbal Pain Scale  Pain Intensity 1: 0  Pain Intervention(s) 1: Medication (see MAR)    PT/OT  Gait                 Physical Exam:  Gen:NAD. On Precedex 0.6 mcg/kg/hr. Paused for exam.  Neuro: Lethargic. Not opening eyes or talking to me today. Not following commands. PERRL. PEREZ spontaneously when he wakes up  Gait deferred.     CT head without contrast on 21 at 1713 shows 2 foci of parenchymal hemorrhage adjacent to the right lateral ventricle associated with significant intraventricular hemorrhage. CT head without contrast on 04/18/21 at 2353 shows no significant interval change in extensive intraventricular hemorrhage. CT head without contrast on 04/19/21 shows no significant change in acute intraventricular hemorrhage and mild ventriculomegaly. CT head without contrast on 04/20/21 shows stable intraventricular hemorrhage and mild ventriculomegaly as described    24 hour results:    Recent Results (from the past 24 hour(s))   GLUCOSE, POC    Collection Time: 04/20/21  5:52 PM   Result Value Ref Range    Glucose (POC) 110 (H) 65 - 100 mg/dL    Performed by Ashley Coreas    GLUCOSE, POC    Collection Time: 04/21/21 12:39 AM   Result Value Ref Range    Glucose (POC) 122 (H) 65 - 100 mg/dL    Performed by Delmy Bowers Thompson, COMPREHENSIVE    Collection Time: 04/21/21  5:08 AM   Result Value Ref Range    Sodium 145 136 - 145 mmol/L    Potassium 4.0 3.5 - 5.1 mmol/L    Chloride 113 (H) 97 - 108 mmol/L    CO2 26 21 - 32 mmol/L    Anion gap 6 5 - 15 mmol/L    Glucose 134 (H) 65 - 100 mg/dL    BUN 31 (H) 6 - 20 MG/DL    Creatinine 1.53 (H) 0.70 - 1.30 MG/DL    BUN/Creatinine ratio 20 12 - 20      GFR est AA 53 (L) >60 ml/min/1.73m2    GFR est non-AA 43 (L) >60 ml/min/1.73m2    Calcium 8.3 (L) 8.5 - 10.1 MG/DL    Bilirubin, total 0.6 0.2 - 1.0 MG/DL    ALT (SGPT) 26 12 - 78 U/L    AST (SGOT) 37 15 - 37 U/L    Alk.  phosphatase 90 45 - 117 U/L    Protein, total 6.8 6.4 - 8.2 g/dL    Albumin 3.2 (L) 3.5 - 5.0 g/dL    Globulin 3.6 2.0 - 4.0 g/dL    A-G Ratio 0.9 (L) 1.1 - 2.2     CBC WITH AUTOMATED DIFF    Collection Time: 04/21/21  5:08 AM   Result Value Ref Range    WBC 11.2 (H) 4.1 - 11.1 K/uL    RBC 4.03 (L) 4.10 - 5.70 M/uL    HGB 12.3 12.1 - 17.0 g/dL    HCT 38.9 36.6 - 50.3 %    MCV 96.5 80.0 - 99.0 FL    MCH 30.5 26.0 - 34.0 PG    MCHC 31.6 30.0 - 36.5 g/dL    RDW 15.2 (H) 11.5 - 14.5 %    PLATELET 206 822 - 304 K/uL    MPV 10.3 8.9 - 12.9 FL    NRBC 0.0 0  WBC    ABSOLUTE NRBC 0.00 0.00 - 0.01 K/uL    NEUTROPHILS 82 (H) 32 - 75 %    LYMPHOCYTES 6 (L) 12 - 49 %    MONOCYTES 10 5 - 13 %    EOSINOPHILS 1 0 - 7 %    BASOPHILS 1 0 - 1 %    IMMATURE GRANULOCYTES 0 0.0 - 0.5 %    ABS. NEUTROPHILS 9.2 (H) 1.8 - 8.0 K/UL    ABS. LYMPHOCYTES 0.7 (L) 0.8 - 3.5 K/UL    ABS. MONOCYTES 1.1 (H) 0.0 - 1.0 K/UL    ABS. EOSINOPHILS 0.1 0.0 - 0.4 K/UL    ABS. BASOPHILS 0.1 0.0 - 0.1 K/UL    ABS. IMM. GRANS. 0.0 0.00 - 0.04 K/UL    DF SMEAR SCANNED      RBC COMMENTS ANISOCYTOSIS  1+        RBC COMMENTS MACROCYTOSIS  1+       MAGNESIUM    Collection Time: 04/21/21  5:08 AM   Result Value Ref Range    Magnesium 2.4 1.6 - 2.4 mg/dL   GLUCOSE, POC    Collection Time: 04/21/21  7:02 AM   Result Value Ref Range    Glucose (POC) 137 (H) 65 - 100 mg/dL    Performed by [id number], POC    Collection Time: 04/21/21 11:37 AM   Result Value Ref Range    Glucose (POC) 142 (H) 65 - 100 mg/dL    Performed by Samuel Pettit           Assessment:     Active Problems:    Intracranial hemorrhage ([address] (4/18/2021)        Plan:   1. IVH with mild ventriculomegaly   - neuro checks   - CT scan with stable ventricle size. On pt MRI in 2019, he appeared to have slightly enlarged ventricles then so this may be his baseline. Sulci are open bilaterally. - repeat head CT stable. Cont to treat patient conservatively without EVD. - Will discuss with Dr. Marcus Zuniga the timing of another CT scan and if it is needed. 2. HTN   - SBP less than 160 to normotensive   - Cardene PRN   - cont hydralazine   - Intensivist following  3. Alcohol abuse   - CIWA and treat DTs but try to not oversedate   - Ativan PRN  4. Acute metabolic encephalopathy   - multi-factorial   - supportive care   - cont seroquel bid.  Discussed with daughter we can see how he does and the dose can be titrated up or down pending his response   - neuro checks to q2h to help prevent delirium   - restraints PRN   - Intensivist following    Activity: up with assist  DVT ppx: SCDs  Dispo: tbd    Plan d/w Dr. Emerson Byrd, ICU nurse, intensivist. Spoke with daughter at bedside and reviewed situation and plans for the day.        Taniya Gómez NP

## 2021-04-21 NOTE — PROGRESS NOTES
Neurology Progress Note  Lindsey Khan, WILBUR    Admit Date: 2021   LOS: 3 days      Daily Progress Note: 2021    HPI:Del Dowd is a 80 y.o. male with a pmhx of CAD on ASA daily, COPD, CKD 3, HLD, HTN, TESSA, TIA, viral encephalitis and ETOH abuse who presented to [facility] ER on 21 with a 1 day history of nausea, vomiting, headache and AMS. Per chart review, while at ED [facility] he was oriented x 3, able to participate in his exam and answered questions appropriately. A CTH was performed which 2 foci of IPH adjacent to the right lateral ventricle as well as significant intraventricular hemorrhage. His SBP's ranged from 159-172 prior to initiation of cardene gtt. Neurosurgery was consulted and recommended transfer to [facility] with serial imaging. He was then transferred to [facility] ICU for further care and monitoring. Upon arrival he was extremely agitated and disoriented so he was given 5mg valium and started on a precedex gtt. He was so heavily sedated that it was difficult to establish a clinical trend and the benzodiazepines were ultimately reversed and precedex stopped. After a brief pause in sedation he once again became alert and agitated. A CTH was repeated which showed no interval change in hemorrhage. Neurology was consulted for further recommendations. Subjective:     No neuro events overnight. Allergies   Allergen Reactions    Levaquin [Levofloxacin] Nausea and Vomiting     Reports anxiety, nausea, aching after taking levaquin    Ciprofloxacin Shortness of Breath    Quinolones Shortness of Breath     Review of Systems:  Review of systems not obtained due to patient factors.     Objective:   Vital signs  Temp (24hrs), Av.3 °F (37.4 °C), Min:98.2 °F (36.8 °C), Max:100 °F (37.8 °C)   1901 -  0700  In: 192.4 [I.V.:12.4]  Out: 40 [Urine:40]  701 -  1900  In: 921.1 [I.V.:711.1]  Out: 2180 [Urine:2180]  Visit Vitals  BP (!) 136/53   Pulse 62   Temp 99 °F (37.2 °C)   Resp (!) 33 Ht 5' 8\" (1.727 m)   Wt 219 lb 5.7 oz (99.5 kg)   SpO2 99%   BMI 33.35 kg/m²    O2 Flow Rate (L/min): 50 l/min O2 Device: Hi flow nasal cannula   Vitals:    04/20/21 2200 04/20/21 2300 04/21/21 0000 04/21/21 0100   BP: 139/74 119/87 (!) 130/57 (!) 136/53   Pulse: 65 (!) 58 (!) 57 62   Resp: 28 (!) 37 (!) 40 (!) 33   Temp:       SpO2: 99% 99% 100% 99%   Weight:       Height:          Physical Exam:  GENERAL: Agitated, restless, precedex running at 0.5 mcg/kg/hr (not turned off for exam)  SKIN: Warm, dry, color appropriate for ethnicity.      Neurologic Exam:  Mental Status:             Intermittently alert, eyes open to noxious stimuli, oriented to self only. Not following commands. Impulsive behavior.     Language:                   Minimal speech. No comprehension or naming of objects.      Cranial Nerves:           Pupils 3 mm and reactive to light. Left pupil round, right pupil ovoid shape. Blinks to threat. Gaze midline. Facial sensation appears to be intact. Face grossly symmetric. Very hard of hearing (baseline). Shoulder shrug 5/5 bilaterally.                                         Motor:                          Unable to follow commands for strength grading, however purposeful movement x 4 extremities in restraints. Bulk and tone normal. .                                      No involuntary movements.     Sensation:                   Sensation intact throughout to light touch.     Coordination & Gait:   Gait deferred, ataxia not able to be tested.    Labs:  Lab Results   Component Value Date/Time    WBC 11.5 (H) 04/19/2021 12:59 AM    HGB (POC) 14.9 07/27/2018 02:38 PM    HGB 11.7 (L) 04/19/2021 12:59 AM    Hematocrit (POC) 45 05/13/2019 07:16 PM    HCT 36.3 (L) 04/19/2021 12:59 AM    PLATELET 397 81/04/8998 12:59 AM    MCV 94.5 04/19/2021 12:59 AM     Lab Results   Component Value Date/Time    Sodium 142 04/19/2021 12:59 AM    Potassium 3.6 04/19/2021 12:59 AM    Chloride 107 04/19/2021 12:59 AM    CO2 26 04/19/2021 12:59 AM    Anion gap 9 04/19/2021 12:59 AM    Glucose 157 (H) 04/19/2021 12:59 AM    BUN 28 (H) 04/19/2021 12:59 AM    Creatinine 1.86 (H) 04/19/2021 12:59 AM    BUN/Creatinine ratio 15 04/19/2021 12:59 AM    GFR est AA 42 (L) 04/19/2021 12:59 AM    GFR est non-AA 35 (L) 04/19/2021 12:59 AM    Calcium 8.8 04/19/2021 12:59 AM     Imaging:  CT Results (maximum last 3): Results from [place] encounter on 04/18/21   CT HEAD WO CONT    Narrative EXAM: CT HEAD WO CONT  Clinical history: Evaluate intraventricular hemorrhage  INDICATION: IVH    COMPARISON: 4/19/2021. CONTRAST: None. TECHNIQUE: Unenhanced CT of the head was performed using 5 mm images. Brain and  bone windows were generated. Coronal and sagittal reformats. CT dose reduction  was achieved through use of a standardized protocol tailored for this  examination and automatic exposure control for dose modulation. FINDINGS:  Intraventricular hemorrhage throughout the lateral ventricles left greater than  right, as well as in the third and fourth ventricles without significant  interval change in amount of hemorrhage or mild ventricular dilatation. 2 small  round foci of hemorrhage along the lateral lining of the posterior right  ventricular body without change. No new intracranial hemorrhage. Patchy  periventricular white matter hypodensities similar to prior study. Vertebral  vascular calcifications. Impression Stable intraventricular hemorrhage and mild ventriculomegaly as described   CT HEAD WO CONT    Narrative INDICATION: ivh    EXAM:  HEAD CT WITHOUT CONTRAST    COMPARISON: 4/18/2021    TECHNIQUE:  Routine noncontrast axial head CT was performed.   Sagittal and  coronal reconstructions were generated. CT dose reduction was achieved through use of a standardized protocol tailored  for this examination and automatic exposure control for dose modulation. FINDINGS:    Intraventricular hemorrhage throughout the lateral ventricles left greater than  right, as well as in the third and fourth ventricles without significant  interval change in amount of hemorrhage or mild ventricular dilatation. 2 small  round foci of hemorrhage along the lateral lining of the posterior right  ventricular body without change. No new intracranial hemorrhage. Patchy  periventricular white matter hypodensities similar to prior study. Paranasal  sinuses and mastoid air cells are clear. Impression No significant change in acute intraventricular hemorrhage and mild  ventriculomegaly as described. CT HEAD WO CONT    Narrative EXAM: CT HEAD WO CONT  Medical history: Neuro changes  INDICATION: neuro changes    COMPARISON: 4/18/2021. CONTRAST: None. TECHNIQUE: Unenhanced CT of the head was performed using 5 mm images. Brain and  bone windows were generated. Coronal and sagittal reformats. CT dose reduction  was achieved through use of a standardized protocol tailored for this  examination and automatic exposure control for dose modulation. FINDINGS:  Ventriculomegaly and periventricular hypodensity is not changed. Punctate foci  of hyperdensity adjacent to the posterior horn of the right lateral ventricle  unchanged. Intraventricular hemorrhage on the right and on the left is not  significantly changed. Ventricular hemorrhage in the third and fourth ventricles  also not significantly changed. . No new midline shift. Vertebral vascular  calcifications. . . .    The bone windows demonstrate no abnormalities. The visualized portions of the  paranasal sinuses and mastoid air cells are clear. Impression No significant interval change in extensive intraventricular hemorrhage. Assessment:   Active Problems:    Intracranial hemorrhage ([facility] [address] (4/18/2021)      Plan:      IPH with IVH, ICH score: 3              - Initially seen on CT head at [id number], CT repeated upon arrival to [facility] showing stable hemorrhage and unchanged ventricles              - Repeat CT head this am stable              - SBP goal less than 140, Cardene/Labetalol PRN              - q1 neuro checks               - A1C ok at 5.9    - Lipid panel showing LDL 74.8              - PT/OT/SLP evals              - Wean sedation as able, currently on precedex, seroquel added to assist              - Stroke education              - NSGY following, no surgical intervention at this time    Plan discussed with Dr. Kelsie Duarte, further recommendations to follow.      Lanette Hammonds NP  Neurocritical Care Nurse Practitioner

## 2021-04-21 NOTE — PROGRESS NOTES
Spoke to pts daughter yesterday and films reviewed with her. I think agitation and confusion are multi-factorial.  He has not developed progressive hcp over multiple ct scans since admission. Sulci and cisterns widely defined.   Cont to follow clinically and radiographically

## 2021-04-21 NOTE — PROGRESS NOTES
[facility]    ICU TEAM Progress Note    Name: Tommie Lua   : 1934   MRN: [id number]   Date: 2021           ICU Assessment     1. Intraventricular hemorrhage with ventriculomegaly  2. Altered mental status  3. Acute metabolic encephalopathy  4. Possible alcohol withdrawal  5. Hypertension             ICU Comprehensive Plan of Care:     1. Neurological System: IVH with mild ventriculomegaly complicated by altered mental status requiring precedex infusion. Neurosurgery following and no intervention right now. AMS likely a result of IVH and not alcohol withdrawal. Have switched to seroquel to see if that can give us a better mental status and get off the precedex drip. - Continue precedex for agitation.    - Increased seroquel to 50mg BID.    - Continuing CIWA with ativan, try to avoid benzos to prevent delirium if not withdrawing.   - NSGY following.    - q2h neurochecks   - SBP < 140, currently on nicardipine    2. Cardiovascular System: He has a strict SBP goal per neurosurgery and is requiring cardene to achieve the goal   - Continue cardene for SBP < 140   - Added home hydralazine to help come off cardene.    - Transfusion Trigger (Hgb): <7 g/dL   - Keep K > 4; Mg > 2     3. Respiratory System: His agitation is causing hypoxia requiring HFNC. When he pulls it off he desaturates to the low 80s. Continues to be tenuous. - Trialing lasix to improve pulmonary status   - Continue HFNC and wean to NC if possible    - Pulmonary toilet: Incentive Spirometry    - SpO2 Goal: > 92%   - DVT Prophylaxis: SCD's or Sequential Compression Device     4. Renal/GI/Endocrine System   - DHT for enteral access   - IVFs: none at this time   - Ulcer Prophylaxis: Pepcid (famotidine)    - Bowel Regimen: BM    - Feeding: TFs   - Blood Sugar Goal 120-180 - Glycemic Control: Insulin    5. Infectious Disease: No concern for infection at this time.    - Tubes: None   - Lines: Peripheral IV   - Drains: Yu Catheter      6. PT/OT: PT consulted and on board and OT consulted and on board     7. Goals of Care Discussion with family Yes     8. Plan of Care/Code Status: Full Code    9. Discussed Care Plan with Bedside RN    10. Documentation of Current Medications    Subjective:   Progress Note: 4/21/2021      Reason for ICU Admission: Intraventricular hemorrhage.      This is an 80year old male that presented to [facility] ED for 2 day hx of headache and confusion. He was seen at the OSH and was found to have an intraventricular hemorrhage and he was then transferred to [facility] for neurosurgical services. He presented to the ICU in an agitated state which required precedex infusion and IV bolus of valium. Overnight Events:   4/21/2021  - Agitated again requiring turning on of precedex. Seroquel added. Lasix given overnight. Active Problem List:     Problem List  Date Reviewed: 9/21/2020          Codes Class    Intracranial hemorrhage (HCC) ICD-10-CM: I62.9  ICD-9-CM: 432.9         CKD (chronic kidney disease) stage 3, GFR 30-59 ml/min ([facility] [address] ICD-10-CM: N18.30  ICD-9-CM: 525. 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 9         Altered mental state ICD-10-CM: R41.82  ICD-9-CM: 780.97         Facial droop ICD-10-CM: R29.810  ICD-9-CM: 781.94         Acute blood loss anemia ICD-10-CM: D62  ICD-9-CM: 285.1         Rectal bleeding ICD-10-CM: K62.5  ICD-9-CM: 569.3         Pneumonia ICD-10-CM: J18.9  ICD-9-CM: 486         Severe sepsis (HCC) ICD-10-CM: A41.9, R65.20  ICD-9-CM: 038.9, 995.92         Flank pain ICD-10-CM: R10.9  ICD-9-CM: 789.09         Spinal stenosis of lumbar region at multiple levels ICD-10-CM: M48.061  ICD-9-CM: 724.02         Bilateral carotid artery stenosis ICD-10-CM: I65.23  ICD-9-CM: 433.10, 433.30 Severe obesity (HCC) ICD-10-CM: E66.01  ICD-9-CM: 278.01         TESSA on CPAP ICD-10-CM: G47.33, Z99.89  ICD-9-CM: 327.23, V46.8         Simple chronic bronchitis (HCC) ICD-10-CM: J41.0  ICD-9-CM: 491.0         Fatigue ICD-10-CM: R53.83  ICD-9-CM: 780.79         CAD (coronary artery disease) ICD-10-CM: I25.10  ICD-9-CM: 414.00         Dyslipidemia ICD-10-CM: E78.5  ICD-9-CM: 272.4         On statin therapy ICD-10-CM: Z79.899  ICD-9-CM: V58.69         Gout ICD-10-CM: M10.9  ICD-9-CM: 274.9         GERD (gastroesophageal reflux disease) ICD-10-CM: K21.9  ICD-9-CM: 530.81         TIA (transient ischemic attack) ICD-10-CM: G45.9  ICD-9-CM: 435.9         Dyspnea ICD-10-CM: R06.00  ICD-9-CM: 786.09         Colon polyps ICD-10-CM: K63.5  ICD-9-CM: 211.3         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  ICD-9-CM: 528.6         Hypertension ICD-10-CM: I10  ICD-9-CM: 401.9         ED (erectile dysfunction) ICD-10-CM: N52.9  ICD-9-CM: 607.84         Hypoxia ICD-10-CM: R09.02  ICD-9-CM: 799.02         Neurogenic claudication ICD-10-CM: M48.062  ICD-9-CM: 724.03               Past Medical History:      has a past medical history of Adverse effect of anesthesia, Alcohol abuse, Arthritis, CAD (coronary artery disease), Chronic obstructive pulmonary disease ([facility] Chronic obstructive pulmonary disease ([facility] [address] CKD (chronic kidney disease) stage 3, GFR 30-59 ml/min ([facility] [address] (9/21/2020), Dyslipidemia (8/16/2017), Dyspepsia and other specified disorders of function of stomach, Dyspnea (8/16/2017), ED (erectile dysfunction) (8/16/2017), Encounter for immunization (8/16/2017), Fatigue (8/16/2017), Flank pain (8/1/2019), GERD (gastroesophageal reflux disease) (8/16/2017), Gout (8/16/2017), Hypertension, 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 Past Surgical History:      has a past surgical history that includes pr cardiac surg procedure unlist (1996); pr cardiac surg procedure unlist (04/2019); hx hernia repair; hx hernia repair (11/09/15); hx urological; colonoscopy (N/A, 9/27/2019); upper gi endoscopy,biopsy (9/30/2019); and hx orthopaedic. Home Medications:     Prior to Admission medications    Medication Sig Start Date End Date Taking? Authorizing Provider   atenoloL (TENORMIN) 100 mg tablet TAKE ONE TABLET BY MOUTH DAILY 4/14/21   Elias Rivera MD   atorvastatin (LIPITOR) 80 mg tablet TAKE ONE TABLET BY MOUTH DAILY 3/15/21   Elias Rivera MD   furosemide (LASIX) 80 mg tablet TAKE ONE TABLET BY MOUTH DAILY 2/1/21   Elias Rivera MD   thiamine HCL (B-1) 100 mg tablet TAKE ONE BY MOUTH DAILY 12/21/20   Elias Rivera MD   isosorbide mononitrate ER (IMDUR) 30 mg tablet TAKE ONE TABLET BY MOUTH DAILY 12/21/20   Elias Rivera MD   hydrALAZINE (APRESOLINE) 50 mg tablet TAKE ONE TABLET BY MOUTH THREE TIMES A DAY 12/21/20   Elias Rivera MD   pantoprazole (PROTONIX) 40 mg tablet TAKE ONE TABLET BY MOUTH DAILY 9/22/20   Elias Rivera MD   DULoxetine (CYMBALTA) 30 mg capsule TAKE ONE CAPSULE BY MOUTH DAILY 9/16/20   Elias Rivera MD   melatonin 5 mg cap capsule Take 5 mg by mouth nightly. Pt. Take 2 tabs a night    Provider, Historical   acetaminophen (TYLENOL) 500 mg tablet Take 1,000 mg by mouth every six (6) hours as needed for Pain. Provider, Historical   aspirin delayed-release 81 mg tablet Take 81 mg by mouth daily. Provider, Historical   ANORO ELLIPTA 62.5-25 mcg/actuation inhaler Take 1 Puff by inhalation daily. 12/26/18   Provider, Historical       Allergies/Social/Family History:      Allergies   Allergen Reactions    Levaquin [Levofloxacin] Nausea and Vomiting     Reports anxiety, nausea, aching after taking levaquin    Ciprofloxacin Shortness of Breath    Quinolones Shortness of Breath      Social History     Tobacco Use    Smoking status: Former Smoker     Packs/day: 0.50     Years: 20.00     Pack years: 10.00    Smokeless tobacco: Former User    Tobacco comment: quit about 40  years ago   / used to dip tobaco and dip snuff   Substance Use Topics    Alcohol use: Yes     Comment: \"Drinks very little now for about a month \"      Family History   Problem Relation Age of Onset    Heart Disease Mother     Hypertension Mother     Hypertension Father     Hypertension Sister     Hypertension Brother     Cancer Brother        Review of Systems:     Pertinent items are noted in HPI. Objective:   Vital Signs:  Visit Vitals  BP (!) 144/73 (BP 1 Location: Left arm, BP Patient Position: At rest)   Pulse 96   Temp (!) 100.8 °F (38.2 °C)   Resp 24   Ht 5' 8\" (1.727 m)   Wt 99.5 kg (219 lb 5.7 oz)   SpO2 99%   BMI 33.35 kg/m²    O2 Flow Rate (L/min): 50 l/min O2 Device: Heated, Hi flow nasal cannula Temp (24hrs), Av.6 °F (37.6 °C), Min:98.3 °F (36.8 °C), Max:100.8 °F (38.2 °C)           Intake/Output:     Intake/Output Summary (Last 24 hours) at 2021 1727  Last data filed at 2021 1600  Gross per 24 hour   Intake 1749.39 ml   Output 1935 ml   Net -185.61 ml       Physical Exam:    General:  Agitated, well noursished, well developed, appears stated age   Eyes:  Sclera anicteric. Pupils equally round and reactive to light. Mouth/Throat: Mucous membranes normal, oral pharynx clear   Neck: Supple   Lungs:   Clear to auscultation bilaterally, good effort. HFNC in place. CV:  Regular rate and rhythm,no murmur, click, rub or gallop   Abdomen:   Soft, non-tender.  bowel sounds normal. non-distended   Extremities: No cyanosis or edema   Skin: Skin color, texture, turgor normal. no acute rash or lesions   Lymph nodes: Cervical and supraclavicular normal   Musculoskeletal: No swelling or deformity   Lines/Devices:  Intact, no erythema, drainage or tenderness   Psych: Agitated          LABS AND  DATA: Personally reviewed  Recent Labs     04/21/21  0508 04/19/21  0059   WBC 11.2* 11.5*   HGB 12.3 11.7*   HCT 38.9 36.3*    215     Recent Labs     04/21/21  0508 04/19/21  0059    142   K 4.0 3.6   * 107   CO2 26 26   BUN 31* 28*   CREA 1.53* 1.86*   * 157*   CA 8.3* 8.8   MG 2.4 1.8   PHOS  --  3.8     Recent Labs     04/21/21  0508   AP 90   TP 6.8   ALB 3.2*   GLOB 3.6     Recent Labs     04/19/21  0059   INR 1.3*   PTP 13.5*   APTT 37.6*      Recent Labs     04/18/21 2032   PHI 7.45   PCO2I 39.3   PO2I 56*   FIO2I 28     No results for input(s): CPK, CKMB, TROIQ, BNPP in the last 72 hours. Hemodynamics:   PAP:   CO:     Wedge:   CI:     CVP:    SVR:       PVR:       Ventilator Settings:  Mode Rate Tidal Volume Pressure FiO2 PEEP            70 %       Peak airway pressure:      Minute ventilation:          MEDS: Reviewed    Chest X-Ray:  CXR Results  (Last 48 hours)               04/21/21 0814  XR CHEST PORT Final result    Impression:  1. New bilateral pleural effusions with new areas of moderate atelectasis   medially in each lower lobe. Narrative:  EXAM: XR CHEST PORT       INDICATION: Evaluate for pneumonia       COMPARISON: 4/18/2021       FINDINGS: A portable AP radiograph of the chest was obtained at 0812 hours. The   patient is on a cardiac monitor. Feeding tube courses into the stomach the   distal tip is not seen. There is persistent cardiomegaly. Coronary stents   identified. There are new small bilateral pleural effusions mostly in the subpulmonic   location. Additionally there is new moderate atelectasis medially in each lower   lobe. No pneumonia is identified. ECHO:  4/13/2020: Nml BiV function    Multidisciplinary Rounds Completed:   Yes    ABCDEF Bundle/Checklist Completed:  Yes    SPECIAL EQUIPMENT  None    DISPOSITION  Stay in ICU    CRITICAL CARE CONSULTANT NOTE  I had a face to face encounter with the patient, reviewed and interpreted patient data including clinical events, labs, images, vital signs, I/O's, and examined patient. I have discussed the case and the plan and management of the patient's care with the consulting services, the bedside nurses and the respiratory therapist.      NOTE OF PERSONAL INVOLVEMENT IN CARE   This patient has a high probability of imminent, clinically significant deterioration, which requires the highest level of preparedness to intervene urgently. I participated in the decision-making and personally managed or directed the management of the following life and organ supporting interventions that required my frequent assessment to treat or prevent imminent deterioration. I personally spent 38 minutes of critical care time. This is time spent at this critically ill patient's bedside actively involved in patient care as well as the coordination of care. This does not include any procedural time which has been billed separately.     Kristie Ward MD  Staff Intensivist/Anesthesiologist  [facility]  4/21/2021

## 2021-04-22 ENCOUNTER — APPOINTMENT (OUTPATIENT)
Dept: CT IMAGING | Age: 86
DRG: 064 | End: 2021-04-22
Attending: NEUROLOGICAL SURGERY
Payer: MEDICARE

## 2021-04-22 ENCOUNTER — APPOINTMENT (OUTPATIENT)
Dept: GENERAL RADIOLOGY | Age: 86
DRG: 064 | End: 2021-04-22
Attending: ANESTHESIOLOGY
Payer: MEDICARE

## 2021-04-22 LAB
ANION GAP SERPL CALC-SCNC: 6 MMOL/L (ref 5–15)
BUN SERPL-MCNC: 37 MG/DL (ref 6–20)
BUN/CREAT SERPL: 21 (ref 12–20)
CALCIUM SERPL-MCNC: 8.5 MG/DL (ref 8.5–10.1)
CHLORIDE SERPL-SCNC: 112 MMOL/L (ref 97–108)
CO2 SERPL-SCNC: 27 MMOL/L (ref 21–32)
CREAT SERPL-MCNC: 1.73 MG/DL (ref 0.7–1.3)
ERYTHROCYTE [DISTWIDTH] IN BLOOD BY AUTOMATED COUNT: 15.3 % (ref 11.5–14.5)
GLUCOSE BLD STRIP.AUTO-MCNC: 125 MG/DL (ref 65–100)
GLUCOSE BLD STRIP.AUTO-MCNC: 142 MG/DL (ref 65–100)
GLUCOSE BLD STRIP.AUTO-MCNC: 146 MG/DL (ref 65–100)
GLUCOSE BLD STRIP.AUTO-MCNC: 179 MG/DL (ref 65–100)
GLUCOSE SERPL-MCNC: 147 MG/DL (ref 65–100)
HCT VFR BLD AUTO: 39.1 % (ref 36.6–50.3)
HGB BLD-MCNC: 12.4 G/DL (ref 12.1–17)
MAGNESIUM SERPL-MCNC: 2.4 MG/DL (ref 1.6–2.4)
MCH RBC QN AUTO: 30.9 PG (ref 26–34)
MCHC RBC AUTO-ENTMCNC: 31.7 G/DL (ref 30–36.5)
MCV RBC AUTO: 97.5 FL (ref 80–99)
NRBC # BLD: 0 K/UL (ref 0–0.01)
NRBC BLD-RTO: 0 PER 100 WBC
PLATELET # BLD AUTO: 186 K/UL (ref 150–400)
PMV BLD AUTO: 10.2 FL (ref 8.9–12.9)
POTASSIUM SERPL-SCNC: 3.7 MMOL/L (ref 3.5–5.1)
RBC # BLD AUTO: 4.01 M/UL (ref 4.1–5.7)
SERVICE CMNT-IMP: ABNORMAL
SODIUM SERPL-SCNC: 145 MMOL/L (ref 136–145)
WBC # BLD AUTO: 11 K/UL (ref 4.1–11.1)

## 2021-04-22 PROCEDURE — 74011250637 HC RX REV CODE- 250/637: Performed by: NURSE PRACTITIONER

## 2021-04-22 PROCEDURE — 74011000250 HC RX REV CODE- 250: Performed by: NURSE PRACTITIONER

## 2021-04-22 PROCEDURE — 65610000006 HC RM INTENSIVE CARE

## 2021-04-22 PROCEDURE — 74011250636 HC RX REV CODE- 250/636: Performed by: NURSE PRACTITIONER

## 2021-04-22 PROCEDURE — 70450 CT HEAD/BRAIN W/O DYE: CPT

## 2021-04-22 PROCEDURE — 83735 ASSAY OF MAGNESIUM: CPT

## 2021-04-22 PROCEDURE — 71045 X-RAY EXAM CHEST 1 VIEW: CPT

## 2021-04-22 PROCEDURE — 74011636637 HC RX REV CODE- 636/637: Performed by: ANESTHESIOLOGY

## 2021-04-22 PROCEDURE — 77030018798 HC PMP KT ENTRL FED COVD -A

## 2021-04-22 PROCEDURE — 80048 BASIC METABOLIC PNL TOTAL CA: CPT

## 2021-04-22 PROCEDURE — 74011250637 HC RX REV CODE- 250/637: Performed by: ANESTHESIOLOGY

## 2021-04-22 PROCEDURE — 36415 COLL VENOUS BLD VENIPUNCTURE: CPT

## 2021-04-22 PROCEDURE — 82962 GLUCOSE BLOOD TEST: CPT

## 2021-04-22 PROCEDURE — 99232 SBSQ HOSP IP/OBS MODERATE 35: CPT | Performed by: PSYCHIATRY & NEUROLOGY

## 2021-04-22 PROCEDURE — 74011250636 HC RX REV CODE- 250/636: Performed by: ANESTHESIOLOGY

## 2021-04-22 PROCEDURE — APPSS30 APP SPLIT SHARED TIME 16-30 MINUTES: Performed by: NURSE PRACTITIONER

## 2021-04-22 PROCEDURE — 87040 BLOOD CULTURE FOR BACTERIA: CPT

## 2021-04-22 PROCEDURE — 85027 COMPLETE CBC AUTOMATED: CPT

## 2021-04-22 RX ORDER — HYDRALAZINE HYDROCHLORIDE 50 MG/1
50 TABLET, FILM COATED ORAL EVERY 8 HOURS
Status: DISCONTINUED | OUTPATIENT
Start: 2021-04-22 | End: 2021-04-29

## 2021-04-22 RX ORDER — DOCUSATE SODIUM 50 MG/5ML
100 LIQUID ORAL DAILY
Status: DISCONTINUED | OUTPATIENT
Start: 2021-04-23 | End: 2021-05-03 | Stop reason: HOSPADM

## 2021-04-22 RX ORDER — QUETIAPINE FUMARATE 25 MG/1
75 TABLET, FILM COATED ORAL 2 TIMES DAILY
Status: DISCONTINUED | OUTPATIENT
Start: 2021-04-22 | End: 2021-04-23

## 2021-04-22 RX ADMIN — ACETAMINOPHEN 650 MG: 325 TABLET, FILM COATED ORAL at 00:05

## 2021-04-22 RX ADMIN — LORAZEPAM 2 MG: 2 INJECTION INTRAMUSCULAR; INTRAVENOUS at 05:09

## 2021-04-22 RX ADMIN — QUETIAPINE FUMARATE 75 MG: 25 TABLET ORAL at 19:56

## 2021-04-22 RX ADMIN — INSULIN LISPRO 2 UNITS: 100 INJECTION, SOLUTION INTRAVENOUS; SUBCUTANEOUS at 12:00

## 2021-04-22 RX ADMIN — DOCUSATE SODIUM 100 MG: 50 LIQUID ORAL at 09:38

## 2021-04-22 RX ADMIN — ACETAMINOPHEN 650 MG: 325 TABLET, FILM COATED ORAL at 23:55

## 2021-04-22 RX ADMIN — SODIUM CHLORIDE 5 MG/HR: 900 INJECTION, SOLUTION INTRAVENOUS at 08:46

## 2021-04-22 RX ADMIN — QUETIAPINE FUMARATE 75 MG: 25 TABLET ORAL at 09:38

## 2021-04-22 RX ADMIN — Medication 10 ML: at 13:20

## 2021-04-22 RX ADMIN — DEXMEDETOMIDINE HYDROCHLORIDE 0.7 MCG/KG/HR: 400 INJECTION INTRAVENOUS at 00:01

## 2021-04-22 RX ADMIN — Medication 10 ML: at 22:04

## 2021-04-22 RX ADMIN — HYDRALAZINE HYDROCHLORIDE 50 MG: 50 TABLET, FILM COATED ORAL at 13:17

## 2021-04-22 RX ADMIN — HYDRALAZINE HYDROCHLORIDE 25 MG: 50 TABLET, FILM COATED ORAL at 06:03

## 2021-04-22 RX ADMIN — INSULIN LISPRO 2 UNITS: 100 INJECTION, SOLUTION INTRAVENOUS; SUBCUTANEOUS at 17:34

## 2021-04-22 RX ADMIN — HYDRALAZINE HYDROCHLORIDE 50 MG: 50 TABLET, FILM COATED ORAL at 22:04

## 2021-04-22 RX ADMIN — DEXMEDETOMIDINE HYDROCHLORIDE 0.7 MCG/KG/HR: 400 INJECTION INTRAVENOUS at 08:48

## 2021-04-22 RX ADMIN — DEXMEDETOMIDINE HYDROCHLORIDE 1.5 MCG/KG/HR: 400 INJECTION INTRAVENOUS at 05:08

## 2021-04-22 RX ADMIN — SODIUM CHLORIDE 5 MG/HR: 900 INJECTION, SOLUTION INTRAVENOUS at 20:03

## 2021-04-22 RX ADMIN — HYDRALAZINE HYDROCHLORIDE 10 MG: 20 INJECTION INTRAMUSCULAR; INTRAVENOUS at 04:37

## 2021-04-22 RX ADMIN — LABETALOL HYDROCHLORIDE 10 MG: 5 INJECTION INTRAVENOUS at 04:04

## 2021-04-22 RX ADMIN — POLYETHYLENE GLYCOL 3350 17 G: 17 POWDER, FOR SOLUTION ORAL at 09:38

## 2021-04-22 RX ADMIN — Medication 10 ML: at 05:59

## 2021-04-22 RX ADMIN — ACETAMINOPHEN 650 MG: 325 TABLET, FILM COATED ORAL at 06:41

## 2021-04-22 RX ADMIN — LANSOPRAZOLE 30 MG: KIT at 07:03

## 2021-04-22 NOTE — PROGRESS NOTES
04/22/21 1657   Oxygen Therapy   O2 Sat (%) 95 %   Pulse via Oximetry 101 beats per minute   O2 Device Heated; Hi flow nasal cannula   O2 Flow Rate (L/min) 50 l/min   O2 Temperature 93.2 °F (34 °C)   FIO2 (%) 60 %  (weaned)   weaned

## 2021-04-22 NOTE — PROGRESS NOTES
Neurosurgery Progress Note  Bessy Campbell, ACNP-BC          Admit Date: 2021   LOS: 4 days        Daily Progress Note: 2021    HPI: The patient presented from home with headache, nausea, vomiting. He also told his daughter he felt \"crazy in the head\" yesterday. 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 He has had headaches for the past 2 days. He presented to the ER at [facility]. His head CT revealed intraventricular hemorrhage. He transferred to [facility] for further evaluation. He has been agitated, requiring precedex an mitts. He did receive flumazenil last night because of difficulty obtaining neuro exam after receiving valium. This morning he is wide awake in bed, extremely restless, on precedex. Subjective: The patient's seroquel was increased yesterday. CT head this morning shows some improvement in the ventricle size and the amount of IVH present. His respiratory status remains tenuous. Unable to obtain ROS due to AMS. Objective:     Vital signs  Temp (24hrs), Av.5 °F (38.1 °C), Min:99.5 °F (37.5 °C), Max:101 °F (38.3 °C)   No intake/output data recorded.  1901 -  0700  In: 3107.5 [I.V.:927.5]  Out: 2650 [Urine:2650]    Visit Vitals  /62   Pulse 69   Temp (!) 100.8 °F (38.2 °C)   Resp (!) 42   Ht 5' 8\" (1.727 m)   Wt 97.3 kg (214 lb 8.1 oz)   SpO2 98%   BMI 32.62 kg/m²    O2 Flow Rate (L/min): 50 l/min O2 Device: Heated, Hi flow nasal cannula     Pain control  Pain Assessment  Pain Scale 1: Adult Nonverbal Pain Scale  Pain Intensity 1: 2  Pain Intervention(s) 1: Medication (see MAR)    PT/OT  Gait                 Physical Exam:  Gen:NAD. On Precedex 0.7 mcg/kg/hr. Paused for exam.  Neuro: Lethargic. Not opening eyes for me. Eventually will say a few words to me when I ask him if he can help me with my gardening. Not following commands. PERRL. 2 mm BL. Looks like he has had previous eye surgery. PEREZ spontaneously when he wakes up.   Gait deferred. CT head without contrast on 04/18/21 at 1713 shows 2 foci of parenchymal hemorrhage adjacent to the right lateral ventricle associated with significant intraventricular hemorrhage. CT head without contrast on 04/18/21 at 2353 shows no significant interval change in extensive intraventricular hemorrhage. CT head without contrast on 04/19/21 shows no significant change in acute intraventricular hemorrhage and mild ventriculomegaly. CT head without contrast on 04/20/21 shows stable intraventricular hemorrhage and mild ventriculomegaly as described    CT head without contrast on 04/22/1 shows slightly diminished intraventricular hemorrhage and ventriculomegaly.     24 hour results:    Recent Results (from the past 24 hour(s))   GLUCOSE, POC    Collection Time: 04/21/21 11:37 AM   Result Value Ref Range    Glucose (POC) 142 (H) 65 - 100 mg/dL    Performed by Edwige Silvestre    GLUCOSE, POC    Collection Time: 04/21/21  5:25 PM   Result Value Ref Range    Glucose (POC) 144 (H) 65 - 100 mg/dL    Performed by Edwige Silvestre    CULTURE, RESPIRATORY/SPUTUM/BRONCH Johny Gale STAIN    Collection Time: 04/21/21  9:25 PM    Specimen: Sputum   Result Value Ref Range    Special Requests: NO SPECIAL REQUESTS      GRAM STAIN RARE WBCS SEEN      GRAM STAIN RARE EPITHELIAL CELLS SEEN      GRAM STAIN 1+ GRAM POSITIVE COCCI IN CLUSTERS      Culture result: PENDING    GLUCOSE, POC    Collection Time: 04/21/21 11:56 PM   Result Value Ref Range    Glucose (POC) 159 (H) 65 - 100 mg/dL    Performed by Ino Chin, BASIC    Collection Time: 04/22/21  1:50 AM   Result Value Ref Range    Sodium 145 136 - 145 mmol/L    Potassium 3.7 3.5 - 5.1 mmol/L    Chloride 112 (H) 97 - 108 mmol/L    CO2 27 21 - 32 mmol/L    Anion gap 6 5 - 15 mmol/L    Glucose 147 (H) 65 - 100 mg/dL    BUN 37 (H) 6 - 20 MG/DL    Creatinine 1.73 (H) 0.70 - 1.30 MG/DL    BUN/Creatinine ratio 21 (H) 12 - 20      GFR est AA 46 (L) >60 ml/min/1.73m2    GFR est non-AA 38 (L) >60 ml/min/1.73m2    Calcium 8.5 8.5 - 10.1 MG/DL   MAGNESIUM    Collection Time: 04/22/21  1:50 AM   Result Value Ref Range    Magnesium 2.4 1.6 - 2.4 mg/dL   CBC W/O DIFF    Collection Time: 04/22/21  1:50 AM   Result Value Ref Range    WBC 11.0 4.1 - 11.1 K/uL    RBC 4.01 (L) 4.10 - 5.70 M/uL    HGB 12.4 12.1 - 17.0 g/dL    HCT 39.1 36.6 - 50.3 %    MCV 97.5 80.0 - 99.0 FL    MCH 30.9 26.0 - 34.0 PG    MCHC 31.7 30.0 - 36.5 g/dL    RDW 15.3 (H) 11.5 - 14.5 %    PLATELET 411 629 - 811 K/uL    MPV 10.2 8.9 - 12.9 FL    NRBC 0.0 0  WBC    ABSOLUTE NRBC 0.00 0.00 - 0.01 K/uL   GLUCOSE, POC    Collection Time: 04/22/21  6:00 AM   Result Value Ref Range    Glucose (POC) 125 (H) 65 - 100 mg/dL    Performed by Arabella Stein           Assessment:     Active Problems:    Intracranial hemorrhage ([facility] [address] (4/18/2021)        Plan:   1. IVH with mild ventriculomegaly   - neuro checks   - CT scan with stable ventricle size. On pt MRI in 2019, he appeared to have slightly enlarged ventricles then so this may be his baseline. Sulci are open bilaterally. - repeat head CT with some improvement. . Cont to treat patient conservatively without EVD. - cont to try to decrease sedation  2. HTN   - SBP less than 160 to normotensive   - Cardene PRN   - cont hydralazine   - Intensivist following  3. Alcohol abuse   - out of the window for withdrawal. D/C ativan so it isn't contributing to delirium  4. Acute metabolic encephalopathy   - multi-factorial   - supportive care   - cont seroquel bid. Discussed with daughter we can see how he does and the dose can be titrated up or down pending his response   - neuro checks to q2h to help prevent delirium   - restraints PRN   - Precedex PRN   - Intensivist following      Activity: up with assist  DVT ppx: SCDs  Dispo: tbd    Plan d/w Dr. Roberta Caal, ICU nurse, intensivist. Spoke with daughter at bedside. Pt remains tenuous with his respiratory status. Lois Matt, NP

## 2021-04-22 NOTE — PROGRESS NOTES
[facility]    ICU TEAM Progress Note    Name: Brigitte Rosa   : 1934   MRN: [id number]   Date: 2021           ICU Assessment     1. Intraventricular hemorrhage with ventriculomegaly  2. Altered mental status  3. Acute metabolic encephalopathy  4. Possible alcohol withdrawal  5. Hypertension             ICU Comprehensive Plan of Care:     1. Neurological System: IVH with mild ventriculomegaly complicated by altered mental status requiring precedex infusion. Neurosurgery following and no intervention right now. AMS likely a result of IVH and ICU delirium; not alcohol withdrawal. Have switched to seroquel to see if that can give us a better mental status and get off the precedex drip. - Continue precedex for agitation, try to wean off.    - Increased seroquel to 75mg BID.    - Continuing CIWA with ativan, try to avoid benzos to prevent delirium if not from alochol withdrawal..   - NSGY following.    - q2h neurochecks   - SBP < 140, currently on nicardipine    2. Cardiovascular System: He has a strict SBP goal per neurosurgery and is requiring cardene to achieve the goal.   - Continue cardene for SBP < 140   - Increase hydralazine to 50mg q8h to help come off cardene.    - Transfusion Trigger (Hgb): <7 g/dL   - Keep K > 4; Mg > 2     3. Respiratory System: His agitation is causing hypoxia requiring HFNC. When he pulls it off he desaturates to the low 80s. Continues to be tenuous. Lasix yesterday with negative I/Os but no improvement in pulmonary status. - Trialing lasix to improve pulmonary status but    - Continue HFNC and wean to NC if possible    - Pulmonary toilet: Incentive Spirometry    - SpO2 Goal: > 92%   - DVT Prophylaxis: SCD's or Sequential Compression Device     4.  Renal/GI/Endocrine System: Lasix trial resulted in increased creatinine.    - DHT for enteral access   - IVFs: none at this time   - Ulcer Prophylaxis: Pepcid (famotidine)    - Bowel Regimen:     - Feeding: TFs   - Blood Sugar Goal 120-180 - Glycemic Control: Insulin    5. Infectious Disease: No concern for infection at this time. - Tubes: Dobhoff tube   - Lines: Peripheral IV   - Drains: Yu Catheter      6. PT/OT: PT consulted and on board and OT consulted and on board     7. Goals of Care Discussion with family Yes     8. Plan of Care/Code Status: Full Code    9. Discussed Care Plan with Bedside RN    10. Documentation of Current Medications    Subjective:   Progress Note: 4/22/2021      Reason for ICU Admission: Intraventricular hemorrhage.      This is an 80year old male that presented to [facility] ED for 2 day hx of headache and confusion. He was seen at the OSH and was found to have an intraventricular hemorrhage and he was then transferred to [facility] for neurosurgical services. He presented to the ICU in an agitated state which required precedex infusion and IV bolus of valium. Overnight Events:   4/22/2021  - No improvement in neurological status with continue need for precedex. Active Problem List:     Problem List  Date Reviewed: 9/21/2020          Codes Class    Intracranial hemorrhage (HCC) ICD-10-CM: I62.9  ICD-9-CM: 432.9         CKD (chronic kidney disease) stage 3, GFR 30-59 ml/min ([facility] [address] ICD-10-CM: N18.30  ICD-9-CM: 747. 3         Coronary artery disease with stable angina pectoris (HCC) ICD-10-CM: I25.118  ICD-9-CM: 414.00, 413.9         Chronic obstructive pulmonary disease ([facility] [address] ICD-10-CM: J44.9  ICD-9-CM: 496         Viral encephalitis ICD-10-CM: A86  ICD-9-CM: 538. 9         Altered mental state ICD-10-CM: R41.82  ICD-9-CM: 780.97         Facial droop ICD-10-CM: R29.810  ICD-9-CM: 781.94         Acute blood loss anemia ICD-10-CM: D62  ICD-9-CM: 285.1         Rectal bleeding ICD-10-CM: K62.5  ICD-9-CM: 569.3         Pneumonia ICD-10-CM: J18.9  ICD-9-CM: 486         Severe sepsis ([facility] [address] ICD-10-CM: A41.9, R65.20  ICD-9-CM: 038.9, 995.92         Flank pain ICD-10-CM: R10.9  ICD-9-CM: 789.09 Spinal stenosis of lumbar region at multiple levels ICD-10-CM: M48.061  ICD-9-CM: 724.02         Bilateral carotid artery stenosis ICD-10-CM: I65.23  ICD-9-CM: 433.10, 433.30         Severe obesity (HCC) ICD-10-CM: E66.01  ICD-9-CM: 278.01         TESSA on CPAP ICD-10-CM: G47.33, Z99.89  ICD-9-CM: 327.23, V46.8         Simple chronic bronchitis (HCC) ICD-10-CM: J41.0  ICD-9-CM: 491.0         Fatigue ICD-10-CM: R53.83  ICD-9-CM: 780.79         CAD (coronary artery disease) ICD-10-CM: I25.10  ICD-9-CM: 414.00         Dyslipidemia ICD-10-CM: E78.5  ICD-9-CM: 272.4         On statin therapy ICD-10-CM: Z79.899  ICD-9-CM: V58.69         Gout ICD-10-CM: M10.9  ICD-9-CM: 274.9         GERD (gastroesophageal reflux disease) ICD-10-CM: K21.9  ICD-9-CM: 530.81         TIA (transient ischemic attack) ICD-10-CM: G45.9  ICD-9-CM: 435.9         Dyspnea ICD-10-CM: R06.00  ICD-9-CM: 786.09         Colon polyps ICD-10-CM: K63.5  ICD-9-CM: 211.3         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  ICD-9-CM: 528.6         Hypertension ICD-10-CM: I10  ICD-9-CM: 401.9         ED (erectile dysfunction) ICD-10-CM: N52.9  ICD-9-CM: 607.84         Hypoxia ICD-10-CM: R09.02  ICD-9-CM: 799.02         Neurogenic claudication ICD-10-CM: M48.062  ICD-9-CM: 724.03               Past Medical History:      has a past medical history of Adverse effect of anesthesia, Alcohol abuse, Arthritis, CAD (coronary artery disease), Chronic obstructive pulmonary disease ([facility] [address] Chronic obstructive pulmonary disease ([facility] [address] CKD (chronic kidney disease) stage 3, GFR 30-59 ml/min ([facility] [address] (9/21/2020), Dyslipidemia (8/16/2017), Dyspepsia and other specified disorders of function of stomach, Dyspnea (8/16/2017), ED (erectile dysfunction) (8/16/2017), Encounter for immunization (8/16/2017), Fatigue (8/16/2017), Flank pain (8/1/2019), GERD (gastroesophageal reflux disease) (8/16/2017), Gout (8/16/2017), Hypertension, 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 Past Surgical History:      has a past surgical history that includes pr cardiac surg procedure unlist (1996); pr cardiac surg procedure unlist (04/2019); hx hernia repair; hx hernia repair (11/09/15); hx urological; colonoscopy (N/A, 9/27/2019); upper gi endoscopy,biopsy (9/30/2019); and hx orthopaedic. Home Medications:     Prior to Admission medications    Medication Sig Start Date End Date Taking? Authorizing Provider   atenoloL (TENORMIN) 100 mg tablet TAKE ONE TABLET BY MOUTH DAILY 4/14/21   Elias Rivera MD   atorvastatin (LIPITOR) 80 mg tablet TAKE ONE TABLET BY MOUTH DAILY 3/15/21   Elias Rivera MD   furosemide (LASIX) 80 mg tablet TAKE ONE TABLET BY MOUTH DAILY 2/1/21   Elias Rivera MD   thiamine HCL (B-1) 100 mg tablet TAKE ONE BY MOUTH DAILY 12/21/20   Elias Rivera MD   isosorbide mononitrate ER (IMDUR) 30 mg tablet TAKE ONE TABLET BY MOUTH DAILY 12/21/20   Elias Rivera MD   hydrALAZINE (APRESOLINE) 50 mg tablet TAKE ONE TABLET BY MOUTH THREE TIMES A DAY 12/21/20   Elias Rivera MD   pantoprazole (PROTONIX) 40 mg tablet TAKE ONE TABLET BY MOUTH DAILY 9/22/20   Elias Rivera MD   DULoxetine (CYMBALTA) 30 mg capsule TAKE ONE CAPSULE BY MOUTH DAILY 9/16/20   Elias Rivera MD   melatonin 5 mg cap capsule Take 5 mg by mouth nightly. Pt. Take 2 tabs a night    Provider, Historical   acetaminophen (TYLENOL) 500 mg tablet Take 1,000 mg by mouth every six (6) hours as needed for Pain. Provider, Historical   aspirin delayed-release 81 mg tablet Take 81 mg by mouth daily. Provider, Historical   ANORO ELLIPTA 62.5-25 mcg/actuation inhaler Take 1 Puff by inhalation daily.  12/26/18   Provider, Historical Allergies/Social/Family History: Allergies   Allergen Reactions    Levaquin [Levofloxacin] Nausea and Vomiting     Reports anxiety, nausea, aching after taking levaquin    Ciprofloxacin Shortness of Breath    Quinolones Shortness of Breath      Social History     Tobacco Use    Smoking status: Former Smoker     Packs/day: 0.50     Years: 20.00     Pack years: 10.00    Smokeless tobacco: Former User    Tobacco comment: quit about 40  years ago   / used to dip tobaco and dip snuff   Substance Use Topics    Alcohol use: Yes     Comment: \"Drinks very little now for about a month \"      Family History   Problem Relation Age of Onset    Heart Disease Mother     Hypertension Mother     Hypertension Father     Hypertension Sister     Hypertension Brother     Cancer Brother        Review of Systems:     Pertinent items are noted in HPI. Objective:   Vital Signs:  Visit Vitals  /62   Pulse 69   Temp (!) 100.8 °F (38.2 °C)   Resp (!) 42   Ht 5' 8\" (1.727 m)   Wt 97.3 kg (214 lb 8.1 oz)   SpO2 95%   BMI 32.62 kg/m²    O2 Flow Rate (L/min): 50 l/min O2 Device: Hi flow nasal cannula Temp (24hrs), Av.5 °F (38.1 °C), Min:99.5 °F (37.5 °C), Max:101 °F (38.3 °C)           Intake/Output:     Intake/Output Summary (Last 24 hours) at 2021 0957  Last data filed at 2021 0700  Gross per 24 hour   Intake 1872.15 ml   Output 1465 ml   Net 407.15 ml       Physical Exam:    General:  Agitated, well noursished, well developed, appears stated age   Eyes:  Sclera anicteric. Pupils equally round and reactive to light. Mouth/Throat: Mucous membranes normal, oral pharynx clear   Neck: Supple   Lungs:   Coarse to auscultation bilaterally, good effort. HFNC in place. CV:  Regular rate and rhythm,no murmur, click, rub or gallop   Abdomen:   Soft, non-tender.  bowel sounds normal. non-distended   Extremities: No cyanosis or edema   Skin: Skin color, texture, turgor normal. no acute rash or lesions Lymph nodes: Cervical and supraclavicular normal   Musculoskeletal: No swelling or deformity   Lines/Devices:  Intact, no erythema, drainage or tenderness   Psych: Agitated          LABS AND  DATA: Personally reviewed  Recent Labs     04/22/21  0150 04/21/21  0508   WBC 11.0 11.2*   HGB 12.4 12.3   HCT 39.1 38.9    203     Recent Labs     04/22/21  0150 04/21/21  0508    145   K 3.7 4.0   * 113*   CO2 27 26   BUN 37* 31*   CREA 1.73* 1.53*   * 134*   CA 8.5 8.3*   MG 2.4 2.4     Recent Labs     04/21/21  0508   AP 90   TP 6.8   ALB 3.2*   GLOB 3.6     No results for input(s): INR, PTP, APTT, INREXT, INREXT in the last 72 hours. No results for input(s): PHI, PCO2I, PO2I, FIO2I in the last 72 hours. No results for input(s): CPK, CKMB, TROIQ, BNPP in the last 72 hours. Hemodynamics:   PAP:   CO:     Wedge:   CI:     CVP:    SVR:       PVR:       Ventilator Settings:  Mode Rate Tidal Volume Pressure FiO2 PEEP            65 %       Peak airway pressure:      Minute ventilation:          MEDS: Reviewed    Chest X-Ray:  CXR Results  (Last 48 hours)               04/21/21 0814  XR CHEST PORT Final result    Impression:  1. New bilateral pleural effusions with new areas of moderate atelectasis   medially in each lower lobe. Narrative:  EXAM: XR CHEST PORT       INDICATION: Evaluate for pneumonia       COMPARISON: 4/18/2021       FINDINGS: A portable AP radiograph of the chest was obtained at 0812 hours. The   patient is on a cardiac monitor. Feeding tube courses into the stomach the   distal tip is not seen. There is persistent cardiomegaly. Coronary stents   identified. There are new small bilateral pleural effusions mostly in the subpulmonic   location. Additionally there is new moderate atelectasis medially in each lower   lobe. No pneumonia is identified. ECHO:  4/13/2020: Nml BiV function    Multidisciplinary Rounds Completed:   Yes    ABCDEF Bundle/Checklist Completed:  Yes    SPECIAL EQUIPMENT  None    DISPOSITION  Stay in ICU    CRITICAL CARE CONSULTANT NOTE  I had a face to face encounter with the patient, reviewed and interpreted patient data including clinical events, labs, images, vital signs, I/O's, and examined patient. I have discussed the case and the plan and management of the patient's care with the consulting services, the bedside nurses and the respiratory therapist.      NOTE OF PERSONAL INVOLVEMENT IN CARE   This patient has a high probability of imminent, clinically significant deterioration, which requires the highest level of preparedness to intervene urgently. I participated in the decision-making and personally managed or directed the management of the following life and organ supporting interventions that required my frequent assessment to treat or prevent imminent deterioration. I personally spent 36 minutes of critical care time. This is time spent at this critically ill patient's bedside actively involved in patient care as well as the coordination of care. This does not include any procedural time which has been billed separately.     Dyana Villegas MD  Staff Intensivist/Anesthesiologist  [facility]  4/22/2021

## 2021-04-22 NOTE — PROGRESS NOTES
Bedside and Verbal shift change report given to  [address]  (oncoming nurse) by Mariah Prince (offgoing nurse). Report included the following information SBAR, Intake/Output, Cardiac Rhythm SR and Dual Neuro Assessment. Primary Nurse Naga Laboy RN and Mariah Prince RN performed a dual skin assessment on this patient No impairment noted  Aquiles score is 14    Summary    Patient is unresponsive to painful stimuli weaning  Percedex as ordered. Cardene remain at 5 mg/hr at present. Urine output marginal at present. Percedex weaned off, patient moving to painful simuli,   Responding to voice at time. Weaning Cardene  To keep SBP  BP < 160 per  Gordy Brar Neuro NP. Turned and reposition q 2 . Patient remain restless. Tolerating tube feeding  Well     Daughter at bedside. Bedside and Verbal shift change report given to Mariah Prince (oncoming nurse) by [address] [place] [address] (offgoing nurse). Report included the following information SBAR, Kardex, Intake/Output, Cardiac Rhythm ST and Dual Neuro Assessment.

## 2021-04-22 NOTE — PROGRESS NOTES
1930: Bedside and Verbal shift change report given to [address] (oncoming nurse) by Mickie Marquez (offgoing nurse). Report included the following information SBAR, Kardex, ED Summary, MAR, Recent Results, and Cardiac Rhythm NSR .     0200: Dobhoff removed by pt while in restraints. Reinserted new dobhoff. Awaiting KUB placement. Precedex gtt restarted. 0400: Pt very agitated. One time dose of versed given.

## 2021-04-22 NOTE — PROGRESS NOTES
04/22/21 0951   Oxygen Therapy   O2 Sat (%) 95 %   Pulse via Oximetry 73 beats per minute   O2 Device Hi flow nasal cannula   Skin Assessment Clean, dry, & intact   O2 Flow Rate (L/min) 50 l/min   O2 Temperature 93.2 °F (34 °C)   FIO2 (%) 65 %   weaned

## 2021-04-22 NOTE — PROGRESS NOTES
Neurology Progress Note  Lucina Khan NP    Admit Date: 2021   LOS: 4 days      Daily Progress Note: 2021    HPI:Del Manuel is a 80 y.o. male with a pmhx of CAD on ASA daily, COPD, CKD 3, HLD, HTN, TESSA, TIA, viral encephalitis and ETOH abuse who presented to [facility] ER on 21 with a 1 day history of nausea, vomiting, headache and AMS. Per chart review, [facility] he was oriented x 3, able to participate in his exam and answered questions appropriately. A CTH was performed which 2 foci of IPH adjacent to the right lateral ventricle as well as significant intraventricular hemorrhage. His SBP's ranged from 159-172 prior to initiation of cardene gtt. Neurosurgery was consulted and recommended transfer to [facility] with serial imaging. He was then transferred to [facility] ICU for further care and monitoring. Upon arrival he was extremely agitated and disoriented so he was given 5mg valium and started on a precedex gtt. He was so heavily sedated that it was difficult to establish a clinical trend and the benzodiazepines were ultimately reversed and precedex stopped. After a brief pause in sedation he once again became alert and agitated. A CTH was repeated which showed no interval change in hemorrhage. Neurology was consulted for further recommendations. Subjective:     No neuro events overnight. Has required increased doses of precedex so is much more drowsy on exam this morning than previously. Allergies   Allergen Reactions    Levaquin [Levofloxacin] Nausea and Vomiting     Reports anxiety, nausea, aching after taking levaquin    Ciprofloxacin Shortness of Breath    Quinolones Shortness of Breath     Review of Systems:  Review of systems not obtained due to patient factors.     Objective:   Vital signs  Temp (24hrs), Av.2 °F (37.9 °C), Min:98.3 °F (36.8 °C), Max:101 °F (38.3 °C)   1901 -  0700  In: 216 [I.V.:56]  Out: 255 [Urine:255]   0701 -  1900  In: 2793.8 [I.V.:1063.8]  Out: 5752 [Urine:2375]  Visit Vitals  /65   Pulse 84   Temp (!) (P) 100.8 °F (38.2 °C)   Resp (!) 46   Ht 5' 8\" (1.727 m)   Wt 219 lb 5.7 oz (99.5 kg)   SpO2 100%   BMI 33.35 kg/m²    O2 Flow Rate (L/min): 50 l/min O2 Device: Heated, Hi flow nasal cannula   Vitals:    04/21/21 2100 04/21/21 2200 04/21/21 2300 04/22/21 0000   BP: 116/73 (!) 116/57 121/76 137/65   Pulse: 82 81 85 84   Resp: (!) 31 (!) 41 30 (!) 46   Temp:    (!) (P) 100.8 °F (38.2 °C)   SpO2: 100% 100% 100% 100%   Weight:       Height:          Physical Exam:  GENERAL: Restless, NADprecedex running at 0.7 mcg/kg/hr (not turned off for exam)  SKIN: Warm, dry, color appropriate for ethnicity. NEURO: Sedated with precedex 0.7 mcg/kg/hr (not turned off for exam). Drowsy. Eyes open to voice. Oriented to self only. Does not follow commands. Minimal speech. No comprehension or naming of objects. Pupils 3 mm and briskly reactive to light with ovoid shape. Blinks to threat bilaterally. No disconjugate gaze present. Tracks with eyes around room but not to command. Face grossly symmetric. Very hard of hearing (baseline). Antonette spontaneously with equal strength. Bulk and tone normal. No involuntary movements. Gait deferred, ataxia not able to be tested.       Labs:  Lab Results   Component Value Date/Time    WBC 11.2 (H) 04/21/2021 05:08 AM    HGB (POC) 14.9 07/27/2018 02:38 PM    HGB 12.3 04/21/2021 05:08 AM    Hematocrit (POC) 45 05/13/2019 07:16 PM    HCT 38.9 04/21/2021 05:08 AM    PLATELET 942 59/44/8662 05:08 AM    MCV 96.5 04/21/2021 05:08 AM     Lab Results   Component Value Date/Time    Sodium 145 04/21/2021 05:08 AM    Potassium 4.0 04/21/2021 05:08 AM    Chloride 113 (H) 04/21/2021 05:08 AM    CO2 26 04/21/2021 05:08 AM    Anion gap 6 04/21/2021 05:08 AM    Glucose 134 (H) 04/21/2021 05:08 AM    BUN 31 (H) 04/21/2021 05:08 AM    Creatinine 1.53 (H) 04/21/2021 05:08 AM    BUN/Creatinine ratio 20 04/21/2021 05:08 AM    GFR est AA 53 (L) 04/21/2021 05:08 AM    GFR est non-AA 43 (L) 04/21/2021 05:08 AM    Calcium 8.3 (L) 04/21/2021 05:08 AM     Imaging:  CT Results (maximum last 3): Results from [place] encounter on 04/18/21   CT HEAD WO CONT    Narrative EXAM: CT HEAD WO CONT  Clinical history: Evaluate intraventricular hemorrhage  INDICATION: IVH    COMPARISON: 4/19/2021. CONTRAST: None. TECHNIQUE: Unenhanced CT of the head was performed using 5 mm images. Brain and  bone windows were generated. Coronal and sagittal reformats. CT dose reduction  was achieved through use of a standardized protocol tailored for this  examination and automatic exposure control for dose modulation. FINDINGS:  Intraventricular hemorrhage throughout the lateral ventricles left greater than  right, as well as in the third and fourth ventricles without significant  interval change in amount of hemorrhage or mild ventricular dilatation. 2 small  round foci of hemorrhage along the lateral lining of the posterior right  ventricular body without change. No new intracranial hemorrhage. Patchy  periventricular white matter hypodensities similar to prior study. Vertebral  vascular calcifications. Impression Stable intraventricular hemorrhage and mild ventriculomegaly as described   CT HEAD WO CONT    Narrative INDICATION: ivh    EXAM:  HEAD CT WITHOUT CONTRAST    COMPARISON: 4/18/2021    TECHNIQUE:  Routine noncontrast axial head CT was performed. Sagittal and  coronal reconstructions were generated. CT dose reduction was achieved through use of a standardized protocol tailored  for this examination and automatic exposure control for dose modulation. FINDINGS:    Intraventricular hemorrhage throughout the lateral ventricles left greater than  right, as well as in the third and fourth ventricles without significant  interval change in amount of hemorrhage or mild ventricular dilatation.  2 small  round foci of hemorrhage along the lateral lining of the posterior right  ventricular body without change. No new intracranial hemorrhage. Patchy  periventricular white matter hypodensities similar to prior study. Paranasal  sinuses and mastoid air cells are clear. Impression No significant change in acute intraventricular hemorrhage and mild  ventriculomegaly as described. CT HEAD WO CONT    Narrative EXAM: CT HEAD WO CONT  Medical history: Neuro changes  INDICATION: neuro changes    COMPARISON: 4/18/2021. CONTRAST: None. TECHNIQUE: Unenhanced CT of the head was performed using 5 mm images. Brain and  bone windows were generated. Coronal and sagittal reformats. CT dose reduction  was achieved through use of a standardized protocol tailored for this  examination and automatic exposure control for dose modulation. FINDINGS:  Ventriculomegaly and periventricular hypodensity is not changed. Punctate foci  of hyperdensity adjacent to the posterior horn of the right lateral ventricle  unchanged. Intraventricular hemorrhage on the right and on the left is not  significantly changed. Ventricular hemorrhage in the third and fourth ventricles  also not significantly changed. . No new midline shift. Vertebral vascular  calcifications. . . .    The bone windows demonstrate no abnormalities. The visualized portions of the  paranasal sinuses and mastoid air cells are clear. Impression No significant interval change in extensive intraventricular hemorrhage.          Assessment:   Active Problems:    Intracranial hemorrhage ([address] (4/18/2021)      Plan:      IPH with IVH, ICH score: 3              - Initially seen on CT head at [facility], serial head CTs have been stable              - SBP goal less than 140-160, Cardene/Labetalol PRN              - q2 neuro checks               - A1C ok at 5.9    - Lipid panel showing LDL 74.8              - PT/OT/SLP evals when appropriate              - Wean sedation as able, currently on precedex, seroquel dose increased yesterday to assist              - Stroke education when appropriate              - NSGY following, no surgical intervention at this time    Plan discussed with Dr. Nitza Juarez, further recommendations to follow.      Adarsh Rodriguez NP  Neurocritical Care Nurse Practitioner

## 2021-04-22 NOTE — PROGRESS NOTES
1930: Bedside and Verbal shift change report given to Primo Garvin (oncoming nurse) by Leatha Beard RN (offgoing nurse). Report included the following information SBAR, Kardex, ED Summary, MAR, Recent Results, and Cardiac Rhythm NSR .

## 2021-04-23 ENCOUNTER — APPOINTMENT (OUTPATIENT)
Dept: GENERAL RADIOLOGY | Age: 86
DRG: 064 | End: 2021-04-23
Attending: INTERNAL MEDICINE
Payer: MEDICARE

## 2021-04-23 LAB
ANION GAP SERPL CALC-SCNC: 8 MMOL/L (ref 5–15)
BACTERIA SPEC CULT: ABNORMAL
BACTERIA SPEC CULT: ABNORMAL
BASOPHILS # BLD: 0 K/UL (ref 0–0.1)
BASOPHILS NFR BLD: 0 % (ref 0–1)
BUN SERPL-MCNC: 35 MG/DL (ref 6–20)
BUN/CREAT SERPL: 23 (ref 12–20)
CALCIUM SERPL-MCNC: 8.7 MG/DL (ref 8.5–10.1)
CHLORIDE SERPL-SCNC: 113 MMOL/L (ref 97–108)
CO2 SERPL-SCNC: 25 MMOL/L (ref 21–32)
CREAT SERPL-MCNC: 1.49 MG/DL (ref 0.7–1.3)
DIFFERENTIAL METHOD BLD: ABNORMAL
EOSINOPHIL # BLD: 0.1 K/UL (ref 0–0.4)
EOSINOPHIL NFR BLD: 1 % (ref 0–7)
ERYTHROCYTE [DISTWIDTH] IN BLOOD BY AUTOMATED COUNT: 15.5 % (ref 11.5–14.5)
GLUCOSE BLD STRIP.AUTO-MCNC: 128 MG/DL (ref 65–100)
GLUCOSE BLD STRIP.AUTO-MCNC: 142 MG/DL (ref 65–100)
GLUCOSE BLD STRIP.AUTO-MCNC: 181 MG/DL (ref 65–100)
GLUCOSE SERPL-MCNC: 139 MG/DL (ref 65–100)
GRAM STN SPEC: ABNORMAL
HCT VFR BLD AUTO: 38.6 % (ref 36.6–50.3)
HGB BLD-MCNC: 12.5 G/DL (ref 12.1–17)
IMM GRANULOCYTES # BLD AUTO: 0.1 K/UL (ref 0–0.04)
IMM GRANULOCYTES NFR BLD AUTO: 1 % (ref 0–0.5)
LYMPHOCYTES # BLD: 0.8 K/UL (ref 0.8–3.5)
LYMPHOCYTES NFR BLD: 6 % (ref 12–49)
MAGNESIUM SERPL-MCNC: 2.3 MG/DL (ref 1.6–2.4)
MCH RBC QN AUTO: 31.1 PG (ref 26–34)
MCHC RBC AUTO-ENTMCNC: 32.4 G/DL (ref 30–36.5)
MCV RBC AUTO: 96 FL (ref 80–99)
MONOCYTES # BLD: 1.3 K/UL (ref 0–1)
MONOCYTES NFR BLD: 10 % (ref 5–13)
NEUTS SEG # BLD: 10.2 K/UL (ref 1.8–8)
NEUTS SEG NFR BLD: 82 % (ref 32–75)
NRBC # BLD: 0 K/UL (ref 0–0.01)
NRBC BLD-RTO: 0 PER 100 WBC
PLATELET # BLD AUTO: 218 K/UL (ref 150–400)
PLATELET COMMENTS,PCOM: ABNORMAL
PMV BLD AUTO: 10.3 FL (ref 8.9–12.9)
POTASSIUM SERPL-SCNC: 3.5 MMOL/L (ref 3.5–5.1)
RBC # BLD AUTO: 4.02 M/UL (ref 4.1–5.7)
RBC MORPH BLD: ABNORMAL
RBC MORPH BLD: ABNORMAL
SERVICE CMNT-IMP: ABNORMAL
SODIUM SERPL-SCNC: 146 MMOL/L (ref 136–145)
WBC # BLD AUTO: 12.5 K/UL (ref 4.1–11.1)

## 2021-04-23 PROCEDURE — 74011250637 HC RX REV CODE- 250/637: Performed by: ANESTHESIOLOGY

## 2021-04-23 PROCEDURE — 36415 COLL VENOUS BLD VENIPUNCTURE: CPT

## 2021-04-23 PROCEDURE — 83735 ASSAY OF MAGNESIUM: CPT

## 2021-04-23 PROCEDURE — 74018 RADEX ABDOMEN 1 VIEW: CPT

## 2021-04-23 PROCEDURE — 77030004950 HC CATH ENTRL NG COVD -A

## 2021-04-23 PROCEDURE — 74011250636 HC RX REV CODE- 250/636

## 2021-04-23 PROCEDURE — 74011000250 HC RX REV CODE- 250: Performed by: NURSE PRACTITIONER

## 2021-04-23 PROCEDURE — 82962 GLUCOSE BLOOD TEST: CPT

## 2021-04-23 PROCEDURE — 74011636637 HC RX REV CODE- 636/637: Performed by: ANESTHESIOLOGY

## 2021-04-23 PROCEDURE — 74011250636 HC RX REV CODE- 250/636: Performed by: NURSE PRACTITIONER

## 2021-04-23 PROCEDURE — 74011250637 HC RX REV CODE- 250/637: Performed by: NURSE PRACTITIONER

## 2021-04-23 PROCEDURE — 74011250636 HC RX REV CODE- 250/636: Performed by: STUDENT IN AN ORGANIZED HEALTH CARE EDUCATION/TRAINING PROGRAM

## 2021-04-23 PROCEDURE — 77030040704 HC NSL TU RETAIN SYS BRDL PRO AMR -B

## 2021-04-23 PROCEDURE — 74011250637 HC RX REV CODE- 250/637: Performed by: STUDENT IN AN ORGANIZED HEALTH CARE EDUCATION/TRAINING PROGRAM

## 2021-04-23 PROCEDURE — 77030040132 HC BLANKET THRM DISP CSZ -B

## 2021-04-23 PROCEDURE — 74011000250 HC RX REV CODE- 250: Performed by: STUDENT IN AN ORGANIZED HEALTH CARE EDUCATION/TRAINING PROGRAM

## 2021-04-23 PROCEDURE — 80048 BASIC METABOLIC PNL TOTAL CA: CPT

## 2021-04-23 PROCEDURE — 65610000006 HC RM INTENSIVE CARE

## 2021-04-23 PROCEDURE — 85025 COMPLETE CBC W/AUTO DIFF WBC: CPT

## 2021-04-23 RX ORDER — QUETIAPINE FUMARATE 100 MG/1
100 TABLET, FILM COATED ORAL
Status: DISCONTINUED | OUTPATIENT
Start: 2021-04-23 | End: 2021-05-03 | Stop reason: HOSPADM

## 2021-04-23 RX ORDER — ATENOLOL 50 MG/1
50 TABLET ORAL DAILY
Status: DISCONTINUED | OUTPATIENT
Start: 2021-04-23 | End: 2021-04-29

## 2021-04-23 RX ORDER — MIDAZOLAM HYDROCHLORIDE 1 MG/ML
1 INJECTION, SOLUTION INTRAMUSCULAR; INTRAVENOUS ONCE
Status: COMPLETED | OUTPATIENT
Start: 2021-04-23 | End: 2021-04-23

## 2021-04-23 RX ORDER — MIDAZOLAM HYDROCHLORIDE 1 MG/ML
INJECTION, SOLUTION INTRAMUSCULAR; INTRAVENOUS
Status: COMPLETED
Start: 2021-04-23 | End: 2021-04-23

## 2021-04-23 RX ADMIN — WATER 2 G: 1 INJECTION INTRAMUSCULAR; INTRAVENOUS; SUBCUTANEOUS at 12:27

## 2021-04-23 RX ADMIN — SODIUM CHLORIDE 5 MG/HR: 900 INJECTION, SOLUTION INTRAVENOUS at 09:30

## 2021-04-23 RX ADMIN — ACETAMINOPHEN 650 MG: 325 TABLET, FILM COATED ORAL at 18:15

## 2021-04-23 RX ADMIN — Medication 10 ML: at 13:12

## 2021-04-23 RX ADMIN — ACETAMINOPHEN 650 MG: 325 TABLET, FILM COATED ORAL at 08:19

## 2021-04-23 RX ADMIN — POTASSIUM BICARBONATE 40 MEQ: 782 TABLET, EFFERVESCENT ORAL at 12:27

## 2021-04-23 RX ADMIN — ATENOLOL 50 MG: 25 TABLET ORAL at 12:26

## 2021-04-23 RX ADMIN — QUETIAPINE FUMARATE 75 MG: 25 TABLET ORAL at 08:19

## 2021-04-23 RX ADMIN — QUETIAPINE FUMARATE 100 MG: 100 TABLET ORAL at 21:14

## 2021-04-23 RX ADMIN — Medication 10 ML: at 05:03

## 2021-04-23 RX ADMIN — MIDAZOLAM HYDROCHLORIDE 1 MG: 1 INJECTION, SOLUTION INTRAMUSCULAR; INTRAVENOUS at 04:19

## 2021-04-23 RX ADMIN — HYDRALAZINE HYDROCHLORIDE 50 MG: 50 TABLET, FILM COATED ORAL at 06:30

## 2021-04-23 RX ADMIN — HYDRALAZINE HYDROCHLORIDE 50 MG: 50 TABLET, FILM COATED ORAL at 21:14

## 2021-04-23 RX ADMIN — HYDRALAZINE HYDROCHLORIDE 50 MG: 50 TABLET, FILM COATED ORAL at 13:11

## 2021-04-23 RX ADMIN — INSULIN LISPRO 2 UNITS: 100 INJECTION, SOLUTION INTRAVENOUS; SUBCUTANEOUS at 17:46

## 2021-04-23 RX ADMIN — ACETAMINOPHEN 650 MG: 325 TABLET, FILM COATED ORAL at 13:11

## 2021-04-23 RX ADMIN — DEXMEDETOMIDINE HYDROCHLORIDE 0.1 MCG/KG/HR: 400 INJECTION INTRAVENOUS at 02:21

## 2021-04-23 RX ADMIN — LANSOPRAZOLE 30 MG: KIT at 07:03

## 2021-04-23 RX ADMIN — Medication 10 ML: at 21:14

## 2021-04-23 RX ADMIN — LABETALOL HYDROCHLORIDE 10 MG: 5 INJECTION INTRAVENOUS at 12:34

## 2021-04-23 RX ADMIN — INSULIN LISPRO 2 UNITS: 100 INJECTION, SOLUTION INTRAVENOUS; SUBCUTANEOUS at 12:27

## 2021-04-23 NOTE — PROGRESS NOTES
Neurosurgery Progress Note  [facility]          Admit Date: 2021   LOS: 5 days        Daily Progress Note: 2021    HPI: The patient presented from home with headache, nausea, vomiting. He also told his daughter he felt \"crazy in the head\" yesterday. 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 He has had headaches for the past 2 days. He presented to the ER at [facility]. His head CT revealed intraventricular hemorrhage. He transferred to [facility] for further evaluation. He has been agitated, requiring precedex an mitts. He did receive flumazenil last night because of difficulty obtaining neuro exam after receiving valium. This morning he is wide awake in bed, extremely restless, on precedex. Subjective: The patient is more awake today and actually talking. He got his dobhoff tube out overnight even while in restraints. His daughter is at bedside and concerned about the precedex and the seroquel. Ativan PRN was discontinued yesterday. He is still on high flow and his RR is still high. Daughter states that this is similar to his usual breathing patter at home due to his COPD. Unable to obtain ROS due to AMS. Objective:     Vital signs  Temp (24hrs), Av.7 °F (37.1 °C), Min:97.6 °F (36.4 °C), Max:100.5 °F (38.1 °C)    07 -  1900  In: 487 [I.V.:72]  Out: 190 [Urine:90; Drains:100]  1901 -  0700  In: 2801 [I.V.:926]  Out: 6414 [Urine:1445; Drains:200]    Visit Vitals  BP (!) 117/59   Pulse 98   Temp 99.3 °F (37.4 °C)   Resp (!) 34   Ht 5' 8\" (1.727 m)   Wt 97.6 kg (215 lb 2.7 oz)   SpO2 95%   BMI 32.72 kg/m²    O2 Flow Rate (L/min): 40 l/min O2 Device: Hi flow nasal cannula     Pain control  Pain Assessment  Pain Scale 1: Adult Nonverbal Pain Scale  Pain Intensity 1: 3  Pain Location 1: Head  Pain Description 1: Aching  Pain Intervention(s) 1: Medication (see MAR)    PT/OT  Gait                 Physical Exam:  Gen:NAD.  On Precedex 0.2 mcg/kg/hr. Not paused for exam.  Neuro: Lethargic. Opens eyes to voice and looks at me. Talks to me today. Asking for water. PERRL. 3 mm BL. Looks like he has had previous eye surgery. PEREZ spontaneously. Gait deferred. CT head without contrast on 04/18/21 at 1713 shows 2 foci of parenchymal hemorrhage adjacent to the right lateral ventricle associated with significant intraventricular hemorrhage. CT head without contrast on 04/18/21 at 2353 shows no significant interval change in extensive intraventricular hemorrhage. CT head without contrast on 04/19/21 shows no significant change in acute intraventricular hemorrhage and mild ventriculomegaly. CT head without contrast on 04/20/21 shows stable intraventricular hemorrhage and mild ventriculomegaly as described    CT head without contrast on 04/22/1 shows slightly diminished intraventricular hemorrhage and ventriculomegaly.     24 hour results:    Recent Results (from the past 24 hour(s))   GLUCOSE, POC    Collection Time: 04/22/21 11:46 AM   Result Value Ref Range    Glucose (POC) 179 (H) 65 - 100 mg/dL    Performed by FREECULTR    GLUCOSE, POC    Collection Time: 04/22/21  5:15 PM   Result Value Ref Range    Glucose (POC) 146 (H) 65 - 100 mg/dL    Performed by FREECULTR    GLUCOSE, POC    Collection Time: 04/22/21 11:45 PM   Result Value Ref Range    Glucose (POC) 142 (H) 65 - 100 mg/dL    Performed by Edmundo Nickerson BASIC    Collection Time: 04/23/21  4:19 AM   Result Value Ref Range    Sodium 146 (H) 136 - 145 mmol/L    Potassium 3.5 3.5 - 5.1 mmol/L    Chloride 113 (H) 97 - 108 mmol/L    CO2 25 21 - 32 mmol/L    Anion gap 8 5 - 15 mmol/L    Glucose 139 (H) 65 - 100 mg/dL    BUN 35 (H) 6 - 20 MG/DL    Creatinine 1.49 (H) 0.70 - 1.30 MG/DL    BUN/Creatinine ratio 23 (H) 12 - 20      GFR est AA 54 (L) >60 ml/min/1.73m2    GFR est non-AA 45 (L) >60 ml/min/1.73m2    Calcium 8.7 8.5 - 10.1 MG/DL   MAGNESIUM    Collection Time: 04/23/21  4:19 AM   Result Value Ref Range    Magnesium 2.3 1.6 - 2.4 mg/dL   CBC WITH AUTOMATED DIFF    Collection Time: 04/23/21  4:19 AM   Result Value Ref Range    WBC 12.5 (H) 4.1 - 11.1 K/uL    RBC 4.02 (L) 4.10 - 5.70 M/uL    HGB 12.5 12.1 - 17.0 g/dL    HCT 38.6 36.6 - 50.3 %    MCV 96.0 80.0 - 99.0 FL    MCH 31.1 26.0 - 34.0 PG    MCHC 32.4 30.0 - 36.5 g/dL    RDW 15.5 (H) 11.5 - 14.5 %    PLATELET 674 260 - 423 K/uL    MPV 10.3 8.9 - 12.9 FL    NRBC 0.0 0  WBC    ABSOLUTE NRBC 0.00 0.00 - 0.01 K/uL    NEUTROPHILS 82 (H) 32 - 75 %    LYMPHOCYTES 6 (L) 12 - 49 %    MONOCYTES 10 5 - 13 %    EOSINOPHILS 1 0 - 7 %    BASOPHILS 0 0 - 1 %    IMMATURE GRANULOCYTES 1 (H) 0.0 - 0.5 %    ABS. NEUTROPHILS 10.2 (H) 1.8 - 8.0 K/UL    ABS. LYMPHOCYTES 0.8 0.8 - 3.5 K/UL    ABS. MONOCYTES 1.3 (H) 0.0 - 1.0 K/UL    ABS. EOSINOPHILS 0.1 0.0 - 0.4 K/UL    ABS. BASOPHILS 0.0 0.0 - 0.1 K/UL    ABS. IMM. GRANS. 0.1 (H) 0.00 - 0.04 K/UL    DF SMEAR SCANNED      PLATELET COMMENTS Large Platelets      RBC COMMENTS ANISOCYTOSIS  1+        RBC COMMENTS MACROCYTOSIS  1+       GLUCOSE, POC    Collection Time: 04/23/21  6:10 AM   Result Value Ref Range    Glucose (POC) 128 (H) 65 - 100 mg/dL    Performed by Laurie Jalloh           Assessment:     Active Problems:    Intracranial hemorrhage ([address] (4/18/2021)        Plan:   1. IVH with mild ventriculomegaly   - neuro checks   - CT scan with stable ventricle size. On pt MRI in 2019, he appeared to have slightly enlarged ventricles then so this may be his baseline. Sulci are open bilaterally. - repeat head CT with some improvement. . Cont to treat patient conservatively without EVD. - cont to try to decrease sedation   - Pt waking up more so do not think he will need surgical intervention.   - will change neuro checks to every 4 hours to help decrease delirium and promote sleep.   2. HTN   - SBP less than 160 to normotensive   - Cardene PRN   - cont hydralazine   - Intensivist following  3. Alcohol abuse   - out of the window for withdrawal. D/C ativan so it isn't contributing to delirium  4. Acute metabolic encephalopathy   - multi-factorial   - supportive care   - cont seroquel bid. Discussed with daughter we can see how he does and the dose can be titrated up or down pending his response   - neuro checks to q4h to help prevent delirium   - restraints PRN   - Precedex PRN   - Intensivist following      Activity: up with assist  DVT ppx: SCDs  Dispo: tbd    Plan d/w Dr. Lena Green, ICU nurse, intensivist. Spoke with daughter at bedside. She is concerned about the precedex and seroquel.  Will defer management to the intensivist.       Ronald Millan, NP

## 2021-04-23 NOTE — PROGRESS NOTES
0730: Bedside and Verbal shift change report given to Bill Monge RN (oncoming nurse) by Oc Barakat RN (offgoing nurse). Report included the following information SBAR, Kardex, Intake/Output, MAR, Accordion, Recent Results, Med Rec Status, Cardiac Rhythm Sinus Tach, Alarm Parameters  and Dual Neuro Assessment.      1200: Pt febrile at 101.0; applied ice packs and removed blankets; administered Ancef (see MAR); pt tachypneic with moist cough but unable to produce sputum; pt appeared to gag on secretions and due to concern for vomiting RN held tube feeds at this time    1300: Administered Tylenol (see MAR); notified MD of pts sustained tachypnea and remains febrile at 101.5; more ice packs applied; tube feeds resumed at this time after no further episodes of gagging    1800: Notified MD of rising temp; currently 102.0; orders received for cooling blanket; pt placed on cooling blanket; room air

## 2021-04-23 NOTE — PROGRESS NOTES
Transition of Care Plan   RUR- Low      DISPOSITION: pending medical progression   F/U with PCP/Specialist     Transport: TBD  Patient admitted with Intraventricular hemorrhage with ventriculomegaly with possible ETOH withdrawal.  Patient remains in the ICU on Hi Flow oxygen. IVF's: Precedex and Cardene gtts. Per notes patient is not following commands. Care management is continuing to follow for transitions of care.    Brigette Marquez RN,Care Management *Gen: NAD, AAO*3  *HEENT: NC/AT, MMM, airway patent, trachea midline  *CV: RRR, S1/S2 present, no murmurs/rubs/gallops  *Resp: no respiratory distress, LCTAB, no wheezing/rales/rhonchi  *Abd: non-distended, soft N/Tx4, no guarding or rigidity  *Neuro: no focal neuro deficits, moving all limbs appropriately  *Extremities: no gross deformity  *Skin: no rashes, no wounds   ~ Ke Kumar M.D. *Gen: NAD, lying in bed - appears tired, AAO*3  *HEENT: NC/AT, MMM, airway patent, trachea midline  *CV: RRR, S1/S2 present, no murmurs/rubs/gallops  *Resp: no respiratory distress, LCTAB, no wheezing/rales/rhonchi  *Abd: non-distended, soft N/Tx4, no guarding or rigidity  *Neuro: no focal neuro deficits, moving all limbs appropriately  *Extremities: no gross deformity  *Skin: no rashes, no wounds   ~ Ke Kumar M.D.

## 2021-04-23 NOTE — PROGRESS NOTES
[facility]    ICU TEAM Progress Note    Name: Fredick Leyden   : 1934   MRN: [id number]   Date: 2021           ICU Assessment     1. Intraventricular hemorrhage with ventriculomegaly  2. Altered mental status  3. Acute metabolic encephalopathy  4. Possible alcohol withdrawal  5. Hypertension           ICU Comprehensive Plan of Care:     1. Neurological System: IVH with mild ventriculomegaly complicated by altered mental status requiring precedex infusion. Neurosurgery following and no intervention right now. AMS likely a result of IVH and ICU delirium; not alcohol withdrawal. Have added seroquel to see if that can give us a better mental status and get off the precedex drip. - Continue precedex for agitation, wean off if possible. - Seroquel to 75mg BID.    - Continuing CIWA with ativan, try to avoid benzos to prevent delirium if not from alochol withdrawal..   - NSGY following.    - q2h neurochecks   - SBP < 140, currently on nicardipine    2. Cardiovascular System: He has a strict SBP goal per neurosurgery and is requiring cardene to achieve the goal.   - Continue cardene for SBP < 140   - Increase hydralazine to 50mg q8h to help come off cardene.    - Transfusion Trigger (Hgb): <7 g/dL   - Keep K > 4; Mg > 2     3. Respiratory System: His agitation is causing hypoxia requiring HFNC. When he pulls it off he desaturates to the low 80s. Continues to be tenuous. Small pleural effusions.   - Continue HFNC and wean to NC if possible    - Pulmonary toilet: Incentive Spirometry    - SpO2 Goal: > 92%   - DVT Prophylaxis: SCD's or Sequential Compression Device     4. Renal/GI/Endocrine System:     - DHT for enteral access   - IVFs: none at this time   - Ulcer Prophylaxis: Pepcid (famotidine)    - Bowel Regimen: BM    - Feeding: TFs   - Blood Sugar Goal 120-180 - Glycemic Control: Insulin    5. Infectious Disease: No concern for infection at this time.    - Tubes: Dobhoff tube   - Lines: Peripheral IV   - Drains: Yu Catheter      6. PT/OT: PT consulted and on board and OT consulted and on board     7. Goals of Care Discussion with family Yes     8. Plan of Care/Code Status: Full Code    9. Discussed Care Plan with Bedside RN    10. Documentation of Current Medications    Subjective:   Progress Note: 2021      Reason for ICU Admission: Intraventricular hemorrhage.      This is an 80year old male that presented to [facility] ED for 2 day hx of headache and confusion. He was seen at the OSH and was found to have an intraventricular hemorrhage and he was then transferred to [facility] for neurosurgical services. He presented to the ICU in an agitated state which required precedex infusion and IV bolus of valium. : No improvement in neurological status with continued need for precedex. Overnight Events:   2021  - given 1mg versed for agitation. Remains on precedex and cardene. Objective:   Visit Vitals  BP (!) 117/59   Pulse 98   Temp 98.3 °F (36.8 °C)   Resp (!) 34   Ht 5' 8\" (1.727 m)   Wt 97.6 kg (215 lb 2.7 oz)   SpO2 95%   BMI 32.72 kg/m²    O2 Flow Rate (L/min): 40 l/min O2 Device: Hi flow nasal cannula Temp (24hrs), Av.5 °F (36.9 °C), Min:97.6 °F (36.4 °C), Max:100.5 °F (38.1 °C)    Intake/Output:     Intake/Output Summary (Last 24 hours) at 2021 1011  Last data filed at 2021 0800  Gross per 24 hour   Intake 1736.22 ml   Output 1040 ml   Net 696.22 ml       Physical Exam:  General:  Agitated, well noursished, well developed, appears stated age   Eyes:  Sclera anicteric. Pupils equally round and reactive to light. Mouth/Throat: Mucous membranes normal, oral pharynx clear   Neck: Supple   Lungs:   Coarse to auscultation bilaterally, good effort. HFNC in place. CV:  Regular rate and rhythm,no murmur, click, rub or gallop   Abdomen:   Soft, non-tender.  bowel sounds normal. non-distended   Extremities: No cyanosis or edema   Skin: Skin color, texture, turgor normal. no acute rash or lesions   Lymph nodes: Cervical and supraclavicular normal   Musculoskeletal: No swelling or deformity   Lines/Devices:  Intact, no erythema, drainage or tenderness   Psych: Agitated, shouting intermittently. Incoherent. A+Ox0     Labs and Data: Reviewed     MEDS: Reviewed    ECHO: 4/13/2020: Nml BiV function    Multidisciplinary Rounds Completed: Yes    ABCDEF Bundle/Checklist Completed: Yes    SPECIAL EQUIPMENT: None    DISPOSITION: Stay in ICU    CRITICAL CARE CONSULTANT NOTE  I had a face to face encounter with the patient, reviewed and interpreted patient data including clinical events, labs, images, vital signs, I/O's, and examined patient. I have discussed the case and the plan and management of the patient's care with the consulting services, the bedside nurses and the respiratory therapist.      NOTE OF PERSONAL INVOLVEMENT IN CARE   This patient has a high probability of imminent, clinically significant deterioration, which requires the highest level of preparedness to intervene urgently. I participated in the decision-making and personally managed or directed the management of the following life and organ supporting interventions that required my frequent assessment to treat or prevent imminent deterioration. I personally spent 55 minutes of critical care time. This is time spent at this critically ill patient's bedside actively involved in patient care as well as the coordination of care. This does not include any procedural time which has been billed separately.     Sylvester Harley MD  Staff Intensivist/Anesthesiologist  [facility]  4/23/2021

## 2021-04-23 NOTE — PROGRESS NOTES
Spiritual Care Assessment/Progress Note  [facility]      NAME: Jessie Sicard      MRN: [id number]  AGE: 80 y.o. SEX: male  Spiritism Affiliation: Other   Language: English     4/23/2021     Total Time (in minutes): 11     Spiritual Assessment begun in [facility] through conversation with:         [x]Patient        [] Family    [] Friend(s)        Reason for Consult: Initial/Spiritual assessment, critical care     Spiritual beliefs: (Please include comment if needed)     [x] Identifies with a navarro tradition:         [] Supported by a navarro community:            [] Claims no spiritual orientation:           [] Seeking spiritual identity:                [] Adheres to an individual form of spirituality:           [] Not able to assess:                           Identified resources for coping:      [x] Prayer                               [] Music                  [] Guided Imagery     [x] Family/friends                 [] Pet visits     [] Devotional reading                         [] Unknown     [] Other:                                               Interventions offered during this visit: (See comments for more details)          Family/Friend(s):  Affirmation of emotions/emotional suffering, Affirmation of navarro, Catharsis/review of pertinent events in supportive environment, Coping skills reviewed/reinforced, Iconic (affirming the presence of God/Higher Power), Normalization of emotional/spiritual concerns, Prayer (assurance of)     Plan of Care:     [] Support spiritual and/or cultural needs    [] Support AMD and/or advance care planning process      [] Support grieving process   [] Coordinate Rites and/or Rituals    [] Coordination with community clergy   [] No spiritual needs identified at this time   [] Detailed Plan of Care below (See Comments)  [] Make referral to Music Therapy  [] Make referral to Pet Therapy     [] Make referral to Addiction services  [] Make referral to [facility]  [] Make referral to Spiritual Care Partner  [] No future visits requested        [x] Follow up upon further referrals     Comments:  for initial visit. Pt's daughter was at bedside and shared that she has spent some time in the [address] [place] [address]. She is appreciative of  support. Let her know of  availability.  provided pastoral listening, support and assurance of prayer. Please contact [phone] [facility] for further support.      [address] Mis Alanis, MACE   [phone])

## 2021-04-24 ENCOUNTER — APPOINTMENT (OUTPATIENT)
Dept: GENERAL RADIOLOGY | Age: 86
DRG: 064 | End: 2021-04-24
Payer: MEDICARE

## 2021-04-24 ENCOUNTER — APPOINTMENT (OUTPATIENT)
Dept: CT IMAGING | Age: 86
DRG: 064 | End: 2021-04-24
Payer: MEDICARE

## 2021-04-24 LAB
ALBUMIN SERPL-MCNC: 2.9 G/DL (ref 3.5–5)
ALBUMIN/GLOB SERPL: 0.7 {RATIO} (ref 1.1–2.2)
ALP SERPL-CCNC: 84 U/L (ref 45–117)
ALT SERPL-CCNC: 23 U/L (ref 12–78)
ANION GAP SERPL CALC-SCNC: 7 MMOL/L (ref 5–15)
AST SERPL-CCNC: 30 U/L (ref 15–37)
BASOPHILS # BLD: 0 K/UL (ref 0–0.1)
BASOPHILS NFR BLD: 0 % (ref 0–1)
BILIRUB SERPL-MCNC: 0.4 MG/DL (ref 0.2–1)
BUN SERPL-MCNC: 39 MG/DL (ref 6–20)
BUN/CREAT SERPL: 24 (ref 12–20)
CALCIUM SERPL-MCNC: 9.3 MG/DL (ref 8.5–10.1)
CHLORIDE SERPL-SCNC: 111 MMOL/L (ref 97–108)
CO2 SERPL-SCNC: 28 MMOL/L (ref 21–32)
CREAT SERPL-MCNC: 1.65 MG/DL (ref 0.7–1.3)
DIFFERENTIAL METHOD BLD: ABNORMAL
EOSINOPHIL # BLD: 0 K/UL (ref 0–0.4)
EOSINOPHIL NFR BLD: 0 % (ref 0–7)
ERYTHROCYTE [DISTWIDTH] IN BLOOD BY AUTOMATED COUNT: 15.7 % (ref 11.5–14.5)
GLOBULIN SER CALC-MCNC: 4.1 G/DL (ref 2–4)
GLUCOSE BLD STRIP.AUTO-MCNC: 115 MG/DL (ref 65–100)
GLUCOSE BLD STRIP.AUTO-MCNC: 156 MG/DL (ref 65–100)
GLUCOSE BLD STRIP.AUTO-MCNC: 170 MG/DL (ref 65–100)
GLUCOSE BLD STRIP.AUTO-MCNC: 178 MG/DL (ref 65–100)
GLUCOSE SERPL-MCNC: 158 MG/DL (ref 65–100)
HCT VFR BLD AUTO: 39.2 % (ref 36.6–50.3)
HGB BLD-MCNC: 12.4 G/DL (ref 12.1–17)
IMM GRANULOCYTES # BLD AUTO: 0.1 K/UL (ref 0–0.04)
IMM GRANULOCYTES NFR BLD AUTO: 1 % (ref 0–0.5)
LYMPHOCYTES # BLD: 0.9 K/UL (ref 0.8–3.5)
LYMPHOCYTES NFR BLD: 7 % (ref 12–49)
MAGNESIUM SERPL-MCNC: 2.5 MG/DL (ref 1.6–2.4)
MCH RBC QN AUTO: 30.5 PG (ref 26–34)
MCHC RBC AUTO-ENTMCNC: 31.6 G/DL (ref 30–36.5)
MCV RBC AUTO: 96.3 FL (ref 80–99)
MONOCYTES # BLD: 1.1 K/UL (ref 0–1)
MONOCYTES NFR BLD: 8 % (ref 5–13)
NEUTS SEG # BLD: 10.7 K/UL (ref 1.8–8)
NEUTS SEG NFR BLD: 84 % (ref 32–75)
NRBC # BLD: 0 K/UL (ref 0–0.01)
NRBC BLD-RTO: 0 PER 100 WBC
PHOSPHATE SERPL-MCNC: 3.1 MG/DL (ref 2.6–4.7)
PLATELET # BLD AUTO: 233 K/UL (ref 150–400)
PMV BLD AUTO: 10.8 FL (ref 8.9–12.9)
POTASSIUM SERPL-SCNC: 4.3 MMOL/L (ref 3.5–5.1)
PROT SERPL-MCNC: 7 G/DL (ref 6.4–8.2)
RBC # BLD AUTO: 4.07 M/UL (ref 4.1–5.7)
SERVICE CMNT-IMP: ABNORMAL
SODIUM SERPL-SCNC: 146 MMOL/L (ref 136–145)
WBC # BLD AUTO: 12.9 K/UL (ref 4.1–11.1)

## 2021-04-24 PROCEDURE — 85025 COMPLETE CBC W/AUTO DIFF WBC: CPT

## 2021-04-24 PROCEDURE — 74011250637 HC RX REV CODE- 250/637: Performed by: ANESTHESIOLOGY

## 2021-04-24 PROCEDURE — 82962 GLUCOSE BLOOD TEST: CPT

## 2021-04-24 PROCEDURE — 65660000001 HC RM ICU INTERMED STEPDOWN

## 2021-04-24 PROCEDURE — 36415 COLL VENOUS BLD VENIPUNCTURE: CPT

## 2021-04-24 PROCEDURE — 71045 X-RAY EXAM CHEST 1 VIEW: CPT

## 2021-04-24 PROCEDURE — 70450 CT HEAD/BRAIN W/O DYE: CPT

## 2021-04-24 PROCEDURE — 94760 N-INVAS EAR/PLS OXIMETRY 1: CPT

## 2021-04-24 PROCEDURE — 74011000250 HC RX REV CODE- 250: Performed by: STUDENT IN AN ORGANIZED HEALTH CARE EDUCATION/TRAINING PROGRAM

## 2021-04-24 PROCEDURE — 74011250636 HC RX REV CODE- 250/636: Performed by: STUDENT IN AN ORGANIZED HEALTH CARE EDUCATION/TRAINING PROGRAM

## 2021-04-24 PROCEDURE — 77010033711 HC HIGH FLOW OXYGEN

## 2021-04-24 PROCEDURE — 74011000250 HC RX REV CODE- 250: Performed by: NURSE PRACTITIONER

## 2021-04-24 PROCEDURE — 74011250637 HC RX REV CODE- 250/637: Performed by: NURSE PRACTITIONER

## 2021-04-24 PROCEDURE — 74011636637 HC RX REV CODE- 636/637: Performed by: ANESTHESIOLOGY

## 2021-04-24 PROCEDURE — 02HV33Z INSERTION OF INFUSION DEVICE INTO SUPERIOR VENA CAVA, PERCUTANEOUS APPROACH: ICD-10-PCS | Performed by: HOSPITALIST

## 2021-04-24 PROCEDURE — 74011250636 HC RX REV CODE- 250/636: Performed by: NURSE PRACTITIONER

## 2021-04-24 PROCEDURE — 74011250637 HC RX REV CODE- 250/637: Performed by: STUDENT IN AN ORGANIZED HEALTH CARE EDUCATION/TRAINING PROGRAM

## 2021-04-24 PROCEDURE — 83735 ASSAY OF MAGNESIUM: CPT

## 2021-04-24 PROCEDURE — 84100 ASSAY OF PHOSPHORUS: CPT

## 2021-04-24 PROCEDURE — 80053 COMPREHEN METABOLIC PANEL: CPT

## 2021-04-24 PROCEDURE — 77030018798 HC PMP KT ENTRL FED COVD -A

## 2021-04-24 RX ORDER — HYDRALAZINE HYDROCHLORIDE 50 MG/1
50 TABLET, FILM COATED ORAL ONCE
Status: COMPLETED | OUTPATIENT
Start: 2021-04-24 | End: 2021-04-24

## 2021-04-24 RX ADMIN — HYDRALAZINE HYDROCHLORIDE 50 MG: 50 TABLET, FILM COATED ORAL at 05:28

## 2021-04-24 RX ADMIN — LABETALOL HYDROCHLORIDE 10 MG: 5 INJECTION INTRAVENOUS at 21:08

## 2021-04-24 RX ADMIN — INSULIN LISPRO 2 UNITS: 100 INJECTION, SOLUTION INTRAVENOUS; SUBCUTANEOUS at 18:34

## 2021-04-24 RX ADMIN — WATER 2 G: 1 INJECTION INTRAMUSCULAR; INTRAVENOUS; SUBCUTANEOUS at 02:38

## 2021-04-24 RX ADMIN — WATER 2 G: 1 INJECTION INTRAMUSCULAR; INTRAVENOUS; SUBCUTANEOUS at 09:46

## 2021-04-24 RX ADMIN — LANSOPRAZOLE 30 MG: KIT at 08:35

## 2021-04-24 RX ADMIN — ONDANSETRON 4 MG: 2 INJECTION INTRAMUSCULAR; INTRAVENOUS at 22:52

## 2021-04-24 RX ADMIN — ACETAMINOPHEN 650 MG: 325 TABLET, FILM COATED ORAL at 16:16

## 2021-04-24 RX ADMIN — ATENOLOL 50 MG: 25 TABLET ORAL at 08:35

## 2021-04-24 RX ADMIN — HYDRALAZINE HYDROCHLORIDE 50 MG: 50 TABLET, FILM COATED ORAL at 22:51

## 2021-04-24 RX ADMIN — WATER 2 G: 1 INJECTION INTRAMUSCULAR; INTRAVENOUS; SUBCUTANEOUS at 18:34

## 2021-04-24 RX ADMIN — HYDRALAZINE HYDROCHLORIDE 50 MG: 50 TABLET, FILM COATED ORAL at 00:10

## 2021-04-24 RX ADMIN — ACETAMINOPHEN 650 MG: 325 TABLET, FILM COATED ORAL at 09:55

## 2021-04-24 RX ADMIN — ACETAMINOPHEN 650 MG: 325 TABLET, FILM COATED ORAL at 22:51

## 2021-04-24 RX ADMIN — QUETIAPINE FUMARATE 100 MG: 100 TABLET ORAL at 22:51

## 2021-04-24 RX ADMIN — Medication 10 ML: at 05:28

## 2021-04-24 RX ADMIN — ACETAMINOPHEN 650 MG: 325 TABLET, FILM COATED ORAL at 00:28

## 2021-04-24 RX ADMIN — HYDRALAZINE HYDROCHLORIDE 50 MG: 50 TABLET, FILM COATED ORAL at 14:20

## 2021-04-24 RX ADMIN — Medication 10 ML: at 14:20

## 2021-04-24 RX ADMIN — INSULIN LISPRO 2 UNITS: 100 INJECTION, SOLUTION INTRAVENOUS; SUBCUTANEOUS at 11:53

## 2021-04-24 RX ADMIN — INSULIN LISPRO 2 UNITS: 100 INJECTION, SOLUTION INTRAVENOUS; SUBCUTANEOUS at 02:39

## 2021-04-24 NOTE — PROCEDURES
Paulette China  1934  [id number]  4/24/2021    Indication: Poor peripheral access and vasoactive IV medications    Risks, benefits, alternatives explained and consent obtained from daughter. Patient positioned in Trendelenburg. Central line Bundle:   Full sterile barrier precautions used. 7-Step Sterility Protocol followed. (cap, mask sterile gown, sterile gloves, large sterile sheet, hand hygiene, 2% chlorhexidine for cutaneous antisepsis)  5 mL 1% Lidocaine placed at insertion site. 8.5 fr 4 lumen 16 cm CVL placed Using ultrasound guidance,   Right internal jugular cannulated x 1 attempt(s) utilizing the modified Seldinger technique. Without difficulty  Good blood return. Catheter secured & Biopatch applied. Sterile Tegaderm placed. CXR pending.       ADRIENNE Mcnally  April 24, 2021  1:00 AM

## 2021-04-24 NOTE — PROGRESS NOTES
Bedside and Verbal shift change report given to KESHA Mcmanus RN (oncoming nurse) by Reginald Caraballo RN (offgoing nurse). Report included the following information SBAR, Kardex, ED Summary, Procedure Summary, Intake/Output, MAR and Cardiac Rhythm NSR/Sinus Tachycardia. Patient oriented to person, hospital, year 2021. Disoriented to situation, month or what hospital. Follows commands but confused and hard of hearing. Spontaneous and impulsive movement, somewhat redirectable. Daughter at bedside- hypervigilant. Unable to get IV access, NP Dorothy Apple at bedside to attempt peripheral with ultrasound. 2255 Patients daughter signed consent for central line so patient can get abx and IV meds for bp. Agitated and may need precedex to be restarted. 0100 Central line catheter placed. Blood pressure intermittently above 160.   0300 Pt more confused, unable to answer orientation questions, unable to redirect, left pupil now oval and brisk. Stat head CT showing no acute changes.

## 2021-04-24 NOTE — PROGRESS NOTES
[facility]    ICU TEAM Progress Note    Name: Renetta Dixon   : 1934   MRN: [id number]   Date: 2021           ICU Assessment     1. Intraventricular hemorrhage with ventriculomegaly  2. Altered mental status  3. Acute metabolic encephalopathy  4. Possible alcohol withdrawal  5. Hypertension           ICU Comprehensive Plan of Care:     1. Neurological: IVH with mild ventriculomegaly. Neurosurgery following and no intervention right now. AMS likely a result of IVH and ICU delirium; not alcohol withdrawal. Increased PM seroquel dose  which seemed to help. CT head overnight for confusion showed some improvement in ventricular hemorrhage. Some agitation o/n but markedly less than previous nights. Cooling blanket for fever of 102 yesterday evening.   - Continue Seroquel to 100mg QHS. - Continuing CIWA with ativan, try to avoid benzos to prevent delirium if not from alochol withdrawal.   - NSGY following.    - q4h neurochecks   - Off cardene for 24 hours    2. Cardiovascular:    - Goal SBP < 140   - Hydralazine to 50mg q8h. - Transfusion Trigger (Hgb): <7 g/dL   - Keep K > 4; Mg > 2     3. Respiratory: Significant improvement in oxygen requirements overnight after increased pm seroquel.    - Continue HFNC and wean to NC if possible    - Pulmonary toilet: Incentive Spirometry    - SpO2 Goal: > 92%   - DVT Prophylaxis: SCD's or Sequential Compression Device     4. Renal/GI/Endocrine:     - DHT for enteral access   - IVFs: none at this time   - Ulcer Prophylaxis: Pepcid (famotidine)    - Bowel Regimen:     - Feeding: TFs   - Blood Sugar Goal 120-180 - Glycemic Control: Insulin    5. Infectious Disease: MSSA in sputum. WBC creeping up but has been on ABX <24 hours. Febrile. Likely multifactorial given ICH. Put on cooling blanket overnight. NGTD on BCx    - Tubes: Dobhoff tube   - Lines: Peripheral IV, CVC   - Drains: Yu Catheter      6.  PT/OT: PT consulted and on board and OT consulted and on board     7. Goals of Care Discussion with family Yes     8. Plan of Care/Code Status: Full Code    9. Discussed Care Plan with Bedside RN    10. Documentation of Current Medications    Subjective:   Progress Note: 2021      Reason for ICU Admission: Intraventricular hemorrhage.      This is an 80year old male that presented to ED [facility] ED for 2 day hx of headache and confusion. He was seen at the OSH and was found to have an intraventricular hemorrhage and he was then transferred to [facility] for neurosurgical services. He presented to the ICU in an agitated state which required precedex infusion and IV bolus of valium. : No improvement in neurological status with continued need for precedex. : given 1mg versed for agitation. Remains on precedex and cardene. Overnight Events:   2021  - better night. Less agitation. Some confusion which prompted head CT. Showed improvement in IVH. No requirement for precedex or other sedatives. Has remained off cardene for 24 hours. Vascular access issues - had CVC placed. Objective:     Visit Vitals  BP (!) 157/79   Pulse 92   Temp 99.4 °F (37.4 °C)   Resp 30   Ht 5' 8\" (1.727 m)   Wt 97 kg (213 lb 13.5 oz)   SpO2 98%   BMI 32.52 kg/m²    O2 Flow Rate (L/min): 25 l/min O2 Device: Hi flow nasal cannula Temp (24hrs), Av.7 °F (38.2 °C), Min:99.4 °F (37.4 °C), Max:101.3 °F (38.5 °C)    Intake/Output:     Intake/Output Summary (Last 24 hours) at 2021 1030  Last data filed at 2021 1000  Gross per 24 hour   Intake 3132.63 ml   Output 1225 ml   Net 1907.63 ml       Physical Exam:  General:  Agitated, well noursished, well developed, appears stated age   Eyes:  Sclera anicteric. Pupils equally round and reactive to light. Mouth/Throat: Mucous membranes normal, oral pharynx clear   Neck: Supple   Lungs:   Coarse to auscultation bilaterally, good effort. HFNC in place.    CV:  Regular rate and rhythm,no murmur, click, rub or gallop Abdomen:   Soft, non-tender. bowel sounds normal. non-distended   Extremities: No cyanosis or edema   Skin: Skin color, texture, turgor normal. no acute rash or lesions   Lymph nodes: Cervical and supraclavicular normal   Musculoskeletal: No swelling or deformity   Lines/Devices:  Intact, no erythema, drainage or tenderness   Psych: Agitated, shouting intermittently. Incoherent. A+Ox0     Labs and Data: Reviewed     MEDS: Reviewed    ECHO: 4/13/2020: Nml BiV function    CXR 4/24:  No pneumothorax. Triple lumen catheter on the right in appropriate position for use. Minimal left basilar atelectasis. CT Head 4/24: Diminished though still extensive intraventricular hemorrhage. Multidisciplinary Rounds Completed: Yes    ABCDEF Bundle/Checklist Completed: Yes    SPECIAL EQUIPMENT: None    DISPOSITION: Transfer to non-ICU bed    CRITICAL CARE CONSULTANT NOTE  I had a face to face encounter with the patient, reviewed and interpreted patient data including clinical events, labs, images, vital signs, I/O's, and examined patient. I have discussed the case and the plan and management of the patient's care with the consulting services, the bedside nurses and the respiratory therapist.      NOTE OF PERSONAL INVOLVEMENT IN CARE   This patient has a high probability of imminent, clinically significant deterioration, which requires the highest level of preparedness to intervene urgently. I participated in the decision-making and personally managed or directed the management of the following life and organ supporting interventions that required my frequent assessment to treat or prevent imminent deterioration. I personally spent 60 minutes of critical care time. This is time spent at this critically ill patient's bedside actively involved in patient care as well as the coordination of care. This does not include any procedural time which has been billed separately.     Ellen Rivera MD  Staff Intensivist/Anesthesiologist  [facility]  4/24/2021

## 2021-04-24 NOTE — PROGRESS NOTES
0730: Bedside and Verbal shift change report given to Trevor Crews RN (oncoming nurse) by Suresh Mayer RN (offgoing nurse). Report included the following information SBAR, Kardex, ED Summary, Intake/Output, MAR, Accordion, Recent Results, Med Rec Status, Cardiac Rhythm NSR, Alarm Parameters  and Dual Neuro Assessment.

## 2021-04-24 NOTE — PROGRESS NOTES
[facility]  Hospitalist Group     ICU Transfer/Accept Summary     [place] [address] [place]  DATE OF TRANSFER: 4/24/2021       PATIENT ID: Nancy Bazzi  MRN: [id number]   YOB: 1934    PRIMARY CARE PROVIDER: Pauline Johnson MD   DATE OF ADMISSION: 4/18/2021  9:58 PM    ATTENDING PHYSICIAN: Katerina Damon MD  CONSULTATIONS:   IP CONSULT TO NEUROLOGY  IP CONSULT TO INTENSIVIST    PROCEDURES/SURGERIES:   * No surgery found *    REASON FOR ADMISSION: <principal problem not specified>     HOSPITAL PROBLEM LIST:  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) E66.01    TESSA on CPAP G47.33, Z99.89    Simple chronic bronchitis ([facility]) J41.0    Bilateral carotid artery stenosis I65.23    Spinal stenosis of lumbar region at multiple levels M48.061    Flank pain R10.9    Rectal bleeding K62.5    Pneumonia J18.9    Severe sepsis ([facility]) A41.9, R65.20    Acute blood loss anemia D62    Facial droop R29.810    Altered mental state R41.82    Viral encephalitis A86    Coronary artery disease with stable angina pectoris ([facility]) I25.118    Chronic obstructive pulmonary disease ([facility]) J44.9    CKD (chronic kidney disease) stage 3, GFR 30-59 ml/min ([facility]) N18.30    Intracranial hemorrhage ([facility]) I62.9         Brief HPI and Hospital Course:      Patient is a 60-year-old male with history of coronary disease-on aspirin, COPD, chronic insufficiency hyperlipidemia hypertension who has history of prior viral encephalitis was admitted with headache and nausea with vomiting. The CT scan on presentation showed a right intraparenchymal hemorrhage adjacent to the lateral ventricle. Patient was in the ICU, but followed by neurosurgery, there was no surgical recommendations for EVD but was followed with neuro checks every 2 hours. Cardene drip is required for blood pressure control, patient also became encephalopathic due to multiple etiologies. CT scan done yesterday for worsening of confusion shows improvement in intraventricular hemorrhage. Patient became febrile, sputum culture grew MSSA and patient was started on cefazolin. She is currently medically stable, neurochecks have been changed every 4 hours and critical care team recommends downgrading to neuro telemetry/neuro intermediate care bed. Assessment and Plan:    -Intraventricular hemorrhage with mild ventriculomegaly  Neurosurgery on board, no acute surgical intervention  MRI pending 19 head slight enlarged ventricles and repeat CT head shows some improvement in the hemorrhage  Neurosurgery recommends less sedation, neuro checks every 4 hours to help decrease delirium and promote sleep    -Acute metabolic encephalopathy, multifactorial  IVH/ICU delirium/alcohol abuse history  Supportive care  Seroquel twice daily  Neurochecks every 4 hours  Restraints and Precedex as needed (if stays in the ICU)    -Hypertension, controlled, continue Cardene as needed,   continue hydralazine, atenolol  goal of SBP is to keep less than 160 mmHg    -Alcohol abuse  Out of withdrawal window  Ativan as needed, no need for around-the-clock Ativan    -MSSA bronchitis  Sputum culture grows MSSA  Continue cefazolin  Repeat CBC in the morning           PHYSICAL EXAMINATION:  Visit Vitals  BP (!) 157/79   Pulse 92   Temp 99.4 °F (37.4 °C)   Resp 30   Ht 5' 8\" (1.727 m)   Wt 97 kg (213 lb 13.5 oz)   SpO2 98%   BMI 32.52 kg/m²       General:          Agitated, encephalopathic.   HEENT:           Atraumatic, MMM Neck:               Supple, symmetrical  Lungs:             Clear to auscultation bilaterally. No Wheezing or Rhonchi. No rales. Heart:              Regular  rhythm,  No  murmur   No edema  Abdomen:       Soft, non-tender. Not distended. Bowel sounds normal  Extremities:     No cyanosis. No clubbing. Skin:                Not pale. Not Jaundiced  No rashes   Psych:             sleeping  Neurologic:     sleeping, family preferred that patient to be not awaken for exam    Labs:     Recent Labs     04/24/21  0508 04/23/21  0419   WBC 12.9* 12.5*   HGB 12.4 12.5   HCT 39.2 38.6    218     Recent Labs     04/24/21  0508 04/23/21  0419 04/22/21  0150   * 146* 145   K 4.3 3.5 3.7   * 113* 112*   CO2 28 25 27   BUN 39* 35* 37*   CREA 1.65* 1.49* 1.73*   * 139* 147*   CA 9.3 8.7 8.5   MG 2.5* 2.3 2.4   PHOS 3.1  --   --      Recent Labs     04/24/21  0508   ALT 23   AP 84   TBILI 0.4   TP 7.0   ALB 2.9*   GLOB 4.1*     No results for input(s): INR, PTP, APTT, INREXT in the last 72 hours. No results for input(s): FE, TIBC, PSAT, FERR in the last 72 hours. No results found for: FOL, RBCF   No results for input(s): PH, PCO2, PO2 in the last 72 hours. No results for input(s): CPK, CKNDX, TROIQ in the last 72 hours.     No lab exists for component: CPKMB  Lab Results   Component Value Date/Time    Cholesterol, total 144 04/19/2021 12:59 AM    HDL Cholesterol 33 04/19/2021 12:59 AM    LDL, calculated 74.8 04/19/2021 12:59 AM    Triglyceride 181 (H) 04/19/2021 12:59 AM    CHOL/HDL Ratio 4.4 04/19/2021 12:59 AM     Lab Results   Component Value Date/Time    Glucose (POC) 156 (H) 04/24/2021 11:49 AM    Glucose (POC) 115 (H) 04/24/2021 06:57 AM    Glucose (POC) 170 (H) 04/24/2021 02:31 AM    Glucose (POC) 142 (H) 04/23/2021 04:43 PM    Glucose (POC) 181 (H) 04/23/2021 11:13 AM     Lab Results   Component Value Date/Time    Color YELLOW/STRAW 04/18/2021 04:49 PM    Appearance CLEAR 04/18/2021 04:49 PM    Specific gravity 1.013 04/18/2021 04:49 PM    Specific gravity 1.015 09/21/2020 09:13 AM    pH (UA) 6.0 04/18/2021 04:49 PM    Protein Negative 04/18/2021 04:49 PM    Glucose Negative 04/18/2021 04:49 PM    Ketone Negative 04/18/2021 04:49 PM    Bilirubin Negative 04/18/2021 04:49 PM    Urobilinogen 0.2 04/18/2021 04:49 PM    Nitrites Negative 04/18/2021 04:49 PM    Leukocyte Esterase Negative 04/18/2021 04:49 PM    Epithelial cells FEW 04/18/2021 04:49 PM    Bacteria Negative 04/18/2021 04:49 PM    WBC 0-4 04/18/2021 04:49 PM    RBC 0-5 04/18/2021 04:49 PM         CODE STATUS:  x Full Code    DNR    Partial    Comfort Care       Signed:   Jasmin Louis MD  Date of Service:  4/24/2021  12:28 PM

## 2021-04-25 LAB
GLUCOSE BLD STRIP.AUTO-MCNC: 128 MG/DL (ref 65–100)
GLUCOSE BLD STRIP.AUTO-MCNC: 140 MG/DL (ref 65–100)
GLUCOSE BLD STRIP.AUTO-MCNC: 142 MG/DL (ref 65–100)
GLUCOSE BLD STRIP.AUTO-MCNC: 197 MG/DL (ref 65–100)
MAGNESIUM SERPL-MCNC: 2.5 MG/DL (ref 1.6–2.4)
SERVICE CMNT-IMP: ABNORMAL

## 2021-04-25 PROCEDURE — 74011250637 HC RX REV CODE- 250/637: Performed by: INTERNAL MEDICINE

## 2021-04-25 PROCEDURE — 94640 AIRWAY INHALATION TREATMENT: CPT

## 2021-04-25 PROCEDURE — 36415 COLL VENOUS BLD VENIPUNCTURE: CPT

## 2021-04-25 PROCEDURE — 51798 US URINE CAPACITY MEASURE: CPT

## 2021-04-25 PROCEDURE — 74011636637 HC RX REV CODE- 636/637: Performed by: ANESTHESIOLOGY

## 2021-04-25 PROCEDURE — 74011000250 HC RX REV CODE- 250: Performed by: NURSE PRACTITIONER

## 2021-04-25 PROCEDURE — 74011250637 HC RX REV CODE- 250/637: Performed by: NURSE PRACTITIONER

## 2021-04-25 PROCEDURE — 74011250637 HC RX REV CODE- 250/637: Performed by: STUDENT IN AN ORGANIZED HEALTH CARE EDUCATION/TRAINING PROGRAM

## 2021-04-25 PROCEDURE — 82962 GLUCOSE BLOOD TEST: CPT

## 2021-04-25 PROCEDURE — 74011000250 HC RX REV CODE- 250: Performed by: STUDENT IN AN ORGANIZED HEALTH CARE EDUCATION/TRAINING PROGRAM

## 2021-04-25 PROCEDURE — 74011250636 HC RX REV CODE- 250/636: Performed by: NURSE PRACTITIONER

## 2021-04-25 PROCEDURE — 74011250636 HC RX REV CODE- 250/636: Performed by: STUDENT IN AN ORGANIZED HEALTH CARE EDUCATION/TRAINING PROGRAM

## 2021-04-25 PROCEDURE — 83735 ASSAY OF MAGNESIUM: CPT

## 2021-04-25 PROCEDURE — 65660000001 HC RM ICU INTERMED STEPDOWN

## 2021-04-25 PROCEDURE — 77010033678 HC OXYGEN DAILY

## 2021-04-25 PROCEDURE — 74011250637 HC RX REV CODE- 250/637: Performed by: ANESTHESIOLOGY

## 2021-04-25 RX ORDER — ALBUTEROL SULFATE 0.83 MG/ML
2.5 SOLUTION RESPIRATORY (INHALATION)
Status: DISCONTINUED | OUTPATIENT
Start: 2021-04-25 | End: 2021-05-03 | Stop reason: HOSPADM

## 2021-04-25 RX ORDER — GUAIFENESIN/DEXTROMETHORPHAN 100-10MG/5
10 SYRUP ORAL EVERY 6 HOURS
Status: DISCONTINUED | OUTPATIENT
Start: 2021-04-25 | End: 2021-05-02

## 2021-04-25 RX ADMIN — GUAIFENESIN AND DEXTROMETHORPHAN 10 ML: 100; 10 SYRUP ORAL at 06:27

## 2021-04-25 RX ADMIN — ACETAMINOPHEN 650 MG: 325 TABLET, FILM COATED ORAL at 06:27

## 2021-04-25 RX ADMIN — WATER 2 G: 1 INJECTION INTRAMUSCULAR; INTRAVENOUS; SUBCUTANEOUS at 18:00

## 2021-04-25 RX ADMIN — ACETAMINOPHEN 650 MG: 325 TABLET, FILM COATED ORAL at 13:14

## 2021-04-25 RX ADMIN — QUETIAPINE FUMARATE 100 MG: 100 TABLET ORAL at 21:07

## 2021-04-25 RX ADMIN — Medication 5 ML: at 14:00

## 2021-04-25 RX ADMIN — GUAIFENESIN AND DEXTROMETHORPHAN 10 ML: 100; 10 SYRUP ORAL at 12:00

## 2021-04-25 RX ADMIN — Medication 1 PACKET: at 10:53

## 2021-04-25 RX ADMIN — ALBUTEROL SULFATE 2.5 MG: 2.5 SOLUTION RESPIRATORY (INHALATION) at 11:12

## 2021-04-25 RX ADMIN — Medication 1 PACKET: at 18:08

## 2021-04-25 RX ADMIN — INSULIN LISPRO 2 UNITS: 100 INJECTION, SOLUTION INTRAVENOUS; SUBCUTANEOUS at 01:56

## 2021-04-25 RX ADMIN — Medication 1 PACKET: at 21:10

## 2021-04-25 RX ADMIN — HYDRALAZINE HYDROCHLORIDE 50 MG: 50 TABLET, FILM COATED ORAL at 21:07

## 2021-04-25 RX ADMIN — LANSOPRAZOLE 30 MG: KIT at 09:00

## 2021-04-25 RX ADMIN — HYDRALAZINE HYDROCHLORIDE 50 MG: 50 TABLET, FILM COATED ORAL at 06:27

## 2021-04-25 RX ADMIN — WATER 2 G: 1 INJECTION INTRAMUSCULAR; INTRAVENOUS; SUBCUTANEOUS at 10:00

## 2021-04-25 RX ADMIN — INSULIN LISPRO 2 UNITS: 100 INJECTION, SOLUTION INTRAVENOUS; SUBCUTANEOUS at 06:00

## 2021-04-25 RX ADMIN — ACETAMINOPHEN 650 MG: 325 TABLET, FILM COATED ORAL at 21:07

## 2021-04-25 RX ADMIN — Medication 10 ML: at 21:10

## 2021-04-25 RX ADMIN — GUAIFENESIN AND DEXTROMETHORPHAN 10 ML: 100; 10 SYRUP ORAL at 18:08

## 2021-04-25 RX ADMIN — HYDRALAZINE HYDROCHLORIDE 10 MG: 20 INJECTION INTRAMUSCULAR; INTRAVENOUS at 18:11

## 2021-04-25 RX ADMIN — Medication 10 ML: at 01:59

## 2021-04-25 RX ADMIN — ATENOLOL 50 MG: 25 TABLET ORAL at 10:48

## 2021-04-25 RX ADMIN — Medication 10 ML: at 06:33

## 2021-04-25 RX ADMIN — HYDRALAZINE HYDROCHLORIDE 50 MG: 50 TABLET, FILM COATED ORAL at 13:14

## 2021-04-25 RX ADMIN — WATER 2 G: 1 INJECTION INTRAMUSCULAR; INTRAVENOUS; SUBCUTANEOUS at 02:00

## 2021-04-25 NOTE — PROGRESS NOTES
[facility] Adult  Hospitalist Group                                                                                          Hospitalist Progress Note  Alexsandra Cisse   Answering service: [phone] from in house phone        Date of Service:  2021  NAME:  Jose Duque  :  1934  MRN:  [id number]      Admission Summary:   80-year-old male with history of coronary disease-on aspirin, COPD, chronic insufficiency hyperlipidemia hypertension who has history of prior viral encephalitis was admitted with headache and nausea with vomiting. The CT scan on presentation showed a right intraparenchymal hemorrhage adjacent to the lateral ventricle. Patient was in the ICU, but followed by neurosurgery, there was no surgical recommendations for EVD but was followed with neuro checks every 2 hours. Cardene drip is required for blood pressure control, patient also became encephalopathic due to multiple etiologies. CT scan done yesterday for worsening of confusion shows improvement in intraventricular hemorrhage. Patient became febrile, sputum culture grew MSSA and patient was started on cefazolin. She is currently medically stable, neurochecks have been changed every 4 hours and critical care team recommends downgrading to neuro telemetry/neuro intermediate care bed. Interval history / Subjective: Follow up intraventricular hemorrhage. Patient seen and examined. First encounter. Family at bedside. Patient alert to name and place. Family reported ongoing loose bowel movements. Inquiring whether patient can be moved to chair. Overall, they report improved in cognition since admission.       Assessment & Plan:     Intraventricular hemorrhage with mild ventriculomegaly  -Neurosurgery on board, no acute surgical intervention  -Repeat CT head shows some improvement in the hemorrhage  -Neurosurgery recommends less sedation, neuro checks every 4 hours to help decrease delirium and promote sleep     Acute metabolic encephalopathy, multifactorial  -IVH/ICU delirium  -Supportive care  -Seroquel at bedtime  -Neurochecks every 4 hours     Hypertension, controlled, continue Cardene as needed,   -continue hydralazine, atenolol  -goal of SBP is to keep less than 160 mmHg     History of alcohol abuse  -family reports prior history but minimal alcohol intake prior to admission   -Out of withdrawal window     MSSA bronchitis  -Sputum culture grows MSSA  -Continue cefazolin    Dysphagia:   -repeat speech evaluation 4/26  -continue tube feeds, benefiber added for loose stools     Code status: full   DVT prophylaxis: [address] discussed with: Patient/Family  Anticipated Disposition: SNF/LTC  Anticipated Discharge: Greater than 48 hours     Hospital Problems  Date Reviewed: 9/21/2020          Codes Class Noted POA    Intracranial hemorrhage ([facility] [address] ICD-10-CM: I62.9  ICD-9-CM: 432.9  4/18/2021 Unknown                Review of Systems:   Negative unless stated above       Vital Signs:    Last 24hrs VS reviewed since prior progress note. Most recent are:  Visit Vitals  BP (!) 158/79   Pulse 98   Temp (!) 100.6 °F (38.1 °C)   Resp 24   Ht 5' 8\" (1.727 m)   Wt 97 kg (213 lb 13.5 oz)   SpO2 96%   BMI 32.52 kg/m²         Intake/Output Summary (Last 24 hours) at 4/25/2021 0936  Last data filed at 4/24/2021 1834  Gross per 24 hour   Intake 660 ml   Output 750 ml   Net -90 ml        Physical Examination:     I had a face to face encounter with this patient and independently examined them on 4/25/2021 as outlined below:          Constitutional:  No acute distress, cooperative   ENT:  Oral mucosa moist, oropharynx benign. Resp:  CTA bilaterally. No wheezing/rhonchi/rales. No accessory muscle use   CV:  Regular rhythm, normal rate, no murmurs, gallops, rubs    GI:  Soft, non distended, non tender.  normoactive bowel sounds, no hepatosplenomegaly     Musculoskeletal:  No edema, warm, 2+ pulses throughout    Neurologic: Moves all extremities. AAOx2, can follow simple commands            Data Review:    Review and/or order of clinical lab test  Review and/or order of tests in the radiology section of CPT  Review and/or order of tests in the medicine section of CPT      Labs:     Recent Labs     04/24/21  0508 04/23/21  0419   WBC 12.9* 12.5*   HGB 12.4 12.5   HCT 39.2 38.6    218     Recent Labs     04/25/21  0206 04/24/21  0508 04/23/21  0419   NA  --  146* 146*   K  --  4.3 3.5   CL  --  111* 113*   CO2  --  28 25   BUN  --  39* 35*   CREA  --  1.65* 1.49*   GLU  --  158* 139*   CA  --  9.3 8.7   MG 2.5* 2.5* 2.3   PHOS  --  3.1  --      Recent Labs     04/24/21  0508   ALT 23   AP 84   TBILI 0.4   TP 7.0   ALB 2.9*   GLOB 4.1*     No results for input(s): INR, PTP, APTT, INREXT in the last 72 hours. No results for input(s): FE, TIBC, PSAT, FERR in the last 72 hours. No results found for: FOL, RBCF   No results for input(s): PH, PCO2, PO2 in the last 72 hours. No results for input(s): CPK, CKNDX, TROIQ in the last 72 hours.     No lab exists for component: CPKMB  Lab Results   Component Value Date/Time    Cholesterol, total 144 04/19/2021 12:59 AM    HDL Cholesterol 33 04/19/2021 12:59 AM    LDL, calculated 74.8 04/19/2021 12:59 AM    Triglyceride 181 (H) 04/19/2021 12:59 AM    CHOL/HDL Ratio 4.4 04/19/2021 12:59 AM     Lab Results   Component Value Date/Time    Glucose (POC) 197 (H) 04/25/2021 07:26 AM    Glucose (POC) 140 (H) 04/25/2021 01:48 AM    Glucose (POC) 178 (H) 04/24/2021 06:28 PM    Glucose (POC) 156 (H) 04/24/2021 11:49 AM    Glucose (POC) 115 (H) 04/24/2021 06:57 AM     Lab Results   Component Value Date/Time    Color YELLOW/STRAW 04/18/2021 04:49 PM    Appearance CLEAR 04/18/2021 04:49 PM    Specific gravity 1.013 04/18/2021 04:49 PM    Specific gravity 1.015 09/21/2020 09:13 AM    pH (UA) 6.0 04/18/2021 04:49 PM    Protein Negative 04/18/2021 04:49 PM    Glucose Negative 04/18/2021 04:49 PM    Ketone Negative 04/18/2021 04:49 PM    Bilirubin Negative 04/18/2021 04:49 PM    Urobilinogen 0.2 04/18/2021 04:49 PM    Nitrites Negative 04/18/2021 04:49 PM    Leukocyte Esterase Negative 04/18/2021 04:49 PM    Epithelial cells FEW 04/18/2021 04:49 PM    Bacteria Negative 04/18/2021 04:49 PM    WBC 0-4 04/18/2021 04:49 PM    RBC 0-5 04/18/2021 04:49 PM         Medications Reviewed:     Current Facility-Administered Medications   Medication Dose Route Frequency    guaiFENesin-dextromethorphan (ROBITUSSIN DM) 100-10 mg/5 mL syrup 10 mL  10 mL Oral Q6H    albuterol (PROVENTIL VENTOLIN) nebulizer solution 2.5 mg  2.5 mg Nebulization Q6H PRN    atenoloL (TENORMIN) tablet 50 mg  50 mg Per NG tube DAILY    ceFAZolin (ANCEF) 2 g in sterile water (preservative free) 20 mL IV syringe  2 g IntraVENous Q8H    QUEtiapine (SEROquel) tablet 100 mg  100 mg Per NG tube QHS    hydrALAZINE (APRESOLINE) tablet 50 mg  50 mg Per NG tube Q8H    docusate (COLACE) 50 mg/5 mL oral liquid 100 mg  100 mg Per NG tube DAILY    lansoprazole (PREVACID) 3 mg/mL oral suspension 30 mg  30 mg Per NG tube ACB    sodium chloride (NS) flush 5-40 mL  5-40 mL IntraVENous Q8H    sodium chloride (NS) flush 5-40 mL  5-40 mL IntraVENous PRN    acetaminophen (TYLENOL) tablet 650 mg  650 mg Oral Q6H PRN    Or    acetaminophen (TYLENOL) suppository 650 mg  650 mg Rectal Q6H PRN    polyethylene glycol (MIRALAX) packet 17 g  17 g Oral DAILY PRN    promethazine (PHENERGAN) tablet 12.5 mg  12.5 mg Oral Q6H PRN    Or    ondansetron (ZOFRAN) injection 4 mg  4 mg IntraVENous Q6H PRN    glucose chewable tablet 16 g  4 Tab Oral PRN    dextrose (D50W) injection syrg 12.5-25 g  25-50 mL IntraVENous PRN    glucagon (GLUCAGEN) injection 1 mg  1 mg IntraMUSCular PRN    insulin lispro (HUMALOG) injection   SubCUTAneous Q6H    hydrALAZINE (APRESOLINE) 20 mg/mL injection 10 mg  10 mg IntraVENous Q4H PRN    labetaloL (NORMODYNE;TRANDATE) injection 10 mg  10 mg IntraVENous Q4H PRN    niCARdipine (CARDENE) 25 mg in 0.9% sodium chloride 250 mL infusion  0-15 mg/hr IntraVENous TITRATE     ______________________________________________________________________  EXPECTED LENGTH OF STAY: 2d 0h  ACTUAL LENGTH OF STAY:          [address] DO

## 2021-04-25 NOTE — PROGRESS NOTES
Problem: Non-Violent Restraints  Goal: Removal from restraints as soon as assessed to be safe  Outcome: Progressing Towards Goal  Goal: No harm/injury to patient while restraints in use  Outcome: Progressing Towards Goal  Goal: Patient's dignity will be maintained  Outcome: Progressing Towards Goal  Goal: Patient Interventions  Outcome: Progressing Towards Goal     Problem: Falls - Risk of  Goal: *Absence of Falls  Description: Document Aurea Moyblaise Fall Risk and appropriate interventions in the flowsheet. Outcome: Progressing Towards Goal  Note: Fall Risk Interventions:       Mentation Interventions: Adequate sleep, hydration, pain control, Bed/chair exit alarm, Door open when patient unattended, Reorient patient, Room close to nurse's station    Medication Interventions: Bed/chair exit alarm, Evaluate medications/consider consulting pharmacy    Elimination Interventions: Bed/chair exit alarm, Call light in reach, Patient to call for help with toileting needs, Toileting schedule/hourly rounds              Problem: Pressure Injury - Risk of  Goal: *Prevention of pressure injury  Description: Document Aquiles Scale and appropriate interventions in the flowsheet. Outcome: Progressing Towards Goal  Note: Pressure Injury Interventions:  Sensory Interventions: Assess changes in LOC, Assess need for specialty bed, Check visual cues for pain, Discuss PT/OT consult with provider, Float heels, Keep linens dry and wrinkle-free, Maintain/enhance activity level, Monitor skin under medical devices, Turn and reposition approx.  every two hours (pillows and wedges if needed)    Moisture Interventions: Absorbent underpads, Apply protective barrier, creams and emollients, Check for incontinence Q2 hours and as needed, Internal/External urinary devices, Limit adult briefs, Maintain skin hydration (lotion/cream)    Activity Interventions: Assess need for specialty bed, Increase time out of bed, Pressure redistribution bed/mattress(bed type), PT/OT evaluation    Mobility Interventions: Assess need for specialty bed, Float heels, Pressure redistribution bed/mattress (bed type), PT/OT evaluation, Turn and reposition approx.  every two hours(pillow and wedges)    Nutrition Interventions: Document food/fluid/supplement intake    Friction and Shear Interventions: Apply protective barrier, creams and emollients, Lift team/patient mobility team                Problem: TIA/CVA Stroke: Day 2 Until Discharge  Goal: Activity/Safety  Outcome: Progressing Towards Goal  Goal: Diagnostic Test/Procedures  Outcome: Progressing Towards Goal  Goal: Nutrition/Diet  Outcome: Progressing Towards Goal  Goal: Discharge Planning  Outcome: Progressing Towards Goal  Goal: Medications  Outcome: Progressing Towards Goal  Goal: Respiratory  Outcome: Progressing Towards Goal  Goal: Treatments/Interventions/Procedures  Outcome: Progressing Towards Goal  Goal: Psychosocial  Outcome: Progressing Towards Goal  Goal: *Verbalizes anxiety and depression are reduced or absent  Outcome: Progressing Towards Goal  Goal: *Absence of aspiration  Outcome: Progressing Towards Goal  Goal: *Absence of deep venous thrombosis signs and symptoms(Stroke Metric)  Outcome: Progressing Towards Goal  Goal: *Optimal pain control at patient's stated goal  Outcome: Progressing Towards Goal  Goal: *Tolerating diet  Outcome: Progressing Towards Goal  Goal: *Ability to perform ADLs and demonstrates progressive mobility and function  Outcome: Progressing Towards Goal  Goal: *Stroke education continued(Stroke Metric)  Outcome: Progressing Towards Goal

## 2021-04-26 ENCOUNTER — APPOINTMENT (OUTPATIENT)
Dept: GENERAL RADIOLOGY | Age: 86
DRG: 064 | End: 2021-04-26
Attending: INTERNAL MEDICINE
Payer: MEDICARE

## 2021-04-26 LAB
ANION GAP SERPL CALC-SCNC: 4 MMOL/L (ref 5–15)
ARTERIAL PATENCY WRIST A: YES
BASE EXCESS BLDA CALC-SCNC: 3.4 MMOL/L
BDY SITE: ABNORMAL
BUN SERPL-MCNC: 41 MG/DL (ref 6–20)
BUN/CREAT SERPL: 25 (ref 12–20)
CALCIUM SERPL-MCNC: 9 MG/DL (ref 8.5–10.1)
CHLORIDE SERPL-SCNC: 114 MMOL/L (ref 97–108)
CO2 SERPL-SCNC: 29 MMOL/L (ref 21–32)
CREAT SERPL-MCNC: 1.63 MG/DL (ref 0.7–1.3)
ERYTHROCYTE [DISTWIDTH] IN BLOOD BY AUTOMATED COUNT: 15.8 % (ref 11.5–14.5)
GLUCOSE BLD STRIP.AUTO-MCNC: 136 MG/DL (ref 65–100)
GLUCOSE BLD STRIP.AUTO-MCNC: 138 MG/DL (ref 65–100)
GLUCOSE BLD STRIP.AUTO-MCNC: 159 MG/DL (ref 65–100)
GLUCOSE BLD STRIP.AUTO-MCNC: 169 MG/DL (ref 65–100)
GLUCOSE SERPL-MCNC: 140 MG/DL (ref 65–100)
HCO3 BLDA-SCNC: 27 MMOL/L (ref 22–26)
HCT VFR BLD AUTO: 39.5 % (ref 36.6–50.3)
HGB BLD-MCNC: 12.5 G/DL (ref 12.1–17)
LACTATE SERPL-SCNC: 1.8 MMOL/L (ref 0.4–2)
MAGNESIUM SERPL-MCNC: 2.8 MG/DL (ref 1.6–2.4)
MAGNESIUM SERPL-MCNC: 2.8 MG/DL (ref 1.6–2.4)
MCH RBC QN AUTO: 30.6 PG (ref 26–34)
MCHC RBC AUTO-ENTMCNC: 31.6 G/DL (ref 30–36.5)
MCV RBC AUTO: 96.6 FL (ref 80–99)
NRBC # BLD: 0 K/UL (ref 0–0.01)
NRBC BLD-RTO: 0 PER 100 WBC
PCO2 BLDA: 36 MMHG (ref 35–45)
PH BLDA: 7.49 [PH] (ref 7.35–7.45)
PHOSPHATE SERPL-MCNC: 2.4 MG/DL (ref 2.6–4.7)
PLATELET # BLD AUTO: 257 K/UL (ref 150–400)
PMV BLD AUTO: 10.2 FL (ref 8.9–12.9)
PO2 BLDA: 213 MMHG (ref 80–100)
POTASSIUM SERPL-SCNC: 4.2 MMOL/L (ref 3.5–5.1)
RBC # BLD AUTO: 4.09 M/UL (ref 4.1–5.7)
SAO2 % BLD: 100 % (ref 92–97)
SAO2% DEVICE SAO2% SENSOR NAME: ABNORMAL
SERVICE CMNT-IMP: ABNORMAL
SODIUM SERPL-SCNC: 147 MMOL/L (ref 136–145)
SPECIMEN SITE: ABNORMAL
TROPONIN I SERPL-MCNC: 0.11 NG/ML
TROPONIN I SERPL-MCNC: 0.11 NG/ML
WBC # BLD AUTO: 12.3 K/UL (ref 4.1–11.1)

## 2021-04-26 PROCEDURE — 83735 ASSAY OF MAGNESIUM: CPT

## 2021-04-26 PROCEDURE — 74011250637 HC RX REV CODE- 250/637: Performed by: ANESTHESIOLOGY

## 2021-04-26 PROCEDURE — 80048 BASIC METABOLIC PNL TOTAL CA: CPT

## 2021-04-26 PROCEDURE — 83605 ASSAY OF LACTIC ACID: CPT

## 2021-04-26 PROCEDURE — 85027 COMPLETE CBC AUTOMATED: CPT

## 2021-04-26 PROCEDURE — 74011636637 HC RX REV CODE- 636/637: Performed by: ANESTHESIOLOGY

## 2021-04-26 PROCEDURE — 94760 N-INVAS EAR/PLS OXIMETRY 1: CPT

## 2021-04-26 PROCEDURE — 87040 BLOOD CULTURE FOR BACTERIA: CPT

## 2021-04-26 PROCEDURE — 74011250637 HC RX REV CODE- 250/637: Performed by: NURSE PRACTITIONER

## 2021-04-26 PROCEDURE — 74011000258 HC RX REV CODE- 258: Performed by: INTERNAL MEDICINE

## 2021-04-26 PROCEDURE — 84484 ASSAY OF TROPONIN QUANT: CPT

## 2021-04-26 PROCEDURE — 36592 COLLECT BLOOD FROM PICC: CPT

## 2021-04-26 PROCEDURE — 74011250637 HC RX REV CODE- 250/637: Performed by: INTERNAL MEDICINE

## 2021-04-26 PROCEDURE — 93005 ELECTROCARDIOGRAM TRACING: CPT

## 2021-04-26 PROCEDURE — 74011000250 HC RX REV CODE- 250: Performed by: NURSE PRACTITIONER

## 2021-04-26 PROCEDURE — 36600 WITHDRAWAL OF ARTERIAL BLOOD: CPT

## 2021-04-26 PROCEDURE — 77010033678 HC OXYGEN DAILY

## 2021-04-26 PROCEDURE — 82803 BLOOD GASES ANY COMBINATION: CPT

## 2021-04-26 PROCEDURE — 36415 COLL VENOUS BLD VENIPUNCTURE: CPT

## 2021-04-26 PROCEDURE — 74011250636 HC RX REV CODE- 250/636: Performed by: STUDENT IN AN ORGANIZED HEALTH CARE EDUCATION/TRAINING PROGRAM

## 2021-04-26 PROCEDURE — 74011250636 HC RX REV CODE- 250/636: Performed by: NURSE PRACTITIONER

## 2021-04-26 PROCEDURE — 74011250636 HC RX REV CODE- 250/636: Performed by: INTERNAL MEDICINE

## 2021-04-26 PROCEDURE — 74011000250 HC RX REV CODE- 250: Performed by: STUDENT IN AN ORGANIZED HEALTH CARE EDUCATION/TRAINING PROGRAM

## 2021-04-26 PROCEDURE — 65660000001 HC RM ICU INTERMED STEPDOWN

## 2021-04-26 PROCEDURE — 84100 ASSAY OF PHOSPHORUS: CPT

## 2021-04-26 PROCEDURE — 74011250637 HC RX REV CODE- 250/637: Performed by: STUDENT IN AN ORGANIZED HEALTH CARE EDUCATION/TRAINING PROGRAM

## 2021-04-26 PROCEDURE — 82962 GLUCOSE BLOOD TEST: CPT

## 2021-04-26 PROCEDURE — 71045 X-RAY EXAM CHEST 1 VIEW: CPT

## 2021-04-26 RX ORDER — VANCOMYCIN 2 GRAM/500 ML IN 0.9 % SODIUM CHLORIDE INTRAVENOUS
2000 ONCE
Status: COMPLETED | OUTPATIENT
Start: 2021-04-26 | End: 2021-04-26

## 2021-04-26 RX ORDER — VANCOMYCIN HYDROCHLORIDE
1250 DAILY
Status: DISCONTINUED | OUTPATIENT
Start: 2021-04-27 | End: 2021-04-28

## 2021-04-26 RX ORDER — DULOXETIN HYDROCHLORIDE 30 MG/1
30 CAPSULE, DELAYED RELEASE ORAL DAILY
Status: DISCONTINUED | OUTPATIENT
Start: 2021-04-26 | End: 2021-05-03 | Stop reason: HOSPADM

## 2021-04-26 RX ORDER — MICONAZOLE NITRATE 2 %
POWDER (GRAM) TOPICAL AS NEEDED
Status: DISCONTINUED | OUTPATIENT
Start: 2021-04-26 | End: 2021-05-03 | Stop reason: HOSPADM

## 2021-04-26 RX ORDER — VANCOMYCIN HYDROCHLORIDE
1250 DAILY
Status: DISCONTINUED | OUTPATIENT
Start: 2021-04-27 | End: 2021-04-26

## 2021-04-26 RX ORDER — HALOPERIDOL 5 MG/ML
3 INJECTION INTRAMUSCULAR ONCE
Status: COMPLETED | OUTPATIENT
Start: 2021-04-26 | End: 2021-04-26

## 2021-04-26 RX ADMIN — ACETAMINOPHEN 650 MG: 325 TABLET, FILM COATED ORAL at 05:36

## 2021-04-26 RX ADMIN — HYDRALAZINE HYDROCHLORIDE 10 MG: 20 INJECTION INTRAMUSCULAR; INTRAVENOUS at 03:37

## 2021-04-26 RX ADMIN — ALBUTEROL SULFATE 2.5 MG: 2.5 SOLUTION RESPIRATORY (INHALATION) at 20:17

## 2021-04-26 RX ADMIN — DULOXETINE HYDROCHLORIDE 30 MG: 30 CAPSULE, DELAYED RELEASE ORAL at 11:37

## 2021-04-26 RX ADMIN — INSULIN LISPRO 2 UNITS: 100 INJECTION, SOLUTION INTRAVENOUS; SUBCUTANEOUS at 19:13

## 2021-04-26 RX ADMIN — VANCOMYCIN HYDROCHLORIDE 2000 MG: 10 INJECTION, POWDER, LYOPHILIZED, FOR SOLUTION INTRAVENOUS at 10:47

## 2021-04-26 RX ADMIN — PIPERACILLIN AND TAZOBACTAM 3.38 G: 3; .375 INJECTION, POWDER, LYOPHILIZED, FOR SOLUTION INTRAVENOUS at 10:47

## 2021-04-26 RX ADMIN — GUAIFENESIN AND DEXTROMETHORPHAN 10 ML: 100; 10 SYRUP ORAL at 05:19

## 2021-04-26 RX ADMIN — ATENOLOL 50 MG: 25 TABLET ORAL at 10:46

## 2021-04-26 RX ADMIN — HYDRALAZINE HYDROCHLORIDE 10 MG: 20 INJECTION INTRAMUSCULAR; INTRAVENOUS at 18:12

## 2021-04-26 RX ADMIN — LABETALOL HYDROCHLORIDE 10 MG: 5 INJECTION INTRAVENOUS at 02:26

## 2021-04-26 RX ADMIN — WATER 2 G: 1 INJECTION INTRAMUSCULAR; INTRAVENOUS; SUBCUTANEOUS at 02:00

## 2021-04-26 RX ADMIN — GUAIFENESIN AND DEXTROMETHORPHAN 10 ML: 100; 10 SYRUP ORAL at 11:37

## 2021-04-26 RX ADMIN — GUAIFENESIN AND DEXTROMETHORPHAN 10 ML: 100; 10 SYRUP ORAL at 00:55

## 2021-04-26 RX ADMIN — HALOPERIDOL LACTATE 3 MG: 5 INJECTION, SOLUTION INTRAMUSCULAR at 03:56

## 2021-04-26 RX ADMIN — Medication 1 PACKET: at 16:17

## 2021-04-26 RX ADMIN — HYDRALAZINE HYDROCHLORIDE 50 MG: 50 TABLET, FILM COATED ORAL at 14:16

## 2021-04-26 RX ADMIN — LANSOPRAZOLE 30 MG: KIT at 10:46

## 2021-04-26 RX ADMIN — GUAIFENESIN AND DEXTROMETHORPHAN 10 ML: 100; 10 SYRUP ORAL at 18:12

## 2021-04-26 RX ADMIN — DOCUSATE SODIUM 100 MG: 50 LIQUID ORAL at 10:46

## 2021-04-26 RX ADMIN — QUETIAPINE FUMARATE 100 MG: 100 TABLET ORAL at 21:26

## 2021-04-26 RX ADMIN — INSULIN LISPRO 2 UNITS: 100 INJECTION, SOLUTION INTRAVENOUS; SUBCUTANEOUS at 00:55

## 2021-04-26 RX ADMIN — HYDRALAZINE HYDROCHLORIDE 10 MG: 20 INJECTION INTRAMUSCULAR; INTRAVENOUS at 11:05

## 2021-04-26 RX ADMIN — ACETAMINOPHEN 650 MG: 325 TABLET, FILM COATED ORAL at 21:26

## 2021-04-26 RX ADMIN — PIPERACILLIN AND TAZOBACTAM 3.38 G: 3; .375 INJECTION, POWDER, LYOPHILIZED, FOR SOLUTION INTRAVENOUS at 16:17

## 2021-04-26 RX ADMIN — HYDRALAZINE HYDROCHLORIDE 50 MG: 50 TABLET, FILM COATED ORAL at 05:19

## 2021-04-26 RX ADMIN — Medication 1 PACKET: at 21:30

## 2021-04-26 RX ADMIN — Medication 1 PACKET: at 10:48

## 2021-04-26 RX ADMIN — HYDRALAZINE HYDROCHLORIDE 50 MG: 50 TABLET, FILM COATED ORAL at 21:26

## 2021-04-26 NOTE — PROGRESS NOTES
04/25/21 2220   Vital Signs   Temp 98.3 °F (36.8 °C)   Temp Source Axillary   Pulse (Heart Rate) (!) 107   Heart Rate Source Monitor   Resp Rate 30   O2 Sat (%) 94 %   Level of Consciousness Alert (0)   BP (!) 138/57   MAP (Monitor) 79   MAP (Calculated) 84   BP 1 Method Automatic   BP 1 Location Left upper arm   BP Patient Position At rest   MEWS Score 4   Alarms Set and Audible Cardiac alarms   Box Number 678   Electrodes Replaced No   Pain 1   Pain Scale 1 Numeric (0 - 10)   Pain Intensity 1 0   Patient Stated Pain Goal 0   Pain Reassessment 1 Yes   Oxygen Therapy   Pulse via Oximetry 108 beats per minute   O2 Device Nasal cannula   O2 Flow Rate (L/min) 6 l/min   Patient Observation   Repositioned Head of bed elevated (degrees); Heels off loaded   Patient Turned Turned on Right Side     I informed WILBUR Denton of patient's vitals and nursing interventions. Patient is restless, tremorous, coarse lung sounds, unproductive cough. Patient intermittently tachycardic up to the 150s, tachypneic, and feverish. Tylenol given, temp on recheck WDL. No new orders received.

## 2021-04-26 NOTE — PROGRESS NOTES
Transition of Care Plan   RUR- 20% Medium Risk   DISPOSITION: The disposition plan is to transition to SNF - pending review.  Transport: [facility]/family     CM met with patient and patients grandson at bedside. Patient is being recommended for SNF. CM left a hard copy of the SNF list with patients grandson who reports that patients daughter will be arriving at some point today to look at the list. CM contacted patients spouse -  Aby Garcia - [phone] to provide her with an update. CM then contacted patients daughter Yvonne Pinedo [phone] to provide her with an update. Patients daughter reported, \"I want him to go to [facility], as my daughter works there. \" CM encouraged patient to pick a back up plan just incase beds are full or other potential reasons. Patients daughter reported, \"they aren't full my daughter is the manger there. \"     CM will continue to follow, provide support and assist with JACQUES needs as they arise.     Juanis November

## 2021-04-26 NOTE — PROGRESS NOTES
Hospitalist Progress Note:     Notified by nursing for overnight events. Hypoxic with increased oxygen demands, tachypnea, accessory muscle use, tachycardia, confusion. Will order sepsis work up. Consider repeat CT scans- although received multiple during admission and demonstrated improvement. Patient previously vaccinated against COVID 19 and had negative testing on admission.      Plan:   -Blood cultures  -Urine cultures  -Chest x-ray  -Lactic acid  -Troponin  -Procalcitonin  -Broaden antibiotics in interim   -C-diff      No Beatty, DO

## 2021-04-26 NOTE — PROGRESS NOTES
[facility] Adult  Hospitalist Group                                                                                          Hospitalist Progress Note  Amber Waldemar Dance,   Answering service: [phone] from in house phone        Date of Service:  2021  NAME:  Radhames Negro  :  1934  MRN:  [id number]      Admission Summary:   51-year-old male with history of coronary disease-on aspirin, COPD, chronic insufficiency hyperlipidemia hypertension who has history of prior viral encephalitis was admitted with headache and nausea with vomiting. The CT scan on presentation showed a right intraparenchymal hemorrhage adjacent to the lateral ventricle. Patient was in the ICU, but followed by neurosurgery, there was no surgical recommendations for EVD but was followed with neuro checks every 2 hours. Cardene drip is required for blood pressure control, patient also became encephalopathic due to multiple etiologies. CT scan done yesterday for worsening of confusion shows improvement in intraventricular hemorrhage. Patient became febrile, sputum culture grew MSSA and patient was started on cefazolin. She is currently medically stable, neurochecks have been changed every 4 hours and critical care team recommends downgrading to neuro telemetry/neuro intermediate care bed. Interval history / Subjective: Follow up intraventricular hemorrhage. Patient seen and examined. Overnight, patient with fevers, tachycardic, tachypnea. Orders placed- see prior note. Grandson at bedside. During my exam, patient falling asleep then became subsequently tachycardic and hypoxic. Would favor leaving on oxygen.   Encouraged family to allow patient to sleep this AM.      Assessment & Plan:     Intraventricular hemorrhage with mild ventriculomegaly  -Neurosurgery on board, no acute surgical intervention  -Repeat CT head shows some improvement in the hemorrhage  -Neurosurgery recommends less sedation, neuro checks every 4 hours to help decrease delirium and promote sleep    SIRS (fevers, tachycardia, tachypnea):  -unclear source  -check blood, urine cultures, procalcitonin, lactic acid, CXR  -broad spectrum antibiotics pending culture results     Acute metabolic encephalopathy, multifactorial: intermittent  -IVH/ICU delirium with possible component of sepsis  -Supportive care. Family defers restraints or day time seroquel if able. Will continue to monitor   -Continue Seroquel at bedtime  -Neurochecks every 4 hours  -ABG appropriate     Acute hypoxic respiratory failure:   -previously requiring high flow nasal cannula  -concern for TESSA, will continue supplemental oxygen      Hypertension, controlled, continue Cardene as needed:   -continue hydralazine, atenolol  -goal of SBP is to keep less than 160 mmHg     History of alcohol abuse  -family reports prior history but minimal alcohol intake prior to admission   -Out of withdrawal window     MSSA bronchitis  -Sputum culture grows MSSA    Dysphagia:   -repeat speech evaluation when appropriate   -continue tube feeds, benefiber added for loose stools     Code status: full   DVT prophylaxis: [address] discussed with: Patient/Family  Anticipated Disposition: SNF/LTC vs home with family   Anticipated Discharge: Greater than 48 hours     Hospital Problems  Date Reviewed: 9/21/2020          Codes Class Noted POA    Intracranial hemorrhage ([facility] [address] ICD-10-CM: I62.9  ICD-9-CM: 432.9  4/18/2021 Unknown                Review of Systems:   Negative unless stated above       Vital Signs:    Last 24hrs VS reviewed since prior progress note.  Most recent are:  Visit Vitals  BP (!) 154/63 (BP 1 Location: Left upper arm, BP Patient Position: At rest)   Pulse (!) 114   Temp 100.4 °F (38 °C)   Resp 29   Ht 5' 8\" (1.727 m)   Wt 99.7 kg (219 lb 12.8 oz)   SpO2 92%   BMI 33.42 kg/m²         Intake/Output Summary (Last 24 hours) at 4/26/2021 1032  Last data filed at 4/26/2021 0000  Gross per 24 hour   Intake 580 ml   Output --   Net 580 ml        Physical Examination:     I had a face to face encounter with this patient and independently examined them on 4/26/2021 as outlined below:          Constitutional:  No acute distress, drowsy    ENT:  Oral mucosa moist, oropharynx benign. Resp:  CTA bilaterally. Minimal expiratory wheeze. No accessory muscle use   CV:  Tachycardic, no murmurs, gallops, rubs    GI:  Soft, non distended, non tender. normoactive bowel sounds, no hepatosplenomegaly     Musculoskeletal:  No edema, warm, 2+ pulses throughout    Neurologic:  Moves all extremities. AAOx2, cannot follow commands           Data Review:    Review and/or order of clinical lab test  Review and/or order of tests in the radiology section of CPT  Review and/or order of tests in the medicine section of CPT      Labs:     Recent Labs     04/26/21  0218 04/24/21  0508   WBC 12.3* 12.9*   HGB 12.5 12.4   HCT 39.5 39.2    233     Recent Labs     04/26/21  0218 04/25/21  0206 04/24/21  0508   *  --  146*   K 4.2  --  4.3   *  --  111*   CO2 29  --  28   BUN 41*  --  39*   CREA 1.63*  --  1.65*   *  --  158*   CA 9.0  --  9.3   MG 2.8* 2.5* 2.5*   PHOS 2.4*  --  3.1     Recent Labs     04/24/21  0508   ALT 23   AP 84   TBILI 0.4   TP 7.0   ALB 2.9*   GLOB 4.1*     No results for input(s): INR, PTP, APTT, INREXT, INREXT in the last 72 hours. No results for input(s): FE, TIBC, PSAT, FERR in the last 72 hours. No results found for: FOL, RBCF   Recent Labs     04/26/21  0846   PH 7.49*   PCO2 36   PO2 213*     No results for input(s): CPK, CKNDX, TROIQ in the last 72 hours.     No lab exists for component: CPKMB  Lab Results   Component Value Date/Time    Cholesterol, total 144 04/19/2021 12:59 AM    HDL Cholesterol 33 04/19/2021 12:59 AM    LDL, calculated 74.8 04/19/2021 12:59 AM    Triglyceride 181 (H) 04/19/2021 12:59 AM    CHOL/HDL Ratio 4.4 04/19/2021 12:59 AM     Lab Results Component Value Date/Time    Glucose (POC) 136 (H) 04/26/2021 05:27 AM    Glucose (POC) 169 (H) 04/26/2021 12:42 AM    Glucose (POC) 128 (H) 04/25/2021 06:38 PM    Glucose (POC) 142 (H) 04/25/2021 11:32 AM    Glucose (POC) 197 (H) 04/25/2021 07:26 AM     Lab Results   Component Value Date/Time    Color YELLOW/STRAW 04/18/2021 04:49 PM    Appearance CLEAR 04/18/2021 04:49 PM    Specific gravity 1.013 04/18/2021 04:49 PM    Specific gravity 1.015 09/21/2020 09:13 AM    pH (UA) 6.0 04/18/2021 04:49 PM    Protein Negative 04/18/2021 04:49 PM    Glucose Negative 04/18/2021 04:49 PM    Ketone Negative 04/18/2021 04:49 PM    Bilirubin Negative 04/18/2021 04:49 PM    Urobilinogen 0.2 04/18/2021 04:49 PM    Nitrites Negative 04/18/2021 04:49 PM    Leukocyte Esterase Negative 04/18/2021 04:49 PM    Epithelial cells FEW 04/18/2021 04:49 PM    Bacteria Negative 04/18/2021 04:49 PM    WBC 0-4 04/18/2021 04:49 PM    RBC 0-5 04/18/2021 04:49 PM         Medications Reviewed:     Current Facility-Administered Medications   Medication Dose Route Frequency    piperacillin-tazobactam (ZOSYN) 3.375 g in 0.9% sodium chloride (MBP/ADV) 100 mL MBP  3.375 g IntraVENous Q8H    Vancomycin - pharmacy to dose   Other Rx Dosing/Monitoring    vancomycin (VANCOCIN) 2000 mg in  ml infusion  2,000 mg IntraVENous ONCE    [START ON 4/27/2021] vancomycin (VANCOCIN) 1250 mg in  ml infusion  1,250 mg IntraVENous DAILY    DULoxetine (CYMBALTA) capsule 30 mg  30 mg Oral DAILY    guaiFENesin-dextromethorphan (ROBITUSSIN DM) 100-10 mg/5 mL syrup 10 mL  10 mL Oral Q6H    albuterol (PROVENTIL VENTOLIN) nebulizer solution 2.5 mg  2.5 mg Nebulization Q6H PRN    guar gum (BENEFIBER) packet 1 Packet  4 g Oral TID    atenoloL (TENORMIN) tablet 50 mg  50 mg Per NG tube DAILY    QUEtiapine (SEROquel) tablet 100 mg  100 mg Per NG tube QHS    hydrALAZINE (APRESOLINE) tablet 50 mg  50 mg Per NG tube Q8H    docusate (COLACE) 50 mg/5 mL oral liquid 100 mg  100 mg Per NG tube DAILY    lansoprazole (PREVACID) 3 mg/mL oral suspension 30 mg  30 mg Per NG tube ACB    sodium chloride (NS) flush 5-40 mL  5-40 mL IntraVENous Q8H    sodium chloride (NS) flush 5-40 mL  5-40 mL IntraVENous PRN    acetaminophen (TYLENOL) tablet 650 mg  650 mg Oral Q6H PRN    Or    acetaminophen (TYLENOL) suppository 650 mg  650 mg Rectal Q6H PRN    polyethylene glycol (MIRALAX) packet 17 g  17 g Oral DAILY PRN    promethazine (PHENERGAN) tablet 12.5 mg  12.5 mg Oral Q6H PRN    Or    ondansetron (ZOFRAN) injection 4 mg  4 mg IntraVENous Q6H PRN    glucose chewable tablet 16 g  4 Tab Oral PRN    dextrose (D50W) injection syrg 12.5-25 g  25-50 mL IntraVENous PRN    glucagon (GLUCAGEN) injection 1 mg  1 mg IntraMUSCular PRN    insulin lispro (HUMALOG) injection   SubCUTAneous Q6H    hydrALAZINE (APRESOLINE) 20 mg/mL injection 10 mg  10 mg IntraVENous Q4H PRN    labetaloL (NORMODYNE;TRANDATE) injection 10 mg  10 mg IntraVENous Q4H PRN     ______________________________________________________________________  EXPECTED LENGTH OF STAY: 2d 0h  ACTUAL LENGTH OF STAY:          [address] SAlexsander Jacques DO

## 2021-04-26 NOTE — PROGRESS NOTES
Problem: Risk for Spread of Infection  Goal: Prevent transmission of infectious organism to others  Description: Prevent the transmission of infectious organisms to other patients, staff members, and visitors. Outcome: Progressing Towards Goal     Problem: Patient Education:  Go to Education Activity  Goal: Patient/Family Education  Outcome: Progressing Towards Goal     Problem: Non-Violent Restraints  Goal: Removal from restraints as soon as assessed to be safe  Outcome: Progressing Towards Goal  Goal: No harm/injury to patient while restraints in use  Outcome: Progressing Towards Goal  Goal: Patient's dignity will be maintained  Outcome: Progressing Towards Goal  Goal: Patient Interventions  Outcome: Progressing Towards Goal     Problem: Falls - Risk of  Goal: *Absence of Falls  Description: Document Aurea Klein Fall Risk and appropriate interventions in the flowsheet. Outcome: Progressing Towards Goal  Note: Fall Risk Interventions:       Mentation Interventions: Adequate sleep, hydration, pain control    Medication Interventions: Bed/chair exit alarm    Elimination Interventions: Bed/chair exit alarm, Call light in reach, Toileting schedule/hourly rounds              Problem: Patient Education: Go to Patient Education Activity  Goal: Patient/Family Education  Outcome: Progressing Towards Goal     Problem: Pressure Injury - Risk of  Goal: *Prevention of pressure injury  Description: Document Aquiles Scale and appropriate interventions in the flowsheet. Outcome: Progressing Towards Goal  Note: Pressure Injury Interventions:  Sensory Interventions: Assess changes in LOC    Moisture Interventions: Absorbent underpads, Apply protective barrier, creams and emollients, Check for incontinence Q2 hours and as needed    Activity Interventions: Assess need for specialty bed, PT/OT evaluation    Mobility Interventions: Assess need for specialty bed, Turn and reposition approx.  every two hours(pillow and wedges), PT/OT evaluation    Nutrition Interventions: Document food/fluid/supplement intake    Friction and Shear Interventions: Apply protective barrier, creams and emollients                Problem: Patient Education: Go to Patient Education Activity  Goal: Patient/Family Education  Outcome: Progressing Towards Goal     Problem: Nutrition Deficit  Goal: *Optimize nutritional status  Outcome: Progressing Towards Goal     Problem: Patient Education: Go to Patient Education Activity  Goal: Patient/Family Education  Outcome: Progressing Towards Goal     Problem: TIA/CVA Stroke: 0-24 hours  Goal: Off Pathway (Use only if patient is Off Pathway)  Outcome: Progressing Towards Goal  Goal: Activity/Safety  Outcome: Progressing Towards Goal  Goal: Nutrition/Diet  Outcome: Progressing Towards Goal  Goal: Discharge Planning  Outcome: Progressing Towards Goal  Goal: Medications  Outcome: Progressing Towards Goal  Goal: Respiratory  Outcome: Progressing Towards Goal  Goal: Treatments/Interventions/Procedures  Outcome: Progressing Towards Goal  Goal: Minimize risk of bleeding post-thrombolytic infusion  Outcome: Progressing Towards Goal  Goal: Monitor for complications post-thrombolytic infusion  Outcome: Progressing Towards Goal  Goal: Psychosocial  Outcome: Progressing Towards Goal  Goal: *Hemodynamically stable  Outcome: Progressing Towards Goal  Goal: *Neurologically stable  Description: Absence of additional neurological deficits    Outcome: Progressing Towards Goal  Goal: *Verbalizes anxiety and depression are reduced or absent  Outcome: Progressing Towards Goal  Goal: *Absence of Signs of Aspiration on Current Diet  Outcome: Progressing Towards Goal  Goal: *Absence of deep venous thrombosis signs and symptoms(Stroke Metric)  Outcome: Progressing Towards Goal  Goal: *Ability to perform ADLs and demonstrates progressive mobility and function  Outcome: Progressing Towards Goal  Goal: *Stroke education started(Stroke Metric)  Outcome: Progressing Towards Goal  Goal: *Dysphagia screen performed(Stroke Metric)  Outcome: Progressing Towards Goal  Goal: *Rehab consulted(Stroke Metric)  Outcome: Progressing Towards Goal     Problem: TIA/CVA Stroke: Day 2 Until Discharge  Goal: Off Pathway (Use only if patient is Off Pathway)  Outcome: Progressing Towards Goal  Goal: Activity/Safety  Outcome: Progressing Towards Goal  Goal: Diagnostic Test/Procedures  Outcome: Progressing Towards Goal  Goal: Nutrition/Diet  Outcome: Progressing Towards Goal  Goal: Discharge Planning  Outcome: Progressing Towards Goal  Goal: Medications  Outcome: Progressing Towards Goal  Goal: Respiratory  Outcome: Progressing Towards Goal  Goal: Treatments/Interventions/Procedures  Outcome: Progressing Towards Goal  Goal: Psychosocial  Outcome: Progressing Towards Goal  Goal: *Verbalizes anxiety and depression are reduced or absent  Outcome: Progressing Towards Goal  Goal: *Absence of aspiration  Outcome: Progressing Towards Goal  Goal: *Absence of deep venous thrombosis signs and symptoms(Stroke Metric)  Outcome: Progressing Towards Goal  Goal: *Optimal pain control at patient's stated goal  Outcome: Progressing Towards Goal  Goal: *Tolerating diet  Outcome: Progressing Towards Goal  Goal: *Ability to perform ADLs and demonstrates progressive mobility and function  Outcome: Progressing Towards Goal  Goal: *Stroke education continued(Stroke Metric)  Outcome: Progressing Towards Goal     Problem: Ischemic Stroke: Discharge Outcomes  Goal: *Verbalizes anxiety and depression are reduced or absent  Outcome: Progressing Towards Goal  Goal: *Verbalize understanding of risk factor modification(Stroke Metric)  Outcome: Progressing Towards Goal  Goal: *Hemodynamically stable  Outcome: Progressing Towards Goal  Goal: *Absence of aspiration pneumonia  Outcome: Progressing Towards Goal  Goal: *Aware of needed dietary changes  Outcome: Progressing Towards Goal  Goal: *Verbalize understanding of prescribed medications including anti-coagulants, anti-lipid, and/or anti-platelets(Stroke Metric)  Outcome: Progressing Towards Goal  Goal: *Tolerating diet  Outcome: Progressing Towards Goal  Goal: *Aware of follow-up diagnostics related to anticoagulants  Outcome: Progressing Towards Goal  Goal: *Ability to perform ADLs and demonstrates progressive mobility and function  Outcome: Progressing Towards Goal  Goal: *Absence of DVT(Stroke Metric)  Outcome: Progressing Towards Goal  Goal: *Absence of aspiration  Outcome: Progressing Towards Goal  Goal: *Optimal pain control at patient's stated goal  Outcome: Progressing Towards Goal  Goal: *Home safety concerns addressed  Outcome: Progressing Towards Goal  Goal: *Describes available resources and support systems  Outcome: Progressing Towards Goal  Goal: *Verbalizes understanding of activation of EMS(911) for stroke symptoms(Stroke Metric)  Outcome: Progressing Towards Goal  Goal: *Understands and describes signs and symptoms to report to providers(Stroke Metric)  Outcome: Progressing Towards Goal  Goal: *Neurolgocially stable (absence of additional neurological deficits)  Outcome: Progressing Towards Goal  Goal: *Verbalizes importance of follow-up with primary care physician(Stroke Metric)  Outcome: Progressing Towards Goal  Goal: *Smoking cessation discussed,if applicable(Stroke Metric)  Outcome: Progressing Towards Goal  Goal: *Depression screening completed(Stroke Metric)  Outcome: Progressing Towards Goal

## 2021-04-26 NOTE — PROGRESS NOTES
Pharmacist Note - Vancomycin Dosing    Consult provided for this 80 y.o. male for indication of sepsis. Antibiotic regimen(s): vanc + zosyn (cefazolin dced)  Patient on vancomycin PTA? NO     Recent Labs     21  0218 21  0508   WBC 12.3* 12.9*   CREA 1.63* 1.65*   BUN 41* 39*     Frequency of BMP: daily through   Height: 172.7 cm  Weight: 99.7 kg  Est CrCl: 37.2 ml/min; UO: unmeasured ml/kg/hr  Temp (24hrs), Av.5 °F (37.5 °C), Min:98.1 °F (36.7 °C), Max:101.2 °F (38.4 °C)    Cultures:   sputum: 1+ GPCs, moderate S aureus - final   blood: NGTD - prelim    Goal trough = 15 - 20 mcg/mL    Therapy will be initiated with a loading dose of 2000 mg IV x 1 to be followed by a maintenance dose of 1250 mg IV every 24 hours for an anticipated trough level of ~16.5 mcg/ml. Pharmacy to follow patient daily and order levels / make dose adjustments as appropriate.

## 2021-04-26 NOTE — PROGRESS NOTES
0340 Pt continuously restless and pulling at lines. Grandson at bedside continuously needing the redirect him with limited success. Family member requesting patient be put back into bilat soft wrist restraints and to be given something to help decreased agitation and restlessness. Cata Weiss NP notified and ordered bilat soft wrist restraints and one time dose of IV haldol at [phone]. Grandson at bedside agrees with interventions.

## 2021-04-26 NOTE — PROGRESS NOTES
04/26/21 0607   Vital Signs   Temp 100.4 °F (38 °C)   Temp Source Axillary   Pulse (Heart Rate) (!) 114   Heart Rate Source Monitor   Cardiac Rhythm Sinus Tach   Resp Rate 29   O2 Sat (%) 92 %   Level of Consciousness Alert (0)   BP (!) 154/63   MAP (Monitor) 86   MAP (Calculated) 93   BP 1 Method Automatic   BP 1 Location Left upper arm   BP Patient Position At rest   MEWS Score 4   Alarms Set and Audible Cardiac alarms   Box Number 678   Electrodes Replaced No   Pain 1   Pain Scale 1 Numeric (0 - 10)   Pain Intensity 1 0   Patient Stated Pain Goal 0   Pain Reassessment 1 Yes   Oxygen Therapy   Pulse via Oximetry 111 beats per minute     Poncoh Wheat NP at bedside to evaluate patient. Order for RT to deep suction received. RT notified. Told by RT that they will be up    0611 Pt luis at bedside wants bilat wrist restraints taken off to trial them off stating that he will monitor him and keep him from pulling at lines. Order still in place.

## 2021-04-26 NOTE — PROGRESS NOTES
Bedside shift change report given to Manish barrow RN (oncoming nurse) by Gabby Phelan RN (offgoing nurse). Report included the following information SBAR, Kardex, Intake/Output, MAR, Recent Results, Cardiac Rhythm NSR/ST and Dual Neuro Assessment. I have read and agree with the documentation provided by Juan Trujillo RN, as her preceptor.

## 2021-04-26 NOTE — PROGRESS NOTES
04/26/21 0200   Vital Signs   Temp 98.9 °F (37.2 °C)   Temp Source Oral   Pulse (Heart Rate) (!) 113   Heart Rate Source Monitor   Cardiac Rhythm NSR   Resp Rate 25   O2 Sat (%) 93 %   Level of Consciousness Alert (0)   BP (!) 171/88   MAP (Calculated) 116   BP 1 Method Automatic   BP 1 Location Left upper arm   BP Patient Position At rest   MEWS Score 4   Alarms Set and Audible Cardiac alarms   Box Number 678   Electrodes Replaced No   Pain 1   Pain Scale 1 Numeric (0 - 10)   Pain Intensity 1 0   Patient Stated Pain Goal 0   Pain Reassessment 1 Yes   Oxygen Therapy   O2 Device Nasal cannula   O2 Flow Rate (L/min) 5 l/min   Patient Observation   Repositioned Head of bed elevated (degrees); Heels off loaded   Patient Turned Turned on Right Side   Height/Weight   Weight 99.7 kg (219 lb 12.8 oz)   Weight Source Bed   BMI (calculated) 33.4   No change since previous vitals, Suction provided, deep coughing encouraged, tube feeds paused as lung sound are still coarse, IV labetalol given for elevated BP. BP Recheck still elevated, will continue to monitor.

## 2021-04-27 ENCOUNTER — APPOINTMENT (OUTPATIENT)
Dept: CT IMAGING | Age: 86
DRG: 064 | End: 2021-04-27
Attending: INTERNAL MEDICINE
Payer: MEDICARE

## 2021-04-27 LAB
ANION GAP SERPL CALC-SCNC: 3 MMOL/L (ref 5–15)
APPEARANCE UR: CLEAR
ATRIAL RATE: 86 BPM
BACTERIA SPEC CULT: NORMAL
BACTERIA URNS QL MICRO: NEGATIVE /HPF
BASOPHILS # BLD: 0 K/UL (ref 0–0.1)
BASOPHILS NFR BLD: 0 % (ref 0–1)
BILIRUB UR QL: NEGATIVE
BUN SERPL-MCNC: 39 MG/DL (ref 6–20)
BUN/CREAT SERPL: 25 (ref 12–20)
CALCIUM SERPL-MCNC: 8.4 MG/DL (ref 8.5–10.1)
CALCULATED P AXIS, ECG09: 90 DEGREES
CALCULATED R AXIS, ECG10: -8 DEGREES
CALCULATED T AXIS, ECG11: 60 DEGREES
CHLORIDE SERPL-SCNC: 114 MMOL/L (ref 97–108)
CO2 SERPL-SCNC: 29 MMOL/L (ref 21–32)
COLOR UR: ABNORMAL
CREAT SERPL-MCNC: 1.56 MG/DL (ref 0.7–1.3)
DIAGNOSIS, 93000: NORMAL
DIFFERENTIAL METHOD BLD: ABNORMAL
EOSINOPHIL # BLD: 0.1 K/UL (ref 0–0.4)
EOSINOPHIL NFR BLD: 1 % (ref 0–7)
EPITH CASTS URNS QL MICRO: ABNORMAL /LPF
ERYTHROCYTE [DISTWIDTH] IN BLOOD BY AUTOMATED COUNT: 15.5 % (ref 11.5–14.5)
GLUCOSE BLD STRIP.AUTO-MCNC: 117 MG/DL (ref 65–100)
GLUCOSE BLD STRIP.AUTO-MCNC: 122 MG/DL (ref 65–100)
GLUCOSE BLD STRIP.AUTO-MCNC: 126 MG/DL (ref 65–100)
GLUCOSE BLD STRIP.AUTO-MCNC: 157 MG/DL (ref 65–100)
GLUCOSE SERPL-MCNC: 154 MG/DL (ref 65–100)
GLUCOSE UR STRIP.AUTO-MCNC: NEGATIVE MG/DL
HCT VFR BLD AUTO: 35.5 % (ref 36.6–50.3)
HGB BLD-MCNC: 11 G/DL (ref 12.1–17)
HGB UR QL STRIP: NEGATIVE
IMM GRANULOCYTES # BLD AUTO: 0.1 K/UL (ref 0–0.04)
IMM GRANULOCYTES NFR BLD AUTO: 1 % (ref 0–0.5)
KETONES UR QL STRIP.AUTO: NEGATIVE MG/DL
LEUKOCYTE ESTERASE UR QL STRIP.AUTO: NEGATIVE
LYMPHOCYTES # BLD: 0.7 K/UL (ref 0.8–3.5)
LYMPHOCYTES NFR BLD: 7 % (ref 12–49)
MCH RBC QN AUTO: 30.6 PG (ref 26–34)
MCHC RBC AUTO-ENTMCNC: 31 G/DL (ref 30–36.5)
MCV RBC AUTO: 98.6 FL (ref 80–99)
MONOCYTES # BLD: 0.8 K/UL (ref 0–1)
MONOCYTES NFR BLD: 8 % (ref 5–13)
NEUTS SEG # BLD: 8.5 K/UL (ref 1.8–8)
NEUTS SEG NFR BLD: 83 % (ref 32–75)
NITRITE UR QL STRIP.AUTO: NEGATIVE
NRBC # BLD: 0 K/UL (ref 0–0.01)
NRBC BLD-RTO: 0 PER 100 WBC
P-R INTERVAL, ECG05: 158 MS
PH UR STRIP: 5 [PH] (ref 5–8)
PLATELET # BLD AUTO: 206 K/UL (ref 150–400)
PMV BLD AUTO: 11 FL (ref 8.9–12.9)
POTASSIUM SERPL-SCNC: 4.1 MMOL/L (ref 3.5–5.1)
PROT UR STRIP-MCNC: 30 MG/DL
Q-T INTERVAL, ECG07: 402 MS
QRS DURATION, ECG06: 84 MS
QTC CALCULATION (BEZET), ECG08: 481 MS
RBC # BLD AUTO: 3.6 M/UL (ref 4.1–5.7)
RBC #/AREA URNS HPF: ABNORMAL /HPF (ref 0–5)
RBC MORPH BLD: ABNORMAL
RBC MORPH BLD: ABNORMAL
SERVICE CMNT-IMP: ABNORMAL
SERVICE CMNT-IMP: NORMAL
SODIUM SERPL-SCNC: 146 MMOL/L (ref 136–145)
SP GR UR REFRACTOMETRY: 1.02 (ref 1–1.03)
TROPONIN I SERPL-MCNC: 0.09 NG/ML
UA: UC IF INDICATED,UAUC: ABNORMAL
UROBILINOGEN UR QL STRIP.AUTO: 0.2 EU/DL (ref 0.2–1)
VENTRICULAR RATE, ECG03: 86 BPM
WBC # BLD AUTO: 10.2 K/UL (ref 4.1–11.1)
WBC URNS QL MICRO: ABNORMAL /HPF (ref 0–4)

## 2021-04-27 PROCEDURE — 74011000258 HC RX REV CODE- 258: Performed by: INTERNAL MEDICINE

## 2021-04-27 PROCEDURE — 81001 URINALYSIS AUTO W/SCOPE: CPT

## 2021-04-27 PROCEDURE — 85025 COMPLETE CBC W/AUTO DIFF WBC: CPT

## 2021-04-27 PROCEDURE — 74011250637 HC RX REV CODE- 250/637: Performed by: ANESTHESIOLOGY

## 2021-04-27 PROCEDURE — 74011250637 HC RX REV CODE- 250/637: Performed by: NURSE PRACTITIONER

## 2021-04-27 PROCEDURE — 74011250636 HC RX REV CODE- 250/636: Performed by: INTERNAL MEDICINE

## 2021-04-27 PROCEDURE — 36592 COLLECT BLOOD FROM PICC: CPT

## 2021-04-27 PROCEDURE — 51798 US URINE CAPACITY MEASURE: CPT

## 2021-04-27 PROCEDURE — 82962 GLUCOSE BLOOD TEST: CPT

## 2021-04-27 PROCEDURE — 74011000250 HC RX REV CODE- 250: Performed by: NURSE PRACTITIONER

## 2021-04-27 PROCEDURE — 74011250637 HC RX REV CODE- 250/637: Performed by: STUDENT IN AN ORGANIZED HEALTH CARE EDUCATION/TRAINING PROGRAM

## 2021-04-27 PROCEDURE — 97161 PT EVAL LOW COMPLEX 20 MIN: CPT

## 2021-04-27 PROCEDURE — 97530 THERAPEUTIC ACTIVITIES: CPT

## 2021-04-27 PROCEDURE — 65660000001 HC RM ICU INTERMED STEPDOWN

## 2021-04-27 PROCEDURE — 74011250636 HC RX REV CODE- 250/636: Performed by: NURSE PRACTITIONER

## 2021-04-27 PROCEDURE — 36415 COLL VENOUS BLD VENIPUNCTURE: CPT

## 2021-04-27 PROCEDURE — 84484 ASSAY OF TROPONIN QUANT: CPT

## 2021-04-27 PROCEDURE — 70450 CT HEAD/BRAIN W/O DYE: CPT

## 2021-04-27 PROCEDURE — 74011250637 HC RX REV CODE- 250/637: Performed by: INTERNAL MEDICINE

## 2021-04-27 PROCEDURE — 97165 OT EVAL LOW COMPLEX 30 MIN: CPT

## 2021-04-27 PROCEDURE — 80048 BASIC METABOLIC PNL TOTAL CA: CPT

## 2021-04-27 RX ORDER — AMLODIPINE BESYLATE 5 MG/1
5 TABLET ORAL DAILY
Status: DISCONTINUED | OUTPATIENT
Start: 2021-04-27 | End: 2021-05-03 | Stop reason: HOSPADM

## 2021-04-27 RX ADMIN — GUAIFENESIN AND DEXTROMETHORPHAN 10 ML: 100; 10 SYRUP ORAL at 01:03

## 2021-04-27 RX ADMIN — Medication 10 ML: at 22:35

## 2021-04-27 RX ADMIN — HYDRALAZINE HYDROCHLORIDE 10 MG: 20 INJECTION INTRAMUSCULAR; INTRAVENOUS at 19:22

## 2021-04-27 RX ADMIN — GUAIFENESIN AND DEXTROMETHORPHAN 10 ML: 100; 10 SYRUP ORAL at 17:38

## 2021-04-27 RX ADMIN — LANSOPRAZOLE 30 MG: KIT at 08:33

## 2021-04-27 RX ADMIN — VANCOMYCIN HYDROCHLORIDE 1250 MG: 10 INJECTION, POWDER, LYOPHILIZED, FOR SOLUTION INTRAVENOUS at 10:42

## 2021-04-27 RX ADMIN — PIPERACILLIN AND TAZOBACTAM 3.38 G: 3; .375 INJECTION, POWDER, LYOPHILIZED, FOR SOLUTION INTRAVENOUS at 08:33

## 2021-04-27 RX ADMIN — ACETAMINOPHEN 650 MG: 325 TABLET, FILM COATED ORAL at 19:43

## 2021-04-27 RX ADMIN — LABETALOL HYDROCHLORIDE 10 MG: 5 INJECTION INTRAVENOUS at 18:04

## 2021-04-27 RX ADMIN — Medication 1 PACKET: at 22:29

## 2021-04-27 RX ADMIN — Medication 1 PACKET: at 08:33

## 2021-04-27 RX ADMIN — ATENOLOL 50 MG: 25 TABLET ORAL at 08:33

## 2021-04-27 RX ADMIN — GUAIFENESIN AND DEXTROMETHORPHAN 10 ML: 100; 10 SYRUP ORAL at 06:23

## 2021-04-27 RX ADMIN — PIPERACILLIN AND TAZOBACTAM 3.38 G: 3; .375 INJECTION, POWDER, LYOPHILIZED, FOR SOLUTION INTRAVENOUS at 17:37

## 2021-04-27 RX ADMIN — GUAIFENESIN AND DEXTROMETHORPHAN 10 ML: 100; 10 SYRUP ORAL at 13:22

## 2021-04-27 RX ADMIN — PIPERACILLIN AND TAZOBACTAM 3.38 G: 3; .375 INJECTION, POWDER, LYOPHILIZED, FOR SOLUTION INTRAVENOUS at 01:03

## 2021-04-27 RX ADMIN — QUETIAPINE FUMARATE 100 MG: 100 TABLET ORAL at 22:29

## 2021-04-27 RX ADMIN — MICONAZOLE NITRATE 2 % TOPICAL POWDER: at 03:00

## 2021-04-27 RX ADMIN — HYDRALAZINE HYDROCHLORIDE 50 MG: 50 TABLET, FILM COATED ORAL at 13:25

## 2021-04-27 RX ADMIN — HYDRALAZINE HYDROCHLORIDE 50 MG: 50 TABLET, FILM COATED ORAL at 06:23

## 2021-04-27 RX ADMIN — Medication 1 PACKET: at 17:38

## 2021-04-27 RX ADMIN — Medication 10 ML: at 17:37

## 2021-04-27 RX ADMIN — DULOXETINE HYDROCHLORIDE 30 MG: 30 CAPSULE, DELAYED RELEASE ORAL at 08:33

## 2021-04-27 RX ADMIN — HYDRALAZINE HYDROCHLORIDE 50 MG: 50 TABLET, FILM COATED ORAL at 22:29

## 2021-04-27 RX ADMIN — AMLODIPINE BESYLATE 5 MG: 5 TABLET ORAL at 10:42

## 2021-04-27 NOTE — PROGRESS NOTES
Problem: Mobility Impaired (Adult and Pediatric)  Goal: *Acute Goals and Plan of Care (Insert Text)  Description:   FUNCTIONAL STATUS PRIOR TO ADMISSION: Patient was independent and active without use of DME.    HOME SUPPORT PRIOR TO ADMISSION: The patient lived with his spouse but did not require assist.    Physical Therapy Goals  Initiated 4/27/2021  1. Patient will move from supine to sit and sit to supine , scoot up and down, and roll side to side in bed with moderate assistance  within 7 day(s). 2.  Patient will transfer from bed to chair and chair to bed with moderate assistance  using the least restrictive device within 7 day(s). 3.  Patient will perform sit to stand with moderate assistance  within 7 day(s). 4.  Patient will ambulate with moderate assistance  for 10 feet with the least restrictive device within 7 day(s). 5.  Patient will demo fair sitting balance x2 min at EOB in prep for mobility progression within 7 days. 6.  Patient will improve Copeland Balance score by 7 points within 7 days. Outcome: Progressing Towards Goal     PHYSICAL THERAPY EVALUATION- NEURO POPULATION  Patient: Arline Campos (89 y.o. male)  Date: 4/27/2021  Primary Diagnosis: Intracranial hemorrhage (HCC) [I62.9]        Precautions:          ASSESSMENT  Based on the objective data described below, the patient presents with significantly impaired functional mobility as compared to baseline level 2* impaired cognition (attention to task, command following, impulsive, A&O x1, etc), impaired sitting balance, global weakness, impaired gait, and decreased tolerance to sustained activity following admission for large acute ICH. No surgical plans at this time. At baseline, pt lived at home with his spouse and was indep/active without AD. Pt is very [place] and easily distracted making formal neuro exam very difficult and unreliable. Today, he required up to max A x2 for bed mobility, scooting and repositioning towards EOB. In sitting, worked on attaining/maintaining midline postural control - required near constant assist to prevent from posterior and L lateral leans. Poor initiation of righting reactions. Progressed to completing sit<>stands x2 reps with mod A x2 (elevated bed surface) and taking several side steps along EOB with cues/assist for lateral weight shift and L LE >R LE advancement. Assisted back to bed at end of session and transitioned to modified chair position. Provided education on chair position and lights on during the day to assist with sleep/wake cycle regulation and ?delirium. Answered questions to satisfaction. At this time, he is well below his indep baseline level and a high falls risk. Recommend discharge to IPR once medically cleared. If this is not and option, then recommend SNF. Current Level of Function Impacting Discharge (mobility/balance): max A x2    Functional Outcome Measure: The patient scored Total: 0/56 on the Copeland Balance Assessment which is indicative of high fall risk. Other factors to consider for discharge: indep at baseline; high falls risk     Patient will benefit from skilled therapy intervention to address the above noted impairments. PLAN :  Recommendations and Planned Interventions: bed mobility training, transfer training, gait training, therapeutic exercises, neuromuscular re-education, patient and family training/education, and therapeutic activities      Frequency/Duration: Patient will be followed by physical therapy:  5 times a week to address goals. Recommendation for discharge: (in order for the patient to meet his/her long term goals)  Therapy 3 hours per day 5-7 days per week; in not, then SNF    This discharge recommendation:  Has been made in collaboration with the attending provider and/or case management    IF patient discharges home will need the following DME: to be determined (TBD)         SUBJECTIVE:   Patient stated Amena Almendarez.     OBJECTIVE DATA SUMMARY:   HISTORY:    Past Medical History:   Diagnosis Date    Adverse effect of anesthesia     combative after anesthesia    Alcohol abuse     6 beers/day    Arthritis     CAD (coronary artery disease)     Chronic obstructive pulmonary disease (HCC)     Chronic obstructive pulmonary disease (HCC)     CKD (chronic kidney disease) stage 3, GFR 30-59 ml/min ([facility] [address] 9/21/2020    Dyslipidemia 8/16/2017    Dyspepsia and other specified disorders of function of stomach     Dyspnea 8/16/2017    ED (erectile dysfunction) 8/16/2017    Encounter for immunization 8/16/2017    Fatigue 8/16/2017    Flank pain 8/1/2019    GERD (gastroesophageal reflux disease) 8/16/2017    Gout 8/16/2017    Hypertension     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    Morbid obesity ([facility] [address]     On statin therapy 8/16/2017    TESSA on CPAP 1/3/2019    Psychiatric disorder     Depression    Simple chronic bronchitis ([facility] [address] 1/3/2019    TIA (transient ischemic attack) 4/43/4986    Umbilical hernia 73/7/2770    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     Past Surgical History:   Procedure Laterality Date    COLONOSCOPY N/A 9/27/2019    COLONOSCOPY performed by Carmen Aquino MD at [facility] ENDOSCOPY    HX [facility]    HX HERNIA REPAIR  29/41/57    open umbilical hernia repair mesh    HX ORTHOPAEDIC      HX UROLOGICAL      HYDROCELECTOMY    RI CARDIAC SURG PROCEDURE UNLIST  1996    Cardiac Stents    RI CARDIAC SURG PROCEDURE UNLIST  04/2019    EF 61-65%    UPPER GI ENDOSCOPY,BIOPSY  9/30/2019            Personal factors and/or comorbidities impacting plan of care: PMHx    Home Situation  Home Environment: Private residence  # Steps to Enter: 1  One/Two Story Residence: One story  Living Alone: No  Support Systems: Spouse/Significant Other/Partner  Current DME Used/Available at Home: Cane, straight    EXAMINATION/PRESENTATION/DECISION MAKING:   Critical Behavior:  Neurologic State: Alert, Confused  Orientation Level: Disoriented to place, Disoriented to situation, Disoriented to time, Oriented to person  Cognition: Decreased command following  Safety/Judgement: Decreased insight into deficits, Decreased awareness of need for safety  Hearing:     Skin:    Edema:   Range Of Motion:  AROM: Generally decreased, functional     Strength:    Strength: Generally decreased, functional     Tone & Sensation:   Tone: Normal  Sensation: (inconsistent responses, possibly d/t [place])         Coordination:  Coordination: Grossly decreased, non-functional  Vision:   Tracking: Requires cues, head turns, or add eye shifts to track  Visual Fields: (able to detect stimuli in all fields)  Diplopia: No  Acuity: Within Defined Limits  Functional Mobility:  Bed Mobility:     Supine to Sit: Assist x2;Maximum assistance  Sit to Supine: Assist x2;Maximum assistance  Scooting: Maximum assistance  Transfers:  Sit to Stand: Assist x2; Moderate assistance(elevated bed surface)  Stand to Sit: Assist x2; Moderate assistance          Balance:   Sitting: Impaired  Sitting - Static: Poor (constant support)  Sitting - Dynamic: Poor (constant support)  Standing: Impaired; With support  Standing - Static: Poor  Standing - Dynamic : Poor  Ambulation/Gait Training:  Distance (ft): 3 Feet (ft)(side stepping)  Ambulation - Level of Assistance: Assist x2; Moderate assistance;Maximum assistance  Gait Description (WDL): Exceptions to WDL  Gait Abnormalities: Decreased step clearance; Altered arm swing;Trunk sway increased  Base of Support: Center of gravity altered  Speed/Beverly: Shuffled; Slow  Step Length: Right shortened;Left shortened    Functional Measure  Copeland Balance Test:    Sitting to Standin  Standing Unsupported: 0  Sitting with Back Unsupported: 0  Standing to Sittin  Transfers: 0  Standing Unsupported with Eyes Closed: 0  Standing Unsupported with Feet Together: 0  Reach Forward with Outstretched Arm: 0   Object: 0  Turn to Look Over Shoulders: 0  Turn 360 Degrees: 0  Alternate Foot on Step/Stool: 0  Standing Unsupported One Foot in Front: 0  Stand on One Le  Total: 0/56         56=Maximum possible score;   0-20=High fall risk  21-40=Moderate fall risk   41-56=Low fall risk        Physical Therapy Evaluation Charge Determination   History Examination Presentation Decision-Making   MEDIUM  Complexity : 1-2 comorbidities / personal factors will impact the outcome/ POC  HIGH Complexity : 4+ Standardized tests and measures addressing body structure, function, activity limitation and / or participation in recreation  LOW Complexity : Stable, uncomplicated  HIGH Complexity : FOTO score of 1- 25       Based on the above components, the patient evaluation is determined to be of the following complexity level: LOW     Activity Tolerance:   Good, desaturates with exertion and requires oxygen, requires rest breaks, and observed SOB with activity      After treatment patient left in no apparent distress:   Supine in bed, Call bell within reach, Caregiver / family present, and Side rails x 3    COMMUNICATION/EDUCATION:   The patients plan of care was discussed with: Occupational therapist, Registered nurse, and Physician. Fall prevention education was provided and the patient/caregiver indicated understanding., Patient/family have participated as able in goal setting and plan of care. , and Patient/family agree to work toward stated goals and plan of care.     Thank you for this referral.  Franco Yousif, PT, DPT   Time Calculation: 27 mins

## 2021-04-27 NOTE — ROUTINE PROCESS
Bedside shift change report given to Chele Alfaro RN (oncoming nurse) by Livan Washington RN (offgoing nurse). Report included the following information SBAR, Kardex, Procedure Summary, Recent Results, Cardiac Rhythm NSR/ST, Quality Measures and Dual Neuro Assessment.

## 2021-04-27 NOTE — PROGRESS NOTES
Comprehensive Nutrition Assessment    Type and Reason for Visit: Reassess    Nutrition Recommendations/Plan:      1. Diet per SLP recommendations. RD to modify based on eval.     2. Monitor sodium trends, encourage PO fluids vs increase free water flushes. 3. Please obtain Phos on tomorrow's AM labs. Nutrition Assessment:    Pt admitted with IVH. PMHx: Alcohol abuse, COPD, CKD 3, HLD, GERD, HTN. Conservative treatment-surgery not indicated. Question alcohol withdrawal? Acute metabolic encephalopathy.     4/20: Mr Trena March was well-nourished PTA per daughter. No weight loss or decline in appetite prior to this admission. Since pt remains encephalopathic plan is to place DHT with bridle today. Lytes WNL. BG elevated-suspect d/t stress. No history of DM.      4/27: TF's as ordered providing 2100 kcals, 113 g protein, with 2085 ml free water, and 2280 ml total volume. Pt discussed on ID rounds, with mentation improving - plan for SLP reconsult to assess ability to resume PO diet. TFs are currently infusing at goal. Ongoing loose stools, Benefiber added TID. Pending SLP rec's - could modify to fiber containing formula/modify regimen. Pt may also benefit from nocturnal feedings while PO intakes established. Monitoring sodium trends, with free water flushes to be adjusted as needed.          Malnutrition Assessment:  Malnutrition Status:  No malnutrition    Context:  Acute illness     Findings of the 6 clinical characteristics of malnutrition:   Energy Intake:  No significant decrease in energy intake  Weight Loss:  No significant weight loss     Body Fat Loss:  No significant body fat loss,     Muscle Mass Loss:  No significant muscle mass loss,    Fluid Accumulation:  No significant fluid accumulation,     Strength:  Not performed     Nutritionally Significant Medications: Colace (held), Humalog, IV Zosyn, Benefiber TID      Estimated Daily Nutrient Needs:  Energy (kcal): 2035 (MSJ x 1.2);  Weight Used for Energy Requirements: Current(100 kg)  Protein (g): 105-119 (1.5-1.7g/kg IBW); Weight Used for Protein Requirements: Ideal(70 kg)  Fluid (ml/day): 2035 ml; Method Used for Fluid Requirements: 1 ml/kcal    Nutrition Related Findings:       BM: +loose BM today  Edema: None  Wounds:  None     Recent Labs     04/27/21  0143 04/26/21  1132 04/26/21  0218 04/25/21  0206   *  --  140*  --    BUN 39*  --  41*  --    CREA 1.56*  --  1.63*  --    *  --  147*  --    K 4.1  --  4.2  --    *  --  114*  --    CO2 29  --  29  --    CA 8.4*  --  9.0  --    PHOS  --   --  2.4*  --    MG  --  2.8* 2.8* 2.5*     Recent Labs     04/26/21  1132   LAC 1.8     Recent Labs     04/27/21  0143 04/26/21  0218   WBC 10.2 12.3*   HGB 11.0* 12.5   HCT 35.5* 39.5    257     Recent Labs     04/27/21  0625 04/27/21  0115 04/26/21  1902 04/26/21  1303 04/26/21  0527 04/26/21  0042 04/25/21  1838 04/25/21  1132 04/25/21  0726 04/25/21  0148 04/24/21  1828   GLUCPOC 122* 117* 159* 138* 136* 169* 128* 142* 197* 140* 178*       Lab Results   Component Value Date/Time    Hemoglobin A1c 5.9 (H) 04/19/2021 12:59 AM         Current Nutrition Therapies:   Diet: NPO, on TF's - Osmolite 1.5 @ 60 ml/hr + 1 pkt Prosource BID with 160 ml water flush Q 4 hrs  Supplements: None  Additional Caloric Sources:      Meal intake: No data found. Anthropometric Measures:  · Height:  5' 8\" (172.7 cm)  · Current Body Wt:  99.5 kg (219 lb 5.7 oz)   · Ideal Body Wt:  154:  142.4 %   · BMI Categories:  Obese class 1 (BMI 30.0-34. 9)     Wt Readings from Last 10 Encounters:   04/27/21 99.6 kg (219 lb 9.3 oz)   12/04/20 103.3 kg (227 lb 12.8 oz)   09/21/20 102.5 kg (226 lb)       Nutrition Diagnosis:   · Inadequate oral intake related to cognitive or neurological impairment as evidenced by NPO or clear liquid status due to medical condition      Nutrition Interventions:   Food and/or Nutrient Delivery: Continue tube feeding  Nutrition Education and Counseling: No recommendations at this time  Coordination of Nutrition Care: Continue to monitor while inpatient, Speech therapy, Interdisciplinary rounds    Goals:  Continued TF tolerance vs PO diet. Nutrition Monitoring and Evaluation:   Behavioral-Environmental Outcomes: None identified  Food/Nutrient Intake Outcomes: Enteral nutrition intake/tolerance  Physical Signs/Symptoms Outcomes: GI status, Weight, Biochemical data    Discharge Planning:     Too soon to determine     Beryl Tan RD     Contact via [address] [place] [address]

## 2021-04-27 NOTE — PROGRESS NOTES
Orders received, chart reviewed and patient evaluated by physical therapy. Pending progression with skilled acute physical therapy, recommend:  Therapy 3 hours per day 5-7 days per week  If not, then recommend SNF. Full evaluation to follow.      Espinoza Garner, PT, DPT

## 2021-04-27 NOTE — PROGRESS NOTES
Problem: Risk for Spread of Infection  Goal: Prevent transmission of infectious organism to others  Description: Prevent the transmission of infectious organisms to other patients, staff members, and visitors. Outcome: Progressing Towards Goal     Problem: Patient Education:  Go to Education Activity  Goal: Patient/Family Education  Outcome: Progressing Towards Goal     Problem: Non-Violent Restraints  Goal: No harm/injury to patient while restraints in use  Outcome: Progressing Towards Goal  Goal: Patient's dignity will be maintained  Outcome: Progressing Towards Goal     Problem: Falls - Risk of  Goal: *Absence of Falls  Description: Document Ellen Gill Fall Risk and appropriate interventions in the flowsheet.   Outcome: Progressing Towards Goal  Note: Fall Risk Interventions:       Mentation Interventions: Increase mobility, Family/sitter at bedside, Bed/chair exit alarm, Adequate sleep, hydration, pain control    Medication Interventions: Bed/chair exit alarm, Patient to call before getting OOB    Elimination Interventions: Bed/chair exit alarm, Call light in reach              Problem: Patient Education: Go to Patient Education Activity  Goal: Patient/Family Education  Outcome: Progressing Towards Goal     Problem: Patient Education: Go to Patient Education Activity  Goal: Patient/Family Education  Outcome: Progressing Towards Goal     Problem: Nutrition Deficit  Goal: *Optimize nutritional status  Outcome: Progressing Towards Goal     Problem: TIA/CVA Stroke: Day 2 Until Discharge  Goal: Off Pathway (Use only if patient is Off Pathway)  Outcome: Progressing Towards Goal  Goal: Activity/Safety  Outcome: Progressing Towards Goal  Goal: Nutrition/Diet  Outcome: Progressing Towards Goal  Goal: Discharge Planning  Outcome: Progressing Towards Goal  Goal: Medications  Outcome: Progressing Towards Goal  Goal: Respiratory  Outcome: Progressing Towards Goal  Goal: *Absence of aspiration  Outcome: Progressing Towards Goal  Goal: *Absence of deep venous thrombosis signs and symptoms(Stroke Metric)  Outcome: Progressing Towards Goal     Problem: Ischemic Stroke: Discharge Outcomes  Goal: *Hemodynamically stable  Outcome: Progressing Towards Goal  Goal: *Absence of aspiration pneumonia  Outcome: Progressing Towards Goal  Goal: *Aware of needed dietary changes  Outcome: Progressing Towards Goal  Goal: *Optimal pain control at patient's stated goal  Outcome: Progressing Towards Goal  Goal: *Home safety concerns addressed  Outcome: Progressing Towards Goal  Goal: *Neurolgocially stable (absence of additional neurological deficits)  Outcome: Progressing Towards Goal

## 2021-04-27 NOTE — PROGRESS NOTES
Problem: Self Care Deficits Care Plan (Adult)  Goal: *Acute Goals and Plan of Care (Insert Text)  Description:   FUNCTIONAL STATUS PRIOR TO ADMISSION: Patient was independent with ADLs and functional mobility, ambulatory with occasional SPC use. Enjoys watching sports and gardening. HOME SUPPORT: Patient lived with his wife but did not require assistance. Occupational Therapy Goals  Initiated 4/27/2021  1. Patient will perform grooming seated EOB with minimal assistance within 7 day(s). 2.  Patient will perform bathing with moderate assistance  within 7 day(s). 3.  Patient will perform upper body dressing with minimal assistance within 7 day(s). 4.  Patient will perform toilet transfers with moderate assistance  within 7 day(s). 5.  Patient will perform all aspects of toileting with moderate assistance  within 7 day(s). 6.  Patient will participate in upper extremity therapeutic exercise/activities with minimal assistance for 10 minutes within 7 day(s). Outcome: Not Met    OCCUPATIONAL THERAPY EVALUATION  Patient: Kaylynn Pedraza (68 y.o. male)  Date: 4/27/2021  Primary Diagnosis: Intracranial hemorrhage (HCC) [I62.9]        Precautions: fall       ASSESSMENT  Based on the objective data described below, the patient presents with impaired cognition (alert & Ox1, following ~75% simple commands although also may be limited by [facility], limited insight into deficits and history),  impaired cardiopulmonary tolerance (SPO2 stable >93% on 6L O2 NC), poor sitting and standing balance, and generally decreased AROM/ strength/ coordination s/p admission for IVH without surgical intervention. Patient mobilized to EOB, requiring near constant correction for posterior and L lean but showed improvement and progressed to standing and side-stepping. He is significantly below functional baseline and would benefit from rehab placement at d/c.     Current Level of Function Impacting Discharge (ADLs/self-care): maximum assistance x2 supine<>sit, moderate assistance x2 sit<>stand, infer maximum to total assistance ADLs. Functional Outcome Measure: The patient scored 5/100 on the Barthel Index outcome measure which is indicative of ~95% impairment in functional performance. Patient will benefit from skilled therapy intervention to address the above noted impairments. PLAN :  Recommendations and Planned Interventions: self care training, functional mobility training, therapeutic exercise, balance training, therapeutic activities, endurance activities, patient education, home safety training, and family training/education    Frequency/Duration: Patient will be followed by occupational therapy 5 times a week to address goals. Recommendation for discharge: (in order for the patient to meet his/her long term goals)  Therapy 3 hours per day 5-7 days per week    This discharge recommendation:  Has been made in collaboration with the attending provider and/or case management         SUBJECTIVE:   Patient stated Saul Yip.     OBJECTIVE DATA SUMMARY:   HISTORY:   Past Medical History:   Diagnosis Date    Adverse effect of anesthesia     combative after anesthesia    Alcohol abuse     6 beers/day    Arthritis     CAD (coronary artery disease)     Chronic obstructive pulmonary disease (HCC)     Chronic obstructive pulmonary disease (HCC)     CKD (chronic kidney disease) stage 3, GFR 30-59 ml/min ([address] 9/21/2020    Dyslipidemia 8/16/2017    Dyspepsia and other specified disorders of function of stomach     Dyspnea 8/16/2017    ED (erectile dysfunction) 8/16/2017    Encounter for immunization 8/16/2017    Fatigue 8/16/2017    Flank pain 8/1/2019    GERD (gastroesophageal reflux disease) 8/16/2017    Gout 8/16/2017    Hypertension     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    Morbid obesity ([address]     On statin therapy 8/16/2017    TESSA on CPAP 1/3/2019    Psychiatric disorder     Depression    Simple chronic bronchitis ([address] 1/3/2019 TIA (transient ischemic attack) 7/32/8386    Umbilical hernia 81/3/9927    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     Past Surgical History:   Procedure Laterality Date    COLONOSCOPY N/A 9/27/2019    COLONOSCOPY performed by Jaky Garcia MD at [address]  65/82/33    open umbilical hernia repair mesh    HX ORTHOPAEDIC      HX UROLOGICAL      HYDROCELECTOMY    [place] CARDIAC SURG PROCEDURE UNLIST  1996    Cardiac Stents    NV CARDIAC SURG PROCEDURE UNLIST  04/2019    EF 61-65%    UPPER GI ENDOSCOPY,BIOPSY  9/30/2019            Expanded or extensive additional review of patient history:     Home Situation  Home Environment: Private residence  # Steps to Enter: 1  One/Two Story Residence: One story  Living Alone: No  Support Systems: Spouse/Significant Other/Partner  Current DME Used/Available at Home: Cane, straight      EXAMINATION OF PERFORMANCE DEFICITS:  Cognitive/Behavioral Status:  Neurologic State: Alert;Confused  Orientation Level: Oriented to person;Disoriented to time;Disoriented to situation;Disoriented to place  Cognition: Decreased command following(~75% simple, may also be limited by [facility])  Perception: Appears intact     Safety/Judgement: Decreased insight into deficits; Decreased awareness of need for safety    Skin: visible skin appears intact    Edema: none noted    Hearing:   [place], hearing aides    Vision/Perceptual:                   Visual Fields: (able to detect stimuli in all fields)       Acuity: Within Defined Limits         Range of Motion:    AROM: Generally decreased, functional                         Strength:    Strength: Generally decreased, functional                Coordination:  Coordination: Generally decreased, functional  Fine Motor Skills-Upper: Left Impaired;Right Impaired    Gross Motor Skills-Upper: Left Impaired;Right Impaired    Tone & Sensation:    Tone: Normal  Sensation: (inconsistent responses, possibly d/t [place]) Balance:  Sitting: Impaired  Sitting - Static: Poor (constant support)  Sitting - Dynamic: Poor (constant support)  Standing: Impaired  Standing - Static: Poor  Standing - Dynamic : Poor    Functional Mobility and Transfers for ADLs:  Bed Mobility:  Supine to Sit: Maximum assistance;Assist x2  Sit to Supine: Maximum assistance;Assist x2    Transfers:  Sit to Stand: Moderate assistance;Assist x2    ADL Assessment:  Feeding: Total assistance(NPO, tube)    Oral Facial Hygiene/Grooming: Maximum assistance(inferred)    Bathing: Maximum assistance(inferred)    Upper Body Dressing: Maximum assistance(inferred)    Lower Body Dressing: Total assistance(for socks and inferred overall)    Toileting: Total assistance(inferred)                ADL Intervention and task modifications:            Cognitive Retraining  Safety/Judgement: Decreased insight into deficits; Decreased awareness of need for safety    Functional Measure:  Barthel Index:    Bathin  Bladder: 0  Bowels: 0  Groomin  Dressin  Feedin  Mobility: 0  Stairs: 0  Toilet Use: 0  Transfer (Bed to Chair and Back): 5  Total: 5/100        The Barthel ADL Index: Guidelines  1. The index should be used as a record of what a patient does, not as a record of what a patient could do. 2. The main aim is to establish degree of independence from any help, physical or verbal, however minor and for whatever reason. 3. The need for supervision renders the patient not independent. 4. A patient's performance should be established using the best available evidence. Asking the patient, friends/relatives and nurses are the usual sources, but direct observation and common sense are also important. However direct testing is not needed. 5. Usually the patient's performance over the preceding 24-48 hours is important, but occasionally longer periods will be relevant. 6. Middle categories imply that the patient supplies over 50 per cent of the effort.   7. Use of aids to be independent is allowed. Mennie Barthel., Barthel, DYAKOV ([id number]). Functional evaluation: the Barthel Index. [address] (14)2. BENNIE Rodrigues, Livan Soriano, Anais Christiansen., [place], [address] (1999). Measuring the change indisability after inpatient rehabilitation; comparison of the responsiveness of the Barthel Index and Functional [place] Measure. Journal of Neurology, Neurosurgery, and Psychiatry, 66(4), 680-913. SARAH Monzon, MAZIN Pulido, & Chetna Van M.A. (2004.) Assessment of post-stroke quality of life in cost-effectiveness studies: The usefulness of the Barthel Index and the EuroQoL-5D. Quality of Life Research, 15, 288-02         Occupational Therapy Evaluation Charge Determination   History Examination Decision-Making   LOW Complexity : Brief history review  MEDIUM Complexity : 3-5 performance deficits relating to physical, cognitive , or psychosocial skils that result in activity limitations and / or participation restrictions MEDIUM Complexity : Patient may present with comorbidities that affect occupational performnce. Miniml to moderate modification of tasks or assistance (eg, physical or verbal ) with assesment(s) is necessary to enable patient to complete evaluation       Based on the above components, the patient evaluation is determined to be of the following complexity level: LOW   Pain Rating:  Patient did not indicate pain    Activity Tolerance:   VSS, fair    After treatment patient left in no apparent distress:    Supine in bed, Call bell within reach, Bed / chair alarm activated, and Caregiver / family present    COMMUNICATION/EDUCATION:   The patients plan of care was discussed with: Physical therapist, Registered nurse, Physician, and Case management. Home safety education was provided and the patient/caregiver indicated understanding., Patient/family have participated as able in goal setting and plan of care. , and Patient/family agree to work toward stated goals and plan of care. This patients plan of care is appropriate for delegation to [facility].     Thank you for this referral.  Carole Fernandez OT  Time Calculation: 27 mins

## 2021-04-27 NOTE — PROGRESS NOTES
[facility]                                                                                          Hospitalist Progress Note  Alexsandra BassDO  Answering service: [phone] from in house phone        Date of Service:  2021  NAME:  Isrrael Phillips  :  1934  MRN:  [id number]      Admission Summary:   70-year-old male with history of coronary disease-on aspirin, COPD, chronic insufficiency hyperlipidemia hypertension who has history of prior viral encephalitis was admitted with headache and nausea with vomiting. The CT scan on presentation showed a right intraparenchymal hemorrhage adjacent to the lateral ventricle. Patient was in the ICU, but followed by neurosurgery, there was no surgical recommendations for EVD but was followed with neuro checks every 2 hours. Cardene drip is required for blood pressure control, patient also became encephalopathic due to multiple etiologies. CT scan done yesterday for worsening of confusion shows improvement in intraventricular hemorrhage. Patient became febrile, sputum culture grew MSSA and patient was started on cefazolin. She is currently medically stable, neurochecks have been changed every 4 hours and critical care team recommends downgrading to neuro telemetry/neuro intermediate care bed. Interval history / Subjective: Follow up intraventricular hemorrhage. Patient seen and examined. Daughter at bedside. Reports better sleep overnight. Fevers improved. 100.0 this AM. Cultures negative.  Was able to work with PT/OT this AM.      Assessment & Plan:     Intraventricular hemorrhage with mild ventriculomegaly  -Neurosurgery on board, no acute surgical intervention  -Repeat CT head shows some improvement in the hemorrhage  -Neurosurgery recommends less sedation, neuro checks every 4 hours to help decrease delirium and promote sleep    SIRS (fevers, tachycardia, tachypnea):   -unclear source  -blood, urine cultures, procalcitonin, lactic acid, CXR unremarkable thus far   -broad spectrum antibiotics pending culture results, possible discontinuation 4/28     Acute metabolic encephalopathy, multifactorial: intermittent  -IVH/ICU delirium with possible component of sepsis  -Supportive care. Family defers restraints or day time seroquel if able. Will continue to monitor   -Continue Seroquel at bedtime  -Neurochecks every 4 hours  -reorient days and nights     Acute hypoxic respiratory failure:   -concern for TESSA, will continue supplemental oxygen   -previously requiring high flow nasal cannula  -aa nebs prn     Hypertension:   -continue hydralazine, atenolol. Add amlodipine  -goal of SBP is to keep less than 160 mmHg     History of alcohol abuse  -family reports prior history but minimal alcohol intake prior to admission   -Out of withdrawal window     MSSA bronchitis  -Sputum culture grows MSSA    Dysphagia:   -repeat speech evaluation   -continue tube feeds, benefiber added for loose stools     Code status: full   DVT prophylaxis: [address] discussed with: Patient/Family  Anticipated Disposition: SNF/LTC  Anticipated Discharge: Greater than 48 hours     Hospital Problems  Date Reviewed: 9/21/2020          Codes Class Noted POA    Intracranial hemorrhage ([facility] ICD-10-CM: I62.9  ICD-9-CM: 432.9  4/18/2021 Unknown                Review of Systems:   Negative unless stated above       Vital Signs:    Last 24hrs VS reviewed since prior progress note.  Most recent are:  Visit Vitals  BP (!) 157/89 (BP 1 Location: Left arm, BP Patient Position: At rest)   Pulse 86   Temp 100.1 °F (37.8 °C)   Resp 26   Ht 5' 8\" (1.727 m)   Wt 99.6 kg (219 lb 9.3 oz)   SpO2 97%   BMI 33.39 kg/m²         Intake/Output Summary (Last 24 hours) at 4/27/2021 1011  Last data filed at 4/27/2021 0800  Gross per 24 hour   Intake 840 ml   Output --   Net 840 ml        Physical Examination:     I had a face to face encounter with this patient and independently examined them on 4/27/2021 as outlined below:          Constitutional:  No acute distress, awake and intermittently conversant    ENT:  Oral mucosa moist, oropharynx benign. Resp:  CTA bilaterally. Minimal expiratory wheeze. No accessory muscle use   CV:  RRR, no murmurs, gallops, rubs    GI:  Soft, obese, non distended, non tender. normoactive bowel sounds, no hepatosplenomegaly     Musculoskeletal:  No edema, warm, 2+ pulses throughout    Neurologic:  Moves all extremities. AAOx2, can follow commands- limited by hearing            Data Review:    Review and/or order of clinical lab test  Review and/or order of tests in the radiology section of CPT  Review and/or order of tests in the medicine section of CPT      Labs:     Recent Labs     04/27/21 0143 04/26/21 0218   WBC 10.2 12.3*   HGB 11.0* 12.5   HCT 35.5* 39.5    257     Recent Labs     04/27/21 0143 04/26/21  1132 04/26/21 0218 04/25/21  0206   *  --  147*  --    K 4.1  --  4.2  --    *  --  114*  --    CO2 29  --  29  --    BUN 39*  --  41*  --    CREA 1.56*  --  1.63*  --    *  --  140*  --    CA 8.4*  --  9.0  --    MG  --  2.8* 2.8* 2.5*   PHOS  --   --  2.4*  --      No results for input(s): ALT, AP, TBIL, TBILI, TP, ALB, GLOB, GGT, AML, LPSE in the last 72 hours. No lab exists for component: SGOT, GPT, AMYP, HLPSE  No results for input(s): INR, PTP, APTT, INREXT, INREXT in the last 72 hours. No results for input(s): FE, TIBC, PSAT, FERR in the last 72 hours.    No results found for: FOL, RBCF   Recent Labs     04/26/21  0846   PH 7.49*   PCO2 36   PO2 213*     Recent Labs     04/27/21 0143 04/26/21 2017 04/26/21  1132   TROIQ 0.09* 0.11* 0.11*     Lab Results   Component Value Date/Time    Cholesterol, total 144 04/19/2021 12:59 AM    HDL Cholesterol 33 04/19/2021 12:59 AM    LDL, calculated 74.8 04/19/2021 12:59 AM    Triglyceride 181 (H) 04/19/2021 12:59 AM    CHOL/HDL Ratio 4.4 04/19/2021 12:59 AM     Lab Results   Component Value Date/Time    Glucose (POC) 122 (H) 04/27/2021 06:25 AM    Glucose (POC) 117 (H) 04/27/2021 01:15 AM    Glucose (POC) 159 (H) 04/26/2021 07:02 PM    Glucose (POC) 138 (H) 04/26/2021 01:03 PM    Glucose (POC) 136 (H) 04/26/2021 05:27 AM     Lab Results   Component Value Date/Time    Color YELLOW/STRAW 04/27/2021 06:27 AM    Appearance CLEAR 04/27/2021 06:27 AM    Specific gravity 1.022 04/27/2021 06:27 AM    Specific gravity 1.015 09/21/2020 09:13 AM    pH (UA) 5.0 04/27/2021 06:27 AM    Protein 30 (A) 04/27/2021 06:27 AM    Glucose Negative 04/27/2021 06:27 AM    Ketone Negative 04/27/2021 06:27 AM    Bilirubin Negative 04/27/2021 06:27 AM    Urobilinogen 0.2 04/27/2021 06:27 AM    Nitrites Negative 04/27/2021 06:27 AM    Leukocyte Esterase Negative 04/27/2021 06:27 AM    Epithelial cells FEW 04/27/2021 06:27 AM    Bacteria Negative 04/27/2021 06:27 AM    WBC 0-4 04/27/2021 06:27 AM    RBC 0-5 04/27/2021 06:27 AM         Medications Reviewed:     Current Facility-Administered Medications   Medication Dose Route Frequency    piperacillin-tazobactam (ZOSYN) 3.375 g in 0.9% sodium chloride (MBP/ADV) 100 mL MBP  3.375 g IntraVENous Q8H    Vancomycin - pharmacy to dose   Other Rx Dosing/Monitoring    DULoxetine (CYMBALTA) capsule 30 mg  30 mg Oral DAILY    vancomycin (VANCOCIN) 1250 mg in  ml infusion  1,250 mg IntraVENous DAILY    miconazole (MICOTIN) 2 % powder   Topical PRN    guaiFENesin-dextromethorphan (ROBITUSSIN DM) 100-10 mg/5 mL syrup 10 mL  10 mL Oral Q6H    albuterol (PROVENTIL VENTOLIN) nebulizer solution 2.5 mg  2.5 mg Nebulization Q6H PRN    guar gum (BENEFIBER) packet 1 Packet  4 g Oral TID    atenoloL (TENORMIN) tablet 50 mg  50 mg Per NG tube DAILY    QUEtiapine (SEROquel) tablet 100 mg  100 mg Per NG tube QHS    hydrALAZINE (APRESOLINE) tablet 50 mg  50 mg Per NG tube Q8H    docusate (COLACE) 50 mg/5 mL oral liquid 100 mg  100 mg Per NG tube DAILY  lansoprazole (PREVACID) 3 mg/mL oral suspension 30 mg  30 mg Per NG tube ACB    sodium chloride (NS) flush 5-40 mL  5-40 mL IntraVENous Q8H    sodium chloride (NS) flush 5-40 mL  5-40 mL IntraVENous PRN    acetaminophen (TYLENOL) tablet 650 mg  650 mg Oral Q6H PRN    Or    acetaminophen (TYLENOL) suppository 650 mg  650 mg Rectal Q6H PRN    polyethylene glycol (MIRALAX) packet 17 g  17 g Oral DAILY PRN    promethazine (PHENERGAN) tablet 12.5 mg  12.5 mg Oral Q6H PRN    Or    ondansetron (ZOFRAN) injection 4 mg  4 mg IntraVENous Q6H PRN    glucose chewable tablet 16 g  4 Tab Oral PRN    dextrose (D50W) injection syrg 12.5-25 g  25-50 mL IntraVENous PRN    glucagon (GLUCAGEN) injection 1 mg  1 mg IntraMUSCular PRN    insulin lispro (HUMALOG) injection   SubCUTAneous Q6H    hydrALAZINE (APRESOLINE) 20 mg/mL injection 10 mg  10 mg IntraVENous Q4H PRN    labetaloL (NORMODYNE;TRANDATE) injection 10 mg  10 mg IntraVENous Q4H PRN     ______________________________________________________________________  EXPECTED LENGTH OF STAY: 2d 0h  ACTUAL LENGTH OF STAY:          [address] [facility]

## 2021-04-27 NOTE — PROGRESS NOTES
Bedside shift change report given to Héctor (oncoming nurse) by Mundo Haney (offgoing nurse). Report included the following information SBAR, Kardex, Intake/Output, MAR, Cardiac Rhythm ST, Quality Measures and Dual Neuro Assessment.

## 2021-04-27 NOTE — PROGRESS NOTES
Transition of Care Plan   RUR- 26% Medium Risk   DISPOSITION: The disposition plan is to transition to SNF - pending review.  Transport: [facility]    CM contacted Beatrice Menjivar at [facility] to retreive update. Patients referral is pending at this time. Once completed admin coordinator will contact TW. CM will continue to follow, provide support and assist with JACQUES needs as they arise.     Taurus Grant

## 2021-04-27 NOTE — PROGRESS NOTES
Speech Pathology  Orders received. Chart reviewed and spoke with RN. Patient febrile and lethargic, mumbling and attempting to pull at tubes/lines. Patient is NPO with dobhoff feedings. SLP will defer evaluation today given status, but will continue to follow.

## 2021-04-27 NOTE — PROGRESS NOTES
Bedside shift change report given to Livan Washington RN (oncoming nurse) by Jorge Estrada RN (offgoing nurse). Report included the following information SBAR, Kardex, Intake/Output, MAR, Recent Results, Cardiac Rhythm NSR/ST and Dual Neuro Assessment. I have read and agree with the documentation provided by Natalia Garner RN as her preceptor.

## 2021-04-27 NOTE — PROGRESS NOTES
Problem: Risk for Spread of Infection  Goal: Prevent transmission of infectious organism to others  Description: Prevent the transmission of infectious organisms to other patients, staff members, and visitors. Outcome: Progressing Towards Goal     Problem: Patient Education:  Go to Education Activity  Goal: Patient/Family Education  Outcome: Progressing Towards Goal     Problem: Non-Violent Restraints  Goal: Removal from restraints as soon as assessed to be safe  Outcome: Progressing Towards Goal  Goal: No harm/injury to patient while restraints in use  Outcome: Progressing Towards Goal  Goal: Patient's dignity will be maintained  Outcome: Progressing Towards Goal  Goal: Patient Interventions  Outcome: Progressing Towards Goal     Problem: Falls - Risk of  Goal: *Absence of Falls  Description: Document Chani Tadeo Fall Risk and appropriate interventions in the flowsheet. Outcome: Progressing Towards Goal  Note: Fall Risk Interventions:       Mentation Interventions: Increase mobility    Medication Interventions: Bed/chair exit alarm    Elimination Interventions: Bed/chair exit alarm, Call light in reach, Toileting schedule/hourly rounds              Problem: Patient Education: Go to Patient Education Activity  Goal: Patient/Family Education  Outcome: Progressing Towards Goal     Problem: Pressure Injury - Risk of  Goal: *Prevention of pressure injury  Description: Document Aquiles Scale and appropriate interventions in the flowsheet. Outcome: Progressing Towards Goal  Note: Pressure Injury Interventions:  Sensory Interventions: Minimize linen layers    Moisture Interventions: Minimize layers    Activity Interventions: PT/OT evaluation    Mobility Interventions: Turn and reposition approx.  every two hours(pillow and wedges)    Nutrition Interventions: Document food/fluid/supplement intake    Friction and Shear Interventions: Minimize layers                Problem: Patient Education: Go to Patient Education Activity  Goal: Patient/Family Education  Outcome: Progressing Towards Goal     Problem: Nutrition Deficit  Goal: *Optimize nutritional status  Outcome: Progressing Towards Goal     Problem: Patient Education: Go to Patient Education Activity  Goal: Patient/Family Education  Outcome: Progressing Towards Goal     Problem: TIA/CVA Stroke: 0-24 hours  Goal: Off Pathway (Use only if patient is Off Pathway)  Outcome: Progressing Towards Goal  Goal: Activity/Safety  Outcome: Progressing Towards Goal  Goal: Nutrition/Diet  Outcome: Progressing Towards Goal  Goal: Discharge Planning  Outcome: Progressing Towards Goal  Goal: Medications  Outcome: Progressing Towards Goal  Goal: Respiratory  Outcome: Progressing Towards Goal  Goal: Treatments/Interventions/Procedures  Outcome: Progressing Towards Goal  Goal: Minimize risk of bleeding post-thrombolytic infusion  Outcome: Progressing Towards Goal  Goal: Monitor for complications post-thrombolytic infusion  Outcome: Progressing Towards Goal  Goal: Psychosocial  Outcome: Progressing Towards Goal  Goal: *Hemodynamically stable  Outcome: Progressing Towards Goal  Goal: *Neurologically stable  Description: Absence of additional neurological deficits    Outcome: Progressing Towards Goal  Goal: *Verbalizes anxiety and depression are reduced or absent  Outcome: Progressing Towards Goal  Goal: *Absence of Signs of Aspiration on Current Diet  Outcome: Progressing Towards Goal  Goal: *Absence of deep venous thrombosis signs and symptoms(Stroke Metric)  Outcome: Progressing Towards Goal  Goal: *Ability to perform ADLs and demonstrates progressive mobility and function  Outcome: Progressing Towards Goal  Goal: *Stroke education started(Stroke Metric)  Outcome: Progressing Towards Goal  Goal: *Dysphagia screen performed(Stroke Metric)  Outcome: Progressing Towards Goal  Goal: *Rehab consulted(Stroke Metric)  Outcome: Progressing Towards Goal     Problem: TIA/CVA Stroke: Day 2 Until Discharge  Goal: Off Pathway (Use only if patient is Off Pathway)  Outcome: Progressing Towards Goal  Goal: Activity/Safety  Outcome: Progressing Towards Goal  Goal: Diagnostic Test/Procedures  Outcome: Progressing Towards Goal  Goal: Nutrition/Diet  Outcome: Progressing Towards Goal  Goal: Discharge Planning  Outcome: Progressing Towards Goal  Goal: Medications  Outcome: Progressing Towards Goal  Goal: Respiratory  Outcome: Progressing Towards Goal  Goal: Treatments/Interventions/Procedures  Outcome: Progressing Towards Goal  Goal: Psychosocial  Outcome: Progressing Towards Goal  Goal: *Verbalizes anxiety and depression are reduced or absent  Outcome: Progressing Towards Goal  Goal: *Absence of aspiration  Outcome: Progressing Towards Goal  Goal: *Absence of deep venous thrombosis signs and symptoms(Stroke Metric)  Outcome: Progressing Towards Goal  Goal: *Optimal pain control at patient's stated goal  Outcome: Progressing Towards Goal  Goal: *Tolerating diet  Outcome: Progressing Towards Goal  Goal: *Ability to perform ADLs and demonstrates progressive mobility and function  Outcome: Progressing Towards Goal  Goal: *Stroke education continued(Stroke Metric)  Outcome: Progressing Towards Goal     Problem: Ischemic Stroke: Discharge Outcomes  Goal: *Verbalizes anxiety and depression are reduced or absent  Outcome: Progressing Towards Goal  Goal: *Verbalize understanding of risk factor modification(Stroke Metric)  Outcome: Progressing Towards Goal  Goal: *Hemodynamically stable  Outcome: Progressing Towards Goal  Goal: *Absence of aspiration pneumonia  Outcome: Progressing Towards Goal  Goal: *Aware of needed dietary changes  Outcome: Progressing Towards Goal  Goal: *Verbalize understanding of prescribed medications including anti-coagulants, anti-lipid, and/or anti-platelets(Stroke Metric)  Outcome: Progressing Towards Goal  Goal: *Tolerating diet  Outcome: Progressing Towards Goal  Goal: *Aware of follow-up diagnostics related to anticoagulants  Outcome: Progressing Towards Goal  Goal: *Ability to perform ADLs and demonstrates progressive mobility and function  Outcome: Progressing Towards Goal  Goal: *Absence of DVT(Stroke Metric)  Outcome: Progressing Towards Goal  Goal: *Absence of aspiration  Outcome: Progressing Towards Goal  Goal: *Optimal pain control at patient's stated goal  Outcome: Progressing Towards Goal  Goal: *Home safety concerns addressed  Outcome: Progressing Towards Goal  Goal: *Describes available resources and support systems  Outcome: Progressing Towards Goal  Goal: *Verbalizes understanding of activation of EMS(911) for stroke symptoms(Stroke Metric)  Outcome: Progressing Towards Goal  Goal: *Understands and describes signs and symptoms to report to providers(Stroke Metric)  Outcome: Progressing Towards Goal  Goal: *Neurolgocially stable (absence of additional neurological deficits)  Outcome: Progressing Towards Goal  Goal: *Verbalizes importance of follow-up with primary care physician(Stroke Metric)  Outcome: Progressing Towards Goal  Goal: *Smoking cessation discussed,if applicable(Stroke Metric)  Outcome: Progressing Towards Goal  Goal: *Depression screening completed(Stroke Metric)  Outcome: Progressing Towards Goal

## 2021-04-28 LAB
ANION GAP SERPL CALC-SCNC: 6 MMOL/L (ref 5–15)
BUN SERPL-MCNC: 33 MG/DL (ref 6–20)
BUN/CREAT SERPL: 22 (ref 12–20)
CALCIUM SERPL-MCNC: 8.8 MG/DL (ref 8.5–10.1)
CHLORIDE SERPL-SCNC: 114 MMOL/L (ref 97–108)
CO2 SERPL-SCNC: 28 MMOL/L (ref 21–32)
CREAT SERPL-MCNC: 1.47 MG/DL (ref 0.7–1.3)
D DIMER PPP FEU-MCNC: 3.72 MG/L FEU (ref 0–0.65)
ERYTHROCYTE [DISTWIDTH] IN BLOOD BY AUTOMATED COUNT: 15.1 % (ref 11.5–14.5)
GLUCOSE BLD STRIP.AUTO-MCNC: 125 MG/DL (ref 65–100)
GLUCOSE BLD STRIP.AUTO-MCNC: 127 MG/DL (ref 65–100)
GLUCOSE BLD STRIP.AUTO-MCNC: 131 MG/DL (ref 65–100)
GLUCOSE BLD STRIP.AUTO-MCNC: 138 MG/DL (ref 65–100)
GLUCOSE SERPL-MCNC: 127 MG/DL (ref 65–100)
HCT VFR BLD AUTO: 36 % (ref 36.6–50.3)
HGB BLD-MCNC: 11.6 G/DL (ref 12.1–17)
MCH RBC QN AUTO: 31 PG (ref 26–34)
MCHC RBC AUTO-ENTMCNC: 32.2 G/DL (ref 30–36.5)
MCV RBC AUTO: 96.3 FL (ref 80–99)
NRBC # BLD: 0 K/UL (ref 0–0.01)
NRBC BLD-RTO: 0 PER 100 WBC
PLATELET # BLD AUTO: 244 K/UL (ref 150–400)
PMV BLD AUTO: 10.5 FL (ref 8.9–12.9)
POTASSIUM SERPL-SCNC: 4 MMOL/L (ref 3.5–5.1)
PROCALCITONIN SERPL-MCNC: <0.05 NG/ML
RBC # BLD AUTO: 3.74 M/UL (ref 4.1–5.7)
SERVICE CMNT-IMP: ABNORMAL
SODIUM SERPL-SCNC: 148 MMOL/L (ref 136–145)
WBC # BLD AUTO: 11.8 K/UL (ref 4.1–11.1)

## 2021-04-28 PROCEDURE — 65660000001 HC RM ICU INTERMED STEPDOWN

## 2021-04-28 PROCEDURE — 74011000258 HC RX REV CODE- 258: Performed by: INTERNAL MEDICINE

## 2021-04-28 PROCEDURE — 97530 THERAPEUTIC ACTIVITIES: CPT

## 2021-04-28 PROCEDURE — 85027 COMPLETE CBC AUTOMATED: CPT

## 2021-04-28 PROCEDURE — 74011250637 HC RX REV CODE- 250/637: Performed by: INTERNAL MEDICINE

## 2021-04-28 PROCEDURE — 74011250637 HC RX REV CODE- 250/637: Performed by: NURSE PRACTITIONER

## 2021-04-28 PROCEDURE — 80048 BASIC METABOLIC PNL TOTAL CA: CPT

## 2021-04-28 PROCEDURE — 74011250636 HC RX REV CODE- 250/636: Performed by: NURSE PRACTITIONER

## 2021-04-28 PROCEDURE — 74011250637 HC RX REV CODE- 250/637: Performed by: ANESTHESIOLOGY

## 2021-04-28 PROCEDURE — 85379 FIBRIN DEGRADATION QUANT: CPT

## 2021-04-28 PROCEDURE — 74011250636 HC RX REV CODE- 250/636: Performed by: INTERNAL MEDICINE

## 2021-04-28 PROCEDURE — 84145 PROCALCITONIN (PCT): CPT

## 2021-04-28 PROCEDURE — 74011250637 HC RX REV CODE- 250/637: Performed by: STUDENT IN AN ORGANIZED HEALTH CARE EDUCATION/TRAINING PROGRAM

## 2021-04-28 PROCEDURE — 92610 EVALUATE SWALLOWING FUNCTION: CPT | Performed by: SPEECH-LANGUAGE PATHOLOGIST

## 2021-04-28 PROCEDURE — 82962 GLUCOSE BLOOD TEST: CPT

## 2021-04-28 PROCEDURE — 36415 COLL VENOUS BLD VENIPUNCTURE: CPT

## 2021-04-28 RX ORDER — IPRATROPIUM BROMIDE AND ALBUTEROL SULFATE 2.5; .5 MG/3ML; MG/3ML
3 SOLUTION RESPIRATORY (INHALATION)
Status: DISCONTINUED | OUTPATIENT
Start: 2021-04-28 | End: 2021-05-03 | Stop reason: HOSPADM

## 2021-04-28 RX ADMIN — HYDRALAZINE HYDROCHLORIDE 50 MG: 50 TABLET, FILM COATED ORAL at 05:53

## 2021-04-28 RX ADMIN — GUAIFENESIN AND DEXTROMETHORPHAN 10 ML: 100; 10 SYRUP ORAL at 00:00

## 2021-04-28 RX ADMIN — Medication 1 PACKET: at 08:00

## 2021-04-28 RX ADMIN — PIPERACILLIN AND TAZOBACTAM 3.38 G: 3; .375 INJECTION, POWDER, LYOPHILIZED, FOR SOLUTION INTRAVENOUS at 08:00

## 2021-04-28 RX ADMIN — HYDRALAZINE HYDROCHLORIDE 10 MG: 20 INJECTION INTRAMUSCULAR; INTRAVENOUS at 10:57

## 2021-04-28 RX ADMIN — GUAIFENESIN AND DEXTROMETHORPHAN 10 ML: 100; 10 SYRUP ORAL at 05:53

## 2021-04-28 RX ADMIN — QUETIAPINE FUMARATE 100 MG: 100 TABLET ORAL at 22:05

## 2021-04-28 RX ADMIN — DULOXETINE HYDROCHLORIDE 30 MG: 30 CAPSULE, DELAYED RELEASE ORAL at 08:01

## 2021-04-28 RX ADMIN — GUAIFENESIN AND DEXTROMETHORPHAN 10 ML: 100; 10 SYRUP ORAL at 11:06

## 2021-04-28 RX ADMIN — Medication 10 ML: at 05:58

## 2021-04-28 RX ADMIN — AMLODIPINE BESYLATE 5 MG: 5 TABLET ORAL at 08:01

## 2021-04-28 RX ADMIN — Medication 1 PACKET: at 22:05

## 2021-04-28 RX ADMIN — Medication 1 PACKET: at 17:27

## 2021-04-28 RX ADMIN — ATENOLOL 50 MG: 25 TABLET ORAL at 08:01

## 2021-04-28 RX ADMIN — HYDRALAZINE HYDROCHLORIDE 10 MG: 20 INJECTION INTRAMUSCULAR; INTRAVENOUS at 17:58

## 2021-04-28 RX ADMIN — HYDRALAZINE HYDROCHLORIDE 50 MG: 50 TABLET, FILM COATED ORAL at 15:12

## 2021-04-28 RX ADMIN — ACETAMINOPHEN 650 MG: 325 TABLET, FILM COATED ORAL at 02:02

## 2021-04-28 RX ADMIN — HYDRALAZINE HYDROCHLORIDE 50 MG: 50 TABLET, FILM COATED ORAL at 22:05

## 2021-04-28 RX ADMIN — LANSOPRAZOLE 30 MG: KIT at 08:01

## 2021-04-28 RX ADMIN — PIPERACILLIN AND TAZOBACTAM 3.38 G: 3; .375 INJECTION, POWDER, LYOPHILIZED, FOR SOLUTION INTRAVENOUS at 00:23

## 2021-04-28 RX ADMIN — Medication 10 ML: at 14:35

## 2021-04-28 RX ADMIN — VANCOMYCIN HYDROCHLORIDE 1250 MG: 10 INJECTION, POWDER, LYOPHILIZED, FOR SOLUTION INTRAVENOUS at 11:06

## 2021-04-28 RX ADMIN — GUAIFENESIN AND DEXTROMETHORPHAN 10 ML: 100; 10 SYRUP ORAL at 17:27

## 2021-04-28 RX ADMIN — PIPERACILLIN AND TAZOBACTAM 3.38 G: 3; .375 INJECTION, POWDER, LYOPHILIZED, FOR SOLUTION INTRAVENOUS at 17:26

## 2021-04-28 NOTE — PROGRESS NOTES
Problem: Risk for Spread of Infection  Goal: Prevent transmission of infectious organism to others  Description: Prevent the transmission of infectious organisms to other patients, staff members, and visitors. Outcome: Progressing Towards Goal     Problem: Patient Education:  Go to Education Activity  Goal: Patient/Family Education  Outcome: Progressing Towards Goal     Problem: Non-Violent Restraints  Goal: Removal from restraints as soon as assessed to be safe  Outcome: Progressing Towards Goal  Goal: No harm/injury to patient while restraints in use  Outcome: Progressing Towards Goal  Goal: Patient's dignity will be maintained  Outcome: Progressing Towards Goal  Goal: Patient Interventions  Outcome: Progressing Towards Goal     Problem: Falls - Risk of  Goal: *Absence of Falls  Description: Document Candace Leon Fall Risk and appropriate interventions in the flowsheet. Outcome: Progressing Towards Goal  Note: Fall Risk Interventions:       Mentation Interventions: Adequate sleep, hydration, pain control    Medication Interventions: Bed/chair exit alarm    Elimination Interventions: Bed/chair exit alarm    History of Falls Interventions: Bed/chair exit alarm         Problem: Patient Education: Go to Patient Education Activity  Goal: Patient/Family Education  Outcome: Progressing Towards Goal     Problem: Pressure Injury - Risk of  Goal: *Prevention of pressure injury  Description: Document Aquiles Scale and appropriate interventions in the flowsheet.   Outcome: Progressing Towards Goal  Note: Pressure Injury Interventions:  Sensory Interventions: Assess need for specialty bed    Moisture Interventions: Apply protective barrier, creams and emollients    Activity Interventions: Increase time out of bed    Mobility Interventions: Float heels    Nutrition Interventions: Document food/fluid/supplement intake    Friction and Shear Interventions: Feet elevated on foot rest                Problem: Patient Education: Go to Patient Education Activity  Goal: Patient/Family Education  Outcome: Progressing Towards Goal     Problem: Nutrition Deficit  Goal: *Optimize nutritional status  Outcome: Progressing Towards Goal     Problem: Nutrition Deficit  Goal: *Optimize nutritional status  Outcome: Progressing Towards Goal     Problem: Patient Education: Go to Patient Education Activity  Goal: Patient/Family Education  Outcome: Progressing Towards Goal     Problem: TIA/CVA Stroke: 0-24 hours  Goal: Off Pathway (Use only if patient is Off Pathway)  Outcome: Progressing Towards Goal  Goal: Activity/Safety  Outcome: Progressing Towards Goal  Goal: Nutrition/Diet  Outcome: Progressing Towards Goal  Goal: Discharge Planning  Outcome: Progressing Towards Goal  Goal: Medications  Outcome: Progressing Towards Goal  Goal: Respiratory  Outcome: Progressing Towards Goal  Goal: Treatments/Interventions/Procedures  Outcome: Progressing Towards Goal  Goal: Minimize risk of bleeding post-thrombolytic infusion  Outcome: Progressing Towards Goal  Goal: Monitor for complications post-thrombolytic infusion  Outcome: Progressing Towards Goal  Goal: Psychosocial  Outcome: Progressing Towards Goal  Goal: *Hemodynamically stable  Outcome: Progressing Towards Goal  Goal: *Neurologically stable  Description: Absence of additional neurological deficits    Outcome: Progressing Towards Goal  Goal: *Verbalizes anxiety and depression are reduced or absent  Outcome: Progressing Towards Goal  Goal: *Absence of Signs of Aspiration on Current Diet  Outcome: Progressing Towards Goal  Goal: *Absence of deep venous thrombosis signs and symptoms(Stroke Metric)  Outcome: Progressing Towards Goal  Goal: *Ability to perform ADLs and demonstrates progressive mobility and function  Outcome: Progressing Towards Goal  Goal: *Stroke education started(Stroke Metric)  Outcome: Progressing Towards Goal  Goal: *Dysphagia screen performed(Stroke Metric)  Outcome: Progressing Towards Goal  Goal: *Rehab consulted(Stroke Metric)  Outcome: Progressing Towards Goal     Problem: TIA/CVA Stroke: 0-24 hours  Goal: Off Pathway (Use only if patient is Off Pathway)  Outcome: Progressing Towards Goal  Goal: Activity/Safety  Outcome: Progressing Towards Goal  Goal: Nutrition/Diet  Outcome: Progressing Towards Goal  Goal: Discharge Planning  Outcome: Progressing Towards Goal  Goal: Medications  Outcome: Progressing Towards Goal  Goal: Respiratory  Outcome: Progressing Towards Goal  Goal: Treatments/Interventions/Procedures  Outcome: Progressing Towards Goal  Goal: Minimize risk of bleeding post-thrombolytic infusion  Outcome: Progressing Towards Goal  Goal: Monitor for complications post-thrombolytic infusion  Outcome: Progressing Towards Goal  Goal: Psychosocial  Outcome: Progressing Towards Goal  Goal: *Hemodynamically stable  Outcome: Progressing Towards Goal  Goal: *Neurologically stable  Description: Absence of additional neurological deficits    Outcome: Progressing Towards Goal  Goal: *Verbalizes anxiety and depression are reduced or absent  Outcome: Progressing Towards Goal  Goal: *Absence of Signs of Aspiration on Current Diet  Outcome: Progressing Towards Goal  Goal: *Absence of deep venous thrombosis signs and symptoms(Stroke Metric)  Outcome: Progressing Towards Goal  Goal: *Ability to perform ADLs and demonstrates progressive mobility and function  Outcome: Progressing Towards Goal  Goal: *Stroke education started(Stroke Metric)  Outcome: Progressing Towards Goal  Goal: *Dysphagia screen performed(Stroke Metric)  Outcome: Progressing Towards Goal  Goal: *Rehab consulted(Stroke Metric)  Outcome: Progressing Towards Goal     Problem: TIA/CVA Stroke: Day 2 Until Discharge  Goal: Off Pathway (Use only if patient is Off Pathway)  Outcome: Progressing Towards Goal  Goal: Activity/Safety  Outcome: Progressing Towards Goal  Goal: Diagnostic Test/Procedures  Outcome: Progressing Towards Goal  Goal: Nutrition/Diet  Outcome: Progressing Towards Goal  Goal: Discharge Planning  Outcome: Progressing Towards Goal  Goal: Medications  Outcome: Progressing Towards Goal  Goal: Respiratory  Outcome: Progressing Towards Goal  Goal: Treatments/Interventions/Procedures  Outcome: Progressing Towards Goal  Goal: Psychosocial  Outcome: Progressing Towards Goal  Goal: *Verbalizes anxiety and depression are reduced or absent  Outcome: Progressing Towards Goal  Goal: *Absence of aspiration  Outcome: Progressing Towards Goal  Goal: *Absence of deep venous thrombosis signs and symptoms(Stroke Metric)  Outcome: Progressing Towards Goal  Goal: *Optimal pain control at patient's stated goal  Outcome: Progressing Towards Goal  Goal: *Tolerating diet  Outcome: Progressing Towards Goal  Goal: *Ability to perform ADLs and demonstrates progressive mobility and function  Outcome: Progressing Towards Goal  Goal: *Stroke education continued(Stroke Metric)  Outcome: Progressing Towards Goal     Problem: Ischemic Stroke: Discharge Outcomes  Goal: *Verbalizes anxiety and depression are reduced or absent  Outcome: Progressing Towards Goal  Goal: *Verbalize understanding of risk factor modification(Stroke Metric)  Outcome: Progressing Towards Goal  Goal: *Hemodynamically stable  Outcome: Progressing Towards Goal  Goal: *Absence of aspiration pneumonia  Outcome: Progressing Towards Goal  Goal: *Aware of needed dietary changes  Outcome: Progressing Towards Goal  Goal: *Verbalize understanding of prescribed medications including anti-coagulants, anti-lipid, and/or anti-platelets(Stroke Metric)  Outcome: Progressing Towards Goal  Goal: *Tolerating diet  Outcome: Progressing Towards Goal  Goal: *Aware of follow-up diagnostics related to anticoagulants  Outcome: Progressing Towards Goal  Goal: *Ability to perform ADLs and demonstrates progressive mobility and function  Outcome: Progressing Towards Goal  Goal: *Absence of DVT(Stroke Metric)  Outcome: Progressing Towards Goal  Goal: *Absence of aspiration  Outcome: Progressing Towards Goal  Goal: *Optimal pain control at patient's stated goal  Outcome: Progressing Towards Goal  Goal: *Home safety concerns addressed  Outcome: Progressing Towards Goal  Goal: *Describes available resources and support systems  Outcome: Progressing Towards Goal  Goal: *Verbalizes understanding of activation of EMS(911) for stroke symptoms(Stroke Metric)  Outcome: Progressing Towards Goal  Goal: *Understands and describes signs and symptoms to report to providers(Stroke Metric)  Outcome: Progressing Towards Goal  Goal: *Neurolgocially stable (absence of additional neurological deficits)  Outcome: Progressing Towards Goal  Goal: *Verbalizes importance of follow-up with primary care physician(Stroke Metric)  Outcome: Progressing Towards Goal  Goal: *Smoking cessation discussed,if applicable(Stroke Metric)  Outcome: Progressing Towards Goal  Goal: *Depression screening completed(Stroke Metric)  Outcome: Progressing Towards Goal

## 2021-04-28 NOTE — PROGRESS NOTES
Transition of Care Plan   RUR- 23% Medium Risk   DISPOSITION: The disposition plan is to transition to SNF- Pending review.  Transport: LI CORREIA received a call from Ga Forbes coordinator with [facility] [place] [facility] and reports that she will come and complete a bedside with patient today. CM will continue to follow, provide support and assist with JACQUES needs as they arise.     Taylor Oliveros

## 2021-04-28 NOTE — PROGRESS NOTES
Problem: Self Care Deficits Care Plan (Adult)  Goal: *Acute Goals and Plan of Care (Insert Text)  Description:   FUNCTIONAL STATUS PRIOR TO ADMISSION: Patient was independent with ADLs and functional mobility, ambulatory with occasional SPC use. Enjoys watching sports and gardening. HOME SUPPORT: Patient lived with his wife but did not require assistance. Occupational Therapy Goals  Initiated 4/27/2021  1. Patient will perform grooming seated EOB with minimal assistance within 7 day(s). 2.  Patient will perform bathing with moderate assistance  within 7 day(s). 3.  Patient will perform upper body dressing with minimal assistance within 7 day(s). 4.  Patient will perform toilet transfers with moderate assistance  within 7 day(s). 5.  Patient will perform all aspects of toileting with moderate assistance  within 7 day(s). 6.  Patient will participate in upper extremity therapeutic exercise/activities with minimal assistance for 10 minutes within 7 day(s). Outcome: Progressing Towards Goal    OCCUPATIONAL THERAPY TREATMENT  Patient: Rondel Dubin (3 y.o. male)  Date: 4/28/2021  Diagnosis: Intracranial hemorrhage (HCC) [I62.9] <principal problem not specified>       Precautions:  fall  Chart, occupational therapy assessment, plan of care, and goals were reviewed. ASSESSMENT  Patient continues with skilled OT services and is progressing towards goals but remains limited by impaired cognition (A& Ox1, followed most simple commands, impulsive, decreased attention to task, limited insight),  impaired cardiopulmonary tolerance, poor sitting and standing balance, and generally decreased AROM/ strength/ coordination s/p admission for IVH without surgical intervention. Patient with similar performance as yesterday, although slightly more interactive/ engaged but also required more tactile redirection d/t fidgety behavior/ reaching.   Patient mobilized to EOB, requiring near constant correction for posterior and L lean but showed improvement and progressed to standing and side-stepping. He is significantly below functional baseline and would benefit from SNF at d/c. Current Level of Function Impacting Discharge (ADLs/self-care): maximum assistance x2 supine<>sit, moderate assistance x2 sit<>stand, infer maximum to total assistance ADLs. PLAN :  Patient continues to benefit from skilled intervention to address the above impairments. Continue treatment per established plan of care to address goals. Recommendation for discharge: (in order for the patient to meet his/her long term goals)  Therapy up to 5 days/week in SNF setting    This discharge recommendation:  Has been made in collaboration with the attending provider and/or case management         SUBJECTIVE:   Patient stated I feel great.     OBJECTIVE DATA SUMMARY:   Cognitive/Behavioral Status:  Neurologic State: Alert;Confused  Orientation Level: Disoriented to place; Disoriented to situation;Disoriented to time;Oriented to person  Cognition: Decreased command following     Perseveration: No perseveration noted       Functional Mobility and Transfers for ADLs:  Bed Mobility:  Supine to Sit: Assist x2;Maximum assistance  Sit to Supine: Assist x2;Maximum assistance  Scooting: Maximum assistance    Transfers:  Sit to Stand: Assist x2; Moderate assistance(EOB high)          Balance:  Sitting: Impaired; With support  Sitting - Static: Poor (constant support)  Sitting - Dynamic: Poor (constant support)  Standing: Impaired;Pull to stand  Standing - Static: Poor  Standing - Dynamic : Poor    Pain:  Patient did not report pain    Activity Tolerance:   VSS, fair    After treatment patient left in no apparent distress:   Supine in bed, Call bell within reach, Bed / chair alarm activated, Caregiver / family present, and Restraints    COMMUNICATION/COLLABORATION:   The patients plan of care was discussed with: Physical therapist and Registered nurse. Kenney Richey OT  Time Calculation: 23 mins

## 2021-04-28 NOTE — PROGRESS NOTES
Problem: Mobility Impaired (Adult and Pediatric)  Goal: *Acute Goals and Plan of Care (Insert Text)  Description:   FUNCTIONAL STATUS PRIOR TO ADMISSION: Patient was independent and active without use of DME.    HOME SUPPORT PRIOR TO ADMISSION: The patient lived with his spouse but did not require assist.    Physical Therapy Goals  Initiated 4/27/2021  1. Patient will move from supine to sit and sit to supine , scoot up and down, and roll side to side in bed with moderate assistance  within 7 day(s). 2.  Patient will transfer from bed to chair and chair to bed with moderate assistance  using the least restrictive device within 7 day(s). 3.  Patient will perform sit to stand with moderate assistance  within 7 day(s). 4.  Patient will ambulate with moderate assistance  for 10 feet with the least restrictive device within 7 day(s). 5.  Patient will demo fair sitting balance x2 min at EOB in prep for mobility progression within 7 days. 6.  Patient will improve Copeland Balance score by 7 points within 7 days. Outcome: Progressing Towards Goal     PHYSICAL THERAPY TREATMENT  Patient: Tommie Lua (11 y.o. male)  Date: 4/28/2021  Diagnosis: Intracranial hemorrhage (HCC) [I62.9] <principal problem not specified>       Precautions:    Chart, physical therapy assessment, plan of care and goals were reviewed. ASSESSMENT  Patient continues with skilled PT services and is slowly progressing towards goals - remains most limited by impaired cognition (attention to task, command following, impulsive, A&O x1, etc), impaired sitting balance, global weakness, impaired gait, and decreased tolerance to sustained activity leading to increased dependency and falls risk from baseline level. Pt is very [place] and easily distracted - multiple family members present and FaceTime family call as well increased overall distractibility today. Received pt in restraints, wide awake and extremely fidgety.  Able to coherently state a few phrases appropriately during session. Continues to required max A x2 for bed mobility, scooting and repositioning towards EOB. In sitting, worked on attaining/maintaining midline postural control - required near constant assist to prevent from posterior and L lateral leans - tends to even retro push at times. Poor initiation of righting reactions. Progressed to completing sit<>stands x2 reps with mod A x2 (elevated bed surface) and taking several side steps along EOB with cues/assist for lateral weight shift and L LE >R LE advancement. Assisted back to bed at end of session and transitioned to modified chair position. RN notified of need for brief change. Provided education on chair position and lights on during the day to assist with sleep/wake cycle regulation and ?delirium. Answered questions to satisfaction. Recommending SNF once medically cleared. Will follow. Current Level of Function Impacting Discharge (mobility/balance): max A x2; poor command following; very [place] and easily distracted    Other factors to consider for discharge: far below baseline level         PLAN :  Patient continues to benefit from skilled intervention to address the above impairments. Continue treatment per established plan of care. to address goals. Recommendation for discharge: (in order for the patient to meet his/her long term goals)  Therapy up to 5 days/week in SNF setting    This discharge recommendation:  A follow-up discussion with the attending provider and/or case management is planned    IF patient discharges home will need the following DME: to be determined (TBD)       SUBJECTIVE:   Patient stated i'm doing good.     OBJECTIVE DATA SUMMARY:   Critical Behavior:  Neurologic State: Alert, Confused  Orientation Level: Disoriented to place, Disoriented to situation, Disoriented to time, Oriented to person  Cognition: Decreased command following  Safety/Judgement: Decreased insight into deficits, Decreased awareness of need for safety  Functional Mobility Training:  Bed Mobility:  Supine to Sit: Assist x2;Maximum assistance  Sit to Supine: Assist x2;Maximum assistance  Scooting: Maximum assistance      Transfers:  Sit to Stand: Assist x2; Moderate assistance(EOB high)  Stand to Sit: Assist x2; Moderate assistance     Balance:  Sitting: Impaired; With support  Sitting - Static: Poor (constant support)  Sitting - Dynamic: Poor (constant support)  Standing: Impaired;Pull to stand  Standing - Static: Poor  Standing - Dynamic : Poor  Ambulation/Gait Training:  Distance (ft): 4 Feet (ft)  Ambulation - Level of Assistance: Assist x2; Moderate assistance  Gait Abnormalities: Decreased step clearance; Altered arm swing;Shuffling gait;Trunk sway increased  Base of Support: Center of gravity altered  Speed/[place]everly: Slow;Shuffled  Step Length: Right shortened;Left shortened    Activity Tolerance:   Fair and requires frequent rest breaks    After treatment patient left in no apparent distress:   Supine in bed, Call bell within reach, Bed / chair alarm activated, Caregiver / family present, Side rails x 3, and Restraints    COMMUNICATION/COLLABORATION:   The patients plan of care was discussed with: Occupational therapist and Registered nurse.        Aime Henderson, PT, DPT   Time Calculation: 23 mins

## 2021-04-28 NOTE — PROGRESS NOTES
Problem: Dysphagia (Adult)  Goal: *Acute Goals and Plan of Care (Insert Text)  Description: Initiated 4/28/2021  1. Patient will participate in reassessment of swallow function within 7 days. Outcome: Not Met   SPEECH LANGUAGE PATHOLOGY BEDSIDE SWALLOW EVALUATION  Patient: Carole Malin (58 y.o. male)  Date: 4/28/2021  Primary Diagnosis: Intracranial hemorrhage (HCC) [I62.9]        Precautions: fall       ASSESSMENT :  Based on the objective data described below, the patient presents with functional oral and suspected moderate to severe pharyngeal dysphagia. He appropriately accepts a bolus from a spoon and straw, adequate mastication and oral clearance, no anterior loss. Patient with overt coughing frequently with thin liquids and intermittently with puree as well. He appears to be at high risk for aspiration currently. The patient is very [address] He had difficulty following commands, likely due to both [address] and confusion. Hearing aids placed for assessment, which seemed to help slightly. Patient will benefit from skilled intervention to address the above impairments. Patients rehabilitation potential is considered to be Fair     PLAN :  Recommendations and Planned Interventions:  NPO except for ice chips for oral integrity and prevention of disuse atrophy  SLP to follow  Frequency/Duration: Patient will be followed by speech-language pathology 3 times a week to address goals. Discharge Recommendations: To Be Determined     SUBJECTIVE:   Patient stated Get me up! .     OBJECTIVE:     Past Medical History:   Diagnosis Date    Adverse effect of anesthesia     combative after anesthesia    Alcohol abuse     6 beers/day    Arthritis     CAD (coronary artery disease)     Chronic obstructive pulmonary disease (HCC)     Chronic obstructive pulmonary disease (HCC)     CKD (chronic kidney disease) stage 3, GFR 30-59 ml/min ([address] 9/21/2020    Dyslipidemia 8/16/2017    Dyspepsia and other specified disorders of function of stomach     Dyspnea 8/16/2017    ED (erectile dysfunction) 8/16/2017    Encounter for immunization 8/16/2017    Fatigue 8/16/2017    Flank pain 8/1/2019    GERD (gastroesophageal reflux disease) 8/16/2017    Gout 8/16/2017    Hypertension     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    Morbid obesity ([facility] [address]     On statin therapy 8/16/2017    TESSA on CPAP 1/3/2019    Psychiatric disorder     Depression    Simple chronic bronchitis ([facility] [address] 1/3/2019    TIA (transient ischemic attack) 3/25/8818    Umbilical hernia 99/5/3589    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     Past Surgical History:   Procedure Laterality Date    COLONOSCOPY N/A 9/27/2019    COLONOSCOPY performed by Todd Juarez MD at [facility] ENDOSCOPY     [facility] [place]  94/19/26    open umbilical hernia repair mesh    HX ORTHOPAEDIC      HX UROLOGICAL      HYDROCELECTOMY    [facility] CARDIAC SURG PROCEDURE UNLIST  1996    Cardiac Stents    [facility] CARDIAC SURG PROCEDURE UNLIST  04/2019    EF 61-65%    UPPER GI ENDOSCOPY,BIOPSY  9/30/2019          Prior Level of Function/Home Situation:    Home Situation  Home Environment: Private residence  # Steps to Enter: 1  One/Two Story Residence: One story  Living Alone: No  Support Systems: Spouse/Significant Other/Partner  Current DME Used/Available at Home: Cane, straight  Diet prior to admission: patient is unable to provide history.  Suspect Regular with thins  Current Diet:  NPO with NGT   Cognitive and Communication Status:  Neurologic State: Alert, Confused  Orientation Level: Disoriented to place, Disoriented to situation, Disoriented to time, Oriented to person  Cognition: Decreased command following  Perception: Appears intact  Perseveration: No perseveration noted  Safety/Judgement: Decreased insight into deficits, Decreased awareness of need for safety  Oral Assessment:  Oral Assessment  Labial: No impairment  Dentition: Intact  Oral Hygiene: dry  Lingual: No impairment  Mandible: No impairment  P.O. Trials:  Patient Position: upright in bed  Vocal quality prior to P.O.: No impairment  Consistency Presented: Ice chips; Thin liquid;Puree  How Presented: SLP-fed/presented;Spoon;Straw     Bolus Acceptance: No impairment  Bolus Formation/Control: No impairment     Propulsion: No impairment  Oral Residue: None        Aspiration Signs/Symptoms: Strong cough                Oral Phase Severity: No impairment  Pharyngeal Phase Severity : Moderate-severe    NOMS:   The NOMS functional outcome measure was used to quantify this patient's level of swallowing impairment. Based on the NOMS, the patient was determined to be at level 2 for swallow function       NOMS Swallowing Levels:  Level 1 (CN): NPO  Level 2 (CM): NPO but takes consistency in therapy  Level 3 (CL): Takes less than 50% of nutrition p.o. and continues with nonoral feedings; and/or safe with mod cues; and/or max diet restriction  Level 4 (CK): Safe swallow but needs mod cues; and/or mod diet restriction; and/or still requires some nonoral feeding/supplements  Level 5 (CJ): Safe swallow with min diet restriction; and/or needs min cues  Level 6 (CI): Independent with p.o.; rare cues; usually self cues; may need to avoid some foods or needs extra time  Level 7 ([address] [place] [address]): Independent for all p.o.  BARTOLO. (2003). National Outcomes Measurement System (NOMS): Adult Speech-Language Pathology User's Guide. Pain:  Pain Scale 1: Numeric (0 - 10)  Pain Intensity 1: 0       After treatment:   Patient left in no apparent distress in bed, Call bell within reach, Nursing notified, and Bed / chair alarm activated    COMMUNICATION/EDUCATION:   Patient was educated regarding purpose of SLP visit. He did not seem to understand, but did participate. The patient's plan of care including recommendations, planned interventions, and recommended diet changes were discussed with: Registered nurse.      Patient is unable to participate in goal setting and plan of care.    Thank you for this referral.  Alex Desai, SLP  Time Calculation: 20 mins

## 2021-04-28 NOTE — PROGRESS NOTES
Problem: Non-Violent Restraints  Goal: Removal from restraints as soon as assessed to be safe  Outcome: Progressing Towards Goal  Goal: No harm/injury to patient while restraints in use  Outcome: Progressing Towards Goal  Goal: Patient's dignity will be maintained  Outcome: Progressing Towards Goal     Problem: Falls - Risk of  Goal: *Absence of Falls  Description: Document [place] Fall Risk and appropriate interventions in the flowsheet. Outcome: Progressing Towards Goal  Note: Fall Risk Interventions:       Mentation Interventions: Adequate sleep, hydration, pain control, Bed/chair exit alarm, Door open when patient unattended    Medication Interventions: Bed/chair exit alarm, Evaluate medications/consider consulting pharmacy, Patient to call before getting OOB    Elimination Interventions: Bed/chair exit alarm, Call light in reach, Toileting schedule/hourly rounds    History of Falls Interventions: Bed/chair exit alarm, Consult care management for discharge planning, Door open when patient unattended, Evaluate medications/consider consulting pharmacy, Room close to nurse's station         Problem: Pressure Injury - Risk of  Goal: *Prevention of pressure injury  Description: Document Aquiles Scale and appropriate interventions in the flowsheet.   Outcome: Progressing Towards Goal  Note: Pressure Injury Interventions:  Sensory Interventions: Assess changes in LOC, Assess need for specialty bed, Check visual cues for pain, Discuss PT/OT consult with provider    Moisture Interventions: Apply protective barrier, creams and emollients, Assess need for specialty bed, Check for incontinence Q2 hours and as needed, Internal/External urinary devices    Activity Interventions: Increase time out of bed, Pressure redistribution bed/mattress(bed type), PT/OT evaluation    Mobility Interventions: HOB 30 degrees or less, Pressure redistribution bed/mattress (bed type), PT/OT evaluation    Nutrition Interventions: Document food/fluid/supplement intake, Discuss nutritional consult with provider    Friction and Shear Interventions: Apply protective barrier, creams and emollients, HOB 30 degrees or less, Lift sheet                Problem: TIA/CVA Stroke: Day 2 Until Discharge  Goal: Activity/Safety  Outcome: Progressing Towards Goal  Goal: Diagnostic Test/Procedures  Outcome: Progressing Towards Goal  Goal: Nutrition/Diet  Outcome: Progressing Towards Goal  Goal: *Absence of aspiration  Outcome: Progressing Towards Goal

## 2021-04-28 NOTE — PROGRESS NOTES
[facility] Adult  Hospitalist Group                                                                                          Hospitalist Progress Note  Alexsandra Escobedo,   Answering service: [phone] from in house phone        Date of Service:  2021  NAME:  Abraham Hernandez  :  1934  MRN:  [id number]      Admission Summary:   55-year-old male with history of coronary disease-on aspirin, COPD, chronic insufficiency hyperlipidemia hypertension who has history of prior viral encephalitis was admitted with headache and nausea with vomiting. The CT scan on presentation showed a right intraparenchymal hemorrhage adjacent to the lateral ventricle. Patient was in the ICU, but followed by neurosurgery, there was no surgical recommendations for EVD but was followed with neuro checks every 2 hours. Cardene drip is required for blood pressure control, patient also became encephalopathic due to multiple etiologies. CT scan done yesterday for worsening of confusion shows improvement in intraventricular hemorrhage. Patient became febrile, sputum culture grew MSSA and patient was started on cefazolin. She is currently medically stable, neurochecks have been changed every 4 hours and critical care team recommends downgrading to neuro telemetry/neuro intermediate care bed. Interval history / Subjective: Follow up intraventricular hemorrhage. Patient seen and examined. Alert to name. Overnight, patient with fever. Agitated and pulled out CVL. Now in restraints. Was called to bedside yesterday evening by family and concerns for new onset confusion. Discussed hospital delirium and new stroke. CT head ordered and mild improvement.       Assessment & Plan:     Intraventricular hemorrhage with mild ventriculomegaly  -Neurosurgery on board, no acute surgical intervention  -Repeat CT head shows some improvement in the hemorrhage  -Neurosurgery recommends less sedation, neuro checks every 4 hours to help decrease delirium and promote sleep    SIRS (fevers, tachycardia, tachypnea): 4/26  -unclear source, possible central. Will need to rule out other causes- d-dimer and procalcitonin pending. Consider lower extremity dopplers pending d-dimer  -blood, urine cultures, lactic acid, CXR unremarkable thus far   -consider discontinuing antibiotics if work up negative      Acute metabolic encephalopathy, multifactorial: intermittent  -IVH/hospital delirium with possible component of SIRs  -Supportive care. Restraints only as needed   -Continue Seroquel at bedtime  -Neurochecks every 4 hours  -reorient days and nights     Dysphagia:   -repeat speech evaluation   -continue tube feeds, benefiber added for loose stools    Acute hypoxic respiratory failure: resolving   -concern for TESSA, will continue supplemental oxygen when sleeping   -previously requiring high flow nasal cannula  -aa nebs BID and prn     Hypertension:   -continue hydralazine, atenolol, amlodipine  -goal of SBP is to keep less than 160 mmHg     History of alcohol abuse  -family reports prior history but minimal alcohol intake prior to admission   -Out of withdrawal window     MSSA bronchitis  -Sputum culture grows MSSA      Code status: full   DVT prophylaxis: [address] discussed with: Patient/Family  Anticipated Disposition: SNF/LTC  Anticipated Discharge: Greater than 48 hours     Hospital Problems  Date Reviewed: 9/21/2020          Codes Class Noted POA    Intracranial hemorrhage ([facility] [address] ICD-10-CM: I62.9  ICD-9-CM: 432.9  4/18/2021 Unknown                Review of Systems:   Negative unless stated above       Vital Signs:    Last 24hrs VS reviewed since prior progress note.  Most recent are:  Visit Vitals  BP (!) 189/78 (BP 1 Location: Left upper arm, BP Patient Position: At rest)   Pulse 90   Temp 98.3 °F (36.8 °C)   Resp 22   Ht 5' 8\" (1.727 m)   Wt 101.3 kg (223 lb 5.2 oz)   SpO2 95%   BMI 33.96 kg/m²         Intake/Output Summary (Last 24 hours) at 4/28/2021 1144  Last data filed at 4/28/2021 0800  Gross per 24 hour   Intake 1000 ml   Output 0 ml   Net 1000 ml        Physical Examination:     I had a face to face encounter with this patient and independently examined them on 4/28/2021 as outlined below:          Constitutional:  No acute distress, awake and intermittently conversant    ENT:  Oral mucosa moist, oropharynx benign. Resp:  CTA bilaterally. Minimal expiratory wheeze. No accessory muscle use   CV:  RRR, no murmurs, gallops, rubs    GI:  Soft, obese, non distended, non tender. hyperactive bowel sounds, no hepatosplenomegaly     Musculoskeletal:  No edema, warm, 2+ pulses throughout    Neurologic:  Moves all extremities. AAOx1, can intermittently follow commands- limited by hearing            Data Review:    Review and/or order of clinical lab test  Review and/or order of tests in the radiology section of CPT  Review and/or order of tests in the medicine section of CPT      Labs:     Recent Labs     04/28/21  0232 04/27/21  0143   WBC 11.8* 10.2   HGB 11.6* 11.0*   HCT 36.0* 35.5*    206     Recent Labs     04/28/21  0232 04/27/21  0143 04/26/21  1132 04/26/21  0218   * 146*  --  147*   K 4.0 4.1  --  4.2   * 114*  --  114*   CO2 28 29  --  29   BUN 33* 39*  --  41*   CREA 1.47* 1.56*  --  1.63*   * 154*  --  140*   CA 8.8 8.4*  --  9.0   MG  --   --  2.8* 2.8*   PHOS  --   --   --  2.4*     No results for input(s): ALT, AP, TBIL, TBILI, TP, ALB, GLOB, GGT, AML, LPSE in the last 72 hours. No lab exists for component: SGOT, GPT, AMYP, HLPSE  No results for input(s): INR, PTP, APTT, INREXT, INREXT in the last 72 hours. No results for input(s): FE, TIBC, PSAT, FERR in the last 72 hours.    No results found for: FOL, RBCF   Recent Labs     04/26/21  0846   PH 7.49*   PCO2 36   PO2 213*     Recent Labs     04/27/21  0143 04/26/21 2017 04/26/21  1132   TROIQ 0.09* 0.11* 0.11*     Lab Results   Component Value Date/Time    Cholesterol, total 144 04/19/2021 12:59 AM    HDL Cholesterol 33 04/19/2021 12:59 AM    LDL, calculated 74.8 04/19/2021 12:59 AM    Triglyceride 181 (H) 04/19/2021 12:59 AM    CHOL/HDL Ratio 4.4 04/19/2021 12:59 AM     Lab Results   Component Value Date/Time    Glucose (POC) 138 (H) 04/28/2021 05:57 AM    Glucose (POC) 125 (H) 04/28/2021 12:26 AM    Glucose (POC) 157 (H) 04/27/2021 06:54 PM    Glucose (POC) 126 (H) 04/27/2021 01:20 PM    Glucose (POC) 122 (H) 04/27/2021 06:25 AM     Lab Results   Component Value Date/Time    Color YELLOW/STRAW 04/27/2021 06:27 AM    Appearance CLEAR 04/27/2021 06:27 AM    Specific gravity 1.022 04/27/2021 06:27 AM    Specific gravity 1.015 09/21/2020 09:13 AM    pH (UA) 5.0 04/27/2021 06:27 AM    Protein 30 (A) 04/27/2021 06:27 AM    Glucose Negative 04/27/2021 06:27 AM    Ketone Negative 04/27/2021 06:27 AM    Bilirubin Negative 04/27/2021 06:27 AM    Urobilinogen 0.2 04/27/2021 06:27 AM    Nitrites Negative 04/27/2021 06:27 AM    Leukocyte Esterase Negative 04/27/2021 06:27 AM    Epithelial cells FEW 04/27/2021 06:27 AM    Bacteria Negative 04/27/2021 06:27 AM    WBC 0-4 04/27/2021 06:27 AM    RBC 0-5 04/27/2021 06:27 AM         Medications Reviewed:     Current Facility-Administered Medications   Medication Dose Route Frequency    amLODIPine (NORVASC) tablet 5 mg  5 mg Oral DAILY    piperacillin-tazobactam (ZOSYN) 3.375 g in 0.9% sodium chloride (MBP/ADV) 100 mL MBP  3.375 g IntraVENous Q8H    Vancomycin - pharmacy to dose   Other Rx Dosing/Monitoring    DULoxetine (CYMBALTA) capsule 30 mg  30 mg Oral DAILY    vancomycin (VANCOCIN) 1250 mg in  ml infusion  1,250 mg IntraVENous DAILY    miconazole (MICOTIN) 2 % powder   Topical PRN    guaiFENesin-dextromethorphan (ROBITUSSIN DM) 100-10 mg/5 mL syrup 10 mL  10 mL Oral Q6H    albuterol (PROVENTIL VENTOLIN) nebulizer solution 2.5 mg  2.5 mg Nebulization Q6H PRN    guar gum (BENEFIBER) packet 1 Packet  4 g Oral TID    atenoloL (TENORMIN) tablet 50 mg  50 mg Per NG tube DAILY    QUEtiapine (SEROquel) tablet 100 mg  100 mg Per NG tube QHS    hydrALAZINE (APRESOLINE) tablet 50 mg  50 mg Per NG tube Q8H    docusate (COLACE) 50 mg/5 mL oral liquid 100 mg  100 mg Per NG tube DAILY    lansoprazole (PREVACID) 3 mg/mL oral suspension 30 mg  30 mg Per NG tube ACB    sodium chloride (NS) flush 5-40 mL  5-40 mL IntraVENous Q8H    sodium chloride (NS) flush 5-40 mL  5-40 mL IntraVENous PRN    acetaminophen (TYLENOL) tablet 650 mg  650 mg Oral Q6H PRN    Or    acetaminophen (TYLENOL) suppository 650 mg  650 mg Rectal Q6H PRN    polyethylene glycol (MIRALAX) packet 17 g  17 g Oral DAILY PRN    promethazine (PHENERGAN) tablet 12.5 mg  12.5 mg Oral Q6H PRN    Or    ondansetron (ZOFRAN) injection 4 mg  4 mg IntraVENous Q6H PRN    glucose chewable tablet 16 g  4 Tab Oral PRN    dextrose (D50W) injection syrg 12.5-25 g  25-50 mL IntraVENous PRN    glucagon (GLUCAGEN) injection 1 mg  1 mg IntraMUSCular PRN    insulin lispro (HUMALOG) injection   SubCUTAneous Q6H    hydrALAZINE (APRESOLINE) 20 mg/mL injection 10 mg  10 mg IntraVENous Q4H PRN    labetaloL (NORMODYNE;TRANDATE) injection 10 mg  10 mg IntraVENous Q4H PRN     ______________________________________________________________________  EXPECTED LENGTH OF STAY: 4d 9h  ACTUAL LENGTH OF STAY:          [id number]ES[address] Brar DO

## 2021-04-28 NOTE — PROGRESS NOTES
NUTRITION NOTe- tube feeding adjustment    Recommendations:   1. Stop TFs of Osmolite 1.5.    2. Modify TF's:  Day 1 transition: Start Vital 1.5 @ 40 ml/hr x 14 hrs (560 ml total TF volume) from 20:00-10:00. Free water flushes 160 ml Q 4 hrs around the clock  Day 2: Vital 1.5 @ 65 ml/hr x 14 hrs (910 ml total TF volume) from 20:00-10:00. Free water flushes 160 ml Q 4 hrs around the clock. Day 3 (goal): Vital 1.5 @ 86 ml/hr x 14 hrs (1200 ml total TF volume) from 20:00-10:00. Free water flushes 160 ml Q 4 hrs around the clock. .   TFs at goal provides 1800 kcal, 81 g protein, 7.2 g dietary fiber, 224 g CHO, with 1877 ml free water, and 2160 ml total volume    3. Continue Benefiber TID. Diet: NPO  Supplements/Nutrition Support: Osmolite 1.5 @ 60 ml/hr+ 2 pkts prosource/day, with 160 ml free water flush Q 4 hrs  Nutrition-related meds: Colace (held), Benefiber TID, started IV abx 4/27    Discussed on ID rounds. RN reports ongoing issues re: diarrhea. Pt has had Benefiber added TID, with no improvement. SLP unable to assess pt yesterday given pt mentation. Given no current improvement with 9 g soluble fiber added, will modify TFs to peptide-based, hydrolyzed formula to promote GI tolerance and absorption, with rec to continue Benefiber TID. Will also adjust TF to run 14 hrs to allow time off the pump. Na trends again noted - will follow and adjust free water as needed once new regimen established.      Recent Labs     04/28/21  0232 04/27/21  0143 04/26/21  1132 04/26/21  0218   * 154*  --  140*   BUN 33* 39*  --  41*   CREA 1.47* 1.56*  --  1.63*   * 146*  --  147*   K 4.0 4.1  --  4.2   * 114*  --  114*   CO2 28 29  --  29   CA 8.8 8.4*  --  9.0   PHOS  --   --   --  2.4*   MG  --   --  2.8* 2.8*     Recent Labs     04/28/21  0557 04/28/21  0026 04/27/21  1854 04/27/21  1320 04/27/21  5938 04/27/21  0115 04/26/21  1902 04/26/21  1303 04/26/21  0527 04/26/21  0042 04/25/21  1838 04/25/21  1132   GLUCPOC 138* 125* 157* 126* 122* 117* 159* 138* 136* 169* 128* 142*         See full RD assessment from 4/27 for additional details, goals, and monitoring/evaluation.    Estimated Nutrition Needs:   Energy: 2035 (MSJ x 1.2)  Wt used: Current(100 kg)  Protein: 105-119 (1.5-1.7g/kg IBW)  Wt used: Ideal(70 kg)   Fluid: 2035 ml     Tim Braga, KAREN    Contact via [employer]

## 2021-04-28 NOTE — PROGRESS NOTES
[id number] RN in room responding to bed alarm. Pt pulled out Right IJ central line, blood on sheets, central line catheter on the floor. Pt very restless and confused, difficult to redirect. Rick Flores, WILBUR paged about order for restraints, told to place patient in restraints and call Dr. Pascual Bailey at 0700 for restraint order. Pt placed in bilat wrist restraints. Dr. Pascual Bailey paged. 1510 Order for bilateral wrist restraints received from Dr. Pascual Bailey. Daughter notified of patient's confusion, restlessness, pulling out IJ and being placed in restraints. Daughter is agreeable to the restraints. The daughter Waneta Minder) agreeable to PICC line if needed. Asked to call for consent if needed. Bedside shift change report given to Lesli Odell RN (oncoming nurse) by Kaylie Bishop RN (offgoing nurse). Report included the following information SBAR, Kardex, Intake/Output, MAR, Recent Results, Cardiac Rhythm NSR/SA/ST and Dual Neuro Assessment. I have read and agree with the documentation provided by Anderson Romero RN, as her preceptor.

## 2021-04-29 ENCOUNTER — APPOINTMENT (OUTPATIENT)
Dept: NUCLEAR MEDICINE | Age: 86
DRG: 064 | End: 2021-04-29
Attending: HOSPITALIST
Payer: MEDICARE

## 2021-04-29 ENCOUNTER — APPOINTMENT (OUTPATIENT)
Dept: GENERAL RADIOLOGY | Age: 86
DRG: 064 | End: 2021-04-29
Attending: HOSPITALIST
Payer: MEDICARE

## 2021-04-29 LAB
ANION GAP SERPL CALC-SCNC: 9 MMOL/L (ref 5–15)
BUN SERPL-MCNC: 32 MG/DL (ref 6–20)
BUN/CREAT SERPL: 21 (ref 12–20)
CALCIUM SERPL-MCNC: 8.4 MG/DL (ref 8.5–10.1)
CHLORIDE SERPL-SCNC: 114 MMOL/L (ref 97–108)
CO2 SERPL-SCNC: 26 MMOL/L (ref 21–32)
CREAT SERPL-MCNC: 1.51 MG/DL (ref 0.7–1.3)
ERYTHROCYTE [DISTWIDTH] IN BLOOD BY AUTOMATED COUNT: 15.3 % (ref 11.5–14.5)
GLUCOSE BLD STRIP.AUTO-MCNC: 135 MG/DL (ref 65–100)
GLUCOSE BLD STRIP.AUTO-MCNC: 150 MG/DL (ref 65–100)
GLUCOSE BLD STRIP.AUTO-MCNC: 160 MG/DL (ref 65–100)
GLUCOSE BLD STRIP.AUTO-MCNC: 167 MG/DL (ref 65–100)
GLUCOSE BLD STRIP.AUTO-MCNC: 170 MG/DL (ref 65–100)
GLUCOSE SERPL-MCNC: 123 MG/DL (ref 65–100)
HCT VFR BLD AUTO: 36.8 % (ref 36.6–50.3)
HGB BLD-MCNC: 11.7 G/DL (ref 12.1–17)
MAGNESIUM SERPL-MCNC: 2.8 MG/DL (ref 1.6–2.4)
MCH RBC QN AUTO: 30.8 PG (ref 26–34)
MCHC RBC AUTO-ENTMCNC: 31.8 G/DL (ref 30–36.5)
MCV RBC AUTO: 96.8 FL (ref 80–99)
NRBC # BLD: 0 K/UL (ref 0–0.01)
NRBC BLD-RTO: 0 PER 100 WBC
PHOSPHATE SERPL-MCNC: 3.2 MG/DL (ref 2.6–4.7)
PLATELET # BLD AUTO: 238 K/UL (ref 150–400)
PMV BLD AUTO: 11 FL (ref 8.9–12.9)
POTASSIUM SERPL-SCNC: 3.9 MMOL/L (ref 3.5–5.1)
RBC # BLD AUTO: 3.8 M/UL (ref 4.1–5.7)
SERVICE CMNT-IMP: ABNORMAL
SODIUM SERPL-SCNC: 149 MMOL/L (ref 136–145)
WBC # BLD AUTO: 9.5 K/UL (ref 4.1–11.1)

## 2021-04-29 PROCEDURE — 65660000001 HC RM ICU INTERMED STEPDOWN

## 2021-04-29 PROCEDURE — 85027 COMPLETE CBC AUTOMATED: CPT

## 2021-04-29 PROCEDURE — 83735 ASSAY OF MAGNESIUM: CPT

## 2021-04-29 PROCEDURE — 74011250637 HC RX REV CODE- 250/637: Performed by: HOSPITALIST

## 2021-04-29 PROCEDURE — 74011250637 HC RX REV CODE- 250/637: Performed by: ANESTHESIOLOGY

## 2021-04-29 PROCEDURE — 74011250637 HC RX REV CODE- 250/637: Performed by: STUDENT IN AN ORGANIZED HEALTH CARE EDUCATION/TRAINING PROGRAM

## 2021-04-29 PROCEDURE — 74011000258 HC RX REV CODE- 258: Performed by: INTERNAL MEDICINE

## 2021-04-29 PROCEDURE — 74011250636 HC RX REV CODE- 250/636: Performed by: INTERNAL MEDICINE

## 2021-04-29 PROCEDURE — 36415 COLL VENOUS BLD VENIPUNCTURE: CPT

## 2021-04-29 PROCEDURE — 74011250636 HC RX REV CODE- 250/636: Performed by: NURSE PRACTITIONER

## 2021-04-29 PROCEDURE — 80048 BASIC METABOLIC PNL TOTAL CA: CPT

## 2021-04-29 PROCEDURE — 84100 ASSAY OF PHOSPHORUS: CPT

## 2021-04-29 PROCEDURE — 74011000250 HC RX REV CODE- 250: Performed by: INTERNAL MEDICINE

## 2021-04-29 PROCEDURE — 74011000250 HC RX REV CODE- 250: Performed by: NURSE PRACTITIONER

## 2021-04-29 PROCEDURE — 74011250637 HC RX REV CODE- 250/637: Performed by: NURSE PRACTITIONER

## 2021-04-29 PROCEDURE — 74011636637 HC RX REV CODE- 636/637: Performed by: ANESTHESIOLOGY

## 2021-04-29 PROCEDURE — 74011250637 HC RX REV CODE- 250/637: Performed by: INTERNAL MEDICINE

## 2021-04-29 PROCEDURE — 82962 GLUCOSE BLOOD TEST: CPT

## 2021-04-29 PROCEDURE — 92526 ORAL FUNCTION THERAPY: CPT | Performed by: SPEECH-LANGUAGE PATHOLOGIST

## 2021-04-29 RX ORDER — ATENOLOL 50 MG/1
100 TABLET ORAL DAILY
Status: DISCONTINUED | OUTPATIENT
Start: 2021-04-30 | End: 2021-05-03 | Stop reason: HOSPADM

## 2021-04-29 RX ORDER — HYDRALAZINE HYDROCHLORIDE 50 MG/1
100 TABLET, FILM COATED ORAL EVERY 8 HOURS
Status: DISCONTINUED | OUTPATIENT
Start: 2021-04-29 | End: 2021-05-03 | Stop reason: HOSPADM

## 2021-04-29 RX ADMIN — Medication 1 PACKET: at 21:32

## 2021-04-29 RX ADMIN — LANSOPRAZOLE 30 MG: KIT at 09:02

## 2021-04-29 RX ADMIN — ACETAMINOPHEN 650 MG: 325 TABLET, FILM COATED ORAL at 06:12

## 2021-04-29 RX ADMIN — GUAIFENESIN AND DEXTROMETHORPHAN 10 ML: 100; 10 SYRUP ORAL at 05:59

## 2021-04-29 RX ADMIN — GUAIFENESIN AND DEXTROMETHORPHAN 10 ML: 100; 10 SYRUP ORAL at 01:52

## 2021-04-29 RX ADMIN — HYDRALAZINE HYDROCHLORIDE 10 MG: 20 INJECTION INTRAMUSCULAR; INTRAVENOUS at 10:00

## 2021-04-29 RX ADMIN — ATENOLOL 50 MG: 25 TABLET ORAL at 09:02

## 2021-04-29 RX ADMIN — QUETIAPINE FUMARATE 100 MG: 100 TABLET ORAL at 21:32

## 2021-04-29 RX ADMIN — INSULIN LISPRO 2 UNITS: 100 INJECTION, SOLUTION INTRAVENOUS; SUBCUTANEOUS at 12:37

## 2021-04-29 RX ADMIN — Medication 1 PACKET: at 17:16

## 2021-04-29 RX ADMIN — Medication 1 PACKET: at 09:02

## 2021-04-29 RX ADMIN — AMLODIPINE BESYLATE 5 MG: 5 TABLET ORAL at 09:02

## 2021-04-29 RX ADMIN — IPRATROPIUM BROMIDE AND ALBUTEROL SULFATE 3 ML: .5; 3 SOLUTION RESPIRATORY (INHALATION) at 22:11

## 2021-04-29 RX ADMIN — Medication 10 ML: at 14:00

## 2021-04-29 RX ADMIN — PIPERACILLIN AND TAZOBACTAM 3.38 G: 3; .375 INJECTION, POWDER, LYOPHILIZED, FOR SOLUTION INTRAVENOUS at 01:52

## 2021-04-29 RX ADMIN — ACETAMINOPHEN 650 MG: 325 TABLET, FILM COATED ORAL at 21:32

## 2021-04-29 RX ADMIN — PIPERACILLIN AND TAZOBACTAM 3.38 G: 3; .375 INJECTION, POWDER, LYOPHILIZED, FOR SOLUTION INTRAVENOUS at 09:02

## 2021-04-29 RX ADMIN — HYDRALAZINE HYDROCHLORIDE 100 MG: 50 TABLET, FILM COATED ORAL at 14:14

## 2021-04-29 RX ADMIN — GUAIFENESIN AND DEXTROMETHORPHAN 10 ML: 100; 10 SYRUP ORAL at 12:37

## 2021-04-29 RX ADMIN — IPRATROPIUM BROMIDE AND ALBUTEROL SULFATE 3 ML: .5; 3 SOLUTION RESPIRATORY (INHALATION) at 00:12

## 2021-04-29 RX ADMIN — INSULIN LISPRO 2 UNITS: 100 INJECTION, SOLUTION INTRAVENOUS; SUBCUTANEOUS at 06:12

## 2021-04-29 RX ADMIN — DULOXETINE HYDROCHLORIDE 30 MG: 30 CAPSULE, DELAYED RELEASE ORAL at 09:02

## 2021-04-29 RX ADMIN — GUAIFENESIN AND DEXTROMETHORPHAN 10 ML: 100; 10 SYRUP ORAL at 17:16

## 2021-04-29 RX ADMIN — LABETALOL HYDROCHLORIDE 10 MG: 5 INJECTION INTRAVENOUS at 17:16

## 2021-04-29 RX ADMIN — LABETALOL HYDROCHLORIDE 10 MG: 5 INJECTION INTRAVENOUS at 09:01

## 2021-04-29 RX ADMIN — Medication 10 ML: at 05:59

## 2021-04-29 RX ADMIN — HYDRALAZINE HYDROCHLORIDE 50 MG: 50 TABLET, FILM COATED ORAL at 05:58

## 2021-04-29 RX ADMIN — DOCUSATE SODIUM 100 MG: 50 LIQUID ORAL at 09:02

## 2021-04-29 RX ADMIN — HYDRALAZINE HYDROCHLORIDE 100 MG: 50 TABLET, FILM COATED ORAL at 21:32

## 2021-04-29 NOTE — PROGRESS NOTES
Bedside and Verbal shift change report given to Mere Argueta (oncoming nurse) by Collin (offgoing nurse). Report included the following information SBAR, Kardex, Procedure Summary, MAR, Recent Results, Cardiac Rhythm NSR and Dual Neuro Assessment.

## 2021-04-29 NOTE — PROGRESS NOTES
Daughter would like for the patient's swallowing to be retested today or tomorrow for possible PO intake.

## 2021-04-29 NOTE — PROGRESS NOTES
Physical Therapy 4/29/2021    Chart reviewed up to date. Pt awaiting duplex of BLE to r/o DVT. Will defer PT intervention and  follow-up as appropriate. Recommend with nursing patient to complete as able in order to maintain strength, endurance and independence: xhair position in bed 3x/day for 30-60 min with foot board in place and anti-slip socks. Thank you.   Jhonny Champagne, PT, DPT

## 2021-04-29 NOTE — PROGRESS NOTES
Problem: Dysphagia (Adult)  Goal: *Acute Goals and Plan of Care (Insert Text)  Description: Initiated 4/28/2021  1. Patient will participate in reassessment of swallow function within 7 days. Outcome: Progressing Towards Goal   SPEECH LANGUAGE PATHOLOGY DYSPHAGIA TREATMENT  Patient: Mohamud Borges (35 y.o. male)  Date: 4/29/2021  Diagnosis: Intracranial hemorrhage (HCC) [I62.9] <principal problem not specified>       Precautions: fall      ASSESSMENT:  Patient alert and calm throughout session. Adequate bolus acceptance from spoon and straw with no observed difficulty with oral manipulation. Noted cough x 1 after ice chips and again after successive sips of thins. Subtle throat clear intermittently. No overt s/s of aspiration with purees. Would benefit from MBS to determine safest diet. PLAN:  Recommendations and Planned Interventions:  Continue NPO with ice chips  Will benefit from MBS to determine safest diet  Following  Patient continues to benefit from skilled intervention to address the above impairments. Continue treatment per established plan of care. Discharge Recommendations: To Be Determined     SUBJECTIVE:   Patient stated Oh that's good. OBJECTIVE:   Cognitive and Communication Status:  Neurologic State: Alert  Orientation Level: Oriented to person, Disoriented to place, Disoriented to situation, Disoriented to time  Cognition: Decreased command following  Perception: Appears intact  Perseveration: No perseveration noted  Safety/Judgement: Decreased awareness of environment  Dysphagia Treatment:  Oral Assessment:  Oral Assessment  Dentition: Natural  Oral Hygiene: dry  P.O. Trials:  Patient Position: up in bed  Vocal quality prior to P.O.: No impairment  Consistency Presented: Ice chips; Thin liquid;Puree  How Presented: SLP-fed/presented;Spoon;Straw;Successive swallows     Bolus Acceptance: No impairment  Bolus Formation/Control: No impairment     Propulsion: No impairment  Oral Residue: None        Aspiration Signs/Symptoms: (cough x 1 after ice chips and again after successive gulps of water)                                                                                                                                                 Pain:  Pain Scale 1: Numeric (0 - 10)  Pain Intensity 1: 0       After treatment:   Patient left in no apparent distress in bed, Call bell within reach, and Nursing notified    COMMUNICATION/EDUCATION:   Patient was educated regarding purpose of SLP visit. The patient's plan of care including recommendations, planned interventions, and recommended diet changes were discussed with: Registered nurse.      Joaquina Marshall, SLP  Time Calculation: 20 mins

## 2021-04-29 NOTE — PROGRESS NOTES
Occupational Therapy: hold    Chart reviewed, noted patient with elevated d-dimer (3.72) with BLE duplex and pulmonary ventilation/ perfusion scan both ordered and pending. Will hold OT at this time and will f/u as able and appropriate.     Jah Ro, OTR/L

## 2021-04-29 NOTE — PROGRESS NOTES
Problem: Non-Violent Restraints  Goal: Removal from restraints as soon as assessed to be safe  Outcome: Progressing Towards Goal  Goal: No harm/injury to patient while restraints in use  Outcome: Progressing Towards Goal  Goal: Patient's dignity will be maintained  Outcome: Progressing Towards Goal  Goal: Patient Interventions  Outcome: Progressing Towards Goal     Problem: Falls - Risk of  Goal: *Absence of Falls  Description: Document Ruy Newberry Fall Risk and appropriate interventions in the flowsheet. Outcome: Progressing Towards Goal  Note: Fall Risk Interventions:       Mentation Interventions: Adequate sleep, hydration, pain control, Bed/chair exit alarm, Door open when patient unattended, Room close to nurse's station    Medication Interventions: Bed/chair exit alarm, Evaluate medications/consider consulting pharmacy    Elimination Interventions: Bed/chair exit alarm, Toileting schedule/hourly rounds    History of Falls Interventions: Bed/chair exit alarm, Door open when patient unattended, Investigate reason for fall, Room close to nurse's station         Problem: Pressure Injury - Risk of  Goal: *Prevention of pressure injury  Description: Document Aquiles Scale and appropriate interventions in the flowsheet. Outcome: Progressing Towards Goal  Note: Pressure Injury Interventions:  Sensory Interventions: Assess changes in LOC, Check visual cues for pain, Discuss PT/OT consult with provider, Float heels, Keep linens dry and wrinkle-free, Maintain/enhance activity level, Monitor skin under medical devices    Moisture Interventions: Absorbent underpads, Apply protective barrier, creams and emollients, Assess need for specialty bed, Check for incontinence Q2 hours and as needed, Maintain skin hydration (lotion/cream)    Activity Interventions: Increase time out of bed, PT/OT evaluation    Mobility Interventions: Pressure redistribution bed/mattress (bed type), PT/OT evaluation, Turn and reposition approx.  every two hours(pillow and wedges)    Nutrition Interventions: Document food/fluid/supplement intake    Friction and Shear Interventions: Apply protective barrier, creams and emollients, Lift sheet, Lift team/patient mobility team, Minimize layers                Problem: TIA/CVA Stroke: Day 2 Until Discharge  Goal: Activity/Safety  Outcome: Progressing Towards Goal  Goal: Diagnostic Test/Procedures  Outcome: Progressing Towards Goal  Goal: Nutrition/Diet  Outcome: Progressing Towards Goal  Goal: Discharge Planning  Outcome: Progressing Towards Goal  Goal: Medications  Outcome: Progressing Towards Goal  Goal: Respiratory  Outcome: Progressing Towards Goal  Goal: Treatments/Interventions/Procedures  Outcome: Progressing Towards Goal  Goal: Psychosocial  Outcome: Progressing Towards Goal  Goal: *Verbalizes anxiety and depression are reduced or absent  Outcome: Progressing Towards Goal  Goal: *Absence of aspiration  Outcome: Progressing Towards Goal  Goal: *Absence of deep venous thrombosis signs and symptoms(Stroke Metric)  Outcome: Progressing Towards Goal  Goal: *Optimal pain control at patient's stated goal  Outcome: Progressing Towards Goal  Goal: *Tolerating diet  Outcome: Progressing Towards Goal  Goal: *Ability to perform ADLs and demonstrates progressive mobility and function  Outcome: Progressing Towards Goal  Goal: *Stroke education continued(Stroke Metric)  Outcome: Progressing Towards Goal

## 2021-04-29 NOTE — PROGRESS NOTES
Transition of Care Plan   RUR- 21% Medium Risk   DISPOSITION: The disposition plan is to transition to SNF - pending review - Darline Rivera is following/patient has to be without restraints and NG Tube.  Transport: [facility]/family     CM met with [address] [place] [address] coordinator with [facility]. She completed a bedside assessment today. It is reported that patient needs to be without restraints and NG Tube for 24 hours. CM will continue to follow, provide support and assist with JACQUES needs as they arise.     Da Lord

## 2021-04-29 NOTE — PROGRESS NOTES
[facility]  Hospitalist Group                                                                                          Hospitalist Progress Note  Patty Marie MD  Answering service: [phone] OR [phone] from in house phone        Date of Service:  2021  NAME:  Patricia Ashraf  :  1934  MRN:  [id number]      Admission Summary:   51-year-old male with history of coronary disease-on aspirin, COPD, chronic insufficiency hyperlipidemia hypertension who has history of prior viral encephalitis was admitted with headache and nausea with vomiting. The CT scan on presentation showed a right intraparenchymal hemorrhage adjacent to the lateral ventricle. Patient was in the ICU, but followed by neurosurgery, there was no surgical recommendations for EVD but was followed with neuro checks every 2 hours. Cardene drip is required for blood pressure control, patient also became encephalopathic due to multiple etiologies. CT scan done yesterday for worsening of confusion shows improvement in intraventricular hemorrhage. Patient became febrile, sputum culture grew MSSA and patient was started on cefazolin. She is currently medically stable, neurochecks have been changed every 4 hours and critical care team recommends downgrading to neuro telemetry/neuro intermediate care bed. Interval history / Subjective: Follow up intraventricular hemorrhage. Patient seen and examined. Alert to name.    He has dobbhoff need to convert to PEG if fails SLP     Assessment & Plan:     Intraventricular hemorrhage with mild ventriculomegaly  -Neurosurgery on board, no acute surgical intervention  -Repeat CT head shows some improvement in the hemorrhage  -Neurosurgery recommends less sedation, neuro checks every 4 hours to help decrease delirium and promote sleep    SIRS (fevers, tachycardia, tachypnea):   -unclear source, possible central. Will need to rule out other causes- d-dimer elevated get VQ scan procalcitonin WNL d/c abx   - lower extremity dopplers pending d-dimer  -blood, urine cultures, lactic acid, CXR unremarkable thus far      Acute metabolic encephalopathy, multifactorial: intermittent  -IVH/hospital delirium with possible component of SIRs  -Supportive care. Restraints only as needed   -Continue Seroquel at bedtime  -Neurochecks every 4 hours  -reorient days and nights     Dysphagia:   -repeat speech evaluation   -continue tube feeds, benefiber added for loose stools  - if no improvement need PEG    Acute hypoxic respiratory failure: resolving   -concern for TESSA, will continue supplemental oxygen when sleeping   -previously requiring high flow nasal cannula  -aa nebs BID and prn     Hypertension:   -continue hydralazine, atenolol, amlodipine  -goal of SBP is to keep less than 160 mmHg     History of alcohol abuse  -family reports prior history but minimal alcohol intake prior to admission   -Out of withdrawal window     MSSA bronchitis  -Sputum culture grows MSSA      Code status: Full   DVT prophylaxis: [address] discussed with: Patient/Family  Anticipated Disposition: SNF/LTC  Anticipated Discharge: Greater than 48 hours     Hospital Problems  Date Reviewed: 9/21/2020          Codes Class Noted POA    Intracranial hemorrhage ([facility] [address] ICD-10-CM: I62.9  ICD-9-CM: 432.9  4/18/2021 Unknown                Review of Systems:   Negative unless stated above     Xr Abd (kub)    Result Date: 4/23/2021  Gastric tube tip is in appropriate position for use. Xr Abd (kub)    Result Date: 4/20/2021  Dobbhoff tube tip projects over the distal stomach. Ct Head Wo Cont    Result Date: 4/27/2021  Overall mild decrease in hyperdense intraventricular hemorrhage. Extensive chronic small vessel ischemic disease. Stable ventricular size. Ct Head Wo Cont    Result Date: 4/24/2021  Diminished though still extensive intraventricular hemorrhage. No other interval change.      Ct Head Wo Cont    Result Date: 4/22/2021  Slightly diminished intraventricular hemorrhage and ventriculomegaly as described     Ct Head Wo Cont    Result Date: 4/20/2021  Stable intraventricular hemorrhage and mild ventriculomegaly as described    Ct Head Wo Cont    Result Date: 4/19/2021  No significant change in acute intraventricular hemorrhage and mild ventriculomegaly as described. Ct Head Wo Cont    Result Date: 4/18/2021  No significant interval change in extensive intraventricular hemorrhage. Ct Head Wo Cont    Result Date: 4/18/2021  2 foci of parenchymal hemorrhage adjacent to the right lateral ventricle associated with significant intraventricular hemorrhage. Findings communicated to the ER by CT. Ct Abd Pelv Wo Cont    Result Date: 4/18/2021  No acute findings. Calcific plaque at the origin of the SMA. Xr Chest Port    Result Date: 4/26/2021  Decreased mild bibasilar opacities. Xr Chest Port    Result Date: 4/24/2021  No pneumothorax. Triple lumen catheter on the right in appropriate position for use. Minimal left basilar atelectasis. Xr Chest Port    Result Date: 4/22/2021  Unchanged small bilateral pleural effusions. Increased patchy opacities in the lung bases more likely related to atelectasis than airspace disease. Xr Chest Port    Result Date: 4/21/2021  1. New bilateral pleural effusions with new areas of moderate atelectasis medially in each lower lobe. Xr Chest Port    Result Date: 4/18/2021  No acute process identified. Vital Signs:    Last 24hrs VS reviewed since prior progress note.  Most recent are:  Visit Vitals  BP (!) 173/75 (BP 1 Location: Left upper arm, BP Patient Position: At rest)   Pulse 81   Temp 98.4 °F (36.9 °C)   Resp 24   Ht 5' 8\" (1.727 m)   Wt 99.7 kg (219 lb 12.8 oz)   SpO2 96%   BMI 33.42 kg/m²         Intake/Output Summary (Last 24 hours) at 4/29/2021 1113  Last data filed at 4/28/2021 1600  Gross per 24 hour   Intake 320 ml   Output --   Net 320 ml        Physical Examination:     I had a face to face encounter with this patient and independently examined them on 4/29/2021 as outlined below:          Constitutional:  No acute distress, awake    ENT:  MMM    Resp:  CTA bilaterally. CV:  RRR, no murmurs, gallops, rubs    GI:  Soft, obese, non distended, non tender. BS+    Musculoskeletal:  No edema, warm, 2+ pulses throughout    Neurologic:  Moves all extremities. AAOx1, can intermittently follow commands- limited by hearing            Data Review:    Review and/or order of clinical lab test  Review and/or order of tests in the radiology section of CPT  Review and/or order of tests in the medicine section of CPT      Labs:     Recent Labs     04/29/21 0735 04/28/21 0232   WBC 9.5 11.8*   HGB 11.7* 11.6*   HCT 36.8 36.0*    244     Recent Labs     04/29/21 0735 04/28/21 0232 04/27/21 0143 04/26/21  1132   * 148* 146*  --    K 3.9 4.0 4.1  --    * 114* 114*  --    CO2 26 28 29  --    BUN 32* 33* 39*  --    CREA 1.51* 1.47* 1.56*  --    * 127* 154*  --    CA 8.4* 8.8 8.4*  --    MG 2.8*  --   --  2.8*   PHOS 3.2  --   --   --      No results for input(s): ALT, AP, TBIL, TBILI, TP, ALB, GLOB, GGT, AML, LPSE in the last 72 hours. No lab exists for component: SGOT, GPT, AMYP, HLPSE  No results for input(s): INR, PTP, APTT, INREXT, INREXT in the last 72 hours. No results for input(s): FE, TIBC, PSAT, FERR in the last 72 hours. No results found for: FOL, RBCF   No results for input(s): PH, PCO2, PO2 in the last 72 hours.   Recent Labs     04/27/21  0143 04/26/21 2017 04/26/21  1132   TROIQ 0.09* 0.11* 0.11*     Lab Results   Component Value Date/Time    Cholesterol, total 144 04/19/2021 12:59 AM    HDL Cholesterol 33 04/19/2021 12:59 AM    LDL, calculated 74.8 04/19/2021 12:59 AM    Triglyceride 181 (H) 04/19/2021 12:59 AM    CHOL/HDL Ratio 4.4 04/19/2021 12:59 AM     Lab Results   Component Value Date/Time    Glucose (POC) 167 (H) 04/29/2021 06:06 AM    Glucose (POC) 135 (H) 04/29/2021 01:21 AM    Glucose (POC) 131 (H) 04/28/2021 05:46 PM    Glucose (POC) 127 (H) 04/28/2021 11:57 AM    Glucose (POC) 138 (H) 04/28/2021 05:57 AM     Lab Results   Component Value Date/Time    Color YELLOW/STRAW 04/27/2021 06:27 AM    Appearance CLEAR 04/27/2021 06:27 AM    Specific gravity 1.022 04/27/2021 06:27 AM    Specific gravity 1.015 09/21/2020 09:13 AM    pH (UA) 5.0 04/27/2021 06:27 AM    Protein 30 (A) 04/27/2021 06:27 AM    Glucose Negative 04/27/2021 06:27 AM    Ketone Negative 04/27/2021 06:27 AM    Bilirubin Negative 04/27/2021 06:27 AM    Urobilinogen 0.2 04/27/2021 06:27 AM    Nitrites Negative 04/27/2021 06:27 AM    Leukocyte Esterase Negative 04/27/2021 06:27 AM    Epithelial cells FEW 04/27/2021 06:27 AM    Bacteria Negative 04/27/2021 06:27 AM    WBC 0-4 04/27/2021 06:27 AM    RBC 0-5 04/27/2021 06:27 AM         Medications Reviewed:     Current Facility-Administered Medications   Medication Dose Route Frequency    albuterol-ipratropium (DUO-NEB) 2.5 MG-0.5 MG/3 ML  3 mL Nebulization BID RT    amLODIPine (NORVASC) tablet 5 mg  5 mg Oral DAILY    piperacillin-tazobactam (ZOSYN) 3.375 g in 0.9% sodium chloride (MBP/ADV) 100 mL MBP  3.375 g IntraVENous Q8H    DULoxetine (CYMBALTA) capsule 30 mg  30 mg Oral DAILY    miconazole (MICOTIN) 2 % powder   Topical PRN    guaiFENesin-dextromethorphan (ROBITUSSIN DM) 100-10 mg/5 mL syrup 10 mL  10 mL Oral Q6H    albuterol (PROVENTIL VENTOLIN) nebulizer solution 2.5 mg  2.5 mg Nebulization Q6H PRN    guar gum (BENEFIBER) packet 1 Packet  4 g Oral TID    atenoloL (TENORMIN) tablet 50 mg  50 mg Per NG tube DAILY    QUEtiapine (SEROquel) tablet 100 mg  100 mg Per NG tube QHS    hydrALAZINE (APRESOLINE) tablet 50 mg  50 mg Per NG tube Q8H    docusate (COLACE) 50 mg/5 mL oral liquid 100 mg  100 mg Per NG tube DAILY    lansoprazole (PREVACID) 3 mg/mL oral suspension 30 mg  30 mg Per NG tube ACB    sodium chloride (NS) flush 5-40 mL  5-40 mL IntraVENous Q8H    sodium chloride (NS) flush 5-40 mL  5-40 mL IntraVENous PRN    acetaminophen (TYLENOL) tablet 650 mg  650 mg Oral Q6H PRN    Or    acetaminophen (TYLENOL) suppository 650 mg  650 mg Rectal Q6H PRN    polyethylene glycol (MIRALAX) packet 17 g  17 g Oral DAILY PRN    promethazine (PHENERGAN) tablet 12.5 mg  12.5 mg Oral Q6H PRN    Or    ondansetron (ZOFRAN) injection 4 mg  4 mg IntraVENous Q6H PRN    glucose chewable tablet 16 g  4 Tab Oral PRN    dextrose (D50W) injection syrg 12.5-25 g  25-50 mL IntraVENous PRN    glucagon (GLUCAGEN) injection 1 mg  1 mg IntraMUSCular PRN    insulin lispro (HUMALOG) injection   SubCUTAneous Q6H    hydrALAZINE (APRESOLINE) 20 mg/mL injection 10 mg  10 mg IntraVENous Q4H PRN    labetaloL (NORMODYNE;TRANDATE) injection 10 mg  10 mg IntraVENous Q4H PRN     ______________________________________________________________________  EXPECTED LENGTH OF STAY: 4d 9h  ACTUAL LENGTH OF STAY:          Ladarius Sams MD

## 2021-04-29 NOTE — PROGRESS NOTES
Spoke with radiology, will need to postpone MBS until tomorrow. SLP following. Marleny Mercedes M.S., CCC-SLP

## 2021-04-30 ENCOUNTER — APPOINTMENT (OUTPATIENT)
Dept: GENERAL RADIOLOGY | Age: 86
DRG: 064 | End: 2021-04-30
Attending: HOSPITALIST
Payer: MEDICARE

## 2021-04-30 ENCOUNTER — APPOINTMENT (OUTPATIENT)
Dept: VASCULAR SURGERY | Age: 86
DRG: 064 | End: 2021-04-30
Attending: INTERNAL MEDICINE
Payer: MEDICARE

## 2021-04-30 LAB
GLUCOSE BLD STRIP.AUTO-MCNC: 135 MG/DL (ref 65–100)
GLUCOSE BLD STRIP.AUTO-MCNC: 155 MG/DL (ref 65–100)
GLUCOSE BLD STRIP.AUTO-MCNC: 158 MG/DL (ref 65–100)
SERVICE CMNT-IMP: ABNORMAL

## 2021-04-30 PROCEDURE — 74011250637 HC RX REV CODE- 250/637: Performed by: INTERNAL MEDICINE

## 2021-04-30 PROCEDURE — 82962 GLUCOSE BLOOD TEST: CPT

## 2021-04-30 PROCEDURE — 74011250637 HC RX REV CODE- 250/637: Performed by: ANESTHESIOLOGY

## 2021-04-30 PROCEDURE — 65660000001 HC RM ICU INTERMED STEPDOWN

## 2021-04-30 PROCEDURE — 74011250637 HC RX REV CODE- 250/637: Performed by: NURSE PRACTITIONER

## 2021-04-30 PROCEDURE — 92611 MOTION FLUOROSCOPY/SWALLOW: CPT | Performed by: SPEECH-LANGUAGE PATHOLOGIST

## 2021-04-30 PROCEDURE — 74011000250 HC RX REV CODE- 250: Performed by: INTERNAL MEDICINE

## 2021-04-30 PROCEDURE — 74230 X-RAY XM SWLNG FUNCJ C+: CPT

## 2021-04-30 PROCEDURE — 74011636637 HC RX REV CODE- 636/637: Performed by: ANESTHESIOLOGY

## 2021-04-30 PROCEDURE — 94640 AIRWAY INHALATION TREATMENT: CPT

## 2021-04-30 PROCEDURE — 97530 THERAPEUTIC ACTIVITIES: CPT

## 2021-04-30 PROCEDURE — 51798 US URINE CAPACITY MEASURE: CPT

## 2021-04-30 PROCEDURE — 93970 EXTREMITY STUDY: CPT

## 2021-04-30 PROCEDURE — 74011250637 HC RX REV CODE- 250/637: Performed by: STUDENT IN AN ORGANIZED HEALTH CARE EDUCATION/TRAINING PROGRAM

## 2021-04-30 PROCEDURE — 74011250636 HC RX REV CODE- 250/636: Performed by: NURSE PRACTITIONER

## 2021-04-30 PROCEDURE — 74011250637 HC RX REV CODE- 250/637: Performed by: HOSPITALIST

## 2021-04-30 PROCEDURE — 97535 SELF CARE MNGMENT TRAINING: CPT

## 2021-04-30 RX ORDER — FUROSEMIDE 40 MG/1
40 TABLET ORAL DAILY
Status: COMPLETED | OUTPATIENT
Start: 2021-04-30 | End: 2021-05-03

## 2021-04-30 RX ADMIN — INSULIN LISPRO 2 UNITS: 100 INJECTION, SOLUTION INTRAVENOUS; SUBCUTANEOUS at 06:52

## 2021-04-30 RX ADMIN — HYDRALAZINE HYDROCHLORIDE 100 MG: 50 TABLET, FILM COATED ORAL at 14:04

## 2021-04-30 RX ADMIN — GUAIFENESIN AND DEXTROMETHORPHAN 10 ML: 100; 10 SYRUP ORAL at 05:39

## 2021-04-30 RX ADMIN — ATENOLOL 100 MG: 50 TABLET ORAL at 09:39

## 2021-04-30 RX ADMIN — DOCUSATE SODIUM 100 MG: 50 LIQUID ORAL at 09:39

## 2021-04-30 RX ADMIN — HYDRALAZINE HYDROCHLORIDE 100 MG: 50 TABLET, FILM COATED ORAL at 05:39

## 2021-04-30 RX ADMIN — INSULIN LISPRO 2 UNITS: 100 INJECTION, SOLUTION INTRAVENOUS; SUBCUTANEOUS at 01:05

## 2021-04-30 RX ADMIN — Medication 1 PACKET: at 09:44

## 2021-04-30 RX ADMIN — FUROSEMIDE 40 MG: 40 TABLET ORAL at 12:32

## 2021-04-30 RX ADMIN — ACETAMINOPHEN 650 MG: 325 TABLET, FILM COATED ORAL at 21:30

## 2021-04-30 RX ADMIN — HYDRALAZINE HYDROCHLORIDE 10 MG: 20 INJECTION INTRAMUSCULAR; INTRAVENOUS at 17:25

## 2021-04-30 RX ADMIN — Medication 1 PACKET: at 17:25

## 2021-04-30 RX ADMIN — Medication 1 PACKET: at 21:30

## 2021-04-30 RX ADMIN — DULOXETINE HYDROCHLORIDE 30 MG: 30 CAPSULE, DELAYED RELEASE ORAL at 09:39

## 2021-04-30 RX ADMIN — Medication 10 ML: at 21:31

## 2021-04-30 RX ADMIN — ACETAMINOPHEN 650 MG: 325 TABLET, FILM COATED ORAL at 05:39

## 2021-04-30 RX ADMIN — IPRATROPIUM BROMIDE AND ALBUTEROL SULFATE 3 ML: .5; 3 SOLUTION RESPIRATORY (INHALATION) at 10:59

## 2021-04-30 RX ADMIN — GUAIFENESIN AND DEXTROMETHORPHAN 10 ML: 100; 10 SYRUP ORAL at 17:25

## 2021-04-30 RX ADMIN — AMLODIPINE BESYLATE 5 MG: 5 TABLET ORAL at 09:40

## 2021-04-30 RX ADMIN — IPRATROPIUM BROMIDE AND ALBUTEROL SULFATE 3 ML: .5; 3 SOLUTION RESPIRATORY (INHALATION) at 20:33

## 2021-04-30 RX ADMIN — QUETIAPINE FUMARATE 100 MG: 100 TABLET ORAL at 21:30

## 2021-04-30 RX ADMIN — HYDRALAZINE HYDROCHLORIDE 100 MG: 50 TABLET, FILM COATED ORAL at 21:29

## 2021-04-30 RX ADMIN — Medication 10 ML: at 14:04

## 2021-04-30 RX ADMIN — GUAIFENESIN AND DEXTROMETHORPHAN 10 ML: 100; 10 SYRUP ORAL at 12:32

## 2021-04-30 RX ADMIN — GUAIFENESIN AND DEXTROMETHORPHAN 10 ML: 100; 10 SYRUP ORAL at 01:06

## 2021-04-30 RX ADMIN — LANSOPRAZOLE 30 MG: KIT at 09:44

## 2021-04-30 NOTE — PROGRESS NOTES
Problem: Non-Violent Restraints  Goal: Removal from restraints as soon as assessed to be safe  Outcome: Progressing Towards Goal  Goal: No harm/injury to patient while restraints in use  Outcome: Progressing Towards Goal  Goal: Patient's dignity will be maintained  Outcome: Progressing Towards Goal  Goal: Patient Interventions  Outcome: Progressing Towards Goal     Problem: Falls - Risk of  Goal: *Absence of Falls  Description: Document Jacob Antunez Fall Risk and appropriate interventions in the flowsheet. Outcome: Progressing Towards Goal  Note: Fall Risk Interventions:       Mentation Interventions: Adequate sleep, hydration, pain control, Bed/chair exit alarm, Door open when patient unattended, Increase mobility, More frequent rounding, Room close to nurse's station    Medication Interventions: Bed/chair exit alarm, Evaluate medications/consider consulting pharmacy    Elimination Interventions: Bed/chair exit alarm, Call light in reach, Toileting schedule/hourly rounds    History of Falls Interventions: Bed/chair exit alarm, Door open when patient unattended, Room close to nurse's station, Evaluate medications/consider consulting pharmacy         Problem: Pressure Injury - Risk of  Goal: *Prevention of pressure injury  Description: Document Aquiles Scale and appropriate interventions in the flowsheet. Outcome: Progressing Towards Goal  Note: Pressure Injury Interventions:  Sensory Interventions: Assess changes in LOC, Check visual cues for pain, Discuss PT/OT consult with provider, Float heels, Keep linens dry and wrinkle-free, Maintain/enhance activity level, Monitor skin under medical devices, Turn and reposition approx.  every two hours (pillows and wedges if needed)    Moisture Interventions: Absorbent underpads, Apply protective barrier, creams and emollients, Check for incontinence Q2 hours and as needed, Internal/External urinary devices, Limit adult briefs, Maintain skin hydration (lotion/cream)    Activity Interventions: Increase time out of bed, PT/OT evaluation, Pressure redistribution bed/mattress(bed type)    Mobility Interventions: Float heels, Pressure redistribution bed/mattress (bed type), PT/OT evaluation, Turn and reposition approx.  every two hours(pillow and wedges)    Nutrition Interventions: Document food/fluid/supplement intake    Friction and Shear Interventions: Apply protective barrier, creams and emollients, Lift sheet, Lift team/patient mobility team, Minimize layers                Problem: TIA/CVA Stroke: Day 2 Until Discharge  Goal: Activity/Safety  Outcome: Progressing Towards Goal  Goal: Diagnostic Test/Procedures  Outcome: Progressing Towards Goal  Goal: Nutrition/Diet  Outcome: Progressing Towards Goal  Goal: Discharge Planning  Outcome: Progressing Towards Goal  Goal: Medications  Outcome: Progressing Towards Goal  Goal: Respiratory  Outcome: Progressing Towards Goal  Goal: Treatments/Interventions/Procedures  Outcome: Progressing Towards Goal  Goal: Psychosocial  Outcome: Progressing Towards Goal  Goal: *Verbalizes anxiety and depression are reduced or absent  Outcome: Progressing Towards Goal  Goal: *Absence of aspiration  Outcome: Progressing Towards Goal  Goal: *Absence of deep venous thrombosis signs and symptoms(Stroke Metric)  Outcome: Progressing Towards Goal  Goal: *Optimal pain control at patient's stated goal  Outcome: Progressing Towards Goal  Goal: *Tolerating diet  Outcome: Progressing Towards Goal  Goal: *Ability to perform ADLs and demonstrates progressive mobility and function  Outcome: Progressing Towards Goal  Goal: *Stroke education continued(Stroke Metric)  Outcome: Progressing Towards Goal

## 2021-04-30 NOTE — PROGRESS NOTES
Bedside and Verbal shift change report given to [address] (oncoming nurse) by Daryn Quijano RN (offgoing nurse). Report included the following information SBAR, Kardex, Intake/Output, MAR, Recent Results, Cardiac Rhythm NSR and Dual Neuro Assessment.

## 2021-04-30 NOTE — PROGRESS NOTES
Problem: Mobility Impaired (Adult and Pediatric)  Goal: *Acute Goals and Plan of Care (Insert Text)  Description:   FUNCTIONAL STATUS PRIOR TO ADMISSION: Patient was independent and active without use of DME.    HOME SUPPORT PRIOR TO ADMISSION: The patient lived with his spouse but did not require assist.    Physical Therapy Goals  Initiated 4/27/2021  1. Patient will move from supine to sit and sit to supine , scoot up and down, and roll side to side in bed with moderate assistance  within 7 day(s). 2.  Patient will transfer from bed to chair and chair to bed with moderate assistance  using the least restrictive device within 7 day(s). 3.  Patient will perform sit to stand with moderate assistance  within 7 day(s). 4.  Patient will ambulate with moderate assistance  for 10 feet with the least restrictive device within 7 day(s). 5.  Patient will demo fair sitting balance x2 min at EOB in prep for mobility progression within 7 days. 6.  Patient will improve Copeland Balance score by 7 points within 7 days. Outcome: Progressing Towards Goal     PHYSICAL THERAPY TREATMENT  Patient: Rondel Dubin (66 y.o. male)  Date: 4/30/2021  Diagnosis: Intracranial hemorrhage (HCC) [I62.9] <principal problem not specified>       Precautions:    Chart, physical therapy assessment, plan of care and goals were reviewed. ASSESSMENT  Patient continues with skilled PT services and is progressing towards goals - remains most limited by impaired cognition (attention to task, command following, impulsive, A&O x1, etc), impaired sitting balance, global weakness, impaired gait, and decreased tolerance to sustained activity leading to increased dependency and falls risk from baseline level. Received pt supine in bed, much brighter and clearer affect today. Less fidgety with lines/leads though very impulsive with mobility.  Required less physical assist with bed mobility and transfers - overall mod A/min A to mobilize to EOB plus near constant assist to prevent from posterior and L lateral leans improving to SBA with duration. Progressed to completing sit<>stands with RW and min A x2 (pull to stand) and taking several steps over to chair with min A x2. Improved B LE advancement without assist. Remained up in chair at end of session with family present, NAD. Recommending SNF once medically cleared. Will follow. .    For the weekend, recommend patient to complete as able in order to maintain strength, endurance and independence with nursing: OOB to chair 3x/day with RW and assist x2. PT to follow-up with patient after the weekend. Current Level of Function Impacting Discharge (mobility/balance): mod/min A x2 with RW; impulsive; poor safety     Other factors to consider for discharge: below baseline; high falls risk          PLAN :  Patient continues to benefit from skilled intervention to address the above impairments. Continue treatment per established plan of care. to address goals. Recommendation for discharge: (in order for the patient to meet his/her long term goals)  Therapy up to 5 days/week in SNF setting    This discharge recommendation:  A follow-up discussion with the attending provider and/or case management is planned    IF patient discharges home will need the following DME: to be determined (TBD)       SUBJECTIVE:   Patient stated [facility], [facility].     OBJECTIVE DATA SUMMARY:   Critical Behavior:  Neurologic State: Alert, Eyes open spontaneously, Other (Comment)(periodic drowsy)  Orientation Level: Oriented X4  Cognition: Recognition of people/places, Decreased command following  Safety/Judgement: Decreased awareness of environment  Functional Mobility Training:  Bed Mobility:  Supine to Sit: Moderate assistance;Assist x2     Transfers:  Sit to Stand: Minimum assistance;Assist x2  Bed to Chair: Minimum assistance;Assist x2(using RW)        Balance:  Sitting: Impaired; Without support  Sitting - Static: Fair (occasional)  Sitting - Dynamic: Fair (occasional)  Standing: Impaired  Standing - Static: Fair;Constant support  Standing - Dynamic : Poor;Constant support    Activity Tolerance:   Good and Fair    After treatment patient left in no apparent distress:   Sitting in chair, Call bell within reach, Bed / chair alarm activated, and Caregiver / family present    COMMUNICATION/COLLABORATION:   The patients plan of care was discussed with: Occupational therapist and Registered nurse.      Donta Styles, PT, DPT   Time Calculation: 23 mins

## 2021-04-30 NOTE — PROGRESS NOTES
Problem: Dysphagia (Adult)  Goal: *Acute Goals and Plan of Care (Insert Text)  Description: Initiated 4/28/2021  1. Patient will participate in reassessment of swallow function within 7 days. MET  New goal 4/30/21  2. Patient will tolerate dysphagia 1/pureed diet, nectar thickened liquids without overt s/s aspiration within 7 days. New goal 4/30/21  3. Patient will tolerate trials of solids with complete and timely mastication and and transit within 7 days. New goal 4/30/21  4. Patient will tolerate trials of thin liquids with SLP free of s/s aspiration within 7 days. Outcome: Progressing Towards Goal     SPEECH PATHOLOGY MODIFIED BARIUM SWALLOW STUDY  Patient: Michele Sheffield (63 y.o. male)  Date: 4/30/2021  Primary Diagnosis: Intracranial hemorrhage (HCC) [I62.9]        Precautions: Aspiration       ASSESSMENT :  Based on the objective data described below, the patient presents with mild-moderate oral dysphagia characterized by prolonged mastication and delayed oral transit with solids. Moderate pharyngeal dysphagia is characterized by pharyngeal swallow delay and mildly reduced laryngeal elevation. Dysphagia resulted in penetration before the swallow to vocal cords. Some penetrated material cleared but not all. Unable to visualize if penetrated material was aspirated however given no s/s aspiration with penetration to vocal cords it is possible patient may silently aspirate. No penetration or aspiration with nectar thickened liquids, purees or solids. Risk of aspiration remains elevated given dysphagia in setting of IVH. Given MBS feel patient is safe for pureed diet, nectar thickened liquids. Patient will benefit from skilled intervention to address the above impairments.   Patients rehabilitation potential is considered to be Good     PLAN :  Recommendations and Planned Interventions:  Pureed diet  Nectar thickened liquids  Sit up for all PO  Small bites  Single sips  Medication crushed in puree  Frequency/Duration: Patient will be followed by speech-language pathology 3 times a week to address goals. Discharge Recommendations: To Be Determined     SUBJECTIVE:   Patient stated Alright. Patient very [facility].     OBJECTIVE:     Past Medical History:   Diagnosis Date    Adverse effect of anesthesia     combative after anesthesia    Alcohol abuse     6 beers/day    Arthritis     CAD (coronary artery disease)     Chronic obstructive pulmonary disease (HCC)     Chronic obstructive pulmonary disease (HCC)     CKD (chronic kidney disease) stage 3, GFR 30-59 ml/min ([address] 9/21/2020    Dyslipidemia 8/16/2017    Dyspepsia and other specified disorders of function of stomach     Dyspnea 8/16/2017    ED (erectile dysfunction) 8/16/2017    Encounter for immunization 8/16/2017    Fatigue 8/16/2017    Flank pain 8/1/2019    GERD (gastroesophageal reflux disease) 8/16/2017    Gout 8/16/2017    Hypertension     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    Morbid obesity ([address]     On statin therapy 8/16/2017    TESSA on CPAP 1/3/2019    Psychiatric disorder     Depression    Simple chronic bronchitis ([address] 1/3/2019    TIA (transient ischemic attack) 5/95/0011    Umbilical hernia 65/3/1157    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     Past Surgical History:   Procedure Laterality Date    COLONOSCOPY N/A 9/27/2019    COLONOSCOPY performed by Justice Dimas MD at [facility] ENDOSCOPY    HX HERNIA REPAIR      RIH    HX HERNIA REPAIR  95/13/35    open umbilical hernia repair mesh    HX ORTHOPAEDIC      HX UROLOGICAL      HYDROCELECTOMY    CO CARDIAC SURG PROCEDURE UNLIST  1996    Cardiac Stents    CO CARDIAC SURG PROCEDURE UNLIST  04/2019    EF 61-65%    UPPER GI ENDOSCOPY,BIOPSY  9/30/2019          Prior Level of Function/Home Situation:   Home Situation  Home Environment: Private residence  # Steps to Enter: 1  One/Two Story Residence: One story  Living Alone: No  Support Systems: Spouse/Significant Other/Partner  Current DME Used/Available at Home: Cane, straight  Diet prior to admission:   Current Diet:  NPO with Chema    Radiologist: Dr. Romayne Kettle: Lateral;Fluoro  Patient Position: Upright in MBS chair    Trial 1: Trial 2:   Consistency Presented: Thin liquid Consistency Presented: Nectar thick liquid   How Presented: SLP-fed/presented;Straw;Successive swallows How Presented: SLP-fed/presented;Straw;Successive swallows         Bolus Acceptance: No impairment Bolus Acceptance: No impairment   Bolus Formation/Control: No impairment:   Bolus Formation/Control: No impairment:     Propulsion: No impairment Propulsion: No impairment   Oral Residue: None     Initiation of Swallow: Triggered at pyriform sinus(es) Initiation of Swallow: Triggered at valleculae;Triggered at pyriform sinus(es)   Timing: Pooling 1-5 sec Timing: No impairment   Penetration: Before swallow;Trace; To cords Penetration: None     Aspiration/Timing: No evidence of aspiration   Pharyngeal Clearance: Vallecular residue;Pyriform residue ; Less than 10% Pharyngeal Clearance: Less than 10%; Vallecular residue                             Trial 3: Trial 4:   Consistency Presented: Puree Consistency Presented: Solid   How Presented: SLP-fed/presented;Spoon How Presented: SLP-fed/presented         Bolus Acceptance: No impairment Bolus Acceptance: No impairment   Bolus Formation/Control: No impairment, issues, or problems :    : Delayed;Mastication   Propulsion: No impairment Propulsion: Delayed (# of seconds)   Oral Residue: None Oral Residue: None   Initiation of Swallow: Triggered at valleculae    Timing: No impairment Timing: Pooling 1-5 sec   Penetration: None Penetration: None   Aspiration/Timing: No evidence of aspiration Aspiration/Timing: No evidence of aspiration   Pharyngeal Clearance: Vallecular residue; Less than 10% Pharyngeal Clearance: Vallecular residue; Less than 10%                             Decreased Tongue Base Retraction?: Yes  Laryngeal Elevation: WFL (within functional limits)  Aspiration/Penetration Score: 5 (Penetration/Visible residue-Contrast contacts the folds, but is not ejected)   Pharyngeal Symmetry: Not assessed  Pharyngeal-Esophageal Segment: No impairment  Pharyngeal Dysfunction: None    Oral Phase Severity: Mild-moderate  Pharyngeal Phase Severity: Moderate  NOMS:   The NOMS functional outcome measure was used to quantify this patient's level of swallowing impairment. Based on the NOMS, the patient was determined to be at level 3 for swallow function         NOMS Swallowing Levels:  Level 1 (CN): NPO  Level 2 (CM): NPO but takes consistency in therapy  Level 3 (CL): Takes less than 50% of nutrition p.o. and continues with nonoral feedings; and/or safe with mod cues; and/or max diet restriction  Level 4 (CK): Safe swallow but needs mod cues; and/or mod diet restriction; and/or still requires some nonoral feeding/supplements  Level 5 (CJ): Safe swallow with min diet restriction; and/or needs min cues  Level 6 (CI): Independent with p.o.; rare cues; usually self cues; may need to avoid some foods or needs extra time  Level 7 ([address] [place] [address]): Independent for all p.o.  BARTOLO. (2003). National Outcomes Measurement System (NOMS): Adult Speech-Language Pathology User's Guide. COMMUNICATION/EDUCATION:   Patient was educated regarding MBS results, diet and swallow precautions. Patient nodded. The patients plan of care including findings from [facility], recommendations, planned interventions, and recommended diet changes were discussed with: Registered nurse. Patient/family have participated as able in goal setting and plan of care. Patient/family agree to work toward stated goals and plan of care.     Thank you for this referral.  Raymond Fagan SLP  Time Calculation: 25 mins

## 2021-04-30 NOTE — PROGRESS NOTES
Problem: Self Care Deficits Care Plan (Adult)  Goal: *Acute Goals and Plan of Care (Insert Text)  Description:   FUNCTIONAL STATUS PRIOR TO ADMISSION: Patient was independent with ADLs and functional mobility, ambulatory with occasional SPC use. Enjoys watching sports and gardening. HOME SUPPORT: Patient lived with his wife but did not require assistance. Occupational Therapy Goals  Initiated 4/27/2021  1. Patient will perform grooming seated EOB with minimal assistance within 7 day(s). 2.  Patient will perform bathing with moderate assistance  within 7 day(s). 3.  Patient will perform upper body dressing with minimal assistance within 7 day(s). 4.  Patient will perform toilet transfers with moderate assistance  within 7 day(s). 5.  Patient will perform all aspects of toileting with moderate assistance  within 7 day(s). 6.  Patient will participate in upper extremity therapeutic exercise/activities with minimal assistance for 10 minutes within 7 day(s). Outcome: Progressing Towards Goal     OCCUPATIONAL THERAPY TREATMENT  Patient: Renetta Dixon (58 y.o. male)  Date: 4/30/2021  Diagnosis: Intracranial hemorrhage (HCC) [I62.9] <principal problem not specified>      Precautions:  fall  Chart, occupational therapy assessment, plan of care, and goals were reviewed. ASSESSMENT  Patient continues with skilled OT services and is progressing towards goals but remains limited by impaired cognition, impulsivity, impaired sitting and standing balance, and generally decreased AROM/ strength/ coordination s/p admission for IVH without surgical intervention. Jenn Fat presents with improving alertness and functional engagement and was very eager to mobilize. Patient initially had posterior lean sitting EOB but sitting balance improved with cues for anterior reaching/ lean and UE support at EOB.   Patient progressed mobility OOB to chair using Rhodia Oar remains significantly below functional baseline and would benefit from SNF at d/c.     Current Level of Function Impacting Discharge (ADLs/self-care): moderate assistance x2 supine<>sit, minimum assistance x2 sit<>stand/ pivot to chair using RW, minimum assistance self-feeding, infer overall minimum to total assistance ADLs. PLAN :  Patient continues to benefit from skilled intervention to address the above impairments. Continue treatment per established plan of care to address goals. Recommendation for discharge: (in order for the patient to meet his/her long term goals)  Therapy up to 5 days/week in SNF setting    This discharge recommendation:  Has been made in collaboration with the attending provider and/or case management         SUBJECTIVE:   Patient stated Let me go to that chair.     OBJECTIVE DATA SUMMARY:   Cognitive/Behavioral Status:  Neurologic State: Alert;Eyes open spontaneously; Other (Comment)(periodic drowsy)  Orientation Level: Oriented X4  Cognition: Recognition of people/places; Decreased command following             Functional Mobility and Transfers for ADLs:  Bed Mobility:   Supine-sit: Moderate assistance x2    Transfers:   Sit-stand: Minimum assistance x2  Bed-chair: Minimum assistance x2 to pivot using RW          Balance:  Sitting: Impaired  Sitting - Static: Fair (occasional)  Sitting - Dynamic: Fair (occasional)  Standing: Impaired  Standing - Static: Fair;Constant support  Standing - Dynamic : Poor;Constant support    ADL Intervention:  Feeding  Food to Mouth: Minimum assistance(assistance for smaller bites) jello with spoon, grasped cup with L hand and spoon/ hand to mouth with R hand         Pain:  Patient did not report pain    Activity Tolerance:   VSS on RA    After treatment patient left in no apparent distress:   Sitting in chair, Call bell within reach, Bed / chair alarm activated and Caregiver / family present    COMMUNICATION/COLLABORATION:   The patients plan of care was discussed with: Physical therapist and Registered nurse.      Kenney Richey OT  Time Calculation: 23 mins

## 2021-04-30 NOTE — PROGRESS NOTES
Comprehensive Nutrition Assessment    Type and Reason for Visit: Reassess    Nutrition Recommendations/Plan:      1. Continue TFs:  Goal: Vital 1.5 @ 86 ml/hr x 14 hrs (1200 ml total TF volume) from 20:00-10:00. Free water flushes 160 ml Q 4 hrs around the clock. TFs at goal provides 1800 kcal, 81 g protein, 7.2 g dietary fiber, 224 g CHO, with 1877 ml free water, and 2160 ml total volume     2. Continue Benefiber TID. 3. Hold Colace if pt  +loose stools. 4. If PO diet unable to be initiated, consider PEG placement pending ongoing plan of care. Nutrition Assessment:    Pt admitted with IVH. PMHx: Alcohol abuse, COPD, CKD 3, HLD, GERD, HTN. Conservative treatment-surgery not indicated. Question alcohol withdrawal? Acute metabolic encephalopathy.     4/20: Mr Burnie Osler was well-nourished PTA per daughter. No weight loss or decline in appetite prior to this admission. Since pt remains encephalopathic plan is to place DHT with bridle today. Lytes WNL. BG elevated-suspect d/t stress. No history of DM.      4/27: TF's as ordered providing 2100 kcals, 113 g protein, with 2085 ml free water, and 2280 ml total volume. Pt discussed on ID rounds, with mentation improving - plan for SLP reconsult to assess ability to resume PO diet. TFs are currently infusing at goal. Ongoing loose stools, Benefiber added TID. Pending SLP rec's - could modify to fiber containing formula/modify regimen. Pt may also benefit from nocturnal feedings while PO intakes established.      4/28: Pt changed to Vital 1.5, partially hydrolyzed, peptide-based formula to attempt to aid/improve frequent loose stooling. Also changed to 14 hr/nocturnal feedings. 4/30: Discussed on ID rounds. Pt down for MBS this AM, RN reports improvement in loose stools. SLP did approve for PO initiation of diet, recommended nocturnal feeds remain until PO diet tolerance established. RD will continue to monitor pt status and results of MBS vs need for PEG. Malnutrition Assessment:  Malnutrition Status: At risk for malnutrition: prolonged hospitalization    Context:  Acute illness         Nutritionally Significant Medications: Colace (given this AM- previously held), Benefiber TID, Humalog,     Estimated Daily Nutrient Needs:  Energy (kcal): 2035 (MSJ x 1.2); Weight Used for Energy Requirements: Current(100 kg)  Protein (g): 105-119 (1.5-1.7g/kg IBW); Weight Used for Protein Requirements: Ideal(70 kg)  Fluid (ml/day): 2035 ml; Method Used for Fluid Requirements: 1 ml/kcal    Nutrition Related Findings:       BM: +loose BM 4/29  Edema: None  Wounds:  None     Recent Labs     04/29/21  0735 04/28/21  0232   * 127*   BUN 32* 33*   CREA 1.51* 1.47*   * 148*   K 3.9 4.0   * 114*   CO2 26 28   CA 8.4* 8.8   PHOS 3.2  --    MG 2.8*  --        Recent Labs     04/29/21  0735 04/28/21  0232   WBC 9.5 11.8*   HGB 11.7* 11.6*   HCT 36.8 36.0*    244       Recent Labs     04/30/21  0553 04/29/21  2340 04/29/21  1901 04/29/21  1152 04/29/21  0606 04/29/21  0121 04/28/21  1746 04/28/21  1157 04/28/21  0557 04/28/21  0026 04/27/21  1854 04/27/21  1320   GLUCPOC 158* 170* 150* 160* 167* 135* 131* 127* 138* 125* 157* 126*         Current Nutrition Therapies:   Diet: NPO - on TFs  Supplements: None  Additional Caloric Sources:  None    Meal intake: No data found. Anthropometric Measures:  · Height:  5' 8\" (172.7 cm)  · Current Body Wt:  99.5 kg (219 lb 5.7 oz)   · Ideal Body Wt:  154:  142.4 %   · BMI Categories:  Obese class 1 (BMI 30.0-34. 9)     Wt Readings from Last 10 Encounters:   04/29/21 99.7 kg (219 lb 12.8 oz)   12/04/20 103.3 kg (227 lb 12.8 oz)   09/21/20 102.5 kg (226 lb)   06/26/20 100.6 kg (221 lb 12.8 oz)   02/28/20 99 kg (218 lb 3.2 oz)   12/27/19 96.6 kg (213 lb)   12/12/19 98.2 kg (216 lb 6.4 oz)   12/02/19 99.9 kg (220 lb 3.2 oz)   11/18/19 96.2 kg (212 lb)   11/05/19 97.3 kg (214 lb 9.6 oz)       Nutrition Diagnosis:   · Inadequate oral intake related to cognitive or neurological impairment as evidenced by NPO or clear liquid status due to medical condition      Nutrition Interventions:   Food and/or Nutrient Delivery: Continue tube feeding  Nutrition Education and Counseling: No recommendations at this time  Coordination of Nutrition Care: Continue to monitor while inpatient, Speech therapy, Interdisciplinary rounds    Goals:  Continued TF tolerance vs PO diet. Nutrition Monitoring and Evaluation:   Behavioral-Environmental Outcomes: None identified  Food/Nutrient Intake Outcomes: Enteral nutrition intake/tolerance  Physical Signs/Symptoms Outcomes: GI status, Weight, Biochemical data    Discharge Planning:     Too soon to determine     Blayne Simpson RD     Contact via [employer]

## 2021-04-30 NOTE — PROGRESS NOTES
Occupational Therapy: defer    Chart reviewed, noted patient with elevated d-dimer (3.72) with BLE duplex and pulmonary ventilation/ perfusion scan both ordered and still pending. Will hold OT at this time and will f/u as able and appropriate.     Brenden Ford, OTR/L

## 2021-04-30 NOTE — PROGRESS NOTES
[facility]  Hospitalist Group                                                                                          Hospitalist Progress Note  Dilcia Keller MD  Answering service: [phone] OR [phone] from in house phone        Date of Service:  2021  NAME:  Kaylynn Pedraza  :  1934  MRN:  [id number]      Admission Summary:   51-year-old male with history of coronary disease-on aspirin, COPD, chronic insufficiency hyperlipidemia hypertension who has history of prior viral encephalitis was admitted with headache and nausea with vomiting. The CT scan on presentation showed a right intraparenchymal hemorrhage adjacent to the lateral ventricle. Patient was in the ICU, but followed by neurosurgery, there was no surgical recommendations for EVD but was followed with neuro checks every 2 hours. Cardene drip is required for blood pressure control, patient also became encephalopathic due to multiple etiologies. CT scan done yesterday for worsening of confusion shows improvement in intraventricular hemorrhage. Patient became febrile, sputum culture grew MSSA and patient was started on cefazolin. She is currently medically stable, neurochecks have been changed every 4 hours and critical care team recommends downgrading to neuro telemetry/neuro intermediate care bed. Interval history / Subjective: Follow up intraventricular hemorrhage. Patient seen and examined. Alert to name.    He has dobbhoff need to convert to PEG if fails SLP improved slowly -Pureed diet  Nectar thickened liquids recommended   couldn't tolerate barium swallow yesterday   Same for VQ scan as well     Assessment & Plan:     Intraventricular hemorrhage with mild ventriculomegaly  -Neurosurgery on board, no acute surgical intervention  -Repeat CT head shows some improvement in the hemorrhage  -Neurosurgery recommends less sedation, neuro checks every 4 hours to help decrease delirium and promote sleep    SIRS (fevers, tachycardia, tachypnea): 4/26  -unclear source, possible central. Will need to rule out other causes- d-dimer elevated get VQ scan procalcitonin WNL d/c abx   - lower extremity dopplers pending d-dimer  -blood, urine cultures, lactic acid, CXR unremarkable thus far      Acute metabolic encephalopathy, multifactorial: intermittent  -IVH/hospital delirium with possible component of SIRS  -Supportive care. Restraints only as needed   -Continue Seroquel at bedtime  -Neurochecks every 4 hours  -reorient days and nights     Dysphagia:   -repeat speech evaluation   -continue tube feeds, benefiber added for loose stools  - if no improvement need PEG- Pureed diet  Nectar thickened liquid    Acute hypoxic respiratory failure: resolving   -concern for TESSA, will continue supplemental oxygen when sleeping   -previously requiring high flow nasal cannula  -aa nebs BID and prn     Hypertension:   -continue hydralazine, atenolol, amlodipine  -goal of SBP is to keep less than 160 mmHg     History of alcohol abuse  -family reports prior history but minimal alcohol intake prior to admission   -Out of withdrawal window     MSSA bronchitis  -Sputum culture grows MSSA    Code status: Full   DVT prophylaxis: scds    Care Plan discussed with: Patient/Family  Anticipated Disposition: SNF/LTC  Anticipated Discharge: Greater than 48 hours     CALLED WIFE UPDATED 4/30      Hospital Problems  Date Reviewed: 9/21/2020          Codes Class Noted POA    Intracranial hemorrhage ([facility] [address] ICD-10-CM: I62.9  ICD-9-CM: 432.9  4/18/2021 Unknown                Review of Systems:   Negative unless stated above     Xr Abd (kub)    Result Date: 4/23/2021  Gastric tube tip is in appropriate position for use. Xr Abd (kub)    Result Date: 4/20/2021  Dobbhoff tube tip projects over the distal stomach. Ct Head Wo Cont    Result Date: 4/27/2021  Overall mild decrease in hyperdense intraventricular hemorrhage.  Extensive chronic small vessel ischemic disease. Stable ventricular size. Ct Head Wo Cont    Result Date: 4/24/2021  Diminished though still extensive intraventricular hemorrhage. No other interval change. Ct Head Wo Cont    Result Date: 4/22/2021  Slightly diminished intraventricular hemorrhage and ventriculomegaly as described     Ct Head Wo Cont    Result Date: 4/20/2021  Stable intraventricular hemorrhage and mild ventriculomegaly as described    Ct Head Wo Cont    Result Date: 4/19/2021  No significant change in acute intraventricular hemorrhage and mild ventriculomegaly as described. Ct Head Wo Cont    Result Date: 4/18/2021  No significant interval change in extensive intraventricular hemorrhage. Ct Head Wo Cont    Result Date: 4/18/2021  2 foci of parenchymal hemorrhage adjacent to the right lateral ventricle associated with significant intraventricular hemorrhage. Findings communicated to the ER by CT. Ct Abd Pelv Wo Cont    Result Date: 4/18/2021  No acute findings. Calcific plaque at the origin of the SMA. Xr Chest Port    Result Date: 4/26/2021  Decreased mild bibasilar opacities. Xr Chest Port    Result Date: 4/24/2021  No pneumothorax. Triple lumen catheter on the right in appropriate position for use. Minimal left basilar atelectasis. Xr Chest Port    Result Date: 4/22/2021  Unchanged small bilateral pleural effusions. Increased patchy opacities in the lung bases more likely related to atelectasis than airspace disease. Xr Chest Port    Result Date: 4/21/2021  1. New bilateral pleural effusions with new areas of moderate atelectasis medially in each lower lobe. Xr Chest Port    Result Date: 4/18/2021  No acute process identified. Vital Signs:    Last 24hrs VS reviewed since prior progress note.  Most recent are:  Visit Vitals  BP (!) 179/91 (BP 1 Location: Left arm, BP Patient Position: At rest)   Pulse (!) 59   Temp 98 °F (36.7 °C)   Resp 16   Ht 5' 8\" (1.727 m)   Wt 99.7 kg (219 lb 12.8 oz)   SpO2 95%   BMI 33.42 kg/m²         Intake/Output Summary (Last 24 hours) at 4/30/2021 1124  Last data filed at 4/30/2021 0710  Gross per 24 hour   Intake 160 ml   Output 935 ml   Net -775 ml        Physical Examination:     I had a face to face encounter with this patient and independently examined them on 4/30/2021 as outlined below:          Constitutional:  No acute distress, awake    ENT:  MMM    Resp:  CTA bilaterally. CV:  RRR, no murmurs, gallops, rubs    GI:  Soft, obese, non distended, non tender. BS+    Musculoskeletal:  No edema, warm, 2+ pulses throughout    Neurologic:  Moves all extremities. AAOx1, can intermittently follow commands- limited by hearing            Data Review:    Review and/or order of clinical lab test  Review and/or order of tests in the radiology section of CPT  Review and/or order of tests in the medicine section of CPT      Labs:     Recent Labs     04/29/21  0735 04/28/21  0232   WBC 9.5 11.8*   HGB 11.7* 11.6*   HCT 36.8 36.0*    244     Recent Labs     04/29/21  0735 04/28/21  0232   * 148*   K 3.9 4.0   * 114*   CO2 26 28   BUN 32* 33*   CREA 1.51* 1.47*   * 127*   CA 8.4* 8.8   MG 2.8*  --    PHOS 3.2  --      No results for input(s): ALT, AP, TBIL, TBILI, TP, ALB, GLOB, GGT, AML, LPSE in the last 72 hours. No lab exists for component: SGOT, GPT, AMYP, HLPSE  No results for input(s): INR, PTP, APTT, INREXT, INREXT in the last 72 hours. No results for input(s): FE, TIBC, PSAT, FERR in the last 72 hours. No results found for: FOL, RBCF   No results for input(s): PH, PCO2, PO2 in the last 72 hours. No results for input(s): CPK, CKNDX, TROIQ in the last 72 hours.     No lab exists for component: CPKMB  Lab Results   Component Value Date/Time    Cholesterol, total 144 04/19/2021 12:59 AM    HDL Cholesterol 33 04/19/2021 12:59 AM    LDL, calculated 74.8 04/19/2021 12:59 AM    Triglyceride 181 (H) 04/19/2021 12:59 AM    CHOL/HDL Ratio 4.4 04/19/2021 12:59 AM     Lab Results   Component Value Date/Time    Glucose (POC) 158 (H) 04/30/2021 05:53 AM    Glucose (POC) 170 (H) 04/29/2021 11:40 PM    Glucose (POC) 150 (H) 04/29/2021 07:01 PM    Glucose (POC) 160 (H) 04/29/2021 11:52 AM    Glucose (POC) 167 (H) 04/29/2021 06:06 AM     Lab Results   Component Value Date/Time    Color YELLOW/STRAW 04/27/2021 06:27 AM    Appearance CLEAR 04/27/2021 06:27 AM    Specific gravity 1.022 04/27/2021 06:27 AM    Specific gravity 1.015 09/21/2020 09:13 AM    pH (UA) 5.0 04/27/2021 06:27 AM    Protein 30 (A) 04/27/2021 06:27 AM    Glucose Negative 04/27/2021 06:27 AM    Ketone Negative 04/27/2021 06:27 AM    Bilirubin Negative 04/27/2021 06:27 AM    Urobilinogen 0.2 04/27/2021 06:27 AM    Nitrites Negative 04/27/2021 06:27 AM    Leukocyte Esterase Negative 04/27/2021 06:27 AM    Epithelial cells FEW 04/27/2021 06:27 AM    Bacteria Negative 04/27/2021 06:27 AM    WBC 0-4 04/27/2021 06:27 AM    RBC 0-5 04/27/2021 06:27 AM         Medications Reviewed:     Current Facility-Administered Medications   Medication Dose Route Frequency    hydrALAZINE (APRESOLINE) tablet 100 mg  100 mg Per NG tube Q8H    atenoloL (TENORMIN) tablet 100 mg  100 mg Per NG tube DAILY    albuterol-ipratropium (DUO-NEB) 2.5 MG-0.5 MG/3 ML  3 mL Nebulization BID RT    amLODIPine (NORVASC) tablet 5 mg  5 mg Oral DAILY    DULoxetine (CYMBALTA) capsule 30 mg  30 mg Oral DAILY    miconazole (MICOTIN) 2 % powder   Topical PRN    guaiFENesin-dextromethorphan (ROBITUSSIN DM) 100-10 mg/5 mL syrup 10 mL  10 mL Oral Q6H    albuterol (PROVENTIL VENTOLIN) nebulizer solution 2.5 mg  2.5 mg Nebulization Q6H PRN    guar gum (BENEFIBER) packet 1 Packet  4 g Oral TID    QUEtiapine (SEROquel) tablet 100 mg  100 mg Per NG tube QHS    docusate (COLACE) 50 mg/5 mL oral liquid 100 mg  100 mg Per NG tube DAILY    lansoprazole (PREVACID) 3 mg/mL oral suspension 30 mg  30 mg Per NG tube ACB    sodium chloride (NS) flush 5-40 mL  5-40 mL IntraVENous Q8H    sodium chloride (NS) flush 5-40 mL  5-40 mL IntraVENous PRN    acetaminophen (TYLENOL) tablet 650 mg  650 mg Oral Q6H PRN    Or    acetaminophen (TYLENOL) suppository 650 mg  650 mg Rectal Q6H PRN    polyethylene glycol (MIRALAX) packet 17 g  17 g Oral DAILY PRN    promethazine (PHENERGAN) tablet 12.5 mg  12.5 mg Oral Q6H PRN    Or    ondansetron (ZOFRAN) injection 4 mg  4 mg IntraVENous Q6H PRN    glucose chewable tablet 16 g  4 Tab Oral PRN    dextrose (D50W) injection syrg 12.5-25 g  25-50 mL IntraVENous PRN    glucagon (GLUCAGEN) injection 1 mg  1 mg IntraMUSCular PRN    insulin lispro (HUMALOG) injection   SubCUTAneous Q6H    hydrALAZINE (APRESOLINE) 20 mg/mL injection 10 mg  10 mg IntraVENous Q4H PRN    labetaloL (NORMODYNE;TRANDATE) injection 10 mg  10 mg IntraVENous Q4H PRN     ______________________________________________________________________  EXPECTED LENGTH OF STAY: 4d 9h  ACTUAL LENGTH OF STAY:          12                 Jyotsna Driver MD

## 2021-05-01 LAB
BACTERIA SPEC CULT: NORMAL
SERVICE CMNT-IMP: NORMAL

## 2021-05-01 PROCEDURE — 74011000250 HC RX REV CODE- 250: Performed by: INTERNAL MEDICINE

## 2021-05-01 PROCEDURE — 74011250637 HC RX REV CODE- 250/637: Performed by: STUDENT IN AN ORGANIZED HEALTH CARE EDUCATION/TRAINING PROGRAM

## 2021-05-01 PROCEDURE — 94640 AIRWAY INHALATION TREATMENT: CPT

## 2021-05-01 PROCEDURE — 74011000250 HC RX REV CODE- 250: Performed by: NURSE PRACTITIONER

## 2021-05-01 PROCEDURE — 74011250637 HC RX REV CODE- 250/637: Performed by: INTERNAL MEDICINE

## 2021-05-01 PROCEDURE — 65660000001 HC RM ICU INTERMED STEPDOWN

## 2021-05-01 PROCEDURE — 74011250637 HC RX REV CODE- 250/637: Performed by: NURSE PRACTITIONER

## 2021-05-01 PROCEDURE — 74011250637 HC RX REV CODE- 250/637: Performed by: ANESTHESIOLOGY

## 2021-05-01 PROCEDURE — 74011250637 HC RX REV CODE- 250/637: Performed by: HOSPITALIST

## 2021-05-01 PROCEDURE — 74011250636 HC RX REV CODE- 250/636: Performed by: NURSE PRACTITIONER

## 2021-05-01 RX ADMIN — IPRATROPIUM BROMIDE AND ALBUTEROL SULFATE 3 ML: .5; 3 SOLUTION RESPIRATORY (INHALATION) at 08:35

## 2021-05-01 RX ADMIN — QUETIAPINE FUMARATE 100 MG: 100 TABLET ORAL at 21:28

## 2021-05-01 RX ADMIN — ACETAMINOPHEN 650 MG: 325 TABLET, FILM COATED ORAL at 17:13

## 2021-05-01 RX ADMIN — Medication 1 PACKET: at 16:00

## 2021-05-01 RX ADMIN — HYDRALAZINE HYDROCHLORIDE 10 MG: 20 INJECTION INTRAMUSCULAR; INTRAVENOUS at 17:10

## 2021-05-01 RX ADMIN — HYDRALAZINE HYDROCHLORIDE 10 MG: 20 INJECTION INTRAMUSCULAR; INTRAVENOUS at 07:29

## 2021-05-01 RX ADMIN — LANSOPRAZOLE 30 MG: KIT at 08:31

## 2021-05-01 RX ADMIN — AMLODIPINE BESYLATE 5 MG: 5 TABLET ORAL at 08:26

## 2021-05-01 RX ADMIN — HYDRALAZINE HYDROCHLORIDE 100 MG: 50 TABLET, FILM COATED ORAL at 13:57

## 2021-05-01 RX ADMIN — FUROSEMIDE 40 MG: 40 TABLET ORAL at 08:26

## 2021-05-01 RX ADMIN — GUAIFENESIN AND DEXTROMETHORPHAN 10 ML: 100; 10 SYRUP ORAL at 01:11

## 2021-05-01 RX ADMIN — Medication 10 ML: at 21:29

## 2021-05-01 RX ADMIN — DOCUSATE SODIUM 100 MG: 50 LIQUID ORAL at 08:27

## 2021-05-01 RX ADMIN — Medication 1 PACKET: at 08:27

## 2021-05-01 RX ADMIN — GUAIFENESIN AND DEXTROMETHORPHAN 10 ML: 100; 10 SYRUP ORAL at 18:05

## 2021-05-01 RX ADMIN — HYDRALAZINE HYDROCHLORIDE 100 MG: 50 TABLET, FILM COATED ORAL at 21:27

## 2021-05-01 RX ADMIN — LABETALOL HYDROCHLORIDE 10 MG: 5 INJECTION INTRAVENOUS at 04:33

## 2021-05-01 RX ADMIN — HYDRALAZINE HYDROCHLORIDE 100 MG: 50 TABLET, FILM COATED ORAL at 06:26

## 2021-05-01 RX ADMIN — GUAIFENESIN AND DEXTROMETHORPHAN 10 ML: 100; 10 SYRUP ORAL at 06:27

## 2021-05-01 RX ADMIN — DULOXETINE HYDROCHLORIDE 30 MG: 30 CAPSULE, DELAYED RELEASE ORAL at 08:27

## 2021-05-01 RX ADMIN — ATENOLOL 100 MG: 50 TABLET ORAL at 08:26

## 2021-05-01 RX ADMIN — Medication 10 ML: at 13:58

## 2021-05-01 RX ADMIN — LABETALOL HYDROCHLORIDE 10 MG: 5 INJECTION INTRAVENOUS at 18:07

## 2021-05-01 RX ADMIN — GUAIFENESIN AND DEXTROMETHORPHAN 10 ML: 100; 10 SYRUP ORAL at 12:00

## 2021-05-01 RX ADMIN — IPRATROPIUM BROMIDE AND ALBUTEROL SULFATE 3 ML: .5; 3 SOLUTION RESPIRATORY (INHALATION) at 23:29

## 2021-05-01 RX ADMIN — HYDRALAZINE HYDROCHLORIDE 10 MG: 20 INJECTION INTRAMUSCULAR; INTRAVENOUS at 02:20

## 2021-05-01 NOTE — PROGRESS NOTES
[facility]  Hospitalist Group                                                                                          Hospitalist Progress Note  Guillaume Mendez MD  Answering service: [phone] OR 1886 from in house phone        Date of Service:  2021  NAME:  Nancy Bazzi  :  1934  MRN:  [id number]      Admission Summary:   51-year-old male with history of coronary disease-on aspirin, COPD, chronic insufficiency hyperlipidemia hypertension who has history of prior viral encephalitis was admitted with headache and nausea with vomiting. The CT scan on presentation showed a right intraparenchymal hemorrhage adjacent to the lateral ventricle. Patient was in the ICU, but followed by neurosurgery, there was no surgical recommendations for EVD but was followed with neuro checks every 2 hours. Cardene drip is required for blood pressure control, patient also became encephalopathic due to multiple etiologies. CT scan done yesterday for worsening of confusion shows improvement in intraventricular hemorrhage. Patient became febrile, sputum culture grew MSSA and patient was started on cefazolin. She is currently medically stable, neurochecks have been changed every 4 hours and critical care team recommends downgrading to neuro telemetry/neuro intermediate care bed. Interval history / Subjective: Follow up intraventricular hemorrhage. Patient seen and examined. Alert to name.    He tolerated -Pureed diet  couldn't tolerate barium swallow yesterday   Ready to go to SNF sometime next week d/w daughter      Assessment & Plan:     Intraventricular hemorrhage with mild ventriculomegaly  -Neurosurgery on board, no acute surgical intervention  -Repeat CT head shows some improvement in the hemorrhage  -Neurosurgery recommends less sedation, neuro checks every 4 hours to help decrease delirium and promote sleep    SIRS (fevers, tachycardia, tachypnea):   -unclear source, possible central. Will need to rule out other causes- d-dimer elevated d/w family kasandra get VQ scan as low probability  procalcitonin WNL d/c abx   - lower extremity dopplers no DVT    -blood, urine cultures, lactic acid, CXR unremarkable thus far      Acute metabolic encephalopathy, multifactorial: intermittent  -IVH/hospital delirium with possible component of SIRS  -Supportive care. Restraints only as needed   -Continue Seroquel at bedtime  -Neurochecks every 4 hours  -reorient days and nights     Dysphagia:   - passed SLP - Pureed diet  Nectar thickened liquid    Acute hypoxic respiratory failure: resolving   -concern for TESSA, will continue supplemental oxygen when sleeping   -previously requiring high flow nasal cannula  -aa nebs BID and prn     Hypertension:   -continue hydralazine, atenolol, amlodipine  -goal of SBP is to keep less than 160 mmHg     History of alcohol abuse  -family reports prior history but minimal alcohol intake prior to admission   -Out of withdrawal window     MSSA bronchitis  -Sputum culture grows MSSA    Code status: Full   DVT prophylaxis: scds    Care Plan discussed with: Patient/Family  Anticipated Disposition: SNF/LTC  Anticipated Discharge: Greater than 48 hours     CALLED WIFE UPDATED 4/30 d/w daughter as well same day at bedside      Hospital Problems  Date Reviewed: 9/21/2020          Codes Class Noted POA    Intracranial hemorrhage ([facility] [address] ICD-10-CM: I62.9  ICD-9-CM: 432.9  4/18/2021 Unknown                Review of Systems:   Negative unless stated above     Xr Abd (kub)    Result Date: 4/23/2021  Gastric tube tip is in appropriate position for use. Xr Abd (kub)    Result Date: 4/20/2021  Dobbhoff tube tip projects over the distal stomach. Xr Swallow Func Video    Result Date: 4/30/2021  Deep penetration with no definite aspiration consistently with thins and intermittently with nectar, pudding consistency, and solids. Mild piriform pooling.     Ct Head Wo Cont    Result Date: 4/27/2021  Overall mild decrease in hyperdense intraventricular hemorrhage. Extensive chronic small vessel ischemic disease. Stable ventricular size. Ct Head Wo Cont    Result Date: 4/24/2021  Diminished though still extensive intraventricular hemorrhage. No other interval change. Ct Head Wo Cont    Result Date: 4/22/2021  Slightly diminished intraventricular hemorrhage and ventriculomegaly as described     Ct Head Wo Cont    Result Date: 4/20/2021  Stable intraventricular hemorrhage and mild ventriculomegaly as described    Ct Head Wo Cont    Result Date: 4/19/2021  No significant change in acute intraventricular hemorrhage and mild ventriculomegaly as described. Ct Head Wo Cont    Result Date: 4/18/2021  No significant interval change in extensive intraventricular hemorrhage. Ct Head Wo Cont    Result Date: 4/18/2021  2 foci of parenchymal hemorrhage adjacent to the right lateral ventricle associated with significant intraventricular hemorrhage. Findings communicated to the ER by CT. Ct Abd Pelv Wo Cont    Result Date: 4/18/2021  No acute findings. Calcific plaque at the origin of the SMA. Xr Chest Port    Result Date: 4/26/2021  Decreased mild bibasilar opacities. Xr Chest Port    Result Date: 4/24/2021  No pneumothorax. Triple lumen catheter on the right in appropriate position for use. Minimal left basilar atelectasis. Xr Chest Port    Result Date: 4/22/2021  Unchanged small bilateral pleural effusions. Increased patchy opacities in the lung bases more likely related to atelectasis than airspace disease. Xr Chest Port    Result Date: 4/21/2021  1. New bilateral pleural effusions with new areas of moderate atelectasis medially in each lower lobe. Xr Chest Port    Result Date: 4/18/2021  No acute process identified. Vital Signs:    Last 24hrs VS reviewed since prior progress note.  Most recent are:  Visit Vitals  BP (!) 154/81   Pulse 72   Temp 98.6 °F (37 °C)   Resp 9   Ht 5' 8\" (1.727 m)   Wt 99.6 kg (219 lb 9.3 oz)   SpO2 93%   BMI 33.39 kg/m²         Intake/Output Summary (Last 24 hours) at 5/1/2021 1109  Last data filed at 5/1/2021 7642  Gross per 24 hour   Intake --   Output 1075 ml   Net -1075 ml        Physical Examination:     I had a face to face encounter with this patient and independently examined them on 5/1/2021 as outlined below:          Constitutional:  No acute distress, awake    ENT:  MMM    Resp:  CTA bilaterally. CV:  RRR, no murmurs, gallops, rubs    GI:  Soft, obese, non distended, non tender. BS+    Musculoskeletal:  No edema, warm, 2+ pulses throughout    Neurologic:  Moves all extremities. AAOx1, can intermittently follow commands- limited by hearing            Data Review:    Review and/or order of clinical lab test  Review and/or order of tests in the radiology section of CPT  Review and/or order of tests in the medicine section of CPT      Labs:     Recent Labs     04/29/21  0735   WBC 9.5   HGB 11.7*   HCT 36.8        Recent Labs     04/29/21  0735   *   K 3.9   *   CO2 26   BUN 32*   CREA 1.51*   *   CA 8.4*   MG 2.8*   PHOS 3.2     No results for input(s): ALT, AP, TBIL, TBILI, TP, ALB, GLOB, GGT, AML, LPSE in the last 72 hours. No lab exists for component: SGOT, GPT, AMYP, HLPSE  No results for input(s): INR, PTP, APTT, INREXT, INREXT in the last 72 hours. No results for input(s): FE, TIBC, PSAT, FERR in the last 72 hours. No results found for: FOL, RBCF   No results for input(s): PH, PCO2, PO2 in the last 72 hours. No results for input(s): CPK, CKNDX, TROIQ in the last 72 hours.     No lab exists for component: CPKMB  Lab Results   Component Value Date/Time    Cholesterol, total 144 04/19/2021 12:59 AM    HDL Cholesterol 33 04/19/2021 12:59 AM    LDL, calculated 74.8 04/19/2021 12:59 AM    Triglyceride 181 (H) 04/19/2021 12:59 AM    CHOL/HDL Ratio 4.4 04/19/2021 12:59 AM     Lab Results   Component Value Date/Time Glucose (POC) 155 (H) 04/30/2021 04:50 PM    Glucose (POC) 135 (H) 04/30/2021 11:52 AM    Glucose (POC) 158 (H) 04/30/2021 05:53 AM    Glucose (POC) 170 (H) 04/29/2021 11:40 PM    Glucose (POC) 150 (H) 04/29/2021 07:01 PM     Lab Results   Component Value Date/Time    Color YELLOW/STRAW 04/27/2021 06:27 AM    Appearance CLEAR 04/27/2021 06:27 AM    Specific gravity 1.022 04/27/2021 06:27 AM    Specific gravity 1.015 09/21/2020 09:13 AM    pH (UA) 5.0 04/27/2021 06:27 AM    Protein 30 (A) 04/27/2021 06:27 AM    Glucose Negative 04/27/2021 06:27 AM    Ketone Negative 04/27/2021 06:27 AM    Bilirubin Negative 04/27/2021 06:27 AM    Urobilinogen 0.2 04/27/2021 06:27 AM    Nitrites Negative 04/27/2021 06:27 AM    Leukocyte Esterase Negative 04/27/2021 06:27 AM    Epithelial cells FEW 04/27/2021 06:27 AM    Bacteria Negative 04/27/2021 06:27 AM    WBC 0-4 04/27/2021 06:27 AM    RBC 0-5 04/27/2021 06:27 AM         Medications Reviewed:     Current Facility-Administered Medications   Medication Dose Route Frequency    furosemide (LASIX) tablet 40 mg  40 mg Oral DAILY    hydrALAZINE (APRESOLINE) tablet 100 mg  100 mg Per NG tube Q8H    atenoloL (TENORMIN) tablet 100 mg  100 mg Per NG tube DAILY    albuterol-ipratropium (DUO-NEB) 2.5 MG-0.5 MG/3 ML  3 mL Nebulization BID RT    amLODIPine (NORVASC) tablet 5 mg  5 mg Oral DAILY    DULoxetine (CYMBALTA) capsule 30 mg  30 mg Oral DAILY    miconazole (MICOTIN) 2 % powder   Topical PRN    guaiFENesin-dextromethorphan (ROBITUSSIN DM) 100-10 mg/5 mL syrup 10 mL  10 mL Oral Q6H    albuterol (PROVENTIL VENTOLIN) nebulizer solution 2.5 mg  2.5 mg Nebulization Q6H PRN    guar gum (BENEFIBER) packet 1 Packet  4 g Oral TID    QUEtiapine (SEROquel) tablet 100 mg  100 mg Per NG tube QHS    docusate (COLACE) 50 mg/5 mL oral liquid 100 mg  100 mg Per NG tube DAILY    lansoprazole (PREVACID) 3 mg/mL oral suspension 30 mg  30 mg Per NG tube ACB    sodium chloride (NS) flush 5-40 mL  5-40 mL IntraVENous Q8H    sodium chloride (NS) flush 5-40 mL  5-40 mL IntraVENous PRN    acetaminophen (TYLENOL) tablet 650 mg  650 mg Oral Q6H PRN    Or    acetaminophen (TYLENOL) suppository 650 mg  650 mg Rectal Q6H PRN    polyethylene glycol (MIRALAX) packet 17 g  17 g Oral DAILY PRN    promethazine (PHENERGAN) tablet 12.5 mg  12.5 mg Oral Q6H PRN    Or    ondansetron (ZOFRAN) injection 4 mg  4 mg IntraVENous Q6H PRN    glucose chewable tablet 16 g  4 Tab Oral PRN    dextrose (D50W) injection syrg 12.5-25 g  25-50 mL IntraVENous PRN    glucagon (GLUCAGEN) injection 1 mg  1 mg IntraMUSCular PRN    hydrALAZINE (APRESOLINE) 20 mg/mL injection 10 mg  10 mg IntraVENous Q4H PRN    labetaloL (NORMODYNE;TRANDATE) injection 10 mg  10 mg IntraVENous Q4H PRN     ______________________________________________________________________  EXPECTED LENGTH OF STAY: 4d 9h  ACTUAL LENGTH OF STAY:          Maida Pete MD

## 2021-05-01 NOTE — PROGRESS NOTES
Bedside and Verbal shift change report given to Eli Hankins RN (oncoming nurse) by Irvin Shaikh RN (offgoing nurse). Report included the following information SBAR, Kardex, Intake/Output, MAR, Recent Results, Cardiac Rhythm NSR and Dual Neuro Assessment.

## 2021-05-01 NOTE — PROGRESS NOTES
Problem: Risk for Spread of Infection  Goal: Prevent transmission of infectious organism to others  Description: Prevent the transmission of infectious organisms to other patients, staff members, and visitors. Outcome: Progressing Towards Goal     Problem: Patient Education:  Go to Education Activity  Goal: Patient/Family Education  Outcome: Progressing Towards Goal     Problem: Falls - Risk of  Goal: *Absence of Falls  Description: Document Isaias Dewitt Fall Risk and appropriate interventions in the flowsheet.   Outcome: Progressing Towards Goal  Note: Fall Risk Interventions:       Mentation Interventions: Adequate sleep, hydration, pain control, Bed/chair exit alarm, Eyeglasses and hearing aids    Medication Interventions: Patient to call before getting OOB, Evaluate medications/consider consulting pharmacy    Elimination Interventions: Call light in reach, Urinal in reach, Stay With Me (per policy), Toileting schedule/hourly rounds    History of Falls Interventions: Bed/chair exit alarm, Door open when patient unattended         Problem: TIA/CVA Stroke: Day 2 Until Discharge  Goal: Activity/Safety  Outcome: Progressing Towards Goal  Goal: Diagnostic Test/Procedures  Outcome: Progressing Towards Goal  Goal: Nutrition/Diet  Outcome: Progressing Towards Goal  Goal: Discharge Planning  Outcome: Progressing Towards Goal  Goal: Medications  Outcome: Progressing Towards Goal  Goal: Respiratory  Outcome: Progressing Towards Goal  Goal: Treatments/Interventions/Procedures  Outcome: Progressing Towards Goal  Goal: Psychosocial  Outcome: Progressing Towards Goal  Goal: *Verbalizes anxiety and depression are reduced or absent  Outcome: Progressing Towards Goal  Goal: *Absence of aspiration  Outcome: Progressing Towards Goal  Goal: *Absence of deep venous thrombosis signs and symptoms(Stroke Metric)  Outcome: Progressing Towards Goal  Goal: *Optimal pain control at patient's stated goal  Outcome: Progressing Towards Goal

## 2021-05-01 NOTE — ROUTINE PROCESS
Bedside shift change report given to Rachel Green (oncoming nurse) by Aries Harrell (offgoing nurse). Report included the following information SBAR, ED Summary, MAR, Recent Results and Cardiac Rhythm nsr.

## 2021-05-01 NOTE — PROGRESS NOTES
Problem: Falls - Risk of  Goal: *Absence of Falls  Description: Document Lear Shaker Fall Risk and appropriate interventions in the flowsheet. Outcome: Progressing Towards Goal  Note: Fall Risk Interventions:       Mentation Interventions: Adequate sleep, hydration, pain control, Bed/chair exit alarm, Door open when patient unattended, Increase mobility, Room close to nurse's station    Medication Interventions: Bed/chair exit alarm, Evaluate medications/consider consulting pharmacy, Patient to call before getting OOB, Teach patient to arise slowly, Utilize gait belt for transfers/ambulation    Elimination Interventions: Bed/chair exit alarm, Call light in reach, Toileting schedule/hourly rounds    History of Falls Interventions: Bed/chair exit alarm, Door open when patient unattended, Investigate reason for fall, Room close to nurse's station, Utilize gait belt for transfer/ambulation         Problem: Pressure Injury - Risk of  Goal: *Prevention of pressure injury  Description: Document Aquiles Scale and appropriate interventions in the flowsheet. Outcome: Progressing Towards Goal  Note: Pressure Injury Interventions:  Sensory Interventions: Assess changes in LOC, Check visual cues for pain, Discuss PT/OT consult with provider, Float heels, Keep linens dry and wrinkle-free, Maintain/enhance activity level, Minimize linen layers, Turn and reposition approx.  every two hours (pillows and wedges if needed)    Moisture Interventions: Absorbent underpads, Apply protective barrier, creams and emollients, Check for incontinence Q2 hours and as needed, Internal/External urinary devices, Maintain skin hydration (lotion/cream)    Activity Interventions: Assess need for specialty bed, Increase time out of bed, Pressure redistribution bed/mattress(bed type), PT/OT evaluation    Mobility Interventions: Assess need for specialty bed, Float heels, HOB 30 degrees or less, Pressure redistribution bed/mattress (bed type), PT/OT evaluation, Turn and reposition approx.  every two hours(pillow and wedges)    Nutrition Interventions: Document food/fluid/supplement intake    Friction and Shear Interventions: Apply protective barrier, creams and emollients, HOB 30 degrees or less, Lift sheet, Lift team/patient mobility team                Problem: Nutrition Deficit  Goal: *Optimize nutritional status  Outcome: Progressing Towards Goal     Problem: TIA/CVA Stroke: Day 2 Until Discharge  Goal: Activity/Safety  Outcome: Progressing Towards Goal  Goal: Diagnostic Test/Procedures  Outcome: Progressing Towards Goal  Goal: Nutrition/Diet  Outcome: Progressing Towards Goal  Goal: Discharge Planning  Outcome: Progressing Towards Goal  Goal: Medications  Outcome: Progressing Towards Goal  Goal: Respiratory  Outcome: Progressing Towards Goal  Goal: Treatments/Interventions/Procedures  Outcome: Progressing Towards Goal  Goal: Psychosocial  Outcome: Progressing Towards Goal  Goal: *Verbalizes anxiety and depression are reduced or absent  Outcome: Progressing Towards Goal  Goal: *Absence of aspiration  Outcome: Progressing Towards Goal  Goal: *Absence of deep venous thrombosis signs and symptoms(Stroke Metric)  Outcome: Progressing Towards Goal  Goal: *Optimal pain control at patient's stated goal  Outcome: Progressing Towards Goal  Goal: *Tolerating diet  Outcome: Progressing Towards Goal  Goal: *Ability to perform ADLs and demonstrates progressive mobility and function  Outcome: Progressing Towards Goal  Goal: *Stroke education continued(Stroke Metric)  Outcome: Progressing Towards Goal

## 2021-05-02 PROCEDURE — 74011000250 HC RX REV CODE- 250: Performed by: INTERNAL MEDICINE

## 2021-05-02 PROCEDURE — 74011250636 HC RX REV CODE- 250/636: Performed by: NURSE PRACTITIONER

## 2021-05-02 PROCEDURE — 74011250637 HC RX REV CODE- 250/637: Performed by: HOSPITALIST

## 2021-05-02 PROCEDURE — 94640 AIRWAY INHALATION TREATMENT: CPT

## 2021-05-02 PROCEDURE — 77010033678 HC OXYGEN DAILY

## 2021-05-02 PROCEDURE — 65660000001 HC RM ICU INTERMED STEPDOWN

## 2021-05-02 PROCEDURE — 74011250637 HC RX REV CODE- 250/637: Performed by: NURSE PRACTITIONER

## 2021-05-02 PROCEDURE — 74011250637 HC RX REV CODE- 250/637: Performed by: ANESTHESIOLOGY

## 2021-05-02 PROCEDURE — 74011250637 HC RX REV CODE- 250/637: Performed by: INTERNAL MEDICINE

## 2021-05-02 PROCEDURE — 74011250637 HC RX REV CODE- 250/637: Performed by: STUDENT IN AN ORGANIZED HEALTH CARE EDUCATION/TRAINING PROGRAM

## 2021-05-02 RX ADMIN — ATENOLOL 100 MG: 50 TABLET ORAL at 10:48

## 2021-05-02 RX ADMIN — IPRATROPIUM BROMIDE AND ALBUTEROL SULFATE 3 ML: .5; 3 SOLUTION RESPIRATORY (INHALATION) at 08:03

## 2021-05-02 RX ADMIN — QUETIAPINE FUMARATE 100 MG: 100 TABLET ORAL at 21:45

## 2021-05-02 RX ADMIN — Medication 1 PACKET: at 19:06

## 2021-05-02 RX ADMIN — FUROSEMIDE 40 MG: 40 TABLET ORAL at 10:49

## 2021-05-02 RX ADMIN — Medication 10 ML: at 05:59

## 2021-05-02 RX ADMIN — DOCUSATE SODIUM 100 MG: 50 LIQUID ORAL at 10:49

## 2021-05-02 RX ADMIN — AMLODIPINE BESYLATE 5 MG: 5 TABLET ORAL at 10:48

## 2021-05-02 RX ADMIN — GUAIFENESIN AND DEXTROMETHORPHAN 10 ML: 100; 10 SYRUP ORAL at 13:04

## 2021-05-02 RX ADMIN — ACETAMINOPHEN 650 MG: 325 TABLET, FILM COATED ORAL at 05:58

## 2021-05-02 RX ADMIN — HYDRALAZINE HYDROCHLORIDE 100 MG: 50 TABLET, FILM COATED ORAL at 05:58

## 2021-05-02 RX ADMIN — HYDRALAZINE HYDROCHLORIDE 100 MG: 50 TABLET, FILM COATED ORAL at 21:45

## 2021-05-02 RX ADMIN — HYDRALAZINE HYDROCHLORIDE 10 MG: 20 INJECTION INTRAMUSCULAR; INTRAVENOUS at 15:19

## 2021-05-02 RX ADMIN — GUAIFENESIN AND DEXTROMETHORPHAN 10 ML: 100; 10 SYRUP ORAL at 05:58

## 2021-05-02 RX ADMIN — Medication 1 PACKET: at 22:49

## 2021-05-02 RX ADMIN — HYDRALAZINE HYDROCHLORIDE 100 MG: 50 TABLET, FILM COATED ORAL at 14:03

## 2021-05-02 RX ADMIN — Medication 1 PACKET: at 10:48

## 2021-05-02 RX ADMIN — DULOXETINE HYDROCHLORIDE 30 MG: 30 CAPSULE, DELAYED RELEASE ORAL at 10:49

## 2021-05-02 RX ADMIN — Medication 10 ML: at 14:00

## 2021-05-02 RX ADMIN — ACETAMINOPHEN 650 MG: 325 TABLET, FILM COATED ORAL at 10:48

## 2021-05-02 RX ADMIN — IPRATROPIUM BROMIDE AND ALBUTEROL SULFATE 3 ML: .5; 3 SOLUTION RESPIRATORY (INHALATION) at 20:39

## 2021-05-02 RX ADMIN — Medication 10 ML: at 22:49

## 2021-05-02 RX ADMIN — LANSOPRAZOLE 30 MG: KIT at 10:52

## 2021-05-02 NOTE — PROGRESS NOTES
Problem: Risk for Spread of Infection  Goal: Prevent transmission of infectious organism to others  Description: Prevent the transmission of infectious organisms to other patients, staff members, and visitors. Outcome: Progressing Towards Goal     Problem: Falls - Risk of  Goal: *Absence of Falls  Description: Document Mary Macedo Fall Risk and appropriate interventions in the flowsheet. Outcome: Progressing Towards Goal  Note: Fall Risk Interventions:       Mentation Interventions: Adequate sleep, hydration, pain control, Bed/chair exit alarm, Door open when patient unattended, Eyeglasses and hearing aids, Room close to nurse's station    Medication Interventions: Bed/chair exit alarm, Evaluate medications/consider consulting pharmacy, Patient to call before getting OOB, Teach patient to arise slowly, Utilize gait belt for transfers/ambulation    Elimination Interventions: Bed/chair exit alarm, Call light in reach, Patient to call for help with toileting needs, Toileting schedule/hourly rounds    History of Falls Interventions: Bed/chair exit alarm, Door open when patient unattended, Utilize gait belt for transfer/ambulation         Problem: Pressure Injury - Risk of  Goal: *Prevention of pressure injury  Description: Document Aquiles Scale and appropriate interventions in the flowsheet. Outcome: Progressing Towards Goal  Note: Pressure Injury Interventions:  Sensory Interventions: Assess changes in LOC, Check visual cues for pain, Discuss PT/OT consult with provider, Keep linens dry and wrinkle-free, Maintain/enhance activity level, Minimize linen layers, Turn and reposition approx.  every two hours (pillows and wedges if needed)    Moisture Interventions: Absorbent underpads, Apply protective barrier, creams and emollients, Check for incontinence Q2 hours and as needed, Internal/External urinary devices, Maintain skin hydration (lotion/cream)    Activity Interventions: Assess need for specialty bed, Increase time out of bed, Pressure redistribution bed/mattress(bed type), PT/OT evaluation    Mobility Interventions: Assess need for specialty bed, Float heels, Pressure redistribution bed/mattress (bed type), PT/OT evaluation, Turn and reposition approx.  every two hours(pillow and wedges)    Nutrition Interventions: Document food/fluid/supplement intake    Friction and Shear Interventions: Apply protective barrier, creams and emollients, Lift sheet, Lift team/patient mobility team, Transferring/repositioning devices                Problem: Nutrition Deficit  Goal: *Optimize nutritional status  Outcome: Progressing Towards Goal     Problem: TIA/CVA Stroke: Day 2 Until Discharge  Goal: Activity/Safety  Outcome: Progressing Towards Goal  Goal: Diagnostic Test/Procedures  Outcome: Progressing Towards Goal  Goal: Nutrition/Diet  Outcome: Progressing Towards Goal  Goal: Discharge Planning  Outcome: Progressing Towards Goal  Goal: Medications  Outcome: Progressing Towards Goal  Goal: Respiratory  Outcome: Progressing Towards Goal  Goal: Treatments/Interventions/Procedures  Outcome: Progressing Towards Goal  Goal: Psychosocial  Outcome: Progressing Towards Goal  Goal: *Verbalizes anxiety and depression are reduced or absent  Outcome: Progressing Towards Goal  Goal: *Absence of aspiration  Outcome: Progressing Towards Goal  Goal: *Absence of deep venous thrombosis signs and symptoms(Stroke Metric)  Outcome: Progressing Towards Goal  Goal: *Optimal pain control at patient's stated goal  Outcome: Progressing Towards Goal  Goal: *Tolerating diet  Outcome: Progressing Towards Goal  Goal: *Ability to perform ADLs and demonstrates progressive mobility and function  Outcome: Progressing Towards Goal  Goal: *Stroke education continued(Stroke Metric)  Outcome: Progressing Towards Goal

## 2021-05-02 NOTE — PROGRESS NOTES
[facility] Adult  Hospitalist Group                                                                                          Hospitalist Progress Note  Lisbeth Alba MD  Answering service: [phone] OR [phone] from in house phone        Date of Service:  2021  NAME:  Arthur Baum  :  1934  MRN:  [id number]      Admission Summary:   19-year-old male with history of coronary disease-on aspirin, COPD, chronic insufficiency hyperlipidemia hypertension who has history of prior viral encephalitis was admitted with headache and nausea with vomiting. The CT scan on presentation showed a right intraparenchymal hemorrhage adjacent to the lateral ventricle. Patient was in the ICU, but followed by neurosurgery, there was no surgical recommendations for EVD but was followed with neuro checks every 2 hours. Cardene drip is required for blood pressure control, patient also became encephalopathic due to multiple etiologies. CT scan done yesterday for worsening of confusion shows improvement in intraventricular hemorrhage. Patient became febrile, sputum culture grew MSSA and patient was started on cefazolin. She is currently medically stable, neurochecks have been changed every 4 hours and critical care team recommends downgrading to neuro telemetry/neuro intermediate care bed. Interval history / Subjective: Follow up intraventricular hemorrhage. Patient seen and examined. Alert to name.    He tolerated -Pureed diet no need for PEG   BP elevated will d/w RN  Ready to go to SNF sometime next week d/w daughter      Assessment & Plan:     Intraventricular hemorrhage with mild ventriculomegaly  -Neurosurgery on board, no acute surgical intervention  -Repeat CT head shows some improvement in the hemorrhage  -Neurosurgery recommends less sedation, neuro checks every 4 hours to help decrease delirium and promote sleep    SIRS (fevers, tachycardia, tachypnea):   -unclear source, possible central. Will need to rule out other causes- d-dimer elevated d/w family kasandra get VQ scan as low probability  procalcitonin WNL d/c abx   - lower extremity dopplers no DVT    -blood, urine cultures, lactic acid, CXR unremarkable thus far      Acute metabolic encephalopathy, multifactorial: intermittent  -IVH/hospital delirium with possible component of SIRS  -Supportive care. Restraints only as needed   -Continue Seroquel at bedtime  -Neurochecks every 4 hours  -reorient days and nights     Dysphagia:   - passed SLP - Pureed diet  Nectar thickened liquid    Acute hypoxic respiratory failure: resolving   -concern for TESSA, will continue supplemental oxygen when sleeping   -previously requiring high flow nasal cannula  -aa nebs BID and prn     Hypertension:   -continue hydralazine, atenolol, amlodipine  -goal of SBP is to keep less than 160 mmHg     History of alcohol abuse  -family reports prior history but minimal alcohol intake prior to admission   -Out of withdrawal window     MSSA bronchitis  -Sputum culture grows MSSA    Code status: Full   DVT prophylaxis: scds    Care Plan discussed with: Patient/Family  Anticipated Disposition: SNF/LTC  Anticipated Discharge: Greater than 48 hours     CALLED WIFE UPDATED 4/30 d/w daughter as well same day at bedside      Hospital Problems  Date Reviewed: 9/21/2020          Codes Class Noted POA    Intracranial hemorrhage ([facility] [address] ICD-10-CM: I62.9  ICD-9-CM: 432.9  4/18/2021 Unknown                Review of Systems:   Negative unless stated above     Xr Abd (kub)    Result Date: 4/23/2021  Gastric tube tip is in appropriate position for use. Xr Abd (kub)    Result Date: 4/20/2021  Dobbhoff tube tip projects over the distal stomach. Xr Swallow Func Video    Result Date: 4/30/2021  Deep penetration with no definite aspiration consistently with thins and intermittently with nectar, pudding consistency, and solids. Mild piriform pooling.     Ct Head Wo Cont    Result Date: 4/27/2021  Overall mild decrease in hyperdense intraventricular hemorrhage. Extensive chronic small vessel ischemic disease. Stable ventricular size. Ct Head Wo Cont    Result Date: 4/24/2021  Diminished though still extensive intraventricular hemorrhage. No other interval change. Ct Head Wo Cont    Result Date: 4/22/2021  Slightly diminished intraventricular hemorrhage and ventriculomegaly as described     Ct Head Wo Cont    Result Date: 4/20/2021  Stable intraventricular hemorrhage and mild ventriculomegaly as described    Ct Head Wo Cont    Result Date: 4/19/2021  No significant change in acute intraventricular hemorrhage and mild ventriculomegaly as described. Ct Head Wo Cont    Result Date: 4/18/2021  No significant interval change in extensive intraventricular hemorrhage. Ct Head Wo Cont    Result Date: 4/18/2021  2 foci of parenchymal hemorrhage adjacent to the right lateral ventricle associated with significant intraventricular hemorrhage. Findings communicated to the ER by CT. Ct Abd Pelv Wo Cont    Result Date: 4/18/2021  No acute findings. Calcific plaque at the origin of the SMA. Xr Chest Port    Result Date: 4/26/2021  Decreased mild bibasilar opacities. Xr Chest Port    Result Date: 4/24/2021  No pneumothorax. Triple lumen catheter on the right in appropriate position for use. Minimal left basilar atelectasis. Xr Chest Port    Result Date: 4/22/2021  Unchanged small bilateral pleural effusions. Increased patchy opacities in the lung bases more likely related to atelectasis than airspace disease. Xr Chest Port    Result Date: 4/21/2021  1. New bilateral pleural effusions with new areas of moderate atelectasis medially in each lower lobe. Xr Chest Port    Result Date: 4/18/2021  No acute process identified. Vital Signs:    Last 24hrs VS reviewed since prior progress note.  Most recent are:  Visit Vitals  BP (!) 178/87   Pulse 72   Temp 98.2 °F (36.8 °C)   Resp 18 Ht 5' 8\" (1.727 m)   Wt 100.8 kg (222 lb 3.6 oz)   SpO2 93%   BMI 33.79 kg/m²         Intake/Output Summary (Last 24 hours) at 5/2/2021 1052  Last data filed at 5/1/2021 1813  Gross per 24 hour   Intake --   Output 1000 ml   Net -1000 ml        Physical Examination:     I had a face to face encounter with this patient and independently examined them on 5/2/2021 as outlined below:          Constitutional:  No acute distress, awake    ENT:  MMM    Resp:  CTA bilaterally. CV:  RRR, no murmurs, gallops, rubs    GI:  Soft, obese, non distended, non tender. BS+    Musculoskeletal:  No edema, warm, 2+ pulses throughout    Neurologic:  Moves all extremities. AAOx1, can intermittently follow commands- limited by hearing            Data Review:    Review and/or order of clinical lab test  Review and/or order of tests in the radiology section of CPT  Review and/or order of tests in the medicine section of CPT      Labs:     No results for input(s): WBC, HGB, HCT, PLT, HGBEXT, HCTEXT, PLTEXT, HGBEXT, HCTEXT, PLTEXT in the last 72 hours. No results for input(s): NA, K, CL, CO2, BUN, CREA, GLU, CA, MG, PHOS, URICA in the last 72 hours. No results for input(s): ALT, AP, TBIL, TBILI, TP, ALB, GLOB, GGT, AML, LPSE in the last 72 hours. No lab exists for component: SGOT, GPT, AMYP, HLPSE  No results for input(s): INR, PTP, APTT, INREXT, INREXT in the last 72 hours. No results for input(s): FE, TIBC, PSAT, FERR in the last 72 hours. No results found for: FOL, RBCF   No results for input(s): PH, PCO2, PO2 in the last 72 hours. No results for input(s): CPK, CKNDX, TROIQ in the last 72 hours.     No lab exists for component: CPKMB  Lab Results   Component Value Date/Time    Cholesterol, total 144 04/19/2021 12:59 AM    HDL Cholesterol 33 04/19/2021 12:59 AM    LDL, calculated 74.8 04/19/2021 12:59 AM    Triglyceride 181 (H) 04/19/2021 12:59 AM    CHOL/HDL Ratio 4.4 04/19/2021 12:59 AM     Lab Results   Component Value Date/Time    Glucose (POC) 155 (H) 04/30/2021 04:50 PM    Glucose (POC) 135 (H) 04/30/2021 11:52 AM    Glucose (POC) 158 (H) 04/30/2021 05:53 AM    Glucose (POC) 170 (H) 04/29/2021 11:40 PM    Glucose (POC) 150 (H) 04/29/2021 07:01 PM     Lab Results   Component Value Date/Time    Color YELLOW/STRAW 04/27/2021 06:27 AM    Appearance CLEAR 04/27/2021 06:27 AM    Specific gravity 1.022 04/27/2021 06:27 AM    Specific gravity 1.015 09/21/2020 09:13 AM    pH (UA) 5.0 04/27/2021 06:27 AM    Protein 30 (A) 04/27/2021 06:27 AM    Glucose Negative 04/27/2021 06:27 AM    Ketone Negative 04/27/2021 06:27 AM    Bilirubin Negative 04/27/2021 06:27 AM    Urobilinogen 0.2 04/27/2021 06:27 AM    Nitrites Negative 04/27/2021 06:27 AM    Leukocyte Esterase Negative 04/27/2021 06:27 AM    Epithelial cells FEW 04/27/2021 06:27 AM    Bacteria Negative 04/27/2021 06:27 AM    WBC 0-4 04/27/2021 06:27 AM    RBC 0-5 04/27/2021 06:27 AM         Medications Reviewed:     Current Facility-Administered Medications   Medication Dose Route Frequency    furosemide (LASIX) tablet 40 mg  40 mg Oral DAILY    hydrALAZINE (APRESOLINE) tablet 100 mg  100 mg Per NG tube Q8H    atenoloL (TENORMIN) tablet 100 mg  100 mg Per NG tube DAILY    albuterol-ipratropium (DUO-NEB) 2.5 MG-0.5 MG/3 ML  3 mL Nebulization BID RT    amLODIPine (NORVASC) tablet 5 mg  5 mg Oral DAILY    DULoxetine (CYMBALTA) capsule 30 mg  30 mg Oral DAILY    miconazole (MICOTIN) 2 % powder   Topical PRN    guaiFENesin-dextromethorphan (ROBITUSSIN DM) 100-10 mg/5 mL syrup 10 mL  10 mL Oral Q6H    albuterol (PROVENTIL VENTOLIN) nebulizer solution 2.5 mg  2.5 mg Nebulization Q6H PRN    guar gum (BENEFIBER) packet 1 Packet  4 g Oral TID    QUEtiapine (SEROquel) tablet 100 mg  100 mg Per NG tube QHS    docusate (COLACE) 50 mg/5 mL oral liquid 100 mg  100 mg Per NG tube DAILY    lansoprazole (PREVACID) 3 mg/mL oral suspension 30 mg  30 mg Per NG tube ACB    sodium chloride (NS) flush 5-40 mL  5-40 mL IntraVENous Q8H    sodium chloride (NS) flush 5-40 mL  5-40 mL IntraVENous PRN    acetaminophen (TYLENOL) tablet 650 mg  650 mg Oral Q6H PRN    Or    acetaminophen (TYLENOL) suppository 650 mg  650 mg Rectal Q6H PRN    polyethylene glycol (MIRALAX) packet 17 g  17 g Oral DAILY PRN    promethazine (PHENERGAN) tablet 12.5 mg  12.5 mg Oral Q6H PRN    Or    ondansetron (ZOFRAN) injection 4 mg  4 mg IntraVENous Q6H PRN    glucose chewable tablet 16 g  4 Tab Oral PRN    dextrose (D50W) injection syrg 12.5-25 g  25-50 mL IntraVENous PRN    glucagon (GLUCAGEN) injection 1 mg  1 mg IntraMUSCular PRN    hydrALAZINE (APRESOLINE) 20 mg/mL injection 10 mg  10 mg IntraVENous Q4H PRN    labetaloL (NORMODYNE;TRANDATE) injection 10 mg  10 mg IntraVENous Q4H PRN     ______________________________________________________________________  EXPECTED LENGTH OF STAY: 4d 9h  ACTUAL LENGTH OF STAY:          14                 Chetan Humphreys MD

## 2021-05-02 NOTE — PROGRESS NOTES
Bedside and Verbal shift change report given to Arabella RN (oncoming nurse) by Larry Shahid RN (offgoing nurse). Report included the following information SBAR, Kardex, Intake/Output, MAR, Recent Results, Cardiac Rhythm NSR and Dual Neuro Assessment.

## 2021-05-03 VITALS
SYSTOLIC BLOOD PRESSURE: 142 MMHG | BODY MASS INDEX: 33.68 KG/M2 | DIASTOLIC BLOOD PRESSURE: 80 MMHG | OXYGEN SATURATION: 95 % | RESPIRATION RATE: 18 BRPM | HEART RATE: 62 BPM | HEIGHT: 68 IN | WEIGHT: 222.22 LBS | TEMPERATURE: 97.6 F

## 2021-05-03 LAB
COVID-19 RAPID TEST, COVR: NOT DETECTED
GLUCOSE BLD STRIP.AUTO-MCNC: 110 MG/DL (ref 65–100)
SERVICE CMNT-IMP: ABNORMAL
SOURCE, COVRS: NORMAL

## 2021-05-03 PROCEDURE — 92526 ORAL FUNCTION THERAPY: CPT

## 2021-05-03 PROCEDURE — 87635 SARS-COV-2 COVID-19 AMP PRB: CPT

## 2021-05-03 PROCEDURE — 74011250637 HC RX REV CODE- 250/637: Performed by: HOSPITALIST

## 2021-05-03 PROCEDURE — 94640 AIRWAY INHALATION TREATMENT: CPT

## 2021-05-03 PROCEDURE — 82962 GLUCOSE BLOOD TEST: CPT

## 2021-05-03 PROCEDURE — 74011250637 HC RX REV CODE- 250/637: Performed by: NURSE PRACTITIONER

## 2021-05-03 PROCEDURE — 74011250637 HC RX REV CODE- 250/637: Performed by: INTERNAL MEDICINE

## 2021-05-03 PROCEDURE — 74011000250 HC RX REV CODE- 250: Performed by: INTERNAL MEDICINE

## 2021-05-03 PROCEDURE — 74011250637 HC RX REV CODE- 250/637: Performed by: ANESTHESIOLOGY

## 2021-05-03 RX ORDER — QUETIAPINE FUMARATE 100 MG/1
100 TABLET, FILM COATED ORAL
Qty: 30 TAB | Refills: 0 | Status: SHIPPED | COMMUNITY
Start: 2021-05-03 | End: 2021-06-02

## 2021-05-03 RX ORDER — AMLODIPINE BESYLATE 5 MG/1
5 TABLET ORAL DAILY
Qty: 30 TAB | Refills: 0 | Status: ON HOLD | COMMUNITY
Start: 2021-05-04 | End: 2021-06-08

## 2021-05-03 RX ORDER — HYDRALAZINE HYDROCHLORIDE 100 MG/1
100 TABLET, FILM COATED ORAL EVERY 8 HOURS
Qty: 90 TAB | Refills: 0 | Status: ON HOLD | COMMUNITY
Start: 2021-05-03 | End: 2021-06-08

## 2021-05-03 RX ORDER — ISOSORBIDE MONONITRATE 30 MG/1
30 TABLET, EXTENDED RELEASE ORAL DAILY
Status: DISCONTINUED | OUTPATIENT
Start: 2021-05-03 | End: 2021-05-03

## 2021-05-03 RX ORDER — ISOSORBIDE DINITRATE 10 MG/1
10 TABLET ORAL 3 TIMES DAILY
Status: DISCONTINUED | OUTPATIENT
Start: 2021-05-03 | End: 2021-05-03 | Stop reason: HOSPADM

## 2021-05-03 RX ADMIN — Medication 1 PACKET: at 09:07

## 2021-05-03 RX ADMIN — IPRATROPIUM BROMIDE AND ALBUTEROL SULFATE 3 ML: .5; 3 SOLUTION RESPIRATORY (INHALATION) at 07:44

## 2021-05-03 RX ADMIN — Medication 10 ML: at 05:36

## 2021-05-03 RX ADMIN — ATENOLOL 100 MG: 50 TABLET ORAL at 09:07

## 2021-05-03 RX ADMIN — FUROSEMIDE 40 MG: 40 TABLET ORAL at 09:07

## 2021-05-03 RX ADMIN — Medication 10 ML: at 13:39

## 2021-05-03 RX ADMIN — DOCUSATE SODIUM 100 MG: 50 LIQUID ORAL at 09:07

## 2021-05-03 RX ADMIN — HYDRALAZINE HYDROCHLORIDE 100 MG: 50 TABLET, FILM COATED ORAL at 13:39

## 2021-05-03 RX ADMIN — LANSOPRAZOLE 30 MG: KIT at 09:07

## 2021-05-03 RX ADMIN — DULOXETINE HYDROCHLORIDE 30 MG: 30 CAPSULE, DELAYED RELEASE ORAL at 09:07

## 2021-05-03 RX ADMIN — ACETAMINOPHEN 650 MG: 325 TABLET, FILM COATED ORAL at 05:53

## 2021-05-03 RX ADMIN — ISOSORBIDE DINITRATE 10 MG: 10 TABLET ORAL at 13:39

## 2021-05-03 RX ADMIN — HYDRALAZINE HYDROCHLORIDE 100 MG: 50 TABLET, FILM COATED ORAL at 05:36

## 2021-05-03 RX ADMIN — AMLODIPINE BESYLATE 5 MG: 5 TABLET ORAL at 09:07

## 2021-05-03 NOTE — PROGRESS NOTES
Problem: Dysphagia (Adult)  Goal: *Acute Goals and Plan of Care (Insert Text)  Description: Initiated 4/28/2021  1. Patient will participate in reassessment of swallow function within 7 days. MET  New goal 4/30/21  2. Patient will tolerate dysphagia 1/pureed diet, nectar thickened liquids without overt s/s aspiration within 7 days. New goal 4/30/21  3. Patient will tolerate trials of solids with complete and timely mastication and and transit within 7 days. MET  New goal 4/30/21  4. Patient will tolerate trials of thin liquids with SLP free of s/s aspiration within 7 days. Outcome: Progressing Towards Goal     SPEECH LANGUAGE PATHOLOGY DYSPHAGIA TREATMENT  Patient: Carlos Enrique Lozano (25 y.o. male)  Date: 5/3/2021  Diagnosis: Intracranial hemorrhage (HCC) [I62.9] <principal problem not specified>       Precautions: swallow      ASSESSMENT:  Patient with no overt s/s of aspiration on nectar thick liquids and solids. Patient continues to be with mild-moderate oral dysphagia characterized by prolonged mastication and delayed oral transit with solids. Per MBS, patient with delayed swallow initiation and penetration to the cords on thin with unclear visualization of aspiration. PLAN:  Recommendations and Planned Interventions:  - Mechanical soft/nectar thick liquids  Patient continues to benefit from skilled intervention to address the above impairments. Continue treatment per established plan of care. Discharge Recommendations:  Skilled Nursing Facility     SUBJECTIVE:   Patient stated no when asked if he can hear after hearing aids were put in.     OBJECTIVE:   Cognitive and Communication Status:  Neurologic State: Alert  Orientation Level: Oriented to person, Disoriented to time, Disoriented to place, Disoriented to situation  Cognition: Follows commands  Perception: Appears intact  Perseveration: No perseveration noted  Safety/Judgement: Decreased awareness of environment  Dysphagia Treatment:  Oral Assessment:  Oral Assessment  Labial: No impairment  Dentition: Natural;Limited  Oral Hygiene: moist oral mucosa  Lingual: No impairment  Velum: No impairment  P.O. Trials:  Patient Position: upright in bed  Vocal quality prior to P.O.: No impairment  Consistency Presented: Nectar thick liquid; Solid  How Presented: Self-fed/presented;SLP-fed/presented; Successive swallows;Straw     Bolus Acceptance: No impairment  Bolus Formation/Control: delayed     Propulsion: delayed  Oral Residue: None  Initiation of Swallow: No impairment  Laryngeal Elevation: Functional  Aspiration Signs/Symptoms: None  Pharyngeal Phase Characteristics: No impairment, issues, or problems   Effective Modifications: None  Cues for Modifications: None            Pain:  Pain Scale 1: Numeric (0 - 10)  Pain Intensity 1: 0       After treatment:   Patient left in no apparent distress in bed and Caregiver / family present    COMMUNICATION/EDUCATION:   Patient was educated regarding his deficit(s) of dysphagia as this relates to his diagnosis of CVA. He demonstrated Good understanding as evidenced by nodding. The patient's plan of care including recommendations, planned interventions, and recommended diet changes were discussed with: Registered nurse.      Chyna Briscoe  Time Calculation: 15 mins

## 2021-05-03 NOTE — PROGRESS NOTES
I have reviewed discharge instructions with the facility. The facility verbalized understanding. Discharge medications reviewed with facility and appropriate educational materials and side effects teaching were provided. PIV and tele discontinued. Patient discharged to SNF via EMS. Report called to Airam Moy at [facility], unit number given for additional questions. Patient discharged with all personal belongings. CARL, NAD.

## 2021-05-03 NOTE — PROGRESS NOTES
NUTRITION brief  Recommendations:   1. Diet per SLP recommendations. 2. RD to add Boost pudding TID. Diet: Pureed, nectar thickened liquids  Supplements/Nutrition Support: None ordered  Nutrition-related meds:     Pt anticipated to d/c to [facility]. DHT pulled, pt tolerating PO diet well at present. Will add daily Boost Pudding to additionally aid in kcal/protein intakes while pt on dysphagia diet. See full RD assessment from 4/30 for additional details, goals, and monitoring/evaluation.    Estimated Nutrition Needs:   Energy: 2035 (MSJ x 1.2)  Wt used: Current(100 kg)  Protein: 105-119 (1.5-1.7g/kg IBW)  Wt used: Ideal(70 kg)   Fluid: 2035 ml     Josephine Payne RD    Contact via [employer]

## 2021-05-03 NOTE — PROGRESS NOTES
Bedside shift change report given to Jl (oncoming nurse) by Mellisa (offgoing nurse). Report included the following information SBAR, ED Summary, Intake/Output, MAR, Recent Results and Cardiac Rhythm NS.

## 2021-05-03 NOTE — DISCHARGE SUMMARY
Discharge Summary       PATIENT ID: Kaylynn Pedraza  MRN: [id number]   YOB: 1934    DATE OF ADMISSION: 4/18/2021  9:58 PM    DATE OF DISCHARGE: 5/3/21   PRIMARY CARE PROVIDER: Nellie Stearns MD     ATTENDING PHYSICIAN: Page Childs  DISCHARGING PROVIDER: Dilcia Keller MD    To contact this individual call [phone] and ask the  to page. If unavailable ask to be transferred the Adult Hospitalist Department.     CONSULTATIONS: IP CONSULT TO NEUROLOGY  IP CONSULT TO INTENSIVIST    PROCEDURES/SURGERIES: * No surgery found *    ADMITTING DIAGNOSES & HOSPITAL COURSE:   63-year-old male with history of coronary disease-on aspirin, COPD, chronic insufficiency hyperlipidemia hypertension who has history of prior viral encephalitis was admitted with headache and nausea with vomiting.  The CT scan on presentation showed a right intraparenchymal hemorrhage adjacent to the lateral ventricle.  Patient was in the ICU, but followed by neurosurgery, there was no surgical recommendations for EVD but was followed with neuro checks every 2 hours.  Cardene drip is required for blood pressure control, patient also became encephalopathic due to multiple etiologies.  CT scan done yesterday for worsening of confusion shows improvement in intraventricular hemorrhage.  Patient became febrile, sputum culture grew MSSA and patient was started on cefazolin.  She is currently medically stable, neurochecks have been changed every 4 hours and critical care team recommends downgrading to neuro telemetry/neuro intermediate care bed.      Assessment & Plan:      Intraventricular hemorrhage with mild ventriculomegaly  -Neurosurgery on board, no acute surgical intervention  -Repeat CT head shows some improvement in the hemorrhage     SIRS (fevers, tachycardia, tachypnea): 4/26 all resolved   -unclear source, possible central. - lower extremity dopplers no DVT    -blood, urine cultures, lactic acid, CXR unremarkable thus far      Acute metabolic encephalopathy, multifactorial: intermittent  -IVH/hospital delirium with possible component of SIRS  -Continue Seroquel at bedtime     Dysphagia:  Resolved   Now taking full regular meals      Acute hypoxic respiratory failure: resolved   -concern for TESSA, will continue supplemental oxygen when sleeping      Hypertension:   -continue hydralazine, atenolol, amlodipine and imdur , dose adjusted      History of alcohol abuse  -family reports prior history but minimal alcohol intake prior to admission   -Out of withdrawal window     MSSA bronchitis  -Sputum culture grows MSSA     Code status: Full              DISCHARGE DIAGNOSES / PLAN:      1. [facility]      ADDITIONAL CARE RECOMMENDATIONS:        PENDING TEST RESULTS:   At the time of discharge the following test results are still pending:     FOLLOW UP APPOINTMENTS:    Follow-up Information     Follow up With Specialties Details Why Contact Info    Fede Zhang MD Internal Medicine In 2 weeks  [address]  [phone]               DIET: Regular Diet and Cardiac Diet    ACTIVITY: Activity as tolerated    WOUND CARE:     EQUIPMENT needed:       DISCHARGE MEDICATIONS:  Current Discharge Medication List      START taking these medications    Details   amLODIPine (NORVASC) 5 mg tablet Take 1 Tab by mouth daily for 30 days. Qty: 30 Tab, Refills: 0      QUEtiapine (SEROquel) 100 mg tablet 1 Tab by Per NG tube route nightly for 30 days. Qty: 30 Tab, Refills: 0         CONTINUE these medications which have CHANGED    Details   hydrALAZINE (APRESOLINE) 100 mg tablet Take 1 Tab by mouth every eight (8) hours for 30 days.   Qty: 90 Tab, Refills: 0         CONTINUE these medications which have NOT CHANGED    Details   atenoloL (TENORMIN) 100 mg tablet TAKE ONE TABLET BY MOUTH DAILY  Qty: 90 Tab, Refills: 1      atorvastatin (LIPITOR) 80 mg tablet TAKE ONE TABLET BY MOUTH DAILY  Qty: 90 Tab, Refills: 1      furosemide (LASIX) 80 mg tablet TAKE ONE TABLET BY MOUTH DAILY  Qty: 90 Tab, Refills: 1      thiamine HCL (B-1) 100 mg tablet TAKE ONE BY MOUTH DAILY  Qty: 90 Tab, Refills: 4      isosorbide mononitrate ER (IMDUR) 30 mg tablet TAKE ONE TABLET BY MOUTH DAILY  Qty: 90 Tab, Refills: 4      pantoprazole (PROTONIX) 40 mg tablet TAKE ONE TABLET BY MOUTH DAILY  Qty: 90 Tab, Refills: 2      DULoxetine (CYMBALTA) 30 mg capsule TAKE ONE CAPSULE BY MOUTH DAILY  Qty: 90 Cap, Refills: 4      melatonin 5 mg cap capsule Take 5 mg by mouth nightly. Pt. Take 2 tabs a night      acetaminophen (TYLENOL) 500 mg tablet Take 1,000 mg by mouth every six (6) hours as needed for Pain. aspirin delayed-release 81 mg tablet Take 81 mg by mouth daily. ANORO ELLIPTA 62.5-25 mcg/actuation inhaler Take 1 Puff by inhalation daily. NOTIFY YOUR PHYSICIAN FOR ANY OF THE FOLLOWING:   Fever over 101 degrees for 24 hours. Chest pain, shortness of breath, fever, chills, nausea, vomiting, diarrhea, change in mentation, falling, weakness, bleeding. Severe pain or pain not relieved by medications. Or, any other signs or symptoms that you may have questions about. DISPOSITION:    Home With:   OT  PT  HH  RN      x Long term SNF/Inpatient Rehab    Independent/assisted living    Hospice    Other:       PATIENT CONDITION AT DISCHARGE:     Functional status   x Poor     Deconditioned     Independent      Cognition     Lucid    x Forgetful    x Dementia      Catheters/lines (plus indication)    Yu     PICC     PEG    x None      Code status   x  Full code     DNR      PHYSICAL EXAMINATION AT DISCHARGE:  General:          Alert, cooperative, no distress, appears stated age.      HEENT:           Atraumatic, anicteric sclerae, pink conjunctivae                          No oral ulcers, mucosa moist, throat clear, dentition fair  Neck:               Supple, symmetrical  Lungs:             Clear to auscultation bilaterally. No Wheezing or Rhonchi. No rales. Chest wall:      No tenderness  No Accessory muscle use. Heart:              Regular  rhythm,  No  murmur   No edema  Abdomen:        Soft, non-tender. Not distended. Bowel sounds normal  Extremities:     No cyanosis. No clubbing,                            Skin turgor normal, Capillary refill normal  Skin:                Not pale. Not Jaundiced  No rashes   Psych:             Not anxious or agitated.   Neurologic:      Alert, moves all extremities, confused       CHRONIC MEDICAL DIAGNOSES:  Problem List as of 5/3/2021 Date Reviewed: 9/21/2020          Codes Class Noted - Resolved    Intracranial hemorrhage ([facility] ICD-10-CM: I62.9  ICD-9-CM: 432.9  4/18/2021 - Present        CKD (chronic kidney disease) stage 3, GFR 30-59 ml/min ([facility]) ICD-10-CM: N18.30  ICD-9-CM: 585.3  9/21/2020 - Present        Coronary artery disease with stable angina pectoris ([facility] ICD-10-CM: I25.118  ICD-9-CM: 414.00, 413.9  6/26/2020 - Present        Chronic obstructive pulmonary disease ([facility] ICD-10-CM: J44.9  ICD-9-CM: 496  Unknown - Present        Viral encephalitis ICD-10-CM: A86  ICD-9-CM: 049.9  12/2/2019 - Present        Altered mental state ICD-10-CM: R41.82  ICD-9-CM: 780.97  11/19/2019 - Present        Facial droop ICD-10-CM: R29.810  ICD-9-CM: 781.94  10/27/2019 - Present        Acute blood loss anemia ICD-10-CM: D62  ICD-9-CM: 285.1  9/29/2019 - Present        Rectal bleeding ICD-10-CM: K62.5  ICD-9-CM: 569.3  9/23/2019 - Present        Pneumonia ICD-10-CM: J18.9  ICD-9-CM: 897  9/23/2019 - Present        Severe sepsis ([facility] ICD-10-CM: A41.9, R65.20  ICD-9-CM: 038.9, 995.92  9/23/2019 - Present        Flank pain ICD-10-CM: R10.9  ICD-9-CM: 789.09  8/1/2019 - Present        Spinal stenosis of lumbar region at multiple levels ICD-10-CM: M48.061  ICD-9-CM: 724.02  5/13/2019 - Present        Bilateral carotid artery stenosis ICD-10-CM: I65.23  ICD-9-CM: 433.10, 433.30  4/12/2019 - Present        Severe obesity ([facility] ICD-10-CM: E66.01  ICD-9-CM: 278.01  1/3/2019 - Present        TESSA on CPAP ICD-10-CM: G47.33, Z99.89  ICD-9-CM: 327.23, V46.8  1/3/2019 - Present        Simple chronic bronchitis (HCC) ICD-10-CM: J41.0  ICD-9-CM: 491.0  1/3/2019 - Present        Fatigue ICD-10-CM: R53.83  ICD-9-CM: 780.79  8/16/2017 - Present        CAD (coronary artery disease) ICD-10-CM: I25.10  ICD-9-CM: 414.00  8/16/2017 - Present        Dyslipidemia ICD-10-CM: E78.5  ICD-9-CM: 272.4  8/16/2017 - Present        On statin therapy ICD-10-CM: Z79.899  ICD-9-CM: V58.69  8/16/2017 - Present        Gout ICD-10-CM: M10.9  ICD-9-CM: 274.9  8/16/2017 - Present        GERD (gastroesophageal reflux disease) ICD-10-CM: K21.9  ICD-9-CM: 530.81  8/16/2017 - Present        TIA (transient ischemic attack) ICD-10-CM: G45.9  ICD-9-CM: 435.9  8/16/2017 - Present        Dyspnea ICD-10-CM: R06.00  ICD-9-CM: 786.09  8/16/2017 - Present        Colon polyps ICD-10-CM: K63.5  ICD-9-CM: 211.3  8/16/2017 - Present        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  ICD-9-CM: 528.6  8/16/2017 - Present        Hypertension ICD-10-CM: I10  ICD-9-CM: 401.9  8/16/2017 - Present        ED (erectile dysfunction) ICD-10-CM: N52.9  ICD-9-CM: 607.84  8/16/2017 - Present        Hypoxia ICD-10-CM: R09.02  ICD-9-CM: 799.02  4/23/2015 - Present        Neurogenic claudication ICD-10-CM: F93.682  ICD-9-CM: 724.03  4/23/2015 - Present        RESOLVED: Encounter for immunization ICD-10-CM: Z23  ICD-9-CM: V03.89  8/16/2017 - 9/25/2019        RESOLVED: Umbilical hernia EDS-35-TP: K42.9  ICD-9-CM: 553.1  11/9/2011 - 11/10/2015              Greater than 30  minutes were spent with the patient on counseling and coordination of care    Signed:   Vitor Mercedes MD  5/3/2021  11:21 AM

## 2021-05-03 NOTE — DISCHARGE INSTRUCTIONS
Discharge Instructions       PATIENT ID: Fredick Leyden  MRN: [id number]   YOB: 1934    DATE OF ADMISSION: 4/18/2021  9:58 PM    DATE OF DISCHARGE: 5/3/2021    PRIMARY CARE PROVIDER: Char Cowart MD     ATTENDING PHYSICIAN: Denisse García MD  DISCHARGING PROVIDER: Perez Hancock MD    To contact this individual call [phone] and ask the  to page. If unavailable ask to be transferred the Adult Hospitalist Department. DISCHARGE DIAGNOSES ICH     CONSULTATIONS: IP CONSULT TO NEUROLOGY  IP CONSULT TO INTENSIVIST    PROCEDURES/SURGERIES: * No surgery found *    PENDING TEST RESULTS:   At the time of discharge the following test results are still pending:     FOLLOW UP APPOINTMENTS:   Follow-up Information     Follow up With Specialties Details Why Contact Info    Char Cowart MD Internal Medicine In 2 weeks  [facility]  [phone]             ADDITIONAL CARE RECOMMENDATIONS:     DIET: Regular Diet and Cardiac Diet      ACTIVITY: Activity as tolerated    WOUND CARE:     EQUIPMENT needed:       Radiology      DISCHARGE MEDICATIONS:   See Medication Reconciliation Form    · It is important that you take the medication exactly as they are prescribed. · Keep your medication in the bottles provided by the pharmacist and keep a list of the medication names, dosages, and times to be taken in your wallet. · Do not take other medications without consulting your doctor. NOTIFY YOUR PHYSICIAN FOR ANY OF THE FOLLOWING:   Fever over 101 degrees for 24 hours. Chest pain, shortness of breath, fever, chills, nausea, vomiting, diarrhea, change in mentation, falling, weakness, bleeding. Severe pain or pain not relieved by medications. Or, any other signs or symptoms that you may have questions about.       DISPOSITION:  x  Home With:   OT  PT  HH  RN       SNF/Inpatient Rehab/LTAC    Independent/assisted living    Hospice    Other:     CDMP Checked:   Yes x     PROBLEM LIST Updated:  Yes x       Signed:   Job Gillespie MD  5/3/2021  11:20 AM

## 2021-05-03 NOTE — PROGRESS NOTES
Problem: Hemorrhagic Stroke: Day 5 through Discharge  Goal: Activity/Safety  Outcome: Progressing Towards Goal  Goal: Consults, if ordered  Outcome: Progressing Towards Goal  Goal: Diagnostic Test/Procedures  Outcome: Progressing Towards Goal  Goal: Nutrition/Diet  Outcome: Progressing Towards Goal  Goal: Medications  Outcome: Progressing Towards Goal

## 2021-05-03 NOTE — PROGRESS NOTES
Transition of Care Plan   RUR- 17% Low Risk   DISPOSITION: The disposition plan is to transition to SNF - [facility].  Transport: [facility] - 2:00pm    CM contacted [facility] coordinator from [facility]/[phone] to be provided an update. CM left a voice message. CM then contacted patients daughter who requested an update. Patient is being recommended for SNF and the only choice patients daughter provided was to [facility]. Patients daughter reported, \"my daughter works there and she can get him in there because there is room. \" CM expressed to patients daughter that per request of [facility] patient needs to be off of restraints for 24 hours. CM encouraged patients daughter to select a back up plan for SNF. Patient has been accepted to [facility]. Patient room: 35A  Call to report: [phone]  AVS has been updated. CM contacted patients daughter to provide update and room number as well as the [facility]  time. CM will continue to follow, provide support and assist with JACQUES needs as they arise.     Hugh Damico

## 2021-05-03 NOTE — PROGRESS NOTES
Problem: Risk for Spread of Infection  Goal: Prevent transmission of infectious organism to others  Description: Prevent the transmission of infectious organisms to other patients, staff members, and visitors. Outcome: Progressing Towards Goal     Problem: Patient Education:  Go to Education Activity  Goal: Patient/Family Education  Outcome: Progressing Towards Goal     Problem: Non-Violent Restraints  Goal: Removal from restraints as soon as assessed to be safe  Outcome: Progressing Towards Goal  Goal: No harm/injury to patient while restraints in use  Outcome: Progressing Towards Goal  Goal: Patient's dignity will be maintained  Outcome: Progressing Towards Goal  Goal: Patient Interventions  Outcome: Progressing Towards Goal     Problem: Falls - Risk of  Goal: *Absence of Falls  Description: Document Alexandria Rausch Fall Risk and appropriate interventions in the flowsheet.   Outcome: Progressing Towards Goal  Note: Fall Risk Interventions:  Mobility Interventions: Bed/chair exit alarm, Communicate number of staff needed for ambulation/transfer, OT consult for ADLs, Patient to call before getting OOB, PT Consult for mobility concerns, PT Consult for assist device competence, Utilize walker, cane, or other assistive device, Utilize gait belt for transfers/ambulation    Mentation Interventions: Door open when patient unattended, Adequate sleep, hydration, pain control, Bed/chair exit alarm, More frequent rounding, Reorient patient, Increase mobility, Room close to nurse's station, Toileting rounds, Update white board    Medication Interventions: Evaluate medications/consider consulting pharmacy, Patient to call before getting OOB    Elimination Interventions: Patient to call for help with toileting needs, Elevated toilet seat, Toileting schedule/hourly rounds    History of Falls Interventions: Bed/chair exit alarm, Consult care management for discharge planning, Door open when patient unattended, Room close to nurse's station, Vital signs minimum Q4HRs X 24 hrs (comment for end date)         Problem: Patient Education: Go to Patient Education Activity  Goal: Patient/Family Education  Outcome: Progressing Towards Goal     Problem: Pressure Injury - Risk of  Goal: *Prevention of pressure injury  Description: Document Aquiles Scale and appropriate interventions in the flowsheet.   Outcome: Progressing Towards Goal  Note: Pressure Injury Interventions:  Sensory Interventions: Assess changes in LOC, Check visual cues for pain, Discuss PT/OT consult with provider, Float heels, Keep linens dry and wrinkle-free, Maintain/enhance activity level, Minimize linen layers, Monitor skin under medical devices, Pressure redistribution bed/mattress (bed type)    Moisture Interventions: Check for incontinence Q2 hours and as needed, Absorbent underpads, Limit adult briefs, Maintain skin hydration (lotion/cream), Minimize layers    Activity Interventions: Increase time out of bed, Pressure redistribution bed/mattress(bed type), PT/OT evaluation    Mobility Interventions: Pressure redistribution bed/mattress (bed type), PT/OT evaluation    Nutrition Interventions: Document food/fluid/supplement intake    Friction and Shear Interventions: Lift sheet, Lift team/patient mobility team, Minimize layers                Problem: Patient Education: Go to Patient Education Activity  Goal: Patient/Family Education  Outcome: Progressing Towards Goal     Problem: Nutrition Deficit  Goal: *Optimize nutritional status  Outcome: Progressing Towards Goal     Problem: Patient Education: Go to Patient Education Activity  Goal: Patient/Family Education  Outcome: Progressing Towards Goal     Problem: TIA/CVA Stroke: 0-24 hours  Goal: Off Pathway (Use only if patient is Off Pathway)  Outcome: Progressing Towards Goal  Goal: Activity/Safety  Outcome: Progressing Towards Goal  Goal: Nutrition/Diet  Outcome: Progressing Towards Goal  Goal: Discharge Planning  Outcome: Progressing Towards Goal  Goal: Medications  Outcome: Progressing Towards Goal  Goal: Respiratory  Outcome: Progressing Towards Goal  Goal: Treatments/Interventions/Procedures  Outcome: Progressing Towards Goal  Goal: Minimize risk of bleeding post-thrombolytic infusion  Outcome: Progressing Towards Goal  Goal: Monitor for complications post-thrombolytic infusion  Outcome: Progressing Towards Goal  Goal: Psychosocial  Outcome: Progressing Towards Goal  Goal: *Hemodynamically stable  Outcome: Progressing Towards Goal  Goal: *Neurologically stable  Description: Absence of additional neurological deficits    Outcome: Progressing Towards Goal  Goal: *Verbalizes anxiety and depression are reduced or absent  Outcome: Progressing Towards Goal  Goal: *Absence of Signs of Aspiration on Current Diet  Outcome: Progressing Towards Goal  Goal: *Absence of deep venous thrombosis signs and symptoms(Stroke Metric)  Outcome: Progressing Towards Goal  Goal: *Ability to perform ADLs and demonstrates progressive mobility and function  Outcome: Progressing Towards Goal  Goal: *Stroke education started(Stroke Metric)  Outcome: Progressing Towards Goal  Goal: *Dysphagia screen performed(Stroke Metric)  Outcome: Progressing Towards Goal  Goal: *Rehab consulted(Stroke Metric)  Outcome: Progressing Towards Goal     Problem: TIA/CVA Stroke: Day 2 Until Discharge  Goal: Off Pathway (Use only if patient is Off Pathway)  Outcome: Progressing Towards Goal  Goal: Activity/Safety  Outcome: Progressing Towards Goal  Goal: Diagnostic Test/Procedures  Outcome: Progressing Towards Goal  Goal: Nutrition/Diet  Outcome: Progressing Towards Goal  Goal: Discharge Planning  Outcome: Progressing Towards Goal  Goal: Medications  Outcome: Progressing Towards Goal  Goal: Respiratory  Outcome: Progressing Towards Goal  Goal: Treatments/Interventions/Procedures  Outcome: Progressing Towards Goal  Goal: Psychosocial  Outcome: Progressing Towards Goal  Goal: *Verbalizes anxiety and depression are reduced or absent  Outcome: Progressing Towards Goal  Goal: *Absence of aspiration  Outcome: Progressing Towards Goal  Goal: *Absence of deep venous thrombosis signs and symptoms(Stroke Metric)  Outcome: Progressing Towards Goal  Goal: *Optimal pain control at patient's stated goal  Outcome: Progressing Towards Goal  Goal: *Tolerating diet  Outcome: Progressing Towards Goal  Goal: *Ability to perform ADLs and demonstrates progressive mobility and function  Outcome: Progressing Towards Goal  Goal: *Stroke education continued(Stroke Metric)  Outcome: Progressing Towards Goal     Problem: Ischemic Stroke: Discharge Outcomes  Goal: *Verbalizes anxiety and depression are reduced or absent  Outcome: Progressing Towards Goal  Goal: *Verbalize understanding of risk factor modification(Stroke Metric)  Outcome: Progressing Towards Goal  Goal: *Hemodynamically stable  Outcome: Progressing Towards Goal  Goal: *Absence of aspiration pneumonia  Outcome: Progressing Towards Goal  Goal: *Aware of needed dietary changes  Outcome: Progressing Towards Goal  Goal: *Verbalize understanding of prescribed medications including anti-coagulants, anti-lipid, and/or anti-platelets(Stroke Metric)  Outcome: Progressing Towards Goal  Goal: *Tolerating diet  Outcome: Progressing Towards Goal  Goal: *Aware of follow-up diagnostics related to anticoagulants  Outcome: Progressing Towards Goal  Goal: *Ability to perform ADLs and demonstrates progressive mobility and function  Outcome: Progressing Towards Goal  Goal: *Absence of DVT(Stroke Metric)  Outcome: Progressing Towards Goal  Goal: *Absence of aspiration  Outcome: Progressing Towards Goal  Goal: *Optimal pain control at patient's stated goal  Outcome: Progressing Towards Goal  Goal: *Home safety concerns addressed  Outcome: Progressing Towards Goal  Goal: *Describes available resources and support systems  Outcome: Progressing Towards Goal  Goal: *Verbalizes understanding of activation of [id number]) for stroke symptoms(Stroke Metric)  Outcome: Progressing Towards Goal  Goal: *Understands and describes signs and symptoms to report to providers(Stroke Metric)  Outcome: Progressing Towards Goal  Goal: *Neurolgocially stable (absence of additional neurological deficits)  Outcome: Progressing Towards Goal  Goal: *Verbalizes importance of follow-up with primary care physician(Stroke Metric)  Outcome: Progressing Towards Goal  Goal: *Smoking cessation discussed,if applicable(Stroke Metric)  Outcome: Progressing Towards Goal  Goal: *Depression screening completed(Stroke Metric)  Outcome: Progressing Towards Goal

## 2021-05-03 NOTE — PROGRESS NOTES
Bedside and Verbal shift change report given to [facility] (oncoming nurse) by Jeanne (offgoing nurse). Report included the following information SBAR, Kardex, Procedure Summary, MAR, Recent Results, Cardiac Rhythm NSR and Dual Neuro Assessment.

## 2021-05-05 ENCOUNTER — TELEPHONE (OUTPATIENT)
Dept: INTERNAL MEDICINE CLINIC | Age: 86
End: 2021-05-05

## 2021-05-16 ENCOUNTER — APPOINTMENT (OUTPATIENT)
Dept: CT IMAGING | Age: 86
DRG: 193 | End: 2021-05-16
Attending: EMERGENCY MEDICINE
Payer: MEDICARE

## 2021-05-16 ENCOUNTER — HOSPITAL ENCOUNTER (INPATIENT)
Age: 86
LOS: 23 days | Discharge: NURSING, REHAB OR LONG-TERM CARE | DRG: 193 | End: 2021-06-08
Attending: EMERGENCY MEDICINE | Admitting: STUDENT IN AN ORGANIZED HEALTH CARE EDUCATION/TRAINING PROGRAM
Payer: MEDICARE

## 2021-05-16 ENCOUNTER — APPOINTMENT (OUTPATIENT)
Dept: GENERAL RADIOLOGY | Age: 86
DRG: 193 | End: 2021-05-16
Attending: EMERGENCY MEDICINE
Payer: MEDICARE

## 2021-05-16 DIAGNOSIS — G40.901 NON-CONVULSIVE STATUS EPILEPTICUS (HCC): ICD-10-CM

## 2021-05-16 DIAGNOSIS — R41.82 ALTERED MENTAL STATUS, UNSPECIFIED ALTERED MENTAL STATUS TYPE: ICD-10-CM

## 2021-05-16 DIAGNOSIS — R40.0 SOMNOLENCE: ICD-10-CM

## 2021-05-16 DIAGNOSIS — R53.81 PHYSICAL DEBILITY: ICD-10-CM

## 2021-05-16 DIAGNOSIS — J18.9 PNEUMONIA OF LEFT LOWER LOBE DUE TO INFECTIOUS ORGANISM: Primary | ICD-10-CM

## 2021-05-16 DIAGNOSIS — R41.82 MENTAL STATUS, DECREASED: ICD-10-CM

## 2021-05-16 DIAGNOSIS — R56.9 SEIZURES (HCC): ICD-10-CM

## 2021-05-16 DIAGNOSIS — R63.30 FEEDING DIFFICULTIES: ICD-10-CM

## 2021-05-16 LAB
ALBUMIN SERPL-MCNC: 3.9 G/DL (ref 3.5–5)
ALBUMIN/GLOB SERPL: 1 {RATIO} (ref 1.1–2.2)
ALP SERPL-CCNC: 117 U/L (ref 45–117)
ALT SERPL-CCNC: 27 U/L (ref 12–78)
ANION GAP SERPL CALC-SCNC: 6 MMOL/L (ref 5–15)
APPEARANCE UR: CLEAR
AST SERPL-CCNC: 26 U/L (ref 15–37)
BACTERIA URNS QL MICRO: NEGATIVE /HPF
BASOPHILS # BLD: 0 K/UL (ref 0–0.1)
BASOPHILS NFR BLD: 0 % (ref 0–1)
BILIRUB SERPL-MCNC: 0.7 MG/DL (ref 0.2–1)
BILIRUB UR QL: NEGATIVE
BUN SERPL-MCNC: 28 MG/DL (ref 6–20)
BUN/CREAT SERPL: 17 (ref 12–20)
CALCIUM SERPL-MCNC: 9.3 MG/DL (ref 8.5–10.1)
CHLORIDE SERPL-SCNC: 101 MMOL/L (ref 97–108)
CO2 SERPL-SCNC: 29 MMOL/L (ref 21–32)
COLOR UR: NORMAL
COMMENT, HOLDF: NORMAL
CREAT SERPL-MCNC: 1.61 MG/DL (ref 0.7–1.3)
DIFFERENTIAL METHOD BLD: ABNORMAL
EOSINOPHIL # BLD: 0 K/UL (ref 0–0.4)
EOSINOPHIL NFR BLD: 0 % (ref 0–7)
EPITH CASTS URNS QL MICRO: NORMAL /LPF
ERYTHROCYTE [DISTWIDTH] IN BLOOD BY AUTOMATED COUNT: 15.5 % (ref 11.5–14.5)
GLOBULIN SER CALC-MCNC: 3.8 G/DL (ref 2–4)
GLUCOSE SERPL-MCNC: 126 MG/DL (ref 65–100)
GLUCOSE UR STRIP.AUTO-MCNC: NEGATIVE MG/DL
HCT VFR BLD AUTO: 40.1 % (ref 36.6–50.3)
HGB BLD-MCNC: 13.1 G/DL (ref 12.1–17)
HGB UR QL STRIP: NEGATIVE
HYALINE CASTS URNS QL MICRO: NORMAL /LPF (ref 0–5)
IMM GRANULOCYTES # BLD AUTO: 0 K/UL (ref 0–0.04)
IMM GRANULOCYTES NFR BLD AUTO: 0 % (ref 0–0.5)
KETONES UR QL STRIP.AUTO: NEGATIVE MG/DL
LACTATE SERPL-SCNC: 1.8 MMOL/L (ref 0.4–2)
LEUKOCYTE ESTERASE UR QL STRIP.AUTO: NEGATIVE
LYMPHOCYTES # BLD: 0.6 K/UL (ref 0.8–3.5)
LYMPHOCYTES NFR BLD: 6 % (ref 12–49)
MAGNESIUM SERPL-MCNC: 1.8 MG/DL (ref 1.6–2.4)
MCH RBC QN AUTO: 31 PG (ref 26–34)
MCHC RBC AUTO-ENTMCNC: 32.7 G/DL (ref 30–36.5)
MCV RBC AUTO: 94.8 FL (ref 80–99)
MONOCYTES # BLD: 0.5 K/UL (ref 0–1)
MONOCYTES NFR BLD: 5 % (ref 5–13)
NEUTS SEG # BLD: 9.7 K/UL (ref 1.8–8)
NEUTS SEG NFR BLD: 89 % (ref 32–75)
NITRITE UR QL STRIP.AUTO: NEGATIVE
NRBC # BLD: 0 K/UL (ref 0–0.01)
NRBC BLD-RTO: 0 PER 100 WBC
PH UR STRIP: 5 [PH] (ref 5–8)
PHOSPHATE SERPL-MCNC: 3.9 MG/DL (ref 2.6–4.7)
PLATELET # BLD AUTO: 179 K/UL (ref 150–400)
PMV BLD AUTO: 10.2 FL (ref 8.9–12.9)
POTASSIUM SERPL-SCNC: 4.2 MMOL/L (ref 3.5–5.1)
PROCALCITONIN SERPL-MCNC: 0.05 NG/ML
PROT SERPL-MCNC: 7.7 G/DL (ref 6.4–8.2)
PROT UR STRIP-MCNC: NEGATIVE MG/DL
RBC # BLD AUTO: 4.23 M/UL (ref 4.1–5.7)
RBC #/AREA URNS HPF: NORMAL /HPF (ref 0–5)
RBC MORPH BLD: ABNORMAL
RBC MORPH BLD: ABNORMAL
SAMPLES BEING HELD,HOLD: NORMAL
SARS-COV-2, COV2: NORMAL
SODIUM SERPL-SCNC: 136 MMOL/L (ref 136–145)
SP GR UR REFRACTOMETRY: 1.01 (ref 1–1.03)
TSH SERPL DL<=0.05 MIU/L-ACNC: 1.45 UIU/ML (ref 0.36–3.74)
UR CULT HOLD, URHOLD: NORMAL
UROBILINOGEN UR QL STRIP.AUTO: 0.2 EU/DL (ref 0.2–1)
WBC # BLD AUTO: 10.8 K/UL (ref 4.1–11.1)
WBC URNS QL MICRO: NORMAL /HPF (ref 0–4)

## 2021-05-16 PROCEDURE — 36415 COLL VENOUS BLD VENIPUNCTURE: CPT

## 2021-05-16 PROCEDURE — 74011000258 HC RX REV CODE- 258: Performed by: STUDENT IN AN ORGANIZED HEALTH CARE EDUCATION/TRAINING PROGRAM

## 2021-05-16 PROCEDURE — 93005 ELECTROCARDIOGRAM TRACING: CPT

## 2021-05-16 PROCEDURE — 51701 INSERT BLADDER CATHETER: CPT

## 2021-05-16 PROCEDURE — 80053 COMPREHEN METABOLIC PANEL: CPT

## 2021-05-16 PROCEDURE — 84443 ASSAY THYROID STIM HORMONE: CPT

## 2021-05-16 PROCEDURE — 83735 ASSAY OF MAGNESIUM: CPT

## 2021-05-16 PROCEDURE — 84100 ASSAY OF PHOSPHORUS: CPT

## 2021-05-16 PROCEDURE — 84145 PROCALCITONIN (PCT): CPT

## 2021-05-16 PROCEDURE — 70450 CT HEAD/BRAIN W/O DYE: CPT

## 2021-05-16 PROCEDURE — 87040 BLOOD CULTURE FOR BACTERIA: CPT

## 2021-05-16 PROCEDURE — 71045 X-RAY EXAM CHEST 1 VIEW: CPT

## 2021-05-16 PROCEDURE — U0005 INFEC AGEN DETEC AMPLI PROBE: HCPCS

## 2021-05-16 PROCEDURE — 65660000000 HC RM CCU STEPDOWN

## 2021-05-16 PROCEDURE — 74011250636 HC RX REV CODE- 250/636: Performed by: STUDENT IN AN ORGANIZED HEALTH CARE EDUCATION/TRAINING PROGRAM

## 2021-05-16 PROCEDURE — 83605 ASSAY OF LACTIC ACID: CPT

## 2021-05-16 PROCEDURE — 99284 EMERGENCY DEPT VISIT MOD MDM: CPT

## 2021-05-16 PROCEDURE — 85025 COMPLETE CBC W/AUTO DIFF WBC: CPT

## 2021-05-16 PROCEDURE — 81001 URINALYSIS AUTO W/SCOPE: CPT

## 2021-05-16 RX ORDER — CEFEPIME HYDROCHLORIDE 2 G/1
2 INJECTION, POWDER, FOR SOLUTION INTRAVENOUS EVERY 12 HOURS
Status: DISCONTINUED | OUTPATIENT
Start: 2021-05-16 | End: 2021-05-16 | Stop reason: CLARIF

## 2021-05-16 RX ADMIN — CEFEPIME HYDROCHLORIDE 2 G: 2 INJECTION, POWDER, FOR SOLUTION INTRAVENOUS at 23:13

## 2021-05-17 LAB
ANION GAP SERPL CALC-SCNC: 8 MMOL/L (ref 5–15)
ATRIAL RATE: 73 BPM
BUN SERPL-MCNC: 28 MG/DL (ref 6–20)
BUN/CREAT SERPL: 19 (ref 12–20)
CALCIUM SERPL-MCNC: 8.6 MG/DL (ref 8.5–10.1)
CALCULATED P AXIS, ECG09: 72 DEGREES
CALCULATED R AXIS, ECG10: -2 DEGREES
CALCULATED T AXIS, ECG11: 87 DEGREES
CHLORIDE SERPL-SCNC: 105 MMOL/L (ref 97–108)
CO2 SERPL-SCNC: 21 MMOL/L (ref 21–32)
CREAT SERPL-MCNC: 1.46 MG/DL (ref 0.7–1.3)
DIAGNOSIS, 93000: NORMAL
GLUCOSE SERPL-MCNC: 112 MG/DL (ref 65–100)
P-R INTERVAL, ECG05: 188 MS
POTASSIUM SERPL-SCNC: 4 MMOL/L (ref 3.5–5.1)
Q-T INTERVAL, ECG07: 390 MS
QRS DURATION, ECG06: 78 MS
QTC CALCULATION (BEZET), ECG08: 429 MS
SARS-COV-2, XPLCVT: NOT DETECTED
SODIUM SERPL-SCNC: 134 MMOL/L (ref 136–145)
SOURCE, COVRS: NORMAL
VENTRICULAR RATE, ECG03: 73 BPM

## 2021-05-17 PROCEDURE — 74011250637 HC RX REV CODE- 250/637: Performed by: STUDENT IN AN ORGANIZED HEALTH CARE EDUCATION/TRAINING PROGRAM

## 2021-05-17 PROCEDURE — 65660000000 HC RM CCU STEPDOWN

## 2021-05-17 PROCEDURE — 74011250636 HC RX REV CODE- 250/636: Performed by: STUDENT IN AN ORGANIZED HEALTH CARE EDUCATION/TRAINING PROGRAM

## 2021-05-17 PROCEDURE — 74011000258 HC RX REV CODE- 258: Performed by: STUDENT IN AN ORGANIZED HEALTH CARE EDUCATION/TRAINING PROGRAM

## 2021-05-17 PROCEDURE — 80048 BASIC METABOLIC PNL TOTAL CA: CPT

## 2021-05-17 PROCEDURE — 36415 COLL VENOUS BLD VENIPUNCTURE: CPT

## 2021-05-17 PROCEDURE — 97161 PT EVAL LOW COMPLEX 20 MIN: CPT

## 2021-05-17 RX ORDER — ACETAMINOPHEN 325 MG/1
650 TABLET ORAL
Status: DISCONTINUED | OUTPATIENT
Start: 2021-05-17 | End: 2021-06-08 | Stop reason: HOSPADM

## 2021-05-17 RX ORDER — SODIUM CHLORIDE 0.9 % (FLUSH) 0.9 %
5-40 SYRINGE (ML) INJECTION AS NEEDED
Status: DISCONTINUED | OUTPATIENT
Start: 2021-05-17 | End: 2021-06-08 | Stop reason: HOSPADM

## 2021-05-17 RX ORDER — ACETAMINOPHEN 650 MG/1
650 SUPPOSITORY RECTAL
Status: COMPLETED | OUTPATIENT
Start: 2021-05-17 | End: 2021-05-17

## 2021-05-17 RX ORDER — PROMETHAZINE HYDROCHLORIDE 25 MG/1
12.5 TABLET ORAL
Status: DISCONTINUED | OUTPATIENT
Start: 2021-05-17 | End: 2021-06-08 | Stop reason: HOSPADM

## 2021-05-17 RX ORDER — ONDANSETRON 2 MG/ML
4 INJECTION INTRAMUSCULAR; INTRAVENOUS
Status: DISCONTINUED | OUTPATIENT
Start: 2021-05-17 | End: 2021-06-08 | Stop reason: HOSPADM

## 2021-05-17 RX ORDER — SODIUM CHLORIDE 0.9 % (FLUSH) 0.9 %
5-40 SYRINGE (ML) INJECTION EVERY 8 HOURS
Status: DISCONTINUED | OUTPATIENT
Start: 2021-05-17 | End: 2021-06-08 | Stop reason: HOSPADM

## 2021-05-17 RX ORDER — ACETAMINOPHEN 650 MG/1
650 SUPPOSITORY RECTAL
Status: DISCONTINUED | OUTPATIENT
Start: 2021-05-17 | End: 2021-06-08 | Stop reason: HOSPADM

## 2021-05-17 RX ORDER — SODIUM CHLORIDE 9 MG/ML
75 INJECTION, SOLUTION INTRAVENOUS CONTINUOUS
Status: DISCONTINUED | OUTPATIENT
Start: 2021-05-17 | End: 2021-05-19

## 2021-05-17 RX ORDER — POLYETHYLENE GLYCOL 3350 17 G/17G
17 POWDER, FOR SOLUTION ORAL DAILY PRN
Status: DISCONTINUED | OUTPATIENT
Start: 2021-05-17 | End: 2021-05-26 | Stop reason: SDUPTHER

## 2021-05-17 RX ADMIN — SODIUM CHLORIDE 125 ML/HR: 9 INJECTION, SOLUTION INTRAVENOUS at 00:54

## 2021-05-17 RX ADMIN — SODIUM CHLORIDE 125 ML/HR: 9 INJECTION, SOLUTION INTRAVENOUS at 07:40

## 2021-05-17 RX ADMIN — DOXYCYCLINE 100 MG: 100 INJECTION, POWDER, LYOPHILIZED, FOR SOLUTION INTRAVENOUS at 09:30

## 2021-05-17 RX ADMIN — Medication 10 ML: at 05:52

## 2021-05-17 RX ADMIN — CEFEPIME HYDROCHLORIDE 2 G: 2 INJECTION, POWDER, FOR SOLUTION INTRAVENOUS at 23:57

## 2021-05-17 RX ADMIN — Medication 10 ML: at 21:45

## 2021-05-17 RX ADMIN — DOXYCYCLINE 100 MG: 100 INJECTION, POWDER, LYOPHILIZED, FOR SOLUTION INTRAVENOUS at 21:41

## 2021-05-17 RX ADMIN — Medication 10 ML: at 01:08

## 2021-05-17 RX ADMIN — ACETAMINOPHEN 650 MG: 650 SUPPOSITORY RECTAL at 00:12

## 2021-05-17 RX ADMIN — DOXYCYCLINE 100 MG: 100 INJECTION, POWDER, LYOPHILIZED, FOR SOLUTION INTRAVENOUS at 00:55

## 2021-05-17 RX ADMIN — CEFEPIME HYDROCHLORIDE 2 G: 2 INJECTION, POWDER, FOR SOLUTION INTRAVENOUS at 11:22

## 2021-05-17 NOTE — PROGRESS NOTES
Orders received, chart reviewed and patient currently not following commands. PLOF 4/27/2021 OT evaluation s/p intracranial hemorraghic CVA:   FUNCTIONAL STATUS PRIOR TO ADMISSION: Patient was independent with ADLs and functional mobility, ambulatory with occasional SPC use. Enjoys watching sports and gardening. Discharged to [facility] rehab; s/p right UE tremors, increased tone, N/V, and not following commands re-admitted to [facility]. HOME SUPPORT: Patient lived with his wife but did not require assistance.   Home Environment: Private residence  # Steps to Enter: 1  One/Two Story Residence: One story  Living Alone: No  Support Systems: Spouse/Significant Other/Partner  Current DME Used/Available at Home: Cane, straight

## 2021-05-17 NOTE — PROGRESS NOTES
Problem: Mobility Impaired (Adult and Pediatric)  Goal: *Acute Goals and Plan of Care (Insert Text)  Description: FUNCTIONAL STATUS PRIOR TO ADMISSION: patient from SNF rehab following hospitalization for intracranial hemorraghic stroke in April. Per chart review, ambulatory in rehab, conversant. Patient unable to provide any information at this time. HOME SUPPORT PRIOR TO ADMISSION: from SNF but prior to initial admission in April, home with wife. Physical Therapy Goals  Initiated 5/17/2021  1. Patient will move from supine to sit and sit to supine  and roll side to side in bed with maximal assistance within 7 day(s). 2.  Patient will transfer from bed to chair and chair to bed with maximal assistance using the least restrictive device within 7 day(s). 3.  Patient sit EOB x 5 minutes in preparation for transfers/out of bed within 7 days. 4.  Patient will follow 1 step commands 50% of trials within 7 days. Outcome: Not Progressing Towards Goal   PHYSICAL THERAPY EVALUATION  Patient: Kranthi Richardson (70 y.o. male)  Date: 5/17/2021  Primary Diagnosis: CAP (community acquired pneumonia) [J18.9]        Precautions: R/O covid         ASSESSMENT  Based on the objective data described below, the patient presents with no command following, vocalization and overall total assist.  Patient has resting tremor of RUE, noted to cross one foot over the other after positioned on pillow. Attempted hand over hand ROM and no follow through to reach, squeeze my hand, actively move LE's. Appears to be significant tone throughout with resistance for all ROM. This appears to be well below his baseline or baseline from SNF. Will trial for a week to see if improvement noted in command following and activity. .    Current Level of Function Impacting Discharge (mobility/balance): total care    Functional Outcome Measure:   The patient scored Total: 0/100 on the Barthel Index which is indicative of 100% impaired ability to care for basic self needs/dependency on others. Other factors to consider for discharge: below baseline; apparently ambulatory at some level at SNF     Patient will benefit from skilled therapy intervention to address the above noted impairments. PLAN :  Recommendations and Planned Interventions: bed mobility training, transfer training, gait training, therapeutic exercises, neuromuscular re-education, patient and family training/education, and therapeutic activities      Frequency/Duration: Patient will be followed by physical therapy:  5 times a week to address goals. Recommendation for discharge: (in order for the patient to meet his/her long term goals)  To be determined: pending ability to follow commands; likely back to SNF    This discharge recommendation:  Has not yet been discussed the attending provider and/or case management    IF patient discharges home will need the following DME: to be determined (TBD)         SUBJECTIVE:   Patient with no vocalization; only eyes open with tactile and verbal stimulation.     OBJECTIVE DATA SUMMARY:   HISTORY:    Past Medical History:   Diagnosis Date    Adverse effect of anesthesia     combative after anesthesia    Alcohol abuse     6 beers/day    Arthritis     CAD (coronary artery disease)     Chronic obstructive pulmonary disease (HCC)     Chronic obstructive pulmonary disease (HCC)     CKD (chronic kidney disease) stage 3, GFR 30-59 ml/min ([address] 9/21/2020    Dyslipidemia 8/16/2017    Dyspepsia and other specified disorders of function of stomach     Dyspnea 8/16/2017    ED (erectile dysfunction) 8/16/2017    Encounter for immunization 8/16/2017    Fatigue 8/16/2017    Flank pain 8/1/2019    GERD (gastroesophageal reflux disease) 8/16/2017    Gout 8/16/2017    Hypertension     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    Morbid obesity ([address]     On statin therapy 8/16/2017    TESSA on CPAP 1/3/2019    Psychiatric disorder     Depression    Simple chronic bronchitis ([facility] 1/3/2019    TIA (transient ischemic attack)     Umbilical hernia 4229    Viral encephalitis 2019     Past Surgical History:   Procedure Laterality Date    COLONOSCOPY N/A 2019    COLONOSCOPY performed by Ti España MD at [address]  80/    open umbilical hernia repair mesh    HX ORTHOPAEDIC      HX UROLOGICAL      HYDROCELECTOMY    ND CARDIAC SURG PROCEDURE UNLIST      Cardiac Stents    ND CARDIAC SURG PROCEDURE UNLIST  2019    EF 61-65%    UPPER GI ENDOSCOPY,BIOPSY  2019            Personal factors and/or comorbidities impacting plan of care:     Home Situation  Home Environment: Rehabilitation facility  One/Two Story Residence: One story  Living Alone: No  Support Systems: Child(theodora)  Patient Expects to be Discharged to[de-identified] Unknown  Current DME Used/Available at Home: Commode, bedside, Communication device, Walker    EXAMINATION/PRESENTATION/DECISION MAKING:   Critical Behavior:  Neurologic State: Alert  Orientation Level: Unable to verbalize  Cognition: Decreased attention/concentration, Decreased command following     Hearing: Auditory  Auditory Impairment: Hard of hearing, bilateral  Range Of Motion:  AROM: Grossly decreased, non-functional   No active movement noted other than crossing of feet. PROM: Generally decreased, functional           Strength:    Strength: Grossly decreased, non-functional- unable to do formal assessment due to no command following                    Tone & Sensation:   Tone: Abnormal- appears increased throughout             Coordination:  Coordination: Grossly decreased, non-functional  Functional Mobility:  Bed Mobility:  Rolling:  Total assistance               Functional Measure:  Barthel Index:    Bathin  Bladder: 0  Bowels: 0  Groomin  Dressin  Feedin  Mobility: 0  Stairs: 0  Toilet Use: 0  Transfer (Bed to Chair and Back): 0  Total: 0/100 The Barthel ADL Index: Guidelines  1. The index should be used as a record of what a patient does, not as a record of what a patient could do. 2. The main aim is to establish degree of independence from any help, physical or verbal, however minor and for whatever reason. 3. The need for supervision renders the patient not independent. 4. A patient's performance should be established using the best available evidence. Asking the patient, friends/relatives and nurses are the usual sources, but direct observation and common sense are also important. However direct testing is not needed. 5. Usually the patient's performance over the preceding 24-48 hours is important, but occasionally longer periods will be relevant. 6. Middle categories imply that the patient supplies over 50 per cent of the effort. 7. Use of aids to be independent is allowed. Walter Chand., Barthel, D.W. (428). Functional evaluation: the Barthel Index. [address] [facility] (14)2. Leonette Castleman [place], J.J.M.F, Roney Cantu., Dee [facility] [place], [address] [place] [address] (1999). Measuring the change indisability after inpatient rehabilitation; comparison of the responsiveness of the Barthel Index and Functional [place] Measure. Journal of Neurology, Neurosurgery, and Psychiatry, 66(4), 999-054. Jacobo Paris, N.J.A, MAZIN Pulido, & Madeline Farley M.A. (2004.) Assessment of post-stroke quality of life in cost-effectiveness studies: The usefulness of the Barthel Index and the EuroQoL-5D.  Quality of Life Research, 15, 138-80        Physical Therapy Evaluation Charge Determination   History Examination Presentation Decision-Making   HIGH Complexity :3+ comorbidities / personal factors will impact the outcome/ POC  LOW Complexity : 1-2 Standardized tests and measures addressing body structure, function, activity limitation and / or participation in recreation  HIGH Complexity : Unstable and unpredictable characteristics        Based on the above components, the patient evaluation is determined to be of the following complexity level: LOW     After treatment patient left in no apparent distress:   Supine in bed, Heels elevated for pressure relief, Call bell within reach, and Side rails x 3    COMMUNICATION/EDUCATION:   The patients plan of care was discussed with: Registered nurse. Patient is unable to participate in goal setting and plan of care.     Thank you for this referral.  Ag Mayfield, PT   Time Calculation: 11 mins

## 2021-05-17 NOTE — PROGRESS NOTES
Physician Progress Note      PATIENT:               Renetta Giron  CSN #:                  [id number]  :                       1934  ADMIT DATE:       2021 9:02 PM  [address] [place] DATE:  RESPONDING  PROVIDER #:        Gibran Diaz MD          QUERY TEXT:    Patient admitted with CAP. H&P notes patient has severe sepsis and sepsis bundle initiated, but subsequent documentation does not mention sepsis. If possible, please document in the progress notes and discharge summary if sepsis was: The medical record reflects the following:  Risk Factors: 87yo with CAP, elevated temp, and metabolic encephalopathy  Clinical Indicators:  - Temp: 99.9-100.1  - RR: 22-30  - WBC: 10.8  - Lactic acid 1.8  - H&P:  - Vitals on ER presentation are remarkable for Temp 101.3 and RR to 30. - Severe Sepsis 2/2 CAP  -Admit to and monitor on telemetry  -Sepsis bundle intiated  -Volume resuscitation w/ 30cc/kg NS  -Cefepime + Doxy  -Follow blood and urine cultures  Treatment: blood and urine cultures, volume resuscitation,  cefepime, doxycycline    Thank you,  Page Mcmullen  [phone]  Options provided:  -- Sepsis ruled out after study  -- Sepsis treated and resolved  -- Other - I will add my own diagnosis  -- Disagree - Not applicable / Not valid  -- Disagree - Clinically unable to determine / Unknown  -- Refer to Clinical Documentation Reviewer    PROVIDER RESPONSE TEXT:    Sepsis ruled out after study.     Query created by: Eden Olivera on 2021 3:03 PM      Electronically signed by:  Gibran Diaz MD 2021 4:38 PM

## 2021-05-17 NOTE — PROGRESS NOTES
[address] [facility] Adult  Hospitalist Group                                      Advance Care Planning Note    Name: Kranthi Richardson  YOB: 1934  MRN: [id number]  Admission Date: 5/16/2021  9:02 PM    Date of discussion: 5/17/2021    Active Diagnoses:    Hospital Problems  Date Reviewed: 9/21/2020          Codes Class Noted POA    CAP (community acquired pneumonia) ICD-10-CM: J18.9  ICD-9-CM: 923  5/16/2021 Unknown              These active diagnoses are of sufficient risk that focused discussion on advance care planning is indicated in order to allow the patient to thoughtfully consider personal goals of care, and if situations arise that prevent the ability to personally give input, to ensure appropriate representation of their personal desires for different levels and aggressiveness of care. Discussion:     Persons present and participating in discussion: Kranthi Richardson, Tamela Quarles MD, Daughter - Kimi Wood    Topics Discussed:  Patient's medical condition and diagnosis: [ x ] yes [  ] no   Surrogate decision maker: [x  ] yes [  ] no   Patient's current physical function/cognitive function/frailty: [x  ] yes [  ] no   Code Status: [ x ] yes [  ] no   Artificial Nutrition / Dialysis / Non-Invasive Ventilation / Blood Transfusion: [x  ] yes [  ] no  Potential Resources for home (durable medical equipment, home nursing, home O2): [ x ] yes [  ] no    Overview of Discussion:   I had extensive discussion with patient's daughter and power of  at bedside. We discussed his previous and current admission. Discussed diagnostic studies and plan of care. We discussed in great detail differences between full code, partial code and DNR. Daughter asked questions about patient's disposition and chances of surviving CPR. Reviewed this in great detail and discussed risk factors such as his age and chronic conditions which may adversely impact successful resuscitation.   Discussed intubation and artificial nutrition with daughter. At this time, daughter in consultation with her family have opted to have patient made DNR. Patient made DNR and document filled and placed in chart. Time Spent:     Total time spent face-to-face in education and discussion: 20 minutes.      Santhosh Vazquez MD  Date of Service:  5/17/2021  7:48 AM

## 2021-05-17 NOTE — ED TRIAGE NOTES
Pt brought in via EMS from [facility] for [address]. Pt has been declining since Friday. Tonight family noticed tremors, N/V, and pt not following commands. Hx of multiple TIAs and SAH in April.  Baseline A&Ox4

## 2021-05-17 NOTE — PROGRESS NOTES
[address] [facility] Adult  Hospitalist Group                                                                                          Hospitalist Progress Note  Cassidy Watson MD  Answering service: [phone] OR [phone] from in house phone              Progress Note    Patient: Mohamud Borges MRN: [id number]  SSN: [id number]    YOB: 1934  Age: 80 y.o. Sex: male      Admit Date: 5/16/2021    LOS: 1 day     Subjective:     Patient presents with pneumonia. Still with altered mental status this morning. Unable to communicate. Objective:     Vitals:    05/16/21 2329 05/16/21 2330 05/17/21 0327 05/17/21 0838   BP:  (!) 146/71 (!) 158/85 121/70   Pulse:  78 76 70   Resp:  28 22 20   Temp:   99.9 °F (37.7 °C) 98 °F (36.7 °C)   SpO2:  91% 93% 96%   Weight: 95.3 kg (210 lb 1.6 oz)      Height: 5' 8\" (1.727 m)           Intake and Output:  Current Shift: No intake/output data recorded. Last three shifts: No intake/output data recorded. Physical Exam:   GENERAL: Patient is alert but does not communicate  THROAT & NECK: normal and no erythema or exudates noted. LUNG: clear to auscultation bilaterally  HEART: regular rate and rhythm, S1, S2 normal, no murmur, click, rub or gallop  ABDOMEN: soft, non-tender. Bowel sounds normal. No masses,  no organomegaly  EXTREMITIES:  extremities normal, atraumatic, no cyanosis or edema  SKIN: no rash or abnormalities  NEUROLOGIC: Patient is alert but appears confused and does not communicate  PSYCHIATRIC: non focal    Lab/Data Review: All lab results for the last 24 hours reviewed.      Recent Results (from the past 24 hour(s))   CULTURE, BLOOD, PAIRED    Collection Time: 05/16/21  9:16 PM    Specimen: Blood   Result Value Ref Range    Special Requests: NO SPECIAL REQUESTS      Culture result: NO GROWTH AFTER 6 HOURS     LACTIC ACID    Collection Time: 05/16/21  9:16 PM   Result Value Ref Range    Lactic acid 1.8 0.4 - 2.0 MMOL/L   CBC WITH AUTOMATED DIFF Collection Time: 05/16/21  9:16 PM   Result Value Ref Range    WBC 10.8 4.1 - 11.1 K/uL    RBC 4.23 4. 10 - 5.70 M/uL    HGB 13.1 12.1 - 17.0 g/dL    HCT 40.1 36.6 - 50.3 %    MCV 94.8 80.0 - 99.0 FL    MCH 31.0 26.0 - 34.0 PG    MCHC 32.7 30.0 - 36.5 g/dL    RDW 15.5 (H) 11.5 - 14.5 %    PLATELET 534 478 - 312 K/uL    MPV 10.2 8.9 - 12.9 FL    NRBC 0.0 0  WBC    ABSOLUTE NRBC 0.00 0.00 - 0.01 K/uL    NEUTROPHILS 89 (H) 32 - 75 %    LYMPHOCYTES 6 (L) 12 - 49 %    MONOCYTES 5 5 - 13 %    EOSINOPHILS 0 0 - 7 %    BASOPHILS 0 0 - 1 %    IMMATURE GRANULOCYTES 0 0.0 - 0.5 %    ABS. NEUTROPHILS 9.7 (H) 1.8 - 8.0 K/UL    ABS. LYMPHOCYTES 0.6 (L) 0.8 - 3.5 K/UL    ABS. MONOCYTES 0.5 0.0 - 1.0 K/UL    ABS. EOSINOPHILS 0.0 0.0 - 0.4 K/UL    ABS. BASOPHILS 0.0 0.0 - 0.1 K/UL    ABS. IMM. GRANS. 0.0 0.00 - 0.04 K/UL    DF SMEAR SCANNED      RBC COMMENTS ANISOCYTOSIS  1+        RBC COMMENTS MACROCYTOSIS  1+       METABOLIC PANEL, COMPREHENSIVE    Collection Time: 05/16/21  9:16 PM   Result Value Ref Range    Sodium 136 136 - 145 mmol/L    Potassium 4.2 3.5 - 5.1 mmol/L    Chloride 101 97 - 108 mmol/L    CO2 29 21 - 32 mmol/L    Anion gap 6 5 - 15 mmol/L    Glucose 126 (H) 65 - 100 mg/dL    BUN 28 (H) 6 - 20 MG/DL    Creatinine 1.61 (H) 0.70 - 1.30 MG/DL    BUN/Creatinine ratio 17 12 - 20      GFR est AA 50 (L) >60 ml/min/1.73m2    GFR est non-AA 41 (L) >60 ml/min/1.73m2    Calcium 9.3 8.5 - 10.1 MG/DL    Bilirubin, total 0.7 0.2 - 1.0 MG/DL    ALT (SGPT) 27 12 - 78 U/L    AST (SGOT) 26 15 - 37 U/L    Alk.  phosphatase 117 45 - 117 U/L    Protein, total 7.7 6.4 - 8.2 g/dL    Albumin 3.9 3.5 - 5.0 g/dL    Globulin 3.8 2.0 - 4.0 g/dL    A-G Ratio 1.0 (L) 1.1 - 2.2     MAGNESIUM    Collection Time: 05/16/21  9:16 PM   Result Value Ref Range    Magnesium 1.8 1.6 - 2.4 mg/dL   SAMPLES BEING HELD    Collection Time: 05/16/21  9:16 PM   Result Value Ref Range    SAMPLES BEING HELD 1RED,1BLUE     COMMENT        Add-on orders for these samples will be processed based on acceptable specimen integrity and analyte stability, which may vary by analyte. PROCALCITONIN    Collection Time: 05/16/21  9:16 PM   Result Value Ref Range    Procalcitonin 0.05 ng/mL   EKG, 12 LEAD, INITIAL    Collection Time: 05/16/21  9:19 PM   Result Value Ref Range    Ventricular Rate 73 BPM    Atrial Rate 73 BPM    P-R Interval 188 ms    QRS Duration 78 ms    Q-T Interval 390 ms    QTC Calculation (Bezet) 429 ms    Calculated P Axis 72 degrees    Calculated R Axis -2 degrees    Calculated T Axis 87 degrees    Diagnosis       Normal sinus rhythm  ST & T wave abnormality, consider lateral ischemia  When compared with ECG of 26-APR-2021 19:04,  QT has shortened     URINALYSIS W/MICROSCOPIC    Collection Time: 05/16/21  9:33 PM   Result Value Ref Range    Color YELLOW/STRAW      Appearance CLEAR CLEAR      Specific gravity 1.014 1.003 - 1.030      pH (UA) 5.0 5.0 - 8.0      Protein Negative NEG mg/dL    Glucose Negative NEG mg/dL    Ketone Negative NEG mg/dL    Bilirubin Negative NEG      Blood Negative NEG      Urobilinogen 0.2 0.2 - 1.0 EU/dL    Nitrites Negative NEG      Leukocyte Esterase Negative NEG      WBC 0-4 0 - 4 /hpf    RBC 0-5 0 - 5 /hpf    Epithelial cells FEW FEW /lpf    Bacteria Negative NEG /hpf    Hyaline cast 2-5 0 - 5 /lpf   URINE CULTURE HOLD SAMPLE    Collection Time: 05/16/21  9:33 PM    Specimen: Serum; Urine   Result Value Ref Range    Urine culture hold        Urine on hold in Microbiology dept for 2 days. If unpreserved urine is submitted, it cannot be used for addtional testing after 24 hours, recollection will be required.    PHOSPHORUS    Collection Time: 05/16/21  9:33 PM   Result Value Ref Range    Phosphorus 3.9 2.6 - 4.7 MG/DL   TSH 3RD GENERATION    Collection Time: 05/16/21  9:33 PM   Result Value Ref Range    TSH 1.45 0.36 - 3.74 uIU/mL   SARS-COV-2    Collection Time: 05/16/21 11:27 PM   Result Value Ref Range    SARS-CoV-2 Please find results under separate order     METABOLIC PANEL, BASIC    Collection Time: 05/17/21  3:22 AM   Result Value Ref Range    Sodium 134 (L) 136 - 145 mmol/L    Potassium 4.0 3.5 - 5.1 mmol/L    Chloride 105 97 - 108 mmol/L    CO2 21 21 - 32 mmol/L    Anion gap 8 5 - 15 mmol/L    Glucose 112 (H) 65 - 100 mg/dL    BUN 28 (H) 6 - 20 MG/DL    Creatinine 1.46 (H) 0.70 - 1.30 MG/DL    BUN/Creatinine ratio 19 12 - 20      GFR est AA 55 (L) >60 ml/min/1.73m2    GFR est non-AA 46 (L) >60 ml/min/1.73m2    Calcium 8.6 8.5 - 10.1 MG/DL        Imaging:    Ct Head Wo Cont    Result Date: 5/16/2021  EXAM: CT HEAD WO CONT INDICATION: Trouble talking/increased confusion COMPARISON: 4/27/2021. CONTRAST: None. TECHNIQUE: Limited by motion Unenhanced CT of the head was performed using 5 mm images. Brain and bone windows were generated. Coronal and sagittal reformats. CT dose reduction was achieved through use of a standardized protocol tailored for this examination and automatic exposure control for dose modulation. FINDINGS: The ventricles and sulci are normal in size, shape and configuration. . Subcortical and deep white matter hypodensities. . Evaluation for hemorrhage is limited by motion. No large hemorrhage or mass effect is identified. . The basilar cisterns are open. No CT evidence of acute infarct. The bone windows demonstrate no abnormalities. The visualized portions of the paranasal sinuses and mastoid air cells are clear. Significantly limited by motion. No acute intracranial abnormality     Xr Chest Port    Result Date: 5/16/2021  EXAM: XR CHEST PORT INDICATION: Fever COMPARISON: 4/26/2021 FINDINGS: A portable AP radiograph of the chest was obtained at 2136 hours. The patient is on a cardiac monitor. Patchy left basilar airspace opacity is noted. Possible small left pleural effusion. Yessi Salm Heart size is enlarged. .  The bones and soft tissues are grossly within normal limits.      Patchy left basilar airspace opacity which may represent pneumonia or atelectasis with a small left pleural effusion. Assessment and Plan:     Pneumonia  -Patient's chest x-ray shows left basilar airspace disease  -Continue management of community-acquired pneumonia  -Currently on cefepime and doxycycline    Altered mental status  -Likely metabolic encephalopathy due to electrolyte abnormalities and infectious process  -CT head with no acute intracranial abnormality  -Hold Seroquel for now until mental status is improved    Hypertension  -Holding antihypertensives for now  -Monitor blood pressure    Dyslipidemia  -Currently Lipitor on hold    Discharge disposition: Likely next 24 to 48 hours pending medical progress.     Signed By: Agueda Erwin MD     May 17, 2021

## 2021-05-17 NOTE — H&P
History & Physical    Primary Care Provider: Pearl Burks MD  Source of Information: Patient and chart review    History of Presenting Illness:   Tommie Lua is a 80 y.o. male with pmh of CVA, intraventricular brain hemorrhage, dyslipidemia, CKD 3, hypertension who presents to hospital from subacute rehab for concerns of altered/decreased mental status. Patient was recently hospitalized at [facility] and discharged 2 weeks ago to subacute rehab after conservative management of brain intraventricular hemorrhage with mild ventriculomegaly. Daughter who is at bedside states patient had been conversant 2 days ago and seemed to be at his baseline. States his mental status has gradually worsened since 2 days ago and patient at this time is only intermittently and minimally conversant. He has also been noted to have chills and is visibly shivering. There are reports of subjective fever while at rehab to 100.7. Patient at this time is unable to provide additional history  denies being in any pain. He was last seen ambulatory earlier today. Vitals on ER presentation are remarkable for Temp 101.3 and RR to 30. CXR showed LL Pneumonia and CT head was unremarkable. Review of Systems:  Review of systems not obtained due to patient factors.      Past Medical History:   Diagnosis Date    Adverse effect of anesthesia     combative after anesthesia    Alcohol abuse     6 beers/day    Arthritis     CAD (coronary artery disease)     Chronic obstructive pulmonary disease (HCC)     Chronic obstructive pulmonary disease (HCC)     CKD (chronic kidney disease) stage 3, GFR 30-59 ml/min ([facility] [address] 9/21/2020    Dyslipidemia 8/16/2017    Dyspepsia and other specified disorders of function of stomach     Dyspnea 8/16/2017    ED (erectile dysfunction) 8/16/2017    Encounter for immunization 8/16/2017    Fatigue 8/16/2017    Flank pain 8/1/2019    GERD (gastroesophageal reflux disease) 8/16/2017    Gout 8/16/2017    Hypertension     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    [address]  69/06/[birth year]    open umbilical hernia repair mesh    HX ORTHOPAEDIC      HX UROLOGICAL      HYDROCELECTOMY    [place] CARDIAC SURG PROCEDURE UNLIST  1996    Cardiac Stents    [place] CARD[place] PROCEDURE UNLIST  04/2019    EF 61-65%    UPPER GI ENDOSCOPY,BIOPSY  9/30/2019          Prior to Admission medications    Medication Sig Start Date End Date Taking? Authorizing Provider   amLODIPine (NORVASC) 5 mg tablet Take 1 Tab by mouth daily for 30 days. 5/4/21 6/3/21  Bishnu Sherman MD   hydrALAZINE (APRESOLINE) 100 mg tablet Take 1 Tab by mouth every eight (8) hours for 30 days. 5/3/21 6/2/21  Bishnu Sherman MD   QUEtiapine (SEROquel) 100 mg tablet 1 Tab by Per NG tube route nightly for 30 days.  5/3/21 6/2/21  Bishnu Sherman MD   atenoloL (TENORMIN) 100 mg tablet TAKE ONE TABLET BY MOUTH DAILY 4/14/21   Herminia Nixon MD   atorvastatin (LIPITOR) 80 mg tablet TAKE ONE TABLET BY MOUTH DAILY 3/15/21   Herminia Nixon MD   furosemide (LASIX) 80 mg tablet TAKE ONE TABLET BY MOUTH DAILY 2/1/21   Herminia Nixon MD   thiamine HCL (B-1) 100 mg tablet TAKE ONE BY MOUTH DAILY 12/21/20   Herminia Nixon MD   isosorbide mononitrate ER (IMDUR) 30 mg tablet TAKE ONE TABLET BY MOUTH DAILY 12/21/20   Herminia Nixon MD   pantoprazole (PROTONIX) 40 mg tablet TAKE ONE TABLET BY MOUTH DAILY 9/22/20   Herminia Nixon MD DULoxetine (CYMBALTA) 30 mg capsule TAKE ONE CAPSULE BY MOUTH DAILY 9/16/20   Terry Pope MD   melatonin 5 mg cap capsule Take 5 mg by mouth nightly. Pt. Take 2 tabs a night    Provider, Historical   acetaminophen (TYLENOL) 500 mg tablet Take 1,000 mg by mouth every six (6) hours as needed for Pain. Provider, Historical   aspirin delayed-release 81 mg tablet Take 81 mg by mouth daily. Provider, Historical   ANORO ELLIPTA 62.5-25 mcg/actuation inhaler Take 1 Puff by inhalation daily. 12/26/18   Provider, Historical     Allergies   Allergen Reactions    Levaquin [Levofloxacin] Nausea and Vomiting     Reports anxiety, nausea, aching after taking levaquin    Ciprofloxacin Shortness of Breath    Quinolones Shortness of Breath      Family History   Problem Relation Age of Onset    Heart Disease Mother     Hypertension Mother     Hypertension Father     Hypertension Sister     Hypertension Brother     Cancer Brother         SOCIAL HISTORY:  Patient resides:  Independently    Assisted Living    SNF    With family care x      Smoking history:   None x   Former    Chronic      Alcohol history:   None x   Social    Chronic      Ambulates:   Independently    w/cane x   w/walker x   w/wc    CODE STATUS:  DNR    Full x   Other      Objective:     Physical Exam:     Visit Vitals  BP (!) 152/104   Pulse 94   Temp 100.1 °F (37.8 °C)   Resp 30   SpO2 90%      O2 Device: None (Room air)    General:  Alert, cooperative, no distress, tremulous, tracks to voice. Head:  Normocephalic, without obvious abnormality, atraumatic. Eyes:  Conjunctivae/corneas clear. PERRL, EOMs intact. Nose: Nares normal. Septum midline. Mucosa normal.        Neck: Supple, symmetrical, trachea midline, no carotid bruit and no JVD. Lungs:    Anterior breath sounds Clear to auscultation bilaterally. Chest wall:  No tenderness or deformity. Heart:  Regular rate and rhythm, S1, S2 normal, no murmur, click, rub or gallop. Abdomen:   Soft, non-tender. Bowel sounds normal. No masses,  No organomegaly. Extremities: Extremities normal, atraumatic, no cyanosis or edema. Pulses: 2+ and symmetric all extremities. Skin: Skin color, texture, turgor normal. No rashes or lesions   Neurologic: CNII-XII intact. Data Review:     Recent Days:  Recent Labs     05/16/21 2116   WBC 10.8   HGB 13.1   HCT 40.1        Recent Labs     05/16/21 2116      K 4.2      CO2 29   *   BUN 28*   CREA 1.61*   CA 9.3   MG 1.8   ALB 3.9   ALT 27     No results for input(s): PH, PCO2, PO2, HCO3, FIO2 in the last 72 hours. 24 Hour Results:  Recent Results (from the past 24 hour(s))   LACTIC ACID    Collection Time: 05/16/21  9:16 PM   Result Value Ref Range    Lactic acid 1.8 0.4 - 2.0 MMOL/L   CBC WITH AUTOMATED DIFF    Collection Time: 05/16/21  9:16 PM   Result Value Ref Range    WBC 10.8 4.1 - 11.1 K/uL    RBC 4.23 4. 10 - 5.70 M/uL    HGB 13.1 12.1 - 17.0 g/dL    HCT 40.1 36.6 - 50.3 %    MCV 94.8 80.0 - 99.0 FL    MCH 31.0 26.0 - 34.0 PG    MCHC 32.7 30.0 - 36.5 g/dL    RDW 15.5 (H) 11.5 - 14.5 %    PLATELET 028 290 - 646 K/uL    MPV 10.2 8.9 - 12.9 FL    NRBC 0.0 0  WBC    ABSOLUTE NRBC 0.00 0.00 - 0.01 K/uL    NEUTROPHILS 89 (H) 32 - 75 %    LYMPHOCYTES 6 (L) 12 - 49 %    MONOCYTES 5 5 - 13 %    EOSINOPHILS 0 0 - 7 %    BASOPHILS 0 0 - 1 %    IMMATURE GRANULOCYTES 0 0.0 - 0.5 %    ABS. NEUTROPHILS 9.7 (H) 1.8 - 8.0 K/UL    ABS. LYMPHOCYTES 0.6 (L) 0.8 - 3.5 K/UL    ABS. MONOCYTES 0.5 0.0 - 1.0 K/UL    ABS. EOSINOPHILS 0.0 0.0 - 0.4 K/UL    ABS. BASOPHILS 0.0 0.0 - 0.1 K/UL    ABS. IMM.  GRANS. 0.0 0.00 - 0.04 K/UL    DF SMEAR SCANNED      RBC COMMENTS ANISOCYTOSIS  1+        RBC COMMENTS MACROCYTOSIS  1+       METABOLIC PANEL, COMPREHENSIVE    Collection Time: 05/16/21  9:16 PM   Result Value Ref Range    Sodium 136 136 - 145 mmol/L    Potassium 4.2 3.5 - 5.1 mmol/L    Chloride 101 97 - 108 mmol/L    CO2 29 21 - 32 mmol/L    Anion gap 6 5 - 15 mmol/L    Glucose 126 (H) 65 - 100 mg/dL    BUN 28 (H) 6 - 20 MG/DL    Creatinine 1.61 (H) 0.70 - 1.30 MG/DL    BUN/Creatinine ratio 17 12 - 20      GFR est AA 50 (L) >60 ml/min/1.73m2    GFR est non-AA 41 (L) >60 ml/min/1.73m2    Calcium 9.3 8.5 - 10.1 MG/DL    Bilirubin, total 0.7 0.2 - 1.0 MG/DL    ALT (SGPT) 27 12 - 78 U/L    AST (SGOT) 26 15 - 37 U/L    Alk. phosphatase 117 45 - 117 U/L    Protein, total 7.7 6.4 - 8.2 g/dL    Albumin 3.9 3.5 - 5.0 g/dL    Globulin 3.8 2.0 - 4.0 g/dL    A-G Ratio 1.0 (L) 1.1 - 2.2     MAGNESIUM    Collection Time: 05/16/21  9:16 PM   Result Value Ref Range    Magnesium 1.8 1.6 - 2.4 mg/dL   SAMPLES BEING HELD    Collection Time: 05/16/21  9:16 PM   Result Value Ref Range    SAMPLES BEING HELD 1RED,1BLUE     COMMENT        Add-on orders for these samples will be processed based on acceptable specimen integrity and analyte stability, which may vary by analyte.    EKG, 12 LEAD, INITIAL    Collection Time: 05/16/21  9:19 PM   Result Value Ref Range    Ventricular Rate 73 BPM    Atrial Rate 73 BPM    P-R Interval 188 ms    QRS Duration 78 ms    Q-T Interval 390 ms    QTC Calculation (Bezet) 429 ms    Calculated P Axis 72 degrees    Calculated R Axis -2 degrees    Calculated T Axis 87 degrees    Diagnosis       Normal sinus rhythm  ST & T wave abnormality, consider lateral ischemia  When compared with ECG of 26-APR-2021 19:04,  QT has shortened     URINALYSIS W/MICROSCOPIC    Collection Time: 05/16/21  9:33 PM   Result Value Ref Range    Color YELLOW/STRAW      Appearance CLEAR CLEAR      Specific gravity 1.014 1.003 - 1.030      pH (UA) 5.0 5.0 - 8.0      Protein Negative NEG mg/dL    Glucose Negative NEG mg/dL    Ketone Negative NEG mg/dL    Bilirubin Negative NEG      Blood Negative NEG      Urobilinogen 0.2 0.2 - 1.0 EU/dL    Nitrites Negative NEG      Leukocyte Esterase Negative NEG      WBC 0-4 0 - 4 /hpf    RBC 0-5 0 - 5 /hpf    Epithelial cells FEW FEW /lpf    Bacteria Negative NEG /hpf    Hyaline cast 2-5 0 - 5 /lpf   URINE CULTURE HOLD SAMPLE    Collection Time: 05/16/21  9:33 PM    Specimen: Serum; Urine   Result Value Ref Range    Urine culture hold        Urine on hold in Microbiology dept for 2 days. If unpreserved urine is submitted, it cannot be used for addtional testing after 24 hours, recollection will be required. Imaging:     Assessment:     Radhames Negro is a 80 y.o. male with pmh of CVA, intraventricular brain hemorrhage, dyslipidemia, CKD 3, hypertension who is admitted for sepsis and metabolic encephalopathy 2/2 CAP       Plan:       Severe Sepsis 2/2 CAP  -Admit to and monitor on telemetry  -Sepsis bundle intiated  -Volume resuscitation w/ 30cc/kg NS  -Cefepime + Doxy  -Follow blood and urine cultures    Acute Metabolic Encephalopathy  -Chronic and likely exarcebated by CAP  -Management of CAP as outlined above  -Keep NPO. -Hold Seroquel  -Tylenol suppository    HTN  -Hold home antihypertensives given sepsis  -Resume if elevated BP are sustained.     CAD/Dyslipidemia  -Hold Imdur, continue asa and lipitor    GERD   -Protonix              FEN/GI -  NS 30cc/kg  Activity - As tolerated  DVT prophylaxis - SCDs  GI prophylaxis -  NI  Disposition - TBD    CODE STATUS:  Full Code       Signed By: Tracie Hammond MD     May 16, 2021

## 2021-05-17 NOTE — ED PROVIDER NOTES
HPI .  Patient has a history of coronary disease, aspirin therapy, COPD, hyperlipidemia, hypertension, viral encephalitis. Patient was admitted 4 weeks ago with a right intraparenchymal hemorrhage. Patient developed encephalopathy. His blood pressure was elevated and he was on a Cardene drip. Patient was discharged 2 weeks ago. He has been in rehab.  2 days ago patient was talking in complete sentences. He was able to walk short distances with physical therapy assistance. He was able to participate in feeding himself but needed help most meals. Family visited patient yesterday about 28 hours ago and patient was saying very few words. Today his eyes were closed and he was not talking and family had a hard time waking him up. Staff told family patient has not been eating today. Rehab staff reportedly found a temperature of 100.5. EMS thought his temperature was 100.7. His chronic tremor is significantly worse today. Family had noted increased ankle swelling after Lasix was decreased from 80 mg to 40 mg. Patient may have got an extra dose of Lasix today. History is from the old records and his daughter who is at the bedside. Patient is unable to give any history at this time.     Past Medical History:   Diagnosis Date    Adverse effect of anesthesia     combative after anesthesia    Alcohol abuse     6 beers/day    Arthritis     CAD (coronary artery disease)     Chronic obstructive pulmonary disease (HCC)     Chronic obstructive pulmonary disease (HCC)     CKD (chronic kidney disease) stage 3, GFR 30-59 ml/min ([facility] [address] 9/21/2020    Dyslipidemia 8/16/2017    Dyspepsia and other specified disorders of function of stomach     Dyspnea 8/16/2017    ED (erectile dysfunction) 8/16/2017    Encounter for immunization 8/16/2017    Fatigue 8/16/2017    Flank pain 8/1/2019    GERD (gastroesophageal reflux disease) 8/16/2017    Gout 8/16/2017    Hypertension     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  Morbid obesity ([facility] [address]     On statin therapy 8/16/2017    TESSA on CPAP 1/3/2019    Psychiatric disorder     Depression    Simple chronic bronchitis ([facility] [address] 1/3/2019    TIA (transient ischemic attack) 3/67/7279    Umbilical hernia 40/3/3727    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       Past Surgical History:   Procedure Laterality Date    COLONOSCOPY N/A 9/27/2019    COLONOSCOPY performed by Richy Garcia MD at [facility] ENDOSCOPY    HX HERNIA REPAIR      RIH    HX HERNIA REPAIR  92/54/16    open umbilical hernia repair mesh    HX ORTHOPAEDIC      HX UROLOGICAL      HYDROCELECTOMY    [place] CARDI[place]ED[place]  1996    Cardiac Stents    [place] CARD[place] SUR[place] PROCEDURE UN[place]  04/2019    EF 61-65%    UPPER GI ENDOSCOPY,BIOPSY  9/30/2019              Family History:   Problem Relation Age of Onset    Heart Disease Mother     Hypertension Mother     Hypertension Father     Hypertension Sister     Hypertension Brother     Cancer Brother        Social History     Socioeconomic History    Marital status:      Spouse name: Not on file    Number of children: Not on file    Years of education: Not on file    Highest education level: Not on file   Occupational History    Not on file   Social Needs    Financial resource strain: Not on file    Food insecurity     Worry: Not on file     Inability: Not on file    Transportation needs     Medical: Not on file     Non-medical: Not on file   Tobacco Use    Smoking status: Former Smoker     Packs/day: 0.50     Years: 20.00     Pack years: 10.00    Smokeless tobacco: Former User    Tobacco comment: quit about 40  years ago   / used to dip tobaco and dip snuff   Substance and Sexual Activity    Alcohol use: Yes     Comment: \"Drinks very little now for about a month \"    Drug use: No    Sexual activity: Not on file   Lifestyle    Physical activity     Days per week: Not on file     Minutes per session: Not on file    Stress: Not on file   Relationships    Social connections     Talks on phone: Not on file     Gets together: Not on file     Attends [employer] service: Not on file     Active member of club or organization: Not on file     Attends meetings of clubs or organizations: Not on file     Relationship status: Not on file    Intimate partner violence     Fear of current or ex partner: Not on file     Emotionally abused: Not on file     Physically abused: Not on file     Forced sexual activity: Not on file   Other Topics Concern    Not on file   Social History Narrative    Not on file         ALLERGIES: Levaquin [levofloxacin], Ciprofloxacin, and Quinolones    Review of Systems   Reason unable to perform ROS: Patient is not answering questions at all tonight. Vitals:    05/16/21 2107   BP: (!) 152/104   Pulse: 94   Resp: 30   Temp: 100.1 °F (37.8 °C)   SpO2: 90%            Physical Exam  Vitals signs and nursing note reviewed. Constitutional:       Appearance: He is obese. HENT:      Head: Normocephalic and atraumatic. Eyes:      Pupils: Pupils are equal, round, and reactive to light. Neck:      Musculoskeletal: Normal range of motion and neck supple. Cardiovascular:      Rate and Rhythm: Normal rate and regular rhythm. Heart sounds: Normal heart sounds. No murmur. No friction rub. No gallop. Comments: 1/4 pitting edema  Pulmonary:      Effort: Pulmonary effort is normal. No respiratory distress. Breath sounds: No wheezing or rales. Abdominal:      Palpations: Abdomen is soft. Tenderness: There is no abdominal tenderness. There is no rebound. Musculoskeletal: Normal range of motion. General: No tenderness. Skin:     Findings: No erythema. Neurological:      Cranial Nerves: No cranial nerve deficit.       Comments: Motor; symmetric; marked rest tremor of arms   Psychiatric:      Comments: Tends to keep eyes closed not following commands or answering questions; very few words; reportedly he did say all right; MDM       Procedures          ED EKG interpretation:  Rhythm: normal sinus rhythm; and regular . Rate (approx.): 75; Axis: normal; P wave: normal; QRS interval: normal ; ST/T wave: non-specific changes; in  Lead: ; Other findings: . This EKG was interpreted by Arline Nur MD,ED Provider. 9:45 PM           Perfect Serve Consult for Admission  10:30 PM    ED Room Number: ER12/12  Patient Name and age:  Isrrael Phillips 80 y.o.  male  Working Diagnosis:   1. Pneumonia of left lower lobe due to infectious organism    2.  Mental status, decreased        COVID-19 Suspicion:  no  Sepsis present:  no  Reassessment needed: yes  Code Status:  Full Code  Readmission: yes  Isolation Requirements:  no  Recommended Level of Care:  telemetry  Department:[facility] Adult ED - 21   Other:

## 2021-05-17 NOTE — ROUTINE PROCESS
TRANSFER - OUT REPORT: 
 
Verbal report given to [facility], RN(name) on Fredick Leyden  being transferred to (unit) for routine progression of care Report consisted of patients Situation, Background, Assessment and  
Recommendations(SBAR). Information from the following report(s) SBAR, ED Summary and MAR was reviewed with the receiving nurse. Lines:  
Peripheral IV 05/16/21 Right Antecubital (Active) Site Assessment Clean, dry, & intact 05/16/21 2114 Phlebitis Assessment 0 05/16/21 2114 Infiltration Assessment 0 05/16/21 2114 Dressing Status Clean, dry, & intact 05/16/21 2114 Opportunity for questions and clarification was provided. Patient transported with: 
 Registered Nurse

## 2021-05-18 ENCOUNTER — APPOINTMENT (OUTPATIENT)
Dept: MRI IMAGING | Age: 86
DRG: 193 | End: 2021-05-18
Attending: NURSE PRACTITIONER
Payer: MEDICARE

## 2021-05-18 LAB
ANION GAP SERPL CALC-SCNC: 11 MMOL/L (ref 5–15)
BUN SERPL-MCNC: 23 MG/DL (ref 6–20)
BUN/CREAT SERPL: 21 (ref 12–20)
CALCIUM SERPL-MCNC: 8.6 MG/DL (ref 8.5–10.1)
CHLORIDE SERPL-SCNC: 106 MMOL/L (ref 97–108)
CO2 SERPL-SCNC: 22 MMOL/L (ref 21–32)
CREAT SERPL-MCNC: 1.12 MG/DL (ref 0.7–1.3)
GLUCOSE SERPL-MCNC: 82 MG/DL (ref 65–100)
POTASSIUM SERPL-SCNC: ABNORMAL MMOL/L (ref 3.5–5.1)
SODIUM SERPL-SCNC: 139 MMOL/L (ref 136–145)

## 2021-05-18 PROCEDURE — 99223 1ST HOSP IP/OBS HIGH 75: CPT | Performed by: NURSE PRACTITIONER

## 2021-05-18 PROCEDURE — 70551 MRI BRAIN STEM W/O DYE: CPT

## 2021-05-18 PROCEDURE — 74011000258 HC RX REV CODE- 258: Performed by: STUDENT IN AN ORGANIZED HEALTH CARE EDUCATION/TRAINING PROGRAM

## 2021-05-18 PROCEDURE — 77010033678 HC OXYGEN DAILY

## 2021-05-18 PROCEDURE — 97167 OT EVAL HIGH COMPLEX 60 MIN: CPT

## 2021-05-18 PROCEDURE — 94760 N-INVAS EAR/PLS OXIMETRY 1: CPT

## 2021-05-18 PROCEDURE — 74011250636 HC RX REV CODE- 250/636: Performed by: FAMILY MEDICINE

## 2021-05-18 PROCEDURE — 65660000000 HC RM CCU STEPDOWN

## 2021-05-18 PROCEDURE — 80048 BASIC METABOLIC PNL TOTAL CA: CPT

## 2021-05-18 PROCEDURE — 97535 SELF CARE MNGMENT TRAINING: CPT

## 2021-05-18 PROCEDURE — 74011250636 HC RX REV CODE- 250/636: Performed by: STUDENT IN AN ORGANIZED HEALTH CARE EDUCATION/TRAINING PROGRAM

## 2021-05-18 PROCEDURE — 36415 COLL VENOUS BLD VENIPUNCTURE: CPT

## 2021-05-18 PROCEDURE — 97530 THERAPEUTIC ACTIVITIES: CPT

## 2021-05-18 RX ORDER — AMLODIPINE BESYLATE 5 MG/1
5 TABLET ORAL DAILY
Status: DISCONTINUED | OUTPATIENT
Start: 2021-05-18 | End: 2021-05-21

## 2021-05-18 RX ORDER — ISOSORBIDE MONONITRATE 30 MG/1
30 TABLET, EXTENDED RELEASE ORAL DAILY
Status: DISCONTINUED | OUTPATIENT
Start: 2021-05-18 | End: 2021-06-08 | Stop reason: HOSPADM

## 2021-05-18 RX ADMIN — DOXYCYCLINE 100 MG: 100 INJECTION, POWDER, LYOPHILIZED, FOR SOLUTION INTRAVENOUS at 23:28

## 2021-05-18 RX ADMIN — DOXYCYCLINE 100 MG: 100 INJECTION, POWDER, LYOPHILIZED, FOR SOLUTION INTRAVENOUS at 11:14

## 2021-05-18 RX ADMIN — SODIUM CHLORIDE 75 ML/HR: 9 INJECTION, SOLUTION INTRAVENOUS at 12:45

## 2021-05-18 RX ADMIN — CEFEPIME HYDROCHLORIDE 2 G: 2 INJECTION, POWDER, FOR SOLUTION INTRAVENOUS at 12:45

## 2021-05-18 RX ADMIN — Medication 10 ML: at 23:36

## 2021-05-18 RX ADMIN — Medication 10 ML: at 15:56

## 2021-05-18 NOTE — PROGRESS NOTES
Problem: Mobility Impaired (Adult and Pediatric)  Goal: *Acute Goals and Plan of Care (Insert Text)  Description: FUNCTIONAL STATUS PRIOR TO ADMISSION: patient from SNF rehab following hospitalization for intracranial hemorraghic stroke in April. Per chart review, ambulatory in rehab, conversant. Patient unable to provide any information at this time. HOME SUPPORT PRIOR TO ADMISSION: from SNF but prior to initial admission in April, home with wife. Physical Therapy Goals  Initiated 5/17/2021  1. Patient will move from supine to sit and sit to supine  and roll side to side in bed with maximal assistance within 7 day(s). 2.  Patient will transfer from bed to chair and chair to bed with maximal assistance using the least restrictive device within 7 day(s). 3.  Patient sit EOB x 5 minutes in preparation for transfers/out of bed within 7 days. 4.  Patient will follow 1 step commands 50% of trials within 7 days. Outcome: Progressing Towards Goal   PHYSICAL THERAPY TREATMENT  Patient: Logan Roland (11 y.o. male)  Date: 5/18/2021  Diagnosis: CAP (community acquired pneumonia) [J18.9] <principal problem not specified>       Precautions:    Chart, physical therapy assessment, plan of care and goals were reviewed. ASSESSMENT  Patient continues with skilled PT services and is progressing towards goals. Patient more alert today and making eye contact with a few one word responses, \"okay, no, Bon\". Patient with poor ability to  follow directions with verbal and tactile cues. More of hand over hand direction and still with poor response. Was able to get patient sitting EOB and stood but max assist x 2 and poor left knee control. Returned to bed and positioned for comfort.       Current Level of Function Impacting Discharge (mobility/balance): max assist of 1-2 for basic bed mobility; dependent transfers    Other factors to consider for discharge: cognitive impairment limiting participation         PLAN :  Patient continues to benefit from skilled intervention to address the above impairments. Continue treatment per established plan of care. to address goals. Recommendation for discharge: (in order for the patient to meet his/her long term goals)  Therapy up to 5 days/week in SNF setting    This discharge recommendation:  Has not yet been discussed the attending provider and/or case management    IF patient discharges home will need the following DME: to be determined (TBD)       SUBJECTIVE:   Patient stated Bon Montemayor.     OBJECTIVE DATA SUMMARY:   Critical Behavior:  Neurologic State: Alert  Orientation Level: Unable to verbalize  Cognition: Unable to assess (comment)     Functional Mobility Training:  Bed Mobility:  Rolling: Maximum assistance(to right; min to left)  Supine to Sit: Maximum assistance;Assist x2  Sit to Supine: Maximum assistance;Assist x2  Scooting: Maximum assistance        Transfers:  Sit to Stand: Maximum assistance;Assist x2  Stand to Sit: Maximum assistance;Assist x2                             Balance:  Sitting: Impaired; Without support  Sitting - Static: Fair (occasional)  Sitting - Dynamic: Fair (occasional)  Standing: Impaired; With support  Standing - Static: Constant support;Poor      Pain Ratin    Activity Tolerance:   Poor and SpO2 stable on RA    After treatment patient left in no apparent distress:   Patient positioned in right sidelying for pressure relief, Call bell within reach, and Side rails x 3    COMMUNICATION/COLLABORATION:   The patients plan of care was discussed with: Occupational therapist and Registered nurse.      Nikky Ramos, PT   Time Calculation: 28 mins

## 2021-05-18 NOTE — PROGRESS NOTES
JACQUES:  RUR: 21%    Disposition:  Return to [facility]  Updates sent to [facility] via [employer]. Chart reviewed. Patient admitted here from [facility] with AMS, tremors, n/v, not following commands and LL Pneumonia. The patient had a prior admission here  4/18/21- 5/3/21 with dx of intracranial hemorrhage. Following that hospitalization, he was d/c'd to [facility]. Prior to patient's previous hospitalization and rehab stay, he was independent in adl's and lives at home with his wife. The patient has a past medical hx of CVA, CAD, COPD, Viral Encephalopathy and Hyperlipidemia. Reason for Readmission:   AMS, tremors, n/v, not following commands and LL Pneumonia. RUR Score/Risk Level:   21%      PCP: First and Last name:  Mamta Conteh MD   Name of Practice: [address] [place] [address] Primary Care   Are you a current patient: Yes/No: yes   Approximate date of last visit: 12/4/2020   Can you participate in a virtual visit with your PCP:     Is a Care Conference indicated:  Not at this time       Did you attend your follow up appointment (s): If not, why not:  n/a         Resources/supports as identified by patient/family:  Facility staff        Top Challenges facing patient (as identified by patient/family and CM): Finances/Medication cost?  no     Transportation      no  Support system or lack thereof?  no     Living arrangements? snf rehab       Self-care/ADLs/Cognition? Current Advanced Directive/Advance Care Plan:             Plan for utilizing home health:   Not at this time             Transition of Care Plan:    Based on readmission, the patient's previous Plan of Care  has been evaluated and/or modified. The current Transition of Care Plan is:       12:20p  Patient approved to return to [facility].

## 2021-05-18 NOTE — PROGRESS NOTES
[address] [facility] Adult  Hospitalist Group                                                                                          Hospitalist Progress Note  Evan Oquendo MD  Answering service: [phone] OR [phone] from in house phone              Progress Note    Patient: Rondel Dubin MRN: [id number]  SSN: [id number]    YOB: 1934  Age: 80 y.o. Sex: male      Admit Date: 5/16/2021    LOS: 2 days     Subjective:     Patient presents with pneumonia. Patient seems more alert this a.m., makes eye contact and also speaks a few words. Knows his name. Objective:     Vitals:    05/17/21 1459 05/17/21 2035 05/18/21 0245 05/18/21 0846   BP: (!) 177/82 (!) 172/48 (!) 183/85 123/70   Pulse: 62 72 (!) 59 (!) 53   Resp: 20 20 20 20   Temp: 97.2 °F (36.2 °C) 97.8 °F (36.6 °C) 97 °F (36.1 °C) 97.3 °F (36.3 °C)   SpO2: 98% 97% 98% 95%   Weight:       Height:            Intake and Output:  Current Shift: No intake/output data recorded. Last three shifts: No intake/output data recorded. Physical Exam:   GENERAL: Patient appears more alert  THROAT & NECK: normal and no erythema or exudates noted. LUNG: clear to auscultation bilaterally  HEART: regular rate and rhythm, S1, S2 normal, no murmur, click, rub or gallop  ABDOMEN: soft, non-tender. Bowel sounds normal. No masses,  no organomegaly  EXTREMITIES:  extremities normal, atraumatic, no cyanosis or edema  SKIN: no rash or abnormalities  NEUROLOGIC: Patient appears more alert    Lab/Data Review: All lab results for the last 24 hours reviewed. No results found for this or any previous visit (from the past 24 hour(s)). Imaging:    No results found.      Assessment and Plan:     Pneumonia  -Patient's chest x-ray shows left basilar airspace disease  -Continue management of community-acquired pneumonia  -Currently on cefepime and doxycycline    Altered mental status  -Likely metabolic encephalopathy due to electrolyte abnormalities and infectious process  -CT head with no acute intracranial abnormality  -Hold Seroquel for now until mental status is improved  -Patient appears more alert with the mental status  -Neurology evaluation    Hypertension  -Restart Imdur and Norvasc    Dyslipidemia  -Currently Lipitor on hold    Discharge disposition: Likely next 24 to 48 hours pending medical progress.     Signed By: Michel Veras MD     May 18, 2021

## 2021-05-18 NOTE — PROGRESS NOTES
Problem: Self Care Deficits Care Plan (Adult)  Goal: *Acute Goals and Plan of Care (Insert Text)  Description:   FUNCTIONAL STATUS PRIOR TO ADMISSION: Admission from SNF, previous 4/2021 hospitalization with IVH with non-surgical intervention at time was mod A x2-max A x3 with impaired sitting and standing balance with impulsivity, impaired command following, [place], per chart patient was ambulating and able to hold conversation at rehab with significant change of AMS now     HOME SUPPORT: previous to 4/2021 was mod I with [facility] living with wife, was at [facility] for 2 weeks, supportive family    Occupational Therapy Goals  Initiated 5/18/2021  1. Patient will perform self-feeding with supervision/set-up within 7 day(s). 2.  Patient will perform grooming with minimal assistance/contact guard assist within 7 day(s). 3.  Patient will perform upper body dressing with minimal assistance/contact guard assist within 7 day(s). 4.  Patient will perform toilet transfers with moderate assistance  within 7 day(s). 5.  Patient will perform all aspects of toileting with moderate assistance  within 7 day(s). 6.  Patient will participate in upper extremity therapeutic exercise/activities with supervision/set-up for 5 minutes within 7 day(s). 7.  Patient will utilize energy conservation techniques during functional activities with verbal cues within 7 day(s).             Outcome: Progressing Towards Goal   OCCUPATIONAL THERAPY EVALUATION  Patient: Arthur Baum (88 y.o. male)  Date: 5/18/2021  Primary Diagnosis: CAP (community acquired pneumonia) [J18.9]        Precautions: NPO       ASSESSMENT  Based on the objective data described below, the patient presents with decreased command following, impaired cognition (Ox1 self only), minimal conversation with noted previous IVH 4 weeks ago with recent discharge to SNF rehab 2 weeks ago, currently no functional use of B UE without total A with hand over hand technique, impaired sitting and standing balance with L lateral and posterior lean without ability to adjust to midline with minimal righting reactions, noted minimal WB on L LE with flexed L knee, decreased L functional use without tactile cue required tactile, visual, and physical A to use B UE (L>R) with positioning/bed mobility and urinary incontinence noted. SPO2 stable on room air. Currently with mod A x2-max A x2 for bed mobility with resistance/stiffness noted despite increased time for command following, intermittent support for sitting balance and constant support of max A x2 during standing for 10 seconds only. Unable to participate in self care despite various techniques/cues/time. Patient's cognition is below baseline of previous admission (was following up to 75% simple commands). Will need rehab again at discharge as patient is below PLOF and below previous admission mobility as well. Current Level of Function Impacting Discharge (ADLs/self-care): total care for ADLs, max A x2 for sit to stand    Functional Outcome Measure: The patient scored Total: 0/100 on the Barthel Index outcome measure which is indicative of 100% impaired ability to care for basic self needs/dependency on others; inferred 100% dependency on others for instrumental ADLs. Other factors to consider for discharge:      Patient will benefit from skilled therapy intervention to address the above noted impairments. PLAN :  Recommendations and Planned Interventions: self care training, functional mobility training, therapeutic exercise, balance training, visual/perceptual training, therapeutic activities, cognitive retraining, endurance activities, neuromuscular re-education, and patient education    Frequency/Duration: Patient will be followed by occupational therapy 5 times a week to address goals.     Recommendation for discharge: (in order for the patient to meet his/her long term goals)  Therapy up to 5 days/week in SNF setting    This discharge recommendation:  A follow-up discussion with the attending provider and/or case management is planned    IF patient discharges home will need the following DME: tbd, unable to go home safetly, will need SNF       SUBJECTIVE:   Patient stated Bon.     OBJECTIVE DATA SUMMARY:   HISTORY:   Past Medical History:   Diagnosis Date    Adverse effect of anesthesia     combative after anesthesia    Alcohol abuse     6 beers/day    Arthritis     CAD (coronary artery disease)     Chronic obstructive pulmonary disease (HCC)     Chronic obstructive pulmonary disease (HCC)     CKD (chronic kidney disease) stage 3, GFR 30-59 ml/min ([address] 9/21/2020    Dyslipidemia 8/16/2017    Dyspepsia and other specified disorders of function of stomach     Dyspnea 8/16/2017    ED (erectile dysfunction) 8/16/2017    Encounter for immunization 8/16/2017    Fatigue 8/16/2017    Flank pain 8/1/2019    GERD (gastroesophageal reflux disease) 8/16/2017    Gout 8/16/2017    Hypertension     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    Morbid obesity ([address]     On statin therapy 8/16/2017    TESSA on CPAP 1/3/2019    Psychiatric disorder     Depression    Simple chronic bronchitis ([address] 1/3/2019    TIA (transient ischemic attack) 4/60/4443    Umbilical hernia 86/3/3560    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     Past Surgical History:   Procedure Laterality Date    COLONOSCOPY N/A 9/27/2019    COLONOSCOPY performed by Paty Godoy MD at [address]  77/69/12    open umbilical hernia repair mesh    HX ORTHOPAEDIC      HX UROLOGICAL      HYDROCELECTOMY    [place] CARDIAC SURG PROCEDURE UNLIST  1996    Cardiac Stents    NV CARDIAC SURG PROCEDURE UNLIST  04/2019    EF 61-65%    UPPER GI ENDOSCOPY,BIOPSY  9/30/2019            Expanded or extensive additional review of patient history: previous to 4/2021 was independent to mod I with [facility]    Home Situation  Home Environment: Rehabilitation facility  One/Two Story Residence: One story  Living Alone: No  Support Systems: Child(theodora)  Patient Expects to be Discharged to[de-identified] Unknown  Current DME Used/Available at Home: Commode, bedside, Communication device, Walker    Hand dominance: Right    EXAMINATION OF PERFORMANCE DEFICITS:  Cognitive/Behavioral Status:  Neurologic State: Confused        Perception: Cues to attend left visual field;Cues to attend to left side of body     Safety/Judgement: Decreased awareness of environment;Decreased awareness of need for safety;Decreased awareness of need for assistance;Decreased insight into deficits    Skin: intact    Edema: none noted    Hearing: Auditory  Auditory Impairment: Hard of hearing, bilateral    Vision/Perceptual:                         Acuity: Impaired near vision; Impaired far vision(difficult to test, impaired cognition)         Range of Motion:  Able to reach forward with B UE with physical A and tactile cues  AROM: Generally decreased, functional(impaired cognition limiting command following)                         Strength:  B UE  Strength: Generally decreased, functional                Coordination:  Coordination: Generally decreased, functional  Fine Motor Skills-Upper: Left Impaired;Right Impaired(non-functional, impaired command following)         Tone & Sensation:  B UE unable to assess, impaired cognition limiting                            Balance:  Sitting: Impaired; Without support  Sitting - Static: Fair (occasional)  Sitting - Dynamic: Fair (occasional)  Standing: Impaired; With support  Standing - Static: Constant support;Poor    Functional Mobility and Transfers for ADLs:  Bed Mobility:  Rolling: Maximum assistance(to right; min to left)  Supine to Sit: Maximum assistance;Assist x2  Sit to Supine: Maximum assistance;Assist x2  Scooting: Maximum assistance    Transfers:  Sit to Stand: Maximum assistance;Assist x2  Stand to Sit: Maximum assistance;Assist x2    ADL Assessment:  Feeding: Total assistance(NPO)    Oral Facial Hygiene/Grooming: Total assistance    Bathing: Total assistance    Upper Body Dressing: Total assistance    Lower Body Dressing: Total assistance    Toileting: Total assistance;Assist x2      Completed OT evaluation and ADLs seated EOB and standing as able with min- mod A for balance at times, difficulty with COG and midline orientation seated with decreased L side awareness and attention however this is intermittent, unable to fully test due to impaired cognition with decreased command following and conversation. Educated on safety and endurance training with encouragement for full participation in ADLs while in hospital. Poor understanding noted with no feedback. ADL Intervention and task modifications:       Grooming  Washing Face: Total assistance (dependent)(total care [facility] required)              Upper [address]: Total assistance (dependent)(able to reach through with each hand only, tactile cue L>R)    Lower Body Dressing Assistance  Socks: Total assistance (dependent)    Toileting  Bladder Hygiene: Total assistance (dependent)  Clothing Management: Total assistance (dependent)    Cognitive Retraining  Safety/Judgement: Decreased awareness of environment;Decreased awareness of need for safety;Decreased awareness of need for assistance;Decreased insight into deficits    Therapeutic Exercise:     Functional Measure:  Barthel Index:    Bathin  Bladder: 0  Bowels: 0  Groomin  Dressin  Feedin  Mobility: 0  Stairs: 0  Toilet Use: 0  Transfer (Bed to Chair and Back): 0  Total: 0/100        The Barthel ADL Index: Guidelines  1. The index should be used as a record of what a patient does, not as a record of what a patient could do. 2. The main aim is to establish degree of independence from any help, physical or verbal, however minor and for whatever reason. 3. The need for supervision renders the patient not independent.   4. A patient's performance should be established using the best available evidence. Asking the patient, friends/relatives and nurses are the usual sources, but direct observation and common sense are also important. However direct testing is not needed. 5. Usually the patient's performance over the preceding 24-48 hours is important, but occasionally longer periods will be relevant. 6. Middle categories imply that the patient supplies over 50 per cent of the effort. 7. Use of aids to be independent is allowed. [place] Peer., Barthel, D.W. (0311). Functional evaluation: the Barthel Index. [facility] (14)2. ELVA GarciaF, Conrad Patten., Merlin Ates., [place], [address] [place] [address] Ave (1999). Measuring the change indisability after inpatient rehabilitation; comparison of the responsiveness of the Barthel Index and Functional [place] Measure. Journal of Neurology, Neurosurgery, and Psychiatry, 66(4), 836-971. Fletcher De La Vega, N.J.A, MAZIN Pulido, & James Leone M.A. (2004.) Assessment of post-stroke quality of life in cost-effectiveness studies: The usefulness of the Barthel Index and the EuroQoL-5D. Quality of Life Research, 15, 580-74         Occupational Therapy Evaluation Charge Determination   History Examination Decision-Making   HIGH Complexity : Extensive review of history including physical, cognitive and psychosocial history  HIGH Complexity : 5 or more performance deficits relating to physical, cognitive , or psychosocial skils that result in activity limitations and / or participation restrictions HIGH Complexity : Patient presents with comorbidities that affect occupational performance.  Signifigant modification of tasks or assistance (eg, physical or verbal) with assessment (s) is necessary to enable patient to complete evaluation       Based on the above components, the patient evaluation is determined to be of the following complexity level: HIGH   Pain Rating:  Stating no    Activity Tolerance:   Poor    After treatment patient left in no apparent distress:    Supine in bed, Patient positioned in R sidelying for pressure relief, Call bell within reach, and Side rails x 3    COMMUNICATION/EDUCATION:   The patients plan of care was discussed with: Physical therapist and Registered nurse. Home safety education was provided and the patient/caregiver indicated understanding., Patient/family have participated as able in goal setting and plan of care. , and Patient/family agree to work toward stated goals and plan of care. This patients plan of care is appropriate for delegation to [facility].     Thank you for this referral.  Yaneth Fulton, DIPESH  Time Calculation: 23 mins

## 2021-05-18 NOTE — PROGRESS NOTES
Problem: Pressure Injury - Risk of  Goal: *Prevention of pressure injury  Description: Document Aquiles Scale and appropriate interventions in the flowsheet. Outcome: Progressing Towards Goal  Note: Pressure Injury Interventions:  Sensory Interventions: Assess changes in LOC    Moisture Interventions: Absorbent underpads    Activity Interventions: PT/OT evaluation    Mobility Interventions: HOB 30 degrees or less    Nutrition Interventions: Discuss nutritional consult with provider                     Problem: Patient Education: Go to Patient Education Activity  Goal: Patient/Family Education  Outcome: Progressing Towards Goal     Problem: Patient Education: Go to Patient Education Activity  Goal: Patient/Family Education  Outcome: Progressing Towards Goal     Problem: Falls - Risk of  Goal: *Absence of Falls  Description: Document Briones Hola Fall Risk and appropriate interventions in the flowsheet. Outcome: Progressing Towards Goal  Note: Fall Risk Interventions:  Mobility Interventions: Communicate number of staff needed for ambulation/transfer    Mentation Interventions: Room close to nurse's station         Elimination Interventions:  Toileting schedule/hourly rounds              Problem: Patient Education: Go to Patient Education Activity  Goal: Patient/Family Education  Outcome: Progressing Towards Goal

## 2021-05-18 NOTE — CONSULTS
Neurology  Consult  aKtlin Smith FNP-C  Neurology NP  [phone]    Patient: Tressa Valenzuela MRN: [id number]  SSN: [id number]    YOB: 1934  Age: 80 y.o. Sex: male        Chief Complaint:AMS    Subjective:      Tressa Valenzuela is a 80 y.o. male with a pmhx of CAD on ASA daily, COPD, CKD 3, HLD, HTN, TESSA, TIA, viral encephalitis and ETOH abuse who presented to [facility] with AMS. In April, the patient was admitted to the hospital for two weeks due to an intraventricular hemorrhage and mild ventriculomegaly. There were no surgical recommendations for EVD. The patient was sent to rehab. At the time of discharge, he was alert to self   and had trouble speaking and swallowing. Per NOTE \"Daughter who is at bedside states patient had been conversant 2 days ago and seemed to be at his baseline. States his mental status has gradually worsened since 2 days ago and patient at this time is only intermittently and minimally conversant. He has also been noted to have chills and is visibly shivering. There are reports of subjective fever while at rehab to 100.7. Patient at this time is unable to provide additional history  denies being in any pain. He was last seen ambulatory earlier today. Vitals on ER presentation are remarkable for Temp 101.3 and RR to 30. CXR showed LL Pneumonia and CT head was unremarkable. Based on notes it seem like he has been improving everyday. No focal deficits noted.   Past Medical History:   Diagnosis Date    Adverse effect of anesthesia     combative after anesthesia    Alcohol abuse     6 beers/day    Arthritis     CAD (coronary artery disease)     Chronic obstructive pulmonary disease (HCC)     Chronic obstructive pulmonary disease (HCC)     CKD (chronic kidney disease) stage 3, GFR 30-59 ml/min ([facility] [address] 9/21/2020    Dyslipidemia 8/16/2017    Dyspepsia and other specified disorders of function of stomach     Dyspnea 8/16/2017    ED (erectile dysfunction) 8/16/2017    Encounter for immunization 8/16/2017    Fatigue 8/16/2017    Flank pain 8/1/2019    GERD (gastroesophageal reflux disease) 8/16/2017    Gout 8/16/2017    Hypertension     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    Morbid obesity ([facility] [address]     On statin therapy 8/16/2017    TESSA on CPAP 1/3/2019    Psychiatric disorder     Depression    Simple chronic bronchitis ([facility] [address] 1/3/2019    TIA (transient ischemic attack) 4/66/7334    Umbilical hernia 80/2/7490    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     Past Surgical History:   Procedure Laterality Date    COLONOSCOPY N/A 9/27/2019    COLONOSCOPY performed by Sofi Hill MD at [facility] ENDOSCOPY    HX HERNIA REPAIR      RIH    HX HERNIA REPAIR  60/23/59    open umbilical hernia repair mesh    HX ORTHOPAEDIC      HX UROLOGICAL      HYDROCELECTOMY    OK CARDIAC SURG PROCEDURE UNLIST  1996    Cardiac Stents    OK CARDIAC SURG PROCEDURE UNLIST  04/2019    EF 61-65%    UPPER GI ENDOSCOPY,BIOPSY  9/30/2019           Family History   Problem Relation Age of Onset    Heart Disease Mother     Hypertension Mother     Hypertension Father     Hypertension Sister     Hypertension Brother     Cancer Brother      Social History     Tobacco Use    Smoking status: Former Smoker     Packs/day: 0.50     Years: 20.00     Pack years: 10.00    Smokeless tobacco: Former User    Tobacco comment: quit about 40  years ago   / used to dip tobaco and dip snuff   Substance Use Topics    Alcohol use: Yes     Comment: \"Drinks very little now for about a month \"      Current Facility-Administered Medications   Medication Dose Route Frequency Provider Last Rate Last Admin    amLODIPine (NORVASC) tablet 5 mg  5 mg Oral DAILY Maggie Aponte MD   Stopped at 05/18/21 1244    isosorbide mononitrate ER (IMDUR) tablet 30 mg  30 mg Oral DAILY Maggie Aponte MD   Stopped at 05/18/21 1245    sodium chloride (NS) flush 5-40 mL  5-40 mL IntraVENous Priscilla Gonzalez MD   Stopped at 05/18/21 0600    sodium chloride (NS) flush 5-40 mL  5-40 mL IntraVENous PRN Curt Grady MD        acetaminophen (TYLENOL) tablet 650 mg  650 mg Oral Q6H PRN Curt Grady MD        Or    acetaminophen (TYLENOL) suppository 650 mg  650 mg Rectal Q6H PRN Curt Grady MD        polyethylene glycol (MIRALAX) packet 17 g  17 g Oral DAILY PRN Curt Grady MD        promethazine (PHENERGAN) tablet 12.5 mg  12.5 mg Oral Q6H PRN Curt Grady MD        Or    ondansetron (ZOFRAN) injection 4 mg  4 mg IntraVENous Q6H PRN Curt Grady MD        0.9% sodium chloride infusion  75 mL/hr IntraVENous CONTINUOUS Ron Nguyen MD 75 mL/hr at 05/18/21 1245 75 mL/hr at 05/18/21 1245    doxycycline (VIBRAMYCIN) 100 mg in 0.9% sodium chloride (MBP/ADV) 100 mL MBP  100 mg IntraVENous Q12H Curt Grady  mL/hr at 05/18/21 1114 100 mg at 05/18/21 1114    cefepime (MAXIPIME) 2 g in 0.9% sodium chloride (MBP/ADV) 100 mL MBP  2 g IntraVENous Q12H Curt Grady  mL/hr at 05/18/21 1245 2 g at 05/18/21 1245        Allergies   Allergen Reactions    Levaquin [Levofloxacin] Nausea and Vomiting     Reports anxiety, nausea, aching after taking levaquin    Ciprofloxacin Shortness of Breath    Quinolones Shortness of Breath       Review of Systems:  Pertinent items are noted in the History of Present Illness. Objective:     Vitals:    05/17/21 2035 05/18/21 0245 05/18/21 0846 05/18/21 1457   BP: (!) 172/48 (!) 183/85 123/70 (!) 194/94   Pulse: 72 (!) 59 (!) 53 (!) 57   Resp: 20 20 20 20   Temp: 97.8 °F (36.6 °C) 97 °F (36.1 °C) 97.3 °F (36.3 °C) 97.3 °F (36.3 °C)   SpO2: 97% 98% 95% 94%   Weight:       Height:            Physical Exam:  GENERAL: slowed mentation, disoriented  LUNG: clear to auscultation bilaterally  HEART: regular rate and rhythm      Neurologic Exam:  Mental Status:  Alert and oriented to self , Appropriate affect, mood and behavior.        Language:    Minimal speech able to stay first and last name     Cranial Nerves:   Normal appearing fundi, sharp discs. Pupils equal, round and reactive to light. Visual fields full to confrontation. Extraocular movements intact. Facial sensation intact. Full facial strength, no asymmetry. Hearing intact bilaterally. No dysarthria. Tongue protrudes to midline, palate elevates symmetrically. Motor:    No pronator drift. Bulk and tone normal.      Spontaneous movement all extremities      Resting tremor and head tremor     Sensation:    Sensation intact throughout to light touch,     Reflexes:    Reflexes are 2+ at the biceps, triceps, patella and achilles     bilaterally. Negative Babinskis    Coordination & Gait: FTN difficult to complete due to patient  being hard of hearing     No results found for this or any previous visit (from the past 24 hour(s)). Imaging:  CT Results (most recent):  Results from Hospital Encounter encounter on 05/16/21   CT HEAD WO CONT    Narrative EXAM: CT HEAD WO CONT    INDICATION: Trouble talking/increased confusion    COMPARISON: 4/27/2021. CONTRAST: None. TECHNIQUE: Limited by motion Unenhanced CT of the head was performed using 5 mm  images. Brain and bone windows were generated. Coronal and sagittal reformats. CT dose reduction was achieved through use of a standardized protocol tailored  for this examination and automatic exposure control for dose modulation. FINDINGS:  The ventricles and sulci are normal in size, shape and configuration. .  Subcortical and deep white matter hypodensities. . Evaluation for hemorrhage is  limited by motion. No large hemorrhage or mass effect is identified. . The  basilar cisterns are open. No CT evidence of acute infarct. The bone windows demonstrate no abnormalities. The visualized portions of the  paranasal sinuses and mastoid air cells are clear. Impression Significantly limited by motion.  No acute intracranial abnormality           Assessment:     Hospital Problems  Date Reviewed: 9/21/2020          Codes Class Noted POA    CAP (community acquired pneumonia) ICD-10-CM: J18.9  ICD-9-CM: 552  5/16/2021 Unknown              Plan:   80year old male with AMS is the setting of an acute infection (pneumonia). He is now taking cefepime and doxycycline and seems to be doing well. During the exam, he was alert and oriented to himself and his birthday, which seems to be his baseline from his previous admission. He is hard of hearing which makes it hard for him to follow commands. CTH with no acute intracranial abnormality. AMS is most likely due to metabolic encephalopathy caused by an acute infection, which may worsen past symptoms. Given that I have no idea what his baseline was prior to discharge, I would have a brain MRI to rule out any structural abnormalities. - Ordered MRI of the brain   -PT/OT/SLP        Thank you for allowing the Neurology Service the pleasure of participating in the care of your patient. This patient will be discussed with my collaborating care team physician  and she may have further recommendations regarding this patient's care.        Signed By: Cristian Langley NP     May 18, 2021

## 2021-05-19 ENCOUNTER — APPOINTMENT (OUTPATIENT)
Dept: CT IMAGING | Age: 86
DRG: 193 | End: 2021-05-19
Attending: NURSE PRACTITIONER
Payer: MEDICARE

## 2021-05-19 LAB
ANION GAP SERPL CALC-SCNC: 13 MMOL/L (ref 5–15)
ATRIAL RATE: 57 BPM
BUN SERPL-MCNC: 22 MG/DL (ref 6–20)
BUN/CREAT SERPL: 21 (ref 12–20)
CALCIUM SERPL-MCNC: 8.3 MG/DL (ref 8.5–10.1)
CALCULATED P AXIS, ECG09: 0 DEGREES
CALCULATED R AXIS, ECG10: 2 DEGREES
CALCULATED T AXIS, ECG11: 51 DEGREES
CHLORIDE SERPL-SCNC: 109 MMOL/L (ref 97–108)
CO2 SERPL-SCNC: 17 MMOL/L (ref 21–32)
CREAT SERPL-MCNC: 1.04 MG/DL (ref 0.7–1.3)
DIAGNOSIS, 93000: NORMAL
GLUCOSE SERPL-MCNC: 74 MG/DL (ref 65–100)
P-R INTERVAL, ECG05: 160 MS
POTASSIUM SERPL-SCNC: 4.4 MMOL/L (ref 3.5–5.1)
Q-T INTERVAL, ECG07: 468 MS
QRS DURATION, ECG06: 86 MS
QTC CALCULATION (BEZET), ECG08: 455 MS
SODIUM SERPL-SCNC: 139 MMOL/L (ref 136–145)
VENTRICULAR RATE, ECG03: 57 BPM

## 2021-05-19 PROCEDURE — 74011250637 HC RX REV CODE- 250/637: Performed by: FAMILY MEDICINE

## 2021-05-19 PROCEDURE — 74011250636 HC RX REV CODE- 250/636: Performed by: FAMILY MEDICINE

## 2021-05-19 PROCEDURE — 70496 CT ANGIOGRAPHY HEAD: CPT

## 2021-05-19 PROCEDURE — 70498 CT ANGIOGRAPHY NECK: CPT

## 2021-05-19 PROCEDURE — 36415 COLL VENOUS BLD VENIPUNCTURE: CPT

## 2021-05-19 PROCEDURE — 76937 US GUIDE VASCULAR ACCESS: CPT

## 2021-05-19 PROCEDURE — 74011250636 HC RX REV CODE- 250/636: Performed by: STUDENT IN AN ORGANIZED HEALTH CARE EDUCATION/TRAINING PROGRAM

## 2021-05-19 PROCEDURE — 94760 N-INVAS EAR/PLS OXIMETRY 1: CPT

## 2021-05-19 PROCEDURE — 74011000258 HC RX REV CODE- 258: Performed by: STUDENT IN AN ORGANIZED HEALTH CARE EDUCATION/TRAINING PROGRAM

## 2021-05-19 PROCEDURE — 93005 ELECTROCARDIOGRAM TRACING: CPT

## 2021-05-19 PROCEDURE — 92610 EVALUATE SWALLOWING FUNCTION: CPT

## 2021-05-19 PROCEDURE — 65660000000 HC RM CCU STEPDOWN

## 2021-05-19 PROCEDURE — 99233 SBSQ HOSP IP/OBS HIGH 50: CPT | Performed by: NURSE PRACTITIONER

## 2021-05-19 PROCEDURE — 80048 BASIC METABOLIC PNL TOTAL CA: CPT

## 2021-05-19 PROCEDURE — 74011000636 HC RX REV CODE- 636: Performed by: RADIOLOGY

## 2021-05-19 RX ORDER — ATORVASTATIN CALCIUM 40 MG/1
80 TABLET, FILM COATED ORAL DAILY
Status: DISCONTINUED | OUTPATIENT
Start: 2021-05-20 | End: 2021-06-08 | Stop reason: HOSPADM

## 2021-05-19 RX ORDER — HYDRALAZINE HYDROCHLORIDE 50 MG/1
50 TABLET, FILM COATED ORAL 3 TIMES DAILY
Status: DISCONTINUED | OUTPATIENT
Start: 2021-05-19 | End: 2021-05-23

## 2021-05-19 RX ADMIN — HYDRALAZINE HYDROCHLORIDE 50 MG: 50 TABLET, FILM COATED ORAL at 22:27

## 2021-05-19 RX ADMIN — ISOSORBIDE MONONITRATE 30 MG: 60 TABLET, EXTENDED RELEASE ORAL at 10:39

## 2021-05-19 RX ADMIN — SODIUM CHLORIDE 75 ML/HR: 9 INJECTION, SOLUTION INTRAVENOUS at 00:59

## 2021-05-19 RX ADMIN — DOXYCYCLINE 100 MG: 100 INJECTION, POWDER, LYOPHILIZED, FOR SOLUTION INTRAVENOUS at 22:27

## 2021-05-19 RX ADMIN — CEFEPIME HYDROCHLORIDE 2 G: 2 INJECTION, POWDER, FOR SOLUTION INTRAVENOUS at 10:40

## 2021-05-19 RX ADMIN — Medication 10 ML: at 22:27

## 2021-05-19 RX ADMIN — CEFEPIME HYDROCHLORIDE 2 G: 2 INJECTION, POWDER, FOR SOLUTION INTRAVENOUS at 22:27

## 2021-05-19 RX ADMIN — DOXYCYCLINE 100 MG: 100 INJECTION, POWDER, LYOPHILIZED, FOR SOLUTION INTRAVENOUS at 09:03

## 2021-05-19 RX ADMIN — IOPAMIDOL 100 ML: 755 INJECTION, SOLUTION INTRAVENOUS at 16:19

## 2021-05-19 RX ADMIN — HYDRALAZINE HYDROCHLORIDE 50 MG: 50 TABLET, FILM COATED ORAL at 09:06

## 2021-05-19 RX ADMIN — CEFEPIME HYDROCHLORIDE 2 G: 2 INJECTION, POWDER, FOR SOLUTION INTRAVENOUS at 00:57

## 2021-05-19 RX ADMIN — HYDRALAZINE HYDROCHLORIDE 50 MG: 50 TABLET, FILM COATED ORAL at 16:55

## 2021-05-19 NOTE — PROGRESS NOTES
Neurology Progress Note    Patient ID:  Rashawn Locket  [id number]  20 y.o.  1934        Subjective:     No events overnight. Alert to self and follow some commands. Improvement from yesterday. Objective: All records in [facility] reviewed and noted    ROS:  Unable to obtain   Meds:  Current Facility-Administered Medications   Medication Dose Route Frequency    hydrALAZINE (APRESOLINE) tablet 50 mg  50 mg Oral TID    [Held by provider] amLODIPine (NORVASC) tablet 5 mg  5 mg Oral DAILY    isosorbide mononitrate ER (IMDUR) tablet 30 mg  30 mg Oral DAILY    sodium chloride (NS) flush 5-40 mL  5-40 mL IntraVENous Q8H    sodium chloride (NS) flush 5-40 mL  5-40 mL IntraVENous PRN    acetaminophen (TYLENOL) tablet 650 mg  650 mg Oral Q6H PRN    Or    acetaminophen (TYLENOL) suppository 650 mg  650 mg Rectal Q6H PRN    polyethylene glycol (MIRALAX) packet 17 g  17 g Oral DAILY PRN    promethazine (PHENERGAN) tablet 12.5 mg  12.5 mg Oral Q6H PRN    Or    ondansetron (ZOFRAN) injection 4 mg  4 mg IntraVENous Q6H PRN    doxycycline (VIBRAMYCIN) 100 mg in 0.9% sodium chloride (MBP/ADV) 100 mL MBP  100 mg IntraVENous Q12H    cefepime (MAXIPIME) 2 g in 0.9% sodium chloride (MBP/ADV) 100 mL MBP  2 g IntraVENous Q12H       Imaging:  MRI Results (most recent):  Results from Hospital Encounter encounter on 05/16/21    MRI BRAIN WO CONT    Narrative  *PRELIMINARY REPORT*    Multiple foci of small cortical and white matter infarcts mostly on the right  with a couple of lesions on the left as well. Interval evolution of  intraventricular hemorrhage and small parenchymal hemorrhages. Preliminary report was provided by Dr. Carolina Thayer, the on-call radiologist, at  22:17. The findings were called to floor nurse, Colletta Balo, on 5/18/2021 at 22:20 by myself. 789    Final report to follow.     *END PRELIMINARY REPORT*    EXAM:  MRI BRAIN WO CONT    INDICATION:    AMS    COMPARISON:  CT head 5/16/2021, MRI brain 11/23/2019. CONTRAST: None. TECHNIQUE:  Multiplanar multisequence acquisition without contrast of the brain. FINDINGS:  Few small scattered acute infarcts in the bilateral frontal lobes and  parieto-occipital lobes, right greater than left. Evolved now late subacute small intraparenchymal hematomas in the right parietal  periventricular white matter measuring 10 mm and 5 mm (series 6 image 11), with  no significant surrounding edema or mass effect. There is hemosiderin staining  noted along the ependymal lining of the lateral ventricles, as well as scattered  superficial siderosis involving the sylvian fissures, and along the surface of  the brainstem and cerebellum. Unchanged generalized parenchymal volume loss with commensurate dilation of the  sulci and ventricular system. Unchanged confluent periventricular and deep white  matter T2/FLAIR hyperintensity, and patchy T2/FLAIR hyperintensity in the nely,  consistent with severe chronic microvascular ischemic disease. There is no  cerebellar tonsillar herniation. Expected arterial flow-voids are present. The paranasal sinuses, mastoid air cells, and middle ears are clear. The orbital  contents are within normal limits with bilateral lens implants. No significant  osseous or scalp lesions are identified. Impression  1. Few small scattered acute infarcts in the bilateral frontal lobes and  parieto-occipital lobes, right greater than left. 2. 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 3. Unchanged generalized parenchymal volume loss and severe chronic  microvascular ischemic disease.         Lab Review   Recent Results (from the past 24 hour(s))   METABOLIC PANEL, BASIC    Collection Time: 05/18/21  7:08 PM Result Value Ref Range    Sodium 139 136 - 145 mmol/L    Potassium HEMOLYZED,RECOLLECT REQUESTED 3.5 - 5.1 mmol/L    Chloride 106 97 - 108 mmol/L    CO2 22 21 - 32 mmol/L    Anion gap 11 5 - 15 mmol/L    Glucose 82 65 - 100 mg/dL    BUN 23 (H) 6 - 20 MG/DL    Creatinine 1.12 0.70 - 1.30 MG/DL    BUN/Creatinine ratio 21 (H) 12 - 20      GFR est AA >60 >60 ml/min/1.73m2    GFR est non-AA >60 >60 ml/min/1.73m2    Calcium 8.6 8.5 - 10.1 MG/DL   EKG, 12 LEAD, INITIAL    Collection Time: 05/19/21  2:34 AM   Result Value Ref Range    Ventricular Rate 57 BPM    Atrial Rate 57 BPM    P-R Interval 160 ms    QRS Duration 86 ms    Q-T Interval 468 ms    QTC Calculation (Bezet) 455 ms    Calculated P Axis 0 degrees    Calculated R Axis 2 degrees    Calculated T Axis 51 degrees    Diagnosis       Sinus bradycardia with premature supraventricular complexes  Otherwise normal ECG  When compared with ECG of 16-MAY-2021 21:19,  premature supraventricular complexes are now present  Confirmed by Mu Remy ([id number]) on 5/19/2021 96:81:78 AM     METABOLIC PANEL, BASIC    Collection Time: 05/19/21  3:03 AM   Result Value Ref Range    Sodium 139 136 - 145 mmol/L    Potassium 4.4 3.5 - 5.1 mmol/L    Chloride 109 (H) 97 - 108 mmol/L    CO2 17 (L) 21 - 32 mmol/L    Anion gap 13 5 - 15 mmol/L    Glucose 74 65 - 100 mg/dL    BUN 22 (H) 6 - 20 MG/DL    Creatinine 1.04 0.70 - 1.30 MG/DL    BUN/Creatinine ratio 21 (H) 12 - 20      GFR est AA >60 >60 ml/min/1.73m2    GFR est non-AA >60 >60 ml/min/1.73m2    Calcium 8.3 (L) 8.5 - 10.1 MG/DL       Exam:  Visit Vitals  BP (!) 166/72 (BP 1 Location: Left upper arm, BP Patient Position: At rest)   Pulse (!) 52   Temp 98.5 °F (36.9 °C)   Resp 19   Ht 5' 8\" (1.727 m)   Wt 210 lb 1.6 oz (95.3 kg)   SpO2 95%   BMI 31.95 kg/m²     Gen: Well developed  CV: RRR  Neuro: A&o to self  CN II-XII: PERRL, EOMI, face symmetric, tongue/palate midline  Motor: strength 5/5 all four ext  Sensory: intact to LT  Gait: deferred     Assessment:     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    Hypertension I10    ED (erectile dysfunction) N52.9    Severe obesity ([facility]) E66.01    TESSA on CPAP G47.33, Z99.89    Simple chronic bronchitis ([facility]) J41.0    Bilateral carotid artery stenosis I65.23    Spinal stenosis of lumbar region at multiple levels M48.061    Flank pain R10.9    Rectal bleeding K62.5    Pneumonia J18.9    Severe sepsis ([facility]) A41.9, R65.20    Acute blood loss anemia D62    Facial droop R29.810    Altered mental state R41.82    Viral encephalitis A86    Coronary artery disease with stable angina pectoris ([facility]) I25.118    Chronic obstructive pulmonary disease ([facility]) J44.9    CKD (chronic kidney disease) stage 3, GFR 30-59 ml/min ([facility]) N18.30    Intracranial hemorrhage ([facility]) I62.9    CAP (community acquired pneumonia) J18.9       Plan:   1.) Embolic Stroke    -seen on MRI    - Few small scattered acute infarcts in the bilateral frontal lobes and parieto-occipital lobes, right greater than left. 2.  Sequela of evolved intracranial hemorrhage with small, late subacute hematomas. -CTA head and neck Ordered   - Echo ordered   -cardiology consult    -Continue ASA 81 mg daily. Cannot escalate antiplatelet therapy as he did have GI bleed on Plavix;    - He not a candidate for anticoagulation therapy due to his risk for falls, dementia, Hx HTN, and ICH and subacute hematomas on MRI    -PT/OT/SLP   -Stroke education     2. Pneumonia    - on antibiotic    hospitalist on board   3. AMS   -due to #1 and #2    -Improving     Thank you very much for this consultation.  No further neurologic recommendations at this time.    Signed:  Maynor Phelps NP  5/19/2021  11:49 AM

## 2021-05-19 NOTE — PROGRESS NOTES
[address] [facility]  Hospitalist Group                                                                                          Hospitalist Progress Note  Cassidy Watson MD  Answering service: [phone] OR [phone] from in house phone              Progress Note    Patient: Mohamud Borges MRN: [id number]  SSN: [id number]    YOB: 1934  Age: 80 y.o. Sex: male      Admit Date: 5/16/2021    LOS: 3 days     Subjective:     Patient presents with pneumonia. Patient seems more alert this a.m., makes eye contact and also speaks a few words. Knows his name. Objective:     Vitals:    05/18/21 2054 05/19/21 0330 05/19/21 0856 05/19/21 0907   BP: (!) 189/92 (!) 176/91 (!) 166/72    Pulse: (!) 58 (!) 56 (!) 50 (!) 52   Resp: 22 20 19    Temp: 98.7 °F (37.1 °C) 97.3 °F (36.3 °C) 98.5 °F (36.9 °C)    SpO2: 95% 94% 95%    Weight:       Height:            Intake and Output:  Current Shift: No intake/output data recorded. Last three shifts: No intake/output data recorded. Physical Exam:   GENERAL: Patient appears more alert  THROAT & NECK: normal and no erythema or exudates noted. LUNG: clear to auscultation bilaterally  HEART: regular rate and rhythm, S1, S2 normal, no murmur, click, rub or gallop  ABDOMEN: soft, non-tender. Bowel sounds normal. No masses,  no organomegaly  EXTREMITIES:  extremities normal, atraumatic, no cyanosis or edema  SKIN: no rash or abnormalities  NEUROLOGIC: Patient appears more alert    Lab/Data Review: All lab results for the last 24 hours reviewed.      Recent Results (from the past 24 hour(s))   METABOLIC PANEL, BASIC    Collection Time: 05/18/21  7:08 PM   Result Value Ref Range    Sodium 139 136 - 145 mmol/L    Potassium HEMOLYZED,RECOLLECT REQUESTED 3.5 - 5.1 mmol/L    Chloride 106 97 - 108 mmol/L    CO2 22 21 - 32 mmol/L    Anion gap 11 5 - 15 mmol/L    Glucose 82 65 - 100 mg/dL    BUN 23 (H) 6 - 20 MG/DL    Creatinine 1.12 0.70 - 1.30 MG/DL    BUN/Creatinine ratio 21 (H) 12 - 20      GFR est AA >60 >60 ml/min/1.73m2    GFR est non-AA >60 >60 ml/min/1.73m2    Calcium 8.6 8.5 - 10.1 MG/DL   EKG, 12 LEAD, INITIAL    Collection Time: 05/19/21  2:34 AM   Result Value Ref Range    Ventricular Rate 57 BPM    Atrial Rate 57 BPM    P-R Interval 160 ms    QRS Duration 86 ms    Q-T Interval 468 ms    QTC Calculation (Bezet) 455 ms    Calculated P Axis 0 degrees    Calculated R Axis 2 degrees    Calculated T Axis 51 degrees    Diagnosis       Sinus bradycardia with premature supraventricular complexes  Otherwise normal ECG  When compared with ECG of 16-MAY-2021 21:19,  premature supraventricular complexes are now present  Confirmed by Olga Ayon ([id number]) on 5/19/2021 14:51:03 AM     METABOLIC PANEL, BASIC    Collection Time: 05/19/21  3:03 AM   Result Value Ref Range    Sodium 139 136 - 145 mmol/L    Potassium 4.4 3.5 - 5.1 mmol/L    Chloride 109 (H) 97 - 108 mmol/L    CO2 17 (L) 21 - 32 mmol/L    Anion gap 13 5 - 15 mmol/L    Glucose 74 65 - 100 mg/dL    BUN 22 (H) 6 - 20 MG/DL    Creatinine 1.04 0.70 - 1.30 MG/DL    BUN/Creatinine ratio 21 (H) 12 - 20      GFR est AA >60 >60 ml/min/1.73m2    GFR est non-AA >60 >60 ml/min/1.73m2    Calcium 8.3 (L) 8.5 - 10.1 MG/DL        Imaging:         Assessment and Plan:     Pneumonia  -Patient's chest x-ray shows left basilar airspace disease  -Continue management of community-acquired pneumonia  -Currently on cefepime and doxycycline    Altered mental status  -Likely metabolic encephalopathy due to electrolyte abnormalities and infectious process  -CT head with no acute intracranial abnormality, MRI shows multiple areas of infarcts in both sides of the brain, concerning for embolic event  -Hold Seroquel for now until mental status is improved  -Patient appears more alert with the mental status    CVA  -MRI shows multiple areas of infarcts in both sides of the brain, concerning for embolic event  -Today we found atrial fibrillation on the monitor, likely the cause of his embolic stroke, atrial fibrillation is new onset  -Echocardiogram pending  -Neurology following, cardiology consulted  -Speech evaluated the patient, continue current diet  -PT evaluating the patient    New onset atrial fibrillation  -Cardiology consulted, echo pending  -May need anticoagulation, timing to be determined, pending further recommendation from neurology and cardiology    Hypertension  -Continue Imdur and start hydralazine, daughter states that hydralazine helps with his blood pressure better  -Hold Norvasc  -Further adjustment of blood pressure meds based on follow-up blood pressure readings    Dyslipidemia  -Resume Lipitor    Discharge disposition: Likely next 24 to 48 hours pending medical progress. Patient will likely need SNF placement once medically stable.     Signed By: Michel Veras MD     May 19, 2021

## 2021-05-19 NOTE — PROGRESS NOTES
Bedside shift change report given to Delbert Thomas (oncoming nurse) by Brooke Martínez (offgoing nurse). Report included the following information Say Lawler Updated Dr. Leann Camejo on MRI results. 2514 Notified Dr. Olga Lidia Vazquez that pt's /72, pulse 52. Pt to receive imdur. 46 Notified Dr. Olga Lidia Vazquez that pt is in Afib per monitor tech. Cardiology to be consulted. 1519 TRANSFER - OUT REPORT:    Verbal report given to Duong Rogel (name) on Liza Johnson  being transferred to 6S (unit) for routine progression of care       Report consisted of patients Situation, Background, Assessment and   Recommendations(SBAR). Information from the following report(s) SBAR was reviewed with the receiving nurse. Lines:   Peripheral IV 05/19/21 Left;Upper Arm (Active)   Site Assessment Clean, dry, & intact 05/19/21 1514   Phlebitis Assessment 0 05/19/21 1514   Infiltration Assessment 0 05/19/21 1514   Dressing Status Clean, dry, & intact 05/19/21 1514   Dressing Type Transparent 05/19/21 1514   Hub Color/Line Status Pink 05/19/21 1514        Opportunity for questions and clarification was provided. Notified daughter, Pee Calixto, of pt's change of room. 1546 Received order to resume previous diet from Dr. Olga Lidia Vazquez.

## 2021-05-19 NOTE — PROGRESS NOTES
SLP Contact Note    SLP evaluation complete. Pt had MBS a few weeks ago with penetration to the level of the vocal folds and recommendations for pureed diet/nectar-thick liquids. Unclear what patient has been eating/drinking since this admission. Pt does have a regular diet/thin liquids ordered and did not appear to have significant difficulty with it. Therefore, could consider two options. Could initiate regular diet/thin liquids with close monitoring of vital signs (increase in WBC, worsening chest imaging with involvement of right lobe, worsened respiratory status, etc). Or, could place patient on diet of pureed diet/nectar-thick liquids per MBS, but unsure that he requires this diet anymore. Will defer to Dr. Mara Hill to determine medical team's wishes regarding patient's diet.         Thank you,  JENNIFER Shen.Ed, [address] [facility]  Speech-Language Pathologist

## 2021-05-19 NOTE — PROGRESS NOTES
Problem: Dysphagia (Adult)  Goal: *Acute Goals and Plan of Care (Insert Text)  Description: Speech Therapy Goals  Initiated 5/19/2021    1. Patient will tolerate regular diet/thin liquids without adverse effects within 7 days. Outcome: Progressing Towards Goal     SPEECH LANGUAGE PATHOLOGY BEDSIDE SWALLOW EVALUATION  Patient: Amena Silva (39 y.o. male)  Date: 5/19/2021  Primary Diagnosis: CAP (community acquired pneumonia) [J18.9]        Precautions:        ASSESSMENT :  Pt had MBS a few weeks ago with penetration to the level of the vocal folds with thin liquids and recommendations for pureed diet/nectar-thick liquids. Unclear what patient has been eating/drinking since discharge from previous admission. Pt does have a regular diet/thin liquids ordered and did not appear to have significant difficulty with it. Therefore, could consider two options. Could initiate regular diet/thin liquids with close monitoring of vital signs (increase in WBC, worsening chest imaging with involvement of right lobe, worsened respiratory status, etc). Or, could place patient on diet of pureed diet/nectar-thick liquids per MBS, but unsure that he requires this diet anymore. Discussed with Dr. Chelsie Shaikh who was in agreement with initiating regular diet/thin liquids with close monitoring of vital signs. Patient will benefit from skilled intervention to address the above impairments. Patients rehabilitation potential is considered to be Fair     PLAN :  Recommendations and Planned Interventions:  --Per discussion with MD continue regular diet/thin liquids with close monitoring of WBC, respiratory status, and chest imaging for any signs of intolerance  --1:1 assistance  --small sips/bites  --alternate liquids/solids    Frequency/Duration: Patient will be followed by speech-language pathology 2 times a week to address goals. Discharge Recommendations: To Be Determined     SUBJECTIVE:   Patient stated, \"What did you say?  after SLP reacted to his large belch.     OBJECTIVE:     Past Medical History:   Diagnosis Date    Adverse effect of anesthesia     combative after anesthesia    Alcohol abuse     6 beers/day    Arthritis     CAD (coronary artery disease)     Chronic obstructive pulmonary disease (HCC)     Chronic obstructive pulmonary disease (HCC)     CKD (chronic kidney disease) stage 3, GFR 30-59 ml/min ([facility] [address] 9/21/2020    Dyslipidemia 8/16/2017    Dyspepsia and other specified disorders of function of stomach     Dyspnea 8/16/2017    ED (erectile dysfunction) 8/16/2017    Encounter for immunization 8/16/2017    Fatigue 8/16/2017    Flank pain 8/1/2019    GERD (gastroesophageal reflux disease) 8/16/2017    Gout 8/16/2017    Hypertension     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    Morbid obesity ([facility] [address]     On statin therapy 8/16/2017    TESSA on CPAP 1/3/2019    Psychiatric disorder     Depression    Simple chronic bronchitis ([facility] [address] 1/3/2019    TIA (transient ischemic attack) 7/53/0609    Umbilical hernia 68/9/4855    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     Past Surgical History:   Procedure Laterality Date    COLONOSCOPY N/A 9/27/2019    COLONOSCOPY performed by Jeffrey Villasenor MD at [address]  75/55/28    open umbilical hernia repair mesh    HX ORTHOPAEDIC      HX UROLOGICAL      HYDROCELECTOMY    MS CARDIAC SURG PROCEDURE UNLIST  1996    Cardiac Stents    MS CARDIAC SURG PROCEDURE UNLIST  04/2019    EF 61-65%    UPPER GI ENDOSCOPY,BIOPSY  9/30/2019          Prior Level of Function/Home Situation:   Home Situation  Home Environment: Rehabilitation facility  One/Two Story Residence: One story  Living Alone: No  Support Systems: Child(theodora)  Patient Expects to be Discharged to[de-identified] Unknown  Current DME Used/Available at Home: Commode, bedside, Communication device, Walker  Diet prior to admission: unclear  Current Diet:  NPO   Cognitive and Communication Status:  Neurologic State: Alert, Confused  Orientation Level: Oriented to person  Cognition: Unable to assess (comment)  Perception: Cues to attend left visual field, Cues to attend to left side of body     Safety/Judgement: Decreased awareness of environment, Decreased awareness of need for safety, Decreased awareness of need for assistance, Decreased insight into deficits  Oral Assessment:  Oral Assessment  Labial: No impairment  Dentition: Natural  Oral Hygiene: oral mucosa moist and clear of secretions  Lingual: No impairment  Velum: No impairment  Mandible: No impairment  P.O. Trials:  Patient Position: upright in bed  Vocal quality prior to P.O.: No impairment  Consistency Presented: Thin liquid; Solid; Nectar thick liquid;Puree  How Presented: Self-fed/presented;Cup/sip;Spoon;Straw;Successive swallows     Bolus Acceptance: No impairment  Bolus Formation/Control: No impairment     Propulsion: No impairment  Oral Residue: None  Initiation of Swallow: No impairment  Laryngeal Elevation: Functional  Aspiration Signs/Symptoms: None  Pharyngeal Phase Characteristics: No impairment, issues, or problems              Oral Phase Severity: No impairment  Pharyngeal Phase Severity :  (hx of difficulty; no significant deficits today)    NOMS:   The NOMS functional outcome measure was used to quantify this patient's level of swallowing impairment. Based on the NOMS, the patient was determined to be at level 5 for swallow function       NOMS Swallowing Levels:  Level 1 (CN): NPO  Level 2 (CM): NPO but takes consistency in therapy  Level 3 (CL): Takes less than 50% of nutrition p.o. and continues with nonoral feedings; and/or safe with mod cues; and/or max diet restriction  Level 4 (CK):  Safe swallow but needs mod cues; and/or mod diet restriction; and/or still requires some nonoral feeding/supplements  Level 5 (CJ): Safe swallow with min diet restriction; and/or needs min cues  Level 6 (CI): Independent with p.o.; rare cues; usually self cues; may need to avoid some foods or needs extra time  Level 7 ([address] [place] [address]): Independent for all p.o.  BARTOLO. (2003). National Outcomes Measurement System (NOMS): Adult Speech-Language Pathology User's Guide. Pain:  Pain Scale 1: Numeric (0 - 10)  Pain Intensity 1: 0       After treatment:   Call bell within reach and Nursing notified    COMMUNICATION/EDUCATION:     The patient's plan of care including recommendations, planned interventions, and recommended diet changes were discussed with: Registered nurse and Physician. Patient is unable to participate in goal setting and plan of care.     Thank you for this referral.  TIMOTHY Angelo  Time Calculation: 15 mins

## 2021-05-19 NOTE — PROGRESS NOTES
Problem: Pressure Injury - Risk of  Goal: *Prevention of pressure injury  Description: Document Aquiles Scale and appropriate interventions in the flowsheet. Outcome: Progressing Towards Goal  Note: Pressure Injury Interventions:  Sensory Interventions: Assess changes in LOC    Moisture Interventions: Absorbent underpads    Activity Interventions: PT/OT evaluation    Mobility Interventions: HOB 30 degrees or less    Nutrition Interventions: Discuss nutritional consult with provider                     Problem: Patient Education: Go to Patient Education Activity  Goal: Patient/Family Education  Outcome: Progressing Towards Goal     Problem: Patient Education: Go to Patient Education Activity  Goal: Patient/Family Education  Outcome: Progressing Towards Goal     Problem: Falls - Risk of  Goal: *Absence of Falls  Description: Document Franco Chavez Fall Risk and appropriate interventions in the flowsheet. Outcome: Progressing Towards Goal  Note: Fall Risk Interventions:  Mobility Interventions: Communicate number of staff needed for ambulation/transfer    Mentation Interventions: Adequate sleep, hydration, pain control, Door open when patient unattended, Room close to nurse's station         Elimination Interventions:  Toileting schedule/hourly rounds              Problem: Patient Education: Go to Patient Education Activity  Goal: Patient/Family Education  Outcome: Progressing Towards Goal     Problem: Patient Education: Go to Patient Education Activity  Goal: Patient/Family Education  Outcome: Progressing Towards Goal

## 2021-05-19 NOTE — CONSULTS
[facility] Cardiology Consultation    Date of Consult:  05/19/21  Date of Admission: 5/16/2021  Primary Cardiologist: Dr. Lashell Martin  Physician Requesting consult: Dr. Latisha Slater / Reason for Consult:   Concern for atrial fibrillation    History of Present Illness:  Fidel Campos is a 80 y.o. male with the below listed medical history who was admitted with AMS. He was recently hospitalized at [facility] 2 weeks ago due to [address]. Over the past few days, his mental status has declined. He has also had fevers and chills. He is unable to provide much history. In the ED, his temperature was 101.3 F. CXR showed \"Patchy left basilar airspace opacity which may represent pneumonia or atelectasis with a small left pleural effusion. \"  He was also tachypneic on initial presentation. BCx NGTD. Rapid COVID-19 test negative. I personally reviewed his 2 ECGs done during this hospitalization as well as his telemetry. There is no evidence for atrial fibrillation. He has frequent APDs.      Past Medical History:   Diagnosis Date    Adverse effect of anesthesia     combative after anesthesia    Alcohol abuse     6 beers/day    Arthritis     CAD (coronary artery disease)     Chronic obstructive pulmonary disease (HCC)     Chronic obstructive pulmonary disease (HCC)     CKD (chronic kidney disease) stage 3, GFR 30-59 ml/min ([address] 9/21/2020    Dyslipidemia 8/16/2017    Dyspepsia and other specified disorders of function of stomach     Dyspnea 8/16/2017    ED (erectile dysfunction) 8/16/2017    Encounter for immunization 8/16/2017    Fatigue 8/16/2017    Flank pain 8/1/2019    GERD (gastroesophageal reflux disease) 8/16/2017    Gout 8/16/2017    Hypertension     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 Authorizing Provider   amLODIPine (NORVASC) 5 mg tablet Take 1 Tab by mouth daily for 30 days. 5/4/21 6/3/21  Emre Stringer MD   hydrALAZINE (APRESOLINE) 100 mg tablet Take 1 Tab by mouth every eight (8) hours for 30 days. 5/3/21 6/2/21  Emre Stringer MD   QUEtiapine (SEROquel) 100 mg tablet 1 Tab by Per NG tube route nightly for 30 days. 5/3/21 6/2/21  Emre Stringer MD   atenoloL (TENORMIN) 100 mg tablet TAKE ONE TABLET BY MOUTH DAILY 4/14/21   Royal Booker MD   atorvastatin (LIPITOR) 80 mg tablet TAKE ONE TABLET BY MOUTH DAILY 3/15/21   Royal Booker MD   furosemide (LASIX) 80 mg tablet TAKE ONE TABLET BY MOUTH DAILY 2/1/21   Royal Booker MD   thiamine HCL (B-1) 100 mg tablet TAKE ONE BY MOUTH DAILY 12/21/20   Royal Booker MD   isosorbide mononitrate ER (IMDUR) 30 mg tablet TAKE ONE TABLET BY MOUTH DAILY 12/21/20   Royal Booker MD   pantoprazole (PROTONIX) 40 mg tablet TAKE ONE TABLET BY MOUTH DAILY 9/22/20   Royal Booker MD   DULoxetine (CYMBALTA) 30 mg capsule TAKE ONE CAPSULE BY MOUTH DAILY 9/16/20   Royal Booker MD   melatonin 5 mg cap capsule Take 5 mg by mouth nightly. Pt. Take 2 tabs a night    Provider, Historical   acetaminophen (TYLENOL) 500 mg tablet Take 1,000 mg by mouth every six (6) hours as needed for Pain. Provider, Historical   aspirin delayed-release 81 mg tablet Take 81 mg by mouth daily. Provider, Historical   ANORO ELLIPTA 62.5-25 mcg/actuation inhaler Take 1 Puff by inhalation daily.  12/26/18   Provider, Historical       Current Facility-Administered Medications   Medication Dose Route Frequency    hydrALAZINE (APRESOLINE) tablet 50 mg  50 mg Oral TID    [START ON 5/20/2021] atorvastatin (LIPITOR) tablet 80 mg  80 mg Oral DAILY    iopamidoL (ISOVUE-370) 76 % injection 100 mL  100 mL IntraVENous RAD ONCE    [Held by provider] amLODIPine (NORVASC) tablet 5 mg  5 mg Oral DAILY    isosorbide mononitrate ER (IMDUR) tablet 30 mg  30 mg Oral DAILY    sodium chloride (NS) flush 5-40 mL  5-40 mL IntraVENous Q8H    sodium chloride (NS) flush 5-40 mL  5-40 mL IntraVENous PRN    acetaminophen (TYLENOL) tablet 650 mg  650 mg Oral Q6H PRN    Or    acetaminophen (TYLENOL) suppository 650 mg  650 mg Rectal Q6H PRN    polyethylene glycol (MIRALAX) packet 17 g  17 g Oral DAILY PRN    promethazine (PHENERGAN) tablet 12.5 mg  12.5 mg Oral Q6H PRN    Or    ondansetron (ZOFRAN) injection 4 mg  4 mg IntraVENous Q6H PRN    doxycycline (VIBRAMYCIN) 100 mg in 0.9% sodium chloride (MBP/ADV) 100 mL MBP  100 mg IntraVENous Q12H    cefepime (MAXIPIME) 2 g in 0.9% sodium chloride (MBP/ADV) 100 mL MBP  2 g IntraVENous Q12H       Family History   Problem Relation Age of Onset    Heart Disease Mother     Hypertension Mother     Hypertension Father     Hypertension Sister     Hypertension Brother     Cancer Brother        Social History     Socioeconomic History    Marital status:      Spouse name: Not on file    Number of children: Not on file    Years of education: Not on file    Highest education level: Not on file   Occupational History    Not on file   Tobacco Use    Smoking status: Former Smoker     Packs/day: 0.50     Years: 20.00     Pack years: 10.00    Smokeless tobacco: Former User    Tobacco comment: quit about 40  years ago   / used to dip tobaco and dip snuff   Substance and Sexual Activity    Alcohol use: Yes     Comment: \"Drinks very little now for about a month \"    Drug use: No    Sexual activity: Not on file   Other Topics Concern    Not on file   Social History Narrative    Not on file     Social Determinants of Health     Financial Resource Strain:     Difficulty of Paying Living Expenses:    Food Insecurity:     Worried About Running Out of Food in the Last Year:    [address] in the Last Year:    Transportation Needs:     Lack of Transportation (Medical):  Lack of Transportation (Non-Medical):    Physical Activity:     Days of Exercise per Week:     Minutes of Exercise per Session:    Stress:     Feeling of Stress :    Social Connections:     Frequency of Communication with Friends and Family:     Frequency of Social Gatherings with Friends and Family:     Attends Church Services:     Active Member of Clubs or Organizations:     Attends Club or Organization Meetings:     Marital Status:    Intimate Partner Violence:     Fear of Current or Ex-Partner:     Emotionally Abused:     Physically Abused:     Sexually Abused:        Review of Systems   Unable to perform ROS: Mental acuity       Visit Vitals  BP (!) 166/72 (BP 1 Location: Left upper arm, BP Patient Position: At rest)   Pulse (!) 52   Temp 98.5 °F (36.9 °C)   Resp 19   Ht 5' 8\" (1.727 m)   Wt 95.3 kg (210 lb 1.6 oz)   SpO2 95%   BMI 31.95 kg/m²       No intake or output data in the 24 hours ending 05/19/21 1242     Physical Exam  GEN: NAD, appears stated age  [de-identified]: EOMI, MMM, OP clear  NECK: Normal JVP, carotids 2+ b/l and symmetrical  CV: RRR, normal S1 and S2, no M/R/G  LUNGS: CTAB, no W/R/R  ABD: Obese, NABS, soft, NT/ND  EXT: No edema, 2+ and symmetrical radial pulses and pedal pulses b/l  PSYCH: Somnolent  NEURO: Somnolent, does not arouse to voice. Barely starts to open eyes with repeatedly calling his name.     Lab Review:  BMP:   Lab Results   Component Value Date/Time     05/19/2021 03:03 AM    K 4.4 05/19/2021 03:03 AM     (H) 05/19/2021 03:03 AM    CO2 17 (L) 05/19/2021 03:03 AM    AGAP 13 05/19/2021 03:03 AM    GLU 74 05/19/2021 03:03 AM    BUN 22 (H) 05/19/2021 03:03 AM    CREA 1.04 05/19/2021 03:03 AM    GFRAA >60 05/19/2021 03:03 AM    GFRNA >60 05/19/2021 03:03 AM        CBC:  Lab Results   Component Value Date/Time    WBC 10.8 05/16/2021 09:16 PM    HGB (POC) 14.9 07/27/2018 02:38 PM    HGB 13.1 05/16/2021 09:16 PM    Hematocrit (POC) 45 05/13/2019 07:16 PM    HCT 40.1 05/16/2021 09:16 PM    PLATELET 893 32/74/7015 09:16 PM    MCV 94.8 05/16/2021 09:16 PM       All Cardiac Markers in the last 24 hours:  No results found for: CPK, CK, CKMMB, CKMB, RCK3, CKMBT, CKMBPOC, CKNDX, CKND1, DANNIELLE, TROPT, TROIQ, ADY, TROPT, TNIPOC, BNP, BNPP, BNPNT    Lab Results   Component Value Date/Time    Cholesterol, total 144 04/19/2021 12:59 AM    Cholesterol (POC) 171.0 09/08/2017 10:42 AM    HDL Cholesterol 33 04/19/2021 12:59 AM    HDL Cholesterol (POC) 36.0 09/08/2017 10:42 AM    LDL Cholesterol (POC) 70.4 09/08/2017 10:42 AM    LDL, calculated 74.8 04/19/2021 12:59 AM    VLDL, calculated 36.2 04/19/2021 12:59 AM    Triglyceride 181 (H) 04/19/2021 12:59 AM    Triglycerides (POC) 323.0 (A) 09/08/2017 10:42 AM    CHOL/HDL Ratio 4.4 04/19/2021 12:59 AM        Data Review:  ECG tracing personally reviewed:   5/16/21:  Significant baseline artifact, but likely NSR    5/19/21:  NSR with frequent APDs    Echocardiogram:  09/29/19    ECHO EUNICE W OR WO CONTRAST 10/03/2019 10/3/2019    Interpretation Summary  · Left Ventricle: Normal cavity size, wall thickness and systolic function (ejection fraction normal). Estimated left ventricular ejection fraction is 56 - 60%. No regional wall motion abnormality noted. · Left Atrium: Mildly dilated left atrium. Left atrial appendage velocity is normal (greater than 40 cm/sec). · Aortic Valve: Mild aortic valve regurgitation is present. · Mitral Valve: Trace mitral valve regurgitation is present. Signed by: David Conroy DO on 10/3/2019  3:22 PM    Other imaging:  MRI Brain:  IMPRESSION  1. Few small scattered acute infarcts in the bilateral frontal lobes and parieto-occipital lobes, right greater than left.   2. 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 3. Unchanged generalized parenchymal volume loss and severe chronic microvascular ischemic disease. Assessment:    1. Sinus bradycardia with frequent APDs; no definite evidence for atrial fibrillation, EVR 2019 showed no atrial fibrillation  2. CAD s/p prior LAD PCI in 1996  3. Mild aortic stenosis  4. Chronic venous insufficiency s/p left GSV venaseal 2/19 and right GSV venaseal 3/19  5. CKD stage III  6. COPD  7. TESSA on CPAP  8. Prior TIA with recent ICM and current evidence for acute embolic infarcts  9. HTN  10. HL    Recommendations / Plan:  - No evidence for atrial fibrillation  - Even if he had atrial fibrillation; suspect that anticoagulation would be contraindicated in the setting of his recent [address]  - Suggest consideration of palliative care / hospice and discussion of [address] with family  - Can follow-up with primary cardiologist, Dr. Tal Alvarenga, as an outpatient as necessary    We will sign-off, but are available if additional questions arise. Thank you for the opportunity to participate in the care of Artis Figueroa and please do not hesitate to contact us should you have any questions. Signed:  Seun Gill.  Johnny Saini, [address] [facility] / [address] [place] [address] [facility]  05/19/21

## 2021-05-19 NOTE — PROGRESS NOTES
Problem: Pressure Injury - Risk of  Goal: *Prevention of pressure injury  Description: Document Aquiles Scale and appropriate interventions in the flowsheet. Outcome: Progressing Towards Goal  Note: Pressure Injury Interventions:  Sensory Interventions: Assess changes in LOC    Moisture Interventions: Absorbent underpads    Activity Interventions: PT/OT evaluation    Mobility Interventions: Float heels, HOB 30 degrees or less    Nutrition Interventions: Document food/fluid/supplement intake    Friction and Shear Interventions: HOB 30 degrees or less                Problem: Falls - Risk of  Goal: *Absence of Falls  Description: Document Too Fall Risk and appropriate interventions in the flowsheet.   Outcome: Progressing Towards Goal  Note: Fall Risk Interventions:  Mobility Interventions: Communicate number of staff needed for ambulation/transfer    Mentation Interventions: Door open when patient unattended         Elimination Interventions: Call light in reach

## 2021-05-20 ENCOUNTER — APPOINTMENT (OUTPATIENT)
Dept: NON INVASIVE DIAGNOSTICS | Age: 86
DRG: 193 | End: 2021-05-20
Attending: NURSE PRACTITIONER
Payer: MEDICARE

## 2021-05-20 LAB
ANION GAP SERPL CALC-SCNC: 7 MMOL/L (ref 5–15)
BUN SERPL-MCNC: 25 MG/DL (ref 6–20)
BUN/CREAT SERPL: 20 (ref 12–20)
CALCIUM SERPL-MCNC: 9.2 MG/DL (ref 8.5–10.1)
CHLORIDE SERPL-SCNC: 109 MMOL/L (ref 97–108)
CO2 SERPL-SCNC: 26 MMOL/L (ref 21–32)
CREAT SERPL-MCNC: 1.28 MG/DL (ref 0.7–1.3)
ECHO AO ROOT DIAM: 3.35 CM
ECHO LA MAJOR AXIS: 2.91 CM
ECHO LA MINOR AXIS: 1.39 CM
ECHO LV INTERNAL DIMENSION DIASTOLIC: 4.13 CM (ref 4.2–5.9)
ECHO LV INTERNAL DIMENSION SYSTOLIC: 2.6 CM
ECHO LV IVSD: 0.99 CM (ref 0.6–1)
ECHO LV MASS 2D: 140.3 G (ref 88–224)
ECHO LV MASS INDEX 2D: 67.1 G/M2 (ref 49–115)
ECHO LV POSTERIOR WALL DIASTOLIC: 1.08 CM (ref 0.6–1)
ECHO LVOT DIAM: 2.1 CM
ECHO PV MAX VELOCITY: 121.37 CM/S
ECHO PV PEAK INSTANTANEOUS GRADIENT SYSTOLIC: 5.89 MMHG
ECHO TV REGURGITANT MAX VELOCITY: 319.57 CM/S
ECHO TV REGURGITANT PEAK GRADIENT: 40.85 MMHG
GLUCOSE SERPL-MCNC: 98 MG/DL (ref 65–100)
POTASSIUM SERPL-SCNC: 4.1 MMOL/L (ref 3.5–5.1)
SODIUM SERPL-SCNC: 142 MMOL/L (ref 136–145)

## 2021-05-20 PROCEDURE — C8929 TTE W OR WO FOL WCON,DOPPLER: HCPCS

## 2021-05-20 PROCEDURE — 74011250636 HC RX REV CODE- 250/636: Performed by: INTERNAL MEDICINE

## 2021-05-20 PROCEDURE — 80048 BASIC METABOLIC PNL TOTAL CA: CPT

## 2021-05-20 PROCEDURE — 99233 SBSQ HOSP IP/OBS HIGH 50: CPT | Performed by: NURSE PRACTITIONER

## 2021-05-20 PROCEDURE — 65660000000 HC RM CCU STEPDOWN

## 2021-05-20 PROCEDURE — 74011250637 HC RX REV CODE- 250/637: Performed by: FAMILY MEDICINE

## 2021-05-20 PROCEDURE — 74011250636 HC RX REV CODE- 250/636: Performed by: STUDENT IN AN ORGANIZED HEALTH CARE EDUCATION/TRAINING PROGRAM

## 2021-05-20 PROCEDURE — 95816 EEG AWAKE AND DROWSY: CPT | Performed by: PSYCHIATRY & NEUROLOGY

## 2021-05-20 PROCEDURE — 74011250636 HC RX REV CODE- 250/636: Performed by: PSYCHIATRY & NEUROLOGY

## 2021-05-20 PROCEDURE — 95816 EEG AWAKE AND DROWSY: CPT | Performed by: NURSE PRACTITIONER

## 2021-05-20 PROCEDURE — 36415 COLL VENOUS BLD VENIPUNCTURE: CPT

## 2021-05-20 PROCEDURE — 74011250636 HC RX REV CODE- 250/636: Performed by: NURSE PRACTITIONER

## 2021-05-20 PROCEDURE — 74011000258 HC RX REV CODE- 258: Performed by: PSYCHIATRY & NEUROLOGY

## 2021-05-20 PROCEDURE — 94760 N-INVAS EAR/PLS OXIMETRY 1: CPT

## 2021-05-20 PROCEDURE — 74011000258 HC RX REV CODE- 258: Performed by: STUDENT IN AN ORGANIZED HEALTH CARE EDUCATION/TRAINING PROGRAM

## 2021-05-20 RX ORDER — GUAIFENESIN 100 MG/5ML
81 LIQUID (ML) ORAL DAILY
Status: DISCONTINUED | OUTPATIENT
Start: 2021-05-21 | End: 2021-06-08 | Stop reason: HOSPADM

## 2021-05-20 RX ORDER — LORAZEPAM 2 MG/ML
2 INJECTION INTRAMUSCULAR ONCE
Status: COMPLETED | OUTPATIENT
Start: 2021-05-20 | End: 2021-05-20

## 2021-05-20 RX ADMIN — Medication 10 ML: at 06:00

## 2021-05-20 RX ADMIN — HYDRALAZINE HYDROCHLORIDE 50 MG: 50 TABLET, FILM COATED ORAL at 21:31

## 2021-05-20 RX ADMIN — Medication 10 ML: at 21:31

## 2021-05-20 RX ADMIN — LORAZEPAM 2 MG: 2 INJECTION INTRAMUSCULAR; INTRAVENOUS at 09:31

## 2021-05-20 RX ADMIN — LEVETIRACETAM 1000 MG: 100 INJECTION, SOLUTION INTRAVENOUS at 11:35

## 2021-05-20 RX ADMIN — DOXYCYCLINE 100 MG: 100 INJECTION, POWDER, LYOPHILIZED, FOR SOLUTION INTRAVENOUS at 21:30

## 2021-05-20 RX ADMIN — PERFLUTREN 2 ML: 6.52 INJECTION, SUSPENSION INTRAVENOUS at 12:22

## 2021-05-20 RX ADMIN — DOXYCYCLINE 100 MG: 100 INJECTION, POWDER, LYOPHILIZED, FOR SOLUTION INTRAVENOUS at 11:15

## 2021-05-20 RX ADMIN — CEFEPIME HYDROCHLORIDE 2 G: 2 INJECTION, POWDER, FOR SOLUTION INTRAVENOUS at 11:03

## 2021-05-20 NOTE — PROGRESS NOTES
RN entered room. Pt was not responsive, left eye twitching, L arm twitching, gaze deviating to the left. NP made aware. RN received orders for Ativan and stat EEG. 0930 pt given Ativan. 0940 pt making eye contact and smiling at RN. Will continue to monitor.

## 2021-05-20 NOTE — PROGRESS NOTES
Physical Therapy  Attempted to work with patient. Chart reviewed and noted questionable seizure this am.  At this time patient would not open eyes to loud voice or tactile stimulation. Per nursing received ativan earlier. Will follow up tomorrow.   Valentina Dias, PT

## 2021-05-20 NOTE — PROGRESS NOTES
[address] [facility] Adult  Hospitalist Group                                                                                          Hospitalist Progress Note  Sheryle Netters, MD  Answering service: [phone] from in house phone        Date of Service:  2021  NAME:  Patricia Ashraf  :  1934  MRN:  [id number]      Admission Summary:   [facility] is a 80 y.o. male with pmh of CVA, intraventricular brain hemorrhage, dyslipidemia, CKD 3, hypertension who presents to hospital from subacute rehab for concerns of altered/decreased mental status. Interval history / Subjective:   Patient with seizure-like activity with lipsmacking and left eye gaze deviation, not following commands this morning. Received 2 mg of IV Ativan with some improvement. Seen on my examination during EEG, able to open his eyes and responded verbally to his name. Assessment & Plan:     Acute embolic infarcts  Subacute hematoma -intraventricular hemorrhage with ventriculomegaly on recent admission, discharged 5/3  -Suspicious for A. fib, however when reviewed by cardiology no definitive episodes seen on telemetry  -Not a candidate for anticoagulation due to intracranial hemorrhage, and fall risk  -Neurology following  -No escalation of antiplatelets due to [address], continue aspirin  -Continue atorvastatin  -PT/OT/speech  -Echo pending    Metabolic encephalopathymultifactorial, acute CVAs, pneumonia, electrolyte imbalances, potential seizures  -Day and night schedules, avoid benzodiazepines if able  -Frequent redirection  -Holding Seroquel for now    Concern for seizure-like activity  -Status post Keppra load   -Continue Keppra twice daily  -We will DC cefepime     Community-acquired pneumonia -x-ray with left basilar airspace disease  -Discontinue cefepime, continue doxy x 1 additional day  -Remains on room air, monitor    Hypertensioncontinue Imdur and hydralazine. Norvasc on hold.   Continue to monitor closely  Hyperlipidemiacontinue Lipitor    Code status: FULL  DVT prophylaxis: SCDs due to [address] discussed with: Patient/Family  Anticipated Disposition: SNF/LTC  Anticipated Discharge: 24 hours to 50 hours     Hospital Problems  Date Reviewed: 9/21/2020        Codes Class Noted POA    CAP (community acquired pneumonia) ICD-10-CM: J18.9  ICD-9-CM: 231  5/16/2021 Unknown                Review of Systems:   A comprehensive review of systems was negative except for that written in the HPI. Vital Signs:    Last 24hrs VS reviewed since prior progress note. Most recent are:  Visit Vitals  BP (!) 144/80 (BP 1 Location: Left upper arm, BP Patient Position: Supine)   Pulse 71   Temp 98 °F (36.7 °C)   Resp 17   Ht 5' 8\" (1.727 m)   Wt 95.3 kg (210 lb)   SpO2 97%   BMI 31.93 kg/m²       No intake or output data in the 24 hours ending 05/20/21 1620     Physical Examination:     I had a face to face encounter with this patient and independently examined them on 5/20/2021 as outlined below:          Constitutional:  No acute distress, responds to voice   ENT:  Oral mucosa moist, oropharynx benign. Resp:  CTA bilaterally. No wheezing/rhonchi/rales. No accessory muscle use   CV:  Regular rhythm, normal rate, no murmurs, gallops, rubs    GI:  Soft, non distended, non tender. normoactive bowel sounds, no hepatosplenomegaly     Musculoskeletal:  No edema, warm, 2+ pulses throughout    Neurologic:  Opens eyes and responds to voice. Not answering any orientation questions CN II-XII reviewed            Data Review:    Review and/or order of clinical lab test  Review and/or order of tests in the radiology section of CPT  Review and/or order of tests in the medicine section of CPT      Labs:   No results for input(s): WBC, HGB, HCT, PLT, HGBEXT, HCTEXT, PLTEXT in the last 72 hours.   Recent Labs     05/20/21  0445 05/19/21  0303 05/18/21  1908    139 139   K 4.1 4.4 HEMOLYZED,RECOLLECT REQUESTED   * 109* 106   CO2 26 17* 22   BUN 25* 22* 23*   CREA 1.28 1.04 1.12   GLU 98 74 82   CA 9.2 8.3* 8.6     No results for input(s): ALT, AP, TBIL, TBILI, TP, ALB, GLOB, GGT, AML, LPSE in the last 72 hours. No lab exists for component: SGOT, GPT, AMYP, HLPSE  No results for input(s): INR, PTP, APTT, INREXT in the last 72 hours. No results for input(s): FE, TIBC, PSAT, FERR in the last 72 hours. No results found for: FOL, RBCF   No results for input(s): PH, PCO2, PO2 in the last 72 hours. No results for input(s): CPK, CKNDX, TROIQ in the last 72 hours.     No lab exists for component: CPKMB  Lab Results   Component Value Date/Time    Cholesterol, total 144 04/19/2021 12:59 AM    HDL Cholesterol 33 04/19/2021 12:59 AM    LDL, calculated 74.8 04/19/2021 12:59 AM    Triglyceride 181 (H) 04/19/2021 12:59 AM    CHOL/HDL Ratio 4.4 04/19/2021 12:59 AM     Lab Results   Component Value Date/Time    Glucose (POC) 110 (H) 05/03/2021 11:41 AM    Glucose (POC) 155 (H) 04/30/2021 04:50 PM    Glucose (POC) 135 (H) 04/30/2021 11:52 AM    Glucose (POC) 158 (H) 04/30/2021 05:53 AM    Glucose (POC) 170 (H) 04/29/2021 11:40 PM     Lab Results   Component Value Date/Time    Color YELLOW/STRAW 05/16/2021 09:33 PM    Appearance CLEAR 05/16/2021 09:33 PM    Specific gravity 1.014 05/16/2021 09:33 PM    Specific gravity 1.015 09/21/2020 09:13 AM    pH (UA) 5.0 05/16/2021 09:33 PM    Protein Negative 05/16/2021 09:33 PM    Glucose Negative 05/16/2021 09:33 PM    Ketone Negative 05/16/2021 09:33 PM    Bilirubin Negative 05/16/2021 09:33 PM    Urobilinogen 0.2 05/16/2021 09:33 PM    Nitrites Negative 05/16/2021 09:33 PM    Leukocyte Esterase Negative 05/16/2021 09:33 PM    Epithelial cells FEW 05/16/2021 09:33 PM    Bacteria Negative 05/16/2021 09:33 PM    WBC 0-4 05/16/2021 09:33 PM    RBC 0-5 05/16/2021 09:33 PM         Medications Reviewed:     Current Facility-Administered Medications   Medication Dose Route Frequency    hydrALAZINE (APRESOLINE) tablet 50 mg  50 mg Oral TID    atorvastatin (LIPITOR) tablet 80 mg  80 mg Oral DAILY    [Held by provider] amLODIPine (NORVASC) tablet 5 mg  5 mg Oral DAILY    isosorbide mononitrate ER (IMDUR) tablet 30 mg  30 mg Oral DAILY    sodium chloride (NS) flush 5-40 mL  5-40 mL IntraVENous Q8H    sodium chloride (NS) flush 5-40 mL  5-40 mL IntraVENous PRN    acetaminophen (TYLENOL) tablet 650 mg  650 mg Oral Q6H PRN    Or    acetaminophen (TYLENOL) suppository 650 mg  650 mg Rectal Q6H PRN    polyethylene glycol (MIRALAX) packet 17 g  17 g Oral DAILY PRN    promethazine (PHENERGAN) tablet 12.5 mg  12.5 mg Oral Q6H PRN    Or    ondansetron (ZOFRAN) injection 4 mg  4 mg IntraVENous Q6H PRN    doxycycline (VIBRAMYCIN) 100 mg in 0.9% sodium chloride (MBP/ADV) 100 mL MBP  100 mg IntraVENous Q12H    cefepime (MAXIPIME) 2 g in 0.9% sodium chloride (MBP/ADV) 100 mL MBP  2 g IntraVENous Q12H     ______________________________________________________________________  EXPECTED LENGTH OF STAY: 4d 2h  ACTUAL LENGTH OF STAY:          4                 Vicki Davis MD

## 2021-05-20 NOTE — PROGRESS NOTES
Problem: Pressure Injury - Risk of  Goal: *Prevention of pressure injury  Description: Document Aquiles Scale and appropriate interventions in the flowsheet. 5/20/2021 1852 by Vicky Torres  Outcome: Progressing Towards Goal  Note: Pressure Injury Interventions:  Sensory Interventions: Turn and reposition approx. every two hours (pillows and wedges if needed), Minimize linen layers, Maintain/enhance activity level, Float heels    Moisture Interventions: Internal/External urinary devices, Limit adult briefs, Check for incontinence Q2 hours and as needed    Activity Interventions: Increase time out of bed, PT/OT evaluation    Mobility Interventions: Turn and reposition approx. every two hours(pillow and wedges), PT/OT evaluation, Float heels    Nutrition Interventions: Discuss nutritional consult with provider, Document food/fluid/supplement intake    Friction and Shear Interventions: Lift sheet, Minimize layers, Transferring/repositioning devices             5/20/2021 1852 by Vicky Torres  Outcome: Progressing Towards Goal  Note: Pressure Injury Interventions:  Sensory Interventions: Turn and reposition approx. every two hours (pillows and wedges if needed), Minimize linen layers, Maintain/enhance activity level, Float heels    Moisture Interventions: Internal/External urinary devices, Limit adult briefs, Check for incontinence Q2 hours and as needed    Activity Interventions: Increase time out of bed, PT/OT evaluation    Mobility Interventions: Turn and reposition approx.  every two hours(pillow and wedges), PT/OT evaluation, Float heels    Nutrition Interventions: Discuss nutritional consult with provider, Document food/fluid/supplement intake    Friction and Shear Interventions: Lift sheet, Minimize layers, Transferring/repositioning devices                Problem: Seizure Disorder (Adult)  Goal: *STG: Maintains lab values within therapeutic range  Outcome: Progressing Towards Goal  Goal: *STG/LTG: Complies with medication therapy  Outcome: Progressing Towards Goal  Goal: *STG: Remains free of injury during seizure activity  Outcome: Progressing Towards Goal  Goal: *STG: Remains safe in hospital  Outcome: Progressing Towards Goal     Problem: Patient Education: Go to Patient Education Activity  Goal: Patient/Family Education  Outcome: Progressing Towards Goal

## 2021-05-20 NOTE — PROGRESS NOTES
SLP Contact Note    Noted pt had ?seizure this morning and not responding to stimuli. Therefore, will hold SLP for now and follow back up tomorrow.       Thank you,  JENNIFER Solorzano.Ed, [address] [facility]  Speech-Language Pathologist

## 2021-05-20 NOTE — PROGRESS NOTES
Pt lethargic and difficult to arouse. NP examined the patient. Pt was able to be aroused post sternal rub. No orders received.  Will continue to monitor

## 2021-05-20 NOTE — PROGRESS NOTES
Neurology Progress Note    Patient ID:  Mohamud Borges  [id number]  80 y.o.  1934      Subjective:     Seizure like activity this morning. ( decrease responsiveness, lip snacking, left eye gaze deviation)      Objective: All records in [facility] care reviewed and noted    ROS:  Unable to obtain   Meds:  Current Facility-Administered Medications   Medication Dose Route Frequency    levETIRAcetam (KEPPRA) 1,000 mg in 0.9% sodium chloride 100 mL IVPB  1,000 mg IntraVENous ONCE    hydrALAZINE (APRESOLINE) tablet 50 mg  50 mg Oral TID    atorvastatin (LIPITOR) tablet 80 mg  80 mg Oral DAILY    [Held by provider] amLODIPine (NORVASC) tablet 5 mg  5 mg Oral DAILY    isosorbide mononitrate ER (IMDUR) tablet 30 mg  30 mg Oral DAILY    sodium chloride (NS) flush 5-40 mL  5-40 mL IntraVENous Q8H    sodium chloride (NS) flush 5-40 mL  5-40 mL IntraVENous PRN    acetaminophen (TYLENOL) tablet 650 mg  650 mg Oral Q6H PRN    Or    acetaminophen (TYLENOL) suppository 650 mg  650 mg Rectal Q6H PRN    polyethylene glycol (MIRALAX) packet 17 g  17 g Oral DAILY PRN    promethazine (PHENERGAN) tablet 12.5 mg  12.5 mg Oral Q6H PRN    Or    ondansetron (ZOFRAN) injection 4 mg  4 mg IntraVENous Q6H PRN    doxycycline (VIBRAMYCIN) 100 mg in 0.9% sodium chloride (MBP/ADV) 100 mL MBP  100 mg IntraVENous Q12H    cefepime (MAXIPIME) 2 g in 0.9% sodium chloride (MBP/ADV) 100 mL MBP  2 g IntraVENous Q12H       Imaging:  MRI Results (most recent):  Results from Hospital Encounter encounter on 05/16/21    MRI BRAIN WO CONT    Narrative  *PRELIMINARY REPORT*    Multiple foci of small cortical and white matter infarcts mostly on the right  with a couple of lesions on the left as well. Interval evolution of  intraventricular hemorrhage and small parenchymal hemorrhages. Preliminary report was provided by Dr. Jacob Miner, the on-call radiologist, at  22:17.     The findings were called to floor nurse, Neda Obrien, on 5/18/2021 at 22:20 by myself. [phone]    Final report to follow. *END PRELIMINARY REPORT*    EXAM:  MRI BRAIN WO CONT    INDICATION:    AMS    COMPARISON:  CT head 5/16/2021, MRI brain 11/23/2019. CONTRAST: None. TECHNIQUE:  Multiplanar multisequence acquisition without contrast of the brain. FINDINGS:  Few small scattered acute infarcts in the bilateral frontal lobes and  parieto-occipital lobes, right greater than left. Evolved now late subacute small intraparenchymal hematomas in the right parietal  periventricular white matter measuring 10 mm and 5 mm (series 6 image 11), with  no significant surrounding edema or mass effect. There is hemosiderin staining  noted along the ependymal lining of the lateral ventricles, as well as scattered  superficial siderosis involving the sylvian fissures, and along the surface of  the brainstem and cerebellum. Unchanged generalized parenchymal volume loss with commensurate dilation of the  sulci and ventricular system. Unchanged confluent periventricular and deep white  matter T2/FLAIR hyperintensity, and patchy T2/FLAIR hyperintensity in the nely,  consistent with severe chronic microvascular ischemic disease. There is no  cerebellar tonsillar herniation. Expected arterial flow-voids are present. The paranasal sinuses, mastoid air cells, and middle ears are clear. The orbital  contents are within normal limits with bilateral lens implants. No significant  osseous or scalp lesions are identified. Impression  1. Few small scattered acute infarcts in the bilateral frontal lobes and  parieto-occipital lobes, right greater than left. 2. 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   3. Unchanged generalized parenchymal volume loss and severe chronic  microvascular ischemic disease.         Lab Review   Recent Results (from the past 24 hour(s))   METABOLIC PANEL, BASIC    Collection Time: 05/20/21  4:45 AM   Result Value Ref Range    Sodium 142 136 - 145 mmol/L    Potassium 4.1 3.5 - 5.1 mmol/L    Chloride 109 (H) 97 - 108 mmol/L    CO2 26 21 - 32 mmol/L    Anion gap 7 5 - 15 mmol/L    Glucose 98 65 - 100 mg/dL    BUN 25 (H) 6 - 20 MG/DL    Creatinine 1.28 0.70 - 1.30 MG/DL    BUN/Creatinine ratio 20 12 - 20      GFR est AA >60 >60 ml/min/1.73m2    GFR est non-AA 53 (L) >60 ml/min/1.73m2    Calcium 9.2 8.5 - 10.1 MG/DL       Exam:  Visit Vitals  BP (!) 176/85 (BP 1 Location: Left upper arm, BP Patient Position: Lying)   Pulse 87   Temp 98.3 °F (36.8 °C)   Resp 21   Ht 5' 8\" (1.727 m)   Wt 210 lb 1.6 oz (95.3 kg)   SpO2 93%   BMI 31.95 kg/m²     Gen: Well developed  CV: RRR  Neuro: decrease responsiveness; abnormal movements, Not following commands  CN II-XII: PERRL, EOMI, face symmetric, tongue/palate midline  Motor: strength 5/5 all four ext  Sensory: intact to LT  Gait:deferred    Assessment:     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    Hypertension I10    ED (erectile dysfunction) N52.9    Severe obesity (HCC) E66.01    TESSA on CPAP G47.33, Z99.89    Simple chronic bronchitis (HCC) J41.0    Bilateral carotid artery stenosis I65.23    Spinal stenosis of lumbar region at multiple levels M48.061    Flank pain R10.9    Rectal bleeding K62.5    Pneumonia J18.9    Severe sepsis (HCC) A41.9, R65.20    Acute blood loss anemia D62    Facial droop R29.810    Altered mental state R41.82    Viral encephalitis A86    Coronary artery disease with stable angina pectoris ([facility]) I25.118    Chronic obstructive pulmonary disease (HCC) J44.9    CKD (chronic kidney disease) stage 3, GFR 30-59 ml/min (HCC) N18.30    Intracranial hemorrhage (HCC) I62.9    CAP (community acquired pneumonia) J18.9     Plan:   .) Embolic Stroke               -seen on MRI               - Few small scattered acute infarcts in the bilateral frontal lobes and parieto-occipital lobes, right greater than left. 2.       Sequela of evolved intracranial hemorrhage with small, late subacute hematomas. -CTA head and neck Ordered              - Echo ordere              -Continue ASA 81 mg daily. Cannot escalate antiplatelet therapy as he did have GI bleed on Plavix;               - He is not a candidate for anticoagulation therapy due to his risk for falls, dementia, Hx HTN, and ICH and subacute hematomas on MRI               -PT/OT/SLP              -Stroke education      2. Pneumonia               - on antibiotic               hospitalist on board   3. AMS              -due to #1 and #2  #4                 4. Seizure like activity    - He was given 2mg Ativan He is now more responsive. Alert to  self and following some commands   -Will load will keppra 1000mg IV now    -continue Keppra 500 mg BID   - consider  stopping cefepime as it lowest seizure threshold   -Seizure precaution      Thank you for allowing the Neurology Service the pleasure of participating in the care of your patient. This patient will be discussed with my collaborating care team physician  and she may have further recommendations regarding this patient's care.        Signed:  Vi Davenport NP  5/20/2021  10:04 AM

## 2021-05-20 NOTE — PROGRESS NOTES
Transition of Care Plan   RUR- Medium 19%    DISPOSITION: Return to [facility]   F/U with PCP/Specialist     Transport: [facility] on will call    CM noted MD note from 5/19 - DC likely 24-48 hours. CM placed [facility] on will call pending MD updates. 2:34pm: CM received call from Houston Sandoval at [facility] regarding patient discharge status. CM plan to follow up tomorrow morning after IDRs with update. NELSON García CeraS.[email or web address].

## 2021-05-20 NOTE — PROCEDURES
[facility]  EEG    Name:  Kamila Johnson  MR#:  [id number]  :  1934  ACCOUNT #:  [de-identified]  DATE OF SERVICE:  2021      REQUESTING PROVIDER:  Chanelle Lowry NP    HISTORY:  The patient is an 54-year-old male who is being evaluated for altered mental status. DESCRIPTION:  This is an 18-channel EEG performed on a poorly responsive patient. The dominant posterior background rhythm consists of medium voltage rhythms in the 8 Hz frequency range with faster frequencies in the frontal areas. Intermittently, frontally dominant bursts of slowing were seen in the high delta to low theta range lasting 2-3 seconds. Photic stimulation and hyperventilation were not performed. Drowsiness and sleep architecture were not seen. EEG SUMMARY:  Abnormal EEG due to mild intermittent slowing of the background rhythms. CLINICAL INTERPRETATION:  This EEG is suggestive of mild generalized encephalopathic process, nonspecific in type. This may be related to an underlying structural brain injury and/or toxic/metabolic abnormality. No lateralizing or epileptiform features were noted.         Anibal Steen MD      [id number]  D:  2021 15:40  T:  2021 18:45  JOB #:  [id number]

## 2021-05-21 LAB
AMMONIA PLAS-SCNC: 26 UMOL/L
ANION GAP SERPL CALC-SCNC: 9 MMOL/L (ref 5–15)
ANION GAP SERPL CALC-SCNC: 9 MMOL/L (ref 5–15)
ARTERIAL PATENCY WRIST A: POSITIVE
BACTERIA SPEC CULT: NORMAL
BASE DEFICIT BLD-SCNC: 2.8 MMOL/L
BASOPHILS # BLD: 0 K/UL (ref 0–0.1)
BASOPHILS NFR BLD: 0 % (ref 0–1)
BDY SITE: ABNORMAL
BNP SERPL-MCNC: 1085 PG/ML
BUN SERPL-MCNC: 22 MG/DL (ref 6–20)
BUN SERPL-MCNC: 22 MG/DL (ref 6–20)
BUN/CREAT SERPL: 19 (ref 12–20)
BUN/CREAT SERPL: 20 (ref 12–20)
CA-I BLD-SCNC: 1.19 MMOL/L (ref 1.12–1.32)
CALCIUM SERPL-MCNC: 9 MG/DL (ref 8.5–10.1)
CALCIUM SERPL-MCNC: 9.3 MG/DL (ref 8.5–10.1)
CHLORIDE SERPL-SCNC: 113 MMOL/L (ref 97–108)
CHLORIDE SERPL-SCNC: 114 MMOL/L (ref 97–108)
CO2 SERPL-SCNC: 22 MMOL/L (ref 21–32)
CO2 SERPL-SCNC: 24 MMOL/L (ref 21–32)
CREAT SERPL-MCNC: 1.12 MG/DL (ref 0.7–1.3)
CREAT SERPL-MCNC: 1.18 MG/DL (ref 0.7–1.3)
DIFFERENTIAL METHOD BLD: ABNORMAL
EOSINOPHIL # BLD: 0.1 K/UL (ref 0–0.4)
EOSINOPHIL NFR BLD: 1 % (ref 0–7)
ERYTHROCYTE [DISTWIDTH] IN BLOOD BY AUTOMATED COUNT: 15.4 % (ref 11.5–14.5)
ERYTHROCYTE [DISTWIDTH] IN BLOOD BY AUTOMATED COUNT: 15.8 % (ref 11.5–14.5)
GAS FLOW.O2 O2 DELIVERY SYS: ABNORMAL L/MIN
GLUCOSE BLD STRIP.AUTO-MCNC: 92 MG/DL (ref 65–117)
GLUCOSE SERPL-MCNC: 94 MG/DL (ref 65–100)
GLUCOSE SERPL-MCNC: 95 MG/DL (ref 65–100)
HCO3 BLD-SCNC: 21.9 MMOL/L (ref 22–26)
HCT VFR BLD AUTO: 39.7 % (ref 36.6–50.3)
HCT VFR BLD AUTO: 41 % (ref 36.6–50.3)
HGB BLD-MCNC: 12.9 G/DL (ref 12.1–17)
HGB BLD-MCNC: 13.6 G/DL (ref 12.1–17)
IMM GRANULOCYTES # BLD AUTO: 0 K/UL (ref 0–0.04)
IMM GRANULOCYTES NFR BLD AUTO: 0 % (ref 0–0.5)
LACTATE SERPL-SCNC: 0.9 MMOL/L (ref 0.4–2)
LACTATE SERPL-SCNC: 0.9 MMOL/L (ref 0.4–2)
LYMPHOCYTES # BLD: 0.7 K/UL (ref 0.8–3.5)
LYMPHOCYTES NFR BLD: 9 % (ref 12–49)
MAGNESIUM SERPL-MCNC: 2.1 MG/DL (ref 1.6–2.4)
MAGNESIUM SERPL-MCNC: 2.2 MG/DL (ref 1.6–2.4)
MAGNESIUM SERPL-MCNC: 2.4 MG/DL (ref 1.6–2.4)
MCH RBC QN AUTO: 31.2 PG (ref 26–34)
MCH RBC QN AUTO: 31.6 PG (ref 26–34)
MCHC RBC AUTO-ENTMCNC: 32.5 G/DL (ref 30–36.5)
MCHC RBC AUTO-ENTMCNC: 33.2 G/DL (ref 30–36.5)
MCV RBC AUTO: 95.1 FL (ref 80–99)
MCV RBC AUTO: 96.1 FL (ref 80–99)
MONOCYTES # BLD: 0.5 K/UL (ref 0–1)
MONOCYTES NFR BLD: 7 % (ref 5–13)
NEUTS SEG # BLD: 6.4 K/UL (ref 1.8–8)
NEUTS SEG NFR BLD: 83 % (ref 32–75)
NRBC # BLD: 0 K/UL (ref 0–0.01)
NRBC # BLD: 0 K/UL (ref 0–0.01)
NRBC BLD-RTO: 0 PER 100 WBC
NRBC BLD-RTO: 0 PER 100 WBC
PCO2 BLD: 36.9 MMHG (ref 35–45)
PH BLD: 7.38 [PH] (ref 7.35–7.45)
PHOSPHATE SERPL-MCNC: 3.1 MG/DL (ref 2.6–4.7)
PHOSPHATE SERPL-MCNC: 3.3 MG/DL (ref 2.6–4.7)
PHOSPHATE SERPL-MCNC: 3.3 MG/DL (ref 2.6–4.7)
PLATELET # BLD AUTO: 126 K/UL (ref 150–400)
PLATELET # BLD AUTO: 133 K/UL (ref 150–400)
PLATELET COMMENTS,PCOM: ABNORMAL
PMV BLD AUTO: 10.2 FL (ref 8.9–12.9)
PMV BLD AUTO: 9.9 FL (ref 8.9–12.9)
PO2 BLD: 67 MMHG (ref 80–100)
POTASSIUM SERPL-SCNC: 3.7 MMOL/L (ref 3.5–5.1)
POTASSIUM SERPL-SCNC: 3.8 MMOL/L (ref 3.5–5.1)
RBC # BLD AUTO: 4.13 M/UL (ref 4.1–5.7)
RBC # BLD AUTO: 4.31 M/UL (ref 4.1–5.7)
RBC MORPH BLD: ABNORMAL
RBC MORPH BLD: ABNORMAL
SAO2 % BLD: 92.7 % (ref 92–97)
SERVICE CMNT-IMP: NORMAL
SERVICE CMNT-IMP: NORMAL
SODIUM SERPL-SCNC: 144 MMOL/L (ref 136–145)
SODIUM SERPL-SCNC: 147 MMOL/L (ref 136–145)
SPECIMEN TYPE: ABNORMAL
TROPONIN I SERPL-MCNC: <0.05 NG/ML
TROPONIN I SERPL-MCNC: <0.05 NG/ML
WBC # BLD AUTO: 6.6 K/UL (ref 4.1–11.1)
WBC # BLD AUTO: 7.7 K/UL (ref 4.1–11.1)

## 2021-05-21 PROCEDURE — 95714 VEEG EA 12-26 HR UNMNTR: CPT | Performed by: PSYCHIATRY & NEUROLOGY

## 2021-05-21 PROCEDURE — 99233 SBSQ HOSP IP/OBS HIGH 50: CPT | Performed by: PSYCHIATRY & NEUROLOGY

## 2021-05-21 PROCEDURE — 83735 ASSAY OF MAGNESIUM: CPT

## 2021-05-21 PROCEDURE — 82962 GLUCOSE BLOOD TEST: CPT

## 2021-05-21 PROCEDURE — 84100 ASSAY OF PHOSPHORUS: CPT

## 2021-05-21 PROCEDURE — 74011250636 HC RX REV CODE- 250/636: Performed by: INTERNAL MEDICINE

## 2021-05-21 PROCEDURE — 65610000006 HC RM INTENSIVE CARE

## 2021-05-21 PROCEDURE — 84484 ASSAY OF TROPONIN QUANT: CPT

## 2021-05-21 PROCEDURE — 82803 BLOOD GASES ANY COMBINATION: CPT

## 2021-05-21 PROCEDURE — 2709999900 HC NON-CHARGEABLE SUPPLY

## 2021-05-21 PROCEDURE — 74011250636 HC RX REV CODE- 250/636: Performed by: PSYCHIATRY & NEUROLOGY

## 2021-05-21 PROCEDURE — 87449 NOS EACH ORGANISM AG IA: CPT

## 2021-05-21 PROCEDURE — 74011000258 HC RX REV CODE- 258: Performed by: PSYCHIATRY & NEUROLOGY

## 2021-05-21 PROCEDURE — 83605 ASSAY OF LACTIC ACID: CPT

## 2021-05-21 PROCEDURE — 36415 COLL VENOUS BLD VENIPUNCTURE: CPT

## 2021-05-21 PROCEDURE — C9254 INJECTION, LACOSAMIDE: HCPCS | Performed by: PSYCHIATRY & NEUROLOGY

## 2021-05-21 PROCEDURE — 85025 COMPLETE CBC W/AUTO DIFF WBC: CPT

## 2021-05-21 PROCEDURE — 95816 EEG AWAKE AND DROWSY: CPT | Performed by: PSYCHIATRY & NEUROLOGY

## 2021-05-21 PROCEDURE — 92526 ORAL FUNCTION THERAPY: CPT

## 2021-05-21 PROCEDURE — 74011250636 HC RX REV CODE- 250/636: Performed by: STUDENT IN AN ORGANIZED HEALTH CARE EDUCATION/TRAINING PROGRAM

## 2021-05-21 PROCEDURE — 80048 BASIC METABOLIC PNL TOTAL CA: CPT

## 2021-05-21 PROCEDURE — 77030040361 HC SLV COMPR DVT MDII -B

## 2021-05-21 PROCEDURE — 82140 ASSAY OF AMMONIA: CPT

## 2021-05-21 PROCEDURE — 83880 ASSAY OF NATRIURETIC PEPTIDE: CPT

## 2021-05-21 PROCEDURE — 36600 WITHDRAWAL OF ARTERIAL BLOOD: CPT

## 2021-05-21 PROCEDURE — 85027 COMPLETE CBC AUTOMATED: CPT

## 2021-05-21 PROCEDURE — 74011000258 HC RX REV CODE- 258: Performed by: STUDENT IN AN ORGANIZED HEALTH CARE EDUCATION/TRAINING PROGRAM

## 2021-05-21 RX ORDER — HYDRALAZINE HYDROCHLORIDE 20 MG/ML
20 INJECTION INTRAMUSCULAR; INTRAVENOUS
Status: DISCONTINUED | OUTPATIENT
Start: 2021-05-21 | End: 2021-06-08 | Stop reason: HOSPADM

## 2021-05-21 RX ORDER — LORAZEPAM 2 MG/ML
2 INJECTION INTRAMUSCULAR ONCE
Status: COMPLETED | OUTPATIENT
Start: 2021-05-21 | End: 2021-05-21

## 2021-05-21 RX ORDER — POLYETHYLENE GLYCOL 3350 17 G/17G
17 POWDER, FOR SOLUTION ORAL DAILY PRN
Status: DISCONTINUED | OUTPATIENT
Start: 2021-05-21 | End: 2021-06-08 | Stop reason: HOSPADM

## 2021-05-21 RX ORDER — LORAZEPAM 2 MG/ML
2 INJECTION INTRAMUSCULAR
Status: COMPLETED | OUTPATIENT
Start: 2021-05-21 | End: 2021-05-21

## 2021-05-21 RX ORDER — SODIUM CHLORIDE 0.9 % (FLUSH) 0.9 %
5-40 SYRINGE (ML) INJECTION EVERY 8 HOURS
Status: DISCONTINUED | OUTPATIENT
Start: 2021-05-21 | End: 2021-06-08 | Stop reason: HOSPADM

## 2021-05-21 RX ORDER — SODIUM CHLORIDE 0.9 % (FLUSH) 0.9 %
5-40 SYRINGE (ML) INJECTION AS NEEDED
Status: DISCONTINUED | OUTPATIENT
Start: 2021-05-21 | End: 2021-06-08 | Stop reason: HOSPADM

## 2021-05-21 RX ORDER — ALBUTEROL SULFATE 0.83 MG/ML
2.5 SOLUTION RESPIRATORY (INHALATION)
Status: DISCONTINUED | OUTPATIENT
Start: 2021-05-21 | End: 2021-06-08 | Stop reason: HOSPADM

## 2021-05-21 RX ORDER — LORAZEPAM 2 MG/ML
2 INJECTION INTRAMUSCULAR
Status: DISCONTINUED | OUTPATIENT
Start: 2021-05-21 | End: 2021-05-23

## 2021-05-21 RX ADMIN — LEVETIRACETAM 1000 MG: 100 INJECTION, SOLUTION INTRAVENOUS at 23:15

## 2021-05-21 RX ADMIN — LORAZEPAM 2 MG: 2 INJECTION INTRAMUSCULAR; INTRAVENOUS at 17:55

## 2021-05-21 RX ADMIN — SODIUM CHLORIDE 200 MG: 9 INJECTION, SOLUTION INTRAVENOUS at 14:09

## 2021-05-21 RX ADMIN — LORAZEPAM 2 MG: 2 INJECTION INTRAMUSCULAR; INTRAVENOUS at 12:02

## 2021-05-21 RX ADMIN — Medication 20 ML: at 22:43

## 2021-05-21 RX ADMIN — HYDRALAZINE HYDROCHLORIDE 20 MG: 20 INJECTION INTRAMUSCULAR; INTRAVENOUS at 12:06

## 2021-05-21 RX ADMIN — LEVETIRACETAM 1000 MG: 100 INJECTION, SOLUTION INTRAVENOUS at 10:48

## 2021-05-21 RX ADMIN — DOXYCYCLINE 100 MG: 100 INJECTION, POWDER, LYOPHILIZED, FOR SOLUTION INTRAVENOUS at 09:56

## 2021-05-21 NOTE — PROGRESS NOTES
Physical Therapy  5/21/2021    Chart reviewed. Noted pt only responding to painful stimuli per VS flowsheet- RN confirmed. Pt w/ no response to sternal rub and BUE ROM w/ OT assist. Will defer and follow up as appropriate.      Katlyn Means, PTA

## 2021-05-21 NOTE — PROGRESS NOTES
Transition of Care Plan   RUR- Med. 21%   DISPOSITION: Return to [facility]   F/U with PCP/Specialist     Transport: [facility]    CM discussed patient case in IDRs - patient not ready for discharge. CM updated Jaqueline Smith at [facility], [phone]. If patient is ready for discharge over the weekend, on call CM please call [phone]; # is also call report. Candido Martinez, M.S.W.

## 2021-05-21 NOTE — PROGRESS NOTES
Problem: Pressure Injury - Risk of  Goal: *Prevention of pressure injury  Description: Document Aquiles Scale and appropriate interventions in the flowsheet. 5/21/2021 1854 by Luis Alfredo Murphy  Outcome: Progressing Towards Goal  Note: Pressure Injury Interventions:  Sensory Interventions: Turn and reposition approx. every two hours (pillows and wedges if needed), Pad between skin to skin, Monitor skin under medical devices, Minimize linen layers    Moisture Interventions: Limit adult briefs, Internal/External urinary devices    Activity Interventions: Increase time out of bed, PT/OT evaluation    Mobility Interventions: PT/OT evaluation, Turn and reposition approx. every two hours(pillow and wedges)    Nutrition Interventions: Document food/fluid/supplement intake, Discuss nutritional consult with provider    Friction and Shear Interventions: Minimize layers, Lift sheet             5/21/2021 1650 by Luis Alfredo Murphy  Outcome: Progressing Towards Goal  Note: Pressure Injury Interventions:  Sensory Interventions: Turn and reposition approx. every two hours (pillows and wedges if needed), Pad between skin to skin, Monitor skin under medical devices, Minimize linen layers    Moisture Interventions: Limit adult briefs, Internal/External urinary devices    Activity Interventions: Increase time out of bed, PT/OT evaluation    Mobility Interventions: PT/OT evaluation, Turn and reposition approx.  every two hours(pillow and wedges)    Nutrition Interventions: Document food/fluid/supplement intake, Discuss nutritional consult with provider    Friction and Shear Interventions: Minimize layers, Lift sheet                Problem: Patient Education: Go to Patient Education Activity  Goal: Patient/Family Education  5/21/2021 1854 by Luis Alfredo Murphy  Outcome: Progressing Towards Goal  5/21/2021 1650 by Luis Alfredo Murphy  Outcome: Progressing Towards Goal     Problem: Patient Education: Go to Patient Education Activity  Goal: Patient/Family Education  5/21/2021 1854 by Chrystal Chery  Outcome: Progressing Towards Goal  5/21/2021 1650 by Chrystal Chery  Outcome: Progressing Towards Goal     Problem: Falls - Risk of  Goal: *Absence of Falls  Description: Document Kaylee Craig Fall Risk and appropriate interventions in the flowsheet. 5/21/2021 1854 by Chrystal Chery  Outcome: Progressing Towards Goal  Note: Fall Risk Interventions:  Mobility Interventions: Bed/chair exit alarm, PT Consult for mobility concerns    Mentation Interventions: Adequate sleep, hydration, pain control, More frequent rounding    Medication Interventions: Evaluate medications/consider consulting pharmacy    Elimination Interventions: Toileting schedule/hourly rounds    History of Falls Interventions: Investigate reason for fall, Door open when patient unattended      5/21/2021 1650 by Chrystal Chery  Outcome: Progressing Towards Goal  Note: Fall Risk Interventions:  Mobility Interventions: Bed/chair exit alarm, PT Consult for mobility concerns    Mentation Interventions: Adequate sleep, hydration, pain control, More frequent rounding    Medication Interventions: Evaluate medications/consider consulting pharmacy    Elimination Interventions: Toileting schedule/hourly rounds    History of Falls Interventions:  Investigate reason for fall, Door open when patient unattended         Problem: Patient Education: Go to Patient Education Activity  Goal: Patient/Family Education  Outcome: Progressing Towards Goal     Problem: Patient Education: Go to Patient Education Activity  Goal: Patient/Family Education  Outcome: Progressing Towards Goal     Problem: Patient Education: Go to Patient Education Activity  Goal: Patient/Family Education  Outcome: Progressing Towards Goal     Problem: Seizure Disorder (Adult)  Goal: *STG/LTG: Complies with medication therapy  Outcome: Progressing Towards Goal  Goal: *STG: Remains free of injury during seizure activity  Outcome: Progressing Towards Goal  Goal: *STG: Remains safe in hospital  Outcome: Progressing Towards Goal  Goal: Interventions  Outcome: Progressing Towards Goal     Problem: TIA/CVA Stroke: Day 2 Until Discharge  Goal: Discharge Planning  Outcome: Progressing Towards Goal  Goal: Medications  Outcome: Progressing Towards Goal  Goal: Respiratory  Outcome: Progressing Towards Goal  Goal: *Optimal pain control at patient's stated goal  Outcome: Progressing Towards Goal     Problem: Ischemic Stroke: Discharge Outcomes  Goal: *Hemodynamically stable  Outcome: Progressing Towards Goal

## 2021-05-21 NOTE — PROGRESS NOTES
Occupational Therapy: Chart reviewed. Noted patientt only responding to painful stimuli per VS flowsheet- RN confirmed. Patient w/ no response to sternal rub and B UE PROM with assist. Will defer and follow up as appropriate.    Gilford Carmine, COTA/ELLIS

## 2021-05-21 NOTE — PROCEDURES
[address]  EEG    Name:  Casey Storm  MR#:  [id number]  :  1934  ACCOUNT #:  [de-identified]  DATE OF SERVICE:  2021      REQUESTING PHYSICIAN:  Lyn Peter DO    HISTORY:  The patient is an 35-year-old male who is being evaluated for altered mental status and twitching movements of his mouth, suspicious for seizures. DESCRIPTION:  This is an 18-channel EEG performed on a poorly responsive patient. In the beginning, the EEG shows rhythmic, frontally dominant delta waveforms of triphasic configuration at a frequency of 1 to 1.5 Hz. At times, they have a sharp component. The patient was noted to have lip smacking movements intermittently. During this EEG, 2 mg of Ativan were administered, after which the EEG background changes to mildly slow theta rhythms interspersed with delta rhythms and the triphasic slow waveforms disappear. The patient also appears to be clinically more responsive and was opening his eyes. EEG SUMMARY:  Abnormal EEG due to rhythmic slow waveforms in the beginning, indicating nonconvulsive status epilepticus that resolves post Ativan administration. CLINICAL INTERPRETATION:  This EEG shows generalized nonconvulsive status epilepticus in the beginning that resolves after administration of 2 mg of IV Ativan. Please correlate clinically and consider continuous video EEG monitoring for seizure recurrence.       MD JESSIE Parrish/[email or web address]  D:  2021 12:59  T:  2021 17:53  JOB #:  [id number]

## 2021-05-21 NOTE — PROGRESS NOTES
Bedside shift change report given to Domenica Lennox (oncoming nurse) by Milagro Tam (offgoing nurse). Report included the following information SBAR, Intake/Output, MAR, Cardiac Rhythm SB, Quality Measures and Dual Neuro Assessment.

## 2021-05-21 NOTE — PROGRESS NOTES
Problem: Dysphagia (Adult)  Goal: *Acute Goals and Plan of Care (Insert Text)  Description: Speech Therapy Goals  Initiated 5/19/2021    1. Patient will tolerate regular diet/thin liquids without adverse effects within 7 days. Outcome: Not Progressing Towards Goal     SPEECH LANGUAGE PATHOLOGY DYSPHAGIA TREATMENT  [facility]65 [facility])  Date: 5/21/2021  Diagnosis: CAP (community acquired pneumonia) [J18.9] <principal problem not specified>       Precautions:       ASSESSMENT:  Pt s/p possible seizure activity and with impaired mentation and no bolus manipulation. Therefore, recommend holding PO until mentation improves. If mentation improves over the weekend, can certainly repeat swallow screen and reinitiate diet per previous SLP note. PLAN:  Recommendations and Planned Interventions:  --NPO until mentation improves  --If mentation improves, repeat swallow screen and initiate diet per previous SLP note  --can do water swabs for patient comfort in the interim    Patient continues to benefit from skilled intervention to address the above impairments. Continue treatment per established plan of care. Discharge Recommendations: To Be Determined     SUBJECTIVE:   Patient non-verbal.    OBJECTIVE:   Cognitive and Communication Status:  Neurologic State: Confused, Drowsy, Lethargic  Orientation Level: Unable to verbalize  Cognition: Unable to assess (comment)  Perception: Cues to attend left visual field, Cues to attend to left side of body     Safety/Judgement: Decreased awareness of environment, Decreased awareness of need for safety, Decreased awareness of need for assistance, Decreased insight into deficits  Dysphagia Treatment:    P.O. Trials:  Patient Position: upright in bed  Vocal quality prior to P.O.:    Consistency Presented: Ice chips; Thin liquid  How Presented: SLP-fed/presented;Spoon     Bolus Acceptance: Impaired  Bolus Formation/Control: Impaired  Type of Impairment: Incomplete        Initiation of Swallow: Absent  Laryngeal Elevation: Absent         After treatment:   Call bell within reach and Nursing notified    COMMUNICATION/EDUCATION:     The patient's plan of care including recommendations, planned interventions, and recommended diet changes were discussed with: Registered nurse. Arthur Rincon, SLP  Time Calculation: 10 mins

## 2021-05-21 NOTE — ACP (ADVANCE CARE PLANNING)
Advance Care Planning     Advance Care Planning (ACP) Physician/NP/PA Conversation      Date of Conversation: 5/16/2021  Conducted with: Healthcare Decision Maker: Named in Advance Directive or Healthcare Power of [address]:     Primary Decision Maker: Ebony Valles - Daughter - [phone]  Click here to complete [address] including selection of the [address] Relationship (ie \"Primary\")  Today we documented Decision Maker(s) consistent with Legal Next of Kin hierarchy. Care Preferences:    Hospitalization: \"If your health worsens and it becomes clear that your chance of recovery is unlikely, what would be your preference regarding hospitalization? \"  The patient would prefer hospitalization. Ventilation: \"If you were unable to breathe on your own and your chance of recovery was unlikely, what would be your preference about the use of a ventilator (breathing machine) if it was available to you? \"   The patient would NOT desire the use of a ventilator. Resuscitation: \"In the event your heart stopped as a result of an underlying serious health condition, would you want attempts to be made to restart your heart, or would you prefer a natural death? \"   No, do NOT attempt to resuscitate. Additional topics discussed: treatment goals, benefit/burden of treatment options, artificial nutrition, ventilation preferences, hospitalization preferences and resuscitation preferences    Reviewed patient's hospital course, previous intracranial hemorrhage and sent to [facility], readmits for altered mental status. 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 Concern for potential A. fib as an etiology, however does not qualify for any blood thinners due to intracranial hemorrhage and previous episodes of bleeding. Currently patient hospital course complicated by seizure activity and subclinical status.   Discussed with family that it is unclear how he will do going forward. Sometimes prolonged seizure activity can cause permanent damage. Reviewed that over the next 24 to 48 hours we will need to keep a close eye on him to see how he will do. Reviewed the potential for his inability to swallow going forward, and family stated they do not think that he would want a long-term feeding tube. They are okay with short-term NG feeds if needed. They are also okay with pressors if needed. They confirm that he is a DO NOT RESUSCITATE and DO NOT INTUBATE.     Conversation Outcomes / Follow-Up Plan:   ACP complete - no further action today  Reviewed DNR/DNI and patient confirms current DNR status - completed forms on file (place new order if needed)     Length of Voluntary ACP Conversation in minutes:  16 minutes    Horacio Damon MD

## 2021-05-21 NOTE — CONSULTS
[facility]    ICU Consult    Name: Kaylynn Pedraza   : 1934   MRN: [id number]   Date: 2021      Subjective:   Progress Note: 2021      Reason for ICU Consult: Neurological neurochecks     HPI:  Seble Rush is a 80 y. o. male with pmh of CVA, intraventricular brain hemorrhage, dyslipidemia, CKD 3, hypertension who presents to hospital from subacute rehab for concerns of altered/decreased mental status. Patient was found to have multiple ischemic CVAs on MRI, continue to have altered mental status with some eye deviation and twitching on . This was concerning for seizure, given 2 mg of Ativan and loaded with Keppra with some improvement. On  patient however declined, unresponsive to verbal stimuli. Stat EEG 24-hour was ordered and showed subclinical status. He was loaded with Vimpat, and neurology recommends transfer to ICU for closer neuro checks. Interval Events:   2021      POD:  * No surgery found *    S/P:       Assessment and Recommendations:     ICU Problems:    1. Status epilepticus   -Cont EEG per neurology  -Loaded with keppra   -Started on vimpat  -Ativan prn   -Q1h neurochecks  -Pt is DNI/DNR, currently protecting airway     2. Acute embolic infarcts   -Likely cardioembolic rt arrhythmia, afib   -No AC dt recent ICH  -On ASA per neurology  -Continue atrovastatin  -PT/OT/speech     3. Recent ICH with IVH (5/3)  -Supportive care  -Hold AC  -BP management     4. CAP  -CXR appreciated  -On doxycycline for 10 doses  -Procal, LA, CBC pending  -FU CXR in am   -Chest PT once off EEG  -NT suctioning prn      5. Metabolic encephalopathy   -multifactoral   -supportive care     6.  HTN  -Cardene drip as needed  -BP parameters per neurology     Active Problem List:     Problem List  Date Reviewed: 2020        Codes Class    CAP (community acquired pneumonia) ICD-10-CM: J18.9  ICD-9-CM: 5         Intracranial hemorrhage ([facility] ICD-10-CM: I62.9  ICD-9-CM: 432.9         CKD (chronic kidney disease) stage 3, GFR 30-59 ml/min ([facility]) ICD-10-CM: N18.30  ICD-9-CM: 547. 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 9         Altered mental state ICD-10-CM: R41.82  ICD-9-CM: 780.97         Facial droop ICD-10-CM: R29.810  ICD-9-CM: 781.94         Acute blood loss anemia ICD-10-CM: D62  ICD-9-CM: 285.1         Rectal bleeding ICD-10-CM: K62.5  ICD-9-CM: 569.3         Pneumonia ICD-10-CM: J18.9  ICD-9-CM: 486         Severe sepsis ([facility] ICD-10-CM: A41.9, R65.20  ICD-9-CM: 038.9, 995.92         Flank pain ICD-10-CM: R10.9  ICD-9-CM: 789.09         Spinal stenosis of lumbar region at multiple levels ICD-10-CM: M48.061  ICD-9-CM: 724.02         Bilateral carotid artery stenosis ICD-10-CM: I65.23  ICD-9-CM: 433.10, 433.30         Severe obesity (HCC) ICD-10-CM: E66.01  ICD-9-CM: 278.01         TESSA on CPAP ICD-10-CM: G47.33, Z99.89  ICD-9-CM: 327.23, V46.8         Simple chronic bronchitis (HCC) ICD-10-CM: J41.0  ICD-9-CM: 491.0         Fatigue ICD-10-CM: R53.83  ICD-9-CM: 780.79         CAD (coronary artery disease) ICD-10-CM: I25.10  ICD-9-CM: 414.00         Dyslipidemia ICD-10-CM: E78.5  ICD-9-CM: 272.4         On statin therapy ICD-10-CM: Z79.899  ICD-9-CM: V58.69         Gout ICD-10-CM: M10.9  ICD-9-CM: 274.9         GERD (gastroesophageal reflux disease) ICD-10-CM: K21.9  ICD-9-CM: 530.81         TIA (transient ischemic attack) ICD-10-CM: G45.9  ICD-9-CM: 435.9         Dyspnea ICD-10-CM: R06.00  ICD-9-CM: 786.09         Colon polyps ICD-10-CM: K63.5  ICD-9-CM: 211.3         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  ICD-9-CM: 528.6         Hypertension ICD-10-CM: I10  ICD-9-CM: 401.9         ED (erectile dysfunction) ICD-10-CM: N52.9  ICD-9-CM: 607.84         Hypoxia ICD-10-CM: R09.02  ICD-9-CM: 799.02         Neurogenic claudication ICD-10-CM: M48.062  ICD-9-CM: 724.03               Past Medical History:      has a past medical history of Adverse effect of anesthesia, Alcohol abuse, Arthritis, CAD (coronary artery disease), Chronic obstructive pulmonary disease ([facility] Chronic obstructive pulmonary disease ([facility] CKD (chronic kidney disease) stage 3, GFR 30-59 ml/min ([facility] (9/21/2020), Dyslipidemia (8/16/2017), Dyspepsia and other specified disorders of function of stomach, Dyspnea (8/16/2017), ED (erectile dysfunction) (8/16/2017), Encounter for immunization (8/16/2017), Fatigue (8/16/2017), Flank pain (8/1/2019), GERD (gastroesophageal reflux disease) (8/16/2017), Gout (8/16/2017), Hypertension, 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 Past Surgical History:      has a past surgical history that includes pr cardiac surg procedure unlist (1996); pr cardiac surg procedure unlist (04/2019); hx hernia repair; hx hernia repair (11/09/15); hx urological; colonoscopy (N/A, 9/27/2019); upper gi endoscopy,biopsy (9/30/2019); and hx orthopaedic. Home Medications:     Prior to Admission medications    Medication Sig Start Date End Date Taking? Authorizing Provider   amLODIPine (NORVASC) 5 mg tablet Take 1 Tab by mouth daily for 30 days. 5/4/21 6/3/21  Macho Russell MD   hydrALAZINE (APRESOLINE) 100 mg tablet Take 1 Tab by mouth every eight (8) hours for 30 days. 5/3/21 6/2/21  Macho Russell MD   QUEtiapine (SEROquel) 100 mg tablet 1 Tab by Per NG tube route nightly for 30 days.  5/3/21 6/2/21  Macho Russell MD   atenoloL (TENORMIN) 100 mg tablet TAKE ONE TABLET BY MOUTH DAILY 4/14/21   Irvin Nguyen MD   atorvastatin (LIPITOR) 80 mg tablet TAKE ONE TABLET BY MOUTH DAILY 3/15/21   Amy Emery MD   furosemide (LASIX) 80 mg tablet TAKE ONE TABLET BY MOUTH DAILY 2/1/21   Amy Emery MD   thiamine HCL (B-1) 100 mg tablet TAKE ONE BY MOUTH DAILY 12/21/20   Amy Emery MD   isosorbide mononitrate ER (IMDUR) 30 mg tablet TAKE ONE TABLET BY MOUTH DAILY 12/21/20   Amy Emery MD   pantoprazole (PROTONIX) 40 mg tablet TAKE ONE TABLET BY MOUTH DAILY 9/22/20   Amy Emery MD   DULoxetine (CYMBALTA) 30 mg capsule TAKE ONE CAPSULE BY MOUTH DAILY 9/16/20   Amy Emery MD   melatonin 5 mg cap capsule Take 5 mg by mouth nightly. Pt. Take 2 tabs a night    Provider, Historical   acetaminophen (TYLENOL) 500 mg tablet Take 1,000 mg by mouth every six (6) hours as needed for Pain. Provider, Historical   aspirin delayed-release 81 mg tablet Take 81 mg by mouth daily. Provider, Historical   ANORO ELLIPTA 62.5-25 mcg/actuation inhaler Take 1 Puff by inhalation daily. 12/26/18   Provider, Historical       Allergies/Social/Family History: Allergies   Allergen Reactions    Levaquin [Levofloxacin] Nausea and Vomiting     Reports anxiety, nausea, aching after taking levaquin    Ciprofloxacin Shortness of Breath    Quinolones Shortness of Breath      Social History     Tobacco Use    Smoking status: Former Smoker     Packs/day: 0.50     Years: 20.00     Pack years: 10.00    Smokeless tobacco: Former User    Tobacco comment: quit about 40  years ago   / used to dip tobaco and dip snuff   Substance Use Topics    Alcohol use: Yes     Comment: \"Drinks very little now for about a month \"      Family History   Problem Relation Age of Onset    Heart Disease Mother     Hypertension Mother     Hypertension Father     Hypertension Sister     Hypertension Brother     Cancer Brother        Review of Systems:     Review of systems not obtained due to patient factors.     Objective:   Vital Signs:  Visit Vitals  BP (!) 189/76 (BP 1 Location: Right arm, BP Patient Position: At rest)   Pulse 72   Temp 98.3 °F (36.8 °C)   Resp 15   Ht 5' 8\" (1.727 m)   Wt 95.2 kg (209 lb 14.1 oz)   SpO2 98%   BMI 31.91 kg/m²    O2 Flow Rate (L/min): 4 l/min O2 Device: Nasal cannula Temp (24hrs), Av.9 °F (36.6 °C), Min:97.5 °F (36.4 °C), Max:98.3 °F (36.8 °C)           Intake/Output:   No intake or output data in the 24 hours ending 21 2035    Physical Exam:    General:  Pt Is post ictal    Eyes:  Sclera anicteric. Pupils equally round and reactive to light. Mouth/Throat: Mucous membranes normal, oral pharynx clear   Neck: Supple   Lungs:   Protecting airway, shallow breathing noted,    CV:  Regular rate and rhythm,no murmur, click, rub or gallop   Abdomen:   Soft, non-tender. bowel sounds normal. non-distended   Extremities: No cyanosis or edema   Skin: Skin color, texture, turgor normal. no acute rash or lesions   Lymph nodes: Cervical and supraclavicular normal   Musculoskeletal: No swelling or deformity   Lines/Devices:  Intact, no erythema, drainage or tenderness           LABS AND  DATA: Personally reviewed  Recent Labs     21  0452   WBC 7.7   HGB 13.6   HCT 41.0   *     Recent Labs     21  0452 21  0445    142   K 3.7 4.1   * 109*   CO2 22 26   BUN 22* 25*   CREA 1.18 1.28   GLU 95 98   CA 9.3 9.2   MG 2.4  --    PHOS 3.1  --      No results for input(s): AP, TBIL, TP, ALB, GLOB, AML, LPSE in the last 72 hours. No lab exists for component: SGOT, GPT, AMYP  No results for input(s): INR, PTP, APTT, INREXT in the last 72 hours. Recent Labs     21  1539   PHI 7.38   PCO2I 36.9   PO2I 67*     No results for input(s): CPK, CKMB, TROIQ, BNPP in the last 72 hours.     Hemodynamics:   PAP:   CO:     Wedge:   CI:     CVP:    SVR:       PVR:       Ventilator Settings:  Mode Rate Tidal Volume Pressure FiO2 PEEP                    Peak airway pressure:      Minute ventilation: MEDS: Reviewed    Chest X-Ray:  CXR Results  (Last 48 hours)    None          SPECIAL EQUIPMENT  None    DISPOSITION  Stay in ICU    CRITICAL CARE CONSULTANT NOTE  I had a face to face encounter with the patient, reviewed and interpreted patient data including clinical events, labs, images, vital signs, I/O's, and examined patient. I have discussed the case and the plan and management of the patient's care with the consulting services, the bedside nurses and the respiratory therapist.      NOTE OF PERSONAL INVOLVEMENT IN CARE   This patient has a high probability of imminent, clinically significant deterioration, which requires the highest level of preparedness to intervene urgently. I participated in the decision-making and personally managed or directed the management of the following life and organ supporting interventions that required my frequent assessment to treat or prevent imminent deterioration. I personally spent 45 minutes of critical care time. This is time spent at this critically ill patient's bedside actively involved in patient care as well as the coordination of care and discussions with the patient's family. This does not include any procedural time which has been billed separately.     Kaleb Sheth NP    [facility]

## 2021-05-21 NOTE — PROGRESS NOTES
Spiritual Care Assessment/Progress Note  [facility]      NAME: Radhames Negro      MRN: [id number]  AGE: 80 y.o. SEX: male  Synagogue Affiliation: Other   Language: English     5/21/2021     Total Time (in minutes): 8     Spiritual Assessment begun in [address] through conversation with:         []Patient        [] Family    [] Friend(s)        Reason for Consult: Palliative Care, Initial/Spiritual Assessment     Spiritual beliefs: (Please include comment if needed)     [] Identifies with a navarro tradition:         [] Supported by a navarro community:            [] Claims no spiritual orientation:           [] Seeking spiritual identity:                [] Adheres to an individual form of spirituality:           [x] Not able to assess:                           Identified resources for coping:      [] Prayer                               [] Music                  [] Guided Imagery     [] Family/friends                 [] Pet visits     [] Devotional reading                         [x] Unknown     [] Other                                             Interventions offered during this visit: (See comments for more details)    Patient Interventions: Prayer (assurance of), Initial visit           Plan of Care:     [] Support spiritual and/or cultural needs    [] Support AMD and/or advance care planning process      [] Support grieving process   [] Coordinate Rites and/or Rituals    [] Coordination with community clergy   [] No spiritual needs identified at this time   [] Detailed Plan of Care below (See Comments)  [] Make referral to Music Therapy  [] Make referral to Pet Therapy     [] Make referral to Addiction services  [] Make referral to [facility]  [] Make referral to Spiritual Care Partner  [] No future visits requested        [x] Follow up upon further referrals     Comments: Initial visit with Mr. Jaki Fischer. Pt did not appear to respond to 's presence and no family at bedside.  Chart reviewed prior to visit. Chaplains remain available for support as needed.     [address] MDiv, [place]

## 2021-05-21 NOTE — PROGRESS NOTES
[facility] Adult  Hospitalist Group     ICU Transfer/Accept Summary     This patient is being transferred INTO THE ICU  DATE OF TRANSFER: 5/21/2021       PATIENT ID: Birgit Ballesteros  MRN: [id number]   YOB: 1934    PRIMARY CARE PROVIDER: Hugh Triana MD   DATE OF ADMISSION: 5/16/2021  9:02 PM    ATTENDING PHYSICIAN: Toro Zavaleta MD  CONSULTATIONS:   IP CONSULT TO NEUROLOGY  IP CONSULT TO PALLIATIVE CARE - PROVIDER  IP CONSULT TO CARDIOLOGY    PROCEDURES/SURGERIES:   * No surgery found *    REASON FOR ADMISSION: <principal problem not specified>     HOSPITAL PROBLEM LIST:  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    Hypertension I10    ED (erectile dysfunction) N52.9    Severe obesity (HCC) E66.01    TESSA on CPAP G47.33, Z99.89    Simple chronic bronchitis (HCC) J41.0    Bilateral carotid artery stenosis I65.23    Spinal stenosis of lumbar region at multiple levels M48.061    Flank pain R10.9    Rectal bleeding K62.5    Pneumonia J18.9    Severe sepsis (HCC) A41.9, R65.20    Acute blood loss anemia D62    Facial droop R29.810    Altered mental state R41.82    Viral encephalitis A86    Coronary artery disease with stable angina pectoris (HCC) I25.118    Chronic obstructive pulmonary disease (HCC) J44.9    CKD (chronic kidney disease) stage 3, GFR 30-59 ml/min (HCC) N18.30    Intracranial hemorrhage (HCC) I62.9    CAP (community acquired pneumonia) J18.9         Brief HPI and Hospital Course:      Mo Rush is a 80 y. o. male with pmh of CVA, intraventricular brain hemorrhage, dyslipidemia, CKD 3, hypertension who presents to hospital from subacute rehab for concerns of altered/decreased mental status. Patient was found to have multiple ischemic CVAs on MRI, continue to have altered mental status with some eye deviation and twitching on 5/20. This was concerning for seizure, given 2 mg of Ativan and loaded with Keppra with some improvement. On 5/21 patient however declined, unresponsive to verbal stimuli. Stat EEG 24-hour was ordered and showed subclinical status. He was loaded with Vimpat, and neurology recommends transfer to ICU for closer neuro checks    Assessment and Plan:    Subclinical status -   -Status post Keppra load 5/20  -Continue Keppra twice daily  -s/p Vimpat load   - To ICU for q1hr neurochecks per neuro   - Neurology following  - 24hr EEG in place   - Currently protecting airway, but pt is a DNR/DNI signed by family. Would need to discuss with daughter if intubation is necessary and reversible. Acute embolic infarcts  Subacute hematoma -intraventricular hemorrhage with ventriculomegaly on recent admission, discharged 5/3  -Suspicious for A. fib, however when reviewed by cardiology no definitive episodes seen on telemetry  -Not a candidate for anticoagulation due to intracranial hemorrhage, and fall risk  -Neurology following  -No escalation of antiplatelets due to [address], continue aspirin  -Continue atorvastatin  -PT/OT/speech  -Echo with normal EF      Metabolic encephalopathymultifactorial, acute CVAs, pneumonia, electrolyte imbalances, potential seizures  - Check ammonia, lactic and ABG today   -Day and night schedules, avoid benzodiazepines if able  -Frequent redirection  -Holding Seroquel for now      Community-acquired pneumonia -x-ray with left basilar airspace disease  -s/p cefepime (decreases seizure threshold), continue doxy x 1 additional day  -Remains on room air, monitor     Hypertensioncontinue Imdur and hydralazine. Norvasc on hold.   Continue to monitor closely  Hyperlipidemiacontinue Lipitor           DNR/DNI, pressors ok   To ICU, updated family at bedside. PHYSICAL EXAMINATION:  Visit Vitals  BP (!) 189/76 (BP 1 Location: Right arm, BP Patient Position: At rest)   Pulse 72   Temp 98.3 °F (36.8 °C)   Resp 15   Ht 5' 8\" (1.727 m)   Wt 95.2 kg (209 lb 14.1 oz)   SpO2 98%   BMI 31.91 kg/m²       General:          Eyes closed, not responding verbally  HEENT:           Atraumatic, MMM, resists eye opening             Lungs:             Course bilaterally. No Wheezing or Rhonchi. No rales. Heart:              Regular  rhythm,  No  murmur   No edema  Abdomen:       Soft, non-tender. Not distended. Bowel sounds normal  Extremities:     No cyanosis. No clubbing,  +2 distal pulses  Skin:                Not pale. Not Jaundiced  No rashes   Psych:             Not anxious or agitated. Neurologic:      Not repsonding to verbal stimuli, opens eyes briefly, responsive to pain. Labs:     Recent Labs     05/21/21  0452   WBC 7.7   HGB 13.6   HCT 41.0   *     Recent Labs     05/21/21  0452 05/20/21  0445 05/19/21  0303    142 139   K 3.7 4.1 4.4   * 109* 109*   CO2 22 26 17*   BUN 22* 25* 22*   CREA 1.18 1.28 1.04   GLU 95 98 74   CA 9.3 9.2 8.3*   MG 2.4  --   --    PHOS 3.1  --   --      No results for input(s): ALT, AP, TBIL, TBILI, TP, ALB, GLOB, GGT, AML, LPSE in the last 72 hours. No lab exists for component: SGOT, GPT, AMYP, HLPSE  No results for input(s): INR, PTP, APTT, INREXT in the last 72 hours. No results for input(s): FE, TIBC, PSAT, FERR in the last 72 hours. No results found for: FOL, RBCF   No results for input(s): PH, PCO2, PO2 in the last 72 hours. No results for input(s): CPK, CKNDX, TROIQ in the last 72 hours.     No lab exists for component: CPKMB  Lab Results   Component Value Date/Time    Cholesterol, total 144 04/19/2021 12:59 AM    HDL Cholesterol 33 04/19/2021 12:59 AM    LDL, calculated 74.8 04/19/2021 12:59 AM Triglyceride 181 (H) 04/19/2021 12:59 AM    CHOL/HDL Ratio 4.4 04/19/2021 12:59 AM     Lab Results   Component Value Date/Time    Glucose (POC) 92 05/21/2021 07:36 AM    Glucose (POC) 110 (H) 05/03/2021 11:41 AM    Glucose (POC) 155 (H) 04/30/2021 04:50 PM    Glucose (POC) 135 (H) 04/30/2021 11:52 AM    Glucose (POC) 158 (H) 04/30/2021 05:53 AM     Lab Results   Component Value Date/Time    Color YELLOW/STRAW 05/16/2021 09:33 PM    Appearance CLEAR 05/16/2021 09:33 PM    Specific gravity 1.014 05/16/2021 09:33 PM    Specific gravity 1.015 09/21/2020 09:13 AM    pH (UA) 5.0 05/16/2021 09:33 PM    Protein Negative 05/16/2021 09:33 PM    Glucose Negative 05/16/2021 09:33 PM    Ketone Negative 05/16/2021 09:33 PM    Bilirubin Negative 05/16/2021 09:33 PM    Urobilinogen 0.2 05/16/2021 09:33 PM    Nitrites Negative 05/16/2021 09:33 PM    Leukocyte Esterase Negative 05/16/2021 09:33 PM    Epithelial cells FEW 05/16/2021 09:33 PM    Bacteria Negative 05/16/2021 09:33 PM    WBC 0-4 05/16/2021 09:33 PM    RBC 0-5 05/16/2021 09:33 PM         CODE STATUS:   Full Code   X DNR    Partial    Comfort Care       Signed:   Mynor Michel MD  Date of Service:  5/21/2021  4:27 PM

## 2021-05-21 NOTE — PROGRESS NOTES
Neurology Progress Note    Patient ID:  Kranthi Richardson  [id number]  23 y.o.  1934    Subjective:      Patient somnolent this AM without command following and R gaze deviation, rema-oral automatism. Stat EEG showing NCSE. Received 2mg Ativan IV with improvement in EEG and some clinical improvement/eye opening. Current Facility-Administered Medications   Medication Dose Route Frequency    levETIRAcetam (KEPPRA) 1,000 mg in 0.9% sodium chloride 100 mL IVPB  1,000 mg IntraVENous Q12H    hydrALAZINE (APRESOLINE) 20 mg/mL injection 20 mg  20 mg IntraVENous Q6H PRN    LORazepam (ATIVAN) injection 2 mg  2 mg IntraVENous ONCE PRN    [START ON 5/22/2021] lacosamide (VIMPAT) 100 mg in 0.9% sodium chloride 100 mL IVPB  100 mg IntraVENous Q12H    aspirin chewable tablet 81 mg  81 mg Oral DAILY    hydrALAZINE (APRESOLINE) tablet 50 mg  50 mg Oral TID    atorvastatin (LIPITOR) tablet 80 mg  80 mg Oral DAILY    [Held by provider] amLODIPine (NORVASC) tablet 5 mg  5 mg Oral DAILY    isosorbide mononitrate ER (IMDUR) tablet 30 mg  30 mg Oral DAILY    sodium chloride (NS) flush 5-40 mL  5-40 mL IntraVENous Q8H    sodium chloride (NS) flush 5-40 mL  5-40 mL IntraVENous PRN    acetaminophen (TYLENOL) tablet 650 mg  650 mg Oral Q6H PRN    Or    acetaminophen (TYLENOL) suppository 650 mg  650 mg Rectal Q6H PRN    polyethylene glycol (MIRALAX) packet 17 g  17 g Oral DAILY PRN    promethazine (PHENERGAN) tablet 12.5 mg  12.5 mg Oral Q6H PRN    Or    ondansetron (ZOFRAN) injection 4 mg  4 mg IntraVENous Q6H PRN          Objective:     Patient Vitals for the past 8 hrs:   BP Temp Pulse Resp SpO2 Weight   05/21/21 1400 (!) 189/76 98.3 °F (36.8 °C) 72 15 98 %    05/21/21 1206 (!) 178/108  73      05/21/21 1137      209 lb 14.1 oz (95.2 kg)   05/21/21 1000 (!) 168/74 97.7 °F (36.5 °C) 75  97 %        No intake/output data recorded. No intake/output data recorded.     Lab Review   Recent Results (from the past 24 hour(s))   MAGNESIUM    Collection Time: 05/21/21  4:52 AM   Result Value Ref Range    Magnesium 2.4 1.6 - 2.4 mg/dL   METABOLIC PANEL, BASIC    Collection Time: 05/21/21  4:52 AM   Result Value Ref Range    Sodium 144 136 - 145 mmol/L    Potassium 3.7 3.5 - 5.1 mmol/L    Chloride 113 (H) 97 - 108 mmol/L    CO2 22 21 - 32 mmol/L    Anion gap 9 5 - 15 mmol/L    Glucose 95 65 - 100 mg/dL    BUN 22 (H) 6 - 20 MG/DL    Creatinine 1.18 0.70 - 1.30 MG/DL    BUN/Creatinine ratio 19 12 - 20      GFR est AA >60 >60 ml/min/1.73m2    GFR est non-AA 59 (L) >60 ml/min/1.73m2    Calcium 9.3 8.5 - 10.1 MG/DL   PHOSPHORUS    Collection Time: 05/21/21  4:52 AM   Result Value Ref Range    Phosphorus 3.1 2.6 - 4.7 MG/DL   CBC WITH AUTOMATED DIFF    Collection Time: 05/21/21  4:52 AM   Result Value Ref Range    WBC 7.7 4.1 - 11.1 K/uL    RBC 4.31 4.10 - 5.70 M/uL    HGB 13.6 12.1 - 17.0 g/dL    HCT 41.0 36.6 - 50.3 %    MCV 95.1 80.0 - 99.0 FL    MCH 31.6 26.0 - 34.0 PG    MCHC 33.2 30.0 - 36.5 g/dL    RDW 15.4 (H) 11.5 - 14.5 %    PLATELET 672 (L) 392 - 400 K/uL    MPV 10.2 8.9 - 12.9 FL    NRBC 0.0 0  WBC    ABSOLUTE NRBC 0.00 0.00 - 0.01 K/uL    NEUTROPHILS 83 (H) 32 - 75 %    LYMPHOCYTES 9 (L) 12 - 49 %    MONOCYTES 7 5 - 13 %    EOSINOPHILS 1 0 - 7 %    BASOPHILS 0 0 - 1 %    IMMATURE GRANULOCYTES 0 0.0 - 0.5 %    ABS. NEUTROPHILS 6.4 1.8 - 8.0 K/UL    ABS. LYMPHOCYTES 0.7 (L) 0.8 - 3.5 K/UL    ABS. MONOCYTES 0.5 0.0 - 1.0 K/UL    ABS. EOSINOPHILS 0.1 0.0 - 0.4 K/UL    ABS. BASOPHILS 0.0 0.0 - 0.1 K/UL    ABS. IMM.  GRANS. 0.0 0.00 - 0.04 K/UL    DF SMEAR SCANNED      PLATELET COMMENTS Large Platelets      RBC COMMENTS ANISOCYTOSIS  1+        RBC COMMENTS MACROCYTOSIS  1+       GLUCOSE, POC    Collection Time: 05/21/21  7:36 AM   Result Value Ref Range    Glucose (POC) 92 65 - 117 mg/dL    Performed by Santos Riley    LACTIC ACID    Collection Time: 05/21/21 12:30 PM   Result Value Ref Range    Lactic acid 0.9 0.4 - 2.0 MMOL/L   AMMONIA    Collection Time: 05/21/21 12:30 PM   Result Value Ref Range    Ammonia 26 <32 UMOL/L     Neurological Exam:  Mental Status: No commands, somnolent   Speech: Unable to assess   Cranial Nerves:   PERRL, EOM-R gaze preference, no blink to threat b/l       Motor:  W/D x 4    Reflexes:   Deferred   Sensory:   W/D x 4   Gait:  Unable to assess   Tremor:   No tremor noted. Cerebellar:  Unable to assess       Assessment:     Active Problems:    CAP (community acquired pneumonia) (5/16/2021)    80year old male with a h/o R ICH/IVH 2/2 HTN, possible amyloid angiopathy admitted from SNF with AMS. Undergoing treatment for PNA. MRI Brain completed 5/18/21 showing scattered b/l hemispheric infarcts R>L, evolution of previously identified R parietal ICH. Antiplatelet therapy not escalated due to h/o amyloid angiopathy and recent ICH/IVH. 5/20/21 he was noted with seizure activity (gaze deviation, lip smacking) which resolved after Ativan 2mg IV with subsequent improvement in clinical examination. EEG noted diffuse slowing. Today he was noted with additional seizure activity which resolved electrographically and clinically following administration of Ativan. EEG c/w NCSE. Will maintain EEG monitoring overnight. Plan:   Transfer ICU for q1h Neuro checks  Vimpat load 200mg IV x 1 following by maintenance 100mg BID.    Maintain LEV 1000mg BID  cEEG monitoring x 24h  Give Ativan 2mg IV PRN for seizure duration greater than or equal to 3 minutes or seizure frequency of 3 or more seizures per 8 hours    Signed:  Brittani Llanos DO  5/21/2021  3:31 PM

## 2021-05-21 NOTE — PROGRESS NOTES
TRANSFER - OUT REPORT:    Verbal report given to Patrick(name) on Arthur Baum  being transferred to ICU(unit) per MD order for Q1 neuro checks. Report consisted of patients Situation, Background, Assessment and   Recommendations(SBAR). Information from the following report(s) SBAR, Intake/Output, MAR, Cardiac Rhythm NSR, Quality Measures and Dual Neuro Assessment was reviewed with the receiving nurse. Lines:   Peripheral IV 05/19/21 Left;Upper Arm (Active)   Site Assessment Clean, dry, & intact 05/21/21 1600   Phlebitis Assessment 0 05/21/21 1600   Infiltration Assessment 0 05/21/21 1600   Dressing Status Clean, dry, & intact 05/21/21 1600   Dressing Type Transparent;Tape 05/21/21 1600   Hub Color/Line Status Pink; Infusing 05/21/21 1600   Action Taken Open ports on tubing capped 05/21/21 1600   Alcohol Cap Used Yes 05/21/21 1600        Opportunity for questions and clarification was provided.       Patient transported with:   Monitor  O2 @ 4 liters  Registered Nurse

## 2021-05-21 NOTE — PROGRESS NOTES
Problem: Pressure Injury - Risk of  Goal: *Prevention of pressure injury  Description: Document Aquiles Scale and appropriate interventions in the flowsheet. Outcome: Progressing Towards Goal  Note: Pressure Injury Interventions:  Sensory Interventions: Turn and reposition approx. every two hours (pillows and wedges if needed), Pad between skin to skin, Monitor skin under medical devices, Minimize linen layers    Moisture Interventions: Limit adult briefs, Internal/External urinary devices    Activity Interventions: Increase time out of bed, PT/OT evaluation    Mobility Interventions: PT/OT evaluation, Turn and reposition approx. every two hours(pillow and wedges)    Nutrition Interventions: Document food/fluid/supplement intake, Discuss nutritional consult with provider    Friction and Shear Interventions: Minimize layers, Lift sheet                Problem: Patient Education: Go to Patient Education Activity  Goal: Patient/Family Education  Outcome: Progressing Towards Goal     Problem: Patient Education: Go to Patient Education Activity  Goal: Patient/Family Education  Outcome: Progressing Towards Goal     Problem: Falls - Risk of  Goal: *Absence of Falls  Description: Document Zuri Vasquez Fall Risk and appropriate interventions in the flowsheet. Outcome: Progressing Towards Goal  Note: Fall Risk Interventions:  Mobility Interventions: Bed/chair exit alarm, PT Consult for mobility concerns    Mentation Interventions: Adequate sleep, hydration, pain control, More frequent rounding    Medication Interventions: Evaluate medications/consider consulting pharmacy    Elimination Interventions: Toileting schedule/hourly rounds    History of Falls Interventions:  Investigate reason for fall, Door open when patient unattended         Problem: Patient Education: Go to Patient Education Activity  Goal: Patient/Family Education  Outcome: Progressing Towards Goal     Problem: Patient Education: Go to Patient Education Activity  Goal: Patient/Family Education  Outcome: Progressing Towards Goal     Problem: Patient Education: Go to Patient Education Activity  Goal: Patient/Family Education  Outcome: Progressing Towards Goal     Problem: Seizure Disorder (Adult)  Goal: *STG/LTG: Complies with medication therapy  Outcome: Progressing Towards Goal  Goal: *STG: Remains free of injury during seizure activity  Outcome: Progressing Towards Goal  Goal: *STG: Remains safe in hospital  Outcome: Progressing Towards Goal  Goal: Interventions  Outcome: Progressing Towards Goal

## 2021-05-22 ENCOUNTER — APPOINTMENT (OUTPATIENT)
Dept: GENERAL RADIOLOGY | Age: 86
DRG: 193 | End: 2021-05-22
Attending: NURSE PRACTITIONER
Payer: MEDICARE

## 2021-05-22 LAB
ANION GAP SERPL CALC-SCNC: 6 MMOL/L (ref 5–15)
APTT PPP: 37.6 SEC (ref 22.1–31)
ARTERIAL PATENCY WRIST A: YES
BASE DEFICIT BLDA-SCNC: 3.5 MMOL/L
BASOPHILS # BLD: 0 K/UL (ref 0–0.1)
BASOPHILS NFR BLD: 0 % (ref 0–1)
BDY SITE: ABNORMAL
BUN SERPL-MCNC: 23 MG/DL (ref 6–20)
BUN/CREAT SERPL: 21 (ref 12–20)
CALCIUM SERPL-MCNC: 8.8 MG/DL (ref 8.5–10.1)
CHLORIDE SERPL-SCNC: 117 MMOL/L (ref 97–108)
CO2 SERPL-SCNC: 24 MMOL/L (ref 21–32)
CREAT SERPL-MCNC: 1.12 MG/DL (ref 0.7–1.3)
DIFFERENTIAL METHOD BLD: ABNORMAL
EOSINOPHIL # BLD: 0.1 K/UL (ref 0–0.4)
EOSINOPHIL NFR BLD: 2 % (ref 0–7)
ERYTHROCYTE [DISTWIDTH] IN BLOOD BY AUTOMATED COUNT: 15.7 % (ref 11.5–14.5)
GLUCOSE BLD STRIP.AUTO-MCNC: 107 MG/DL (ref 65–117)
GLUCOSE SERPL-MCNC: 93 MG/DL (ref 65–100)
HCO3 BLDA-SCNC: 21 MMOL/L (ref 22–26)
HCT VFR BLD AUTO: 35.6 % (ref 36.6–50.3)
HGB BLD-MCNC: 11.3 G/DL (ref 12.1–17)
IMM GRANULOCYTES # BLD AUTO: 0 K/UL (ref 0–0.04)
IMM GRANULOCYTES NFR BLD AUTO: 0 % (ref 0–0.5)
LACTATE SERPL-SCNC: 0.9 MMOL/L (ref 0.4–2)
LYMPHOCYTES # BLD: 0.6 K/UL (ref 0.8–3.5)
LYMPHOCYTES NFR BLD: 9 % (ref 12–49)
MAGNESIUM SERPL-MCNC: 2.2 MG/DL (ref 1.6–2.4)
MCH RBC QN AUTO: 31.2 PG (ref 26–34)
MCHC RBC AUTO-ENTMCNC: 31.7 G/DL (ref 30–36.5)
MCV RBC AUTO: 98.3 FL (ref 80–99)
MONOCYTES # BLD: 0.5 K/UL (ref 0–1)
MONOCYTES NFR BLD: 7 % (ref 5–13)
NEUTS SEG # BLD: 5.5 K/UL (ref 1.8–8)
NEUTS SEG NFR BLD: 82 % (ref 32–75)
NRBC # BLD: 0 K/UL (ref 0–0.01)
NRBC BLD-RTO: 0 PER 100 WBC
PCO2 BLDA: 38 MMHG (ref 35–45)
PH BLDA: 7.37 [PH] (ref 7.35–7.45)
PHOSPHATE SERPL-MCNC: 3.4 MG/DL (ref 2.6–4.7)
PLATELET # BLD AUTO: 122 K/UL (ref 150–400)
PMV BLD AUTO: 10.1 FL (ref 8.9–12.9)
PO2 BLDA: 107 MMHG (ref 80–100)
POTASSIUM SERPL-SCNC: 4 MMOL/L (ref 3.5–5.1)
RBC # BLD AUTO: 3.62 M/UL (ref 4.1–5.7)
RBC MORPH BLD: ABNORMAL
SAO2 % BLD: 98 % (ref 92–97)
SAO2% DEVICE SAO2% SENSOR NAME: ABNORMAL
SERVICE CMNT-IMP: NORMAL
SODIUM SERPL-SCNC: 147 MMOL/L (ref 136–145)
SPECIMEN SITE: ABNORMAL
THERAPEUTIC RANGE,PTTT: ABNORMAL SECS (ref 58–77)
WBC # BLD AUTO: 6.7 K/UL (ref 4.1–11.1)

## 2021-05-22 PROCEDURE — 74011250636 HC RX REV CODE- 250/636: Performed by: NURSE PRACTITIONER

## 2021-05-22 PROCEDURE — 85730 THROMBOPLASTIN TIME PARTIAL: CPT

## 2021-05-22 PROCEDURE — 85025 COMPLETE CBC W/AUTO DIFF WBC: CPT

## 2021-05-22 PROCEDURE — 71045 X-RAY EXAM CHEST 1 VIEW: CPT

## 2021-05-22 PROCEDURE — 80048 BASIC METABOLIC PNL TOTAL CA: CPT

## 2021-05-22 PROCEDURE — 74011250636 HC RX REV CODE- 250/636: Performed by: EMERGENCY MEDICINE

## 2021-05-22 PROCEDURE — 36415 COLL VENOUS BLD VENIPUNCTURE: CPT

## 2021-05-22 PROCEDURE — 82803 BLOOD GASES ANY COMBINATION: CPT

## 2021-05-22 PROCEDURE — 74011250637 HC RX REV CODE- 250/637: Performed by: INTERNAL MEDICINE

## 2021-05-22 PROCEDURE — 65610000006 HC RM INTENSIVE CARE

## 2021-05-22 PROCEDURE — 82962 GLUCOSE BLOOD TEST: CPT

## 2021-05-22 PROCEDURE — C9254 INJECTION, LACOSAMIDE: HCPCS | Performed by: PSYCHIATRY & NEUROLOGY

## 2021-05-22 PROCEDURE — 84100 ASSAY OF PHOSPHORUS: CPT

## 2021-05-22 PROCEDURE — 99231 SBSQ HOSP IP/OBS SF/LOW 25: CPT | Performed by: PSYCHIATRY & NEUROLOGY

## 2021-05-22 PROCEDURE — 74011000258 HC RX REV CODE- 258: Performed by: PSYCHIATRY & NEUROLOGY

## 2021-05-22 PROCEDURE — 74011250636 HC RX REV CODE- 250/636: Performed by: PSYCHIATRY & NEUROLOGY

## 2021-05-22 PROCEDURE — 74011250637 HC RX REV CODE- 250/637: Performed by: FAMILY MEDICINE

## 2021-05-22 PROCEDURE — 74011250636 HC RX REV CODE- 250/636: Performed by: INTERNAL MEDICINE

## 2021-05-22 PROCEDURE — 95720 EEG PHY/QHP EA INCR W/VEEG: CPT | Performed by: PSYCHIATRY & NEUROLOGY

## 2021-05-22 PROCEDURE — 83605 ASSAY OF LACTIC ACID: CPT

## 2021-05-22 PROCEDURE — 36600 WITHDRAWAL OF ARTERIAL BLOOD: CPT

## 2021-05-22 PROCEDURE — 83735 ASSAY OF MAGNESIUM: CPT

## 2021-05-22 RX ORDER — SODIUM CHLORIDE, SODIUM LACTATE, POTASSIUM CHLORIDE, CALCIUM CHLORIDE 600; 310; 30; 20 MG/100ML; MG/100ML; MG/100ML; MG/100ML
50 INJECTION, SOLUTION INTRAVENOUS CONTINUOUS
Status: DISCONTINUED | OUTPATIENT
Start: 2021-05-22 | End: 2021-05-24

## 2021-05-22 RX ADMIN — HYDRALAZINE HYDROCHLORIDE 20 MG: 20 INJECTION INTRAMUSCULAR; INTRAVENOUS at 07:14

## 2021-05-22 RX ADMIN — ISOSORBIDE MONONITRATE 30 MG: 60 TABLET, EXTENDED RELEASE ORAL at 08:58

## 2021-05-22 RX ADMIN — SODIUM CHLORIDE, POTASSIUM CHLORIDE, SODIUM LACTATE AND CALCIUM CHLORIDE 50 ML/HR: 600; 310; 30; 20 INJECTION, SOLUTION INTRAVENOUS at 12:00

## 2021-05-22 RX ADMIN — SODIUM CHLORIDE 100 MG: 9 INJECTION, SOLUTION INTRAVENOUS at 02:15

## 2021-05-22 RX ADMIN — LEVETIRACETAM 1000 MG: 100 INJECTION, SOLUTION INTRAVENOUS at 22:45

## 2021-05-22 RX ADMIN — LORAZEPAM 2 MG: 2 INJECTION INTRAMUSCULAR; INTRAVENOUS at 06:09

## 2021-05-22 RX ADMIN — HYDRALAZINE HYDROCHLORIDE 50 MG: 50 TABLET, FILM COATED ORAL at 08:57

## 2021-05-22 RX ADMIN — SODIUM CHLORIDE 100 MG: 9 INJECTION, SOLUTION INTRAVENOUS at 14:29

## 2021-05-22 RX ADMIN — Medication 10 ML: at 09:00

## 2021-05-22 RX ADMIN — ASPIRIN 81 MG: 81 TABLET, CHEWABLE ORAL at 08:59

## 2021-05-22 RX ADMIN — LEVETIRACETAM 1000 MG: 100 INJECTION, SOLUTION INTRAVENOUS at 10:47

## 2021-05-22 RX ADMIN — Medication 10 ML: at 14:28

## 2021-05-22 RX ADMIN — Medication 10 ML: at 20:38

## 2021-05-22 RX ADMIN — ATORVASTATIN CALCIUM 80 MG: 40 TABLET, FILM COATED ORAL at 08:59

## 2021-05-22 RX ADMIN — Medication 10 ML: at 14:29

## 2021-05-22 NOTE — PROGRESS NOTES
1930-bedside report received from [address] using sbar format   24hr EEG in progress  2200-Emilia BEAVERS placed midline cath under U/S rt [facility]  0609-ativan given for possible sz activity  NP Chata Pena aware and at bedside  0615-some improvement in BUE twitching /spont purposeful movement noted in LUE-pt brought hand up to face and scratched his nose  0714-hydralazine given for sbp > 160    0730-bedside report given to RN using sbar format

## 2021-05-22 NOTE — PROGRESS NOTES
[facility]    ICU TEAM Progress Note    Name: April Patel   : 1934   MRN: [id number]   Date: 2021      Assessment:   Reason for ICU Consult: Neurological neurochecks      HPI:  Steph Rush is a 80 y. o. male with pmh of CVA, intraventricular brain hemorrhage, dyslipidemia, CKD 3, hypertension who presents to hospital from subacute rehab for concerns of altered/decreased mental status. Patient was found to have multiple ischemic CVAs on MRI, continue to have altered mental status with some eye deviation and twitching on .  This was concerning for seizure, given 2 mg of Ativan and loaded with Keppra with some improvement.  On  patient however declined, unresponsive to verbal stimuli.  Stat EEG 24-hour was ordered and showed subclinical status.  He was loaded with Vimpat, and neurology recommends transfer to ICU for closer neuro checks. POD:  * No surgery found *    S/P:       Plan:        ICU Problems:     1. Status epilepticus   -Cont EEG per neurology  -Loaded with keppra   -Started on vimpat  -Ativan prn   -Q1h neurochecks  -Pt is DNI/DNR, currently protecting airway      2. Acute embolic infarcts   -Likely cardioembolic rt arrhythmia, afib   -No AC dt recent ICH  -On ASA per neurology  -Continue atrovastatin  -PT/OT/speech      3. Recent ICH with IVH (5/3)  -Supportive care  -Hold AC  -BP management      4. CAP  -CXR appreciated  -On doxycycline for 10 doses  -Procal, LA, CBC pending  -FU CXR in am   -Chest PT once off EEG  -NT suctioning prn       5. Metabolic encephalopathy   -multifactoral   -supportive care      6.  HTN  -Cardene drip as needed  -BP parameters per neurology     Subjective:   Overnight Events:   2021        Objective:     Visit Vitals  /69   Pulse 90   Temp 97.7 °F (36.5 °C)   Resp 25   Ht 5' 8\" (1.727 m)   Wt 95.2 kg (209 lb 14.1 oz)   SpO2 100%   BMI 31.91 kg/m²    O2 Flow Rate (L/min): 2 l/min O2 Device: None (Room air) Temp (24hrs), Av.7 °F (36.5 °C), Min:97 °F (36.1 °C), Max:98.3 °F (36.8 °C)           Hemodynamics:   PAP:   CO:     Wedge:   CI:     CVP:    SVR:       PVR:       Ventilator Settings:  Mode Rate Tidal Volume Pressure FiO2 PEEP                    Peak airway pressure:      Minute ventilation:          Intake/Output:     Intake/Output Summary (Last 24 hours) at 2021 1137  Last data filed at 2021 0800  Gross per 24 hour   Intake 200 ml   Output 800 ml   Net -600 ml       Physical Exam:  Physical Exam:     General:  Pt Is post ictal    Eyes:  Sclera anicteric. Pupils equally round and reactive to light. Mouth/Throat: Mucous membranes normal, oral pharynx clear   Neck: Supple   Lungs:   Protecting airway, shallow breathing noted,    CV:  Regular rate and rhythm,no murmur, click, rub or gallop   Abdomen:   Soft, non-tender. bowel sounds normal. non-distended   Extremities: No cyanosis or edema   Skin: Skin color, texture, turgor normal. no acute rash or lesions   Lymph nodes: Cervical and supraclavicular normal   Musculoskeletal: No swelling or deformity   Lines/Devices:  Intact, no erythema, drainage or tenderness               Labs & Data: Reviewed    Medications: Reviewed    Chest X-Ray:    TTE:    Multidisciplinary Rounds Completed:  Pending    ABCDEF Bundle/Checklist Completed: Yes    SPECIAL EQUIPMENT: None    DISPOSITION: Stay in ICU    CRITICAL CARE CONSULTANT NOTE  I had a face to face encounter with the patient, reviewed and interpreted patient data including clinical events, labs, images, vital signs, I/O's, and examined patient. I have discussed the case and the plan and management of the patient's care with the consulting services, the bedside nurses and the respiratory therapist.      NOTE OF PERSONAL INVOLVEMENT IN CARE   This patient has a high probability of imminent, clinically significant deterioration, which requires the highest level of preparedness to intervene urgently.  I participated in the decision-making and personally managed or directed the management of the following life and organ supporting interventions that required my frequent assessment to treat or prevent imminent deterioration. I personally spent 45 minutes of critical care time. This is time spent at this critically ill patient's bedside actively involved in patient care as well as the coordination of care and discussions with the patient's family. This does not include any procedural time which has been billed separately.     Sofiya Valdivia NP    [facility] [place]  5/22/2021

## 2021-05-22 NOTE — PROGRESS NOTES
Called and updated patient's daughter on plan of care for patient today. Daughter asking about starting nutrition for patient. Informed daughter that I will address with the Intensivist.     All questions answered.     Audery Cast, [facility]  Neurocritical care NP

## 2021-05-22 NOTE — PROGRESS NOTES
Bedside shift change report given to Landen Wallace RN  (oncoming nurse) by Lory Garcia RN (offgoing nurse). Report included the following information SBAR, Intake/Output, MAR, Cardiac Rhythm NSR and Dual Neuro Assessment. Bedside shift change report given to Cecy Haile RN  (oncoming nurse) by Landen Wallace RN  (offgoing nurse). Report included the following information SBAR, Intake/Output, MAR, Cardiac Rhythm NSR and Dual Neuro Assessment.

## 2021-05-22 NOTE — PROGRESS NOTES
Neurology Attending Addendum:  Agree with overview, interval history and physical as documented by Ms. Festus Ahumada. On exam patient is somnolent will briefly arouse but otherwise quickly closes eyes and becomes less interactive. Appears to respond to noxious stimuli more in right arm than left arm this morning though supported respond in all extremities earlier in the morning. Physical exam is otherwise as documented below. This morning patient is thought to have episode concerning for seizure described as lip smacking eye twitching and left arm tremor as well as limb stiffening. Was given Ativan potentially explaining his increasingly somnolent state. EEG demonstrates admixed slowing with state changes and occasional brief occurrences of triphasic frontally prominent discharges without buildup or definite seizure. EEG was discontinued given readings with formal interpretation pending. At this juncture appears stable to transfer to the floor if no other reason to maintain presents in ICU. We will continue to monitor for recurrence of possible seizure will treat by increasing Keppra 1500 mg twice daily if recurs rather than continuing to administer benzodiazepines particularly given DNR status. Neurology will continue to follow. please call question concerns. Neurocritical Care Progress Note  Gianni Bobby NP    Admit Date: 5/16/2021   LOS: 6 days      Daily Progress Note: 5/22/2021      HPI:Del SCOTT Maira Magana is a 80 y.o. male with a past medical history of of CAD on ASA daily, COPD, CKD 3, HLD, HTN, TESSA, TIA, viral encephalitis and ETOH abuse who presented to [facility] on 5/16/21 with AMS, nausea and vomiting and tremors. He had been admitted to the hospital in April due to right intraparenchymal hemorrhage with ventricular hemorrhage. This was thought to have been caused by amyloid angiopathy. He was discharged from that admission on 5/3/21 and has been in rehab at [facility] doing well.  Several days before admission on  the patient was talking in sentences and able to walk some with the walker according to his daughter. Patient then developed fever and chills in the rehab and was diagnosed with PNA. CTH showed no acute abnormality on . MRI was performed on 21 showed a few small scattered acute infarcts in the bilateral frontal lobes and parieto-occipital lobes, right greater than left. Sequela of evolved intracranial hemorrhage with small, late subacute hematomas. EEG performed on  showed no seizure. Patient with decreased responsiveness, lip smaking and left gaze deviation on . EEG showed generalized nonconvulsive status epilepticus in the beginning that resolves after administration of Ativan. He was started on Keppra. On  he was noted with further seizure activity which resolved with Ativan. He was placed on cEEG and Vimpat load of 200 mg IV with 100 mg bid maintenance was added to the regimen. He was sent to ICU for frequent neuro checks. Subjective: On cEEG. Possible seizure activity this am at 06:08 with lip smacking, eye twitching, left arm tremor,and stiffening of his limbs. No left gaze deviation noted at this time. 2 mg of Ativan given with some improvement. Allergies   Allergen Reactions    Levaquin [Levofloxacin] Nausea and Vomiting     Reports anxiety, nausea, aching after taking levaquin    Ciprofloxacin Shortness of Breath    Quinolones Shortness of Breath       Review of Systems:  Review of systems not obtained due to patient factors. Obtunded    Objective:   Vital signs  Temp (24hrs), Av.7 °F (36.5 °C), Min:97 °F (36.1 °C), Max:98.3 °F (36.8 °C)   1901 -  0700  In: 200 [I.V.:200]  Out: 700 [Urine:700]  No intake/output data recorded.   Visit Vitals  /65   Pulse 81   Temp 97 °F (36.1 °C)   Resp 25   Ht 5' 8\" (1.727 m)   Wt 95.2 kg (209 lb 14.1 oz)   SpO2 100%   BMI 31.91 kg/m²    O2 Flow Rate (L/min): 2 l/min O2 Device: Nasal cannula Vitals:    05/22/21 0130 05/22/21 0200 05/22/21 0300 05/22/21 0330   BP: 138/63 (!) 99/56 112/66 121/65   Pulse: 86 88 82 81   Resp: 11 13 (!) 2 25   Temp:       SpO2: 95% 99% 100% 100%   Weight:       Height:            Physical Exam:  GENERAL: Somnolent, NAD  SKIN: Warm, dry, color appropriate for ethnicity. Neurologic Exam:  Mental Status:  Lethargic, eyes open to noxious stimuli       Language:    Mute    Cranial Nerves:   Pupils 2 mm, equal, round and reactive to light. Resists eye opening. Blinks to threat     Full facial strength, no asymmetry. Motor:          Bulk and tone normal.      No involuntary movements. Sensation:    Withdraws to noxious stimuli x 4. Labs:  Lab Results   Component Value Date/Time    WBC 6.7 05/22/2021 03:11 AM    HGB (POC) 14.9 07/27/2018 02:38 PM    HGB 11.3 (L) 05/22/2021 03:11 AM    Hematocrit (POC) 45 05/13/2019 07:16 PM    HCT 35.6 (L) 05/22/2021 03:11 AM    PLATELET 647 (L) 63/97/3644 03:11 AM    MCV 98.3 05/22/2021 03:11 AM     Lab Results   Component Value Date/Time    Sodium 147 (H) 05/22/2021 03:11 AM    Potassium 4.0 05/22/2021 03:11 AM    Chloride 117 (H) 05/22/2021 03:11 AM    CO2 24 05/22/2021 03:11 AM    Anion gap 6 05/22/2021 03:11 AM    Glucose 93 05/22/2021 03:11 AM    BUN 23 (H) 05/22/2021 03:11 AM    Creatinine 1.12 05/22/2021 03:11 AM    BUN/Creatinine ratio 21 (H) 05/22/2021 03:11 AM    GFR est AA >60 05/22/2021 03:11 AM    GFR est non-AA >60 05/22/2021 03:11 AM    Calcium 8.8 05/22/2021 03:11 AM     Imaging:  CT Results (maximum last 3): Results from Hospital Encounter encounter on 05/16/21    CTA HEAD    Narrative  CLINICAL HISTORY: Embolic stroke    EXAMINATION:  CT ANGIOGRAPHY HEAD AND NECK    COMPARISON: MRI brain May 18, 2021    TECHNIQUE:  Following the uneventful administration of iodinated contrast  material, axial CT angiography of the head and neck was performed.  Delayed axial  images through the head were also obtained. Coronal and sagittal reconstructions  were obtained. Manual postprocessing of images was performed. 3-D  Sagittal  maximal intensity projection images were obtained. 3-D Coronal maximal  intensity projections were obtained. CT dose reduction was achieved through use  of a standardized protocol tailored for this examination and automatic exposure  control for dose modulation. FINDINGS:    Delayed contrast-enhanced head CT:    Prominence of the ventricles and sulci indicative of age-related involutional  changes. There is no acute intra or extra-axial hemorrhage. Scattered  periventricular white matter hypodensities indicative of microvascular ischemic  disease. The basal cisterns are clear. The paranasal sinuses are clear. CTA NECK:    Great vessels: Normal arch anatomy with the origins patent. Right subclavian artery: Patent    Left subclavian artery: Patent    Right common carotid artery: Patent    Left common carotid artery: Patent    Cervical right internal carotid artery: Heterogeneous calcified plaque in the  proximal right internal carotid artery with less than 50% stenosis by NASCET  criteria. Cervical left internal carotid artery: Mild calcified plaque with no significant  stenosis by NASCET criteria. Right vertebral artery: Mild calcified plaque at the origin without significant  stenosis. Left vertebral artery: Mild calcified plaque at the origin without significant  stenosis. 40 mm pulmonary artery suggestive of pulmonary arterial hypertension. Imaged  lung apices are clear. Heterogeneous multinodular thyroid gland.     CTA HEAD:    Right cavernous internal carotid artery: Patent    Left cavernous internal carotid artery: Patent    Anterior cerebral arteries: Patent    Anterior communicating artery: Patent    Right middle cerebral artery: Patent    Left middle cerebral artery: Patent    Posterior communicating arteries: Patent    Posterior cerebral arteries: Patent    Basilar artery: Patent    Distal vertebral arteries: Patent    No evidence for intracranial aneurysm or hemodynamically significant stenosis. Impression  1. Heterogeneous plaque in the proximal right internal carotid artery without  hemodynamically significant stenosis. 2.  No significant left carotid artery stenosis. 3.  No large vessel occlusion. 4.  40 mm main pulmonary artery suggestive of pulmonary arterial hypertension. CTA NECK    Narrative  CLINICAL HISTORY: Embolic stroke    EXAMINATION:  CT ANGIOGRAPHY HEAD AND NECK    COMPARISON: MRI brain May 18, 2021    TECHNIQUE:  Following the uneventful administration of iodinated contrast  material, axial CT angiography of the head and neck was performed. Delayed axial  images through the head were also obtained. Coronal and sagittal reconstructions  were obtained. Manual postprocessing of images was performed. 3-D  Sagittal  maximal intensity projection images were obtained. 3-D Coronal maximal  intensity projections were obtained. CT dose reduction was achieved through use  of a standardized protocol tailored for this examination and automatic exposure  control for dose modulation. FINDINGS:    Delayed contrast-enhanced head CT:    Prominence of the ventricles and sulci indicative of age-related involutional  changes. There is no acute intra or extra-axial hemorrhage. Scattered  periventricular white matter hypodensities indicative of microvascular ischemic  disease. The basal cisterns are clear. The paranasal sinuses are clear. CTA NECK:    Great vessels: Normal arch anatomy with the origins patent. Right subclavian artery: Patent    Left subclavian artery: Patent    Right common carotid artery: Patent    Left common carotid artery: Patent    Cervical right internal carotid artery: Heterogeneous calcified plaque in the  proximal right internal carotid artery with less than 50% stenosis by NASCET  criteria.     Cervical left internal carotid artery: Mild calcified plaque with no significant  stenosis by NASCET criteria. Right vertebral artery: Mild calcified plaque at the origin without significant  stenosis. Left vertebral artery: Mild calcified plaque at the origin without significant  stenosis. 40 mm pulmonary artery suggestive of pulmonary arterial hypertension. Imaged  lung apices are clear. Heterogeneous multinodular thyroid gland. CTA HEAD:    Right cavernous internal carotid artery: Patent    Left cavernous internal carotid artery: Patent    Anterior cerebral arteries: Patent    Anterior communicating artery: Patent    Right middle cerebral artery: Patent    Left middle cerebral artery: Patent    Posterior communicating arteries: Patent    Posterior cerebral arteries: Patent    Basilar artery: Patent    Distal vertebral arteries: Patent    No evidence for intracranial aneurysm or hemodynamically significant stenosis. Impression  1. Heterogeneous plaque in the proximal right internal carotid artery without  hemodynamically significant stenosis. 2.  No significant left carotid artery stenosis. 3.  No large vessel occlusion. 4.  40 mm main pulmonary artery suggestive of pulmonary arterial hypertension. CT HEAD WO CONT    Narrative  EXAM: CT HEAD WO CONT    INDICATION: Trouble talking/increased confusion    COMPARISON: 4/27/2021. CONTRAST: None. TECHNIQUE: Limited by motion Unenhanced CT of the head was performed using 5 mm  images. Brain and bone windows were generated. Coronal and sagittal reformats. CT dose reduction was achieved through use of a standardized protocol tailored  for this examination and automatic exposure control for dose modulation. FINDINGS:  The ventricles and sulci are normal in size, shape and configuration. .  Subcortical and deep white matter hypodensities. . Evaluation for hemorrhage is  limited by motion. No large hemorrhage or mass effect is identified. . The  basilar cisterns are open. No CT evidence of acute infarct. The bone windows demonstrate no abnormalities. The visualized portions of the  paranasal sinuses and mastoid air cells are clear. Impression  Significantly limited by motion. 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 -Q 1 hour neuro checks in ICU   -Continue Vimpat 100 mg bid, Keppra 1000 mg bid.    -Currently on cEEG monitoring   -Give Ativan 2mg IV PRN for seizure duration greater than or equal to 3 minutes or seizure frequency of 3 or more seizures per 8 hours    2.) Embolic Stroke               -seen on MRI               - Few small scattered acute infarcts in the bilateral frontal lobes and parieto-occipital lobes,  right greater than left. Sequela of evolved intracranial hemorrhage with small, late subacute  hematomas. -CTA H & N showed no LVO, heterogeneous calcified plaque in the proximal right ICA with  less than 50% stenosis. No significant left carotid stenosis.             - Echo showed EF of 60-65%. There is inconclusive left ventricular diastolic function. PFO  not noted. However, technically difficult study with poor endocardial visualization.               -Continue ASA 81 mg daily. Cannot escalate antiplatelet therapy as he did have GI bleed on  Plavix.                - He is not a candidate for anticoagulation therapy due to his risk for falls, dementia, Hx HTN, and ICH and subacute hematomas on MRI               -PT/OT/SLP              -Stroke education        Further recommendations from Dr. Toño Stanford, NP  Neurocritical Care Nurse Practitioner

## 2021-05-23 ENCOUNTER — APPOINTMENT (OUTPATIENT)
Dept: GENERAL RADIOLOGY | Age: 86
DRG: 193 | End: 2021-05-23
Attending: EMERGENCY MEDICINE
Payer: MEDICARE

## 2021-05-23 LAB
ERYTHROCYTE [DISTWIDTH] IN BLOOD BY AUTOMATED COUNT: 16 % (ref 11.5–14.5)
HCT VFR BLD AUTO: 38.4 % (ref 36.6–50.3)
HGB BLD-MCNC: 12.2 G/DL (ref 12.1–17)
MCH RBC QN AUTO: 31.2 PG (ref 26–34)
MCHC RBC AUTO-ENTMCNC: 31.8 G/DL (ref 30–36.5)
MCV RBC AUTO: 98.2 FL (ref 80–99)
NRBC # BLD: 0 K/UL (ref 0–0.01)
NRBC BLD-RTO: 0 PER 100 WBC
PLATELET # BLD AUTO: 159 K/UL (ref 150–400)
PMV BLD AUTO: 10.4 FL (ref 8.9–12.9)
RBC # BLD AUTO: 3.91 M/UL (ref 4.1–5.7)
WBC # BLD AUTO: 7.2 K/UL (ref 4.1–11.1)

## 2021-05-23 PROCEDURE — 74011000258 HC RX REV CODE- 258: Performed by: PSYCHIATRY & NEUROLOGY

## 2021-05-23 PROCEDURE — C9254 INJECTION, LACOSAMIDE: HCPCS | Performed by: PSYCHIATRY & NEUROLOGY

## 2021-05-23 PROCEDURE — 74011250636 HC RX REV CODE- 250/636: Performed by: INTERNAL MEDICINE

## 2021-05-23 PROCEDURE — 74011250637 HC RX REV CODE- 250/637: Performed by: INTERNAL MEDICINE

## 2021-05-23 PROCEDURE — 51798 US URINE CAPACITY MEASURE: CPT

## 2021-05-23 PROCEDURE — 74011250636 HC RX REV CODE- 250/636: Performed by: PSYCHIATRY & NEUROLOGY

## 2021-05-23 PROCEDURE — 99231 SBSQ HOSP IP/OBS SF/LOW 25: CPT | Performed by: PSYCHIATRY & NEUROLOGY

## 2021-05-23 PROCEDURE — 85027 COMPLETE CBC AUTOMATED: CPT

## 2021-05-23 PROCEDURE — 65660000001 HC RM ICU INTERMED STEPDOWN

## 2021-05-23 PROCEDURE — 74018 RADEX ABDOMEN 1 VIEW: CPT

## 2021-05-23 PROCEDURE — 74011250637 HC RX REV CODE- 250/637: Performed by: FAMILY MEDICINE

## 2021-05-23 PROCEDURE — 74011250636 HC RX REV CODE- 250/636: Performed by: NURSE PRACTITIONER

## 2021-05-23 PROCEDURE — 36415 COLL VENOUS BLD VENIPUNCTURE: CPT

## 2021-05-23 RX ORDER — LABETALOL HYDROCHLORIDE 5 MG/ML
20 INJECTION, SOLUTION INTRAVENOUS
Status: DISCONTINUED | OUTPATIENT
Start: 2021-05-23 | End: 2021-06-08 | Stop reason: HOSPADM

## 2021-05-23 RX ORDER — AMLODIPINE BESYLATE 5 MG/1
5 TABLET ORAL DAILY
Status: DISCONTINUED | OUTPATIENT
Start: 2021-05-23 | End: 2021-06-08 | Stop reason: HOSPADM

## 2021-05-23 RX ORDER — HYDRALAZINE HYDROCHLORIDE 50 MG/1
100 TABLET, FILM COATED ORAL 3 TIMES DAILY
Status: DISCONTINUED | OUTPATIENT
Start: 2021-05-23 | End: 2021-06-08 | Stop reason: HOSPADM

## 2021-05-23 RX ADMIN — HYDRALAZINE HYDROCHLORIDE 100 MG: 50 TABLET, FILM COATED ORAL at 21:00

## 2021-05-23 RX ADMIN — Medication 10 ML: at 21:00

## 2021-05-23 RX ADMIN — SODIUM CHLORIDE 100 MG: 9 INJECTION, SOLUTION INTRAVENOUS at 01:30

## 2021-05-23 RX ADMIN — AMLODIPINE BESYLATE 5 MG: 5 TABLET ORAL at 17:06

## 2021-05-23 RX ADMIN — HYDRALAZINE HYDROCHLORIDE 20 MG: 20 INJECTION INTRAMUSCULAR; INTRAVENOUS at 08:31

## 2021-05-23 RX ADMIN — ASPIRIN 81 MG: 81 TABLET, CHEWABLE ORAL at 11:29

## 2021-05-23 RX ADMIN — SODIUM CHLORIDE 100 MG: 9 INJECTION, SOLUTION INTRAVENOUS at 14:22

## 2021-05-23 RX ADMIN — Medication 10 ML: at 05:14

## 2021-05-23 RX ADMIN — LEVETIRACETAM 1000 MG: 100 INJECTION, SOLUTION INTRAVENOUS at 11:51

## 2021-05-23 RX ADMIN — LEVETIRACETAM 1000 MG: 100 INJECTION, SOLUTION INTRAVENOUS at 23:43

## 2021-05-23 RX ADMIN — HYDRALAZINE HYDROCHLORIDE 50 MG: 50 TABLET, FILM COATED ORAL at 11:29

## 2021-05-23 RX ADMIN — Medication 10 ML: at 14:27

## 2021-05-23 RX ADMIN — ATORVASTATIN CALCIUM 80 MG: 40 TABLET, FILM COATED ORAL at 11:29

## 2021-05-23 RX ADMIN — HYDRALAZINE HYDROCHLORIDE 50 MG: 50 TABLET, FILM COATED ORAL at 16:39

## 2021-05-23 RX ADMIN — SODIUM CHLORIDE, POTASSIUM CHLORIDE, SODIUM LACTATE AND CALCIUM CHLORIDE 50 ML/HR: 600; 310; 30; 20 INJECTION, SOLUTION INTRAVENOUS at 05:14

## 2021-05-23 NOTE — PROGRESS NOTES
1930-Bedside shift change report given to Hailey Tenorio RN  (oncoming nurse) by Rock Quinones RN  (offgoing nurse). Report included the following information SBAR, Intake/Output, MAR, Cardiac Rhythm NSR and Dual Neuro Assessment.        0730-bedside report given to RN using sbar format

## 2021-05-23 NOTE — PROGRESS NOTES
Neurology Attending Addendum:  Review, interval history and physical as documented by Ms. Mortimer Fredrickson reviewed and agree with its contents. On exam patient is mumbling appearing to attempt to swayed examiner from evaluation but otherwise noninteractive. During my exam he is arousable but overall noninteractive making commentary on neurologic function difficult even as compared to limit exam documented by Ms. Maco Bingham. General exam is otherwise as noted below. 24-hour EEG yesterday demonstrated metabolic pattern with gradual improvement currently patient appears potentially delirious. Would not make medication changes at this juncture but if patient remains significantly somnolent over the next 2 to 3 days could consider slight decrease in antiepileptic dosing to foster reawakening. Appears stable for spacing to every 4 hour neurochecks and transfer from the ICU from a neurology standpoint would recommend ongoing delirium interventions. Neurology will continue to follow please call question concerns. Neurology Progress Note  Fannie Peoples NP    Admit Date: 5/16/2021   LOS: 7 days      Daily Progress Note: 5/23/2021      HPI: Radhames Negro is a 80 y.o. male with a past medical history of of CAD on ASA daily, COPD, CKD 3, HLD, HTN, TESSA, TIA, viral encephalitis and ETOH abuse who presented to [facility] on 5/16/21 with AMS, nausea and vomiting and tremors. He had been admitted to the hospital in April due to right intraparenchymal hemorrhage with IVH. This was thought to have been caused by amyloid angiopathy. He was discharged from that admission on 5/3/21 and has been in rehab at [facility] doing well. Several days before admission on 5/16 the patient was talking in sentences and able to walk some with the walker according to his daughter. Patient then developed fever and chills in the rehab and was diagnosed with PNA. CTH showed no acute abnormality on 5/16.  MRI was performed on 5/18/21 showed a few small scattered acute infarcts in the bilateral frontal lobes and parieto-occipital lobes, right greater than left. Sequela of evolved intracranial hemorrhage with small, late subacute hematomas. EEG performed on  showed no seizure. Patient with decreased responsiveness, lip smaking and left gaze deviation on . EEG showed generalized nonconvulsive status epilepticus in the beginning that resolves after administration of Ativan. He was started on Keppra. On  he was noted with further seizure activity which resolved with Ativan. He was placed on cEEG and Vimpat load of 200 mg IV with 100 mg bid maintenance was added to the regimen. He was sent to ICU for frequent neuro checks. Subjective:   No neuro events overnight. More responsive in early morning for RN assessment. Allergies   Allergen Reactions    Levaquin [Levofloxacin] Nausea and Vomiting     Reports anxiety, nausea, aching after taking levaquin    Ciprofloxacin Shortness of Breath    Quinolones Shortness of Breath       Review of Systems:  Review of systems not obtained due to patient factors. Objective:   Vital signs  Temp (24hrs), Av.7 °F (36.5 °C), Min:97.6 °F (36.4 °C), Max:97.7 °F (36.5 °C)   1901 -  0700  In: 300 [I.V.:300]  Out: -    0701 -  1900  In: 760 [I.V.:760]  Out: 1300 [Urine:1300]  Visit Vitals  BP (!) 151/77   Pulse 98   Temp 97.7 °F (36.5 °C)   Resp 21   Ht 5' 8\" (1.727 m)   Wt 95.2 kg (209 lb 14.1 oz)   SpO2 97%   BMI 31.91 kg/m²    O2 Flow Rate (L/min): 2 l/min O2 Device: None (Room air)   Vitals:    21 2130 21 2200 21 2230 21 2300   BP: (!) 151/88 (!) 145/77 129/74 (!) 151/77   Pulse: 97 99 99 98   Resp:    Temp:       SpO2: 96% 98% 96% 97%   Weight:       Height:            Physical Exam:  GENERAL: Somnolent, NAD  SKIN: Warm, dry, color appropriate for ethnicity. Neurologic Exam:  Mental Status:  Lethargic, eyes open to noxious stimuli.  No command following. Language:    When his name is called he says \"yeah\". Cranial Nerves:   Pupils 2 mm, equal, round and reactive to light. Gaze midline. Resists eye opening. Blinks to threat     Full facial strength, no asymmetry. Motor:    Bulk and tone normal.      No involuntary movements. Sensation:    Withdraws to noxious stimuli x 4, but appears weaker on left side than right. Labs:  Lab Results   Component Value Date/Time    WBC 6.7 05/22/2021 03:11 AM    HGB (POC) 14.9 07/27/2018 02:38 PM    HGB 11.3 (L) 05/22/2021 03:11 AM    Hematocrit (POC) 45 05/13/2019 07:16 PM    HCT 35.6 (L) 05/22/2021 03:11 AM    PLATELET 139 (L) 92/43/6512 03:11 AM    MCV 98.3 05/22/2021 03:11 AM     Lab Results   Component Value Date/Time    Sodium 147 (H) 05/22/2021 03:11 AM    Potassium 4.0 05/22/2021 03:11 AM    Chloride 117 (H) 05/22/2021 03:11 AM    CO2 24 05/22/2021 03:11 AM    Anion gap 6 05/22/2021 03:11 AM    Glucose 93 05/22/2021 03:11 AM    BUN 23 (H) 05/22/2021 03:11 AM    Creatinine 1.12 05/22/2021 03:11 AM    BUN/Creatinine ratio 21 (H) 05/22/2021 03:11 AM    GFR est AA >60 05/22/2021 03:11 AM    GFR est non-AA >60 05/22/2021 03:11 AM    Calcium 8.8 05/22/2021 03:11 AM     Imaging:  CT Results (maximum last 3): Results from Hospital Encounter encounter on 05/16/21    CTA HEAD    Narrative  CLINICAL HISTORY: Embolic stroke    EXAMINATION:  CT ANGIOGRAPHY HEAD AND NECK    COMPARISON: MRI brain May 18, 2021    TECHNIQUE:  Following the uneventful administration of iodinated contrast  material, axial CT angiography of the head and neck was performed. Delayed axial  images through the head were also obtained. Coronal and sagittal reconstructions  were obtained. Manual postprocessing of images was performed. 3-D  Sagittal  maximal intensity projection images were obtained. 3-D Coronal maximal  intensity projections were obtained.  CT dose reduction was achieved through use  of a standardized protocol tailored for this examination and automatic exposure  control for dose modulation. FINDINGS:    Delayed contrast-enhanced head CT:    Prominence of the ventricles and sulci indicative of age-related involutional  changes. There is no acute intra or extra-axial hemorrhage. Scattered  periventricular white matter hypodensities indicative of microvascular ischemic  disease. The basal cisterns are clear. The paranasal sinuses are clear. CTA NECK:    Great vessels: Normal arch anatomy with the origins patent. Right subclavian artery: Patent    Left subclavian artery: Patent    Right common carotid artery: Patent    Left common carotid artery: Patent    Cervical right internal carotid artery: Heterogeneous calcified plaque in the  proximal right internal carotid artery with less than 50% stenosis by NASCET  criteria. Cervical left internal carotid artery: Mild calcified plaque with no significant  stenosis by NASCET criteria. Right vertebral artery: Mild calcified plaque at the origin without significant  stenosis. Left vertebral artery: Mild calcified plaque at the origin without significant  stenosis. 40 mm pulmonary artery suggestive of pulmonary arterial hypertension. Imaged  lung apices are clear. Heterogeneous multinodular thyroid gland. CTA HEAD:    Right cavernous internal carotid artery: Patent    Left cavernous internal carotid artery: Patent    Anterior cerebral arteries: Patent    Anterior communicating artery: Patent    Right middle cerebral artery: Patent    Left middle cerebral artery: Patent    Posterior communicating arteries: Patent    Posterior cerebral arteries: Patent    Basilar artery: Patent    Distal vertebral arteries: Patent    No evidence for intracranial aneurysm or hemodynamically significant stenosis. Impression  1. Heterogeneous plaque in the proximal right internal carotid artery without  hemodynamically significant stenosis.   2.  No significant left carotid artery stenosis. 3.  No large vessel occlusion. 4.  40 mm main pulmonary artery suggestive of pulmonary arterial hypertension. CTA NECK    Narrative  CLINICAL HISTORY: Embolic stroke    EXAMINATION:  CT ANGIOGRAPHY HEAD AND NECK    COMPARISON: MRI brain May 18, 2021    TECHNIQUE:  Following the uneventful administration of iodinated contrast  material, axial CT angiography of the head and neck was performed. Delayed axial  images through the head were also obtained. Coronal and sagittal reconstructions  were obtained. Manual postprocessing of images was performed. 3-D  Sagittal  maximal intensity projection images were obtained. 3-D Coronal maximal  intensity projections were obtained. CT dose reduction was achieved through use  of a standardized protocol tailored for this examination and automatic exposure  control for dose modulation. FINDINGS:    Delayed contrast-enhanced head CT:    Prominence of the ventricles and sulci indicative of age-related involutional  changes. There is no acute intra or extra-axial hemorrhage. Scattered  periventricular white matter hypodensities indicative of microvascular ischemic  disease. The basal cisterns are clear. The paranasal sinuses are clear. CTA NECK:    Great vessels: Normal arch anatomy with the origins patent. Right subclavian artery: Patent    Left subclavian artery: Patent    Right common carotid artery: Patent    Left common carotid artery: Patent    Cervical right internal carotid artery: Heterogeneous calcified plaque in the  proximal right internal carotid artery with less than 50% stenosis by NASCET  criteria. Cervical left internal carotid artery: Mild calcified plaque with no significant  stenosis by NASCET criteria. Right vertebral artery: Mild calcified plaque at the origin without significant  stenosis. Left vertebral artery: Mild calcified plaque at the origin without significant  stenosis.     40 mm pulmonary artery suggestive of pulmonary arterial hypertension. Imaged  lung apices are clear. Heterogeneous multinodular thyroid gland. CTA HEAD:    Right cavernous internal carotid artery: Patent    Left cavernous internal carotid artery: Patent    Anterior cerebral arteries: Patent    Anterior communicating artery: Patent    Right middle cerebral artery: Patent    Left middle cerebral artery: Patent    Posterior communicating arteries: Patent    Posterior cerebral arteries: Patent    Basilar artery: Patent    Distal vertebral arteries: Patent    No evidence for intracranial aneurysm or hemodynamically significant stenosis. Impression  1. Heterogeneous plaque in the proximal right internal carotid artery without  hemodynamically significant stenosis. 2.  No significant left carotid artery stenosis. 3.  No large vessel occlusion. 4.  40 mm main pulmonary artery suggestive of pulmonary arterial hypertension. CT HEAD WO CONT    Narrative  EXAM: CT HEAD WO CONT    INDICATION: Trouble talking/increased confusion    COMPARISON: 4/27/2021. CONTRAST: None. TECHNIQUE: Limited by motion Unenhanced CT of the head was performed using 5 mm  images. Brain and bone windows were generated. Coronal and sagittal reformats. CT dose reduction was achieved through use of a standardized protocol tailored  for this examination and automatic exposure control for dose modulation. FINDINGS:  The ventricles and sulci are normal in size, shape and configuration. .  Subcortical and deep white matter hypodensities. . Evaluation for hemorrhage is  limited by motion. No large hemorrhage or mass effect is identified. . The  basilar cisterns are open. No CT evidence of acute infarct. The bone windows demonstrate no abnormalities. The visualized portions of the  paranasal sinuses and mastoid air cells are clear. Impression  Significantly limited by motion.  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    -cEEG completed 5/22/21 with admixed slowing with state changes and occasional brief occurrences of triphasic frontally prominent discharges without buildup or definite seizure. -Give Ativan 2mg IV PRN for seizure duration greater than or equal to 3 minutes or seizure frequency of 3 or more seizures per 8 hours    2.) CVA              -seen on MRI brain 5/18/21              - Few small scattered acute infarcts in the bilateral frontal lobes and parieto-occipital lobes, right greater than left. Sequela of evolved intracranial hemorrhage with small, late subacute hematomas. -CTA H & N showed no LVO, heterogeneous calcified plaque in the proximal right ICA with less than 50% stenosis. No significant left carotid stenosis.             - Echo showed EF of 60-65%. There is inconclusive left ventricular diastolic function. PFO not noted. However, technically difficult study with poor endocardial visualization.               -Continue ASA 81 mg daily. Cannot escalate antiplatelet therapy as he did have GI bleed on Plavix. - LDL 4/2021 74.8, on Lipitor 80mg daily.    - A1C ok at 5.9 on 4/19/2021              - He is not a candidate for anticoagulation therapy due to his risk for falls, dementia, Hx HTN, and ICH and subacute hematomas on MRI               -PT/OT/SLP              -Stroke education        Further recommendations from Dr. Chiquis Feliciano to follow.      Ramiro Romberg, NP  Neurocritical Care Nurse Practitioner

## 2021-05-23 NOTE — PROCEDURES
ELECTROENCEPHALOGRAM REPORT     Patient Name: Tyrone Lucio  : 1934  Age: 80 y.o. Ordering physician: Dr. Gordy Cody DO  Date of EEG: May 21st, 2021 at 1227 through May 22nd 2021 at [facility] 48: Lance Thomas MD    PROCEDURE: 12-24 hour continuous video electroencephalogram (cvEEG). CLINICAL INDICATION: The patient is a 80 y.o. male who is being evaluated for resolution of suspected nonconvulsive status epilepticus. DESCRIPTION OF THE RECORD:   During the initial half of this recording, background consists of admixed theta with intermixed delta. As record progresses, there is gradual improvement to the background consisting largely of 7 Hz theta with incompletely formed anterior posterior amplitude/frequency gradient. While no discrete sleep architecture is noted, there is transition to more of a discontinuous background and generalized slowing the background suggestive of sedated sleep. Occasional to rare frontally predominant sporadic generalized discharges with triphasic morphology are noted throughout the record, more prominent in the earlier portions of the opposed to near its completion. These occur with a frequency of less than 1 Hz on average do not organize or evolve. No electrographic seizures are noted. Reactivity appears present to environmental stimuli. Single-lead ECG demonstrates normal sinus rhythm. INTERPRETATION:     This is an abnormal 12 to 26-hour continuous video EEG characterized by evidence for mild diffuse cerebral dysfunction, of which the sporadic generalized frontally predominant triphasic discharges are indicative of. While nonspecific in etiology, these findings are consistent with a metabolic/toxic encephalopathy. COMPARISON:  Comparison to prior 24-hour EEG no electroclinical seizures are appreciated.       Damián Coronado MD

## 2021-05-23 NOTE — ROUTINE PROCESS
TRANSFER - OUT REPORT: 
 
Verbal report given to Charmaine Olivo RN (name) on [address]  being transferred to NSTU (unit) for routine progression of care Report consisted of patients Situation, Background, Assessment and  
Recommendations(SBAR). Information from the following report(s) SBAR, Kardex, [facility], Cardiac Rhythm SR and Dual Neuro Assessment was reviewed with the receiving nurse. Lines:    
 
Opportunity for questions and clarification was provided. Patient transported with: 
 Registered Nurse Tech

## 2021-05-23 NOTE — PROGRESS NOTES
Dr. David Boo in to see. Updated on patient's condition and elevated BP. Reviewed home medications and the plan is to add more of his home medications to keep SBP < 160.

## 2021-05-23 NOTE — PROGRESS NOTES
[facility]  Hospitalist Group     ICU Transfer/Accept Summary     [place] [address] [place]  DATE OF TRANSFER: 5/23/2021       PATIENT ID: Renetta Dixon  MRN: [id number]   YOB: 1934    PRIMARY CARE PROVIDER: Eden Rivera MD   DATE OF ADMISSION: 5/16/2021  9:02 PM    ATTENDING PHYSICIAN: Sri Ruiz MD  CONSULTATIONS:   IP CONSULT TO NEUROLOGY  IP CONSULT TO PALLIATIVE CARE - PROVIDER  IP CONSULT TO CARDIOLOGY    PROCEDURES/SURGERIES:   * No surgery found *    REASON FOR ADMISSION: <principal problem not specified>     HOSPITAL PROBLEM LIST:  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    Hypertension I10    ED (erectile dysfunction) N52.9    Severe obesity (HCC) E66.01    TESSA on CPAP G47.33, Z99.89    Simple chronic bronchitis ([facility]) J41.0    Bilateral carotid artery stenosis I65.23    Spinal stenosis of lumbar region at multiple levels M48.061    Flank pain R10.9    Rectal bleeding K62.5    Pneumonia J18.9    Severe sepsis (HCC) A41.9, R65.20    Acute blood loss anemia D62    Facial droop R29.810    Altered mental state R41.82    Viral encephalitis A86    Coronary artery disease with stable angina pectoris (HCC) I25.118    Chronic obstructive pulmonary disease (HCC) J44.9    CKD (chronic kidney disease) stage 3, GFR 30-59 ml/min ([facility]) N18.30    Intracranial hemorrhage (HCC) I62.9    CAP (community acquired pneumonia) J18.9         Brief HPI and Hospital Course:      Arturo Alicea a 80 y. o. male with pmh of CVA, intraventricular brain hemorrhage, dyslipidemia, CKD 3, hypertension who presents to hospital from subacute rehab for concerns of altered/decreased mental status. Patient was found to have multiple ischemic CVAs on MRI, continue to have altered mental status with some eye deviation and twitching on 5/20. This was concerning for seizure, given 2 mg of Ativan and loaded with Keppra with some improvement. On 5/21 patient however declined, unresponsive to verbal stimuli. Stat EEG 24-hour was ordered and showed subclinical status. He was loaded with Vimpat, and neurology recommends transfer to ICU for closer neuro checks on 5/21. Seizures were controlled and  Pt transferred out of ICU on 5/23. Patient is seen and examined at bedside in ICU. He is alert. He is also very hard of hearing. Nurse present. Pt following some commands. He does not look in distress  Dobhoff placed today and meds given. Starting TFs now. BP elevated- meds adjusted.      Assessment and Plan:    Status epilepticus   -Status post Keppra and Vimpat load 5/20  -Continue Keppra and Vimpat   - Neurology following  - 24hr EEG: mild diffuse cerebral dysfunction, of which the sporadic generalized frontally predominant triphasic discharges are indicative of metabolic/toxic encephalopathy.  -  Seizure precautions     Acute embolic infarcts  Subacute hematoma -intraventricular hemorrhage with ventriculomegaly on recent admission, discharged 5/3  -Suspicious for A. fib, however when reviewed by cardiology no definitive episodes seen on telemetry  -Not a candidate for anticoagulation due to intracranial hemorrhage, and fall risk  -Neurology following  -No escalation of antiplatelets due to [address], continue aspirin  -Continue atorvastatin  -PT/OT/speech  -Echo with normal EF      Metabolic encephalopathymultifactorial, acute CVAs, pneumonia, electrolyte imbalances, seizures  -Day and night schedules, avoid benzodiazepines if able  -Frequent redirection  -Holding Seroquel for now      Community-acquired pneumonia   -x-ray with left basilar airspace disease  - completed doxy   -Remains on room air, monitor     Hypertension BP elevated. SBP goal <160. Increased hydralazine to home dose. Restarted amlodipine. Monitor   Hyperlipidemiacontinue Lipitor    Nutrition  - Dobhoff placed on 5/23  - will start TFs    DNR/DNI, pressors ok   Palliative care consulted        PHYSICAL EXAMINATION:  Visit Vitals  BP (!) 162/85   Pulse 84   Temp 98.8 °F (37.1 °C)   Resp 26   Ht 5' 8\" (1.727 m)   Wt 92.3 kg (203 lb 7.8 oz)   SpO2 96%   BMI 30.94 kg/m²       General:          Alert and mumbling   HEENT:           Atraumatic, MMM, resists eye opening             Lungs:             Course bilaterally. No Wheezing or Rhonchi. No rales. Heart:              Regular  rhythm,  No  murmur   No edema  Abdomen:       Soft, non-tender. Not distended. Bowel sounds normal  Extremities:     No cyanosis. No clubbing,  +2 distal pulses  Skin:                Not pale. Not Jaundiced  No rashes   Psych:             Not anxious or agitated. Neurologic:      alert, following some commands      Labs:     Recent Labs     05/23/21  0246 05/22/21 0311   WBC 7.2 6.7   HGB 12.2 11.3*   HCT 38.4 35.6*    122*     Recent Labs     05/22/21  0311 05/21/21  2239 05/21/21  1842 05/21/21  0452   *  --  147* 144   K 4.0  --  3.8 3.7   *  --  114* 113*   CO2 24  --  24 22   BUN 23*  --  22* 22*   CREA 1.12  --  1.12 1.18   GLU 93  --  94 95   CA 8.8  --  9.0 9.3   MG 2.2 2.1 2.2 2.4   PHOS 3.4 3.3 3.3 3.1     No results for input(s): ALT, AP, TBIL, TBILI, TP, ALB, GLOB, GGT, AML, LPSE in the last 72 hours. No lab exists for component: SGOT, GPT, AMYP, HLPSE  Recent Labs     05/22/21 0311   APTT 37.6*      No results for input(s): FE, TIBC, PSAT, FERR in the last 72 hours.    No results found for: FOL, RBCF   Recent Labs     05/22/21  0359   PH 7.37   PCO2 38   PO2 107*     Recent Labs     05/21/21  2239 05/21/21  1842   TROIQ <0.05 <0.05 Lab Results   Component Value Date/Time    Cholesterol, total 144 04/19/2021 12:59 AM    HDL Cholesterol 33 04/19/2021 12:59 AM    LDL, calculated 74.8 04/19/2021 12:59 AM    Triglyceride 181 (H) 04/19/2021 12:59 AM    CHOL/HDL Ratio 4.4 04/19/2021 12:59 AM     Lab Results   Component Value Date/Time    Glucose (POC) 107 05/22/2021 02:25 PM    Glucose (POC) 92 05/21/2021 07:36 AM    Glucose (POC) 110 (H) 05/03/2021 11:41 AM    Glucose (POC) 155 (H) 04/30/2021 04:50 PM    Glucose (POC) 135 (H) 04/30/2021 11:52 AM     Lab Results   Component Value Date/Time    Color YELLOW/STRAW 05/16/2021 09:33 PM    Appearance CLEAR 05/16/2021 09:33 PM    Specific gravity 1.014 05/16/2021 09:33 PM    Specific gravity 1.015 09/21/2020 09:13 AM    pH (UA) 5.0 05/16/2021 09:33 PM    Protein Negative 05/16/2021 09:33 PM    Glucose Negative 05/16/2021 09:33 PM    Ketone Negative 05/16/2021 09:33 PM    Bilirubin Negative 05/16/2021 09:33 PM    Urobilinogen 0.2 05/16/2021 09:33 PM    Nitrites Negative 05/16/2021 09:33 PM    Leukocyte Esterase Negative 05/16/2021 09:33 PM    Epithelial cells FEW 05/16/2021 09:33 PM    Bacteria Negative 05/16/2021 09:33 PM    WBC 0-4 05/16/2021 09:33 PM    RBC 0-5 05/16/2021 09:33 PM         CODE STATUS:   Full Code   X DNR    Partial    Comfort Care       Signed:   Sav Salazar MD  Date of Service:  5/23/2021  4:27 PM

## 2021-05-23 NOTE — PROGRESS NOTES
[facility]    ICU TEAM Progress Note    Name: Fredick Leyden   : 1934   MRN: [id number]   Date: 2021      Assessment:     Reason for ICU Consult: Neurological neurochecks      HPI:  Palak Rush is a 80 y. o. male with pmh of CVA, intraventricular brain hemorrhage, dyslipidemia, CKD 3, hypertension who presents to hospital from subacute rehab for concerns of altered/decreased mental status. Patient was found to have multiple ischemic CVAs on MRI, continue to have altered mental status with some eye deviation and twitching on .  This was concerning for seizure, given 2 mg of Ativan and loaded with Keppra with some improvement.  On  patient however declined, unresponsive to verbal stimuli.  Stat EEG 24-hour was ordered and showed subclinical status.  He was loaded with Vimpat, and neurology recommended transfer to ICU for closer neuro checks. Subjective:   Overnight Events:   21  EEG Comparison to prior 24-hour EEG no electroclinical seizures are appreciated. Findings are consistent with a metabolic/toxic encephalopathy. Patient is awake, opens eyes, tracks. Does not follow commands. Says a few words. PEREZ to pain. Aspiration risk, speech to follow  Place DHT for feeds. Transfer to [facility] intermediate  Q2 hrs neuro assessments    POD:  * No surgery found *    S/P:       Plan:     ICU Problems:     1. Status epilepticus   -Loaded with keppra , continue 1g q 12hrs  -Vimpat 100mg q 12hrs  -Q2h neurochecks  -Pt is DNI/DNR, currently protecting airway   -If seizes notify provider to adjust doses.      2. Acute embolic infarcts   -Likely cardioembolic rt arrhythmia, afib   -No AC dt recent ICH  -On ASA per neurology  -Continue atrovastatin  -PT/OT/speech      3. Recent ICH with IVH (5/3)  -Supportive care  -Hold AC  -BP management      4. CAP  -CXR appreciated.   -On doxycycline for 10 doses  -Chest PT once off EEG  -NT suctioning prn       5.  Metabolic encephalopathy - improved  -multifactoral   -supportive care      6. HTN  -Cardene drip as needed, off  -BP parameters per neurology       Objective:     Visit Vitals  BP (!) 158/81   Pulse 83   Temp 97.7 °F (36.5 °C)   Resp 23   Ht 5' 8\" (1.727 m)   Wt 92.3 kg (203 lb 7.8 oz)   SpO2 98%   BMI 30.94 kg/m²    O2 Flow Rate (L/min): 2 l/min O2 Device: None (Room air) Temp (24hrs), Av.7 °F (36.5 °C), Min:97.6 °F (36.4 °C), Max:97.7 °F (36.5 °C)           Hemodynamics:   PAP:   CO:     Wedge:   CI:     CVP:    SVR:       PVR:       Ventilator Settings:  Mode Rate Tidal Volume Pressure FiO2 PEEP                    Peak airway pressure:      Minute ventilation:          Intake/Output:     Intake/Output Summary (Last 24 hours) at 2021 0750  Last data filed at 2021 0514  Gross per 24 hour   Intake 1260 ml   Output 1300 ml   Net -40 ml       Physical Exam:  Physical Exam:     General:  Awake, alert, confused elderly male. Eyes:  Sclera anicteric. Pupils equally round and reactive to light. Mouth/Throat: Mucous membranes normal, oral pharynx clear   Neck: Supple. Lungs:  Protecting airway, shallow breathing noted. CV:  Regular rate and rhythm,no murmur, click, rub or gallop. Abdomen:  Soft, non-tender. bowel sounds normal. non-distended. Extremities: No cyanosis or edema. Skin: Skin color, texture, turgor normal. no acute rash or lesions. Lymph nodes: Cervical and supraclavicular normal.   Musculoskeletal: No swelling or deformity. Lines/Devices:  Intact, no erythema, drainage or tenderness. Neuro: Patient is awake and alert, confused. PEREZ to pain.  Does not follow commands.              Labs & Data: Reviewed  Recent Results (from the past 24 hour(s))   CBC W/O DIFF    Collection Time: 21  2:46 AM   Result Value Ref Range    WBC 7.2 4.1 - 11.1 K/uL    RBC 3.91 (L) 4.10 - 5.70 M/uL    HGB 12.2 12.1 - 17.0 g/dL    HCT 38.4 36.6 - 50.3 %    MCV 98.2 80.0 - 99.0 FL    MCH 31.2 26.0 - 34.0 PG    MCHC 31.8 30.0 - 36.5 g/dL    RDW 16.0 (H) 11.5 - 14.5 %    PLATELET 167 521 - 546 K/uL    MPV 10.4 8.9 - 12.9 FL    NRBC 0.0 0  WBC    ABSOLUTE NRBC 0.00 0.00 - 0.01 K/uL       Medications: Reviewed    Current Facility-Administered Medications:     lactated Ringers infusion, 50 mL/hr, IntraVENous, CONTINUOUS, Shimon, Stormy Bolus, NP, Last Rate: 50 mL/hr at 05/23/21 0514, 50 mL/hr at 05/23/21 0514    levETIRAcetam (KEPPRA) 1,000 mg in 0.9% sodium chloride 100 mL IVPB, 1,000 mg, IntraVENous, Q12H, Marimar Hanna DO, Last Rate: 0 mL/hr at 05/22/21 2300, 1,000 mg at 05/23/21 1151    hydrALAZINE (APRESOLINE) 20 mg/mL injection 20 mg, 20 mg, IntraVENous, Q6H PRN, Deepa Bernal MD, 20 mg at 05/23/21 0831    lacosamide (VIMPAT) 100 mg in 0.9% sodium chloride 100 mL IVPB, 100 mg, IntraVENous, Q12H, Rosibel RODRIGUEZ DO, 100 mg at 05/23/21 1422    sodium chloride (NS) flush 5-40 mL, 5-40 mL, IntraVENous, Q8H, Shimon, Meghann H, NP, 10 mL at 05/23/21 1427    sodium chloride (NS) flush 5-40 mL, 5-40 mL, IntraVENous, PRN, Shimon, Stormy Bolus, NP    polyethylene glycol (MIRALAX) packet 17 g, 17 g, Oral, DAILY PRN, Shimon, Stormy Bolus, NP    albuterol (PROVENTIL VENTOLIN) nebulizer solution 2.5 mg, 2.5 mg, Nebulization, Q4H PRN, Saul Bella, Stormy Bolus, NP    niCARdipine (CARDENE) 25 mg in 0.9% sodium chloride 250 mL infusion, 0-15 mg/hr, IntraVENous, TITRATE, Shimon, Stormy Bolus, NP, Held at 05/21/21 1900    aspirin chewable tablet 81 mg, 81 mg, Oral, DAILY, Deepa Bernal MD, 81 mg at 05/23/21 1129    hydrALAZINE (APRESOLINE) tablet 50 mg, 50 mg, Oral, TID, Micki Fernandez MD, 50 mg at 05/23/21 1129    atorvastatin (LIPITOR) tablet 80 mg, 80 mg, Oral, DAILY, Carlos Eduardo, Emilio BOOTH MD, 80 mg at 05/23/21 1129    isosorbide mononitrate ER (IMDUR) tablet 30 mg, 30 mg, Oral, DAILY, Carlos Eduardo, Pat Baum MD, 30 mg at 05/22/21 0858    sodium chloride (NS) flush 5-40 mL, 5-40 mL, IntraVENous, Q8H, Curt Grady MD, 10 mL at 05/23/21 6640    sodium chloride (NS) flush 5-40 mL, 5-40 mL, IntraVENous, PRN, Curt Grady MD    acetaminophen (TYLENOL) tablet 650 mg, 650 mg, Oral, Q6H PRN **OR** acetaminophen (TYLENOL) suppository 650 mg, 650 mg, Rectal, Q6H PRN, Curt Grady MD    polyethylene glycol (MIRALAX) packet 17 g, 17 g, Oral, DAILY PRN, Curt Grady MD    promethazine (PHENERGAN) tablet 12.5 mg, 12.5 mg, Oral, Q6H PRN **OR** ondansetron (ZOFRAN) injection 4 mg, 4 mg, IntraVENous, Q6H PRN, Curt Grady MD      Chest X-Ray:  CXR Results  (Last 48 hours)               05/22/21 0454  XR CHEST PORT Final result    Impression:  Improved aeration in the left lung base with minimal residual   airspace opacity       Narrative:  EXAM: XR CHEST PORT       INDICATION: fu CAP       COMPARISON: 5/16/2021       FINDINGS: A portable AP radiograph of the chest was obtained at 400 hours. The   patient is on a cardiac monitor. Linear opacity in the right midlung zone may   represent discoid atelectasis which is new. Improved aeration the left lung base   with minimal residual airspace opacity. . The cardiac and mediastinal contours   and pulmonary vascularity are normal.  The bones and soft tissues are grossly   within normal limits. TTE:   5/20/21  Interpretation Summary    Result status: Final result   · Image quality for this study was technically difficult. · Contrast used: DEFINITY. · LV: Estimated LVEF is 60 - 65%. Normal cavity size, wall thickness and systolic function (ejection fraction normal). Left ventricle not well visualized. Inconclusive left ventricular diastolic function. · RV: Not well visualized.           Multidisciplinary Rounds Completed:  No    ABCDEF Bundle/Checklist Completed: Yes    SPECIAL EQUIPMENT: None    DISPOSITION: Transfer to non-ICU bed    CRITICAL CARE CONSULTANT NOTE  I had a face to face encounter with the patient, reviewed and interpreted patient data including clinical events, labs, images, vital signs, I/O's, and examined patient. I have discussed the case and the plan and management of the patient's care with the consulting services, the bedside nurses and the respiratory therapist.      NOTE OF PERSONAL INVOLVEMENT IN CARE   This patient has a high probability of imminent, clinically significant deterioration, which requires the highest level of preparedness to intervene urgently. I participated in the decision-making and personally managed or directed the management of the following life and organ supporting interventions that required my frequent assessment to treat or prevent imminent deterioration. I personally spent 40 minutes of critical care time. This is time spent at this critically ill patient's bedside actively involved in patient care as well as the coordination of care and discussions with the patient's family. This does not include any procedural time which has been billed separately.     Destiny Flores AGACNP-BC     [address] [facility]

## 2021-05-24 ENCOUNTER — APPOINTMENT (OUTPATIENT)
Dept: GENERAL RADIOLOGY | Age: 86
DRG: 193 | End: 2021-05-24
Attending: INTERNAL MEDICINE
Payer: MEDICARE

## 2021-05-24 ENCOUNTER — APPOINTMENT (OUTPATIENT)
Dept: CT IMAGING | Age: 86
DRG: 193 | End: 2021-05-24
Attending: INTERNAL MEDICINE
Payer: MEDICARE

## 2021-05-24 LAB
ANION GAP SERPL CALC-SCNC: 7 MMOL/L (ref 5–15)
BUN SERPL-MCNC: 24 MG/DL (ref 6–20)
BUN/CREAT SERPL: 21 (ref 12–20)
CALCIUM SERPL-MCNC: 9 MG/DL (ref 8.5–10.1)
CHLORIDE SERPL-SCNC: 119 MMOL/L (ref 97–108)
CO2 SERPL-SCNC: 23 MMOL/L (ref 21–32)
CREAT SERPL-MCNC: 1.15 MG/DL (ref 0.7–1.3)
ERYTHROCYTE [DISTWIDTH] IN BLOOD BY AUTOMATED COUNT: 16.2 % (ref 11.5–14.5)
GLUCOSE BLD STRIP.AUTO-MCNC: 115 MG/DL (ref 65–117)
GLUCOSE BLD STRIP.AUTO-MCNC: 124 MG/DL (ref 65–117)
GLUCOSE SERPL-MCNC: 115 MG/DL (ref 65–100)
HCT VFR BLD AUTO: 40.4 % (ref 36.6–50.3)
HGB BLD-MCNC: 12.8 G/DL (ref 12.1–17)
L PNEUMO1 AG UR QL IA: NEGATIVE
LACTATE SERPL-SCNC: 0.9 MMOL/L (ref 0.4–2)
MAGNESIUM SERPL-MCNC: 2.3 MG/DL (ref 1.6–2.4)
MCH RBC QN AUTO: 30.9 PG (ref 26–34)
MCHC RBC AUTO-ENTMCNC: 31.7 G/DL (ref 30–36.5)
MCV RBC AUTO: 97.6 FL (ref 80–99)
NRBC # BLD: 0 K/UL (ref 0–0.01)
NRBC BLD-RTO: 0 PER 100 WBC
PHOSPHATE SERPL-MCNC: 2.4 MG/DL (ref 2.6–4.7)
PLATELET # BLD AUTO: 167 K/UL (ref 150–400)
PMV BLD AUTO: 10.2 FL (ref 8.9–12.9)
POTASSIUM SERPL-SCNC: 3.8 MMOL/L (ref 3.5–5.1)
RBC # BLD AUTO: 4.14 M/UL (ref 4.1–5.7)
SERVICE CMNT-IMP: ABNORMAL
SERVICE CMNT-IMP: NORMAL
SODIUM SERPL-SCNC: 149 MMOL/L (ref 136–145)
SPECIMEN SOURCE: NORMAL
WBC # BLD AUTO: 6.8 K/UL (ref 4.1–11.1)

## 2021-05-24 PROCEDURE — 95816 EEG AWAKE AND DROWSY: CPT | Performed by: PSYCHIATRY & NEUROLOGY

## 2021-05-24 PROCEDURE — 99233 SBSQ HOSP IP/OBS HIGH 50: CPT | Performed by: PSYCHIATRY & NEUROLOGY

## 2021-05-24 PROCEDURE — 83605 ASSAY OF LACTIC ACID: CPT

## 2021-05-24 PROCEDURE — 82962 GLUCOSE BLOOD TEST: CPT

## 2021-05-24 PROCEDURE — 74011250637 HC RX REV CODE- 250/637: Performed by: INTERNAL MEDICINE

## 2021-05-24 PROCEDURE — C9254 INJECTION, LACOSAMIDE: HCPCS | Performed by: PSYCHIATRY & NEUROLOGY

## 2021-05-24 PROCEDURE — 83735 ASSAY OF MAGNESIUM: CPT

## 2021-05-24 PROCEDURE — 74011250636 HC RX REV CODE- 250/636: Performed by: INTERNAL MEDICINE

## 2021-05-24 PROCEDURE — 74018 RADEX ABDOMEN 1 VIEW: CPT

## 2021-05-24 PROCEDURE — 36415 COLL VENOUS BLD VENIPUNCTURE: CPT

## 2021-05-24 PROCEDURE — 85027 COMPLETE CBC AUTOMATED: CPT

## 2021-05-24 PROCEDURE — 74011250636 HC RX REV CODE- 250/636: Performed by: PSYCHIATRY & NEUROLOGY

## 2021-05-24 PROCEDURE — 74011250637 HC RX REV CODE- 250/637: Performed by: FAMILY MEDICINE

## 2021-05-24 PROCEDURE — 65660000001 HC RM ICU INTERMED STEPDOWN

## 2021-05-24 PROCEDURE — 70450 CT HEAD/BRAIN W/O DYE: CPT

## 2021-05-24 PROCEDURE — 99223 1ST HOSP IP/OBS HIGH 75: CPT | Performed by: NURSE PRACTITIONER

## 2021-05-24 PROCEDURE — 74011000258 HC RX REV CODE- 258: Performed by: PSYCHIATRY & NEUROLOGY

## 2021-05-24 PROCEDURE — 84100 ASSAY OF PHOSPHORUS: CPT

## 2021-05-24 PROCEDURE — 80048 BASIC METABOLIC PNL TOTAL CA: CPT

## 2021-05-24 RX ORDER — DEXTROSE MONOHYDRATE 50 MG/ML
50 INJECTION, SOLUTION INTRAVENOUS CONTINUOUS
Status: DISCONTINUED | OUTPATIENT
Start: 2021-05-24 | End: 2021-05-29

## 2021-05-24 RX ADMIN — LEVETIRACETAM 1000 MG: 100 INJECTION, SOLUTION INTRAVENOUS at 11:47

## 2021-05-24 RX ADMIN — LEVETIRACETAM 1000 MG: 100 INJECTION, SOLUTION INTRAVENOUS at 22:31

## 2021-05-24 RX ADMIN — SODIUM CHLORIDE 100 MG: 9 INJECTION, SOLUTION INTRAVENOUS at 14:19

## 2021-05-24 RX ADMIN — Medication 10 ML: at 08:40

## 2021-05-24 RX ADMIN — Medication 10 ML: at 22:28

## 2021-05-24 RX ADMIN — SODIUM CHLORIDE 100 MG: 9 INJECTION, SOLUTION INTRAVENOUS at 02:15

## 2021-05-24 RX ADMIN — HYDRALAZINE HYDROCHLORIDE 20 MG: 20 INJECTION INTRAMUSCULAR; INTRAVENOUS at 13:42

## 2021-05-24 RX ADMIN — DEXTROSE MONOHYDRATE 50 ML/HR: 50 INJECTION, SOLUTION INTRAVENOUS at 08:40

## 2021-05-24 RX ADMIN — Medication 10 ML: at 14:20

## 2021-05-24 RX ADMIN — HYDRALAZINE HYDROCHLORIDE 100 MG: 50 TABLET, FILM COATED ORAL at 22:27

## 2021-05-24 RX ADMIN — HYDRALAZINE HYDROCHLORIDE 100 MG: 50 TABLET, FILM COATED ORAL at 17:46

## 2021-05-24 RX ADMIN — ASPIRIN 81 MG: 81 TABLET, CHEWABLE ORAL at 08:39

## 2021-05-24 RX ADMIN — Medication 10 ML: at 14:19

## 2021-05-24 NOTE — PROGRESS NOTES
Problem: Pressure Injury - Risk of  Goal: *Prevention of pressure injury  Description: Document Aquiles Scale and appropriate interventions in the flowsheet.   Outcome: Progressing Towards Goal  Note: Pressure Injury Interventions:  Sensory Interventions: Assess changes in LOC, Assess need for specialty bed, Avoid rigorous massage over bony prominences, Keep linens dry and wrinkle-free, Minimize linen layers    Moisture Interventions: Minimize layers, Limit adult briefs    Activity Interventions: Assess need for specialty bed, PT/OT evaluation    Mobility Interventions: PT/OT evaluation, HOB 30 degrees or less    Nutrition Interventions: Document food/fluid/supplement intake, Discuss nutritional consult with provider, Offer support with meals,snacks and hydration    Friction and Shear Interventions: Lift sheet, Minimize layers, Apply protective barrier, creams and emollients

## 2021-05-24 NOTE — CONSULTS
Palliative Medicine Consult  Neel: [phone] ([phone])    Patient Name: Tommie Lua  YOB: 1934    Date of Initial Consult: May 24, 2029  Reason for Consult: Care decisions  Requesting Provider: Paige Mcneal  Primary Care Physician: Pearl Burks MD     SUMMARY:   Tommie Lua is a 80 y.o. with a past history of stroke, IVH, DL D, CKD 3, HTN, EtOH, OA, CAD, COPD, GERD, viral encephalitis, morbid obesity, TESSA, ED who was [facility] on 5[facility]16/2021 [facility] [place] [address] [facility] with a diagnosis of AMS, severe sepsis, CAP. Patient briefly transferred to the ICU for status epilepticus, neuro checks. Issues with dysphagia related to poor mentation. Discharged from [facility] 2 weeks ago for IVH. MRI showed multiple ischemic CVAs per chart notes    Current medical issues leading to Palliative Medicine involvement include: Support with care decisions relating to artificial nutrition. DNR    Baseline prior to initial stroke about 2 weeks ago: pt was gardening and totally independent. He lives at home with his wife. 2 children that live in [place] ([facility]) [employer] fan. Retired from [employer] at 54 yrs old. [employer] nav[employer] without a Jehovah's witness affiliation. Pt had a stroke 2 weeks ago and went to [facility] for rehab. Daughter reports the pt was doing well with rehab until this recent event. PALLIATIVE DIAGNOSES:   1. Somnolence  2. Seizures  3. Feeding difficulties  4. Physical debility   5. Goals of care     PLAN:   1. Pt seen along with daughter, Alber Lua at the bedside. 2. Ene has a clear understanding of the patient's condition. We discussed \"where to from here\"  3. Ene hopeful that the pt will wake up  4. We discussed nutrition and she feels the pt would not want to be prolonged with a peg if prognosis was poor. 5. Hard to determine how much the  meds are contributing to level of sedation  6. We discussed hospice in detail and the difference with Palliative   7. Plan:  1.  Ene will discuss hospice with her mother  2. Ene discussed with attending waiting a few days to see if the patient's mentation improves before making decisions  3. Ene will let me know if she wants to meet with hospice beforehand for information  4. dispo will be home and family will provide 24 hour care. If the pt rallies then family may change to SNF as the dispo  8. Initial consult note routed to primary continuity provider and/or primary health care team members  9. Communicated plan of care with: Palliative [facility] Team     GOALS OF CARE / TREATMENT PREFERENCES:     GOALS OF CARE:  Patient/Health Care Proxy Stated Goals: Other (comment) (still deciding)    TREATMENT PREFERENCES:   Code Status: DNR    Advance Care Planning:  [x] The [facility] Interdisciplinary Team has updated the ACP Navigator with Health Care Decision Maker and Patient Capacity      Primary Decision Maker: Jonnathan Breen - Daughter - [phone]  Advance Care Planning 5/17/2021   Patient's Healthcare Decision Maker is: -   Primary Decision Maker Name -   Primary Decision Maker Phone Number -   Primary Decision Maker Relationship to Patient -   Confirm Advance Directive Yes, on file   Patient Would Like to Complete Advance Directive -   Does the patient have other document types Do Not Resuscitate       Medical Interventions: Limited additional interventions     Other Instructions:   Artificially Administered Nutrition: No feeding tube     Other:    As far as possible, the palliative care team has discussed with patient / health care proxy about goals of care / treatment preferences for patient.      HISTORY:     History obtained from: chart, dtr, team    CHIEF COMPLAINT: pt admitted with aforementioned history and issues    HPI/SUBJECTIVE:    The patient is:   [] Verbal and participatory  [x] Non-participatory due to:   Medical condition  Clinical Pain Assessment (nonverbal scale for severity on nonverbal patients):   Clinical Pain Assessment  Severity: 0     Activity (Movement): Lying quietly, normal position    Duration: for how long has pt been experiencing pain (e.g., 2 days, 1 month, years)  Frequency: how often pain is an issue (e.g., several times per day, once every few days, constant)     FUNCTIONAL ASSESSMENT:     Palliative Performance Scale (PPS):  PPS: 30       PSYCHOSOCIAL/SPIRITUAL SCREENING:     Palliative IDT has assessed this patient for cultural preferences / practices and a referral made as appropriate to needs (Cultural Services, Patient Advocacy, Ethics, etc.)    Any spiritual / Presybeterian concerns:  [] Yes /  [x] No    Caregiver Burnout:  [] Yes /  [x] No /  [] No Caregiver Present      Anticipatory grief assessment:   [x] Normal  / [] Maladaptive       ESAS Anxiety: Anxiety: 0    ESAS Depression:          REVIEW OF SYSTEMS:     Positive and pertinent negative findings in ROS are noted above in HPI. The following systems were [] reviewed / [x] unable to be reviewed as noted in HPI  Other findings are noted below. Systems: constitutional, ears/nose/mouth/throat, respiratory, gastrointestinal, genitourinary, musculoskeletal, integumentary, neurologic, psychiatric, endocrine. Positive findings noted below. Modified ESAS Completed by: provider   Fatigue: 10 Drowsiness: 5     Pain: 0   Anxiety: 0     Anorexia: 10 Dyspnea: 0           Stool Occurrence(s): 0        PHYSICAL EXAM:     From RN flowsheet:  Wt Readings from Last 3 Encounters:   05/24/21 210 lb 6.4 oz (95.4 kg)   05/18/21 210 lb 1.6 oz (95.3 kg)   05/02/21 222 lb 3.6 oz (100.8 kg)     Blood pressure (!) 141/88, pulse 72, temperature 98.7 °F (37.1 °C), resp. rate 25, height 5' 8\" (1.727 m), weight 210 lb 6.4 oz (95.4 kg), SpO2 96 %.     Pain Scale 1: Adult Nonverbal Pain Scale  Pain Intensity 1: 0                 Last bowel movement, if known:     Constitutional: somnolent, eyes opened briefly, NAD  Eyes: pupils equal, anicteric  ENMT: no nasal discharge, moist mucous membranes  Cardiovascular:   Respiratory: breathing not labored, symmetric  Gastrointestinal: gross   Musculoskeletal: no deformity  Skin: warm, dry  Neurologic: following no commands, stroke  Psychiatric: calm       HISTORY:     Active Problems:    CAP (community acquired pneumonia) (5/16/2021)      Past Medical History:   Diagnosis Date    Adverse effect of anesthesia     combative after anesthesia    Alcohol abuse     6 beers/day    Arthritis     CAD (coronary artery disease)     Chronic obstructive pulmonary disease (HCC)     Chronic obstructive pulmonary disease (HCC)     CKD (chronic kidney disease) stage 3, GFR 30-59 ml/min ([address] 9/21/2020    Dyslipidemia 8/16/2017    Dyspepsia and other specified disorders of function of stomach     Dyspnea 8/16/2017    ED (erectile dysfunction) 8/16/2017    Encounter for immunization 8/16/2017    Fatigue 8/16/2017    Flank pain 8/1/2019    GERD (gastroesophageal reflux disease) 8/16/2017    Gout 8/16/2017    Hypertension     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    Morbid obesity ([address]     On statin therapy 8/16/2017    TESSA on CPAP 1/3/2019    Psychiatric disorder     Depression    Simple chronic bronchitis ([address] 1/3/2019    TIA (transient ischemic attack) 8/51/5995    Umbilical hernia 98/7/8131    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      Past Surgical History:   Procedure Laterality Date    COLONOSCOPY N/A 9/27/2019    COLONOSCOPY performed by [facility] at [facility] ENDOSCOPY    HX HERNIA REPAIR      RIH    HX HERNIA REPAIR  57/00/52    open umbilical hernia repair mesh    HX ORTHOPAEDIC      HX UROLOGICAL      HYDROCELECTOMY    SD CARDIAC SURG PROCEDURE UNLIST  1996    Cardiac Stents    SD CARDIAC SURG PROCEDURE UNLIST  04/2019    EF 61-65%    UPPER GI ENDOSCOPY,BIOPSY  9/30/2019           Family History   Problem Relation Age of Onset    Heart Disease Mother     Hypertension Mother     Hypertension Father     Hypertension Sister  Hypertension Brother     Cancer Brother       History reviewed, no pertinent family history.   Social History     Tobacco Use    Smoking status: Former Smoker     Packs/day: 0.50     Years: 20.00     Pack years: 10.00    Smokeless tobacco: Former User    Tobacco comment: quit about 40  years ago   / used to dip tobaco and dip snuff   Substance Use Topics    Alcohol use: Yes     Comment: \"Drinks very little now for about a month \"     Allergies   Allergen Reactions    Levaquin [Levofloxacin] Nausea and Vomiting     Reports anxiety, nausea, aching after taking levaquin    Ciprofloxacin Shortness of Breath    Quinolones Shortness of Breath      Current Facility-Administered Medications   Medication Dose Route Frequency    dextrose 5% infusion  50 mL/hr IntraVENous CONTINUOUS    hydrALAZINE (APRESOLINE) tablet 100 mg  100 mg Oral TID    amLODIPine (NORVASC) tablet 5 mg  5 mg Oral DAILY    labetaloL (NORMODYNE;TRANDATE) injection 20 mg  20 mg IntraVENous Q6H PRN    levETIRAcetam (KEPPRA) 1,000 mg in 0.9% sodium chloride 100 mL IVPB  1,000 mg IntraVENous Q12H    hydrALAZINE (APRESOLINE) 20 mg/mL injection 20 mg  20 mg IntraVENous Q6H PRN    lacosamide (VIMPAT) 100 mg in 0.9% sodium chloride 100 mL IVPB  100 mg IntraVENous Q12H    sodium chloride (NS) flush 5-40 mL  5-40 mL IntraVENous Q8H    sodium chloride (NS) flush 5-40 mL  5-40 mL IntraVENous PRN    polyethylene glycol (MIRALAX) packet 17 g  17 g Oral DAILY PRN    albuterol (PROVENTIL VENTOLIN) nebulizer solution 2.5 mg  2.5 mg Nebulization Q4H PRN    aspirin chewable tablet 81 mg  81 mg Oral DAILY    atorvastatin (LIPITOR) tablet 80 mg  80 mg Oral DAILY    isosorbide mononitrate ER (IMDUR) tablet 30 mg  30 mg Oral DAILY    sodium chloride (NS) flush 5-40 mL  5-40 mL IntraVENous Q8H    sodium chloride (NS) flush 5-40 mL  5-40 mL IntraVENous PRN    acetaminophen (TYLENOL) tablet 650 mg  650 mg Oral Q6H PRN    Or    acetaminophen (TYLENOL) suppository 650 mg  650 mg Rectal Q6H PRN    polyethylene glycol (MIRALAX) packet 17 g  17 g Oral DAILY PRN    promethazine (PHENERGAN) tablet 12.5 mg  12.5 mg Oral Q6H PRN    Or    ondansetron (ZOFRAN) injection 4 mg  4 mg IntraVENous Q6H PRN          LAB AND IMAGING FINDINGS:     Lab Results   Component Value Date/Time    WBC 6.8 05/24/2021 12:59 AM    HGB 12.8 05/24/2021 12:59 AM    PLATELET 438 51/03/1870 12:59 AM     Lab Results   Component Value Date/Time    Sodium 149 (H) 05/24/2021 12:59 AM    Potassium 3.8 05/24/2021 12:59 AM    Chloride 119 (H) 05/24/2021 12:59 AM    CO2 23 05/24/2021 12:59 AM    BUN 24 (H) 05/24/2021 12:59 AM    Creatinine 1.15 05/24/2021 12:59 AM    Calcium 9.0 05/24/2021 12:59 AM    Magnesium 2.3 05/24/2021 12:59 AM    Phosphorus 2.4 (L) 05/24/2021 12:59 AM      Lab Results   Component Value Date/Time    Alk. phosphatase 117 05/16/2021 09:16 PM    Protein, total 7.7 05/16/2021 09:16 PM    Albumin 3.9 05/16/2021 09:16 PM    Globulin 3.8 05/16/2021 09:16 PM     Lab Results   Component Value Date/Time    INR 1.3 (H) 04/19/2021 12:59 AM    Prothrombin time 13.5 (H) 04/19/2021 12:59 AM    aPTT 37.6 (H) 05/22/2021 03:11 AM      No results found for: IRON, FE, TIBC, IBCT, PSAT, FERR   Lab Results   Component Value Date/Time    pH 7.37 05/22/2021 03:59 AM    PCO2 38 05/22/2021 03:59 AM    PO2 107 (H) 05/22/2021 03:59 AM     No components found for: [place]   Lab Results   Component Value Date/Time    .00 09/23/2020 08:33 AM    CK - MB 2.7 04/24/2015 05:11 AM                Total time: 70 minutes   counseling / coordination time, spent as noted above: 60 minutes  > 50% counseling / coordination?:  Yes    Prolonged service was provided for  []30 min   []75 min in face to face time in the presence of the patient, spent as noted above. Time Start:   Time End:   Note: this can only be billed with 56812 (initial) or 22220 (follow up). If multiple start / stop times, list each separately.

## 2021-05-24 NOTE — PROGRESS NOTES
Physical Therapy: Abort    Chart reviewed, RN cleared, attempted to work with pt for PT services. Pt did not respond to max multimodal cuing or increase in vestibular input elevated HOB. He did not follow any commands and is not able to actively participate with therapy at this time. Session aborted.       Mitzy Chambers, PT, DPT

## 2021-05-24 NOTE — CONSULTS
Neurology Progress Note    Patient ID:  Michele Sheffield  [id number]  89 y.o.  1934    Chief Complaint: Seizures    Subjective:     Mr. Kavon Giron is an 77-year-old gentleman who was admitted here on May 16 for mental status changes and fever. His hospital course has been remarkable for status epilepticus since improved and now downgraded to the neuroscience floor from the ICU. Additionally, he has suffered small bilateral infarcts throughout with subacute hematoma and IVH associated. He is currently on a low-dose aspirin but no anticoagulation. He is on Keppra and lacosamide for seizure control. Since being transferred to the ICU he has become more somnolent. I spoke with his daughter. Yesterday he would have his eyes open spontaneously and not today now. He is a DNR.     Objective:       ROS:  Cannot obtain secondary to patient medical condition    Meds:  Current Facility-Administered Medications   Medication Dose Route Frequency    dextrose 5% infusion  50 mL/hr IntraVENous CONTINUOUS    hydrALAZINE (APRESOLINE) tablet 100 mg  100 mg Oral TID    amLODIPine (NORVASC) tablet 5 mg  5 mg Oral DAILY    labetaloL (NORMODYNE;TRANDATE) injection 20 mg  20 mg IntraVENous Q6H PRN    levETIRAcetam (KEPPRA) 1,000 mg in 0.9% sodium chloride 100 mL IVPB  1,000 mg IntraVENous Q12H    hydrALAZINE (APRESOLINE) 20 mg/mL injection 20 mg  20 mg IntraVENous Q6H PRN    lacosamide (VIMPAT) 100 mg in 0.9% sodium chloride 100 mL IVPB  100 mg IntraVENous Q12H    sodium chloride (NS) flush 5-40 mL  5-40 mL IntraVENous Q8H    sodium chloride (NS) flush 5-40 mL  5-40 mL IntraVENous PRN    polyethylene glycol (MIRALAX) packet 17 g  17 g Oral DAILY PRN    albuterol (PROVENTIL VENTOLIN) nebulizer solution 2.5 mg  2.5 mg Nebulization Q4H PRN    aspirin chewable tablet 81 mg  81 mg Oral DAILY    atorvastatin (LIPITOR) tablet 80 mg  80 mg Oral DAILY    isosorbide mononitrate ER (IMDUR) tablet 30 mg  30 mg Oral DAILY    sodium chloride (NS) flush 5-40 mL  5-40 mL IntraVENous Q8H    sodium chloride (NS) flush 5-40 mL  5-40 mL IntraVENous PRN    acetaminophen (TYLENOL) tablet 650 mg  650 mg Oral Q6H PRN    Or    acetaminophen (TYLENOL) suppository 650 mg  650 mg Rectal Q6H PRN    polyethylene glycol (MIRALAX) packet 17 g  17 g Oral DAILY PRN    promethazine (PHENERGAN) tablet 12.5 mg  12.5 mg Oral Q6H PRN    Or    ondansetron (ZOFRAN) injection 4 mg  4 mg IntraVENous Q6H PRN       MRI Results (maximum last 3): Results from [place] encounter on 05/16/21    MRI BRAIN WO CONT    Narrative  *PRELIMINARY REPORT*    Multiple foci of small cortical and white matter infarcts mostly on the right  with a couple of lesions on the left as well. Interval evolution of  intraventricular hemorrhage and small parenchymal hemorrhages. Preliminary report was provided by Dr. Sallie Rowan, the on-call radiologist, at  22:17. The findings were called to floor nurse, Rodrigo Barnett, on 5/18/2021 at 22:20 by myself. [phone]    Final report to follow. *END PRELIMINARY REPORT*    EXAM:  MRI BRAIN WO CONT    INDICATION:    AMS    COMPARISON:  CT head 5/16/2021, MRI brain 11/23/2019. CONTRAST: None. TECHNIQUE:  Multiplanar multisequence acquisition without contrast of the brain. FINDINGS:  Few small scattered acute infarcts in the bilateral frontal lobes and  parieto-occipital lobes, right greater than left. Evolved now late subacute small intraparenchymal hematomas in the right parietal  periventricular white matter measuring 10 mm and 5 mm (series 6 image 11), with  no significant surrounding edema or mass effect. There is hemosiderin staining  noted along the ependymal lining of the lateral ventricles, as well as scattered  superficial siderosis involving the sylvian fissures, and along the surface of  the brainstem and cerebellum. Unchanged generalized parenchymal volume loss with commensurate dilation of the  sulci and ventricular system. Unchanged confluent periventricular and deep white  matter T2/FLAIR hyperintensity, and patchy T2/FLAIR hyperintensity in the nely,  consistent with severe chronic microvascular ischemic disease. There is no  cerebellar tonsillar herniation. Expected arterial flow-voids are present. The paranasal sinuses, mastoid air cells, and middle ears are clear. The orbital  contents are within normal limits with bilateral lens implants. No significant  osseous or scalp lesions are identified. Impression  1. Few small scattered acute infarcts in the bilateral frontal lobes and  parieto-occipital lobes, right greater than left. 2. 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 3. Unchanged generalized parenchymal volume loss and severe chronic  microvascular ischemic disease. Results from [place] encounter on 11/18/19    MRI BRAIN W WO CONT    Narrative  EXAM:  MRI BRAIN W WO CONT    INDICATION:    ?encephalitis altered mental status. COMPARISON:  MRI brain 11/19/2019. CONTRAST: 20 ml Dotarem. TECHNIQUE:  Multiplanar multisequence acquisition without and with contrast of the brain. FINDINGS:  Evaluation is limited by motion. Unchanged generalized parenchymal volume loss with commensurate dilation of the  sulci and ventricular system. Unchanged periventricular deep white matter  T2/FLAIR hyperintensities, confluent in regions, consistent with moderate  chronic microvascular ischemic disease. Small chronic microhemorrhages are noted  in the left frontal lobe and right temporal lobe (series 7 image 40 and 41). There is no acute infarct, hemorrhage, extra-axial fluid collection, or mass  effect. There is no cerebellar tonsillar herniation.  Expected arterial  flow-voids are present. No evidence of abnormal enhancement. Mild mucosal thickening in the left maxillary sinus and sphenoid sinuses. The  mastoid air cells and middle ears are clear. The orbital contents are within  normal limits with bilateral lens implants. No significant osseous or scalp  lesions are identified. Reversal of the cervical lordosis with grade 1  anterolisthesis of C2 on C3 and multilevel degenerative disc disease and facet  arthropathy in the upper cervical spine. Impression  IMPRESSION:  1. Evaluation is limited by motion. No acute intracranial abnormality. 2. Unchanged generalized parenchymal volume loss and moderate chronic  microvascular ischemic disease. MRI BRAIN WO CONT    Narrative  EXAM: MRI BRAIN WO CONT    INDICATION: AMS    COMPARISON: None. CONTRAST: None. TECHNIQUE:  Single sequence of diffusion imaging was performed. The patient could not  tolerate further imaging. FINDINGS:  Diffusion imaging demonstrates no evidence of acute ischemia. Impression  IMPRESSION:  Limited study. No evidence of acute ischemia.       Lab Review   Recent Results (from the past 24 hour(s))   LACTIC ACID    Collection Time: 05/24/21 12:59 AM   Result Value Ref Range    Lactic acid 0.9 0.4 - 2.0 MMOL/L   METABOLIC PANEL, BASIC    Collection Time: 05/24/21 12:59 AM   Result Value Ref Range    Sodium 149 (H) 136 - 145 mmol/L    Potassium 3.8 3.5 - 5.1 mmol/L    Chloride 119 (H) 97 - 108 mmol/L    CO2 23 21 - 32 mmol/L    Anion gap 7 5 - 15 mmol/L    Glucose 115 (H) 65 - 100 mg/dL    BUN 24 (H) 6 - 20 MG/DL    Creatinine 1.15 0.70 - 1.30 MG/DL    BUN/Creatinine ratio 21 (H) 12 - 20      GFR est AA >60 >60 ml/min/1.73m2    GFR est non-AA >60 >60 ml/min/1.73m2    Calcium 9.0 8.5 - 10.1 MG/DL   MAGNESIUM    Collection Time: 05/24/21 12:59 AM   Result Value Ref Range    Magnesium 2.3 1.6 - 2.4 mg/dL   PHOSPHORUS    Collection Time: 05/24/21 12:59 AM   Result Value Ref Range Phosphorus 2.4 (L) 2.6 - 4.7 MG/DL   CBC W/O DIFF    Collection Time: 05/24/21 12:59 AM   Result Value Ref Range    WBC 6.8 4.1 - 11.1 K/uL    RBC 4.14 4.10 - 5.70 M/uL    HGB 12.8 12.1 - 17.0 g/dL    HCT 40.4 36.6 - 50.3 %    MCV 97.6 80.0 - 99.0 FL    MCH 30.9 26.0 - 34.0 PG    MCHC 31.7 30.0 - 36.5 g/dL    RDW 16.2 (H) 11.5 - 14.5 %    PLATELET 939 563 - 524 K/uL    MPV 10.2 8.9 - 12.9 FL    NRBC 0.0 0  WBC    ABSOLUTE NRBC 0.00 0.00 - 0.01 K/uL   GLUCOSE, POC    Collection Time: 05/24/21 11:37 AM   Result Value Ref Range    Glucose (POC) 115 65 - 117 mg/dL    Performed by Robby Nur        Additional comments:I reviewed the patient's new clinical lab test results. and I reviewed the patients new imaging test results. Patient Vitals for the past 8 hrs:   BP Temp Pulse Resp SpO2 Height   05/24/21 1342 (!) 163/77  72      05/24/21 1326  Darby Gone   5' 8\" (1.727 m)   05/24/21 1013 (!) 141/88 98.7 °F (37.1 °C) 72 25 96 %    05/24/21 0839 (!) 149/78  76      05/24/21 0600 (!) 141/70 98.5 °F (36.9 °C) 72 22 97 %        05/24 0701 - 05/24 1900  In: 100   Out: -   05/22 1901 - 05/24 0700  In: 1660 [I.V.:1560]  Out: 1125 [Urine:1125]    Exam:  Visit Vitals  BP (!) 163/77   Pulse 72   Temp 98.7 °F (37.1 °C)   Resp 25   Ht 5' 8\" (1.727 m)   Wt 210 lb 6.4 oz (95.4 kg)   SpO2 96%   BMI 31.99 kg/m²     Gen: Well developed  CV: RRR  Lungs: non labored breathing  Abd: soft, non distended  Neuro: Eyes closed. No response to pain. Does not follow commands.   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    Hypertension I10    ED (erectile dysfunction) N52.9    Severe obesity ([facility]) E66.01    TESSA on CPAP G47.33, Z99.89    Simple chronic bronchitis ([facility]) J41.0    Bilateral carotid artery stenosis I65.23    Spinal stenosis of lumbar region at multiple levels M48.061    Flank pain R10.9    Rectal bleeding K62.5    Pneumonia J18.9    Severe sepsis ([facility]) A41.9, R65.20    Acute blood loss anemia D62    Facial droop R29.810    Altered mental state R41.82    Viral encephalitis A86    Coronary artery disease with stable angina pectoris ([facility]) I25.118    Chronic obstructive pulmonary disease ([facility]) J44.9    CKD (chronic kidney disease) stage 3, GFR 30-59 ml/min ([facility]) N18.30    Intracranial hemorrhage ([facility]) I62.9    CAP (community acquired pneumonia) J18.9       Assessment/Plan:      59-year-old gentleman who is recovering 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 Most recent EEG yesterday without seizure activity. Unfortunately he has become more somnolent. Concern could be medication effect, still postictal, new infarcts given he is not protected, or new status. We will check a stat EEG right now to rule out status. Otherwise he may need more time. I spoke with the daughter at the bedside. If he is not a status, I will continue the current anticonvulsants and allow time for any slow improvement if any. Daughter made it clear that his wishes are not to have a PEG tube. I think is reasonable to give him another 24-48 hours before making any big decisions. Following      This clinical note was dictated with an electronic dictation software that can make unintentional errors. If there are any questions, please contact me directly for clarification.       Signed:  [id number] [facility], DO  5/24/2021  1:58 PM

## 2021-05-24 NOTE — CONSULTS
Comprehensive Nutrition Assessment    Type and Reason for Visit: Initial, Consult    Nutrition Recommendations/Plan:    1. Modify TFs to Osmolite 1.2.  -Start Osmolite 1.2 @ 45 ml/hr, free water flushes 100 ml Q 4 hrs  -Advance by 10 mls Q 8 hrs  GOAL - Osmolite 1.2 @ 75 ml/hr, free water flushes 100 ml Q 4 hrs    2. Monitor GI function/status as TF's advanced to goal.     3. Monitor electrolytes as TF's advanced to goal, Phos 2.4 on this morning's labs. 4. Consider wean and d/c of IVFs once TFs have reached goal.     Nutrition Assessment:    PMH CVA, intraventricular brain hemorrhage, dyslipidemia, CKD 3, HTN, admitted from subacute rehab for concerns of AMS/decreased mental status. 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 Pt has had DHT placed 5/23 for nutrition/medication admin. Nutrition consult received for TF orders and management - Started on Osmolite 1.5 with goal of 50 ml/hr- per RN will resume today at 40ml/hr as DHT required replacement. Pt known to RD from prior admission, at which time pt had copious amount of loose stools, he was transitioned to nocturnal feedings of Vital 1.5 and tolerated well. Given tube feeding market-wide formulary changes, will change to Osmolite 1.2 with additional adjustments as needed pending pt tolerance of TF.     TF's at above recommended goal of Osmolite 1.2 @ 75 ml/hr + 100 ml Q 4 hr water flush to provide 1980 kcal, 92 g protein, with 1950 ml free water, and 2250 ml total volume. Malnutrition Assessment:  Malnutrition Status: At risk for malnutrition  Context:  Acute illness         Nutritionally Significant Medications: Current meds reviewed on IVFs of D5% @ 50 ml/hr      Estimated Daily Nutrient Needs:  Energy (kcal): 1930 (MSJ x 1.2); Weight Used for Energy Requirements: Current  Protein (g): 95 (1 g/kg);  Weight Used for Protein Requirements: Current  Fluid (ml/day): 1930; Method Used for Fluid Requirements: 1 ml/kcal    Nutrition Related Findings:       BM: Abd soft, obese  Edema: 2+ bilateral non-pitting UE edema  Wounds:  None     Recent Labs     05/24/21  0059 05/22/21 0311 05/21/21 2239 05/21/21  1842   * 93  --  94   BUN 24* 23*  --  22*   CREA 1.15 1.12  --  1.12   * 147*  --  147*   K 3.8 4.0  --  3.8   * 117*  --  114*   CO2 23 24  --  24   CA 9.0 8.8  --  9.0   PHOS 2.4* 3.4 3.3 3.3   MG 2.3 2.2 2.1 2.2     Recent Labs     05/24/21 0059 05/22/21 0311 05/21/21  1851   LAC 0.9 0.9 0.9     Recent Labs     05/24/21 0059 05/23/21  0246   WBC 6.8 7.2   HGB 12.8 12.2   HCT 40.4 38.4    159     Recent Labs     05/24/21  1137 05/22/21  1425   GLUCPOC 115 107       Lab Results   Component Value Date/Time    Hemoglobin A1c 5.9 (H) 04/19/2021 12:59 AM    Hemoglobin A1c 5.3 10/28/2019 02:17 AM    Hemoglobin A1c 6.0 05/09/2019 07:16 AM       Current Nutrition Therapies:   Diet: NPO  Supplements: None  Additional Caloric Sources: IVFs (D5% @ 50 ml/hr)    Meal intake:   Patient Vitals for the past 168 hrs:   % Diet Eaten   05/22/21 1200 0%   05/22/21 0800 0%   05/19/21 0908 0%         Anthropometric Measures:  · Height:  5' 8\" (172.7 cm)  · Current Body Wt:  95.3 kg (210 lb)   · Ideal Body Wt:  154:  136.4 %    · BMI Categories:  Obese class 1 (BMI 30.0-34. 9)     Wt Readings from Last 10 Encounters:   05/24/21 95.4 kg (210 lb 6.4 oz)   05/18/21 95.3 kg (210 lb 1.6 oz)   05/02/21 100.8 kg (222 lb 3.6 oz)   12/04/20 103.3 kg (227 lb 12.8 oz)   09/21/20 102.5 kg (226 lb)   06/26/20 100.6 kg (221 lb 12.8 oz)   02/28/20 99 kg (218 lb 3.2 oz)   12/27/19 96.6 kg (213 lb)   12/12/19 98.2 kg (216 lb 6.4 oz)   12/02/19 99.9 kg (220 lb 3.2 oz)       Nutrition Diagnosis:   · Inadequate oral intake related to cognitive or neurological impairment as evidenced by NPO or clear liquid status due to medical condition, swallowing study results      Nutrition Interventions:   Food and/or Nutrient Delivery: Start tube feeding  Nutrition Education and Counseling: No recommendations at this time  Coordination of Nutrition Care: Continue to monitor while inpatient, Interdisciplinary rounds    Goals:  TF advancement to goal within next 24-48 hrs. Nutrition Monitoring and Evaluation:   Behavioral-Environmental Outcomes: None identified  Food/Nutrient Intake Outcomes: Enteral nutrition intake/tolerance  Physical Signs/Symptoms Outcomes: GI status, Chewing or swallowing    Discharge Planning:     Too soon to determine     Josephine Payne RD     Contact via [address] [place] [address]

## 2021-05-24 NOTE — PROCEDURES
[address]  EEG    Name:  Dwaine Schwab  MR#:  [id number]  :  1934  ACCOUNT #:  [de-identified]  DATE OF SERVICE:  2021    REQUESTING PHYSICIAN:  Jeanette Hahn DO    HISTORY:  The patient is an 14-year-old male who is being evaluated for mental status changes and previous EEG showing nonconvulsive status epilepticus. DESCRIPTION:  This is an 18-channel EEG performed on a poorly responsive patient. There is no clear dominant background rhythm. The background activity consists of medium-voltage rhythms ranging from 6-7 Hz frequency range. Intermittently, frontally dominant generalized bursts of slowing in the delta range were seen. Poorly formed sleep architecture in the form of vertex waves was also seen. Photic stimulation did not elicit driving response. Hyperventilation was not performed. EEG SUMMARY:  Abnormal EEG due to mild slowing of the background rhythms. CLINICAL INTERPRETATION:  This EEG is suggestive of a mild generalized encephalopathic process, nonspecific in type. No lateralizing or epileptiform features were noted. No seizure was recorded.       MD JESSIE [email or web address]  D:  2021 16:13  T:  2021 19:08  JOB #:  [id number]

## 2021-05-24 NOTE — PROGRESS NOTES
SLP Contact Note    Noted pt mentation does not appear to have improved. Therefore, SLP will continue to hold for now.       Thank you,  JENNIFER De La Fuente.Ed, [id number] [facility]  Speech-Language Pathologist

## 2021-05-24 NOTE — PROGRESS NOTES
Hospitalist Progress Note      Hospital summary: Pam High a 80 y. o. male with pmh of CVA, intraventricular brain hemorrhage, dyslipidemia, CKD 3, hypertension who presents to hospital from subacute rehab for concerns of altered/decreased mental status. Patient was found to have multiple ischemic CVAs on MRI, continue to have altered mental status with some eye deviation and twitching on 5/20. This was concerning for seizure, given 2 mg of Ativan and loaded with Keppra with some improvement. On 5/21 patient however declined, unresponsive to verbal stimuli. Stat EEG 24-hour was ordered and showed subclinical status. He was loaded with Vimpat, and neurology recommends transfer to ICU for closer neuro checks on 5/21.  Seizures were controlled and  Pt transferred out of ICU on 5/23.     5/16/2021      Assessment/Plan:  Status epilepticus   -Status post Keppra and Vimpat load 5/20  -Continue Keppra and Vimpat   - Neurology following  - 24hr EEG: mild diffuse cerebral dysfunction, of which the sporadic generalized frontally predominant triphasic discharges are indicative of metabolic/toxic encephalopathy.  -  Seizure precautions      Acute embolic infarcts  Subacute hematoma -intraventricular hemorrhage with ventriculomegaly on recent admission, discharged 5/3  -Suspicious for A. fib, however when reviewed by cardiology no definitive episodes seen on telemetry  -Not a candidate for anticoagulation due to intracranial hemorrhage, and fall risk  -Neurology following  -No escalation of antiplatelets due to ICH, continue aspirin  -Continue atorvastatin  -PT/OT/speech  -Echo with normal EF   - rpt CT head 5/24: no change     Acute Metabolic encephalopathy - worsening   - multifactorial, acute CVAs, pneumonia, electrolyte imbalances, seizures  -Day and night schedules, avoid benzodiazepines if able  -Frequent redirection  -Holding Seroquel for now      Community-acquired pneumonia   -x-ray with left basilar airspace disease  - completed doxy   -Remains on room air, monitor     Hypertension BP elevated. SBP goal <160. C/w hydralazine, amlodipine. IV labetalol and hydralazine prn. Hyperlipidemiacontinue Lipitor  Hx TESSA     Nutrition  - Dobhoff placed on 5/23  - c/w TFs  - family does not want PEG tube     Appreciate Palliative care input     Code status: DNR. DVT prophylaxis: SCDs  Disposition: TBD  ----------------------------------------------    CC: Acute CVA. Seizures     S: Patient is seen and examined at bedside this AM. he is very lethargic, responding to pain only. Stat CT head and EEG were done which were negative. Daughter present - long discussion - no PEG tube, revaluate in 2-4 days to see if he improves. Introduced hospice, daughter is aware. Discussed with nursing. Spoke with daughter again and updated CT head and EEG report. Review of Systems:  Review of systems not obtained due to patient factors.     O:  Visit Vitals  BP (!) 163/77   Pulse 76   Temp 97.5 °F (36.4 °C)   Resp 17   Ht 5' 8\" (1.727 m)   Wt 95.4 kg (210 lb 6.4 oz)   SpO2 98%   BMI 31.99 kg/m²       PHYSICAL EXAM:  Gen: lethargic   HEENT: anicteric sclerae, normal conjunctiva, NG tube   Neck: supple, trachea midline, no adenopathy  Heart: RRR, no MRG, no JVD, no peripheral edema  Lungs: decreased at bases  Abd: soft, NT, ND, BS+, no organomegaly  Extr: warm  Skin: dry, no rash  Neuro: lethargic, not following commands       Intake/Output Summary (Last 24 hours) at 5/24/2021 1719  Last data filed at 5/24/2021 0800  Gross per 24 hour   Intake 400 ml   Output    Net 400 ml        Recent labs & imaging reviewed:  Recent Results (from the past 24 hour(s))   LACTIC ACID    Collection Time: 05/24/21 12:59 AM   Result Value Ref Range    Lactic acid 0.9 0.4 - 2.0 MMOL/L   METABOLIC PANEL, BASIC    Collection Time: 05/24/21 12:59 AM   Result Value Ref Range    Sodium 149 (H) 136 - 145 mmol/L Potassium 3.8 3.5 - 5.1 mmol/L    Chloride 119 (H) 97 - 108 mmol/L    CO2 23 21 - 32 mmol/L    Anion gap 7 5 - 15 mmol/L    Glucose 115 (H) 65 - 100 mg/dL    BUN 24 (H) 6 - 20 MG/DL    Creatinine 1.15 0.70 - 1.30 MG/DL    BUN/Creatinine ratio 21 (H) 12 - 20      GFR est AA >60 >60 ml/min/1.73m2    GFR est non-AA >60 >60 ml/min/1.73m2    Calcium 9.0 8.5 - 10.1 MG/DL   MAGNESIUM    Collection Time: 05/24/21 12:59 AM   Result Value Ref Range    Magnesium 2.3 1.6 - 2.4 mg/dL   PHOSPHORUS    Collection Time: 05/24/21 12:59 AM   Result Value Ref Range    Phosphorus 2.4 (L) 2.6 - 4.7 MG/DL   CBC W/O DIFF    Collection Time: 05/24/21 12:59 AM   Result Value Ref Range    WBC 6.8 4.1 - 11.1 K/uL    RBC 4.14 4.10 - 5.70 M/uL    HGB 12.8 12.1 - 17.0 g/dL    HCT 40.4 36.6 - 50.3 %    MCV 97.6 80.0 - 99.0 FL    MCH 30.9 26.0 - 34.0 PG    MCHC 31.7 30.0 - 36.5 g/dL    RDW 16.2 (H) 11.5 - 14.5 %    PLATELET 208 798 - 431 K/uL    MPV 10.2 8.9 - 12.9 FL    NRBC 0.0 0  WBC    ABSOLUTE NRBC 0.00 0.00 - 0.01 K/uL   GLUCOSE, POC    Collection Time: 05/24/21 11:37 AM   Result Value Ref Range    Glucose (POC) 115 65 - 117 mg/dL    Performed by Clemencia Higuera      Recent Labs     05/24/21  0059 05/23/21  0246   WBC 6.8 7.2   HGB 12.8 12.2   HCT 40.4 38.4    159     Recent Labs     05/24/21  0059 05/22/21  0311 05/21/21  2239 05/21/21  1842   * 147*  --  147*   K 3.8 4.0  --  3.8   * 117*  --  114*   CO2 23 24  --  24   BUN 24* 23*  --  22*   CREA 1.15 1.12  --  1.12   * 93  --  94   CA 9.0 8.8  --  9.0   MG 2.3 2.2 2.1 2.2   PHOS 2.4* 3.4 3.3 3.3     No results for input(s): ALT, AP, TBIL, TBILI, TP, ALB, GLOB, GGT, AML, LPSE in the last 72 hours. No lab exists for component: SGOT, GPT, AMYP, HLPSE  Recent Labs     05/22/21  0311   APTT 37.6*      No results for input(s): FE, TIBC, PSAT, FERR in the last 72 hours.    No results found for: FOL, RBCF   Recent Labs     05/22/21  0359   PH 7.37   PCO2 38   PO2 107*     Recent Labs     05/21/21  2239 05/21/21  1842   TROIQ <0.05 <0.05     Lab Results   Component Value Date/Time    Cholesterol, total 144 04/19/2021 12:59 AM    HDL Cholesterol 33 04/19/2021 12:59 AM    LDL, calculated 74.8 04/19/2021 12:59 AM    Triglyceride 181 (H) 04/19/2021 12:59 AM    CHOL/HDL Ratio 4.4 04/19/2021 12:59 AM     Lab Results   Component Value Date/Time    Glucose (POC) 115 05/24/2021 11:37 AM    Glucose (POC) 107 05/22/2021 02:25 PM    Glucose (POC) 92 05/21/2021 07:36 AM    Glucose (POC) 110 (H) 05/03/2021 11:41 AM    Glucose (POC) 155 (H) 04/30/2021 04:50 PM     Lab Results   Component Value Date/Time    Color YELLOW/STRAW 05/16/2021 09:33 PM    Appearance CLEAR 05/16/2021 09:33 PM    Specific gravity 1.014 05/16/2021 09:33 PM    Specific gravity 1.015 09/21/2020 09:13 AM    pH (UA) 5.0 05/16/2021 09:33 PM    Protein Negative 05/16/2021 09:33 PM    Glucose Negative 05/16/2021 09:33 PM    Ketone Negative 05/16/2021 09:33 PM    Bilirubin Negative 05/16/2021 09:33 PM    Urobilinogen 0.2 05/16/2021 09:33 PM    Nitrites Negative 05/16/2021 09:33 PM    Leukocyte Esterase Negative 05/16/2021 09:33 PM    Epithelial cells FEW 05/16/2021 09:33 PM    Bacteria Negative 05/16/2021 09:33 PM    WBC 0-4 05/16/2021 09:33 PM    RBC 0-5 05/16/2021 09:33 PM       Med list reviewed  Current Facility-Administered Medications   Medication Dose Route Frequency    dextrose 5% infusion  50 mL/hr IntraVENous CONTINUOUS    hydrALAZINE (APRESOLINE) tablet 100 mg  100 mg Oral TID    amLODIPine (NORVASC) tablet 5 mg  5 mg Oral DAILY    labetaloL (NORMODYNE;TRANDATE) injection 20 mg  20 mg IntraVENous Q6H PRN    levETIRAcetam (KEPPRA) 1,000 mg in 0.9% sodium chloride 100 mL IVPB  1,000 mg IntraVENous Q12H    hydrALAZINE (APRESOLINE) 20 mg/mL injection 20 mg  20 mg IntraVENous Q6H PRN    lacosamide (VIMPAT) 100 mg in 0.9% sodium chloride 100 mL IVPB  100 mg IntraVENous Q12H    sodium chloride (NS) flush 5-40 mL 5-40 mL IntraVENous Q8H    sodium chloride (NS) flush 5-40 mL  5-40 mL IntraVENous PRN    polyethylene glycol (MIRALAX) packet 17 g  17 g Oral DAILY PRN    albuterol (PROVENTIL VENTOLIN) nebulizer solution 2.5 mg  2.5 mg Nebulization Q4H PRN    aspirin chewable tablet 81 mg  81 mg Oral DAILY    atorvastatin (LIPITOR) tablet 80 mg  80 mg Oral DAILY    isosorbide mononitrate ER (IMDUR) tablet 30 mg  30 mg Oral DAILY    sodium chloride (NS) flush 5-40 mL  5-40 mL IntraVENous Q8H    sodium chloride (NS) flush 5-40 mL  5-40 mL IntraVENous PRN    acetaminophen (TYLENOL) tablet 650 mg  650 mg Oral Q6H PRN    Or    acetaminophen (TYLENOL) suppository 650 mg  650 mg Rectal Q6H PRN    polyethylene glycol (MIRALAX) packet 17 g  17 g Oral DAILY PRN    promethazine (PHENERGAN) tablet 12.5 mg  12.5 mg Oral Q6H PRN    Or    ondansetron (ZOFRAN) injection 4 mg  4 mg IntraVENous Q6H PRN       Care Plan discussed with:  Patient/Family and Nurse    Karla Caldwell MD  Internal Medicine  Date of Service: 5/24/2021

## 2021-05-25 ENCOUNTER — APPOINTMENT (OUTPATIENT)
Dept: GENERAL RADIOLOGY | Age: 86
DRG: 193 | End: 2021-05-25
Attending: INTERNAL MEDICINE
Payer: MEDICARE

## 2021-05-25 LAB
ANION GAP SERPL CALC-SCNC: 5 MMOL/L (ref 5–15)
BUN SERPL-MCNC: 21 MG/DL (ref 6–20)
BUN/CREAT SERPL: 19 (ref 12–20)
CALCIUM SERPL-MCNC: 8.8 MG/DL (ref 8.5–10.1)
CHLORIDE SERPL-SCNC: 116 MMOL/L (ref 97–108)
CO2 SERPL-SCNC: 25 MMOL/L (ref 21–32)
CREAT SERPL-MCNC: 1.08 MG/DL (ref 0.7–1.3)
ERYTHROCYTE [DISTWIDTH] IN BLOOD BY AUTOMATED COUNT: 16 % (ref 11.5–14.5)
GLUCOSE SERPL-MCNC: 140 MG/DL (ref 65–100)
HCT VFR BLD AUTO: 37.1 % (ref 36.6–50.3)
HGB BLD-MCNC: 11.8 G/DL (ref 12.1–17)
LACTATE SERPL-SCNC: 1.1 MMOL/L (ref 0.4–2)
MAGNESIUM SERPL-MCNC: 2.1 MG/DL (ref 1.6–2.4)
MCH RBC QN AUTO: 31.1 PG (ref 26–34)
MCHC RBC AUTO-ENTMCNC: 31.8 G/DL (ref 30–36.5)
MCV RBC AUTO: 97.9 FL (ref 80–99)
NRBC # BLD: 0 K/UL (ref 0–0.01)
NRBC BLD-RTO: 0 PER 100 WBC
PHOSPHATE SERPL-MCNC: 2.6 MG/DL (ref 2.6–4.7)
PLATELET # BLD AUTO: 157 K/UL (ref 150–400)
PMV BLD AUTO: 9.9 FL (ref 8.9–12.9)
POTASSIUM SERPL-SCNC: 3.6 MMOL/L (ref 3.5–5.1)
RBC # BLD AUTO: 3.79 M/UL (ref 4.1–5.7)
SODIUM SERPL-SCNC: 146 MMOL/L (ref 136–145)
WBC # BLD AUTO: 6.4 K/UL (ref 4.1–11.1)

## 2021-05-25 PROCEDURE — 74011250637 HC RX REV CODE- 250/637: Performed by: FAMILY MEDICINE

## 2021-05-25 PROCEDURE — 71045 X-RAY EXAM CHEST 1 VIEW: CPT

## 2021-05-25 PROCEDURE — 74011250637 HC RX REV CODE- 250/637: Performed by: INTERNAL MEDICINE

## 2021-05-25 PROCEDURE — 74011250636 HC RX REV CODE- 250/636: Performed by: PSYCHIATRY & NEUROLOGY

## 2021-05-25 PROCEDURE — C9254 INJECTION, LACOSAMIDE: HCPCS | Performed by: PSYCHIATRY & NEUROLOGY

## 2021-05-25 PROCEDURE — 83735 ASSAY OF MAGNESIUM: CPT

## 2021-05-25 PROCEDURE — 74011250636 HC RX REV CODE- 250/636: Performed by: INTERNAL MEDICINE

## 2021-05-25 PROCEDURE — 36415 COLL VENOUS BLD VENIPUNCTURE: CPT

## 2021-05-25 PROCEDURE — 85027 COMPLETE CBC AUTOMATED: CPT

## 2021-05-25 PROCEDURE — 84100 ASSAY OF PHOSPHORUS: CPT

## 2021-05-25 PROCEDURE — 80048 BASIC METABOLIC PNL TOTAL CA: CPT

## 2021-05-25 PROCEDURE — 83605 ASSAY OF LACTIC ACID: CPT

## 2021-05-25 PROCEDURE — 65660000001 HC RM ICU INTERMED STEPDOWN

## 2021-05-25 PROCEDURE — 99233 SBSQ HOSP IP/OBS HIGH 50: CPT | Performed by: PSYCHIATRY & NEUROLOGY

## 2021-05-25 PROCEDURE — 74011000258 HC RX REV CODE- 258: Performed by: PSYCHIATRY & NEUROLOGY

## 2021-05-25 RX ADMIN — Medication 10 ML: at 06:44

## 2021-05-25 RX ADMIN — SODIUM CHLORIDE 100 MG: 9 INJECTION, SOLUTION INTRAVENOUS at 14:08

## 2021-05-25 RX ADMIN — ATORVASTATIN CALCIUM 80 MG: 40 TABLET, FILM COATED ORAL at 10:21

## 2021-05-25 RX ADMIN — AMLODIPINE BESYLATE 5 MG: 5 TABLET ORAL at 10:21

## 2021-05-25 RX ADMIN — HYDRALAZINE HYDROCHLORIDE 100 MG: 50 TABLET, FILM COATED ORAL at 10:21

## 2021-05-25 RX ADMIN — LEVETIRACETAM 1000 MG: 100 INJECTION, SOLUTION INTRAVENOUS at 23:02

## 2021-05-25 RX ADMIN — DEXTROSE MONOHYDRATE 50 ML/HR: 50 INJECTION, SOLUTION INTRAVENOUS at 06:45

## 2021-05-25 RX ADMIN — ASPIRIN 81 MG: 81 TABLET, CHEWABLE ORAL at 10:21

## 2021-05-25 RX ADMIN — Medication 10 ML: at 22:30

## 2021-05-25 RX ADMIN — SODIUM CHLORIDE 100 MG: 9 INJECTION, SOLUTION INTRAVENOUS at 02:11

## 2021-05-25 RX ADMIN — LEVETIRACETAM 1000 MG: 100 INJECTION, SOLUTION INTRAVENOUS at 11:58

## 2021-05-25 RX ADMIN — HYDRALAZINE HYDROCHLORIDE 100 MG: 50 TABLET, FILM COATED ORAL at 17:10

## 2021-05-25 NOTE — ROUTINE PROCESS
Bedside shift change report given to 2021 Martin Brar (oncoming nurse) by Koko Patricia RN (offgoing nurse). Report included the following information SBAR, Kardex, ED Summary, Intake/Output, MAR, Cardiac Rhythm NSR and Dual Neuro Assessment.

## 2021-05-25 NOTE — PROGRESS NOTES
Dr. Daphnie Serrato returned call, informed her about patient doing a lot of coughing this am. Tube feed was turned off. Will come see patient.

## 2021-05-25 NOTE — CONSULTS
Neurology Progress Note    Patient ID:  Artis Figueroa  [id number]  89 y.o.  1934    Chief Complaint: Seizures    Subjective:     41-year-old gentleman recovering from status epilepticus. He also has small scattered embolic infarcts and hematoma. Yesterday completed a stat EEG because he seemed more somnolent. No seizures. Today he is showing a little bit more alertness but still very obtunded.     Objective:       ROS:  Cannot obtain secondary to patient medical condition      Meds:  Current Facility-Administered Medications   Medication Dose Route Frequency    dextrose 5% infusion  50 mL/hr IntraVENous CONTINUOUS    hydrALAZINE (APRESOLINE) tablet 100 mg  100 mg Oral TID    amLODIPine (NORVASC) tablet 5 mg  5 mg Oral DAILY    labetaloL (NORMODYNE;TRANDATE) injection 20 mg  20 mg IntraVENous Q6H PRN    levETIRAcetam (KEPPRA) 1,000 mg in 0.9% sodium chloride 100 mL IVPB  1,000 mg IntraVENous Q12H    hydrALAZINE (APRESOLINE) 20 mg/mL injection 20 mg  20 mg IntraVENous Q6H PRN    lacosamide (VIMPAT) 100 mg in 0.9% sodium chloride 100 mL IVPB  100 mg IntraVENous Q12H    sodium chloride (NS) flush 5-40 mL  5-40 mL IntraVENous Q8H    sodium chloride (NS) flush 5-40 mL  5-40 mL IntraVENous PRN    polyethylene glycol (MIRALAX) packet 17 g  17 g Oral DAILY PRN    albuterol (PROVENTIL VENTOLIN) nebulizer solution 2.5 mg  2.5 mg Nebulization Q4H PRN    aspirin chewable tablet 81 mg  81 mg Oral DAILY    atorvastatin (LIPITOR) tablet 80 mg  80 mg Oral DAILY    [Held by provider] isosorbide mononitrate ER (IMDUR) tablet 30 mg  30 mg Oral DAILY    sodium chloride (NS) flush 5-40 mL  5-40 mL IntraVENous Q8H    sodium chloride (NS) flush 5-40 mL  5-40 mL IntraVENous PRN    acetaminophen (TYLENOL) tablet 650 mg  650 mg Oral Q6H PRN    Or    acetaminophen (TYLENOL) suppository 650 mg  650 mg Rectal Q6H PRN    polyethylene glycol (MIRALAX) packet 17 g  17 g Oral DAILY PRN    promethazine (PHENERGAN) tablet 12.5 mg  12.5 mg Oral Q6H PRN    Or    ondansetron (ZOFRAN) injection 4 mg  4 mg IntraVENous Q6H PRN       MRI Results (maximum last 3): [facility]    MRI BRAIN WO CONT    Narrative  *PRELIMINARY REPORT*    Multiple foci of small cortical and white matter infarcts mostly on the right  with a couple of lesions on the left as well. Interval evolution of  intraventricular hemorrhage and small parenchymal hemorrhages. Preliminary report was provided by Dr. Zander Sheridan, the on-call radiologist, at  22:17. The findings were called to floor nurse, Ginger Baum, on 5/18/2021 at 22:20 by myself. [phone]    Final report to follow. *END PRELIMINARY REPORT*    EXAM:  MRI BRAIN WO CONT    INDICATION:    AMS    COMPARISON:  CT head 5/16/2021, MRI brain 11/23/2019. CONTRAST: None. TECHNIQUE:  Multiplanar multisequence acquisition without contrast of the brain. FINDINGS:  Few small scattered acute infarcts in the bilateral frontal lobes and  parieto-occipital lobes, right greater than left. Evolved now late subacute small intraparenchymal hematomas in the right parietal  periventricular white matter measuring 10 mm and 5 mm (series 6 image 11), with  no significant surrounding edema or mass effect. There is hemosiderin staining  noted along the ependymal lining of the lateral ventricles, as well as scattered  superficial siderosis involving the sylvian fissures, and along the surface of  the brainstem and cerebellum. Unchanged generalized parenchymal volume loss with commensurate dilation of the  sulci and ventricular system. Unchanged confluent periventricular and deep white  matter T2/FLAIR hyperintensity, and patchy T2/FLAIR hyperintensity in the nely,  consistent with severe chronic microvascular ischemic disease. There is no  cerebellar tonsillar herniation. Expected arterial flow-voids are present. The paranasal sinuses, mastoid air cells, and middle ears are clear. The orbital  contents are within normal limits with bilateral lens implants. No significant  osseous or scalp lesions are identified. Impression  1. Few small scattered acute infarcts in the bilateral frontal lobes and  parieto-occipital lobes, right greater than left. 2. 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 3. Unchanged generalized parenchymal volume loss and severe chronic  microvascular ischemic disease. Results from [place] encounter on 11/18/19    MRI BRAIN W WO CONT    Narrative  EXAM:  MRI BRAIN W WO CONT    INDICATION:    ?encephalitis altered mental status. COMPARISON:  MRI brain 11/19/2019. CONTRAST: 20 ml Dotarem. TECHNIQUE:  Multiplanar multisequence acquisition without and with contrast of the brain. FINDINGS:  Evaluation is limited by motion. Unchanged generalized parenchymal volume loss with commensurate dilation of the  sulci and ventricular system. Unchanged periventricular deep white matter  T2/FLAIR hyperintensities, confluent in regions, consistent with moderate  chronic microvascular ischemic disease. Small chronic microhemorrhages are noted  in the left frontal lobe and right temporal lobe (series 7 image 40 and 41). There is no acute infarct, hemorrhage, extra-axial fluid collection, or mass  effect. There is no cerebellar tonsillar herniation. Expected arterial  flow-voids are present. No evidence of abnormal enhancement. Mild mucosal thickening in the left maxillary sinus and sphenoid sinuses. The  mastoid air cells and middle ears are clear. The orbital contents are within  normal limits with bilateral lens implants. No significant osseous or scalp  lesions are identified.  Reversal of the cervical lordosis with grade 1  anterolisthesis of C2 on C3 and multilevel degenerative disc disease and facet  arthropathy in the upper cervical spine. Impression  IMPRESSION:  1. Evaluation is limited by motion. No acute intracranial abnormality. 2. Unchanged generalized parenchymal volume loss and moderate chronic  microvascular ischemic disease. MRI BRAIN WO CONT    Narrative  EXAM: MRI BRAIN WO CONT    INDICATION: AMS    COMPARISON: None. CONTRAST: None. TECHNIQUE:  Single sequence of diffusion imaging was performed. The patient could not  tolerate further imaging. FINDINGS:  Diffusion imaging demonstrates no evidence of acute ischemia. Impression  IMPRESSION:  Limited study. No evidence of acute ischemia.       Lab Review   Recent Results (from the past 24 hour(s))   GLUCOSE, POC    Collection Time: 05/24/21 11:37 AM   Result Value Ref Range    Glucose (POC) 115 65 - 117 mg/dL    Performed by Mimi Henriquez    GLUCOSE, POC    Collection Time: 05/24/21  6:35 PM   Result Value Ref Range    Glucose (POC) 124 (H) 65 - 117 mg/dL    Performed by Mimi Henriquez    LACTIC ACID    Collection Time: 05/25/21  3:50 AM   Result Value Ref Range    Lactic acid 1.1 0.4 - 2.0 MMOL/L   METABOLIC PANEL, BASIC    Collection Time: 05/25/21  3:51 AM   Result Value Ref Range    Sodium 146 (H) 136 - 145 mmol/L    Potassium 3.6 3.5 - 5.1 mmol/L    Chloride 116 (H) 97 - 108 mmol/L    CO2 25 21 - 32 mmol/L    Anion gap 5 5 - 15 mmol/L    Glucose 140 (H) 65 - 100 mg/dL    BUN 21 (H) 6 - 20 MG/DL    Creatinine 1.08 0.70 - 1.30 MG/DL    BUN/Creatinine ratio 19 12 - 20      GFR est AA >60 >60 ml/min/1.73m2    GFR est non-AA >60 >60 ml/min/1.73m2    Calcium 8.8 8.5 - 10.1 MG/DL   MAGNESIUM    Collection Time: 05/25/21  3:51 AM   Result Value Ref Range    Magnesium 2.1 1.6 - 2.4 mg/dL   PHOSPHORUS    Collection Time: 05/25/21  3:51 AM   Result Value Ref Range    Phosphorus 2.6 2.6 - 4.7 MG/DL   CBC W/O DIFF    Collection Time: 05/25/21  3:51 AM Result Value Ref Range    WBC 6.4 4.1 - 11.1 K/uL    RBC 3.79 (L) 4.10 - 5.70 M/uL    HGB 11.8 (L) 12.1 - 17.0 g/dL    HCT 37.1 36.6 - 50.3 %    MCV 97.9 80.0 - 99.0 FL    MCH 31.1 26.0 - 34.0 PG    MCHC 31.8 30.0 - 36.5 g/dL    RDW 16.0 (H) 11.5 - 14.5 %    PLATELET 070 931 - 596 K/uL    MPV 9.9 8.9 - 12.9 FL    NRBC 0.0 0  WBC    ABSOLUTE NRBC 0.00 0.00 - 0.01 K/uL       Additional comments:I reviewed the patient's new clinical lab test results. Patient Vitals for the past 8 hrs:   BP Temp Pulse Resp SpO2 Weight   05/25/21 0600 (!) 146/78 98.5 °F (36.9 °C) 82 17 98 %    05/25/21 0145 110/79 98.3 °F (36.8 °C) 81 18 100 % 211 lb 6.4 oz (95.9 kg)       No intake/output data recorded. 05/23 1901 - 05/25 0700  In: 1760 [I.V.:400]  Out: -     Exam:  Visit Vitals  BP (!) 146/78 (BP 1 Location: Left upper arm, BP Patient Position: At rest)   Pulse 82   Temp 98.5 °F (36.9 °C)   Resp 17   Ht 5' 8\" (1.727 m)   Wt 211 lb 6.4 oz (95.9 kg)   SpO2 98%   BMI 32.14 kg/m²     Gen: Well developed  CV: RRR  Lungs: non labored breathing  Abd: soft, non distended  Neuro: Eyes closed. Not following any commands. When I open his eyes actively he resists and turns away. He does move his arms to stimulation to get comfortable. Exam limited due to poor cooperation and not following commands.   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    Hypertension I10    ED (erectile dysfunction) N52.9    Severe obesity ([facility]) E66.01    TESSA on CPAP G47.33, Z99.89    Simple chronic bronchitis ([facility]) J41.0    Bilateral carotid artery stenosis I65.23    Spinal stenosis of lumbar region at multiple levels M48.061    Flank pain R10.9    Rectal bleeding K62.5    Pneumonia J18.9    Severe sepsis ([facility]) A41.9, R65.20    Acute blood loss anemia D62    Facial droop R29.810    Altered mental state R41.82    Viral encephalitis A86    Coronary artery disease with stable angina pectoris ([facility]) I25.118    Chronic obstructive pulmonary disease ([facility]) J44.9    CKD (chronic kidney disease) stage 3, GFR 30-59 ml/min ([facility]) N18.30    Intracranial hemorrhage ([facility]) I62.9    CAP (community acquired pneumonia) J18.9       Assessment/Plan:      51-year-old gentleman who has had a significant hospital course who may be  showing a little bit of mild improvement today neurologically. No change to the current anticonvulsants. I would be very hesitant to make any changes at a concerned that he could go back into status. Continue the current management. More time to recover. I spoke to his daughter at the bedside. If he does not make much improvement in a few days they may need to consider goals of care whether that be escalating to a PEG tube or comfort. I do think he could survive the neurologic issues however I am not sure what level of meaningful recovery he will make at this point. It is reasonable to give him more time. This clinical note was dictated with an electronic dictation software that can make unintentional errors. If there are any questions, please contact me directly for clarification.       Signed:  [address] [facility],   5/25/2021  9:28 AM

## 2021-05-25 NOTE — PROGRESS NOTES
Occupational Therapy    Chart reviewed. Noted patient remains unable to follow commands or participate in skilled therapy. Will complete OT order. Please place a new order when patient is able to actively participate.     Delon Mcburney, OTR/L

## 2021-05-25 NOTE — PROGRESS NOTES
Bedside shift change report given to Janneth Jimenez RN (oncoming nurse) by Isaias Claudio RN (offgoing nurse). Report included the following information SBAR, Kardex, Recent Results, Cardiac Rhythm NSR and Dual Neuro Assessment.

## 2021-05-25 NOTE — PROGRESS NOTES
SLP Contact Note    Patient remains inappropriate from cognitive standpoint for swallow treatment. Will continue to hold.       Thank you,  JENNIFER Acosta.Ed, [id number] [facility]  Speech-Language Pathologist

## 2021-05-25 NOTE — PROGRESS NOTES
Problem: Pressure Injury - Risk of  Goal: *Prevention of pressure injury  Description: Document Aquiles Scale and appropriate interventions in the flowsheet. Outcome: Progressing Towards Goal  Note: Pressure Injury Interventions:  Sensory Interventions: Assess need for specialty bed    Moisture Interventions: Absorbent underpads, Check for incontinence Q2 hours and as needed    Activity Interventions: Assess need for specialty bed    Mobility Interventions: Assess need for specialty bed    Nutrition Interventions: Document food/fluid/supplement intake, Discuss nutritional consult with provider, Offer support with meals,snacks and hydration    Friction and Shear Interventions: HOB 30 degrees or less                Problem: Falls - Risk of  Goal: *Absence of Falls  Description: Document Too Fall Risk and appropriate interventions in the flowsheet.   Outcome: Progressing Towards Goal  Note: Fall Risk Interventions:  Mobility Interventions: Communicate number of staff needed for ambulation/transfer, Bed/chair exit alarm    Mentation Interventions: Bed/chair exit alarm, Adequate sleep, hydration, pain control    Medication Interventions: Bed/chair exit alarm    Elimination Interventions: Bed/chair exit alarm, Call light in reach, Patient to call for help with toileting needs    History of Falls Interventions: Door open when patient unattended, Bed/chair exit alarm

## 2021-05-25 NOTE — PROGRESS NOTES
Physical Therapy:     Chart reviewed and discussed with RN. Pt not following commands and is unable to actively participate with skilled therapy. Pt has not been appropriate for several consecutive days. Will sign off. Please reconsult when pt is able to actively participate.       Penelope Sam, PT, DPT

## 2021-05-25 NOTE — PROGRESS NOTES
Patient begin to do a lot of coughing and sneezing. Stopped tube feed and called hospitalist to see if we can get a chest xray.

## 2021-05-25 NOTE — PROGRESS NOTES
Transition of Care Plan   RUR- 26% Medium Risk   DISPOSITION: The disposition plan is pending medical progression and recommendation.  F/U with PCP/Specialist     Transport: [facility]family     At 9:00am - CM spoke with attending physician who informed TW to continue to follow. Pending on patients progress by Friday will determine patients confirmed aftercare plans. CM will continue to follow, provide support and assist with JACQUES needs as they arise.     Maria C Cain

## 2021-05-25 NOTE — PROGRESS NOTES
Hospitalist Progress Note      Hospital summary: Praful Duron a 80 y. o. male with pmh of CVA, intraventricular brain hemorrhage, dyslipidemia, CKD 3, hypertension who presents to hospital from subacute rehab for concerns of altered/decreased mental status. Patient was found to have multiple ischemic CVAs on MRI, continue to have altered mental status with some eye deviation and twitching on 5/20. This was concerning for seizure, given 2 mg of Ativan and loaded with Keppra with some improvement. On 5/21 patient however declined, unresponsive to verbal stimuli. Stat EEG 24-hour was ordered and showed subclinical status. He was loaded with Vimpat, and neurology recommends transfer to ICU for closer neuro checks on 5/21.  Seizures were controlled and  Pt transferred out of ICU on 5/23.     5/16/2021      Assessment/Plan:  Status epilepticus   -Status post Keppra and Vimpat load 5/20  -Continue Keppra and Vimpat   - Neurology following  - 24hr EEG: mild diffuse cerebral dysfunction, of which the sporadic generalized frontally predominant triphasic discharges are indicative of metabolic/toxic encephalopathy.  -  Seizure precautions      Acute embolic infarcts  Subacute hematoma -intraventricular hemorrhage with ventriculomegaly on recent admission, discharged 5/3  -Suspicious for A. fib, however when reviewed by cardiology no definitive episodes seen on telemetry  -Not a candidate for anticoagulation due to intracranial hemorrhage, and fall risk  -Neurology following  -No escalation of antiplatelets due to ICH, continue aspirin  -Continue atorvastatin  -PT/OT/speech  -Echo with normal EF   - rpt CT head 5/24: no change     Acute Metabolic encephalopathy - not improving   - multifactorial, acute CVAs, pneumonia, electrolyte imbalances, seizures  -Day and night schedules, avoid benzodiazepines if able  -Frequent redirection  -Hold Seroquel for now      Community-acquired pneumonia   -x-ray with left basilar airspace disease  - completed doxy   -Remains on room air, monitor     Hypertension BP elevated. SBP goal <160. C/w hydralazine, amlodipine. IV labetalol and hydralazine prn. Hyperlipidemiacontinue Lipitor  Hx TESSA    Hypernatremia - improving  - likely due to poor oral intake   will monitor      Nutrition  - Dobhoff placed on 5/23  - c/w TFs at 45ml/hr for now   - family does not want PEG tube     Appreciate Palliative care input     Code status: DNR. DVT prophylaxis: SCDs  Disposition: TBD  ----------------------------------------------    CC: Acute CVA. Seizures     S: Patient is seen and examined at bedside this AM. Still lethargic, only responded to pain. Patient has been coughing last night and TFs held - CXR ordered   Daughter present - not much improvement in mentation, will watch for few days for progression. Discussed with nursing - restart TFs at slower rate     Review of Systems:  Review of systems not obtained due to patient factors.     O:  Visit Vitals  /69   Pulse 92   Temp 98.9 °F (37.2 °C)   Resp 17   Ht 5' 8\" (1.727 m)   Wt 95.9 kg (211 lb 6.4 oz)   SpO2 99%   BMI 32.14 kg/m²       PHYSICAL EXAM:  Gen: lethargic   HEENT: anicteric sclerae, normal conjunctiva, NG tube   Neck: supple, trachea midline, no adenopathy  Heart: RRR, no MRG, no JVD, no peripheral edema  Lungs: decreased at bases  Abd: soft, NT, ND, BS+, no organomegaly  Extr: warm  Skin: dry, no rash  Neuro: lethargic, not following commands       Intake/Output Summary (Last 24 hours) at 5/25/2021 1335  Last data filed at 5/25/2021 0400  Gross per 24 hour   Intake 1560 ml   Output    Net 1560 ml        Recent labs & imaging reviewed:  Recent Results (from the past 24 hour(s))   GLUCOSE, POC    Collection Time: 05/24/21  6:35 PM   Result Value Ref Range    Glucose (POC) 124 (H) 65 - 117 mg/dL    Performed by Thomas Salazar    LACTIC ACID    Collection Time: 05/25/21  3:50 AM   Result Value Ref Range    Lactic acid 1.1 0.4 - 2.0 MMOL/L   METABOLIC PANEL, BASIC    Collection Time: 05/25/21  3:51 AM   Result Value Ref Range    Sodium 146 (H) 136 - 145 mmol/L    Potassium 3.6 3.5 - 5.1 mmol/L    Chloride 116 (H) 97 - 108 mmol/L    CO2 25 21 - 32 mmol/L    Anion gap 5 5 - 15 mmol/L    Glucose 140 (H) 65 - 100 mg/dL    BUN 21 (H) 6 - 20 MG/DL    Creatinine 1.08 0.70 - 1.30 MG/DL    BUN/Creatinine ratio 19 12 - 20      GFR est AA >60 >60 ml/min/1.73m2    GFR est non-AA >60 >60 ml/min/1.73m2    Calcium 8.8 8.5 - 10.1 MG/DL   MAGNESIUM    Collection Time: 05/25/21  3:51 AM   Result Value Ref Range    Magnesium 2.1 1.6 - 2.4 mg/dL   PHOSPHORUS    Collection Time: 05/25/21  3:51 AM   Result Value Ref Range    Phosphorus 2.6 2.6 - 4.7 MG/DL   CBC W/O DIFF    Collection Time: 05/25/21  3:51 AM   Result Value Ref Range    WBC 6.4 4.1 - 11.1 K/uL    RBC 3.79 (L) 4.10 - 5.70 M/uL    HGB 11.8 (L) 12.1 - 17.0 g/dL    HCT 37.1 36.6 - 50.3 %    MCV 97.9 80.0 - 99.0 FL    MCH 31.1 26.0 - 34.0 PG    MCHC 31.8 30.0 - 36.5 g/dL    RDW 16.0 (H) 11.5 - 14.5 %    PLATELET 031 032 - 911 K/uL    MPV 9.9 8.9 - 12.9 FL    NRBC 0.0 0  WBC    ABSOLUTE NRBC 0.00 0.00 - 0.01 K/uL     Recent Labs     05/25/21 0351 05/24/21  0059   WBC 6.4 6.8   HGB 11.8* 12.8   HCT 37.1 40.4    167     Recent Labs     05/25/21 0351 05/24/21  0059   * 149*   K 3.6 3.8   * 119*   CO2 25 23   BUN 21* 24*   CREA 1.08 1.15   * 115*   CA 8.8 9.0   MG 2.1 2.3   PHOS 2.6 2.4*     No results for input(s): ALT, AP, TBIL, TBILI, TP, ALB, GLOB, GGT, AML, LPSE in the last 72 hours. No lab exists for component: SGOT, GPT, AMYP, HLPSE  No results for input(s): INR, PTP, APTT, INREXT, INREXT in the last 72 hours. No results for input(s): FE, TIBC, PSAT, FERR in the last 72 hours. No results found for: FOL, RBCF   No results for input(s): PH, PCO2, PO2 in the last 72 hours.   No results for input(s): CPK, CKNDX, TROIQ in the last 72 hours.     No lab exists for component: CPKMB  Lab Results   Component Value Date/Time    Cholesterol, total 144 04/19/2021 12:59 AM    HDL Cholesterol 33 04/19/2021 12:59 AM    LDL, calculated 74.8 04/19/2021 12:59 AM    Triglyceride 181 (H) 04/19/2021 12:59 AM    CHOL/HDL Ratio 4.4 04/19/2021 12:59 AM     Lab Results   Component Value Date/Time    Glucose (POC) 124 (H) 05/24/2021 06:35 PM    Glucose (POC) 115 05/24/2021 11:37 AM    Glucose (POC) 107 05/22/2021 02:25 PM    Glucose (POC) 92 05/21/2021 07:36 AM    Glucose (POC) 110 (H) 05/03/2021 11:41 AM     Lab Results   Component Value Date/Time    Color YELLOW/STRAW 05/16/2021 09:33 PM    Appearance CLEAR 05/16/2021 09:33 PM    Specific gravity 1.014 05/16/2021 09:33 PM    Specific gravity 1.015 09/21/2020 09:13 AM    pH (UA) 5.0 05/16/2021 09:33 PM    Protein Negative 05/16/2021 09:33 PM    Glucose Negative 05/16/2021 09:33 PM    Ketone Negative 05/16/2021 09:33 PM    Bilirubin Negative 05/16/2021 09:33 PM    Urobilinogen 0.2 05/16/2021 09:33 PM    Nitrites Negative 05/16/2021 09:33 PM    Leukocyte Esterase Negative 05/16/2021 09:33 PM    Epithelial cells FEW 05/16/2021 09:33 PM    Bacteria Negative 05/16/2021 09:33 PM    WBC 0-4 05/16/2021 09:33 PM    RBC 0-5 05/16/2021 09:33 PM       Med list reviewed  Current Facility-Administered Medications   Medication Dose Route Frequency    dextrose 5% infusion  50 mL/hr IntraVENous CONTINUOUS    hydrALAZINE (APRESOLINE) tablet 100 mg  100 mg Oral TID    amLODIPine (NORVASC) tablet 5 mg  5 mg Oral DAILY    labetaloL (NORMODYNE;TRANDATE) injection 20 mg  20 mg IntraVENous Q6H PRN    levETIRAcetam (KEPPRA) 1,000 mg in 0.9% sodium chloride 100 mL IVPB  1,000 mg IntraVENous Q12H    hydrALAZINE (APRESOLINE) 20 mg/mL injection 20 mg  20 mg IntraVENous Q6H PRN    lacosamide (VIMPAT) 100 mg in 0.9% sodium chloride 100 mL IVPB  100 mg IntraVENous Q12H    sodium chloride (NS) flush 5-40 mL  5-40 mL IntraVENous Q8H    sodium chloride (NS) flush 5-40 mL  5-40 mL IntraVENous PRN    polyethylene glycol (MIRALAX) packet 17 g  17 g Oral DAILY PRN    albuterol (PROVENTIL VENTOLIN) nebulizer solution 2.5 mg  2.5 mg Nebulization Q4H PRN    aspirin chewable tablet 81 mg  81 mg Oral DAILY    atorvastatin (LIPITOR) tablet 80 mg  80 mg Oral DAILY    [Held by provider] isosorbide mononitrate ER (IMDUR) tablet 30 mg  30 mg Oral DAILY    sodium chloride (NS) flush 5-40 mL  5-40 mL IntraVENous Q8H    sodium chloride (NS) flush 5-40 mL  5-40 mL IntraVENous PRN    acetaminophen (TYLENOL) tablet 650 mg  650 mg Oral Q6H PRN    Or    acetaminophen (TYLENOL) suppository 650 mg  650 mg Rectal Q6H PRN    polyethylene glycol (MIRALAX) packet 17 g  17 g Oral DAILY PRN    promethazine (PHENERGAN) tablet 12.5 mg  12.5 mg Oral Q6H PRN    Or    ondansetron (ZOFRAN) injection 4 mg  4 mg IntraVENous Q6H PRN       Care Plan discussed with:  Patient/Family and Nurse    Colonel Shante MD  Internal Medicine  Date of Service: 5/25/2021

## 2021-05-26 ENCOUNTER — APPOINTMENT (OUTPATIENT)
Dept: GENERAL RADIOLOGY | Age: 86
DRG: 193 | End: 2021-05-26
Attending: INTERNAL MEDICINE
Payer: MEDICARE

## 2021-05-26 LAB
ANION GAP SERPL CALC-SCNC: 6 MMOL/L (ref 5–15)
BUN SERPL-MCNC: 16 MG/DL (ref 6–20)
BUN/CREAT SERPL: 15 (ref 12–20)
CALCIUM SERPL-MCNC: 8.5 MG/DL (ref 8.5–10.1)
CHLORIDE SERPL-SCNC: 116 MMOL/L (ref 97–108)
CO2 SERPL-SCNC: 22 MMOL/L (ref 21–32)
CREAT SERPL-MCNC: 1.07 MG/DL (ref 0.7–1.3)
ERYTHROCYTE [DISTWIDTH] IN BLOOD BY AUTOMATED COUNT: 15.8 % (ref 11.5–14.5)
GLUCOSE SERPL-MCNC: 96 MG/DL (ref 65–100)
HCT VFR BLD AUTO: 34.9 % (ref 36.6–50.3)
HGB BLD-MCNC: 11.3 G/DL (ref 12.1–17)
LACTATE SERPL-SCNC: 0.6 MMOL/L (ref 0.4–2)
MAGNESIUM SERPL-MCNC: 2.1 MG/DL (ref 1.6–2.4)
MCH RBC QN AUTO: 31.6 PG (ref 26–34)
MCHC RBC AUTO-ENTMCNC: 32.4 G/DL (ref 30–36.5)
MCV RBC AUTO: 97.5 FL (ref 80–99)
NRBC # BLD: 0 K/UL (ref 0–0.01)
NRBC BLD-RTO: 0 PER 100 WBC
PHOSPHATE SERPL-MCNC: 2.9 MG/DL (ref 2.6–4.7)
PLATELET # BLD AUTO: 151 K/UL (ref 150–400)
PMV BLD AUTO: 10.2 FL (ref 8.9–12.9)
POTASSIUM SERPL-SCNC: 3.6 MMOL/L (ref 3.5–5.1)
RBC # BLD AUTO: 3.58 M/UL (ref 4.1–5.7)
SODIUM SERPL-SCNC: 144 MMOL/L (ref 136–145)
WBC # BLD AUTO: 5.3 K/UL (ref 4.1–11.1)

## 2021-05-26 PROCEDURE — 74011000258 HC RX REV CODE- 258: Performed by: PSYCHIATRY & NEUROLOGY

## 2021-05-26 PROCEDURE — 74011000250 HC RX REV CODE- 250

## 2021-05-26 PROCEDURE — 74340 X-RAY GUIDE FOR GI TUBE: CPT

## 2021-05-26 PROCEDURE — 99233 SBSQ HOSP IP/OBS HIGH 50: CPT | Performed by: PSYCHIATRY & NEUROLOGY

## 2021-05-26 PROCEDURE — 65660000001 HC RM ICU INTERMED STEPDOWN

## 2021-05-26 PROCEDURE — 85027 COMPLETE CBC AUTOMATED: CPT

## 2021-05-26 PROCEDURE — 83605 ASSAY OF LACTIC ACID: CPT

## 2021-05-26 PROCEDURE — 74011000636 HC RX REV CODE- 636: Performed by: RADIOLOGY

## 2021-05-26 PROCEDURE — 74011250636 HC RX REV CODE- 250/636: Performed by: PSYCHIATRY & NEUROLOGY

## 2021-05-26 PROCEDURE — 99232 SBSQ HOSP IP/OBS MODERATE 35: CPT | Performed by: NURSE PRACTITIONER

## 2021-05-26 PROCEDURE — C9254 INJECTION, LACOSAMIDE: HCPCS | Performed by: PSYCHIATRY & NEUROLOGY

## 2021-05-26 PROCEDURE — 83735 ASSAY OF MAGNESIUM: CPT

## 2021-05-26 PROCEDURE — 80048 BASIC METABOLIC PNL TOTAL CA: CPT

## 2021-05-26 PROCEDURE — 74011250636 HC RX REV CODE- 250/636: Performed by: INTERNAL MEDICINE

## 2021-05-26 PROCEDURE — 0DHA7UZ INSERTION OF FEEDING DEVICE INTO JEJUNUM, VIA NATURAL OR ARTIFICIAL OPENING: ICD-10-PCS | Performed by: RADIOLOGY

## 2021-05-26 PROCEDURE — 36415 COLL VENOUS BLD VENIPUNCTURE: CPT

## 2021-05-26 PROCEDURE — 84100 ASSAY OF PHOSPHORUS: CPT

## 2021-05-26 PROCEDURE — 74011250637 HC RX REV CODE- 250/637: Performed by: INTERNAL MEDICINE

## 2021-05-26 RX ORDER — LIDOCAINE HYDROCHLORIDE 20 MG/ML
JELLY TOPICAL
Status: COMPLETED
Start: 2021-05-26 | End: 2021-05-26

## 2021-05-26 RX ORDER — LIDOCAINE HYDROCHLORIDE 20 MG/ML
JELLY TOPICAL AS NEEDED
Status: DISCONTINUED | OUTPATIENT
Start: 2021-05-26 | End: 2021-06-08 | Stop reason: HOSPADM

## 2021-05-26 RX ADMIN — SODIUM CHLORIDE 100 MG: 9 INJECTION, SOLUTION INTRAVENOUS at 01:31

## 2021-05-26 RX ADMIN — LEVETIRACETAM 1000 MG: 100 INJECTION, SOLUTION INTRAVENOUS at 11:26

## 2021-05-26 RX ADMIN — Medication 10 ML: at 06:25

## 2021-05-26 RX ADMIN — DEXTROSE MONOHYDRATE 50 ML/HR: 50 INJECTION, SOLUTION INTRAVENOUS at 05:24

## 2021-05-26 RX ADMIN — HYDRALAZINE HYDROCHLORIDE 100 MG: 50 TABLET, FILM COATED ORAL at 17:09

## 2021-05-26 RX ADMIN — Medication 10 ML: at 06:24

## 2021-05-26 RX ADMIN — LEVETIRACETAM 1000 MG: 100 INJECTION, SOLUTION INTRAVENOUS at 22:22

## 2021-05-26 RX ADMIN — Medication 10 ML: at 13:42

## 2021-05-26 RX ADMIN — Medication 10 ML: at 22:22

## 2021-05-26 RX ADMIN — LIDOCAINE HYDROCHLORIDE: 20 JELLY TOPICAL at 15:59

## 2021-05-26 RX ADMIN — IOHEXOL 50 ML: 350 INJECTION, SOLUTION INTRAVENOUS at 16:00

## 2021-05-26 RX ADMIN — HYDRALAZINE HYDROCHLORIDE 100 MG: 50 TABLET, FILM COATED ORAL at 22:22

## 2021-05-26 RX ADMIN — SODIUM CHLORIDE 100 MG: 9 INJECTION, SOLUTION INTRAVENOUS at 13:35

## 2021-05-26 NOTE — PROGRESS NOTES
Bedside shift change report given to Axel Handley RN (oncoming nurse) by Mehul Shultz RN (offgoing nurse). Report included the following information SBAR, Kardex, Recent Results, Cardiac Rhythm NSR and Dual Neuro Assessment.

## 2021-05-26 NOTE — PROGRESS NOTES
Problem: Pressure Injury - Risk of  Goal: *Prevention of pressure injury  Description: Document Aquiles Scale and appropriate interventions in the flowsheet. Outcome: Progressing Towards Goal  Note: Pressure Injury Interventions:  Sensory Interventions: Assess changes in LOC, Assess need for specialty bed, Check visual cues for pain, Float heels, Keep linens dry and wrinkle-free    Moisture Interventions: Absorbent underpads, Apply protective barrier, creams and emollients, Check for incontinence Q2 hours and as needed    Activity Interventions: Increase time out of bed, Pressure redistribution bed/mattress(bed type), PT/OT evaluation    Mobility Interventions: Float heels, PT/OT evaluation    Nutrition Interventions: Document food/fluid/supplement intake    Friction and Shear Interventions: Apply protective barrier, creams and emollients, Lift team/patient mobility team                Problem: Patient Education: Go to Patient Education Activity  Goal: Patient/Family Education  Outcome: Progressing Towards Goal     Problem: Falls - Risk of  Goal: *Absence of Falls  Description: Document Too Fall Risk and appropriate interventions in the flowsheet.   Outcome: Progressing Towards Goal  Note: Fall Risk Interventions:  Mobility Interventions: Communicate number of staff needed for ambulation/transfer    Mentation Interventions: Adequate sleep, hydration, pain control, More frequent rounding    Medication Interventions: Bed/chair exit alarm, Evaluate medications/consider consulting pharmacy    Elimination Interventions: Bed/chair exit alarm, Call light in reach    History of Falls Interventions: Bed/chair exit alarm, Investigate reason for fall         Problem: Seizure Disorder (Adult)  Goal: *STG: Maintains lab values within therapeutic range  Outcome: Progressing Towards Goal  Goal: *STG/LTG: Complies with medication therapy  Outcome: Progressing Towards Goal  Goal: *STG: Remains safe in hospital  Outcome: Progressing Towards Goal     Problem: TIA/CVA Stroke: Day 2 Until Discharge  Goal: Activity/Safety  Outcome: Progressing Towards Goal  Goal: Diagnostic Test/Procedures  Outcome: Progressing Towards Goal  Goal: Nutrition/Diet  Outcome: Progressing Towards Goal  Goal: Discharge Planning  Outcome: Progressing Towards Goal  Goal: Respiratory  Outcome: Progressing Towards Goal     Problem: Non-Violent Restraints  Goal: Removal from restraints as soon as assessed to be safe  Outcome: Progressing Towards Goal  Goal: No harm/injury to patient while restraints in use  Outcome: Progressing Towards Goal

## 2021-05-26 NOTE — CONSULTS
Neurology Progress Note    Patient ID:  Kranthi Richardson  [id number]  32 y.o.  1934    Chief Complaint: Stroke    Subjective:     43-year-old gentleman who has had scattered embolic infarcts and intracranial hemorrhage/IVH who is recovering from status epilepticus. Overnight he pulled out his NG tube. He had a moment of clear awareness which was transient. He is somnolent again.     Objective:       ROS:  Cannot obtain secondary to patient medical condition      Meds:  Current Facility-Administered Medications   Medication Dose Route Frequency    dextrose 5% infusion  50 mL/hr IntraVENous CONTINUOUS    hydrALAZINE (APRESOLINE) tablet 100 mg  100 mg Oral TID    amLODIPine (NORVASC) tablet 5 mg  5 mg Oral DAILY    labetaloL (NORMODYNE;TRANDATE) injection 20 mg  20 mg IntraVENous Q6H PRN    levETIRAcetam (KEPPRA) 1,000 mg in 0.9% sodium chloride 100 mL IVPB  1,000 mg IntraVENous Q12H    hydrALAZINE (APRESOLINE) 20 mg/mL injection 20 mg  20 mg IntraVENous Q6H PRN    lacosamide (VIMPAT) 100 mg in 0.9% sodium chloride 100 mL IVPB  100 mg IntraVENous Q12H    sodium chloride (NS) flush 5-40 mL  5-40 mL IntraVENous Q8H    sodium chloride (NS) flush 5-40 mL  5-40 mL IntraVENous PRN    polyethylene glycol (MIRALAX) packet 17 g  17 g Oral DAILY PRN    albuterol (PROVENTIL VENTOLIN) nebulizer solution 2.5 mg  2.5 mg Nebulization Q4H PRN    aspirin chewable tablet 81 mg  81 mg Oral DAILY    atorvastatin (LIPITOR) tablet 80 mg  80 mg Oral DAILY    [Held by provider] isosorbide mononitrate ER (IMDUR) tablet 30 mg  30 mg Oral DAILY    sodium chloride (NS) flush 5-40 mL  5-40 mL IntraVENous Q8H    sodium chloride (NS) flush 5-40 mL  5-40 mL IntraVENous PRN    acetaminophen (TYLENOL) tablet 650 mg  650 mg Oral Q6H PRN    Or    acetaminophen (TYLENOL) suppository 650 mg  650 mg Rectal Q6H PRN    polyethylene glycol (MIRALAX) packet 17 g  17 g Oral DAILY PRN    promethazine (PHENERGAN) tablet 12.5 mg  12.5 mg Oral Q6H PRN    Or    ondansetron (ZOFRAN) injection 4 mg  4 mg IntraVENous Q6H PRN       MRI Results (maximum last 3): Results from [place] encounter on 05/16/21    MRI BRAIN WO CONT    Narrative  *PRELIMINARY REPORT*    Multiple foci of small cortical and white matter infarcts mostly on the right  with a couple of lesions on the left as well. Interval evolution of  intraventricular hemorrhage and small parenchymal hemorrhages. Preliminary report was provided by Dr. Brenden Kilgore, the on-call radiologist, at  22:17. The findings were called to floor nurse, Laurita Chester, on 5/18/2021 at 22:20 by myself. [phone]    Final report to follow. *END PRELIMINARY REPORT*    EXAM:  MRI BRAIN WO CONT    INDICATION:    AMS    COMPARISON:  CT head 5/16/2021, MRI brain 11/23/2019. CONTRAST: None. TECHNIQUE:  Multiplanar multisequence acquisition without contrast of the brain. FINDINGS:  Few small scattered acute infarcts in the bilateral frontal lobes and  parieto-occipital lobes, right greater than left. Evolved now late subacute small intraparenchymal hematomas in the right parietal  periventricular white matter measuring 10 mm and 5 mm (series 6 image 11), with  no significant surrounding edema or mass effect. There is hemosiderin staining  noted along the ependymal lining of the lateral ventricles, as well as scattered  superficial siderosis involving the sylvian fissures, and along the surface of  the brainstem and cerebellum. Unchanged generalized parenchymal volume loss with commensurate dilation of the  sulci and ventricular system. Unchanged confluent periventricular and deep white  matter T2/FLAIR hyperintensity, and patchy T2/FLAIR hyperintensity in the nely,  consistent with severe chronic microvascular ischemic disease. There is no  cerebellar tonsillar herniation. Expected arterial flow-voids are present. The paranasal sinuses, mastoid air cells, and middle ears are clear.  The orbital  contents are within normal limits with bilateral lens implants. No significant  osseous or scalp lesions are identified. Impression  1. Few small scattered acute infarcts in the bilateral frontal lobes and  parieto-occipital lobes, right greater than left. 2. 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 3. Unchanged generalized parenchymal volume loss and severe chronic  microvascular ischemic disease. Results from [place] encounter on 11/18/19    MRI BRAIN W WO CONT    Narrative  EXAM:  MRI BRAIN W WO CONT    INDICATION:    ?encephalitis altered mental status. COMPARISON:  MRI brain 11/19/2019. CONTRAST: 20 ml [place]. TECHNIQUE:  Multiplanar multisequence acquisition without and with contrast of the brain. FINDINGS:  Evaluation is limited by motion. Unchanged generalized parenchymal volume loss with commensurate dilation of the  sulci and ventricular system. Unchanged periventricular deep white matter  T2/FLAIR hyperintensities, confluent in regions, consistent with moderate  chronic microvascular ischemic disease. Small chronic microhemorrhages are noted  in the left frontal lobe and right temporal lobe (series 7 image 40 and 41). There is no acute infarct, hemorrhage, extra-axial fluid collection, or mass  effect. There is no cerebellar tonsillar herniation. Expected arterial  flow-voids are present. No evidence of abnormal enhancement. Mild mucosal thickening in the left maxillary sinus and sphenoid sinuses. The  mastoid air cells and middle ears are clear. The orbital contents are within  normal limits with bilateral lens implants. No significant osseous or scalp  lesions are identified.  Reversal of the cervical lordosis with grade 1  anterolisthesis of C2 on C3 and multilevel degenerative disc disease and facet  arthropathy in the upper cervical spine. Impression  IMPRESSION:  1. Evaluation is limited by motion. No acute intracranial abnormality. 2. Unchanged generalized parenchymal volume loss and moderate chronic  microvascular ischemic disease. MRI BRAIN WO CONT    Narrative  EXAM: MRI BRAIN WO CONT    INDICATION: AMS    COMPARISON: None. CONTRAST: None. TECHNIQUE:  Single sequence of diffusion imaging was performed. The patient could not  tolerate further imaging. FINDINGS:  Diffusion imaging demonstrates no evidence of acute ischemia. Impression  IMPRESSION:  Limited study. No evidence of acute ischemia. Lab Review   Recent Results (from the past 24 hour(s))   CBC W/O DIFF    Collection Time: 05/26/21  3:56 AM   Result Value Ref Range    WBC 5.3 4.1 - 11.1 K/uL    RBC 3.58 (L) 4.10 - 5.70 M/uL    HGB 11.3 (L) 12.1 - 17.0 g/dL    HCT 34.9 (L) 36.6 - 50.3 %    MCV 97.5 80.0 - 99.0 FL    MCH 31.6 26.0 - 34.0 PG    MCHC 32.4 30.0 - 36.5 g/dL    RDW 15.8 (H) 11.5 - 14.5 %    PLATELET 010 908 - 064 K/uL    MPV 10.2 8.9 - 12.9 FL    NRBC 0.0 0  WBC    ABSOLUTE NRBC 0.00 0.00 - 0.01 K/uL       Additional comments:I reviewed the patient's new clinical lab test results. Patient Vitals for the past 8 hrs:   BP Temp Pulse Resp SpO2 Weight   05/26/21 0600 (!) 149/65 98.8 °F (37.1 °C) 61 15 98 %    05/26/21 0200 (!) 142/62 98.4 °F (36.9 °C) 71 16 99 % 209 lb 9.6 oz (95.1 kg)       No intake/output data recorded. 05/24 1901 - 05/26 0700  In: 1960 [I.V.:800]  Out: -     Exam:  Visit Vitals  BP (!) 149/65 (BP 1 Location: Left upper arm, BP Patient Position: At rest)   Pulse 61   Temp 98.8 °F (37.1 °C)   Resp 15   Ht 5' 8\" (1.727 m)   Wt 209 lb 9.6 oz (95.1 kg)   SpO2 98%   BMI 31.87 kg/m²     Gen: Well developed  CV: RRR  Lungs: non labored breathing  Abd: soft, non distended  Neuro: Asleep.   Moans to stimulation and pain. Does not follow commands. 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    Hypertension I10    ED (erectile dysfunction) N52.9    Severe obesity ([facility]) E66.01    TESSA on CPAP G47.33, Z99.89    Simple chronic bronchitis ([facility]) J41.0    Bilateral carotid artery stenosis I65.23    Spinal stenosis of lumbar region at multiple levels M48.061    Flank pain R10.9    Rectal bleeding K62.5    Pneumonia J18.9    Severe sepsis ([facility]) A41.9, R65.20    Acute blood loss anemia D62    Facial droop R29.810    Altered mental state R41.82    Viral encephalitis A86    Coronary artery disease with stable angina pectoris ([facility]) I25.118    Chronic obstructive pulmonary disease ([facility]) J44.9    CKD (chronic kidney disease) stage 3, GFR 30-59 ml/min ([facility]) N18.30    Intracranial hemorrhage ([facility]) I62.9    CAP (community acquired pneumonia) J18.9       Assessment/Plan:      63-year-old gentleman who has had scattered embolic infarcts more on the right with scattered hematoma. He is on 2 anticonvulsants which seem to be effective. I do not think he is clinically seizing. EEG yesterday at the bedside with his current condition unrevealing. I spoke with his daughter again at length. I do think we need to maintain the anticonvulsants further at out of risk that he could go back in status. We went over the neuroimaging personally at the bedside.   I think he could survive these infarcts however I do not know if globally he will improve. Family needs to make a decision about nutrition whether or not to replace NG tube or consider PEG. They are hesitant to proceed with PEG understandably hoping he might improve his mentation. I think is reasonable to see how today goes. I plan to speak with the family again tomorrow. 35 minutes of time was spent reviewing medical records today, face-to-face time with the patient and daughter at the bedside reviewing imaging and discussing neck steps. This clinical note was dictated with an electronic dictation software that can make unintentional errors. If there are any questions, please contact me directly for clarification.       Signed:  2 [facility], DO  5/26/2021  9:13 AM

## 2021-05-26 NOTE — PROGRESS NOTES
Hospitalist Progress Note      Hospital summary: Tori Serrato a 80 y. o. male with pmh of CVA, intraventricular brain hemorrhage, dyslipidemia, CKD 3, hypertension who presents to hospital from subacute rehab for concerns of altered/decreased mental status. Patient was found to have multiple ischemic CVAs on MRI, continue to have altered mental status with some eye deviation and twitching on 5/20. This was concerning for seizure, given 2 mg of Ativan and loaded with Keppra with some improvement. On 5/21 patient however declined, unresponsive to verbal stimuli. Stat EEG 24-hour was ordered and showed subclinical status. He was loaded with Vimpat, and neurology recommends transfer to ICU for closer neuro checks on 5/21.  Seizures were controlled and  Pt transferred out of ICU on 5/23.     5/16/2021      Assessment/Plan:  Status epilepticus   -Status post Keppra and Vimpat load 5/20  -Continue Keppra and Vimpat   - Neurology following  - 24hr EEG: mild diffuse cerebral dysfunction, of which the sporadic generalized frontally predominant triphasic discharges are indicative of metabolic/toxic encephalopathy.  -  Seizure precautions      Acute embolic infarcts  Subacute hematoma -intraventricular hemorrhage with ventriculomegaly on recent admission, discharged 5/3  -Suspicious for A. fib, however when reviewed by cardiology no definitive episodes seen on telemetry  -Not a candidate for anticoagulation due to intracranial hemorrhage, and fall risk  -Neurology following  -No escalation of antiplatelets due to ICH, continue aspirin  -Continue atorvastatin  -PT/OT/speech  -Echo with normal EF   - rpt CT head 5/24: no change     Acute Metabolic encephalopathy - not improving , lethargic   - multifactorial, acute CVAs, pneumonia, electrolyte imbalances, seizures  -Day and night schedules, avoid benzodiazepines if able  -Hold Seroquel for now      Community-acquired pneumonia   -x-ray with left basilar airspace disease  - completed doxy   -Remains on room air, monitor     Hypertension SBP goal <160. C/w hydralazine, amlodipine. IV labetalol and hydralazine prn. Hyperlipidemiacontinue Lipitor  Hx TESSA    Hypernatremia - resolved   - likely due to poor oral intake   will monitor      Nutrition  - Dobhoff placed on 5/23 but pt pulled off 5/25 evening  - IR consulted for Dobhoff placement  - will start TFs again  - family hesitant to do PEG tube but considering if it is temporary    Appreciate Palliative care input     Code status: DNR. DVT prophylaxis: SCDs  Disposition: TBD  ----------------------------------------------    CC: Acute CVA. Seizures     S: Patient is seen and examined at bedside this AM. Still lethargic, only responding to pain. Had a brief episode of alertness last evening. He pulled dobhoff last evening   Daughter present - overall no change in mentation, concerning, reasonable to give some more time, family will decide tomorrow if they want to consider PEG after talking with neurology. If not considering PEG, family need to decide on hospice? by Friday   Discussed with nursing - restart TFs at slower rate     Review of Systems:  Review of systems not obtained due to patient factors.     O:  Visit Vitals  /70 (BP 1 Location: Left upper arm, BP Patient Position: At rest)   Pulse 60   Temp 98.3 °F (36.8 °C)   Resp 14   Ht 5' 8\" (1.727 m)   Wt 95.1 kg (209 lb 9.6 oz)   SpO2 100%   BMI 31.87 kg/m²       PHYSICAL EXAM:  Gen: lethargic   HEENT: anicteric sclerae, normal conjunctiva, NG tube   Neck: supple, trachea midline, no adenopathy  Heart: RRR, no MRG, no JVD, no peripheral edema  Lungs: decreased at bases  Abd: soft, NT, ND, BS+, no organomegaly  Extr: warm  Skin: dry, no rash  Neuro: not following commands       Intake/Output Summary (Last 24 hours) at 5/26/2021 1522  Last data filed at 5/26/2021 0000  Gross per 24 hour   Intake 400 ml   Output    Net 400 ml        Recent labs & imaging reviewed:  Recent Results (from the past 24 hour(s))   CBC W/O DIFF    Collection Time: 05/26/21  3:56 AM   Result Value Ref Range    WBC 5.3 4.1 - 11.1 K/uL    RBC 3.58 (L) 4.10 - 5.70 M/uL    HGB 11.3 (L) 12.1 - 17.0 g/dL    HCT 34.9 (L) 36.6 - 50.3 %    MCV 97.5 80.0 - 99.0 FL    MCH 31.6 26.0 - 34.0 PG    MCHC 32.4 30.0 - 36.5 g/dL    RDW 15.8 (H) 11.5 - 14.5 %    PLATELET 815 379 - 232 K/uL    MPV 10.2 8.9 - 12.9 FL    NRBC 0.0 0  WBC    ABSOLUTE NRBC 0.00 0.00 - 4.98 K/uL   METABOLIC PANEL, BASIC    Collection Time: 05/26/21  3:56 AM   Result Value Ref Range    Sodium 144 136 - 145 mmol/L    Potassium 3.6 3.5 - 5.1 mmol/L    Chloride 116 (H) 97 - 108 mmol/L    CO2 22 21 - 32 mmol/L    Anion gap 6 5 - 15 mmol/L    Glucose 96 65 - 100 mg/dL    BUN 16 6 - 20 MG/DL    Creatinine 1.07 0.70 - 1.30 MG/DL    BUN/Creatinine ratio 15 12 - 20      GFR est AA >60 >60 ml/min/1.73m2    GFR est non-AA >60 >60 ml/min/1.73m2    Calcium 8.5 8.5 - 10.1 MG/DL   MAGNESIUM    Collection Time: 05/26/21  3:56 AM   Result Value Ref Range    Magnesium 2.1 1.6 - 2.4 mg/dL   PHOSPHORUS    Collection Time: 05/26/21  3:56 AM   Result Value Ref Range    Phosphorus 2.9 2.6 - 4.7 MG/DL   LACTIC ACID    Collection Time: 05/26/21  1:42 PM   Result Value Ref Range    Lactic acid 0.6 0.4 - 2.0 MMOL/L     Recent Labs     05/26/21  0356 05/25/21  0351   WBC 5.3 6.4   HGB 11.3* 11.8*   HCT 34.9* 37.1    157     Recent Labs     05/26/21  0356 05/25/21  0351 05/24/21  0059    146* 149*   K 3.6 3.6 3.8   * 116* 119*   CO2 22 25 23   BUN 16 21* 24*   CREA 1.07 1.08 1.15   GLU 96 140* 115*   CA 8.5 8.8 9.0   MG 2.1 2.1 2.3   PHOS 2.9 2.6 2.4*     No results for input(s): ALT, AP, TBIL, TBILI, TP, ALB, GLOB, GGT, AML, LPSE in the last 72 hours. No lab exists for component: SGOT, GPT, AMYP, HLPSE  No results for input(s): INR, PTP, APTT, INREXT, INREXT in the last 72 hours.    No results for input(s): FE, TIBC, PSAT, FERR in the last 72 hours. No results found for: FOL, RBCF   No results for input(s): PH, PCO2, PO2 in the last 72 hours. No results for input(s): CPK, CKNDX, TROIQ in the last 72 hours.     No lab exists for component: CPKMB  Lab Results   Component Value Date/Time    Cholesterol, total 144 04/19/2021 12:59 AM    HDL Cholesterol 33 04/19/2021 12:59 AM    LDL, calculated 74.8 04/19/2021 12:59 AM    Triglyceride 181 (H) 04/19/2021 12:59 AM    CHOL/HDL Ratio 4.4 04/19/2021 12:59 AM     Lab Results   Component Value Date/Time    Glucose (POC) 124 (H) 05/24/2021 06:35 PM    Glucose (POC) 115 05/24/2021 11:37 AM    Glucose (POC) 107 05/22/2021 02:25 PM    Glucose (POC) 92 05/21/2021 07:36 AM    Glucose (POC) 110 (H) 05/03/2021 11:41 AM     Lab Results   Component Value Date/Time    Color YELLOW/STRAW 05/16/2021 09:33 PM    Appearance CLEAR 05/16/2021 09:33 PM    Specific gravity 1.014 05/16/2021 09:33 PM    Specific gravity 1.015 09/21/2020 09:13 AM    pH (UA) 5.0 05/16/2021 09:33 PM    Protein Negative 05/16/2021 09:33 PM    Glucose Negative 05/16/2021 09:33 PM    Ketone Negative 05/16/2021 09:33 PM    Bilirubin Negative 05/16/2021 09:33 PM    Urobilinogen 0.2 05/16/2021 09:33 PM    Nitrites Negative 05/16/2021 09:33 PM    Leukocyte Esterase Negative 05/16/2021 09:33 PM    Epithelial cells FEW 05/16/2021 09:33 PM    Bacteria Negative 05/16/2021 09:33 PM    WBC 0-4 05/16/2021 09:33 PM    RBC 0-5 05/16/2021 09:33 PM       Med list reviewed  Current Facility-Administered Medications   Medication Dose Route Frequency    lidocaine (XYLOCAINE) 2 % jelly        lidocaine (XYLOCAINE) 2 % jelly   Mucous Membrane PRN    iohexoL (OMNIPAQUE) 350 mg iodine/mL contrast injection 50 mL  50 mL IntraCATHeter RAD ONCE    dextrose 5% infusion  50 mL/hr IntraVENous CONTINUOUS    hydrALAZINE (APRESOLINE) tablet 100 mg  100 mg Oral TID    amLODIPine (NORVASC) tablet 5 mg  5 mg Oral DAILY    labetaloL (NORMODYNE;TRANDATE) injection 20 mg  20 mg IntraVENous Q6H PRN    levETIRAcetam (KEPPRA) 1,000 mg in 0.9% sodium chloride 100 mL IVPB  1,000 mg IntraVENous Q12H    hydrALAZINE (APRESOLINE) 20 mg/mL injection 20 mg  20 mg IntraVENous Q6H PRN    lacosamide (VIMPAT) 100 mg in 0.9% sodium chloride 100 mL IVPB  100 mg IntraVENous Q12H    sodium chloride (NS) flush 5-40 mL  5-40 mL IntraVENous Q8H    sodium chloride (NS) flush 5-40 mL  5-40 mL IntraVENous PRN    polyethylene glycol (MIRALAX) packet 17 g  17 g Oral DAILY PRN    albuterol (PROVENTIL VENTOLIN) nebulizer solution 2.5 mg  2.5 mg Nebulization Q4H PRN    aspirin chewable tablet 81 mg  81 mg Oral DAILY    atorvastatin (LIPITOR) tablet 80 mg  80 mg Oral DAILY    [Held by provider] isosorbide mononitrate ER (IMDUR) tablet 30 mg  30 mg Oral DAILY    sodium chloride (NS) flush 5-40 mL  5-40 mL IntraVENous Q8H    sodium chloride (NS) flush 5-40 mL  5-40 mL IntraVENous PRN    acetaminophen (TYLENOL) tablet 650 mg  650 mg Oral Q6H PRN    Or    acetaminophen (TYLENOL) suppository 650 mg  650 mg Rectal Q6H PRN    promethazine (PHENERGAN) tablet 12.5 mg  12.5 mg Oral Q6H PRN    Or    ondansetron (ZOFRAN) injection 4 mg  4 mg IntraVENous Q6H PRN       Care Plan discussed with:  Patient/Family and Nurse    Mira Webb MD  Internal Medicine  Date of Service: 5/26/2021

## 2021-05-26 NOTE — PROGRESS NOTES
Speech Pathology  Chart reviewed, spoke with daughter and RN. Patient remains not arousable. Patient pulled NG again. Daughter would like NG replaced, as she is not yet ready to have PEG placed. SLP notified RN of daughter's request. He remains inappropriate for PO at this time. SLP will continue to follow.

## 2021-05-26 NOTE — PROGRESS NOTES
1336:  called, machine in lab was down and by the time it got fixed the lactic acid lab was no longer. Will redraw and send to lab.

## 2021-05-26 NOTE — PROGRESS NOTES
Palliative Medicine Consult  Neel: [phone] ([phone])    Patient Name: Liz Tan  YOB: 1934    Date of Initial Consult: May 24, 2029  Reason for Consult: Care decisions  Requesting Provider: Juliana Viramontes  Primary Care Physician: Ady Villarreal MD     SUMMARY:   Liz Tan is a 80 y.o. with a past history of stroke, IVH, DL D, CKD 3, HTN, EtOH, OA, CAD, COPD, GERD, viral encephalitis, morbid obesity, TESSA, ED who was admitted on 5/16/2021 from [address] [facility] with a diagnosis of AMS, severe sepsis, CAP. Patient briefly transferred to the ICU for status epilepticus, neuro checks. Issues with dysphagia related to poor mentation. Discharged from [facility] 2 weeks ago for IVH. MRI showed multiple ischemic CVAs per chart notes    Current medical issues leading to Palliative Medicine involvement include: Support with care decisions relating to artificial nutrition. DNR    Baseline prior to initial stroke about 2 weeks ago: pt was gardening and totally independent. He lives at home with his wife. 2 children that live in [place] ([facility]) [address]. Retired from [employer] at 54 yrs old. [employer] without a [employer] affiliation. Pt had a stroke 2 weeks ago and went to [facility] for rehab. Daughter reports the pt was doing well with rehab until this recent event. PALLIATIVE DIAGNOSES:   1. Somnolence  2. Seizures  3. Feeding difficulties  4. Physical debility   5. Goals of care     PLAN:   1. 5/26/21: Pt seen along with daughter, Vy Mcneil at the bedside. 2. Ene has a clear understanding of the patient's condition. 3. Discussions held with neuro and attending and family wanting to give the pt more time to gauge improvement of mentation prior to deciding on peg  4. Pt pulled out dobhoff and family would like it replaced  5. Daughter says the option for a temporary peg was presented. We discussed and I explained what that would entail  6.  Plan:Ene says her daughters (2 nurses) and brother are meeting with neurology tomorrow. She is hoping to have a decision regarding peg after the meeting  7. She feels well supported at this time and will contact me if needed  8. Will follow peripherally    Meeting held 5/24/21  9. We discussed \"where to from here\"  10. Ene hopeful that the pt will wake up  11. We discussed nutrition and she feels the pt would not want to be prolonged with a peg if prognosis was poor. 12. Hard to determine how much the  meds are contributing to level of sedation  13. We discussed hospice in detail and the difference with Palliative   14. Plan:  1. Ene will discuss hospice with her mother  2. Ene discussed with attending waiting a few days to see if the patient's mentation improves before making decisions  3. Ene will let me know if she wants to meet with hospice beforehand for information  4. dispo will be home and family will provide 24 hour care. If the pt rallies then family may change to SNF as the dispo  15. Initial consult note routed to primary continuity provider and/or primary health care team members  12. Communicated plan of care with: Palliative ID[facility] Team     GOALS OF CARE / TREATMENT PREFERENCES:     GOALS OF CARE:  Patient/Health Care Proxy Stated Goals:  Other (comment) (still deciding)    TREATMENT PREFERENCES:   Code Status: DNR    Advance Care Planning:  [x] The Pall [facility] Interdisciplinary Team has updated the ACP Navigator with Health Care Decision Maker and Patient Capacity      Primary Decision Maker: Tracy Veras - Lj - [phone]  Advance Care Planning 5/17/2021   Patient's [address] is: -   Primary Decision Maker Name -   Primary Decision Maker Phone Number -   Primary Decision Maker Relationship to Patient -   Confirm Advance Directive Yes, on file   Patient Would Like to Complete Advance Directive -   Does the patient have other document types Do Not Resuscitate       Medical Interventions: Limited additional interventions     Other Instructions:   Artificially Administered Nutrition:  (still deciding)     Other:    As far as possible, the palliative care team has discussed with patient / health care proxy about goals of care / treatment preferences for patient. HISTORY:     History obtained from: chart, dtr, team    CHIEF COMPLAINT: pt admitted with aforementioned history and issues    HPI/SUBJECTIVE:    The patient is:   [] Verbal and participatory  [x] Non-participatory due to:   Medical condition  Clinical Pain Assessment (nonverbal scale for severity on nonverbal patients):   Clinical Pain Assessment  Severity: 0     Activity (Movement): Lying quietly, normal position    Duration: for how long has pt been experiencing pain (e.g., 2 days, 1 month, years)  Frequency: how often pain is an issue (e.g., several times per day, once every few days, constant)     FUNCTIONAL ASSESSMENT:     Palliative Performance Scale (PPS):  PPS: 30       PSYCHOSOCIAL/SPIRITUAL SCREENING:     Palliative IDT has assessed this patient for cultural preferences / practices and a referral made as appropriate to needs (Cultural Services, Patient Advocacy, Ethics, etc.)    Any spiritual / Christian concerns:  [] Yes /  [x] No    Caregiver Burnout:  [] Yes /  [x] No /  [] No Caregiver Present      Anticipatory grief assessment:   [x] Normal  / [] Maladaptive       ESAS Anxiety: Anxiety: 0    ESAS Depression:          REVIEW OF SYSTEMS:     Positive and pertinent negative findings in ROS are noted above in HPI. The following systems were [] reviewed / [x] unable to be reviewed as noted in HPI  Other findings are noted below. Systems: constitutional, ears/nose/mouth/throat, respiratory, gastrointestinal, genitourinary, musculoskeletal, integumentary, neurologic, psychiatric, endocrine. Positive findings noted below.   Modified ESAS Completed by: provider   Fatigue: 10 Drowsiness: 10     Pain: 0   Anxiety: 0 Anorexia: 10 Dyspnea: 0           Stool Occurrence(s): 0        PHYSICAL EXAM:     From RN flowsheet:  Wt Readings from Last 3 Encounters:   05/26/21 209 lb 9.6 oz (95.1 kg)   05/18/21 210 lb 1.6 oz (95.3 kg)   05/02/21 222 lb 3.6 oz (100.8 kg)     Blood pressure 130/70, pulse 60, temperature 98.3 °F (36.8 °C), resp. rate 14, height 5' 8\" (1.727 m), weight 209 lb 9.6 oz (95.1 kg), SpO2 100 %.     Pain Scale 1: Adult Nonverbal Pain Scale  Pain Intensity 1: 0                 Last bowel movement, if known:     Constitutional: somnolent, NAD  Eyes: pupils equal, anicteric  ENMT: no nasal discharge, moist mucous membranes  Cardiovascular:   Respiratory: breathing not labored, symmetric  Gastrointestinal: gross   Musculoskeletal: no deformity  Skin: warm, dry  Neurologic: following no commands, stroke  Psychiatric: calm       HISTORY:     Active Problems:    CAP (community acquired pneumonia) (5/16/2021)      Past Medical History:   Diagnosis Date    Adverse effect of anesthesia     combative after anesthesia    Alcohol abuse     6 beers/day    Arthritis     CAD (coronary artery disease)     Chronic obstructive pulmonary disease (HCC)     Chronic obstructive pulmonary disease (HCC)     CKD (chronic kidney disease) stage 3, GFR 30-59 ml/min ([address] 9/21/2020    Dyslipidemia 8/16/2017    Dyspepsia and other specified disorders of function of stomach     Dyspnea 8/16/2017    ED (erectile dysfunction) 8/16/2017    Encounter for immunization 8/16/2017    Fatigue 8/16/2017    Flank pain 8/1/2019    GERD (gastroesophageal reflux disease) 8/16/2017    Gout 8/16/2017    Hypertension     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    Morbid obesity ([address]     On statin therapy 8/16/2017    TESSA on CPAP 1/3/2019    Psychiatric disorder     Depression    Simple chronic bronchitis ([address] 1/3/2019    TIA (transient ischemic attack) 6/89/8046    Umbilical hernia 52/4/5738    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      Past Surgical History:   Procedure Laterality Date    COLONOSCOPY N/A 9/27/2019    COLON[facility]COPY performed by Madhu Fall [facility] at [facility] ENDOSCOPY    HX HERNIA REPAIR      RIH    HX HERNIA REPAIR  38/17/23    open umbilical hernia repair mesh    HX ORTHOPAEDIC      HX UROLOGICAL      HYDROCELECTOMY    PA CARDIAC SURG PROCEDURE UNLIST  1996    Cardiac Stents    PA CARDIAC SURG PROCEDURE UNLIST  04/2019    EF 61-65%    UPPER GI ENDOSCOPY,BIOPSY  9/30/2019           Family History   Problem Relation Age of Onset    Heart Disease Mother     Hypertension Mother     Hypertension Father     Hypertension Sister     Hypertension Brother     Cancer Brother       History reviewed, no pertinent family history.   Social History     Tobacco Use    Smoking status: Former Smoker     Packs/day: 0.50     Years: 20.00     Pack years: 10.00    Smokeless tobacco: Former User    Tobacco comment: quit about 40  years ago   / used to dip tobaco and dip snuff   Substance Use Topics    Alcohol use: Yes     Comment: \"Drinks very little now for about a month \"     Allergies   Allergen Reactions    Levaquin [Levofloxacin] Nausea and Vomiting     Reports anxiety, nausea, aching after taking levaquin    Ciprofloxacin Shortness of Breath    Quinolones Shortness of Breath      Current Facility-Administered Medications   Medication Dose Route Frequency    lidocaine (XYLOCAINE) 2 % jelly        lidocaine (XYLOCAINE) 2 % jelly   Mucous Membrane PRN    iohexoL (OMNIPAQUE) 350 mg iodine/mL contrast injection 50 mL  50 mL IntraCATHeter RAD ONCE    dextrose 5% infusion  50 mL/hr IntraVENous CONTINUOUS    hydrALAZINE (APRESOLINE) tablet 100 mg  100 mg Oral TID    amLODIPine (NORVASC) tablet 5 mg  5 mg Oral DAILY    labetaloL (NORMODYNE;TRANDATE) injection 20 mg  20 mg IntraVENous Q6H PRN    levETIRAcetam (KEPPRA) 1,000 mg in 0.9% sodium chloride 100 mL IVPB  1,000 mg IntraVENous Q12H    hydrALAZINE (APRESOLINE) 20 mg/mL injection 20 mg  20 mg IntraVENous Q6H PRN    lacosamide (VIMPAT) 100 mg in 0.9% sodium chloride 100 mL IVPB  100 mg IntraVENous Q12H    sodium chloride (NS) flush 5-40 mL  5-40 mL IntraVENous Q8H    sodium chloride (NS) flush 5-40 mL  5-40 mL IntraVENous PRN    polyethylene glycol (MIRALAX) packet 17 g  17 g Oral DAILY PRN    albuterol (PROVENTIL VENTOLIN) nebulizer solution 2.5 mg  2.5 mg Nebulization Q4H PRN    aspirin chewable tablet 81 mg  81 mg Oral DAILY    atorvastatin (LIPITOR) tablet 80 mg  80 mg Oral DAILY    [Held by provider] isosorbide mononitrate ER (IMDUR) tablet 30 mg  30 mg Oral DAILY    sodium chloride (NS) flush 5-40 mL  5-40 mL IntraVENous Q8H    sodium chloride (NS) flush 5-40 mL  5-40 mL IntraVENous PRN    acetaminophen (TYLENOL) tablet 650 mg  650 mg Oral Q6H PRN    Or    acetaminophen (TYLENOL) suppository 650 mg  650 mg Rectal Q6H PRN    promethazine (PHENERGAN) tablet 12.5 mg  12.5 mg Oral Q6H PRN    Or    ondansetron (ZOFRAN) injection 4 mg  4 mg IntraVENous Q6H PRN          LAB AND IMAGING FINDINGS:     Lab Results   Component Value Date/Time    WBC 5.3 05/26/2021 03:56 AM    HGB 11.3 (L) 05/26/2021 03:56 AM    PLATELET 912 48/29/6601 03:56 AM     Lab Results   Component Value Date/Time    Sodium 146 (H) 05/25/2021 03:51 AM    Potassium 3.6 05/25/2021 03:51 AM    Chloride 116 (H) 05/25/2021 03:51 AM    CO2 25 05/25/2021 03:51 AM    BUN 21 (H) 05/25/2021 03:51 AM    Creatinine 1.08 05/25/2021 03:51 AM    Calcium 8.8 05/25/2021 03:51 AM    Magnesium 2.1 05/25/2021 03:51 AM    Phosphorus 2.6 05/25/2021 03:51 AM      Lab Results   Component Value Date/Time    Alk.  phosphatase 117 05/16/2021 09:16 PM    Protein, total 7.7 05/16/2021 09:16 PM    Albumin 3.9 05/16/2021 09:16 PM    Globulin 3.8 05/16/2021 09:16 PM     Lab Results   Component Value Date/Time    INR 1.3 (H) 04/19/2021 12:59 AM    Prothrombin time 13.5 (H) 04/19/2021 12:59 AM    aPTT 37.6 (H) 05/22/2021 03:11 AM      No results found for: IRON, FE, TIBC, IBCT, PSAT, FERR   Lab Results   Component Value Date/Time    pH 7.37 05/22/2021 03:59 AM    PCO2 38 05/22/2021 03:59 AM    PO2 107 (H) 05/22/2021 03:59 AM     No components found for: [place]   Lab Results   Component Value Date/Time    .00 09/23/2020 08:33 AM    CK - MB 2.7 04/24/2015 05:11 AM                Total time: 25 minutes   counseling / coordination time, spent as noted above: 20 minutes  > 50% counseling / coordination?:  Yes    Prolonged service was provided for  []30 min   []75 min in face to face time in the presence of the patient, spent as noted above. Time Start:   Time End:   Note: this can only be billed with 65131 (initial) or 85908 (follow up). If multiple start / stop times, list each separately.

## 2021-05-27 LAB
ANION GAP SERPL CALC-SCNC: 8 MMOL/L (ref 5–15)
BUN SERPL-MCNC: 15 MG/DL (ref 6–20)
BUN/CREAT SERPL: 14 (ref 12–20)
CALCIUM SERPL-MCNC: 8.4 MG/DL (ref 8.5–10.1)
CHLORIDE SERPL-SCNC: 108 MMOL/L (ref 97–108)
CO2 SERPL-SCNC: 24 MMOL/L (ref 21–32)
CREAT SERPL-MCNC: 1.1 MG/DL (ref 0.7–1.3)
GLUCOSE SERPL-MCNC: 122 MG/DL (ref 65–100)
LACTATE SERPL-SCNC: 1.6 MMOL/L (ref 0.4–2)
MAGNESIUM SERPL-MCNC: 2.2 MG/DL (ref 1.6–2.4)
PHOSPHATE SERPL-MCNC: 2.6 MG/DL (ref 2.6–4.7)
POTASSIUM SERPL-SCNC: 4.4 MMOL/L (ref 3.5–5.1)
SODIUM SERPL-SCNC: 140 MMOL/L (ref 136–145)

## 2021-05-27 PROCEDURE — 36415 COLL VENOUS BLD VENIPUNCTURE: CPT

## 2021-05-27 PROCEDURE — 83605 ASSAY OF LACTIC ACID: CPT

## 2021-05-27 PROCEDURE — 74011250637 HC RX REV CODE- 250/637: Performed by: INTERNAL MEDICINE

## 2021-05-27 PROCEDURE — 74011250636 HC RX REV CODE- 250/636: Performed by: PSYCHIATRY & NEUROLOGY

## 2021-05-27 PROCEDURE — 84100 ASSAY OF PHOSPHORUS: CPT

## 2021-05-27 PROCEDURE — 74011250636 HC RX REV CODE- 250/636: Performed by: INTERNAL MEDICINE

## 2021-05-27 PROCEDURE — 74011250637 HC RX REV CODE- 250/637: Performed by: FAMILY MEDICINE

## 2021-05-27 PROCEDURE — 74011000258 HC RX REV CODE- 258: Performed by: PSYCHIATRY & NEUROLOGY

## 2021-05-27 PROCEDURE — 83735 ASSAY OF MAGNESIUM: CPT

## 2021-05-27 PROCEDURE — 65660000001 HC RM ICU INTERMED STEPDOWN

## 2021-05-27 PROCEDURE — 99222 1ST HOSP IP/OBS MODERATE 55: CPT | Performed by: PSYCHIATRY & NEUROLOGY

## 2021-05-27 PROCEDURE — 80048 BASIC METABOLIC PNL TOTAL CA: CPT

## 2021-05-27 PROCEDURE — C9254 INJECTION, LACOSAMIDE: HCPCS | Performed by: PSYCHIATRY & NEUROLOGY

## 2021-05-27 PROCEDURE — 92526 ORAL FUNCTION THERAPY: CPT

## 2021-05-27 RX ADMIN — HYDRALAZINE HYDROCHLORIDE 100 MG: 50 TABLET, FILM COATED ORAL at 08:14

## 2021-05-27 RX ADMIN — SODIUM CHLORIDE 100 MG: 9 INJECTION, SOLUTION INTRAVENOUS at 02:58

## 2021-05-27 RX ADMIN — ASPIRIN 81 MG: 81 TABLET, CHEWABLE ORAL at 08:14

## 2021-05-27 RX ADMIN — SODIUM CHLORIDE 100 MG: 9 INJECTION, SOLUTION INTRAVENOUS at 13:45

## 2021-05-27 RX ADMIN — Medication 10 ML: at 23:53

## 2021-05-27 RX ADMIN — HYDRALAZINE HYDROCHLORIDE 100 MG: 50 TABLET, FILM COATED ORAL at 22:55

## 2021-05-27 RX ADMIN — LEVETIRACETAM 1000 MG: 100 INJECTION, SOLUTION INTRAVENOUS at 23:52

## 2021-05-27 RX ADMIN — Medication 10 ML: at 13:47

## 2021-05-27 RX ADMIN — Medication 10 ML: at 05:56

## 2021-05-27 RX ADMIN — HYDRALAZINE HYDROCHLORIDE 100 MG: 50 TABLET, FILM COATED ORAL at 15:47

## 2021-05-27 RX ADMIN — LEVETIRACETAM 1000 MG: 100 INJECTION, SOLUTION INTRAVENOUS at 10:53

## 2021-05-27 RX ADMIN — ATORVASTATIN CALCIUM 80 MG: 40 TABLET, FILM COATED ORAL at 08:14

## 2021-05-27 RX ADMIN — AMLODIPINE BESYLATE 5 MG: 5 TABLET ORAL at 08:15

## 2021-05-27 RX ADMIN — DEXTROSE MONOHYDRATE 50 ML/HR: 50 INJECTION, SOLUTION INTRAVENOUS at 05:56

## 2021-05-27 NOTE — PROGRESS NOTES
Problem: Dysphagia (Adult)  Goal: *Acute Goals and Plan of Care (Insert Text)  Description: Speech Therapy Goals  Initiated 5/19/2021    1. Patient will tolerate regular diet/thin liquids without adverse effects within 7 days. Discontinue 5/27/2021  2. Patient will participate in swallow re-evaluation within 7 days. 5/27/2021 1437 by Larry Adams SLP  Outcome: Not Progressing Towards Goal  5/27/2021 1437 by Larry Adams, SLP  Outcome: Not Progressing Towards Goal     SPEECH LANGUAGE PATHOLOGY DYSPHAGIA TREATMENT: WEEKLY REASSESSMENT  Patient: Renetta Dixon (30 y.o. male)  Date: 5/27/2021  Diagnosis: CAP (community acquired pneumonia) [J18.9] <principal problem not specified>       Precautions:       ASSESSMENT:  Unfortunately patient overall mentation and ability to participate has been poor over the past week. Today was the first day patient able to participate in swallow treatment, and was with significant overt s/s of aspiration with thin liquids. At this juncture, would only recommend small amounts of puree when pt awake/alert as well as ice chips for comfort. However, would consider continued goals of care conversations as pt likely not a candidate for long-term feeding tube. PLAN:  Goals have been updated based on progression since last assessment. Patient continues to benefit from skilled intervention to address the above impairments. Continue to follow the patient PRN to address goals. Recommendations and Planned Interventions:  --NPO with DHT  --Small bites of puree/ice chips when awake and alert with RN only  --upright  --good oral care    Discharge Recommendations:  To Be Determined     SUBJECTIVE:   Patient non-verbal.    OBJECTIVE:   Cognitive and Communication Status:  Neurologic State: Alert  Orientation Level: Unable to verbalize  Cognition: Decreased attention/concentration, Decreased command following  Perception: Cues to attend left visual field, Cues to attend to left side of body     Safety/Judgement: Decreased awareness of environment, Decreased awareness of need for safety, Decreased awareness of need for assistance, Decreased insight into deficits  Dysphagia Treatment:  Oral Assessment:  Oral Assessment  Labial:  (unable to follow commands)  P.O. Trials:  Patient Position: upright in bed  Vocal quality prior to P.O.: Hoarse  Consistency Presented: Thin liquid;Puree  How Presented: SLP-fed/presented;Straw;Spoon     Bolus Acceptance: No impairment  Bolus Formation/Control: Impaired  Type of Impairment: Delayed  Propulsion: Delayed (# of seconds)     Initiation of Swallow: Delayed (# of seconds)  Laryngeal Elevation: Functional  Aspiration Signs/Symptoms: Strong cough (with thin liquids)              Oral Phase Severity: Moderate  Pharyngeal Phase Severity : Moderate  Pain:  Pain Scale 1: Visual  Pain Intensity 1: 0       After treatment patient left in no apparent distress:   Call bell within reach and Nursing notified    COMMUNICATION/EDUCATION:     The patients plan of care including recommendations, planned interventions, and recommended diet changes were discussed with: Registered nurse.      TIMOTHY Goddard  Time Calculation: 10 mins

## 2021-05-27 NOTE — PROGRESS NOTES
Transition of Care Plan   RUR- 27% Medium Risk   DISPOSITION: The disposition plan is pending medical progression and recommendation   F/U with PCP/Specialist     Transport: [facility]/family     CM will continue to follow, provide support and assist with JACQUES needs as they arise.     Michael Hinkle

## 2021-05-27 NOTE — CONSULTS
Neurology Progress Note    Patient ID:  Tyrone Lucio  [id number]  89 y.o.  1934    Chief Complaint: Stroke    Subjective:     Larry Hill is an 70-year-old gentleman here in the hospital.  He is recovering from [facility] and embolic infarcts. NG tube replaced yesterday. Regaining a little bit more awareness but definitely not back at baseline.     Objective:       ROS:  Cannot obtain secondary to patient medical condition      Meds:  Current Facility-Administered Medications   Medication Dose Route Frequency    lidocaine (XYLOCAINE) 2 % jelly   Mucous Membrane PRN    dextrose 5% infusion  50 mL/hr IntraVENous CONTINUOUS    hydrALAZINE (APRESOLINE) tablet 100 mg  100 mg Oral TID    amLODIPine (NORVASC) tablet 5 mg  5 mg Oral DAILY    labetaloL (NORMODYNE;TRANDATE) injection 20 mg  20 mg IntraVENous Q6H PRN    levETIRAcetam (KEPPRA) 1,000 mg in 0.9% sodium chloride 100 mL IVPB  1,000 mg IntraVENous Q12H    hydrALAZINE (APRESOLINE) 20 mg/mL injection 20 mg  20 mg IntraVENous Q6H PRN    lacosamide (VIMPAT) 100 mg in 0.9% sodium chloride 100 mL IVPB  100 mg IntraVENous Q12H    sodium chloride (NS) flush 5-40 mL  5-40 mL IntraVENous Q8H    sodium chloride (NS) flush 5-40 mL  5-40 mL IntraVENous PRN    polyethylene glycol (MIRALAX) packet 17 g  17 g Oral DAILY PRN    albuterol (PROVENTIL VENTOLIN) nebulizer solution 2.5 mg  2.5 mg Nebulization Q4H PRN    aspirin chewable tablet 81 mg  81 mg Oral DAILY    atorvastatin (LIPITOR) tablet 80 mg  80 mg Oral DAILY    [Held by provider] isosorbide mononitrate ER (IMDUR) tablet 30 mg  30 mg Oral DAILY    sodium chloride (NS) flush 5-40 mL  5-40 mL IntraVENous Q8H    sodium chloride (NS) flush 5-40 mL  5-40 mL IntraVENous PRN    acetaminophen (TYLENOL) tablet 650 mg  650 mg Oral Q6H PRN    Or    acetaminophen (TYLENOL) suppository 650 mg  650 mg Rectal Q6H PRN    promethazine (PHENERGAN) tablet 12.5 mg  12.5 mg Oral Q6H PRN    Or    ondansetron (ZOFRAN) injection 4 mg 4 mg IntraVENous Q6H PRN       MRI Results (maximum last 3): Results from [place] encounter on 05/16/21    MRI BRAIN WO CONT    Narrative  *PRELIMINARY REPORT*    Multiple foci of small cortical and white matter infarcts mostly on the right  with a couple of lesions on the left as well. Interval evolution of  intraventricular hemorrhage and small parenchymal hemorrhages. Preliminary report was provided by Dr. Hilaria Morales, the on-call radiologist, at  22:17. The findings were called to floor nurse, Luís New, on 5/18/2021 at 22:20 by myself. [phone]    Final report to follow. *END PRELIMINARY REPORT*    EXAM:  MRI BRAIN WO CONT    INDICATION:    AMS    COMPARISON:  CT head 5/16/2021, MRI brain 11/23/2019. CONTRAST: None. TECHNIQUE:  Multiplanar multisequence acquisition without contrast of the brain. FINDINGS:  Few small scattered acute infarcts in the bilateral frontal lobes and  parieto-occipital lobes, right greater than left. Evolved now late subacute small intraparenchymal hematomas in the right parietal  periventricular white matter measuring 10 mm and 5 mm (series 6 image 11), with  no significant surrounding edema or mass effect. There is hemosiderin staining  noted along the ependymal lining of the lateral ventricles, as well as scattered  superficial siderosis involving the sylvian fissures, and along the surface of  the brainstem and cerebellum. Unchanged generalized parenchymal volume loss with commensurate dilation of the  sulci and ventricular system. Unchanged confluent periventricular and deep white  matter T2/FLAIR hyperintensity, and patchy T2/FLAIR hyperintensity in the nely,  consistent with severe chronic microvascular ischemic disease. There is no  cerebellar tonsillar herniation. Expected arterial flow-voids are present. The paranasal sinuses, mastoid air cells, and middle ears are clear. The orbital  contents are within normal limits with bilateral lens implants. No significant  osseous or scalp lesions are identified. Impression  1. Few small scattered acute infarcts in the bilateral frontal lobes and  parieto-occipital lobes, right greater than left. 2. 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 3. Unchanged generalized parenchymal volume loss and severe chronic  microvascular ischemic disease. Results from [place] encounter on 11/18/19    MRI BRAIN W WO CONT    Narrative  EXAM:  MRI BRAIN W WO CONT    INDICATION:    ?encephalitis altered mental status. COMPARISON:  MRI brain 11/19/2019. CONTRAST: 20 ml [place]. TECHNIQUE:  Multiplanar multisequence acquisition without and with contrast of the brain. FINDINGS:  Evaluation is limited by motion. Unchanged generalized parenchymal volume loss with commensurate dilation of the  sulci and ventricular system. Unchanged periventricular deep white matter  T2/FLAIR hyperintensities, confluent in regions, consistent with moderate  chronic microvascular ischemic disease. Small chronic microhemorrhages are noted  in the left frontal lobe and right temporal lobe (series 7 image 40 and 41). There is no acute infarct, hemorrhage, extra-axial fluid collection, or mass  effect. There is no cerebellar tonsillar herniation. Expected arterial  flow-voids are present. No evidence of abnormal enhancement. Mild mucosal thickening in the left maxillary sinus and sphenoid sinuses. The  mastoid air cells and middle ears are clear. The orbital contents are within  normal limits with bilateral lens implants. No significant osseous or scalp  lesions are identified.  Reversal of the cervical lordosis with grade 1  anterolisthesis of C2 on C3 and multilevel degenerative disc disease and facet  arthropathy in the upper cervical spine. Impression  IMPRESSION:  1. Evaluation is limited by motion. No acute intracranial abnormality. 2. Unchanged generalized parenchymal volume loss and moderate chronic  microvascular ischemic disease. MRI BRAIN WO CONT    Narrative  EXAM: MRI BRAIN WO CONT    INDICATION: AMS    COMPARISON: None. CONTRAST: None. TECHNIQUE:  Single sequence of diffusion imaging was performed. The patient could not  tolerate further imaging. FINDINGS:  Diffusion imaging demonstrates no evidence of acute ischemia. Impression  IMPRESSION:  Limited study. No evidence of acute ischemia. Lab Review   Recent Results (from the past 24 hour(s))   LACTIC ACID    Collection Time: 05/26/21  1:42 PM   Result Value Ref Range    Lactic acid 0.6 0.4 - 2.0 MMOL/L   LACTIC ACID    Collection Time: 05/27/21  2:02 AM   Result Value Ref Range    Lactic acid 1.6 0.4 - 2.0 MMOL/L   MAGNESIUM    Collection Time: 05/27/21  2:02 AM   Result Value Ref Range    Magnesium 2.2 1.6 - 2.4 mg/dL   PHOSPHORUS    Collection Time: 05/27/21  2:02 AM   Result Value Ref Range    Phosphorus 2.6 2.6 - 4.7 MG/DL   METABOLIC PANEL, BASIC    Collection Time: 05/27/21  2:02 AM   Result Value Ref Range    Sodium 140 136 - 145 mmol/L    Potassium 4.4 3.5 - 5.1 mmol/L    Chloride 108 97 - 108 mmol/L    CO2 24 21 - 32 mmol/L    Anion gap 8 5 - 15 mmol/L    Glucose 122 (H) 65 - 100 mg/dL    BUN 15 6 - 20 MG/DL    Creatinine 1.10 0.70 - 1.30 MG/DL    BUN/Creatinine ratio 14 12 - 20      GFR est AA >60 >60 ml/min/1.73m2    GFR est non-AA >60 >60 ml/min/1.73m2    Calcium 8.4 (L) 8.5 - 10.1 MG/DL       Additional comments:I reviewed the patient's new clinical lab test results.      Patient Vitals for the past 8 hrs:   BP Temp Pulse Resp SpO2 Weight   05/27/21 0814 123/74  82      05/27/21 0600 116/65 98.6 °F (37 °C) 82 16 98 %    05/27/21 0200 124/67 98.5 °F (36.9 °C) 79 15 100 % 211 lb 14.4 oz (96.1 kg) No intake/output data recorded. 05/25 1901 - 05/27 0700  In: 1010 [I.V.:400]  Out: -     Exam:  Visit Vitals  /74   Pulse 82   Temp 98.6 °F (37 °C)   Resp 16   Ht 5' 8\" (1.727 m)   Wt 211 lb 14.4 oz (96.1 kg)   SpO2 98%   BMI 32.22 kg/m²     Gen: Well developed  CV: RRR  Lungs: non labored breathing  Abd: soft, non distended  Neuro: Very drowsy but he will look to the examiner when I call his name. He tries to make some single words like yes. Intermittently follows commands.   CN II-XII: PERRL, conjugate gaze, face grossly symmetric  Motor: Spontaneously moving the upper extremities with mitts on slowly  Sensory: Not tested  DTRs: symmetric  COOR: no limb/truncal ataxia  Gait: Deferred    PROBLEM LIST:     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    Hypertension I10    ED (erectile dysfunction) N52.9    Severe obesity ([facility]) E66.01    TESSA on CPAP G47.33, Z99.89    Simple chronic bronchitis ([facility]) J41.0    Bilateral carotid artery stenosis I65.23    Spinal stenosis of lumbar region at multiple levels M48.061    Flank pain R10.9    Rectal bleeding K62.5    Pneumonia J18.9    Severe sepsis ([facility]) A41.9, R65.20    Acute blood loss anemia D62    Facial droop R29.810    Altered mental state R41.82    Viral encephalitis A86    Coronary artery disease with stable angina pectoris ([facility]) I25.118    Chronic obstructive pulmonary disease ([facility]) J44.9    CKD (chronic kidney disease) stage 3, GFR 30-59 ml/min ([facility]) N18.30    Intracranial hemorrhage ([facility]) I62.9    CAP (community acquired pneumonia) J18.9       Assessment/Plan:      80year-old gentleman who is slowly recovering from a complicated presentation of IVH with subsequent 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 He is no longer clinically seizing. He will remain on anticonvulsants. He is regaining a little bit more alertness and awareness slowly on a day by day basis. I think it would be reasonable to attempt another bedside swallow at some point to determine if the NG tube can be removed. Family has made it clear they do not want a PEG tube. I think it's appropriate to give him a few more days since his mental status seems to be showing a little bit of improvement and then reassess his swallow. 45 minutes of time was spent today reviewing the medical record, speaking with the patient's wife, brother, daughter, two grandchildren, reviewing imaging personally in the room, discussing some goals of care in a limited manner. Palliative should be involved to help answer further questions. This clinical note was dictated with an electronic dictation software that can make unintentional errors. If there are any questions, please contact me directly for clarification.       Signed:  [address] [facility], DO  5/27/2021  9:13 AM

## 2021-05-27 NOTE — PROGRESS NOTES
Bedside and Verbal shift change report given to 2021 Martin Brar (oncoming nurse) by Alex Aguayo (offgoing nurse). Report included the following information SBAR, Kardex, Intake/Output, MAR, Recent Results, Cardiac Rhythm NSR and Dual Neuro Assessment.

## 2021-05-27 NOTE — PROGRESS NOTES
Problem: Pressure Injury - Risk of  Goal: *Prevention of pressure injury  Description: Document Aquiles Scale and appropriate interventions in the flowsheet.   Outcome: Progressing Towards Goal  Note: Pressure Injury Interventions:  Sensory Interventions: Assess need for specialty bed, Minimize linen layers    Moisture Interventions: Absorbent underpads, Apply protective barrier, creams and emollients, Assess need for specialty bed, Check for incontinence Q2 hours and as needed    Activity Interventions: Assess need for specialty bed, PT/OT evaluation    Mobility Interventions: PT/OT evaluation, HOB 30 degrees or less    Nutrition Interventions: Document food/fluid/supplement intake    Friction and Shear Interventions: Apply protective barrier, creams and emollients, HOB 30 degrees or less

## 2021-05-27 NOTE — PROGRESS NOTES
Hospitalist Progress Note      Hospital summary: Pete Both a 80 y. o. male with pmh of CVA, intraventricular brain hemorrhage, dyslipidemia, CKD 3, hypertension who presents to hospital from subacute rehab for concerns of altered/decreased mental status. Patient was found to have multiple ischemic CVAs on MRI, continue to have altered mental status with some eye deviation and twitching on 5/20. This was concerning for seizure, given 2 mg of Ativan and loaded with Keppra with some improvement. On 5/21 patient however declined, unresponsive to verbal stimuli. Stat EEG 24-hour was ordered and showed subclinical status. He was loaded with Vimpat, and neurology recommends transfer to ICU for closer neuro checks on 5/21.  Seizures were controlled and  Pt transferred out of ICU on 5/23.     5/16/2021      Assessment/Plan:  Status epilepticus -resolved  -Status post Keppra and Vimpat load 5/20  -Continue Keppra and Vimpat   - Neurology following  - 24hr EEG: mild diffuse cerebral dysfunction, of which the sporadic generalized frontally predominant triphasic discharges are indicative of metabolic/toxic encephalopathy.  -  Seizure precautions      Acute embolic infarcts  Subacute hematoma -intraventricular hemorrhage with ventriculomegaly on recent admission, discharged 5/3  -Suspicious for A. fib, however when reviewed by cardiology no definitive episodes seen on telemetry  -Not a candidate for anticoagulation due to intracranial hemorrhage, and fall risk  -Neurology following  -No escalation of antiplatelets due to ICH, continue aspirin  -Continue atorvastatin  -PT/OT/speech  -Echo with normal EF   - rpt CT head 5/24: no change     Acute Metabolic encephalopathy - not improving , lethargic   - multifactorial, acute CVAs, pneumonia, electrolyte imbalances, seizures  -Day and night schedules, avoid benzodiazepines if able  -Hold Seroquel for now      Community-acquired pneumonia-resolved   -x-ray with left basilar airspace disease  - completed doxy   -Remains on room air, monitor     Hypertension SBP goal <160. C/w hydralazine, amlodipine. IV labetalol and hydralazine prn. Hyperlipidemiacontinue Lipitor  Hx TESSA    Hypernatremia - resolved   - likely due to poor oral intake   will monitor      Dysphagia  - has dubhoff  -on TFs again  -family wants to give him more time and see if he improves, PEG tube held for now    Appreciate Palliative care input     Code status: DNR. DVT prophylaxis: SCDs  Disposition: TBD  ----------------------------------------------    CC: Acute CVA. Seizures     S: Patient is seen and examined     He opened eyes intermittently, did not follow commands for me    Review of Systems:  Review of systems not obtained due to patient factors.     O:  Visit Vitals  BP (!) 143/65   Pulse 82   Temp 98.5 °F (36.9 °C)   Resp 20   Ht 5' 8\" (1.727 m)   Wt 96.1 kg (211 lb 14.4 oz)   SpO2 100%   BMI 32.22 kg/m²       PHYSICAL EXAM:  Gen: lethargic   HEENT: anicteric sclerae, normal conjunctiva, NG tube   Neck: supple, trachea midline, no adenopathy  Heart: RRR, no MRG, no JVD, no peripheral edema  Lungs: decreased at bases  Abd: soft, NT, ND, BS+, no organomegaly  Extr: warm  Skin: dry, no rash  Neuro: not following commands       Intake/Output Summary (Last 24 hours) at 5/27/2021 1638  Last data filed at 5/27/2021 0800  Gross per 24 hour   Intake 770 ml   Output    Net 770 ml        Recent labs & imaging reviewed:  Recent Results (from the past 24 hour(s))   LACTIC ACID    Collection Time: 05/27/21  2:02 AM   Result Value Ref Range    Lactic acid 1.6 0.4 - 2.0 MMOL/L   MAGNESIUM    Collection Time: 05/27/21  2:02 AM   Result Value Ref Range    Magnesium 2.2 1.6 - 2.4 mg/dL   PHOSPHORUS    Collection Time: 05/27/21  2:02 AM   Result Value Ref Range    Phosphorus 2.6 2.6 - 4.7 MG/DL   METABOLIC PANEL, BASIC    Collection Time: 05/27/21  2:02 AM   Result Value Ref Range    Sodium 140 136 - 145 mmol/L    Potassium 4.4 3.5 - 5.1 mmol/L    Chloride 108 97 - 108 mmol/L    CO2 24 21 - 32 mmol/L    Anion gap 8 5 - 15 mmol/L    Glucose 122 (H) 65 - 100 mg/dL    BUN 15 6 - 20 MG/DL    Creatinine 1.10 0.70 - 1.30 MG/DL    BUN/Creatinine ratio 14 12 - 20      GFR est AA >60 >60 ml/min/1.73m2    GFR est non-AA >60 >60 ml/min/1.73m2    Calcium 8.4 (L) 8.5 - 10.1 MG/DL     Recent Labs     05/26/21  0356 05/25/21  0351   WBC 5.3 6.4   HGB 11.3* 11.8*   HCT 34.9* 37.1    157     Recent Labs     05/27/21  0202 05/26/21  0356 05/25/21 0351    144 146*   K 4.4 3.6 3.6    116* 116*   CO2 24 22 25   BUN 15 16 21*   CREA 1.10 1.07 1.08   * 96 140*   CA 8.4* 8.5 8.8   MG 2.2 2.1 2.1   PHOS 2.6 2.9 2.6     No results for input(s): ALT, AP, TBIL, TBILI, TP, ALB, GLOB, GGT, AML, LPSE in the last 72 hours. No lab exists for component: SGOT, GPT, AMYP, HLPSE  No results for input(s): INR, PTP, APTT, INREXT, INREXT in the last 72 hours. No results for input(s): FE, TIBC, PSAT, FERR in the last 72 hours. No results found for: FOL, RBCF   No results for input(s): PH, PCO2, PO2 in the last 72 hours. No results for input(s): CPK, CKNDX, TROIQ in the last 72 hours.     No lab exists for component: CPKMB  Lab Results   Component Value Date/Time    Cholesterol, total 144 04/19/2021 12:59 AM    HDL Cholesterol 33 04/19/2021 12:59 AM    LDL, calculated 74.8 04/19/2021 12:59 AM    Triglyceride 181 (H) 04/19/2021 12:59 AM    CHOL/HDL Ratio 4.4 04/19/2021 12:59 AM     Lab Results   Component Value Date/Time    Glucose (POC) 124 (H) 05/24/2021 06:35 PM    Glucose (POC) 115 05/24/2021 11:37 AM    Glucose (POC) 107 05/22/2021 02:25 PM    Glucose (POC) 92 05/21/2021 07:36 AM    Glucose (POC) 110 (H) 05/03/2021 11:41 AM     Lab Results   Component Value Date/Time    Color YELLOW/STRAW 05/16/2021 09:33 PM    Appearance CLEAR 05/16/2021 09:33 PM    Specific gravity 1.014 05/16/2021 09:33 PM    Specific gravity 1.015 09/21/2020 09:13 AM    pH (UA) 5.0 05/16/2021 09:33 PM    Protein Negative 05/16/2021 09:33 PM    Glucose Negative 05/16/2021 09:33 PM    Ketone Negative 05/16/2021 09:33 PM    Bilirubin Negative 05/16/2021 09:33 PM    Urobilinogen 0.2 05/16/2021 09:33 PM    Nitrites Negative 05/16/2021 09:33 PM    Leukocyte Esterase Negative 05/16/2021 09:33 PM    Epithelial cells FEW 05/16/2021 09:33 PM    Bacteria Negative 05/16/2021 09:33 PM    WBC 0-4 05/16/2021 09:33 PM    RBC 0-5 05/16/2021 09:33 PM       Med list reviewed  Current Facility-Administered Medications   Medication Dose Route Frequency    lidocaine (XYLOCAINE) 2 % jelly   Mucous Membrane PRN    dextrose 5% infusion  50 mL/hr IntraVENous CONTINUOUS    hydrALAZINE (APRESOLINE) tablet 100 mg  100 mg Oral TID    amLODIPine (NORVASC) tablet 5 mg  5 mg Oral DAILY    labetaloL (NORMODYNE;TRANDATE) injection 20 mg  20 mg IntraVENous Q6H PRN    levETIRAcetam (KEPPRA) 1,000 mg in 0.9% sodium chloride 100 mL IVPB  1,000 mg IntraVENous Q12H    hydrALAZINE (APRESOLINE) 20 mg/mL injection 20 mg  20 mg IntraVENous Q6H PRN    lacosamide (VIMPAT) 100 mg in 0.9% sodium chloride 100 mL IVPB  100 mg IntraVENous Q12H    sodium chloride (NS) flush 5-40 mL  5-40 mL IntraVENous Q8H    sodium chloride (NS) flush 5-40 mL  5-40 mL IntraVENous PRN    polyethylene glycol (MIRALAX) packet 17 g  17 g Oral DAILY PRN    albuterol (PROVENTIL VENTOLIN) nebulizer solution 2.5 mg  2.5 mg Nebulization Q4H PRN    aspirin chewable tablet 81 mg  81 mg Oral DAILY    atorvastatin (LIPITOR) tablet 80 mg  80 mg Oral DAILY    [Held by provider] isosorbide mononitrate ER (IMDUR) tablet 30 mg  30 mg Oral DAILY    sodium chloride (NS) flush 5-40 mL  5-40 mL IntraVENous Q8H    sodium chloride (NS) flush 5-40 mL  5-40 mL IntraVENous PRN    acetaminophen (TYLENOL) tablet 650 mg  650 mg Oral Q6H PRN    Or    acetaminophen (TYLENOL) suppository 650 mg  650 mg Rectal Q6H PRN    promethazine (PHENERGAN) tablet 12.5 mg  12.5 mg Oral Q6H PRN    Or    ondansetron (ZOFRAN) injection 4 mg  4 mg IntraVENous Q6H PRN       Care Plan discussed with:  Patient/Family and Nurse    Angelika Eli MD  Internal Medicine  Date of Service: 5/27/2021

## 2021-05-27 NOTE — PROGRESS NOTES
Bedside and Verbal shift change report given to 2021 Martin Brar (oncoming nurse) by Britni Castro (offgoing nurse). Report included the following information SBAR, Kardex, Intake/Output, MAR, Recent Results, Cardiac Rhythm NSR and Dual Neuro Assessment.

## 2021-05-27 NOTE — PROGRESS NOTES
Problem: Pressure Injury - Risk of  Goal: *Prevention of pressure injury  Description: Document Aquiles Scale and appropriate interventions in the flowsheet. Outcome: Progressing Towards Goal  Note: Pressure Injury Interventions:  Sensory Interventions: Assess need for specialty bed, Check visual cues for pain, Float heels, Keep linens dry and wrinkle-free    Moisture Interventions: Absorbent underpads, Apply protective barrier, creams and emollients, Minimize layers    Activity Interventions: Increase time out of bed, PT/OT evaluation    Mobility Interventions: Float heels, PT/OT evaluation    Nutrition Interventions: Document food/fluid/supplement intake    Friction and Shear Interventions: Apply protective barrier, creams and emollients, Lift sheet                Problem: Falls - Risk of  Goal: *Absence of Falls  Description: Document Too Fall Risk and appropriate interventions in the flowsheet.   Outcome: Progressing Towards Goal  Note: Fall Risk Interventions:  Mobility Interventions: OT consult for ADLs, Communicate number of staff needed for ambulation/transfer, PT Consult for mobility concerns    Mentation Interventions: Adequate sleep, hydration, pain control, Bed/chair exit alarm, More frequent rounding    Medication Interventions: Bed/chair exit alarm, Patient to call before getting OOB    Elimination Interventions: Bed/chair exit alarm, Call light in reach    History of Falls Interventions: Bed/chair exit alarm, Investigate reason for fall         Problem: Seizure Disorder (Adult)  Goal: *STG: Maintains lab values within therapeutic range  Outcome: Progressing Towards Goal  Goal: *STG/LTG: Complies with medication therapy  Outcome: Progressing Towards Goal  Goal: *STG: Remains safe in hospital  Outcome: Progressing Towards Goal     Problem: TIA/CVA Stroke: Day 2 Until Discharge  Goal: Activity/Safety  Outcome: Progressing Towards Goal  Goal: Diagnostic Test/Procedures  Outcome: Progressing Towards Goal  Goal: Nutrition/Diet  Outcome: Progressing Towards Goal  Goal: Medications  Outcome: Progressing Towards Goal  Goal: Respiratory  Outcome: Progressing Towards Goal

## 2021-05-28 LAB
ANION GAP SERPL CALC-SCNC: 5 MMOL/L (ref 5–15)
BASOPHILS # BLD: 0 K/UL (ref 0–0.1)
BASOPHILS NFR BLD: 1 % (ref 0–1)
BUN SERPL-MCNC: 12 MG/DL (ref 6–20)
BUN/CREAT SERPL: 11 (ref 12–20)
CALCIUM SERPL-MCNC: 8.8 MG/DL (ref 8.5–10.1)
CHLORIDE SERPL-SCNC: 110 MMOL/L (ref 97–108)
CO2 SERPL-SCNC: 23 MMOL/L (ref 21–32)
CREAT SERPL-MCNC: 1.06 MG/DL (ref 0.7–1.3)
DIFFERENTIAL METHOD BLD: ABNORMAL
EOSINOPHIL # BLD: 0.2 K/UL (ref 0–0.4)
EOSINOPHIL NFR BLD: 3 % (ref 0–7)
ERYTHROCYTE [DISTWIDTH] IN BLOOD BY AUTOMATED COUNT: 15.2 % (ref 11.5–14.5)
GLUCOSE SERPL-MCNC: 112 MG/DL (ref 65–100)
HCT VFR BLD AUTO: 39.8 % (ref 36.6–50.3)
HGB BLD-MCNC: 12.7 G/DL (ref 12.1–17)
IMM GRANULOCYTES # BLD AUTO: 0 K/UL (ref 0–0.04)
IMM GRANULOCYTES NFR BLD AUTO: 0 % (ref 0–0.5)
LACTATE SERPL-SCNC: 1.2 MMOL/L (ref 0.4–2)
LYMPHOCYTES # BLD: 0.8 K/UL (ref 0.8–3.5)
LYMPHOCYTES NFR BLD: 10 % (ref 12–49)
MAGNESIUM SERPL-MCNC: 2 MG/DL (ref 1.6–2.4)
MCH RBC QN AUTO: 31.3 PG (ref 26–34)
MCHC RBC AUTO-ENTMCNC: 31.9 G/DL (ref 30–36.5)
MCV RBC AUTO: 98 FL (ref 80–99)
MONOCYTES # BLD: 0.5 K/UL (ref 0–1)
MONOCYTES NFR BLD: 6 % (ref 5–13)
NEUTS SEG # BLD: 6.2 K/UL (ref 1.8–8)
NEUTS SEG NFR BLD: 80 % (ref 32–75)
NRBC # BLD: 0 K/UL (ref 0–0.01)
NRBC BLD-RTO: 0 PER 100 WBC
PHOSPHATE SERPL-MCNC: 2.3 MG/DL (ref 2.6–4.7)
PLATELET # BLD AUTO: 193 K/UL (ref 150–400)
PMV BLD AUTO: 10.9 FL (ref 8.9–12.9)
POTASSIUM SERPL-SCNC: 3.7 MMOL/L (ref 3.5–5.1)
RBC # BLD AUTO: 4.06 M/UL (ref 4.1–5.7)
SODIUM SERPL-SCNC: 138 MMOL/L (ref 136–145)
WBC # BLD AUTO: 7.8 K/UL (ref 4.1–11.1)

## 2021-05-28 PROCEDURE — 85025 COMPLETE CBC W/AUTO DIFF WBC: CPT

## 2021-05-28 PROCEDURE — 74011250636 HC RX REV CODE- 250/636: Performed by: PSYCHIATRY & NEUROLOGY

## 2021-05-28 PROCEDURE — 76937 US GUIDE VASCULAR ACCESS: CPT

## 2021-05-28 PROCEDURE — 80048 BASIC METABOLIC PNL TOTAL CA: CPT

## 2021-05-28 PROCEDURE — 83735 ASSAY OF MAGNESIUM: CPT

## 2021-05-28 PROCEDURE — 92526 ORAL FUNCTION THERAPY: CPT

## 2021-05-28 PROCEDURE — 99232 SBSQ HOSP IP/OBS MODERATE 35: CPT | Performed by: PSYCHIATRY & NEUROLOGY

## 2021-05-28 PROCEDURE — 74011250637 HC RX REV CODE- 250/637: Performed by: INTERNAL MEDICINE

## 2021-05-28 PROCEDURE — 74011250637 HC RX REV CODE- 250/637: Performed by: FAMILY MEDICINE

## 2021-05-28 PROCEDURE — 36415 COLL VENOUS BLD VENIPUNCTURE: CPT

## 2021-05-28 PROCEDURE — 77030018798 HC PMP KT ENTRL FED COVD -A

## 2021-05-28 PROCEDURE — 65660000001 HC RM ICU INTERMED STEPDOWN

## 2021-05-28 PROCEDURE — C9254 INJECTION, LACOSAMIDE: HCPCS | Performed by: PSYCHIATRY & NEUROLOGY

## 2021-05-28 PROCEDURE — 74011000258 HC RX REV CODE- 258: Performed by: PSYCHIATRY & NEUROLOGY

## 2021-05-28 PROCEDURE — 84100 ASSAY OF PHOSPHORUS: CPT

## 2021-05-28 PROCEDURE — 74011250637 HC RX REV CODE- 250/637: Performed by: STUDENT IN AN ORGANIZED HEALTH CARE EDUCATION/TRAINING PROGRAM

## 2021-05-28 PROCEDURE — 83605 ASSAY OF LACTIC ACID: CPT

## 2021-05-28 RX ADMIN — Medication 10 ML: at 13:39

## 2021-05-28 RX ADMIN — SODIUM CHLORIDE 100 MG: 9 INJECTION, SOLUTION INTRAVENOUS at 02:33

## 2021-05-28 RX ADMIN — ASPIRIN 81 MG: 81 TABLET, CHEWABLE ORAL at 09:51

## 2021-05-28 RX ADMIN — LEVETIRACETAM 1000 MG: 100 INJECTION, SOLUTION INTRAVENOUS at 22:47

## 2021-05-28 RX ADMIN — Medication 10 ML: at 13:38

## 2021-05-28 RX ADMIN — SODIUM CHLORIDE 100 MG: 9 INJECTION, SOLUTION INTRAVENOUS at 13:43

## 2021-05-28 RX ADMIN — ACETAMINOPHEN 650 MG: 325 TABLET ORAL at 18:20

## 2021-05-28 RX ADMIN — ATORVASTATIN CALCIUM 80 MG: 40 TABLET, FILM COATED ORAL at 09:53

## 2021-05-28 RX ADMIN — HYDRALAZINE HYDROCHLORIDE 100 MG: 50 TABLET, FILM COATED ORAL at 09:52

## 2021-05-28 RX ADMIN — HYDRALAZINE HYDROCHLORIDE 100 MG: 50 TABLET, FILM COATED ORAL at 18:17

## 2021-05-28 RX ADMIN — Medication 10 ML: at 22:47

## 2021-05-28 RX ADMIN — HYDRALAZINE HYDROCHLORIDE 100 MG: 50 TABLET, FILM COATED ORAL at 22:48

## 2021-05-28 RX ADMIN — LEVETIRACETAM 1000 MG: 100 INJECTION, SOLUTION INTRAVENOUS at 11:26

## 2021-05-28 RX ADMIN — Medication 10 ML: at 05:20

## 2021-05-28 RX ADMIN — AMLODIPINE BESYLATE 5 MG: 5 TABLET ORAL at 09:52

## 2021-05-28 NOTE — PROGRESS NOTES
Problem: Pressure Injury - Risk of  Goal: *Prevention of pressure injury  Description: Document Aquiles Scale and appropriate interventions in the flowsheet. Outcome: Progressing Towards Goal  Note: Pressure Injury Interventions:  Sensory Interventions: Assess need for specialty bed    Moisture Interventions: Apply protective barrier, creams and emollients    Activity Interventions: PT/OT evaluation, Increase time out of bed    Mobility Interventions: HOB 30 degrees or less, PT/OT evaluation    Nutrition Interventions: Document food/fluid/supplement intake, Discuss nutritional consult with provider    Friction and Shear Interventions: HOB 30 degrees or less                Problem: Falls - Risk of  Goal: *Absence of Falls  Description: Document Too Fall Risk and appropriate interventions in the flowsheet.   Outcome: Progressing Towards Goal  Note: Fall Risk Interventions:  Mobility Interventions: Bed/chair exit alarm    Mentation Interventions: Bed/chair exit alarm, Door open when patient unattended    Medication Interventions: Bed/chair exit alarm    Elimination Interventions: Bed/chair exit alarm, Call light in reach    History of Falls Interventions: Bed/chair exit alarm, Investigate reason for fall

## 2021-05-28 NOTE — PROGRESS NOTES
Bedside and Verbal shift change report given to [address] [place], [place] [address]way [address] (oncoming nurse) by Kay Montes (offgoing nurse). Report included the following information SBAR, Kardex, Intake/Output, MAR, Recent Results, Cardiac Rhythm NSR/Sinus Tach and Dual Neuro Assessment.

## 2021-05-28 NOTE — PROGRESS NOTES
Comprehensive Nutrition Assessment    Type and Reason for Visit: Initial, Consult    Nutrition Recommendations/Plan:    1. Modify TF:  -Start Jevity 1.5 @ 30 ml/hr  -Adv. feeds by 10 ml Q 12 HR  -GOAL: Jevity 1.5 @ 60 ml/hr + 120 ml free water flush Q 4 hrs    2. Does pt continue to require IVF's in addition to TF's? Consider d/c.     3. Long term goals of care - DHT feedings typically recommended x 4 weeks duration vs PEG tube for long term nutrition support. Nutrition Assessment:    PMH CVA, intraventricular brain hemorrhage, dyslipidemia, CKD 3, HTN, admitted from subacute rehab for concerns of AMS/decreased mental status.      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 Pt has had DHT placed 5/23 for nutrition/medication admin. Pt known to RD from prior admission, at which time pt had copious amount of loose stools, he was transitioned to nocturnal feedings of Vital 1.5 and tolerated well. Given tube feeding market-wide formulary changes, Vital 1.5 no longer available. Today, per discussion with RN and dtr at bedside, concern for pt with frequent loose stools. Additionally, TFs have been running @ 45 ml/hr vs recommended goal of 75 d/t concern of pt coughing - has since resolved. Will modify to fiber containing formula as first change, with continued monitoring and modifications pending pt tolerance and GI status. TFs at recommended goal above to provide 1980 kcal, 84 g protein, with 285 g CHO, 1720 ml free water, and 2040 ml total volume. Malnutrition Assessment:  Malnutrition Status: At risk for malnutrition - prolonged hospitalization/SNF   Context:  Acute illness         Nutritionally Significant Medications: Meds reviewed      Estimated Daily Nutrient Needs:  Energy (kcal): 1930 (MSJ x 1.2); Weight Used for Energy Requirements: Current  Protein (g): 95 (1 g/kg);  Weight Used for Protein Requirements: Current  Fluid (ml/day): 1930; Method Used for Fluid Requirements: 1 ml/kcal    Nutrition Related Findings:       BM: Abd soft, loose BM's daily  Edema: None  Wounds:  None    Recent Labs     05/28/21  0054 05/27/21  0202 05/26/21  0356   GLU  --  122* 96   BUN  --  15 16   CREA  --  1.10 1.07   NA  --  140 144   K  --  4.4 3.6   CL  --  108 116*   CO2  --  24 22   CA  --  8.4* 8.5   PHOS 2.3* 2.6 2.9   MG 2.0 2.2 2.1         Current Nutrition Therapies:   Diet: NPO on TFs  Supplements: NPO  Additional Caloric Sources: IVFs (D5% @ 50 ml/hr)      Anthropometric Measures:  · Height:  5' 8\" (172.7 cm)  · Current Body Wt:  95.3 kg (210 lb)   · Ideal Body Wt:  154:  136.4 %   · BMI Categories:  Obese class 1 (BMI 30.0-34. 9)     Wt Readings from Last 10 Encounters:   05/28/21 94.9 kg (209 lb 4.8 oz)   05/18/21 95.3 kg (210 lb 1.6 oz)   05/02/21 100.8 kg (222 lb 3.6 oz)   12/04/20 103.3 kg (227 lb 12.8 oz)   09/21/20 102.5 kg (226 lb)   06/26/20 100.6 kg (221 lb 12.8 oz)   02/28/20 99 kg (218 lb 3.2 oz)   12/27/19 96.6 kg (213 lb)   12/12/19 98.2 kg (216 lb 6.4 oz)   12/02/19 99.9 kg (220 lb 3.2 oz)       Nutrition Diagnosis:   · Inadequate oral intake related to cognitive or neurological impairment as evidenced by NPO or clear liquid status due to medical condition, swallowing study results      Nutrition Interventions:   Food and/or Nutrient Delivery: Start tube feeding  Nutrition Education and Counseling: No recommendations at this time  Coordination of Nutrition Care: Continue to monitor while inpatient, Interdisciplinary rounds    Goals:  TF advancement to goal within next 24-48 hrs. Nutrition Monitoring and Evaluation:   Behavioral-Environmental Outcomes: None identified  Food/Nutrient Intake Outcomes: Enteral nutrition intake/tolerance  Physical Signs/Symptoms Outcomes: GI status, Chewing or swallowing    Discharge Planning:     Too soon to determine     Lynn Leal RD     Contact via [address]

## 2021-05-28 NOTE — CONSULTS
Neurology Progress Note    Patient ID:  Jessie Sicard  [id number]  89 y.o.  1934    Chief Complaint: Stroke and seizures    Subjective:     \"Bon\" is an 14-year-old gentleman recovering from intraventricular hemorrhage spontaneous, embolic infarcts, seizures. Since yesterday he is still showing a little bit of improvement in mentation. Daughter at the bedside. He is able to tolerate small bites of puréed food when he is awake. Still with significant difficulty following complex commands.     Objective:       ROS:  Cannot obtain secondary to patient medical condition      Meds:  Current Facility-Administered Medications   Medication Dose Route Frequency    lidocaine (XYLOCAINE) 2 % jelly   Mucous Membrane PRN    dextrose 5% infusion  50 mL/hr IntraVENous CONTINUOUS    hydrALAZINE (APRESOLINE) tablet 100 mg  100 mg Oral TID    amLODIPine (NORVASC) tablet 5 mg  5 mg Oral DAILY    labetaloL (NORMODYNE;TRANDATE) injection 20 mg  20 mg IntraVENous Q6H PRN    levETIRAcetam (KEPPRA) 1,000 mg in 0.9% sodium chloride 100 mL IVPB  1,000 mg IntraVENous Q12H    hydrALAZINE (APRESOLINE) 20 mg/mL injection 20 mg  20 mg IntraVENous Q6H PRN    lacosamide (VIMPAT) 100 mg in 0.9% sodium chloride 100 mL IVPB  100 mg IntraVENous Q12H    sodium chloride (NS) flush 5-40 mL  5-40 mL IntraVENous Q8H    sodium chloride (NS) flush 5-40 mL  5-40 mL IntraVENous PRN    polyethylene glycol (MIRALAX) packet 17 g  17 g Oral DAILY PRN    albuterol (PROVENTIL VENTOLIN) nebulizer solution 2.5 mg  2.5 mg Nebulization Q4H PRN    aspirin chewable tablet 81 mg  81 mg Oral DAILY    atorvastatin (LIPITOR) tablet 80 mg  80 mg Oral DAILY    [Held by provider] isosorbide mononitrate ER (IMDUR) tablet 30 mg  30 mg Oral DAILY    sodium chloride (NS) flush 5-40 mL  5-40 mL IntraVENous Q8H    sodium chloride (NS) flush 5-40 mL  5-40 mL IntraVENous PRN    acetaminophen (TYLENOL) tablet 650 mg  650 mg Oral Q6H PRN    Or    acetaminophen (TYLENOL) suppository 650 mg  650 mg Rectal Q6H PRN    promethazine (PHENERGAN) tablet 12.5 mg  12.5 mg Oral Q6H PRN    Or    ondansetron (ZOFRAN) injection 4 mg  4 mg IntraVENous Q6H PRN       MRI Results (maximum last 3): Results from [place] encounter on 05/16/21    MRI BRAIN WO CONT    Narrative  *PRELIMINARY REPORT*    Multiple foci of small cortical and white matter infarcts mostly on the right  with a couple of lesions on the left as well. Interval evolution of  intraventricular hemorrhage and small parenchymal hemorrhages. Preliminary report was provided by Dr. Hilaria Morales, the on-call radiologist, at  22:17. The findings were called to floor nurse, Luís New, on 5/18/2021 at 22:20 by myself. [phone]    Final report to follow. *END PRELIMINARY REPORT*    EXAM:  MRI BRAIN WO CONT    INDICATION:    AMS    COMPARISON:  CT head 5/16/2021, MRI brain 11/23/2019. CONTRAST: None. TECHNIQUE:  Multiplanar multisequence acquisition without contrast of the brain. FINDINGS:  Few small scattered acute infarcts in the bilateral frontal lobes and  parieto-occipital lobes, right greater than left. Evolved now late subacute small intraparenchymal hematomas in the right parietal  periventricular white matter measuring 10 mm and 5 mm (series 6 image 11), with  no significant surrounding edema or mass effect. There is hemosiderin staining  noted along the ependymal lining of the lateral ventricles, as well as scattered  superficial siderosis involving the sylvian fissures, and along the surface of  the brainstem and cerebellum. Unchanged generalized parenchymal volume loss with commensurate dilation of the  sulci and ventricular system. Unchanged confluent periventricular and deep white  matter T2/FLAIR hyperintensity, and patchy T2/FLAIR hyperintensity in the nely,  consistent with severe chronic microvascular ischemic disease. There is no  cerebellar tonsillar herniation.  Expected arterial flow-voids are present. The paranasal sinuses, mastoid air cells, and middle ears are clear. The orbital  contents are within normal limits with bilateral lens implants. No significant  osseous or scalp lesions are identified. Impression  1. Few small scattered acute infarcts in the bilateral frontal lobes and  parieto-occipital lobes, right greater than left. 2. 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 3. Unchanged generalized parenchymal volume loss and severe chronic  microvascular ischemic disease. Results from [place] encounter on 11/18/19    MRI BRAIN W WO CONT    Narrative  EXAM:  MRI BRAIN W WO CONT    INDICATION:    ?encephalitis altered mental status. COMPARISON:  MRI brain 11/19/2019. CONTRAST: 20 ml Dotarem. TECHNIQUE:  Multiplanar multisequence acquisition without and with contrast of the brain. FINDINGS:  Evaluation is limited by motion. Unchanged generalized parenchymal volume loss with commensurate dilation of the  sulci and ventricular system. Unchanged periventricular deep white matter  T2/FLAIR hyperintensities, confluent in regions, consistent with moderate  chronic microvascular ischemic disease. Small chronic microhemorrhages are noted  in the left frontal lobe and right temporal lobe (series 7 image 40 and 41). There is no acute infarct, hemorrhage, extra-axial fluid collection, or mass  effect. There is no cerebellar tonsillar herniation. Expected arterial  flow-voids are present. No evidence of abnormal enhancement. Mild mucosal thickening in the left maxillary sinus and sphenoid sinuses. The  mastoid air cells and middle ears are clear. The orbital contents are within  normal limits with bilateral lens implants.  No significant osseous or scalp  lesions are identified. Reversal of the cervical lordosis with grade 1  anterolisthesis of C2 on C3 and multilevel degenerative disc disease and facet  arthropathy in the upper cervical spine. Impression  IMPRESSION:  1. Evaluation is limited by motion. No acute intracranial abnormality. 2. Unchanged generalized parenchymal volume loss and moderate chronic  microvascular ischemic disease. MRI BRAIN WO CONT    Narrative  EXAM: MRI BRAIN WO CONT    INDICATION: AMS    COMPARISON: None. CONTRAST: None. TECHNIQUE:  Single sequence of diffusion imaging was performed. The patient could not  tolerate further imaging. FINDINGS:  Diffusion imaging demonstrates no evidence of acute ischemia. Impression  IMPRESSION:  Limited study. No evidence of acute ischemia. Lab Review   Recent Results (from the past 24 hour(s))   LACTIC ACID    Collection Time: 05/28/21 12:54 AM   Result Value Ref Range    Lactic acid 1.2 0.4 - 2.0 MMOL/L   MAGNESIUM    Collection Time: 05/28/21 12:54 AM   Result Value Ref Range    Magnesium 2.0 1.6 - 2.4 mg/dL   PHOSPHORUS    Collection Time: 05/28/21 12:54 AM   Result Value Ref Range    Phosphorus 2.3 (L) 2.6 - 4.7 MG/DL       Additional comments:I reviewed the patient's new clinical lab test results. Patient Vitals for the past 8 hrs:   BP Temp Pulse Resp SpO2 Weight   05/28/21 0613 111/60 99.3 °F (37.4 °C) 80 17 99 %    05/28/21 0200 135/68 99.2 °F (37.3 °C) 83 22 96 % 209 lb 4.8 oz (94.9 kg)       No intake/output data recorded. 05/26 1901 - 05/28 0700  In: 1170   Out: -     Exam:  Visit Vitals  /60 (BP Patient Position: At rest)   Pulse 80   Temp 99.3 °F (37.4 °C)   Resp 17   Ht 5' 8\" (1.727 m)   Wt 209 lb 4.8 oz (94.9 kg)   SpO2 99%   BMI 31.82 kg/m²     Gen: Well developed  CV: RRR  Lungs: non labored breathing  Abd: soft, non distended  Neuro: Wakes to his name looking at the examiner.   He will follow simple commands like opening his eyes and closing his eyes. He does not like being examined this early in the morning. He grimaces to the light. CN II-XII: PERRL, face grossly symmetric  Motor: Moving extremities spontaneously minimally and keeps reaching to scratch to the right side of his head  Sensory: Not tested  DTRs: symmetric  COOR: no limb/truncal ataxia  Gait: Deferred    PROBLEM LIST:     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    Hypertension I10    ED (erectile dysfunction) N52.9    Severe obesity ([facility]) E66.01    TESSA on CPAP G47.33, Z99.89    Simple chronic bronchitis ([facility]) J41.0    Bilateral carotid artery stenosis I65.23    Spinal stenosis of lumbar region at multiple levels M48.061    Flank pain R10.9    Rectal bleeding K62.5    Pneumonia J18.9    Severe sepsis ([facility]) A41.9, R65.20    Acute blood loss anemia D62    Facial droop R29.810    Altered mental state R41.82    Viral encephalitis A86    Coronary artery disease with stable angina pectoris ([facility]) I25.118    Chronic obstructive pulmonary disease ([facility]) J44.9    CKD (chronic kidney disease) stage 3, GFR 30-59 ml/min ([facility]) N18.30    Intracranial hemorrhage ([facility]) I62.9    CAP (community acquired pneumonia) J18.9       Assessment/Plan:      27-year-old gentleman slowly recovering from status epilepticus following stroke and intraventricular hemorrhage. He is showing some slow improvement daily as far as mentation but not anywhere near his baseline. Diet with the potential to advance hopefully as he can tolerate more oral.  He might not need a PEG hopefully as family is trying to avoid that if possible. Neurology will follow up this weekend. I spoke with his daughter at the bedside. Okay to stay on aspirin. During this evaluation, we also discussed stroke education to include signs and symptoms of stroke and TIA. This clinical note was dictated with an electronic dictation software that can make unintentional errors. If there are any questions, please contact me directly for clarification.       Signed:  [address] [facility], DO  5/28/2021  9:38 AM

## 2021-05-28 NOTE — PROGRESS NOTES
SLP Contact Note    Updated 1110: AVIVA Romero states pt's daughter requesting SLP treatment. Will complete this morning. Discussed with AVIVA Romero. Pt inappropriate for PO trials on this date. Will hold for now and will continue to follow PRN for improvements in mentation.        Thank you,  NELSON LewisEd, [id number] [facility]  Speech-Language Pathologist

## 2021-05-28 NOTE — PROGRESS NOTES
Problem: Seizure Disorder (Adult)  Goal: *STG: Remains free of seizure activity  Outcome: Progressing Towards Goal  Goal: *STG: Maintains lab values within therapeutic range  Outcome: Progressing Towards Goal  Goal: *STG/LTG: Complies with medication therapy  Outcome: Progressing Towards Goal  Goal: *STG: Remains free of injury during seizure activity  Outcome: Progressing Towards Goal  Goal: *STG: Remains safe in hospital  Outcome: Progressing Towards Goal  Goal: Interventions  Outcome: Progressing Towards Goal     Problem: Patient Education: Go to Patient Education Activity  Goal: Patient/Family Education  Outcome: Progressing Towards Goal     Problem: TIA/CVA Stroke: Day 2 Until Discharge  Goal: Activity/Safety  Outcome: Progressing Towards Goal  Goal: Diagnostic Test/Procedures  Outcome: Progressing Towards Goal  Goal: Nutrition/Diet  Outcome: Progressing Towards Goal  Goal: Discharge Planning  Outcome: Progressing Towards Goal  Goal: Medications  Outcome: Progressing Towards Goal  Goal: Respiratory  Outcome: Progressing Towards Goal  Goal: Treatments/Interventions/Procedures  Outcome: Progressing Towards Goal  Goal: Psychosocial  Outcome: Progressing Towards Goal  Goal: *Verbalizes anxiety and depression are reduced or absent  Outcome: Progressing Towards Goal  Goal: *Absence of aspiration  Outcome: Progressing Towards Goal  Goal: *Absence of deep venous thrombosis signs and symptoms(Stroke Metric)  Outcome: Progressing Towards Goal  Goal: *Optimal pain control at patient's stated goal  Outcome: Progressing Towards Goal  Goal: *Tolerating diet  Outcome: Progressing Towards Goal  Goal: *Ability to perform ADLs and demonstrates progressive mobility and function  Outcome: Progressing Towards Goal  Goal: *Stroke education continued(Stroke Metric)  Outcome: Progressing Towards Goal     Problem: Ischemic Stroke: Discharge Outcomes  Goal: *Verbalizes anxiety and depression are reduced or absent  Outcome: Progressing Towards Goal  Goal: *Verbalize understanding of risk factor modification(Stroke Metric)  Outcome: Progressing Towards Goal  Goal: *Hemodynamically stable  Outcome: Progressing Towards Goal  Goal: *Absence of aspiration pneumonia  Outcome: Progressing Towards Goal  Goal: *Aware of needed dietary changes  Outcome: Progressing Towards Goal  Goal: *Verbalize understanding of prescribed medications including anti-coagulants, anti-lipid, and/or anti-platelets(Stroke Metric)  Outcome: Progressing Towards Goal  Goal: *Tolerating diet  Outcome: Progressing Towards Goal  Goal: *Aware of follow-up diagnostics related to anticoagulants  Outcome: Progressing Towards Goal  Goal: *Ability to perform ADLs and demonstrates progressive mobility and function  Outcome: Progressing Towards Goal  Goal: *Absence of DVT(Stroke Metric)  Outcome: Progressing Towards Goal  Goal: *Absence of aspiration  Outcome: Progressing Towards Goal  Goal: *Optimal pain control at patient's stated goal  Outcome: Progressing Towards Goal  Goal: *Home safety concerns addressed  Outcome: Progressing Towards Goal  Goal: *Describes available resources and support systems  Outcome: Progressing Towards Goal  Goal: *Verbalizes understanding of activation of EMS(911) for stroke symptoms(Stroke Metric)  Outcome: Progressing Towards Goal  Goal: *Understands and describes signs and symptoms to report to providers(Stroke Metric)  Outcome: Progressing Towards Goal  Goal: *Neurolgocially stable (absence of additional neurological deficits)  Outcome: Progressing Towards Goal  Goal: *Verbalizes importance of follow-up with primary care physician(Stroke Metric)  Outcome: Progressing Towards Goal  Goal: *Smoking cessation discussed,if applicable(Stroke Metric)  Outcome: Progressing Towards Goal  Goal: *Depression screening completed(Stroke Metric)  Outcome: Progressing Towards Goal     Problem: Non-Violent Restraints  Goal: Removal from restraints as soon as assessed to be safe  Outcome: Progressing Towards Goal  Goal: No harm/injury to patient while restraints in use  Outcome: Progressing Towards Goal  Goal: Patient's dignity will be maintained  Outcome: Progressing Towards Goal  Goal: Patient Interventions  Outcome: Progressing Towards Goal

## 2021-05-28 NOTE — PROGRESS NOTES
Problem: Dysphagia (Adult)  Goal: *Acute Goals and Plan of Care (Insert Text)  Description: Speech Therapy Goals  Initiated 5/19/2021    1. Patient will tolerate regular diet/thin liquids without adverse effects within 7 days. Discontinue 5/27/2021  2. Patient will participate in swallow re-evaluation within 7 days. Outcome: Not Progressing Towards Goal     SPEECH LANGUAGE PATHOLOGY DYSPHAGIA TREATMENT  Patient: April Patel (85 y.o. male)  Date: 5/28/2021  Diagnosis: CAP (community acquired pneumonia) [J18.9] <principal problem not specified>       Precautions:       ASSESSMENT:  Pt with overt s/s of aspiration with thin and nectar-thick liquids consistencies on this date but decent tolerance of puree trials. Recommend continuing small amounts of ice chips and bites of purees for patient comfort. Continue to encourage goals of care conversations as, even if deemed appropriate for a PO diet, concerned that he would not be able to maintain adequate nutrition in current state. PLAN:  Recommendations and Planned Interventions:  --NPO with ice chips/bites of applesauce for comfort  --good oral care  --only when awake/alert    Patient continues to benefit from skilled intervention to address the above impairments. Continue treatment per established plan of care. Discharge Recommendations: To Be Determined     SUBJECTIVE:   Patient stated, \"Thank you\".     OBJECTIVE:   Cognitive and Communication Status:  Neurologic State: Drowsy, Eyes open to voice  Orientation Level: Oriented to person, Disoriented to place, Disoriented to situation, Disoriented to time  Cognition: Decreased attention/concentration, Decreased command following, Impaired decision making, Impulsive, Memory loss, Poor safety awareness  Perception: Cues to attend left visual field, Cues to attend to left side of body     Safety/Judgement: Decreased awareness of environment, Decreased awareness of need for safety, Decreased awareness of need for assistance, Decreased insight into deficits  Dysphagia Treatment:    P.O. Trials:  Patient Position: upright in bed  Vocal quality prior to P.O.: Hoarse  Consistency Presented: Thin liquid; Nectar thick liquid;Puree  How Presented: Spoon;Straw;SLP-fed/presented     Bolus Acceptance: No impairment  Bolus Formation/Control: Impaired  Type of Impairment: Delayed  Propulsion: Delayed (# of seconds)  Oral Residue: None  Initiation of Swallow: Delayed (# of seconds)  Laryngeal Elevation: Decreased  Aspiration Signs/Symptoms: Weak cough                Oral Phase Severity: Mild-moderate  Pharyngeal Phase Severity : Moderate  Exercises:  Laryngeal Exercises:                                                                                                                                   Pain:  Pain Scale 1: Adult Nonverbal Pain Scale  Pain Intensity 1: 0       After treatment:   Call bell within reach and Nursing notified    COMMUNICATION/EDUCATION:       The patient's plan of care including recommendations, planned interventions, and recommended diet changes were discussed with: Registered nurse.      TIMOTHY Rader  Time Calculation: 15 mins

## 2021-05-29 ENCOUNTER — APPOINTMENT (OUTPATIENT)
Dept: GENERAL RADIOLOGY | Age: 86
DRG: 193 | End: 2021-05-29
Attending: HOSPITALIST
Payer: MEDICARE

## 2021-05-29 PROBLEM — I63.9 CEREBROVASCULAR ACCIDENT (CVA) DUE TO EMBOLISM (HCC): Status: ACTIVE | Noted: 2021-05-29

## 2021-05-29 PROBLEM — G93.40 ENCEPHALOPATHY: Status: ACTIVE | Noted: 2021-05-29

## 2021-05-29 PROBLEM — G40.901 STATUS EPILEPTICUS (HCC): Status: ACTIVE | Noted: 2021-05-29

## 2021-05-29 LAB
ALBUMIN SERPL-MCNC: 2.4 G/DL (ref 3.5–5)
ALBUMIN/GLOB SERPL: 0.7 {RATIO} (ref 1.1–2.2)
ALP SERPL-CCNC: 96 U/L (ref 45–117)
ALT SERPL-CCNC: 20 U/L (ref 12–78)
ANION GAP SERPL CALC-SCNC: 7 MMOL/L (ref 5–15)
AST SERPL-CCNC: 18 U/L (ref 15–37)
BASOPHILS # BLD: 0 K/UL (ref 0–0.1)
BASOPHILS NFR BLD: 0 % (ref 0–1)
BILIRUB SERPL-MCNC: 0.3 MG/DL (ref 0.2–1)
BUN SERPL-MCNC: 11 MG/DL (ref 6–20)
BUN/CREAT SERPL: 11 (ref 12–20)
CALCIUM SERPL-MCNC: 8.4 MG/DL (ref 8.5–10.1)
CHLORIDE SERPL-SCNC: 110 MMOL/L (ref 97–108)
CO2 SERPL-SCNC: 21 MMOL/L (ref 21–32)
CREAT SERPL-MCNC: 0.96 MG/DL (ref 0.7–1.3)
DIFFERENTIAL METHOD BLD: ABNORMAL
EOSINOPHIL # BLD: 0.1 K/UL (ref 0–0.4)
EOSINOPHIL NFR BLD: 1 % (ref 0–7)
ERYTHROCYTE [DISTWIDTH] IN BLOOD BY AUTOMATED COUNT: 15.2 % (ref 11.5–14.5)
GLOBULIN SER CALC-MCNC: 3.3 G/DL (ref 2–4)
GLUCOSE BLD STRIP.AUTO-MCNC: 109 MG/DL (ref 65–117)
GLUCOSE BLD STRIP.AUTO-MCNC: 119 MG/DL (ref 65–117)
GLUCOSE BLD STRIP.AUTO-MCNC: 139 MG/DL (ref 65–117)
GLUCOSE SERPL-MCNC: 142 MG/DL (ref 65–100)
HCT VFR BLD AUTO: 36.5 % (ref 36.6–50.3)
HGB BLD-MCNC: 11.6 G/DL (ref 12.1–17)
IMM GRANULOCYTES # BLD AUTO: 0 K/UL (ref 0–0.04)
IMM GRANULOCYTES NFR BLD AUTO: 0 % (ref 0–0.5)
LACTATE SERPL-SCNC: 1.6 MMOL/L (ref 0.4–2)
LACTATE SERPL-SCNC: 2.2 MMOL/L (ref 0.4–2)
LYMPHOCYTES # BLD: 0.7 K/UL (ref 0.8–3.5)
LYMPHOCYTES NFR BLD: 7 % (ref 12–49)
MAGNESIUM SERPL-MCNC: 1.9 MG/DL (ref 1.6–2.4)
MCH RBC QN AUTO: 31 PG (ref 26–34)
MCHC RBC AUTO-ENTMCNC: 31.8 G/DL (ref 30–36.5)
MCV RBC AUTO: 97.6 FL (ref 80–99)
MONOCYTES # BLD: 0.6 K/UL (ref 0–1)
MONOCYTES NFR BLD: 6 % (ref 5–13)
NEUTS SEG # BLD: 9.1 K/UL (ref 1.8–8)
NEUTS SEG NFR BLD: 86 % (ref 32–75)
NRBC # BLD: 0 K/UL (ref 0–0.01)
NRBC BLD-RTO: 0 PER 100 WBC
PHOSPHATE SERPL-MCNC: 2 MG/DL (ref 2.6–4.7)
PLATELET # BLD AUTO: 217 K/UL (ref 150–400)
PMV BLD AUTO: 11.1 FL (ref 8.9–12.9)
POTASSIUM SERPL-SCNC: 3.9 MMOL/L (ref 3.5–5.1)
PROT SERPL-MCNC: 5.7 G/DL (ref 6.4–8.2)
RBC # BLD AUTO: 3.74 M/UL (ref 4.1–5.7)
RBC MORPH BLD: ABNORMAL
SERVICE CMNT-IMP: ABNORMAL
SERVICE CMNT-IMP: ABNORMAL
SERVICE CMNT-IMP: NORMAL
SODIUM SERPL-SCNC: 138 MMOL/L (ref 136–145)
WBC # BLD AUTO: 10.5 K/UL (ref 4.1–11.1)

## 2021-05-29 PROCEDURE — 74011250637 HC RX REV CODE- 250/637: Performed by: STUDENT IN AN ORGANIZED HEALTH CARE EDUCATION/TRAINING PROGRAM

## 2021-05-29 PROCEDURE — 83605 ASSAY OF LACTIC ACID: CPT

## 2021-05-29 PROCEDURE — 80053 COMPREHEN METABOLIC PANEL: CPT

## 2021-05-29 PROCEDURE — 99233 SBSQ HOSP IP/OBS HIGH 50: CPT | Performed by: PSYCHIATRY & NEUROLOGY

## 2021-05-29 PROCEDURE — 74011250636 HC RX REV CODE- 250/636: Performed by: INTERNAL MEDICINE

## 2021-05-29 PROCEDURE — C9254 INJECTION, LACOSAMIDE: HCPCS | Performed by: PSYCHIATRY & NEUROLOGY

## 2021-05-29 PROCEDURE — 74011250637 HC RX REV CODE- 250/637: Performed by: INTERNAL MEDICINE

## 2021-05-29 PROCEDURE — 36415 COLL VENOUS BLD VENIPUNCTURE: CPT

## 2021-05-29 PROCEDURE — 84100 ASSAY OF PHOSPHORUS: CPT

## 2021-05-29 PROCEDURE — 65660000001 HC RM ICU INTERMED STEPDOWN

## 2021-05-29 PROCEDURE — 74011250637 HC RX REV CODE- 250/637: Performed by: FAMILY MEDICINE

## 2021-05-29 PROCEDURE — 71045 X-RAY EXAM CHEST 1 VIEW: CPT

## 2021-05-29 PROCEDURE — 74011250637 HC RX REV CODE- 250/637: Performed by: HOSPITALIST

## 2021-05-29 PROCEDURE — 74011250636 HC RX REV CODE- 250/636: Performed by: PSYCHIATRY & NEUROLOGY

## 2021-05-29 PROCEDURE — 82962 GLUCOSE BLOOD TEST: CPT

## 2021-05-29 PROCEDURE — 74011000258 HC RX REV CODE- 258: Performed by: PSYCHIATRY & NEUROLOGY

## 2021-05-29 PROCEDURE — 85025 COMPLETE CBC W/AUTO DIFF WBC: CPT

## 2021-05-29 PROCEDURE — 83735 ASSAY OF MAGNESIUM: CPT

## 2021-05-29 RX ORDER — ATENOLOL 25 MG/1
12.5 TABLET ORAL DAILY
Status: DISCONTINUED | OUTPATIENT
Start: 2021-05-29 | End: 2021-06-08 | Stop reason: HOSPADM

## 2021-05-29 RX ADMIN — Medication 10 ML: at 13:01

## 2021-05-29 RX ADMIN — Medication 10 ML: at 21:39

## 2021-05-29 RX ADMIN — DEXTROSE MONOHYDRATE 50 ML/HR: 50 INJECTION, SOLUTION INTRAVENOUS at 09:17

## 2021-05-29 RX ADMIN — HYDRALAZINE HYDROCHLORIDE 100 MG: 50 TABLET, FILM COATED ORAL at 09:45

## 2021-05-29 RX ADMIN — ACETAMINOPHEN 650 MG: 325 TABLET ORAL at 13:47

## 2021-05-29 RX ADMIN — LEVETIRACETAM 1000 MG: 100 INJECTION, SOLUTION INTRAVENOUS at 11:20

## 2021-05-29 RX ADMIN — SODIUM CHLORIDE 100 MG: 9 INJECTION, SOLUTION INTRAVENOUS at 13:49

## 2021-05-29 RX ADMIN — HYDRALAZINE HYDROCHLORIDE 100 MG: 50 TABLET, FILM COATED ORAL at 15:32

## 2021-05-29 RX ADMIN — ATORVASTATIN CALCIUM 80 MG: 40 TABLET, FILM COATED ORAL at 09:45

## 2021-05-29 RX ADMIN — SODIUM CHLORIDE 100 MG: 9 INJECTION, SOLUTION INTRAVENOUS at 02:00

## 2021-05-29 RX ADMIN — Medication 10 ML: at 06:00

## 2021-05-29 RX ADMIN — ASPIRIN 81 MG: 81 TABLET, CHEWABLE ORAL at 09:45

## 2021-05-29 RX ADMIN — ATENOLOL 12.5 MG: 25 TABLET ORAL at 15:32

## 2021-05-29 RX ADMIN — HYDRALAZINE HYDROCHLORIDE 100 MG: 50 TABLET, FILM COATED ORAL at 21:39

## 2021-05-29 RX ADMIN — ACETAMINOPHEN 650 MG: 325 TABLET ORAL at 02:02

## 2021-05-29 RX ADMIN — AMLODIPINE BESYLATE 5 MG: 5 TABLET ORAL at 09:45

## 2021-05-29 RX ADMIN — LEVETIRACETAM 1000 MG: 100 INJECTION, SOLUTION INTRAVENOUS at 23:58

## 2021-05-29 NOTE — PROGRESS NOTES
2000: Bedside and Verbal shift change report given to AVIVA BUSBY (oncoming nurse) by Britney Holloway RN and Mele Robert RN (offgoing nurse). Report included the following information SBAR, Kardex, Intake/Output, MAR, Recent Results, Med Rec Status, Cardiac Rhythm SR/ST and Dual Neuro Assessment. I agree with Laly Parson RN's documentation as her preceptor.      Problem: Seizure Disorder (Adult)  Goal: *STG: Remains free of seizure activity  Outcome: Progressing Towards Goal  Goal: *STG: Maintains lab values within therapeutic range  Outcome: Progressing Towards Goal  Goal: *STG/LTG: Complies with medication therapy  Outcome: Progressing Towards Goal  Goal: *STG: Remains free of injury during seizure activity  Outcome: Progressing Towards Goal  Goal: *STG: Remains safe in hospital  Outcome: Progressing Towards Goal  Goal: Interventions  Outcome: Progressing Towards Goal     Problem: TIA/CVA Stroke: Day 2 Until Discharge  Goal: Activity/Safety  Outcome: Progressing Towards Goal  Goal: Diagnostic Test/Procedures  Outcome: Progressing Towards Goal  Goal: Nutrition/Diet  Outcome: Progressing Towards Goal  Goal: Discharge Planning  Outcome: Progressing Towards Goal  Goal: Medications  Outcome: Progressing Towards Goal  Goal: Respiratory  Outcome: Progressing Towards Goal  Goal: Treatments/Interventions/Procedures  Outcome: Progressing Towards Goal  Goal: Psychosocial  Outcome: Progressing Towards Goal  Goal: *Verbalizes anxiety and depression are reduced or absent  Outcome: Progressing Towards Goal  Goal: *Absence of aspiration  Outcome: Progressing Towards Goal  Goal: *Absence of deep venous thrombosis signs and symptoms(Stroke Metric)  Outcome: Progressing Towards Goal  Goal: *Optimal pain control at patient's stated goal  Outcome: Progressing Towards Goal  Goal: *Tolerating diet  Outcome: Progressing Towards Goal  Goal: *Ability to perform ADLs and demonstrates progressive mobility and function  Outcome: Progressing Towards Goal  Goal: *Stroke education continued(Stroke Metric)  Outcome: Progressing Towards Goal

## 2021-05-29 NOTE — PROGRESS NOTES
Brady Damico is a 80 y.o. male with multiple medical issues admitted on 5/16 with AMS/nausea/vomiting/tremors. 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 Was in rehab when he developed pneumonia and was admitted to the hospital.  MRI brain was performed which showed embolic appearing scattered infarcts in bilateral frontal lobes and parieto-occipital lobes. EEG was done on 5/16 which was unremarkable but had another EEG for symptoms of decreased responsiveness, left gaze deviation, lip smacking on 5/20 and it showed a generalized nonconvulsive status that resolved after administration of Ativan. Has been on now Keppra and lacosamide since then. Has had a slow recovery. Remains lethargic and has NG tube in place. EXAM  Exam:  Visit Vitals  /66 (BP 1 Location: Right arm, BP Patient Position: At rest)   Pulse (!) 110   Temp 98.9 °F (37.2 °C)   Resp 15   Ht 5' 8\" (1.727 m)   Wt 209 lb 4.8 oz (94.9 kg)   SpO2 100%   BMI 31.82 kg/m²     Gen: Drowsy  CV: RRR  Lungs: non labored breathing  Abd: non distending  Neuro: Wakes up to verbal command. Tries to mouth words and is able to follow some simple commands. CN II-XII: PERRL, EOMI, face symmetric, tongue/palate midline  Motor: Able to move all extremities spontaneously  Sensory:  difficult to assess   Gait: Difficult to assess    LABS/ IMAGING  MRI Results (most recent):  Results from Hospital Encounter encounter on 05/16/21    MRI BRAIN WO CONT    Narrative  *PRELIMINARY REPORT*    Multiple foci of small cortical and white matter infarcts mostly on the right  with a couple of lesions on the left as well. Interval evolution of  intraventricular hemorrhage and small parenchymal hemorrhages. Preliminary report was provided by Dr. Jayda Santamaria, the on-call radiologist, at  22:17. The findings were called to floor nurse, Mau Dukes, on 5/18/2021 at 22:20 by myself.   789    Final report to follow. END PRELIMINARY REPORT*    EXAM:  MRI BRAIN WO CONT    INDICATION:    AMS    COMPARISON:  CT head 5/16/2021, MRI brain 11/23/2019. CONTRAST: None. TECHNIQUE:  Multiplanar multisequence acquisition without contrast of the brain. FINDINGS:  Few small scattered acute infarcts in the bilateral frontal lobes and  parieto-occipital lobes, right greater than left. Evolved now late subacute small intraparenchymal hematomas in the right parietal  periventricular white matter measuring 10 mm and 5 mm (series 6 image 11), with  no significant surrounding edema or mass effect. There is hemosiderin staining  noted along the ependymal lining of the lateral ventricles, as well as scattered  superficial siderosis involving the sylvian fissures, and along the surface of  the brainstem and cerebellum. Unchanged generalized parenchymal volume loss with commensurate dilation of the  sulci and ventricular system. Unchanged confluent periventricular and deep white  matter T2/FLAIR hyperintensity, and patchy T2/FLAIR hyperintensity in the nely,  consistent with severe chronic microvascular ischemic disease. There is no  cerebellar tonsillar herniation. Expected arterial flow-voids are present. The paranasal sinuses, mastoid air cells, and middle ears are clear. The orbital  contents are within normal limits with bilateral lens implants. No significant  osseous or scalp lesions are identified. Impression  1. Few small scattered acute infarcts in the bilateral frontal lobes and  parieto-occipital lobes, right greater than left. 2. 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   3. Unchanged generalized parenchymal volume loss and severe chronic  microvascular ischemic disease. Lab Results   Component Value Date/Time    Sodium 138 05/29/2021 04:16 AM    Potassium 3.9 05/29/2021 04:16 AM    Chloride 110 (H) 05/29/2021 04:16 AM    CO2 21 05/29/2021 04:16 AM    Anion gap 7 05/29/2021 04:16 AM    Glucose 142 (H) 05/29/2021 04:16 AM    BUN 11 05/29/2021 04:16 AM    Creatinine 0.96 05/29/2021 04:16 AM    BUN/Creatinine ratio 11 (L) 05/29/2021 04:16 AM    GFR est AA >60 05/29/2021 04:16 AM    GFR est non-AA >60 05/29/2021 04:16 AM    Calcium 8.4 (L) 05/29/2021 04:16 AM    Bilirubin, total 0.3 05/29/2021 04:16 AM    Alk. phosphatase 96 05/29/2021 04:16 AM    Protein, total 5.7 (L) 05/29/2021 04:16 AM    Albumin 2.4 (L) 05/29/2021 04:16 AM    Globulin 3.3 05/29/2021 04:16 AM    A-G Ratio 0.7 (L) 05/29/2021 04:16 AM    ALT (SGPT) 20 05/29/2021 04:16 AM    AST (SGOT) 18 05/29/2021 04:16 AM     Lab Results   Component Value Date/Time    WBC 10.5 05/29/2021 04:16 AM    HGB (POC) 14.9 07/27/2018 02:38 PM    HGB 11.6 (L) 05/29/2021 04:16 AM    Hematocrit (POC) 45 05/13/2019 07:16 PM    HCT 36.5 (L) 05/29/2021 04:16 AM    PLATELET 563 48/76/8816 04:16 AM    MCV 97.6 05/29/2021 04:16 AM       ASSESSMENT  Hospital Problems  Date Reviewed: 9/21/2020        Codes Class Noted POA    Cerebrovascular accident (CVA) due to embolism ([facility] ICD-10-CM: I63.9  ICD-9-CM: 434.11  5/29/2021 Unknown        Status epilepticus ([facility] ICD-10-CM: G40.901  ICD-9-CM: 345.3  5/29/2021 Unknown        Encephalopathy ICD-10-CM: G93.40  ICD-9-CM: 348.30  5/29/2021 Unknown               PLAN  Extended hospital course related to embolic appearing scattered infarctions in both hemispheres of unclear source, possibly cardiac  Currently on aspirin and statin, not a candidate for anticoagulation    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   Subsequent EEGs have not shown any ongoing seizure activity    Clinically he remains encephalopathic/lethargic but is starting to improve slowly  We will slightly lower the dose of lacosamide to 75 mg twice a day to see if it improves the alertness    Continue with supportive care    Franco Blount MD

## 2021-05-29 NOTE — PROGRESS NOTES
Hospitalist Progress Note      Hospital summary: Barbara Louis a 80 y. o. male with pmh of CVA, intraventricular brain hemorrhage, dyslipidemia, CKD 3, hypertension who presents to hospital from subacute rehab for concerns of altered/decreased mental status. Patient was found to have multiple ischemic CVAs on MRI, continue to have altered mental status with some eye deviation and twitching on 5/20. This was concerning for seizure, given 2 mg of Ativan and loaded with Keppra with some improvement. On 5/21 patient however declined, unresponsive to verbal stimuli. Stat EEG 24-hour was ordered and showed subclinical status. He was loaded with Vimpat, and neurology recommends transfer to ICU for closer neuro checks on 5/21. Seizures were controlled and  Pt transferred out of ICU on 5/23.     5/16/2021      Assessment/Plan:  Status epilepticus -resolved  -Status post Keppra and Vimpat load 5/20  -Continue Keppra and Vimpat   - Neurology following  - 24hr EEG: mild diffuse cerebral dysfunction, of which the sporadic generalized frontally predominant triphasic discharges are indicative of metabolic/toxic encephalopathy.  -  Seizure precautions      Acute embolic infarcts  Subacute hematoma -intraventricular hemorrhage with ventriculomegaly on recent admission, discharged 5/3  -Not a candidate for anticoagulation due to intracranial hemorrhage, and fall risk  -Neurology following  -No escalation of antiplatelets due to ICH, continue aspirin  -Continue atorvastatin  -PT/OT/speech  -Echo with normal EF   - rpt CT head 5/24: no change     Acute Metabolic encephalopathy -   - multifactorial, acute CVAs,recent hge, pneumonia, electrolyte imbalances, seizures  Waxing and waning mental status      Community-acquired pneumonia-resolved   -x-ray with left basilar airspace disease  - completed doxy   -Remains on room air, monitor     Hypertension SBP goal <160. C/w hydralazine, amlodipine.   IV labetalol and hydralazine prn. Hyperlipidemiacontinue Lipitor  Hx TESSA    Hypernatremia - resolved   - likely due to poor oral intake   will monitor      Dysphagia  - has dubhoff  -on TFs   -family wants to give him more time and see if he improves, PEG tube held for now  Swallow eval on Tuesday again    Appreciate Palliative care input     Code status: DNR. DVT prophylaxis: SCDs  Disposition: TBD    I spoke to patient's daughter at bedside yesterday  ----------------------------------------------    CC: Acute CVA. Seizures     S: Patient is seen and examined     This morning,he was able to tell his name,awake and alert    Review of Systems:  Review of systems not obtained due to patient factors.     O:  Visit Vitals  /81 (BP 1 Location: Left upper arm, BP Patient Position: At rest)   Pulse (!) 120   Temp 98.9 °F (37.2 °C)   Resp 20   Ht 5' 8\" (1.727 m)   Wt 94.9 kg (209 lb 4.8 oz)   SpO2 99%   BMI 31.82 kg/m²       PHYSICAL EXAM:  Gen: lethargic ,elderly patient  HEENT: anicteric sclerae, normal conjunctiva, NG tube   Neck: supple, trachea midline, no adenopathy  Heart: RRR, no MRG, no JVD, no peripheral edema  Lungs: decreased at bases  Abd: soft, NT, ND, BS+, no organomegaly  Extr: warm  Skin: dry, no rash  Neuro:awake and alert during my eval,followed commands occassionally    Intake/Output Summary (Last 24 hours) at 5/29/2021 0925  Last data filed at 5/29/2021 0400  Gross per 24 hour   Intake 1690 ml   Output    Net 1690 ml        Recent labs & imaging reviewed:  Recent Results (from the past 24 hour(s))   CBC WITH AUTOMATED DIFF    Collection Time: 05/28/21 11:15 AM   Result Value Ref Range    WBC 7.8 4.1 - 11.1 K/uL    RBC 4.06 (L) 4.10 - 5.70 M/uL    HGB 12.7 12.1 - 17.0 g/dL    HCT 39.8 36.6 - 50.3 %    MCV 98.0 80.0 - 99.0 FL    MCH 31.3 26.0 - 34.0 PG    MCHC 31.9 30.0 - 36.5 g/dL    RDW 15.2 (H) 11.5 - 14.5 %    PLATELET 016 574 - 848 K/uL    MPV 10.9 8.9 - 12.9 FL    NRBC 0.0 0  WBC ABSOLUTE NRBC 0.00 0.00 - 0.01 K/uL    NEUTROPHILS 80 (H) 32 - 75 %    LYMPHOCYTES 10 (L) 12 - 49 %    MONOCYTES 6 5 - 13 %    EOSINOPHILS 3 0 - 7 %    BASOPHILS 1 0 - 1 %    IMMATURE GRANULOCYTES 0 0.0 - 0.5 %    ABS. NEUTROPHILS 6.2 1.8 - 8.0 K/UL    ABS. LYMPHOCYTES 0.8 0.8 - 3.5 K/UL    ABS. MONOCYTES 0.5 0.0 - 1.0 K/UL    ABS. EOSINOPHILS 0.2 0.0 - 0.4 K/UL    ABS. BASOPHILS 0.0 0.0 - 0.1 K/UL    ABS. IMM. GRANS. 0.0 0.00 - 0.04 K/UL    DF AUTOMATED     METABOLIC PANEL, BASIC    Collection Time: 05/28/21 11:15 AM   Result Value Ref Range    Sodium 138 136 - 145 mmol/L    Potassium 3.7 3.5 - 5.1 mmol/L    Chloride 110 (H) 97 - 108 mmol/L    CO2 23 21 - 32 mmol/L    Anion gap 5 5 - 15 mmol/L    Glucose 112 (H) 65 - 100 mg/dL    BUN 12 6 - 20 MG/DL    Creatinine 1.06 0.70 - 1.30 MG/DL    BUN/Creatinine ratio 11 (L) 12 - 20      GFR est AA >60 >60 ml/min/1.73m2    GFR est non-AA >60 >60 ml/min/1.73m2    Calcium 8.8 8.5 - 10.1 MG/DL   LACTIC ACID    Collection Time: 05/29/21  2:50 AM   Result Value Ref Range    Lactic acid 2.2 (HH) 0.4 - 2.0 MMOL/L   LACTIC ACID    Collection Time: 05/29/21  4:16 AM   Result Value Ref Range    Lactic acid 1.6 0.4 - 2.0 MMOL/L   METABOLIC PANEL, COMPREHENSIVE    Collection Time: 05/29/21  4:16 AM   Result Value Ref Range    Sodium 138 136 - 145 mmol/L    Potassium 3.9 3.5 - 5.1 mmol/L    Chloride 110 (H) 97 - 108 mmol/L    CO2 21 21 - 32 mmol/L    Anion gap 7 5 - 15 mmol/L    Glucose 142 (H) 65 - 100 mg/dL    BUN 11 6 - 20 MG/DL    Creatinine 0.96 0.70 - 1.30 MG/DL    BUN/Creatinine ratio 11 (L) 12 - 20      GFR est AA >60 >60 ml/min/1.73m2    GFR est non-AA >60 >60 ml/min/1.73m2    Calcium 8.4 (L) 8.5 - 10.1 MG/DL    Bilirubin, total 0.3 0.2 - 1.0 MG/DL    ALT (SGPT) 20 12 - 78 U/L    AST (SGOT) 18 15 - 37 U/L    Alk.  phosphatase 96 45 - 117 U/L    Protein, total 5.7 (L) 6.4 - 8.2 g/dL    Albumin 2.4 (L) 3.5 - 5.0 g/dL    Globulin 3.3 2.0 - 4.0 g/dL    A-G Ratio 0.7 (L) 1.1 - 2.2 MAGNESIUM    Collection Time: 05/29/21  4:16 AM   Result Value Ref Range    Magnesium 1.9 1.6 - 2.4 mg/dL   PHOSPHORUS    Collection Time: 05/29/21  4:16 AM   Result Value Ref Range    Phosphorus 2.0 (L) 2.6 - 4.7 MG/DL   CBC WITH AUTOMATED DIFF    Collection Time: 05/29/21  4:16 AM   Result Value Ref Range    WBC 10.5 4.1 - 11.1 K/uL    RBC 3.74 (L) 4.10 - 5.70 M/uL    HGB 11.6 (L) 12.1 - 17.0 g/dL    HCT 36.5 (L) 36.6 - 50.3 %    MCV 97.6 80.0 - 99.0 FL    MCH 31.0 26.0 - 34.0 PG    MCHC 31.8 30.0 - 36.5 g/dL    RDW 15.2 (H) 11.5 - 14.5 %    PLATELET 202 121 - 812 K/uL    MPV 11.1 8.9 - 12.9 FL    NRBC 0.0 0  WBC    ABSOLUTE NRBC 0.00 0.00 - 0.01 K/uL    NEUTROPHILS 86 (H) 32 - 75 %    LYMPHOCYTES 7 (L) 12 - 49 %    MONOCYTES 6 5 - 13 %    EOSINOPHILS 1 0 - 7 %    BASOPHILS 0 0 - 1 %    IMMATURE GRANULOCYTES 0 0.0 - 0.5 %    ABS. NEUTROPHILS 9.1 (H) 1.8 - 8.0 K/UL    ABS. LYMPHOCYTES 0.7 (L) 0.8 - 3.5 K/UL    ABS. MONOCYTES 0.6 0.0 - 1.0 K/UL    ABS. EOSINOPHILS 0.1 0.0 - 0.4 K/UL    ABS. BASOPHILS 0.0 0.0 - 0.1 K/UL    ABS. IMM. GRANS. 0.0 0.00 - 0.04 K/UL    DF SMEAR SCANNED      RBC COMMENTS ANISOCYTOSIS  1+         Recent Labs     05/29/21  0416 05/28/21  1115   WBC 10.5 7.8   HGB 11.6* 12.7   HCT 36.5* 39.8    193     Recent Labs     05/29/21  0416 05/28/21  1115 05/28/21  0054 05/27/21  0202    138  --  140   K 3.9 3.7  --  4.4   * 110*  --  108   CO2 21 23  --  24   BUN 11 12  --  15   CREA 0.96 1.06  --  1.10   * 112*  --  122*   CA 8.4* 8.8  --  8.4*   MG 1.9  --  2.0 2.2   PHOS 2.0*  --  2.3* 2.6     Recent Labs     05/29/21  0416   ALT 20   AP 96   TBILI 0.3   TP 5.7*   ALB 2.4*   GLOB 3.3     No results for input(s): INR, PTP, APTT, INREXT, INREXT in the last 72 hours. No results for input(s): FE, TIBC, PSAT, FERR in the last 72 hours. No results found for: FOL, RBCF   No results for input(s): PH, PCO2, PO2 in the last 72 hours.   No results for input(s): CPK, CKNDX, TROIQ in the last 72 hours.     No lab exists for component: CPKMB  Lab Results   Component Value Date/Time    Cholesterol, total 144 04/19/2021 12:59 AM    HDL Cholesterol 33 04/19/2021 12:59 AM    LDL, calculated 74.8 04/19/2021 12:59 AM    Triglyceride 181 (H) 04/19/2021 12:59 AM    CHOL/HDL Ratio 4.4 04/19/2021 12:59 AM     Lab Results   Component Value Date/Time    Glucose (POC) 124 (H) 05/24/2021 06:35 PM    Glucose (POC) 115 05/24/2021 11:37 AM    Glucose (POC) 107 05/22/2021 02:25 PM    Glucose (POC) 92 05/21/2021 07:36 AM    Glucose (POC) 110 (H) 05/03/2021 11:41 AM     Lab Results   Component Value Date/Time    Color YELLOW/STRAW 05/16/2021 09:33 PM    Appearance CLEAR 05/16/2021 09:33 PM    Specific gravity 1.014 05/16/2021 09:33 PM    Specific gravity 1.015 09/21/2020 09:13 AM    pH (UA) 5.0 05/16/2021 09:33 PM    Protein Negative 05/16/2021 09:33 PM    Glucose Negative 05/16/2021 09:33 PM    Ketone Negative 05/16/2021 09:33 PM    Bilirubin Negative 05/16/2021 09:33 PM    Urobilinogen 0.2 05/16/2021 09:33 PM    Nitrites Negative 05/16/2021 09:33 PM    Leukocyte Esterase Negative 05/16/2021 09:33 PM    Epithelial cells FEW 05/16/2021 09:33 PM    Bacteria Negative 05/16/2021 09:33 PM    WBC 0-4 05/16/2021 09:33 PM    RBC 0-5 05/16/2021 09:33 PM       Med list reviewed  Current Facility-Administered Medications   Medication Dose Route Frequency    lidocaine (XYLOCAINE) 2 % jelly   Mucous Membrane PRN    dextrose 5% infusion  50 mL/hr IntraVENous CONTINUOUS    hydrALAZINE (APRESOLINE) tablet 100 mg  100 mg Oral TID    amLODIPine (NORVASC) tablet 5 mg  5 mg Oral DAILY    labetaloL (NORMODYNE;TRANDATE) injection 20 mg  20 mg IntraVENous Q6H PRN    levETIRAcetam (KEPPRA) 1,000 mg in 0.9% sodium chloride 100 mL IVPB  1,000 mg IntraVENous Q12H    hydrALAZINE (APRESOLINE) 20 mg/mL injection 20 mg  20 mg IntraVENous Q6H PRN    lacosamide (VIMPAT) 100 mg in 0.9% sodium chloride 100 mL IVPB  100 mg IntraVENous Q12H    sodium chloride (NS) flush 5-40 mL  5-40 mL IntraVENous Q8H    sodium chloride (NS) flush 5-40 mL  5-40 mL IntraVENous PRN    polyethylene glycol (MIRALAX) packet 17 g  17 g Oral DAILY PRN    albuterol (PROVENTIL VENTOLIN) nebulizer solution 2.5 mg  2.5 mg Nebulization Q4H PRN    aspirin chewable tablet 81 mg  81 mg Oral DAILY    atorvastatin (LIPITOR) tablet 80 mg  80 mg Oral DAILY    [Held by provider] isosorbide mononitrate ER (IMDUR) tablet 30 mg  30 mg Oral DAILY    sodium chloride (NS) flush 5-40 mL  5-40 mL IntraVENous Q8H    sodium chloride (NS) flush 5-40 mL  5-40 mL IntraVENous PRN    acetaminophen (TYLENOL) tablet 650 mg  650 mg Oral Q6H PRN    Or    acetaminophen (TYLENOL) suppository 650 mg  650 mg Rectal Q6H PRN    promethazine (PHENERGAN) tablet 12.5 mg  12.5 mg Oral Q6H PRN    Or    ondansetron (ZOFRAN) injection 4 mg  4 mg IntraVENous Q6H PRN       Care Plan discussed with:  Patient/Family and Nurse    Sage Irvin MD  Internal Medicine  Date of Service: 5/29/2021

## 2021-05-30 ENCOUNTER — APPOINTMENT (OUTPATIENT)
Dept: VASCULAR SURGERY | Age: 86
DRG: 193 | End: 2021-05-30
Attending: HOSPITALIST
Payer: MEDICARE

## 2021-05-30 LAB
GLUCOSE BLD STRIP.AUTO-MCNC: 122 MG/DL (ref 65–117)
GLUCOSE BLD STRIP.AUTO-MCNC: 132 MG/DL (ref 65–117)
GLUCOSE BLD STRIP.AUTO-MCNC: 136 MG/DL (ref 65–117)
LACTATE SERPL-SCNC: 1 MMOL/L (ref 0.4–2)
MAGNESIUM SERPL-MCNC: 1.9 MG/DL (ref 1.6–2.4)
PHOSPHATE SERPL-MCNC: 2.5 MG/DL (ref 2.6–4.7)
SERVICE CMNT-IMP: ABNORMAL

## 2021-05-30 PROCEDURE — 74011250636 HC RX REV CODE- 250/636: Performed by: PSYCHIATRY & NEUROLOGY

## 2021-05-30 PROCEDURE — 93971 EXTREMITY STUDY: CPT

## 2021-05-30 PROCEDURE — 65660000001 HC RM ICU INTERMED STEPDOWN

## 2021-05-30 PROCEDURE — 36415 COLL VENOUS BLD VENIPUNCTURE: CPT

## 2021-05-30 PROCEDURE — 74011000258 HC RX REV CODE- 258: Performed by: PSYCHIATRY & NEUROLOGY

## 2021-05-30 PROCEDURE — 84100 ASSAY OF PHOSPHORUS: CPT

## 2021-05-30 PROCEDURE — 83735 ASSAY OF MAGNESIUM: CPT

## 2021-05-30 PROCEDURE — 74011250637 HC RX REV CODE- 250/637: Performed by: FAMILY MEDICINE

## 2021-05-30 PROCEDURE — 83605 ASSAY OF LACTIC ACID: CPT

## 2021-05-30 PROCEDURE — 74011250637 HC RX REV CODE- 250/637: Performed by: INTERNAL MEDICINE

## 2021-05-30 PROCEDURE — 99232 SBSQ HOSP IP/OBS MODERATE 35: CPT | Performed by: PSYCHIATRY & NEUROLOGY

## 2021-05-30 PROCEDURE — 82962 GLUCOSE BLOOD TEST: CPT

## 2021-05-30 PROCEDURE — C9254 INJECTION, LACOSAMIDE: HCPCS | Performed by: PSYCHIATRY & NEUROLOGY

## 2021-05-30 PROCEDURE — 74011250637 HC RX REV CODE- 250/637: Performed by: HOSPITALIST

## 2021-05-30 RX ORDER — POLYETHYLENE GLYCOL 3350 17 G/17G
17 POWDER, FOR SOLUTION ORAL DAILY
Status: DISCONTINUED | OUTPATIENT
Start: 2021-05-30 | End: 2021-06-08 | Stop reason: HOSPADM

## 2021-05-30 RX ADMIN — POLYETHYLENE GLYCOL 3350 17 G: 17 POWDER, FOR SOLUTION ORAL at 11:42

## 2021-05-30 RX ADMIN — Medication 10 ML: at 13:05

## 2021-05-30 RX ADMIN — LEVETIRACETAM 1000 MG: 100 INJECTION, SOLUTION INTRAVENOUS at 22:30

## 2021-05-30 RX ADMIN — SODIUM CHLORIDE 75 MG: 9 INJECTION, SOLUTION INTRAVENOUS at 13:23

## 2021-05-30 RX ADMIN — LEVETIRACETAM 1000 MG: 100 INJECTION, SOLUTION INTRAVENOUS at 10:19

## 2021-05-30 RX ADMIN — HYDRALAZINE HYDROCHLORIDE 100 MG: 50 TABLET, FILM COATED ORAL at 22:29

## 2021-05-30 RX ADMIN — ATORVASTATIN CALCIUM 80 MG: 40 TABLET, FILM COATED ORAL at 09:13

## 2021-05-30 RX ADMIN — HYDRALAZINE HYDROCHLORIDE 100 MG: 50 TABLET, FILM COATED ORAL at 09:11

## 2021-05-30 RX ADMIN — Medication 10 ML: at 06:26

## 2021-05-30 RX ADMIN — AMLODIPINE BESYLATE 5 MG: 5 TABLET ORAL at 09:13

## 2021-05-30 RX ADMIN — SODIUM CHLORIDE 75 MG: 9 INJECTION, SOLUTION INTRAVENOUS at 02:26

## 2021-05-30 RX ADMIN — HYDRALAZINE HYDROCHLORIDE 100 MG: 50 TABLET, FILM COATED ORAL at 17:15

## 2021-05-30 RX ADMIN — ASPIRIN 81 MG: 81 TABLET, CHEWABLE ORAL at 09:13

## 2021-05-30 RX ADMIN — ATENOLOL 12.5 MG: 25 TABLET ORAL at 09:12

## 2021-05-30 RX ADMIN — Medication 10 ML: at 22:30

## 2021-05-30 NOTE — PROGRESS NOTES
Bedside and Verbal shift change report given to Ck Watt RN (oncoming nurse) by Marsha Schulz RN (offgoing nurse). Report included the following information SBAR, Kardex, MAR, Accordion and Recent Results.

## 2021-05-30 NOTE — PROGRESS NOTES
Hospitalist Progress Note      Hospital summary: Torsten Estrella a 80 y. o. male with pmh of CVA, intraventricular brain hemorrhage, dyslipidemia, CKD 3, hypertension who presents to hospital from subacute rehab for concerns of altered/decreased mental status. Patient was found to have multiple ischemic CVAs on MRI, continue to have altered mental status with some eye deviation and twitching on 5/20. This was concerning for seizure, given 2 mg of Ativan and loaded with Keppra with some improvement. On 5/21 patient however declined, unresponsive to verbal stimuli. Stat EEG 24-hour was ordered and showed subclinical status. He was loaded with Vimpat, and neurology recommends transfer to ICU for closer neuro checks on 5/21. Seizures were controlled and  Pt transferred out of ICU on 5/23.     5/16/2021      Assessment/Plan:  Status epilepticus -resolved  -Status post Keppra and Vimpat load 5/20  -Continue Keppra and Vimpat   - Neurology following-vimpate dose reduced yesterday  -  Seizure precautions      Acute embolic infarcts  Subacute hematoma -intraventricular hemorrhage with ventriculomegaly on recent admission, discharged 5/3  -Not a candidate for anticoagulation due to intracranial hemorrhage, and fall risk  -Neurology following  -No escalation of antiplatelets due to ICH, continue aspirin  -Continue atorvastatin  -PT/OT/speech  -Echo with normal EF        Acute Metabolic encephalopathy -   He opens his eyes and gives one word answers      Community-acquired pneumonia-resolved      Hypertension Bp controlled,  C/w hydralazine, amlodipine. IV labetalol and hydralazine prn.    Hyperlipidemiacontinue Lipitor  Hx TESSA    Hypernatremia - resolved   - likely due to poor oral intake   will monitor      Dysphagia  - tamie gomez  -on TFs   -family wants to give him more time and see if he improves, PEG tube held for now  Swallow eval on Tuesday again      RUE swelling- venous duplex ordered    Appreciate Palliative care input     Code status: DNR. DVT prophylaxis: SCDs  Disposition: TBD    I spoke to patient's daughter at bedside yesterday  ----------------------------------------------    CC: Acute CVA. Seizures     S: Patient is seen and examined     This morning,he was able to tell his name,awake and alert    Review of Systems:  Review of systems not obtained due to patient factors.     O:  Visit Vitals  BP (!) 155/68 (BP 1 Location: Right arm, BP Patient Position: At rest)   Pulse 87   Temp 99.3 °F (37.4 °C)   Resp 24   Ht 5' 8\" (1.727 m)   Wt 98.9 kg (218 lb 1.6 oz)   SpO2 100%   BMI 33.16 kg/m²       PHYSICAL EXAM:  Gen: lethargic ,elderly patient  HEENT: anicteric sclerae, normal conjunctiva, NG tube   Neck: supple, trachea midline, no adenopathy  Heart: RRR, no MRG, no JVD, no peripheral edema  Lungs: clear to auscultation, no accessory muscle use  Abd: soft,non tender,non distended  Extr: warm  Skin: dry, no rash  Neuro:awake and alert during my eval,followed commands occassionally    Intake/Output Summary (Last 24 hours) at 5/30/2021 1045  Last data filed at 5/30/2021 0748  Gross per 24 hour   Intake 480 ml   Output    Net 480 ml        Recent labs & imaging reviewed:  Recent Results (from the past 24 hour(s))   GLUCOSE, POC    Collection Time: 05/29/21 11:58 AM   Result Value Ref Range    Glucose (POC) 139 (H) 65 - 117 mg/dL    Performed by Deepak Nice    GLUCOSE, POC    Collection Time: 05/29/21  5:46 PM   Result Value Ref Range    Glucose (POC) 109 65 - 117 mg/dL    Performed by Topher Cuevas    GLUCOSE, POC    Collection Time: 05/29/21 11:29 PM   Result Value Ref Range    Glucose (POC) 119 (H) 65 - 117 mg/dL    Performed by [facility]    LACTIC ACID    Collection Time: 05/30/21  2:33 AM   Result Value Ref Range    Lactic acid 1.0 0.4 - 2.0 MMOL/L   MAGNESIUM    Collection Time: 05/30/21  2:33 AM   Result Value Ref Range    Magnesium 1.9 1.6 - 2.4 mg/dL   PHOSPHORUS Collection Time: 05/30/21  2:33 AM   Result Value Ref Range    Phosphorus 2.5 (L) 2.6 - 4.7 MG/DL   GLUCOSE, POC    Collection Time: 05/30/21  5:27 AM   Result Value Ref Range    Glucose (POC) 122 (H) 65 - 117 mg/dL    Performed by Johanna Garcia      Recent Labs     05/29/21  0416 05/28/21  1115   WBC 10.5 7.8   HGB 11.6* 12.7   HCT 36.5* 39.8    193     Recent Labs     05/30/21  0233 05/29/21  0416 05/28/21  1115 05/28/21  0054   NA  --  138 138  --    K  --  3.9 3.7  --    CL  --  110* 110*  --    CO2  --  21 23  --    BUN  --  11 12  --    CREA  --  0.96 1.06  --    GLU  --  142* 112*  --    CA  --  8.4* 8.8  --    MG 1.9 1.9  --  2.0   PHOS 2.5* 2.0*  --  2.3*     Recent Labs     05/29/21 0416   ALT 20   AP 96   TBILI 0.3   TP 5.7*   ALB 2.4*   GLOB 3.3     No results for input(s): INR, PTP, APTT, INREXT, INREXT in the last 72 hours. No results for input(s): FE, TIBC, PSAT, FERR in the last 72 hours. No results found for: FOL, RBCF   No results for input(s): PH, PCO2, PO2 in the last 72 hours. No results for input(s): CPK, CKNDX, TROIQ in the last 72 hours.     No lab exists for component: CPKMB  Lab Results   Component Value Date/Time    Cholesterol, total 144 04/19/2021 12:59 AM    HDL Cholesterol 33 04/19/2021 12:59 AM    LDL, calculated 74.8 04/19/2021 12:59 AM    Triglyceride 181 (H) 04/19/2021 12:59 AM    CHOL/HDL Ratio 4.4 04/19/2021 12:59 AM     Lab Results   Component Value Date/Time    Glucose (POC) 122 (H) 05/30/2021 05:27 AM    Glucose (POC) 119 (H) 05/29/2021 11:29 PM    Glucose (POC) 109 05/29/2021 05:46 PM    Glucose (POC) 139 (H) 05/29/2021 11:58 AM    Glucose (POC) 124 (H) 05/24/2021 06:35 PM     Lab Results   Component Value Date/Time    Color YELLOW/STRAW 05/16/2021 09:33 PM    Appearance CLEAR 05/16/2021 09:33 PM    Specific gravity 1.014 05/16/2021 09:33 PM    Specific gravity 1.015 09/21/2020 09:13 AM    pH (UA) 5.0 05/16/2021 09:33 PM    Protein Negative 05/16/2021 09:33 PM Glucose Negative 05/16/2021 09:33 PM    Ketone Negative 05/16/2021 09:33 PM    Bilirubin Negative 05/16/2021 09:33 PM    Urobilinogen 0.2 05/16/2021 09:33 PM    Nitrites Negative 05/16/2021 09:33 PM    Leukocyte Esterase Negative 05/16/2021 09:33 PM    Epithelial cells FEW 05/16/2021 09:33 PM    Bacteria Negative 05/16/2021 09:33 PM    WBC 0-4 05/16/2021 09:33 PM    RBC 0-5 05/16/2021 09:33 PM       Med list reviewed  Current Facility-Administered Medications   Medication Dose Route Frequency    atenoloL (TENORMIN) tablet 12.5 mg  12.5 mg Oral DAILY    lacosamide (VIMPAT) 75 mg in 0.9% sodium chloride 100 mL IVPB  75 mg IntraVENous Q12H    lidocaine (XYLOCAINE) 2 % jelly   Mucous Membrane PRN    hydrALAZINE (APRESOLINE) tablet 100 mg  100 mg Oral TID    amLODIPine (NORVASC) tablet 5 mg  5 mg Oral DAILY    labetaloL (NORMODYNE;TRANDATE) injection 20 mg  20 mg IntraVENous Q6H PRN    levETIRAcetam (KEPPRA) 1,000 mg in 0.9% sodium chloride 100 mL IVPB  1,000 mg IntraVENous Q12H    hydrALAZINE (APRESOLINE) 20 mg/mL injection 20 mg  20 mg IntraVENous Q6H PRN    sodium chloride (NS) flush 5-40 mL  5-40 mL IntraVENous Q8H    sodium chloride (NS) flush 5-40 mL  5-40 mL IntraVENous PRN    polyethylene glycol (MIRALAX) packet 17 g  17 g Oral DAILY PRN    albuterol (PROVENTIL VENTOLIN) nebulizer solution 2.5 mg  2.5 mg Nebulization Q4H PRN    aspirin chewable tablet 81 mg  81 mg Oral DAILY    atorvastatin (LIPITOR) tablet 80 mg  80 mg Oral DAILY    [Held by provider] isosorbide mononitrate ER (IMDUR) tablet 30 mg  30 mg Oral DAILY    sodium chloride (NS) flush 5-40 mL  5-40 mL IntraVENous Q8H    sodium chloride (NS) flush 5-40 mL  5-40 mL IntraVENous PRN    acetaminophen (TYLENOL) tablet 650 mg  650 mg Oral Q6H PRN    Or    acetaminophen (TYLENOL) suppository 650 mg  650 mg Rectal Q6H PRN    promethazine (PHENERGAN) tablet 12.5 mg  12.5 mg Oral Q6H PRN    Or    ondansetron (ZOFRAN) injection 4 mg  4 mg IntraVENous Q6H PRN       Care Plan discussed with:  Patient/Family and Nurse    Ghassan Torres MD  Internal Medicine  Date of Service: 5/30/2021

## 2021-05-30 NOTE — PROGRESS NOTES
Demario Kay is a 80 y.o. male with multiple medical issues admitted on 5/16 with AMS/nausea/vomiting/tremors. 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 Was in rehab when he developed pneumonia and was admitted to the hospital.  MRI brain was performed which showed embolic appearing scattered infarcts in bilateral frontal lobes and parieto-occipital lobes. EEG was done on 5/16 which was unremarkable but had another EEG for symptoms of decreased responsiveness, left gaze deviation, lip smacking on 5/20 and it showed a generalized nonconvulsive status that resolved after administration of Ativan. Has been on now Keppra and lacosamide since then. Vimpat dose was reduced to 75 mg twice daily yesterday and overall he shows improvement in mentation     EXAM  Exam:  Visit Vitals  BP (!) 129/55 (BP 1 Location: Right arm, BP Patient Position: At rest)   Pulse 82   Temp 97.9 °F (36.6 °C)   Resp 29   Ht 5' 8\" (1.727 m)   Wt 218 lb 1.6 oz (98.9 kg)   SpO2 96%   BMI 33.16 kg/m²     Gen: Drowsy  CV: RRR  Lungs: non labored breathing  Abd: non distending  Neuro: Easily aroused. Follows simple commands. Was able to recognize 3 out of the 4 family members present in the room. CN II-XII: PERRL, EOMI, face symmetric, tongue/palate midline  Motor: Able to move all extremities spontaneously  Sensory:  difficult to assess   Gait: Difficult to assess    LABS/ IMAGING  MRI Results (most recent):  Results from Hospital Encounter encounter on 05/16/21    MRI BRAIN WO CONT    Narrative  *PRELIMINARY REPORT*    Multiple foci of small cortical and white matter infarcts mostly on the right  with a couple of lesions on the left as well. Interval evolution of  intraventricular hemorrhage and small parenchymal hemorrhages. Preliminary report was provided by Dr. Yrn Golden, the on-call radiologist, at  22:17.     The findings were called to floor nurse, Osmin Ch, on 5/18/2021 at 22:20 by myself. [id number]    Final report to follow. END PRELIMINARY REPORT*    EXAM:  MRI BRAIN WO CONT    INDICATION:    AMS    COMPARISON:  CT head 5/16/2021, MRI brain 11/23/2019. CONTRAST: None. TECHNIQUE:  Multiplanar multisequence acquisition without contrast of the brain. FINDINGS:  Few small scattered acute infarcts in the bilateral frontal lobes and  parieto-occipital lobes, right greater than left. Evolved now late subacute small intraparenchymal hematomas in the right parietal  periventricular white matter measuring 10 mm and 5 mm (series 6 image 11), with  no significant surrounding edema or mass effect. There is hemosiderin staining  noted along the ependymal lining of the lateral ventricles, as well as scattered  superficial siderosis involving the sylvian fissures, and along the surface of  the brainstem and cerebellum. Unchanged generalized parenchymal volume loss with commensurate dilation of the  sulci and ventricular system. Unchanged confluent periventricular and deep white  matter T2/FLAIR hyperintensity, and patchy T2/FLAIR hyperintensity in the nely,  consistent with severe chronic microvascular ischemic disease. There is no  cerebellar tonsillar herniation. Expected arterial flow-voids are present. The paranasal sinuses, mastoid air cells, and middle ears are clear. The orbital  contents are within normal limits with bilateral lens implants. No significant  osseous or scalp lesions are identified. Impression  1. Few small scattered acute infarcts in the bilateral frontal lobes and  parieto-occipital lobes, right greater than left. 2. 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   3. Unchanged generalized parenchymal volume loss and severe chronic  microvascular ischemic disease. Lab Results   Component Value Date/Time    Sodium 138 05/29/2021 04:16 AM    Potassium 3.9 05/29/2021 04:16 AM    Chloride 110 (H) 05/29/2021 04:16 AM    CO2 21 05/29/2021 04:16 AM    Anion gap 7 05/29/2021 04:16 AM    Glucose 142 (H) 05/29/2021 04:16 AM    BUN 11 05/29/2021 04:16 AM    Creatinine 0.96 05/29/2021 04:16 AM    BUN/Creatinine ratio 11 (L) 05/29/2021 04:16 AM    GFR est AA >60 05/29/2021 04:16 AM    GFR est non-AA >60 05/29/2021 04:16 AM    Calcium 8.4 (L) 05/29/2021 04:16 AM    Bilirubin, total 0.3 05/29/2021 04:16 AM    Alk. phosphatase 96 05/29/2021 04:16 AM    Protein, total 5.7 (L) 05/29/2021 04:16 AM    Albumin 2.4 (L) 05/29/2021 04:16 AM    Globulin 3.3 05/29/2021 04:16 AM    A-G Ratio 0.7 (L) 05/29/2021 04:16 AM    ALT (SGPT) 20 05/29/2021 04:16 AM    AST (SGOT) 18 05/29/2021 04:16 AM     Lab Results   Component Value Date/Time    WBC 10.5 05/29/2021 04:16 AM    HGB (POC) 14.9 07/27/2018 02:38 PM    HGB 11.6 (L) 05/29/2021 04:16 AM    Hematocrit (POC) 45 05/13/2019 07:16 PM    HCT 36.5 (L) 05/29/2021 04:16 AM    PLATELET 923 61/14/8504 04:16 AM    MCV 97.6 05/29/2021 04:16 AM       ASSESSMENT  Hospital Problems  Date Reviewed: 9/21/2020        Codes Class Noted POA    Cerebrovascular accident (CVA) due to embolism [facility]) ICD-10-CM: I63.9  ICD-9-CM: 434.11  5/29/2021 Unknown        Status epilepticus ([facility] [address] ICD-10-CM: G40.901  ICD-9-CM: 345.3  5/29/2021 Unknown        Encephalopathy ICD-10-CM: G93.40  ICD-9-CM: 348.30  5/29/2021 Unknown               PLAN  Extended hospital course related to embolic appearing scattered infarctions in both hemispheres of unclear source, possibly cardiac  Currently on aspirin and statin, not a candidate for anticoagulation    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    Was very lethargic and dose of lacosamide was reduced yesterday to 75 mg twice a day. This has had a positive impact and he is much more alert today   We will maintain current dosage for now  Continue with supportive care  Reassess his ability to swallow  Discussed with family    Mariah Taveras MD

## 2021-05-30 NOTE — PROGRESS NOTES
1930: Bedside and Verbal shift change report given to Suyapa Soto RN (oncoming nurse) by Alan Sahu RN and Aliyah Mendes RN (offgoing nurse). Report included the following information SBAR, Kardex, Intake/Output, MAR, Recent Results, Med Rec Status, Cardiac Rhythm SR and Dual Neuro Assessment. I agree with Kady Dougherty RN's documentation as her preceptor.      Problem: Seizure Disorder (Adult)  Goal: *STG: Remains free of seizure activity  Outcome: Progressing Towards Goal  Goal: *STG: Maintains lab values within therapeutic range  Outcome: Progressing Towards Goal  Goal: *STG/LTG: Complies with medication therapy  Outcome: Progressing Towards Goal  Goal: *STG: Remains free of injury during seizure activity  Outcome: Progressing Towards Goal  Goal: *STG: Remains safe in hospital  Outcome: Progressing Towards Goal  Goal: Interventions  Outcome: Progressing Towards Goal     Problem: Non-Violent Restraints  Goal: Removal from restraints as soon as assessed to be safe  Outcome: Progressing Towards Goal  Goal: No harm/injury to patient while restraints in use  Outcome: Progressing Towards Goal  Goal: Patient's dignity will be maintained  Outcome: Progressing Towards Goal  Goal: Patient Interventions  Outcome: Progressing Towards Goal         Problem: TIA/CVA Stroke: Day 2 Until Discharge  Goal: Activity/Safety  Outcome: Progressing Towards Goal  Goal: Diagnostic Test/Procedures  Outcome: Progressing Towards Goal  Goal: Nutrition/Diet  Outcome: Progressing Towards Goal  Goal: Discharge Planning  Outcome: Progressing Towards Goal  Goal: Medications  Outcome: Progressing Towards Goal  Goal: Respiratory  Outcome: Progressing Towards Goal  Goal: Treatments/Interventions/Procedures  Outcome: Progressing Towards Goal  Goal: Psychosocial  Outcome: Progressing Towards Goal  Goal: *Verbalizes anxiety and depression are reduced or absent  Outcome: Progressing Towards Goal  Goal: *Absence of aspiration  Outcome: Progressing Towards Goal  Goal: *Absence of deep venous thrombosis signs and symptoms(Stroke Metric)  Outcome: Progressing Towards Goal  Goal: *Optimal pain control at patient's stated goal  Outcome: Progressing Towards Goal  Goal: *Tolerating diet  Outcome: Progressing Towards Goal  Goal: *Ability to perform ADLs and demonstrates progressive mobility and function  Outcome: Progressing Towards Goal  Goal: *Stroke education continued(Stroke Metric)  Outcome: Progressing Towards Goal     Problem: Non-Violent Restraints  Goal: Removal from restraints as soon as assessed to be safe  Outcome: Progressing Towards Goal  Goal: No harm/injury to patient while restraints in use  Outcome: Progressing Towards Goal  Goal: Patient's dignity will be maintained  Outcome: Progressing Towards Goal  Goal: Patient Interventions  Outcome: Progressing Towards Goal

## 2021-05-30 NOTE — PROGRESS NOTES
Problem: Pressure Injury - Risk of  Goal: *Prevention of pressure injury  Description: Document Aquiles Scale and appropriate interventions in the flowsheet. Outcome: Progressing Towards Goal  Note: Pressure Injury Interventions:  Sensory Interventions: Assess changes in LOC    Moisture Interventions: Absorbent underpads, Apply protective barrier, creams and emollients    Activity Interventions: Increase time out of bed    Mobility Interventions: HOB 30 degrees or less    Nutrition Interventions: Document food/fluid/supplement intake    Friction and Shear Interventions: HOB 30 degrees or less                Problem: Patient Education: Go to Patient Education Activity  Goal: Patient/Family Education  Outcome: Progressing Towards Goal     Problem: Patient Education: Go to Patient Education Activity  Goal: Patient/Family Education  Outcome: Progressing Towards Goal     Problem: Falls - Risk of  Goal: *Absence of Falls  Description: Document Too Fall Risk and appropriate interventions in the flowsheet.   Outcome: Progressing Towards Goal  Note: Fall Risk Interventions:  Mobility Interventions: Communicate number of staff needed for ambulation/transfer, OT consult for ADLs, Patient to call before getting OOB, PT Consult for mobility concerns, PT Consult for assist device competence    Mentation Interventions: Door open when patient unattended, Increase mobility, More frequent rounding, Reorient patient, Update white board    Medication Interventions: Patient to call before getting OOB, Teach patient to arise slowly    Elimination Interventions: Bed/chair exit alarm, Call light in reach, Stay With Me (per policy)    History of Falls Interventions: Consult care management for discharge planning, Door open when patient unattended, Bed/chair exit alarm, Room close to nurse's station, Vital signs minimum Q4HRs X 24 hrs (comment for end date)         Problem: Patient Education: Go to Patient Education Activity  Goal: Patient/Family Education  Outcome: Progressing Towards Goal     Problem: Patient Education: Go to Patient Education Activity  Goal: Patient/Family Education  Outcome: Progressing Towards Goal     Problem: Patient Education: Go to Patient Education Activity  Goal: Patient/Family Education  Outcome: Progressing Towards Goal     Problem: Seizure Disorder (Adult)  Goal: *STG: Remains free of seizure activity  Outcome: Progressing Towards Goal  Goal: *STG: Maintains lab values within therapeutic range  Outcome: Progressing Towards Goal  Goal: *STG/LTG: Complies with medication therapy  Outcome: Progressing Towards Goal  Goal: *STG: Remains free of injury during seizure activity  Outcome: Progressing Towards Goal  Goal: *STG: Remains safe in hospital  Outcome: Progressing Towards Goal  Goal: Interventions  Outcome: Progressing Towards Goal     Problem: Patient Education: Go to Patient Education Activity  Goal: Patient/Family Education  Outcome: Progressing Towards Goal     Problem: Patient Education: Go to Patient Education Activity  Goal: Patient/Family Education  Outcome: Progressing Towards Goal     Problem: TIA/CVA Stroke: 0-24 hours  Goal: Off Pathway (Use only if patient is Off Pathway)  Outcome: Progressing Towards Goal  Goal: Activity/Safety  Outcome: Progressing Towards Goal  Goal: Consults, if ordered  Outcome: Progressing Towards Goal  Goal: Diagnostic Test/Procedures  Outcome: Progressing Towards Goal  Goal: Nutrition/Diet  Outcome: Progressing Towards Goal  Goal: Discharge Planning  Outcome: Progressing Towards Goal  Goal: Medications  Outcome: Progressing Towards Goal  Goal: Respiratory  Outcome: Progressing Towards Goal  Goal: Treatments/Interventions/Procedures  Outcome: Progressing Towards Goal  Goal: Minimize risk of bleeding post-thrombolytic infusion  Outcome: Progressing Towards Goal  Goal: Monitor for complications post-thrombolytic infusion  Outcome: Progressing Towards Goal  Goal: Psychosocial  Outcome: Progressing Towards Goal  Goal: *Hemodynamically stable  Outcome: Progressing Towards Goal  Goal: *Neurologically stable  Description: Absence of additional neurological deficits    Outcome: Progressing Towards Goal  Goal: *Verbalizes anxiety and depression are reduced or absent  Outcome: Progressing Towards Goal  Goal: *Absence of Signs of Aspiration on Current Diet  Outcome: Progressing Towards Goal  Goal: *Absence of deep venous thrombosis signs and symptoms(Stroke Metric)  Outcome: Progressing Towards Goal  Goal: *Ability to perform ADLs and demonstrates progressive mobility and function  Outcome: Progressing Towards Goal  Goal: *Stroke education started(Stroke Metric)  Outcome: Progressing Towards Goal  Goal: *Dysphagia screen performed(Stroke Metric)  Outcome: Progressing Towards Goal  Goal: *Rehab consulted(Stroke Metric)  Outcome: Progressing Towards Goal     Problem: TIA/CVA Stroke: Day 2 Until Discharge  Goal: Off Pathway (Use only if patient is Off Pathway)  Outcome: Progressing Towards Goal  Goal: Activity/Safety  Outcome: Progressing Towards Goal  Goal: Diagnostic Test/Procedures  Outcome: Progressing Towards Goal  Goal: Nutrition/Diet  Outcome: Progressing Towards Goal  Goal: Discharge Planning  Outcome: Progressing Towards Goal  Goal: Medications  Outcome: Progressing Towards Goal  Goal: Respiratory  Outcome: Progressing Towards Goal  Goal: Treatments/Interventions/Procedures  Outcome: Progressing Towards Goal  Goal: Psychosocial  Outcome: Progressing Towards Goal  Goal: *Verbalizes anxiety and depression are reduced or absent  Outcome: Progressing Towards Goal  Goal: *Absence of aspiration  Outcome: Progressing Towards Goal  Goal: *Absence of deep venous thrombosis signs and symptoms(Stroke Metric)  Outcome: Progressing Towards Goal  Goal: *Optimal pain control at patient's stated goal  Outcome: Progressing Towards Goal  Goal: *Tolerating diet  Outcome: Progressing Towards Goal  Goal: *Ability to perform ADLs and demonstrates progressive mobility and function  Outcome: Progressing Towards Goal  Goal: *Stroke education continued(Stroke Metric)  Outcome: Progressing Towards Goal     Problem: Ischemic Stroke: Discharge Outcomes  Goal: *Verbalizes anxiety and depression are reduced or absent  Outcome: Progressing Towards Goal  Goal: *Verbalize understanding of risk factor modification(Stroke Metric)  Outcome: Progressing Towards Goal  Goal: *Hemodynamically stable  Outcome: Progressing Towards Goal  Goal: *Absence of aspiration pneumonia  Outcome: Progressing Towards Goal  Goal: *Aware of needed dietary changes  Outcome: Progressing Towards Goal  Goal: *Verbalize understanding of prescribed medications including anti-coagulants, anti-lipid, and/or anti-platelets(Stroke Metric)  Outcome: Progressing Towards Goal  Goal: *Tolerating diet  Outcome: Progressing Towards Goal  Goal: *Aware of follow-up diagnostics related to anticoagulants  Outcome: Progressing Towards Goal  Goal: *Ability to perform ADLs and demonstrates progressive mobility and function  Outcome: Progressing Towards Goal  Goal: *Absence of DVT(Stroke Metric)  Outcome: Progressing Towards Goal  Goal: *Absence of aspiration  Outcome: Progressing Towards Goal  Goal: *Optimal pain control at patient's stated goal  Outcome: Progressing Towards Goal  Goal: *Home safety concerns addressed  Outcome: Progressing Towards Goal  Goal: *Describes available resources and support systems  Outcome: Progressing Towards Goal  Goal: *Verbalizes understanding of activation of EMS(911) for stroke symptoms(Stroke Metric)  Outcome: Progressing Towards Goal  Goal: *Understands and describes signs and symptoms to report to providers(Stroke Metric)  Outcome: Progressing Towards Goal  Goal: *Neurolgocially stable (absence of additional neurological deficits)  Outcome: Progressing Towards Goal  Goal: *Verbalizes importance of follow-up with primary care physician(Stroke Metric)  Outcome: Progressing Towards Goal  Goal: *Smoking cessation discussed,if applicable(Stroke Metric)  Outcome: Progressing Towards Goal  Goal: *Depression screening completed(Stroke Metric)  Outcome: Progressing Towards Goal

## 2021-05-31 LAB
ALBUMIN SERPL-MCNC: 2.4 G/DL (ref 3.5–5)
ANION GAP SERPL CALC-SCNC: 5 MMOL/L (ref 5–15)
BASOPHILS # BLD: 0 K/UL (ref 0–0.1)
BASOPHILS NFR BLD: 1 % (ref 0–1)
BUN SERPL-MCNC: 10 MG/DL (ref 6–20)
BUN/CREAT SERPL: 11 (ref 12–20)
CALCIUM SERPL-MCNC: 8.7 MG/DL (ref 8.5–10.1)
CHLORIDE SERPL-SCNC: 110 MMOL/L (ref 97–108)
CO2 SERPL-SCNC: 25 MMOL/L (ref 21–32)
CREAT SERPL-MCNC: 0.88 MG/DL (ref 0.7–1.3)
DIFFERENTIAL METHOD BLD: ABNORMAL
EOSINOPHIL # BLD: 0.2 K/UL (ref 0–0.4)
EOSINOPHIL NFR BLD: 3 % (ref 0–7)
ERYTHROCYTE [DISTWIDTH] IN BLOOD BY AUTOMATED COUNT: 15.3 % (ref 11.5–14.5)
GLUCOSE BLD STRIP.AUTO-MCNC: 114 MG/DL (ref 65–117)
GLUCOSE SERPL-MCNC: 119 MG/DL (ref 65–100)
HCT VFR BLD AUTO: 36.3 % (ref 36.6–50.3)
HGB BLD-MCNC: 11.6 G/DL (ref 12.1–17)
IMM GRANULOCYTES # BLD AUTO: 0 K/UL (ref 0–0.04)
IMM GRANULOCYTES NFR BLD AUTO: 0 % (ref 0–0.5)
LYMPHOCYTES # BLD: 1.1 K/UL (ref 0.8–3.5)
LYMPHOCYTES NFR BLD: 15 % (ref 12–49)
MCH RBC QN AUTO: 31.6 PG (ref 26–34)
MCHC RBC AUTO-ENTMCNC: 32 G/DL (ref 30–36.5)
MCV RBC AUTO: 98.9 FL (ref 80–99)
MONOCYTES # BLD: 0.7 K/UL (ref 0–1)
MONOCYTES NFR BLD: 9 % (ref 5–13)
NEUTS SEG # BLD: 5.4 K/UL (ref 1.8–8)
NEUTS SEG NFR BLD: 72 % (ref 32–75)
NRBC # BLD: 0 K/UL (ref 0–0.01)
NRBC BLD-RTO: 0 PER 100 WBC
PHOSPHATE SERPL-MCNC: 2.4 MG/DL (ref 2.6–4.7)
PLATELET # BLD AUTO: 236 K/UL (ref 150–400)
PMV BLD AUTO: 10.6 FL (ref 8.9–12.9)
POTASSIUM SERPL-SCNC: 4.1 MMOL/L (ref 3.5–5.1)
RBC # BLD AUTO: 3.67 M/UL (ref 4.1–5.7)
SERVICE CMNT-IMP: NORMAL
SODIUM SERPL-SCNC: 140 MMOL/L (ref 136–145)
WBC # BLD AUTO: 7.4 K/UL (ref 4.1–11.1)

## 2021-05-31 PROCEDURE — 74011250636 HC RX REV CODE- 250/636: Performed by: PSYCHIATRY & NEUROLOGY

## 2021-05-31 PROCEDURE — 85025 COMPLETE CBC W/AUTO DIFF WBC: CPT

## 2021-05-31 PROCEDURE — 74011250637 HC RX REV CODE- 250/637: Performed by: HOSPITALIST

## 2021-05-31 PROCEDURE — 80069 RENAL FUNCTION PANEL: CPT

## 2021-05-31 PROCEDURE — 74011250637 HC RX REV CODE- 250/637: Performed by: INTERNAL MEDICINE

## 2021-05-31 PROCEDURE — 65660000001 HC RM ICU INTERMED STEPDOWN

## 2021-05-31 PROCEDURE — C9254 INJECTION, LACOSAMIDE: HCPCS | Performed by: PSYCHIATRY & NEUROLOGY

## 2021-05-31 PROCEDURE — 82962 GLUCOSE BLOOD TEST: CPT

## 2021-05-31 PROCEDURE — 74011000258 HC RX REV CODE- 258: Performed by: PSYCHIATRY & NEUROLOGY

## 2021-05-31 PROCEDURE — 36415 COLL VENOUS BLD VENIPUNCTURE: CPT

## 2021-05-31 PROCEDURE — 74011250637 HC RX REV CODE- 250/637: Performed by: FAMILY MEDICINE

## 2021-05-31 PROCEDURE — 99232 SBSQ HOSP IP/OBS MODERATE 35: CPT | Performed by: PSYCHIATRY & NEUROLOGY

## 2021-05-31 RX ORDER — FUROSEMIDE 40 MG/1
40 TABLET ORAL DAILY
Status: DISCONTINUED | OUTPATIENT
Start: 2021-05-31 | End: 2021-06-08 | Stop reason: HOSPADM

## 2021-05-31 RX ADMIN — ASPIRIN 81 MG: 81 TABLET, CHEWABLE ORAL at 09:38

## 2021-05-31 RX ADMIN — LEVETIRACETAM 1000 MG: 100 INJECTION, SOLUTION INTRAVENOUS at 22:09

## 2021-05-31 RX ADMIN — AMLODIPINE BESYLATE 5 MG: 5 TABLET ORAL at 09:38

## 2021-05-31 RX ADMIN — LEVETIRACETAM 1000 MG: 100 INJECTION, SOLUTION INTRAVENOUS at 11:14

## 2021-05-31 RX ADMIN — SODIUM CHLORIDE 75 MG: 9 INJECTION, SOLUTION INTRAVENOUS at 13:52

## 2021-05-31 RX ADMIN — Medication 10 ML: at 06:12

## 2021-05-31 RX ADMIN — ATORVASTATIN CALCIUM 80 MG: 40 TABLET, FILM COATED ORAL at 09:38

## 2021-05-31 RX ADMIN — Medication 10 ML: at 13:52

## 2021-05-31 RX ADMIN — HYDRALAZINE HYDROCHLORIDE 100 MG: 50 TABLET, FILM COATED ORAL at 09:39

## 2021-05-31 RX ADMIN — Medication 10 ML: at 22:04

## 2021-05-31 RX ADMIN — POLYETHYLENE GLYCOL 3350 17 G: 17 POWDER, FOR SOLUTION ORAL at 09:38

## 2021-05-31 RX ADMIN — SODIUM CHLORIDE 75 MG: 9 INJECTION, SOLUTION INTRAVENOUS at 02:30

## 2021-05-31 RX ADMIN — HYDRALAZINE HYDROCHLORIDE 100 MG: 50 TABLET, FILM COATED ORAL at 16:33

## 2021-05-31 RX ADMIN — ATENOLOL 12.5 MG: 25 TABLET ORAL at 09:38

## 2021-05-31 RX ADMIN — HYDRALAZINE HYDROCHLORIDE 100 MG: 50 TABLET, FILM COATED ORAL at 22:09

## 2021-05-31 RX ADMIN — FUROSEMIDE 40 MG: 40 TABLET ORAL at 11:14

## 2021-05-31 NOTE — PROGRESS NOTES
Hospitalist Progress Note      Hospital summary: Ani Every a 80 y. o. male with pmh of CVA, intraventricular brain hemorrhage, dyslipidemia, CKD 3, hypertension who presents to hospital from subacute rehab for concerns of altered/decreased mental status. Patient was found to have multiple ischemic CVAs on MRI, continue to have altered mental status with some eye deviation and twitching on 5/20. This was concerning for seizure, given 2 mg of Ativan and loaded with Keppra with some improvement. On 5/21 patient however declined, unresponsive to verbal stimuli. Stat EEG 24-hour was ordered and showed subclinical status. He was loaded with Vimpat, and neurology recommends transfer to ICU for closer neuro checks on 5/21. Seizures were controlled and  Pt transferred out of ICU on 5/23.     5/16/2021      Assessment/Plan:  Status epilepticus -resolved  -Continue Keppra and Vimpat   - Neurology following-vimpate dose reduced -75 mg BID  -  Seizure precautions      Acute embolic infarcts  Subacute hematoma -intraventricular hemorrhage with ventriculomegaly on recent admission, discharged 5/3  -Not a candidate for anticoagulation due to intracranial hemorrhage, and fall risk  -Neurology following  -No escalation of antiplatelets due to ICH, continue aspirin  -Continue atorvastatin  -PT/OT/speech  -Echo with normal EF        Acute Metabolic encephalopathy -   He opens his eyes with stimulation and gives one word answers      Community-acquired pneumonia-resolved      Hypertension Bp controlled,  C/w hydralazine, amlodipine. IV labetalol and hydralazine prn.    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 DVT prophylaxis: SCDs  Disposition: TBD    I spoke to patient's daughter today  ----------------------------------------------    CC: Acute CVA. Seizures     S: Patient is seen and examined     This morning,he is easily arousable,could tell his name    Review of Systems:  Review of systems not obtained due to patient factors.     O:  Visit Vitals  BP (!) 132/52 (BP 1 Location: Right arm, BP Patient Position: At rest)   Pulse 79   Temp 97.6 °F (36.4 °C)   Resp 24   Ht 5' 8\" (1.727 m)   Wt 98.7 kg (217 lb 9.6 oz)   SpO2 96%   BMI 33.09 kg/m²       PHYSICAL EXAM:  Gen: elderly patient  HEENT: anicteric sclerae, normal conjunctiva, NG tube   Neck: supple, trachea midline, no adenopathy  Heart: RRR, no MRG, no JVD, no peripheral edema  Lungs: clear to auscultation, no accessory muscle use  Abd: soft,non tender,non distended  Extr: warm  Skin: dry, no rash  Neuro:easily arousable,tremors present,could tell his name    Intake/Output Summary (Last 24 hours) at 5/31/2021 1536  Last data filed at 5/31/2021 0400  Gross per 24 hour   Intake 480 ml   Output    Net 480 ml        Recent labs & imaging reviewed:  Recent Results (from the past 24 hour(s))   GLUCOSE, POC    Collection Time: 05/30/21  5:57 PM   Result Value Ref Range    Glucose (POC) 132 (H) 65 - 117 mg/dL    Performed by Gala August    CBC WITH AUTOMATED DIFF    Collection Time: 05/31/21  2:52 AM   Result Value Ref Range    WBC 7.4 4.1 - 11.1 K/uL    RBC 3.67 (L) 4.10 - 5.70 M/uL    HGB 11.6 (L) 12.1 - 17.0 g/dL    HCT 36.3 (L) 36.6 - 50.3 %    MCV 98.9 80.0 - 99.0 FL    MCH 31.6 26.0 - 34.0 PG    MCHC 32.0 30.0 - 36.5 g/dL    RDW 15.3 (H) 11.5 - 14.5 %    PLATELET 382 983 - 681 K/uL    MPV 10.6 8.9 - 12.9 FL    NRBC 0.0 0  WBC ABSOLUTE NRBC 0.00 0.00 - 0.01 K/uL    NEUTROPHILS 72 32 - 75 %    LYMPHOCYTES 15 12 - 49 %    MONOCYTES 9 5 - 13 %    EOSINOPHILS 3 0 - 7 %    BASOPHILS 1 0 - 1 %    IMMATURE GRANULOCYTES 0 0.0 - 0.5 %    ABS. NEUTROPHILS 5.4 1.8 - 8.0 K/UL    ABS. LYMPHOCYTES 1.1 0.8 - 3.5 K/UL    ABS. MONOCYTES 0.7 0.0 - 1.0 K/UL    ABS. EOSINOPHILS 0.2 0.0 - 0.4 K/UL    ABS. BASOPHILS 0.0 0.0 - 0.1 K/UL    ABS. IMM. GRANS. 0.0 0.00 - 0.04 K/UL    DF AUTOMATED     RENAL FUNCTION PANEL    Collection Time: 05/31/21  2:52 AM   Result Value Ref Range    Sodium 140 136 - 145 mmol/L    Potassium 4.1 3.5 - 5.1 mmol/L    Chloride 110 (H) 97 - 108 mmol/L    CO2 25 21 - 32 mmol/L    Anion gap 5 5 - 15 mmol/L    Glucose 119 (H) 65 - 100 mg/dL    BUN 10 6 - 20 MG/DL    Creatinine 0.88 0.70 - 1.30 MG/DL    BUN/Creatinine ratio 11 (L) 12 - 20      GFR est AA >60 >60 ml/min/1.73m2    GFR est non-AA >60 >60 ml/min/1.73m2    Calcium 8.7 8.5 - 10.1 MG/DL    Phosphorus 2.4 (L) 2.6 - 4.7 MG/DL    Albumin 2.4 (L) 3.5 - 5.0 g/dL   GLUCOSE, POC    Collection Time: 05/31/21 12:27 PM   Result Value Ref Range    Glucose (POC) 114 65 - 117 mg/dL    Performed by [email or web address]     05/31/21 0252 05/29/21 0416   WBC 7.4 10.5   HGB 11.6* 11.6*   HCT 36.3* 36.5*    217     Recent Labs     05/31/21 0252 05/30/21  0233 05/29/21  0416     --  138   K 4.1  --  3.9   *  --  110*   CO2 25  --  21   BUN 10  --  11   CREA 0.88  --  0.96   *  --  142*   CA 8.7  --  8.4*   MG  --  1.9 1.9   PHOS 2.4* 2.5* 2.0*     Recent Labs     05/31/21  0252 05/29/21  0416   ALT  --  20   AP  --  96   TBILI  --  0.3   TP  --  5.7*   ALB 2.4* 2.4*   GLOB  --  3.3     No results for input(s): INR, PTP, APTT, INREXT, INREXT in the last 72 hours. No results for input(s): FE, TIBC, PSAT, FERR in the last 72 hours. No results found for: FOL, RBCF   No results for input(s): PH, PCO2, PO2 in the last 72 hours.   No results for input(s): CPK, CKNDX, TROIQ in the last 72 hours.     No lab exists for component: CPKMB  Lab Results   Component Value Date/Time    Cholesterol, total 144 04/19/2021 12:59 AM    HDL Cholesterol 33 04/19/2021 12:59 AM    LDL, calculated 74.8 04/19/2021 12:59 AM    Triglyceride 181 (H) 04/19/2021 12:59 AM    CHOL/HDL Ratio 4.4 04/19/2021 12:59 AM     Lab Results   Component Value Date/Time    Glucose (POC) 114 05/31/2021 12:27 PM    Glucose (POC) 132 (H) 05/30/2021 05:57 PM    Glucose (POC) 136 (H) 05/30/2021 11:55 AM    Glucose (POC) 122 (H) 05/30/2021 05:27 AM    Glucose (POC) 119 (H) 05/29/2021 11:29 PM     Lab Results   Component Value Date/Time    Color YELLOW/STRAW 05/16/2021 09:33 PM    Appearance CLEAR 05/16/2021 09:33 PM    Specific gravity 1.014 05/16/2021 09:33 PM    Specific gravity 1.015 09/21/2020 09:13 AM    pH (UA) 5.0 05/16/2021 09:33 PM    Protein Negative 05/16/2021 09:33 PM    Glucose Negative 05/16/2021 09:33 PM    Ketone Negative 05/16/2021 09:33 PM    Bilirubin Negative 05/16/2021 09:33 PM    Urobilinogen 0.2 05/16/2021 09:33 PM    Nitrites Negative 05/16/2021 09:33 PM    Leukocyte Esterase Negative 05/16/2021 09:33 PM    Epithelial cells FEW 05/16/2021 09:33 PM    Bacteria Negative 05/16/2021 09:33 PM    WBC 0-4 05/16/2021 09:33 PM    RBC 0-5 05/16/2021 09:33 PM       Med list reviewed  Current Facility-Administered Medications   Medication Dose Route Frequency    furosemide (LASIX) tablet 40 mg  40 mg Oral DAILY    polyethylene glycol (MIRALAX) packet 17 g  17 g Oral DAILY    atenoloL (TENORMIN) tablet 12.5 mg  12.5 mg Oral DAILY    lacosamide (VIMPAT) 75 mg in 0.9% sodium chloride 100 mL IVPB  75 mg IntraVENous Q12H    lidocaine (XYLOCAINE) 2 % jelly   Mucous Membrane PRN    hydrALAZINE (APRESOLINE) tablet 100 mg  100 mg Oral TID    amLODIPine (NORVASC) tablet 5 mg  5 mg Oral DAILY    labetaloL (NORMODYNE;TRANDATE) injection 20 mg  20 mg IntraVENous Q6H PRN    levETIRAcetam (KEPPRA) 1,000 mg in 0.9% sodium chloride 100 mL IVPB  1,000 mg IntraVENous Q12H    hydrALAZINE (APRESOLINE) 20 mg/mL injection 20 mg  20 mg IntraVENous Q6H PRN    sodium chloride (NS) flush 5-40 mL  5-40 mL IntraVENous Q8H    sodium chloride (NS) flush 5-40 mL  5-40 mL IntraVENous PRN    polyethylene glycol (MIRALAX) packet 17 g  17 g Oral DAILY PRN    albuterol (PROVENTIL VENTOLIN) nebulizer solution 2.5 mg  2.5 mg Nebulization Q4H PRN    aspirin chewable tablet 81 mg  81 mg Oral DAILY    atorvastatin (LIPITOR) tablet 80 mg  80 mg Oral DAILY    [Held by provider] isosorbide mononitrate ER (IMDUR) tablet 30 mg  30 mg Oral DAILY    sodium chloride (NS) flush 5-40 mL  5-40 mL IntraVENous Q8H    sodium chloride (NS) flush 5-40 mL  5-40 mL IntraVENous PRN    acetaminophen (TYLENOL) tablet 650 mg  650 mg Oral Q6H PRN    Or    acetaminophen (TYLENOL) suppository 650 mg  650 mg Rectal Q6H PRN    promethazine (PHENERGAN) tablet 12.5 mg  12.5 mg Oral Q6H PRN    Or    ondansetron (ZOFRAN) injection 4 mg  4 mg IntraVENous Q6H PRN       Care Plan discussed with:  Patient/Family and Nurse    Rahul Han MD  Internal Medicine  Date of Service: 5/31/2021

## 2021-05-31 NOTE — PROGRESS NOTES
Bedside and Verbal shift change report given to Anila Younge (oncoming nurse) by Beka [id number] (offgoing nurse). Report included the following information SBAR, Kardex, Intake/Output, MAR, Accordion and Recent Results.

## 2021-05-31 NOTE — PROGRESS NOTES
Problem: Pressure Injury - Risk of  Goal: *Prevention of pressure injury  Description: Document Aquiles Scale and appropriate interventions in the flowsheet. Outcome: Progressing Towards Goal  Note: Pressure Injury Interventions:  Sensory Interventions: Assess changes in LOC, Discuss PT/OT consult with provider, Float heels, Keep linens dry and wrinkle-free, Maintain/enhance activity level    Moisture Interventions: Absorbent underpads, Apply protective barrier, creams and emollients, Check for incontinence Q2 hours and as needed, Limit adult briefs, Maintain skin hydration (lotion/cream), Minimize layers    Activity Interventions: PT/OT evaluation, Pressure redistribution bed/mattress(bed type)    Mobility Interventions: HOB 30 degrees or less, Pressure redistribution bed/mattress (bed type), PT/OT evaluation    Nutrition Interventions: Document food/fluid/supplement intake, Offer support with meals,snacks and hydration    Friction and Shear Interventions: Apply protective barrier, creams and emollients, Feet elevated on foot rest, HOB 30 degrees or less, Lift team/patient mobility team                Problem: Patient Education: Go to Patient Education Activity  Goal: Patient/Family Education  Outcome: Progressing Towards Goal     Problem: Patient Education: Go to Patient Education Activity  Goal: Patient/Family Education  Outcome: Progressing Towards Goal     Problem: Falls - Risk of  Goal: *Absence of Falls  Description: Document Too Fall Risk and appropriate interventions in the flowsheet.   Outcome: Progressing Towards Goal  Note: Fall Risk Interventions:  Mobility Interventions: OT consult for ADLs, Patient to call before getting OOB, PT Consult for mobility concerns, Bed/chair exit alarm    Mentation Interventions: Adequate sleep, hydration, pain control, Bed/chair exit alarm, Door open when patient unattended, Increase mobility, More frequent rounding, Reorient patient, Room close to nurse's station    Medication Interventions: Bed/chair exit alarm, Evaluate medications/consider consulting pharmacy, Patient to call before getting OOB    Elimination Interventions: Bed/chair exit alarm, Toileting schedule/hourly rounds    History of Falls Interventions: Bed/chair exit alarm, Door open when patient unattended, Room close to nurse's station         Problem: Patient Education: Go to Patient Education Activity  Goal: Patient/Family Education  Outcome: Progressing Towards Goal     Problem: Patient Education: Go to Patient Education Activity  Goal: Patient/Family Education  Outcome: Progressing Towards Goal     Problem: Patient Education: Go to Patient Education Activity  Goal: Patient/Family Education  Outcome: Progressing Towards Goal     Problem: Seizure Disorder (Adult)  Goal: *STG: Remains free of seizure activity  Outcome: Progressing Towards Goal  Goal: *STG: Maintains lab values within therapeutic range  Outcome: Progressing Towards Goal  Goal: *STG/LTG: Complies with medication therapy  Outcome: Progressing Towards Goal  Goal: *STG: Remains free of injury during seizure activity  Outcome: Progressing Towards Goal  Goal: *STG: Remains safe in hospital  Outcome: Progressing Towards Goal  Goal: Interventions  Outcome: Progressing Towards Goal     Problem: Patient Education: Go to Patient Education Activity  Goal: Patient/Family Education  Outcome: Progressing Towards Goal     Problem: Patient Education: Go to Patient Education Activity  Goal: Patient/Family Education  Outcome: Progressing Towards Goal     Problem: TIA/CVA Stroke: 0-24 hours  Goal: Off Pathway (Use only if patient is Off Pathway)  Outcome: Progressing Towards Goal  Goal: Activity/Safety  Outcome: Progressing Towards Goal  Goal: Consults, if ordered  Outcome: Progressing Towards Goal  Goal: Diagnostic Test/Procedures  Outcome: Progressing Towards Goal  Goal: Nutrition/Diet  Outcome: Progressing Towards Goal  Goal: Discharge Planning  Outcome: Progressing Towards Goal  Goal: Medications  Outcome: Progressing Towards Goal  Goal: Respiratory  Outcome: Progressing Towards Goal  Goal: Treatments/Interventions/Procedures  Outcome: Progressing Towards Goal  Goal: Minimize risk of bleeding post-thrombolytic infusion  Outcome: Progressing Towards Goal  Goal: Monitor for complications post-thrombolytic infusion  Outcome: Progressing Towards Goal  Goal: Psychosocial  Outcome: Progressing Towards Goal  Goal: *Hemodynamically stable  Outcome: Progressing Towards Goal  Goal: *Neurologically stable  Description: Absence of additional neurological deficits    Outcome: Progressing Towards Goal  Goal: *Verbalizes anxiety and depression are reduced or absent  Outcome: Progressing Towards Goal  Goal: *Absence of Signs of Aspiration on Current Diet  Outcome: Progressing Towards Goal  Goal: *Absence of deep venous thrombosis signs and symptoms(Stroke Metric)  Outcome: Progressing Towards Goal  Goal: *Ability to perform ADLs and demonstrates progressive mobility and function  Outcome: Progressing Towards Goal  Goal: *Stroke education started(Stroke Metric)  Outcome: Progressing Towards Goal  Goal: *Dysphagia screen performed(Stroke Metric)  Outcome: Progressing Towards Goal  Goal: *Rehab consulted(Stroke Metric)  Outcome: Progressing Towards Goal     Problem: TIA/CVA Stroke: Day 2 Until Discharge  Goal: Off Pathway (Use only if patient is Off Pathway)  Outcome: Progressing Towards Goal  Goal: Activity/Safety  Outcome: Progressing Towards Goal  Goal: Diagnostic Test/Procedures  Outcome: Progressing Towards Goal  Goal: Nutrition/Diet  Outcome: Progressing Towards Goal  Goal: Discharge Planning  Outcome: Progressing Towards Goal  Goal: Medications  Outcome: Progressing Towards Goal  Goal: Respiratory  Outcome: Progressing Towards Goal  Goal: Treatments/Interventions/Procedures  Outcome: Progressing Towards Goal  Goal: Psychosocial  Outcome: Progressing Towards Goal  Goal: *Verbalizes anxiety and depression are reduced or absent  Outcome: Progressing Towards Goal  Goal: *Absence of aspiration  Outcome: Progressing Towards Goal  Goal: *Absence of deep venous thrombosis signs and symptoms(Stroke Metric)  Outcome: Progressing Towards Goal  Goal: *Optimal pain control at patient's stated goal  Outcome: Progressing Towards Goal  Goal: *Tolerating diet  Outcome: Progressing Towards Goal  Goal: *Ability to perform ADLs and demonstrates progressive mobility and function  Outcome: Progressing Towards Goal  Goal: *Stroke education continued(Stroke Metric)  Outcome: Progressing Towards Goal     Problem: Ischemic Stroke: Discharge Outcomes  Goal: *Verbalizes anxiety and depression are reduced or absent  Outcome: Progressing Towards Goal  Goal: *Verbalize understanding of risk factor modification(Stroke Metric)  Outcome: Progressing Towards Goal  Goal: *Hemodynamically stable  Outcome: Progressing Towards Goal  Goal: *Absence of aspiration pneumonia  Outcome: Progressing Towards Goal  Goal: *Aware of needed dietary changes  Outcome: Progressing Towards Goal  Goal: *Verbalize understanding of prescribed medications including anti-coagulants, anti-lipid, and/or anti-platelets(Stroke Metric)  Outcome: Progressing Towards Goal  Goal: *Tolerating diet  Outcome: Progressing Towards Goal  Goal: *Aware of follow-up diagnostics related to anticoagulants  Outcome: Progressing Towards Goal  Goal: *Ability to perform ADLs and demonstrates progressive mobility and function  Outcome: Progressing Towards Goal  Goal: *Absence of DVT(Stroke Metric)  Outcome: Progressing Towards Goal  Goal: *Absence of aspiration  Outcome: Progressing Towards Goal  Goal: *Optimal pain control at patient's stated goal  Outcome: Progressing Towards Goal  Goal: *Home safety concerns addressed  Outcome: Progressing Towards Goal  Goal: *Describes available resources and support systems  Outcome: Progressing Towards Goal  Goal: *Verbalizes understanding of activation of EMS(911) for stroke symptoms(Stroke Metric)  Outcome: Progressing Towards Goal  Goal: *Understands and describes signs and symptoms to report to providers(Stroke Metric)  Outcome: Progressing Towards Goal  Goal: *Neurolgocially stable (absence of additional neurological deficits)  Outcome: Progressing Towards Goal  Goal: *Verbalizes importance of follow-up with primary care physician(Stroke Metric)  Outcome: Progressing Towards Goal  Goal: *Smoking cessation discussed,if applicable(Stroke Metric)  Outcome: Progressing Towards Goal  Goal: *Depression screening completed(Stroke Metric)  Outcome: Progressing Towards Goal

## 2021-05-31 NOTE — PROGRESS NOTES
Sharon Cobos is a 80 y.o. male with multiple medical issues admitted on 5/16 with AMS/nausea/vomiting/tremors. 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 Was in rehab when he developed pneumonia and was admitted to the hospital.  MRI brain was performed which showed embolic appearing scattered infarcts in bilateral frontal lobes and parieto-occipital lobes. EEG was done on 5/16 which was unremarkable but had another EEG for symptoms of decreased responsiveness, left gaze deviation, lip smacking on 5/20 and it showed a generalized nonconvulsive status that resolved after administration of Ativan. Has been on now Keppra and lacosamide since then. Has remained drowsy. Arousable with repeated prompts. Able to answer simple questions in one-word answers and follows commands. EXAM  Exam:  Visit Vitals  BP (!) 132/52 (BP 1 Location: Right arm, BP Patient Position: At rest)   Pulse 79   Temp 97.6 °F (36.4 °C)   Resp 24   Ht 5' 8\" (1.727 m)   Wt 217 lb 9.6 oz (98.7 kg)   SpO2 96%   BMI 33.09 kg/m²     Gen: Drowsy  CV: RRR  Lungs: non labored breathing  Abd: non distending  Neuro: Easily aroused. Follows simple commands. Was able to recognize 3 out of the 4 family members present in the room. CN II-XII: PERRL, EOMI, face symmetric, tongue/palate midline  Motor: Able to move all extremities spontaneously  Sensory:  difficult to assess   Gait: Difficult to assess    LABS/ IMAGING  MRI Results (most recent):  Results from Hospital Encounter encounter on 05/16/21    MRI BRAIN WO CONT    Narrative  *PRELIMINARY REPORT*    Multiple foci of small cortical and white matter infarcts mostly on the right  with a couple of lesions on the left as well. Interval evolution of  intraventricular hemorrhage and small parenchymal hemorrhages. Preliminary report was provided by Dr. Rosa M Hawley, the on-call radiologist, at  22:17.     The findings were called to floor nurse, Xena Martinez, on 5/18/2021 at 22:20 by myself. [id number]    Final report to follow. END PRELIMINARY REPORT*    EXAM:  MRI BRAIN WO CONT    INDICATION:    AMS    COMPARISON:  CT head 5/16/2021, MRI brain 11/23/2019. CONTRAST: None. TECHNIQUE:  Multiplanar multisequence acquisition without contrast of the brain. FINDINGS:  Few small scattered acute infarcts in the bilateral frontal lobes and  parieto-occipital lobes, right greater than left. Evolved now late subacute small intraparenchymal hematomas in the right parietal  periventricular white matter measuring 10 mm and 5 mm (series 6 image 11), with  no significant surrounding edema or mass effect. There is hemosiderin staining  noted along the ependymal lining of the lateral ventricles, as well as scattered  superficial siderosis involving the sylvian fissures, and along the surface of  the brainstem and cerebellum. Unchanged generalized parenchymal volume loss with commensurate dilation of the  sulci and ventricular system. Unchanged confluent periventricular and deep white  matter T2/FLAIR hyperintensity, and patchy T2/FLAIR hyperintensity in the nely,  consistent with severe chronic microvascular ischemic disease. There is no  cerebellar tonsillar herniation. Expected arterial flow-voids are present. The paranasal sinuses, mastoid air cells, and middle ears are clear. The orbital  contents are within normal limits with bilateral lens implants. No significant  osseous or scalp lesions are identified. Impression  1. Few small scattered acute infarcts in the bilateral frontal lobes and  parieto-occipital lobes, right greater than left. 2. 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   3. Unchanged generalized parenchymal volume loss and severe chronic  microvascular ischemic disease. Lab Results   Component Value Date/Time    Sodium 140 05/31/2021 02:52 AM    Potassium 4.1 05/31/2021 02:52 AM    Chloride 110 (H) 05/31/2021 02:52 AM    CO2 25 05/31/2021 02:52 AM    Anion gap 5 05/31/2021 02:52 AM    Glucose 119 (H) 05/31/2021 02:52 AM    BUN 10 05/31/2021 02:52 AM    Creatinine 0.88 05/31/2021 02:52 AM    BUN/Creatinine ratio 11 (L) 05/31/2021 02:52 AM    GFR est AA >60 05/31/2021 02:52 AM    GFR est non-AA >60 05/31/2021 02:52 AM    Calcium 8.7 05/31/2021 02:52 AM    Bilirubin, total 0.3 05/29/2021 04:16 AM    Alk. phosphatase 96 05/29/2021 04:16 AM    Protein, total 5.7 (L) 05/29/2021 04:16 AM    Albumin 2.4 (L) 05/31/2021 02:52 AM    Globulin 3.3 05/29/2021 04:16 AM    A-G Ratio 0.7 (L) 05/29/2021 04:16 AM    ALT (SGPT) 20 05/29/2021 04:16 AM    AST (SGOT) 18 05/29/2021 04:16 AM     Lab Results   Component Value Date/Time    WBC 7.4 05/31/2021 02:52 AM    HGB (POC) 14.9 07/27/2018 02:38 PM    HGB 11.6 (L) 05/31/2021 02:52 AM    Hematocrit (POC) 45 05/13/2019 07:16 PM    HCT 36.3 (L) 05/31/2021 02:52 AM    PLATELET 768 20/22/3356 02:52 AM    MCV 98.9 05/31/2021 02:52 AM       ASSESSMENT  Hospital Problems  Date Reviewed: 9/21/2020        Codes Class Noted POA    Cerebrovascular accident (CVA) due to embolism [facility]) ICD-10-CM: I63.9  ICD-9-CM: 434.11  5/29/2021 Unknown        Status epilepticus ([facility] [address] ICD-10-CM: G40.901  ICD-9-CM: 345.3  5/29/2021 Unknown        Encephalopathy ICD-10-CM: G93.40  ICD-9-CM: 348.30  5/29/2021 Unknown               PLAN  Extended hospital course related to embolic appearing scattered infarctions in both hemispheres of unclear source, possibly cardiac  Currently on aspirin and statin, not a candidate for anticoagulation    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      Was very lethargic and dose of lacosamide was reduced 75 mg twice a day which improved his mentation  Will cautiously reduce it further to 50 mg twice daily  Repeat EEG tomorrow morning  Continue with supportive care  Reassess his ability to swallow  D/W staff     Nick Aparicio MD

## 2021-06-01 LAB
ALBUMIN SERPL-MCNC: 2.2 G/DL (ref 3.5–5)
ANION GAP SERPL CALC-SCNC: 6 MMOL/L (ref 5–15)
ARTERIAL PATENCY WRIST A: POSITIVE
BASE EXCESS BLD CALC-SCNC: 0.8 MMOL/L
BASOPHILS # BLD: 0 K/UL (ref 0–0.1)
BASOPHILS NFR BLD: 1 % (ref 0–1)
BDY SITE: ABNORMAL
BUN SERPL-MCNC: 12 MG/DL (ref 6–20)
BUN/CREAT SERPL: 13 (ref 12–20)
CA-I BLD-SCNC: 1.15 MMOL/L (ref 1.12–1.32)
CALCIUM SERPL-MCNC: 8.2 MG/DL (ref 8.5–10.1)
CHLORIDE SERPL-SCNC: 107 MMOL/L (ref 97–108)
CO2 SERPL-SCNC: 25 MMOL/L (ref 21–32)
CREAT SERPL-MCNC: 0.91 MG/DL (ref 0.7–1.3)
DIFFERENTIAL METHOD BLD: ABNORMAL
EOSINOPHIL # BLD: 0.2 K/UL (ref 0–0.4)
EOSINOPHIL NFR BLD: 2 % (ref 0–7)
ERYTHROCYTE [DISTWIDTH] IN BLOOD BY AUTOMATED COUNT: 15.2 % (ref 11.5–14.5)
GAS FLOW.O2 O2 DELIVERY SYS: ABNORMAL L/MIN
GLUCOSE BLD STRIP.AUTO-MCNC: 100 MG/DL (ref 65–117)
GLUCOSE BLD STRIP.AUTO-MCNC: 108 MG/DL (ref 65–117)
GLUCOSE BLD STRIP.AUTO-MCNC: 120 MG/DL (ref 65–117)
GLUCOSE SERPL-MCNC: 131 MG/DL (ref 65–100)
HCO3 BLD-SCNC: 24.6 MMOL/L (ref 22–26)
HCT VFR BLD AUTO: 34.6 % (ref 36.6–50.3)
HGB BLD-MCNC: 10.9 G/DL (ref 12.1–17)
IMM GRANULOCYTES # BLD AUTO: 0 K/UL (ref 0–0.04)
IMM GRANULOCYTES NFR BLD AUTO: 1 % (ref 0–0.5)
LYMPHOCYTES # BLD: 0.9 K/UL (ref 0.8–3.5)
LYMPHOCYTES NFR BLD: 13 % (ref 12–49)
MCH RBC QN AUTO: 31 PG (ref 26–34)
MCHC RBC AUTO-ENTMCNC: 31.5 G/DL (ref 30–36.5)
MCV RBC AUTO: 98.3 FL (ref 80–99)
MONOCYTES # BLD: 0.7 K/UL (ref 0–1)
MONOCYTES NFR BLD: 9 % (ref 5–13)
NEUTS SEG # BLD: 5.5 K/UL (ref 1.8–8)
NEUTS SEG NFR BLD: 74 % (ref 32–75)
NRBC # BLD: 0 K/UL (ref 0–0.01)
NRBC BLD-RTO: 0 PER 100 WBC
PCO2 BLD: 35.3 MMHG (ref 35–45)
PH BLD: 7.45 [PH] (ref 7.35–7.45)
PHOSPHATE SERPL-MCNC: 2.2 MG/DL (ref 2.6–4.7)
PLATELET # BLD AUTO: 253 K/UL (ref 150–400)
PMV BLD AUTO: 10.5 FL (ref 8.9–12.9)
PO2 BLD: 79 MMHG (ref 80–100)
POTASSIUM SERPL-SCNC: 4 MMOL/L (ref 3.5–5.1)
RBC # BLD AUTO: 3.52 M/UL (ref 4.1–5.7)
SAO2 % BLD: 96.2 % (ref 92–97)
SERVICE CMNT-IMP: ABNORMAL
SERVICE CMNT-IMP: NORMAL
SERVICE CMNT-IMP: NORMAL
SODIUM SERPL-SCNC: 138 MMOL/L (ref 136–145)
SPECIMEN TYPE: ABNORMAL
WBC # BLD AUTO: 7.3 K/UL (ref 4.1–11.1)

## 2021-06-01 PROCEDURE — 65660000001 HC RM ICU INTERMED STEPDOWN

## 2021-06-01 PROCEDURE — 74011250636 HC RX REV CODE- 250/636: Performed by: PSYCHIATRY & NEUROLOGY

## 2021-06-01 PROCEDURE — 82803 BLOOD GASES ANY COMBINATION: CPT

## 2021-06-01 PROCEDURE — 74011000258 HC RX REV CODE- 258: Performed by: PSYCHIATRY & NEUROLOGY

## 2021-06-01 PROCEDURE — 74011250637 HC RX REV CODE- 250/637: Performed by: STUDENT IN AN ORGANIZED HEALTH CARE EDUCATION/TRAINING PROGRAM

## 2021-06-01 PROCEDURE — 36600 WITHDRAWAL OF ARTERIAL BLOOD: CPT

## 2021-06-01 PROCEDURE — 95816 EEG AWAKE AND DROWSY: CPT | Performed by: PSYCHIATRY & NEUROLOGY

## 2021-06-01 PROCEDURE — 92526 ORAL FUNCTION THERAPY: CPT

## 2021-06-01 PROCEDURE — 82962 GLUCOSE BLOOD TEST: CPT

## 2021-06-01 PROCEDURE — 74011250637 HC RX REV CODE- 250/637: Performed by: FAMILY MEDICINE

## 2021-06-01 PROCEDURE — 80069 RENAL FUNCTION PANEL: CPT

## 2021-06-01 PROCEDURE — C9254 INJECTION, LACOSAMIDE: HCPCS | Performed by: PSYCHIATRY & NEUROLOGY

## 2021-06-01 PROCEDURE — 74011250637 HC RX REV CODE- 250/637: Performed by: HOSPITALIST

## 2021-06-01 PROCEDURE — 36415 COLL VENOUS BLD VENIPUNCTURE: CPT

## 2021-06-01 PROCEDURE — 99232 SBSQ HOSP IP/OBS MODERATE 35: CPT | Performed by: NURSE PRACTITIONER

## 2021-06-01 PROCEDURE — 85025 COMPLETE CBC W/AUTO DIFF WBC: CPT

## 2021-06-01 PROCEDURE — 74011250637 HC RX REV CODE- 250/637: Performed by: INTERNAL MEDICINE

## 2021-06-01 RX ADMIN — AMLODIPINE BESYLATE 5 MG: 5 TABLET ORAL at 09:34

## 2021-06-01 RX ADMIN — SODIUM CHLORIDE 50 MG: 9 INJECTION, SOLUTION INTRAVENOUS at 01:04

## 2021-06-01 RX ADMIN — HYPROMELLOSE 2910 1 DROP: 5 SOLUTION OPHTHALMIC at 17:18

## 2021-06-01 RX ADMIN — SODIUM CHLORIDE 50 MG: 9 INJECTION, SOLUTION INTRAVENOUS at 13:36

## 2021-06-01 RX ADMIN — Medication 10 ML: at 21:56

## 2021-06-01 RX ADMIN — ASPIRIN 81 MG: 81 TABLET, CHEWABLE ORAL at 09:34

## 2021-06-01 RX ADMIN — HYDRALAZINE HYDROCHLORIDE 100 MG: 50 TABLET, FILM COATED ORAL at 21:55

## 2021-06-01 RX ADMIN — Medication 10 ML: at 06:10

## 2021-06-01 RX ADMIN — LEVETIRACETAM 1000 MG: 100 INJECTION, SOLUTION INTRAVENOUS at 11:45

## 2021-06-01 RX ADMIN — Medication 10 ML: at 13:36

## 2021-06-01 RX ADMIN — FUROSEMIDE 40 MG: 40 TABLET ORAL at 09:34

## 2021-06-01 RX ADMIN — ACETAMINOPHEN 650 MG: 325 TABLET ORAL at 09:36

## 2021-06-01 RX ADMIN — ACETAMINOPHEN 650 MG: 325 TABLET ORAL at 15:48

## 2021-06-01 RX ADMIN — ATORVASTATIN CALCIUM 80 MG: 40 TABLET, FILM COATED ORAL at 09:34

## 2021-06-01 RX ADMIN — HYDRALAZINE HYDROCHLORIDE 100 MG: 50 TABLET, FILM COATED ORAL at 09:34

## 2021-06-01 RX ADMIN — HYDRALAZINE HYDROCHLORIDE 100 MG: 50 TABLET, FILM COATED ORAL at 15:48

## 2021-06-01 RX ADMIN — ATENOLOL 12.5 MG: 25 TABLET ORAL at 09:34

## 2021-06-01 RX ADMIN — LEVETIRACETAM 1000 MG: 100 INJECTION, SOLUTION INTRAVENOUS at 22:05

## 2021-06-01 NOTE — PROCEDURES
[address]  EEG    Name:  Glenna Randhawa  MR#:  [id number]  :  1934  ACCOUNT #:  [de-identified]  DATE OF SERVICE:  2021    REQUESTING PHYSICIAN:  Nick Aparicio MD    HISTORY:  The patient is an 51-year-old male who is being evaluated for altered mental status and recent seizures. DESCRIPTION:  This is an 18-channel EEG performed on a predominantly drowsy patient. During minimal wakeful state, the dominant posterior background rhythm consists of medium-voltage rhythms in the 8 Hz frequency range out of the posterior head region. Intermittently, slower frequencies ranging from high delta to low theta range were seen in the frontal temporal regions, left worse than right. Drowsiness was characterized by slowing in the central areas. Photic stimulation and hyperventilation was not performed. EEG SUMMARY:  Abnormal EEG due to mild intermittent slowing of the background rhythms. CLINICAL INTERPRETATION:  This EEG is suggestive of a mild generalized encephalopathic process, nonspecific in type. This may be related to an underlying structural brain injury and/or toxic/metabolic abnormality or sedative effects of medications. No epileptiform features were noted, and no seizure was recorded.       Tommie Contreras MD      [id number]  D:  2021 12:55  T:  2021 15:22  JOB #:  [id number]

## 2021-06-01 NOTE — PROGRESS NOTES
Hospitalist Progress Note      Hospital summary: Moira Capps a 80 y. o. male with pmh of CVA, intraventricular brain hemorrhage, dyslipidemia, CKD 3, hypertension who presents to hospital from subacute rehab for concerns of altered/decreased mental status. Patient was found to have multiple ischemic CVAs on MRI, continue to have altered mental status with some eye deviation and twitching on 5/20. This was concerning for seizure, given 2 mg of Ativan and loaded with Keppra with some improvement. On 5/21 patient however declined, unresponsive to verbal stimuli. Stat EEG 24-hour was ordered and showed subclinical status. He was loaded with Vimpat, and neurology recommends transfer to ICU for closer neuro checks on 5/21. Seizures were controlled and  Pt transferred out of ICU on 5/23.     5/16/2021      Assessment/Plan:  Status epilepticus -resolved  -Continue Keppra and Vimpat   - Neurology following-vimpate dose reduced -50 mg BID  -  Seizure precautions   EEG today     Acute embolic infarcts  Subacute hematoma -intraventricular hemorrhage with ventriculomegaly on recent admission, discharged 5/3  -Not a candidate for anticoagulation due to intracranial hemorrhage, and fall risk  -Neurology following  -No escalation of antiplatelets due to ICH, continue aspirin  -Continue atorvastatin  -PT/OT/speech  -Echo with normal EF        Acute Metabolic encephalopathy -   multifactorial due to above  He opens his eyes with stimulation and gives one word answers      Community-acquired pneumonia-resolved      Hypertension Bp controlled,  C/w hydralazine, amlodipine.     Hyperlipidemiacontinue Lipitor    Hx TESSA  Per daughter,he never liked CPAP so he did not use   Will try CPAP tonight as well  ABG    H/o chronic LE edema due to venous congestion  Ankle edema -dependent edema with immobility  Resumed lasix 40 mg today ,will monitor electrolytes and renal function as he is not currently eating/drinking and dependent on tube feeds    Hypernatremia - resolved        Dysphagia  - has dubhoff  -on TFs   -family wants to give him more time and see if he improves, PEG tube held for now  Swallow eval on Tuesday again      RUE swelling- venous duplex negative    Appreciate Palliative care input     Code status: DNR. DVT prophylaxis: SCDs  Disposition: TBD    I spoke to patient's daughter today  ----------------------------------------------    CC: Acute CVA. Seizures     S: Patient is seen and examined     Per daughter,he was awake this morning and was answering questions  He had EEG this morning  He was somnolent during my eval    Review of Systems:  Review of systems not obtained due to patient factors.     O:  Visit Vitals  BP (!) 166/67   Pulse 84   Temp 99.6 °F (37.6 °C)   Resp 16   Ht 5' 8\" (1.727 m)   Wt 98.7 kg (217 lb 9.6 oz)   SpO2 100%   BMI 33.09 kg/m²       PHYSICAL EXAM:  Gen: elderly patient  HEENT: anicteric sclerae, normal conjunctiva, NG tube   Neck: supple, trachea midline  Heart: RRR, no MRG, no JVD,  Lungs: clear to auscultation, no accessory muscle use  Abd: soft,non tender,non distended  Extr: he has 2+ LE edema mainly at ankles-has chronic venous insufficnecny, UE edema  Skin: dry, no rash  Neuro: arousable,could tell his name    Intake/Output Summary (Last 24 hours) at 6/1/2021 1147  Last data filed at 6/1/2021 0104  Gross per 24 hour   Intake 440 ml   Output    Net 440 ml        Recent labs & imaging reviewed:  Recent Results (from the past 24 hour(s))   GLUCOSE, POC    Collection Time: 05/31/21 12:27 PM   Result Value Ref Range    Glucose (POC) 114 65 - 117 mg/dL    Performed by Handy Head    CBC WITH AUTOMATED DIFF    Collection Time: 06/01/21 12:17 AM   Result Value Ref Range    WBC 7.3 4.1 - 11.1 K/uL    RBC 3.52 (L) 4.10 - 5.70 M/uL    HGB 10.9 (L) 12.1 - 17.0 g/dL    HCT 34.6 (L) 36.6 - 50.3 %    MCV 98.3 80.0 - 99.0 FL    MCH 31.0 26.0 - 34.0 PG    MCHC 31.5 30.0 - 36.5 g/dL    RDW 15.2 (H) 11.5 - 14.5 %    PLATELET 822 673 - 396 K/uL    MPV 10.5 8.9 - 12.9 FL    NRBC 0.0 0  WBC    ABSOLUTE NRBC 0.00 0.00 - 0.01 K/uL    NEUTROPHILS 74 32 - 75 %    LYMPHOCYTES 13 12 - 49 %    MONOCYTES 9 5 - 13 %    EOSINOPHILS 2 0 - 7 %    BASOPHILS 1 0 - 1 %    IMMATURE GRANULOCYTES 1 (H) 0.0 - 0.5 %    ABS. NEUTROPHILS 5.5 1.8 - 8.0 K/UL    ABS. LYMPHOCYTES 0.9 0.8 - 3.5 K/UL    ABS. MONOCYTES 0.7 0.0 - 1.0 K/UL    ABS. EOSINOPHILS 0.2 0.0 - 0.4 K/UL    ABS. BASOPHILS 0.0 0.0 - 0.1 K/UL    ABS. IMM. GRANS. 0.0 0.00 - 0.04 K/UL    DF AUTOMATED     RENAL FUNCTION PANEL    Collection Time: 06/01/21 12:17 AM   Result Value Ref Range    Sodium 138 136 - 145 mmol/L    Potassium 4.0 3.5 - 5.1 mmol/L    Chloride 107 97 - 108 mmol/L    CO2 25 21 - 32 mmol/L    Anion gap 6 5 - 15 mmol/L    Glucose 131 (H) 65 - 100 mg/dL    BUN 12 6 - 20 MG/DL    Creatinine 0.91 0.70 - 1.30 MG/DL    BUN/Creatinine ratio 13 12 - 20      GFR est AA >60 >60 ml/min/1.73m2    GFR est non-AA >60 >60 ml/min/1.73m2    Calcium 8.2 (L) 8.5 - 10.1 MG/DL    Phosphorus 2.2 (L) 2.6 - 4.7 MG/DL    Albumin 2.2 (L) 3.5 - 5.0 g/dL   GLUCOSE, POC    Collection Time: 06/01/21  6:29 AM   Result Value Ref Range    Glucose (POC) 108 65 - 117 mg/dL    Performed by Renetta Acosta      Recent Labs     06/01/21 0017 05/31/21  0252   WBC 7.3 7.4   HGB 10.9* 11.6*   HCT 34.6* 36.3*    236     Recent Labs     06/01/21 0017 05/31/21  0252 05/30/21  0233    140  --    K 4.0 4.1  --     110*  --    CO2 25 25  --    BUN 12 10  --    CREA 0.91 0.88  --    * 119*  --    CA 8.2* 8.7  --    MG  --   --  1.9   PHOS 2.2* 2.4* 2.5*     Recent Labs     06/01/21  0017 05/31/21  0252   ALB 2.2* 2.4*     No results for input(s): INR, PTP, APTT, INREXT, INREXT in the last 72 hours. No results for input(s): FE, TIBC, PSAT, FERR in the last 72 hours.    No results found for: FOL, RBCF   No results for input(s): PH, PCO2, PO2 in the last 72 hours. No results for input(s): CPK, CKNDX, TROIQ in the last 72 hours.     No lab exists for component: CPKMB  Lab Results   Component Value Date/Time    Cholesterol, total 144 04/19/2021 12:59 AM    HDL Cholesterol 33 04/19/2021 12:59 AM    LDL, calculated 74.8 04/19/2021 12:59 AM    Triglyceride 181 (H) 04/19/2021 12:59 AM    CHOL/HDL Ratio 4.4 04/19/2021 12:59 AM     Lab Results   Component Value Date/Time    Glucose (POC) 108 06/01/2021 06:29 AM    Glucose (POC) 114 05/31/2021 12:27 PM    Glucose (POC) 132 (H) 05/30/2021 05:57 PM    Glucose (POC) 136 (H) 05/30/2021 11:55 AM    Glucose (POC) 122 (H) 05/30/2021 05:27 AM     Lab Results   Component Value Date/Time    Color YELLOW/STRAW 05/16/2021 09:33 PM    Appearance CLEAR 05/16/2021 09:33 PM    Specific gravity 1.014 05/16/2021 09:33 PM    Specific gravity 1.015 09/21/2020 09:13 AM    pH (UA) 5.0 05/16/2021 09:33 PM    Protein Negative 05/16/2021 09:33 PM    Glucose Negative 05/16/2021 09:33 PM    Ketone Negative 05/16/2021 09:33 PM    Bilirubin Negative 05/16/2021 09:33 PM    Urobilinogen 0.2 05/16/2021 09:33 PM    Nitrites Negative 05/16/2021 09:33 PM    Leukocyte Esterase Negative 05/16/2021 09:33 PM    Epithelial cells FEW 05/16/2021 09:33 PM    Bacteria Negative 05/16/2021 09:33 PM    WBC 0-4 05/16/2021 09:33 PM    RBC 0-5 05/16/2021 09:33 PM       Med list reviewed  Current Facility-Administered Medications   Medication Dose Route Frequency    furosemide (LASIX) tablet 40 mg  40 mg Oral DAILY    lacosamide (VIMPAT) 50 mg in 0.9% sodium chloride 100 mL IVPB  50 mg IntraVENous Q12H    polyethylene glycol (MIRALAX) packet 17 g  17 g Oral DAILY    atenoloL (TENORMIN) tablet 12.5 mg  12.5 mg Oral DAILY    lidocaine (XYLOCAINE) 2 % jelly   Mucous Membrane PRN    hydrALAZINE (APRESOLINE) tablet 100 mg  100 mg Oral TID    amLODIPine (NORVASC) tablet 5 mg  5 mg Oral DAILY    labetaloL (NORMODYNE;TRANDATE) injection 20 mg  20 mg IntraVENous Q6H PRN    levETIRAcetam (KEPPRA) 1,000 mg in 0.9% sodium chloride 100 mL IVPB  1,000 mg IntraVENous Q12H    hydrALAZINE (APRESOLINE) 20 mg/mL injection 20 mg  20 mg IntraVENous Q6H PRN    sodium chloride (NS) flush 5-40 mL  5-40 mL IntraVENous Q8H    sodium chloride (NS) flush 5-40 mL  5-40 mL IntraVENous PRN    polyethylene glycol (MIRALAX) packet 17 g  17 g Oral DAILY PRN    albuterol (PROVENTIL VENTOLIN) nebulizer solution 2.5 mg  2.5 mg Nebulization Q4H PRN    aspirin chewable tablet 81 mg  81 mg Oral DAILY    atorvastatin (LIPITOR) tablet 80 mg  80 mg Oral DAILY    [Held by provider] isosorbide mononitrate ER (IMDUR) tablet 30 mg  30 mg Oral DAILY    sodium chloride (NS) flush 5-40 mL  5-40 mL IntraVENous Q8H    sodium chloride (NS) flush 5-40 mL  5-40 mL IntraVENous PRN    acetaminophen (TYLENOL) tablet 650 mg  650 mg Oral Q6H PRN    Or    acetaminophen (TYLENOL) suppository 650 mg  650 mg Rectal Q6H PRN    promethazine (PHENERGAN) tablet 12.5 mg  12.5 mg Oral Q6H PRN    Or    ondansetron (ZOFRAN) injection 4 mg  4 mg IntraVENous Q6H PRN       Care Plan discussed with:  Patient/Family and Nurse    Angelika Eli MD  Internal Medicine  Date of Service: 6/1/2021

## 2021-06-01 NOTE — PROGRESS NOTES
Problem: Dysphagia (Adult)  Goal: *Acute Goals and Plan of Care (Insert Text)  Description: Speech Therapy Goals  Initiated 5/19/2021    1. Patient will tolerate regular diet/thin liquids without adverse effects within 7 days. Discontinue 5/27/2021  2. Patient will participate in swallow re-evaluation within 7 days. MET 6/1/2021  Added 6/1/2021  1. Patient will tolerate mechanically soft diet/thin liquids without adverse effects within  days. Outcome: Progressing Towards Goal    SPEECH LANGUAGE PATHOLOGY DYSPHAGIA TREATMENT  Patient: Artis Figueroa (73 y.o. male)  Date: 6/1/2021  Diagnosis: CAP (community acquired pneumonia) [J18.9] <principal problem not specified>       Precautions: Fall      ASSESSMENT:  Patient with improvement in alertness and mentation this afternoon. Pt tolerated small bites and sips with no difficulties or s/s of aspiration. Pt impulsive with drinking though, and when not limited to small sips through straw pinching he took large successive swallows that elicited a strong cough. No s/s with small sips. Pt with mild lingual reside with dora cracker that cleared with thin wash. Suspect that swallow function will continue to mirror level of alertness and confusion. Given this, recommend mechanical soft diet and thin liquids when pt is alert and accepting with strict precautions as outlined below. Low threshold to hold PO when pt is not alert or with any adverse effects. SLP will continue to follow for diet tolerance, however concerned that given alertness and lethargy pt may not be able to maintain appropriate nutrition PO even if he does tolerate diet w/o aspiration. Discussed all of this with the patient's daughter who was at bedside and she expressed good understanding as evidenced through verbalization of agreement and appropriate questions.      PLAN:  Recommendations and Planned Interventions:  -- mechanically soft diet/thin liquids   -- only feed when very alert and accepting  -- low threshold to hold PO with decreased alertness or with any s/s of aspiration or negative effects  -- 1:1 assistance during mealtimes with cues to recognize food items  -- small bites and sips - limit sip size through straw pinching method  -- Alternate liquids and solids   -- Completely upright for all PO   Patient continues to benefit from skilled intervention to address the above impairments. Continue treatment per established plan of care. Discharge Recommendations: To Be Determined     SUBJECTIVE:   Patient stated oh yeah, that's fine when asked if he could try some bites and sips. OBJECTIVE:   Cognitive and Communication Status:  Neurologic State: Alert, Confused  Orientation Level: Oriented to person, Disoriented to place, Disoriented to situation, Disoriented to time  Cognition: Decreased command following  Perception: Appears intact  Perseveration: No perseveration noted  Safety/Judgement: Decreased awareness of environment  Dysphagia Treatment:  Oral Assessment:  Oral Assessment  Labial: No impairment  Dentition: Intact; Natural  Oral Hygiene: moist oral mucosa free of secretions  Lingual: No impairment  Velum: No impairment  Mandible: No impairment  P.O. Trials:  Patient Position: upright in bed  Vocal quality prior to P.O.: No impairment  Consistency Presented: Ice chips; Thin liquid;Puree; Solid  How Presented: SLP-fed/presented;Cup/sip; Successive swallows;Straw;Spoon     Bolus Acceptance: No impairment  Bolus Formation/Control: No impairment     Propulsion: No impairment  Oral Residue: 10-50% of bolus (with cracker)  Initiation of Swallow: Delayed (# of seconds)  Laryngeal Elevation: Functional  Aspiration Signs/Symptoms: Delayed cough/throat clear  Pharyngeal Phase Characteristics: No impairment, issues, or problems   Effective Modifications:  Alternate liquids/solids;Small sips and bites  Cues for Modifications: Maximal               Pain:  Pain Scale 1: Adult Nonverbal Pain Scale  Pain Intensity 1: 0       After treatment:   Patient left in no apparent distress in bed, Call bell within reach, Nursing notified, and Caregiver / family present    COMMUNICATION/EDUCATION:   Patient was educated regarding his deficit(s) of dysphagia as this relates to his diagnosis of CVA. He demonstrated Guarded understanding as evidenced by confusion. The patient's plan of care including recommendations, planned interventions, and recommended diet changes were discussed with: Registered nurse and Physician.      Shanthi Peters M.S. CF-SLP     Time Calculation: 12 mins

## 2021-06-01 NOTE — PROGRESS NOTES
Speech pathology note  Attempted to see patient for swallowing re-evaluation, however patient too drowsy s/p EEG this morning. Daughter requested that we return in afternoon, however unsure we will be able which was relayed to daughter. Discussed that biggest issue is lethargy and inability to maintain alertness to meet nutrition/hydration needs regardless of swallow function. Will follow up again today as able. Thank you.     Olena Wang., CCC-SLP

## 2021-06-01 NOTE — PROGRESS NOTES
Problem: TIA/CVA Stroke: Day 2 Until Discharge  Goal: Activity/Safety  Outcome: Progressing Towards Goal  Goal: Diagnostic Test/Procedures  Outcome: Progressing Towards Goal  Goal: Nutrition/Diet  Outcome: Progressing Towards Goal  Goal: Discharge Planning  Outcome: Progressing Towards Goal  Goal: Medications  Outcome: Progressing Towards Goal     Bedside shift change report given to Moe Bear RN (oncoming nurse) by Stanley Oliveira RN (offgoing nurse). Report included the following information SBAR, Kardex, ED Summary, Intake/Output, MAR, Cardiac Rhythm NSR and Dual Neuro Assessment.

## 2021-06-01 NOTE — PROGRESS NOTES
Neurology Progress Note    Patient ID:  Liza Johnson  [id number]  18 y.o.  1934      Subjective: An 80-year-old gentleman has been recovering from a spontaneous intraventricular hemorrhage, embolic infarctions, and seizures. Vimpat's dosage was reduced yesterday, and he is more alert today. He is follow command able to tell me his first and last name. His daughter is at the bedside    Objective:    All records in [facility] reviewed and noted    ROS:  Unable to obtain     Meds:  Current Facility-Administered Medications   Medication Dose Route Frequency    furosemide (LASIX) tablet 40 mg  40 mg Oral DAILY    lacosamide (VIMPAT) 50 mg in 0.9% sodium chloride 100 mL IVPB  50 mg IntraVENous Q12H    polyethylene glycol (MIRALAX) packet 17 g  17 g Oral DAILY    atenoloL (TENORMIN) tablet 12.5 mg  12.5 mg Oral DAILY    lidocaine (XYLOCAINE) 2 % jelly   Mucous Membrane PRN    hydrALAZINE (APRESOLINE) tablet 100 mg  100 mg Oral TID    amLODIPine (NORVASC) tablet 5 mg  5 mg Oral DAILY    labetaloL (NORMODYNE;TRANDATE) injection 20 mg  20 mg IntraVENous Q6H PRN    levETIRAcetam (KEPPRA) 1,000 mg in 0.9% sodium chloride 100 mL IVPB  1,000 mg IntraVENous Q12H    hydrALAZINE (APRESOLINE) 20 mg/mL injection 20 mg  20 mg IntraVENous Q6H PRN    sodium chloride (NS) flush 5-40 mL  5-40 mL IntraVENous Q8H    sodium chloride (NS) flush 5-40 mL  5-40 mL IntraVENous PRN    polyethylene glycol (MIRALAX) packet 17 g  17 g Oral DAILY PRN    albuterol (PROVENTIL VENTOLIN) nebulizer solution 2.5 mg  2.5 mg Nebulization Q4H PRN    aspirin chewable tablet 81 mg  81 mg Oral DAILY    atorvastatin (LIPITOR) tablet 80 mg  80 mg Oral DAILY    [Held by provider] isosorbide mononitrate ER (IMDUR) tablet 30 mg  30 mg Oral DAILY    sodium chloride (NS) flush 5-40 mL  5-40 mL IntraVENous Q8H    sodium chloride (NS) flush 5-40 mL  5-40 mL IntraVENous PRN    acetaminophen (TYLENOL) tablet 650 mg  650 mg Oral Q6H PRN    Or    acetaminophen (TYLENOL) suppository 650 mg  650 mg Rectal Q6H PRN    promethazine (PHENERGAN) tablet 12.5 mg  12.5 mg Oral Q6H PRN    Or    ondansetron (ZOFRAN) injection 4 mg  4 mg IntraVENous Q6H PRN       Imaging:  MRI Results (most recent):  Results from Hospital Encounter encounter on 05/16/21    MRI BRAIN WO CONT    Narrative  *PRELIMINARY REPORT*    Multiple foci of small cortical and white matter infarcts mostly on the right  with a couple of lesions on the left as well. Interval evolution of  intraventricular hemorrhage and small parenchymal hemorrhages. Preliminary report was provided by Dr. Rona Rodgers, the on-call radiologist, at  22:17. The findings were called to floor nurse, Bobo Leung, on 5/18/2021 at 22:20 by myself. [phone]    Final report to follow. *END PRELIMINARY REPORT*    EXAM:  MRI BRAIN WO CONT    INDICATION:    AMS    COMPARISON:  CT head 5/16/2021, MRI brain 11/23/2019. CONTRAST: None. TECHNIQUE:  Multiplanar multisequence acquisition without contrast of the brain. FINDINGS:  Few small scattered acute infarcts in the bilateral frontal lobes and  parieto-occipital lobes, right greater than left. Evolved now late subacute small intraparenchymal hematomas in the right parietal  periventricular white matter measuring 10 mm and 5 mm (series 6 image 11), with  no significant surrounding edema or mass effect. There is hemosiderin staining  noted along the ependymal lining of the lateral ventricles, as well as scattered  superficial siderosis involving the sylvian fissures, and along the surface of  the brainstem and cerebellum. Unchanged generalized parenchymal volume loss with commensurate dilation of the  sulci and ventricular system. Unchanged confluent periventricular and deep white  matter T2/FLAIR hyperintensity, and patchy T2/FLAIR hyperintensity in the nely,  consistent with severe chronic microvascular ischemic disease. There is no  cerebellar tonsillar herniation. Expected arterial flow-voids are present. The paranasal sinuses, mastoid air cells, and middle ears are clear. The orbital  contents are within normal limits with bilateral lens implants. No significant  osseous or scalp lesions are identified. Impression  1. Few small scattered acute infarcts in the bilateral frontal lobes and  parieto-occipital lobes, right greater than left. 2. 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 3. Unchanged generalized parenchymal volume loss and severe chronic  microvascular ischemic disease. CT Results (most recent):  Results from Hospital Encounter encounter on 05/16/21    CT HEAD WO CONT    Narrative  EXAM: CT HEAD WO CONT    INDICATION: AMS  - worsneing, hx Acute CVA    COMPARISON: CT 5/19/2021. MRI 5/18/2021. Makeda Chase CONTRAST: None. TECHNIQUE: Unenhanced CT of the head was performed using 5 mm images. Brain and  bone windows were generated. Coronal and sagittal reformats. CT dose reduction  was achieved through use of a standardized protocol tailored for this  examination and automatic exposure control for dose modulation. FINDINGS:  There is mild atrophy and compensatory dilatation of the ventricles. . There is  significant white matter disease likely to chronic small vessel ischemic  disease. Previously seen lacunar infarcts and hemosiderin staining on MRI are  not well seen by CT. A small focus of intraventricular hemorrhage in the right  posterior horn is unchanged. There is no new intracranial hemorrhage,  extra-axial collection, or mass effect. The basilar cisterns are open. The bone windows demonstrate no abnormalities. The visualized portions of the  paranasal sinuses and mastoid air cells are clear.     Impression  No significant interval change. Lab Review   Recent Results (from the past 24 hour(s))   CBC WITH AUTOMATED DIFF    Collection Time: 06/01/21 12:17 AM   Result Value Ref Range    WBC 7.3 4.1 - 11.1 K/uL    RBC 3.52 (L) 4.10 - 5.70 M/uL    HGB 10.9 (L) 12.1 - 17.0 g/dL    HCT 34.6 (L) 36.6 - 50.3 %    MCV 98.3 80.0 - 99.0 FL    MCH 31.0 26.0 - 34.0 PG    MCHC 31.5 30.0 - 36.5 g/dL    RDW 15.2 (H) 11.5 - 14.5 %    PLATELET 099 556 - 495 K/uL    MPV 10.5 8.9 - 12.9 FL    NRBC 0.0 0  WBC    ABSOLUTE NRBC 0.00 0.00 - 0.01 K/uL    NEUTROPHILS 74 32 - 75 %    LYMPHOCYTES 13 12 - 49 %    MONOCYTES 9 5 - 13 %    EOSINOPHILS 2 0 - 7 %    BASOPHILS 1 0 - 1 %    IMMATURE GRANULOCYTES 1 (H) 0.0 - 0.5 %    ABS. NEUTROPHILS 5.5 1.8 - 8.0 K/UL    ABS. LYMPHOCYTES 0.9 0.8 - 3.5 K/UL    ABS. MONOCYTES 0.7 0.0 - 1.0 K/UL    ABS. EOSINOPHILS 0.2 0.0 - 0.4 K/UL    ABS. BASOPHILS 0.0 0.0 - 0.1 K/UL    ABS. IMM.  GRANS. 0.0 0.00 - 0.04 K/UL    DF AUTOMATED     RENAL FUNCTION PANEL    Collection Time: 06/01/21 12:17 AM   Result Value Ref Range    Sodium 138 136 - 145 mmol/L    Potassium 4.0 3.5 - 5.1 mmol/L    Chloride 107 97 - 108 mmol/L    CO2 25 21 - 32 mmol/L    Anion gap 6 5 - 15 mmol/L    Glucose 131 (H) 65 - 100 mg/dL    BUN 12 6 - 20 MG/DL    Creatinine 0.91 0.70 - 1.30 MG/DL    BUN/Creatinine ratio 13 12 - 20      GFR est AA >60 >60 ml/min/1.73m2    GFR est non-AA >60 >60 ml/min/1.73m2    Calcium 8.2 (L) 8.5 - 10.1 MG/DL    Phosphorus 2.2 (L) 2.6 - 4.7 MG/DL    Albumin 2.2 (L) 3.5 - 5.0 g/dL   GLUCOSE, POC    Collection Time: 06/01/21  6:29 AM   Result Value Ref Range    Glucose (POC) 108 65 - 117 mg/dL    Performed by Bairon Lucero, POC    Collection Time: 06/01/21 12:21 PM   Result Value Ref Range    Glucose (POC) 100 65 - 117 mg/dL    Performed by Zina Yuan    POC EG7    Collection Time: 06/01/21  1:23 PM   Result Value Ref Range    Calcium, ionized (POC) 1.15 1.12 - 1.32 mmol/L    pH (POC) 7.45 7.35 - 7.45      pCO2 (POC) 35.3 35.0 - 45.0 MMHG    pO2 (POC) 79 (L) 80 - 100 MMHG    HCO3 (POC) 24.6 22 - 26 MMOL/L    Base excess (POC) 0.8 mmol/L    sO2 (POC) 96.2 92 - 97 %    Site RIGHT RADIAL      Device: ROOM AIR      Allens test (POC) Positive      Specimen type (POC) ARTERIAL         Exam:  Visit Vitals  /85   Pulse 79   Temp 97.7 °F (36.5 °C)   Resp 15   Ht 5' 8\" (1.727 m)   Wt 217 lb 9.6 oz (98.7 kg)   SpO2 97%   BMI 33.09 kg/m²     Gen: Well developed  CV: RRR  Lungs: non labored breathing  Abd: non distending  Neuro: A&O xseld, following commands,   CN II-XII: PERRL, EOMI, face symmetric, tongue/palate midline  Motor: Able to move all extremities spontaneously :  Decrease movement on the LLE,may be due to pain and swelling   Sensory : difficult to assess   Gait: deferred     Assessment:     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    Hypertension I10    ED (erectile dysfunction) N52.9    Severe obesity (HCC) E66.01    TESSA on CPAP G47.33, Z99.89    Simple chronic bronchitis (HCC) J41.0    Bilateral carotid artery stenosis I65.23    Spinal stenosis of lumbar region at multiple levels M48.061    Flank pain R10.9    Rectal bleeding K62.5    Pneumonia J18.9    Severe sepsis (HCC) A41.9, R65.20    Acute blood loss anemia D62    Facial droop R29.810    Altered mental state R41.82    Viral encephalitis A86    Coronary artery disease with stable angina pectoris ([facility]) I25.118    Chronic obstructive pulmonary disease (HCC) J44.9    CKD (chronic kidney disease) stage 3, GFR 30-59 ml/min ([facility]) N18.30    Intracranial hemorrhage (HCC) I62.9    CAP (community acquired pneumonia) J18.9    Cerebrovascular accident (CVA) due to embolism ([facility] I63.9    Status epilepticus ([facility] G40.901    Encephalopathy G93.40       Plan:     On 1116, an 66-year-old shante with many medical conditions was in with AMS/nausea/vomiting/tremors. In April 2021, there was a right parietal intraparenchymal hemorrhage with ventricular extension. He was in rehab when he got pneumonia and had to be admitted to the hospital. An MRI of the brain was performed, which revealed embolic-looking scattered infarcts in the bilateral frontal and parieto-occipital lobes. On 5/16, an EEG was performed that was normal, but on 5/20, another EEG was performed for symptoms of decreased responsiveness, left gaze deviation, and lip smacking, and it revealed a widespread nonconvulsive condition that disappeared with administration of Ativan. He remain drowsy which has now improved after the decrease in Lacosamide. Repeat EEG, pending. -EEG, pending     -Continue Keppra 1,000 BID   -continue Lacosamide 50mg BID    -speech to reassess swallow ability   Thank you for allowing the Neurology Service the pleasure of participating in the care of your patient. This patient will be discussed with my collaborating care team physician  and she may have further recommendations regarding this patient's care.   Signed:  Kimberly Flowers NP  6/1/2021  2:38 PM

## 2021-06-02 LAB
ALBUMIN SERPL-MCNC: 2.4 G/DL (ref 3.5–5)
ANION GAP SERPL CALC-SCNC: 5 MMOL/L (ref 5–15)
BASOPHILS # BLD: 0 K/UL (ref 0–0.1)
BASOPHILS NFR BLD: 1 % (ref 0–1)
BUN SERPL-MCNC: 15 MG/DL (ref 6–20)
BUN/CREAT SERPL: 16 (ref 12–20)
CALCIUM SERPL-MCNC: 8.6 MG/DL (ref 8.5–10.1)
CHLORIDE SERPL-SCNC: 100 MMOL/L (ref 97–108)
CO2 SERPL-SCNC: 31 MMOL/L (ref 21–32)
CREAT SERPL-MCNC: 0.93 MG/DL (ref 0.7–1.3)
DIFFERENTIAL METHOD BLD: ABNORMAL
EOSINOPHIL # BLD: 0.1 K/UL (ref 0–0.4)
EOSINOPHIL NFR BLD: 2 % (ref 0–7)
ERYTHROCYTE [DISTWIDTH] IN BLOOD BY AUTOMATED COUNT: 15.1 % (ref 11.5–14.5)
GLUCOSE BLD STRIP.AUTO-MCNC: 133 MG/DL (ref 65–117)
GLUCOSE BLD STRIP.AUTO-MCNC: 133 MG/DL (ref 65–117)
GLUCOSE BLD STRIP.AUTO-MCNC: 148 MG/DL (ref 65–117)
GLUCOSE SERPL-MCNC: 132 MG/DL (ref 65–100)
HCT VFR BLD AUTO: 33.4 % (ref 36.6–50.3)
HGB BLD-MCNC: 10.8 G/DL (ref 12.1–17)
IMM GRANULOCYTES # BLD AUTO: 0 K/UL (ref 0–0.04)
IMM GRANULOCYTES NFR BLD AUTO: 1 % (ref 0–0.5)
LYMPHOCYTES # BLD: 0.9 K/UL (ref 0.8–3.5)
LYMPHOCYTES NFR BLD: 12 % (ref 12–49)
MCH RBC QN AUTO: 30.9 PG (ref 26–34)
MCHC RBC AUTO-ENTMCNC: 32.3 G/DL (ref 30–36.5)
MCV RBC AUTO: 95.4 FL (ref 80–99)
MONOCYTES # BLD: 0.7 K/UL (ref 0–1)
MONOCYTES NFR BLD: 9 % (ref 5–13)
NEUTS SEG # BLD: 5.7 K/UL (ref 1.8–8)
NEUTS SEG NFR BLD: 75 % (ref 32–75)
NRBC # BLD: 0 K/UL (ref 0–0.01)
NRBC BLD-RTO: 0 PER 100 WBC
PHOSPHATE SERPL-MCNC: 2.6 MG/DL (ref 2.6–4.7)
PLATELET # BLD AUTO: 277 K/UL (ref 150–400)
PMV BLD AUTO: 10.3 FL (ref 8.9–12.9)
POTASSIUM SERPL-SCNC: 4 MMOL/L (ref 3.5–5.1)
RBC # BLD AUTO: 3.5 M/UL (ref 4.1–5.7)
SERVICE CMNT-IMP: ABNORMAL
SODIUM SERPL-SCNC: 136 MMOL/L (ref 136–145)
WBC # BLD AUTO: 7.4 K/UL (ref 4.1–11.1)

## 2021-06-02 PROCEDURE — 94640 AIRWAY INHALATION TREATMENT: CPT

## 2021-06-02 PROCEDURE — 97164 PT RE-EVAL EST PLAN CARE: CPT

## 2021-06-02 PROCEDURE — 74011000250 HC RX REV CODE- 250: Performed by: HOSPITALIST

## 2021-06-02 PROCEDURE — 94664 DEMO&/EVAL PT USE INHALER: CPT

## 2021-06-02 PROCEDURE — 74011000258 HC RX REV CODE- 258: Performed by: PSYCHIATRY & NEUROLOGY

## 2021-06-02 PROCEDURE — 74011250636 HC RX REV CODE- 250/636: Performed by: PSYCHIATRY & NEUROLOGY

## 2021-06-02 PROCEDURE — C9254 INJECTION, LACOSAMIDE: HCPCS | Performed by: PSYCHIATRY & NEUROLOGY

## 2021-06-02 PROCEDURE — 97112 NEUROMUSCULAR REEDUCATION: CPT

## 2021-06-02 PROCEDURE — 74011250637 HC RX REV CODE- 250/637: Performed by: INTERNAL MEDICINE

## 2021-06-02 PROCEDURE — 74011250637 HC RX REV CODE- 250/637: Performed by: STUDENT IN AN ORGANIZED HEALTH CARE EDUCATION/TRAINING PROGRAM

## 2021-06-02 PROCEDURE — 74011250637 HC RX REV CODE- 250/637: Performed by: FAMILY MEDICINE

## 2021-06-02 PROCEDURE — 65660000001 HC RM ICU INTERMED STEPDOWN

## 2021-06-02 PROCEDURE — 97112 NEUROMUSCULAR REEDUCATION: CPT | Performed by: OCCUPATIONAL THERAPIST

## 2021-06-02 PROCEDURE — 82962 GLUCOSE BLOOD TEST: CPT

## 2021-06-02 PROCEDURE — 74011250637 HC RX REV CODE- 250/637: Performed by: HOSPITALIST

## 2021-06-02 PROCEDURE — 80069 RENAL FUNCTION PANEL: CPT

## 2021-06-02 PROCEDURE — 85025 COMPLETE CBC W/AUTO DIFF WBC: CPT

## 2021-06-02 PROCEDURE — 97168 OT RE-EVAL EST PLAN CARE: CPT | Performed by: OCCUPATIONAL THERAPIST

## 2021-06-02 PROCEDURE — 36415 COLL VENOUS BLD VENIPUNCTURE: CPT

## 2021-06-02 RX ORDER — BUDESONIDE 0.5 MG/2ML
500 INHALANT ORAL
Status: DISCONTINUED | OUTPATIENT
Start: 2021-06-02 | End: 2021-06-08 | Stop reason: HOSPADM

## 2021-06-02 RX ORDER — DULOXETIN HYDROCHLORIDE 30 MG/1
30 CAPSULE, DELAYED RELEASE ORAL DAILY
Status: DISCONTINUED | OUTPATIENT
Start: 2021-06-03 | End: 2021-06-02

## 2021-06-02 RX ORDER — DULOXETIN HYDROCHLORIDE 30 MG/1
30 CAPSULE, DELAYED RELEASE ORAL DAILY
Status: DISCONTINUED | OUTPATIENT
Start: 2021-06-02 | End: 2021-06-08 | Stop reason: HOSPADM

## 2021-06-02 RX ORDER — ARFORMOTEROL TARTRATE 15 UG/2ML
15 SOLUTION RESPIRATORY (INHALATION)
Status: DISCONTINUED | OUTPATIENT
Start: 2021-06-02 | End: 2021-06-08 | Stop reason: HOSPADM

## 2021-06-02 RX ADMIN — Medication 10 ML: at 22:17

## 2021-06-02 RX ADMIN — HYDRALAZINE HYDROCHLORIDE 100 MG: 50 TABLET, FILM COATED ORAL at 22:12

## 2021-06-02 RX ADMIN — BUDESONIDE 500 MCG: 0.5 INHALANT RESPIRATORY (INHALATION) at 21:57

## 2021-06-02 RX ADMIN — ASPIRIN 81 MG: 81 TABLET, CHEWABLE ORAL at 09:03

## 2021-06-02 RX ADMIN — ATENOLOL 12.5 MG: 25 TABLET ORAL at 09:03

## 2021-06-02 RX ADMIN — HYDRALAZINE HYDROCHLORIDE 100 MG: 50 TABLET, FILM COATED ORAL at 16:44

## 2021-06-02 RX ADMIN — Medication 10 ML: at 13:36

## 2021-06-02 RX ADMIN — AMLODIPINE BESYLATE 5 MG: 5 TABLET ORAL at 09:03

## 2021-06-02 RX ADMIN — ATORVASTATIN CALCIUM 80 MG: 40 TABLET, FILM COATED ORAL at 09:03

## 2021-06-02 RX ADMIN — SODIUM CHLORIDE 50 MG: 9 INJECTION, SOLUTION INTRAVENOUS at 13:37

## 2021-06-02 RX ADMIN — HYPROMELLOSE 2910 1 DROP: 5 SOLUTION OPHTHALMIC at 09:32

## 2021-06-02 RX ADMIN — HYDRALAZINE HYDROCHLORIDE 100 MG: 50 TABLET, FILM COATED ORAL at 09:00

## 2021-06-02 RX ADMIN — ARFORMOTEROL TARTRATE 15 MCG: 15 SOLUTION RESPIRATORY (INHALATION) at 21:57

## 2021-06-02 RX ADMIN — ACETAMINOPHEN 650 MG: 325 TABLET ORAL at 11:46

## 2021-06-02 RX ADMIN — LEVETIRACETAM 1000 MG: 100 INJECTION, SOLUTION INTRAVENOUS at 11:38

## 2021-06-02 RX ADMIN — FUROSEMIDE 40 MG: 40 TABLET ORAL at 09:03

## 2021-06-02 RX ADMIN — LEVETIRACETAM 1000 MG: 100 INJECTION, SOLUTION INTRAVENOUS at 22:16

## 2021-06-02 RX ADMIN — DULOXETINE HYDROCHLORIDE 30 MG: 30 CAPSULE, DELAYED RELEASE ORAL at 19:05

## 2021-06-02 RX ADMIN — SODIUM CHLORIDE 50 MG: 9 INJECTION, SOLUTION INTRAVENOUS at 01:46

## 2021-06-02 NOTE — PROGRESS NOTES
Transition of Care Plan   RUR- 27% Medium Risk   DISPOSITION: The disposition plan is to transition to a SNF - [facility]/[phone] - pending review.  F/U with PCP/Specialist     Transport: [facility]    At 4:39pm - CM met with patient and patients daughter at bedside to check in. Patient has been recommended for SNF. CM attempted to provide patients daughter with SNF choice list. Patients daughter reported, Sharee Cast came from [facility] and he is going back. \" CM submitted referral via [employer] [address] for review. CM will will continue to follow, provide support and assist with JACQUES needs as they arise.     MELONY Sanabria,CRM

## 2021-06-02 NOTE — PROGRESS NOTES
Problem: TIA/CVA Stroke: Day 2 Until Discharge  Goal: Diagnostic Test/Procedures  Outcome: Progressing Towards Goal  Goal: Nutrition/Diet  Outcome: Progressing Towards Goal  Goal: Discharge Planning  Outcome: Progressing Towards Goal  Goal: Medications  Outcome: Progressing Towards Goal  Goal: Respiratory  Outcome: Progressing Towards Goal

## 2021-06-02 NOTE — PROGRESS NOTES
Bedside shift change report given to Juanis Villalba RN (oncoming nurse) by Dali Winters RN (offgoing nurse). Report included the following information SBAR, Cardiac Rhythm nsr and Dual Neuro Assessment.

## 2021-06-02 NOTE — PROGRESS NOTES
Problem: Self Care Deficits Care Plan (Adult)  Goal: *Acute Goals and Plan of Care (Insert Text)  Description:   FUNCTIONAL STATUS PRIOR TO ADMISSION: Admission from SNF, previous 4/2021 hospitalization with IVH with non-surgical intervention at time was mod A x2-max A x3 with impaired sitting and standing balance with impulsivity, impaired command following, [place], per chart patient was ambulating and able to hold conversation at rehab with significant change of AMS now     HOME SUPPORT: previous to 4/2021 was mod I with [facility] living with wife, was at [facility] for 2 weeks, supportive family    Occupational Therapy Goals  Initiated 5/18/2021. Re-Evaluation 6/2/21 and goals downgraded below-  1. Patient will perform self-feeding with supervision/set-up within 7 day(s). - downgrade to max A  2. Patient will perform grooming with minimal assistance/contact guard assist within 7 day(s). - downgrade to max A  3. Patient will perform upper body dressing with minimal assistance/contact guard assist within 7 day(s). - downgrade to max A  4. Patient will perform toilet transfers with moderate assistance  within 7 day(s). -downgrade to max A x2  5. Patient will perform all aspects of toileting with moderate assistance  within 7 day(s). - downgrade to max A  6. Patient will participate in upper extremity therapeutic exercise/activities with supervision/set-up for 5 minutes within 7 day(s). downgrade to max A  7. Patient will utilize energy conservation techniques during functional activities with verbal cues within 7 day(s). - discontinue    Outcome: Progressing Towards Goal     OCCUPATIONAL THERAPY RE-EVALUATION  Patient: Mohamud Borges (77 y.o. male)  Date: 6/2/2021  Diagnosis: CAP (community acquired pneumonia) [J18.9] <principal problem not specified>       Precautions: Fall, Bed Alarm, DNR, Skin, Seizure  Chart, occupational therapy assessment, plan of care, and goals were reviewed.     ASSESSMENT  Based on the objective data described below, pt now more awake, poor command following, very [place], decreased strength, endurance, mobility, balance and unable to come to sit EOB with assist x1 person, safety and coordination. He requires total A for all ADLs and functional mobility. Recommend SNF at discharge. Current Level of Function Impacting Discharge (ADLs): total A for all ADLs and functional mobility    Other factors to consider for discharge: see above         PLAN :  Recommendations and Planned Interventions: self care training, functional mobility training, therapeutic exercise, balance training, visual/perceptual training, therapeutic activities, cognitive retraining, endurance activities, neuromuscular re-education, patient education, home safety training, and family training/education    Frequency/Duration: Patient will be followed by occupational therapy 5 times a week to address goals. Recommend with staff: micky lift OOB; chair position in bed 3x day especially for meals    Recommend next OT session: command following, ADL participation    Recommendation for discharge: (in order for the patient to meet his/her long term goals)  Therapy up to 5 days/week in SNF setting    This discharge recommendation:  Has been made in collaboration with the attending provider and/or case management    Equipment recommendations for successful discharge (if) home: TBD       SUBJECTIVE:   Patient very [facility]:   Hospital course since last seen and reason for reevaluation: pt more alert and following some commands    Cognitive/Behavioral Status:  Neurologic State: Confused;Drowsy; Eyes open to stimulus  Orientation Level: Oriented to person;Disoriented to place; Disoriented to situation;Disoriented to time  Cognition: Decreased attention/concentration;Decreased command following; Impaired decision making;Poor safety awareness  Perception: Cues to maintain midline in sitting; Tactile;Verbal;Visual  Perseveration: No perseveration noted  Safety/Judgement: Decreased awareness of environment;Decreased awareness of need for assistance;Decreased awareness of need for safety;Decreased insight into deficits; Fall prevention    Hearing: Auditory  Auditory Impairment: Hard of hearing, bilateral, Hearing aid(s)  Hearing Aids/Status: At bedside, With patient    Vision/Perceptual:    Acuity:  (unable to follow commands)         Range of Motion:  AROM: Grossly decreased, non-functional (moves all extremities against gravity with max cues)  PROM: Generally decreased, functional                      Strength:  Strength: Generally decreased, functional (moves all extremities against gravity with max cues)                Coordination:  Coordination: Grossly decreased, non-functional  Fine Motor Skills-Upper: Left Impaired;Right Impaired    Gross Motor Skills-Upper: Left Impaired;Right Impaired    Tone & Sensation:  Tone: Abnormal                           Neuro Re-ed and Functional Mobility and Transfers for ADLs:  Bed Mobility:  Rolling: Maximum assistance; Additional time;Assist x1  Supine to Sit: Total assistance (unable to come to sit with A x1)  Scooting: Total assistance    Transfers:  Sit to Stand: Total assistance (unable to come to sit with A x1)  Functional Transfers  Toilet Transfer : Total assistance (bed level only at this time)       Balance:  Sitting:  (unable to come to sit with A x1)    ADL Assessment:  Feeding: Total assistance    Oral Facial Hygiene/Grooming: Total assistance    Bathing: Total assistance    Upper Body Dressing: Total assistance    Lower Body Dressing: Total assistance    Toileting: Total assistance    Cognitive Retraining  Safety/Judgement: Decreased awareness of environment;Decreased awareness of need for assistance;Decreased awareness of need for safety;Decreased insight into deficits; Fall prevention    Functional Measure:  Barthel Index:    Bathing: 0  Bladder: 0  Bowels: 0  Groomin  Dressin  Feedin  Mobility: 0  Stairs: 0  Toilet Use: 0  Transfer (Bed to Chair and Back): 0  Total: 0/100        The Barthel ADL Index: Guidelines  1. The index should be used as a record of what a patient does, not as a record of what a patient could do. 2. The main aim is to establish degree of independence from any help, physical or verbal, however minor and for whatever reason. 3. The need for supervision renders the patient not independent. 4. A patient's performance should be established using the best available evidence. Asking the patient, friends/relatives and nurses are the usual sources, but direct observation and common sense are also important. However direct testing is not needed. 5. Usually the patient's performance over the preceding 24-48 hours is important, but occasionally longer periods will be relevant. 6. Middle categories imply that the patient supplies over 50 per cent of the effort. 7. Use of aids to be independent is allowed. Jack Parish., Barthel, D.W. (4154). Functional evaluation: the Barthel Index. [facility] (14)2. ELVA VuF, Corazon Ohara., [facility], [address] [place] [address] (). Measuring the change indisability after inpatient rehabilitation; comparison of the responsiveness of the Barthel Index and Functional [place] Measure. Journal of Neurology, Neurosurgery, and Psychiatry, 66(4), 651-305. Julia Box, N.J.A, JENNIFER PulidoJ.JENNIFER, & Kory Singh M.A. (2004.) Assessment of post-stroke quality of life in cost-effectiveness studies: The usefulness of the Barthel Index and the EuroQoL-5D.  Quality of Life Research, 13, 427-43        Pain:  No signs of pain    Activity Tolerance:   Poor    After treatment patient left in no apparent distress:   Supine in bed, Call bell within reach, Bed / chair alarm activated, and Caregiver / family present    COMMUNICATION/COLLABORATION:   The patients plan of care was discussed with: Physical therapist and Registered nurse.      Una Blackburn, OT  Time Calculation: 27 mins

## 2021-06-02 NOTE — PROGRESS NOTES
Hospitalist Progress Note      Hospital summary: Olga Casanova a 80 y. o. male with pmh of CVA, intraventricular brain hemorrhage, dyslipidemia, CKD 3, hypertension who presents to hospital from subacute rehab for concerns of altered/decreased mental status. Patient was found to have multiple ischemic CVAs on MRI, continue to have altered mental status with some eye deviation and twitching on 5/20. This was concerning for seizure, given 2 mg of Ativan and loaded with Keppra with some improvement. On 5/21 patient however declined, unresponsive to verbal stimuli. Stat EEG 24-hour was ordered and showed subclinical status. He was loaded with Vimpat, and neurology recommends transfer to ICU for closer neuro checks on 5/21. Seizures were controlled and  Pt transferred out of ICU on 5/23.     5/16/2021      Assessment/Plan:  Status epilepticus -resolved  -Continue Keppra and Vimpat   - Neurology following-vimpate dose reduced -50 mg BID  -  Seizure precautions   EEG results pending     Acute embolic infarcts  Subacute hematoma -intraventricular hemorrhage with ventriculomegaly on recent admission, discharged 5/3  -Not a candidate for anticoagulation due to intracranial hemorrhage, and fall risk  -Neurology following  -No escalation of antiplatelets due to ICH, continue aspirin  -Continue atorvastatin  -PT/OT/speech  -Echo with normal EF        Acute Metabolic encephalopathy -   multifactorial due to above  He is awake,alert mostly in the afternoon,answering few questions  -Patient to get PT/OT and diet mostly later in the day      Community-acquired pneumonia-resolved      Hypertension Bp controlled,  C/w hydralazine, amlodipine.     Hyperlipidemiacontinue Lipitor    Hx TESSA  Per daughter,he never liked CPAP so he did not use   Will try CPAP tonight as well  ABG    H/o chronic LE edema due to venous congestion  Ankle edema -dependent edema with immobility  Resumed lasix 40 mg today ,will monitor electrolytes and renal function as he is not currently eating/drinking and dependent on tube feeds    Hypernatremia - resolved        Dysphagia  - has acrly gomez TFs  -passed swallow eval yesterday  --Patient to get PT/OT and diet mostly later in the day  If he is tolerating diet for 24 -48 hrs,will remove NG tube      RUE swelling- venous duplex negative    Appreciate Palliative care input     Code status: DNR. DVT prophylaxis: SCDs  Disposition: TBD    I spoke to patient's daughter today  ----------------------------------------------    CC: Acute CVA. Seizures     S: Patient is seen and examined     He is somnolent during morning,wakes up in the afternoon  When he is awake, able to participate in therapy,say few words    Review of Systems:  Review of systems not obtained due to patient factors.     O:  Visit Vitals  /65   Pulse 79   Temp 98 °F (36.7 °C)   Resp 20   Ht 5' 8\" (1.727 m)   Wt 98.3 kg (216 lb 12.8 oz)   SpO2 96%   BMI 32.96 kg/m²       PHYSICAL EXAM:  Gen: elderly patient  HEENT: anicteric sclerae, normal conjunctiva, NG tube   Neck: supple, trachea midline  Heart: RRR, no MRG, no JVD,  Lungs: clear to auscultation, no accessory muscle use  Abd: soft,non tender,non distended  Extr: he has 2+ LE edema mainly at ankles-has chronic venous insufficnecny, UE edema  Skin: dry, no rash  Neuro: awake,alert    Intake/Output Summary (Last 24 hours) at 6/2/2021 1640  Last data filed at 6/2/2021 0400  Gross per 24 hour   Intake 450 ml   Output    Net 450 ml        Recent labs & imaging reviewed:  Recent Results (from the past 24 hour(s))   GLUCOSE, POC    Collection Time: 06/01/21 11:31 PM   Result Value Ref Range    Glucose (POC) 120 (H) 65 - 117 mg/dL    Performed by SILVANA Sotomayor    CBC WITH AUTOMATED DIFF    Collection Time: 06/02/21  2:51 AM   Result Value Ref Range    WBC 7.4 4.1 - 11.1 K/uL    RBC 3.50 (L) 4.10 - 5.70 M/uL    HGB 10.8 (L) 12.1 - 17.0 g/dL    HCT 33.4 (L) 36.6 - 50.3 %    MCV 95.4 80.0 - 99.0 FL    MCH 30.9 26.0 - 34.0 PG    MCHC 32.3 30.0 - 36.5 g/dL    RDW 15.1 (H) 11.5 - 14.5 %    PLATELET 655 149 - 531 K/uL    MPV 10.3 8.9 - 12.9 FL    NRBC 0.0 0  WBC    ABSOLUTE NRBC 0.00 0.00 - 0.01 K/uL    NEUTROPHILS 75 32 - 75 %    LYMPHOCYTES 12 12 - 49 %    MONOCYTES 9 5 - 13 %    EOSINOPHILS 2 0 - 7 %    BASOPHILS 1 0 - 1 %    IMMATURE GRANULOCYTES 1 (H) 0.0 - 0.5 %    ABS. NEUTROPHILS 5.7 1.8 - 8.0 K/UL    ABS. LYMPHOCYTES 0.9 0.8 - 3.5 K/UL    ABS. MONOCYTES 0.7 0.0 - 1.0 K/UL    ABS. EOSINOPHILS 0.1 0.0 - 0.4 K/UL    ABS. BASOPHILS 0.0 0.0 - 0.1 K/UL    ABS. IMM.  GRANS. 0.0 0.00 - 0.04 K/UL    DF AUTOMATED     RENAL FUNCTION PANEL    Collection Time: 06/02/21  2:51 AM   Result Value Ref Range    Sodium 136 136 - 145 mmol/L    Potassium 4.0 3.5 - 5.1 mmol/L    Chloride 100 97 - 108 mmol/L    CO2 31 21 - 32 mmol/L    Anion gap 5 5 - 15 mmol/L    Glucose 132 (H) 65 - 100 mg/dL    BUN 15 6 - 20 MG/DL    Creatinine 0.93 0.70 - 1.30 MG/DL    BUN/Creatinine ratio 16 12 - 20      GFR est AA >60 >60 ml/min/1.73m2    GFR est non-AA >60 >60 ml/min/1.73m2    Calcium 8.6 8.5 - 10.1 MG/DL    Phosphorus 2.6 2.6 - 4.7 MG/DL    Albumin 2.4 (L) 3.5 - 5.0 g/dL   GLUCOSE, POC    Collection Time: 06/02/21  6:31 AM   Result Value Ref Range    Glucose (POC) 148 (H) 65 - 117 mg/dL    Performed by Paula Corcoran    GLUCOSE, POC    Collection Time: 06/02/21 11:38 AM   Result Value Ref Range    Glucose (POC) 133 (H) 65 - 117 mg/dL    Performed by Siddharth Benavides      Recent Labs     06/02/21 0251 06/01/21 0017   WBC 7.4 7.3   HGB 10.8* 10.9*   HCT 33.4* 34.6*    253     Recent Labs     06/02/21  0251 06/01/21 0017 05/31/21 0252    138 140   K 4.0 4.0 4.1    107 110*   CO2 31 25 25   BUN 15 12 10   CREA 0.93 0.91 0.88   * 131* 119*   CA 8.6 8.2* 8.7   PHOS 2.6 2.2* 2.4*     Recent Labs     06/02/21 0251 06/01/21 0017 05/31/21 0252   ALB 2.4* 2.2* 2.4*     No results for input(s): INR, PTP, APTT, INREXT, INREXT in the last 72 hours. No results for input(s): FE, TIBC, PSAT, FERR in the last 72 hours. No results found for: FOL, RBCF   No results for input(s): PH, PCO2, PO2 in the last 72 hours. No results for input(s): CPK, CKNDX, TROIQ in the last 72 hours.     No lab exists for component: CPKMB  Lab Results   Component Value Date/Time    Cholesterol, total 144 04/19/2021 12:59 AM    HDL Cholesterol 33 04/19/2021 12:59 AM    LDL, calculated 74.8 04/19/2021 12:59 AM    Triglyceride 181 (H) 04/19/2021 12:59 AM    CHOL/HDL Ratio 4.4 04/19/2021 12:59 AM     Lab Results   Component Value Date/Time    Glucose (POC) 133 (H) 06/02/2021 11:38 AM    Glucose (POC) 148 (H) 06/02/2021 06:31 AM    Glucose (POC) 120 (H) 06/01/2021 11:31 PM    Glucose (POC) 100 06/01/2021 12:21 PM    Glucose (POC) 108 06/01/2021 06:29 AM     Lab Results   Component Value Date/Time    Color YELLOW/STRAW 05/16/2021 09:33 PM    Appearance CLEAR 05/16/2021 09:33 PM    Specific gravity 1.014 05/16/2021 09:33 PM    Specific gravity 1.015 09/21/2020 09:13 AM    pH (UA) 5.0 05/16/2021 09:33 PM    Protein Negative 05/16/2021 09:33 PM    Glucose Negative 05/16/2021 09:33 PM    Ketone Negative 05/16/2021 09:33 PM    Bilirubin Negative 05/16/2021 09:33 PM    Urobilinogen 0.2 05/16/2021 09:33 PM    Nitrites Negative 05/16/2021 09:33 PM    Leukocyte Esterase Negative 05/16/2021 09:33 PM    Epithelial cells FEW 05/16/2021 09:33 PM    Bacteria Negative 05/16/2021 09:33 PM    WBC 0-4 05/16/2021 09:33 PM    RBC 0-5 05/16/2021 09:33 PM       Med list reviewed  Current Facility-Administered Medications   Medication Dose Route Frequency    hydroxypropyl methylcellulose (ISOPTO TEARS) 0.5 % ophthalmic solution 1 Drop  1 Drop Both Eyes PRN    furosemide (LASIX) tablet 40 mg  40 mg Oral DAILY    lacosamide (VIMPAT) 50 mg in 0.9% sodium chloride 100 mL IVPB  50 mg IntraVENous Q12H    polyethylene glycol (MIRALAX) packet 17 g  17 g Oral DAILY    atenoloL (TENORMIN) tablet 12.5 mg  12.5 mg Oral DAILY    lidocaine (XYLOCAINE) 2 % jelly   Mucous Membrane PRN    hydrALAZINE (APRESOLINE) tablet 100 mg  100 mg Oral TID    amLODIPine (NORVASC) tablet 5 mg  5 mg Oral DAILY    labetaloL (NORMODYNE;TRANDATE) injection 20 mg  20 mg IntraVENous Q6H PRN    levETIRAcetam (KEPPRA) 1,000 mg in 0.9% sodium chloride 100 mL IVPB  1,000 mg IntraVENous Q12H    hydrALAZINE (APRESOLINE) 20 mg/mL injection 20 mg  20 mg IntraVENous Q6H PRN    sodium chloride (NS) flush 5-40 mL  5-40 mL IntraVENous Q8H    sodium chloride (NS) flush 5-40 mL  5-40 mL IntraVENous PRN    polyethylene glycol (MIRALAX) packet 17 g  17 g Oral DAILY PRN    albuterol (PROVENTIL VENTOLIN) nebulizer solution 2.5 mg  2.5 mg Nebulization Q4H PRN    aspirin chewable tablet 81 mg  81 mg Oral DAILY    atorvastatin (LIPITOR) tablet 80 mg  80 mg Oral DAILY    [Held by provider] isosorbide mononitrate ER (IMDUR) tablet 30 mg  30 mg Oral DAILY    sodium chloride (NS) flush 5-40 mL  5-40 mL IntraVENous Q8H    sodium chloride (NS) flush 5-40 mL  5-40 mL IntraVENous PRN    acetaminophen (TYLENOL) tablet 650 mg  650 mg Oral Q6H PRN    Or    acetaminophen (TYLENOL) suppository 650 mg  650 mg Rectal Q6H PRN    promethazine (PHENERGAN) tablet 12.5 mg  12.5 mg Oral Q6H PRN    Or    ondansetron (ZOFRAN) injection 4 mg  4 mg IntraVENous Q6H PRN       Care Plan discussed with:  Patient/Family and Nurse    Cindi Garcia MD  Internal Medicine  Date of Service: 6/2/2021

## 2021-06-02 NOTE — PROGRESS NOTES
Problem: Mobility Impaired (Adult and Pediatric)  Goal: *Acute Goals and Plan of Care (Insert Text)  Description: FUNCTIONAL STATUS PRIOR TO ADMISSION: patient from SNF rehab following hospitalization for intracranial hemorraghic stroke in April. Per chart review, ambulatory in rehab, conversant. Patient unable to provide any information at this time. HOME SUPPORT PRIOR TO ADMISSION: from SNF but prior to initial admission in April, home with wife. Physical Therapy Goals  Reassessed 6/2/21  1. Patient will move from supine to sit and sit to supine and roll side to side in bed with moderate assistance of two people assistance within 7 day(s). 2.  Patient will transfer from bed to chair and chair to bed with maximal of two people assistance using the least restrictive device within 7 day(s). 3.  Patient will sit EOB x 5 minutes with moderate assistance of one person in preparation for transfers/out of bed within 7 days. 4.  Patient will transfer sit to stand with max assistance of two people within 7 days. Physical Therapy Goals  Initiated 5/17/2021  1. Patient will move from supine to sit and sit to supine  and roll side to side in bed with maximal assistance within 7 day(s). 2.  Patient will transfer from bed to chair and chair to bed with maximal assistance using the least restrictive device within 7 day(s). 3.  Patient sit EOB x 5 minutes in preparation for transfers/out of bed within 7 days. 4.  Patient will follow 1 step commands 50% of trials within 7 days.   Outcome: Progressing Towards Goal    PHYSICAL THERAPY REEVALUATION  Patient: Fredick Leyden (28 y.o. male)  Date: 6/2/2021  Primary Diagnosis: CAP (community acquired pneumonia) [J18.9]        Precautions: Fall, Bed Alarm, DNR, Skin, Seizure      ASSESSMENT  Based on the objective data described below, the patient presents with inconsistent command following, impaired sequencing, decreased cognition, decreased strength, decreased activity tolerance, impaired sitting balance, and a decline in overall functional mobility. Patient had a right ICH in 4/2021 and was discharged to SNF. Now re-admitted to the hospital with PNA and new frida infarcts. PT had d/c orders, as patient was not following commands, but is now following some commands and able to actively participate. Of note, patient is [place]. This date, patient transferred supine to sit with MaxA x2 and verbal and tactile cues for hand placement and sequencing. Once seated, patient tolerated 5 minutes sitting balance with MaxA x1 for persistent right lean and maintaining midline. Patient was able to reach with each hand separately to touch a target outside [place] (5 inches anterior) with max verbal cueing. Patient was returned to  s/l and finished session with all needs met. Continuing to recommend discharge to SNF. Current Level of Function Impacting Discharge (mobility/balance): MaxA x2 for supine to sit    Functional Outcome Measure: The patient scored 0/56 on the Copeland outcome measure which is indicative of high fall risk. Other factors to consider for discharge: Fall risk, PMH, was at SNF prior to this current hospital admission, patient more alert and participatory in the afternoons     Patient will benefit from skilled therapy intervention to address the above noted impairments. PLAN :  Recommendations and Planned Interventions: bed mobility training, transfer training, gait training, therapeutic exercises, neuromuscular re-education, patient and family training/education, and therapeutic activities      Frequency/Duration: Patient will be followed by physical therapy:  3 times a week to address goals.     Recommendation for discharge: (in order for the patient to meet his/her long term goals)  Therapy up to 5 days/week in SNF setting    This discharge recommendation:  Has not yet been discussed the attending provider and/or case management    Equipment recommendations for successful discharge (if) home: TBD         SUBJECTIVE:   Patient stated What?    OBJECTIVE DATA SUMMARY:   HISTORY:    Past Medical History:   Diagnosis Date    Adverse effect of anesthesia     combative after anesthesia    Alcohol abuse     6 beers/day    Arthritis     CAD (coronary artery disease)     Chronic obstructive pulmonary disease (HCC)     Chronic obstructive pulmonary disease (HCC)     CKD (chronic kidney disease) stage 3, GFR 30-59 ml/min ([facility] [address] 9/21/2020    Dyslipidemia 8/16/2017    Dyspepsia and other specified disorders of function of stomach     Dyspnea 8/16/2017    ED (erectile dysfunction) 8/16/2017    Encounter for immunization 8/16/2017    Fatigue 8/16/2017    Flank pain 8/1/2019    GERD (gastroesophageal reflux disease) 8/16/2017    Gout 8/16/2017    Hypertension     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    Morbid obesity ([address]     On statin therapy 8/16/2017    TESSA on CPAP 1/3/2019    Psychiatric disorder     Depression    Simple chronic bronchitis ([address] 1/3/2019    TIA (transient ischemic attack) 9/72/7524    Umbilical hernia 89/8/2294    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     Past Surgical History:   Procedure Laterality Date    COLONOSCOPY N/A 9/27/2019    COLONOSCOPY performed by dAa Maria MD at [address]  31/79/77    open umbilical hernia repair mesh    HX ORTHOPAEDIC      HX UROLOGICAL      HYDROCELECTOMY    [place] CARDIAC SURG PROCEDURE UNLIST  1996    Cardiac Stents    [place] CARDIAC SURG PROCEDURE UNLIST  04/2019    EF 61-65%    UPPER GI ENDOSCOPY,BIOPSY  9/30/2019          Hospital course since last seen and reason for reevaluation: PT had signed off as pt was not following commands or participating. Pt re-consulted as patient is more alert.     Home Situation  Home Environment: Rehabilitation facility  One/Two Story Residence: One story  Living Alone: No  Support Systems: Child(theodora)  Patient Expects to be Discharged to[de-identified] Unknown  Current DME Used/Available at Home: Commode, bedside, Communication device, Walker    EXAMINATION/PRESENTATION/DECISION MAKING:   Critical Behavior:  Neurologic State: Confused, Drowsy, Eyes open to stimulus  Orientation Level: Oriented to person, Disoriented to place, Disoriented to situation, Disoriented to time  Cognition: Decreased attention/concentration, Decreased command following, Impaired decision making, Poor safety awareness  Safety/Judgement: Decreased awareness of environment, Decreased awareness of need for assistance, Decreased awareness of need for safety, Decreased insight into deficits, Fall prevention  Hearing: Auditory  Auditory Impairment: Hard of hearing, bilateral, Hearing aid(s)  Hearing Aids/Status: At bedside, With patient    Range Of Motion:  AROM: Grossly decreased, non-functional    PROM: Generally decreased, functional     Strength:    Strength: Generally decreased, functional     Tone & Sensation:   Tone: Abnormal     Coordination:  Coordination: Grossly decreased, non-functional  Vision:   Acuity:  (unable to follow commands)  Functional Mobility:  Bed Mobility:  Rolling: Maximum assistance;Assist x2  Supine to Sit: Maximum assistance;Assist x2     Scooting: Maximum assistance;Assist x2    Balance:   Sitting: Impaired; With support  Sitting - Static: Poor (constant support)  Sitting - Dynamic: Poor (constant support)    Functional Measure:  Copeland Balance Test:    Sitting to Standin  Standing Unsupported: 0  Sitting with Back Unsupported: 0  Standing to Sittin  Transfers: 0  Standing Unsupported with Eyes Closed: 0  Standing Unsupported with Feet Together: 0  Reach Forward with Outstretched Arm: 0   Object: 0  Turn to Look Over Shoulders: 0  Turn 360 Degrees: 0  Alternate Foot on Step/Stool: 0  Standing Unsupported One Foot in Front: 0  Stand on One Le  Total: 0/56         56=Maximum possible score;   0-20=High fall risk  21-40=Moderate fall risk   41-56=Low fall risk Activity Tolerance:   Poor    After treatment patient left in no apparent distress:   Supine in bed, Patient positioned in right sidelying for pressure relief, Call bell within reach, Bed / chair alarm activated, and Caregiver / family present    COMMUNICATION/EDUCATION:   The patients plan of care was discussed with: Occupational therapist and Rehabilitation technician. Fall prevention education was provided and the patient/caregiver indicated understanding., Patient/family have participated as able in goal setting and plan of care. , and Patient/family agree to work toward stated goals and plan of care. Regarding student involvement in patient care:  A student participated in this treatment session. Per CMS Medicare statements and APTA guidelines I certify that the following was true:  1. I was present and directly observed the entire session. 2. I made all skilled judgments and clinical decisions regarding care. 3. I am the practitioner responsible for assessment, treatment, and documentation.       Thank you for this referral.  Nel Bender, SPT   Time Calculation: 19 mins

## 2021-06-02 NOTE — ROUTINE PROCESS
Bedside shift change report given to [address] [facility] [address] (oncoming nurse) by Stanley Oliveira RN (offgoing nurse). Report included the following information SBAR, Kardex, ED Summary, Intake/Output, MAR, Cardiac Rhythm NSR and Dual Neuro Assessment.

## 2021-06-02 NOTE — PROGRESS NOTES
Problem: Pressure Injury - Risk of  Goal: *Prevention of pressure injury  Description: Document Aquiles Scale and appropriate interventions in the flowsheet. Outcome: Progressing Towards Goal  Note: Pressure Injury Interventions:  Sensory Interventions: Assess changes in LOC, Check visual cues for pain, Float heels, Keep linens dry and wrinkle-free, Minimize linen layers, Pressure redistribution bed/mattress (bed type), Turn and reposition approx. every two hours (pillows and wedges if needed)    Moisture Interventions: Absorbent underpads, Check for incontinence Q2 hours and as needed, Limit adult briefs, Maintain skin hydration (lotion/cream), Minimize layers, Moisture barrier, Apply protective barrier, creams and emollients    Activity Interventions: Increase time out of bed, Pressure redistribution bed/mattress(bed type), PT/OT evaluation    Mobility Interventions: Float heels, HOB 30 degrees or less, Pressure redistribution bed/mattress (bed type), PT/OT evaluation, Turn and reposition approx. every two hours(pillow and wedges)    Nutrition Interventions: Document food/fluid/supplement intake    Friction and Shear Interventions: HOB 30 degrees or less, Lift sheet, Lift team/patient mobility team, Minimize layers, Apply protective barrier, creams and emollients                Problem: Falls - Risk of  Goal: *Absence of Falls  Description: Document Too Fall Risk and appropriate interventions in the flowsheet.   Outcome: Progressing Towards Goal  Note: Fall Risk Interventions:  Mobility Interventions: Assess mobility with egress test, Bed/chair exit alarm, Communicate number of staff needed for ambulation/transfer, OT consult for ADLs, Patient to call before getting OOB, PT Consult for mobility concerns, PT Consult for assist device competence, Utilize gait belt for transfers/ambulation    Mentation Interventions: Adequate sleep, hydration, pain control, Bed/chair exit alarm, Door open when patient unattended, Evaluate medications/consider consulting pharmacy, Increase mobility, More frequent rounding, Reorient patient, Room close to nurse's station, Update white board    Medication Interventions: Bed/chair exit alarm, Evaluate medications/consider consulting pharmacy, Patient to call before getting OOB, Teach patient to arise slowly    Elimination Interventions: Bed/chair exit alarm, Call light in reach, Patient to call for help with toileting needs, Stay With Me (per policy), Toilet paper/wipes in reach, Toileting schedule/hourly rounds    History of Falls Interventions: Bed/chair exit alarm, Consult care management for discharge planning, Door open when patient unattended, Evaluate medications/consider consulting pharmacy, Room close to nurse's station         Problem: Seizure Disorder (Adult)  Goal: *STG: Remains free of seizure activity  Outcome: Progressing Towards Goal  Goal: *STG: Maintains lab values within therapeutic range  Outcome: Progressing Towards Goal  Goal: *STG/LTG: Complies with medication therapy  Outcome: Progressing Towards Goal  Goal: *STG: Remains free of injury during seizure activity  Outcome: Progressing Towards Goal  Goal: *STG: Remains safe in hospital  Outcome: Progressing Towards Goal  Goal: Interventions  Outcome: Progressing Towards Goal     Problem: TIA/CVA Stroke: Day 2 Until Discharge  Goal: Off Pathway (Use only if patient is Off Pathway)  Outcome: Progressing Towards Goal  Goal: Activity/Safety  Outcome: Progressing Towards Goal  Goal: Diagnostic Test/Procedures  Outcome: Progressing Towards Goal  Goal: Nutrition/Diet  Outcome: Progressing Towards Goal  Goal: Discharge Planning  Outcome: Progressing Towards Goal  Goal: Medications  Outcome: Progressing Towards Goal  Goal: Respiratory  Outcome: Progressing Towards Goal  Goal: Treatments/Interventions/Procedures  Outcome: Progressing Towards Goal  Goal: Psychosocial  Outcome: Progressing Towards Goal  Goal: *Verbalizes anxiety and depression are reduced or absent  Outcome: Progressing Towards Goal  Goal: *Absence of aspiration  Outcome: Progressing Towards Goal  Goal: *Absence of deep venous thrombosis signs and symptoms(Stroke Metric)  Outcome: Progressing Towards Goal  Goal: *Optimal pain control at patient's stated goal  Outcome: Progressing Towards Goal  Goal: *Tolerating diet  Outcome: Progressing Towards Goal  Goal: *Ability to perform ADLs and demonstrates progressive mobility and function  Outcome: Progressing Towards Goal  Goal: *Stroke education continued(Stroke Metric)  Outcome: Progressing Towards Goal

## 2021-06-03 LAB
ALBUMIN SERPL-MCNC: 2.3 G/DL (ref 3.5–5)
ANION GAP SERPL CALC-SCNC: 5 MMOL/L (ref 5–15)
BUN SERPL-MCNC: 16 MG/DL (ref 6–20)
BUN/CREAT SERPL: 19 (ref 12–20)
CALCIUM SERPL-MCNC: 8.2 MG/DL (ref 8.5–10.1)
CHLORIDE SERPL-SCNC: 101 MMOL/L (ref 97–108)
CO2 SERPL-SCNC: 30 MMOL/L (ref 21–32)
CREAT SERPL-MCNC: 0.85 MG/DL (ref 0.7–1.3)
GLUCOSE BLD STRIP.AUTO-MCNC: 139 MG/DL (ref 65–117)
GLUCOSE BLD STRIP.AUTO-MCNC: 141 MG/DL (ref 65–117)
GLUCOSE SERPL-MCNC: 136 MG/DL (ref 65–100)
PHOSPHATE SERPL-MCNC: 2.7 MG/DL (ref 2.6–4.7)
POTASSIUM SERPL-SCNC: 4.1 MMOL/L (ref 3.5–5.1)
SERVICE CMNT-IMP: ABNORMAL
SERVICE CMNT-IMP: ABNORMAL
SODIUM SERPL-SCNC: 136 MMOL/L (ref 136–145)

## 2021-06-03 PROCEDURE — 74011000258 HC RX REV CODE- 258: Performed by: PSYCHIATRY & NEUROLOGY

## 2021-06-03 PROCEDURE — 97112 NEUROMUSCULAR REEDUCATION: CPT

## 2021-06-03 PROCEDURE — 74011000250 HC RX REV CODE- 250: Performed by: HOSPITALIST

## 2021-06-03 PROCEDURE — 74011250637 HC RX REV CODE- 250/637: Performed by: INTERNAL MEDICINE

## 2021-06-03 PROCEDURE — 94640 AIRWAY INHALATION TREATMENT: CPT

## 2021-06-03 PROCEDURE — 74011250636 HC RX REV CODE- 250/636: Performed by: PSYCHIATRY & NEUROLOGY

## 2021-06-03 PROCEDURE — C9254 INJECTION, LACOSAMIDE: HCPCS | Performed by: PSYCHIATRY & NEUROLOGY

## 2021-06-03 PROCEDURE — 74011250637 HC RX REV CODE- 250/637: Performed by: FAMILY MEDICINE

## 2021-06-03 PROCEDURE — 82962 GLUCOSE BLOOD TEST: CPT

## 2021-06-03 PROCEDURE — 80069 RENAL FUNCTION PANEL: CPT

## 2021-06-03 PROCEDURE — 74011250637 HC RX REV CODE- 250/637: Performed by: STUDENT IN AN ORGANIZED HEALTH CARE EDUCATION/TRAINING PROGRAM

## 2021-06-03 PROCEDURE — 74011250637 HC RX REV CODE- 250/637: Performed by: HOSPITALIST

## 2021-06-03 PROCEDURE — 36415 COLL VENOUS BLD VENIPUNCTURE: CPT

## 2021-06-03 PROCEDURE — 65660000001 HC RM ICU INTERMED STEPDOWN

## 2021-06-03 RX ORDER — LANOLIN ALCOHOL/MO/W.PET/CERES
3 CREAM (GRAM) TOPICAL
Status: DISCONTINUED | OUTPATIENT
Start: 2021-06-03 | End: 2021-06-08 | Stop reason: HOSPADM

## 2021-06-03 RX ADMIN — Medication 10 ML: at 14:30

## 2021-06-03 RX ADMIN — LEVETIRACETAM 1000 MG: 100 INJECTION, SOLUTION INTRAVENOUS at 22:00

## 2021-06-03 RX ADMIN — ASPIRIN 81 MG: 81 TABLET, CHEWABLE ORAL at 09:20

## 2021-06-03 RX ADMIN — HYDRALAZINE HYDROCHLORIDE 100 MG: 50 TABLET, FILM COATED ORAL at 16:48

## 2021-06-03 RX ADMIN — LEVETIRACETAM 1000 MG: 100 INJECTION, SOLUTION INTRAVENOUS at 11:47

## 2021-06-03 RX ADMIN — ATENOLOL 12.5 MG: 25 TABLET ORAL at 09:23

## 2021-06-03 RX ADMIN — BUDESONIDE 500 MCG: 0.5 INHALANT RESPIRATORY (INHALATION) at 20:20

## 2021-06-03 RX ADMIN — SODIUM CHLORIDE 50 MG: 9 INJECTION, SOLUTION INTRAVENOUS at 02:25

## 2021-06-03 RX ADMIN — DULOXETINE HYDROCHLORIDE 30 MG: 30 CAPSULE, DELAYED RELEASE ORAL at 09:22

## 2021-06-03 RX ADMIN — BUDESONIDE 500 MCG: 0.5 INHALANT RESPIRATORY (INHALATION) at 07:45

## 2021-06-03 RX ADMIN — ACETAMINOPHEN 650 MG: 325 TABLET ORAL at 10:11

## 2021-06-03 RX ADMIN — HYDRALAZINE HYDROCHLORIDE 100 MG: 50 TABLET, FILM COATED ORAL at 22:00

## 2021-06-03 RX ADMIN — Medication 10 ML: at 22:01

## 2021-06-03 RX ADMIN — ATORVASTATIN CALCIUM 80 MG: 40 TABLET, FILM COATED ORAL at 09:22

## 2021-06-03 RX ADMIN — Medication 3 MG: at 21:59

## 2021-06-03 RX ADMIN — FUROSEMIDE 40 MG: 40 TABLET ORAL at 09:24

## 2021-06-03 RX ADMIN — SODIUM CHLORIDE 50 MG: 9 INJECTION, SOLUTION INTRAVENOUS at 14:30

## 2021-06-03 RX ADMIN — ARFORMOTEROL TARTRATE 15 MCG: 15 SOLUTION RESPIRATORY (INHALATION) at 20:20

## 2021-06-03 RX ADMIN — ARFORMOTEROL TARTRATE 15 MCG: 15 SOLUTION RESPIRATORY (INHALATION) at 07:45

## 2021-06-03 RX ADMIN — AMLODIPINE BESYLATE 5 MG: 5 TABLET ORAL at 09:22

## 2021-06-03 RX ADMIN — HYDRALAZINE HYDROCHLORIDE 100 MG: 50 TABLET, FILM COATED ORAL at 09:22

## 2021-06-03 NOTE — PROGRESS NOTES
Bedside shift change report given to Hailee Oneal RN (oncoming nurse) by Skyla Florentino RN (offgoing nurse). Report included the following information SBAR, Cardiac Rhythm nsr and Dual Neuro Assessment.

## 2021-06-03 NOTE — PROGRESS NOTES
Transition of Care Plan   RUR- 27% Medium Risk   DISPOSITION: The disposition plan is to transition to SNF - [address] [facility]/[phone]  - pending review.  F/U with PCP/Specialist     Transport: [facility]    At 2:00pm - CM attempted to contact admin coordinator at [address] [facility]/[phone] - CM left a voice message. CM received a call from [facility] who reports that patient can transition back to facility. She will come to complete a bedside. CM will update Youlanda Cowden tomorrow regarding discharge date. CM informed patients daughter at bedside. CM will continue to follow, provide support and assist with JACQUES needs as they arise.     Dharmesh Bradford

## 2021-06-03 NOTE — PROGRESS NOTES
Problem: Self Care Deficits Care Plan (Adult)  Goal: *Acute Goals and Plan of Care (Insert Text)  Description:   FUNCTIONAL STATUS PRIOR TO ADMISSION: Admission from SNF, previous 4/2021 hospitalization with IVH with non-surgical intervention at time was mod A x2-max A x3 with impaired sitting and standing balance with impulsivity, impaired command following, [place], per chart patient was ambulating and able to hold conversation at rehab with significant change of AMS now     HOME SUPPORT: previous to 4/2021 was mod I with [facility] living with wife, was at [facility] for 2 weeks, supportive family    Occupational Therapy Goals  Initiated 5/18/2021. Re-Evaluation 6/2/21 and goals downgraded below-  1. Patient will perform self-feeding with supervision/set-up within 7 day(s). - downgrade to max A  2. Patient will perform grooming with minimal assistance/contact guard assist within 7 day(s). - downgrade to max A  3. Patient will perform upper body dressing with minimal assistance/contact guard assist within 7 day(s). - downgrade to max A  4. Patient will perform toilet transfers with moderate assistance  within 7 day(s). -downgrade to max A x2  5. Patient will perform all aspects of toileting with moderate assistance  within 7 day(s). - downgrade to max A  6. Patient will participate in upper extremity therapeutic exercise/activities with supervision/set-up for 5 minutes within 7 day(s). downgrade to max A  7. Patient will utilize energy conservation techniques during functional activities with verbal cues within 7 day(s). - discontinue            Outcome: Progressing Towards Goal    OCCUPATIONAL THERAPY TREATMENT  Patient: Abraham Hernandez (20 y.o. male)  Date: 6/3/2021  Diagnosis: CAP (community acquired pneumonia) [J18.9] <principal problem not specified>       Precautions: Fall, Bed Alarm, DNR, Skin, Seizure  Chart, occupational therapy assessment, plan of care, and goals were reviewed.     ASSESSMENT  Patient continues with skilled OT services and is slowly progressing towards goals however remains limited by decreased activity tolerance, arousal, command following, strength, midline orientation, and coordination noted with bed mobility and cognitive/neuromuscular re-educational activities this session. Decreased volition and ability maintain alertness with brief EOB activity, requiring Max-Total A x1-2 for bed mobility, continued R lateal lean. Intermittent initiation noted with mod-max encouragement but quickly fatigued, therefore returned to supine. Decreased command following & coordination noted with R side, baseline RUE tremor persisting (baseline per daughter present). D/t slowed progress, POC frequency decreased, continue to recommend d/c to SNF. Current Level of Function Impacting Discharge (ADLs): Max-Total A for ADLs, Max-Total A x2 for bed mobility    Other factors to consider for discharge: fall risl, debility, PMH, PLOF         PLAN :  Patient continues to benefit from skilled intervention to address the above impairments. Continue treatment per established plan of care to address goals. Recommend with staff: Recommend with nursing, ADLs with assist, modified bed in chair position 3x/day and toileting via bedpan. Thank you for completing as able in order to maintain patient strength, endurance and independence. Recommendation for discharge: (in order for the patient to meet his/her long term goals)  Therapy up to 5 days/week in SNF setting    This discharge recommendation:  Has been made in collaboration with the attending provider and/or case management    IF patient discharges home will need the following DME: To be determined (TBD)         SUBJECTIVE:   Patient stated I can't do it.  encouragement required with simple command following    OBJECTIVE DATA SUMMARY:   Cognitive/Behavioral Status:  Neurologic State: Drowsy; Confused  Orientation Level: Oriented to person;Disoriented to situation;Disoriented to place; Disoriented to time  Cognition: Decreased attention/concentration;Decreased command following; Impaired decision making;Poor safety awareness  Perception: Cues to maintain midline in sitting;Cues to attend right visual field (decr attention & command follow in R side)  Perseveration: No perseveration noted  Safety/Judgement: Decreased awareness of environment;Decreased awareness of need for assistance;Decreased awareness of need for safety;Decreased insight into deficits    Functional Mobility and Transfers for ADLs:  Bed Mobility:  Rolling: Total assistance;Assist x2  Supine to Sit: Total assistance;Assist x2  Sit to Supine: Maximum assistance;Assist x2  Scooting: Total assistance;Assist x2      Balance:  Sitting: Impaired; With support  Sitting - Static: Poor (constant support)  Sitting - Dynamic: Poor (constant support)    ADL Intervention:     Cognitive Retraining  Orientation Retraining: Awareness of environment  Executive Functions: Executing cognitive plans  Organizing/Sequencing: Breaking task down  Attention to Task: Single task  Following Commands:  Follows one step commands/directions (<20%)  Safety/Judgement: Decreased awareness of environment;Decreased awareness of need for assistance;Decreased awareness of need for safety;Decreased insight into deficits  Cues: Tactile cues provided;Verbal cues provided;Visual cues provided    Neuromuscular Re-Education:   - Sitting balance EOB with continued R lateral lean, Max-Total A for sitting balance x10 minutes, progressing from distal BUE prop support to proximal BUE support on knees with increased R & posterior lateral lean, cues for anterior weight shifting  - Brief initiation of LUE>RUE for AAROM of shoulder flexion, unable to hold up against gravity when let go  - Behavioral regulation with activity--initiation mostly with task termination and returning to supine, otherwise initial declining attempt with encouragement to initiate task       Pain:  None reported    Activity Tolerance:   Poor    After treatment patient left in no apparent distress:   Supine in bed, Call bell within reach, Bed / chair alarm activated, Caregiver / family present, and Side rails x 3    COMMUNICATION/COLLABORATION:   The patients plan of care was discussed with: Registered nurse.      MONI Randhawa, OTR/L  Time Calculation: 19 mins

## 2021-06-03 NOTE — PROGRESS NOTES
Bedside and Verbal shift change report given to [facility] [address] (oncoming nurse) by Quentin Calle (offgoing nurse). Report included the following information SBAR, Kardex and Recent Results.

## 2021-06-03 NOTE — PROGRESS NOTES
[facility] Adult  Hospitalist Group                                                                                          Hospitalist Progress Note  Randolph Kang MD  Answering service: [phone] from in house phone        Date of Service:  6/3/2021  NAME:  Carole Malin  :  1934  MRN:  [id number]      Admission Summary:   Santhosh Rush is a 80 y. o. male with pmh of CVA, intraventricular brain hemorrhage, dyslipidemia, CKD 3, hypertension who presents to hospital from subacute rehab for concerns of altered/decreased mental status. Patient was found to have multiple ischemic CVAs on MRI, continue to have altered mental status with some eye deviation and twitching on .  This was concerning for seizure, given 2 mg of Ativan and loaded with Keppra with some improvement.  On  patient however declined, unresponsive to verbal stimuli.  Stat EEG 24-hour was ordered and showed subclinical status.  He was loaded with Vimpat, and neurology recommends transfer to ICU for closer neuro checks on . Seizures were controlled and  Pt transferred out of ICU on . Interval history / Subjective: Follow up AMS, status epilepticus. Hard of hearing. Patient seen and examined at the bedside. Labs, images and notes reviewed  Discussed with nursing staff, orders reviewed. Plan discussed with patient/Family  Feeling ok. Denied any pain, discomfort. Increased tremors at baseline since CVA per daughter at bedside. No fever, chills. No other concerns. NGT clogged earlier. Started working and TF going well. Tolerating well. Answered all her (daughter) questions and concerns. Could not sleep last night per daughter. Asked for resuming Melatonin. Home dose 10mg QHS  Diet consistency adjusted. Not pureed but mechanical soft and chopped. Also, expressed wish to meet with neurology team. Agreeable for waiting till tomorrow.      Assessment & Plan:     Status epilepticus -resolved  -Continue Keppra and Vimpat   -Neurology following-vimpate dose reduced -50 mg BID. Daughter asked if it is going to be reduced further. Will check with neurology. Daughter expressed more day time sleepiness but pt was awake and responsive.  -Seizure precautions   -EEG results 6/1 noted. D/w daughter     Acute embolic infarcts  Subacute hematoma -intraventricular hemorrhage with ventriculomegaly on recent admission, discharged 5/3  -Not a candidate for anticoagulation due to intracranial hemorrhage, and fall risk  -Neurology following  -No escalation of antiplatelets due to ICH, continue aspirin  -Continue atorvastatin  -PT/OT/speech  -Echo with normal EF      Acute Metabolic encephalopathy -   multifactorial due to above  He is awake,alert mostly in the afternoon,answering few questions  -Patient to get PT/OT   -Follow diet tolerance with modified as Mechanical soft and Chopped and not pureed. -IF tolerated PO well, DC NGT/DH tube.      Community-acquired pneumonia-resolved      Hypertension Bp controlled,  C/w hydralazine, amlodipine.     Hyperlipidemiacontinue Lipitor     Hx TESSA  Per daughter,he never liked CPAP so he did not use   CPAP not tolerated in past. ABG noted     H/o chronic LE edema due to venous congestion  Ankle edema -dependent edema with immobility  Resumed lasix 40 mg 6/2. Continue and monitor BMP   Hypernatremia - resolved         Dysphagia  -Has patricia, on TFs  -passed swallow eval 6/1  -If he is tolerating diet for 24 -48 hrs,will remove NG tube.  Diet consistency modification done 6/3        RUE swelling- venous duplex negative     Appreciate Palliative care input       Code status: DNR  DVT prophylaxis: SCDs    Care Plan discussed with: Patient/Family and Nurse  Anticipated Disposition: [facility]/Rehab  Anticipated Discharge: Greater than 48 hours     Hospital Problems  Date Reviewed: 9/21/2020        Codes Class Noted POA    Cerebrovascular accident (CVA) due to embolism [facility]) ICD-10-CM: I63.9  ICD-9-CM: 434.11  5/29/2021 Unknown        Status epilepticus ([address] ICD-10-CM: G40.901  ICD-9-CM: 345.3  5/29/2021 Unknown        Encephalopathy ICD-10-CM: G93.40  ICD-9-CM: 348.30  5/29/2021 Unknown                Review of Systems:   A comprehensive review of systems was negative except for that written in the HPI. Vital Signs:    Last 24hrs VS reviewed since prior progress note. Most recent are:  Visit Vitals  BP (!) 127/56   Pulse 86   Temp 97.9 °F (36.6 °C)   Resp 12   Ht 5' 8\" (1.727 m)   Wt 97.3 kg (214 lb 8 oz)   SpO2 100%   BMI 32.61 kg/m²         Intake/Output Summary (Last 24 hours) at 6/3/2021 1731  Last data filed at 6/3/2021 0400  Gross per 24 hour   Intake 1320 ml   Output    Net 1320 ml        Physical Examination:     I had a face to face encounter with this patient and independently examined them on 6/3/2021 as outlined below:          Gen: elderly patient. Alert, awake. Comfortable. Minimal verbal. Responding well with gesture otherwise  HEENT: anicteric sclerae, normal conjunctiva, NG tube   Neck: supple, trachea midline  Heart: RRR, no MRG, no JVD,  Lungs: clear to auscultation, no accessory muscle use  Abd: soft,non tender,non distended  Extr: he has 2+ LE edema mainly at ankles-has chronic venous insufficnecny, UE edema  Skin: dry, no rash  Neuro: awake,alert. No focal deficit noted         Data Review:    Review and/or order of clinical lab test  Review and/or order of tests in the radiology section of CPT  Review and/or order of tests in the medicine section of CPT    XR INSERT LONG FEED TUBE    Result Date: 5/26/2021  Successful placement of the Dobbhoff tube in the distal duodenum/proximal jejunum at the ligament of Treitz. MRI BRAIN WO CONT    Result Date: 5/19/2021  1. Few small scattered acute infarcts in the bilateral frontal lobes and parieto-occipital lobes, right greater than left.  2. 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 3. Unchanged generalized parenchymal volume loss and severe chronic microvascular ischemic disease. CT HEAD WO CONT    Result Date: 5/24/2021  No significant interval change. CT HEAD WO CONT    Result Date: 5/16/2021  Significantly limited by motion. No acute intracranial abnormality     CTA HEAD    Result Date: 5/19/2021  1. Heterogeneous plaque in the proximal right internal carotid artery without hemodynamically significant stenosis. 2.  No significant left carotid artery stenosis. 3.  No large vessel occlusion. 4.  40 mm main pulmonary artery suggestive of pulmonary arterial hypertension. CTA NECK    Result Date: 5/19/2021  1. Heterogeneous plaque in the proximal right internal carotid artery without hemodynamically significant stenosis. 2.  No significant left carotid artery stenosis. 3.  No large vessel occlusion. 4.  40 mm main pulmonary artery suggestive of pulmonary arterial hypertension. XR CHEST PORT    Result Date: 5/29/2021  Stable nonspecific left lower airspace disease. No pulmonary edema. XR CHEST PORT    Result Date: 5/25/2021  1. Low lung volumes with mild basilar atelectasis in left lower lobe. XR CHEST PORT    Result Date: 5/22/2021  Improved aeration in the left lung base with minimal residual airspace opacity    XR CHEST PORT    Result Date: 5/16/2021  Patchy left basilar airspace opacity which may represent pneumonia or atelectasis with a small left pleural effusion. XR ABD PORT  1 V    Result Date: 5/24/2021  Intragastric Dobbhoff tube with slight interval advancement. XR ABD PORT  1 V    Result Date: 5/24/2021  Feeding tube tip projects over the fundus and should be advanced.      XR ABD PORT  1 V    Result Date: 5/23/2021  Gastric tube tip is in appropriate position for use. Labs:     Recent Labs     06/02/21  0251 06/01/21  0017   WBC 7.4 7.3   HGB 10.8* 10.9*   HCT 33.4* 34.6*    253     Recent Labs     06/03/21  0551 06/02/21 0251 06/01/21  0017    136 138   K 4.1 4.0 4.0    100 107   CO2 30 31 25   BUN 16 15 12   CREA 0.85 0.93 0.91   * 132* 131*   CA 8.2* 8.6 8.2*   PHOS 2.7 2.6 2.2*     Recent Labs     06/03/21  0551 06/02/21 0251 06/01/21  0017   ALB 2.3* 2.4* 2.2*     No results for input(s): INR, PTP, APTT, INREXT in the last 72 hours. No results for input(s): FE, TIBC, PSAT, FERR in the last 72 hours. No results found for: FOL, RBCF   No results for input(s): PH, PCO2, PO2 in the last 72 hours. No results for input(s): CPK, CKNDX, TROIQ in the last 72 hours.     No lab exists for component: CPKMB  Lab Results   Component Value Date/Time    Cholesterol, total 144 04/19/2021 12:59 AM    HDL Cholesterol 33 04/19/2021 12:59 AM    LDL, calculated 74.8 04/19/2021 12:59 AM    Triglyceride 181 (H) 04/19/2021 12:59 AM    CHOL/HDL Ratio 4.4 04/19/2021 12:59 AM     Lab Results   Component Value Date/Time    Glucose (POC) 139 (H) 06/03/2021 05:19 PM    Glucose (POC) 141 (H) 06/03/2021 11:59 AM    Glucose (POC) 133 (H) 06/02/2021 06:48 PM    Glucose (POC) 133 (H) 06/02/2021 11:38 AM    Glucose (POC) 148 (H) 06/02/2021 06:31 AM     Lab Results   Component Value Date/Time    Color YELLOW/STRAW 05/16/2021 09:33 PM    Appearance CLEAR 05/16/2021 09:33 PM    Specific gravity 1.014 05/16/2021 09:33 PM    Specific gravity 1.015 09/21/2020 09:13 AM    pH (UA) 5.0 05/16/2021 09:33 PM    Protein Negative 05/16/2021 09:33 PM    Glucose Negative 05/16/2021 09:33 PM    Ketone Negative 05/16/2021 09:33 PM    Bilirubin Negative 05/16/2021 09:33 PM    Urobilinogen 0.2 05/16/2021 09:33 PM    Nitrites Negative 05/16/2021 09:33 PM    Leukocyte Esterase Negative 05/16/2021 09:33 PM    Epithelial cells FEW 05/16/2021 09:33 PM    Bacteria Negative 05/16/2021 09:33 PM    WBC 0-4 05/16/2021 09:33 PM    RBC 0-5 05/16/2021 09:33 PM         Medications Reviewed:     Current Facility-Administered Medications   Medication Dose Route Frequency    arformoteroL (BROVANA) neb solution 15 mcg  15 mcg Nebulization BID RT    And    budesonide (PULMICORT) 500 mcg/2 ml nebulizer suspension  500 mcg Nebulization BID RT    DULoxetine (CYMBALTA) capsule 30 mg  30 mg Oral DAILY    hydroxypropyl methylcellulose (ISOPTO TEARS) 0.5 % ophthalmic solution 1 Drop  1 Drop Both Eyes PRN    furosemide (LASIX) tablet 40 mg  40 mg Oral DAILY    lacosamide (VIMPAT) 50 mg in 0.9% sodium chloride 100 mL IVPB  50 mg IntraVENous Q12H    polyethylene glycol (MIRALAX) packet 17 g  17 g Oral DAILY    atenoloL (TENORMIN) tablet 12.5 mg  12.5 mg Oral DAILY    lidocaine (XYLOCAINE) 2 % jelly   Mucous Membrane PRN    hydrALAZINE (APRESOLINE) tablet 100 mg  100 mg Oral TID    amLODIPine (NORVASC) tablet 5 mg  5 mg Oral DAILY    labetaloL (NORMODYNE;TRANDATE) injection 20 mg  20 mg IntraVENous Q6H PRN    levETIRAcetam (KEPPRA) 1,000 mg in 0.9% sodium chloride 100 mL IVPB  1,000 mg IntraVENous Q12H    hydrALAZINE (APRESOLINE) 20 mg/mL injection 20 mg  20 mg IntraVENous Q6H PRN    sodium chloride (NS) flush 5-40 mL  5-40 mL IntraVENous Q8H    sodium chloride (NS) flush 5-40 mL  5-40 mL IntraVENous PRN    polyethylene glycol (MIRALAX) packet 17 g  17 g Oral DAILY PRN    albuterol (PROVENTIL VENTOLIN) nebulizer solution 2.5 mg  2.5 mg Nebulization Q4H PRN    aspirin chewable tablet 81 mg  81 mg Oral DAILY    atorvastatin (LIPITOR) tablet 80 mg  80 mg Oral DAILY    [Held by provider] isosorbide mononitrate ER (IMDUR) tablet 30 mg  30 mg Oral DAILY    sodium chloride (NS) flush 5-40 mL  5-40 mL IntraVENous Q8H    sodium chloride (NS) flush 5-40 mL  5-40 mL IntraVENous PRN    acetaminophen (TYLENOL) tablet 650 mg  650 mg Oral Q6H PRN    Or    acetaminophen (TYLENOL) suppository 650 mg  650 mg Rectal Q6H PRN    promethazine (PHENERGAN) tablet 12.5 mg  12.5 mg Oral Q6H PRN    Or    ondansetron (ZOFRAN) injection 4 mg  4 mg IntraVENous Q6H PRN     ______________________________________________________________________  EXPECTED LENGTH OF STAY: 4d 2h  ACTUAL LENGTH OF STAY:          18                 Jesús Celestin MD

## 2021-06-03 NOTE — PROGRESS NOTES
Problem: Pressure Injury - Risk of  Goal: *Prevention of pressure injury  Description: Document Aquiles Scale and appropriate interventions in the flowsheet. Outcome: Progressing Towards Goal  Note: Pressure Injury Interventions:  Sensory Interventions: Assess changes in LOC, Check visual cues for pain    Moisture Interventions: Absorbent underpads, Maintain skin hydration (lotion/cream)    Activity Interventions: Pressure redistribution bed/mattress(bed type)    Mobility Interventions: Pressure redistribution bed/mattress (bed type), PT/OT evaluation    Nutrition Interventions: Document food/fluid/supplement intake    Friction and Shear Interventions: HOB 30 degrees or less                Problem: Falls - Risk of  Goal: *Absence of Falls  Description: Document Too Fall Risk and appropriate interventions in the flowsheet.   Outcome: Progressing Towards Goal  Note: Fall Risk Interventions:  Mobility Interventions: Bed/chair exit alarm, Patient to call before getting OOB    Mentation Interventions: Bed/chair exit alarm, Adequate sleep, hydration, pain control, More frequent rounding, Reorient patient, Update white board    Medication Interventions: Patient to call before getting OOB, Bed/chair exit alarm    Elimination Interventions: Bed/chair exit alarm, Call light in reach    History of Falls Interventions: Bed/chair exit alarm, Consult care management for discharge planning, Door open when patient unattended, Evaluate medications/consider consulting pharmacy, Room close to nurse's station         Problem: Seizure Disorder (Adult)  Goal: *STG: Remains free of seizure activity  Outcome: Progressing Towards Goal  Goal: *STG: Maintains lab values within therapeutic range  Outcome: Progressing Towards Goal  Goal: *STG/LTG: Complies with medication therapy  Outcome: Progressing Towards Goal  Goal: *STG: Remains free of injury during seizure activity  Outcome: Progressing Towards Goal  Goal: *STG: Remains safe in hospital  Outcome: Progressing Towards Goal  Goal: Interventions  Outcome: Progressing Towards Goal     Problem: TIA/CVA Stroke: Day 2 Until Discharge  Goal: Off Pathway (Use only if patient is Off Pathway)  Outcome: Progressing Towards Goal  Goal: Activity/Safety  Outcome: Progressing Towards Goal  Goal: Diagnostic Test/Procedures  Outcome: Progressing Towards Goal  Goal: Nutrition/Diet  Outcome: Progressing Towards Goal  Goal: Discharge Planning  Outcome: Progressing Towards Goal  Goal: Medications  Outcome: Progressing Towards Goal  Goal: Respiratory  Outcome: Progressing Towards Goal  Goal: Treatments/Interventions/Procedures  Outcome: Progressing Towards Goal  Goal: Psychosocial  Outcome: Progressing Towards Goal  Goal: *Verbalizes anxiety and depression are reduced or absent  Outcome: Progressing Towards Goal  Goal: *Absence of aspiration  Outcome: Progressing Towards Goal  Goal: *Absence of deep venous thrombosis signs and symptoms(Stroke Metric)  Outcome: Progressing Towards Goal  Goal: *Optimal pain control at patient's stated goal  Outcome: Progressing Towards Goal  Goal: *Tolerating diet  Outcome: Progressing Towards Goal  Goal: *Ability to perform ADLs and demonstrates progressive mobility and function  Outcome: Progressing Towards Goal  Goal: *Stroke education continued(Stroke Metric)  Outcome: Progressing Towards Goal

## 2021-06-04 LAB
ALBUMIN SERPL-MCNC: 2.2 G/DL (ref 3.5–5)
ANION GAP SERPL CALC-SCNC: 6 MMOL/L (ref 5–15)
BUN SERPL-MCNC: 16 MG/DL (ref 6–20)
BUN/CREAT SERPL: 18 (ref 12–20)
CALCIUM SERPL-MCNC: 8.2 MG/DL (ref 8.5–10.1)
CHLORIDE SERPL-SCNC: 102 MMOL/L (ref 97–108)
CO2 SERPL-SCNC: 29 MMOL/L (ref 21–32)
CREAT SERPL-MCNC: 0.87 MG/DL (ref 0.7–1.3)
GLUCOSE SERPL-MCNC: 146 MG/DL (ref 65–100)
PHOSPHATE SERPL-MCNC: 2.5 MG/DL (ref 2.6–4.7)
POTASSIUM SERPL-SCNC: 4.1 MMOL/L (ref 3.5–5.1)
SODIUM SERPL-SCNC: 137 MMOL/L (ref 136–145)

## 2021-06-04 PROCEDURE — 74011250637 HC RX REV CODE- 250/637: Performed by: HOSPITALIST

## 2021-06-04 PROCEDURE — 36415 COLL VENOUS BLD VENIPUNCTURE: CPT

## 2021-06-04 PROCEDURE — 92526 ORAL FUNCTION THERAPY: CPT | Performed by: SPEECH-LANGUAGE PATHOLOGIST

## 2021-06-04 PROCEDURE — 74011250637 HC RX REV CODE- 250/637: Performed by: FAMILY MEDICINE

## 2021-06-04 PROCEDURE — 74011000258 HC RX REV CODE- 258: Performed by: PSYCHIATRY & NEUROLOGY

## 2021-06-04 PROCEDURE — 74011250637 HC RX REV CODE- 250/637: Performed by: INTERNAL MEDICINE

## 2021-06-04 PROCEDURE — 99232 SBSQ HOSP IP/OBS MODERATE 35: CPT | Performed by: NURSE PRACTITIONER

## 2021-06-04 PROCEDURE — 74011250636 HC RX REV CODE- 250/636: Performed by: PSYCHIATRY & NEUROLOGY

## 2021-06-04 PROCEDURE — 80069 RENAL FUNCTION PANEL: CPT

## 2021-06-04 PROCEDURE — 65660000001 HC RM ICU INTERMED STEPDOWN

## 2021-06-04 PROCEDURE — C9254 INJECTION, LACOSAMIDE: HCPCS | Performed by: PSYCHIATRY & NEUROLOGY

## 2021-06-04 PROCEDURE — 74011000250 HC RX REV CODE- 250: Performed by: HOSPITALIST

## 2021-06-04 PROCEDURE — 94640 AIRWAY INHALATION TREATMENT: CPT

## 2021-06-04 RX ADMIN — Medication 10 ML: at 05:57

## 2021-06-04 RX ADMIN — ASPIRIN 81 MG: 81 TABLET, CHEWABLE ORAL at 08:49

## 2021-06-04 RX ADMIN — DULOXETINE HYDROCHLORIDE 30 MG: 30 CAPSULE, DELAYED RELEASE ORAL at 08:50

## 2021-06-04 RX ADMIN — Medication 10 ML: at 21:30

## 2021-06-04 RX ADMIN — HYDRALAZINE HYDROCHLORIDE 100 MG: 50 TABLET, FILM COATED ORAL at 08:49

## 2021-06-04 RX ADMIN — SODIUM CHLORIDE 50 MG: 9 INJECTION, SOLUTION INTRAVENOUS at 14:23

## 2021-06-04 RX ADMIN — LEVETIRACETAM 1000 MG: 100 INJECTION, SOLUTION INTRAVENOUS at 10:43

## 2021-06-04 RX ADMIN — ATENOLOL 12.5 MG: 25 TABLET ORAL at 08:50

## 2021-06-04 RX ADMIN — FUROSEMIDE 40 MG: 40 TABLET ORAL at 08:50

## 2021-06-04 RX ADMIN — ARFORMOTEROL TARTRATE 15 MCG: 15 SOLUTION RESPIRATORY (INHALATION) at 09:33

## 2021-06-04 RX ADMIN — HYDRALAZINE HYDROCHLORIDE 100 MG: 50 TABLET, FILM COATED ORAL at 21:27

## 2021-06-04 RX ADMIN — SODIUM CHLORIDE 50 MG: 9 INJECTION, SOLUTION INTRAVENOUS at 01:16

## 2021-06-04 RX ADMIN — Medication 10 ML: at 21:28

## 2021-06-04 RX ADMIN — HYDRALAZINE HYDROCHLORIDE 100 MG: 50 TABLET, FILM COATED ORAL at 15:06

## 2021-06-04 RX ADMIN — ARFORMOTEROL TARTRATE 15 MCG: 15 SOLUTION RESPIRATORY (INHALATION) at 20:35

## 2021-06-04 RX ADMIN — ATORVASTATIN CALCIUM 80 MG: 40 TABLET, FILM COATED ORAL at 08:49

## 2021-06-04 RX ADMIN — BUDESONIDE 500 MCG: 0.5 INHALANT RESPIRATORY (INHALATION) at 20:35

## 2021-06-04 RX ADMIN — BUDESONIDE 500 MCG: 0.5 INHALANT RESPIRATORY (INHALATION) at 09:32

## 2021-06-04 RX ADMIN — Medication 3 MG: at 21:26

## 2021-06-04 RX ADMIN — AMLODIPINE BESYLATE 5 MG: 5 TABLET ORAL at 08:49

## 2021-06-04 NOTE — PROGRESS NOTES
Bedside shift change report given to [address] [place] [address] (oncoming nurse) by Frank Fagan RN (offgoing nurse). Report included the following information SBAR, Kardex, ED Summary, Procedure Summary, Intake/Output, MAR, Recent Results, Cardiac Rhythm NSR, Quality Measures and Dual Neuro Assessment.

## 2021-06-04 NOTE — PROGRESS NOTES
Problem: Pressure Injury - Risk of  Goal: *Prevention of pressure injury  Description: Document Aquiles Scale and appropriate interventions in the flowsheet. Outcome: Progressing Towards Goal  Note: Pressure Injury Interventions:  Sensory Interventions: Assess changes in LOC, Assess need for specialty bed, Avoid rigorous massage over bony prominences, Float heels, Check visual cues for pain, Discuss PT/OT consult with provider, Keep linens dry and wrinkle-free, Maintain/enhance activity level, Minimize linen layers, Monitor skin under medical devices, Pad between skin to skin    Moisture Interventions: Absorbent underpads, Apply protective barrier, creams and emollients, Assess need for specialty bed, Check for incontinence Q2 hours and as needed, Internal/External urinary devices    Activity Interventions: Increase time out of bed, Pressure redistribution bed/mattress(bed type), PT/OT evaluation    Mobility Interventions: Float heels, HOB 30 degrees or less, Pressure redistribution bed/mattress (bed type), PT/OT evaluation    Nutrition Interventions: Offer support with meals,snacks and hydration    Friction and Shear Interventions: Feet elevated on foot rest, Foam dressings/transparent film/skin sealants, HOB 30 degrees or less, Lift sheet, Minimize layers                Problem: Patient Education: Go to Patient Education Activity  Goal: Patient/Family Education  Outcome: Progressing Towards Goal     Problem: Patient Education: Go to Patient Education Activity  Goal: Patient/Family Education  Outcome: Progressing Towards Goal     Problem: Falls - Risk of  Goal: *Absence of Falls  Description: Document Too Fall Risk and appropriate interventions in the flowsheet.   Outcome: Progressing Towards Goal  Note: Fall Risk Interventions:  Mobility Interventions: Bed/chair exit alarm, Communicate number of staff needed for ambulation/transfer, OT consult for ADLs, Patient to call before getting OOB, PT Consult for mobility concerns, PT Consult for assist device competence, Strengthening exercises (ROM-active/passive)    Mentation Interventions: Bed/chair exit alarm, Adequate sleep, hydration, pain control, Evaluate medications/consider consulting pharmacy, Door open when patient unattended, Eyeglasses and hearing aids, Increase mobility, More frequent rounding, Reorient patient, Room close to nurse's station, Toileting rounds, Update white board    Medication Interventions: Evaluate medications/consider consulting pharmacy, Patient to call before getting OOB, Teach patient to arise slowly    Elimination Interventions: Call light in reach, Elevated toilet seat, Patient to call for help with toileting needs, Stay With Me (per policy), Toilet paper/wipes in reach, Toileting schedule/hourly rounds    History of Falls Interventions: Consult care management for discharge planning, Door open when patient unattended, Evaluate medications/consider consulting pharmacy, Investigate reason for fall, Room close to nurse's station         Problem: Patient Education: Go to Patient Education Activity  Goal: Patient/Family Education  Outcome: Progressing Towards Goal     Problem: Patient Education: Go to Patient Education Activity  Goal: Patient/Family Education  Outcome: Progressing Towards Goal     Problem: Patient Education: Go to Patient Education Activity  Goal: Patient/Family Education  Outcome: Progressing Towards Goal     Problem: Seizure Disorder (Adult)  Goal: *STG: Remains free of seizure activity  Outcome: Progressing Towards Goal  Goal: *STG: Maintains lab values within therapeutic range  Outcome: Progressing Towards Goal  Goal: *STG/LTG: Complies with medication therapy  Outcome: Progressing Towards Goal  Goal: *STG: Remains free of injury during seizure activity  Outcome: Progressing Towards Goal  Goal: *STG: Remains safe in hospital  Outcome: Progressing Towards Goal  Goal: Interventions  Outcome: Progressing Towards Goal Problem: Patient Education: Go to Patient Education Activity  Goal: Patient/Family Education  Outcome: Progressing Towards Goal     Problem: Patient Education: Go to Patient Education Activity  Goal: Patient/Family Education  Outcome: Progressing Towards Goal     Problem: TIA/CVA Stroke: 0-24 hours  Goal: Off Pathway (Use only if patient is Off Pathway)  Outcome: Progressing Towards Goal  Goal: Activity/Safety  Outcome: Progressing Towards Goal  Goal: Consults, if ordered  Outcome: Progressing Towards Goal  Goal: Diagnostic Test/Procedures  Outcome: Progressing Towards Goal  Goal: Nutrition/Diet  Outcome: Progressing Towards Goal  Goal: Discharge Planning  Outcome: Progressing Towards Goal  Goal: Medications  Outcome: Progressing Towards Goal  Goal: Respiratory  Outcome: Progressing Towards Goal  Goal: Treatments/Interventions/Procedures  Outcome: Progressing Towards Goal  Goal: Minimize risk of bleeding post-thrombolytic infusion  Outcome: Progressing Towards Goal  Goal: Monitor for complications post-thrombolytic infusion  Outcome: Progressing Towards Goal  Goal: Psychosocial  Outcome: Progressing Towards Goal  Goal: *Hemodynamically stable  Outcome: Progressing Towards Goal  Goal: *Neurologically stable  Description: Absence of additional neurological deficits    Outcome: Progressing Towards Goal  Goal: *Verbalizes anxiety and depression are reduced or absent  Outcome: Progressing Towards Goal  Goal: *Absence of Signs of Aspiration on Current Diet  Outcome: Progressing Towards Goal  Goal: *Absence of deep venous thrombosis signs and symptoms(Stroke Metric)  Outcome: Progressing Towards Goal  Goal: *Ability to perform ADLs and demonstrates progressive mobility and function  Outcome: Progressing Towards Goal  Goal: *Stroke education started(Stroke Metric)  Outcome: Progressing Towards Goal  Goal: *Dysphagia screen performed(Stroke Metric)  Outcome: Progressing Towards Goal  Goal: *Rehab consulted(Stroke Metric)  Outcome: Progressing Towards Goal     Problem: TIA/CVA Stroke: Day 2 Until Discharge  Goal: Off Pathway (Use only if patient is Off Pathway)  Outcome: Progressing Towards Goal  Goal: Activity/Safety  Outcome: Progressing Towards Goal  Goal: Diagnostic Test/Procedures  Outcome: Progressing Towards Goal  Goal: Nutrition/Diet  Outcome: Progressing Towards Goal  Goal: Discharge Planning  Outcome: Progressing Towards Goal  Goal: Medications  Outcome: Progressing Towards Goal  Goal: Respiratory  Outcome: Progressing Towards Goal  Goal: Treatments/Interventions/Procedures  Outcome: Progressing Towards Goal  Goal: Psychosocial  Outcome: Progressing Towards Goal  Goal: *Verbalizes anxiety and depression are reduced or absent  Outcome: Progressing Towards Goal  Goal: *Absence of aspiration  Outcome: Progressing Towards Goal  Goal: *Absence of deep venous thrombosis signs and symptoms(Stroke Metric)  Outcome: Progressing Towards Goal  Goal: *Optimal pain control at patient's stated goal  Outcome: Progressing Towards Goal  Goal: *Tolerating diet  Outcome: Progressing Towards Goal  Goal: *Ability to perform ADLs and demonstrates progressive mobility and function  Outcome: Progressing Towards Goal  Goal: *Stroke education continued(Stroke Metric)  Outcome: Progressing Towards Goal     Problem: Ischemic Stroke: Discharge Outcomes  Goal: *Verbalizes anxiety and depression are reduced or absent  Outcome: Progressing Towards Goal  Goal: *Verbalize understanding of risk factor modification(Stroke Metric)  Outcome: Progressing Towards Goal  Goal: *Hemodynamically stable  Outcome: Progressing Towards Goal  Goal: *Absence of aspiration pneumonia  Outcome: Progressing Towards Goal  Goal: *Aware of needed dietary changes  Outcome: Progressing Towards Goal  Goal: *Verbalize understanding of prescribed medications including anti-coagulants, anti-lipid, and/or anti-platelets(Stroke Metric)  Outcome: Progressing Towards Goal  Goal: *Tolerating diet  Outcome: Progressing Towards Goal  Goal: *Aware of follow-up diagnostics related to anticoagulants  Outcome: Progressing Towards Goal  Goal: *Ability to perform ADLs and demonstrates progressive mobility and function  Outcome: Progressing Towards Goal  Goal: *Absence of DVT(Stroke Metric)  Outcome: Progressing Towards Goal  Goal: *Absence of aspiration  Outcome: Progressing Towards Goal  Goal: *Optimal pain control at patient's stated goal  Outcome: Progressing Towards Goal  Goal: *Home safety concerns addressed  Outcome: Progressing Towards Goal  Goal: *Describes available resources and support systems  Outcome: Progressing Towards Goal  Goal: *Verbalizes understanding of activation of EMS(911) for stroke symptoms(Stroke Metric)  Outcome: Progressing Towards Goal  Goal: *Understands and describes signs and symptoms to report to providers(Stroke Metric)  Outcome: Progressing Towards Goal  Goal: *Neurolgocially stable (absence of additional neurological deficits)  Outcome: Progressing Towards Goal  Goal: *Verbalizes importance of follow-up with primary care physician(Stroke Metric)  Outcome: Progressing Towards Goal  Goal: *Smoking cessation discussed,if applicable(Stroke Metric)  Outcome: Progressing Towards Goal  Goal: *Depression screening completed(Stroke Metric)  Outcome: Progressing Towards Goal

## 2021-06-04 NOTE — PROGRESS NOTES
PALLIATIVE MEDICINE            Family well supported. Continuing current level of care to determine long term artificial nutrition needs. Will sign off. Daughter has my contact information if needed. Kalin Dawson.  [address] MSN, FNP-BC, [facility]

## 2021-06-04 NOTE — PROGRESS NOTES
Comprehensive Nutrition Assessment    Type and Reason for Visit: Reassess    Nutrition Recommendations/Plan:    1. Modify to low-rate nocturnal tube feedings via DHT while PO intakes established: Jevity 1.5 @ 30 ml/hr x 12 hrs (6p-6a, 360 TF volume) + 100 ml free water Q 3 hrs during feedings (4 flushes, 400 ml volume)    2. Continue PO diet per SLP recommendations. RD to add daily Ensure High Protein once/day and Magic Cup once/day. 3. Phos 2.5 on AM labs - monitor for repletion PRN. Nutrition Assessment:    PMH CVA, intraventricular brain hemorrhage, dyslipidemia, CKD 3, HTN, admitted from subacute rehab for concerns of AMS/decreased mental status.      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 Pt has had DHT placed 5/23 for nutrition/medication admin. Pt known to RD from prior admission. On 5/28, per discussion with RN and dtr at bedside, concern for pt with frequent loose stools. Additionally, TFs have been running @ 45 ml/hr vs recommended goal of 75 d/t concern of pt coughing - has since resolved. Will modify to fiber containing formula as first change, with continued monitoring and modifications pending pt tolerance and GI status.    TFs at recommended rate of Jevity 1.5 @ 60 ml/hr + 120 ml free water Q 4 hrs was providing 1980 kcal, 84 g protein, with 285 g CHO, 1720 ml free water, and 2040 ml total volume. Per discussion on rounds this AM 6/4, plan is for feeding tube to eventually be removed. He has been started on a PO diet per SLP - mechanical soft/soft and bite sized per IDDSI terminology; however, also report of receiving pureed texture food - feedback provided to diet office/CNM. Initially discussed removed feeding tube over the weekend, after further discussion plan is to modify to low-rate nocturnal feeding while adequacy of PO established.      Nocturnal feedings of Jevity 1.5 @ 30 ml/hr x 12 hrs, with 100 ml free water Q 3 hrs during feedings (4 flushes total) to provide 540 kcals, 23 g protein, with 7.6 g fiber, and 675 ml free water, 760 ml total volume. Malnutrition Assessment:  Malnutrition Status: At risk for malnutrition - prolonged hospitalization   Context:  Acute illness       Nutritionally Significant Medications: Lasix, Miralax (held)    Estimated Daily Nutrient Needs:  Energy (kcal): 1930 (MSJ x 1.2); Weight Used for Energy Requirements: Current  Protein (g): 95 (1 g/kg); Weight Used for Protein Requirements: Current  Fluid (ml/day): 1930; Method Used for Fluid Requirements: 1 ml/kcal    Nutrition Related Findings:       BM: Abd soft, obese, +stooling  Edema: 2+ bilateral LE edema, 1+ bilateral UE edema  Wounds:  None     Recent Labs     06/04/21  0401 06/03/21  0551 06/02/21  0251   * 136* 132*   BUN 16 16 15   CREA 0.87 0.85 0.93    136 136   K 4.1 4.1 4.0    101 100   CO2 29 30 31   CA 8.2* 8.2* 8.6   PHOS 2.5* 2.7 2.6     Recent Labs     06/04/21  0401 06/03/21  0551 06/02/21  0251   ALB 2.2* 2.3* 2.4*     Recent Labs     06/03/21  1719 06/03/21  1159 06/02/21  1848 06/02/21  1138 06/02/21  0631 06/01/21  2331 06/01/21  1221   GLUCPOC 139* 141* 133* 133* 148* 120* 100     Lab Results   Component Value Date/Time    Hemoglobin A1c 5.9 (H) 04/19/2021 12:59 AM    Hemoglobin A1c 5.3 10/28/2019 02:17 AM    Hemoglobin A1c 6.0 05/09/2019 07:16 AM         Current Nutrition Therapies:   Diet: Soft/Bite Sized (Mechanical Soft)  Supplements: None yet - on TFs  Additional Caloric Sources: None    Meal intake: No data found. Supplement Intake: No data found. Anthropometric Measures:  · Height:  5' 8\" (172.7 cm)  · Current Body Wt:  95.3 kg (210 lb)   · Ideal Body Wt:  154:  136.4 %   · BMI Categories:  Obese class 1 (BMI 30.0-34. 9)     Wt Readings from Last 10 Encounters:   06/04/21 97.2 kg (214 lb 3.2 oz)   05/18/21 95.3 kg (210 lb 1.6 oz)   05/02/21 100.8 kg (222 lb 3.6 oz)   12/04/20 103.3 kg (227 lb 12.8 oz)   09/21/20 102.5 kg (226 lb) 06/26/20 100.6 kg (221 lb 12.8 oz)   02/28/20 99 kg (218 lb 3.2 oz)   12/27/19 96.6 kg (213 lb)   12/12/19 98.2 kg (216 lb 6.4 oz)   12/02/19 99.9 kg (220 lb 3.2 oz)       Nutrition Diagnosis:   · Inadequate oral intake related to cognitive or neurological impairment as evidenced by NPO or clear liquid status due to medical condition, swallowing study results      Nutrition Interventions:   Food and/or Nutrient Delivery: Modify tube feeding  Nutrition Education and Counseling: No recommendations at this time  Coordination of Nutrition Care: Continue to monitor while inpatient, Interdisciplinary rounds    Goals:  Continued TF tolerance with improvement to ~50% oral intake daily. Nutrition Monitoring and Evaluation:   Behavioral-Environmental Outcomes: None identified  Food/Nutrient Intake Outcomes: Diet advancement/tolerance, Enteral nutrition intake/tolerance  Physical Signs/Symptoms Outcomes: GI status, Chewing or swallowing    Discharge Planning:     Too soon to determine     Lynn Leal RD     Contact via [facility]

## 2021-06-04 NOTE — PROGRESS NOTES
Neurology Progress Note    Patient ID:  Birgit Ballesteros  [id number]  5 y.o.  1934      Subjective: An 66-year-old gentleman has been recovering from a spontaneous intraventricular hemorrhage, embolic infarctions, and seizures. Rest in the bed. Mild tremors when woken out of sleep it subsided when he went back to sleep. NG tube in place     Objective:    All records in [facility] reviewed and noted    ROS:  Unable to obtain     Meds:  Current Facility-Administered Medications   Medication Dose Route Frequency    melatonin tablet 3 mg  3 mg Oral QHS    arformoteroL (BROVANA) neb solution 15 mcg  15 mcg Nebulization BID RT    And    budesonide (PULMICORT) 500 mcg/2 ml nebulizer suspension  500 mcg Nebulization BID RT    DULoxetine (CYMBALTA) capsule 30 mg  30 mg Oral DAILY    hydroxypropyl methylcellulose (ISOPTO TEARS) 0.5 % ophthalmic solution 1 Drop  1 Drop Both Eyes PRN    furosemide (LASIX) tablet 40 mg  40 mg Oral DAILY    lacosamide (VIMPAT) 50 mg in 0.9% sodium chloride 100 mL IVPB  50 mg IntraVENous Q12H    polyethylene glycol (MIRALAX) packet 17 g  17 g Oral DAILY    atenoloL (TENORMIN) tablet 12.5 mg  12.5 mg Oral DAILY    lidocaine (XYLOCAINE) 2 % jelly   Mucous Membrane PRN    hydrALAZINE (APRESOLINE) tablet 100 mg  100 mg Oral TID    amLODIPine (NORVASC) tablet 5 mg  5 mg Oral DAILY    labetaloL (NORMODYNE;TRANDATE) injection 20 mg  20 mg IntraVENous Q6H PRN    levETIRAcetam (KEPPRA) 1,000 mg in 0.9% sodium chloride 100 mL IVPB  1,000 mg IntraVENous Q12H    hydrALAZINE (APRESOLINE) 20 mg/mL injection 20 mg  20 mg IntraVENous Q6H PRN    sodium chloride (NS) flush 5-40 mL  5-40 mL IntraVENous Q8H    sodium chloride (NS) flush 5-40 mL  5-40 mL IntraVENous PRN    polyethylene glycol (MIRALAX) packet 17 g  17 g Oral DAILY PRN    albuterol (PROVENTIL VENTOLIN) nebulizer solution 2.5 mg  2.5 mg Nebulization Q4H PRN    aspirin chewable tablet 81 mg  81 mg Oral DAILY    atorvastatin (LIPITOR) tablet 80 mg  80 mg Oral DAILY    [Held by provider] isosorbide mononitrate ER (IMDUR) tablet 30 mg  30 mg Oral DAILY    sodium chloride (NS) flush 5-40 mL  5-40 mL IntraVENous Q8H    sodium chloride (NS) flush 5-40 mL  5-40 mL IntraVENous PRN    acetaminophen (TYLENOL) tablet 650 mg  650 mg Oral Q6H PRN    Or    acetaminophen (TYLENOL) suppository 650 mg  650 mg Rectal Q6H PRN    promethazine (PHENERGAN) tablet 12.5 mg  12.5 mg Oral Q6H PRN    Or    ondansetron (ZOFRAN) injection 4 mg  4 mg IntraVENous Q6H PRN       Imaging:  MRI Results (most recent):  Results from Hospital Encounter encounter on 05/16/21    MRI BRAIN WO CONT    Narrative  *PRELIMINARY REPORT*    Multiple foci of small cortical and white matter infarcts mostly on the right  with a couple of lesions on the left as well. Interval evolution of  intraventricular hemorrhage and small parenchymal hemorrhages. Preliminary report was provided by Dr. Citlali Phelps, the on-call radiologist, at  22:17. The findings were called to floor nurse, Agnes Gifford, on 5/18/2021 at 22:20 by myself. [phone]    Final report to follow. *END PRELIMINARY REPORT*    EXAM:  MRI BRAIN WO CONT    INDICATION:    AMS    COMPARISON:  CT head 5/16/2021, MRI brain 11/23/2019. CONTRAST: None. TECHNIQUE:  Multiplanar multisequence acquisition without contrast of the brain. FINDINGS:  Few small scattered acute infarcts in the bilateral frontal lobes and  parieto-occipital lobes, right greater than left. Evolved now late subacute small intraparenchymal hematomas in the right parietal  periventricular white matter measuring 10 mm and 5 mm (series 6 image 11), with  no significant surrounding edema or mass effect. There is hemosiderin staining  noted along the ependymal lining of the lateral ventricles, as well as scattered  superficial siderosis involving the sylvian fissures, and along the surface of  the brainstem and cerebellum.     Unchanged generalized parenchymal volume loss with commensurate dilation of the  sulci and ventricular system. Unchanged confluent periventricular and deep white  matter T2/FLAIR hyperintensity, and patchy T2/FLAIR hyperintensity in the nely,  consistent with severe chronic microvascular ischemic disease. There is no  cerebellar tonsillar herniation. Expected arterial flow-voids are present. The paranasal sinuses, mastoid air cells, and middle ears are clear. The orbital  contents are within normal limits with bilateral lens implants. No significant  osseous or scalp lesions are identified. Impression  1. Few small scattered acute infarcts in the bilateral frontal lobes and  parieto-occipital lobes, right greater than left. 2. 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 3. Unchanged generalized parenchymal volume loss and severe chronic  microvascular ischemic disease. CT Results (most recent):  Results from Hospital Encounter encounter on 05/16/21    CT HEAD WO CONT    Narrative  EXAM: CT HEAD WO CONT    INDICATION: AMS  - worsneing, hx Acute CVA    COMPARISON: CT 5/19/2021. MRI 5/18/2021. Joan Wilkes CONTRAST: None. TECHNIQUE: Unenhanced CT of the head was performed using 5 mm images. Brain and  bone windows were generated. Coronal and sagittal reformats. CT dose reduction  was achieved through use of a standardized protocol tailored for this  examination and automatic exposure control for dose modulation. FINDINGS:  There is mild atrophy and compensatory dilatation of the ventricles. . There is  significant white matter disease likely to chronic small vessel ischemic  disease. Previously seen lacunar infarcts and hemosiderin staining on MRI are  not well seen by CT.  A small focus of intraventricular hemorrhage in the right  posterior horn is unchanged. There is no new intracranial hemorrhage,  extra-axial collection, or mass effect. The basilar cisterns are open. The bone windows demonstrate no abnormalities. The visualized portions of the  paranasal sinuses and mastoid air cells are clear. Impression  No significant interval change.         Lab Review   Recent Results (from the past 24 hour(s))   GLUCOSE, POC    Collection Time: 06/03/21 11:59 AM   Result Value Ref Range    Glucose (POC) 141 (H) 65 - 117 mg/dL    Performed by Alexa Claire    GLUCOSE, POC    Collection Time: 06/03/21  5:19 PM   Result Value Ref Range    Glucose (POC) 139 (H) 65 - 117 mg/dL    Performed by Gerber Manrique    RENAL FUNCTION PANEL    Collection Time: 06/04/21  4:01 AM   Result Value Ref Range    Sodium 137 136 - 145 mmol/L    Potassium 4.1 3.5 - 5.1 mmol/L    Chloride 102 97 - 108 mmol/L    CO2 29 21 - 32 mmol/L    Anion gap 6 5 - 15 mmol/L    Glucose 146 (H) 65 - 100 mg/dL    BUN 16 6 - 20 MG/DL    Creatinine 0.87 0.70 - 1.30 MG/DL    BUN/Creatinine ratio 18 12 - 20      GFR est AA >60 >60 ml/min/1.73m2    GFR est non-AA >60 >60 ml/min/1.73m2    Calcium 8.2 (L) 8.5 - 10.1 MG/DL    Phosphorus 2.5 (L) 2.6 - 4.7 MG/DL    Albumin 2.2 (L) 3.5 - 5.0 g/dL       Exam:  Visit Vitals  /65 (BP 1 Location: Left upper arm, BP Patient Position: At rest)   Pulse 84   Temp 98.5 °F (36.9 °C)   Resp 19   Ht 5' 8\" (1.727 m)   Wt 214 lb 3.2 oz (97.2 kg)   SpO2 97%   BMI 32.57 kg/m²     Gen: Well developed  CV: RRR  Lungs: non labored breathing  Abd: non distending  Neuro: A&O xseld, following commands,   CN II-XII: PERRL, EOMI, face symmetric, tongue/palate midline  Motor: Able to move all extremities spontaneously :  Decrease movement on the LLE,may be due to pain and swelling   Sensory : difficult to assess   Gait: deferred     Assessment:     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    Hypertension I10    ED (erectile dysfunction) N52.9    Severe obesity (HCC) E66.01    TESSA on CPAP G47.33, Z99.89    Simple chronic bronchitis (HCC) J41.0    Bilateral carotid artery stenosis I65.23    Spinal stenosis of lumbar region at multiple levels M48.061    Flank pain R10.9    Rectal bleeding K62.5    Pneumonia J18.9    Severe sepsis (HCC) A41.9, R65.20    Acute blood loss anemia D62    Facial droop R29.810    Altered mental state R41.82    Viral encephalitis A86    Coronary artery disease with stable angina pectoris ([facility]) I25.118    Chronic obstructive pulmonary disease (HCC) J44.9    CKD (chronic kidney disease) stage 3, GFR 30-59 ml/min ([facility]) N18.30    Intracranial hemorrhage (HCC) I62.9    CAP (community acquired pneumonia) J18.9    Cerebrovascular accident (CVA) due to embolism ([facility]) I63.9    Status epilepticus ([facility] [address] G40.901    Encephalopathy G93.40       Plan:     On 1116, an 51-year-old shante with many medical conditions was in with AMS/nausea/vomiting/tremors. In April 2021, there was a right parietal intraparenchymal hemorrhage with ventricular extension. He was in rehab when he got pneumonia and had to be admitted to the hospital. An MRI of the brain was performed, which revealed embolic-looking scattered infarcts in the bilateral frontal and parieto-occipital lobes. On 5/16, an EEG was performed that was normal, but on 5/20, another EEG was performed for symptoms of decreased responsiveness, left gaze deviation, and lip smacking, and it revealed a widespread nonconvulsive condition that disappeared with administration of Ativan.  He seems to be doing very well. EEG suggestive of a mild generalized encephalopathic process, nonspecific in type. This may be related to an underlying structural brain injury and/or toxic/metabolic abnormality or sedative effects of medications. No epileptiform features were noted, and no seizure was recorded      -Continue Keppra 1,000 BID   -continue Lacosamide 50mg BID    -If he is tolerates  diet for 24 -48 hrs, the plan is to remove NG tube     Thank you very much for this consultation.  No further neurologic recommendations at this time      Signed:  Maggy Tan NP  6/4/2021  2:38 PM

## 2021-06-04 NOTE — PROGRESS NOTES
Problem: Dysphagia (Adult)  Goal: *Acute Goals and Plan of Care (Insert Text)  Description: Speech Therapy Goals  Initiated 5/19/2021    1. Patient will tolerate regular diet/thin liquids without adverse effects within 7 days. Discontinue 5/27/2021  2. Patient will participate in swallow re-evaluation within 7 days. MET 6/1/2021  Added 6/1/2021  1. Patient will tolerate mechanically soft diet/thin liquids without adverse effects within  days. Outcome: Progressing Towards Goal   SPEECH LANGUAGE PATHOLOGY DYSPHAGIA TREATMENT  Patient: [facility] (3 y.o. male)  Date: 6/4/2021  Diagnosis: CAP (community acquired pneumonia) [J18.9] <principal problem not specified>       Precautions:  Fall, Bed Alarm, DNR, Skin, Seizure    ASSESSMENT:  Patient sleeping initially but awakened to verbal and tactile cues. Patient demonstrated coughing with successive sips of water via straw. Patient dependent for single straw sips which he tolerated without overt s/s aspiration. Patient tolerated purees without difficulty. With soft solids patient demonstrated slow mastication with mild oral residue. Patient then closing eyes and no longer accepting PO therefore PO trials/dysphagia treatment stopped. PLAN:  Recommendations and Planned Interventions:  Continue soft and chopped diet  Thin liquids in single sips  Patient continues to benefit from skilled intervention to address the above impairments. Continue treatment per established plan of care. Discharge Recommendations: To Be Determined     SUBJECTIVE:   Patient stated huh. Patient agreed to PO trials. RN approved treatment.     OBJECTIVE:   Cognitive and Communication Status:  Neurologic State: Alert, Confused  Orientation Level: Oriented to person, Disoriented to time, Disoriented to situation, Disoriented to place  Cognition: Decreased command following, Decreased attention/concentration, Poor safety awareness  Perception: Cues to maintain midline in sitting, Cues to attend right visual field (decr attention & command follow in R side)  Perseveration: No perseveration noted  Safety/Judgement: Decreased awareness of environment, Decreased awareness of need for assistance, Decreased awareness of need for safety, Decreased insight into deficits  Dysphagia Treatment:  Oral Assessment:     P.O. Trials:  Patient Position: Upright in bed  Vocal quality prior to P.O.: No impairment  Consistency Presented: Mechanical soft;Puree; Thin liquid  How Presented: SLP-fed/presented;Spoon;Straw;Successive swallows     Bolus Acceptance: No impairment  Bolus Formation/Control: Impaired  Type of Impairment: Delayed; Incomplete;Mastication  Propulsion: Delayed (# of seconds)  Oral Residue: Less than 10% of bolus; Lingual        Aspiration Signs/Symptoms: Strong cough                      After treatment:   Patient left in no apparent distress in bed, Call bell within reach, and Nursing notified    COMMUNICATION/EDUCATION:   Patient was educated regarding role of SLP, diet and swallow precautions. Patient closed his eyes. The patient's plan of care including recommendations, planned interventions, and recommended diet changes were discussed with: Registered nurse.      TIMOTHY Watson  Time Calculation: 10 mins

## 2021-06-05 LAB
ALBUMIN SERPL-MCNC: 2.4 G/DL (ref 3.5–5)
ALBUMIN SERPL-MCNC: 2.4 G/DL (ref 3.5–5)
ALBUMIN/GLOB SERPL: 0.7 {RATIO} (ref 1.1–2.2)
ALP SERPL-CCNC: 97 U/L (ref 45–117)
ALT SERPL-CCNC: 34 U/L (ref 12–78)
ANION GAP SERPL CALC-SCNC: 2 MMOL/L (ref 5–15)
ANION GAP SERPL CALC-SCNC: 3 MMOL/L (ref 5–15)
AST SERPL-CCNC: 25 U/L (ref 15–37)
BILIRUB SERPL-MCNC: 0.2 MG/DL (ref 0.2–1)
BUN SERPL-MCNC: 19 MG/DL (ref 6–20)
BUN SERPL-MCNC: 19 MG/DL (ref 6–20)
BUN/CREAT SERPL: 20 (ref 12–20)
BUN/CREAT SERPL: 20 (ref 12–20)
CALCIUM SERPL-MCNC: 8.7 MG/DL (ref 8.5–10.1)
CALCIUM SERPL-MCNC: 8.8 MG/DL (ref 8.5–10.1)
CHLORIDE SERPL-SCNC: 100 MMOL/L (ref 97–108)
CHLORIDE SERPL-SCNC: 99 MMOL/L (ref 97–108)
CO2 SERPL-SCNC: 32 MMOL/L (ref 21–32)
CO2 SERPL-SCNC: 32 MMOL/L (ref 21–32)
CREAT SERPL-MCNC: 0.93 MG/DL (ref 0.7–1.3)
CREAT SERPL-MCNC: 0.95 MG/DL (ref 0.7–1.3)
ERYTHROCYTE [DISTWIDTH] IN BLOOD BY AUTOMATED COUNT: 14.6 % (ref 11.5–14.5)
GLOBULIN SER CALC-MCNC: 3.6 G/DL (ref 2–4)
GLUCOSE SERPL-MCNC: 106 MG/DL (ref 65–100)
GLUCOSE SERPL-MCNC: 107 MG/DL (ref 65–100)
HCT VFR BLD AUTO: 33.7 % (ref 36.6–50.3)
HGB BLD-MCNC: 10.8 G/DL (ref 12.1–17)
MCH RBC QN AUTO: 31.4 PG (ref 26–34)
MCHC RBC AUTO-ENTMCNC: 32 G/DL (ref 30–36.5)
MCV RBC AUTO: 98 FL (ref 80–99)
NRBC # BLD: 0 K/UL (ref 0–0.01)
NRBC BLD-RTO: 0 PER 100 WBC
PHOSPHATE SERPL-MCNC: 3.4 MG/DL (ref 2.6–4.7)
PLATELET # BLD AUTO: 294 K/UL (ref 150–400)
PMV BLD AUTO: 10.5 FL (ref 8.9–12.9)
POTASSIUM SERPL-SCNC: 4.4 MMOL/L (ref 3.5–5.1)
POTASSIUM SERPL-SCNC: 4.4 MMOL/L (ref 3.5–5.1)
PROT SERPL-MCNC: 6 G/DL (ref 6.4–8.2)
RBC # BLD AUTO: 3.44 M/UL (ref 4.1–5.7)
SODIUM SERPL-SCNC: 134 MMOL/L (ref 136–145)
SODIUM SERPL-SCNC: 134 MMOL/L (ref 136–145)
WBC # BLD AUTO: 7.9 K/UL (ref 4.1–11.1)

## 2021-06-05 PROCEDURE — 94640 AIRWAY INHALATION TREATMENT: CPT

## 2021-06-05 PROCEDURE — 80069 RENAL FUNCTION PANEL: CPT

## 2021-06-05 PROCEDURE — 74011250637 HC RX REV CODE- 250/637: Performed by: INTERNAL MEDICINE

## 2021-06-05 PROCEDURE — 74011250637 HC RX REV CODE- 250/637: Performed by: HOSPITALIST

## 2021-06-05 PROCEDURE — C9254 INJECTION, LACOSAMIDE: HCPCS | Performed by: INTERNAL MEDICINE

## 2021-06-05 PROCEDURE — 85027 COMPLETE CBC AUTOMATED: CPT

## 2021-06-05 PROCEDURE — 74011000258 HC RX REV CODE- 258: Performed by: PSYCHIATRY & NEUROLOGY

## 2021-06-05 PROCEDURE — 74011000258 HC RX REV CODE- 258: Performed by: INTERNAL MEDICINE

## 2021-06-05 PROCEDURE — 74011250637 HC RX REV CODE- 250/637: Performed by: STUDENT IN AN ORGANIZED HEALTH CARE EDUCATION/TRAINING PROGRAM

## 2021-06-05 PROCEDURE — 74011250636 HC RX REV CODE- 250/636: Performed by: INTERNAL MEDICINE

## 2021-06-05 PROCEDURE — 74011250636 HC RX REV CODE- 250/636: Performed by: PSYCHIATRY & NEUROLOGY

## 2021-06-05 PROCEDURE — 74011250637 HC RX REV CODE- 250/637: Performed by: FAMILY MEDICINE

## 2021-06-05 PROCEDURE — 74011000250 HC RX REV CODE- 250: Performed by: HOSPITALIST

## 2021-06-05 PROCEDURE — 65660000001 HC RM ICU INTERMED STEPDOWN

## 2021-06-05 PROCEDURE — 36415 COLL VENOUS BLD VENIPUNCTURE: CPT

## 2021-06-05 PROCEDURE — 80053 COMPREHEN METABOLIC PANEL: CPT

## 2021-06-05 RX ADMIN — Medication 10 ML: at 22:55

## 2021-06-05 RX ADMIN — HYDRALAZINE HYDROCHLORIDE 100 MG: 50 TABLET, FILM COATED ORAL at 08:16

## 2021-06-05 RX ADMIN — LEVETIRACETAM 1000 MG: 100 INJECTION, SOLUTION INTRAVENOUS at 00:07

## 2021-06-05 RX ADMIN — ASPIRIN 81 MG: 81 TABLET, CHEWABLE ORAL at 08:15

## 2021-06-05 RX ADMIN — ATENOLOL 12.5 MG: 25 TABLET ORAL at 08:16

## 2021-06-05 RX ADMIN — LEVETIRACETAM 1000 MG: 100 INJECTION, SOLUTION INTRAVENOUS at 11:12

## 2021-06-05 RX ADMIN — FUROSEMIDE 40 MG: 40 TABLET ORAL at 08:16

## 2021-06-05 RX ADMIN — AMLODIPINE BESYLATE 5 MG: 5 TABLET ORAL at 08:16

## 2021-06-05 RX ADMIN — HYDRALAZINE HYDROCHLORIDE 100 MG: 50 TABLET, FILM COATED ORAL at 15:22

## 2021-06-05 RX ADMIN — Medication 3 MG: at 21:45

## 2021-06-05 RX ADMIN — ARFORMOTEROL TARTRATE 15 MCG: 15 SOLUTION RESPIRATORY (INHALATION) at 08:15

## 2021-06-05 RX ADMIN — BUDESONIDE 500 MCG: 0.5 INHALANT RESPIRATORY (INHALATION) at 22:09

## 2021-06-05 RX ADMIN — LEVETIRACETAM 1000 MG: 100 INJECTION, SOLUTION INTRAVENOUS at 22:55

## 2021-06-05 RX ADMIN — SODIUM CHLORIDE 25 MG: 9 INJECTION, SOLUTION INTRAVENOUS at 13:47

## 2021-06-05 RX ADMIN — SODIUM CHLORIDE 25 MG: 9 INJECTION, SOLUTION INTRAVENOUS at 01:56

## 2021-06-05 RX ADMIN — HYDRALAZINE HYDROCHLORIDE 100 MG: 50 TABLET, FILM COATED ORAL at 21:44

## 2021-06-05 RX ADMIN — Medication 10 ML: at 06:09

## 2021-06-05 RX ADMIN — ATORVASTATIN CALCIUM 80 MG: 40 TABLET, FILM COATED ORAL at 08:16

## 2021-06-05 RX ADMIN — Medication 10 ML: at 13:46

## 2021-06-05 RX ADMIN — ARFORMOTEROL TARTRATE 15 MCG: 15 SOLUTION RESPIRATORY (INHALATION) at 22:09

## 2021-06-05 RX ADMIN — DULOXETINE HYDROCHLORIDE 30 MG: 30 CAPSULE, DELAYED RELEASE ORAL at 08:16

## 2021-06-05 RX ADMIN — ACETAMINOPHEN 650 MG: 325 TABLET ORAL at 21:44

## 2021-06-05 RX ADMIN — Medication 10 ML: at 13:47

## 2021-06-05 NOTE — PROGRESS NOTES
Problem: Pressure Injury - Risk of  Goal: *Prevention of pressure injury  Description: Document Aquiles Scale and appropriate interventions in the flowsheet. Outcome: Progressing Towards Goal  Note: Pressure Injury Interventions:  Sensory Interventions: Assess changes in LOC    Moisture Interventions: Absorbent underpads, Apply protective barrier, creams and emollients    Activity Interventions: Pressure redistribution bed/mattress(bed type), PT/OT evaluation    Mobility Interventions: Pressure redistribution bed/mattress (bed type), PT/OT evaluation    Nutrition Interventions: Document food/fluid/supplement intake    Friction and Shear Interventions: Apply protective barrier, creams and emollients, Lift sheet                Problem: Patient Education: Go to Patient Education Activity  Goal: Patient/Family Education  Outcome: Progressing Towards Goal     Problem: Patient Education: Go to Patient Education Activity  Goal: Patient/Family Education  Outcome: Progressing Towards Goal     Problem: Falls - Risk of  Goal: *Absence of Falls  Description: Document Too Fall Risk and appropriate interventions in the flowsheet.   Outcome: Progressing Towards Goal  Note: Fall Risk Interventions:  Mobility Interventions: Communicate number of staff needed for ambulation/transfer, OT consult for ADLs, PT Consult for mobility concerns    Mentation Interventions: Adequate sleep, hydration, pain control, More frequent rounding, Reorient patient    Medication Interventions: Evaluate medications/consider consulting pharmacy    Elimination Interventions: Call light in reach    History of Falls Interventions: Bed/chair exit alarm         Problem: Patient Education: Go to Patient Education Activity  Goal: Patient/Family Education  Outcome: Progressing Towards Goal     Problem: Patient Education: Go to Patient Education Activity  Goal: Patient/Family Education  Outcome: Progressing Towards Goal     Problem: Patient Education: Go to Patient Education Activity  Goal: Patient/Family Education  Outcome: Progressing Towards Goal     Problem: Seizure Disorder (Adult)  Goal: *STG: Remains free of seizure activity  Outcome: Progressing Towards Goal  Goal: *STG: Maintains lab values within therapeutic range  Outcome: Progressing Towards Goal  Goal: *STG/LTG: Complies with medication therapy  Outcome: Progressing Towards Goal  Goal: *STG: Remains free of injury during seizure activity  Outcome: Progressing Towards Goal  Goal: *STG: Remains safe in hospital  Outcome: Progressing Towards Goal  Goal: Interventions  Outcome: Progressing Towards Goal     Problem: Patient Education: Go to Patient Education Activity  Goal: Patient/Family Education  Outcome: Progressing Towards Goal     Problem: Patient Education: Go to Patient Education Activity  Goal: Patient/Family Education  Outcome: Progressing Towards Goal     Problem: TIA/CVA Stroke: 0-24 hours  Goal: Off Pathway (Use only if patient is Off Pathway)  Outcome: Progressing Towards Goal  Goal: Activity/Safety  Outcome: Progressing Towards Goal  Goal: Consults, if ordered  Outcome: Progressing Towards Goal  Goal: Diagnostic Test/Procedures  Outcome: Progressing Towards Goal  Goal: Nutrition/Diet  Outcome: Progressing Towards Goal  Goal: Discharge Planning  Outcome: Progressing Towards Goal  Goal: Medications  Outcome: Progressing Towards Goal  Goal: Respiratory  Outcome: Progressing Towards Goal  Goal: Treatments/Interventions/Procedures  Outcome: Progressing Towards Goal  Goal: Minimize risk of bleeding post-thrombolytic infusion  Outcome: Progressing Towards Goal  Goal: Monitor for complications post-thrombolytic infusion  Outcome: Progressing Towards Goal  Goal: Psychosocial  Outcome: Progressing Towards Goal  Goal: *Hemodynamically stable  Outcome: Progressing Towards Goal  Goal: *Neurologically stable  Description: Absence of additional neurological deficits    Outcome: Progressing Towards Goal  Goal: *Verbalizes anxiety and depression are reduced or absent  Outcome: Progressing Towards Goal  Goal: *Absence of Signs of Aspiration on Current Diet  Outcome: Progressing Towards Goal  Goal: *Absence of deep venous thrombosis signs and symptoms(Stroke Metric)  Outcome: Progressing Towards Goal  Goal: *Ability to perform ADLs and demonstrates progressive mobility and function  Outcome: Progressing Towards Goal  Goal: *Stroke education started(Stroke Metric)  Outcome: Progressing Towards Goal  Goal: *Dysphagia screen performed(Stroke Metric)  Outcome: Progressing Towards Goal  Goal: *Rehab consulted(Stroke Metric)  Outcome: Progressing Towards Goal     Problem: TIA/CVA Stroke: Day 2 Until Discharge  Goal: Off Pathway (Use only if patient is Off Pathway)  Outcome: Progressing Towards Goal  Goal: Activity/Safety  Outcome: Progressing Towards Goal  Goal: Diagnostic Test/Procedures  Outcome: Progressing Towards Goal  Goal: Nutrition/Diet  Outcome: Progressing Towards Goal  Goal: Discharge Planning  Outcome: Progressing Towards Goal  Goal: Medications  Outcome: Progressing Towards Goal  Goal: Respiratory  Outcome: Progressing Towards Goal  Goal: Treatments/Interventions/Procedures  Outcome: Progressing Towards Goal  Goal: Psychosocial  Outcome: Progressing Towards Goal  Goal: *Verbalizes anxiety and depression are reduced or absent  Outcome: Progressing Towards Goal  Goal: *Absence of aspiration  Outcome: Progressing Towards Goal  Goal: *Absence of deep venous thrombosis signs and symptoms(Stroke Metric)  Outcome: Progressing Towards Goal  Goal: *Optimal pain control at patient's stated goal  Outcome: Progressing Towards Goal  Goal: *Tolerating diet  Outcome: Progressing Towards Goal  Goal: *Ability to perform ADLs and demonstrates progressive mobility and function  Outcome: Progressing Towards Goal  Goal: *Stroke education continued(Stroke Metric)  Outcome: Progressing Towards Goal     Problem: Ischemic Stroke: Discharge Outcomes  Goal: *Verbalizes anxiety and depression are reduced or absent  Outcome: Progressing Towards Goal  Goal: *Verbalize understanding of risk factor modification(Stroke Metric)  Outcome: Progressing Towards Goal  Goal: *Hemodynamically stable  Outcome: Progressing Towards Goal  Goal: *Absence of aspiration pneumonia  Outcome: Progressing Towards Goal  Goal: *Aware of needed dietary changes  Outcome: Progressing Towards Goal  Goal: *Verbalize understanding of prescribed medications including anti-coagulants, anti-lipid, and/or anti-platelets(Stroke Metric)  Outcome: Progressing Towards Goal  Goal: *Tolerating diet  Outcome: Progressing Towards Goal  Goal: *Aware of follow-up diagnostics related to anticoagulants  Outcome: Progressing Towards Goal  Goal: *Ability to perform ADLs and demonstrates progressive mobility and function  Outcome: Progressing Towards Goal  Goal: *Absence of DVT(Stroke Metric)  Outcome: Progressing Towards Goal  Goal: *Absence of aspiration  Outcome: Progressing Towards Goal  Goal: *Optimal pain control at patient's stated goal  Outcome: Progressing Towards Goal  Goal: *Home safety concerns addressed  Outcome: Progressing Towards Goal  Goal: *Describes available resources and support systems  Outcome: Progressing Towards Goal  Goal: *Verbalizes understanding of activation of EMS(911) for stroke symptoms(Stroke Metric)  Outcome: Progressing Towards Goal  Goal: *Understands and describes signs and symptoms to report to providers(Stroke Metric)  Outcome: Progressing Towards Goal  Goal: *Neurolgocially stable (absence of additional neurological deficits)  Outcome: Progressing Towards Goal  Goal: *Verbalizes importance of follow-up with primary care physician(Stroke Metric)  Outcome: Progressing Towards Goal  Goal: *Smoking cessation discussed,if applicable(Stroke Metric)  Outcome: Progressing Towards Goal  Goal: *Depression screening completed(Stroke Metric)  Outcome: Progressing Towards Goal

## 2021-06-05 NOTE — PROGRESS NOTES
Bedside and Verbal shift change report given to [address] (oncoming nurse) by Daxa Contreras (offgoing nurse). Report included the following information SBAR, Kardex, Cardiac Rhythm NSR and Dual Neuro Assessment.

## 2021-06-05 NOTE — PROGRESS NOTES
Problem: Pressure Injury - Risk of  Goal: *Prevention of pressure injury  Description: Document Aquiles Scale and appropriate interventions in the flowsheet. Outcome: Progressing Towards Goal  Note: Pressure Injury Interventions:  Sensory Interventions: Assess changes in LOC    Moisture Interventions: Absorbent underpads, Apply protective barrier, creams and emollients    Activity Interventions: Pressure redistribution bed/mattress(bed type), PT/OT evaluation    Mobility Interventions: Pressure redistribution bed/mattress (bed type), PT/OT evaluation    Nutrition Interventions: Document food/fluid/supplement intake    Friction and Shear Interventions: Apply protective barrier, creams and emollients, Lift sheet                Problem: Patient Education: Go to Patient Education Activity  Goal: Patient/Family Education  Outcome: Progressing Towards Goal     Problem: Falls - Risk of  Goal: *Absence of Falls  Description: Document Too Fall Risk and appropriate interventions in the flowsheet.   Outcome: Progressing Towards Goal  Note: Fall Risk Interventions:  Mobility Interventions: Communicate number of staff needed for ambulation/transfer, OT consult for ADLs, PT Consult for mobility concerns    Mentation Interventions: Adequate sleep, hydration, pain control, More frequent rounding, Reorient patient    Medication Interventions: Evaluate medications/consider consulting pharmacy    Elimination Interventions: Call light in reach    History of Falls Interventions: Bed/chair exit alarm         Problem: TIA/CVA Stroke: Day 2 Until Discharge  Goal: Activity/Safety  Outcome: Progressing Towards Goal  Goal: Diagnostic Test/Procedures  Outcome: Progressing Towards Goal  Goal: Nutrition/Diet  Outcome: Progressing Towards Goal  Goal: Discharge Planning  Outcome: Progressing Towards Goal  Goal: Medications  Outcome: Progressing Towards Goal  Goal: Treatments/Interventions/Procedures  Outcome: Progressing Towards Goal

## 2021-06-05 NOTE — ROUTINE PROCESS
Bedside shift change report given to [facility] (oncoming nurse) by Livan Washington RN (offgoing nurse). Report included the following information SBAR, Kardex, ED Summary, Procedure Summary, Intake/Output, MAR, Recent Results, Cardiac Rhythm NSR, Quality Measures and Dual Neuro Assessment.

## 2021-06-05 NOTE — PROGRESS NOTES
[facility]  Hospitalist Group                                                                                          Hospitalist Progress Note  Alex Arriaga MD  Answering service: [phone] from in house phone        Date of Service:  2021  NAME:  Birgit Ballesteros  :  1934  MRN:  [id number]      Admission Summary:   Mo Rush is a 80 y. o. male with pmh of CVA, intraventricular brain hemorrhage, dyslipidemia, CKD 3, hypertension who presents to hospital from subacute rehab for concerns of altered/decreased mental status. Patient was found to have multiple ischemic CVAs on MRI, continue to have altered mental status with some eye deviation and twitching on .  This was concerning for seizure, given 2 mg of Ativan and loaded with Keppra with some improvement.  On  patient however declined, unresponsive to verbal stimuli.  Stat EEG 24-hour was ordered and showed subclinical status.  He was loaded with Vimpat, and neurology recommends transfer to ICU for closer neuro checks on . Seizures were controlled and  Pt transferred out of ICU on . Interval history / Subjective: Follow up AMS, status epilepticus. Hard of hearing. Patient seen and examined at the bedside. Labs, images and notes reviewed  Discussed with nursing staff, orders reviewed. Plan discussed with patient/Family  Feeling okay. Rested well. Diet still came as pureed for the dinner and patient did not eat much. No other concerns. Later in the day daughter suggested neurology planning to cut back on Vimpat versus discontinued. Assessment & Plan:     Status epilepticus -resolved  -Continue Judy Cordial  -Neurology following-vimpate dose reduced -50 mg BID on .   -Daughter discussed with neurology about cutting back on dose further. Neurology planning to cut back to 25 mg twice daily vs discontinue altogether for Vimpat.  -Seizure precautions   -EEG results  noted.  D/w daughter.  -Neurology may consider repeat EEG after change in the dosage of Vimpat     Acute embolic infarcts  Subacute hematoma -intraventricular hemorrhage with ventriculomegaly on recent admission, discharged 5/3  -Not a candidate for anticoagulation due to intracranial hemorrhage, and fall risk  -Neurology following  -No escalation of antiplatelets due to ICH, continue aspirin  -Continue atorvastatin  -PT/OT/speech  -Echo with normal EF      Acute Metabolic encephalopathy -   multifactorial due to above  He is awake,alert mostly in the afternoon,answering few questions  -Patient to get PT/OT   -Follow diet tolerance with modified as Mechanical soft and Chopped and not pureed. -IF tolerated PO well, DC NGT/DH tube.      Community-acquired pneumonia-resolved      Hypertension Bp controlled,  C/w hydralazine, amlodipine.     Hyperlipidemiacontinue Lipitor     Hx TESSA  Per daughter,he never liked CPAP so he did not use   CPAP not tolerated in past. ABG noted     H/o chronic LE edema due to venous congestion  Ankle edema -dependent edema with immobility  Resumed lasix 40 mg 6/2. Continue and monitor BMP   Hypernatremia - resolved         Dysphagia  -Has laurieff, on TFs.   Change to nightly feeds  -passed swallow eval 6/1  -Diet modified to small bite-size      RUE swelling- venous duplex negative     Appreciate Palliative care input       Code status: DNR  DVT prophylaxis: SCDs    Care Plan discussed with: Patient/Family and Nurse  Anticipated Disposition: [facility]/Rehab  Anticipated Discharge: Greater than 48 hours     Hospital Problems  Date Reviewed: 9/21/2020        Codes Class Noted POA    Cerebrovascular accident (CVA) due to embolism ([address] ICD-10-CM: I63.9  ICD-9-CM: 434.11  5/29/2021 Unknown        Status epilepticus ([address] ICD-10-CM: G40.901  ICD-9-CM: 345.3  5/29/2021 Unknown        Encephalopathy ICD-10-CM: G93.40  ICD-9-CM: 348.30  5/29/2021 Unknown                Review of Systems:   A comprehensive review of systems was negative except for that written in the HPI. Vital Signs:    Last 24hrs VS reviewed since prior progress note. Most recent are:  Visit Vitals  /63 (BP 1 Location: Left upper arm, BP Patient Position: At rest)   Pulse 89   Temp 98.1 °F (36.7 °C)   Resp 20   Ht 5' 8\" (1.727 m)   Wt 97.2 kg (214 lb 3.2 oz)   SpO2 91%   BMI 32.57 kg/m²         Intake/Output Summary (Last 24 hours) at 6/4/2021 2239  Last data filed at 6/4/2021 0800  Gross per 24 hour   Intake 1040 ml   Output    Net 1040 ml        Physical Examination:     I had a face to face encounter with this patient and independently examined them on 6/4/2021 as outlined below:          Gen: elderly patient. Alert, awake. Comfortable. Answering appropriately. Following commands. HEENT: anicteric sclerae, normal conjunctiva, NG tube   Neck: supple, trachea midline  Heart: RRR, no MRG, no JVD,  Lungs: CTAB, no RRW. Abd: soft,non tender,non distended  Extr: he has 2+ LE edema mainly at ankles-has chronic venous insufficnecny, UE edema  Skin: dry, no rash  Neuro: awake,alert. No focal deficit noted         Data Review:    Review and/or order of clinical lab test  Review and/or order of tests in the radiology section of CPT  Review and/or order of tests in the medicine section of CPT    XR INSERT LONG FEED TUBE    Result Date: 5/26/2021  Successful placement of the Dobbhoff tube in the distal duodenum/proximal jejunum at the ligament of Treitz. MRI BRAIN WO CONT    Result Date: 5/19/2021  1. Few small scattered acute infarcts in the bilateral frontal lobes and parieto-occipital lobes, right greater than left. 2. 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  3. Unchanged generalized parenchymal volume loss and severe chronic microvascular ischemic disease. CT HEAD WO CONT    Result Date: 5/24/2021  No significant interval change. CT HEAD WO CONT    Result Date: 5/16/2021  Significantly limited by motion. No acute intracranial abnormality     CTA HEAD    Result Date: 5/19/2021  1. Heterogeneous plaque in the proximal right internal carotid artery without hemodynamically significant stenosis. 2.  No significant left carotid artery stenosis. 3.  No large vessel occlusion. 4.  40 mm main pulmonary artery suggestive of pulmonary arterial hypertension. CTA NECK    Result Date: 5/19/2021  1. Heterogeneous plaque in the proximal right internal carotid artery without hemodynamically significant stenosis. 2.  No significant left carotid artery stenosis. 3.  No large vessel occlusion. 4.  40 mm main pulmonary artery suggestive of pulmonary arterial hypertension. XR CHEST PORT    Result Date: 5/29/2021  Stable nonspecific left lower airspace disease. No pulmonary edema. XR CHEST PORT    Result Date: 5/25/2021  1. Low lung volumes with mild basilar atelectasis in left lower lobe. XR CHEST PORT    Result Date: 5/22/2021  Improved aeration in the left lung base with minimal residual airspace opacity    XR CHEST PORT    Result Date: 5/16/2021  Patchy left basilar airspace opacity which may represent pneumonia or atelectasis with a small left pleural effusion. XR ABD PORT  1 V    Result Date: 5/24/2021  Intragastric Dobbhoff tube with slight interval advancement. XR ABD PORT  1 V    Result Date: 5/24/2021  Feeding tube tip projects over the fundus and should be advanced. XR ABD PORT  1 V    Result Date: 5/23/2021  Gastric tube tip is in appropriate position for use.        Labs:     Recent Labs     06/02/21  0251   WBC 7.4   HGB 10.8*   HCT 33.4*        Recent Labs     06/04/21  0401 06/03/21  0551 06/02/21  0251    136 136   K 4.1 4.1 4.0    101 100   CO2 29 30 31   BUN 16 16 15   CREA 0. 87 0.85 0.93   * 136* 132*   CA 8.2* 8.2* 8.6   PHOS 2.5* 2.7 2.6     Recent Labs     06/04/21  0401 06/03/21  0551 06/02/21  0251   ALB 2.2* 2.3* 2.4*     No results for input(s): INR, PTP, APTT, INREXT, INREXT in the last 72 hours. No results for input(s): FE, TIBC, PSAT, FERR in the last 72 hours. No results found for: FOL, RBCF   No results for input(s): PH, PCO2, PO2 in the last 72 hours. No results for input(s): CPK, CKNDX, TROIQ in the last 72 hours.     No lab exists for component: CPKMB  Lab Results   Component Value Date/Time    Cholesterol, total 144 04/19/2021 12:59 AM    HDL Cholesterol 33 04/19/2021 12:59 AM    LDL, calculated 74.8 04/19/2021 12:59 AM    Triglyceride 181 (H) 04/19/2021 12:59 AM    CHOL/HDL Ratio 4.4 04/19/2021 12:59 AM     Lab Results   Component Value Date/Time    Glucose (POC) 139 (H) 06/03/2021 05:19 PM    Glucose (POC) 141 (H) 06/03/2021 11:59 AM    Glucose (POC) 133 (H) 06/02/2021 06:48 PM    Glucose (POC) 133 (H) 06/02/2021 11:38 AM    Glucose (POC) 148 (H) 06/02/2021 06:31 AM     Lab Results   Component Value Date/Time    Color YELLOW/STRAW 05/16/2021 09:33 PM    Appearance CLEAR 05/16/2021 09:33 PM    Specific gravity 1.014 05/16/2021 09:33 PM    Specific gravity 1.015 09/21/2020 09:13 AM    pH (UA) 5.0 05/16/2021 09:33 PM    Protein Negative 05/16/2021 09:33 PM    Glucose Negative 05/16/2021 09:33 PM    Ketone Negative 05/16/2021 09:33 PM    Bilirubin Negative 05/16/2021 09:33 PM    Urobilinogen 0.2 05/16/2021 09:33 PM    Nitrites Negative 05/16/2021 09:33 PM    Leukocyte Esterase Negative 05/16/2021 09:33 PM    Epithelial cells FEW 05/16/2021 09:33 PM    Bacteria Negative 05/16/2021 09:33 PM    WBC 0-4 05/16/2021 09:33 PM    RBC 0-5 05/16/2021 09:33 PM         Medications Reviewed:     Current Facility-Administered Medications   Medication Dose Route Frequency    melatonin tablet 3 mg  3 mg Oral QHS    arformoteroL (BROVANA) neb solution 15 mcg  15 mcg Nebulization BID RT    And    budesonide (PULMICORT) 500 mcg/2 ml nebulizer suspension  500 mcg Nebulization BID RT    DULoxetine (CYMBALTA) capsule 30 mg  30 mg Oral DAILY    hydroxypropyl methylcellulose (ISOPTO TEARS) 0.5 % ophthalmic solution 1 Drop  1 Drop Both Eyes PRN    furosemide (LASIX) tablet 40 mg  40 mg Oral DAILY    lacosamide (VIMPAT) 50 mg in 0.9% sodium chloride 100 mL IVPB  50 mg IntraVENous Q12H    polyethylene glycol (MIRALAX) packet 17 g  17 g Oral DAILY    atenoloL (TENORMIN) tablet 12.5 mg  12.5 mg Oral DAILY    lidocaine (XYLOCAINE) 2 % jelly   Mucous Membrane PRN    hydrALAZINE (APRESOLINE) tablet 100 mg  100 mg Oral TID    amLODIPine (NORVASC) tablet 5 mg  5 mg Oral DAILY    labetaloL (NORMODYNE;TRANDATE) injection 20 mg  20 mg IntraVENous Q6H PRN    levETIRAcetam (KEPPRA) 1,000 mg in 0.9% sodium chloride 100 mL IVPB  1,000 mg IntraVENous Q12H    hydrALAZINE (APRESOLINE) 20 mg/mL injection 20 mg  20 mg IntraVENous Q6H PRN    sodium chloride (NS) flush 5-40 mL  5-40 mL IntraVENous Q8H    sodium chloride (NS) flush 5-40 mL  5-40 mL IntraVENous PRN    polyethylene glycol (MIRALAX) packet 17 g  17 g Oral DAILY PRN    albuterol (PROVENTIL VENTOLIN) nebulizer solution 2.5 mg  2.5 mg Nebulization Q4H PRN    aspirin chewable tablet 81 mg  81 mg Oral DAILY    atorvastatin (LIPITOR) tablet 80 mg  80 mg Oral DAILY    [Held by provider] isosorbide mononitrate ER (IMDUR) tablet 30 mg  30 mg Oral DAILY    sodium chloride (NS) flush 5-40 mL  5-40 mL IntraVENous Q8H    sodium chloride (NS) flush 5-40 mL  5-40 mL IntraVENous PRN    acetaminophen (TYLENOL) tablet 650 mg  650 mg Oral Q6H PRN    Or    acetaminophen (TYLENOL) suppository 650 mg  650 mg Rectal Q6H PRN    promethazine (PHENERGAN) tablet 12.5 mg  12.5 mg Oral Q6H PRN    Or    ondansetron (ZOFRAN) injection 4 mg  4 mg IntraVENous Q6H PRN     ______________________________________________________________________  EXPECTED LENGTH OF STAY: 4d 2h  ACTUAL LENGTH OF STAY:          19                 Mike Zamora MD

## 2021-06-06 LAB
ALBUMIN SERPL-MCNC: 2.5 G/DL (ref 3.5–5)
ANION GAP SERPL CALC-SCNC: 5 MMOL/L (ref 5–15)
BUN SERPL-MCNC: 21 MG/DL (ref 6–20)
BUN/CREAT SERPL: 21 (ref 12–20)
CALCIUM SERPL-MCNC: 8.6 MG/DL (ref 8.5–10.1)
CHLORIDE SERPL-SCNC: 99 MMOL/L (ref 97–108)
CO2 SERPL-SCNC: 30 MMOL/L (ref 21–32)
CREAT SERPL-MCNC: 1 MG/DL (ref 0.7–1.3)
GLUCOSE BLD STRIP.AUTO-MCNC: 145 MG/DL (ref 65–117)
GLUCOSE SERPL-MCNC: 113 MG/DL (ref 65–100)
PHOSPHATE SERPL-MCNC: 3.9 MG/DL (ref 2.6–4.7)
POTASSIUM SERPL-SCNC: 4 MMOL/L (ref 3.5–5.1)
SERVICE CMNT-IMP: ABNORMAL
SODIUM SERPL-SCNC: 134 MMOL/L (ref 136–145)

## 2021-06-06 PROCEDURE — 74011250637 HC RX REV CODE- 250/637: Performed by: FAMILY MEDICINE

## 2021-06-06 PROCEDURE — 74011000258 HC RX REV CODE- 258: Performed by: PSYCHIATRY & NEUROLOGY

## 2021-06-06 PROCEDURE — 65660000001 HC RM ICU INTERMED STEPDOWN

## 2021-06-06 PROCEDURE — 74011250636 HC RX REV CODE- 250/636: Performed by: PSYCHIATRY & NEUROLOGY

## 2021-06-06 PROCEDURE — 80069 RENAL FUNCTION PANEL: CPT

## 2021-06-06 PROCEDURE — 74011250636 HC RX REV CODE- 250/636: Performed by: INTERNAL MEDICINE

## 2021-06-06 PROCEDURE — 74011000258 HC RX REV CODE- 258: Performed by: INTERNAL MEDICINE

## 2021-06-06 PROCEDURE — 74011250637 HC RX REV CODE- 250/637: Performed by: HOSPITALIST

## 2021-06-06 PROCEDURE — 74011250637 HC RX REV CODE- 250/637: Performed by: INTERNAL MEDICINE

## 2021-06-06 PROCEDURE — 36415 COLL VENOUS BLD VENIPUNCTURE: CPT

## 2021-06-06 PROCEDURE — 82962 GLUCOSE BLOOD TEST: CPT

## 2021-06-06 PROCEDURE — C9254 INJECTION, LACOSAMIDE: HCPCS | Performed by: INTERNAL MEDICINE

## 2021-06-06 PROCEDURE — 94640 AIRWAY INHALATION TREATMENT: CPT

## 2021-06-06 PROCEDURE — 94760 N-INVAS EAR/PLS OXIMETRY 1: CPT

## 2021-06-06 PROCEDURE — 74011000250 HC RX REV CODE- 250: Performed by: HOSPITALIST

## 2021-06-06 RX ADMIN — LEVETIRACETAM 1000 MG: 100 INJECTION, SOLUTION INTRAVENOUS at 22:23

## 2021-06-06 RX ADMIN — DULOXETINE HYDROCHLORIDE 30 MG: 30 CAPSULE, DELAYED RELEASE ORAL at 08:07

## 2021-06-06 RX ADMIN — ISOSORBIDE MONONITRATE 30 MG: 60 TABLET, EXTENDED RELEASE ORAL at 08:07

## 2021-06-06 RX ADMIN — Medication 10 ML: at 11:08

## 2021-06-06 RX ADMIN — AMLODIPINE BESYLATE 5 MG: 5 TABLET ORAL at 08:07

## 2021-06-06 RX ADMIN — ASPIRIN 81 MG: 81 TABLET, CHEWABLE ORAL at 08:07

## 2021-06-06 RX ADMIN — ATORVASTATIN CALCIUM 80 MG: 40 TABLET, FILM COATED ORAL at 08:07

## 2021-06-06 RX ADMIN — BUDESONIDE 500 MCG: 0.5 INHALANT RESPIRATORY (INHALATION) at 21:28

## 2021-06-06 RX ADMIN — ARFORMOTEROL TARTRATE 15 MCG: 15 SOLUTION RESPIRATORY (INHALATION) at 09:09

## 2021-06-06 RX ADMIN — HYDRALAZINE HYDROCHLORIDE 100 MG: 50 TABLET, FILM COATED ORAL at 08:07

## 2021-06-06 RX ADMIN — Medication 3 MG: at 21:24

## 2021-06-06 RX ADMIN — HYDRALAZINE HYDROCHLORIDE 100 MG: 50 TABLET, FILM COATED ORAL at 15:09

## 2021-06-06 RX ADMIN — ATENOLOL 12.5 MG: 25 TABLET ORAL at 08:07

## 2021-06-06 RX ADMIN — Medication 10 ML: at 06:07

## 2021-06-06 RX ADMIN — Medication 10 ML: at 21:26

## 2021-06-06 RX ADMIN — LEVETIRACETAM 1000 MG: 100 INJECTION, SOLUTION INTRAVENOUS at 11:08

## 2021-06-06 RX ADMIN — HYDRALAZINE HYDROCHLORIDE 100 MG: 50 TABLET, FILM COATED ORAL at 21:23

## 2021-06-06 RX ADMIN — BUDESONIDE 500 MCG: 0.5 INHALANT RESPIRATORY (INHALATION) at 09:09

## 2021-06-06 RX ADMIN — ARFORMOTEROL TARTRATE 15 MCG: 15 SOLUTION RESPIRATORY (INHALATION) at 21:28

## 2021-06-06 RX ADMIN — FUROSEMIDE 40 MG: 40 TABLET ORAL at 08:07

## 2021-06-06 RX ADMIN — SODIUM CHLORIDE 25 MG: 9 INJECTION, SOLUTION INTRAVENOUS at 02:14

## 2021-06-06 NOTE — PROGRESS NOTES
Bedside and Verbal shift change report given to [address] (oncoming nurse) by Rowdy Tadeo (offgoing nurse). Report included the following information SBAR, Kardex, Cardiac Rhythm NSR and Dual Neuro Assessment.

## 2021-06-06 NOTE — PROGRESS NOTES
Problem: Pressure Injury - Risk of  Goal: *Prevention of pressure injury  Description: Document Aquiles Scale and appropriate interventions in the flowsheet. Outcome: Progressing Towards Goal  Note: Pressure Injury Interventions:  Sensory Interventions: Assess changes in LOC, Assess need for specialty bed, Avoid rigorous massage over bony prominences, Discuss PT/OT consult with provider, Check visual cues for pain, Float heels, Keep linens dry and wrinkle-free, Maintain/enhance activity level, Minimize linen layers, Monitor skin under medical devices, Pad between skin to skin    Moisture Interventions: Absorbent underpads, Apply protective barrier, creams and emollients, Assess need for specialty bed, Check for incontinence Q2 hours and as needed, Limit adult briefs    Activity Interventions: Increase time out of bed, Pressure redistribution bed/mattress(bed type), PT/OT evaluation    Mobility Interventions: Float heels, HOB 30 degrees or less, Pressure redistribution bed/mattress (bed type), PT/OT evaluation    Nutrition Interventions: Offer support with meals,snacks and hydration    Friction and Shear Interventions: Foam dressings/transparent film/skin sealants, HOB 30 degrees or less, Lift sheet, Lift team/patient mobility team, Sit at 90-degree angle, Transfer aides:transfer board/[place] lift/ceiling lift, Transferring/repositioning devices                Problem: Patient Education: Go to Patient Education Activity  Goal: Patient/Family Education  Outcome: Progressing Towards Goal     Problem: Patient Education: Go to Patient Education Activity  Goal: Patient/Family Education  Outcome: Progressing Towards Goal     Problem: Falls - Risk of  Goal: *Absence of Falls  Description: Document Too Fall Risk and appropriate interventions in the flowsheet.   Outcome: Progressing Towards Goal  Note: Fall Risk Interventions:  Mobility Interventions: Bed/chair exit alarm, Communicate number of staff needed for ambulation/transfer, OT consult for ADLs, Patient to call before getting OOB, PT Consult for mobility concerns, PT Consult for assist device competence, Strengthening exercises (ROM-active/passive)    Mentation Interventions: Adequate sleep, hydration, pain control, Bed/chair exit alarm, Door open when patient unattended, Evaluate medications/consider consulting pharmacy, Eyeglasses and hearing aids, Increase mobility, More frequent rounding, Reorient patient, Toileting rounds, Update white board    Medication Interventions: Bed/chair exit alarm, Evaluate medications/consider consulting pharmacy, Patient to call before getting OOB, Teach patient to arise slowly    Elimination Interventions: Bed/chair exit alarm, Call light in reach, Elevated toilet seat, Patient to call for help with toileting needs, Stay With Me (per policy)    History of Falls Interventions: Bed/chair exit alarm, Consult care management for discharge planning         Problem: Patient Education: Go to Patient Education Activity  Goal: Patient/Family Education  Outcome: Progressing Towards Goal     Problem: Patient Education: Go to Patient Education Activity  Goal: Patient/Family Education  Outcome: Progressing Towards Goal     Problem: Patient Education: Go to Patient Education Activity  Goal: Patient/Family Education  Outcome: Progressing Towards Goal     Problem: Seizure Disorder (Adult)  Goal: *STG: Remains free of seizure activity  Outcome: Progressing Towards Goal  Goal: *STG: Maintains lab values within therapeutic range  Outcome: Progressing Towards Goal  Goal: *STG/LTG: Complies with medication therapy  Outcome: Progressing Towards Goal  Goal: *STG: Remains free of injury during seizure activity  Outcome: Progressing Towards Goal  Goal: *STG: Remains safe in hospital  Outcome: Progressing Towards Goal  Goal: Interventions  Outcome: Progressing Towards Goal     Problem: Patient Education: Go to Patient Education Activity  Goal: Patient/Family Education  Outcome: Progressing Towards Goal     Problem: Patient Education: Go to Patient Education Activity  Goal: Patient/Family Education  Outcome: Progressing Towards Goal     Problem: TIA/CVA Stroke: 0-24 hours  Goal: Off Pathway (Use only if patient is Off Pathway)  Outcome: Progressing Towards Goal  Goal: Activity/Safety  Outcome: Progressing Towards Goal  Goal: Consults, if ordered  Outcome: Progressing Towards Goal  Goal: Diagnostic Test/Procedures  Outcome: Progressing Towards Goal  Goal: Nutrition/Diet  Outcome: Progressing Towards Goal  Goal: Discharge Planning  Outcome: Progressing Towards Goal  Goal: Medications  Outcome: Progressing Towards Goal  Goal: Respiratory  Outcome: Progressing Towards Goal  Goal: Treatments/Interventions/Procedures  Outcome: Progressing Towards Goal  Goal: Minimize risk of bleeding post-thrombolytic infusion  Outcome: Progressing Towards Goal  Goal: Monitor for complications post-thrombolytic infusion  Outcome: Progressing Towards Goal  Goal: Psychosocial  Outcome: Progressing Towards Goal  Goal: *Hemodynamically stable  Outcome: Progressing Towards Goal  Goal: *Neurologically stable  Description: Absence of additional neurological deficits    Outcome: Progressing Towards Goal  Goal: *Verbalizes anxiety and depression are reduced or absent  Outcome: Progressing Towards Goal  Goal: *Absence of Signs of Aspiration on Current Diet  Outcome: Progressing Towards Goal  Goal: *Absence of deep venous thrombosis signs and symptoms(Stroke Metric)  Outcome: Progressing Towards Goal  Goal: *Ability to perform ADLs and demonstrates progressive mobility and function  Outcome: Progressing Towards Goal  Goal: *Stroke education started(Stroke Metric)  Outcome: Progressing Towards Goal  Goal: *Dysphagia screen performed(Stroke Metric)  Outcome: Progressing Towards Goal  Goal: *Rehab consulted(Stroke Metric)  Outcome: Progressing Towards Goal     Problem: TIA/CVA Stroke: Day 2 Until Discharge  Goal: Off Pathway (Use only if patient is Off Pathway)  Outcome: Progressing Towards Goal  Goal: Activity/Safety  Outcome: Progressing Towards Goal  Goal: Diagnostic Test/Procedures  Outcome: Progressing Towards Goal  Goal: Nutrition/Diet  Outcome: Progressing Towards Goal  Goal: Discharge Planning  Outcome: Progressing Towards Goal  Goal: Medications  Outcome: Progressing Towards Goal  Goal: Respiratory  Outcome: Progressing Towards Goal  Goal: Treatments/Interventions/Procedures  Outcome: Progressing Towards Goal  Goal: Psychosocial  Outcome: Progressing Towards Goal  Goal: *Verbalizes anxiety and depression are reduced or absent  Outcome: Progressing Towards Goal  Goal: *Absence of aspiration  Outcome: Progressing Towards Goal  Goal: *Absence of deep venous thrombosis signs and symptoms(Stroke Metric)  Outcome: Progressing Towards Goal  Goal: *Optimal pain control at patient's stated goal  Outcome: Progressing Towards Goal  Goal: *Tolerating diet  Outcome: Progressing Towards Goal  Goal: *Ability to perform ADLs and demonstrates progressive mobility and function  Outcome: Progressing Towards Goal  Goal: *Stroke education continued(Stroke Metric)  Outcome: Progressing Towards Goal     Problem: Ischemic Stroke: Discharge Outcomes  Goal: *Verbalizes anxiety and depression are reduced or absent  Outcome: Progressing Towards Goal  Goal: *Verbalize understanding of risk factor modification(Stroke Metric)  Outcome: Progressing Towards Goal  Goal: *Hemodynamically stable  Outcome: Progressing Towards Goal  Goal: *Absence of aspiration pneumonia  Outcome: Progressing Towards Goal  Goal: *Aware of needed dietary changes  Outcome: Progressing Towards Goal  Goal: *Verbalize understanding of prescribed medications including anti-coagulants, anti-lipid, and/or anti-platelets(Stroke Metric)  Outcome: Progressing Towards Goal  Goal: *Tolerating diet  Outcome: Progressing Towards Goal  Goal: *Aware of follow-up diagnostics related to anticoagulants  Outcome: Progressing Towards Goal  Goal: *Ability to perform ADLs and demonstrates progressive mobility and function  Outcome: Progressing Towards Goal  Goal: *Absence of DVT(Stroke Metric)  Outcome: Progressing Towards Goal  Goal: *Absence of aspiration  Outcome: Progressing Towards Goal  Goal: *Optimal pain control at patient's stated goal  Outcome: Progressing Towards Goal  Goal: *Home safety concerns addressed  Outcome: Progressing Towards Goal  Goal: *Describes available resources and support systems  Outcome: Progressing Towards Goal  Goal: *Verbalizes understanding of activation of EMS(911) for stroke symptoms(Stroke Metric)  Outcome: Progressing Towards Goal  Goal: *Understands and describes signs and symptoms to report to providers(Stroke Metric)  Outcome: Progressing Towards Goal  Goal: *Neurolgocially stable (absence of additional neurological deficits)  Outcome: Progressing Towards Goal  Goal: *Verbalizes importance of follow-up with primary care physician(Stroke Metric)  Outcome: Progressing Towards Goal  Goal: *Smoking cessation discussed,if applicable(Stroke Metric)  Outcome: Progressing Towards Goal  Goal: *Depression screening completed(Stroke Metric)  Outcome: Progressing Towards Goal

## 2021-06-06 NOTE — PROGRESS NOTES
[address] [facility] Adult  Hospitalist Group                                                                                          Hospitalist Progress Note  Riddhi Nuñez MD  Answering service: [phone] from in house phone        Date of Service:  2021  NAME:  Rashawn Guadarrama  :  1934  MRN:  [id number]      Admission Summary:   Thomas Rush is a 80 y. o. male with pmh of CVA, intraventricular brain hemorrhage, dyslipidemia, CKD 3, hypertension who presents to hospital from subacute rehab for concerns of altered/decreased mental status. Patient was found to have multiple ischemic CVAs on MRI, continue to have altered mental status with some eye deviation and twitching on .  This was concerning for seizure, given 2 mg of Ativan and loaded with Keppra with some improvement.  On  patient however declined, unresponsive to verbal stimuli.  Stat EEG 24-hour was ordered and showed subclinical status.  He was loaded with Vimpat, and neurology recommends transfer to ICU for closer neuro checks on . Seizures were controlled and  Pt transferred out of ICU on . Interval history / Subjective: Follow up AMS, status epilepticus. Hard of hearing. Patient seen and examined at the bedside. Labs, images and notes reviewed  Discussed with nursing staff, orders reviewed. Plan discussed with patient/Family  Sleepy. Did not sleep well last night per team. No other concerns. D/w neurology team. Shellie Mendez today and neurology team to evaluate tomorrow. No fever, chills. No other concerns. Assessment & Plan:     Status epilepticus -resolved  -On Mamie Kelly  -Neurology on board. Appreciate  -Will DC Vimpat as OK'd by neurology 6/6 AM.   -Neurology following-vimpate dose reduced -50 mg BID on .   -Daughter discussed with neurology about cutting back on dose further.   Neurology planning to cut back to 25 mg twice daily vs discontinue altogether for Vimpat.  -Seizure precautions   -EEG results 6/1 noted. D/w daughter.  -Neurology may consider repeat EEG after discontinuation of Vimpat. Will follow neurology recommendations     Acute embolic infarcts  Subacute hematoma -intraventricular hemorrhage with ventriculomegaly on recent admission, discharged 5/3  -Not a candidate for anticoagulation due to intracranial hemorrhage, and fall risk  -Neurology following  -No escalation of antiplatelets due to ICH, continue aspirin  -Continue atorvastatin  -PT/OT/speech  -Echo with normal EF      Acute Metabolic encephalopathy, improving  multifactorial due to above  -Follow closely off Vimpat now  -Continue close monitoring      Community-acquired pneumonia-resolved      Hypertension Bp controlled,  C/w hydralazine, amlodipine.     Hyperlipidemiacontinue Lipitor     Hx TESSA  Per daughter,he never liked CPAP so he did not use   CPAP not tolerated in past. ABG noted     H/o chronic LE edema due to venous congestion  Ankle edema -dependent edema with immobility  Resumed lasix 40 mg 6/2. Continue and monitor BMP   Hypernatremia - resolved      Dysphagia  -Has dubhoff, on TFs.   Changed to nightly feeds  -passed swallow eval 6/1  -Diet modified to small bite-size   -Still continue to remain poor PO intake     RUE swelling- venous duplex negative     Appreciate Palliative care input       Code status: DNR  DVT prophylaxis: SCDs    Care Plan discussed with: Patient/Family and Nurse  Anticipated Disposition: SAH/Rehab  Anticipated Discharge: Greater than 48 hours     Hospital Problems  Date Reviewed: 9/21/2020        Codes Class Noted POA    Cerebrovascular accident (CVA) due to embolism ([facility] [address] ICD-10-CM: I63.9  ICD-9-CM: 434.11  5/29/2021 Unknown        Status epilepticus ([facility] [address] ICD-10-CM: G40.901  ICD-9-CM: 345.3  5/29/2021 Unknown        Encephalopathy ICD-10-CM: G93.40  ICD-9-CM: 348.30  5/29/2021 Unknown                Review of Systems:   A comprehensive review of systems was negative except for that written in the HPI. Vital Signs:    Last 24hrs VS reviewed since prior progress note. Most recent are:  Visit Vitals  /64   Pulse 80   Temp 98.7 °F (37.1 °C)   Resp 20   Ht 5' 8\" (1.727 m)   Wt 99.9 kg (220 lb 3.2 oz)   SpO2 90%   BMI 33.48 kg/m²         Intake/Output Summary (Last 24 hours) at 6/6/2021 3815  Last data filed at 6/6/2021 0000  Gross per 24 hour   Intake 200 ml   Output    Net 200 ml        Physical Examination:     I had a face to face encounter with this patient and independently examined them on 6/6/2021 as outlined below:          Gen: elderly patient. Sleeping comfortably. Arousable and appropriately responding. HEENT: anicteric sclerae, normal conjunctiva, NG tube   Neck: supple, trachea midline  Heart: RRR, no MRG, no JVD,  Lungs: CTAB, no RRW. Abd: soft,non tender,non distended  Extr: he has 2+ LE edema mainly at ankles-has chronic venous insufficnecny, UE edema  Skin: dry, no rash  Neuro: awake,alert. No focal deficit noted         Data Review:    Review and/or order of clinical lab test  Review and/or order of tests in the radiology section of CPT  Review and/or order of tests in the medicine section of CPT    XR INSERT LONG FEED TUBE    Result Date: 5/26/2021  Successful placement of the Dobbhoff tube in the distal duodenum/proximal jejunum at the ligament of Treitz. MRI BRAIN WO CONT    Result Date: 5/19/2021  1. Few small scattered acute infarcts in the bilateral frontal lobes and parieto-occipital lobes, right greater than left. 2. 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 3. Unchanged generalized parenchymal volume loss and severe chronic microvascular ischemic disease.      CT HEAD WO CONT    Result Date: 5/24/2021  No significant interval change. CT HEAD WO CONT    Result Date: 5/16/2021  Significantly limited by motion. No acute intracranial abnormality     CTA HEAD    Result Date: 5/19/2021  1. Heterogeneous plaque in the proximal right internal carotid artery without hemodynamically significant stenosis. 2.  No significant left carotid artery stenosis. 3.  No large vessel occlusion. 4.  40 mm main pulmonary artery suggestive of pulmonary arterial hypertension. CTA NECK    Result Date: 5/19/2021  1. Heterogeneous plaque in the proximal right internal carotid artery without hemodynamically significant stenosis. 2.  No significant left carotid artery stenosis. 3.  No large vessel occlusion. 4.  40 mm main pulmonary artery suggestive of pulmonary arterial hypertension. XR CHEST PORT    Result Date: 5/29/2021  Stable nonspecific left lower airspace disease. No pulmonary edema. XR CHEST PORT    Result Date: 5/25/2021  1. Low lung volumes with mild basilar atelectasis in left lower lobe. XR CHEST PORT    Result Date: 5/22/2021  Improved aeration in the left lung base with minimal residual airspace opacity    XR CHEST PORT    Result Date: 5/16/2021  Patchy left basilar airspace opacity which may represent pneumonia or atelectasis with a small left pleural effusion. XR ABD PORT  1 V    Result Date: 5/24/2021  Intragastric Dobbhoff tube with slight interval advancement. XR ABD PORT  1 V    Result Date: 5/24/2021  Feeding tube tip projects over the fundus and should be advanced. XR ABD PORT  1 V    Result Date: 5/23/2021  Gastric tube tip is in appropriate position for use.        Labs:     Recent Labs     06/05/21  0205   WBC 7.9   HGB 10.8*   HCT 33.7*        Recent Labs     06/06/21  0209 06/05/21  0205 06/04/21  0401   * 134*  134* 137   K 4.0 4.4  4.4 4.1   CL 99 100  99 102   CO2 30 32  32 29   BUN 21* 19  19 16   CREA 1.00 0.93  0.95 0.87   * 107*  106* 146*   CA 8.6 8.8 8.7 8.2*   PHOS 3.9 3.4 2.5*     Recent Labs     06/06/21  0209 06/05/21  0205 06/04/21  0401   ALT  --  34  --    AP  --  97  --    TBILI  --  0.2  --    TP  --  6.0*  --    ALB 2.5* 2.4*  2.4* 2.2*   GLOB  --  3.6  --      No results for input(s): INR, PTP, APTT, INREXT, INREXT in the last 72 hours. No results for input(s): FE, TIBC, PSAT, FERR in the last 72 hours. No results found for: FOL, RBCF   No results for input(s): PH, PCO2, PO2 in the last 72 hours. No results for input(s): CPK, CKNDX, TROIQ in the last 72 hours.     No lab exists for component: CPKMB  Lab Results   Component Value Date/Time    Cholesterol, total 144 04/19/2021 12:59 AM    HDL Cholesterol 33 04/19/2021 12:59 AM    LDL, calculated 74.8 04/19/2021 12:59 AM    Triglyceride 181 (H) 04/19/2021 12:59 AM    CHOL/HDL Ratio 4.4 04/19/2021 12:59 AM     Lab Results   Component Value Date/Time    Glucose (POC) 145 (H) 06/06/2021 06:09 AM    Glucose (POC) 139 (H) 06/03/2021 05:19 PM    Glucose (POC) 141 (H) 06/03/2021 11:59 AM    Glucose (POC) 133 (H) 06/02/2021 06:48 PM    Glucose (POC) 133 (H) 06/02/2021 11:38 AM     Lab Results   Component Value Date/Time    Color YELLOW/STRAW 05/16/2021 09:33 PM    Appearance CLEAR 05/16/2021 09:33 PM    Specific gravity 1.014 05/16/2021 09:33 PM    Specific gravity 1.015 09/21/2020 09:13 AM    pH (UA) 5.0 05/16/2021 09:33 PM    Protein Negative 05/16/2021 09:33 PM    Glucose Negative 05/16/2021 09:33 PM    Ketone Negative 05/16/2021 09:33 PM    Bilirubin Negative 05/16/2021 09:33 PM    Urobilinogen 0.2 05/16/2021 09:33 PM    Nitrites Negative 05/16/2021 09:33 PM    Leukocyte Esterase Negative 05/16/2021 09:33 PM    Epithelial cells FEW 05/16/2021 09:33 PM    Bacteria Negative 05/16/2021 09:33 PM    WBC 0-4 05/16/2021 09:33 PM    RBC 0-5 05/16/2021 09:33 PM         Medications Reviewed:     Current Facility-Administered Medications   Medication Dose Route Frequency    melatonin tablet 3 mg  3 mg Oral QHS  arformoteroL (BROVANA) neb solution 15 mcg  15 mcg Nebulization BID RT    And    budesonide (PULMICORT) 500 mcg/2 ml nebulizer suspension  500 mcg Nebulization BID RT    DULoxetine (CYMBALTA) capsule 30 mg  30 mg Oral DAILY    hydroxypropyl methylcellulose (ISOPTO TEARS) 0.5 % ophthalmic solution 1 Drop  1 Drop Both Eyes PRN    furosemide (LASIX) tablet 40 mg  40 mg Oral DAILY    polyethylene glycol (MIRALAX) packet 17 g  17 g Oral DAILY    atenoloL (TENORMIN) tablet 12.5 mg  12.5 mg Oral DAILY    lidocaine (XYLOCAINE) 2 % jelly   Mucous Membrane PRN    hydrALAZINE (APRESOLINE) tablet 100 mg  100 mg Oral TID    amLODIPine (NORVASC) tablet 5 mg  5 mg Oral DAILY    labetaloL (NORMODYNE;TRANDATE) injection 20 mg  20 mg IntraVENous Q6H PRN    levETIRAcetam (KEPPRA) 1,000 mg in 0.9% sodium chloride 100 mL IVPB  1,000 mg IntraVENous Q12H    hydrALAZINE (APRESOLINE) 20 mg/mL injection 20 mg  20 mg IntraVENous Q6H PRN    sodium chloride (NS) flush 5-40 mL  5-40 mL IntraVENous Q8H    sodium chloride (NS) flush 5-40 mL  5-40 mL IntraVENous PRN    polyethylene glycol (MIRALAX) packet 17 g  17 g Oral DAILY PRN    albuterol (PROVENTIL VENTOLIN) nebulizer solution 2.5 mg  2.5 mg Nebulization Q4H PRN    aspirin chewable tablet 81 mg  81 mg Oral DAILY    atorvastatin (LIPITOR) tablet 80 mg  80 mg Oral DAILY    isosorbide mononitrate ER (IMDUR) tablet 30 mg  30 mg Oral DAILY    sodium chloride (NS) flush 5-40 mL  5-40 mL IntraVENous Q8H    sodium chloride (NS) flush 5-40 mL  5-40 mL IntraVENous PRN    acetaminophen (TYLENOL) tablet 650 mg  650 mg Oral Q6H PRN    Or    acetaminophen (TYLENOL) suppository 650 mg  650 mg Rectal Q6H PRN    promethazine (PHENERGAN) tablet 12.5 mg  12.5 mg Oral Q6H PRN    Or    ondansetron (ZOFRAN) injection 4 mg  4 mg IntraVENous Q6H PRN     ______________________________________________________________________  EXPECTED LENGTH OF STAY: 4d 2h  ACTUAL LENGTH OF STAY: [address], [place]

## 2021-06-06 NOTE — ROUTINE PROCESS
Bedside shift change report given to [address] [place] [address] (oncoming nurse) by Destiny Savage RN (offgoing nurse). Report included the following information SBAR, Kardex, ED Summary, Procedure Summary, Intake/Output, MAR, Recent Results, Cardiac Rhythm NSR, Quality Measures and Dual Neuro Assessment.

## 2021-06-06 NOTE — PROGRESS NOTES
[address] [facility] Adult  Hospitalist Group                                                                                          Hospitalist Progress Note  Joyce Ruiz MD  Answering service: [phone] from in house phone        Date of Service:  2021  NAME:  Brigitte Rosa  :  1934  MRN:  [id number]      Admission Summary:   Sanjeev Rush is a 80 y. o. male with pmh of CVA, intraventricular brain hemorrhage, dyslipidemia, CKD 3, hypertension who presents to hospital from subacute rehab for concerns of altered/decreased mental status. Patient was found to have multiple ischemic CVAs on MRI, continue to have altered mental status with some eye deviation and twitching on .  This was concerning for seizure, given 2 mg of Ativan and loaded with Keppra with some improvement.  On  patient however declined, unresponsive to verbal stimuli.  Stat EEG 24-hour was ordered and showed subclinical status.  He was loaded with Vimpat, and neurology recommends transfer to ICU for closer neuro checks on . Seizures were controlled and  Pt transferred out of ICU on . Interval history / Subjective: Follow up AMS, status epilepticus. Hard of hearing. Patient seen and examined at the bedside. Labs, images and notes reviewed  Discussed with nursing staff, orders reviewed. Plan discussed with patient/Family  Feeling okay. Rested well. No overnight events or concerns. Did eat breakfast few bites per nursing team.     Assessment & Plan:     Status epilepticus -resolved  -Continue Amna Dias  -Neurology following-vimpate dose reduced -50 mg BID on .   -Daughter discussed with neurology about cutting back on dose further. Neurology planning to cut back to 25 mg twice daily vs discontinue altogether for Vimpat.  -Seizure precautions   -EEG results  noted. D/w daughter.  -Neurology may consider repeat EEG after change in the dosage of Vimpat. No changes at present. Awaiting neurology update.     Acute embolic infarcts  Subacute hematoma -intraventricular hemorrhage with ventriculomegaly on recent admission, discharged 5/3  -Not a candidate for anticoagulation due to intracranial hemorrhage, and fall risk  -Neurology following  -No escalation of antiplatelets due to ICH, continue aspirin  -Continue atorvastatin  -PT/OT/speech  -Echo with normal EF      Acute Metabolic encephalopathy -   multifactorial due to above  He is awake,alert mostly in the afternoon,answering few questions  -Patient to get PT/OT   -Follow diet tolerance with modified as Mechanical soft and Chopped and not pureed. -IF tolerated PO well, DC NGT/DH tube.      Community-acquired pneumonia-resolved      Hypertension Bp controlled,  C/w hydralazine, amlodipine.     Hyperlipidemiacontinue Lipitor     Hx TESSA  Per daughter,he never liked CPAP so he did not use   CPAP not tolerated in past. ABG noted     H/o chronic LE edema due to venous congestion  Ankle edema -dependent edema with immobility  Resumed lasix 40 mg 6/2. Continue and monitor BMP   Hypernatremia - resolved      Dysphagia  -Has dubjoelleff, on TFs. Change to nightly feeds  -passed swallow eval 6/1  -Diet modified to small bite-size      RUE swelling- venous duplex negative     Appreciate Palliative care input       Code status: DNR  DVT prophylaxis: SCDs    Care Plan discussed with: Patient/Family and Nurse  Anticipated Disposition: SAH/Rehab  Anticipated Discharge: Greater than 48 hours     Hospital Problems  Date Reviewed: 9/21/2020        Codes Class Noted POA    Cerebrovascular accident (CVA) due to embolism ([facility] [address] ICD-10-CM: I63.9  ICD-9-CM: 434.11  5/29/2021 Unknown        Status epilepticus ([facility] [address] ICD-10-CM: G40.901  ICD-9-CM: 345.3  5/29/2021 Unknown        Encephalopathy ICD-10-CM: G93.40  ICD-9-CM: 348.30  5/29/2021 Unknown                Review of Systems:   A comprehensive review of systems was negative except for that written in the HPI. Vital Signs:    Last 24hrs VS reviewed since prior progress note. Most recent are:  Visit Vitals  BP (!) 156/94 (BP 1 Location: Left upper arm, BP Patient Position: At rest)   Pulse 85   Temp 96.8 °F (36 °C)   Resp 19   Ht 5' 8\" (1.727 m)   Wt 101 kg (222 lb 11.2 oz)   SpO2 95%   BMI 33.86 kg/m²         Intake/Output Summary (Last 24 hours) at 6/5/2021 2332  Last data filed at 6/5/2021 0400  Gross per 24 hour   Intake 200 ml   Output    Net 200 ml        Physical Examination:     I had a face to face encounter with this patient and independently examined them on 6/5/2021 as outlined below:          Gen: elderly patient. Alert, awake. Comfortable. Answering appropriately. Following commands. HEENT: anicteric sclerae, normal conjunctiva, NG tube   Neck: supple, trachea midline  Heart: RRR, no MRG, no JVD,  Lungs: CTAB, no RRW. Abd: soft,non tender,non distended  Extr: he has 2+ LE edema mainly at ankles-has chronic venous insufficnecny, UE edema  Skin: dry, no rash  Neuro: awake,alert. No focal deficit noted         Data Review:    Review and/or order of clinical lab test  Review and/or order of tests in the radiology section of CPT  Review and/or order of tests in the medicine section of CPT    XR INSERT LONG FEED TUBE    Result Date: 5/26/2021  Successful placement of the Dobbhoff tube in the distal duodenum/proximal jejunum at the ligament of Treitz. MRI BRAIN WO CONT    Result Date: 5/19/2021  1. Few small scattered acute infarcts in the bilateral frontal lobes and parieto-occipital lobes, right greater than left. 2. 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 3. Unchanged generalized parenchymal volume loss and severe chronic microvascular ischemic disease.      CT HEAD WO CONT    Result Date: 5/24/2021  No significant interval change. CT HEAD WO CONT    Result Date: 5/16/2021  Significantly limited by motion. No acute intracranial abnormality     CTA HEAD    Result Date: 5/19/2021  1. Heterogeneous plaque in the proximal right internal carotid artery without hemodynamically significant stenosis. 2.  No significant left carotid artery stenosis. 3.  No large vessel occlusion. 4.  40 mm main pulmonary artery suggestive of pulmonary arterial hypertension. CTA NECK    Result Date: 5/19/2021  1. Heterogeneous plaque in the proximal right internal carotid artery without hemodynamically significant stenosis. 2.  No significant left carotid artery stenosis. 3.  No large vessel occlusion. 4.  40 mm main pulmonary artery suggestive of pulmonary arterial hypertension. XR CHEST PORT    Result Date: 5/29/2021  Stable nonspecific left lower airspace disease. No pulmonary edema. XR CHEST PORT    Result Date: 5/25/2021  1. Low lung volumes with mild basilar atelectasis in left lower lobe. XR CHEST PORT    Result Date: 5/22/2021  Improved aeration in the left lung base with minimal residual airspace opacity    XR CHEST PORT    Result Date: 5/16/2021  Patchy left basilar airspace opacity which may represent pneumonia or atelectasis with a small left pleural effusion. XR ABD PORT  1 V    Result Date: 5/24/2021  Intragastric Dobbhoff tube with slight interval advancement. XR ABD PORT  1 V    Result Date: 5/24/2021  Feeding tube tip projects over the fundus and should be advanced. XR ABD PORT  1 V    Result Date: 5/23/2021  Gastric tube tip is in appropriate position for use.        Labs:     Recent Labs     06/05/21  0205   WBC 7.9   HGB 10.8*   HCT 33.7*        Recent Labs     06/05/21  0205 06/04/21  0401 06/03/21  0551   *  134* 137 136   K 4.4  4.4 4.1 4.1     99 102 101   CO2 32  32 29 30   BUN 19  19 16 16   CREA 0.93  0.95 0.87 0.85   GLU 107*  106* 146* 136*   CA 8.8  8.7 8.2* 8.2*   PHOS 3.4 2.5* 2.7     Recent Labs     06/05/21  0205 06/04/21  0401 06/03/21  0551   ALT 34  --   --    AP 97  --   --    TBILI 0.2  --   --    TP 6.0*  --   --    ALB 2.4*  2.4* 2.2* 2.3*   GLOB 3.6  --   --      No results for input(s): INR, PTP, APTT, INREXT, INREXT in the last 72 hours. No results for input(s): FE, TIBC, PSAT, FERR in the last 72 hours. No results found for: FOL, RBCF   No results for input(s): PH, PCO2, PO2 in the last 72 hours. No results for input(s): CPK, CKNDX, TROIQ in the last 72 hours.     No lab exists for component: CPKMB  Lab Results   Component Value Date/Time    Cholesterol, total 144 04/19/2021 12:59 AM    HDL Cholesterol 33 04/19/2021 12:59 AM    LDL, calculated 74.8 04/19/2021 12:59 AM    Triglyceride 181 (H) 04/19/2021 12:59 AM    CHOL/HDL Ratio 4.4 04/19/2021 12:59 AM     Lab Results   Component Value Date/Time    Glucose (POC) 139 (H) 06/03/2021 05:19 PM    Glucose (POC) 141 (H) 06/03/2021 11:59 AM    Glucose (POC) 133 (H) 06/02/2021 06:48 PM    Glucose (POC) 133 (H) 06/02/2021 11:38 AM    Glucose (POC) 148 (H) 06/02/2021 06:31 AM     Lab Results   Component Value Date/Time    Color YELLOW/STRAW 05/16/2021 09:33 PM    Appearance CLEAR 05/16/2021 09:33 PM    Specific gravity 1.014 05/16/2021 09:33 PM    Specific gravity 1.015 09/21/2020 09:13 AM    pH (UA) 5.0 05/16/2021 09:33 PM    Protein Negative 05/16/2021 09:33 PM    Glucose Negative 05/16/2021 09:33 PM    Ketone Negative 05/16/2021 09:33 PM    Bilirubin Negative 05/16/2021 09:33 PM    Urobilinogen 0.2 05/16/2021 09:33 PM    Nitrites Negative 05/16/2021 09:33 PM    Leukocyte Esterase Negative 05/16/2021 09:33 PM    Epithelial cells FEW 05/16/2021 09:33 PM    Bacteria Negative 05/16/2021 09:33 PM    WBC 0-4 05/16/2021 09:33 PM    RBC 0-5 05/16/2021 09:33 PM         Medications Reviewed:     Current Facility-Administered Medications   Medication Dose Route Frequency    lacosamide (VIMPAT) 25 mg in 0.9% sodium chloride 100 mL IVPB  25 mg IntraVENous Q12H    melatonin tablet 3 mg  3 mg Oral QHS    arformoteroL (BROVANA) neb solution 15 mcg  15 mcg Nebulization BID RT    And    budesonide (PULMICORT) 500 mcg/2 ml nebulizer suspension  500 mcg Nebulization BID RT    DULoxetine (CYMBALTA) capsule 30 mg  30 mg Oral DAILY    hydroxypropyl methylcellulose (ISOPTO TEARS) 0.5 % ophthalmic solution 1 Drop  1 Drop Both Eyes PRN    furosemide (LASIX) tablet 40 mg  40 mg Oral DAILY    polyethylene glycol (MIRALAX) packet 17 g  17 g Oral DAILY    atenoloL (TENORMIN) tablet 12.5 mg  12.5 mg Oral DAILY    lidocaine (XYLOCAINE) 2 % jelly   Mucous Membrane PRN    hydrALAZINE (APRESOLINE) tablet 100 mg  100 mg Oral TID    amLODIPine (NORVASC) tablet 5 mg  5 mg Oral DAILY    labetaloL (NORMODYNE;TRANDATE) injection 20 mg  20 mg IntraVENous Q6H PRN    levETIRAcetam (KEPPRA) 1,000 mg in 0.9% sodium chloride 100 mL IVPB  1,000 mg IntraVENous Q12H    hydrALAZINE (APRESOLINE) 20 mg/mL injection 20 mg  20 mg IntraVENous Q6H PRN    sodium chloride (NS) flush 5-40 mL  5-40 mL IntraVENous Q8H    sodium chloride (NS) flush 5-40 mL  5-40 mL IntraVENous PRN    polyethylene glycol (MIRALAX) packet 17 g  17 g Oral DAILY PRN    albuterol (PROVENTIL VENTOLIN) nebulizer solution 2.5 mg  2.5 mg Nebulization Q4H PRN    aspirin chewable tablet 81 mg  81 mg Oral DAILY    atorvastatin (LIPITOR) tablet 80 mg  80 mg Oral DAILY    isosorbide mononitrate ER (IMDUR) tablet 30 mg  30 mg Oral DAILY    sodium chloride (NS) flush 5-40 mL  5-40 mL IntraVENous Q8H    sodium chloride (NS) flush 5-40 mL  5-40 mL IntraVENous PRN    acetaminophen (TYLENOL) tablet 650 mg  650 mg Oral Q6H PRN    Or    acetaminophen (TYLENOL) suppository 650 mg  650 mg Rectal Q6H PRN    promethazine (PHENERGAN) tablet 12.5 mg  12.5 mg Oral Q6H PRN    Or    ondansetron (ZOFRAN) injection 4 mg  4 mg IntraVENous Q6H PRN ______________________________________________________________________  EXPECTED LENGTH OF STAY: 4d 2h  ACTUAL LENGTH OF STAY:          20                 Tylor Arredondo MD

## 2021-06-06 NOTE — PROGRESS NOTES
Problem: Pressure Injury - Risk of  Goal: *Prevention of pressure injury  Description: Document Aquiles Scale and appropriate interventions in the flowsheet. Outcome: Progressing Towards Goal  Note: Pressure Injury Interventions:  Sensory Interventions: Assess changes in LOC    Moisture Interventions: Absorbent underpads, Internal/External urinary devices    Activity Interventions: Pressure redistribution bed/mattress(bed type)    Mobility Interventions: Pressure redistribution bed/mattress (bed type)    Nutrition Interventions: Document food/fluid/supplement intake    Friction and Shear Interventions: Apply protective barrier, creams and emollients, Lift sheet                Problem: Falls - Risk of  Goal: *Absence of Falls  Description: Document Too Fall Risk and appropriate interventions in the flowsheet.   Outcome: Progressing Towards Goal  Note: Fall Risk Interventions:  Mobility Interventions: Communicate number of staff needed for ambulation/transfer, OT consult for ADLs, PT Consult for mobility concerns    Mentation Interventions: Adequate sleep, hydration, pain control, More frequent rounding, Reorient patient    Medication Interventions: Evaluate medications/consider consulting pharmacy    Elimination Interventions: Bed/chair exit alarm, Patient to call for help with toileting needs    History of Falls Interventions: Bed/chair exit alarm, Door open when patient unattended         Problem: TIA/CVA Stroke: Day 2 Until Discharge  Goal: Activity/Safety  Outcome: Progressing Towards Goal  Goal: Diagnostic Test/Procedures  Outcome: Progressing Towards Goal  Goal: Nutrition/Diet  Outcome: Progressing Towards Goal  Goal: Medications  Outcome: Progressing Towards Goal  Goal: Treatments/Interventions/Procedures  Outcome: Progressing Towards Goal

## 2021-06-07 LAB
ALBUMIN SERPL-MCNC: 2.6 G/DL (ref 3.5–5)
ANION GAP SERPL CALC-SCNC: 4 MMOL/L (ref 5–15)
BUN SERPL-MCNC: 20 MG/DL (ref 6–20)
BUN/CREAT SERPL: 20 (ref 12–20)
CALCIUM SERPL-MCNC: 8.5 MG/DL (ref 8.5–10.1)
CHLORIDE SERPL-SCNC: 100 MMOL/L (ref 97–108)
CO2 SERPL-SCNC: 30 MMOL/L (ref 21–32)
CREAT SERPL-MCNC: 0.98 MG/DL (ref 0.7–1.3)
GLUCOSE SERPL-MCNC: 114 MG/DL (ref 65–100)
PHOSPHATE SERPL-MCNC: 3.6 MG/DL (ref 2.6–4.7)
POTASSIUM SERPL-SCNC: 4.1 MMOL/L (ref 3.5–5.1)
SODIUM SERPL-SCNC: 134 MMOL/L (ref 136–145)
TROPONIN I SERPL-MCNC: <0.05 NG/ML

## 2021-06-07 PROCEDURE — 92526 ORAL FUNCTION THERAPY: CPT | Performed by: SPEECH-LANGUAGE PATHOLOGIST

## 2021-06-07 PROCEDURE — 36415 COLL VENOUS BLD VENIPUNCTURE: CPT

## 2021-06-07 PROCEDURE — 94640 AIRWAY INHALATION TREATMENT: CPT

## 2021-06-07 PROCEDURE — 74011250637 HC RX REV CODE- 250/637: Performed by: INTERNAL MEDICINE

## 2021-06-07 PROCEDURE — C9113 INJ PANTOPRAZOLE SODIUM, VIA: HCPCS | Performed by: INTERNAL MEDICINE

## 2021-06-07 PROCEDURE — 95816 EEG AWAKE AND DROWSY: CPT | Performed by: PSYCHIATRY & NEUROLOGY

## 2021-06-07 PROCEDURE — 74011250636 HC RX REV CODE- 250/636: Performed by: PSYCHIATRY & NEUROLOGY

## 2021-06-07 PROCEDURE — 74011250637 HC RX REV CODE- 250/637: Performed by: STUDENT IN AN ORGANIZED HEALTH CARE EDUCATION/TRAINING PROGRAM

## 2021-06-07 PROCEDURE — 84484 ASSAY OF TROPONIN QUANT: CPT

## 2021-06-07 PROCEDURE — 74011250636 HC RX REV CODE- 250/636: Performed by: INTERNAL MEDICINE

## 2021-06-07 PROCEDURE — 74011000250 HC RX REV CODE- 250: Performed by: HOSPITALIST

## 2021-06-07 PROCEDURE — 65660000001 HC RM ICU INTERMED STEPDOWN

## 2021-06-07 PROCEDURE — 77030029684 HC NEB SM VOL KT MONA -A

## 2021-06-07 PROCEDURE — 80069 RENAL FUNCTION PANEL: CPT

## 2021-06-07 PROCEDURE — 93005 ELECTROCARDIOGRAM TRACING: CPT

## 2021-06-07 PROCEDURE — 74011250637 HC RX REV CODE- 250/637: Performed by: FAMILY MEDICINE

## 2021-06-07 PROCEDURE — 74011250637 HC RX REV CODE- 250/637: Performed by: HOSPITALIST

## 2021-06-07 PROCEDURE — 74011000258 HC RX REV CODE- 258: Performed by: PSYCHIATRY & NEUROLOGY

## 2021-06-07 PROCEDURE — 94760 N-INVAS EAR/PLS OXIMETRY 1: CPT

## 2021-06-07 PROCEDURE — 74011000250 HC RX REV CODE- 250: Performed by: INTERNAL MEDICINE

## 2021-06-07 RX ORDER — PANTOPRAZOLE SODIUM 40 MG/1
40 TABLET, DELAYED RELEASE ORAL
Status: DISCONTINUED | OUTPATIENT
Start: 2021-06-07 | End: 2021-06-08 | Stop reason: HOSPADM

## 2021-06-07 RX ADMIN — ALUMINUM HYDROXIDE, MAGNESIUM HYDROXIDE, AND SIMETHICONE 40 ML: 200; 200; 20 SUSPENSION ORAL at 12:47

## 2021-06-07 RX ADMIN — Medication 3 MG: at 21:54

## 2021-06-07 RX ADMIN — LEVETIRACETAM 1000 MG: 100 INJECTION, SOLUTION INTRAVENOUS at 23:24

## 2021-06-07 RX ADMIN — ACETAMINOPHEN 650 MG: 325 TABLET ORAL at 11:11

## 2021-06-07 RX ADMIN — BUDESONIDE 500 MCG: 0.5 INHALANT RESPIRATORY (INHALATION) at 09:41

## 2021-06-07 RX ADMIN — ASPIRIN 81 MG: 81 TABLET, CHEWABLE ORAL at 09:17

## 2021-06-07 RX ADMIN — ATENOLOL 12.5 MG: 25 TABLET ORAL at 09:17

## 2021-06-07 RX ADMIN — ARFORMOTEROL TARTRATE 15 MCG: 15 SOLUTION RESPIRATORY (INHALATION) at 09:41

## 2021-06-07 RX ADMIN — HYDRALAZINE HYDROCHLORIDE 100 MG: 50 TABLET, FILM COATED ORAL at 21:54

## 2021-06-07 RX ADMIN — AMLODIPINE BESYLATE 5 MG: 5 TABLET ORAL at 09:17

## 2021-06-07 RX ADMIN — PANTOPRAZOLE SODIUM 40 MG: 40 TABLET, DELAYED RELEASE ORAL at 16:51

## 2021-06-07 RX ADMIN — BUDESONIDE 500 MCG: 0.5 INHALANT RESPIRATORY (INHALATION) at 20:44

## 2021-06-07 RX ADMIN — LEVETIRACETAM 1000 MG: 100 INJECTION, SOLUTION INTRAVENOUS at 11:23

## 2021-06-07 RX ADMIN — ATORVASTATIN CALCIUM 80 MG: 40 TABLET, FILM COATED ORAL at 09:17

## 2021-06-07 RX ADMIN — Medication 10 ML: at 21:54

## 2021-06-07 RX ADMIN — Medication 10 ML: at 06:18

## 2021-06-07 RX ADMIN — ISOSORBIDE MONONITRATE 30 MG: 60 TABLET, EXTENDED RELEASE ORAL at 09:17

## 2021-06-07 RX ADMIN — Medication 10 ML: at 22:00

## 2021-06-07 RX ADMIN — FUROSEMIDE 40 MG: 40 TABLET ORAL at 09:17

## 2021-06-07 RX ADMIN — DULOXETINE HYDROCHLORIDE 30 MG: 30 CAPSULE, DELAYED RELEASE ORAL at 09:17

## 2021-06-07 RX ADMIN — HYDRALAZINE HYDROCHLORIDE 100 MG: 50 TABLET, FILM COATED ORAL at 09:17

## 2021-06-07 RX ADMIN — HYDRALAZINE HYDROCHLORIDE 100 MG: 50 TABLET, FILM COATED ORAL at 16:51

## 2021-06-07 RX ADMIN — SODIUM CHLORIDE 40 MG: 9 INJECTION INTRAMUSCULAR; INTRAVENOUS; SUBCUTANEOUS at 11:23

## 2021-06-07 RX ADMIN — ARFORMOTEROL TARTRATE 15 MCG: 15 SOLUTION RESPIRATORY (INHALATION) at 20:44

## 2021-06-07 NOTE — PROGRESS NOTES
[facility]  Hospitalist Group                                                                                          Hospitalist Progress Note  Munir Carlson MD  Answering service: [phone] from in house phone        Date of Service:  2021  NAME:  Abraham Hernandez  :  1934  MRN:  [id number]      Admission Summary:   Luisa Gosselin Martin is a 80 y. o. male with pmh of CVA, intraventricular brain hemorrhage, dyslipidemia, CKD 3, hypertension who presents to hospital from subacute rehab for concerns of altered/decreased mental status. Patient was found to have multiple ischemic CVAs on MRI, continue to have altered mental status with some eye deviation and twitching on .  This was concerning for seizure, given 2 mg of Ativan and loaded with Keppra with some improvement.  On  patient however declined, unresponsive to verbal stimuli.  Stat EEG 24-hour was ordered and showed subclinical status.  He was loaded with Vimpat, and neurology recommends transfer to ICU for closer neuro checks on . Seizures were controlled and  Pt transferred out of ICU on . Interval history / Subjective: Follow up AMS, status epilepticus. Hard of hearing. Patient seen and examined at the bedside. Labs, images and notes reviewed  Discussed with nursing staff, orders reviewed. Plan discussed with patient/Family  Sleepy but instantly arousable. Daughter at bedside. C/o chest pain, epigastric to lower sternal, nonradiating, mild to moderate, no SOB/ nausea. No other concerns. Daughter suggested to have pantoprazole in previous hospitalizations when I discussed about EKG, troponin, GI cocktail and PPI BID for 2-4 weeks given his hospital course. Good PO intake. OK to DC Dobbhoff tube per daughter, team.  DC likely tomorrow if clinically stable. Assessment & Plan:     Status epilepticus -resolved  -On Elsie Cunningham until . Vimpat dc'd 6/6 am.  -Neurology on board. Appreciate  -Changing Keppra IV to PO per neurology today  -EEG per neurology  -Seizure precautions   -EEG results 6/1 noted. D/w daughter. No active seizures.  -Follow neuro rec closely    Chest pain, epigastric vs central lower chest, more likely GI origin. R/o cardiac concerns  -EKG stat  -Troponin stat  -GI cocktail stat PO x 1 and then Q6h prn  -IV Pantoprazole 40mg X1 and then PO BID x 2-4 weeks and then daily for 2-4 weeks before discontinuation  -IF any cardiac concerns, consider cardiology eval  -Echo from 5/20 noted     Acute embolic infarcts  Subacute hematoma -intraventricular hemorrhage with ventriculomegaly on recent admission, discharged 5/3  -Not a candidate for anticoagulation due to intracranial hemorrhage, and fall risk  -Neurology following  -No escalation of antiplatelets due to ICH, continue aspirin  -Continue atorvastatin  -PT/OT/speech  -Echo with normal EF      Acute Metabolic encephalopathy, improving much  multifactorial due to above  -AEC as noted above, per neurology. Keppra only now. Vimpat DC'd 6/6  -Continue close monitoring      Community-acquired pneumonia-resolved      Hypertension Bp controlled,  C/w hydralazine, amlodipine.     Hyperlipidemiacontinue Lipitor     Hx TESSA  Per daughter,he never liked CPAP so he did not use   CPAP not tolerated in past. ABG noted     H/o chronic LE edema due to venous congestion  Ankle edema -dependent edema with immobility  Resumed lasix 40 mg 6/2. Continue and monitor BMP   Hypernatremia - resolved      Dysphagia, better and improving PO intake after clearance from ST on 6/4  -Has dubhoff, off TFs over the weekend 6/5. Changed to nightly feeds  -passed swallow eval 6/1  -Diet modified to small bite-size 6/5  -Better PO intake.   -DC Dobbhoff tube 6/7 AM     RUE swelling- venous duplex negative     Appreciate Palliative care input       Code status: DNR  DVT prophylaxis: SCDs    Care Plan discussed with: Patient/Family and Nurse  Anticipated Disposition: Critical access hospital  Anticipated Discharge: Likely tomorrow if clinically stable. COVID rapid test for screen     Hospital Problems  Date Reviewed: 9/21/2020        Codes Class Noted POA    Cerebrovascular accident (CVA) due to embolism [facility]) ICD-10-CM: I63.9  ICD-9-CM: 434.11  5/29/2021 Unknown        Status epilepticus ([address] ICD-10-CM: G40.901  ICD-9-CM: 345.3  5/29/2021 Unknown        Encephalopathy ICD-10-CM: G93.40  ICD-9-CM: 348.30  5/29/2021 Unknown                Review of Systems:   A comprehensive review of systems was negative except for that written in the HPI. Vital Signs:    Last 24hrs VS reviewed since prior progress note. Most recent are:  Visit Vitals  /75 (BP 1 Location: Left arm, BP Patient Position: At rest)   Pulse 99   Temp 98.8 °F (37.1 °C)   Resp 22   Ht 5' 8\" (1.727 m)   Wt 100.4 kg (221 lb 4.8 oz)   SpO2 99%   BMI 33.65 kg/m²       No intake or output data in the 24 hours ending 06/07/21 1051     Physical Examination:     I had a face to face encounter with this patient and independently examined them on 6/7/2021 as outlined below:          Gen: elderly patient. Alert, oriented, comfortable. Mild distress when asked from pain   HEENT: anicteric sclerae, normal conjunctiva, NG tube   Neck: supple, trachea midline  Heart: RRR, no MRG, no JVD,  Lungs: CTAB, no RRW. No respiratory distress  Abd: soft,non tender,non distended. Mild epigastrid lower sternal discomfort from pain but nontender  Extr: he has 2+ LE edema mainly at ankles-has chronic venous insufficnecny, UE edema  Skin: dry, no rash  Neuro: awake,alert.  No focal deficit noted         Data Review:    Review and/or order of clinical lab test  Review and/or order of tests in the radiology section of CPT  Review and/or order of tests in the medicine section of CPT    XR INSERT LONG FEED TUBE    Result Date: 5/26/2021  Successful placement of the Dobbhoff tube in the distal duodenum/proximal jejunum at the ligament sandra Dorsey. MRI BRAIN WO CONT    Result Date: 5/19/2021  1. Few small scattered acute infarcts in the bilateral frontal lobes and parieto-occipital lobes, right greater than left. 2. 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 3. Unchanged generalized parenchymal volume loss and severe chronic microvascular ischemic disease. CT HEAD WO CONT    Result Date: 5/24/2021  No significant interval change. CT HEAD WO CONT    Result Date: 5/16/2021  Significantly limited by motion. No acute intracranial abnormality     CTA HEAD    Result Date: 5/19/2021  1. Heterogeneous plaque in the proximal right internal carotid artery without hemodynamically significant stenosis. 2.  No significant left carotid artery stenosis. 3.  No large vessel occlusion. 4.  40 mm main pulmonary artery suggestive of pulmonary arterial hypertension. CTA NECK    Result Date: 5/19/2021  1. Heterogeneous plaque in the proximal right internal carotid artery without hemodynamically significant stenosis. 2.  No significant left carotid artery stenosis. 3.  No large vessel occlusion. 4.  40 mm main pulmonary artery suggestive of pulmonary arterial hypertension. XR CHEST PORT    Result Date: 5/29/2021  Stable nonspecific left lower airspace disease. No pulmonary edema. XR CHEST PORT    Result Date: 5/25/2021  1. Low lung volumes with mild basilar atelectasis in left lower lobe. XR CHEST PORT    Result Date: 5/22/2021  Improved aeration in the left lung base with minimal residual airspace opacity    XR CHEST PORT    Result Date: 5/16/2021  Patchy left basilar airspace opacity which may represent pneumonia or atelectasis with a small left pleural effusion.     XR ABD PORT  1 V    Result Date: 5/24/2021  Intragastric Dobbhoff tube with slight interval advancement. XR ABD PORT  1 V    Result Date: 5/24/2021  Feeding tube tip projects over the fundus and should be advanced. XR ABD PORT  1 V    Result Date: 5/23/2021  Gastric tube tip is in appropriate position for use. Labs:     Recent Labs     06/05/21 0205   WBC 7.9   HGB 10.8*   HCT 33.7*        Recent Labs     06/07/21 0149 06/06/21 0209 06/05/21 0205   * 134* 134*  134*   K 4.1 4.0 4.4  4.4    99 100  99   CO2 30 30 32  32   BUN 20 21* 19  19   CREA 0.98 1.00 0.93  0.95   * 113* 107*  106*   CA 8.5 8.6 8.8  8.7   PHOS 3.6 3.9 3.4     Recent Labs     06/07/21 0149 06/06/21 0209 06/05/21 0205   ALT  --   --  34   AP  --   --  97   TBILI  --   --  0.2   TP  --   --  6.0*   ALB 2.6* 2.5* 2.4*  2.4*   GLOB  --   --  3.6     No results for input(s): INR, PTP, APTT, INREXT, INREXT in the last 72 hours. No results for input(s): FE, TIBC, PSAT, FERR in the last 72 hours. No results found for: FOL, RBCF   No results for input(s): PH, PCO2, PO2 in the last 72 hours. No results for input(s): CPK, CKNDX, TROIQ in the last 72 hours.     No lab exists for component: CPKMB  Lab Results   Component Value Date/Time    Cholesterol, total 144 04/19/2021 12:59 AM    HDL Cholesterol 33 04/19/2021 12:59 AM    LDL, calculated 74.8 04/19/2021 12:59 AM    Triglyceride 181 (H) 04/19/2021 12:59 AM    CHOL/HDL Ratio 4.4 04/19/2021 12:59 AM     Lab Results   Component Value Date/Time    Glucose (POC) 145 (H) 06/06/2021 06:09 AM    Glucose (POC) 139 (H) 06/03/2021 05:19 PM    Glucose (POC) 141 (H) 06/03/2021 11:59 AM    Glucose (POC) 133 (H) 06/02/2021 06:48 PM    Glucose (POC) 133 (H) 06/02/2021 11:38 AM     Lab Results   Component Value Date/Time    Color YELLOW/STRAW 05/16/2021 09:33 PM    Appearance CLEAR 05/16/2021 09:33 PM    Specific gravity 1.014 05/16/2021 09:33 PM    Specific gravity 1.015 09/21/2020 09:13 AM pH (UA) 5.0 05/16/2021 09:33 PM    Protein Negative 05/16/2021 09:33 PM    Glucose Negative 05/16/2021 09:33 PM    Ketone Negative 05/16/2021 09:33 PM    Bilirubin Negative 05/16/2021 09:33 PM    Urobilinogen 0.2 05/16/2021 09:33 PM    Nitrites Negative 05/16/2021 09:33 PM    Leukocyte Esterase Negative 05/16/2021 09:33 PM    Epithelial cells FEW 05/16/2021 09:33 PM    Bacteria Negative 05/16/2021 09:33 PM    WBC 0-4 05/16/2021 09:33 PM    RBC 0-5 05/16/2021 09:33 PM         Medications Reviewed:     Current Facility-Administered Medications   Medication Dose Route Frequency    pantoprazole (PROTONIX) 40 mg in 0.9% sodium chloride 10 mL injection  40 mg IntraVENous ONCE    mylanta/viscous lidocaine (GI COCKTAIL)  40 mL Oral ONCE    mylanta/viscous lidocaine (GI COCKTAIL)  40 mL Oral Q6H PRN    pantoprazole (PROTONIX) tablet 40 mg  40 mg Oral ACB&D    melatonin tablet 3 mg  3 mg Oral QHS    arformoteroL (BROVANA) neb solution 15 mcg  15 mcg Nebulization BID RT    And    budesonide (PULMICORT) 500 mcg/2 ml nebulizer suspension  500 mcg Nebulization BID RT    DULoxetine (CYMBALTA) capsule 30 mg  30 mg Oral DAILY    hydroxypropyl methylcellulose (ISOPTO TEARS) 0.5 % ophthalmic solution 1 Drop  1 Drop Both Eyes PRN    furosemide (LASIX) tablet 40 mg  40 mg Oral DAILY    polyethylene glycol (MIRALAX) packet 17 g  17 g Oral DAILY    atenoloL (TENORMIN) tablet 12.5 mg  12.5 mg Oral DAILY    lidocaine (XYLOCAINE) 2 % jelly   Mucous Membrane PRN    hydrALAZINE (APRESOLINE) tablet 100 mg  100 mg Oral TID    amLODIPine (NORVASC) tablet 5 mg  5 mg Oral DAILY    labetaloL (NORMODYNE;TRANDATE) injection 20 mg  20 mg IntraVENous Q6H PRN    levETIRAcetam (KEPPRA) 1,000 mg in 0.9% sodium chloride 100 mL IVPB  1,000 mg IntraVENous Q12H    hydrALAZINE (APRESOLINE) 20 mg/mL injection 20 mg  20 mg IntraVENous Q6H PRN    sodium chloride (NS) flush 5-40 mL  5-40 mL IntraVENous Q8H    sodium chloride (NS) flush 5-40 mL 5-40 mL IntraVENous PRN    polyethylene glycol (MIRALAX) packet 17 g  17 g Oral DAILY PRN    albuterol (PROVENTIL VENTOLIN) nebulizer solution 2.5 mg  2.5 mg Nebulization Q4H PRN    aspirin chewable tablet 81 mg  81 mg Oral DAILY    atorvastatin (LIPITOR) tablet 80 mg  80 mg Oral DAILY    isosorbide mononitrate ER (IMDUR) tablet 30 mg  30 mg Oral DAILY    sodium chloride (NS) flush 5-40 mL  5-40 mL IntraVENous Q8H    sodium chloride (NS) flush 5-40 mL  5-40 mL IntraVENous PRN    acetaminophen (TYLENOL) tablet 650 mg  650 mg Oral Q6H PRN    Or    acetaminophen (TYLENOL) suppository 650 mg  650 mg Rectal Q6H PRN    promethazine (PHENERGAN) tablet 12.5 mg  12.5 mg Oral Q6H PRN    Or    ondansetron (ZOFRAN) injection 4 mg  4 mg IntraVENous Q6H PRN     ______________________________________________________________________  EXPECTED LENGTH OF STAY: 4d 2h  ACTUAL LENGTH OF STAY:          22                 Jarad Hooper MD

## 2021-06-07 NOTE — PROGRESS NOTES
Transition of Care Plan   RUR- 26% Medium Risk   DISPOSITION: The disposition plan is to transition to [id number]/[phone]  [facility] - patient accepted.  F/U with PCP/Specialist     Transport: [facility] - 11:00am Tuesday June 8, 2021     At 1:02pm - CM met with patient and patients daughter at bedside. Patient will discharge tomorrow Tuesday June 8, 2021 at 11:00am via [facility]. CM informed patient and patients daughter of this information. Once room number and call to report number are available, CM will provide. CM will continue to follow, provide support and assist with JACQUES needs as they arise. AVS has been updated.     Miracle Bhagat

## 2021-06-07 NOTE — PROGRESS NOTES
Bedside and Verbal shift change report given to AVIVA Gorman (oncoming nurse) by Lizette Fong (offgoing nurse). Report included the following information SBAR, Kardex, Cardiac Rhythm NSR and Dual Neuro Assessment.

## 2021-06-07 NOTE — PROGRESS NOTES
Occupational Therapy  06/07/21    Chart reviewed. Patient undergoing bedside EEG upon time of attempt, will follow up for OT treatment as able & appropriate.      Thank you,   Amanda Childs, OTD, OTR/L

## 2021-06-07 NOTE — PROGRESS NOTES
Physical Therapy: Defer    Chart reviewed and attempted to see pt for PT treatment. Pt currently undergoing bedside EEG. Will defer and continue to follow.     Gina Hancock, PT, DPT

## 2021-06-07 NOTE — PROGRESS NOTES
Problem: Pressure Injury - Risk of  Goal: *Prevention of pressure injury  Description: Document Aquiles Scale and appropriate interventions in the flowsheet. Outcome: Progressing Towards Goal  Note: Pressure Injury Interventions:  Sensory Interventions: Assess changes in LOC    Moisture Interventions: Absorbent underpads, Apply protective barrier, creams and emollients    Activity Interventions: Pressure redistribution bed/mattress(bed type)    Mobility Interventions: Pressure redistribution bed/mattress (bed type)    Nutrition Interventions: Document food/fluid/supplement intake    Friction and Shear Interventions: Apply protective barrier, creams and emollients                Problem: Falls - Risk of  Goal: *Absence of Falls  Description: Document Too Fall Risk and appropriate interventions in the flowsheet.   Outcome: Progressing Towards Goal  Note: Fall Risk Interventions:  Mobility Interventions: Communicate number of staff needed for ambulation/transfer    Mentation Interventions: Adequate sleep, hydration, pain control, More frequent rounding, Reorient patient    Medication Interventions: Evaluate medications/consider consulting pharmacy    Elimination Interventions: Bed/chair exit alarm, Call light in reach, Patient to call for help with toileting needs    History of Falls Interventions: Bed/chair exit alarm, Door open when patient unattended         Problem: TIA/CVA Stroke: Day 2 Until Discharge  Goal: Activity/Safety  Outcome: Progressing Towards Goal  Goal: Diagnostic Test/Procedures  Outcome: Progressing Towards Goal  Goal: Nutrition/Diet  Outcome: Progressing Towards Goal  Goal: Discharge Planning  Outcome: Progressing Towards Goal  Goal: Medications  Outcome: Progressing Towards Goal  Goal: *Absence of deep venous thrombosis signs and symptoms(Stroke Metric)  Outcome: Progressing Towards Goal  Goal: *Ability to perform ADLs and demonstrates progressive mobility and function  Outcome: Progressing Towards Goal

## 2021-06-07 NOTE — PROGRESS NOTES
Problem: Pressure Injury - Risk of  Goal: *Prevention of pressure injury  Description: Document Aquiles Scale and appropriate interventions in the flowsheet. Outcome: Progressing Towards Goal  Note: Pressure Injury Interventions:  Sensory Interventions: Minimize linen layers, Float heels    Moisture Interventions: Limit adult briefs, Maintain skin hydration (lotion/cream), Check for incontinence Q2 hours and as needed    Activity Interventions: Increase time out of bed, PT/OT evaluation    Mobility Interventions: Pressure redistribution bed/mattress (bed type)    Nutrition Interventions: Document food/fluid/supplement intake, Discuss nutritional consult with provider    Friction and Shear Interventions: Minimize layers, HOB 30 degrees or less                Problem: Patient Education: Go to Patient Education Activity  Goal: Patient/Family Education  Outcome: Progressing Towards Goal     Problem: Falls - Risk of  Goal: *Absence of Falls  Description: Document Too Fall Risk and appropriate interventions in the flowsheet.   Outcome: Progressing Towards Goal  Note: Fall Risk Interventions:  Mobility Interventions: Bed/chair exit alarm, Communicate number of staff needed for ambulation/transfer    Mentation Interventions: Adequate sleep, hydration, pain control, Bed/chair exit alarm, Family/sitter at bedside    Medication Interventions: Bed/chair exit alarm, Patient to call before getting OOB    Elimination Interventions: Bed/chair exit alarm, Call light in reach    History of Falls Interventions: Bed/chair exit alarm, Door open when patient unattended         Problem: Patient Education: Go to Patient Education Activity  Goal: Patient/Family Education  Outcome: Progressing Towards Goal     Problem: Patient Education: Go to Patient Education Activity  Goal: Patient/Family Education  Outcome: Progressing Towards Goal     Problem: Seizure Disorder (Adult)  Goal: *STG/LTG: Complies with medication therapy  Outcome: Progressing Towards Goal  Goal: *STG: Remains free of injury during seizure activity  Outcome: Progressing Towards Goal     Problem: TIA/CVA Stroke: Day 2 Until Discharge  Goal: Diagnostic Test/Procedures  Outcome: Progressing Towards Goal  Goal: Nutrition/Diet  Outcome: Progressing Towards Goal  Goal: Discharge Planning  Outcome: Progressing Towards Goal  Goal: Respiratory  Outcome: Progressing Towards Goal  Goal: *Verbalizes anxiety and depression are reduced or absent  Outcome: Progressing Towards Goal  Goal: *Absence of aspiration  Outcome: Progressing Towards Goal  Goal: *Absence of deep venous thrombosis signs and symptoms(Stroke Metric)  Outcome: Progressing Towards Goal     Problem: Ischemic Stroke: Discharge Outcomes  Goal: *Verbalizes anxiety and depression are reduced or absent  Outcome: Progressing Towards Goal  Goal: *Verbalize understanding of risk factor modification(Stroke Metric)  Outcome: Progressing Towards Goal  Goal: *Tolerating diet  Outcome: Progressing Towards Goal  Goal: *Aware of follow-up diagnostics related to anticoagulants  Outcome: Progressing Towards Goal  Goal: *Describes available resources and support systems  Outcome: Progressing Towards Goal  Goal: *Verbalizes understanding of activation of EMS(911) for stroke symptoms(Stroke Metric)  Outcome: Progressing Towards Goal  Goal: *Understands and describes signs and symptoms to report to providers(Stroke Metric)  Outcome: Progressing Towards Goal

## 2021-06-07 NOTE — PROCEDURES
ELECTROENCEPHALOGRAM REPORT     Patient Name: Carlos Enrique Lozano  : 1934  Age: 80 y.o. Date of EEG: 2021  Interpreting physician: Alison Hill MD    PROCEDURE: Routine awake and drowsy video electroencephalogram (vEEG). CLINICAL INDICATION: The patient is a 80 y.o. male who is being evaluated for evidence of ongoing subclinical seizures, interictal discharge burden. DESCRIPTION OF THE RECORD:   During wakefulness, there is a well-formed posterior dominant rhythm at times, manifest by 8 to 8.5 Hz activity. Transition to drowsiness is manifest by fragmentation of the waking background with progressive increase in theta. During the transition from wakefulness to drowsiness, there is occasional 2-3 seconds runs of polymorphic generalized delta. No discrete sleep architecture is noted. No epileptiform discharges or electrographic seizures are noted. Photic stimulation is present. Single-lead ECG demonstrates normal sinus rhythm. INTERPRETATION:     This is a minimally abnormal routine vEEG characterized by evidence for a mild degree of cerebral dysfunction, superimposed on an otherwise normal awake and drowsy background.     Dionte Li MD

## 2021-06-07 NOTE — PROGRESS NOTES
Problem: Dysphagia (Adult)  Goal: *Acute Goals and Plan of Care (Insert Text)  Description: Speech Therapy Goals  Initiated 5/19/2021    1. Patient will tolerate regular diet/thin liquids without adverse effects within 7 days. Discontinue 5/27/2021  2. Patient will participate in swallow re-evaluation within 7 days. MET 6/1/2021  Added 6/1/2021  1. Patient will tolerate mechanically soft diet/thin liquids without adverse effects within  days. MET 6/7/21  Increase to regular. Outcome: Progressing Towards Goal       SPEECH LANGUAGE PATHOLOGY DYSPHAGIA TREATMENT  Patient: Arline Campos (1 y.o. male)  Date: 6/7/2021  Diagnosis: CAP (community acquired pneumonia) [J18.9] <principal problem not specified>       Precautions:  Fall, Bed Alarm, DNR, Skin, Seizure    ASSESSMENT:  Patient sitting upright in bed eating regular diet provided by family. Patient demonstrated timely and complete mastication and tolerated solids without difficulty. Mildly rabid feeding rate with large bites noted. With thin liquids patient continues to take successive straw sips. Delayed throat clear noted x 1 after liquids. Educated patient and daughter on single sips. Daughter verbalized understanding. Given bedside presentation feel patient is safe for diet advance to regular consistency. PLAN:  Recommendations and Planned Interventions:  Regular diet  Thin liquids in SINGLE sips  May use straws    Patient continues to benefit from skilled intervention to address the above impairments. Continue treatment per established plan of care. Discharge Recommendations: To Be Determined     SUBJECTIVE:   Patient nodded. Patient's daughter at bedside reports patient tolerating PO well and without difficulty. Dobbhoff tube removed.     OBJECTIVE:   Cognitive and Communication Status:  Neurologic State: Alert  Orientation Level: Oriented to person, Oriented to time, Disoriented to place, Disoriented to situation  Cognition: Decreased attention/concentration, Decreased command following  Perception: Cues to maintain midline in sitting, Cues to attend right visual field (decr attention & command follow in R side)  Perseveration: No perseveration noted  Safety/Judgement: Decreased awareness of environment, Decreased awareness of need for assistance, Decreased awareness of need for safety, Decreased insight into deficits  Dysphagia Treatment:  Oral Assessment:     P.O. Trials:  Patient Position: Upright in bed  Vocal quality prior to P.O.: No impairment  Consistency Presented: Puree; Solid; Thin liquid  How Presented: Self-fed/presented;Straw;Successive swallows     Bolus Acceptance: No impairment  Bolus Formation/Control: No impairment     Propulsion: No impairment  Oral Residue: None  Initiation of Swallow: Delayed (# of seconds)  Laryngeal Elevation: Functional  Aspiration Signs/Symptoms: Delayed cough/throat clear                        After treatment:   Patient left in no apparent distress in bed, Call bell within reach, Nursing notified, and Caregiver / family present    COMMUNICATION/EDUCATION:   Patient was educated regarding role of SLP, diet and swallow precautions. Patient did not respond. Patient's daughter verbalized understanding. The patient's plan of care including recommendations, planned interventions, and recommended diet changes were discussed with: Registered nurse.      Alicia Marroquin, SLP  Time Calculation: 15 mins

## 2021-06-08 VITALS
SYSTOLIC BLOOD PRESSURE: 124 MMHG | DIASTOLIC BLOOD PRESSURE: 61 MMHG | HEART RATE: 97 BPM | WEIGHT: 218.5 LBS | BODY MASS INDEX: 33.12 KG/M2 | OXYGEN SATURATION: 94 % | RESPIRATION RATE: 20 BRPM | TEMPERATURE: 98.9 F | HEIGHT: 68 IN

## 2021-06-08 LAB
ALBUMIN SERPL-MCNC: 2.6 G/DL (ref 3.5–5)
ANION GAP SERPL CALC-SCNC: 6 MMOL/L (ref 5–15)
BUN SERPL-MCNC: 16 MG/DL (ref 6–20)
BUN/CREAT SERPL: 15 (ref 12–20)
CALCIUM SERPL-MCNC: 8.8 MG/DL (ref 8.5–10.1)
CHLORIDE SERPL-SCNC: 103 MMOL/L (ref 97–108)
CO2 SERPL-SCNC: 28 MMOL/L (ref 21–32)
COVID-19 RAPID TEST, COVR: NOT DETECTED
CREAT SERPL-MCNC: 1.09 MG/DL (ref 0.7–1.3)
GLUCOSE SERPL-MCNC: 99 MG/DL (ref 65–100)
PHOSPHATE SERPL-MCNC: 3.1 MG/DL (ref 2.6–4.7)
POTASSIUM SERPL-SCNC: 4.1 MMOL/L (ref 3.5–5.1)
SODIUM SERPL-SCNC: 137 MMOL/L (ref 136–145)
SOURCE, COVRS: NORMAL

## 2021-06-08 PROCEDURE — 94640 AIRWAY INHALATION TREATMENT: CPT

## 2021-06-08 PROCEDURE — 80069 RENAL FUNCTION PANEL: CPT

## 2021-06-08 PROCEDURE — 74011000250 HC RX REV CODE- 250: Performed by: HOSPITALIST

## 2021-06-08 PROCEDURE — 74011250637 HC RX REV CODE- 250/637: Performed by: INTERNAL MEDICINE

## 2021-06-08 PROCEDURE — 36415 COLL VENOUS BLD VENIPUNCTURE: CPT

## 2021-06-08 PROCEDURE — 74011250637 HC RX REV CODE- 250/637: Performed by: FAMILY MEDICINE

## 2021-06-08 PROCEDURE — 87635 SARS-COV-2 COVID-19 AMP PRB: CPT

## 2021-06-08 PROCEDURE — 74011250637 HC RX REV CODE- 250/637: Performed by: HOSPITALIST

## 2021-06-08 RX ORDER — PANTOPRAZOLE SODIUM 40 MG/1
TABLET, DELAYED RELEASE ORAL
Qty: 58 TABLET | Refills: 0 | Status: SHIPPED
Start: 2021-06-08 | End: 2021-06-14

## 2021-06-08 RX ORDER — LEVETIRACETAM 1000 MG/1
1000 TABLET ORAL EVERY 12 HOURS
Qty: 60 TABLET | Refills: 0 | Status: SHIPPED
Start: 2021-06-08 | End: 2021-08-09 | Stop reason: SDUPTHER

## 2021-06-08 RX ORDER — ATENOLOL 25 MG/1
12.5 TABLET ORAL DAILY
Qty: 15 TABLET | Refills: 0 | Status: SHIPPED
Start: 2021-06-08 | End: 2021-08-19 | Stop reason: SDUPTHER

## 2021-06-08 RX ORDER — FUROSEMIDE 80 MG/1
40 TABLET ORAL DAILY
Qty: 15 TABLET | Refills: 0 | Status: SHIPPED
Start: 2021-06-08 | End: 2021-08-19 | Stop reason: DRUGHIGH

## 2021-06-08 RX ORDER — POLYETHYLENE GLYCOL 3350 17 G/17G
17 POWDER, FOR SOLUTION ORAL DAILY
Qty: 30 EACH | Refills: 0 | Status: SHIPPED
Start: 2021-06-09

## 2021-06-08 RX ORDER — ALBUTEROL SULFATE 0.83 MG/ML
2.5 SOLUTION RESPIRATORY (INHALATION)
Qty: 30 NEBULE | Refills: 0 | Status: SHIPPED
Start: 2021-06-08 | End: 2021-08-19

## 2021-06-08 RX ORDER — LEVETIRACETAM 500 MG/1
1000 TABLET ORAL EVERY 12 HOURS
Status: DISCONTINUED | OUTPATIENT
Start: 2021-06-08 | End: 2021-06-08 | Stop reason: HOSPADM

## 2021-06-08 RX ORDER — AMLODIPINE BESYLATE 5 MG/1
5 TABLET ORAL DAILY
Qty: 30 TABLET | Refills: 0 | Status: SHIPPED
Start: 2021-06-08 | End: 2021-08-09 | Stop reason: SDUPTHER

## 2021-06-08 RX ORDER — HYDRALAZINE HYDROCHLORIDE 100 MG/1
100 TABLET, FILM COATED ORAL EVERY 8 HOURS
Qty: 90 TABLET | Refills: 0 | Status: SHIPPED
Start: 2021-06-08 | End: 2021-07-08

## 2021-06-08 RX ADMIN — POLYETHYLENE GLYCOL 3350 17 G: 17 POWDER, FOR SOLUTION ORAL at 08:57

## 2021-06-08 RX ADMIN — HYDRALAZINE HYDROCHLORIDE 100 MG: 50 TABLET, FILM COATED ORAL at 08:57

## 2021-06-08 RX ADMIN — Medication 10 ML: at 06:07

## 2021-06-08 RX ADMIN — ASPIRIN 81 MG: 81 TABLET, CHEWABLE ORAL at 08:57

## 2021-06-08 RX ADMIN — ISOSORBIDE MONONITRATE 30 MG: 60 TABLET, EXTENDED RELEASE ORAL at 08:58

## 2021-06-08 RX ADMIN — PANTOPRAZOLE SODIUM 40 MG: 40 TABLET, DELAYED RELEASE ORAL at 06:48

## 2021-06-08 RX ADMIN — ATENOLOL 12.5 MG: 25 TABLET ORAL at 08:57

## 2021-06-08 RX ADMIN — AMLODIPINE BESYLATE 5 MG: 5 TABLET ORAL at 08:57

## 2021-06-08 RX ADMIN — BUDESONIDE 500 MCG: 0.5 INHALANT RESPIRATORY (INHALATION) at 07:25

## 2021-06-08 RX ADMIN — ATORVASTATIN CALCIUM 80 MG: 40 TABLET, FILM COATED ORAL at 08:57

## 2021-06-08 RX ADMIN — ARFORMOTEROL TARTRATE 15 MCG: 15 SOLUTION RESPIRATORY (INHALATION) at 07:25

## 2021-06-08 RX ADMIN — FUROSEMIDE 40 MG: 40 TABLET ORAL at 08:57

## 2021-06-08 RX ADMIN — DULOXETINE HYDROCHLORIDE 30 MG: 30 CAPSULE, DELAYED RELEASE ORAL at 08:57

## 2021-06-08 NOTE — PROGRESS NOTES
Transition of Care Plan   RUR- 24% Medium Risk   DISPOSITION: The disposition plan is to transition to a SNF - [facility]/[phone] - patient accepted.  Transport: [facility] - 11:00am today    CM contacted Bronson Manzo from [facility] to retreive room number and call to report number. Patient is scheduled to leave to transition to [facility] at 11:00am via [facility]. CM informed patient and patients daughter at bedside about the room number. Room Number: 33A  Call to report number: [phone]  AVS has been updated. [facility] packet has been placed at bedside chart.     Jasiel Krause

## 2021-06-08 NOTE — PROGRESS NOTES
.Bedside shift change report given to Mundo Haney RN (oncoming nurse) by Maxine Leslie RN (offgoing nurse). Report included the following information SBAR, Kardex, Recent Results, Cardiac Rhythm NSR and Dual Neuro Assessment.

## 2021-06-08 NOTE — PROGRESS NOTES
[facility]  Hospitalist Group                                                                                          Hospitalist Progress Note  Wanda Blair MD  Answering service: [phone] from in house phone        Date of Service:  2021  NAME:  Delmy Ardon  :  1934  MRN:  [id number]      Admission Summary:   Lizbeth Rush is a 80 y. o. male with pmh of CVA, intraventricular brain hemorrhage, dyslipidemia, CKD 3, hypertension who presents to hospital from subacute rehab for concerns of altered/decreased mental status. Patient was found to have multiple ischemic CVAs on MRI, continue to have altered mental status with some eye deviation and twitching on .  This was concerning for seizure, given 2 mg of Ativan and loaded with Keppra with some improvement.  On  patient however declined, unresponsive to verbal stimuli.  Stat EEG 24-hour was ordered and showed subclinical status.  He was loaded with Vimpat, and neurology recommends transfer to ICU for closer neuro checks on . Seizures were controlled and  Pt transferred out of ICU on . Interval history / Subjective: Follow up AMS, status epilepticus. Hard of hearing. Patient seen and examined at the bedside. Labs, images and notes reviewed  Discussed with nursing staff, orders reviewed. Plan discussed with patient/Family  Feeling okay. No further episodes of chest pains. Scheduled for discharge to SNF at 11 AM today. Feels ready. Denied any other concerns or overnight events. Assessment & Plan:     Discharge diagnosis:  #Status epilepticus, resolved. Seizure disorder, controlled on Keppra 1000 mg twice daily  #Chest pain, likely GERD. Controlled. Stress ulcer prophylaxis for 4 weeks.   #Few small acute infarcts of bilateral frontal lobes, parietal occipital lobes, R>L on MRI brain 2021  #Sequelae of involved intracranial hemorrhage with small, late subacute hematomas in right parietal periventricular white matter on MRI as above  #Unchanged underlies parenchymal volume loss and severe chronic microvascular ischemic disease on MRI as above  #Acute metabolic encephalopathy, multifactorial, resolved. 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 Tolerating oral diet well. Dobbhoff tube DC'd 6/7. Tube feeds discontinued 6/5  #Hx of chronic leg edema due to venous stasis, stable    Status epilepticus -resolved  -On Keppra and Vimpat until 6/5. Vimpat dc'd 6/6 am.  -Neurology on board. Appreciate. No other recommendations. EEG done 6/7.  -Repeat EEG 6/7/2021 noted without acute seizures. -Keppra 1000 mg PO twice daily  -Seizure precautions   -EEG results 6/1 noted. D/w daughter. No active seizures.  -Outpatient neurology follow-up in 2-4 weeks or as recommended    Chest pain, epigastric vs central lower chest, more likely GI origin. -EKG stat unremarkable for any acute STT changes  -Troponin stat unremarkable  -GI cocktail stat PO x 1 helpful. -IV Pantoprazole 40mg X1 helpful.   Initiated for stress ulcer prophylaxis (SUP)  -Continue pantoprazole 40 mg p.o. twice daily x2 weeks, then daily x2 weeks for SUP  -Recommend keeping patient upright for at least 30 to 60 minutes after food/meals  -Per daughter, patient had needed PPI prophylaxis during previous hospitalizations as well  -IF any cardiac concerns, consider cardiology eval.  Not warranted at this point.  -Echo from 5/20 noted     Acute embolic infarcts  Subacute hematoma -intraventricular hemorrhage with ventriculomegaly on recent admission, discharged 5/3  -Not a candidate for anticoagulation due to intracranial hemorrhage, and fall risk  -Neurology following  -No escalation of antiplatelets due to ICH, continue aspirin  -Continue atorvastatin  -PT/OT/speech  -Echo with normal EF      Acute Metabolic encephalopathy, improving much  multifactorial due to above  -AEC as noted above, per neurology. Keppra only now. Vimpat DC'd 6/6  -Continue close monitoring      Community-acquired pneumonia-resolved      Hypertension Bp controlled,  C/w hydralazine, amlodipine.     Hyperlipidemiacontinue Lipitor     Hx TESSA  Per daughter,he never liked CPAP so he did not use   CPAP not tolerated in past. ABG noted     H/o chronic LE edema due to venous congestion  Ankle edema -dependent edema with immobility  Resumed lasix 40 mg 6/2. Continue and monitor BMP   Hypernatremia - resolved      Dysphagia, better and improving PO intake after clearance from ST on 6/4  -Has dubhoff, off TFs over the weekend 6/5. Changed to nightly feeds  -passed swallow eval 6/1  -Diet modified to small bite-size 6/5  -Better PO intake. -DC Dobbhoff tube 6/7 AM     RUE swelling- venous duplex negative     Appreciate Palliative care input       Code status: DNR  DVT prophylaxis: SCDs    Care Plan discussed with: Patient/Family and Nurse  Anticipated Disposition: [facility] SNF. Anticipated Discharge: Covid rapid test negative. DC today to rehab. Hospital Problems  Date Reviewed: 9/21/2020        Codes Class Noted POA    Cerebrovascular accident (CVA) due to embolism [facility]) ICD-10-CM: I63.9  ICD-9-CM: 434.11  5/29/2021 Unknown        Status epilepticus ([facility] [address] ICD-10-CM: G40.901  ICD-9-CM: 345.3  5/29/2021 Unknown        Encephalopathy ICD-10-CM: G93.40  ICD-9-CM: 348.30  5/29/2021 Unknown                Review of Systems:   A comprehensive review of systems was negative except for that written in the HPI. Vital Signs:    Last 24hrs VS reviewed since prior progress note.  Most recent are:  Visit Vitals  /68   Pulse 89   Temp 98.5 °F (36.9 °C)   Resp 20   Ht 5' 8\" (1.727 m)   Wt 99.1 kg (218 lb 8 oz)   SpO2 94%   BMI 33.22 kg/m²         Intake/Output Summary (Last 24 hours) at 6/8/2021 5143  Last data filed at 6/7/2021 2000  Gross per 24 hour   Intake 120 ml Output    Net 120 ml        Physical Examination:     I had a face to face encounter with this patient and independently examined them on 6/8/2021 as outlined below:           Gen: elderly patient. More alert, oriented, comfortable. No distress  HEENT: anicteric sclerae, normal conjunctiva, NG tube   Neck: supple, trachea midline  Heart: RRR, no MRG, no JVD,  Lungs: CTAB, no RRW. No respiratory distress  Abd: soft,non tender,non distended. No epigastric discomfort today  Extr: he has 2+ LE edema mainly at ankles-has chronic venous insufficnecny, UE edema  Skin: dry, no rash  Neuro: awake,alert. No focal deficit noted         Data Review:    Review and/or order of clinical lab test  Review and/or order of tests in the radiology section of CPT  Review and/or order of tests in the medicine section of CPT    XR INSERT LONG FEED TUBE    Result Date: 5/26/2021  Successful placement of the Dobbhoff tube in the distal duodenum/proximal jejunum at the ligament of Treitz. MRI BRAIN WO CONT    Result Date: 5/19/2021  1. Few small scattered acute infarcts in the bilateral frontal lobes and parieto-occipital lobes, right greater than left. 2. 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 3. Unchanged generalized parenchymal volume loss and severe chronic microvascular ischemic disease. CT HEAD WO CONT    Result Date: 5/24/2021  No significant interval change. CT HEAD WO CONT    Result Date: 5/16/2021  Significantly limited by motion. No acute intracranial abnormality     CTA HEAD    Result Date: 5/19/2021  1. Heterogeneous plaque in the proximal right internal carotid artery without hemodynamically significant stenosis. 2.  No significant left carotid artery stenosis.  3.  No large vessel occlusion. 4.  40 mm main pulmonary artery suggestive of pulmonary arterial hypertension. CTA NECK    Result Date: 5/19/2021  1. Heterogeneous plaque in the proximal right internal carotid artery without hemodynamically significant stenosis. 2.  No significant left carotid artery stenosis. 3.  No large vessel occlusion. 4.  40 mm main pulmonary artery suggestive of pulmonary arterial hypertension. XR CHEST PORT    Result Date: 5/29/2021  Stable nonspecific left lower airspace disease. No pulmonary edema. XR CHEST PORT    Result Date: 5/25/2021  1. Low lung volumes with mild basilar atelectasis in left lower lobe. XR CHEST PORT    Result Date: 5/22/2021  Improved aeration in the left lung base with minimal residual airspace opacity    XR CHEST PORT    Result Date: 5/16/2021  Patchy left basilar airspace opacity which may represent pneumonia or atelectasis with a small left pleural effusion. XR ABD PORT  1 V    Result Date: 5/24/2021  Intragastric Dobbhoff tube with slight interval advancement. XR ABD PORT  1 V    Result Date: 5/24/2021  Feeding tube tip projects over the fundus and should be advanced. XR ABD PORT  1 V    Result Date: 5/23/2021  Gastric tube tip is in appropriate position for use. Labs:     No results for input(s): WBC, HGB, HCT, PLT, HGBEXT, HCTEXT, PLTEXT, HGBEXT, HCTEXT, PLTEXT in the last 72 hours. Recent Labs     06/08/21  0410 06/07/21  0149 06/06/21  0209    134* 134*   K 4.1 4.1 4.0    100 99   CO2 28 30 30   BUN 16 20 21*   CREA 1.09 0.98 1.00   GLU 99 114* 113*   CA 8.8 8.5 8.6   PHOS 3.1 3.6 3.9     Recent Labs     06/08/21  0410 06/07/21  0149 06/06/21  0209   ALB 2.6* 2.6* 2.5*     No results for input(s): INR, PTP, APTT, INREXT, INREXT in the last 72 hours. No results for input(s): FE, TIBC, PSAT, FERR in the last 72 hours. No results found for: FOL, RBCF   No results for input(s): PH, PCO2, PO2 in the last 72 hours.   Recent Labs 06/07/21  1114   TROIQ <0.05     Lab Results   Component Value Date/Time    Cholesterol, total 144 04/19/2021 12:59 AM    HDL Cholesterol 33 04/19/2021 12:59 AM    LDL, calculated 74.8 04/19/2021 12:59 AM    Triglyceride 181 (H) 04/19/2021 12:59 AM    CHOL/HDL Ratio 4.4 04/19/2021 12:59 AM     Lab Results   Component Value Date/Time    Glucose (POC) 145 (H) 06/06/2021 06:09 AM    Glucose (POC) 139 (H) 06/03/2021 05:19 PM    Glucose (POC) 141 (H) 06/03/2021 11:59 AM    Glucose (POC) 133 (H) 06/02/2021 06:48 PM    Glucose (POC) 133 (H) 06/02/2021 11:38 AM     Lab Results   Component Value Date/Time    Color YELLOW/STRAW 05/16/2021 09:33 PM    Appearance CLEAR 05/16/2021 09:33 PM    Specific gravity 1.014 05/16/2021 09:33 PM    Specific gravity 1.015 09/21/2020 09:13 AM    pH (UA) 5.0 05/16/2021 09:33 PM    Protein Negative 05/16/2021 09:33 PM    Glucose Negative 05/16/2021 09:33 PM    Ketone Negative 05/16/2021 09:33 PM    Bilirubin Negative 05/16/2021 09:33 PM    Urobilinogen 0.2 05/16/2021 09:33 PM    Nitrites Negative 05/16/2021 09:33 PM    Leukocyte Esterase Negative 05/16/2021 09:33 PM    Epithelial cells FEW 05/16/2021 09:33 PM    Bacteria Negative 05/16/2021 09:33 PM    WBC 0-4 05/16/2021 09:33 PM    RBC 0-5 05/16/2021 09:33 PM         Medications Reviewed:     Current Facility-Administered Medications   Medication Dose Route Frequency    mylanta/viscous lidocaine (GI COCKTAIL)  40 mL Oral Q6H PRN    pantoprazole (PROTONIX) tablet 40 mg  40 mg Oral ACB&D    melatonin tablet 3 mg  3 mg Oral QHS    arformoteroL (BROVANA) neb solution 15 mcg  15 mcg Nebulization BID RT    And    budesonide (PULMICORT) 500 mcg/2 ml nebulizer suspension  500 mcg Nebulization BID RT    DULoxetine (CYMBALTA) capsule 30 mg  30 mg Oral DAILY    hydroxypropyl methylcellulose (ISOPTO TEARS) 0.5 % ophthalmic solution 1 Drop  1 Drop Both Eyes PRN    furosemide (LASIX) tablet 40 mg  40 mg Oral DAILY    polyethylene glycol (MIRALAX) packet 17 g  17 g Oral DAILY    atenoloL (TENORMIN) tablet 12.5 mg  12.5 mg Oral DAILY    lidocaine (XYLOCAINE) 2 % jelly   Mucous Membrane PRN    hydrALAZINE (APRESOLINE) tablet 100 mg  100 mg Oral TID    amLODIPine (NORVASC) tablet 5 mg  5 mg Oral DAILY    labetaloL (NORMODYNE;TRANDATE) injection 20 mg  20 mg IntraVENous Q6H PRN    levETIRAcetam (KEPPRA) 1,000 mg in 0.9% sodium chloride 100 mL IVPB  1,000 mg IntraVENous Q12H    hydrALAZINE (APRESOLINE) 20 mg/mL injection 20 mg  20 mg IntraVENous Q6H PRN    sodium chloride (NS) flush 5-40 mL  5-40 mL IntraVENous Q8H    sodium chloride (NS) flush 5-40 mL  5-40 mL IntraVENous PRN    polyethylene glycol (MIRALAX) packet 17 g  17 g Oral DAILY PRN    albuterol (PROVENTIL VENTOLIN) nebulizer solution 2.5 mg  2.5 mg Nebulization Q4H PRN    aspirin chewable tablet 81 mg  81 mg Oral DAILY    atorvastatin (LIPITOR) tablet 80 mg  80 mg Oral DAILY    isosorbide mononitrate ER (IMDUR) tablet 30 mg  30 mg Oral DAILY    sodium chloride (NS) flush 5-40 mL  5-40 mL IntraVENous Q8H    sodium chloride (NS) flush 5-40 mL  5-40 mL IntraVENous PRN    acetaminophen (TYLENOL) tablet 650 mg  650 mg Oral Q6H PRN    Or    acetaminophen (TYLENOL) suppository 650 mg  650 mg Rectal Q6H PRN    promethazine (PHENERGAN) tablet 12.5 mg  12.5 mg Oral Q6H PRN    Or    ondansetron (ZOFRAN) injection 4 mg  4 mg IntraVENous Q6H PRN     ______________________________________________________________________  EXPECTED LENGTH OF STAY: 4d 2h  ACTUAL LENGTH OF STAY:          23                 Wanda Blair MD

## 2021-06-08 NOTE — PROGRESS NOTES
I have reviewed discharge instructions with the caregiver. The caregiver verbalized understanding. Report called to [facility]. Signed copy of d/c instructions placed on chart. PIV and tele removed. Transport being provided by [facility].

## 2021-06-08 NOTE — PROGRESS NOTES
Bedside shift change report given to Augustina Bravo (oncoming nurse) by Luis Hughes (offgoing nurse). Report included the following information SBAR, Kardex, Intake/Output, MAR, Cardiac Rhythm NSR, ST, Quality Measures and Dual Neuro Assessment.

## 2021-06-08 NOTE — PROGRESS NOTES
Problem: Pressure Injury - Risk of  Goal: *Prevention of pressure injury  Description: Document Aquiles Scale and appropriate interventions in the flowsheet. Outcome: Resolved/Met  Note: Pressure Injury Interventions:  Sensory Interventions: Float heels, Minimize linen layers    Moisture Interventions: Absorbent underpads, Minimize layers    Activity Interventions: Increase time out of bed, PT/OT evaluation    Mobility Interventions: PT/OT evaluation, HOB 30 degrees or less    Nutrition Interventions: Document food/fluid/supplement intake    Friction and Shear Interventions: Lift sheet, Lift team/patient mobility team                Problem: Patient Education: Go to Patient Education Activity  Goal: Patient/Family Education  Outcome: Resolved/Met     Problem: Patient Education: Go to Patient Education Activity  Goal: Patient/Family Education  Outcome: Resolved/Met     Problem: Falls - Risk of  Goal: *Absence of Falls  Description: Document Too Fall Risk and appropriate interventions in the flowsheet.   Outcome: Resolved/Met  Note: Fall Risk Interventions:  Mobility Interventions: Bed/chair exit alarm, PT Consult for mobility concerns    Mentation Interventions: Adequate sleep, hydration, pain control, Door open when patient unattended, Bed/chair exit alarm    Medication Interventions: Bed/chair exit alarm, Patient to call before getting OOB    Elimination Interventions: Bed/chair exit alarm, Call light in reach, Patient to call for help with toileting needs    History of Falls Interventions: Bed/chair exit alarm, Investigate reason for fall         Problem: Patient Education: Go to Patient Education Activity  Goal: Patient/Family Education  Outcome: Resolved/Met     Problem: Patient Education: Go to Patient Education Activity  Goal: Patient/Family Education  Outcome: Resolved/Met     Problem: Patient Education: Go to Patient Education Activity  Goal: Patient/Family Education  Outcome: Resolved/Met     Problem: Seizure Disorder (Adult)  Goal: *STG: Remains free of seizure activity  Outcome: Resolved/Met  Goal: *STG: Maintains lab values within therapeutic range  Outcome: Resolved/Met  Goal: *STG/LTG: Complies with medication therapy  Outcome: Resolved/Met  Goal: *STG: Remains free of injury during seizure activity  Outcome: Resolved/Met  Goal: *STG: Remains safe in hospital  Outcome: Resolved/Met  Goal: Interventions  Outcome: Resolved/Met     Problem: Patient Education: Go to Patient Education Activity  Goal: Patient/Family Education  Outcome: Resolved/Met     Problem: Patient Education: Go to Patient Education Activity  Goal: Patient/Family Education  Outcome: Resolved/Met     Problem: TIA/CVA Stroke: 0-24 hours  Goal: Off Pathway (Use only if patient is Off Pathway)  Outcome: Resolved/Met  Goal: Activity/Safety  Outcome: Resolved/Met  Goal: Consults, if ordered  Outcome: Resolved/Met  Goal: Diagnostic Test/Procedures  Outcome: Resolved/Met  Goal: Nutrition/Diet  Outcome: Resolved/Met  Goal: Discharge Planning  Outcome: Resolved/Met  Goal: Medications  Outcome: Resolved/Met  Goal: Respiratory  Outcome: Resolved/Met  Goal: Treatments/Interventions/Procedures  Outcome: Resolved/Met  Goal: Minimize risk of bleeding post-thrombolytic infusion  Outcome: Resolved/Met  Goal: Monitor for complications post-thrombolytic infusion  Outcome: Resolved/Met  Goal: Psychosocial  Outcome: Resolved/Met  Goal: *Hemodynamically stable  Outcome: Resolved/Met  Goal: *Neurologically stable  Description: Absence of additional neurological deficits    Outcome: Resolved/Met  Goal: *Verbalizes anxiety and depression are reduced or absent  Outcome: Resolved/Met  Goal: *Absence of Signs of Aspiration on Current Diet  Outcome: Resolved/Met  Goal: *Absence of deep venous thrombosis signs and symptoms(Stroke Metric)  Outcome: Resolved/Met  Goal: *Ability to perform ADLs and demonstrates progressive mobility and function  Outcome: Resolved/Met  Goal: *Stroke education started(Stroke Metric)  Outcome: Resolved/Met  Goal: *Dysphagia screen performed(Stroke Metric)  Outcome: Resolved/Met  Goal: *Rehab consulted(Stroke Metric)  Outcome: Resolved/Met     Problem: TIA/CVA Stroke: Day 2 Until Discharge  Goal: Off Pathway (Use only if patient is Off Pathway)  Outcome: Resolved/Met  Goal: Activity/Safety  Outcome: Resolved/Met  Goal: Diagnostic Test/Procedures  Outcome: Resolved/Met  Goal: Nutrition/Diet  Outcome: Resolved/Met  Goal: Discharge Planning  Outcome: Resolved/Met  Goal: Medications  Outcome: Resolved/Met  Goal: Respiratory  Outcome: Resolved/Met  Goal: Treatments/Interventions/Procedures  Outcome: Resolved/Met  Goal: Psychosocial  Outcome: Resolved/Met  Goal: *Verbalizes anxiety and depression are reduced or absent  Outcome: Resolved/Met  Goal: *Absence of aspiration  Outcome: Resolved/Met  Goal: *Absence of deep venous thrombosis signs and symptoms(Stroke Metric)  Outcome: Resolved/Met  Goal: *Optimal pain control at patient's stated goal  Outcome: Resolved/Met  Goal: *Tolerating diet  Outcome: Resolved/Met  Goal: *Ability to perform ADLs and demonstrates progressive mobility and function  Outcome: Resolved/Met  Goal: *Stroke education continued(Stroke Metric)  Outcome: Resolved/Met     Problem: Ischemic Stroke: Discharge Outcomes  Goal: *Verbalizes anxiety and depression are reduced or absent  Outcome: Resolved/Met  Goal: *Verbalize understanding of risk factor modification(Stroke Metric)  Outcome: Resolved/Met  Goal: *Hemodynamically stable  Outcome: Resolved/Met  Goal: *Absence of aspiration pneumonia  Outcome: Resolved/Met  Goal: *Aware of needed dietary changes  Outcome: Resolved/Met  Goal: *Verbalize understanding of prescribed medications including anti-coagulants, anti-lipid, and/or anti-platelets(Stroke Metric)  Outcome: Resolved/Met  Goal: *Tolerating diet  Outcome: Resolved/Met  Goal: *Aware of follow-up diagnostics related to anticoagulants  Outcome: Resolved/Met  Goal: *Ability to perform ADLs and demonstrates progressive mobility and function  Outcome: Resolved/Met  Goal: *Absence of DVT(Stroke Metric)  Outcome: Resolved/Met  Goal: *Absence of aspiration  Outcome: Resolved/Met  Goal: *Optimal pain control at patient's stated goal  Outcome: Resolved/Met  Goal: *Home safety concerns addressed  Outcome: Resolved/Met  Goal: *Describes available resources and support systems  Outcome: Resolved/Met  Goal: *Verbalizes understanding of activation of EMS(911) for stroke symptoms(Stroke Metric)  Outcome: Resolved/Met  Goal: *Understands and describes signs and symptoms to report to providers(Stroke Metric)  Outcome: Resolved/Met  Goal: *Neurolgocially stable (absence of additional neurological deficits)  Outcome: Resolved/Met  Goal: *Verbalizes importance of follow-up with primary care physician(Stroke Metric)  Outcome: Resolved/Met  Goal: *Smoking cessation discussed,if applicable(Stroke Metric)  Outcome: Resolved/Met  Goal: *Depression screening completed(Stroke Metric)  Outcome: Resolved/Met

## 2021-06-08 NOTE — DISCHARGE SUMMARY
Discharge Summary       PATIENT ID: Logan Roland  MRN: [id number]   YOB: 1934    DATE OF ADMISSION: 5/16/2021  9:02 PM    DATE OF DISCHARGE: 6/8/21   PRIMARY CARE PROVIDER: Samy Reaves MD     ATTENDING PHYSICIAN: Allen Oliveira MD  DISCHARGING PROVIDER: Allen Oliveira MD    To contact this individual call [phone] and ask the  to page. If unavailable ask to be transferred the Adult Hospitalist Department. CONSULTATIONS: IP CONSULT TO NEUROLOGY  IP CONSULT TO INTERVENTIONAL RADIOLOGY  IP CONSULT TO CARDIOLOGY    PROCEDURES/SURGERIES: * No surgery found *    ADMITTING DIAGNOSES & HOSPITAL COURSE:   #Status epilepticus, resolved. Seizure disorder, controlled on Keppra 1000 mg twice daily  #Chest pain, likely GERD. Controlled. Stress ulcer prophylaxis for 4 weeks. #Few small acute infarcts of bilateral frontal lobes, parietal occipital lobes, R>L on MRI brain 5/18/2021  #Sequelae of involved intracranial hemorrhage with small, late subacute hematomas in right parietal periventricular white matter on MRI as above  #Unchanged underlies parenchymal volume loss and severe chronic microvascular ischemic disease on MRI as above  #Acute metabolic encephalopathy, multifactorial, resolved. 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 Tolerating oral diet well. Dobbhoff tube DC'd 6/7. Tube feeds discontinued 6/5  #Hx of chronic leg edema due to venous stasis, stable    Admission Summary:   Maynor Crespo a 80 y. o. male with pmh of CVA, intraventricular brain hemorrhage, dyslipidemia, CKD 3, hypertension who presents to hospital from subacute rehab for concerns of altered/decreased mental status.   Patient was found to have multiple ischemic CVAs on MRI, continue to have altered mental status with some eye deviation and twitching on 5/20.  This was concerning for seizure, given 2 mg of Ativan and loaded with Keppra with some improvement.  On 5/21 patient however declined, unresponsive to verbal stimuli.  Stat EEG 24-hour was ordered and showed subclinical status.  He was loaded with Vimpat, and neurology recommends transfer to ICU for closer neuro checks on 5/21. Seizures were controlled and  Pt transferred out of ICU on 5/23.      Interval history / Subjective: Follow up AMS, status epilepticus. Hard of hearing. Patient seen and examined at the bedside. Labs, images and notes reviewed  Discussed with nursing staff, orders reviewed. Plan discussed with patient/Family  Feeling okay. No further episodes of chest pains. Scheduled for discharge to SNF at 11 AM today. Feels ready. Denied any other concerns or overnight events. DISCHARGE DIAGNOSES / PLAN:      Discharge diagnosis:  #Status epilepticus, resolved. Seizure disorder, controlled on Keppra 1000 mg twice daily  #Chest pain, likely GERD. Controlled. Stress ulcer prophylaxis for 4 weeks. #Few small acute infarcts of bilateral frontal lobes, parietal occipital lobes, R>L on MRI brain 5/18/2021  #Sequelae of involved intracranial hemorrhage with small, late subacute hematomas in right parietal periventricular white matter on MRI as above  #Unchanged underlies parenchymal volume loss and severe chronic microvascular ischemic disease on MRI as above  #Acute metabolic encephalopathy, multifactorial, resolved. 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 Tolerating oral diet well. Dobbhoff tube DC'd 6/7. Tube feeds discontinued 6/5  #Hx of chronic leg edema due to venous stasis, stable     Status epilepticus -resolved  -On Keppra and Vimpat until 6/5. Vimpat dc'd 6/6 am.  -Neurology on board. Appreciate. No other recommendations. EEG done 6/7.  -Repeat EEG 6/7/2021 noted without acute seizures.   -Keppra 1000 mg PO twice daily  -Seizure precautions   -EEG results 6/1 noted. D/w daughter. No active seizures.  -Outpatient neurology follow-up in 2-4 weeks or as recommended     Chest pain, epigastric vs central lower chest, more likely GI origin.      -EKG stat unremarkable for any acute STT changes  -Troponin stat unremarkable  -GI cocktail stat PO x 1 helpful. -IV Pantoprazole 40mg X1 helpful. Initiated for stress ulcer prophylaxis (SUP)  -Continue pantoprazole 40 mg p.o. twice daily x2 weeks, then daily x2 weeks for SUP  -Recommend keeping patient upright for at least 30 to 60 minutes after food/meals  -Per daughter, patient had needed PPI prophylaxis during previous hospitalizations as well  -IF any cardiac concerns, consider cardiology eval.  Not warranted at this point.  -Echo from 5/20 noted     Acute embolic infarcts  Subacute hematoma -intraventricular hemorrhage with ventriculomegaly on recent admission, discharged 5/3  -Not a candidate for anticoagulation due to intracranial hemorrhage, and fall risk  -Neurology following  -No escalation of antiplatelets due to ICH, continue aspirin  -Continue atorvastatin  -PT/OT/speech  -Echo with normal EF      Acute Metabolic encephalopathy, improving much  multifactorial due to above  -AEC as noted above, per neurology. Keppra only now. Vimpat DC'd 6/6  -Continue close monitoring      Community-acquired pneumonia-resolved      Hypertension Bp controlled,  C/w hydralazine, amlodipine.     Hyperlipidemiacontinue Lipitor     Hx TESSA  Per daughter,he never liked CPAP so he did not use   CPAP not tolerated in past. ABG noted     H/o chronic LE edema due to venous congestion  Ankle edema -dependent edema with immobility  Resumed lasix 40 mg 6/2. Continue and monitor BMP   Hypernatremia - resolved      Dysphagia, better and improving PO intake after clearance from ST on 6/4  -Has dubhoff, off TFs over the weekend 6/5.   Changed to nightly feeds  -passed swallow eval 6/1  -Diet modified to small bite-size 6/5  -Better PO intake. -DC Dobbhoff tube 6/7 AM     RUE swelling- venous duplex negative     Appreciate Palliative care input      Code status: DNR  DVT prophylaxis: SCDs     Care Plan discussed with: Patient/Family and Nurse  Anticipated Disposition: [facility]. Anticipated Discharge: Covid rapid test negative. DC today to rehab. ADDITIONAL CARE RECOMMENDATIONS:   Follow-up with PCP, cardiology, neurology as noted in the appointments. · It is important that you take the medication exactly as they are prescribed. · Keep your medication in the bottles provided by the pharmacist and keep a list of the medication names, dosages, and times to be taken in your wallet. · Do not take other medications without consulting your doctor. · No drinking alcohol or driving car or operating machinery if you are on narcotic pain medications. Donot take sedating mediations if you are sleepy or confused.    · Fall Precautions  · Keep Well Hydrated  · Report to your medical provider if you feel you have  developed allergies to medications  · Follow up with your PCP or Consultant for medication adjustments and refills  · Monitor for signs of fevers,chills,bleeding,chest pain and seek medical attention if you do so.          DIET: Cardiac Diet     ACTIVITY: Activity as tolerated and PT/OT Eval and Treat     WOUND CARE: N/A     EQUIPMENT needed: As per PT/OT     PENDING TEST RESULTS:   At the time of discharge the following test results are still pending: none    FOLLOW UP APPOINTMENTS:    Follow-up Information     Follow up With Specialties Details Why Contact Francisco Javier FREITAS [address] Jose Sanford MD Internal Medicine Schedule an appointment as soon as possible for a visit in 1 week PCP: Post hospital discharge follow-up within 1 week after discharge from rehab. Deanna De Jesus [address]      David Sandhu MD Neurology Schedule an appointment as soon as possible for a visit in 1 month Neurology: Follow-up with neurology physician for stroke in 1-2 months or as needed before or after [place] Charlyn Lesch [facility] Jameel Mckinney MD Cardiology Schedule an appointment as soon as possible for a visit Cardiology: Follow-up with cardiologist as needed [address]  [phone]               DISCHARGE MEDICATIONS:  Current Discharge Medication List      START taking these medications    Details   hydroxypropyl methylcellulose (ISOPTO TEARS) 0.5 % ophthalmic solution Administer 1 Drop to both eyes as needed (Dry eyes). Qty: 15 mL, Refills: 0  Start date: 6/8/2021      polyethylene glycol (MIRALAX) 17 gram packet Take 1 Packet by mouth daily. Hold for diarrhea or loose stools. Mix in 8 oz of water, juice, soda, coffee, or tea prior to administration  Qty: 30 Each, Refills: 0  Start date: 6/9/2021      alum-mag hydroxide-simeth-lidocaine Take 40 mL by mouth every six (6) hours as needed (Heartburns/epigastric pain/abdominal pain) for up to 10 days. Shake Well: For Oral Use; Mix 30mL Mylanta and 10mL Lidocaine Viscous together = 40 mL GI Cocktail  Qty: 100 mL, Refills: 0  Start date: 6/8/2021, End date: 6/18/2021      levETIRAcetam 1,000 mg tablet Take 1 Tablet by mouth every twelve (12) hours every twelve (12) hours. Do not crush or chew. Swallow whole. Qty: 60 Tablet, Refills: 0  Start date: 6/8/2021      albuterol (PROVENTIL VENTOLIN) 2.5 mg /3 mL (0.083 %) nebu 3 mL by Nebulization route every four (4) hours as needed for Wheezing, Shortness of Breath, Respiratory Distress or Cough.   Qty: 30 Nebule, Refills: 0  Start date: 6/8/2021         CONTINUE these medications which have CHANGED    Details   amLODIPine (NORVASC) 5 mg tablet Take 1 Tablet by mouth daily for 30 days.  Adelaida Spells: 30 Tablet, Refills: 0  Start date: 6/8/2021, End date: 7/8/2021      atenoloL (TENORMIN) 25 mg tablet Take 0.5 Tablets by mouth daily. Dose reduced from 100 mg daily to 12.5 mg daily  Qty: 15 Tablet, Refills: 0  Start date: 6/8/2021      furosemide (LASIX) 80 mg tablet Take 0.5 Tablets by mouth daily. Dose reduced from 80 mg daily to 40 mg daily  Qty: 15 Tablet, Refills: 0  Start date: 6/8/2021      hydrALAZINE (APRESOLINE) 100 mg tablet Take 1 Tablet by mouth every eight (8) hours for 30 days. Qty: 90 Tablet, Refills: 0  Start date: 6/8/2021, End date: 7/8/2021      pantoprazole (PROTONIX) 40 mg tablet Take 1 Tablet by mouth Before breakfast and dinner for 14 days, THEN 1 Tablet daily for 30 days. Please continue daily maintenance dose of 40 mg daily after initial 14 days of 40 mg twice daily  Qty: 58 Tablet, Refills: 0  Start date: 6/8/2021, End date: 7/22/2021         CONTINUE these medications which have NOT CHANGED    Details   atorvastatin (LIPITOR) 80 mg tablet TAKE ONE TABLET BY MOUTH DAILY  Qty: 90 Tab, Refills: 1      thiamine HCL (B-1) 100 mg tablet TAKE ONE BY MOUTH DAILY  Qty: 90 Tab, Refills: 4      isosorbide mononitrate ER (IMDUR) 30 mg tablet TAKE ONE TABLET BY MOUTH DAILY  Qty: 90 Tab, Refills: 4      DULoxetine (CYMBALTA) 30 mg capsule TAKE ONE CAPSULE BY MOUTH DAILY  Qty: 90 Cap, Refills: 4      melatonin 5 mg cap capsule Take 5 mg by mouth nightly. Pt. Take 2 tabs a night      acetaminophen (TYLENOL) 500 mg tablet Take 1,000 mg by mouth every six (6) hours as needed for Pain. aspirin delayed-release 81 mg tablet Take 81 mg by mouth daily. ANORO ELLIPTA 62.5-25 mcg/actuation inhaler Take 1 Puff by inhalation daily. NOTIFY YOUR PHYSICIAN FOR ANY OF THE FOLLOWING:   Fever over 101 degrees for 24 hours. Chest pain, shortness of breath, fever, chills, nausea, vomiting, diarrhea, change in mentation, falling, weakness, bleeding.  Severe pain or pain not relieved by medications. Or, any other signs or symptoms that you may have questions about. DISPOSITION:    Home With:   OT  PT  HH  RN      x Long term SNF/Inpatient Rehab    Independent/assisted living    Hospice    Other:       PATIENT CONDITION AT DISCHARGE:     Functional status    Poor    x Deconditioned     Independent      Cognition     Lucid    x Forgetful     Dementia      Catheters/lines (plus indication)    Yu     PICC     PEG    x None      Code status     Full code    x DNR      PHYSICAL EXAMINATION AT DISCHARGE:  Visit Vitals  /61 (BP 1 Location: Left arm, BP Patient Position: At rest)   Pulse 97   Temp 98.9 °F (37.2 °C)   Resp 20   Ht 5' 8\" (1.727 m)   Wt 99.1 kg (218 lb 8 oz)   SpO2 94%   BMI 33.22 kg/m²     I had a face to face encounter with this patient and independently examined them on 6/8/2021 as outlined below:                                              Gen: elderly patient. More alert, oriented, comfortable. No distress  HEENT: anicteric sclerae, normal conjunctiva, NG tube   Neck: supple, trachea midline  Heart: RRR, no MRG, no JVD,  Lungs: CTAB, no RRW. No respiratory distress  Abd: soft,non tender,non distended. No epigastric discomfort today  Extr: he has 2+ LE edema mainly at ankles-has chronic venous insufficnecny, UE edema  Skin: dry, no rash  Neuro: awake,alert.  No focal deficit noted     CHRONIC MEDICAL DIAGNOSES:  Problem List as of 6/8/2021 Date Reviewed: 9/21/2020        Codes Class Noted - Resolved    Cerebrovascular accident (CVA) due to embolism ([facility] ICD-10-CM: I63.9  ICD-9-CM: 434.11  5/29/2021 - Present        Status epilepticus (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 ([facility] ICD-10-CM: I25.118  ICD-9-CM: 414.00, 413.9  6/26/2020 - Present        Chronic obstructive pulmonary disease ([facility] ICD-10-CM: J44.9  ICD-9-CM: 496  Unknown - Present        Viral encephalitis ICD-10-CM: A86  ICD-9-CM: 049.9  12/2/2019 - Present        Altered mental state ICD-10-CM: R41.82  ICD-9-CM: 780.97  11/19/2019 - Present        Facial droop ICD-10-CM: R29.810  ICD-9-CM: 781.94  10/27/2019 - Present        Acute blood loss anemia ICD-10-CM: D62  ICD-9-CM: 285.1  9/29/2019 - Present        Rectal bleeding ICD-10-CM: K62.5  ICD-9-CM: 569.3  9/23/2019 - Present        Pneumonia ICD-10-CM: J18.9  ICD-9-CM: 992  9/23/2019 - Present        Severe sepsis ([facility] ICD-10-CM: A41.9, R65.20  ICD-9-CM: 038.9, 995.92  9/23/2019 - Present        Flank pain ICD-10-CM: R10.9  ICD-9-CM: 789.09  8/1/2019 - Present        Spinal stenosis of lumbar region at multiple levels ICD-10-CM: M48.061  ICD-9-CM: 724.02  5/13/2019 - Present        Bilateral carotid artery stenosis ICD-10-CM: I65.23  ICD-9-CM: 433.10, 433.30  4/12/2019 - Present        Severe obesity ([facility] ICD-10-CM: E66.01  ICD-9-CM: 278.01  1/3/2019 - Present        TESSA on CPAP ICD-10-CM: G47.33, Z99.89  ICD-9-CM: 327.23, V46.8  1/3/2019 - Present        Simple chronic bronchitis ([facility] ICD-10-CM: J41.0  ICD-9-CM: 491.0  1/3/2019 - Present        Fatigue ICD-10-CM: R53.83  ICD-9-CM: 780.79  8/16/2017 - Present        CAD (coronary artery disease) ICD-10-CM: I25.10  ICD-9-CM: 414.00  8/16/2017 - Present        Dyslipidemia ICD-10-CM: E78.5  ICD-9-CM: 272.4  8/16/2017 - Present        On statin therapy ICD-10-CM: Z79.899  ICD-9-CM: V58.69  8/16/2017 - Present        Gout ICD-10-CM: M10.9  ICD-9-CM: 274.9  8/16/2017 - Present        GERD (gastroesophageal reflux disease) ICD-10-CM: K21.9  ICD-9-CM: 530.81  8/16/2017 - Present        TIA (transient ischemic attack) ICD-10-CM: G45.9  ICD-9-CM: 435.9  8/16/2017 - Present        Dyspnea ICD-10-CM: R06.00  ICD-9-CM: 786.09  8/16/2017 - Present        Colon polyps ICD-10-CM: K63.5  ICD-9-CM: 211.3  8/16/2017 - Present        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  ICD-9-CM: 528.6  8/16/2017 - Present        Hypertension ICD-10-CM: I10  ICD-9-CM: 401.9  8/16/2017 - Present        ED (erectile dysfunction) ICD-10-CM: N52.9  ICD-9-CM: 607.84  8/16/2017 - Present        Hypoxia ICD-10-CM: R09.02  ICD-9-CM: 799.02  4/23/2015 - Present        Neurogenic claudication ICD-10-CM: Q29.389  ICD-9-CM: 724.03  4/23/2015 - Present        RESOLVED: Encounter for immunization ICD-10-CM: Z23  ICD-9-CM: V03.89  8/16/2017 - 9/25/2019        RESOLVED: Umbilical hernia EIX-06-SB: K42.9  ICD-9-CM: 553.1  11/9/2011 - 11/10/2015            Radiology:  XR INSERT LONG FEED TUBE     Result Date: 5/26/2021  Successful placement of the Dobbhoff tube in the distal duodenum/proximal jejunum at the ligament of Treitz.      MRI BRAIN WO CONT     Result Date: 5/19/2021  1. Few small scattered acute infarcts in the bilateral frontal lobes and parieto-occipital lobes, right greater than left. 2. 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 3. Unchanged generalized parenchymal volume loss and severe chronic microvascular ischemic disease.      CT HEAD WO CONT     Result Date: 5/24/2021  No significant interval change.      CT HEAD WO CONT     Result Date: 5/16/2021  Significantly limited by motion. No acute intracranial abnormality      CTA HEAD     Result Date: 5/19/2021  1.   Heterogeneous plaque in the proximal right internal carotid artery without hemodynamically significant stenosis. 2.  No significant left carotid artery stenosis. 3.  No large vessel occlusion. 4.  40 mm main pulmonary artery suggestive of pulmonary arterial hypertension.      CTA NECK     Result Date: 5/19/2021  1. Heterogeneous plaque in the proximal right internal carotid artery without hemodynamically significant stenosis. 2.  No significant left carotid artery stenosis. 3.  No large vessel occlusion. 4.  40 mm main pulmonary artery suggestive of pulmonary arterial hypertension.      XR CHEST PORT     Result Date: 5/29/2021  Stable nonspecific left lower airspace disease. No pulmonary edema.     XR CHEST PORT     Result Date: 5/25/2021  1.  Low lung volumes with mild basilar atelectasis in left lower lobe.     XR CHEST PORT     Result Date: 5/22/2021  Improved aeration in the left lung base with minimal residual airspace opacity     XR CHEST PORT     Result Date: 5/16/2021  Patchy left basilar airspace opacity which may represent pneumonia or atelectasis with a small left pleural effusion.     XR ABD PORT  1 V     Result Date: 5/24/2021  Intragastric Dobbhoff tube with slight interval advancement.      XR ABD PORT  1 V     Result Date: 5/24/2021  Feeding tube tip projects over the fundus and should be advanced.      XR ABD PORT  1 V     Result Date: 5/23/2021  Gastric tube tip is in appropriate position for use.      Laboratory data:        Recent Results (from the past 24 hour(s))   TROPONIN I     Collection Time: 06/07/21 11:14 AM   Result Value Ref Range     Troponin-I, Qt. <0.05 <0.05 ng/mL   RENAL FUNCTION PANEL     Collection Time: 06/08/21  4:10 AM   Result Value Ref Range     Sodium 137 136 - 145 mmol/L     Potassium 4.1 3.5 - 5.1 mmol/L     Chloride 103 97 - 108 mmol/L     CO2 28 21 - 32 mmol/L     Anion gap 6 5 - 15 mmol/L     Glucose 99 65 - 100 mg/dL     BUN 16 6 - 20 MG/DL     Creatinine 1.09 0.70 - 1.30 MG/DL     BUN/Creatinine ratio 15 12 - 20       GFR est AA >60 >60 ml/min/1.73m2     GFR est non-AA >60 >60 ml/min/1.73m2     Calcium 8.8 8.5 - 10.1 MG/DL     Phosphorus 3.1 2.6 - 4.7 MG/DL     Albumin 2.6 (L) 3.5 - 5.0 g/dL   COVID-19 RAPID TEST     Collection Time: 06/08/21  7:58 AM   Result Value Ref Range     Specimen source Nasopharyngeal       COVID-19 rapid test Not detected NOTD           Greater than 40 minutes were spent with the patient on counseling and coordination of care    Signed:   Mariela Mora MD  6/8/2021  9:48 AM

## 2021-06-08 NOTE — DISCHARGE INSTRUCTIONS
Discharge Instructions       PATIENT ID: Liz Tan  MRN: [id number]   YOB: 1934    DATE OF ADMISSION: 5/16/2021  9:02 PM    DATE OF DISCHARGE: 6/8/2021    PRIMARY CARE PROVIDER: Ady Villarreal MD     ATTENDING PHYSICIAN: Hodan Mcdermott MD  DISCHARGING PROVIDER: Pablito Aquino MD    To contact this individual call [phone] and ask the  to page. If unavailable ask to be transferred the Adult Hospitalist Department. DISCHARGE DIAGNOSES   #Status epilepticus, resolved. Seizure disorder, controlled on Keppra 1000 mg twice daily  #Chest pain, likely GERD. Controlled. Stress ulcer prophylaxis for 4 weeks. #Few small acute infarcts of bilateral frontal lobes, parietal occipital lobes, R>L on MRI brain 5/18/2021  #Sequelae of involved intracranial hemorrhage with small, late subacute hematomas in right parietal periventricular white matter on MRI as above  #Unchanged underlies parenchymal volume loss and severe chronic microvascular ischemic disease on MRI as above  #Acute metabolic encephalopathy, multifactorial, resolved. 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 Tolerating oral diet well. Dobbhoff tube DC'd 6/7.  Tube feeds discontinued 6/5  #Hx of chronic leg edema due to venous stasis, stable    CONSULTATIONS: IP CONSULT TO NEUROLOGY  IP CONSULT TO INTERVENTIONAL RADIOLOGY  IP CONSULT TO CARDIOLOGY    PROCEDURES/SURGERIES: * No surgery found *    PENDING TEST RESULTS:   At the time of discharge the following test results are still pending: None    FOLLOW UP APPOINTMENTS:   Follow-up Information     Follow up With Specialties Details Why Contact Francisco Javier FREITAS [facility]   [address]    Ady Villarreal MD Internal Medicine Schedule an appointment as soon as possible for a visit in 1 week PCP: Post hospital discharge follow-up within 1 week after discharge from rehab. [address]      Nikos Rasheed MD Neurology Schedule an appointment as soon as possible for a visit in 1 month Neurology: Follow-up with neurology physician for stroke in 1-2 months or as needed before or after [address] TORRES [address] [facility]      Jean-Claude Rowan MD Cardiology Schedule an appointment as soon as possible for a visit Cardiology: Follow-up with cardiologist as needed [address]  [phone]             ADDITIONAL CARE RECOMMENDATIONS:   Follow-up with PCP, cardiology, neurology as noted in the appointments. · It is important that you take the medication exactly as they are prescribed. · Keep your medication in the bottles provided by the pharmacist and keep a list of the medication names, dosages, and times to be taken in your wallet. · Do not take other medications without consulting your doctor. · No drinking alcohol or driving car or operating machinery if you are on narcotic pain medications. Donot take sedating mediations if you are sleepy or confused. · Fall Precautions  · Keep Well Hydrated  · Report to your medical provider if you feel you have  developed allergies to medications  · Follow up with your PCP or Consultant for medication adjustments and refills  · Monitor for signs of fevers,chills,bleeding,chest pain and seek medical attention if you do so. DIET: Cardiac Diet    ACTIVITY: Activity as tolerated and PT/OT Eval and Treat    WOUND CARE: N/A    EQUIPMENT needed: As per PT/OT      Radiology:  XR INSERT LONG FEED TUBE    Result Date: 5/26/2021  Successful placement of the Dobbhoff tube in the distal duodenum/proximal jejunum at the ligament of Treitz. MRI BRAIN WO CONT    Result Date: 5/19/2021  1.  Few small scattered acute infarcts in the bilateral frontal lobes and parieto-occipital lobes, right greater than left. 2. 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 3. Unchanged generalized parenchymal volume loss and severe chronic microvascular ischemic disease. CT HEAD WO CONT    Result Date: 5/24/2021  No significant interval change. CT HEAD WO CONT    Result Date: 5/16/2021  Significantly limited by motion. No acute intracranial abnormality     CTA HEAD    Result Date: 5/19/2021  1. Heterogeneous plaque in the proximal right internal carotid artery without hemodynamically significant stenosis. 2.  No significant left carotid artery stenosis. 3.  No large vessel occlusion. 4.  40 mm main pulmonary artery suggestive of pulmonary arterial hypertension. CTA NECK    Result Date: 5/19/2021  1. Heterogeneous plaque in the proximal right internal carotid artery without hemodynamically significant stenosis. 2.  No significant left carotid artery stenosis. 3.  No large vessel occlusion. 4.  40 mm main pulmonary artery suggestive of pulmonary arterial hypertension. XR CHEST PORT    Result Date: 5/29/2021  Stable nonspecific left lower airspace disease. No pulmonary edema. XR CHEST PORT    Result Date: 5/25/2021  1. Low lung volumes with mild basilar atelectasis in left lower lobe. XR CHEST PORT    Result Date: 5/22/2021  Improved aeration in the left lung base with minimal residual airspace opacity    XR CHEST PORT    Result Date: 5/16/2021  Patchy left basilar airspace opacity which may represent pneumonia or atelectasis with a small left pleural effusion. XR ABD PORT  1 V    Result Date: 5/24/2021  Intragastric Dobbhoff tube with slight interval advancement.      XR ABD PORT  1 V    Result Date: 5/24/2021  Feeding tube tip projects over the fundus and should be advanced. XR ABD PORT  1 V    Result Date: 5/23/2021  Gastric tube tip is in appropriate position for use. Laboratory data:  Recent Results (from the past 24 hour(s))   TROPONIN I    Collection Time: 06/07/21 11:14 AM   Result Value Ref Range    Troponin-I, Qt. <0.05 <0.05 ng/mL   RENAL FUNCTION PANEL    Collection Time: 06/08/21  4:10 AM   Result Value Ref Range    Sodium 137 136 - 145 mmol/L    Potassium 4.1 3.5 - 5.1 mmol/L    Chloride 103 97 - 108 mmol/L    CO2 28 21 - 32 mmol/L    Anion gap 6 5 - 15 mmol/L    Glucose 99 65 - 100 mg/dL    BUN 16 6 - 20 MG/DL    Creatinine 1.09 0.70 - 1.30 MG/DL    BUN/Creatinine ratio 15 12 - 20      GFR est AA >60 >60 ml/min/1.73m2    GFR est non-AA >60 >60 ml/min/1.73m2    Calcium 8.8 8.5 - 10.1 MG/DL    Phosphorus 3.1 2.6 - 4.7 MG/DL    Albumin 2.6 (L) 3.5 - 5.0 g/dL   COVID-19 RAPID TEST    Collection Time: 06/08/21  7:58 AM   Result Value Ref Range    Specimen source Nasopharyngeal      COVID-19 rapid test Not detected NOTD             DISCHARGE MEDICATIONS:   See Medication Reconciliation Form    · It is important that you take the medication exactly as they are prescribed. · Keep your medication in the bottles provided by the pharmacist and keep a list of the medication names, dosages, and times to be taken in your wallet. · Do not take other medications without consulting your doctor. NOTIFY YOUR PHYSICIAN FOR ANY OF THE FOLLOWING:   Fever over 101 degrees for 24 hours. Chest pain, shortness of breath, fever, chills, nausea, vomiting, diarrhea, change in mentation, falling, weakness, bleeding. Severe pain or pain not relieved by medications. Or, any other signs or symptoms that you may have questions about.       DISPOSITION:    Home With:   OT  PT  HH  RN      x SNF/Inpatient Rehab/LTAC [facility]    Independent/assisted living    Hospice    Other:     CDMP Checked:   Yes x     PROBLEM LIST Updated:  Yes x        My Medications      START taking these medications      Instructions Each Dose to Equal Morning Noon Evening Bedtime   albuterol 2.5 mg /3 mL (0.083 %) Nebu  Commonly known as: PROVENTIL VENTOLIN    Your last dose was: Your next dose is:         3 mL by Nebulization route every four (4) hours as needed for Wheezing, Shortness of Breath, Respiratory Distress or Cough. 2.5 mg                 alum-mag hydroxide-simeth-lidocaine    Your last dose was: Your next dose is: Take 40 mL by mouth every six (6) hours as needed (Heartburns/epigastric pain/abdominal pain) for up to 10 days. Shake Well: For Oral Use; Mix 30mL Mylanta and 10mL Lidocaine Viscous together = 40 mL GI Cocktail   40 mL                 hydroxypropyl methylcellulose 0.5 % ophthalmic solution  Commonly known as: ISOPTO TEARS    Your last dose was: Your next dose is:         Administer 1 Drop to both eyes as needed (Dry eyes). 1 Drop                 levETIRAcetam 1,000 mg tablet    Your last dose was: Your next dose is: Take 1 Tablet by mouth every twelve (12) hours every twelve (12) hours. Do not crush or chew. Swallow whole. 1,000 mg                 polyethylene glycol 17 gram packet  Commonly known as: MIRALAX  Start taking on: June 9, 2021    Your last dose was: Your next dose is: Take 1 Packet by mouth daily. Hold for diarrhea or loose stools. Mix in 8 oz of water, juice, soda, coffee, or tea prior to administration   17 g                    CHANGE how you take these medications      Instructions Each Dose to Equal Morning Noon Evening Bedtime   atenoloL 25 mg tablet  Commonly known as: TENORMIN  What changed:   · medication strength  · See the new instructions. Your last dose was: Your next dose is: Take 0.5 Tablets by mouth daily.  Dose reduced from 100 mg daily to 12.5 mg daily   12.5 mg                 furosemide 80 mg tablet  Commonly known as: LASIX  What changed: See the new instructions. Your last dose was: Your next dose is: Take 0.5 Tablets by mouth daily. Dose reduced from 80 mg daily to 40 mg daily   40 mg                 pantoprazole 40 mg tablet  Commonly known as: PROTONIX  Start taking on: June 8, 2021  What changed: See the new instructions. Your last dose was: Your next dose is: Take 1 Tablet by mouth Before breakfast and dinner for 14 days, THEN 1 Tablet daily for 30 days. Please continue daily maintenance dose of 40 mg daily after initial 14 days of 40 mg twice daily                     CONTINUE taking these medications      Instructions Each Dose to Equal Morning Noon Evening Bedtime   acetaminophen 500 mg tablet  Commonly known as: TYLENOL    Your last dose was: Your next dose is: Take 1,000 mg by mouth every six (6) hours as needed for Pain.   1,000 mg                 amLODIPine 5 mg tablet  Commonly known as: NORVASC    Your last dose was: Your next dose is: Take 1 Tablet by mouth daily for 30 days. 5 mg                 Anoro Ellipta 62.5-25 mcg/actuation inhaler  Generic drug: umeclidinium-vilanteroL    Your last dose was: Your next dose is: Take 1 Puff by inhalation daily. 1 Puff                 aspirin delayed-release 81 mg tablet    Your last dose was: Your next dose is: Take 81 mg by mouth daily. 81 mg                 atorvastatin 80 mg tablet  Commonly known as: LIPITOR    Your last dose was: Your next dose is:         TAKE ONE TABLET BY MOUTH DAILY                  DULoxetine 30 mg capsule  Commonly known as: CYMBALTA    Your last dose was: Your next dose is:         TAKE ONE CAPSULE BY MOUTH DAILY                  hydrALAZINE 100 mg tablet  Commonly known as: APRESOLINE    Your last dose was: Your next dose is: Take 1 Tablet by mouth every eight (8) hours for 30 days.    100 mg                 isosorbide mononitrate ER 30 mg tablet  Commonly known as: IMDUR    Your last dose was: Your next dose is:         TAKE ONE TABLET BY MOUTH DAILY                  melatonin 5 mg Cap capsule    Your last dose was: Your next dose is: Take 5 mg by mouth nightly. Pt. Take 2 tabs a night   5 mg                 thiamine  mg tablet  Commonly known as: B-1    Your last dose was: Your next dose is:         TAKE ONE BY MOUTH DAILY                     ASK your doctor about these medications      Instructions Each Dose to Equal Morning Noon Evening Bedtime   QUEtiapine 100 mg tablet  Commonly known as: SEROquel  Ask about: Should I take this medication? Your last dose was: Your next dose is:         1 Tab by Per NG tube route nightly for 30 days. 100 mg                       Where to Get Your Medications      Information on where to get these meds will be given to you by the nurse or doctor.     Ask your nurse or doctor about these medications  · albuterol 2.5 mg /3 mL (0.083 %) Nebu  · alum-mag hydroxide-simeth-lidocaine  · amLODIPine 5 mg tablet  · atenoloL 25 mg tablet  · furosemide 80 mg tablet  · hydrALAZINE 100 mg tablet  · hydroxypropyl methylcellulose 0.5 % ophthalmic solution  · levETIRAcetam 1,000 mg tablet  · pantoprazole 40 mg tablet  · polyethylene glycol 17 gram packet         Signed:   Jarad Hooper MD  6/8/2021  9:46 AM

## 2021-06-09 ENCOUNTER — PATIENT OUTREACH (OUTPATIENT)
Dept: CASE MANAGEMENT | Age: 86
End: 2021-06-09

## 2021-06-09 LAB
ATRIAL RATE: 90 BPM
CALCULATED P AXIS, ECG09: 94 DEGREES
CALCULATED R AXIS, ECG10: -13 DEGREES
CALCULATED T AXIS, ECG11: 54 DEGREES
DIAGNOSIS, 93000: NORMAL
P-R INTERVAL, ECG05: 206 MS
Q-T INTERVAL, ECG07: 370 MS
QRS DURATION, ECG06: 84 MS
QTC CALCULATION (BEZET), ECG08: 452 MS
VENTRICULAR RATE, ECG03: 90 BPM

## 2021-06-09 NOTE — PROGRESS NOTES
Transition of care outreach postponed for 14 days due to patient's discharge to SNF. Per EMR patient discharged back to [facility] and [address] [place] [address]. Will continue to monitor patient progress.

## 2021-06-11 ENCOUNTER — TELEPHONE (OUTPATIENT)
Dept: NEUROLOGY | Age: 86
End: 2021-06-11

## 2021-06-11 NOTE — TELEPHONE ENCOUNTER
[facility] calling stating patient is staying there and he was recently hospitalized for seizures. She stated the patient is currently taking 100mg of Keppra every 12 hours and they received a creatinine clearance level check and it was 37mL per minutes. Pharmacy is recommending cutting down Keppra between 250 and 750mg every two hours.  Please call

## 2021-06-11 NOTE — TELEPHONE ENCOUNTER
I have reviewed this information with Gabriela Calvert. She stated patient's Cre[place]nine Clearance was abnormal. Se agreed to reduce Keppra at this time, per Dr. Shagufta Caldwell.

## 2021-06-11 NOTE — TELEPHONE ENCOUNTER
Per Dr. Brian Aleman, She had blood work done on June 5 and I do not see any problems with kidney function at that time. If there has been a decline in kidney function since then and does needs reduced , I would recommend reducing it to 750 mg twice daily.

## 2021-06-24 ENCOUNTER — PATIENT OUTREACH (OUTPATIENT)
Dept: CASE MANAGEMENT | Age: 86
End: 2021-06-24

## 2021-06-24 NOTE — PROGRESS NOTES
Outgoing call placed to [facility] and [address] regarding update of patient status. Spoke with Mary Woody (staff member), patient identified utilizing 2 identifiers. Introduced self and reason for the call, Mary Woody reports patient is currently admitted to the facility. Will continue to monitor patient progress.

## 2021-06-30 ENCOUNTER — PATIENT OUTREACH (OUTPATIENT)
Dept: CASE MANAGEMENT | Age: 86
End: 2021-06-30

## 2021-07-07 ENCOUNTER — PATIENT OUTREACH (OUTPATIENT)
Dept: CASE MANAGEMENT | Age: 86
End: 2021-07-07

## 2021-07-07 NOTE — PROGRESS NOTES
Post Acute Facility Update     *The information contained in this note was received during a weekly care coordination call with the post acute facility*    Current Facility: [address] [place] [address] ([facility])  Anticipated Discharge Date: TBD    Facility Nursing Update:   Facility Social Work Update:Social work to speak with robyther today. Home with wife, [facility] and private duty. Upper Extremity Assistance: Stand by Assist - Presence and Cueing  Lower Extremity Assistance: Moderate Assistance   Bed Mobility: Contact Guard Assist - hands on patient for balance   Transfers: Contact Guard Assist - hands on patient for balance   Ambulation: Minimal Assistance   How far (in feet) is the patient ambulating?  100 ft  Device Used: walker  Barriers to Discharge: TBD  Other: Inconsistent at times due to alertness level; poor sequencing

## 2021-07-07 NOTE — PROGRESS NOTES
Post Acute Facility Update     *The information contained in this note was received during a weekly care coordination call with the post acute facility*    Current Facility: [address] ([facility])  Anticipated Discharge Date: 7/13/2021    Facility Nursing Update: No current updates   Facility Social Work Update: Pt lives with spouse and family lives nearby. PCA recommended. D/c date is 7/13/2021.   No therapy updates at this time  Device Used: None  Barriers to Discharge: YARIEL Mckeon, MSW  [facility]

## 2021-07-08 ENCOUNTER — PATIENT OUTREACH (OUTPATIENT)
Dept: CASE MANAGEMENT | Age: 86
End: 2021-07-08

## 2021-07-08 NOTE — PROGRESS NOTES
Outgoing call placed to [facility] and [address] regarding update of patient status. Spoke with Temi Bray (staffmember), patient identified utilizing 2 identifiers. Introduced self and reason for the call. Temi Bray reports patient is currently admitted to the facility. Patient has been discharged from the hospital 30 days will sign off.

## 2021-07-28 ENCOUNTER — PATIENT OUTREACH (OUTPATIENT)
Dept: CASE MANAGEMENT | Age: 86
End: 2021-07-28

## 2021-08-03 NOTE — PROGRESS NOTES
[address] [place] [address] Dr Discharge Note    *The information contained in this note was received during a weekly care coordination call with the post acute facility*      Patient was discharged from [address] [place] [address] () on 7/13/2021. PCP: MD GAMAL Levy appointment: family to schedule f/u appointment  Future Appointments   Date Time Provider Deanna Jocelyne   8/12/2021  3:40 PM MD MARILOU Jensen BS AMB       Home Health/Outpatient orders at discharge: home health care  [facility]: [address] ordered at discharge: none  [facility] received prior to discharge: N/A    LPN managing patient signed off. Community Care Team will sign off at this time. Medications were not reconciled and general patient assessment was not completed during this skilled nursing facility outreach.      Andreas MARQUESN, RN  Arvind

## 2021-08-09 ENCOUNTER — TELEPHONE (OUTPATIENT)
Dept: NEUROLOGY | Age: 86
End: 2021-08-09

## 2021-08-09 DIAGNOSIS — G40.909 SEIZURE DISORDER (HCC): Primary | ICD-10-CM

## 2021-08-09 RX ORDER — FUROSEMIDE 20 MG/1
TABLET ORAL
Qty: 30 TABLET | Refills: 0 | Status: SHIPPED | OUTPATIENT
Start: 2021-08-09 | End: 2021-08-19 | Stop reason: SDUPTHER

## 2021-08-09 RX ORDER — LEVETIRACETAM 1000 MG/1
1000 TABLET ORAL EVERY 12 HOURS
Qty: 60 TABLET | Refills: 1 | Status: SHIPPED | OUTPATIENT
Start: 2021-08-09 | End: 2021-08-12 | Stop reason: SDUPTHER

## 2021-08-09 NOTE — TELEPHONE ENCOUNTER
Last Refill: 6/8/21  Last Visit: 9/21/20   Next Visit: 8/19/2021    Requested Prescriptions     Pending Prescriptions Disp Refills    amLODIPine (NORVASC) 5 mg tablet 30 Tablet 0     Sig: Take 1 Tablet by mouth daily for 30 days.

## 2021-08-09 NOTE — TELEPHONE ENCOUNTER
Patient has appointment on 8/12 and he runs out of medication on 8/11. Would like to know if we can call a pill or two.  Please call back   Mediation is Keppra 750 mg

## 2021-08-09 NOTE — TELEPHONE ENCOUNTER
Patient of Dr. Samuel Daley patient is to take furosemide 20mg three times daily. PCP: Gerber Hou MD    Last appt: 3/31/2021  Future Appointments   Date Time Provider Deanna Jocelyne   8/12/2021  3:40 PM Shonna Rios MD [facility] BS AMB   8/19/2021  3:30 PM Gerber Hou MD [facility] BS AMB       Last refilled:    Requested Prescriptions     Pending Prescriptions Disp Refills    furosemide (LASIX) 20 mg tablet 30 Tablet 0     Sig: Take one tablet by mouth 3 times daily.

## 2021-08-10 RX ORDER — AMLODIPINE BESYLATE 5 MG/1
5 TABLET ORAL DAILY
Qty: 30 TABLET | Refills: 5 | Status: SHIPPED | OUTPATIENT
Start: 2021-08-10 | End: 2021-08-19 | Stop reason: SDUPTHER

## 2021-08-12 ENCOUNTER — OFFICE VISIT (OUTPATIENT)
Dept: NEUROLOGY | Age: 86
End: 2021-08-12
Payer: MEDICARE

## 2021-08-12 VITALS
SYSTOLIC BLOOD PRESSURE: 112 MMHG | RESPIRATION RATE: 18 BRPM | DIASTOLIC BLOOD PRESSURE: 74 MMHG | OXYGEN SATURATION: 96 %

## 2021-08-12 DIAGNOSIS — I69.319 CVA, OLD, COGNITIVE DEFICITS: Primary | ICD-10-CM

## 2021-08-12 DIAGNOSIS — G40.909 SEIZURE DISORDER (HCC): ICD-10-CM

## 2021-08-12 PROCEDURE — 99214 OFFICE O/P EST MOD 30 MIN: CPT | Performed by: PSYCHIATRY & NEUROLOGY

## 2021-08-12 PROCEDURE — G8417 CALC BMI ABV UP PARAM F/U: HCPCS | Performed by: PSYCHIATRY & NEUROLOGY

## 2021-08-12 PROCEDURE — 1101F PT FALLS ASSESS-DOCD LE1/YR: CPT | Performed by: PSYCHIATRY & NEUROLOGY

## 2021-08-12 PROCEDURE — G8536 NO DOC ELDER MAL SCRN: HCPCS | Performed by: PSYCHIATRY & NEUROLOGY

## 2021-08-12 PROCEDURE — G8427 DOCREV CUR MEDS BY ELIG CLIN: HCPCS | Performed by: PSYCHIATRY & NEUROLOGY

## 2021-08-12 PROCEDURE — G8432 DEP SCR NOT DOC, RNG: HCPCS | Performed by: PSYCHIATRY & NEUROLOGY

## 2021-08-12 RX ORDER — LEVETIRACETAM 750 MG/1
750 TABLET ORAL EVERY 12 HOURS
Qty: 180 TABLET | Refills: 2 | Status: SHIPPED | OUTPATIENT
Start: 2021-08-12 | End: 2022-03-18 | Stop reason: SDUPTHER

## 2021-08-12 NOTE — PROGRESS NOTES
Greg Todd is a 80 y.o. male who was last seen by me in May of this year when he was hospitalized with AMS/nausea/vomiting/tremors. 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 Was in rehab when he developed pneumonia and was admitted to the hospital.    MRI brain was performed which showed embolic appearing scattered infarcts in bilateral frontal lobes and parieto-occipital lobes. EEG was done on 5/16 which was unremarkable but had another EEG for symptoms of decreased responsiveness, left gaze deviation, lip smacking on 5/20 and it showed a generalized nonconvulsive status that resolved after administration of Ativan. He was treated with Keppra and lacosamide but lacosamide was causing excessive drowsiness and it was weaned down  He was discharged on Keppra 1000 mg twice daily and this was reduced to 750 mg twice daily at the rehab  Has not had any further seizure recurrence    He is now home and is able to walk. Mostly sits around in the house. He is extremely hard of hearing but is starting to have meaningful conversations.         EXAM  Exam:  Visit Vitals  /74   Resp 18   SpO2 96%     Gen:  CV: RRR  Lungs: non labored breathing  Abd: non distending  Neuro: A&O x 3, no dysarthria or aphasia  CN II-XII: PERRL, EOMI, face symmetric, tongue/palate midline  Motor: strength 5/5 all four ext  Sensory: intact to LT  Gait: normal    LABS/ IMAGING  Lab Results   Component Value Date/Time    WBC 7.9 06/05/2021 02:05 AM    HGB (POC) 14.9 07/27/2018 02:38 PM    HGB 10.8 (L) 06/05/2021 02:05 AM    Hematocrit (POC) 45 05/13/2019 07:16 PM    HCT 33.7 (L) 06/05/2021 02:05 AM    PLATELET 001 16/53/1869 02:05 AM    MCV 98.0 06/05/2021 02:05 AM     Lab Results   Component Value Date/Time    Sodium 137 06/08/2021 04:10 AM    Potassium 4.1 06/08/2021 04:10 AM    Chloride 103 06/08/2021 04:10 AM    CO2 28 06/08/2021 04:10 AM    Anion gap 6 06/08/2021 04:10 AM    Glucose 99 06/08/2021 04:10 AM    BUN 16 06/08/2021 04:10 AM    Creatinine 1.09 06/08/2021 04:10 AM    BUN/Creatinine ratio 15 06/08/2021 04:10 AM    GFR est AA >60 06/08/2021 04:10 AM    GFR est non-AA >60 06/08/2021 04:10 AM    Calcium 8.8 06/08/2021 04:10 AM    Bilirubin, total 0.2 06/05/2021 02:05 AM    Alk. phosphatase 97 06/05/2021 02:05 AM    Protein, total 6.0 (L) 06/05/2021 02:05 AM    Albumin 2.6 (L) 06/08/2021 04:10 AM    Globulin 3.6 06/05/2021 02:05 AM    A-G Ratio 0.7 (L) 06/05/2021 02:05 AM    ALT (SGPT) 34 06/05/2021 02:05 AM    AST (SGOT) 25 06/05/2021 02:05 AM     MRI Results (most recent):  Results from [place] encounter on 05/16/21    MRI BRAIN WO CONT    Narrative  *PRELIMINARY REPORT*    Multiple foci of small cortical and white matter infarcts mostly on the right  with a couple of lesions on the left as well. Interval evolution of  intraventricular hemorrhage and small parenchymal hemorrhages. Preliminary report was provided by Dr. Dorothea Andrews, the on-call radiologist, at  22:17. The findings were called to floor nurse, Andrea Mireles, on 5/18/2021 at 22:20 by myself. [id number]    Final report to follow. *END PRELIMINARY REPORT*    EXAM:  MRI BRAIN WO CONT    INDICATION:    AMS    COMPARISON:  CT head 5/16/2021, MRI brain 11/23/2019. CONTRAST: None. TECHNIQUE:  Multiplanar multisequence acquisition without contrast of the brain. FINDINGS:  Few small scattered acute infarcts in the bilateral frontal lobes and  parieto-occipital lobes, right greater than left. Evolved now late subacute small intraparenchymal hematomas in the right parietal  periventricular white matter measuring 10 mm and 5 mm (series 6 image 11), with  no significant surrounding edema or mass effect.  There is hemosiderin staining  noted along the ependymal lining of the lateral ventricles, as well as scattered  superficial siderosis involving the sylvian fissures, and along the surface of  the brainstem and cerebellum. Unchanged generalized parenchymal volume loss with commensurate dilation of the  sulci and ventricular system. Unchanged confluent periventricular and deep white  matter T2/FLAIR hyperintensity, and patchy T2/FLAIR hyperintensity in the nely,  consistent with severe chronic microvascular ischemic disease. There is no  cerebellar tonsillar herniation. Expected arterial flow-voids are present. The paranasal sinuses, mastoid air cells, and middle ears are clear. The orbital  contents are within normal limits with bilateral lens implants. No significant  osseous or scalp lesions are identified. Impression  1. Few small scattered acute infarcts in the bilateral frontal lobes and  parieto-occipital lobes, right greater than left. 2. 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 3. Unchanged generalized parenchymal volume loss and severe chronic  microvascular ischemic disease. ASSESSMENT    ICD-10-CM ICD-9-CM    1. CVA, old, cognitive deficits  I69.319 438.0    2.  Seizure disorder ([facility])  G40.909 345.90 levETIRAcetam (KEPPRA) 750 mg tablet       PLAN  Mr. Naa Hurtado had an extended hospital stay course related to embolic appearing scattered infarctions in both hemispheres of unclear source, possibly cardiac  Currently on aspirin and statin, not a candidate for anticoagulation     Also developed nonconvulsive status epilepticus on 5/20 that was initially treated with 2 anticonvulsants but now doing well on Keppra 750 mg twice daily   Repeat EEG showed minimal slowing    Clinically he is doing well and does not have any focal motor or sensory deficits but he is somewhat slow to respond and has delayed speech and thought processing  Does not drive  Continue to stay physically and mentally active  Follow-up in 6 months    Lillian Pinon MD

## 2021-08-19 ENCOUNTER — OFFICE VISIT (OUTPATIENT)
Dept: INTERNAL MEDICINE CLINIC | Age: 86
End: 2021-08-19
Payer: MEDICARE

## 2021-08-19 VITALS
TEMPERATURE: 98 F | DIASTOLIC BLOOD PRESSURE: 64 MMHG | HEART RATE: 76 BPM | SYSTOLIC BLOOD PRESSURE: 120 MMHG | OXYGEN SATURATION: 96 % | BODY MASS INDEX: 30.46 KG/M2 | HEIGHT: 68 IN | WEIGHT: 201 LBS

## 2021-08-19 DIAGNOSIS — G40.901 STATUS EPILEPTICUS (HCC): ICD-10-CM

## 2021-08-19 DIAGNOSIS — I25.118 CORONARY ARTERY DISEASE OF NATIVE ARTERY OF NATIVE HEART WITH STABLE ANGINA PECTORIS (HCC): ICD-10-CM

## 2021-08-19 DIAGNOSIS — I63.10 CEREBROVASCULAR ACCIDENT (CVA) DUE TO EMBOLISM OF PRECEREBRAL ARTERY (HCC): ICD-10-CM

## 2021-08-19 DIAGNOSIS — F33.0 MILD EPISODE OF RECURRENT MAJOR DEPRESSIVE DISORDER (HCC): ICD-10-CM

## 2021-08-19 DIAGNOSIS — G93.40 ENCEPHALOPATHY: ICD-10-CM

## 2021-08-19 DIAGNOSIS — F33.1 MAJOR DEPRESSIVE DISORDER, RECURRENT, MODERATE (HCC): ICD-10-CM

## 2021-08-19 DIAGNOSIS — N18.30 STAGE 3 CHRONIC KIDNEY DISEASE, UNSPECIFIED WHETHER STAGE 3A OR 3B CKD (HCC): ICD-10-CM

## 2021-08-19 DIAGNOSIS — F33.0 MAJOR DEPRESSIVE DISORDER, RECURRENT, MILD (HCC): Primary | ICD-10-CM

## 2021-08-19 DIAGNOSIS — I10 ESSENTIAL HYPERTENSION: ICD-10-CM

## 2021-08-19 DIAGNOSIS — Z01.818 PREOP EXAMINATION: ICD-10-CM

## 2021-08-19 DIAGNOSIS — J43.1 PANLOBULAR EMPHYSEMA (HCC): ICD-10-CM

## 2021-08-19 PROBLEM — F33.9 MAJOR DEPRESSIVE DISORDER, RECURRENT, UNSPECIFIED (HCC): Status: ACTIVE | Noted: 2021-08-19

## 2021-08-19 PROCEDURE — G8536 NO DOC ELDER MAL SCRN: HCPCS | Performed by: INTERNAL MEDICINE

## 2021-08-19 PROCEDURE — G8510 SCR DEP NEG, NO PLAN REQD: HCPCS | Performed by: INTERNAL MEDICINE

## 2021-08-19 PROCEDURE — G8427 DOCREV CUR MEDS BY ELIG CLIN: HCPCS | Performed by: INTERNAL MEDICINE

## 2021-08-19 PROCEDURE — 1101F PT FALLS ASSESS-DOCD LE1/YR: CPT | Performed by: INTERNAL MEDICINE

## 2021-08-19 PROCEDURE — 99214 OFFICE O/P EST MOD 30 MIN: CPT | Performed by: INTERNAL MEDICINE

## 2021-08-19 PROCEDURE — G8417 CALC BMI ABV UP PARAM F/U: HCPCS | Performed by: INTERNAL MEDICINE

## 2021-08-19 RX ORDER — AMLODIPINE BESYLATE 5 MG/1
5 TABLET ORAL DAILY
Qty: 90 TABLET | Refills: 3 | Status: SHIPPED | OUTPATIENT
Start: 2021-08-19 | End: 2022-08-31

## 2021-08-19 RX ORDER — DULOXETIN HYDROCHLORIDE 30 MG/1
30 CAPSULE, DELAYED RELEASE ORAL DAILY
Qty: 90 CAPSULE | Refills: 3 | Status: SHIPPED | OUTPATIENT
Start: 2021-08-19 | End: 2022-08-31

## 2021-08-19 RX ORDER — PREDNISOLONE ACETATE 10 MG/ML
1 SUSPENSION/ DROPS OPHTHALMIC 4 TIMES DAILY
COMMUNITY
Start: 2021-08-10

## 2021-08-19 RX ORDER — TRAZODONE HYDROCHLORIDE 50 MG/1
75 TABLET ORAL
COMMUNITY
Start: 2021-07-13 | End: 2021-08-19 | Stop reason: SDUPTHER

## 2021-08-19 RX ORDER — DORZOLAMIDE HYDROCHLORIDE AND TIMOLOL MALEATE 20; 5 MG/ML; MG/ML
1 SOLUTION/ DROPS OPHTHALMIC 2 TIMES DAILY
COMMUNITY
Start: 2021-07-30

## 2021-08-19 RX ORDER — FUROSEMIDE 20 MG/1
TABLET ORAL
Qty: 270 TABLET | Refills: 3 | Status: SHIPPED | OUTPATIENT
Start: 2021-08-19 | End: 2022-08-08

## 2021-08-19 RX ORDER — ATENOLOL 25 MG/1
12.5 TABLET ORAL DAILY
Qty: 45 TABLET | Refills: 3 | Status: SHIPPED | OUTPATIENT
Start: 2021-08-19 | End: 2022-08-31

## 2021-08-19 RX ORDER — BRIMONIDINE TARTRATE 2 MG/ML
1 SOLUTION/ DROPS OPHTHALMIC 2 TIMES DAILY
COMMUNITY
Start: 2021-08-10

## 2021-08-19 RX ORDER — ATROPINE SULFATE 10 MG/ML
1 SOLUTION/ DROPS OPHTHALMIC DAILY
COMMUNITY
Start: 2021-08-10

## 2021-08-19 RX ORDER — TRAZODONE HYDROCHLORIDE 100 MG/1
100 TABLET ORAL
Qty: 90 TABLET | Refills: 3 | Status: SHIPPED | OUTPATIENT
Start: 2021-08-19 | End: 2022-08-08

## 2021-08-19 RX ORDER — HYDRALAZINE HYDROCHLORIDE 100 MG/1
100 TABLET, FILM COATED ORAL 3 TIMES DAILY
Qty: 270 TABLET | Refills: 3 | Status: SHIPPED | OUTPATIENT
Start: 2021-08-19 | End: 2022-09-25

## 2021-08-19 NOTE — PROGRESS NOTES
Allyson Lopez is a 80 y.o. male and presents with Transitions Of Care [facility] 5/16/21 - 6/8/21 for CVA and then [facility] 6/8/21 - 7/13/21) and Pre-op Exam (eye surgery scheduled for 9/1/21)  . Subjective:  Mr. Juan Antonio Wong presents today for follow-up after prolonged hospitalization and rehab after being admitted to [facility] on 16 May with an acute hemorrhagic CVA and seizure disorder. His medications have been adjusted which have been reviewed with he and his daughter today. His wife unfortunately was recently diagnosed with metastatic cancer suspected breast primary and is receiving palliative therapy. He has had follow-up with neurology as well. 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 His blood pressure is well controlled. He denies shortness of breath, chest pain, palpitations, PND, orthopnea. He has some chronic lower extremity edema but is wearing compression stockings with excellent results.     Past Medical History:   Diagnosis Date    Adverse effect of anesthesia     combative after anesthesia    Alcohol abuse     6 beers/day    Arthritis     CAD (coronary artery disease)     Chronic obstructive pulmonary disease (HCC)     Chronic obstructive pulmonary disease (HCC)     CKD (chronic kidney disease) stage 3, GFR 30-59 ml/min ([address] 9/21/2020    Dyslipidemia 8/16/2017    Dyspepsia and other specified disorders of function of stomach     Dyspnea 8/16/2017    ED (erectile dysfunction) 8/16/2017    Encounter for immunization 8/16/2017    Fatigue 8/16/2017    Flank pain 8/1/2019    GERD (gastroesophageal reflux disease) 8/16/2017    Gout 8/16/2017    Hypertension     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    Morbid obesity ([address]     On statin therapy 8/16/2017    TESSA on CPAP 1/3/2019    Psychiatric disorder     Depression    Simple chronic bronchitis ([address] 1/3/2019    TIA (transient ischemic attack) 0/23/6762    Umbilical hernia 70/2/4225    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     Past Surgical History:   Procedure Laterality Date    COLONOSCOPY N/A 9/27/2019    COLONOSCOPY performed by Monty Ortiz [facility] at [facility] ENDOSCOPY    HX HERNIA REPAIR      RI    HX HERNIA REPAIR  25/21/48    open umbilical hernia repair mesh    HX ORTHOPAEDIC      HX UROLOGICAL      HYDROCELECTOMY    PA CARDIAC SURG PROCEDURE UNLIST  1996    Cardiac Stents    PA CARDIAC SURG PROCEDURE UNLIST  04/2019    EF 61-65%    UPPER GI ENDOSCOPY,BIOPSY  9/30/2019          Allergies   Allergen Reactions    Levaquin [Levofloxacin] Nausea and Vomiting     Reports anxiety, nausea, aching after taking levaquin    Ciprofloxacin Shortness of Breath    Quinolones Shortness of Breath     Current Outpatient Medications   Medication Sig Dispense Refill    atropine 1 % ophthalmic solution 1 Drop daily.  dorzolamide-timoloL (COSOPT) 22.3-6.8 mg/mL ophthalmic solution 1 Drop two (2) times a day.  brimonidine (ALPHAGAN) 0.2 % ophthalmic solution 1 Drop two (2) times a day.  prednisoLONE acetate (PRED FORTE) 1 % ophthalmic suspension 1 Drop four (4) times daily.  eyelid cleanser combination 3 (OCUSOFT LID SCRUB PLUS EX) by Apply Externally route.  amLODIPine (NORVASC) 5 mg tablet Take 1 Tablet by mouth daily for 360 days. 90 Tablet 3    atenoloL (TENORMIN) 25 mg tablet Take 0.5 Tablets by mouth daily. Dose reduced from 100 mg daily to 12.5 mg daily 45 Tablet 3    DULoxetine (CYMBALTA) 30 mg capsule Take 1 Capsule by mouth daily. 90 Capsule 3    furosemide (LASIX) 20 mg tablet Take 3 tablets by mouth daily 270 Tablet 3    hydrALAZINE (APRESOLINE) 100 mg tablet Take 1 Tablet by mouth three (3) times daily. . 270 Tablet 3    traZODone (DESYREL) 100 mg tablet Take 1 Tablet by mouth nightly.  90 Tablet 3    levETIRAcetam (KEPPRA) 750 mg tablet Take 1 Tablet by mouth every twelve (12) hours every twelve (12) hours. Do not crush or chew. Swallow whole. 180 Tablet 2    polyethylene glycol (MIRALAX) 17 gram packet Take 1 Packet by mouth daily. Hold for diarrhea or loose stools. Mix in 8 oz of water, juice, soda, coffee, or tea prior to administration (Patient taking differently: Take 17 g by mouth as needed.) 30 Each 0    atorvastatin (LIPITOR) 80 mg tablet TAKE ONE TABLET BY MOUTH DAILY 90 Tab 1    thiamine HCL (B-1) 100 mg tablet TAKE ONE BY MOUTH DAILY 90 Tab 4    isosorbide mononitrate ER (IMDUR) 30 mg tablet TAKE ONE TABLET BY MOUTH DAILY 90 Tab 4    melatonin 5 mg cap capsule Take 5 mg by mouth nightly. Pt. Take 2 tabs a night      acetaminophen (TYLENOL) 500 mg tablet Take 1,000 mg by mouth every six (6) hours as needed for Pain.  aspirin delayed-release 81 mg tablet Take 81 mg by mouth daily.  ANORO ELLIPTA 62.5-25 mcg/actuation inhaler Take 1 Puff by inhalation daily. Social History     Socioeconomic History    Marital status:      Spouse name: Not on file    Number of children: Not on file    Years of education: Not on file    Highest education level: Not on file   Tobacco Use    Smoking status: Former Smoker     Packs/day: 0.50     Years: 20.00     Pack years: 10.00    Smokeless tobacco: Former User    Tobacco comment: quit about 40  years ago   / used to dip tobaco and dip snuff   Substance and Sexual Activity    Alcohol use: Yes     Comment: \"Drinks very little now for about a month \"    Drug use: No     Social Determinants of Health     Financial Resource Strain:     Difficulty of Paying Living Expenses:    Food Insecurity:     Worried About Running Out of Food in the Last Year:     [address] in the Last Year:    Transportation Needs:     Lack of Transportation (Medical):      Lack of Transportation (Non-Medical):    Physical Activity:     Days of Exercise per Week:     Minutes of Exercise per Session:    Stress:     Feeling of Stress :    Social Connections:     Frequency of Communication with Friends and Family:     Frequency of Social Gatherings with Friends and Family:     Attends Orthodox Services:     Active Member of Clubs or Organizations:     Attends Club or Organization Meetings:     Marital Status:      Family History   Problem Relation Age of Onset    Heart Disease Mother     Hypertension Mother     Hypertension Father     Hypertension Sister     Hypertension Brother     Cancer Brother        Review of Systems  Constitutional:  negative for fevers, chills, anorexia and weight loss  Eyes:    negative for  irritation  ENT:    negative for tinnitus,sore throat,nasal congestion,ear pains. hoarseness  Respiratory:     negative for cough, hemoptysis, dyspnea,wheezing  CV:    negative for chest pain, palpitations  GI:    negative for nausea, vomiting, diarrhea, abdominal pain,melena  Endo:               negative for polyuria,polydipsia,polyphagia,heat intolerance  Genitourinary : negative for frequency, dysuria and hematuria  Integumentary: negative for rash and pruritus  Hematologic:   negative for easy bruising and gum/nose bleeding  Musculoskel:  negative for myalgias, arthralgias, back pain, muscle weakness, joint pain  Neurological:   negative for headaches, dizziness, vertigo, memory problems and gait   Behavl/Psych:  negative for feelings of anxiety, depression, mood changes  ROS otherwise negative      Objective:  Visit Vitals  /64   Pulse 76   Temp 98 °F (36.7 °C)   Ht 5' 8\" (1.727 m)   Wt 201 lb (91.2 kg)   SpO2 96%   BMI 30.56 kg/m²     Physical Exam:   General appearance - alert, well appearing, and in no distress  Mental status - alert, oriented to person, place, and time  EYE-MAURICE, EOMI, fundi normal, corneas normal, no foreign bodies  ENT-ENT exam normal, no neck nodes or sinus tenderness  Nose - normal and patent, no erythema, discharge or polyps  Mouth - mucous membranes moist, pharynx normal without lesions  Neck - supple, no significant adenopathy   Chest - clear to auscultation, no wheezes, rales or rhonchi, symmetric air entry   Heart - normal rate, regular rhythm, normal S1, S2, no murmurs, rubs, clicks or gallops   Abdomen - soft, nontender, nondistended, no masses or organomegaly  Lymph- no adenopathy palpable  Ext-peripheral pulses normal, no pedal edema, no clubbing or cyanosis  Skin-Warm and dry. no hyperpigmentation, vitiligo, or suspicious lesions  Neuro -alert, oriented, normal speech, no focal findings or movement disorder noted, unsteady gait      Assessment/Plan:  Diagnoses and all orders for this visit:    1. Major depressive disorder, recurrent, mild    2. Major depressive disorder, recurrent, moderate    3. Mild episode of recurrent major depressive disorder ([facility]    4. Panlobular emphysema ([facility]    5. Coronary artery disease of native artery of native heart with stable angina pectoris ([facility]    6. Preop examination    7. Essential hypertension    8. Cerebrovascular accident (CVA) due to embolism of precerebral artery ([facility]    9. Status epilepticus ([facility]    10. Encephalopathy    11. Stage 3 chronic kidney disease, unspecified whether stage 3a or 3b CKD ([facility]    Other orders  -     amLODIPine (NORVASC) 5 mg tablet; Take 1 Tablet by mouth daily for 360 days. -     atenoloL (TENORMIN) 25 mg tablet; Take 0.5 Tablets by mouth daily. Dose reduced from 100 mg daily to 12.5 mg daily  -     DULoxetine (CYMBALTA) 30 mg capsule; Take 1 Capsule by mouth daily. -     furosemide (LASIX) 20 mg tablet; Take 3 tablets by mouth daily  -     hydrALAZINE (APRESOLINE) 100 mg tablet; Take 1 Tablet by mouth three (3) times daily. .  -     traZODone (DESYREL) 100 mg tablet; Take 1 Tablet by mouth nightly. ICD-10-CM ICD-9-CM    1. Major depressive disorder, recurrent, mild  F33.0 296.31    2. Major depressive disorder, recurrent, moderate  F33.1 296.32    3.  Mild episode of recurrent major depressive disorder ([facility]  F33.0 296.31    4. Panlobular emphysema (HCC)  J43.1 492.8    5. Coronary artery disease of native artery of native heart with stable angina pectoris ([facility]  I25.118 414.01      413.9    6. Preop examination  Z01.818 V72.84    7. Essential hypertension  I10 401.9    8. Cerebrovascular accident (CVA) due to embolism of precerebral artery ([facility]  I63.10 434.11    9. Status epilepticus (HCC)  G40.901 345.3    10. Encephalopathy  G93.40 348.30    11. Stage 3 chronic kidney disease, unspecified whether stage 3a or 3b CKD ([facility]  N18.30 585. 3      Plan:    Patient is status post hospitalization for CVA and seizure disorder. He is otherwise medically stable and doing well on his current medical regimen. He has a scheduled surgery for September 1. Although he is at moderate risk given his underlying condition and age no further risk ratification is recommended at this time and he is medically stable for this procedure. Follow-up and Dispositions    · Return in about 6 months (around 2/19/2022) for follow up. I have reviewed with the patient details of the assessment and plan and all questions were answered. Relevent patient education was performed. Verbal and/or written instructions (see AVS) provided. The most recent lab findings were reviewed with the patient. Plan was discussed with patient who verbally expressed understanding. An After Visit Summary was printed and given to the patient.     Homero Jones MD

## 2021-08-19 NOTE — PROGRESS NOTES
Yumiko Zamudio presents today at the clinic for    Chief Complaint   Patient presents with   [facility] 5/16/21 - 6/8/21 for CVA and then [facility] 6/8/21 - 7/13/21    Pre-op Exam     eye surgery scheduled for 9/1/21        Wt Readings from Last 3 Encounters:   08/19/21 201 lb (91.2 kg)   06/08/21 218 lb 8 oz (99.1 kg)   05/18/21 210 lb 1.6 oz (95.3 kg)     Temp Readings from Last 3 Encounters:   08/19/21 98 °F (36.7 °C)   06/08/21 98.9 °F (37.2 °C)   05/03/21 97.6 °F (36.4 °C)     BP Readings from Last 3 Encounters:   08/19/21 120/64   08/12/21 112/74   06/08/21 124/61     Pulse Readings from Last 3 Encounters:   08/19/21 76   06/08/21 97   05/03/21 62       Health Maintenance Due   Topic    DTaP/Tdap/Td series (1 - Tdap)    Shingrix Vaccine Age 50> (1 of 2)         Learning Assessment:  :     Learning Assessment 8/21/2018 9/8/2017 11/9/2015   PRIMARY LEARNER Patient Patient Patient   HIGHEST LEVEL OF EDUCATION - PRIMARY LEARNER  - GRADUATED HIGH SCHOOL OR GED -   BARRIERS PRIMARY LEARNER - NONE -   CO-LEARNER CAREGIVER - No -   PRIMARY LANGUAGE ENGLISH ENGLISH ENGLISH   LEARNER PREFERENCE PRIMARY LISTENING LISTENING LISTENING   ANSWERED BY patient patient patient   RELATIONSHIP SELF SELF SELF       Depression Screening:  :     3 most recent PHQ Screens 8/19/2021   PHQ Not Done -   Little interest or pleasure in doing things Not at all   Feeling down, depressed, irritable, or hopeless Not at all   Total Score PHQ 2 0       Fall Risk Assessment:  :     Fall Risk Assessment, last 12 mths 8/19/2021   Able to walk? Yes   Fall in past 12 months? 0   Do you feel unsteady? 0   Are you worried about falling 0       Abuse Screening:  :     Abuse Screening Questionnaire 8/19/2021 4/26/2019 8/21/2018 7/27/2018 9/8/2017   Do you ever feel afraid of your partner? N N N N N   Are you in a relationship with someone who physically or mentally threatens you?  N N N N N   Is it safe for you to go home? Y Y Y Y Y       Coordination of Care Questionnaire:  :     1. Have you been to the ER, urgent care clinic since your last visit? Hospitalized since your last visit? [facility] 5/16/21 - 6/8/21 for CVA and then [facility] 6/8/21 - 7/13/21    2. Have you seen or consulted any other health care providers outside of the [address] [place] [address] since your last visit? Include any pap smears or colon screening.  [facility] 5/16/21 - 6/8/21 for CVA and then [facility] 6/8/21 - 7/13/21

## 2021-11-24 ENCOUNTER — TELEPHONE (OUTPATIENT)
Dept: NEUROLOGY | Age: 86
End: 2021-11-24

## 2021-11-24 NOTE — TELEPHONE ENCOUNTER
I have reviewed this information with patient's daughter. She expressed understanding and plans to take patient to Urgent Care today.

## 2021-11-24 NOTE — TELEPHONE ENCOUNTER
Message from Gemma Mccann, [facility] call re increased tremors and extremely tired. Should we make medication adjustments? \"

## 2021-11-24 NOTE — TELEPHONE ENCOUNTER
Per Dr. Francisco Elizabeth, I recommend that he should be evaluated and some labs/urine/EEG checked prior to making any medication adjustments.  Increasing the dose of Keppra will likely make him more tired and we do not want to do it unnecessarily.    One option is to go to ER or I can order these labs/tests but that can take a few days to process

## 2021-11-24 NOTE — TELEPHONE ENCOUNTER
----- Message from Roger Spring sent at 11/24/2021  8:19 AM EST -----  Regarding: Dr. Hilaria Anderson first and last name: [facility] (Daughter)      Reason for call: The daughter needs to speak to the nurse. Callback required yes/no and why: Yes new symptoms Lyn Lo may need to be increased      Best contact number(s): [phone]      Details to clarify the request: The patient daughter needs to speak to the nurse. Her dads khloe have gotten worse and he is unable to control his urine. Lyn Lo may need an increase.        Roger Spring

## 2021-11-26 RX ORDER — CEFUROXIME AXETIL 500 MG/1
TABLET ORAL
Qty: 20 TABLET | Refills: 0 | Status: SHIPPED | OUTPATIENT
Start: 2021-11-26 | End: 2022-10-05 | Stop reason: ALTCHOICE

## 2021-11-26 RX ORDER — CEFUROXIME AXETIL 250 MG/1
250 TABLET ORAL 2 TIMES DAILY
Qty: 20 TABLET | Refills: 0 | Status: SHIPPED | OUTPATIENT
Start: 2021-11-26 | End: 2021-12-06

## 2021-11-26 NOTE — TELEPHONE ENCOUNTER
RX refill request from the patient/pharmacy. Patient last seen 11- with labs, and next appt. scheduled for 02-  Requested Prescriptions     Pending Prescriptions Disp Refills    cefUROXime (CEFTIN) 500 mg tablet 20 Tablet 0     Sig: Take one tablet twice a day for 10 days. Noemy Rushing

## 2021-11-29 ENCOUNTER — TELEPHONE (OUTPATIENT)
Dept: INTERNAL MEDICINE CLINIC | Age: 86
End: 2021-11-29

## 2021-11-29 NOTE — TELEPHONE ENCOUNTER
----- Message from Tanja Garibay sent at 11/29/2021 10:28 AM EST -----  Subject: Message to Provider    QUESTIONS  Information for Provider? patient daughter called in about medication   prescribed and he is now itching for about three days. Please call his   daughter back to advise  ---------------------------------------------------------------------------  --------------  CALL BACK INFO  What is the best way for the office to contact you? OK to leave message on   voicemail  Preferred Call Back Phone Number? [phone]  ---------------------------------------------------------------------------  --------------  SCRIPT ANSWERS  Relationship to Patient?  Self

## 2021-11-29 NOTE — TELEPHONE ENCOUNTER
Spoke with patient's daughter, Delia Matthews. She states patient is complaining of itching since starting Ceftin 500 mg BID, but she does not see any rash, redness or hives. She states she would like him to continue taking the Ceftin. Per Dr Hope Opitz - patient advised to cut Ceftin tablets in half and take 1/2 tablet (250 mg) BID. Patient's daughter advised and states understanding. Has no further questions at this time.

## 2021-11-30 ENCOUNTER — TELEPHONE (OUTPATIENT)
Dept: INTERNAL MEDICINE CLINIC | Age: 86
End: 2021-11-30

## 2021-11-30 NOTE — TELEPHONE ENCOUNTER
Patient's grandson called in regarding patient's itchy skin. Stated that his mother and himself gave the patient Claritin however, it has provided no relief. Stated they asked the pharmacist about giving the patient Benadryl but the pharmacist stated it can cause delirium so they did not give the patient that. Wanted to know if Dr. Mamta Rodriguez could call in a prescription for hydroxazine or something comparable. Advised grandson that we wouldn't be able to discuss his grandfather's health with him since he is not on the release form. grandson provided mother's contact info instead.   CB: [phone]

## 2021-12-06 RX ORDER — ATORVASTATIN CALCIUM 80 MG/1
TABLET, FILM COATED ORAL
Qty: 90 TABLET | Refills: 1 | Status: SHIPPED | OUTPATIENT
Start: 2021-12-06 | End: 2022-06-30

## 2021-12-07 NOTE — PROGRESS NOTES
Hospitalist Progress Note      Hospital summary: Pam High a 80 y. o. male with pmh of CVA, intraventricular brain hemorrhage, dyslipidemia, CKD 3, hypertension who presents to hospital from subacute rehab for concerns of altered/decreased mental status. Patient was found to have multiple ischemic CVAs on MRI, continue to have altered mental status with some eye deviation and twitching on 5/20. This was concerning for seizure, given 2 mg of Ativan and loaded with Keppra with some improvement. On 5/21 patient however declined, unresponsive to verbal stimuli. Stat EEG 24-hour was ordered and showed subclinical status. He was loaded with Vimpat, and neurology recommends transfer to ICU for closer neuro checks on 5/21.  Seizures were controlled and  Pt transferred out of ICU on 5/23.     5/16/2021      Assessment/Plan:  Status epilepticus -resolved  -Status post Keppra and Vimpat load 5/20  -Continue Keppra and Vimpat   - Neurology following  - 24hr EEG: mild diffuse cerebral dysfunction, of which the sporadic generalized frontally predominant triphasic discharges are indicative of metabolic/toxic encephalopathy.  -  Seizure precautions      Acute embolic infarcts  Subacute hematoma -intraventricular hemorrhage with ventriculomegaly on recent admission, discharged 5/3  -Suspicious for A. fib, however when reviewed by cardiology no definitive episodes seen on telemetry  -Not a candidate for anticoagulation due to intracranial hemorrhage, and fall risk  -Neurology following  -No escalation of antiplatelets due to ICH, continue aspirin  -Continue atorvastatin  -PT/OT/speech  -Echo with normal EF   - rpt CT head 5/24: no change     Acute Metabolic encephalopathy -   - multifactorial, acute CVAs,recent hge, pneumonia, electrolyte imbalances, seizures  Waxing and waning mental status      Community-acquired pneumonia-resolved   -x-ray with left basilar airspace disease  - completed doxy -Remains on room air, monitor     Hypertension SBP goal <160. C/w hydralazine, amlodipine. IV labetalol and hydralazine prn. Hyperlipidemiacontinue Lipitor  Hx TESSA    Hypernatremia - resolved   - likely due to poor oral intake   will monitor      Dysphagia  - has dubhoff  -on TFs again  -family wants to give him more time and see if he improves, PEG tube held for now  Swallow eval on Tuesday again    Appreciate Palliative care input     Code status: DNR. DVT prophylaxis: SCDs  Disposition: TBD    I spoke to patient's daughter at bedside  ----------------------------------------------    CC: Acute CVA. Seizures     S: Patient is seen and examined     Waxing and waning mental status  -During my eval,he woke up to voice- could tell his name, followed commands occassionally    Review of Systems:  Review of systems not obtained due to patient factors.     O:  Visit Vitals  /73 (BP 1 Location: Left upper arm, BP Patient Position: At rest)   Pulse 97   Temp 98.4 °F (36.9 °C)   Resp 20   Ht 5' 8\" (1.727 m)   Wt 94.9 kg (209 lb 4.8 oz)   SpO2 99%   BMI 31.82 kg/m²       PHYSICAL EXAM:  Gen: lethargic ,elderly patient  HEENT: anicteric sclerae, normal conjunctiva, NG tube   Neck: supple, trachea midline, no adenopathy  Heart: RRR, no MRG, no JVD, no peripheral edema  Lungs: decreased at bases  Abd: soft, NT, ND, BS+, no organomegaly  Extr: warm  Skin: dry, no rash  Neuro: lethargic but woke up to voice-could tell his name,followed commands occassionally    Intake/Output Summary (Last 24 hours) at 5/28/2021 1457  Last data filed at 5/28/2021 1200  Gross per 24 hour   Intake 5770 ml   Output    Net 5770 ml        Recent labs & imaging reviewed:  Recent Results (from the past 24 hour(s))   LACTIC ACID    Collection Time: 05/28/21 12:54 AM   Result Value Ref Range    Lactic acid 1.2 0.4 - 2.0 MMOL/L   MAGNESIUM    Collection Time: 05/28/21 12:54 AM   Result Value Ref Range    Magnesium 2.0 1.6 - 2.4 mg/dL PHOSPHORUS    Collection Time: 05/28/21 12:54 AM   Result Value Ref Range    Phosphorus 2.3 (L) 2.6 - 4.7 MG/DL   CBC WITH AUTOMATED DIFF    Collection Time: 05/28/21 11:15 AM   Result Value Ref Range    WBC 7.8 4.1 - 11.1 K/uL    RBC 4.06 (L) 4.10 - 5.70 M/uL    HGB 12.7 12.1 - 17.0 g/dL    HCT 39.8 36.6 - 50.3 %    MCV 98.0 80.0 - 99.0 FL    MCH 31.3 26.0 - 34.0 PG    MCHC 31.9 30.0 - 36.5 g/dL    RDW 15.2 (H) 11.5 - 14.5 %    PLATELET 290 231 - 405 K/uL    MPV 10.9 8.9 - 12.9 FL    NRBC 0.0 0  WBC    ABSOLUTE NRBC 0.00 0.00 - 0.01 K/uL    NEUTROPHILS 80 (H) 32 - 75 %    LYMPHOCYTES 10 (L) 12 - 49 %    MONOCYTES 6 5 - 13 %    EOSINOPHILS 3 0 - 7 %    BASOPHILS 1 0 - 1 %    IMMATURE GRANULOCYTES 0 0.0 - 0.5 %    ABS. NEUTROPHILS 6.2 1.8 - 8.0 K/UL    ABS. LYMPHOCYTES 0.8 0.8 - 3.5 K/UL    ABS. MONOCYTES 0.5 0.0 - 1.0 K/UL    ABS. EOSINOPHILS 0.2 0.0 - 0.4 K/UL    ABS. BASOPHILS 0.0 0.0 - 0.1 K/UL    ABS. IMM.  GRANS. 0.0 0.00 - 0.04 K/UL    DF AUTOMATED     METABOLIC PANEL, BASIC    Collection Time: 05/28/21 11:15 AM   Result Value Ref Range    Sodium 138 136 - 145 mmol/L    Potassium 3.7 3.5 - 5.1 mmol/L    Chloride 110 (H) 97 - 108 mmol/L    CO2 23 21 - 32 mmol/L    Anion gap 5 5 - 15 mmol/L    Glucose 112 (H) 65 - 100 mg/dL    BUN 12 6 - 20 MG/DL    Creatinine 1.06 0.70 - 1.30 MG/DL    BUN/Creatinine ratio 11 (L) 12 - 20      GFR est AA >60 >60 ml/min/1.73m2    GFR est non-AA >60 >60 ml/min/1.73m2    Calcium 8.8 8.5 - 10.1 MG/DL     Recent Labs     05/28/21  1115 05/26/21  0356   WBC 7.8 5.3   HGB 12.7 11.3*   HCT 39.8 34.9*    151     Recent Labs     05/28/21  1115 05/28/21  0054 05/27/21  0202 05/26/21  0356     --  140 144   K 3.7  --  4.4 3.6   *  --  108 116*   CO2 23  --  24 22   BUN 12  --  15 16   CREA 1.06  --  1.10 1.07   *  --  122* 96   CA 8.8  --  8.4* 8.5   MG  --  2.0 2.2 2.1   PHOS  --  2.3* 2.6 2.9     No results for input(s): ALT, AP, TBIL, TBILI, TP, ALB, GLOB, GGT, AML, LPSE in the last 72 hours. No lab exists for component: SGOT, GPT, AMYP, HLPSE  No results for input(s): INR, PTP, APTT, INREXT, INREXT in the last 72 hours. No results for input(s): FE, TIBC, PSAT, FERR in the last 72 hours. No results found for: FOL, RBCF   No results for input(s): PH, PCO2, PO2 in the last 72 hours. No results for input(s): CPK, CKNDX, TROIQ in the last 72 hours.     No lab exists for component: CPKMB  Lab Results   Component Value Date/Time    Cholesterol, total 144 04/19/2021 12:59 AM    HDL Cholesterol 33 04/19/2021 12:59 AM    LDL, calculated 74.8 04/19/2021 12:59 AM    Triglyceride 181 (H) 04/19/2021 12:59 AM    CHOL/HDL Ratio 4.4 04/19/2021 12:59 AM     Lab Results   Component Value Date/Time    Glucose (POC) 124 (H) 05/24/2021 06:35 PM    Glucose (POC) 115 05/24/2021 11:37 AM    Glucose (POC) 107 05/22/2021 02:25 PM    Glucose (POC) 92 05/21/2021 07:36 AM    Glucose (POC) 110 (H) 05/03/2021 11:41 AM     Lab Results   Component Value Date/Time    Color YELLOW/STRAW 05/16/2021 09:33 PM    Appearance CLEAR 05/16/2021 09:33 PM    Specific gravity 1.014 05/16/2021 09:33 PM    Specific gravity 1.015 09/21/2020 09:13 AM    pH (UA) 5.0 05/16/2021 09:33 PM    Protein Negative 05/16/2021 09:33 PM    Glucose Negative 05/16/2021 09:33 PM    Ketone Negative 05/16/2021 09:33 PM    Bilirubin Negative 05/16/2021 09:33 PM    Urobilinogen 0.2 05/16/2021 09:33 PM    Nitrites Negative 05/16/2021 09:33 PM    Leukocyte Esterase Negative 05/16/2021 09:33 PM    Epithelial cells FEW 05/16/2021 09:33 PM    Bacteria Negative 05/16/2021 09:33 PM    WBC 0-4 05/16/2021 09:33 PM    RBC 0-5 05/16/2021 09:33 PM       Med list reviewed  Current Facility-Administered Medications   Medication Dose Route Frequency    lidocaine (XYLOCAINE) 2 % jelly   Mucous Membrane PRN    dextrose 5% infusion  50 mL/hr IntraVENous CONTINUOUS    hydrALAZINE (APRESOLINE) tablet 100 mg  100 mg Oral TID    amLODIPine (NORVASC) tablet 5 mg  5 mg Oral DAILY    labetaloL (NORMODYNE;TRANDATE) injection 20 mg  20 mg IntraVENous Q6H PRN    levETIRAcetam (KEPPRA) 1,000 mg in 0.9% sodium chloride 100 mL IVPB  1,000 mg IntraVENous Q12H    hydrALAZINE (APRESOLINE) 20 mg/mL injection 20 mg  20 mg IntraVENous Q6H PRN    lacosamide (VIMPAT) 100 mg in 0.9% sodium chloride 100 mL IVPB  100 mg IntraVENous Q12H    sodium chloride (NS) flush 5-40 mL  5-40 mL IntraVENous Q8H    sodium chloride (NS) flush 5-40 mL  5-40 mL IntraVENous PRN    polyethylene glycol (MIRALAX) packet 17 g  17 g Oral DAILY PRN    albuterol (PROVENTIL VENTOLIN) nebulizer solution 2.5 mg  2.5 mg Nebulization Q4H PRN    aspirin chewable tablet 81 mg  81 mg Oral DAILY    atorvastatin (LIPITOR) tablet 80 mg  80 mg Oral DAILY    [Held by provider] isosorbide mononitrate ER (IMDUR) tablet 30 mg  30 mg Oral DAILY    sodium chloride (NS) flush 5-40 mL  5-40 mL IntraVENous Q8H    sodium chloride (NS) flush 5-40 mL  5-40 mL IntraVENous PRN    acetaminophen (TYLENOL) tablet 650 mg  650 mg Oral Q6H PRN    Or    acetaminophen (TYLENOL) suppository 650 mg  650 mg Rectal Q6H PRN    promethazine (PHENERGAN) tablet 12.5 mg  12.5 mg Oral Q6H PRN    Or    ondansetron (ZOFRAN) injection 4 mg  4 mg IntraVENous Q6H PRN       Care Plan discussed with:  Patient/Family and Nurse    Renae Claros MD  Internal Medicine  Date of Service: 5/28/2021 Performed Resulted

## 2021-12-20 ENCOUNTER — TELEPHONE (OUTPATIENT)
Dept: INTERNAL MEDICINE CLINIC | Age: 86
End: 2021-12-20

## 2021-12-20 NOTE — TELEPHONE ENCOUNTER
Patient's daughter Jose Lozano called stating patient has completed antibiotic and is still complaining of burning with urination. Due to patient's limited mobility, she will  a specimen cup and bring it back to the lab with his urine specimen for a ua/uc to be done.

## 2021-12-27 RX ORDER — LANOLIN ALCOHOL/MO/W.PET/CERES
CREAM (GRAM) TOPICAL
Qty: 90 TABLET | Refills: 4 | Status: SHIPPED | OUTPATIENT
Start: 2021-12-27

## 2021-12-27 NOTE — TELEPHONE ENCOUNTER
PCP: Nel Rincon MD    Last appt: 8/19/2021  Future Appointments   Date Time Provider Deanna Casanova   2/18/2022  3:15 PM Nel Rincon MD [facility] BS AMB   3/18/2022  2:40 PM Emily Comer MD [facility] BS AMB       Requested Prescriptions     Pending Prescriptions Disp Refills    thiamine HCL (B-1) 100 mg tablet 90 Tablet 4

## 2022-01-06 ENCOUNTER — TELEPHONE (OUTPATIENT)
Dept: INTERNAL MEDICINE CLINIC | Age: 87
End: 2022-01-06

## 2022-01-06 NOTE — TELEPHONE ENCOUNTER
Just FYI:    MsAlexsander Odalis Lainez called from [facility] regarding patient's current status with PT. Stated patient's regimen has been adjusted to a maintenance amount where patient will be have PT every other week and wanted his PCP to be made aware.

## 2022-02-18 ENCOUNTER — OFFICE VISIT (OUTPATIENT)
Dept: INTERNAL MEDICINE CLINIC | Age: 87
End: 2022-02-18
Payer: MEDICARE

## 2022-02-18 VITALS
RESPIRATION RATE: 18 BRPM | WEIGHT: 201 LBS | DIASTOLIC BLOOD PRESSURE: 75 MMHG | HEIGHT: 68 IN | SYSTOLIC BLOOD PRESSURE: 119 MMHG | HEART RATE: 72 BPM | OXYGEN SATURATION: 94 % | BODY MASS INDEX: 30.46 KG/M2

## 2022-02-18 DIAGNOSIS — G95.19 NEUROGENIC CLAUDICATION (HCC): ICD-10-CM

## 2022-02-18 DIAGNOSIS — I25.118 CORONARY ARTERY DISEASE OF NATIVE ARTERY OF NATIVE HEART WITH STABLE ANGINA PECTORIS (HCC): ICD-10-CM

## 2022-02-18 DIAGNOSIS — R53.83 FATIGUE, UNSPECIFIED TYPE: ICD-10-CM

## 2022-02-18 DIAGNOSIS — R73.02 IGT (IMPAIRED GLUCOSE TOLERANCE): Primary | ICD-10-CM

## 2022-02-18 DIAGNOSIS — I63.10 CEREBROVASCULAR ACCIDENT (CVA) DUE TO EMBOLISM OF PRECEREBRAL ARTERY (HCC): ICD-10-CM

## 2022-02-18 DIAGNOSIS — G40.909 SEIZURE DISORDER (HCC): ICD-10-CM

## 2022-02-18 DIAGNOSIS — J43.1 PANLOBULAR EMPHYSEMA (HCC): ICD-10-CM

## 2022-02-18 DIAGNOSIS — F33.0 MAJOR DEPRESSIVE DISORDER, RECURRENT, MILD (HCC): ICD-10-CM

## 2022-02-18 DIAGNOSIS — N18.30 STAGE 3 CHRONIC KIDNEY DISEASE, UNSPECIFIED WHETHER STAGE 3A OR 3B CKD (HCC): ICD-10-CM

## 2022-02-18 DIAGNOSIS — Z79.899 ON STATIN THERAPY: ICD-10-CM

## 2022-02-18 DIAGNOSIS — E78.5 DYSLIPIDEMIA: ICD-10-CM

## 2022-02-18 DIAGNOSIS — I10 ESSENTIAL HYPERTENSION: ICD-10-CM

## 2022-02-18 PROCEDURE — G8427 DOCREV CUR MEDS BY ELIG CLIN: HCPCS | Performed by: INTERNAL MEDICINE

## 2022-02-18 PROCEDURE — G8417 CALC BMI ABV UP PARAM F/U: HCPCS | Performed by: INTERNAL MEDICINE

## 2022-02-18 PROCEDURE — G8536 NO DOC ELDER MAL SCRN: HCPCS | Performed by: INTERNAL MEDICINE

## 2022-02-18 PROCEDURE — 1101F PT FALLS ASSESS-DOCD LE1/YR: CPT | Performed by: INTERNAL MEDICINE

## 2022-02-18 PROCEDURE — 99214 OFFICE O/P EST MOD 30 MIN: CPT | Performed by: INTERNAL MEDICINE

## 2022-02-18 PROCEDURE — G9717 DOC PT DX DEP/BP F/U NT REQ: HCPCS | Performed by: INTERNAL MEDICINE

## 2022-02-18 NOTE — PROGRESS NOTES
1. Have you been to the ER, urgent care clinic since your last visit? Hospitalized since your last visit? No    2. Have you seen or consulted any other health care providers outside of the [address] [place] [address] since your last visit? Include any pap smears or colon screening.  No

## 2022-02-18 NOTE — PROGRESS NOTES
Shirlene Sheth is a 80 y.o. male and presents with Follow-up (6 month f/u)  . Subjective:  Mr. Sarah Dickey presents today for 6-month follow-up for multiple problems including coronary disease, COPD, chronic kidney disease, reflux, hypertension, seizure disorder secondary to CVA, obesity, sleep apnea, history of stroke. He has been doing well on his current medical regimen. He denies any side effects with his medications. He has had some itching involving his neck and face but no rashes present. Itching comes and goes. He does not appear to be related to medications or to foods. He has not used any new shaving cream, soap or lotions. He has no shortness of breath, chest pain, palpitations, PND, orthopnea, or pedal edema.     Past Medical History:   Diagnosis Date    Adverse effect of anesthesia     combative after anesthesia    Alcohol abuse     6 beers/day    Arthritis     CAD (coronary artery disease)     Chronic obstructive pulmonary disease (HCC)     Chronic obstructive pulmonary disease (HCC)     CKD (chronic kidney disease) stage 3, GFR 30-59 ml/min ([address] 9/21/2020    Dyslipidemia 8/16/2017    Dyspepsia and other specified disorders of function of stomach     Dyspnea 8/16/2017    ED (erectile dysfunction) 8/16/2017    Encounter for immunization 8/16/2017    Fatigue 8/16/2017    Flank pain 8/1/2019    GERD (gastroesophageal reflux disease) 8/16/2017    Gout 8/16/2017    Hypertension     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    Morbid obesity ([address]     On statin therapy 8/16/2017    TESSA on CPAP 1/3/2019    Psychiatric disorder     Depression    Simple chronic bronchitis ([address] 1/3/2019    TIA (transient ischemic attack) 9/48/0371    Umbilical hernia 58/1/0062    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     Past Surgical History:   Procedure Laterality Date    COLONOSCOPY N/A 9/27/2019    COLON[facility]COPY performed [facility] Spencer [facility] at [facility] ENDOSCOPY    HX [facility] HERNIA REPAIR  33/64/03    open umbilical hernia repair mesh    HX ORTHOPAEDIC      HX UROLOGICAL      HYDROCELECTOMY    MD CARDIAC SURG PROCEDURE UNLIST  1996    Cardiac Stents    MD CARDIAC SURG PROCEDURE UNLIST  04/2019    EF 61-65%    UPPER GI ENDOSCOPY,BIOPSY  9/30/2019          Allergies   Allergen Reactions    Levaquin [Levofloxacin] Nausea and Vomiting     Reports anxiety, nausea, aching after taking levaquin    Ciprofloxacin Shortness of Breath    Quinolones Shortness of Breath     Current Outpatient Medications   Medication Sig Dispense Refill    thiamine HCL (B-1) 100 mg tablet TAKE ONE BY MOUTH DAILY 90 Tablet 4    atorvastatin (LIPITOR) 80 mg tablet TAKE ONE TABLET BY MOUTH DAILY 90 Tablet 1    cefUROXime (CEFTIN) 500 mg tablet Take one tablet twice a day for 10 days. 20 Tablet 0    atropine 1 % ophthalmic solution 1 Drop daily.  dorzolamide-timoloL (COSOPT) 22.3-6.8 mg/mL ophthalmic solution 1 Drop two (2) times a day.  brimonidine (ALPHAGAN) 0.2 % ophthalmic solution 1 Drop two (2) times a day.  prednisoLONE acetate (PRED FORTE) 1 % ophthalmic suspension 1 Drop four (4) times daily.  eyelid cleanser combination 3 (OCUSOFT LID SCRUB PLUS EX) by Apply Externally route.  amLODIPine (NORVASC) 5 mg tablet Take 1 Tablet by mouth daily for 360 days. 90 Tablet 3    atenoloL (TENORMIN) 25 mg tablet Take 0.5 Tablets by mouth daily. Dose reduced from 100 mg daily to 12.5 mg daily 45 Tablet 3    DULoxetine (CYMBALTA) 30 mg capsule Take 1 Capsule by mouth daily. 90 Capsule 3    furosemide (LASIX) 20 mg tablet Take 3 tablets by mouth daily 270 Tablet 3    hydrALAZINE (APRESOLINE) 100 mg tablet Take 1 Tablet by mouth three (3) times daily. . 270 Tablet 3    traZODone (DESYREL) 100 mg tablet Take 1 Tablet by mouth nightly. 90 Tablet 3    levETIRAcetam (KEPPRA) 750 mg tablet Take 1 Tablet by mouth every twelve (12) hours every twelve (12) hours. Do not crush or chew.  Swallow whole. 180 Tablet 2    polyethylene glycol (MIRALAX) 17 gram packet Take 1 Packet by mouth daily. Hold for diarrhea or loose stools. Mix in 8 oz of water, juice, soda, coffee, or tea prior to administration (Patient taking differently: Take 17 g by mouth as needed.) 30 Each 0    isosorbide mononitrate ER (IMDUR) 30 mg tablet TAKE ONE TABLET BY MOUTH DAILY 90 Tab 4    melatonin 5 mg cap capsule Take 5 mg by mouth nightly. Pt. Take 2 tabs a night      acetaminophen (TYLENOL) 500 mg tablet Take 1,000 mg by mouth every six (6) hours as needed for Pain.  aspirin delayed-release 81 mg tablet Take 81 mg by mouth daily.  ANORO ELLIPTA 62.5-25 mcg/actuation inhaler Take 1 Puff by inhalation daily. Social History     Socioeconomic History    Marital status:    Tobacco Use    Smoking status: Former Smoker     Packs/day: 0.50     Years: 20.00     Pack years: 10.00    Smokeless tobacco: Former User    Tobacco comment: quit about 40  years ago   / used to dip tobaco and dip snuff   Substance and Sexual Activity    Alcohol use: Yes     Comment: \"Drinks very little now for about a month \"    Drug use: No     Family History   Problem Relation Age of Onset    Heart Disease Mother     Hypertension Mother     Hypertension Father     Hypertension Sister     Hypertension Brother     Cancer Brother        Review of Systems  Constitutional:  negative for fevers, chills, anorexia and weight loss  Eyes:    negative for visual disturbance and irritation  ENT:    negative for tinnitus,sore throat,nasal congestion,ear pains. hoarseness  Respiratory:     negative for cough, hemoptysis, dyspnea,wheezing  CV:    negative for chest pain, palpitations, lower extremity edema  GI:    negative for nausea, vomiting, diarrhea, abdominal pain,melena  Endo:               negative for polyuria,polydipsia,polyphagia,heat intolerance  Genitourinary : negative for frequency, dysuria and hematuria  Integumentary: negative for rash and pruritus  Hematologic:   negative for easy bruising and gum/nose bleeding  Musculoskel:  negative for myalgias, arthralgias,  muscle weakness  Neurological:   negative for headaches, dizziness, vertigo, memory problems  Behavl/Psych:  negative for feelings of  depression, mood changes  ROS otherwise negative      Objective:  Visit Vitals  /75 (BP 1 Location: Left arm, BP Patient Position: Sitting, BP Cuff Size: Large adult)   Pulse 72   Resp 18   Ht 5' 8\" (1.727 m)   Wt 201 lb (91.2 kg)   SpO2 94%   BMI 30.56 kg/m²     Physical Exam:   General appearance - alert, well appearing, and in no distress  Mental status - alert, oriented to person, place, and time  EYE-MAURICE, EOMI, fundi normal, corneas normal, no foreign bodies  ENT-ENT exam normal, no neck nodes or sinus tenderness  Nose - normal and patent, no erythema, discharge or polyps  Mouth - mucous membranes moist, pharynx normal without lesions  Neck - supple, no significant adenopathy   Chest - clear to auscultation, no wheezes, rales or rhonchi, symmetric air entry   Heart - normal rate, regular rhythm, normal S1, S2, no murmurs, rubs, clicks or gallops   Abdomen - soft, nontender, nondistended, no masses or organomegaly  Lymph- no adenopathy palpable  Ext-peripheral pulses normal, no pedal edema, no clubbing or cyanosis  Skin-Warm and dry. no hyperpigmentation, vitiligo, or suspicious lesions  Neuro -alert, oriented, normal speech, grossly nonfocal, unsteady gait      Assessment/Plan:  Diagnoses and all orders for this visit:    1. IGT (impaired glucose tolerance)  -     HEMOGLOBIN A1C WITH EAG; Future    2. Coronary artery disease of native artery of native heart with stable angina pectoris ([facility]    3. Essential hypertension  -     METABOLIC PANEL, COMPREHENSIVE; Future  -     URINALYSIS W/ RFLX MICROSCOPIC; Future    4.  Cerebrovascular accident (CVA) due to embolism of precerebral artery (HCC)    5. Stage 3 chronic kidney disease, unspecified whether stage 3a or 3b CKD ([place] [address]    6. Dyslipidemia  -     LIPID PANEL; Future  -     METABOLIC PANEL, COMPREHENSIVE; Future  -     CK; Future    7. On statin therapy  -     LIPID PANEL; Future  -     METABOLIC PANEL, COMPREHENSIVE; Future  -     CK; Future    8. Seizure disorder (HCC)  -     LEVETIRACETAM (KEPPRA); Future    9. Fatigue, unspecified type  -     CBC WITH AUTOMATED DIFF; Future    10. Neurogenic claudication ([place] [address]    11. Panlobular emphysema ([place] [address]    12. Major depressive disorder, recurrent, mild ([place] [address]          ICD-10-CM ICD-9-CM    1. IGT (impaired glucose tolerance)  R73.02 790.22 HEMOGLOBIN A1C WITH EAG      HEMOGLOBIN A1C WITH EAG   2. Coronary artery disease of native artery of native heart with stable angina pectoris ([place] [address]  I25.118 414.01      413.9    3. Essential hypertension  E00 939.3 METABOLIC PANEL, COMPREHENSIVE      URINALYSIS W/ RFLX MICROSCOPIC      METABOLIC PANEL, COMPREHENSIVE   4. Cerebrovascular accident (CVA) due to embolism of precerebral artery ([place] [address]  I63.10 434.11    5. Stage 3 chronic kidney disease, unspecified whether stage 3a or 3b CKD ([facility])  N18.30 585.3    6. Dyslipidemia  E78.5 272.4 LIPID PANEL      METABOLIC PANEL, COMPREHENSIVE      CK      CK      METABOLIC PANEL, COMPREHENSIVE      LIPID PANEL   7. On statin therapy  Z79.899 V58.69 LIPID PANEL      METABOLIC PANEL, COMPREHENSIVE      CK      CK      METABOLIC PANEL, COMPREHENSIVE      LIPID PANEL   8. Seizure disorder (HCC)  G40.909 345.90 LEVETIRACETAM (KEPPRA)      LEVETIRACETAM (KEPPRA)   9. Fatigue, unspecified type  R53.83 780.79 CBC WITH AUTOMATED DIFF      CBC WITH AUTOMATED DIFF   10. Neurogenic claudication (HCC)  G95.19 435.1    11. Panlobular emphysema (HCC)  J43.1 492.8    12. Major depressive disorder, recurrent, mild (HCC)  F33.0 296.31          Follow-up and Dispositions    · Return in about 6 months (around 8/18/2022).          I have reviewed with the patient details of the assessment and plan and all questions were answered. Relevent patient education was performed. Verbal and/or written instructions (see AVS) provided. The most recent lab findings were reviewed with the patient. Plan was discussed with patient who verbally expressed understanding. An After Visit Summary was printed and given to the patient.     Kimmie Waddell MD

## 2022-02-19 LAB
ALBUMIN SERPL-MCNC: 3.6 G/DL (ref 3.5–5)
ALBUMIN/GLOB SERPL: 1.1 {RATIO} (ref 1.1–2.2)
ALP SERPL-CCNC: 129 U/L (ref 45–117)
ALT SERPL-CCNC: 26 U/L (ref 12–78)
ANION GAP SERPL CALC-SCNC: 4 MMOL/L (ref 5–15)
AST SERPL-CCNC: 20 U/L (ref 15–37)
BASOPHILS # BLD: 0.1 K/UL (ref 0–0.1)
BASOPHILS NFR BLD: 1 % (ref 0–1)
BILIRUB SERPL-MCNC: 0.3 MG/DL (ref 0.2–1)
BUN SERPL-MCNC: 23 MG/DL (ref 6–20)
BUN/CREAT SERPL: 16 (ref 12–20)
CALCIUM SERPL-MCNC: 8.9 MG/DL (ref 8.5–10.1)
CHLORIDE SERPL-SCNC: 107 MMOL/L (ref 97–108)
CHOLEST SERPL-MCNC: 121 MG/DL
CK SERPL-CCNC: 59 U/L (ref 39–308)
CO2 SERPL-SCNC: 28 MMOL/L (ref 21–32)
CREAT SERPL-MCNC: 1.46 MG/DL (ref 0.7–1.3)
DIFFERENTIAL METHOD BLD: ABNORMAL
EOSINOPHIL # BLD: 0.2 K/UL (ref 0–0.4)
EOSINOPHIL NFR BLD: 3 % (ref 0–7)
ERYTHROCYTE [DISTWIDTH] IN BLOOD BY AUTOMATED COUNT: 14.9 % (ref 11.5–14.5)
EST. AVERAGE GLUCOSE BLD GHB EST-MCNC: 117 MG/DL
GLOBULIN SER CALC-MCNC: 3.4 G/DL (ref 2–4)
GLUCOSE SERPL-MCNC: 119 MG/DL (ref 65–100)
HBA1C MFR BLD: 5.7 % (ref 4–5.6)
HCT VFR BLD AUTO: 40.6 % (ref 36.6–50.3)
HDLC SERPL-MCNC: 30 MG/DL
HDLC SERPL: 4 {RATIO} (ref 0–5)
HGB BLD-MCNC: 13 G/DL (ref 12.1–17)
IMM GRANULOCYTES # BLD AUTO: 0 K/UL (ref 0–0.04)
IMM GRANULOCYTES NFR BLD AUTO: 0 % (ref 0–0.5)
LDLC SERPL CALC-MCNC: 58.4 MG/DL (ref 0–100)
LYMPHOCYTES # BLD: 1.1 K/UL (ref 0.8–3.5)
LYMPHOCYTES NFR BLD: 18 % (ref 12–49)
MCH RBC QN AUTO: 30.7 PG (ref 26–34)
MCHC RBC AUTO-ENTMCNC: 32 G/DL (ref 30–36.5)
MCV RBC AUTO: 96 FL (ref 80–99)
MONOCYTES # BLD: 0.7 K/UL (ref 0–1)
MONOCYTES NFR BLD: 11 % (ref 5–13)
NEUTS SEG # BLD: 4.4 K/UL (ref 1.8–8)
NEUTS SEG NFR BLD: 67 % (ref 32–75)
NRBC # BLD: 0 K/UL (ref 0–0.01)
NRBC BLD-RTO: 0 PER 100 WBC
PLATELET # BLD AUTO: 231 K/UL (ref 150–400)
PMV BLD AUTO: 11 FL (ref 8.9–12.9)
POTASSIUM SERPL-SCNC: 4.2 MMOL/L (ref 3.5–5.1)
PROT SERPL-MCNC: 7 G/DL (ref 6.4–8.2)
RBC # BLD AUTO: 4.23 M/UL (ref 4.1–5.7)
SODIUM SERPL-SCNC: 139 MMOL/L (ref 136–145)
TRIGL SERPL-MCNC: 163 MG/DL (ref ?–150)
VLDLC SERPL CALC-MCNC: 32.6 MG/DL
WBC # BLD AUTO: 6.5 K/UL (ref 4.1–11.1)

## 2022-02-22 LAB — LEVETIRACETAM SERPL-MCNC: 35.4 UG/ML (ref 10–40)

## 2022-02-22 NOTE — PROGRESS NOTES
Your A1c is stable at 5.7%. Glucose is stable at 119. Kidney function has declined somewhat with a creatinine that has increased to 1.46. This will need to be monitored. Your cholesterol profile is excellent. Your complete blood count is normal.  Please schedule a follow-up lab visit in 6 to 8 weeks.

## 2022-03-18 ENCOUNTER — OFFICE VISIT (OUTPATIENT)
Dept: NEUROLOGY | Age: 87
End: 2022-03-18
Payer: MEDICARE

## 2022-03-18 VITALS
OXYGEN SATURATION: 98 % | RESPIRATION RATE: 18 BRPM | SYSTOLIC BLOOD PRESSURE: 136 MMHG | DIASTOLIC BLOOD PRESSURE: 80 MMHG

## 2022-03-18 DIAGNOSIS — G40.909 SEIZURE DISORDER (HCC): ICD-10-CM

## 2022-03-18 DIAGNOSIS — I69.319 CVA, OLD, COGNITIVE DEFICITS: Primary | ICD-10-CM

## 2022-03-18 PROBLEM — M48.061 SPINAL STENOSIS OF LUMBAR REGION AT MULTIPLE LEVELS: Status: ACTIVE | Noted: 2019-05-13

## 2022-03-18 PROBLEM — Z79.899 ON STATIN THERAPY: Status: ACTIVE | Noted: 2017-08-16

## 2022-03-18 PROBLEM — F33.9 MAJOR DEPRESSIVE DISORDER, RECURRENT, UNSPECIFIED (HCC): Status: ACTIVE | Noted: 2021-08-19

## 2022-03-18 PROBLEM — F33.1 MAJOR DEPRESSIVE DISORDER, RECURRENT, MODERATE (HCC): Status: ACTIVE | Noted: 2021-08-19

## 2022-03-18 PROBLEM — E66.01 SEVERE OBESITY (HCC): Status: ACTIVE | Noted: 2019-01-03

## 2022-03-18 PROBLEM — R53.83 FATIGUE: Status: ACTIVE | Noted: 2017-08-16

## 2022-03-18 PROBLEM — D62 ACUTE BLOOD LOSS ANEMIA: Status: ACTIVE | Noted: 2019-09-29

## 2022-03-18 PROBLEM — G40.901 STATUS EPILEPTICUS (HCC): Status: ACTIVE | Noted: 2021-05-29

## 2022-03-18 PROCEDURE — G8427 DOCREV CUR MEDS BY ELIG CLIN: HCPCS | Performed by: PSYCHIATRY & NEUROLOGY

## 2022-03-18 PROCEDURE — 99214 OFFICE O/P EST MOD 30 MIN: CPT | Performed by: PSYCHIATRY & NEUROLOGY

## 2022-03-18 PROCEDURE — 1101F PT FALLS ASSESS-DOCD LE1/YR: CPT | Performed by: PSYCHIATRY & NEUROLOGY

## 2022-03-18 PROCEDURE — G8417 CALC BMI ABV UP PARAM F/U: HCPCS | Performed by: PSYCHIATRY & NEUROLOGY

## 2022-03-18 PROCEDURE — G8536 NO DOC ELDER MAL SCRN: HCPCS | Performed by: PSYCHIATRY & NEUROLOGY

## 2022-03-18 PROCEDURE — G9717 DOC PT DX DEP/BP F/U NT REQ: HCPCS | Performed by: PSYCHIATRY & NEUROLOGY

## 2022-03-18 RX ORDER — LEVETIRACETAM 750 MG/1
750 TABLET ORAL EVERY 12 HOURS
Qty: 60 TABLET | Refills: 1 | Status: SHIPPED
Start: 2022-03-18 | End: 2022-04-14 | Stop reason: DRUGHIGH

## 2022-03-18 NOTE — PROGRESS NOTES
SUBJECTIVE  Arpita Fernandez came back for follow-up. He has not had any further seizures. Continues to take Keppra 750 mg twice daily and denies any problems. He has been noticing more tremor in his right upper extremity and he had it even before the stroke. He is able to ambulate with a walker but mostly sits around and does not do much physical activity. 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 This occurred after his initial discharge when he was in the rehab and had to come back to the hospital.    RECAP  He was hospitalized in May 2021 with AMS/nausea/vomiting/tremors. 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 Was in rehab when he developed pneumonia and was admitted to the hospital.    MRI brain was performed which showed embolic appearing scattered infarcts in bilateral frontal lobes and parieto-occipital lobes. EEG was done on 5/16 which was unremarkable but had another EEG for symptoms of decreased responsiveness, left gaze deviation, lip smacking on 5/20 and it showed a generalized nonconvulsive status that resolved after administration of Ativan.    He was treated with Keppra and lacosamide but lacosamide was causing excessive drowsiness and it was weaned down  He was discharged on Keppra 1000 mg twice daily and this was reduced to 750 mg twice daily at the rehab  Has not had any further seizure recurrence          EXAM  Exam:  Visit Vitals  /80   Resp 18   SpO2 98%     Gen:  CV: RRR  Lungs: non labored breathing  Abd: non distending  Neuro: A&O x 3, no dysarthria or aphasia  CN II-XII: PERRL, EOMI, face symmetric, tongue/palate midline  Motor: strength 5/5 all four ext  Sensory: intact to LT  Gait: normal    LABS/ IMAGING  Lab Results   Component Value Date/Time    WBC 6.5 02/18/2022 03:58 PM    HGB (POC) 14.9 07/27/2018 02:38 PM    HGB 13.0 02/18/2022 03:58 PM Hematocrit (POC) 45 05/13/2019 07:16 PM    HCT 40.6 02/18/2022 03:58 PM    PLATELET 339 29/80/1674 03:58 PM    MCV 96.0 02/18/2022 03:58 PM     Lab Results   Component Value Date/Time    Sodium 139 02/18/2022 03:58 PM    Potassium 4.2 02/18/2022 03:58 PM    Chloride 107 02/18/2022 03:58 PM    CO2 28 02/18/2022 03:58 PM    Anion gap 4 (L) 02/18/2022 03:58 PM    Glucose 119 (H) 02/18/2022 03:58 PM    BUN 23 (H) 02/18/2022 03:58 PM    Creatinine 1.46 (H) 02/18/2022 03:58 PM    BUN/Creatinine ratio 16 02/18/2022 03:58 PM    GFR est AA 55 (L) 02/18/2022 03:58 PM    GFR est non-AA 46 (L) 02/18/2022 03:58 PM    Calcium 8.9 02/18/2022 03:58 PM    Bilirubin, total 0.3 02/18/2022 03:58 PM    Alk. phosphatase 129 (H) 02/18/2022 03:58 PM    Protein, total 7.0 02/18/2022 03:58 PM    Albumin 3.6 02/18/2022 03:58 PM    Globulin 3.4 02/18/2022 03:58 PM    A-G Ratio 1.1 02/18/2022 03:58 PM    ALT (SGPT) 26 02/18/2022 03:58 PM    AST (SGOT) 20 02/18/2022 03:58 PM     MRI Results (most recent):  Results from Hospital Encounter encounter on 05/16/21    MRI BRAIN WO CONT    Narrative  *PRELIMINARY REPORT*    Multiple foci of small cortical and white matter infarcts mostly on the right  with a couple of lesions on the left as well. Interval evolution of  intraventricular hemorrhage and small parenchymal hemorrhages. Preliminary report was provided by Dr. Beny Jalloh, the on-call radiologist, at  22:17. The findings were called to floor nurse, Rosales Cruz, on 5/18/2021 at 22:20 by myself. [id number]    Final report to follow. *END PRELIMINARY REPORT*    EXAM:  MRI BRAIN WO CONT    INDICATION:    AMS    COMPARISON:  CT head 5/16/2021, MRI brain 11/23/2019. CONTRAST: None. TECHNIQUE:  Multiplanar multisequence acquisition without contrast of the brain. FINDINGS:  Few small scattered acute infarcts in the bilateral frontal lobes and  parieto-occipital lobes, right greater than left.     Evolved now late subacute small intraparenchymal hematomas in the right parietal  periventricular white matter measuring 10 mm and 5 mm (series 6 image 11), with  no significant surrounding edema or mass effect. There is hemosiderin staining  noted along the ependymal lining of the lateral ventricles, as well as scattered  superficial siderosis involving the sylvian fissures, and along the surface of  the brainstem and cerebellum. Unchanged generalized parenchymal volume loss with commensurate dilation of the  sulci and ventricular system. Unchanged confluent periventricular and deep white  matter T2/FLAIR hyperintensity, and patchy T2/FLAIR hyperintensity in the nely,  consistent with severe chronic microvascular ischemic disease. There is no  cerebellar tonsillar herniation. Expected arterial flow-voids are present. The paranasal sinuses, mastoid air cells, and middle ears are clear. The orbital  contents are within normal limits with bilateral lens implants. No significant  osseous or scalp lesions are identified. Impression  1. Few small scattered acute infarcts in the bilateral frontal lobes and  parieto-occipital lobes, right greater than left. 2. 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 3. Unchanged generalized parenchymal volume loss and severe chronic  microvascular ischemic disease. ASSESSMENT    ICD-10-CM ICD-9-CM    1.  CVA, old, cognitive deficits  I69.319 438.0 EEG   2. Seizure disorder (HCC)  G40.909 345.90 EEG      levETIRAcetam (KEPPRA) 750 mg tablet       PLAN  Mr. Porsche Lopez had an extended hospital stay course related to embolic appearing scattered infarctions in both hemispheres of unclear source, possibly cardiac  Currently on aspirin and statin, not a candidate for anticoagulation  He is able to ambulate with the help of a walker. 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 We will monitor this clinically for now.     Continue to stay physically and mentally active  Follow-up in 6 months    Deepa Cohn MD

## 2022-03-19 PROBLEM — R65.20 SEVERE SEPSIS (HCC): Status: ACTIVE | Noted: 2019-09-23

## 2022-03-19 PROBLEM — N52.9 ED (ERECTILE DYSFUNCTION): Status: ACTIVE | Noted: 2017-08-16

## 2022-03-19 PROBLEM — I63.9 CEREBROVASCULAR ACCIDENT (CVA) DUE TO EMBOLISM (HCC): Status: ACTIVE | Noted: 2021-05-29

## 2022-03-19 PROBLEM — N18.30 CKD (CHRONIC KIDNEY DISEASE) STAGE 3, GFR 30-59 ML/MIN (HCC): Status: ACTIVE | Noted: 2020-09-21

## 2022-03-19 PROBLEM — K63.5 COLON POLYPS: Status: ACTIVE | Noted: 2017-08-16

## 2022-03-19 PROBLEM — J18.9 CAP (COMMUNITY ACQUIRED PNEUMONIA): Status: ACTIVE | Noted: 2021-05-16

## 2022-03-19 PROBLEM — I10 HYPERTENSION: Status: ACTIVE | Noted: 2017-08-16

## 2022-03-19 PROBLEM — I65.23 BILATERAL CAROTID ARTERY STENOSIS: Status: ACTIVE | Noted: 2019-04-12

## 2022-03-19 PROBLEM — G45.9 TIA (TRANSIENT ISCHEMIC ATTACK): Status: ACTIVE | Noted: 2017-08-16

## 2022-03-19 PROBLEM — F33.0 MAJOR DEPRESSIVE DISORDER, RECURRENT, MILD (HCC): Status: ACTIVE | Noted: 2021-08-19

## 2022-03-19 PROBLEM — K21.9 GERD (GASTROESOPHAGEAL REFLUX DISEASE): Status: ACTIVE | Noted: 2017-08-16

## 2022-03-19 PROBLEM — R06.00 DYSPNEA: Status: ACTIVE | Noted: 2017-08-16

## 2022-03-19 PROBLEM — A41.9 SEVERE SEPSIS (HCC): Status: ACTIVE | Noted: 2019-09-23

## 2022-03-19 PROBLEM — R10.9 FLANK PAIN: Status: ACTIVE | Noted: 2019-08-01

## 2022-03-19 PROBLEM — G93.40 ENCEPHALOPATHY: Status: ACTIVE | Noted: 2021-05-29

## 2022-03-19 PROBLEM — A86 VIRAL ENCEPHALITIS: Status: ACTIVE | Noted: 2019-12-02

## 2022-03-19 PROBLEM — I25.10 CAD (CORONARY ARTERY DISEASE): Status: ACTIVE | Noted: 2017-08-16

## 2022-03-19 PROBLEM — Z99.89 OSA ON CPAP: Status: ACTIVE | Noted: 2019-01-03

## 2022-03-19 PROBLEM — K62.5 RECTAL BLEEDING: Status: ACTIVE | Noted: 2019-09-23

## 2022-03-19 PROBLEM — E78.5 DYSLIPIDEMIA: Status: ACTIVE | Noted: 2017-08-16

## 2022-03-19 PROBLEM — G47.33 OSA ON CPAP: Status: ACTIVE | Noted: 2019-01-03

## 2022-03-19 PROBLEM — I25.118 CORONARY ARTERY DISEASE WITH STABLE ANGINA PECTORIS (HCC): Status: ACTIVE | Noted: 2020-06-26

## 2022-03-19 PROBLEM — J18.9 PNEUMONIA: Status: ACTIVE | Noted: 2019-09-23

## 2022-03-20 PROBLEM — J41.0 SIMPLE CHRONIC BRONCHITIS (HCC): Status: ACTIVE | Noted: 2019-01-03

## 2022-03-20 PROBLEM — K13.21 LEUKOPLAKIA OF ORAL CAVITY: Status: ACTIVE | Noted: 2017-08-16

## 2022-03-20 PROBLEM — M10.9 GOUT: Status: ACTIVE | Noted: 2017-08-16

## 2022-03-20 PROBLEM — R29.810 FACIAL DROOP: Status: ACTIVE | Noted: 2019-10-27

## 2022-03-20 PROBLEM — I62.9 INTRACRANIAL HEMORRHAGE (HCC): Status: ACTIVE | Noted: 2021-04-18

## 2022-03-20 PROBLEM — R41.82 ALTERED MENTAL STATE: Status: ACTIVE | Noted: 2019-11-19

## 2022-04-06 ENCOUNTER — HOSPITAL ENCOUNTER (OUTPATIENT)
Dept: NEUROLOGY | Age: 87
Discharge: HOME | End: 2022-04-06
Attending: PSYCHIATRY & NEUROLOGY
Payer: MEDICARE

## 2022-04-06 DIAGNOSIS — I69.319 CVA, OLD, COGNITIVE DEFICITS: ICD-10-CM

## 2022-04-06 DIAGNOSIS — R41.82 ALTERED MENTAL STATUS, UNSPECIFIED ALTERED MENTAL STATUS TYPE: ICD-10-CM

## 2022-04-06 DIAGNOSIS — G40.909 SEIZURE DISORDER (HCC): ICD-10-CM

## 2022-04-06 PROCEDURE — 95816 EEG AWAKE AND DROWSY: CPT | Performed by: PSYCHIATRY & NEUROLOGY

## 2022-04-06 PROCEDURE — 95816 EEG AWAKE AND DROWSY: CPT

## 2022-04-07 NOTE — PROCEDURES
[address]  EEG    Name:  Kayla Pinon  MR#:  [id number]  :  1934  ACCOUNT #:  [de-identified]  DATE OF SERVICE:  2022    CLINICAL INDICATION:  The patient is an 80-year-old male with a history of altered mental status. The patient with possible confusion episodes, possible seizures. The patient needs EEG to rule out seizures, rule out encephalopathy, rule out cortical abnormality. EEG CLASSIFICATION:  On this patient is dysrhythmia grade I generalized, maximum right hemisphere, maximum temporal.    DESCRIPTION OF THE RECORD:  This is a 16-channel EEG recording on the patient to monitor for abnormal activity in a patient having spells of confusion. This patient had a posteriorly located occipital alpha rhythm of 8-9 Hz that did attenuate with eye opening in the awake state. Throughout the recording, there was a mild increase in some 2-6 Hz activity seen, but this at times being slightly more prominent in the right hemisphere and maximum in the right temporal region. There were no spike or spike-and-wave discharges seen, and no other recorded dysrhythmic or electrographic runs of abnormal activity. The patient did enter stages of drowsiness, but no clear stage of sleep were identified. Hyperventilation was not performed. Photic stimulation produced a minimal driving response in the posterior head region. INTERPRETATION:  This is a mildly abnormal electroencephalogram due to the generalized slowing seen most consistent with diffuse encephalopathy of toxic, metabolic, or degenerative type. In addition, there appeared to be some slightly more focal slowing in the right temporal region raising the possibility of a structural lesion there or area of cortical disturbance, but no spike or spike-and-wave discharges were seen, and no recorded dysrhythmic spells of any type were seen. Clinical correlation is recommended.       Alix Dove MD      [id number]  D: 04/06/2022 20:57  T:  04/07/2022 7:27  JOB #:  [id number]  D

## 2022-04-13 NOTE — PROGRESS NOTES
I have reviewed this information with patient's daughter. HIPAA verified. She expressed understanding and agreed to this plan. Please send in 500mg tablets/capsules.  Thanks

## 2022-04-14 RX ORDER — LEVETIRACETAM 500 MG/1
500 TABLET ORAL 2 TIMES DAILY
Qty: 60 TABLET | Refills: 5 | Status: SHIPPED | OUTPATIENT
Start: 2022-04-14 | End: 2022-10-07

## 2022-04-26 ENCOUNTER — PATIENT MESSAGE (OUTPATIENT)
Dept: INTERNAL MEDICINE CLINIC | Age: 87
End: 2022-04-26

## 2022-04-26 RX ORDER — ISOSORBIDE MONONITRATE 30 MG/1
30 TABLET, EXTENDED RELEASE ORAL DAILY
Qty: 90 TABLET | Refills: 3 | Status: SHIPPED | OUTPATIENT
Start: 2022-04-26

## 2022-04-26 NOTE — TELEPHONE ENCOUNTER
Last Refill: 12-21-20  Last Visit: 2/18/2022   Next Visit: 8/18/2022     Requested Prescriptions     Pending Prescriptions Disp Refills    isosorbide mononitrate ER (IMDUR) 30 mg tablet 90 Tablet 3     Sig: Take 1 Tablet by mouth daily.

## 2022-04-26 NOTE — TELEPHONE ENCOUNTER
From: Asa Rossi  To: Wai Pfeiffer MD  Sent: 4/26/2022 10:10 AM EDT  Subject: Isosorbide Mononit ER 30 mg    Can you please call in a new Rx for this medication? There are no refills left. Thank you.

## 2022-05-31 ENCOUNTER — HOSPITAL ENCOUNTER (EMERGENCY)
Age: 87
Discharge: HOME | End: 2022-05-31
Attending: EMERGENCY MEDICINE
Payer: MEDICARE

## 2022-05-31 ENCOUNTER — APPOINTMENT (OUTPATIENT)
Dept: CT IMAGING | Age: 87
End: 2022-05-31
Attending: EMERGENCY MEDICINE
Payer: MEDICARE

## 2022-05-31 VITALS
HEART RATE: 88 BPM | OXYGEN SATURATION: 94 % | TEMPERATURE: 97 F | SYSTOLIC BLOOD PRESSURE: 139 MMHG | DIASTOLIC BLOOD PRESSURE: 92 MMHG | RESPIRATION RATE: 20 BRPM

## 2022-05-31 DIAGNOSIS — H65.193 ACUTE MEE (MIDDLE EAR EFFUSION), BILATERAL: ICD-10-CM

## 2022-05-31 DIAGNOSIS — J01.30 ACUTE NON-RECURRENT SPHENOIDAL SINUSITIS: Primary | ICD-10-CM

## 2022-05-31 LAB
ALBUMIN SERPL-MCNC: 3.1 G/DL (ref 3.5–5)
ALBUMIN/GLOB SERPL: 0.7 {RATIO} (ref 1.1–2.2)
ALP SERPL-CCNC: 133 U/L (ref 45–117)
ALT SERPL-CCNC: 21 U/L (ref 12–78)
ANION GAP SERPL CALC-SCNC: 6 MMOL/L (ref 5–15)
AST SERPL-CCNC: 22 U/L (ref 15–37)
BASOPHILS # BLD: 0 K/UL (ref 0–0.1)
BASOPHILS NFR BLD: 0 % (ref 0–1)
BILIRUB SERPL-MCNC: 0.5 MG/DL (ref 0.2–1)
BUN SERPL-MCNC: 19 MG/DL (ref 6–20)
BUN/CREAT SERPL: 12 (ref 12–20)
CALCIUM SERPL-MCNC: 9.1 MG/DL (ref 8.5–10.1)
CHLORIDE SERPL-SCNC: 105 MMOL/L (ref 97–108)
CO2 SERPL-SCNC: 26 MMOL/L (ref 21–32)
COMMENT, HOLDF: NORMAL
CREAT SERPL-MCNC: 1.58 MG/DL (ref 0.7–1.3)
DIFFERENTIAL METHOD BLD: ABNORMAL
EOSINOPHIL # BLD: 0.1 K/UL (ref 0–0.4)
EOSINOPHIL NFR BLD: 1 % (ref 0–7)
ERYTHROCYTE [DISTWIDTH] IN BLOOD BY AUTOMATED COUNT: 13.9 % (ref 11.5–14.5)
GLOBULIN SER CALC-MCNC: 4.4 G/DL (ref 2–4)
GLUCOSE SERPL-MCNC: 129 MG/DL (ref 65–100)
HCT VFR BLD AUTO: 40.9 % (ref 36.6–50.3)
HGB BLD-MCNC: 13.5 G/DL (ref 12.1–17)
IMM GRANULOCYTES # BLD AUTO: 0 K/UL (ref 0–0.04)
IMM GRANULOCYTES NFR BLD AUTO: 0 % (ref 0–0.5)
LYMPHOCYTES # BLD: 0.9 K/UL (ref 0.8–3.5)
LYMPHOCYTES NFR BLD: 10 % (ref 12–49)
MCH RBC QN AUTO: 31 PG (ref 26–34)
MCHC RBC AUTO-ENTMCNC: 33 G/DL (ref 30–36.5)
MCV RBC AUTO: 93.8 FL (ref 80–99)
MONOCYTES # BLD: 0.6 K/UL (ref 0–1)
MONOCYTES NFR BLD: 6 % (ref 5–13)
NEUTS SEG # BLD: 7.9 K/UL (ref 1.8–8)
NEUTS SEG NFR BLD: 83 % (ref 32–75)
NRBC # BLD: 0 K/UL (ref 0–0.01)
NRBC BLD-RTO: 0 PER 100 WBC
PLATELET # BLD AUTO: 216 K/UL (ref 150–400)
PMV BLD AUTO: 10.2 FL (ref 8.9–12.9)
POTASSIUM SERPL-SCNC: 4.3 MMOL/L (ref 3.5–5.1)
PROT SERPL-MCNC: 7.5 G/DL (ref 6.4–8.2)
RBC # BLD AUTO: 4.36 M/UL (ref 4.1–5.7)
SAMPLES BEING HELD,HOLD: NORMAL
SODIUM SERPL-SCNC: 137 MMOL/L (ref 136–145)
WBC # BLD AUTO: 9.5 K/UL (ref 4.1–11.1)

## 2022-05-31 PROCEDURE — 70450 CT HEAD/BRAIN W/O DYE: CPT

## 2022-05-31 PROCEDURE — 36415 COLL VENOUS BLD VENIPUNCTURE: CPT

## 2022-05-31 PROCEDURE — 85025 COMPLETE CBC W/AUTO DIFF WBC: CPT

## 2022-05-31 PROCEDURE — 80053 COMPREHEN METABOLIC PANEL: CPT

## 2022-05-31 PROCEDURE — 99284 EMERGENCY DEPT VISIT MOD MDM: CPT

## 2022-05-31 RX ORDER — AMOXICILLIN AND CLAVULANATE POTASSIUM 875; 125 MG/1; MG/1
1 TABLET, FILM COATED ORAL 2 TIMES DAILY
Qty: 20 TABLET | Refills: 0 | Status: SHIPPED | OUTPATIENT
Start: 2022-05-31 | End: 2022-06-10

## 2022-05-31 NOTE — ED PROVIDER NOTES
Patient is a an 68-year-old male with past medical history of COPD, CKD, CAD, GERD, hypertension, CVA who presents with his daughter for evaluation of 1 week history of a frontal headache. He has been taking acetaminophen at home with relief of his symptoms. He has had periods of time where he fell dizzy, but reports that he sometimes has this symptom as a result of his old CVA. He denies any new visual changes. 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 He has not had fevers at home. He denies any neck pain or rigidity. His daughter who lives with him reports that he has been acting normally. He does not take blood thinners at home.                  Past Medical History:   Diagnosis Date    Adverse effect of anesthesia     combative after anesthesia    Alcohol abuse     6 beers/day    Arthritis     CAD (coronary artery disease)     Chronic obstructive pulmonary disease (HCC)     Chronic obstructive pulmonary disease (HCC)     CKD (chronic kidney disease) stage 3, GFR 30-59 ml/min ([address] 9/21/2020    Dyslipidemia 8/16/2017    Dyspepsia and other specified disorders of function of stomach     Dyspnea 8/16/2017    ED (erectile dysfunction) 8/16/2017    Encounter for immunization 8/16/2017    Fatigue 8/16/2017    Flank pain 8/1/2019    GERD (gastroesophageal reflux disease) 8/16/2017    Gout 8/16/2017    Hypertension     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    Morbid obesity ([address]     On statin therapy 8/16/2017    TESSA on CPAP 1/3/2019    Psychiatric disorder     Depression    Simple chronic bronchitis ([address] 1/3/2019    TIA (transient ischemic attack) 0/24/0998    Umbilical hernia 18/9/3364    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       Past Surgical History:   Procedure Laterality Date    COLONOSCOPY N/A 9/27/2019    COLONOSCOPY performed by Delorise Gaucher[facility] at [facility] ENDOSCOPY    HX HERNIA REPAIR      [facility]    HX HERNIA REPAIR  44/96/78    open umbilical hernia repair mesh    HX ORTHOPAEDIC      HX UROLOGICAL      HYDROCELECTOMY    AL CARDIAC SURG PROCEDURE UNLIST  1996    Cardiac Stents    AL CARDIAC SURG PROCEDURE UNLIST  04/2019    EF 61-65%    UPPER GI ENDOSCOPY,BIOPSY  9/30/2019              Family History:   Problem Relation Age of Onset    Heart Disease Mother     Hypertension Mother     Hypertension Father     Hypertension Sister     Hypertension Brother     Cancer Brother        Social History     Socioeconomic History    Marital status:      Spouse name: Not on file    Number of children: Not on file    Years of education: Not on file    Highest education level: Not on file   Occupational History    Not on file   Tobacco Use    Smoking status: Former Smoker     Packs/day: 0.50     Years: 20.00     Pack years: 10.00    Smokeless tobacco: Former User    Tobacco comment: quit about 40  years ago   / used to dip tobaco and dip snuff   Substance and Sexual Activity    Alcohol use: Yes     Comment: \"Drinks very little now for about a month \"    Drug use: No    Sexual activity: Not on file   Other Topics Concern    Not on file   Social History Narrative    Not on file     Social Determinants of Health     Financial Resource Strain:     Difficulty of Paying Living Expenses: Not on file   Food Insecurity:     Worried About [address] [employer] in the Last Year: Not on file    [address] in the Last Year: Not on file   Transportation Needs:     Lack of Transportation (Medical): Not on file    Lack of Transportation (Non-Medical):  Not on file   Physical Activity:     Days of Exercise per Week: Not on file    Minutes of Exercise per Session: Not on file   Stress:     Feeling of Stress : Not on file   Social Connections:     Frequency of Communication with Friends and Family: Not on file    Frequency of Social Gatherings with Friends and Family: Not on file    Attends [employer] Services: Not on file   [employer] or Organizations: Not on file    Attends Club or Organization Meetings: Not on file    Marital Status: Not on file   Intimate Partner Violence:     Fear of Current or Ex-Partner: Not on file    Emotionally Abused: Not on file    Physically Abused: Not on file    Sexually Abused: Not on file   Housing Stability:     Unable to Pay for Housing in the Last Year: Not on file    Number of [place] in the Last Year: Not on file    Unstable Housing in the Last Year: Not on file         ALLERGIES: Levaquin [levofloxacin], Ciprofloxacin, and Quinolones    Review of Systems   Constitutional: Negative for unexpected weight change. HENT: Negative for congestion. Eyes: Negative for visual disturbance. Respiratory: Negative for cough, chest tightness and shortness of breath. Cardiovascular: Negative for chest pain. Gastrointestinal: Negative for abdominal pain, nausea and vomiting. Endocrine: Negative for polyuria. Genitourinary: Negative for dysuria and flank pain. Musculoskeletal: Negative for back pain. Skin: Negative for color change. Allergic/Immunologic: Negative for immunocompromised state. Neurological: Positive for dizziness, tremors and headaches. Negative for facial asymmetry, speech difficulty, weakness and light-headedness. Hematological: Negative for adenopathy. Psychiatric/Behavioral: Negative for agitation. Vitals:    05/31/22 0937   BP: (!) 139/92   Pulse: 88   Resp: 20   Temp: 97 °F (36.1 °C)   SpO2: 94%            Physical Exam  Vitals and nursing note reviewed. Constitutional:       General: He is not in acute distress. Appearance: Normal appearance. He is obese. HENT:      Head: Atraumatic. Eyes:      Conjunctiva/sclera: Conjunctivae normal.      Pupils: Pupils are equal, round, and reactive to light. Cardiovascular:      Rate and Rhythm: Normal rate and regular rhythm. Pulses: Normal pulses. Heart sounds: Normal heart sounds.    Pulmonary: Effort: Pulmonary effort is normal.      Breath sounds: Normal breath sounds. Abdominal:      General: Abdomen is flat. Bowel sounds are normal.   Musculoskeletal:         General: Normal range of motion. Cervical back: Neck supple. Skin:     General: Skin is warm and dry. Capillary Refill: Capillary refill takes less than 2 seconds. Neurological:      Mental Status: He is alert and oriented to person, place, and time. Mental status is at baseline. GCS: GCS eye subscore is 4. GCS verbal subscore is 5. GCS motor subscore is 6. Cranial Nerves: Cranial nerves are intact. No dysarthria or facial asymmetry. Sensory: Sensation is intact. Motor: Motor function is intact. No pronator drift. Comments: Baseline tremor in left hand, unchanged from baseline. EOM intact in left eye. Right eye fixed and dilated, blind in this eye. Psychiatric:         Mood and Affect: Mood normal.         Behavior: Behavior normal.          MDM  Number of Diagnoses or Management Options  Acute JOSSUE (middle ear effusion), bilateral  Acute non-recurrent sphenoidal sinusitis  Diagnosis management comments: Patient presents with headache x1 week. His labs are unchanged from baseline. His head CT shows evidence of sinusitis. Physical exam additionally shows evidence of bilateral middle ear effusions, no evidence of acute otitis media. Will place patient on oral Augmentin for sinusitis treatment. I do not think further imaging is necessary at this point in time as he has no new neurological deficits. Encouraged follow-up with his PCP and neurologist.  Discussed my clinical impression(s), any labs and/or radiology results with the patient. I answered any questions and addressed any concerns. Discussed the importance of following up with their primary care physician and/or specialist(s). Discussed signs or symptoms that would warrant return back to the ER for further evaluation.  The patient is agreeable with discharge.        Amount and/or Complexity of Data Reviewed  Clinical lab tests: ordered and reviewed  Tests in the radiology section of CPT®: ordered and reviewed  Discuss the patient with other providers: yes (Dr. Chula Loya, ED Attending)    Patient Progress  Patient progress: stable         Procedures

## 2022-05-31 NOTE — ED TRIAGE NOTES
Pt c/o headache since 330 am today, denies any numbness or tingling , denies fever, denies n/v, denies head injury    Family stated he has had a head consistently fr a week or so

## 2022-05-31 NOTE — DISCHARGE INSTRUCTIONS
Thank you for allowing us to provide you with medical care today. We realize that you have many choices for your emergency care needs. We thank you for choosing [facility]. Please choose us in the future for any continued health care needs. The exam and treatment you received in the emergency department were for an emergent problem and are not intended as complete care. It is important that you follow-up with a doctor. If your symptoms worsen or you do not improve should return to the emergency department. We are available 24 hours a day. Please make an appointment with your health care provider for follow-up of your emergency department visit. Take this sheet with you when you go to your follow-up visit.

## 2022-06-30 RX ORDER — ATORVASTATIN CALCIUM 80 MG/1
TABLET, FILM COATED ORAL
Qty: 90 TABLET | Refills: 1 | Status: SHIPPED | OUTPATIENT
Start: 2022-06-30

## 2022-07-28 ENCOUNTER — TELEPHONE (OUTPATIENT)
Dept: INTERNAL MEDICINE CLINIC | Age: 87
End: 2022-07-28

## 2022-08-08 RX ORDER — FUROSEMIDE 20 MG/1
TABLET ORAL
Qty: 270 TABLET | Refills: 3 | Status: SHIPPED | OUTPATIENT
Start: 2022-08-08 | End: 2022-08-11

## 2022-08-08 RX ORDER — TRAZODONE HYDROCHLORIDE 100 MG/1
TABLET ORAL
Qty: 90 TABLET | Refills: 3 | Status: SHIPPED | OUTPATIENT
Start: 2022-08-08 | End: 2022-08-11

## 2022-08-11 RX ORDER — TRAZODONE HYDROCHLORIDE 100 MG/1
TABLET ORAL
Qty: 90 TABLET | Refills: 3 | Status: SHIPPED | OUTPATIENT
Start: 2022-08-11

## 2022-08-11 RX ORDER — FUROSEMIDE 20 MG/1
TABLET ORAL
Qty: 270 TABLET | Refills: 3 | Status: SHIPPED | OUTPATIENT
Start: 2022-08-11

## 2022-08-31 RX ORDER — ATENOLOL 25 MG/1
TABLET ORAL
Qty: 45 TABLET | Refills: 3 | Status: SHIPPED | OUTPATIENT
Start: 2022-08-31

## 2022-08-31 RX ORDER — AMLODIPINE BESYLATE 5 MG/1
TABLET ORAL
Qty: 90 TABLET | Refills: 3 | Status: SHIPPED | OUTPATIENT
Start: 2022-08-31

## 2022-08-31 RX ORDER — DULOXETIN HYDROCHLORIDE 30 MG/1
CAPSULE, DELAYED RELEASE ORAL
Qty: 90 CAPSULE | Refills: 3 | Status: SHIPPED | OUTPATIENT
Start: 2022-08-31

## 2022-09-18 ENCOUNTER — TRANSCRIBE ORDER (OUTPATIENT)
Dept: REGISTRATION | Age: 87
End: 2022-09-18

## 2022-09-18 ENCOUNTER — HOSPITAL ENCOUNTER (OUTPATIENT)
Dept: GENERAL RADIOLOGY | Age: 87
Discharge: HOME | End: 2022-09-18
Payer: MEDICARE

## 2022-09-18 DIAGNOSIS — R05.9 COUGH: ICD-10-CM

## 2022-09-18 DIAGNOSIS — R05.9 COUGH: Primary | ICD-10-CM

## 2022-09-18 PROCEDURE — 71046 X-RAY EXAM CHEST 2 VIEWS: CPT

## 2022-09-25 RX ORDER — HYDRALAZINE HYDROCHLORIDE 100 MG/1
TABLET, FILM COATED ORAL
Qty: 270 TABLET | Refills: 3 | Status: SHIPPED | OUTPATIENT
Start: 2022-09-25

## 2022-10-03 NOTE — PROGRESS NOTES
Discussed advanced directive. Patient states that he does not have an advanced directive. 1. Have you been to the ER, urgent care clinic since your last visit? Hospitalized since your last visit? No    2. Have you seen or consulted any other health care providers outside of the [address] [place] [address] since your last visit? Include any pap smears or colon screening.  No No

## 2022-10-05 ENCOUNTER — OFFICE VISIT (OUTPATIENT)
Dept: INTERNAL MEDICINE CLINIC | Age: 87
End: 2022-10-05
Payer: MEDICARE

## 2022-10-05 VITALS
HEART RATE: 69 BPM | SYSTOLIC BLOOD PRESSURE: 108 MMHG | WEIGHT: 219.8 LBS | DIASTOLIC BLOOD PRESSURE: 56 MMHG | BODY MASS INDEX: 33.31 KG/M2 | RESPIRATION RATE: 17 BRPM | OXYGEN SATURATION: 96 % | HEIGHT: 68 IN

## 2022-10-05 DIAGNOSIS — Z99.89 OSA ON CPAP: ICD-10-CM

## 2022-10-05 DIAGNOSIS — J43.1 PANLOBULAR EMPHYSEMA (HCC): ICD-10-CM

## 2022-10-05 DIAGNOSIS — N18.30 STAGE 3 CHRONIC KIDNEY DISEASE, UNSPECIFIED WHETHER STAGE 3A OR 3B CKD (HCC): ICD-10-CM

## 2022-10-05 DIAGNOSIS — E78.5 DYSLIPIDEMIA: ICD-10-CM

## 2022-10-05 DIAGNOSIS — Z71.89 ADVANCED DIRECTIVES, COUNSELING/DISCUSSION: ICD-10-CM

## 2022-10-05 DIAGNOSIS — Z13.6 SCREENING FOR ISCHEMIC HEART DISEASE: ICD-10-CM

## 2022-10-05 DIAGNOSIS — I63.10 CEREBROVASCULAR ACCIDENT (CVA) DUE TO EMBOLISM OF PRECEREBRAL ARTERY (HCC): ICD-10-CM

## 2022-10-05 DIAGNOSIS — I10 PRIMARY HYPERTENSION: ICD-10-CM

## 2022-10-05 DIAGNOSIS — I25.10 CORONARY ARTERY DISEASE INVOLVING NATIVE CORONARY ARTERY OF NATIVE HEART WITHOUT ANGINA PECTORIS: ICD-10-CM

## 2022-10-05 DIAGNOSIS — Z79.899 ON STATIN THERAPY: ICD-10-CM

## 2022-10-05 DIAGNOSIS — M48.061 SPINAL STENOSIS OF LUMBAR REGION AT MULTIPLE LEVELS: ICD-10-CM

## 2022-10-05 DIAGNOSIS — R73.02 IGT (IMPAIRED GLUCOSE TOLERANCE): ICD-10-CM

## 2022-10-05 DIAGNOSIS — G47.33 OSA ON CPAP: ICD-10-CM

## 2022-10-05 DIAGNOSIS — Z23 NEEDS FLU SHOT: ICD-10-CM

## 2022-10-05 DIAGNOSIS — G40.909 SEIZURE DISORDER (HCC): ICD-10-CM

## 2022-10-05 DIAGNOSIS — Z13.31 SCREENING FOR DEPRESSION: ICD-10-CM

## 2022-10-05 DIAGNOSIS — Z13.1 SCREENING FOR DIABETES MELLITUS: ICD-10-CM

## 2022-10-05 DIAGNOSIS — Z00.00 MEDICARE ANNUAL WELLNESS VISIT, SUBSEQUENT: Primary | ICD-10-CM

## 2022-10-05 PROCEDURE — 90694 VACC AIIV4 NO PRSRV 0.5ML IM: CPT | Performed by: INTERNAL MEDICINE

## 2022-10-05 PROCEDURE — G0439 PPPS, SUBSEQ VISIT: HCPCS | Performed by: INTERNAL MEDICINE

## 2022-10-05 PROCEDURE — 1101F PT FALLS ASSESS-DOCD LE1/YR: CPT | Performed by: INTERNAL MEDICINE

## 2022-10-05 PROCEDURE — G8427 DOCREV CUR MEDS BY ELIG CLIN: HCPCS | Performed by: INTERNAL MEDICINE

## 2022-10-05 PROCEDURE — G0008 ADMIN INFLUENZA VIRUS VAC: HCPCS | Performed by: INTERNAL MEDICINE

## 2022-10-05 PROCEDURE — G8417 CALC BMI ABV UP PARAM F/U: HCPCS | Performed by: INTERNAL MEDICINE

## 2022-10-05 PROCEDURE — 1123F ACP DISCUSS/DSCN MKR DOCD: CPT | Performed by: INTERNAL MEDICINE

## 2022-10-05 PROCEDURE — G8536 NO DOC ELDER MAL SCRN: HCPCS | Performed by: INTERNAL MEDICINE

## 2022-10-05 PROCEDURE — G9717 DOC PT DX DEP/BP F/U NT REQ: HCPCS | Performed by: INTERNAL MEDICINE

## 2022-10-05 NOTE — PROGRESS NOTES
This is the Subsequent Medicare Annual Wellness Exam, performed 12 months or more after the Initial AWV or the last Subsequent AWV    I have reviewed the patient's medical history in detail and updated the computerized patient record. Assessment/Plan   Education and counseling provided:  Are appropriate based on today's review and evaluation    1. Medicare annual wellness visit, subsequent  2. Primary hypertension  -     METABOLIC PANEL, COMPREHENSIVE; Future  -     URINALYSIS W/ RFLX MICROSCOPIC; Future  3. Coronary artery disease involving native coronary artery of native heart without angina pectoris  4. Cerebrovascular accident (CVA) due to embolism of precerebral artery (HCC)  5. Stage 3 chronic kidney disease, unspecified whether stage 3a or 3b CKD ([facility]  -     CBC WITH AUTOMATED DIFF; Future  -     METABOLIC PANEL, COMPREHENSIVE; Future  -     URINALYSIS W/ RFLX MICROSCOPIC; Future  6. Dyslipidemia  7. On statin therapy  -     CK; Future  -     LIPID PANEL; Future  -     METABOLIC PANEL, COMPREHENSIVE; Future  8. Spinal stenosis of lumbar region at multiple levels  -     CK; Future  -     LIPID PANEL; Future  -     METABOLIC PANEL, COMPREHENSIVE; Future  9. Seizure disorder ([facility]  10. IGT (impaired glucose tolerance)  -     HEMOGLOBIN A1C WITH EAG; Future  11. Panlobular emphysema ([facility]  12. TESSA on CPAP  13. Needs flu shot  -     INFLUENZA, FLUAD, (AGE 65 Y+), IM, PF, 0.5 ML  14. Advanced directives, counseling/discussion  15. Screening for diabetes mellitus  16. Screening for ischemic heart disease  17. Screening for depression  -     [facility]  18.  Body mass index 33.0-33.9, adult       Depression Risk Factor Screening     3 most recent PHQ Screens 2/18/2022   PHQ Not Done -   Little interest or pleasure in doing things Not at all   Feeling down, depressed, irritable, or hopeless Not at all   Total Score PHQ 2 0       Alcohol & Drug Abuse Risk Screen    Do you average more than 1 drink per night or more than 7 drinks a week: No    In the past three months have you have had more than 4 drinks containing alcohol on one occasion: No          Functional Ability and Level of Safety    Hearing: The patient wears hearing aids. Activities of Daily Living: The home contains: no safety equipment. Patient does total self care      Ambulation: with difficulty, uses a WC     Fall Risk:  Fall Risk Assessment, last 12 mths 8/19/2021   Able to walk? Yes   Fall in past 12 months? 0   Do you feel unsteady?  0   Are you worried about falling 0      Abuse Screen:  Patient is not abused       Cognitive Screening    Has your family/caregiver stated any concerns about your memory: no     Cognitive Screening: Normal - Verbal Fluency Test    Health Maintenance Due     Health Maintenance Due   Topic Date Due    DTaP/Tdap/Td series (1 - Tdap) Never done    Shingrix Vaccine Age 49> (1 of 2) Never done    COVID-19 Vaccine (3 - Booster for Pfizer series) 08/22/2021    Flu Vaccine (1) 08/01/2022       Patient Care Team   Patient Care Team:  Clifford Rae MD as PCP - General (Internal Medicine Physician)  Clifford Rae MD as PCP - [facility] Provider  Laquita Vela MD as Surgeon (General Surgery)    History     Patient Active Problem List   Diagnosis Code    Hypoxia R09.02    Neurogenic claudication [facility]) G95.19    Fatigue R53.83    CAD (coronary artery disease) I25.10    Dyslipidemia E78.5    On statin therapy Z79.899    Gout M10.9    GERD (gastroesophageal reflux disease) K21.9    TIA (transient ischemic attack) G45.9    Dyspnea R06.00    Colon polyps K63.5    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    Hypertension I10    ED (erectile dysfunction) N52.9    Severe obesity (HCC) E66.01    TESSA on CPAP G47.33, Z99.89    Simple chronic bronchitis ([address] J41.0    Bilateral carotid artery stenosis I65.23    Spinal stenosis of lumbar region at multiple levels M48.061    Flank pain R10.9    Rectal bleeding K62.5 Pneumonia J18.9    Severe sepsis (HCC) A41.9, R65.20    Acute blood loss anemia D62    Facial droop R29.810    Altered mental state R41.82    Viral encephalitis A86    Coronary artery disease with stable angina pectoris (HCC) I25.118    Chronic obstructive pulmonary disease (HCC) J44.9    CKD (chronic kidney disease) stage 3, GFR 30-59 ml/min (HCC) N18.30    Intracranial hemorrhage (HCC) I62.9    CAP (community acquired pneumonia) J18.9    Cerebrovascular accident (CVA) due to embolism ([address] I63.9    Status epilepticus ([address] G40.901    Seizure disorder ([address] G40.909    Major depressive disorder, recurrent, mild F33.0    Major depressive disorder, recurrent, moderate F33.1    Major depressive disorder, recurrent, unspecified F33.9     Past Medical History:   Diagnosis Date    Adverse effect of anesthesia     combative after anesthesia    Alcohol abuse     6 beers/day    Arthritis     CAD (coronary artery disease)     Chronic obstructive pulmonary disease (HCC)     Chronic obstructive pulmonary disease (HCC)     CKD (chronic kidney disease) stage 3, GFR 30-59 ml/min ([address] 9/21/2020    Dyslipidemia 8/16/2017    Dyspepsia and other specified disorders of function of stomach     Dyspnea 8/16/2017    ED (erectile dysfunction) 8/16/2017    Encounter for immunization 8/16/2017    Fatigue 8/16/2017    Flank pain 8/1/2019    GERD (gastroesophageal reflux disease) 8/16/2017    Gout 8/16/2017    Hypertension     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    Morbid obesity ([address]     On statin therapy 8/16/2017    TESSA on CPAP 1/3/2019    Psychiatric disorder     Depression    Simple chronic bronchitis ([address] 1/3/2019    TIA (transient ischemic attack) 8/18/2653    Umbilical hernia 78/2/2142    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      Past Surgical History:   Procedure Laterality Date    COLONOSCOPY N/A 9/27/2019    COLONOSCOPY performed by Elias May MD at [facility] ENDOSCOPY    HX HERNIA REPAIR      [facility]    HX HERNIA REPAIR  11/09/15    open umbilical hernia repair mesh    HX ORTHOPAEDIC      HX UROLOGICAL      HYDROCELECTOMY    [place] CARDIAC SURG PROCEDURE UNLIST  1996    Cardiac Stents    OR CARDIAC SURG PROCEDURE UNLIST  04/2019    EF 61-65%    UPPER GI ENDOSCOPY,BIOPSY  9/30/2019          Current Outpatient Medications   Medication Sig Dispense Refill    hydrALAZINE (APRESOLINE) 100 mg tablet TAKE ONE TABLET BY MOUTH THREE TIMES A  Tablet 3    DULoxetine (CYMBALTA) 30 mg capsule TAKE ONE CAPSULE BY MOUTH DAILY 90 Capsule 3    amLODIPine (NORVASC) 5 mg tablet TAKE ONE TABLET BY MOUTH DAILY 90 Tablet 3    atenoloL (TENORMIN) 25 mg tablet TAKE 1/2 TABLET BY MOUTH DAILY 45 Tablet 3    traZODone (DESYREL) 100 mg tablet TAKE ONE TABLET BY MOUTH ONCE NIGHTLY 90 Tablet 3    furosemide (LASIX) 20 mg tablet TAKE THREE TABLETS BY MOUTH DAILY 270 Tablet 3    atorvastatin (LIPITOR) 80 mg tablet TAKE ONE TABLET BY MOUTH DAILY 90 Tablet 1    isosorbide mononitrate ER (IMDUR) 30 mg tablet Take 1 Tablet by mouth daily. 90 Tablet 3    levETIRAcetam (KEPPRA) 500 mg tablet Take 1 Tablet by mouth two (2) times a day. 60 Tablet 5    thiamine HCL (B-1) 100 mg tablet TAKE ONE BY MOUTH DAILY 90 Tablet 4    dorzolamide-timoloL (COSOPT) 22.3-6.8 mg/mL ophthalmic solution 1 Drop two (2) times a day. brimonidine (ALPHAGAN) 0.2 % ophthalmic solution 1 Drop two (2) times a day. prednisoLONE acetate (PRED FORTE) 1 % ophthalmic suspension 1 Drop four (4) times daily. eyelid cleanser combination 3 (OCUSOFT LID SCRUB PLUS EX) by Apply Externally route. polyethylene glycol (MIRALAX) 17 gram packet Take 1 Packet by mouth daily. Hold for diarrhea or loose stools. Mix in 8 oz of water, juice, soda, coffee, or tea prior to administration (Patient taking differently: Take 17 g by mouth as needed.) 30 Each 0    melatonin 5 mg cap capsule Take 5 mg by mouth nightly. Pt.  Take 2 tabs a night      acetaminophen (TYLENOL) 500 mg tablet Take 1,000 mg by mouth every six (6) hours as needed for Pain. aspirin delayed-release 81 mg tablet Take 81 mg by mouth daily. ANORO ELLIPTA 62.5-25 mcg/actuation inhaler Take 1 Puff by inhalation daily. atropine 1 % ophthalmic solution 1 Drop daily. (Patient not taking: Reported on 10/5/2022)       Allergies   Allergen Reactions    Levaquin [Levofloxacin] Nausea and Vomiting     Reports anxiety, nausea, aching after taking levaquin    Ciprofloxacin Shortness of Breath    Quinolones Shortness of Breath       Family History   Problem Relation Age of Onset    Heart Disease Mother     Hypertension Mother     Hypertension Father     Hypertension Sister     Hypertension Brother     Cancer Brother      Social History     Tobacco Use    Smoking status: Former     Packs/day: 0.50     Years: 20.00     Pack years: 10.00     Types: Cigarettes    Smokeless tobacco: Former    Tobacco comments:     quit about 40  years ago   / used to dip tobaco and dip snuff   Substance Use Topics    Alcohol use: Yes     Comment: \"Drinks very little now for about a month \"         Darryl Jaeger MD           Shirlene Sheth is a 80 y.o. male and presents with Hypertension (6 month follow up), GERD, and COPD  . Subjective:    Mr. Sarah Dickey presents today for a Medicare annual in this review and follow-up of multiple problems including chronic kidney disease, hypertension, coronary disease, hyperlipidemia, monitoring statin therapy, GERD, COPD, and history of cerebrovascular accident. He has no shortness of breath, chest pain, palpitations, PND, orthopnea. He does have trace pedal edema. This is only recent. There is been no change in his medications. He reports no side effects with his medicine.   Past Medical History:   Diagnosis Date    Adverse effect of anesthesia     combative after anesthesia    Alcohol abuse     6 beers/day    Arthritis     CAD (coronary artery disease)     Chronic obstructive pulmonary disease (HCC)     Chronic obstructive pulmonary disease ([facility]     CKD (chronic kidney disease) stage 3, GFR 30-59 ml/min ([facility]) 9/21/2020    Dyslipidemia 8/16/2017    Dyspepsia and other specified disorders of function of stomach     Dyspnea 8/16/2017    ED (erectile dysfunction) 8/16/2017    Encounter for immunization 8/16/2017    Fatigue 8/16/2017    Flank pain 8/1/2019    GERD (gastroesophageal reflux disease) 8/16/2017    Gout 8/16/2017    Hypertension     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    Morbid obesity ([facility]     On statin therapy 8/16/2017    TESSA on CPAP 1/3/2019    Psychiatric disorder     Depression    Simple chronic bronchitis ([facility] 1/3/2019    TIA (transient ischemic attack) 9/85/5454    Umbilical hernia 82/9/2052    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     Past Surgical History:   Procedure Laterality Date    COLONOSCOPY N/A 9/27/2019    COLONOSCOPY performed by Carmen Caldwell MD at [facility] ENDOSCOPY    HX HERNIA REPAIR      [facility]    HX HERNIA REPAIR  36/76/15    open umbilical hernia repair mesh    HX ORTHOPAEDIC      HX UROLOGICAL      HYDROCELECTOMY    [place] CARDIAC SURG PROCEDURE UNLIST  1996    Cardiac Stents    [place] CARDIAC SURG PROCEDURE UNLIST  04/2019    EF 61-65%    UPPER GI ENDOSCOPY,BIOPSY  9/30/2019          Allergies   Allergen Reactions    Levaquin [Levofloxacin] Nausea and Vomiting     Reports anxiety, nausea, aching after taking levaquin    Ciprofloxacin Shortness of Breath    Quinolones Shortness of Breath     Current Outpatient Medications   Medication Sig Dispense Refill    hydrALAZINE (APRESOLINE) 100 mg tablet TAKE ONE TABLET BY MOUTH THREE TIMES A  Tablet 3    DULoxetine (CYMBALTA) 30 mg capsule TAKE ONE CAPSULE BY MOUTH DAILY 90 Capsule 3    amLODIPine (NORVASC) 5 mg tablet TAKE ONE TABLET BY MOUTH DAILY 90 Tablet 3    atenoloL (TENORMIN) 25 mg tablet TAKE 1/2 TABLET BY MOUTH DAILY 45 Tablet 3    traZODone (DESYREL) 100 mg tablet TAKE ONE TABLET BY MOUTH ONCE NIGHTLY 90 Tablet 3    furosemide (LASIX) 20 mg tablet TAKE THREE TABLETS BY MOUTH DAILY 270 Tablet 3    atorvastatin (LIPITOR) 80 mg tablet TAKE ONE TABLET BY MOUTH DAILY 90 Tablet 1    isosorbide mononitrate ER (IMDUR) 30 mg tablet Take 1 Tablet by mouth daily. 90 Tablet 3    levETIRAcetam (KEPPRA) 500 mg tablet Take 1 Tablet by mouth two (2) times a day. 60 Tablet 5    thiamine HCL (B-1) 100 mg tablet TAKE ONE BY MOUTH DAILY 90 Tablet 4    dorzolamide-timoloL (COSOPT) 22.3-6.8 mg/mL ophthalmic solution 1 Drop two (2) times a day. brimonidine (ALPHAGAN) 0.2 % ophthalmic solution 1 Drop two (2) times a day. prednisoLONE acetate (PRED FORTE) 1 % ophthalmic suspension 1 Drop four (4) times daily. eyelid cleanser combination 3 (OCUSOFT LID SCRUB PLUS EX) by Apply Externally route. polyethylene glycol (MIRALAX) 17 gram packet Take 1 Packet by mouth daily. Hold for diarrhea or loose stools. Mix in 8 oz of water, juice, soda, coffee, or tea prior to administration (Patient taking differently: Take 17 g by mouth as needed.) 30 Each 0    melatonin 5 mg cap capsule Take 5 mg by mouth nightly. Pt. Take 2 tabs a night      acetaminophen (TYLENOL) 500 mg tablet Take 1,000 mg by mouth every six (6) hours as needed for Pain. aspirin delayed-release 81 mg tablet Take 81 mg by mouth daily. ANORO ELLIPTA 62.5-25 mcg/actuation inhaler Take 1 Puff by inhalation daily. atropine 1 % ophthalmic solution 1 Drop daily.  (Patient not taking: Reported on 10/5/2022)       Social History     Socioeconomic History    Marital status:    Tobacco Use    Smoking status: Former     Packs/day: 0.50     Years: 20.00     Pack years: 10.00     Types: Cigarettes    Smokeless tobacco: Former    Tobacco comments:     quit about 40  years ago   / used to dip tobaco and dip snuff   Substance and Sexual Activity    Alcohol use: Yes     Comment: \"Drinks very little now for about a month \"    Drug use: No     Family History   Problem Relation Age of Onset    Heart Disease Mother Hypertension Mother     Hypertension Father     Hypertension Sister     Hypertension Brother     Cancer Brother        Review of Systems  Constitutional:  negative for fevers, chills, anorexia and weight loss  Eyes:    negative for visual disturbance and irritation  ENT:    negative for tinnitus,sore throat,nasal congestion,ear pains. hoarseness  Respiratory:     negative for cough, hemoptysis, dyspnea,wheezing  CV:    negative for chest pain, palpitations,   GI:    negative for nausea, vomiting, diarrhea, abdominal pain,melena  Endo:               negative for polyuria,polydipsia,polyphagia,heat intolerance  Genitourinary : negative for frequency, dysuria and hematuria  Integumentary: negative for rash and pruritus  Hematologic:   negative for easy bruising and gum/nose bleeding  Musculoskel:  negative for myalgias, arthralgias, back pain, muscle weakness, joint pain  Neurological:   negative for headaches, dizziness, vertigo, memory problems and gait   Behavl/Psych:  negative for feelings of anxiety, depression, mood changes  ROS otherwise negative      Objective:  Visit Vitals  BP (!) 108/56 (BP 1 Location: Right arm, BP Patient Position: Sitting, BP Cuff Size: Large adult)   Pulse 69   Resp 17   Ht 5' 8\" (1.727 m)   Wt 219 lb 12.8 oz (99.7 kg)   SpO2 96%   BMI 33.42 kg/m²     Physical Exam:   General appearance - alert, well appearing, and in no distress  Mental status - alert, oriented to person, place, and time  EYE-MAURICE, EOMI, fundi normal, corneas normal, no foreign bodies  ENT-ENT exam normal, no neck nodes or sinus tenderness  Nose - normal and patent, no erythema, discharge or polyps  Mouth - mucous membranes moist, pharynx normal without lesions  Neck - supple, no significant adenopathy   Chest - clear to auscultation, no wheezes, rales or rhonchi, symmetric air entry   Heart - normal rate, regular rhythm, normal S1, S2, no murmurs, rubs, clicks or gallops   Abdomen - soft, nontender, nondistended, no masses or organomegaly  Lymph- no adenopathy palpable  Ext-peripheral pulses normal, 1+ bilateral pedal edema, no clubbing or cyanosis  Skin-Warm and dry. no hyperpigmentation, vitiligo, or suspicious lesions  Neuro -alert, oriented, normal speech, no focal findings or movement disorder noted      Assessment/Plan:  Diagnoses and all orders for this visit:    1. Medicare annual wellness visit, subsequent    2. Primary hypertension  -     METABOLIC PANEL, COMPREHENSIVE; Future  -     URINALYSIS W/ RFLX MICROSCOPIC; Future    3. Coronary artery disease involving native coronary artery of native heart without angina pectoris    4. Cerebrovascular accident (CVA) due to embolism of precerebral artery (HCC)    5. Stage 3 chronic kidney disease, unspecified whether stage 3a or 3b CKD ([facility]  -     CBC WITH AUTOMATED DIFF; Future  -     METABOLIC PANEL, COMPREHENSIVE; Future  -     URINALYSIS W/ RFLX MICROSCOPIC; Future    6. Dyslipidemia    7. On statin therapy  -     CK; Future  -     LIPID PANEL; Future  -     METABOLIC PANEL, COMPREHENSIVE; Future    8. Spinal stenosis of lumbar region at multiple levels  -     CK; Future  -     LIPID PANEL; Future  -     METABOLIC PANEL, COMPREHENSIVE; Future    9. Seizure disorder ([facility]    10. IGT (impaired glucose tolerance)  -     HEMOGLOBIN A1C WITH EAG; Future    11. [facility]obular emph[facility]sema ([facility]    12. TESSA on CPAP    13. Needs flu shot  -     INFLUENZA, FLUAD, (AGE 65 Y+), IM, PF, 0.5 ML    14. Advanced directives, counseling/discussion    15. Screening for diabetes mellitus    16. Screening for ischemic heart disease    17. Screening for depression  -     [address]    18. Body mass index 33.0-33.9, adult          ICD-10-CM ICD-9-CM    1. Medicare annual wellness visit, subsequent  Z00.00 V70.0       2. Primary hypertension  A52 926.8 METABOLIC PANEL, COMPREHENSIVE      URINALYSIS W/ RFLX MICROSCOPIC      3.  Coronary artery disease involving native coronary artery of native heart without angina pectoris  I25.10 414.01       4. Cerebrovascular accident (CVA) due to embolism of precerebral artery ([facility]  I63.10 434.11       5. Stage 3 chronic kidney disease, unspecified whether stage 3a or 3b CKD ([facility])  N18.30 585.3 CBC WITH AUTOMATED DIFF      METABOLIC PANEL, COMPREHENSIVE      URINALYSIS W/ RFLX MICROSCOPIC      6. Dyslipidemia  E78.5 272.4       7. On statin therapy  Z79.899 V58.69 CK      LIPID PANEL      METABOLIC PANEL, COMPREHENSIVE      8. Spinal stenosis of lumbar region at multiple levels  M48.061 724.02 CK      LIPID PANEL      METABOLIC PANEL, COMPREHENSIVE      9. Seizure disorder ([facility])  G40.909 345.90       10. IGT (impaired glucose tolerance)  R73.02 790.22 HEMOGLOBIN A1C WITH EAG      11. Panlobular emphysema ([facility])  J43.1 492.8       12. TESSA on CPAP  G47.33 327.23     Z99.89 V46.8       13. Needs flu shot  Z23 V04.81 INFLUENZA, FLUAD, (AGE 65 Y+), IM, PF, 0.5 ML      14. Advanced directives, counseling/discussion  Z71.89 V65.49       15. Screening for diabetes mellitus  Z13.1 V77.1       16. Screening for ischemic heart disease  Z13.6 V81.0       17. Screening for depression  Z13.31 V79.0 [facility]      18. Body mass index 33.0-33.9, adult  Z68.33 V85.33           Plan:    Continue current medical regimen as outlined above. Further recommendations based on labs as ordered. Flu vaccine to be given today. I have reviewed with the patient details of the assessment and plan and all questions were answered. Relevent patient education was performed. Verbal and/or written instructions (see AVS) provided. The most recent lab findings were reviewed with the patient. Plan was discussed with patient who verbally expressed understanding. An After Visit Summary was printed and given to the patient.     Asia Godoy MD

## 2022-10-05 NOTE — PROGRESS NOTES
Chief Complaint   Patient presents with    Hypertension     6 month follow up    GERD    COPD     Visit Vitals  BP (!) 108/56 (BP 1 Location: Right arm, BP Patient Position: Sitting, BP Cuff Size: Large adult)   Pulse 69   Resp 17   Ht 5' 8\" (1.727 m)   Wt 219 lb 12.8 oz (99.7 kg)   SpO2 96%   BMI 33.42 kg/m²     1. Have you been to the ER, urgent care clinic since your last visit? Hospitalized since your last visit? No    2. Have you seen or consulted any other health care providers outside of the [address] [place] [address] since your last visit? Include any pap smears or colon screening. Dr. Ragsdale Pulse  neurology    Per verbal orders from Dr. Parmjit Verma, patient received Fluad flu vaccine given IM in left deltoid given by Beatrice Severino. Vaccine was verified by Husam Sears. Patient was observed for 15 minutes following injection with no complications noted. VIS was given.

## 2022-10-05 NOTE — PATIENT INSTRUCTIONS
Medicare Wellness Visit, Male    The best way to live healthy is to have a lifestyle where you eat a well-balanced diet, exercise regularly, limit alcohol use, and quit all forms of tobacco/nicotine, if applicable. Regular preventive services are another way to keep healthy. Preventive services (vaccines, screening tests, monitoring & exams) can help personalize your care plan, which helps you manage your own care. Screening tests can find health problems at the earliest stages, when they are easiest to treat. [facility] follows the current, evidence-based guidelines published by the [facility] ([facility]) when recommending preventive services for our patients. Because we follow these guidelines, sometimes recommendations change over time as research supports it. (For example, a prostate screening blood test is no longer routinely recommended for men with no symptoms). Of course, you and your doctor may decide to screen more often for some diseases, based on your risk and co-morbidities (chronic disease you are already diagnosed with). Preventive services for you include:  - Medicare offers their members a free annual wellness visit, which is time for you and your primary care provider to discuss and plan for your preventive service needs. Take advantage of this benefit every year!  -All adults over age 72 should receive the recommended pneumonia vaccines. Current USPSTF guidelines recommend a series of two vaccines for the best pneumonia protection.   -All adults should have a flu vaccine yearly and tetanus vaccine every 10 years.  -All adults age 48 and older should receive the shingles vaccines (series of two vaccines).        -All adults age 38-68 who are overweight should have a diabetes screening test once every three years.   -Other screening tests & preventive services for persons with diabetes include: an eye exam to screen for diabetic retinopathy, a kidney function test, a foot exam, and stricter control over your cholesterol.   -Cardiovascular screening for adults with routine risk involves an electrocardiogram (ECG) at intervals determined by the provider.   -Colorectal cancer screening should be done for adults age 54-65 with no increased risk factors for colorectal cancer. There are a number of acceptable methods of screening for this type of cancer. Each test has its own benefits and drawbacks. Discuss with your provider what is most appropriate for you during your annual wellness visit. The different tests include: colonoscopy (considered the best screening method), a fecal occult blood test, a fecal DNA test, and sigmoidoscopy.  -All adults born between [facility] should be screened once for Hepatitis C.  -An Abdominal Aortic Aneurysm (AAA) Screening is recommended for men age 73-68 who has ever smoked in their lifetime.      Here is a list of your current Health Maintenance items (your personalized list of preventive services) with a due date:  Health Maintenance Due   Topic Date Due    DTaP/Tdap/Td  (1 - Tdap) Never done    Shingles Vaccine (1 of 2) Never done    COVID-19 Vaccine (3 - Booster for EMBI series) 08/22/2021    Yearly Flu Vaccine (1) 08/01/2022

## 2022-10-06 LAB
ALBUMIN SERPL-MCNC: 3.2 G/DL (ref 3.5–5)
ALBUMIN/GLOB SERPL: 1 {RATIO} (ref 1.1–2.2)
ALP SERPL-CCNC: 140 U/L (ref 45–117)
ALT SERPL-CCNC: 24 U/L (ref 12–78)
ANION GAP SERPL CALC-SCNC: 7 MMOL/L (ref 5–15)
AST SERPL-CCNC: 18 U/L (ref 15–37)
BASOPHILS # BLD: 0.1 K/UL (ref 0–0.1)
BASOPHILS NFR BLD: 1 % (ref 0–1)
BILIRUB SERPL-MCNC: 0.3 MG/DL (ref 0.2–1)
BUN SERPL-MCNC: 17 MG/DL (ref 6–20)
BUN/CREAT SERPL: 11 (ref 12–20)
CALCIUM SERPL-MCNC: 8.6 MG/DL (ref 8.5–10.1)
CHLORIDE SERPL-SCNC: 108 MMOL/L (ref 97–108)
CHOLEST SERPL-MCNC: 108 MG/DL
CK SERPL-CCNC: 65 U/L (ref 39–308)
CO2 SERPL-SCNC: 28 MMOL/L (ref 21–32)
CREAT SERPL-MCNC: 1.6 MG/DL (ref 0.7–1.3)
DIFFERENTIAL METHOD BLD: ABNORMAL
EOSINOPHIL # BLD: 0.2 K/UL (ref 0–0.4)
EOSINOPHIL NFR BLD: 2 % (ref 0–7)
ERYTHROCYTE [DISTWIDTH] IN BLOOD BY AUTOMATED COUNT: 14.6 % (ref 11.5–14.5)
EST. AVERAGE GLUCOSE BLD GHB EST-MCNC: 126 MG/DL
GLOBULIN SER CALC-MCNC: 3.2 G/DL (ref 2–4)
GLUCOSE SERPL-MCNC: 149 MG/DL (ref 65–100)
HBA1C MFR BLD: 6 % (ref 4–5.6)
HCT VFR BLD AUTO: 40.8 % (ref 36.6–50.3)
HDLC SERPL-MCNC: 25 MG/DL
HDLC SERPL: 4.3 {RATIO} (ref 0–5)
HGB BLD-MCNC: 13.1 G/DL (ref 12.1–17)
IMM GRANULOCYTES # BLD AUTO: 0 K/UL (ref 0–0.04)
IMM GRANULOCYTES NFR BLD AUTO: 1 % (ref 0–0.5)
LDLC SERPL CALC-MCNC: 46.4 MG/DL (ref 0–100)
LYMPHOCYTES # BLD: 1.1 K/UL (ref 0.8–3.5)
LYMPHOCYTES NFR BLD: 16 % (ref 12–49)
MCH RBC QN AUTO: 30.2 PG (ref 26–34)
MCHC RBC AUTO-ENTMCNC: 32.1 G/DL (ref 30–36.5)
MCV RBC AUTO: 94 FL (ref 80–99)
MONOCYTES # BLD: 0.5 K/UL (ref 0–1)
MONOCYTES NFR BLD: 8 % (ref 5–13)
NEUTS SEG # BLD: 4.7 K/UL (ref 1.8–8)
NEUTS SEG NFR BLD: 72 % (ref 32–75)
NRBC # BLD: 0 K/UL (ref 0–0.01)
NRBC BLD-RTO: 0 PER 100 WBC
PLATELET # BLD AUTO: 200 K/UL (ref 150–400)
PMV BLD AUTO: 11.2 FL (ref 8.9–12.9)
POTASSIUM SERPL-SCNC: 3.9 MMOL/L (ref 3.5–5.1)
PROT SERPL-MCNC: 6.4 G/DL (ref 6.4–8.2)
RBC # BLD AUTO: 4.34 M/UL (ref 4.1–5.7)
SODIUM SERPL-SCNC: 143 MMOL/L (ref 136–145)
TRIGL SERPL-MCNC: 183 MG/DL (ref ?–150)
VLDLC SERPL CALC-MCNC: 36.6 MG/DL
WBC # BLD AUTO: 6.5 K/UL (ref 4.1–11.1)

## 2022-10-07 RX ORDER — LEVETIRACETAM 500 MG/1
TABLET ORAL
Qty: 60 TABLET | Refills: 5 | Status: SHIPPED | OUTPATIENT
Start: 2022-10-07 | End: 2022-10-10

## 2022-10-10 RX ORDER — LEVETIRACETAM 500 MG/1
TABLET ORAL
Qty: 60 TABLET | Refills: 5 | Status: SHIPPED | OUTPATIENT
Start: 2022-10-10

## 2022-10-13 ENCOUNTER — TELEPHONE (OUTPATIENT)
Dept: INTERNAL MEDICINE CLINIC | Age: 87
End: 2022-10-13

## 2022-11-18 ENCOUNTER — OFFICE VISIT (OUTPATIENT)
Dept: NEUROLOGY | Age: 87
End: 2022-11-18
Payer: MEDICARE

## 2022-11-18 VITALS
OXYGEN SATURATION: 94 % | DIASTOLIC BLOOD PRESSURE: 74 MMHG | RESPIRATION RATE: 18 BRPM | SYSTOLIC BLOOD PRESSURE: 142 MMHG | HEART RATE: 75 BPM

## 2022-11-18 DIAGNOSIS — I69.319 CVA, OLD, COGNITIVE DEFICITS: Primary | ICD-10-CM

## 2022-11-18 DIAGNOSIS — R20.0 NUMBNESS OF FINGERS: ICD-10-CM

## 2022-11-18 DIAGNOSIS — G40.909 SEIZURE DISORDER (HCC): ICD-10-CM

## 2022-11-18 PROCEDURE — G9717 DOC PT DX DEP/BP F/U NT REQ: HCPCS | Performed by: PSYCHIATRY & NEUROLOGY

## 2022-11-18 PROCEDURE — G8427 DOCREV CUR MEDS BY ELIG CLIN: HCPCS | Performed by: PSYCHIATRY & NEUROLOGY

## 2022-11-18 PROCEDURE — 1101F PT FALLS ASSESS-DOCD LE1/YR: CPT | Performed by: PSYCHIATRY & NEUROLOGY

## 2022-11-18 PROCEDURE — 99214 OFFICE O/P EST MOD 30 MIN: CPT | Performed by: PSYCHIATRY & NEUROLOGY

## 2022-11-18 PROCEDURE — G8536 NO DOC ELDER MAL SCRN: HCPCS | Performed by: PSYCHIATRY & NEUROLOGY

## 2022-11-18 PROCEDURE — G8417 CALC BMI ABV UP PARAM F/U: HCPCS | Performed by: PSYCHIATRY & NEUROLOGY

## 2022-11-18 PROCEDURE — 1123F ACP DISCUSS/DSCN MKR DOCD: CPT | Performed by: PSYCHIATRY & NEUROLOGY

## 2022-11-18 NOTE — PROGRESS NOTES
HAZEL Yu came back for follow-up. He has not had any further seizure-like activity. His EEG showed nonspecific slowing without any epileptiform features. Keppra dose was reduced to 500 mg twice daily  Continues to complain of mild headache off and on for which she takes Tylenol. Sometimes he will appear somewhat confused/disoriented but it clears up by itself. Has been reporting some numbness in the middle finger of both hands. Tremor in his hands is unchanged. He is able to ambulate with a walker but mostly sits around and does not do much physical activity. 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 This occurred after his initial discharge when he was in the rehab and had to come back to the hospital.    RECAP  He was hospitalized in May 2021 with AMS/nausea/vomiting/tremors. 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 Was in rehab when he developed pneumonia and was admitted to the hospital.    MRI brain was performed which showed embolic appearing scattered infarcts in bilateral frontal lobes and parieto-occipital lobes. EEG was done on 5/16 which was unremarkable but had another EEG for symptoms of decreased responsiveness, left gaze deviation, lip smacking on 5/20 and it showed a generalized nonconvulsive status that resolved after administration of Ativan.    He was treated with Keppra and lacosamide but lacosamide was causing excessive drowsiness and it was weaned down  He was discharged on Keppra 1000 mg twice daily and this was reduced to 750 mg twice daily at the rehab  Has not had any further seizure recurrence          EXAM  Exam:  Visit Vitals  BP (!) 142/74   Pulse 75   Resp 18   SpO2 94%     Gen:  CV: RRR  Lungs: non labored breathing  Abd: non distending  Neuro: A&O x 3, no dysarthria or aphasia  CN II-XII: PERRL, EOMI, face symmetric, tongue/palate midline  Motor: strength 5/5 all four ext  Sensory: intact to LT  Gait: normal    LABS/ IMAGING  Lab Results   Component Value Date/Time    WBC 6.5 10/05/2022 01:33 PM    HGB (POC) 14.9 07/27/2018 02:38 PM    HGB 13.1 10/05/2022 01:33 PM    Hematocrit (POC) 45 05/13/2019 07:16 PM    HCT 40.8 10/05/2022 01:33 PM    PLATELET 907 77/01/3842 01:33 PM    MCV 94.0 10/05/2022 01:33 PM     Lab Results   Component Value Date/Time    Sodium 143 10/05/2022 01:33 PM    Potassium 3.9 10/05/2022 01:33 PM    Chloride 108 10/05/2022 01:33 PM    CO2 28 10/05/2022 01:33 PM    Anion gap 7 10/05/2022 01:33 PM    Glucose 149 (H) 10/05/2022 01:33 PM    BUN 17 10/05/2022 01:33 PM    Creatinine 1.60 (H) 10/05/2022 01:33 PM    BUN/Creatinine ratio 11 (L) 10/05/2022 01:33 PM    GFR est AA 51 (L) 05/31/2022 09:47 AM    GFR est non-AA 42 (L) 05/31/2022 09:47 AM    Calcium 8.6 10/05/2022 01:33 PM    Bilirubin, total 0.3 10/05/2022 01:33 PM    Alk. phosphatase 140 (H) 10/05/2022 01:33 PM    Protein, total 6.4 10/05/2022 01:33 PM    Albumin 3.2 (L) 10/05/2022 01:33 PM    Globulin 3.2 10/05/2022 01:33 PM    A-G Ratio 1.0 (L) 10/05/2022 01:33 PM    ALT (SGPT) 24 10/05/2022 01:33 PM    AST (SGOT) 18 10/05/2022 01:33 PM     MRI Results (most recent):  Results from Hospital Encounter encounter on 05/16/21    MRI BRAIN WO CONT    Narrative  *PRELIMINARY REPORT*    Multiple foci of small cortical and white matter infarcts mostly on the right  with a couple of lesions on the left as well. Interval evolution of  intraventricular hemorrhage and small parenchymal hemorrhages. Preliminary report was provided by Dr. Ben Morel, the on-call radiologist, at  22:17. The findings were called to floor nurse, Trip Guerra, on 5/18/2021 at 22:20 by myself. [id number]    Final report to follow. *END PRELIMINARY REPORT*    EXAM:  MRI BRAIN WO CONT    INDICATION:    AMS    COMPARISON:  CT head 5/16/2021, MRI brain 11/23/2019. CONTRAST: None.     TECHNIQUE:  Multiplanar multisequence acquisition without contrast of the brain. FINDINGS:  Few small scattered acute infarcts in the bilateral frontal lobes and  parieto-occipital lobes, right greater than left. Evolved now late subacute small intraparenchymal hematomas in the right parietal  periventricular white matter measuring 10 mm and 5 mm (series 6 image 11), with  no significant surrounding edema or mass effect. There is hemosiderin staining  noted along the ependymal lining of the lateral ventricles, as well as scattered  superficial siderosis involving the sylvian fissures, and along the surface of  the brainstem and cerebellum. Unchanged generalized parenchymal volume loss with commensurate dilation of the  sulci and ventricular system. Unchanged confluent periventricular and deep white  matter T2/FLAIR hyperintensity, and patchy T2/FLAIR hyperintensity in the nely,  consistent with severe chronic microvascular ischemic disease. There is no  cerebellar tonsillar herniation. Expected arterial flow-voids are present. The paranasal sinuses, mastoid air cells, and middle ears are clear. The orbital  contents are within normal limits with bilateral lens implants. No significant  osseous or scalp lesions are identified. Impression  1. Few small scattered acute infarcts in the bilateral frontal lobes and  parieto-occipital lobes, right greater than left. 2. 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 3. Unchanged generalized parenchymal volume loss and severe chronic  microvascular ischemic disease. ASSESSMENT    ICD-10-CM ICD-9-CM    1. CVA, old, cognitive deficits  I69.319 438.0       2. Seizure disorder ([facility]  G40.909 345.90       3.  Numbness of fingers  R20.0 782.0 PLAN  Mr. Garrett Myles has had embolic appearing scattered infarctions in both hemispheres of unclear source, possibly cardiac  Currently on aspirin and statin, not a candidate for anticoagulation  He is able to ambulate with the help of a walker. 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 If headaches do not improve, we can consider alternative prophylactics    He has some resting and intention tremor in both upper extremities which has been present for a long time. No overall change or parkinsonian features noted on today's visit.     Continue to stay physically and mentally active    Follow-up annually or earlier if needed     Shaquille Barlow MD

## 2022-12-30 RX ORDER — ATORVASTATIN CALCIUM 80 MG/1
TABLET, FILM COATED ORAL
Qty: 90 TABLET | Refills: 1 | Status: SHIPPED | OUTPATIENT
Start: 2022-12-30

## 2023-01-13 NOTE — PROGRESS NOTES
Occupational Therapy    Completed chart review and attempted to see for OT services. Patient off the floor for CT scan. Will continue to follow for OT services.        Thank you,  Selam Arvizu, OT Thank you very much for kindly referring Dino York, a very pleasant 62-year-old male, to our service due to rectal bleeding.  Past medical history significant for HTN, HLD, BPH, cervicalgia, liver cysts and diverticulosis.  Past surgical history significant for tonsillectomy.  He is EGD and colonoscopy naive and denies a family history of colon polyps, colon cancer or other GI malignancy.  Dino presents today without gastrointestinal complaint, but relates an episode of blood per rectum approximately 2 months ago.  His bowel habits are unremarkable averaging 1-2/day of soft brown stool, typically in the morning after coffee.  He has had no further instances and FOBT dated 17 November 2022 was negative.  He denies pyrosis, dysphagia, dyspepsia, abdominal pain, change in bowel habits, melena or unintentional weight loss.

## 2023-03-23 RX ORDER — LANOLIN ALCOHOL/MO/W.PET/CERES
CREAM (GRAM) TOPICAL
Qty: 90 TABLET | Refills: 4 | Status: SHIPPED | OUTPATIENT
Start: 2023-03-23

## 2023-04-05 ENCOUNTER — OFFICE VISIT (OUTPATIENT)
Dept: INTERNAL MEDICINE CLINIC | Age: 88
End: 2023-04-05
Payer: MEDICARE

## 2023-04-05 PROBLEM — G95.19 OTHER VASCULAR MYELOPATHIES (HCC): Status: ACTIVE | Noted: 2023-04-05

## 2023-04-05 PROCEDURE — 1101F PT FALLS ASSESS-DOCD LE1/YR: CPT | Performed by: INTERNAL MEDICINE

## 2023-04-05 PROCEDURE — 99214 OFFICE O/P EST MOD 30 MIN: CPT | Performed by: INTERNAL MEDICINE

## 2023-04-05 PROCEDURE — G8417 CALC BMI ABV UP PARAM F/U: HCPCS | Performed by: INTERNAL MEDICINE

## 2023-04-05 PROCEDURE — 1123F ACP DISCUSS/DSCN MKR DOCD: CPT | Performed by: INTERNAL MEDICINE

## 2023-04-05 PROCEDURE — G8536 NO DOC ELDER MAL SCRN: HCPCS | Performed by: INTERNAL MEDICINE

## 2023-04-05 PROCEDURE — G8427 DOCREV CUR MEDS BY ELIG CLIN: HCPCS | Performed by: INTERNAL MEDICINE

## 2023-04-05 PROCEDURE — G9717 DOC PT DX DEP/BP F/U NT REQ: HCPCS | Performed by: INTERNAL MEDICINE

## 2023-04-05 NOTE — PROGRESS NOTES
Barbara Arrington is a 80 y.o. male  Chief Complaint   Patient presents with    Follow-up     6 month     1. Have you been to the ER, urgent care clinic since your last visit? Hospitalized since your last visit? No    2. Have you seen or consulted any other health care providers outside of the [address] [place] [address] since your last visit? Include any pap smears or colon screening.  No

## 2023-04-05 NOTE — PROGRESS NOTES
Shannan Melo is a 80 y.o. male and presents with Follow-up (6 month)  . Subjective:  Mr. Irina Hernandez presents today for 6-month follow-up for several problems including coronary disease, chronic kidney disease, COPD, hyperlipidemia, monitoring statin therapy, history of CVA, depression, and seizure disorder. He is doing well on his current medical regimen. He denies any side effects from his medications. He has no shortness of breath, chest pain, palpitations, PND, orthopnea, or pedal edema. His daughter presents with him today and notes no significant problems. He is ambulatory with a walker. He has not had any falls.     Past Medical History:   Diagnosis Date    Adverse effect of anesthesia     combative after anesthesia    Alcohol abuse     6 beers/day    Arthritis     CAD (coronary artery disease)     Chronic obstructive pulmonary disease (HCC)     Chronic obstructive pulmonary disease (HCC)     CKD (chronic kidney disease) stage 3, GFR 30-59 ml/min ([address] 9/21/2020    Dyslipidemia 8/16/2017    Dyspepsia and other specified disorders of function of stomach     Dyspnea 8/16/2017    ED (erectile dysfunction) 8/16/2017    Encounter for immunization 8/16/2017    Fatigue 8/16/2017    Flank pain 8/1/2019    GERD (gastroesophageal reflux disease) 8/16/2017    Gout 8/16/2017    Hypertension     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    Morbid obesity ([address]     On statin therapy 8/16/2017    TESSA on CPAP 1/3/2019    Psychiatric disorder     Depression    Simple chronic bronchitis ([address] 1/3/2019    TIA (transient ischemic attack) 1/47/7859    Umbilical hernia 49/0/9273    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     Past Surgical History:   Procedure Laterality Date    COLONOSCOPY N/A 9/27/2019    COLONOSCOPY performed by Lizzeth Huizar MD at [facility] ENDOSCOPY    HX HERNIA REPAIR      RIH    HX HERNIA REPAIR  97/41/36    open umbilical hernia repair mesh    HX ORTHOPAEDIC      HX UROLOGICAL      HYDROCELECTOMY    SC UNLISTED PROCEDURE CARDIAC SURGERY  1996    Cardiac Stents    ID UNLISTED PROCEDURE CARDIAC SURGERY  04/2019    EF 61-65%    UPPER GI ENDOSCOPY,BIOPSY  9/30/2019          Allergies   Allergen Reactions    Levaquin [Levofloxacin] Nausea and Vomiting     Reports anxiety, nausea, aching after taking levaquin    Ciprofloxacin Shortness of Breath    Quinolones Shortness of Breath     Current Outpatient Medications   Medication Sig Dispense Refill    thiamine HCL (B-1) 100 mg tablet TAKE ONE TABLET BY MOUTH DAILY 90 Tablet 4    atorvastatin (LIPITOR) 80 mg tablet TAKE ONE TABLET BY MOUTH DAILY 90 Tablet 1    hydrALAZINE (APRESOLINE) 100 mg tablet TAKE ONE TABLET BY MOUTH THREE TIMES A  Tablet 3    DULoxetine (CYMBALTA) 30 mg capsule TAKE ONE CAPSULE BY MOUTH DAILY 90 Capsule 3    amLODIPine (NORVASC) 5 mg tablet TAKE ONE TABLET BY MOUTH DAILY 90 Tablet 3    atenoloL (TENORMIN) 25 mg tablet TAKE 1/2 TABLET BY MOUTH DAILY 45 Tablet 3    traZODone (DESYREL) 100 mg tablet TAKE ONE TABLET BY MOUTH ONCE NIGHTLY 90 Tablet 3    furosemide (LASIX) 20 mg tablet TAKE THREE TABLETS BY MOUTH DAILY 270 Tablet 3    isosorbide mononitrate ER (IMDUR) 30 mg tablet Take 1 Tablet by mouth daily. 90 Tablet 3    brimonidine (ALPHAGAN) 0.2 % ophthalmic solution 1 Drop two (2) times a day. polyethylene glycol (MIRALAX) 17 gram packet Take 1 Packet by mouth daily. Hold for diarrhea or loose stools. Mix in 8 oz of water, juice, soda, coffee, or tea prior to administration (Patient taking differently: Take 1 Packet by mouth as needed.) 30 Each 0    melatonin 5 mg cap capsule Take 1 Capsule by mouth nightly. Pt. Take 2 tabs a night      acetaminophen (TYLENOL) 500 mg tablet Take 2 Tablets by mouth every six (6) hours as needed for Pain. aspirin delayed-release 81 mg tablet Take 1 Tablet by mouth daily. ANORO ELLIPTA 62.5-25 mcg/actuation inhaler Take 1 Puff by inhalation daily.       levETIRAcetam (KEPPRA) 500 mg tablet TAKE ONE TABLET BY MOUTH TWICE A DAY (Patient not taking: Reported on 4/5/2023) 60 Tablet 5    atropine 1 % ophthalmic solution 1 Drop daily. (Patient not taking: Reported on 4/5/2023)      dorzolamide-timoloL (COSOPT) 22.3-6.8 mg/mL ophthalmic solution 1 Drop two (2) times a day. (Patient not taking: Reported on 4/5/2023)      prednisoLONE acetate (PRED FORTE) 1 % ophthalmic suspension 1 Drop four (4) times daily. (Patient not taking: Reported on 4/5/2023)      eyelid cleanser combination 3 (OCUSOFT LID SCRUB PLUS EX) by Apply Externally route. (Patient not taking: Reported on 4/5/2023)       Social History     Socioeconomic History    Marital status:    Tobacco Use    Smoking status: Former     Packs/day: 0.50     Years: 20.00     Pack years: 10.00     Types: Cigarettes    Smokeless tobacco: Former    Tobacco comments:     quit about 40  years ago   / used to dip tobaco and dip snuff   Substance and Sexual Activity    Alcohol use: Yes     Comment: \"Drinks very little now for about a month \"    Drug use: No     Family History   Problem Relation Age of Onset    Heart Disease Mother     Hypertension Mother     Hypertension Father     Hypertension Sister     Hypertension Brother     Cancer Brother        Review of Systems  Constitutional:  negative for fevers, chills, anorexia and weight loss  Eyes:    negative for visual disturbance and irritation  ENT:    negative for tinnitus,sore throat,nasal congestion,ear pains. hoarseness  Respiratory:     negative for cough, hemoptysis, dyspnea,wheezing  CV:    negative for chest pain, palpitations, lower extremity edema  GI:    negative for nausea, vomiting, diarrhea, abdominal pain,melena  Endo:               negative for polyuria,polydipsia,polyphagia,heat intolerance  Genitourinary : negative for frequency, dysuria and hematuria  Integumentary: negative for rash and pruritus  Hematologic:   negative for easy bruising and gum/nose bleeding  Musculoskel:  negative for myalgias, arthralgias, back pain, muscle weakness, joint pain  Neurological:   negative for headaches, dizziness, vertigo, memory problems and gait   Behavl/Psych:  negative for feelings of anxiety, depression, mood changes  ROS otherwise negative      Objective:  Visit Vitals  /70 (BP 1 Location: Left upper arm, BP Patient Position: Sitting)   Pulse 75   Temp 97.8 °F (36.6 °C) (Temporal)   Resp 20   Ht 5' 8\" (1.727 m)   Wt 217 lb (98.4 kg)   SpO2 92%   BMI 32.99 kg/m²     Physical Exam:   General appearance - alert, well appearing, and in no distress  Mental status - alert, oriented to person, place, and time  EYE-MAURICE, EOMI, fundi normal, corneas normal, no foreign bodies  ENT-ENT exam normal, no neck nodes or sinus tenderness  Nose - normal and patent, no erythema, discharge or polyps  Mouth - mucous membranes moist, pharynx normal without lesions  Neck - supple, no significant adenopathy   Chest - clear to auscultation, no wheezes, rales or rhonchi, symmetric air entry   Heart - normal rate, regular rhythm, normal S1, S2, no murmurs, rubs, clicks or gallops   Abdomen - soft, nontender, nondistended, no masses or organomegaly  Lymph- no adenopathy palpable  Ext-peripheral pulses normal, no pedal edema, no clubbing or cyanosis  Skin-Warm and dry. no hyperpigmentation, vitiligo, or suspicious lesions  Neuro -alert, oriented, normal speech, no focal findings or movement disorder noted      Assessment/Plan:  Diagnoses and all orders for this visit:    1. Coronary artery disease involving native coronary artery of native heart without angina pectoris  -     METABOLIC PANEL, COMPREHENSIVE; Future    2. Other vascular myelopathies ([address]    3. Panlobular emphysema ([address]    4. Major depressive disorder, recurrent, mild ([address]    5. Seizure disorder ([address]    6.  Coronary artery disease of native artery of native heart with stable angina pectoris (HCC)    7. Stage 3 chronic kidney disease, unspecified whether stage 3a or 3b CKD ([facility]    8. Cerebrovascular accident (CVA) due to embolism of precerebral artery ([facility]    9. Primary hypertension  -     METABOLIC PANEL, COMPREHENSIVE; Future  -     URINALYSIS W/ RFLX MICROSCOPIC; Future    10. Dyslipidemia  -     LIPID PANEL; Future  -     METABOLIC PANEL, COMPREHENSIVE; Future  -     CK; Future    11. On statin therapy  -     METABOLIC PANEL, COMPREHENSIVE; Future  -     CK; Future    12. Major depressive disorder, recurrent, moderate    13. IGT (impaired glucose tolerance)  -     HEMOGLOBIN A1C WITH EAG; Future          ICD-10-CM ICD-9-CM    1. Coronary artery disease involving native coronary artery of native heart without angina pectoris  E70.59 472.70 METABOLIC PANEL, COMPREHENSIVE      METABOLIC PANEL, COMPREHENSIVE      2. Other vascular myelopathies (HCC)  G95.19 336.1       3. Panlobular emphysema (HCC)  J43.1 492.8       4. Major depressive disorder, recurrent, mild (HCC)  F33.0 296.31       5. Seizure disorder ([place] [address]  G40.909 345.90       6. Coronary artery disease of native artery of native heart with stable angina pectoris ([place] [address]  I25.118 414.01      413.9       7. Stage 3 chronic kidney disease, unspecified whether stage 3a or 3b CKD ([facility])  N18.30 585.3       8. Cerebrovascular accident (CVA) due to embolism of precerebral artery ([facility]  I63.10 434.11       9. Primary hypertension  E84 655.9 METABOLIC PANEL, COMPREHENSIVE      URINALYSIS W/ RFLX MICROSCOPIC      METABOLIC PANEL, COMPREHENSIVE      URINALYSIS W/ RFLX MICROSCOPIC      10. Dyslipidemia  E78.5 272.4 LIPID PANEL      METABOLIC PANEL, COMPREHENSIVE      CK      CK      LIPID PANEL      METABOLIC PANEL, COMPREHENSIVE      11. On statin therapy  M45.814 P66.82 METABOLIC PANEL, COMPREHENSIVE      CK      CK      METABOLIC PANEL, COMPREHENSIVE      12. Major depressive disorder, recurrent, moderate  F33.1 296.32       13.  IGT (impaired glucose tolerance)  R73.02 790.22 HEMOGLOBIN A1C WITH EAG      HEMOGLOBIN A1C WITH EAG Plan:    Continue current medical regimen as outlined above. Further recommendations based on labs as ordered. Follow-up and Dispositions    Return in about 6 months (around 10/5/2023) for [address] I have reviewed with the patient details of the assessment and plan and all questions were answered. Relevent patient education was performed. Verbal and/or written instructions (see AVS) provided. The most recent lab findings were reviewed with the patient. Plan was discussed with patient who verbally expressed understanding. An After Visit Summary was printed and given to the patient.     Gato Vázquez MD

## 2023-04-20 RX ORDER — ISOSORBIDE MONONITRATE 30 MG/1
TABLET, EXTENDED RELEASE ORAL
Qty: 90 TABLET | Refills: 3 | Status: SHIPPED | OUTPATIENT
Start: 2023-04-20

## 2023-04-27 RX ORDER — ISOSORBIDE MONONITRATE 30 MG/1
TABLET, EXTENDED RELEASE ORAL
Qty: 90 TABLET | Refills: 3 | Status: SHIPPED | OUTPATIENT
Start: 2023-04-27

## 2023-07-03 RX ORDER — ATORVASTATIN CALCIUM 80 MG/1
80 TABLET, FILM COATED ORAL DAILY
Qty: 90 TABLET | Refills: 0 | Status: SHIPPED | OUTPATIENT
Start: 2023-07-03

## 2023-07-03 NOTE — TELEPHONE ENCOUNTER
PCP: Asuncion Emery MD    Last appt: 4/5/23  Future Appointments   Date Time Provider [address]   10/5/2023  1:00 PM MD CLAUDIO Evangelista BS AMB   11/10/2023 11:40 AM Amol Evans MD [facility] BS AMB       Last refilled:12/30/22    Requested Prescriptions     Pending Prescriptions Disp Refills    atorvastatin (LIPITOR) 80 MG tablet 30 tablet 2     Sig: Take 1 tablet by mouth daily
Patient is out of his medication.
Self

## 2023-08-16 RX ORDER — FUROSEMIDE 20 MG/1
TABLET ORAL
Qty: 270 TABLET | Refills: 3 | Status: SHIPPED | OUTPATIENT
Start: 2023-08-16

## 2023-08-16 NOTE — TELEPHONE ENCOUNTER
furosemide (LASIX) 20 mg tablet 270 Tablet 3 8/11/2022     Sig: TAKE THREE TABLETS BY MOUTH DAILY      Last visit: 4/5/23  Future visit: 10/5/23    Pharmacy: [facility]

## 2023-08-28 RX ORDER — DULOXETIN HYDROCHLORIDE 30 MG/1
30 CAPSULE, DELAYED RELEASE ORAL DAILY
Qty: 30 CAPSULE | Refills: 1 | Status: SHIPPED | OUTPATIENT
Start: 2023-08-28

## 2023-08-28 RX ORDER — AMLODIPINE BESYLATE 5 MG/1
5 TABLET ORAL DAILY
Qty: 30 TABLET | Refills: 1 | Status: SHIPPED | OUTPATIENT
Start: 2023-08-28

## 2023-08-28 RX ORDER — ATENOLOL 25 MG/1
12.5 TABLET ORAL DAILY
Qty: 30 TABLET | Refills: 1 | Status: SHIPPED | OUTPATIENT
Start: 2023-08-28

## 2023-08-28 NOTE — TELEPHONE ENCOUNTER
Pt's daughter requesting refill so to lobo on file.      Medication  atenolol (TENORMIN) 25 MG tablet [[id number]]  atenolol (TENORMIN) 25 MG tablet [[id number]]     Order Details  Dose, Route, Frequency: As Directed   Dispense Quantity: -- Refills: --          Sig: TAKE 1/2 TABLET BY MOUTH DAILY         Start Date: 08/31/22 End Date: --   Written Date: -- Expiration Date: --   Ordering Date: 03/22/23     Source:  Received from: [facility]   Medication  DULoxetine (CYMBALTA) 30 MG extended release capsule [[id number]]  DULoxetine (CYMBALTA) 30 MG extended release capsule [[id number]]     Order Details  Dose, Route, Frequency: As Directed   Dispense Quantity: -- Refills: --          Sig: TAKE ONE CAPSULE BY MOUTH DAILY         Start Date: 08/31/22 End Date: --   Written Date: -- Expiration Date: --   Ordering Date: 03/22/23     Source:  Received from: [facility]   Medication  amLODIPine (NORVASC) 5 MG tablet [[id number]]  amLODIPine (NORVASC) 5 MG tablet [[id number]]     Order Details  Dose, Route, Frequency: As Directed   Dispense Quantity: -- Refills: --          Sig: TAKE ONE TABLET BY MOUTH DAILY         Start Date: 08/31/22 End Date: --   Written Date: -- Expiration Date: --   Ordering Date: 03/22/23     Source:  Received from: [facility]

## 2023-08-30 NOTE — PROGRESS NOTES
Bedside shift change report given to Cherie Dancer, RN (oncoming nurse) by Faina Hendricks RN (offgoing nurse). Report included the following information SBAR, Kardex, Recent Results, Cardiac Rhythm NSR and Dual Neuro Assessment. Kenalog Preparation: Kenalog

## 2023-09-19 NOTE — PROGRESS NOTES
Pt here for C4D1 gazyva. Arrives Ambulating independently, accompanied by Self       Oral medications included in this regimen:  no    Patient confirms comprehension of cancer treatment schedule:  yes    Pregnancy screening:  Not applicable    Modifications in dose or schedule:  No    Medications appearance and physical integrity checked by RN.  Chemotherapy IV pump settings verified by 2 RNs:  yes     Frequency of blood return and site check throughout administration: Prior to administration     Infusion/treatment outcome:  patient tolerated treatment without incident    Education Record    Learner:  Patient  Barriers / Limitations:  None  Method:  Brief focused  Education / instructions given:  chemo admin  Outcome:  Shows understanding    Discharged Home, Ambulating independently, accompanied by:Self    Patient/family verbalized understanding of future appointments: by [facility] Transition of Care Plan   RUR- 28% High Risk   DISPOSITION: The disposition plan is pending medical progression and recommendation.  F/U with PCP/Specialist     Transport: [facility]/family     Per Treatment Team meeting this morning, currently awaiting family decision will continue to follow, provide support and assist with JACQUES needs as they arise.     Cheryl Castro

## 2023-09-25 NOTE — PROGRESS NOTES
* No surgery found *  * No surgery found *  Bedside shift change report given to Redd Jorgensen RN(oncoming nurse) by Saintclair Auerbach RN (offgoing nurse). Report included the following information SBAR, Kardex, ED Summary, Intake/Output, MAR and Recent Results.     Zone Phone:   [phone]    Significant changes during shift:  Pt confused, blood pressure elevated continue monitoring      Patient Information    Elizabeth Coulter  80 y.o.  11/18/2019  6:47 PM by Ivana Parish MD. Elizabeth Coulter was admitted from Home    Problem List    Patient Active Problem List    Diagnosis Date Noted    Altered mental state 11/19/2019    Facial droop 10/27/2019    Acute blood loss anemia 09/29/2019    Rectal bleeding 09/23/2019    Pneumonia 09/23/2019    Severe sepsis ([address] 09/23/2019    Flank pain 08/01/2019    Spinal stenosis of lumbar region at multiple levels 05/13/2019    Bilateral carotid artery stenosis 04/12/2019    Severe obesity ([address] 01/03/2019    TESSA on CPAP 01/03/2019    Simple chronic bronchitis ([address] 01/03/2019    Fatigue 08/16/2017    CAD (coronary artery disease) 08/16/2017    Dyslipidemia 08/16/2017    On statin therapy 08/16/2017    Gout 08/16/2017    GERD (gastroesophageal reflux disease) 08/16/2017    TIA (transient ischemic attack) 08/16/2017    Dyspnea 08/16/2017    Colon polyps 08/16/2017    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    Hypertension 08/16/2017    ED (erectile dysfunction) 08/16/2017    Hypoxia 04/23/2015    Neurogenic claudication 04/23/2015     Past Medical History:   Diagnosis Date    Adverse effect of anesthesia     combative after anesthesia    Alcohol abuse     6 beers/day    Arthritis     CAD (coronary artery disease)     Chronic obstructive pulmonary disease ([address]     Dyslipidemia 8/16/2017    Dyspepsia and other specified disorders of function of stomach     Dyspnea 8/16/2017    ED (erectile dysfunction) 8/16/2017    Encounter for immunization 8/16/2017    Fatigue 8/16/2017 Subjective   History of Present Illness  67yo M h/o COPD p/w L hand erythema and mild swelling that started yesterday. Patient reports 4-5 days ago he turned the corner too quickly and hit his L hand and developed a skin tear. Was seen at the ER in [facility] as he was on vacation and they cleaned up the wound and gave him a tdap. Yesterday started to have some redness and swelling of the L forearm/hand. No drainage. No fevers. Has been covering the skin tear with neosporin.    History provided by:  Patient and significant other    Review of Systems   Skin:         L forearm erythema and tenderness   All other systems reviewed and are negative.    Past Medical History:   Diagnosis Date    Anemia     as a child    Asthma     Cancer     Cataracts, bilateral     COPD (chronic obstructive pulmonary disease)     Erectile dysfunction 2018    Being treated    GERD (gastroesophageal reflux disease)     GI (gastrointestinal bleed) 11/25/2022    Led to hospitalization for colonitis    Glaucoma     History of kidney stones     History of pneumonia     History of thyroid disease     Hypertension     Kidney stones     Morbid obesity     Pneumonia 2004    Diagnosed with bilateral pneumonia hospitalized for one week    Restrictive lung disease secondary to obesity     Sleep apnea     CPAP       Allergies   Allergen Reactions    Sildenafil Other (See Comments)     tachycardia       Past Surgical History:   Procedure Laterality Date    CARDIOVASCULAR STRESS TEST      CATARACT EXTRACTION Right 04/2019    CATARACT EXTRACTION Left 12/2019    COLONOSCOPY  2015    COLONOSCOPY N/A 11/26/2022    Procedure: COLONOSCOPY to cecum with random cold bxs;  Surgeon: Bianca Leija MD;  Location: [facility] ENDOSCOPY;  Service: Gastroenterology;  Laterality: N/A;  pre: rectal bleeding  post: diverticulosis, left colon colitis(70cm-30/40cm)    ENDOSCOPY N/A 11/26/2022    Procedure: ESOPHAGOGASTRODUODENOSCOPY with cold bx;  Surgeon: Bianca Leija  MD;  Location: [facility] ENDOSCOPY;  Service: Gastroenterology;  Laterality: N/A;  pre:epigasstric pain  post: gastritis    NOSE SURGERY      PILONIDAL CYSTECTOMY      pilonidal cyst resection    SINUS SURGERY      UPPER GASTROINTESTINAL ENDOSCOPY  2022       Family History   Problem Relation Age of Onset    Other Mother         family history cardiac disorder    COPD Mother         family history    Heart failure Mother     Arthritis Mother     Dementia Mother     Heart disease Mother     Hypertension Mother     Asthma Father         famiy history    Hypertension Father         family history    Diabetes Father             Arrhythmia Father         a-fib    Dementia Father     Rheumatic fever Father     Heart disease Father     Other Maternal Grandfather         family history cardiac disorder    Prostate cancer Maternal Grandfather         family history    Heart failure Maternal Grandfather     Hypertension Paternal Grandfather         Heat Stroke    Heart disease Paternal Grandfather     Cancer Other         family history    Diabetes Other         family history diabetes mellitus    Heart disease Other         family history    Stroke Other         family history stroke syndrome    Other Brother         Down Syndrome     Down syndrome Brother     Liver disease Brother         Downs syndrome adult    No Known Problems Daughter     No Known Problems Son     No Known Problems Other     No Known Problems Son     COPD Paternal Grandmother                 Social History     Socioeconomic History    Marital status:      Spouse name: Cherrie    Number of children: 3    Years of education: Master's   Tobacco Use    Smoking status: Former     Packs/day: 1.50     Years: 35.00     Pack years: 52.50     Types: Cigarettes     Start date: 1971     Quit date: 2007     Years since quittin.7    Smokeless tobacco: Never    Tobacco comments:     Began smoking age 16.  Smoked 2 ppd   Flank pain 8/1/2019    GERD (gastroesophageal reflux disease) 8/16/2017    Gout 8/16/2017    Hypertension     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    Morbid obesity ([email or web address]     On statin therapy 8/16/2017    TESSA on CPAP 1/3/2019    Psychiatric disorder     Depression    Simple chronic bronchitis ([email or web address] 1/3/2019    TIA (transient ischemic attack) 5/70/2317    Umbilical hernia 12/9/5650         Core Measures:    CVA: No No  CHF:No No  PNA:No No    Activity Status:    OOB to Chair Yes  Ambulated this shift Yes   Bed Rest No    LINES AND DRAINS:    PIV    DVT prophylaxis:    DVT prophylaxis Med- Yes  DVT prophylaxis SCD or MOISES- No     Wounds: (If Applicable)    Wounds- No    Patient Safety:    Falls Score Total Score: 4  Safety Level_______  Bed Alarm On? Yes  Sitter?  No    Plan for upcoming shift:safety, IV antibiotics, monitor Blood pressure, LP tomorrow    Discharge Plan: TBD    Active Consults:  IP CONSULT TO NEUROLOGY  IP CONSULT TO CARDIOLOGY for 20 years then 1 ppd for 16 years.  He is a former smoker quitting 10 years ago with a 56 pack year history.   Substance and Sexual Activity    Alcohol use: Yes     Alcohol/week: 4.0 standard drinks     Types: 2 Cans of beer, 2 Shots of liquor per week     Comment: OCC/  Daily caffeine use    Drug use: Never    Sexual activity: Yes     Partners: Female     Birth control/protection: None, Hysterectomy           Objective   Physical Exam  Vitals and nursing note reviewed.   Constitutional:       General: He is not in acute distress.     Appearance: Normal appearance. He is not ill-appearing.   HENT:      Head: Normocephalic and atraumatic.      Nose: Nose normal.      Mouth/Throat:      Mouth: Mucous membranes are moist.   Eyes:      General: No scleral icterus.     Extraocular Movements: Extraocular movements intact.      Pupils: Pupils are equal, round, and reactive to light.   Pulmonary:      Effort: Pulmonary effort is normal.   Musculoskeletal:         General: Normal range of motion.      Cervical back: Normal range of motion and neck supple.      Comments: L distal forearm with skin tear semicircular approx 1.5in diameter with mild erythema extending distal from skin tear, no crepitus, mild swelling, ttp, full ROM L wrist   Skin:     General: Skin is warm and dry.      Capillary Refill: Capillary refill takes less than 2 seconds.   Neurological:      General: No focal deficit present.      Mental Status: He is alert and oriented to person, place, and time.       Procedures           ED Course                                           Medical Decision Making  67yo M h/o COPD p/w L hand erythema and mild swelling that started yesterday. Exam suggestive of cellulitis from the skin tear, no signs of septic joint, will give ceftriaxone, keflex rx, discussed strict return precautions and care instructions with pt and wife at bedside.    Problems Addressed:  Cellulitis of left upper extremity: complicated acute  illness or injury  Skin tear of left forearm without complication, initial encounter: complicated acute illness or injury    Risk  Prescription drug management.        Final diagnoses:   Cellulitis of left upper extremity   Skin tear of left forearm without complication, initial encounter       ED Disposition  ED Disposition       ED Disposition   Discharge    Condition   Stable    Comment   --               Dre Zamorano MD  [address] [facility]  [address]  [facility] [id number] [phone]               Medication List        New Prescriptions      cephalexin 500 MG capsule  Commonly known as: KEFLEX  Take 1 capsule by mouth 4 (Four) Times a Day for 7 days.            Stop      cefdinir 300 MG capsule  Commonly known as: OMNICEF               Where to Get Your Medications        These medications were sent to [id number] - [place], [place] - [address] AT [facility] [address] - [phone]  - [phone]   [address], [facility] [address]      Phone: [phone]   cephalexin 500 MG capsule

## 2023-09-28 RX ORDER — ATORVASTATIN CALCIUM 80 MG/1
80 TABLET, FILM COATED ORAL DAILY
Qty: 90 TABLET | Refills: 3 | Status: SHIPPED | OUTPATIENT
Start: 2023-09-28

## 2023-09-28 NOTE — TELEPHONE ENCOUNTER
Disp Refills Start End    atorvastatin (LIPITOR) 80 MG tablet 90 tablet 3 7/3/2023     Sig - Route:  Take 1 tablet by mouth daily - Oral      Last visit:4/5/23  Future appt: 10/5/23    Pharmacy [address] [place] [address]

## 2023-10-17 ENCOUNTER — OFFICE VISIT (OUTPATIENT)
Age: 88
End: 2023-10-17
Payer: MEDICARE

## 2023-10-17 VITALS
OXYGEN SATURATION: 96 % | SYSTOLIC BLOOD PRESSURE: 124 MMHG | HEART RATE: 68 BPM | BODY MASS INDEX: 32.99 KG/M2 | DIASTOLIC BLOOD PRESSURE: 86 MMHG | HEIGHT: 68 IN

## 2023-10-17 DIAGNOSIS — Z86.73 OLD CEREBROVASCULAR ACCIDENT (CVA) WITHOUT LATE EFFECT: Primary | ICD-10-CM

## 2023-10-17 DIAGNOSIS — Z87.898 HISTORY OF SEIZURE: ICD-10-CM

## 2023-10-17 PROCEDURE — G8417 CALC BMI ABV UP PARAM F/U: HCPCS | Performed by: PSYCHIATRY & NEUROLOGY

## 2023-10-17 PROCEDURE — 99213 OFFICE O/P EST LOW 20 MIN: CPT | Performed by: PSYCHIATRY & NEUROLOGY

## 2023-10-17 PROCEDURE — G8427 DOCREV CUR MEDS BY ELIG CLIN: HCPCS | Performed by: PSYCHIATRY & NEUROLOGY

## 2023-10-17 PROCEDURE — G8484 FLU IMMUNIZE NO ADMIN: HCPCS | Performed by: PSYCHIATRY & NEUROLOGY

## 2023-10-17 PROCEDURE — 1036F TOBACCO NON-USER: CPT | Performed by: PSYCHIATRY & NEUROLOGY

## 2023-10-17 PROCEDURE — 1123F ACP DISCUSS/DSCN MKR DOCD: CPT | Performed by: PSYCHIATRY & NEUROLOGY

## 2023-10-17 RX ORDER — GABAPENTIN 100 MG/1
200 CAPSULE ORAL 3 TIMES DAILY
COMMUNITY
Start: 2019-06-14

## 2023-10-17 RX ORDER — ALLOPURINOL 300 MG/1
TABLET ORAL
COMMUNITY
Start: 2019-01-11

## 2023-10-17 RX ORDER — CLOPIDOGREL BISULFATE 75 MG/1
75 TABLET ORAL DAILY
COMMUNITY
Start: 2019-04-15

## 2023-10-17 RX ORDER — OLMESARTAN MEDOXOMIL 40 MG/1
TABLET ORAL
COMMUNITY
Start: 2019-01-11

## 2023-10-17 RX ORDER — CLONAZEPAM 0.5 MG/1
0.5 TABLET ORAL
COMMUNITY
Start: 2019-04-26

## 2023-10-17 NOTE — PROGRESS NOTES
Chief Complaint   Patient presents with    Follow-up     Hx of cerebral infarction       SUBJECTIVE  Rebeca Cherry came back for follow-up. He was last seen a year ago. He continues to do well and he has not had any further seizure-like activity. Does not take Keppra anymore. He has remained stable medically and his minor symptoms of numbness in the middle fingers of both hands, intermittent confusion, mild tremor in his hands are all unchanged  He is able to ambulate with a walker but mostly needs assistance with activities of daily living. He is living with his wife and daughter is there with them most of the time. 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 This occurred after his initial discharge when he was in the rehab and had to come back to the hospital.    RECAP  He was hospitalized in May 2021 with AMS/nausea/vomiting/tremors. 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 Was in rehab when he developed pneumonia and was admitted to the hospital.    MRI brain was performed which showed embolic appearing scattered infarcts in bilateral frontal lobes and parieto-occipital lobes. EEG was done on 5/16 which was unremarkable but had another EEG for symptoms of decreased responsiveness, left gaze deviation, lip smacking on 5/20 and it showed a generalized nonconvulsive status that resolved after administration of Ativan.    He was treated with Keppra and lacosamide but lacosamide was causing excessive drowsiness and it was weaned down  He was discharged on Keppra 1000 mg twice daily and this was reduced to 750 mg twice daily at the rehab  Has not had any further seizure recurrence      EXAM  Vitals:    10/17/23 1141   BP: 124/86   Pulse: 68   SpO2: 96%       Gen:  CV: RRR  Lungs: non labored breathing  Abd: non distending  Neuro: A&O x 3, no dysarthria or aphasia  CN II-XII: PERRL, EOMI, face symmetric,

## 2023-10-23 ENCOUNTER — OFFICE VISIT (OUTPATIENT)
Facility: CLINIC | Age: 88
End: 2023-10-23
Payer: MEDICARE

## 2023-10-23 VITALS
WEIGHT: 220 LBS | DIASTOLIC BLOOD PRESSURE: 80 MMHG | BODY MASS INDEX: 33.34 KG/M2 | OXYGEN SATURATION: 93 % | HEIGHT: 68 IN | HEART RATE: 70 BPM | SYSTOLIC BLOOD PRESSURE: 122 MMHG | TEMPERATURE: 98.1 F | RESPIRATION RATE: 20 BRPM

## 2023-10-23 DIAGNOSIS — R53.83 FATIGUE, UNSPECIFIED TYPE: ICD-10-CM

## 2023-10-23 DIAGNOSIS — N18.30 STAGE 3 CHRONIC KIDNEY DISEASE, UNSPECIFIED WHETHER STAGE 3A OR 3B CKD (HCC): ICD-10-CM

## 2023-10-23 DIAGNOSIS — Z23 NEEDS FLU SHOT: ICD-10-CM

## 2023-10-23 DIAGNOSIS — I25.118 CORONARY ARTERY DISEASE OF NATIVE ARTERY OF NATIVE HEART WITH STABLE ANGINA PECTORIS (HCC): ICD-10-CM

## 2023-10-23 DIAGNOSIS — M10.09 IDIOPATHIC GOUT OF MULTIPLE SITES, UNSPECIFIED CHRONICITY: ICD-10-CM

## 2023-10-23 DIAGNOSIS — Z00.00 MEDICARE ANNUAL WELLNESS VISIT, SUBSEQUENT: Primary | ICD-10-CM

## 2023-10-23 DIAGNOSIS — K21.9 GASTROESOPHAGEAL REFLUX DISEASE WITHOUT ESOPHAGITIS: ICD-10-CM

## 2023-10-23 DIAGNOSIS — I10 PRIMARY HYPERTENSION: ICD-10-CM

## 2023-10-23 DIAGNOSIS — Z79.899 ON STATIN THERAPY: ICD-10-CM

## 2023-10-23 DIAGNOSIS — J43.1 PANLOBULAR EMPHYSEMA (HCC): ICD-10-CM

## 2023-10-23 DIAGNOSIS — R73.02 IGT (IMPAIRED GLUCOSE TOLERANCE): ICD-10-CM

## 2023-10-23 PROCEDURE — 1123F ACP DISCUSS/DSCN MKR DOCD: CPT | Performed by: INTERNAL MEDICINE

## 2023-10-23 PROCEDURE — G0439 PPPS, SUBSEQ VISIT: HCPCS | Performed by: INTERNAL MEDICINE

## 2023-10-23 PROCEDURE — G0008 ADMIN INFLUENZA VIRUS VAC: HCPCS | Performed by: INTERNAL MEDICINE

## 2023-10-23 PROCEDURE — 90694 VACC AIIV4 NO PRSRV 0.5ML IM: CPT | Performed by: INTERNAL MEDICINE

## 2023-10-23 PROCEDURE — G8484 FLU IMMUNIZE NO ADMIN: HCPCS | Performed by: INTERNAL MEDICINE

## 2023-10-23 RX ORDER — DULOXETIN HYDROCHLORIDE 30 MG/1
30 CAPSULE, DELAYED RELEASE ORAL DAILY
Qty: 90 CAPSULE | Refills: 3 | Status: SHIPPED | OUTPATIENT
Start: 2023-10-23 | End: 2023-10-31

## 2023-10-23 RX ORDER — FUROSEMIDE 20 MG/1
TABLET ORAL
Qty: 270 TABLET | Refills: 3 | Status: SHIPPED | OUTPATIENT
Start: 2023-10-23

## 2023-10-23 RX ORDER — HYDRALAZINE HYDROCHLORIDE 100 MG/1
100 TABLET, FILM COATED ORAL 3 TIMES DAILY
Qty: 90 TABLET | Refills: 3 | Status: SHIPPED | OUTPATIENT
Start: 2023-10-23

## 2023-10-23 RX ORDER — ASPIRIN 81 MG/1
81 TABLET ORAL DAILY
Qty: 90 TABLET | Refills: 3 | Status: SHIPPED | OUTPATIENT
Start: 2023-10-23

## 2023-10-23 RX ORDER — OLMESARTAN MEDOXOMIL 40 MG/1
40 TABLET ORAL DAILY
Qty: 90 TABLET | Refills: 3 | Status: SHIPPED | OUTPATIENT
Start: 2023-10-23

## 2023-10-23 RX ORDER — ATENOLOL 25 MG/1
12.5 TABLET ORAL DAILY
Qty: 90 TABLET | Refills: 3 | Status: SHIPPED | OUTPATIENT
Start: 2023-10-23

## 2023-10-23 RX ORDER — ISOSORBIDE MONONITRATE 30 MG/1
30 TABLET, EXTENDED RELEASE ORAL DAILY
Qty: 90 TABLET | Refills: 3 | Status: SHIPPED | OUTPATIENT
Start: 2023-10-23

## 2023-10-23 RX ORDER — AMLODIPINE BESYLATE 5 MG/1
5 TABLET ORAL DAILY
Qty: 30 TABLET | Refills: 1 | Status: SHIPPED | OUTPATIENT
Start: 2023-10-23

## 2023-10-23 RX ORDER — ATORVASTATIN CALCIUM 80 MG/1
80 TABLET, FILM COATED ORAL DAILY
Qty: 90 TABLET | Refills: 3 | Status: SHIPPED | OUTPATIENT
Start: 2023-10-23

## 2023-10-23 RX ORDER — ALLOPURINOL 300 MG/1
300 TABLET ORAL DAILY
Qty: 90 TABLET | Refills: 3 | Status: SHIPPED | OUTPATIENT
Start: 2023-10-23

## 2023-10-23 ASSESSMENT — PATIENT HEALTH QUESTIONNAIRE - PHQ9
5. POOR APPETITE OR OVEREATING: 0
SUM OF ALL RESPONSES TO PHQ9 QUESTIONS 1 & 2: 0
7. TROUBLE CONCENTRATING ON THINGS, SUCH AS READING THE NEWSPAPER OR WATCHING TELEVISION: 0
10. IF YOU CHECKED OFF ANY PROBLEMS, HOW DIFFICULT HAVE THESE PROBLEMS MADE IT FOR YOU TO DO YOUR WORK, TAKE CARE OF THINGS AT HOME, OR GET ALONG WITH OTHER PEOPLE: 0
SUM OF ALL RESPONSES TO PHQ QUESTIONS 1-9: 0
8. MOVING OR SPEAKING SO SLOWLY THAT OTHER PEOPLE COULD HAVE NOTICED. OR THE OPPOSITE, BEING SO FIGETY OR RESTLESS THAT YOU HAVE BEEN MOVING AROUND A LOT MORE THAN USUAL: 0
2. FEELING DOWN, DEPRESSED OR HOPELESS: 0
1. LITTLE INTEREST OR PLEASURE IN DOING THINGS: 0
4. FEELING TIRED OR HAVING LITTLE ENERGY: 0
6. FEELING BAD ABOUT YOURSELF - OR THAT YOU ARE A FAILURE OR HAVE LET YOURSELF OR YOUR FAMILY DOWN: 0
SUM OF ALL RESPONSES TO PHQ QUESTIONS 1-9: 0
3. TROUBLE FALLING OR STAYING ASLEEP: 0
9. THOUGHTS THAT YOU WOULD BE BETTER OFF DEAD, OR OF HURTING YOURSELF: 0

## 2023-10-23 ASSESSMENT — LIFESTYLE VARIABLES
HOW OFTEN DO YOU HAVE A DRINK CONTAINING ALCOHOL: NEVER
HOW MANY STANDARD DRINKS CONTAINING ALCOHOL DO YOU HAVE ON A TYPICAL DAY: PATIENT DOES NOT DRINK

## 2023-10-23 NOTE — PROGRESS NOTES
Mona Garvin is a 80 y.o. male presenting for 6 Month Follow-Up and Medicare AWV  . 1. Have you been to the ER, urgent care clinic since your last visit? Hospitalized since your last visit? No    2. Have you seen or consulted any other health care providers outside of the [address] [place] [address] since your last visit? Include any pap smears or colon screening. No    After obtaining written consent and per orders of Dr. Mitzy Hoover, injection of Fluad given by Roya Perry MA. Patient tolerated procedure well. VIS was given to them. No reactions noted.
not taking: Reported on 10/17/2023  Automatic Reconciliation, Ar   dorzolamide-timolol (COSOPT) 22.3-6.8 MG/ML ophthalmic solution 1 drop 2 times daily  Patient not taking: Reported on 10/17/2023  Automatic Reconciliation, Ar   levETIRAcetam (KEPPRA) 500 MG tablet TAKE ONE TABLET BY MOUTH TWICE A DAY  Patient not taking: Reported on 10/17/2023  Automatic Reconciliation, Ar   polyethylene glycol (GLYCOLAX) 17 GM/SCOOP powder Take 17 g by mouth daily  Patient not taking: Reported on 10/17/2023  Automatic Reconciliation, Ar   prednisoLONE acetate (PRED FORTE) 1 % ophthalmic suspension 1 drop 4 times daily  Patient not taking: Reported on 10/17/2023  Automatic Reconciliation, Ar   umeclidinium-vilanterol (ANORO ELLIPTA) 62.5-25 MCG/ACT inhaler Inhale 1 puff into the lungs daily  Patient not taking: Reported on 10/17/2023  Automatic Reconciliation, Ar       CareTeam (Including outside providers/suppliers regularly involved in providing care):   Patient Care Team:  Rd Boone MD as PCP - Sanket Garcia MD as PCP - Empaneled Provider  Kamran Vasquez MD as Surgeon     Reviewed and updated this visit:  Tobacco  Allergies  Meds  Med Hx  Surg Hx  Soc Hx  Fam Hx

## 2023-10-24 LAB
ALBUMIN SERPL-MCNC: 3.9 G/DL (ref 3.5–5)
ALBUMIN/GLOB SERPL: 1.2 (ref 1.1–2.2)
ALP SERPL-CCNC: 140 U/L (ref 45–117)
ALT SERPL-CCNC: 23 U/L (ref 12–78)
ANION GAP SERPL CALC-SCNC: 10 MMOL/L (ref 5–15)
APPEARANCE UR: CLEAR
AST SERPL-CCNC: 17 U/L (ref 15–37)
BASOPHILS # BLD: 0.1 K/UL (ref 0–0.1)
BASOPHILS NFR BLD: 1 % (ref 0–1)
BILIRUB SERPL-MCNC: 0.5 MG/DL (ref 0.2–1)
BILIRUB UR QL: NEGATIVE
BUN SERPL-MCNC: 24 MG/DL (ref 6–20)
BUN/CREAT SERPL: 12 (ref 12–20)
CALCIUM SERPL-MCNC: 8.7 MG/DL (ref 8.5–10.1)
CHLORIDE SERPL-SCNC: 106 MMOL/L (ref 97–108)
CHOLEST SERPL-MCNC: 114 MG/DL
CK SERPL-CCNC: 71 U/L (ref 39–308)
CO2 SERPL-SCNC: 25 MMOL/L (ref 21–32)
COLOR UR: NORMAL
CREAT SERPL-MCNC: 2.03 MG/DL (ref 0.7–1.3)
DIFFERENTIAL METHOD BLD: ABNORMAL
EOSINOPHIL # BLD: 0.2 K/UL (ref 0–0.4)
EOSINOPHIL NFR BLD: 2 % (ref 0–7)
ERYTHROCYTE [DISTWIDTH] IN BLOOD BY AUTOMATED COUNT: 15 % (ref 11.5–14.5)
EST. AVERAGE GLUCOSE BLD GHB EST-MCNC: 117 MG/DL
GLOBULIN SER CALC-MCNC: 3.3 G/DL (ref 2–4)
GLUCOSE SERPL-MCNC: 103 MG/DL (ref 65–100)
GLUCOSE UR STRIP.AUTO-MCNC: NEGATIVE MG/DL
HBA1C MFR BLD: 5.7 % (ref 4–5.6)
HCT VFR BLD AUTO: 42.7 % (ref 36.6–50.3)
HDLC SERPL-MCNC: 33 MG/DL
HDLC SERPL: 3.5 (ref 0–5)
HGB BLD-MCNC: 13.7 G/DL (ref 12.1–17)
HGB UR QL STRIP: NEGATIVE
IMM GRANULOCYTES # BLD AUTO: 0 K/UL (ref 0–0.04)
IMM GRANULOCYTES NFR BLD AUTO: 0 % (ref 0–0.5)
KETONES UR QL STRIP.AUTO: NEGATIVE MG/DL
LDLC SERPL CALC-MCNC: 53.2 MG/DL (ref 0–100)
LEUKOCYTE ESTERASE UR QL STRIP.AUTO: NEGATIVE
LYMPHOCYTES # BLD: 1.6 K/UL (ref 0.8–3.5)
LYMPHOCYTES NFR BLD: 21 % (ref 12–49)
MCH RBC QN AUTO: 29.5 PG (ref 26–34)
MCHC RBC AUTO-ENTMCNC: 32.1 G/DL (ref 30–36.5)
MCV RBC AUTO: 92 FL (ref 80–99)
MONOCYTES # BLD: 0.7 K/UL (ref 0–1)
MONOCYTES NFR BLD: 9 % (ref 5–13)
NEUTS SEG # BLD: 5.2 K/UL (ref 1.8–8)
NEUTS SEG NFR BLD: 67 % (ref 32–75)
NITRITE UR QL STRIP.AUTO: NEGATIVE
NRBC # BLD: 0 K/UL (ref 0–0.01)
NRBC BLD-RTO: 0 PER 100 WBC
PH UR STRIP: 6 (ref 5–8)
PLATELET # BLD AUTO: 230 K/UL (ref 150–400)
PMV BLD AUTO: 10.9 FL (ref 8.9–12.9)
POTASSIUM SERPL-SCNC: 4 MMOL/L (ref 3.5–5.1)
PROT SERPL-MCNC: 7.2 G/DL (ref 6.4–8.2)
PROT UR STRIP-MCNC: NEGATIVE MG/DL
RBC # BLD AUTO: 4.64 M/UL (ref 4.1–5.7)
SODIUM SERPL-SCNC: 141 MMOL/L (ref 136–145)
SP GR UR REFRACTOMETRY: 1.01 (ref 1–1.03)
TRIGL SERPL-MCNC: 139 MG/DL
URATE SERPL-MCNC: 8.5 MG/DL (ref 3.5–7.2)
UROBILINOGEN UR QL STRIP.AUTO: 0.2 EU/DL (ref 0.2–1)
VLDLC SERPL CALC-MCNC: 27.8 MG/DL
WBC # BLD AUTO: 7.8 K/UL (ref 4.1–11.1)

## 2023-10-30 NOTE — TELEPHONE ENCOUNTER
Last Refill: Rx was printed on 12-23-23  Last Visit: 10/23/2023   Next Visit: 4/24/2024     Requested Prescriptions     Pending Prescriptions Disp Refills    DULoxetine (CYMBALTA) 30 MG extended release capsule [Pharmacy Med Name: DULoxetine HCL DR 30 MG CAPSULE] 30 capsule 3     Sig: TAKE 1 CAPSULE BY MOUTH DAILY

## 2023-10-31 RX ORDER — DULOXETIN HYDROCHLORIDE 30 MG/1
30 CAPSULE, DELAYED RELEASE ORAL DAILY
Qty: 30 CAPSULE | Refills: 3 | Status: SHIPPED | OUTPATIENT
Start: 2023-10-31

## 2023-11-13 RX ORDER — TRAZODONE HYDROCHLORIDE 100 MG/1
100 TABLET ORAL NIGHTLY
Qty: 90 TABLET | Refills: 0 | Status: CANCELLED | OUTPATIENT
Start: 2023-11-13

## 2023-11-13 RX ORDER — TRAZODONE HYDROCHLORIDE 100 MG/1
100 TABLET ORAL NIGHTLY
Qty: 90 TABLET | Refills: 1 | Status: SHIPPED | OUTPATIENT
Start: 2023-11-13

## 2023-11-13 NOTE — TELEPHONE ENCOUNTER
traZODone (DESYREL) 100 mg tablet 90 Tablet 3 8/11/2022    Sig: TAKE ONE TABLET BY MOUTH ONCE NIGHTLY    Last visit: 10/23/23  Future appt: 4/24/24    Pharmacy: [facility]

## 2024-02-13 NOTE — TELEPHONE ENCOUNTER
PCP: BERNIE Tee MD    Last appt: 10/23/2023  Future Appointments   Date Time Provider Department Center   4/24/2024  1:00 PM BERNIE Tee MD [facility]       Requested Prescriptions     Pending Prescriptions Disp Refills    hydrALAZINE (APRESOLINE) 100 MG tablet 90 tablet 3     Sig: Take 1 tablet by mouth 3 times daily       Prior labs and Blood pressures:  BP Readings from Last 3 Encounters:   10/23/23 122/80   10/17/23 124/86   04/05/23 132/70     Lab Results   Component Value Date/Time     10/23/2023 03:43 PM    K 4.0 10/23/2023 03:43 PM     10/23/2023 03:43 PM    CO2 25 10/23/2023 03:43 PM    BUN 24 10/23/2023 03:43 PM    GFRAA 51 05/31/2022 09:47 AM     No results found for: \"HBA1C\", \"DTG1UIMV\"  Lab Results   Component Value Date/Time    CHOL 114 10/23/2023 03:43 PM    HDL 33 10/23/2023 03:43 PM    VLDL 60 09/23/2020 08:33 AM     No results found for: \"VITD3\", \"VD3RIA\"        Lab Results   Component Value Date/Time    TSH 1.45 05/16/2021 09:33 PM        Results faxed to Dr. Mayo's office per patient request.

## 2024-02-13 NOTE — TELEPHONE ENCOUNTER
hydrALAZINE (APRESOLINE) 100 MG tablet 90 tablet 3 10/23/2023 --    Sig - Route: Take 1 tablet by mouth 3 times daily - Oral        Last visit 10/23/23  Future appt 4/24/24    Ph[facility]acy [facility]

## 2024-02-14 RX ORDER — HYDRALAZINE HYDROCHLORIDE 100 MG/1
100 TABLET, FILM COATED ORAL 3 TIMES DAILY
Qty: 90 TABLET | Refills: 3 | Status: SHIPPED | OUTPATIENT
Start: 2024-02-14

## 2024-02-19 RX ORDER — AMLODIPINE BESYLATE 5 MG/1
5 TABLET ORAL DAILY
Qty: 90 TABLET | Refills: 1 | Status: SHIPPED | OUTPATIENT
Start: 2024-02-19

## 2024-02-19 NOTE — TELEPHONE ENCOUNTER
PCP: BERNIE Tee MD    Last appt: 10/23/2023    Future Appointments   Date Time Provider Department Center   4/24/2024  1:00 PM BERNIE Tee MD [facility]       Requested Prescriptions     Pending Prescriptions Disp Refills    amLODIPine (NORVASC) 5 MG tablet [Pharmacy Med Name: amLODIPine BESYLATE 5 MG TAB] 90 tablet 1     Sig: TAKE 1 TABLET BY MOUTH DAILY

## 2024-02-26 NOTE — PROGRESS NOTES
Brief Operative Note    Patient: Sharmin Marshall 81 year old female    MRN: [id number]    Surgeon(s): Kwabena Raymundo MD  Phone Number: [phone]                       Co-Surgeon(s) and Role:     * Kwabena Raymundo MD -orthopedic spinal surgeon     * Jun Oneal MD-neurosurgeon    Assistant(s): None available    Pre-Op Diagnosis: POST OP WOUND INFECTION     Post-Op Diagnosis: Same as above     Procedure: Procedure(s):  I + D LOWER LUMBAR SPINE, incision and drainage lumbar wound superficial with irrigation and debridement and culture and sensitivity including aerobic anaerobic fungal and AFB.    Anesthesia Type: No value filed.  General anesthesia                                   Complications: None    Description: See above    Findings: See above    Specimens Removed:   ID Type Source Tests Collected by Time   1 : SUPRAFACIAL SPINAL WOUND Wound Spine, Lumbar MYCOBACTERIAL CULTURE AND ACID FAST SMEAR Kwabena Raymundo MD 2/26/2024 1646   2 : SUPRAFACIAL SPINAL WOUND Wound Spine, Lumbar ANAEROBE/AEROBE, BACTERIAL CULTURE WITH GRAM STAIN Kwabena Raymundo MD 2/26/2024 1648   3 : SUPRAFACIAL SPINAL WOUND Wound Spine, Lumbar FUNGAL CULTURE AND SMEAR Kwabena Raymundo MD 2/26/2024 1648        Estimated Blood Loss: No Value 25 cc    Assistant Tasks: None     Implants: * No implants in log *      I was present for the key portions of the procedure and was immediately available for the non-key portions         Problem: Risk for Spread of Infection  Goal: Prevent transmission of infectious organism to others  Description: Prevent the transmission of infectious organisms to other patients, staff members, and visitors. Outcome: Progressing Towards Goal     Problem: Patient Education:  Go to Education Activity  Goal: Patient/Family Education  Outcome: Progressing Towards Goal     Problem: Non-Violent Restraints  Goal: Removal from restraints as soon as assessed to be safe  Outcome: Progressing Towards Goal  Goal: No harm/injury to patient while restraints in use  Outcome: Progressing Towards Goal  Goal: Patient's dignity will be maintained  Outcome: Progressing Towards Goal  Goal: Patient Interventions  Outcome: Progressing Towards Goal     Problem: Falls - Risk of  Goal: *Absence of Falls  Description: Document Ayla Son Fall Risk and appropriate interventions in the flowsheet. Outcome: Progressing Towards Goal  Note: Fall Risk Interventions:       Mentation Interventions: Increase mobility    Medication Interventions: Bed/chair exit alarm    Elimination Interventions: Bed/chair exit alarm, Call light in reach, Toileting schedule/hourly rounds              Problem: Patient Education: Go to Patient Education Activity  Goal: Patient/Family Education  Outcome: Progressing Towards Goal     Problem: Pressure Injury - Risk of  Goal: *Prevention of pressure injury  Description: Document Aquiles Scale and appropriate interventions in the flowsheet. Outcome: Progressing Towards Goal  Note: Pressure Injury Interventions:  Sensory Interventions: Minimize linen layers    Moisture Interventions: Minimize layers    Activity Interventions: PT/OT evaluation    Mobility Interventions: Turn and reposition approx.  every two hours(pillow and wedges)    Nutrition Interventions: Document food/fluid/supplement intake    Friction and Shear Interventions: Minimize layers                Problem: Nutrition Deficit  Goal: *Optimize nutritional status  Outcome: Progressing Towards Goal     Problem: Patient Education: Go to Patient Education Activity  Goal: Patient/Family Education  Outcome: Progressing Towards Goal     Problem: TIA/CVA Stroke: 0-24 hours  Goal: Off Pathway (Use only if patient is Off Pathway)  Outcome: Progressing Towards Goal  Goal: Activity/Safety  Outcome: Progressing Towards Goal  Goal: Nutrition/Diet  Outcome: Progressing Towards Goal  Goal: Discharge Planning  Outcome: Progressing Towards Goal  Goal: Medications  Outcome: Progressing Towards Goal  Goal: Respiratory  Outcome: Progressing Towards Goal  Goal: Treatments/Interventions/Procedures  Outcome: Progressing Towards Goal  Goal: Minimize risk of bleeding post-thrombolytic infusion  Outcome: Progressing Towards Goal  Goal: Monitor for complications post-thrombolytic infusion  Outcome: Progressing Towards Goal  Goal: Psychosocial  Outcome: Progressing Towards Goal  Goal: *Hemodynamically stable  Outcome: Progressing Towards Goal  Goal: *Neurologically stable  Description: Absence of additional neurological deficits    Outcome: Progressing Towards Goal  Goal: *Verbalizes anxiety and depression are reduced or absent  Outcome: Progressing Towards Goal  Goal: *Absence of Signs of Aspiration on Current Diet  Outcome: Progressing Towards Goal  Goal: *Absence of deep venous thrombosis signs and symptoms(Stroke Metric)  Outcome: Progressing Towards Goal  Goal: *Ability to perform ADLs and demonstrates progressive mobility and function  Outcome: Progressing Towards Goal  Goal: *Stroke education started(Stroke Metric)  Outcome: Progressing Towards Goal  Goal: *Dysphagia screen performed(Stroke Metric)  Outcome: Progressing Towards Goal  Goal: *Rehab consulted(Stroke Metric)  Outcome: Progressing Towards Goal     Problem: TIA/CVA Stroke: Day 2 Until Discharge  Goal: Off Pathway (Use only if patient is Off Pathway)  Outcome: Progressing Towards Goal  Goal: Activity/Safety  Outcome: Progressing Towards Goal  Goal: Diagnostic Test/Procedures  Outcome: Progressing Towards Goal  Goal: Nutrition/Diet  Outcome: Progressing Towards Goal  Goal: Discharge Planning  Outcome: Progressing Towards Goal  Goal: Medications  Outcome: Progressing Towards Goal  Goal: Respiratory  Outcome: Progressing Towards Goal  Goal: Treatments/Interventions/Procedures  Outcome: Progressing Towards Goal  Goal: Psychosocial  Outcome: Progressing Towards Goal  Goal: *Verbalizes anxiety and depression are reduced or absent  Outcome: Progressing Towards Goal  Goal: *Absence of aspiration  Outcome: Progressing Towards Goal  Goal: *Absence of deep venous thrombosis signs and symptoms(Stroke Metric)  Outcome: Progressing Towards Goal  Goal: *Optimal pain control at patient's stated goal  Outcome: Progressing Towards Goal  Goal: *Tolerating diet  Outcome: Progressing Towards Goal  Goal: *Ability to perform ADLs and demonstrates progressive mobility and function  Outcome: Progressing Towards Goal  Goal: *Stroke education continued(Stroke Metric)  Outcome: Progressing Towards Goal     Problem: Ischemic Stroke: Discharge Outcomes  Goal: *Verbalizes anxiety and depression are reduced or absent  Outcome: Progressing Towards Goal  Goal: *Verbalize understanding of risk factor modification(Stroke Metric)  Outcome: Progressing Towards Goal  Goal: *Hemodynamically stable  Outcome: Progressing Towards Goal  Goal: *Absence of aspiration pneumonia  Outcome: Progressing Towards Goal  Goal: *Aware of needed dietary changes  Outcome: Progressing Towards Goal  Goal: *Verbalize understanding of prescribed medications including anti-coagulants, anti-lipid, and/or anti-platelets(Stroke Metric)  Outcome: Progressing Towards Goal  Goal: *Tolerating diet  Outcome: Progressing Towards Goal  Goal: *Aware of follow-up diagnostics related to anticoagulants  Outcome: Progressing Towards Goal  Goal: *Ability to perform ADLs and demonstrates progressive mobility and function  Outcome: Progressing Towards Goal  Goal: *Absence of DVT(Stroke Metric)  Outcome: Progressing Towards Goal  Goal: *Absence of aspiration  Outcome: Progressing Towards Goal  Goal: *Optimal pain control at patient's stated goal  Outcome: Progressing Towards Goal  Goal: *Home safety concerns addressed  Outcome: Progressing Towards Goal  Goal: *Describes available resources and support systems  Outcome: Progressing Towards Goal  Goal: *Verbalizes understanding of activation of EMS(911) for stroke symptoms(Stroke Metric)  Outcome: Progressing Towards Goal  Goal: *Understands and describes signs and symptoms to report to providers(Stroke Metric)  Outcome: Progressing Towards Goal  Goal: *Neurolgocially stable (absence of additional neurological deficits)  Outcome: Progressing Towards Goal  Goal: *Verbalizes importance of follow-up with primary care physician(Stroke Metric)  Outcome: Progressing Towards Goal  Goal: *Smoking cessation discussed,if applicable(Stroke Metric)  Outcome: Progressing Towards Goal  Goal: *Depression screening completed(Stroke Metric)  Outcome: Progressing Towards Goal

## 2024-02-27 RX ORDER — TRAZODONE HYDROCHLORIDE 100 MG/1
100 TABLET ORAL NIGHTLY
Qty: 90 TABLET | Refills: 1 | Status: SHIPPED | OUTPATIENT
Start: 2024-02-27

## 2024-02-27 NOTE — TELEPHONE ENCOUNTER
Last Refill: 11-13-23  Last Visit: 10/23/2023   Next Visit: 4/24/2024     Requested Prescriptions     Pending Prescriptions Disp Refills    traZODone (DESYREL) 100 MG tablet [Pharmacy Med Name: traZODone 100 MG TABLET] 90 tablet 1     Sig: TAKE ONE TABLET BY MOUTH ONCE NIGHTLY

## 2024-04-11 NOTE — PROGRESS NOTES
Chief Complaint   Patient presents with    Follow-up     Hx of cerebral infarction     Vitals:    10/17/23 1141   BP: 124/86   Pulse: 68   SpO2: 96% Refill Encounter    PCP Visits: Recent Visits  Date Type Provider Dept   02/27/24 Office Visit Estephania San MD [facility]   11/29/23 Office Visit Estephania San MD [facility]   11/07/23 Office Visit Estephania San MD [facility]   08/22/23 Office Visit Estephania San MD [facility]   07/17/23 Office Visit Estephania San MD [facility]   06/06/23 Office Visit Estephania San MD [facility]   Showing recent visits within past 360 days and meeting all other requirements  Future Appointments  No visits were found meeting these conditions.  Showing future appointments within next 720 days and meeting all other requirements     Last 3 Blood Pressure:   BP Readings from Last 3 Encounters:   04/10/24 (!) 140/87   04/10/24 (!) 140/87   02/27/24 132/82     Preferred Pharmacy:   [facility] [address] [place] [address]  Phone: [phone] Fax: [phone]      Requested RX:  Requested Prescriptions     Pending Prescriptions Disp Refills    tirzepatide 7.5 mg/0.5 mL PnIj 12 Pen 1     Sig: Inject 7.5 mg into the skin every 7 days.      RX Route: Normal

## 2024-04-24 ENCOUNTER — OFFICE VISIT (OUTPATIENT)
Facility: CLINIC | Age: 89
End: 2024-04-24
Payer: MEDICARE

## 2024-04-24 VITALS
SYSTOLIC BLOOD PRESSURE: 114 MMHG | HEART RATE: 65 BPM | DIASTOLIC BLOOD PRESSURE: 77 MMHG | BODY MASS INDEX: 31.41 KG/M2 | TEMPERATURE: 97.9 F | WEIGHT: 206.6 LBS | OXYGEN SATURATION: 92 %

## 2024-04-24 DIAGNOSIS — Z79.899 ON STATIN THERAPY: ICD-10-CM

## 2024-04-24 DIAGNOSIS — F33.0 MAJOR DEPRESSIVE DISORDER, RECURRENT, MILD (HCC): ICD-10-CM

## 2024-04-24 DIAGNOSIS — R73.02 IGT (IMPAIRED GLUCOSE TOLERANCE): ICD-10-CM

## 2024-04-24 DIAGNOSIS — G40.409 OTHER GENERALIZED EPILEPSY, NOT INTRACTABLE, WITHOUT STATUS EPILEPTICUS (HCC): ICD-10-CM

## 2024-04-24 DIAGNOSIS — I10 PRIMARY HYPERTENSION: Primary | ICD-10-CM

## 2024-04-24 DIAGNOSIS — F33.1 MAJOR DEPRESSIVE DISORDER, RECURRENT, MODERATE (HCC): ICD-10-CM

## 2024-04-24 DIAGNOSIS — N18.30 STAGE 3 CHRONIC KIDNEY DISEASE, UNSPECIFIED WHETHER STAGE 3A OR 3B CKD (HCC): ICD-10-CM

## 2024-04-24 DIAGNOSIS — I25.118 CORONARY ARTERY DISEASE OF NATIVE ARTERY OF NATIVE HEART WITH STABLE ANGINA PECTORIS (HCC): ICD-10-CM

## 2024-04-24 DIAGNOSIS — Z86.73 HISTORY OF STROKE: ICD-10-CM

## 2024-04-24 DIAGNOSIS — M10.09 IDIOPATHIC GOUT OF MULTIPLE SITES, UNSPECIFIED CHRONICITY: ICD-10-CM

## 2024-04-24 DIAGNOSIS — J43.1 PANLOBULAR EMPHYSEMA (HCC): ICD-10-CM

## 2024-04-24 DIAGNOSIS — G95.19 OTHER VASCULAR MYELOPATHIES (HCC): ICD-10-CM

## 2024-04-24 LAB
ALBUMIN SERPL-MCNC: 3.4 G/DL (ref 3.5–5)
ALBUMIN/GLOB SERPL: 0.9 (ref 1.1–2.2)
ALP SERPL-CCNC: 148 U/L (ref 45–117)
ALT SERPL-CCNC: 20 U/L (ref 12–78)
ANION GAP SERPL CALC-SCNC: 8 MMOL/L (ref 5–15)
APPEARANCE UR: CLEAR
AST SERPL-CCNC: 25 U/L (ref 15–37)
BASOPHILS # BLD: 0.1 K/UL (ref 0–0.1)
BASOPHILS NFR BLD: 1 % (ref 0–1)
BILIRUB SERPL-MCNC: 0.4 MG/DL (ref 0.2–1)
BILIRUB UR QL: NEGATIVE
BUN SERPL-MCNC: 19 MG/DL (ref 6–20)
BUN/CREAT SERPL: 10 (ref 12–20)
CALCIUM SERPL-MCNC: 9.2 MG/DL (ref 8.5–10.1)
CHLORIDE SERPL-SCNC: 107 MMOL/L (ref 97–108)
CHOLEST SERPL-MCNC: 126 MG/DL
CK SERPL-CCNC: 74 U/L (ref 39–308)
CO2 SERPL-SCNC: 25 MMOL/L (ref 21–32)
COLOR UR: NORMAL
CREAT SERPL-MCNC: 1.87 MG/DL (ref 0.7–1.3)
DIFFERENTIAL METHOD BLD: ABNORMAL
EOSINOPHIL # BLD: 0.2 K/UL (ref 0–0.4)
EOSINOPHIL NFR BLD: 2 % (ref 0–7)
ERYTHROCYTE [DISTWIDTH] IN BLOOD BY AUTOMATED COUNT: 15.9 % (ref 11.5–14.5)
EST. AVERAGE GLUCOSE BLD GHB EST-MCNC: 117 MG/DL
GLOBULIN SER CALC-MCNC: 3.7 G/DL (ref 2–4)
GLUCOSE SERPL-MCNC: 102 MG/DL (ref 65–100)
GLUCOSE UR STRIP.AUTO-MCNC: NEGATIVE MG/DL
HBA1C MFR BLD: 5.7 % (ref 4–5.6)
HCT VFR BLD AUTO: 43.9 % (ref 36.6–50.3)
HDLC SERPL-MCNC: 29 MG/DL
HDLC SERPL: 4.3 (ref 0–5)
HGB BLD-MCNC: 13.7 G/DL (ref 12.1–17)
HGB UR QL STRIP: NEGATIVE
IMM GRANULOCYTES # BLD AUTO: 0 K/UL (ref 0–0.04)
IMM GRANULOCYTES NFR BLD AUTO: 0 % (ref 0–0.5)
KETONES UR QL STRIP.AUTO: NEGATIVE MG/DL
LDLC SERPL CALC-MCNC: 60.6 MG/DL (ref 0–100)
LEUKOCYTE ESTERASE UR QL STRIP.AUTO: NEGATIVE
LYMPHOCYTES # BLD: 1.6 K/UL (ref 0.8–3.5)
LYMPHOCYTES NFR BLD: 16 % (ref 12–49)
MCH RBC QN AUTO: 29.8 PG (ref 26–34)
MCHC RBC AUTO-ENTMCNC: 31.2 G/DL (ref 30–36.5)
MCV RBC AUTO: 95.6 FL (ref 80–99)
MONOCYTES # BLD: 0.8 K/UL (ref 0–1)
MONOCYTES NFR BLD: 8 % (ref 5–13)
NEUTS SEG # BLD: 7.2 K/UL (ref 1.8–8)
NEUTS SEG NFR BLD: 73 % (ref 32–75)
NITRITE UR QL STRIP.AUTO: NEGATIVE
NRBC # BLD: 0 K/UL (ref 0–0.01)
NRBC BLD-RTO: 0 PER 100 WBC
PH UR STRIP: 7 (ref 5–8)
PLATELET # BLD AUTO: 262 K/UL (ref 150–400)
PMV BLD AUTO: 11 FL (ref 8.9–12.9)
POTASSIUM SERPL-SCNC: 5.3 MMOL/L (ref 3.5–5.1)
PROT SERPL-MCNC: 7.1 G/DL (ref 6.4–8.2)
PROT UR STRIP-MCNC: NEGATIVE MG/DL
RBC # BLD AUTO: 4.59 M/UL (ref 4.1–5.7)
SODIUM SERPL-SCNC: 140 MMOL/L (ref 136–145)
SP GR UR REFRACTOMETRY: 1.01 (ref 1–1.03)
TRIGL SERPL-MCNC: 182 MG/DL
URATE SERPL-MCNC: 8.6 MG/DL (ref 3.5–7.2)
UROBILINOGEN UR QL STRIP.AUTO: 0.2 EU/DL (ref 0.2–1)
VLDLC SERPL CALC-MCNC: 36.4 MG/DL
WBC # BLD AUTO: 9.9 K/UL (ref 4.1–11.1)

## 2024-04-24 PROCEDURE — 3023F SPIROM DOC REV: CPT | Performed by: INTERNAL MEDICINE

## 2024-04-24 PROCEDURE — 99214 OFFICE O/P EST MOD 30 MIN: CPT | Performed by: INTERNAL MEDICINE

## 2024-04-24 PROCEDURE — G8427 DOCREV CUR MEDS BY ELIG CLIN: HCPCS | Performed by: INTERNAL MEDICINE

## 2024-04-24 PROCEDURE — 1036F TOBACCO NON-USER: CPT | Performed by: INTERNAL MEDICINE

## 2024-04-24 PROCEDURE — 1123F ACP DISCUSS/DSCN MKR DOCD: CPT | Performed by: INTERNAL MEDICINE

## 2024-04-24 PROCEDURE — G8417 CALC BMI ABV UP PARAM F/U: HCPCS | Performed by: INTERNAL MEDICINE

## 2024-04-24 NOTE — PROGRESS NOTES
Chief Complaint   Patient presents with    Follow-up     6 month followup    1. Have you been to the ER, urgent care clinic since your last visit?  Hospitalized since your last visit?    2. Have you seen or consulted any other health care providers outside of the [facility] since your last visit?  Include any pap smears or colon screening. no   
AVS) provided. The most recent lab findings were reviewed with the patient.  Plan was discussed with patient who verbally expressed understanding.    An After Visit Summary was printed and given to the patient.    Aron Tee MD

## 2024-06-04 NOTE — TELEPHONE ENCOUNTER
PCP: BERNIE Tee MD    Last appt: 4/24/2024    Future Appointments   Date Time Provider Department Center   10/31/2024  8:00 AM BERNIE Tee MD [facility]       Requested Prescriptions     Pending Prescriptions Disp Refills    thiamine 100 MG tablet [Pharmacy Med Name: VITAMIN B-1 100 MG TABLET] 90 tablet 1     Sig: TAKE ONE TABLET BY MOUTH DAILY

## 2024-06-05 RX ORDER — LANOLIN ALCOHOL/MO/W.PET/CERES
100 CREAM (GRAM) TOPICAL DAILY
Qty: 90 TABLET | Refills: 1 | Status: SHIPPED | OUTPATIENT
Start: 2024-06-05

## 2024-07-15 NOTE — PROGRESS NOTES
Hospitalist Progress Note    NAME: Thuan Colon   :  1934   MRN:  [id number]       Assessment / Plan:  Acute persistent Lower GI bleeding  Acute blood loss anemia, POA   -hb base line is 13. S/p 1 unit of prbc on . Hb is 9 today  CT abdomen without contrast with diverticulosis without colitis or diverticulitis  NM bleeding scan shows no bleeding  C scope showed diverticulosis but no stigmata of bleeding  Plavix held  Further work up per CRS  Check hb in am     Severe sepsis, POA resolved  LLL MRSA community acquired pneumonia, POA   Hypotension POA- resolved now  Acute Hypoxic respiratory failure due to above resolved  -Sputum cx grew MRSA. Change abx to Doxycyline. Add Maalox prn with abx due to heart burn with abx. -BP is now improved  -urine cx negative. Blood cx NGTD  -Cont IS due to atelectasis on CXR. On room air now. KATHY on CKD stage 3  Cr is now close to base line of around 1.3    CAD hx stent x 1 26 years ago, POA on Plavix  HTN  --hold lasix, olmesartan, amlodipine due to hypotension and GI bleed   -Holding Plavix- likely DC on discharge- change to ASA 81  -cont added  low dose of tenormin and monitor BP    Gout  COPD, not on home O2  TESSA on cpap  TIA , on plavix. Morbid obesity  Hard of hearing, has hearing aid  S/p L2-L5 decompression for spinal stenosis and back pain   -continue anoro. Cont duoneb q4h prn. Cont mucinex  -continue cpap. Has not been using at home per daughter  -continue gabapentin         Code:  full    DVT prophylaxis: SCD due to gi bleeding  Surrogate decision maker:  Wife Christiano Ibarra. Daughter Ene [phone]      30.0 - 39.9 Obese / Body mass index is 35.88 kg/m². weightloss recommended    Recommended Disposition: Home     Subjective:     Chief Complaint / Reason for Physician Visit F/U GI Bleed, PNA, Sepsis  Sob is improved. Had C scope and no stigmata of bleeding but showed diverticulosis.       Objective:     VITALS:   Last 24hrs VS reviewed since prior progress note. Most recent are:  Patient Vitals for the past 24 hrs:   Temp Pulse Resp BP SpO2   09/27/19 1515 98.2 °F (36.8 °C) 67 16 132/61 95 %   09/27/19 1455  80 15 122/69 91 %   09/27/19 1452  71 20 129/78 92 %   09/27/19 1450  78 19 136/68 92 %   09/27/19 1447  74 17 134/64 94 %   09/27/19 1444  77 26 127/64 95 %   09/27/19 1440  76 19 124/64 94 %   09/27/19 1437  79  99/74 95 %   09/27/19 1435  82 15 128/77 95 %   09/27/19 1434  80 16 118/58 95 %   09/27/19 1432  80 20 135/78 97 %   09/27/19 1430  83 23 118/58 94 %   09/27/19 1429  82 17 107/71 100 %   09/27/19 1427  81 13 123/62 99 %   09/27/19 1426  81 25 123/86 99 %   09/27/19 1421  74 18 147/70 100 %   09/27/19 1244 98.5 °F (36.9 °C) 75 19 158/67 96 %   09/27/19 1210 98.4 °F (36.9 °C) 69 20 133/58 98 %   09/27/19 0909 98.2 °F (36.8 °C) 81 18 130/56    09/27/19 0730    130/56    09/27/19 0357 97.9 °F (36.6 °C) 75 26 130/74 100 %   09/26/19 2300 97.7 °F (36.5 °C) 84 17 140/63 98 %   09/26/19 1926 98 °F (36.7 °C) 74 16 131/57 100 %       Intake/Output Summary (Last 24 hours) at 9/27/2019 1551  Last data filed at 9/27/2019 1416  Gross per 24 hour   Intake 700 ml   Output 450 ml   Net 250 ml        PHYSICAL EXAM:  General: Alert, cooperative, pale   EENT:  EOMI. Anicteric sclerae. MMM  Resp:  CTA bilaterally, no wheezing or rales. No accessory muscle use  CV:  Regular  rhythm,  No edema  GI:  Soft, Non distended, Non tender.  +Bowel sounds  Neurologic:  Alert and oriented X 3, normal speech,   Psych:   Not anxious nor agitated  Skin:  No rashes.   No jaundice    Reviewed most current lab test results and cultures  YES  Reviewed most current radiology test results   YES  Review and summation of old records today    NO  Reviewed patient's current orders and MAR    YES  PMH/ reviewed - no change compared to H&P  ________________________________________________________________________  Care Plan discussed with:    Comments   Patient x Family      RN x    Care Manager     Consultant                        Multidiciplinary team rounds were held today with , nursing, pharmacist and clinical coordinator. Patient's plan of care was discussed; medications were reviewed and discharge planning was addressed. ________________________________________________________________________  Total NON critical care TIME: 35   Minutes    Total CRITICAL CARE TIME Spent: 30  Minutes non procedure based      Comments   >50% of visit spent in counseling and coordination of care x    ________________________________________________________________________  Amaya Dawson MD     Procedures: see electronic medical records for all procedures/Xrays and details which were not copied into this note but were reviewed prior to creation of Plan. LABS:  I reviewed today's most current labs and imaging studies. Pertinent labs include:  Recent Labs     09/27/19  0942 09/27/19  0105 09/26/19  1722  09/26/19  0402  09/25/19  0014   WBC 6.1  --   --   --  9.2  --  9.0   HGB 9.0* 9.3* 8.7*   < > 7.7*   < > 7.2*   HCT 26.5* 28.9* 26.1*   < > 23.9*   < > 22.3*     --   --   --  174  --  192    < > = values in this interval not displayed.      Recent Labs     09/25/19  0014      K 4.7   *   CO2 23   *   BUN 21*   CREA 1.30   CA 7.7*       Signed: Amaya Dawson MD No (0)

## 2024-07-30 NOTE — ED TRIAGE NOTES
Cardiology Progress Note      Patient:  Stone Sharp  YOB: 1936  MRN: [id number]   Acct: [id number]  Admit Date:  7/27/2024  Primary Cardiologist: Krysta Rodriguez MD  Seen by Dr. Traylor     Per prior cardiology consult note-  Reason for Consultation:  congestive heart failure.     Clinical Summary:  87/M, poor historian was brought to the emergency room by his family members for altered mental status, described at staring into the space.  Hemodynamically stable.  Noted to have urinary tract infection (UA-  WBC, 50-75 RBC and many bacteria). CT scan of head showed old infarct of right temporal lobe, severe chronic generalized atrophy and chest x-ray showed cardiomegaly with pulmonary vascular congestion, small to moderate left pleural effusion.  Mild troponin elevation (38, 38, 82 and 71),  proBNP 2471 (previous 430) and lactic acid 2.9.  Started on ceftriaxone.     Patient is mildly confused and disoriented.  Does not report any complaint.  Denies chest pain, shortness of breath.  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 [facility], history of bilateral DVT/IVC filter, history of ICH 11/2020 (no deficits), CKD-III, Parkinson's disease,  and loop recorder in situ.\"      Subjective (Events in last 24 hours):   Pt in bed   Family at bedside     Pt denies chest pain or SOB   BP stable Tele SR no ectopy     Pt was changed to po diuretics this am by attending     Many questions answered of family       Objective:   BP (!) 140/98   Pulse 69   Temp 97.7 °F (36.5 °C) (Oral)   Resp 20   Ht 1.753 m (5' 9\")   Wt 94.3 kg (208 lb)   SpO2 95%   BMI 30.72 kg/m²        TELEMETRY: SR no ectopy     Physical Exam:  General Appearance: alert and oriented to person, in no acute  Pt states he bent over and has bad pain in his rt flank/side area. Pt states he has had back surgery in the past. Pt states the same thing happened on Tuesday while getting into a car. [place] states they gave him pain medicine and ice and was better. of 60 - 65%. Left ventricle size is normal. Normal wall thickness. Normal wall motion. Diastolic dysfunction present with normal LV EF.   Left Atrium Left atrium size is normal.   Right Ventricle Right ventricle size is normal. Normal systolic function.   Right Atrium Right atrium size is normal.   Aortic Valve Valve structure is normal. No regurgitation. No stenosis.   Mitral Valve Valve structure is normal. Trace regurgitation. No stenosis noted.   Tricuspid Valve Valve structure is normal. Trace regurgitation. No stenosis noted.   Pulmonic Valve Valve structure is normal. No regurgitation. No stenosis noted.   Aorta Normal sized aortic root and ascending aorta.   IVC/Hepatic Veins IVC diameter is less than or equal to 21 mm and decreases greater than 50% during inspiration; therefore the estimated right atrial pressure is normal (~3 mmHg). IVC size is normal.   Pericardium No pericardial effusion.   Extracardiac Bilateral pleural effusion. There seems to be a liver cyst, clinical correlation is recommended.       Stress: 2022 maddy office   Abnormal       Left Heart Cath: 2022  OM PCI / LCX PCI - details unknown 2020    Dr. Cuenca      Lab Data:    Cardiac Enzymes:  Recent Labs     07/27/24  1453   CKTOTAL 60       CBC:   Lab Results   Component Value Date/Time    WBC 4.7 07/30/2024 07:06 AM    RBC 4.24 07/30/2024 07:06 AM    RBC 5.43 03/22/2023 10:40 AM    HGB 12.7 07/30/2024 07:06 AM    HCT 41.4 07/30/2024 07:06 AM     07/30/2024 07:06 AM       CMP:    Lab Results   Component Value Date/Time     07/30/2024 07:06 AM    K 3.2 07/30/2024 07:06 AM    K 3.5 07/27/2024 02:37 PM     07/30/2024 07:06 AM    CO2 27 07/30/2024 07:06 AM    BUN 16 07/30/2024 07:06 AM    CREATININE 0.9 07/30/2024 07:06 AM    LABGLOM 82 07/30/2024 07:06 AM    LABGLOM >60 03/16/2024 11:00 AM    GLUCOSE 105 07/30/2024 07:06 AM    GLUCOSE 104 03/22/2023 10:40 AM    CALCIUM 8.5 07/30/2024 07:06 AM       Hepatic Function

## 2024-08-14 RX ORDER — HYDRALAZINE HYDROCHLORIDE 100 MG/1
100 TABLET, FILM COATED ORAL 3 TIMES DAILY
Qty: 90 TABLET | Refills: 5 | Status: SHIPPED | OUTPATIENT
Start: 2024-08-14

## 2024-08-14 NOTE — TELEPHONE ENCOUNTER
RX refill request from the patient/pharmacy. Patient last seen 04- with labs, and next appt. scheduled for 10-  Requested Prescriptions     Pending Prescriptions Disp Refills    hydrALAZINE (APRESOLINE) 100 MG tablet [Pharmacy Med Name: hydrALAZINE 100 MG TABLET] 90 tablet 5     Sig: TAKE ONE TABLET BY MOUTH THREE TIMES A DAY    .

## 2024-08-21 RX ORDER — AMLODIPINE BESYLATE 5 MG/1
5 TABLET ORAL DAILY
Qty: 90 TABLET | Refills: 1 | Status: SHIPPED | OUTPATIENT
Start: 2024-08-21

## 2024-08-21 NOTE — TELEPHONE ENCOUNTER
RX refill request from the patient/pharmacy. Patient last seen 04- with labs, and next appt. scheduled for 10-  Requested Prescriptions     Pending Prescriptions Disp Refills    amLODIPine (NORVASC) 5 MG tablet [Pharmacy Med Name: AMLODIPINE BESYLATE 5 MG TAB] 90 tablet 1     Sig: TAKE 1 TABLET BY MOUTH DAILY    .

## 2024-10-11 RX ORDER — TRAZODONE HYDROCHLORIDE 100 MG/1
100 TABLET ORAL NIGHTLY
Qty: 90 TABLET | Refills: 1 | Status: SHIPPED | OUTPATIENT
Start: 2024-10-11

## 2024-10-11 RX ORDER — LANOLIN ALCOHOL/MO/W.PET/CERES
100 CREAM (GRAM) TOPICAL DAILY
Qty: 90 TABLET | Refills: 1 | Status: SHIPPED | OUTPATIENT
Start: 2024-10-11

## 2024-10-11 NOTE — TELEPHONE ENCOUNTER
PCP: BERNIE Tee MD    LAST APPT:4/24/2024    Future Appointments   Date Time Provider Department Center   10/31/2024  8:00 AM BERNIE Tee MD [facility]       Requested Prescriptions     Pending Prescriptions Disp Refills    traZODone (DESYREL) 100 MG tablet [Pharmacy Med Name: traZODone 100 MG TABLET] 90 tablet 1     Sig: TAKE ONE TABLET BY MOUTH ONCE NIGHTLY    thiamine 100 MG tablet [Pharmacy Med Name: VITAMIN B-1 100 MG TABLET] 90 tablet 1     Sig: TAKE 1 TABLET BY MOUTH DAILY

## 2024-10-31 ENCOUNTER — OFFICE VISIT (OUTPATIENT)
Facility: CLINIC | Age: 89
End: 2024-10-31

## 2024-10-31 VITALS
SYSTOLIC BLOOD PRESSURE: 116 MMHG | RESPIRATION RATE: 18 BRPM | OXYGEN SATURATION: 98 % | HEART RATE: 68 BPM | TEMPERATURE: 98.8 F | DIASTOLIC BLOOD PRESSURE: 68 MMHG | WEIGHT: 207 LBS | BODY MASS INDEX: 31.37 KG/M2 | HEIGHT: 68 IN

## 2024-10-31 DIAGNOSIS — I10 PRIMARY HYPERTENSION: ICD-10-CM

## 2024-10-31 DIAGNOSIS — G40.909 SEIZURE DISORDER (HCC): ICD-10-CM

## 2024-10-31 DIAGNOSIS — Z00.00 MEDICARE ANNUAL WELLNESS VISIT, SUBSEQUENT: ICD-10-CM

## 2024-10-31 DIAGNOSIS — E78.5 DYSLIPIDEMIA: ICD-10-CM

## 2024-10-31 DIAGNOSIS — Z23 NEED FOR VACCINATION AGAINST STREPTOCOCCUS PNEUMONIAE: ICD-10-CM

## 2024-10-31 DIAGNOSIS — J43.1 PANLOBULAR EMPHYSEMA (HCC): ICD-10-CM

## 2024-10-31 DIAGNOSIS — Z23 NEEDS FLU SHOT: ICD-10-CM

## 2024-10-31 DIAGNOSIS — Z79.899 ON STATIN THERAPY: ICD-10-CM

## 2024-10-31 DIAGNOSIS — Z86.73 HISTORY OF STROKE: ICD-10-CM

## 2024-10-31 DIAGNOSIS — I25.118 CORONARY ARTERY DISEASE OF NATIVE ARTERY OF NATIVE HEART WITH STABLE ANGINA PECTORIS (HCC): Primary | ICD-10-CM

## 2024-10-31 DIAGNOSIS — R73.02 IGT (IMPAIRED GLUCOSE TOLERANCE): ICD-10-CM

## 2024-10-31 DIAGNOSIS — M10.09 IDIOPATHIC GOUT OF MULTIPLE SITES, UNSPECIFIED CHRONICITY: ICD-10-CM

## 2024-10-31 DIAGNOSIS — N18.30 STAGE 3 CHRONIC KIDNEY DISEASE, UNSPECIFIED WHETHER STAGE 3A OR 3B CKD (HCC): ICD-10-CM

## 2024-10-31 DIAGNOSIS — G47.33 OSA ON CPAP: ICD-10-CM

## 2024-10-31 DIAGNOSIS — F33.0 MAJOR DEPRESSIVE DISORDER, RECURRENT, MILD (HCC): ICD-10-CM

## 2024-10-31 PROBLEM — N52.9 ED (ERECTILE DYSFUNCTION): Status: RESOLVED | Noted: 2017-08-16 | Resolved: 2024-10-31

## 2024-10-31 PROBLEM — E66.01 SEVERE OBESITY: Status: RESOLVED | Noted: 2019-01-03 | Resolved: 2024-10-31

## 2024-10-31 PROBLEM — R29.810 FACIAL DROOP: Status: RESOLVED | Noted: 2019-10-27 | Resolved: 2024-10-31

## 2024-10-31 PROBLEM — J41.0 SIMPLE CHRONIC BRONCHITIS (HCC): Status: RESOLVED | Noted: 2019-01-03 | Resolved: 2024-10-31

## 2024-10-31 PROBLEM — R53.83 FATIGUE: Status: RESOLVED | Noted: 2017-08-16 | Resolved: 2024-10-31

## 2024-10-31 PROBLEM — J18.9 PNEUMONIA: Status: RESOLVED | Noted: 2019-09-23 | Resolved: 2024-10-31

## 2024-10-31 PROBLEM — R65.20 SEVERE SEPSIS (HCC): Status: RESOLVED | Noted: 2019-09-23 | Resolved: 2024-10-31

## 2024-10-31 PROBLEM — G40.901 STATUS EPILEPTICUS (HCC): Status: RESOLVED | Noted: 2021-05-29 | Resolved: 2024-10-31

## 2024-10-31 PROBLEM — A41.9 SEVERE SEPSIS (HCC): Status: RESOLVED | Noted: 2019-09-23 | Resolved: 2024-10-31

## 2024-10-31 PROBLEM — D62 ACUTE BLOOD LOSS ANEMIA: Status: RESOLVED | Noted: 2019-09-29 | Resolved: 2024-10-31

## 2024-10-31 PROBLEM — R41.82 ALTERED MENTAL STATE: Status: RESOLVED | Noted: 2019-11-19 | Resolved: 2024-10-31

## 2024-10-31 PROBLEM — R10.9 FLANK PAIN: Status: RESOLVED | Noted: 2019-08-01 | Resolved: 2024-10-31

## 2024-10-31 PROBLEM — A86 VIRAL ENCEPHALITIS: Status: RESOLVED | Noted: 2019-12-02 | Resolved: 2024-10-31

## 2024-10-31 PROBLEM — R06.00 DYSPNEA: Status: RESOLVED | Noted: 2017-08-16 | Resolved: 2024-10-31

## 2024-10-31 LAB
25(OH)D3 SERPL-MCNC: 16.7 NG/ML (ref 30–100)
ALBUMIN SERPL-MCNC: 3.7 G/DL (ref 3.5–5)
ALBUMIN/GLOB SERPL: 1.1 (ref 1.1–2.2)
ALP SERPL-CCNC: 132 U/L (ref 45–117)
ALT SERPL-CCNC: 17 U/L (ref 12–78)
ANION GAP SERPL CALC-SCNC: 6 MMOL/L (ref 2–12)
AST SERPL-CCNC: 12 U/L (ref 15–37)
BASOPHILS # BLD: 0.1 K/UL (ref 0–0.1)
BASOPHILS NFR BLD: 1 % (ref 0–1)
BILIRUB SERPL-MCNC: 0.5 MG/DL (ref 0.2–1)
BUN SERPL-MCNC: 25 MG/DL (ref 6–20)
BUN/CREAT SERPL: 12 (ref 12–20)
CALCIUM SERPL-MCNC: 9.1 MG/DL (ref 8.5–10.1)
CHLORIDE SERPL-SCNC: 105 MMOL/L (ref 97–108)
CHOLEST SERPL-MCNC: 142 MG/DL
CK SERPL-CCNC: 40 U/L (ref 39–308)
CO2 SERPL-SCNC: 32 MMOL/L (ref 21–32)
CREAT SERPL-MCNC: 2.12 MG/DL (ref 0.7–1.3)
DIFFERENTIAL METHOD BLD: ABNORMAL
EOSINOPHIL # BLD: 0.2 K/UL (ref 0–0.4)
EOSINOPHIL NFR BLD: 2 % (ref 0–7)
ERYTHROCYTE [DISTWIDTH] IN BLOOD BY AUTOMATED COUNT: 15.1 % (ref 11.5–14.5)
EST. AVERAGE GLUCOSE BLD GHB EST-MCNC: 108 MG/DL
GLOBULIN SER CALC-MCNC: 3.5 G/DL (ref 2–4)
GLUCOSE SERPL-MCNC: 117 MG/DL (ref 65–100)
HBA1C MFR BLD: 5.4 % (ref 4–5.6)
HCT VFR BLD AUTO: 41.5 % (ref 36.6–50.3)
HDLC SERPL-MCNC: 30 MG/DL
HDLC SERPL: 4.7 (ref 0–5)
HGB BLD-MCNC: 13.2 G/DL (ref 12.1–17)
IMM GRANULOCYTES # BLD AUTO: 0 K/UL (ref 0–0.04)
IMM GRANULOCYTES NFR BLD AUTO: 0 % (ref 0–0.5)
LDLC SERPL CALC-MCNC: 76.6 MG/DL (ref 0–100)
LYMPHOCYTES # BLD: 1.2 K/UL (ref 0.8–3.5)
LYMPHOCYTES NFR BLD: 13 % (ref 12–49)
MCH RBC QN AUTO: 30.6 PG (ref 26–34)
MCHC RBC AUTO-ENTMCNC: 31.8 G/DL (ref 30–36.5)
MCV RBC AUTO: 96.1 FL (ref 80–99)
MONOCYTES # BLD: 0.6 K/UL (ref 0–1)
MONOCYTES NFR BLD: 7 % (ref 5–13)
NEUTS SEG # BLD: 7.1 K/UL (ref 1.8–8)
NEUTS SEG NFR BLD: 77 % (ref 32–75)
NRBC # BLD: 0 K/UL (ref 0–0.01)
NRBC BLD-RTO: 0 PER 100 WBC
PLATELET # BLD AUTO: 223 K/UL (ref 150–400)
PMV BLD AUTO: 11 FL (ref 8.9–12.9)
POTASSIUM SERPL-SCNC: 4.4 MMOL/L (ref 3.5–5.1)
PROT SERPL-MCNC: 7.2 G/DL (ref 6.4–8.2)
RBC # BLD AUTO: 4.32 M/UL (ref 4.1–5.7)
SODIUM SERPL-SCNC: 143 MMOL/L (ref 136–145)
TRIGL SERPL-MCNC: 177 MG/DL
URATE SERPL-MCNC: 8.1 MG/DL (ref 3.5–7.2)
VLDLC SERPL CALC-MCNC: 35.4 MG/DL
WBC # BLD AUTO: 9.3 K/UL (ref 4.1–11.1)

## 2024-10-31 RX ORDER — TRAZODONE HYDROCHLORIDE 100 MG/1
100 TABLET ORAL NIGHTLY
Qty: 90 TABLET | Refills: 1 | Status: SHIPPED | OUTPATIENT
Start: 2024-10-31

## 2024-10-31 RX ORDER — ATORVASTATIN CALCIUM 80 MG/1
80 TABLET, FILM COATED ORAL DAILY
Qty: 90 TABLET | Refills: 3 | Status: SHIPPED | OUTPATIENT
Start: 2024-10-31

## 2024-10-31 RX ORDER — ATENOLOL 25 MG/1
12.5 TABLET ORAL DAILY
Qty: 90 TABLET | Refills: 3 | Status: SHIPPED | OUTPATIENT
Start: 2024-10-31

## 2024-10-31 RX ORDER — ISOSORBIDE MONONITRATE 30 MG/1
30 TABLET, EXTENDED RELEASE ORAL DAILY
Qty: 90 TABLET | Refills: 3 | Status: SHIPPED | OUTPATIENT
Start: 2024-10-31

## 2024-10-31 RX ORDER — FUROSEMIDE 20 MG/1
TABLET ORAL
Qty: 270 TABLET | Refills: 3 | Status: SHIPPED | OUTPATIENT
Start: 2024-10-31

## 2024-10-31 RX ORDER — DULOXETIN HYDROCHLORIDE 30 MG/1
30 CAPSULE, DELAYED RELEASE ORAL DAILY
Qty: 30 CAPSULE | Refills: 3 | Status: SHIPPED | OUTPATIENT
Start: 2024-10-31

## 2024-10-31 RX ORDER — LANOLIN ALCOHOL/MO/W.PET/CERES
100 CREAM (GRAM) TOPICAL DAILY
Qty: 90 TABLET | Refills: 1 | Status: SHIPPED | OUTPATIENT
Start: 2024-10-31

## 2024-10-31 RX ORDER — AMLODIPINE BESYLATE 5 MG/1
5 TABLET ORAL DAILY
Qty: 90 TABLET | Refills: 1 | Status: SHIPPED | OUTPATIENT
Start: 2024-10-31

## 2024-10-31 ASSESSMENT — PATIENT HEALTH QUESTIONNAIRE - PHQ9
SUM OF ALL RESPONSES TO PHQ QUESTIONS 1-9: 0
5. POOR APPETITE OR OVEREATING: NOT AT ALL
2. FEELING DOWN, DEPRESSED OR HOPELESS: NOT AT ALL
3. TROUBLE FALLING OR STAYING ASLEEP: NOT AT ALL
6. FEELING BAD ABOUT YOURSELF - OR THAT YOU ARE A FAILURE OR HAVE LET YOURSELF OR YOUR FAMILY DOWN: NOT AT ALL
SUM OF ALL RESPONSES TO PHQ9 QUESTIONS 1 & 2: 0
4. FEELING TIRED OR HAVING LITTLE ENERGY: NOT AT ALL
7. TROUBLE CONCENTRATING ON THINGS, SUCH AS READING THE NEWSPAPER OR WATCHING TELEVISION: NOT AT ALL
10. IF YOU CHECKED OFF ANY PROBLEMS, HOW DIFFICULT HAVE THESE PROBLEMS MADE IT FOR YOU TO DO YOUR WORK, TAKE CARE OF THINGS AT HOME, OR GET ALONG WITH OTHER PEOPLE: NOT DIFFICULT AT ALL
9. THOUGHTS THAT YOU WOULD BE BETTER OFF DEAD, OR OF HURTING YOURSELF: NOT AT ALL
1. LITTLE INTEREST OR PLEASURE IN DOING THINGS: NOT AT ALL
8. MOVING OR SPEAKING SO SLOWLY THAT OTHER PEOPLE COULD HAVE NOTICED. OR THE OPPOSITE, BEING SO FIGETY OR RESTLESS THAT YOU HAVE BEEN MOVING AROUND A LOT MORE THAN USUAL: NOT AT ALL

## 2024-10-31 NOTE — PROGRESS NOTES
Warner HIRSCH Ellis is a 89 y.o. male     Chief Complaint   Patient presents with    6 Month Follow-Up    Medicare AWV     Pt interested in getting flu shot      /68 (Site: Left Upper Arm, Position: Sitting, Cuff Size: Medium Adult)   Pulse 68   Temp 98.8 °F (37.1 °C) (Temporal)   Resp 18   Ht 1.727 m (5' 8\")   Wt 93.9 kg (207 lb)   SpO2 98%   BMI 31.47 kg/m²     Health Maintenance Due   Topic Date Due    DTaP/Tdap/Td vaccine (1 - Tdap) Never done    Shingles vaccine (1 of 2) Never done    Respiratory Syncytial Virus (RSV) Pregnant or age 60 yrs+ (1 - 1-dose 60+ series) Never done    Pneumococcal 65+ years Vaccine (2 of 2 - PPSV23 or PCV20) 11/03/2017    Flu vaccine (1) 08/01/2024    COVID-19 Vaccine (3 - 2023-24 season) 09/01/2024    Annual Wellness Visit (Medicare)  10/23/2024    Depression Monitoring  10/23/2024         \"Have you been to the ER, urgent care clinic since your last visit?  Hospitalized since your last visit?\"    NO    “Have you seen or consulted any other health care providers outside of [facility] since your last visit?”    NO                   
  negative for headaches, dizziness, vertigo, memory problems and gait   Behavl/Psych:  negative for feelings of anxiety, depression, mood changes  ROS otherwise negative      Objective:  /68 (Site: Left Upper Arm, Position: Sitting, Cuff Size: Medium Adult)   Pulse 68   Temp 98.8 °F (37.1 °C) (Temporal)   Resp 18   Ht 1.727 m (5' 8\")   Wt 93.9 kg (207 lb)   SpO2 98%   BMI 31.47 kg/m²   Physical Exam:   General appearance - alert, well appearing, and in no distress  Mental status - alert, oriented to person, place, and time  EYE-MADHURI, EOMI, fundi normal, corneas normal, no foreign bodies  ENT-ENT exam normal, no neck nodes or sinus tenderness  Nose - normal and patent, no erythema, discharge or polyps  Mouth - mucous membranes moist, pharynx normal without lesions  Neck - supple, no significant adenopathy   Chest - clear to auscultation, no wheezes, rales or rhonchi, symmetric air entry   Heart - normal rate, regular rhythm, normal S1, S2, no murmurs, rubs, clicks or gallops   Abdomen - soft, nontender, nondistended, no masses or organomegaly  Lymph- no adenopathy palpable  Ext-peripheral pulses normal, no pedal edema, no clubbing or cyanosis  Skin-Warm and dry. no hyperpigmentation, vitiligo, or suspicious lesions  Neuro -alert, oriented, normal speech, no focal findings or movement disorder noted      Assessment/Plan:  Warner was seen today for 6 month follow-up and medicare aw.    Diagnoses and all orders for this visit:    Coronary artery disease of native artery of native heart with stable angina pectoris (HCC)  -     Comprehensive Metabolic Panel; Future  -     Hemoglobin A1C; Future  -     Lipid Panel; Future  -     isosorbide mononitrate (IMDUR) 30 MG extended release tablet; Take 1 tablet by mouth daily    Primary hypertension  -     Comprehensive Metabolic Panel; Future  -     Hemoglobin A1C; Future  -     Lipid Panel; Future  -     Urinalysis; Future  -     amLODIPine (NORVASC) 5 MG tablet;

## 2024-12-13 ENCOUNTER — OFFICE VISIT (OUTPATIENT)
Facility: CLINIC | Age: 88
End: 2024-12-13

## 2024-12-13 VITALS
DIASTOLIC BLOOD PRESSURE: 62 MMHG | BODY MASS INDEX: 31.9 KG/M2 | SYSTOLIC BLOOD PRESSURE: 128 MMHG | TEMPERATURE: 98.3 F | WEIGHT: 210.5 LBS | RESPIRATION RATE: 18 BRPM | HEART RATE: 64 BPM | HEIGHT: 68 IN | OXYGEN SATURATION: 96 %

## 2024-12-13 DIAGNOSIS — I10 PRIMARY HYPERTENSION: ICD-10-CM

## 2024-12-13 DIAGNOSIS — L03.115 CELLULITIS OF RIGHT LOWER LEG: Primary | ICD-10-CM

## 2024-12-13 PROBLEM — G45.9 TIA (TRANSIENT ISCHEMIC ATTACK): Status: RESOLVED | Noted: 2017-08-16 | Resolved: 2024-12-13

## 2024-12-13 RX ORDER — DOXYCYCLINE HYCLATE 100 MG
100 TABLET ORAL 2 TIMES DAILY
Qty: 20 TABLET | Refills: 0 | Status: SHIPPED | OUTPATIENT
Start: 2024-12-13 | End: 2024-12-23

## 2024-12-13 SDOH — ECONOMIC STABILITY: FOOD INSECURITY: WITHIN THE PAST 12 MONTHS, YOU WORRIED THAT YOUR FOOD WOULD RUN OUT BEFORE YOU GOT MONEY TO BUY MORE.: NEVER TRUE

## 2024-12-13 SDOH — ECONOMIC STABILITY: FOOD INSECURITY: WITHIN THE PAST 12 MONTHS, THE FOOD YOU BOUGHT JUST DIDN'T LAST AND YOU DIDN'T HAVE MONEY TO GET MORE.: NEVER TRUE

## 2024-12-13 SDOH — ECONOMIC STABILITY: INCOME INSECURITY: HOW HARD IS IT FOR YOU TO PAY FOR THE VERY BASICS LIKE FOOD, HOUSING, MEDICAL CARE, AND HEATING?: NOT HARD AT ALL

## 2024-12-13 NOTE — PROGRESS NOTES
Warner Corral is a 89 y.o. male     Chief Complaint   Patient presents with    Wound Check     RLE x 1 week, LLE unknown length of time       /62 (Site: Left Upper Arm, Position: Sitting, Cuff Size: Medium Adult)   Pulse 64   Temp 98.3 °F (36.8 °C) (Oral)   Resp 18   Ht 1.727 m (5' 8\")   Wt 95.5 kg (210 lb 8 oz)   SpO2 96%   BMI 32.01 kg/m²     Health Maintenance Due   Topic Date Due    DTaP/Tdap/Td vaccine (1 - Tdap) Never done    Shingles vaccine (1 of 2) Never done    Respiratory Syncytial Virus (RSV) Pregnant or age 60 yrs+ (1 - 1-dose 75+ series) Never done    Pneumococcal 65+ years Vaccine (2 of 2 - PPSV23 or PCV20) 11/03/2017    COVID-19 Vaccine (3 - 2023-24 season) 09/01/2024         \"Have you been to the ER, urgent care clinic since your last visit?  Hospitalized since your last visit?\"    NO    “Have you seen or consulted any other health care providers outside of [facility] since your last visit?”    NO

## 2024-12-13 NOTE — PATIENT INSTRUCTIONS
Call your doctor now or seek immediate medical care if:    You have signs that your infection is getting worse, such as:  Increased pain, swelling, warmth, or redness.  Red streaks leading from the area.  Pus draining from the area.  A fever.     You get a rash.   Watch closely for changes in your health, and be sure to contact your doctor if:    You do not get better as expected.

## 2024-12-13 NOTE — ASSESSMENT & PLAN NOTE
Over the past 5 days patient experiencing progressive leg warmth, erythema, and swelling on right lateral aspect of right leg. Physical exam findings consistent with cellulitis. Will treat with doxy for MRSA coverage. Directed caregiver to monitor patient for fever and worsening of symptoms. Low threshold for ER visit, will possibly require IV abx.  Patient education provided.

## 2024-12-13 NOTE — PROGRESS NOTES
Warner Corral a 89 y.o. male  has a past medical history of Adverse effect of anesthesia, Alcohol abuse, Arthritis, CAD (coronary artery disease), Chronic obstructive pulmonary disease (HCC), Chronic obstructive pulmonary disease (HCC), CKD (chronic kidney disease) stage 3, GFR 30-59 ml/min (HCC), Dyslipidemia, Dyspepsia and other specified disorders of function of stomach, Dyspnea, ED (erectile dysfunction), Encounter for immunization, Fatigue, Flank pain, GERD (gastroesophageal reflux disease), Gout, Hypertension, Leukoplakia of oral cavity, Morbid obesity, On statin therapy, CLEM on CPAP, Psychiatric disorder, Simple chronic bronchitis (HCC), TIA (transient ischemic attack), Umbilical hernia, and Viral encephalitis. presents to office today for skin itching.     Subjective:    Cellulitis   -Patient complains of left leg wound.  He notes that at baseline he has pruritus, over the past couple of days he had been using his right foot to itch his left leg.  He notes that he had a tear in the skin and his symptoms became progressively worse.  He reports that the area initially was mildly erythematous, however over the past few hours his symptoms have worsened.  Denies fever and chills.  Denies nausea and vomiting.       Health Maintenance   Topic Date Due    DTaP/Tdap/Td vaccine (1 - Tdap) Never done    Shingles vaccine (1 of 2) Never done    Respiratory Syncytial Virus (RSV) Pregnant or age 60 yrs+ (1 - 1-dose 75+ series) Never done    Pneumococcal 65+ years Vaccine (2 of 2 - PPSV23 or PCV20) 11/03/2017    COVID-19 Vaccine (3 - 2023-24 season) 09/01/2024    Lipids  10/31/2025    Depression Monitoring  10/31/2025    Annual Wellness Visit (Medicare)  11/01/2025    Flu vaccine  Completed    Hepatitis A vaccine  Aged Out    Hepatitis B vaccine  Aged Out    Hib vaccine  Aged Out    Polio vaccine  Aged Out    Meningococcal (ACWY) vaccine  Aged Out        Immunization History   Administered Date(s) Administered

## 2024-12-20 ENCOUNTER — OFFICE VISIT (OUTPATIENT)
Facility: CLINIC | Age: 88
End: 2024-12-20

## 2024-12-20 VITALS
OXYGEN SATURATION: 95 % | RESPIRATION RATE: 16 BRPM | DIASTOLIC BLOOD PRESSURE: 65 MMHG | HEIGHT: 68 IN | HEART RATE: 65 BPM | BODY MASS INDEX: 31.48 KG/M2 | WEIGHT: 207.7 LBS | SYSTOLIC BLOOD PRESSURE: 144 MMHG

## 2024-12-20 DIAGNOSIS — L03.115 CELLULITIS OF RIGHT LOWER LEG: Primary | ICD-10-CM

## 2024-12-20 DIAGNOSIS — I10 PRIMARY HYPERTENSION: ICD-10-CM

## 2024-12-20 NOTE — ASSESSMENT & PLAN NOTE
During last office visit, patient noted to have cellulitis of right leg.  He was started on antibiotic course.  Since that time his symptoms have significantly improved.  He has 3 additional days of antibiotics, I directed him to complete full course.  Mild area of induration still present, encouraged him to continue to monitor for resolution of symptoms.  Provided patient with topical antibiotic ointment to apply to area.  Recommendations to keep area dry and clean.  Return precaution reviewed.

## 2024-12-20 NOTE — PROGRESS NOTES
\"Have you been to the ER, urgent care clinic since your last visit?  Hospitalized since your last visit?\"    NO    “Have you seen or consulted any other health care providers outside our system since your last visit?”    NO             
continue current regimen.            Follow-up and Dispositions:  Return if symptoms worsen or fail to improve.       I have reviewed the patient's medical history in detail and updated the computerized patient record.      We had a prolonged discussion about these complex clinical issues and went over the various important aspects to consider. All questions were answered.      Advised the patient to call back or return to office if symptoms do not improve, change in nature, or persist.     The patient was given an after visit summary or informed of [facility] which includes patient instructions, diagnoses, current medications, & vitals.    Patient expressed understanding with the diagnosis and plan.        Yesy Acosta MD

## 2024-12-20 NOTE — PATIENT INSTRUCTIONS
Your wound looks much better.  Keep area dry and clean.  Apply provided antibiotic ointment.  Okay to cover during the day or night.  Complete antibiotic course.       Happy Holidays!

## 2025-03-03 RX ORDER — HYDRALAZINE HYDROCHLORIDE 100 MG/1
100 TABLET, FILM COATED ORAL 3 TIMES DAILY
Qty: 90 TABLET | Refills: 5 | Status: SHIPPED | OUTPATIENT
Start: 2025-03-03

## 2025-03-23 DIAGNOSIS — F33.0 MAJOR DEPRESSIVE DISORDER, RECURRENT, MILD: ICD-10-CM

## 2025-03-24 NOTE — TELEPHONE ENCOUNTER
PCP: BERNIE Tee MD    Last appt: 10/31/2024    Future Appointments   Date Time Provider Department Center   5/2/2025  8:00 AM BERNIE Tee MD [facility]       Requested Prescriptions     Pending Prescriptions Disp Refills    DULoxetine (CYMBALTA) 30 MG extended release capsule [Pharmacy Med Name: DULoxetine HCL DR 30 MG CAPSULE] 30 capsule 3     Sig: TAKE 1 CAPSULE BY MOUTH DAILY

## 2025-03-25 RX ORDER — DULOXETIN HYDROCHLORIDE 30 MG/1
30 CAPSULE, DELAYED RELEASE ORAL DAILY
Qty: 30 CAPSULE | Refills: 3 | Status: SHIPPED | OUTPATIENT
Start: 2025-03-25

## 2025-05-02 PROBLEM — L03.115 CELLULITIS OF RIGHT LOWER LEG: Status: RESOLVED | Noted: 2024-12-13 | Resolved: 2025-05-02

## 2025-05-02 PROBLEM — F33.0 MAJOR DEPRESSIVE DISORDER, RECURRENT, MILD: Status: RESOLVED | Noted: 2021-08-19 | Resolved: 2025-05-02

## 2025-05-14 ENCOUNTER — OFFICE VISIT (OUTPATIENT)
Facility: CLINIC | Age: 89
End: 2025-05-14
Payer: MEDICARE

## 2025-05-14 VITALS
HEIGHT: 68 IN | RESPIRATION RATE: 18 BRPM | WEIGHT: 209 LBS | HEART RATE: 68 BPM | SYSTOLIC BLOOD PRESSURE: 120 MMHG | TEMPERATURE: 98.8 F | BODY MASS INDEX: 31.67 KG/M2 | OXYGEN SATURATION: 94 % | DIASTOLIC BLOOD PRESSURE: 68 MMHG

## 2025-05-14 DIAGNOSIS — J43.1 PANLOBULAR EMPHYSEMA (HCC): ICD-10-CM

## 2025-05-14 DIAGNOSIS — F33.1 MAJOR DEPRESSIVE DISORDER, RECURRENT, MODERATE (HCC): ICD-10-CM

## 2025-05-14 DIAGNOSIS — E78.5 DYSLIPIDEMIA: ICD-10-CM

## 2025-05-14 DIAGNOSIS — G95.19 OTHER VASCULAR MYELOPATHIES (HCC): ICD-10-CM

## 2025-05-14 DIAGNOSIS — R73.02 IGT (IMPAIRED GLUCOSE TOLERANCE): ICD-10-CM

## 2025-05-14 DIAGNOSIS — N18.30 STAGE 3 CHRONIC KIDNEY DISEASE, UNSPECIFIED WHETHER STAGE 3A OR 3B CKD (HCC): ICD-10-CM

## 2025-05-14 DIAGNOSIS — I10 PRIMARY HYPERTENSION: ICD-10-CM

## 2025-05-14 DIAGNOSIS — G40.909 SEIZURE DISORDER (HCC): ICD-10-CM

## 2025-05-14 DIAGNOSIS — Z86.73 HISTORY OF STROKE: ICD-10-CM

## 2025-05-14 DIAGNOSIS — Z79.899 ON STATIN THERAPY: ICD-10-CM

## 2025-05-14 DIAGNOSIS — I25.118 CORONARY ARTERY DISEASE OF NATIVE ARTERY OF NATIVE HEART WITH STABLE ANGINA PECTORIS: Primary | ICD-10-CM

## 2025-05-14 PROCEDURE — G8417 CALC BMI ABV UP PARAM F/U: HCPCS | Performed by: INTERNAL MEDICINE

## 2025-05-14 PROCEDURE — 1126F AMNT PAIN NOTED NONE PRSNT: CPT | Performed by: INTERNAL MEDICINE

## 2025-05-14 PROCEDURE — 1159F MED LIST DOCD IN RCRD: CPT | Performed by: INTERNAL MEDICINE

## 2025-05-14 PROCEDURE — 99214 OFFICE O/P EST MOD 30 MIN: CPT | Performed by: INTERNAL MEDICINE

## 2025-05-14 PROCEDURE — 1036F TOBACCO NON-USER: CPT | Performed by: INTERNAL MEDICINE

## 2025-05-14 PROCEDURE — 3023F SPIROM DOC REV: CPT | Performed by: INTERNAL MEDICINE

## 2025-05-14 PROCEDURE — G8427 DOCREV CUR MEDS BY ELIG CLIN: HCPCS | Performed by: INTERNAL MEDICINE

## 2025-05-14 PROCEDURE — 1123F ACP DISCUSS/DSCN MKR DOCD: CPT | Performed by: INTERNAL MEDICINE

## 2025-05-14 SDOH — ECONOMIC STABILITY: FOOD INSECURITY: WITHIN THE PAST 12 MONTHS, THE FOOD YOU BOUGHT JUST DIDN'T LAST AND YOU DIDN'T HAVE MONEY TO GET MORE.: NEVER TRUE

## 2025-05-14 SDOH — ECONOMIC STABILITY: FOOD INSECURITY: WITHIN THE PAST 12 MONTHS, YOU WORRIED THAT YOUR FOOD WOULD RUN OUT BEFORE YOU GOT MONEY TO BUY MORE.: NEVER TRUE

## 2025-05-14 ASSESSMENT — PATIENT HEALTH QUESTIONNAIRE - PHQ9
9. THOUGHTS THAT YOU WOULD BE BETTER OFF DEAD, OR OF HURTING YOURSELF: NOT AT ALL
8. MOVING OR SPEAKING SO SLOWLY THAT OTHER PEOPLE COULD HAVE NOTICED. OR THE OPPOSITE, BEING SO FIGETY OR RESTLESS THAT YOU HAVE BEEN MOVING AROUND A LOT MORE THAN USUAL: NOT AT ALL
SUM OF ALL RESPONSES TO PHQ QUESTIONS 1-9: 0
10. IF YOU CHECKED OFF ANY PROBLEMS, HOW DIFFICULT HAVE THESE PROBLEMS MADE IT FOR YOU TO DO YOUR WORK, TAKE CARE OF THINGS AT HOME, OR GET ALONG WITH OTHER PEOPLE: NOT DIFFICULT AT ALL
SUM OF ALL RESPONSES TO PHQ QUESTIONS 1-9: 0
5. POOR APPETITE OR OVEREATING: NOT AT ALL
SUM OF ALL RESPONSES TO PHQ QUESTIONS 1-9: 0
3. TROUBLE FALLING OR STAYING ASLEEP: NOT AT ALL
7. TROUBLE CONCENTRATING ON THINGS, SUCH AS READING THE NEWSPAPER OR WATCHING TELEVISION: NOT AT ALL
4. FEELING TIRED OR HAVING LITTLE ENERGY: NOT AT ALL
6. FEELING BAD ABOUT YOURSELF - OR THAT YOU ARE A FAILURE OR HAVE LET YOURSELF OR YOUR FAMILY DOWN: NOT AT ALL
1. LITTLE INTEREST OR PLEASURE IN DOING THINGS: NOT AT ALL
2. FEELING DOWN, DEPRESSED OR HOPELESS: NOT AT ALL
SUM OF ALL RESPONSES TO PHQ QUESTIONS 1-9: 0

## 2025-05-14 NOTE — PROGRESS NOTES
Warner Corral is a 89 y.o. male     Chief Complaint   Patient presents with    6 Month Follow-Up       /68 (BP Site: Left Upper Arm, Patient Position: Sitting, BP Cuff Size: Large Adult)   Pulse 68   Temp 98.8 °F (37.1 °C) (Temporal)   Resp 18   Ht 1.727 m (5' 8\")   Wt 94.8 kg (209 lb)   SpO2 94%   BMI 31.78 kg/m²     Health Maintenance Due   Topic Date Due    DTaP/Tdap/Td vaccine (1 - Tdap) Never done    Shingles vaccine (1 of 2) Never done    Respiratory Syncytial Virus (RSV) Pregnant or age 60 yrs+ (1 - 1-dose 75+ series) Never done    Pneumococcal 50+ years Vaccine (2 of 2 - PPSV23) 11/03/2017    COVID-19 Vaccine (3 - 2024-25 season) 09/01/2024         \"Have you been to the ER, urgent care clinic since your last visit?  Hospitalized since your last visit?\"    NO    “Have you seen or consulted any other health care providers outside of [facility] since your last visit?”    NO                   
Differential     Hemoglobin A1C     Lipid Panel     Comprehensive Metabolic Panel     Vitamin D 25 Hydroxy     Urinalysis      9. Dyslipidemia  E78.5 Vitamin D 25 Hydroxy     CK     Comprehensive Metabolic Panel     Lipid Panel     Hemoglobin A1C     CBC with Auto Differential     Urinalysis     CK     CBC with Auto Differential     Hemoglobin A1C     Lipid Panel     Comprehensive Metabolic Panel     Vitamin D 25 Hydroxy     Urinalysis      10. History of stroke  Z86.73 Vitamin D 25 Hydroxy     CK     Comprehensive Metabolic Panel     Lipid Panel     Hemoglobin A1C     CBC with Auto Differential     Urinalysis     CK     CBC with Auto Differential     Hemoglobin A1C     Lipid Panel     Comprehensive Metabolic Panel     Vitamin D 25 Hydroxy     Urinalysis      11. IGT (impaired glucose tolerance)  R73.02 Hemoglobin A1C     Hemoglobin A1C        Plan:    Continue current medical regimen as outlined above.  Further recommendations based on labs as ordered.  Follow-up in 6 months unless otherwise indicated.    Follow-up and Dispositions    Return in about 6 months (around 11/14/2025) for MWV.         I have reviewed with the patient details of the assessment and plan and all questions were answered. Relevent patient education was performed. Verbal and/or written instructions (see AVS) provided. The most recent lab findings were reviewed with the patient.  Plan was discussed with patient who verbally expressed understanding.    An After Visit Summary was printed and given to the patient.    Aron Tee MD

## 2025-05-15 LAB
25(OH)D3 SERPL-MCNC: 14.4 NG/ML (ref 30–100)
ALBUMIN SERPL-MCNC: 3.4 G/DL (ref 3.5–5)
ALBUMIN/GLOB SERPL: 0.9 (ref 1.1–2.2)
ALP SERPL-CCNC: 132 U/L (ref 45–117)
ALT SERPL-CCNC: 24 U/L (ref 12–78)
ANION GAP SERPL CALC-SCNC: 5 MMOL/L (ref 2–12)
APPEARANCE UR: CLEAR
AST SERPL-CCNC: 24 U/L (ref 15–37)
BASOPHILS # BLD: 0.06 K/UL (ref 0–0.1)
BASOPHILS NFR BLD: 0.8 % (ref 0–1)
BILIRUB SERPL-MCNC: 0.3 MG/DL (ref 0.2–1)
BILIRUB UR QL: NEGATIVE
BUN SERPL-MCNC: 23 MG/DL (ref 6–20)
BUN/CREAT SERPL: 12 (ref 12–20)
CALCIUM SERPL-MCNC: 9.1 MG/DL (ref 8.5–10.1)
CHLORIDE SERPL-SCNC: 105 MMOL/L (ref 97–108)
CHOLEST SERPL-MCNC: 129 MG/DL
CK SERPL-CCNC: 46 U/L (ref 39–308)
CO2 SERPL-SCNC: 29 MMOL/L (ref 21–32)
COLOR UR: NORMAL
CREAT SERPL-MCNC: 1.95 MG/DL (ref 0.7–1.3)
DIFFERENTIAL METHOD BLD: NORMAL
EOSINOPHIL # BLD: 0.25 K/UL (ref 0–0.4)
EOSINOPHIL NFR BLD: 3.2 % (ref 0–7)
ERYTHROCYTE [DISTWIDTH] IN BLOOD BY AUTOMATED COUNT: 14.5 % (ref 11.5–14.5)
EST. AVERAGE GLUCOSE BLD GHB EST-MCNC: 117 MG/DL
GLOBULIN SER CALC-MCNC: 3.6 G/DL (ref 2–4)
GLUCOSE SERPL-MCNC: 110 MG/DL (ref 65–100)
GLUCOSE UR STRIP.AUTO-MCNC: NEGATIVE MG/DL
HBA1C MFR BLD: 5.7 % (ref 4–5.6)
HCT VFR BLD AUTO: 39 % (ref 36.6–50.3)
HDLC SERPL-MCNC: 30 MG/DL
HDLC SERPL: 4.3 (ref 0–5)
HGB BLD-MCNC: 12.9 G/DL (ref 12.1–17)
HGB UR QL STRIP: NEGATIVE
IMM GRANULOCYTES # BLD AUTO: 0.02 K/UL (ref 0–0.04)
IMM GRANULOCYTES NFR BLD AUTO: 0.3 % (ref 0–0.5)
KETONES UR QL STRIP.AUTO: NEGATIVE MG/DL
LDLC SERPL CALC-MCNC: 59.2 MG/DL (ref 0–100)
LEUKOCYTE ESTERASE UR QL STRIP.AUTO: NEGATIVE
LYMPHOCYTES # BLD: 1.62 K/UL (ref 0.8–3.5)
LYMPHOCYTES NFR BLD: 20.8 % (ref 12–49)
MCH RBC QN AUTO: 30.7 PG (ref 26–34)
MCHC RBC AUTO-ENTMCNC: 33.1 G/DL (ref 30–36.5)
MCV RBC AUTO: 92.9 FL (ref 80–99)
MONOCYTES # BLD: 0.73 K/UL (ref 0–1)
MONOCYTES NFR BLD: 9.4 % (ref 5–13)
NEUTS SEG # BLD: 5.12 K/UL (ref 1.8–8)
NEUTS SEG NFR BLD: 65.5 % (ref 32–75)
NITRITE UR QL STRIP.AUTO: NEGATIVE
NRBC # BLD: 0 K/UL (ref 0–0.01)
NRBC BLD-RTO: 0 PER 100 WBC
PH UR STRIP: 5.5 (ref 5–8)
PLATELET # BLD AUTO: 236 K/UL (ref 150–400)
PMV BLD AUTO: 11 FL (ref 8.9–12.9)
POTASSIUM SERPL-SCNC: 4.1 MMOL/L (ref 3.5–5.1)
PROT SERPL-MCNC: 7 G/DL (ref 6.4–8.2)
PROT UR STRIP-MCNC: NEGATIVE MG/DL
RBC # BLD AUTO: 4.2 M/UL (ref 4.1–5.7)
SODIUM SERPL-SCNC: 139 MMOL/L (ref 136–145)
SP GR UR REFRACTOMETRY: 1.01 (ref 1–1.03)
TRIGL SERPL-MCNC: 199 MG/DL
UROBILINOGEN UR QL STRIP.AUTO: 0.2 EU/DL (ref 0.2–1)
VLDLC SERPL CALC-MCNC: 39.8 MG/DL
WBC # BLD AUTO: 7.8 K/UL (ref 4.1–11.1)

## 2025-05-22 DIAGNOSIS — I10 PRIMARY HYPERTENSION: ICD-10-CM

## 2025-05-22 RX ORDER — AMLODIPINE BESYLATE 5 MG/1
5 TABLET ORAL DAILY
Qty: 90 TABLET | Refills: 1 | Status: SHIPPED | OUTPATIENT
Start: 2025-05-22

## 2025-05-22 NOTE — TELEPHONE ENCOUNTER
PCP: BERNIE Tee MD    Last appt: 5/14/2025    Future Appointments   Date Time Provider Department Center   11/14/2025  1:15 PM BERNIE Tee MD [facility]       Requested Prescriptions     Pending Prescriptions Disp Refills    amLODIPine (NORVASC) 5 MG tablet [Pharmacy Med Name: amLODIPine BESYLATE 5 MG TAB] 90 tablet 1     Sig: TAKE 1 TABLET BY MOUTH DAILY

## 2025-05-28 ENCOUNTER — RESULTS FOLLOW-UP (OUTPATIENT)
Facility: CLINIC | Age: 89
End: 2025-05-28

## 2025-05-28 DIAGNOSIS — E55.9 VITAMIN D DEFICIENCY: Primary | ICD-10-CM

## 2025-05-28 RX ORDER — ERGOCALCIFEROL 1.25 MG/1
50000 CAPSULE, LIQUID FILLED ORAL WEEKLY
Qty: 12 CAPSULE | Refills: 1 | Status: SHIPPED | OUTPATIENT
Start: 2025-05-28

## 2025-05-28 NOTE — TELEPHONE ENCOUNTER
Your complete blood count is normal your A1c which is an average of your glucose is excellent at 5.7%.  Your LDL cholesterol is excellent.  Your glucose is borderline at 110.  Your kidney function is stable.  Your vitamin D level is low.  Your urinalysis was clear.    Take 50,000 units of vitamin D2 weekly for 12 weeks.

## 2025-07-22 PROBLEM — I62.9 INTRACRANIAL HEMORRHAGE (HCC): Status: RESOLVED | Noted: 2021-04-18 | Resolved: 2025-07-22

## 2025-07-22 PROBLEM — I63.9 CEREBROVASCULAR ACCIDENT (CVA) DUE TO EMBOLISM (HCC): Status: RESOLVED | Noted: 2021-05-29 | Resolved: 2025-07-22

## 2025-07-23 DIAGNOSIS — F33.0 MAJOR DEPRESSIVE DISORDER, RECURRENT, MILD: ICD-10-CM

## 2025-07-24 NOTE — TELEPHONE ENCOUNTER
PCP: BERNIE Tee MD    Last appt: 5/14/2025    Future Appointments   Date Time Provider Department Center   11/14/2025  1:15 PM BERNIE Tee MD [facility]       Requested Prescriptions     Pending Prescriptions Disp Refills    DULoxetine (CYMBALTA) 30 MG extended release capsule [Pharmacy Med Name: DULoxetine HCL DR 30 MG CAPSULE] 30 capsule 3     Sig: TAKE 1 CAPSULE BY MOUTH DAILY

## 2025-07-25 RX ORDER — DULOXETIN HYDROCHLORIDE 30 MG/1
30 CAPSULE, DELAYED RELEASE ORAL DAILY
Qty: 30 CAPSULE | Refills: 3 | Status: SHIPPED | OUTPATIENT
Start: 2025-07-25

## 2025-07-30 DIAGNOSIS — F33.0 MAJOR DEPRESSIVE DISORDER, RECURRENT, MILD: ICD-10-CM

## 2025-07-30 RX ORDER — DULOXETIN HYDROCHLORIDE 30 MG/1
30 CAPSULE, DELAYED RELEASE ORAL DAILY
Qty: 30 CAPSULE | Refills: 3 | Status: SHIPPED | OUTPATIENT
Start: 2025-07-30

## 2025-08-21 DIAGNOSIS — I10 PRIMARY HYPERTENSION: ICD-10-CM

## 2025-08-22 RX ORDER — AMLODIPINE BESYLATE 5 MG/1
5 TABLET ORAL DAILY
Qty: 90 TABLET | Refills: 1 | Status: SHIPPED | OUTPATIENT
Start: 2025-08-22

## (undated) DEVICE — HANDLE LT SNAP ON ULT DURABLE LENS FOR TRUMPF ALC DISPOSABLE

## (undated) DEVICE — REM POLYHESIVE ADULT PATIENT RETURN ELECTRODE: Brand: VALLEYLAB

## (undated) DEVICE — DRAPE,CHEST,FENES,15X10,STERIL: Brand: MEDLINE

## (undated) DEVICE — MEDI-VAC NON-CONDUCTIVE SUCTION TUBING: Brand: CARDINAL HEALTH

## (undated) DEVICE — KENDALL RADIOLUCENT FOAM MONITORING ELECTRODE RECTANGULAR SHAPE: Brand: KENDALL

## (undated) DEVICE — TRAY PREP DRY W/ PREM GLV 2 APPL 6 SPNG 2 UNDPD 1 OVERWRAP

## (undated) DEVICE — SUTURE CHROMIC GUT SZ 3-0 L27IN ABSRB BRN L19MM PS-2 3/8 1638H

## (undated) DEVICE — ELECTRODE BLDE L4IN NONINSULATED EDGE

## (undated) DEVICE — (D)PREP SKN CHLRAPRP APPL 26ML -- CONVERT TO ITEM 371833

## (undated) DEVICE — SYR 10ML LUER LOK 1/5ML GRAD --

## (undated) DEVICE — SUTURE VCRL SZ 2-0 L18IN ABSRB UD CT-1 L36MM 1/2 CIR J839D

## (undated) DEVICE — X-RAY SPONGES,16 PLY: Brand: DERMACEA

## (undated) DEVICE — SUTURE CHROMIC GUT SZ 2-0 L27IN ABSRB BRN L26MM SH 1/2 CIR G123H

## (undated) DEVICE — DEVON™ KNEE AND BODY STRAP 60" X 3" (1.5 M X 7.6 CM): Brand: DEVON

## (undated) DEVICE — Device

## (undated) DEVICE — SOLUTION IRRIG 1000ML H2O STRL BLT

## (undated) DEVICE — Z DISCONTINUED PER MEDLINE LINE GAS SAMPLING O2/CO2 LNG AD 13 FT NSL W/ TBNG FILTERLINE

## (undated) DEVICE — SOLIDIFIER MEDC 1200ML -- CONVERT TO 356117

## (undated) DEVICE — STERILE POLYISOPRENE POWDER-FREE SURGICAL GLOVES: Brand: PROTEXIS

## (undated) DEVICE — SURGIFOAM SPNG SZ 100

## (undated) DEVICE — COVER,MAYO STAND,STERILE: Brand: MEDLINE

## (undated) DEVICE — FORCEPS BX L160CM DIA8MM GRSP DISECT CUP TIP NONLOCKING ROT

## (undated) DEVICE — TOWEL 4 PLY TISS 19X30 SUE WHT

## (undated) DEVICE — 3M™ STERI-DRAPE™ INSTRUMENT POUCH 1018: Brand: STERI-DRAPE™

## (undated) DEVICE — GOWN,SIRUS,NONRNF,SETINSLV,XL,20/CS: Brand: MEDLINE

## (undated) DEVICE — CONTAINER SPEC 20 ML LID NEUT BUFF FORMALIN 10 % POLYPR STS

## (undated) DEVICE — KENDALL SCD EXPRESS SLEEVES, KNEE LENGTH, MEDIUM: Brand: KENDALL SCD

## (undated) DEVICE — INFECTION CONTROL KIT SYS

## (undated) DEVICE — YANKAUER,TAPERED BULBOUS TIP,W/O VENT: Brand: MEDLINE

## (undated) DEVICE — STERILE POLYISOPRENE POWDER-FREE SURGICAL GLOVES WITH EMOLLIENT COATING: Brand: PROTEXIS

## (undated) DEVICE — SUTURE V-LOC 180 SZ 0 L12IN ABSRB GRN L37MM GS-21 1/2 CIR VLOCL0316

## (undated) DEVICE — TOWEL SURG W17XL27IN STD BLU COT NONFENESTRATED PREWASHED

## (undated) DEVICE — SLIM BODY SKIN STAPLER: Brand: APPOSE ULC

## (undated) DEVICE — SET ADMIN 16ML TBNG L100IN 2 Y INJ SITE IV PIGGY BK DISP

## (undated) DEVICE — NDL SPNE QNCKE 18GX3.5IN LF --

## (undated) DEVICE — BIPOLAR FORCEPS CORD: Brand: VALLEYLAB

## (undated) DEVICE — NEEDLE HYPO 18GA L1.5IN PNK S STL HUB POLYPR SHLD REG BVL

## (undated) DEVICE — MARS DISPOSABLE KIT, 3V

## (undated) DEVICE — KIT JACK TBL PT CARE

## (undated) DEVICE — GOWN,SIRUS,NONRNF,SETINSLV,2XL,18/CS: Brand: MEDLINE

## (undated) DEVICE — TUBING IRRIG L77IN DIA0.241IN L BOR FOR CYSTO W/ NVENT

## (undated) DEVICE — SPONGE GZ W4XL4IN COT 12 PLY TYP VII WVN C FLD DSGN

## (undated) DEVICE — SUTURE STRATAFIX SPRL MCRYL + SZ 2-0 L18IN ABSRB UD CT-1 SXMP1B413

## (undated) DEVICE — BLOCK BITE ENDOSCP AD 21 MM W/ DIL BLU LF DISP

## (undated) DEVICE — SUTURE ABSORBABLE MONOFILAMENT 2-0 8 IN ANTIBACT STRATAFIX SXMP1B409

## (undated) DEVICE — 1200 GUARD II KIT W/5MM TUBE W/O VAC TUBE: Brand: GUARDIAN

## (undated) DEVICE — Z CONVERTED USE 2107985 COVER FLROSCP W36XL28IN 4 SIDE ADH

## (undated) DEVICE — DRAPE,LAPAROTOMY,PCH,STERILE: Brand: MEDLINE

## (undated) DEVICE — NEONATAL-ADULT SPO2 SENSOR: Brand: NELLCOR

## (undated) DEVICE — 3M™ TEGADERM™ TRANSPARENT FILM DRESSING FRAME STYLE, 1626W, 4 IN X 4-3/4 IN (10 CM X 12 CM), 50/CT 4CT/CASE: Brand: 3M™ TEGADERM™

## (undated) DEVICE — ADHESIVE SKIN CLOSURE 4X22 CM PREMIERPRO EXOFINFUSION DISP

## (undated) DEVICE — CATH IV AUTOGRD BC PNK 20GA 25 -- INSYTE

## (undated) DEVICE — SYR 3ML LL TIP 1/10ML GRAD --

## (undated) DEVICE — 3000CC GUARDIAN II: Brand: GUARDIAN

## (undated) DEVICE — BASIN EMSIS 16OZ GRAPHITE PLAS KID SHP MOLD GRAD FOR ORAL

## (undated) DEVICE — BAG SPEC BIOHZRD 10 X 10 IN --

## (undated) DEVICE — PICO SINGLE USE NEGATIVE PRESSURE WOUND THERAPY SYSTEM 15CM X 30CM  6IN. X 12IN.: Brand: PICO

## (undated) DEVICE — DRAIN WND PENRS RADPQ 0.25X18 -- CONVERT TO ITEM 360112

## (undated) DEVICE — BONE MARROW KIT ASPIR 11 GA

## (undated) DEVICE — BONE WAX WHITE: Brand: BONE WAX WHITE

## (undated) DEVICE — SYR 50ML LR LCK 1ML GRAD NSAF --

## (undated) DEVICE — DRAIN SURG 10FR L1/8IN DIA3.2MM SIL CHN RND HUBLESS FULL

## (undated) DEVICE — SUTURE ABSORBABLE BRAIDED 2-0 CT-1 27 IN UD VICRYL J259H

## (undated) DEVICE — COVER,TABLE,60X90,STERILE: Brand: MEDLINE

## (undated) DEVICE — TOOL 14MH30 LEGEND 14CM 3MM: Brand: MIDAS REX ™

## (undated) DEVICE — BLADE ASSEMB CLP HAIR FINE --

## (undated) DEVICE — K-WIRE

## (undated) DEVICE — FLOSEAL MATRIX IS INDICATED IN SURGICAL PROCEDURES (OTHER THAN IN OPHTHALMIC) AS AN ADJUNCT TO HEMOSTASIS WHEN CONTROL OF BLEEDING BY LIGATURE OR CONVENTIONALPROCEDURES IS INEFFECTIVE OR IMPRACTICAL.: Brand: FLOSEAL HEMOSTATIC MATRIX

## (undated) DEVICE — SUTURE VCRL SZ 1 L18IN ABSRB VLT CT-1 L36MM 1/2 CIR J741D

## (undated) DEVICE — CONTAINER,SPECIMEN,3OZ,OR STRL: Brand: MEDLINE

## (undated) DEVICE — AEGIS 1" DISK 4MM HOLE, PEEL OPEN: Brand: MEDLINE

## (undated) DEVICE — TRAY CATH 16F URIN MTR LTX -- CONVERT TO ITEM 363111

## (undated) DEVICE — SOLUTION IV 1000ML 0.9% SOD CHL

## (undated) DEVICE — GAUZE SPONGES,12 PLY: Brand: CURITY

## (undated) DEVICE — DRAPE,LAP,CHOLE,W/TROUGHS,STERILE: Brand: MEDLINE

## (undated) DEVICE — BNDG ADH FABRIC 2X4IN ST LF --

## (undated) DEVICE — BLUNT DISSECTOR: Brand: ENDO PEANUT

## (undated) DEVICE — LAMINECTOMY RICHMOND-LF: Brand: MEDLINE INDUSTRIES, INC.